# Patient Record
Sex: FEMALE | Race: WHITE | NOT HISPANIC OR LATINO | Employment: OTHER | URBAN - METROPOLITAN AREA
[De-identification: names, ages, dates, MRNs, and addresses within clinical notes are randomized per-mention and may not be internally consistent; named-entity substitution may affect disease eponyms.]

---

## 2017-01-25 ENCOUNTER — TELEPHONE (OUTPATIENT)
Dept: PHARMACY | Facility: CLINIC | Age: 67
End: 2017-01-25

## 2017-01-25 NOTE — TELEPHONE ENCOUNTER
Patient called saying she received a letter from [facility] indicating that if she continues to use our Pharmacy for her Repatha she would have to pay full price for the medication.  I explained we don't want that to happen and I would find out where the medication needed to be sent.    She is concerned about using their mail order program, as she used them prior for Enbrel and received medication hot a number of times.     Will call patient back with more information.

## 2017-01-25 NOTE — TELEPHONE ENCOUNTER
Called patient back with information about her insurance requiring her to use mail order for Repatha.    Explained to her that I spoke with Sowmya at [facility], [phone], and she indicated that patient must convert to  Home Delivery serviced through [facility] for long term maintenance drugs.      I gave Ms. Busby the  Home Delivery phone number ([phone]) and explained that she would need to call them to obtain a price, as they would not give it to me.      We will transfer the prescription for the patient to  Home Delivery.

## 2017-01-30 ENCOUNTER — TELEPHONE (OUTPATIENT)
Dept: PHARMACY | Facility: CLINIC | Age: 67
End: 2017-01-30

## 2017-01-30 NOTE — TELEPHONE ENCOUNTER
Tried to fill Repatha today as requested by patient.     Copayment was over $1,000 as we expected.  Patient is required to use  Home Delivery.  I told her that [facility], was transferring prescription today to them at [phone].      Patient verbalized understanding.

## 2017-02-01 ENCOUNTER — TELEPHONE (OUTPATIENT)
Dept: PHARMACY | Facility: CLINIC | Age: 67
End: 2017-02-01

## 2017-02-01 NOTE — TELEPHONE ENCOUNTER
Patient called to let me know [facility] called her and set up delivery for Repatha and told her there would be a $0.00 copayment on her Repatha.      Patient was very thankful for all the help provided.  It is my pleasure to assist Ms. Busby.

## 2017-02-13 ENCOUNTER — TELEPHONE (OUTPATIENT)
Dept: PHARMACY | Facility: CLINIC | Age: 67
End: 2017-02-13

## 2017-02-13 NOTE — TELEPHONE ENCOUNTER
Patient called to notify us that [facility], [facility] had her prescription as Repatha syringes and she can't use the syringes, only the Sureclick.  I reassured Ms. Busby that the prescription we had on filed at the pharmacy was a Sureclick and one of our pharmacists would call [facility] and speak with them.    Patient verbalized understanding.

## 2017-02-28 ENCOUNTER — TELEPHONE (OUTPATIENT)
Dept: FAMILY MEDICINE | Facility: CLINIC | Age: 67
End: 2017-02-28

## 2017-02-28 DIAGNOSIS — R73.03 PREDIABETES: Primary | ICD-10-CM

## 2017-03-03 ENCOUNTER — OFFICE VISIT (OUTPATIENT)
Dept: RHEUMATOLOGY | Facility: CLINIC | Age: 67
End: 2017-03-03
Payer: MEDICARE

## 2017-03-03 VITALS
SYSTOLIC BLOOD PRESSURE: 129 MMHG | DIASTOLIC BLOOD PRESSURE: 50 MMHG | HEIGHT: 63 IN | BODY MASS INDEX: 41.61 KG/M2 | HEART RATE: 63 BPM | WEIGHT: 234.81 LBS

## 2017-03-03 DIAGNOSIS — D64.9 ANEMIA, UNSPECIFIED TYPE: ICD-10-CM

## 2017-03-03 DIAGNOSIS — E55.9 VITAMIN D DEFICIENCY: ICD-10-CM

## 2017-03-03 DIAGNOSIS — M81.0 OP (OSTEOPOROSIS): ICD-10-CM

## 2017-03-03 DIAGNOSIS — E66.01 MORBID OBESITY WITH BMI OF 40.0-44.9, ADULT: ICD-10-CM

## 2017-03-03 DIAGNOSIS — G62.9 NEUROPATHY: ICD-10-CM

## 2017-03-03 DIAGNOSIS — Z95.1 S/P CABG (CORONARY ARTERY BYPASS GRAFT): Chronic | ICD-10-CM

## 2017-03-03 DIAGNOSIS — N18.30 STAGE 3 CHRONIC KIDNEY DISEASE: ICD-10-CM

## 2017-03-03 DIAGNOSIS — L40.50 PSORIATIC ARTHRITIS: Primary | Chronic | ICD-10-CM

## 2017-03-03 DIAGNOSIS — M15.9 PRIMARY OSTEOARTHRITIS INVOLVING MULTIPLE JOINTS: ICD-10-CM

## 2017-03-03 DIAGNOSIS — D84.9 IMMUNOCOMPROMISED PATIENT: ICD-10-CM

## 2017-03-03 PROCEDURE — 96372 THER/PROPH/DIAG INJ SC/IM: CPT | Mod: PBBFAC,PO

## 2017-03-03 PROCEDURE — 99999 PR PBB SHADOW E&M-EST. PATIENT-LVL III: CPT | Mod: PBBFAC,,, | Performed by: INTERNAL MEDICINE

## 2017-03-03 PROCEDURE — 99215 OFFICE O/P EST HI 40 MIN: CPT | Mod: S$PBB,,, | Performed by: INTERNAL MEDICINE

## 2017-03-03 PROCEDURE — 99213 OFFICE O/P EST LOW 20 MIN: CPT | Mod: PBBFAC,PO | Performed by: INTERNAL MEDICINE

## 2017-03-03 RX ORDER — CHLORHEXIDINE GLUCONATE 40 MG/ML
SOLUTION TOPICAL DAILY PRN
Qty: 473 ML | Refills: 3 | Status: SHIPPED | OUTPATIENT
Start: 2017-03-03 | End: 2017-11-16

## 2017-03-03 RX ORDER — GABAPENTIN 100 MG/1
200 CAPSULE ORAL 3 TIMES DAILY PRN
Qty: 540 CAPSULE | Refills: 3 | Status: SHIPPED | OUTPATIENT
Start: 2017-03-03 | End: 2018-03-13 | Stop reason: SDUPTHER

## 2017-03-03 RX ADMIN — USTEKINUMAB 90 MG: 90 INJECTION, SOLUTION SUBCUTANEOUS at 01:03

## 2017-03-03 NOTE — PROGRESS NOTES
"Subjective:       Patient ID: Qi Ortiz is a 66 y.o. female.    Chief Complaint: Psoriatic Arthritis; polyneuropathy; and Osteoporosis    HPI   Routine fup on psoriatic arthritis; last visit was first visit with me after DR. Stauffer left. She also has OP, CAD s/p 3v CABG 9/2016, on Stelara since 3/2016.    Last visit she was doing great! She was recovering from open heart surgery. She had no synovitis or psoriasis. She had some neuropathic pain in her chest and was requesting medicine so I started gabapentin 100mg TID.     Since last visit she had cardiology fup and had some SOB in absence of evidence of CHF exacerbation so they cleared her to start cardiology rehab. She saw pulmonary and was hypotensive and had postural dizziness during appointment so she was sent to ED by EMS. She refused admission despite hgb drop from 11.3 to 7.8. At this time she was having a bleeding hemorrhoid that stopped on it's own 2/19. She took 5 days of iron one daily (3 pills made her constipated) and her strength  Returned and didn't need to follow-up. Then daughter got the flu, then she got the flu. 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  She has PCP follow-up scheduled 3/8.   4 deaths in the family this past week as well, unexpected death of nephew in his 50s.     Review of Systems   Gastrointestinal: Positive for anal bleeding (spot or two of bright red blood on the toilet paper).   Neurological: Positive for numbness (pain on left side of the chest which is a neuropathy, complete response to gabapentin).         She is having some midfoot pain all day long.   She thinks this may be weather related.   Psoriasis in remission.     Objective:   BP (!) 129/50  Pulse 63  Ht 5' 3" (1.6 m)  Wt 106.5 kg (234 lb 12.6 oz)  BMI 41.59 kg/m2     Physical Exam   Vitals reviewed.  Constitutional: She is oriented to person, place, and time and well-developed, " well-nourished, and in no distress. No distress.   HENT:   Head: Normocephalic and atraumatic.   Right Ear: External ear normal.   Left Ear: External ear normal.   Eyes: Conjunctivae are normal. Right eye exhibits no discharge. Left eye exhibits no discharge. No scleral icterus.   Neck: Neck supple.   Cardiovascular: Normal rate and regular rhythm.    Murmur heard.  Sinus arrhythmia     Pulmonary/Chest: Effort normal. No respiratory distress. She has no wheezes.   Neurological: She is alert and oriented to person, place, and time. No cranial nerve deficit. She exhibits normal muscle tone.   Skin: Skin is warm and dry. Rash (small amount of redness in the hair line anteriorly) noted. She is not diaphoretic. No erythema.     Psychiatric: Mood, memory, affect and judgment normal.   Musculoskeletal: Normal range of motion. She exhibits tenderness (r inferior medial knee joint line tenderness, no synoviits). She exhibits no edema or deformity.   No synovitis anywhere             LABS REVIEWED  Results for JOI LYON (MRN [id number]) as of 3/3/2017 12:44   Ref. Range 3/3/2017 12:25   WBC Latest Ref Range: 3.90 - 12.70 K/uL 6.95   RBC Latest Ref Range: 4.00 - 5.40 M/uL 4.65   Hemoglobin Latest Ref Range: 12.0 - 16.0 g/dL 10.5 (L)   Hematocrit Latest Ref Range: 37.0 - 48.5 % 34.8 (L)   MCV Latest Ref Range: 82 - 98 fL 75 (L)   MCH Latest Ref Range: 27.0 - 31.0 pg 22.6 (L)   MCHC Latest Ref Range: 32.0 - 36.0 % 30.2 (L)   RDW Latest Ref Range: 11.5 - 14.5 % 18.6 (H)   Platelets Latest Ref Range: 150 - 350 K/uL 291   MPV Latest Ref Range: 9.2 - 12.9 fL 9.1 (L)     Results for JOI LYON (MRN [id number]) as of 3/3/2017 15:55   Ref. Range 3/3/2017 12:25   Sodium Latest Ref Range: 136 - 145 mmol/L 142   Potassium Latest Ref Range: 3.5 - 5.1 mmol/L 4.1   Chloride Latest Ref Range: 95 - 110 mmol/L 106   CO2 Latest Ref Range: 23 - 29 mmol/L 26   Anion Gap Latest Ref Range: 8 - 16 mmol/L 10   BUN, Bld Latest Ref Range: 8 -  23 mg/dL 20   Creatinine Latest Ref Range: 0.5 - 1.4 mg/dL 1.1   eGFR if non African American Latest Ref Range: >60 mL/min/1.73 m^2 53 (A)   eGFR if African American Latest Ref Range: >60 mL/min/1.73 m^2 >60   Glucose Latest Ref Range: 70 - 110 mg/dL 100   Calcium Latest Ref Range: 8.7 - 10.5 mg/dL 9.5   Alkaline Phosphatase Latest Ref Range: 55 - 135 U/L 45 (L)   Total Protein Latest Ref Range: 6.0 - 8.4 g/dL 7.3   Albumin Latest Ref Range: 3.5 - 5.2 g/dL 3.4 (L)   Total Bilirubin Latest Ref Range: 0.1 - 1.0 mg/dL 0.4   AST Latest Ref Range: 10 - 40 U/L 23   ALT Latest Ref Range: 10 - 44 U/L 12     I personally viewed the xrays with my impressions below:  CXR 12/2016 no acute disease     Assessment:       1. Psoriatic arthritis    2. S/P CABG (coronary artery bypass graft)    3. Morbid obesity with BMI of 40.0-44.9, adult    4. Vitamin D deficiency    5. OP (osteoporosis)    6. Anemia, unspecified type    7. Neuropathy    8. Immunocompromised patient    9. Stage 3 chronic kidney disease    10. Primary osteoarthritis involving multiple joints          Psoriatic arthritis- in remission today- Failed Enbrel, intolerance to MTX, allergy to sulfa, recurrent skin infections with Humira on hibiclens, Otezla intolerance  Started Ustekinumab IL12/IL-23  On 3/2016 with 90% improvement of her skin. Ustekinumab 90mg every 12 weeks she gets it done here. Due for injection today. No clinical or lab toxicity noted.       Knee OA/multiple sites- s/p viscosupplementation 1/2016, has some pain inferior right knee joint line and takes OTC tylenol with relief     Osteopenia on drug holiday  Repeat DEXA 4/2017     Polyneuropathy- chronic, improving with gabapentin 100mg to 200mg TID. Needs refills.     Anemia- improving, due to bleeding hemorrhoids which are just now spotting. She is going to see colorectal surgeon about this.     CKD Stage III- stable, avoid NSAIDS.     S/p CABG- stable right now, following with pulmonary and  cardiology.     IC patient    Plan:     Continue taking gabapentin 200mg -200mg as needed, new RX given    Continue hibiclens refilled for her since she has had numerous soft tissue infections and ID recommended she use this product to wash with     Give Stelara today and schedule in 12 weeks    DEXA next visit    See colorectal surgeon please!    RTC in 3 months with cbc, cmp, sed rate, crp and DEXA    MB

## 2017-03-03 NOTE — MR AVS SNAPSHOT
[facility] - Rheumatology  [address] [place] [address]  Phone: [phone]  Fax: [phone]                  Qi Ortiz   3/3/2017 1:00 PM   Office Visit    Description:  Female : 1950   Provider:  Elisabeth Barragan MD   Department:  [facility] - Rheumatology           Reason for Visit     Psoriatic Arthritis     polyneuropathy     Osteoporosis           Diagnoses this Visit        Comments    Psoriatic arthritis    -  Primary     S/P CABG (coronary artery bypass graft)         Morbid obesity with BMI of 40.0-44.9, adult         Vitamin D deficiency         OP (osteoporosis)         Anemia, unspecified type         Neuropathy         Immunocompromised patient         Stage 3 chronic kidney disease                To Do List           Future Appointments        Provider Department Dept Phone    3/7/2017 1:40 PM Tono Hancock MD [facility] [phone]    3/8/2017 11:00 AM Trinidad Cleaning MD [facility] [phone]    2017 10:30 AM LABORATORY, [facility] [phone]    2017 11:00 AM [facility] [phone]    2017 11:30 AM Elisabeth Barragan MD [facility] Rheumatology [phone]      Goals (5 Years of Data)     None       These Medications        Disp Refills Start End    gabapentin (NEURONTIN) 100 MG capsule 540 capsule 3 3/3/2017 3/3/2018    Take 2 capsules (200 mg total) by mouth 3 (three) times daily as needed. - Oral    Pharmacy: [facility] Ph #: [phone]       chlorhexidine (HIBICLENS) 4 % external liquid 473 mL 3 3/3/2017     Apply topically daily as needed. - Topical (Top)    Pharmacy: [facility] Ph #: [phone]         [facility]     [facility] -  Assistance  Registered nurses in the [facility] provide clinical advisement, health education,  appointment booking, and other advisory services.  Call for this free service at [phone].             Medications           Message regarding Medications     Verify the changes and/or additions to your medication regime listed below are the same as discussed with your clinician today.  If any of these changes or additions are incorrect, please notify your healthcare provider.        START taking these NEW medications        Refills    gabapentin (NEURONTIN) 100 MG capsule 3    Sig: Take 2 capsules (200 mg total) by mouth 3 (three) times daily as needed.    Class: Print    Route: Oral    chlorhexidine (HIBICLENS) 4 % external liquid 3    Sig: Apply topically daily as needed.    Class: Print    Route: Topical (Top)           Verify that the below list of medications is an accurate representation of the medications you are currently taking.  If none reported, the list may be blank. If incorrect, please contact your healthcare provider. Carry this list with you in case of emergency.           Current Medications     aspirin 81 MG Chew Take 81 mg by mouth once daily.    chlorhexidine (HIBICLENS) 4 % external liquid Apply topically daily as needed.    clopidogrel (PLAVIX) 75 mg tablet Take 1 tablet (75 mg total) by mouth once daily. Generic ok. Take as directed by your cardiologist.    cyanocobalamin 2000 MCG tablet Take 2,000 mcg by mouth once daily.    evolocumab (REPATHA SYRINGE) 140 mg/mL Syrg Inject 1 Dose into the skin every 14 (fourteen) days.    garlic 2,000 mg Cap Take 3,000 mg by mouth once daily.     levothyroxine (SYNTHROID) 100 MCG tablet Take 1 tablet (100 mcg total) by mouth once daily. Generic ok    metoprolol succinate (TOPROL-XL) 100 MG 24 hr tablet 1 tab (100mg) in morning. Half tab (50mg) at bedtime.  Generic ok    nitroGLYCERIN (NITROSTAT) 0.4 MG SL tablet Place 1 tablet (0.4 mg total) under the tongue every 5 (five) minutes as needed for Chest pain.    pyridoxine (VITAMIN B-6) 100 MG Tab Take  "200 mg by mouth once daily.     valsartan-hydrochlorothiazide (DIOVAN-HCT) 160-12.5 mg per tablet Take 1 tablet by mouth once daily. Generic ok    vitamin D 1000 units Tab Take 185 mg by mouth once daily.    chlorhexidine (HIBICLENS) 4 % external liquid Apply topically daily as needed.    ferrous gluconate (FERGON) 325 MG Tab Take 1 tablet (325 mg total) by mouth 2 (two) times daily with meals.    furosemide (LASIX) 20 MG tablet Take 1 tablet (20 mg total) by mouth 2 (two) times daily.    gabapentin (NEURONTIN) 100 MG capsule Take 2 capsules (200 mg total) by mouth 3 (three) times daily as needed.           Clinical Reference Information           Your Vitals Were     BP Pulse Height Weight BMI    129/50 63 5' 3" (1.6 m) 106.5 kg (234 lb 12.6 oz) 41.59 kg/m2      Blood Pressure          Most Recent Value    BP  (!)  129/50      Allergies as of 3/3/2017     Celexa [Citalopram]    Clindamycin    Codeine    Crestor [Rosuvastatin]    Cytotec [Misoprostol]    Lisinopril    Magnesium Citrate    Stadol [Butorphanol Tartrate]    Vicodin [Hydrocodone-acetaminophen]    Adhesive    Aggrenox [Aspirin-dipyridamole]    Demerol [Meperidine]    Isosorbide    Kenalog [Triamcinolone Acetonide]    Medrol [Methylprednisolone]    Mobic [Meloxicam]    Morphine    Nitroglycerin    Ranexa [Ranolazine]    Sulfa (Sulfonamide Antibiotics)    Talwin [Pentazocine Lactate]    Tetracyclines    Tilade [Nedocromil]    Zetia [Ezetimibe]      Immunizations Administered on Date of Encounter - 3/3/2017     None      Orders Placed During Today's Visit     Future Labs/Procedures Expected by Expires    DXA Bone Density Spine And Hip  3/3/2017 3/3/2018      [facility] Sign-Up     Activating your [facility] account is as easy as 1-2-3!     1) Visit [email or web address], select Sign Up Now, enter this activation code and your date of birth, then select Next.  [email or web address]  Expires: 4/17/2017  1:42 PM      2) Create a username and password to use when you visit " [facility] in the future and select a security question in case you lose your password and select Next.    3) Enter your e-mail address and click Sign Up!    Additional Information  If you have questions, please e-mail [email or web address] or call [phone] to talk to our [facility] staff. Remember, [facility] is NOT to be used for urgent needs. For medical emergencies, dial 911.         Language Assistance Services     ATTENTION: Language assistance services are available, free of charge. Please call [phone].      ATENCIÓN: Si habla español, tiene a gayle disposición servicios gratuitos de asistencia lingüística. Llame al [phone].     CHÚ Ý: N?u b?n nói Ti?ng Vi?t, có các d?ch v? h? tr? ngôn ng? mi?n phí dành cho b?n. G?i s? [phone].         [facility] complies with applicable Federal civil rights laws and does not discriminate on the basis of race, color, national origin, age, disability, or sex.

## 2017-03-03 NOTE — PROGRESS NOTES
Administered 1cc of Stelara 90mg/1cc to RLQ of ABD. Patient tolerated well. No acute reaction noted to site. Patient instructed on S/S to report. Patient verbalized understanding. Patient instructed to wait 15 minutes after injection.    Lot: [id number]  Exp: 02/2018  Man : [employer]

## 2017-03-07 ENCOUNTER — OFFICE VISIT (OUTPATIENT)
Dept: CARDIOLOGY | Facility: CLINIC | Age: 67
End: 2017-03-07
Payer: MEDICARE

## 2017-03-07 VITALS
HEART RATE: 60 BPM | BODY MASS INDEX: 40.12 KG/M2 | DIASTOLIC BLOOD PRESSURE: 66 MMHG | WEIGHT: 235 LBS | SYSTOLIC BLOOD PRESSURE: 140 MMHG | HEIGHT: 64 IN

## 2017-03-07 DIAGNOSIS — I25.10 CORONARY ARTERY DISEASE, OCCLUSIVE: Chronic | ICD-10-CM

## 2017-03-07 DIAGNOSIS — I25.810: ICD-10-CM

## 2017-03-07 DIAGNOSIS — I10 ESSENTIAL HYPERTENSION: ICD-10-CM

## 2017-03-07 DIAGNOSIS — Z95.1 S/P CABG (CORONARY ARTERY BYPASS GRAFT): Chronic | ICD-10-CM

## 2017-03-07 DIAGNOSIS — R07.2 PRECORDIAL PAIN: ICD-10-CM

## 2017-03-07 DIAGNOSIS — E78.2 MIXED HYPERLIPIDEMIA: Chronic | ICD-10-CM

## 2017-03-07 DIAGNOSIS — I25.10 CORONARY ARTERY DISEASE, OCCLUSIVE: Primary | Chronic | ICD-10-CM

## 2017-03-07 PROCEDURE — 99999 PR PBB SHADOW E&M-EST. PATIENT-LVL III: CPT | Mod: PBBFAC,,, | Performed by: NUCLEAR MEDICINE

## 2017-03-07 PROCEDURE — 99213 OFFICE O/P EST LOW 20 MIN: CPT | Mod: PBBFAC | Performed by: NUCLEAR MEDICINE

## 2017-03-07 PROCEDURE — 99214 OFFICE O/P EST MOD 30 MIN: CPT | Mod: S$PBB,,, | Performed by: NUCLEAR MEDICINE

## 2017-03-07 RX ORDER — CLOPIDOGREL BISULFATE 75 MG/1
75 TABLET ORAL DAILY
Qty: 90 TABLET | Refills: 3 | Status: SHIPPED | OUTPATIENT
Start: 2017-03-07 | End: 2017-03-09 | Stop reason: SDUPTHER

## 2017-03-07 RX ORDER — VALSARTAN AND HYDROCHLOROTHIAZIDE 160; 12.5 MG/1; MG/1
1 TABLET, FILM COATED ORAL DAILY
Qty: 90 TABLET | Refills: 3 | Status: SHIPPED | OUTPATIENT
Start: 2017-03-07 | End: 2017-03-09 | Stop reason: SDUPTHER

## 2017-03-07 RX ORDER — METOPROLOL SUCCINATE 100 MG/1
TABLET, EXTENDED RELEASE ORAL
Qty: 135 TABLET | Refills: 3 | Status: SHIPPED | OUTPATIENT
Start: 2017-03-07 | End: 2017-03-09 | Stop reason: SDUPTHER

## 2017-03-07 RX ORDER — NITROGLYCERIN 0.4 MG/1
0.4 TABLET SUBLINGUAL EVERY 5 MIN PRN
Qty: 90 TABLET | Refills: 3 | Status: SHIPPED | OUTPATIENT
Start: 2017-03-07 | End: 2017-03-09 | Stop reason: SDUPTHER

## 2017-03-07 NOTE — MR AVS SNAPSHOT
[facility]  [address] [address]  Phone: [phone]  Fax: [phone]                  Qi Ortiz   3/7/2017 1:40 PM   Office Visit    Description:  Female : 1950   Provider:  Tono Hancock MD   Department:  [facility] Cardiology           Reason for Visit     Coronary Artery Disease     Hyperlipidemia           Diagnoses this Visit        Comments    Coronary artery disease, occlusive    -  Primary     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         Mixed hyperlipidemia                To Do List           Future Appointments        Provider Department Dept Phone    3/8/2017 11:00 AM Trinidad Cleaning MD [facility] [phone]    2017 10:30 AM LABORATORY, [facility] [phone]    2017 11:00 AM [facility] [phone]    2017 11:30 AM Elisabeth Barragan MD [facility] [phone]      Goals (5 Years of Data)     None      Follow-Up and Disposition     Return in about 6 months (around 2017).      [facility]     [facility] Nurse [facility] -  Assistance  Registered nurses in the [facility] provide clinical advisement, health education, appointment booking, and other advisory services.  Call for this free service at [phone].             Medications           Message regarding Medications     Verify the changes and/or additions to your medication regime listed below are the same as discussed with your clinician today.  If any of these changes or additions are incorrect, please notify your healthcare provider.             Verify that the below list of medications is an accurate representation of the medications you are currently taking.  If none reported, the list may be blank. If incorrect, please contact your healthcare provider. Carry this list with you in case of emergency.          "  Current Medications     aspirin 81 MG Chew Take 81 mg by mouth once daily.    chlorhexidine (HIBICLENS) 4 % external liquid Apply topically daily as needed.    chlorhexidine (HIBICLENS) 4 % external liquid Apply topically daily as needed.    cyanocobalamin 2000 MCG tablet Take 2,000 mcg by mouth once daily.    gabapentin (NEURONTIN) 100 MG capsule Take 2 capsules (200 mg total) by mouth 3 (three) times daily as needed.    garlic 2,000 mg Cap Take 3,000 mg by mouth once daily.     levothyroxine (SYNTHROID) 100 MCG tablet Take 1 tablet (100 mcg total) by mouth once daily. Generic ok    pyridoxine (VITAMIN B-6) 100 MG Tab Take 200 mg by mouth once daily.     vitamin D 1000 units Tab Take 185 mg by mouth once daily.    clopidogrel (PLAVIX) 75 mg tablet Take 1 tablet (75 mg total) by mouth once daily. Generic ok. Take as directed by your cardiologist.    evolocumab (REPATHA SURECLICK) 140 mg/mL PnIj Inject 140 mg into the skin every 14 (fourteen) days.    ferrous gluconate (FERGON) 325 MG Tab Take 1 tablet (325 mg total) by mouth 2 (two) times daily with meals.    furosemide (LASIX) 20 MG tablet Take 1 tablet (20 mg total) by mouth 2 (two) times daily.    metoprolol succinate (TOPROL-XL) 100 MG 24 hr tablet 1 tab (100mg) in morning. Half tab (50mg) at bedtime.  Generic ok    nitroGLYCERIN (NITROSTAT) 0.4 MG SL tablet Place 1 tablet (0.4 mg total) under the tongue every 5 (five) minutes as needed for Chest pain.    valsartan-hydrochlorothiazide (DIOVAN-HCT) 160-12.5 mg per tablet Take 1 tablet by mouth once daily. Generic ok           Clinical Reference Information           Your Vitals Were     BP Pulse Height Weight BMI    140/66 (BP Location: Left arm, Patient Position: Sitting, BP Method: Manual) 60 5' 3.5" (1.613 m) 106.6 kg (235 lb) 40.98 kg/m2      Blood Pressure          Most Recent Value    BP  (!)  140/66      Allergies as of 3/7/2017     Celexa [Citalopram]    Clindamycin    Codeine    Crestor [Rosuvastatin] "    Cytotec [Misoprostol]    Lisinopril    Magnesium Citrate    Stadol [Butorphanol Tartrate]    Vicodin [Hydrocodone-acetaminophen]    Adhesive    Aggrenox [Aspirin-dipyridamole]    Demerol [Meperidine]    Isosorbide    Kenalog [Triamcinolone Acetonide]    Medrol [Methylprednisolone]    Mobic [Meloxicam]    Morphine    Nitroglycerin    Ranexa [Ranolazine]    Sulfa (Sulfonamide Antibiotics)    Talwin [Pentazocine Lactate]    Tetracyclines    Tilade [Nedocromil]    Zetia [Ezetimibe]      Immunizations Administered on Date of Encounter - 3/7/2017     None      [facility] Sign-Up     Activating your [facility] account is as easy as 1-2-3!     1) Visit [email or web address], select Sign Up Now, enter this activation code and your date of birth, then select Next.  [email or web address]  Expires: 4/17/2017  1:42 PM      2) Create a username and password to use when you visit [facility] in the future and select a security question in case you lose your password and select Next.    3) Enter your e-mail address and click Sign Up!    Additional Information  If you have questions, please e-mail [email or web address] or call [phone] to talk to our [facility] staff. Remember, [facility] is NOT to be used for urgent needs. For medical emergencies, dial 911.         Language Assistance Services     ATTENTION: Language assistance services are available, free of charge. Please call [phone].      ATENCIÓN: Si habla español, tiene a gayle disposición servicios gratuitos de asistencia lingüística. Llame al [phone].     CHÚ Ý: N?u b?n nói Ti?ng Vi?t, có các d?ch v? h? tr? ngôn ng? mi?n phí dành cho b?n. G?i s? [phone].         [facility] complies with applicable Federal civil rights laws and does not discriminate on the basis of race, color, national origin, age, disability, or sex.

## 2017-03-07 NOTE — PROGRESS NOTES
Subjective:   Patient ID:  Qi Ortiz is a 66 y.o. female who presents for follow-up of Coronary Artery Disease (3 month followup) and Hyperlipidemia      HPI 1- CHRONIC CAD- POST CABG   2- HYPERLIPIDEMIA ON REPATHA  DOING WELL.  NO RECURRENT ANGINA. NO RECENT HOSPITALIZATIONS OR ED VISITS FOR ACS OR ADHF.  ORDINARY DAILY ACTIVITIES WELL TOLERATED. NO ORTHOPNEA OR PND  NO EDEMA. NO INTERMITTENT CLAUDICATION. NO CALVE TENDERNESS  NO PALPITATIONS. NO SYNCOPE  NO FOCAL CNS SYMPTOMS OR SIGNS TO SUGGEST TIA OR STROKE  CARD MED WELL TOLERATED  REPATHA - NO SIDE EFFECTS    Review of Systems   Constitution: Negative for chills, fever, weakness, night sweats, weight gain and weight loss.   HENT: Negative for headaches and nosebleeds.    Eyes: Negative for blurred vision, double vision and visual disturbance.   Cardiovascular: Negative for chest pain, dyspnea on exertion, irregular heartbeat, leg swelling, orthopnea, palpitations, paroxysmal nocturnal dyspnea and syncope.   Respiratory: Negative for cough, hemoptysis and wheezing.    Endocrine: Negative for polydipsia and polyuria.   Hematologic/Lymphatic: Does not bruise/bleed easily.   Skin: Negative for rash.   Musculoskeletal: Negative for joint pain, joint swelling, muscle weakness and myalgias.   Gastrointestinal: Negative for abdominal pain, hematemesis, jaundice and melena.   Genitourinary: Negative for dysuria, hematuria and nocturia.   Neurological: Negative for dizziness, focal weakness and sensory change.   Psychiatric/Behavioral: Negative for depression. The patient does not have insomnia and is not nervous/anxious.      Family History   Problem Relation Age of Onset    Breast cancer Maternal Grandfather     Breast cancer Paternal Aunt     Stroke      Breast cancer Sister 60    Leukemia Sister 8      as child    Lung cancer Paternal Grandfather     Heart disease       Past Medical History:   Diagnosis Date    Carotid stenosis     19%    COPD  (chronic obstructive pulmonary disease)     No meds    CVA (cerebral vascular accident)     Dr. 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 Hope    Psoriatic arthritis     Rheumatology     Current Outpatient Prescriptions on File Prior to Visit   Medication Sig Dispense Refill    aspirin 81 MG Chew Take 81 mg by mouth once daily.      chlorhexidine (HIBICLENS) 4 % external liquid Apply topically daily as needed. 120 mL 0    chlorhexidine (HIBICLENS) 4 % external liquid Apply topically daily as needed. 473 mL 3    cyanocobalamin 2000 MCG tablet Take 2,000 mcg by mouth once daily.      gabapentin (NEURONTIN) 100 MG capsule Take 2 capsules (200 mg total) by mouth 3 (three) times daily as needed. 540 capsule 3    garlic 2,000 mg Cap Take 3,000 mg by mouth once daily.       levothyroxine (SYNTHROID) 100 MCG tablet Take 1 tablet (100 mcg total) by mouth once daily. Generic ok 90 tablet 3    pyridoxine (VITAMIN B-6) 100 MG Tab Take 200 mg by mouth once daily.       vitamin D 1000 units Tab Take 185 mg by mouth once daily.      [DISCONTINUED] clopidogrel (PLAVIX) 75 mg tablet Take 1 tablet (75 mg total) by mouth once daily. Generic ok. Take as directed by your cardiologist. 90 tablet 3    [DISCONTINUED] evolocumab (REPATHA SYRINGE) 140 mg/mL Syrg Inject 1 Dose into the skin every 14 (fourteen) days. 6 Syringe 3    [DISCONTINUED] metoprolol succinate (TOPROL-XL) 100 MG 24 hr tablet 1 tab (100mg) in morning. Half tab (50mg) at bedtime.  Generic ok 135 tablet 3    [DISCONTINUED] valsartan-hydrochlorothiazide (DIOVAN-HCT) 160-12.5 mg per tablet Take 1 tablet by mouth once daily. Generic ok 90 tablet 3    ferrous gluconate (FERGON) 325 MG Tab Take 1 tablet (325 mg total) by mouth 2 (two) times daily with meals. 60 tablet 0    furosemide (LASIX) 20 MG tablet Take 1 tablet (20 mg total) by  mouth 2 (two) times daily. (Patient taking differently: Take 20 mg by mouth once daily. ) 60 tablet 11    [DISCONTINUED] nitroGLYCERIN (NITROSTAT) 0.4 MG SL tablet Place 1 tablet (0.4 mg total) under the tongue every 5 (five) minutes as needed for Chest pain. 60 tablet 12     No current facility-administered medications on file prior to visit.      Review of patient's allergies indicates:   Allergen Reactions    Celexa [citalopram] Anaphylaxis    Clindamycin Itching and Swelling    Codeine Shortness Of Breath, Itching and Swelling    Crestor [rosuvastatin] Anaphylaxis    Cytotec [misoprostol] Anaphylaxis and Other (See Comments)     Difficulty breathing    Lisinopril Anaphylaxis    Magnesium citrate Shortness Of Breath    Stadol [butorphanol tartrate] Anaphylaxis     Coded    Vicodin [hydrocodone-acetaminophen] Shortness Of Breath    Adhesive      EKG Electrodes    Aggrenox [aspirin-dipyridamole] Other (See Comments)     headaches    Demerol [meperidine]     Isosorbide Other (See Comments)     Severe headache    Kenalog [triamcinolone acetonide]     Medrol [methylprednisolone] Other (See Comments)     unknown    Mobic [meloxicam]     Morphine     Nitroglycerin      Long acting    Ranexa [ranolazine] Nausea And Vomiting    Sulfa (sulfonamide antibiotics) Other (See Comments)     unknown    Talwin [pentazocine lactate]     Tetracyclines     Tilade [nedocromil]     Zetia [ezetimibe]        Objective:     Physical Exam   Constitutional: She is oriented to person, place, and time. She appears well-developed. No distress.   HENT:   Head: Normocephalic.   Eyes: Conjunctivae are normal. Pupils are equal, round, and reactive to light. No scleral icterus.   Neck: Normal range of motion. Neck supple. Normal carotid pulses, no hepatojugular reflux and no JVD present. Carotid bruit is not present. No edema present. No thyroid mass and no thyromegaly present.   Cardiovascular: Normal rate, regular rhythm,  S1 normal, S2 normal, normal heart sounds and intact distal pulses.  PMI is not displaced.  Exam reveals no gallop and no friction rub.    No murmur heard.  Pulses:       Carotid pulses are 2+ on the right side, and 2+ on the left side.       Radial pulses are 2+ on the right side, and 2+ on the left side.        Femoral pulses are 2+ on the right side, and 2+ on the left side.       Popliteal pulses are 2+ on the right side, and 2+ on the left side.        Dorsalis pedis pulses are 2+ on the right side, and 2+ on the left side.        Posterior tibial pulses are 2+ on the right side, and 2+ on the left side.   Pulmonary/Chest: Effort normal and breath sounds normal. She has no wheezes. She has no rales. She exhibits no tenderness.   Abdominal: Soft. Bowel sounds are normal. She exhibits no pulsatile midline mass and no mass. There is no hepatosplenomegaly. There is no tenderness.   Musculoskeletal: Normal range of motion. She exhibits no edema or tenderness.        Cervical back: Normal.        Thoracic back: Normal.        Lumbar back: Normal.   Lymphadenopathy:     She has no cervical adenopathy.     She has no axillary adenopathy.        Right: No supraclavicular adenopathy present.        Left: No supraclavicular adenopathy present.   Neurological: She is alert and oriented to person, place, and time. She has normal strength and normal reflexes. No sensory deficit. Gait normal.   Skin: Skin is warm. No rash noted. No cyanosis. No pallor. Nails show no clubbing.   Psychiatric: She has a normal mood and affect. Her speech is normal and behavior is normal. Cognition and memory are normal.       Assessment:     1. Coronary artery disease, occlusive    2. 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    3. Mixed hyperlipidemia      STABLE CAD/ ANGINA PATTERN.  RECENT LAB RESULTS WERE REVIEWED AND DISCUSSED- LIPIDS AT GOAL  CNS STATUS STABLE  CARD MED WELL TOLERATED  Plan:     Coronary artery  disease, occlusive    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    Mixed hyperlipidemia    1- CONTINUE PRESENT CARD MED    2- RETURN IN 6 MONTHS.

## 2017-03-08 ENCOUNTER — HOSPITAL ENCOUNTER (OUTPATIENT)
Dept: RADIOLOGY | Facility: HOSPITAL | Age: 67
Discharge: HOME | End: 2017-03-08
Attending: FAMILY MEDICINE
Payer: MEDICARE

## 2017-03-08 ENCOUNTER — OFFICE VISIT (OUTPATIENT)
Dept: FAMILY MEDICINE | Facility: CLINIC | Age: 67
End: 2017-03-08
Payer: MEDICARE

## 2017-03-08 VITALS
HEIGHT: 64 IN | RESPIRATION RATE: 15 BRPM | WEIGHT: 237.88 LBS | TEMPERATURE: 97 F | SYSTOLIC BLOOD PRESSURE: 136 MMHG | BODY MASS INDEX: 40.61 KG/M2 | HEART RATE: 60 BPM | DIASTOLIC BLOOD PRESSURE: 70 MMHG | OXYGEN SATURATION: 98 %

## 2017-03-08 DIAGNOSIS — E78.5 HYPERLIPIDEMIA, UNSPECIFIED HYPERLIPIDEMIA TYPE: ICD-10-CM

## 2017-03-08 DIAGNOSIS — L40.50 PSORIATIC ARTHRITIS: ICD-10-CM

## 2017-03-08 DIAGNOSIS — E66.01 MORBID OBESITY WITH BMI OF 40.0-44.9, ADULT: ICD-10-CM

## 2017-03-08 DIAGNOSIS — E03.9 HYPOTHYROIDISM, UNSPECIFIED TYPE: ICD-10-CM

## 2017-03-08 DIAGNOSIS — Z12.11 COLON CANCER SCREENING: ICD-10-CM

## 2017-03-08 DIAGNOSIS — N18.30 STAGE 3 CHRONIC KIDNEY DISEASE: ICD-10-CM

## 2017-03-08 DIAGNOSIS — Z12.31 VISIT FOR SCREENING MAMMOGRAM: ICD-10-CM

## 2017-03-08 DIAGNOSIS — I25.10 CORONARY ARTERY DISEASE, OCCLUSIVE: ICD-10-CM

## 2017-03-08 DIAGNOSIS — R73.03 PREDIABETES: Primary | ICD-10-CM

## 2017-03-08 DIAGNOSIS — D64.9 ANEMIA, UNSPECIFIED: ICD-10-CM

## 2017-03-08 DIAGNOSIS — I10 ESSENTIAL HYPERTENSION: ICD-10-CM

## 2017-03-08 DIAGNOSIS — M25.561 RIGHT KNEE PAIN, UNSPECIFIED CHRONICITY: ICD-10-CM

## 2017-03-08 DIAGNOSIS — Z95.1 S/P CABG (CORONARY ARTERY BYPASS GRAFT): ICD-10-CM

## 2017-03-08 PROCEDURE — 73560 X-RAY EXAM OF KNEE 1 OR 2: CPT | Mod: TC,59,PO,LT

## 2017-03-08 PROCEDURE — 73562 X-RAY EXAM OF KNEE 3: CPT | Mod: 26,RT,, | Performed by: RADIOLOGY

## 2017-03-08 PROCEDURE — 99214 OFFICE O/P EST MOD 30 MIN: CPT | Mod: S$PBB,,, | Performed by: FAMILY MEDICINE

## 2017-03-08 PROCEDURE — 73560 X-RAY EXAM OF KNEE 1 OR 2: CPT | Mod: 26,59,LT, | Performed by: RADIOLOGY

## 2017-03-08 PROCEDURE — 99999 PR PBB SHADOW E&M-EST. PATIENT-LVL IV: CPT | Mod: PBBFAC,,, | Performed by: FAMILY MEDICINE

## 2017-03-08 RX ORDER — LEVOTHYROXINE SODIUM 100 UG/1
100 TABLET ORAL DAILY
Qty: 90 TABLET | Refills: 1 | Status: SHIPPED | OUTPATIENT
Start: 2017-03-08 | End: 2017-09-18

## 2017-03-08 NOTE — PROGRESS NOTES
Subjective:       Patient ID: Qi Ortiz is a 66 y.o. female.    Chief Complaint: Chronic Care    HPI   Chronic Care  65yo female presents today for chronic care assessment. Since her last visit she has undergone CABG after findings of occlusive CAD. She reports she has been doing well and is without any associated CP, BROOKS or palpitations. Most recent assessment with Cardiology was yesterday and no adjustments were made to her medication regimen. Psoriatic arthritis has been stable and she is followed by Rheumatology routinely. She has started Stelara treatments. Updated lab assessment demonstrates stable A1c at 6.0. There are no symptoms of hyper or hypoglycemia reported. Diet has admittedly been variable. Current TFT levels are stable with TSH of 1.567 and free T4 of 1.07 on Synthroid 100mcg daily. She has been experiencing hemorrhoidal bleeding and is planning to see GI for updated C scope. No change in bowel habits is reported. Right knee pain has been present for the past week. She reports crepitus and audible popping to the patella with range of motion. There have been no falls or injuries. She has tried no measures for relief. Updated health maintenance is needed.     Review of Systems   Constitutional: Negative for appetite change, fatigue and unexpected weight change.   HENT: Negative for congestion, ear pain, rhinorrhea and sore throat.    Eyes: Negative for redness, itching and visual disturbance.   Respiratory: Negative for chest tightness and shortness of breath.    Cardiovascular: Negative for chest pain, palpitations and leg swelling.   Gastrointestinal: Negative for abdominal pain, blood in stool, constipation, diarrhea, nausea and vomiting.        +hemorrhoids   Endocrine: Negative for cold intolerance, heat intolerance, polydipsia, polyphagia and polyuria.   Genitourinary: Negative for decreased urine volume, difficulty urinating and dysuria.   Musculoskeletal: Positive for arthralgias.  "Negative for myalgias.   Skin: Negative for pallor and rash.   Allergic/Immunologic: Negative for environmental allergies.   Neurological: Negative for dizziness, weakness and numbness.   Hematological: Bruises/bleeds easily.   Psychiatric/Behavioral: Negative for dysphoric mood and sleep disturbance. The patient is not nervous/anxious.        Objective:   /70  Pulse 60  Temp 96.7 °F (35.9 °C) (Temporal)   Resp 15  Ht 5' 3.5" (1.613 m)  Wt 107.9 kg (237 lb 14 oz)  SpO2 98%  BMI 41.48 kg/m2  Physical Exam   Constitutional: She is oriented to person, place, and time. She appears well-developed. No distress.   Morbidly obese, non-toxic   HENT:   Head: Normocephalic and atraumatic.   Right Ear: External ear normal.   Left Ear: External ear normal.   Nose: Nose normal.   Mouth/Throat: Oropharynx is clear and moist.   Eyes: Conjunctivae and EOM are normal. Pupils are equal, round, and reactive to light.   Neck: Normal range of motion. Neck supple.   Cardiovascular: Normal rate, regular rhythm and normal heart sounds.    Pulmonary/Chest: Effort normal and breath sounds normal.   Abdominal: Soft. Bowel sounds are normal.   Musculoskeletal: She exhibits no edema.        Right knee: She exhibits normal range of motion, no swelling, no effusion and no bony tenderness. No tenderness found. No medial joint line, no lateral joint line and no patellar tendon tenderness noted.   +crepitus on ROM of the right knee   Neurological: She is alert and oriented to person, place, and time.   Skin: Skin is warm and dry. She is not diaphoretic.   Psychiatric: She has a normal mood and affect. Her behavior is normal.       Assessment:       1. Prediabetes    2. Essential hypertension    3. Hypothyroidism, unspecified type    4. Right knee pain, unspecified chronicity    5. Anemia, unspecified    6. Hyperlipidemia, unspecified hyperlipidemia type    7. Coronary artery disease, occlusive    8. Psoriatic arthritis    9. Stage 3 " chronic kidney disease    10. Morbid obesity with BMI of 40.0-44.9, adult    11. S/P CABG (coronary artery bypass graft)    12. Visit for screening mammogram    13. Colon cancer screening        Plan:   Prediabetes  Stable. Advised need for dietary modification and weight loss. Recommend recheck in 6 months.  -     Hemoglobin A1c; Future; Expected date: 3/10/17    Essential hypertension  Stable. Continuation of current measures is advised. Target BP <140/90.  -     Basic metabolic panel; Future; Expected date: 3/10/17    Hypothyroidism, unspecified type  Stable TFT's. Continue with Synthroid at current dosing. Recheck in 6 months.  -     levothyroxine (SYNTHROID) 100 MCG tablet; Take 1 tablet (100 mcg total) by mouth once daily. Generic ok  Dispense: 90 tablet; Refill: 1  -     TSH; Future; Expected date: 3/8/17  -     T4, free; Future; Expected date: 3/8/17  -     TSH; Future; Expected date: 3/10/17  -     T4, free; Future; Expected date: 3/10/17    Right knee pain, unspecified chronicity  Mild degenerative changes noted. Consider Ortho evaluation.  -     X-ray Knee Ortho Right; Future; Expected date: 3/8/17    Anemia, unspecified  Have strongly advised seeing GI for C scope and further assessment with regard to hemorrhoids.  -     CBC auto differential; Future; Expected date: 3/8/17    Hyperlipidemia, unspecified hyperlipidemia type  Stable. Continue with current treatment in combination with dietary measures.    Coronary artery disease, occlusive  Stable. Notes from most recent Cardiology visit have been reviewed with the patient in office today. She is advised to keep all future follow-up appointments.    Psoriatic arthritis  Stable. Continue with Stelara as per Rheumatology. Notes from her most recent visit were reviewed with the patient in office today.    Stage 3 chronic kidney disease  Stable. Recommend BP control and avoidance of NSAID use. Will assess updated BMP prior to next visit.    Morbid obesity with  BMI of 40.0-44.9, adult  Weight loss efforts have been encouraged as above.    S/P CABG (coronary artery bypass graft)  As per Cardiology.    Visit for screening mammogram  -     Mammo Digital Screening Bilat with CAD; Future; Expected date: 3/8/17    Colon cancer screening  See GI as above.    RTC in 6 months with labs prior to the visit.

## 2017-03-08 NOTE — PROGRESS NOTES
Patient states she wants to wait to do mammogram/  Because of open heart surgery sept 2016.  Cayla Romeo LPN

## 2017-03-08 NOTE — MR AVS SNAPSHOT
[facility] Medicine  [address]  [facility] [address]  Phone: [phone]  Fax: [phone]                  Qi Ortiz   3/8/2017 11:00 AM   Office Visit    Description:  Female : 1950   Provider:  Trinidad Cleaning MD   Department:  [facility]           Reason for Visit     Chronic Care           Diagnoses this Visit        Comments    Prediabetes    -  Primary     Essential hypertension         Hypothyroidism, unspecified type         Right knee pain, unspecified chronicity         Anemia, unspecified         Hyperlipidemia, unspecified hyperlipidemia type         Coronary artery disease, occlusive         Psoriatic arthritis         Stage 3 chronic kidney disease         Morbid obesity with BMI of 40.0-44.9, adult         S/P CABG (coronary artery bypass graft)         Visit for screening mammogram         Colon cancer screening                To Do List           Future Appointments        Provider Department Dept Phone    3/8/2017 1:30 PM [facility] XR1 [facility] [phone]    3/8/2017 1:50 PM LAB[facility]ATORY, [place] [facility]     2017 10:30 AM [facility]ATORY, [place] [facility] [phone]    2017 11:00 AM [facility] BMD1 [facility] [phone]    2017 11:30 AM Elisabeth Barragan MD [facility] Rheumatology [phone]      Goals (5 Years of Data)     None       These Medications        Disp Refills Start End    levothyroxine (SYNTHROID) 100 MCG tablet 90 tablet 1 3/8/2017     Take 1 tablet (100 mcg total) by mouth once daily. Generic ok - Oral    Pharmacy: [facility] Ph #: [phone]         [facility] On Call     [facility] On [facility] Nurse Care Line -  Assistance  Registered nurses in the [facility] Center provide clinical advisement, health education, appointment booking, and other advisory services.  Call for this  free service at [phone].             Medications           Message regarding Medications     Verify the changes and/or additions to your medication regime listed below are the same as discussed with your clinician today.  If any of these changes or additions are incorrect, please notify your healthcare provider.             Verify that the below list of medications is an accurate representation of the medications you are currently taking.  If none reported, the list may be blank. If incorrect, please contact your healthcare provider. Carry this list with you in case of emergency.           Current Medications     aspirin 81 MG Chew Take 81 mg by mouth once daily.    chlorhexidine (HIBICLENS) 4 % external liquid Apply topically daily as needed.    chlorhexidine (HIBICLENS) 4 % external liquid Apply topically daily as needed.    clopidogrel (PLAVIX) 75 mg tablet Take 1 tablet (75 mg total) by mouth once daily. Generic ok. Take as directed by your cardiologist.    cyanocobalamin 2000 MCG tablet Take 2,000 mcg by mouth once daily.    evolocumab (REPATHA SURECLICK) 140 mg/mL PnIj Inject 140 mg into the skin every 14 (fourteen) days.    ferrous gluconate (FERGON) 325 MG Tab Take 1 tablet (325 mg total) by mouth 2 (two) times daily with meals.    furosemide (LASIX) 20 MG tablet Take 1 tablet (20 mg total) by mouth 2 (two) times daily.    gabapentin (NEURONTIN) 100 MG capsule Take 2 capsules (200 mg total) by mouth 3 (three) times daily as needed.    garlic 2,000 mg Cap Take 3,000 mg by mouth once daily.     levothyroxine (SYNTHROID) 100 MCG tablet Take 1 tablet (100 mcg total) by mouth once daily. Generic ok    metoprolol succinate (TOPROL-XL) 100 MG 24 hr tablet 1 tab (100mg) in morning. Half tab (50mg) at bedtime.  Generic ok    nitroGLYCERIN (NITROSTAT) 0.4 MG SL tablet Place 1 tablet (0.4 mg total) under the tongue every 5 (five) minutes as needed for Chest pain.    pyridoxine (VITAMIN B-6) 100 MG Tab Take 200  "mg by mouth once daily.     valsartan-hydrochlorothiazide (DIOVAN-HCT) 160-12.5 mg per tablet Take 1 tablet by mouth once daily. Generic ok    vitamin D 1000 units Tab Take 185 mg by mouth once daily.           Clinical Reference Information           Your Vitals Were     BP Pulse Temp Resp Height Weight    136/70 60 96.7 °F (35.9 °C) (Temporal) 15 5' 3.5" (1.613 m) 107.9 kg (237 lb 14 oz)    SpO2 BMI             98% 41.48 kg/m2         Blood Pressure          Most Recent Value    BP  136/70      Allergies as of 3/8/2017     Celexa [Citalopram]    Clindamycin    Codeine    Crestor [Rosuvastatin]    Cytotec [Misoprostol]    Lisinopril    Magnesium Citrate    Stadol [Butorphanol Tartrate]    Vicodin [Hydrocodone-acetaminophen]    Adhesive    Aggrenox [Aspirin-dipyridamole]    Demerol [Meperidine]    Isosorbide    Kenalog [Triamcinolone Acetonide]    Medrol [Methylprednisolone]    Mobic [Meloxicam]    Morphine    Nitroglycerin    Ranexa [Ranolazine]    Sulfa (Sulfonamide Antibiotics)    Talwin [Pentazocine Lactate]    Tetracyclines    Tilade [Nedocromil]    Zetia [Ezetimibe]      Immunizations Administered on Date of Encounter - 3/8/2017     None      Orders Placed During Today's Visit     Future Labs/Procedures Expected by Expires    CBC auto differential  3/8/2017 5/7/2018    Mammo Digital Screening Bilat with CAD  3/8/2017 5/8/2018    T4, free  3/8/2017 5/7/2018    TSH  3/8/2017 5/7/2018    X-ray Knee Ortho Right  3/8/2017 3/8/2018      [facility] Sign-Up     Activating your [facility] account is as easy as 1-2-3!     1) Visit [email or web address], select Sign Up Now, enter this activation code and your date of birth, then select Next.  [email or web address]  Expires: 4/17/2017  1:42 PM      2) Create a username and password to use when you visit [facility] in the future and select a security question in case you lose your password and select Next.    3) Enter your e-mail address and click Sign Up!    Additional Information  If " you have questions, please e-mail [email or web address] or call [phone] to talk to our [facility] staff. Remember, [facility] is NOT to be used for urgent needs. For medical emergencies, dial 911.         Language Assistance Services     ATTENTION: Language assistance services are available, free of charge. Please call [phone].      ATENCIÓN: Si habla español, tiene a gayle disposición servicios gratuitos de asistencia lingüística. Llame al [phone].     CHÚ Ý: N?u b?n nói Ti?ng Vi?t, có các d?ch v? h? tr? ngôn ng? mi?n phí dành cho b?n. G?i s? [phone].         [facility] complies with applicable Federal civil rights laws and does not discriminate on the basis of race, color, national origin, age, disability, or sex.

## 2017-03-09 ENCOUNTER — TELEPHONE (OUTPATIENT)
Dept: CARDIOLOGY | Facility: CLINIC | Age: 67
End: 2017-03-09

## 2017-03-09 DIAGNOSIS — I10 ESSENTIAL HYPERTENSION: ICD-10-CM

## 2017-03-09 DIAGNOSIS — R07.2 PRECORDIAL PAIN: ICD-10-CM

## 2017-03-09 RX ORDER — METOPROLOL SUCCINATE 100 MG/1
TABLET, EXTENDED RELEASE ORAL
Qty: 135 TABLET | Refills: 3 | Status: SHIPPED | OUTPATIENT
Start: 2017-03-09 | End: 2018-03-15 | Stop reason: SDUPTHER

## 2017-03-09 RX ORDER — VALSARTAN AND HYDROCHLOROTHIAZIDE 160; 12.5 MG/1; MG/1
1 TABLET, FILM COATED ORAL DAILY
Qty: 90 TABLET | Refills: 3 | Status: ON HOLD | OUTPATIENT
Start: 2017-03-09 | End: 2017-12-20 | Stop reason: HOSPADM

## 2017-03-09 RX ORDER — NITROGLYCERIN 0.4 MG/1
0.4 TABLET SUBLINGUAL EVERY 5 MIN PRN
Qty: 90 TABLET | Refills: 3 | Status: SHIPPED | OUTPATIENT
Start: 2017-03-09 | End: 2018-03-15 | Stop reason: SDUPTHER

## 2017-03-09 RX ORDER — CLOPIDOGREL BISULFATE 75 MG/1
75 TABLET ORAL DAILY
Qty: 90 TABLET | Refills: 3 | Status: SHIPPED | OUTPATIENT
Start: 2017-03-09 | End: 2018-03-15 | Stop reason: SDUPTHER

## 2017-03-09 NOTE — TELEPHONE ENCOUNTER
Qi Ortiz   3/7/2017 1:40 PM   Refill   MRN:  [id number]    Department: [facility] Cardiology   Dept Phone: [phone]    Description: Female : 1950   Provider: Tono Hancock MD         Medication    evolocumab (REPATHA SURECLICK) 140 mg/mL PnIj [[id number]]         Medication Detail         Disp Refills Start End SALVADOR     evolocumab (REPATHA SURECLICK) 140 mg/mL PnIj 6 Syringe 3 3/7/2017  --     Sig - Route: Inject 140 mg into the skin every 14 (fourteen) days. - Subcutaneous     Class: Normal     Order: [id number]     Date/Time Signed: 3/7/2017  2:07 PM         E-Prescribing Status: Receipt confirmed by pharmacy (3/7/2017  2:07 PM CST)       Pharmacy      [facility] - [address]

## 2017-03-09 NOTE — TELEPHONE ENCOUNTER
----- Message from Stefany Redd sent at 3/9/2017  3:12 PM CST -----  Patient returned your call.   Call her at [phone].                                           [email or web address]

## 2017-03-10 ENCOUNTER — TELEPHONE (OUTPATIENT)
Dept: FAMILY MEDICINE | Facility: CLINIC | Age: 67
End: 2017-03-10

## 2017-06-07 ENCOUNTER — OFFICE VISIT (OUTPATIENT)
Dept: RHEUMATOLOGY | Facility: CLINIC | Age: 67
End: 2017-06-07
Payer: MEDICARE

## 2017-06-07 ENCOUNTER — LAB VISIT (OUTPATIENT)
Dept: LAB | Facility: HOSPITAL | Age: 67
End: 2017-06-07
Attending: INTERNAL MEDICINE
Payer: MEDICARE

## 2017-06-07 VITALS
DIASTOLIC BLOOD PRESSURE: 75 MMHG | HEART RATE: 71 BPM | WEIGHT: 241.19 LBS | SYSTOLIC BLOOD PRESSURE: 130 MMHG | BODY MASS INDEX: 42.73 KG/M2 | HEIGHT: 63 IN

## 2017-06-07 DIAGNOSIS — G62.9 POLYNEUROPATHY: ICD-10-CM

## 2017-06-07 DIAGNOSIS — L40.50 PSORIATIC ARTHRITIS: Chronic | ICD-10-CM

## 2017-06-07 DIAGNOSIS — N18.30 STAGE 3 CHRONIC KIDNEY DISEASE: ICD-10-CM

## 2017-06-07 DIAGNOSIS — L40.50 PSORIATIC ARTHRITIS: Primary | ICD-10-CM

## 2017-06-07 DIAGNOSIS — M81.0 OSTEOPOROSIS, UNSPECIFIED OSTEOPOROSIS TYPE, UNSPECIFIED PATHOLOGICAL FRACTURE PRESENCE: ICD-10-CM

## 2017-06-07 PROBLEM — M85.89 OSTEOPENIA OF MULTIPLE SITES: Status: ACTIVE | Noted: 2017-06-07

## 2017-06-07 LAB
ALBUMIN SERPL BCP-MCNC: 3.3 G/DL
ALP SERPL-CCNC: 50 U/L
ALT SERPL W/O P-5'-P-CCNC: 12 U/L
ANION GAP SERPL CALC-SCNC: 9 MMOL/L
AST SERPL-CCNC: 18 U/L
BASOPHILS # BLD AUTO: 0.04 K/UL
BASOPHILS NFR BLD: 0.6 %
BILIRUB SERPL-MCNC: 0.2 MG/DL
BUN SERPL-MCNC: 17 MG/DL
CALCIUM SERPL-MCNC: 9.6 MG/DL
CHLORIDE SERPL-SCNC: 105 MMOL/L
CO2 SERPL-SCNC: 29 MMOL/L
CREAT SERPL-MCNC: 1.1 MG/DL
CRP SERPL-MCNC: 10.7 MG/L
DIFFERENTIAL METHOD: ABNORMAL
EOSINOPHIL # BLD AUTO: 0.2 K/UL
EOSINOPHIL NFR BLD: 2.6 %
ERYTHROCYTE [DISTWIDTH] IN BLOOD BY AUTOMATED COUNT: 18.6 %
ERYTHROCYTE [SEDIMENTATION RATE] IN BLOOD BY WESTERGREN METHOD: 23 MM/HR
EST. GFR  (AFRICAN AMERICAN): >60 ML/MIN/1.73 M^2
EST. GFR  (NON AFRICAN AMERICAN): 52 ML/MIN/1.73 M^2
GLUCOSE SERPL-MCNC: 139 MG/DL
HCT VFR BLD AUTO: 36 %
HGB BLD-MCNC: 10.9 G/DL
LYMPHOCYTES # BLD AUTO: 2.1 K/UL
LYMPHOCYTES NFR BLD: 29.3 %
MCH RBC QN AUTO: 23.6 PG
MCHC RBC AUTO-ENTMCNC: 30.3 %
MCV RBC AUTO: 78 FL
MONOCYTES # BLD AUTO: 0.4 K/UL
MONOCYTES NFR BLD: 5.6 %
NEUTROPHILS # BLD AUTO: 4.5 K/UL
NEUTROPHILS NFR BLD: 61.9 %
PLATELET # BLD AUTO: 321 K/UL
PMV BLD AUTO: 9.3 FL
POTASSIUM SERPL-SCNC: 4 MMOL/L
PROT SERPL-MCNC: 7.3 G/DL
RBC # BLD AUTO: 4.62 M/UL
SODIUM SERPL-SCNC: 143 MMOL/L
WBC # BLD AUTO: 7.26 K/UL

## 2017-06-07 PROCEDURE — 99999 PR PBB SHADOW E&M-EST. PATIENT-LVL IV: CPT | Mod: PBBFAC,,, | Performed by: INTERNAL MEDICINE

## 2017-06-07 PROCEDURE — 1125F AMNT PAIN NOTED PAIN PRSNT: CPT | Mod: ,,, | Performed by: INTERNAL MEDICINE

## 2017-06-07 PROCEDURE — 1159F MED LIST DOCD IN RCRD: CPT | Mod: ,,, | Performed by: INTERNAL MEDICINE

## 2017-06-07 PROCEDURE — 99214 OFFICE O/P EST MOD 30 MIN: CPT | Mod: S$PBB,,, | Performed by: INTERNAL MEDICINE

## 2017-06-07 PROCEDURE — 99214 OFFICE O/P EST MOD 30 MIN: CPT | Mod: PBBFAC,25,PO | Performed by: INTERNAL MEDICINE

## 2017-06-07 PROCEDURE — 96372 THER/PROPH/DIAG INJ SC/IM: CPT | Mod: PBBFAC,PO

## 2017-06-07 RX ADMIN — USTEKINUMAB 90 MG: 90 INJECTION, SOLUTION SUBCUTANEOUS at 12:06

## 2017-06-07 NOTE — PROGRESS NOTES
Administered 1cc of Stelara 90mg/1cc to RLQ of ABD. Patient tolerated well. No acute reaction noted to site. Patient instructed on S/S to report. Patient verbalized understanding. Patient instructed to wait 15 minutes after injection.     Lot: [id number]  Exp: 11/2018  Man : [employer]

## 2017-06-07 NOTE — PROGRESS NOTES
"Subjective:       Patient ID: Qi Ortiz is a 67 y.o. female.    Chief Complaint: Psoriatic Arthritis; Vitamin D Deficiency; Osteoporosis; Osteoarthritis; Chronic Kidney Disease; and immunocompromised    HPI   Routine fup on psoriatic arthritis on Stelara q 3 months getting it here. She also has OP, CAD s/p 3v CABG 9/2016, on Stelara since 3/2016. Also with neuropathic chest pain due to cardiac surgery on gabapentin. She has also had chronic soft tissue infections.    Last visit:   PSA in remission  Had anemia due to acute bleed from hemorrhoid the last visit but didn't seek out colorectal surgery since hgb stabilized    Review of Systems   Constitutional: Negative for chills and fever.   Gastrointestinal: Positive for anal bleeding (hemorrhoid bleeds on and off).   Skin: Negative for rash.        Minimal skin disease             Objective:   /75   Pulse 71   Ht 5' 3" (1.6 m)   Wt 109.4 kg (241 lb 2.9 oz)   BMI 42.72 kg/m²      Physical Exam   Vitals reviewed.  Constitutional: She is oriented to person, place, and time and well-developed, well-nourished, and in no distress. No distress.   HENT:   Head: Normocephalic and atraumatic.   Right Ear: External ear normal.   Left Ear: External ear normal.   Eyes: Conjunctivae are normal. Right eye exhibits no discharge. Left eye exhibits no discharge. No scleral icterus.   Neck: Neck supple.   Cardiovascular: Normal rate and regular rhythm.    Murmur heard.  Sinus arrhythmia     Pulmonary/Chest: Effort normal. No respiratory distress. She has no wheezes.   Neurological: She is alert and oriented to person, place, and time. No cranial nerve deficit. She exhibits normal muscle tone.   Skin: Skin is warm and dry. No rash noted. She is not diaphoretic. No erythema.     Psychiatric: Mood, memory, affect and judgment normal.   Musculoskeletal: She exhibits no edema, tenderness or deformity.   No synovitis anywhere             LABS REVIEWED   Ref. Range 6/7/2017 " 10:45   WBC Latest Ref Range: 3.90 - 12.70 K/uL 7.26   RBC Latest Ref Range: 4.00 - 5.40 M/uL 4.62   Hemoglobin Latest Ref Range: 12.0 - 16.0 g/dL 10.9 (L)   Hematocrit Latest Ref Range: 37.0 - 48.5 % 36.0 (L)   MCV Latest Ref Range: 82 - 98 fL 78 (L)   MCH Latest Ref Range: 27.0 - 31.0 pg 23.6 (L)   MCHC Latest Ref Range: 32.0 - 36.0 % 30.3 (L)   RDW Latest Ref Range: 11.5 - 14.5 % 18.6 (H)   Platelets Latest Ref Range: 150 - 350 K/uL 321   MPV Latest Ref Range: 9.2 - 12.9 fL 9.3   Gran% Latest Ref Range: 38.0 - 73.0 % 61.9   Gran # Latest Ref Range: 1.8 - 7.7 K/uL 4.5   Lymph% Latest Ref Range: 18.0 - 48.0 % 29.3   Lymph # Latest Ref Range: 1.0 - 4.8 K/uL 2.1   Mono% Latest Ref Range: 4.0 - 15.0 % 5.6   Mono # Latest Ref Range: 0.3 - 1.0 K/uL 0.4   Eosinophil% Latest Ref Range: 0.0 - 8.0 % 2.6   Eos # Latest Ref Range: 0.0 - 0.5 K/uL 0.2   Basophil% Latest Ref Range: 0.0 - 1.9 % 0.6   Baso # Latest Ref Range: 0.00 - 0.20 K/uL 0.04   Sed Rate Latest Ref Range: 0 - 20 mm/Hr 23 (H)   Sodium Latest Ref Range: 136 - 145 mmol/L 143   Potassium Latest Ref Range: 3.5 - 5.1 mmol/L 4.0   Chloride Latest Ref Range: 95 - 110 mmol/L 105   CO2 Latest Ref Range: 23 - 29 mmol/L 29   Anion Gap Latest Ref Range: 8 - 16 mmol/L 9   BUN, Bld Latest Ref Range: 8 - 23 mg/dL 17   Creatinine Latest Ref Range: 0.5 - 1.4 mg/dL 1.1   eGFR if non African American Latest Ref Range: >60 mL/min/1.73 m^2 52 (A)   eGFR if African American Latest Ref Range: >60 mL/min/1.73 m^2 >60   Glucose Latest Ref Range: 70 - 110 mg/dL 139 (H)   Calcium Latest Ref Range: 8.7 - 10.5 mg/dL 9.6   Alkaline Phosphatase Latest Ref Range: 55 - 135 U/L 50 (L)   Total Protein Latest Ref Range: 6.0 - 8.4 g/dL 7.3   Albumin Latest Ref Range: 3.5 - 5.2 g/dL 3.3 (L)   Total Bilirubin Latest Ref Range: 0.1 - 1.0 mg/dL 0.2   AST Latest Ref Range: 10 - 40 U/L 18   ALT Latest Ref Range: 10 - 44 U/L 12       I personally viewed the xrays with my impressions below:  CXR 12/2016  no acute disease     IMAGING:  BMD done today 6/7/2017 reviewed  Osteopenia  Major fx 14.1%  Hip fx 1.7%    Comparison to 4/7/2015  Total hip decline -3.6%   Total spine -0.3%    Assessment:       1. Psoriatic arthritis    2. Osteoporosis, unspecified osteoporosis type, unspecified pathological fracture presence    3. Polyneuropathy    4. Stage 3 chronic kidney disease          Psoriatic arthritis- in remission today- Failed Enbrel, intolerance to MTX, allergy to sulfa, recurrent skin infections with Humira on hibiclens, Otezla intolerance  Started Ustekinumab IL12/IL-23 on 3/2016 with 90% improvement of her skin. Ustekinumab 90mg every 12 weeks she gets it done here. Due for injection today. No clinical or lab toxicity noted.       Osteoporosis by definition due to hx of fragility fracture in setting of osteopenia. Currently on bisphosphonate holiday- was on Boniva for 3 years. She failed po fosamax due to GI intolerance.     Polyneuropathy- chronic, improving with gabapentin 100mg to 200mg TID.     CKD - stable GFR ~52, still ok to give Reclast    Plan:     Give Stelara today and schedule in 12 weeks    See colorectal surgeon please to evaluate for colorectal surgery to remove the occasionally bleeding hemorrhoid    We discussed the bone density test and the decline in the BMD since 2015. Given her hx of fracture, will opt to restart therapy with IV Reclast.given po intolerance. Will start the approval process so that way she may come and get her Reclast at next visit.    RTC in 3 months with cbc  , cmp, sed rate, crp and Reclast infusion    MB

## 2017-06-08 RX ORDER — HEPARIN SODIUM (PORCINE) LOCK FLUSH IV SOLN 100 UNIT/ML 100 UNIT/ML
100 SOLUTION INTRAVENOUS
Status: CANCELLED | OUTPATIENT
Start: 2017-06-08

## 2017-06-08 RX ORDER — ZOLEDRONIC ACID 5 MG/100ML
5 INJECTION, SOLUTION INTRAVENOUS ONCE
Status: CANCELLED | OUTPATIENT
Start: 2017-06-08 | End: 2017-06-08

## 2017-06-08 RX ORDER — SODIUM CHLORIDE 0.9 % (FLUSH) 0.9 %
10 SYRINGE (ML) INJECTION
Status: CANCELLED | OUTPATIENT
Start: 2017-06-08

## 2017-06-08 RX ORDER — ACETAMINOPHEN 325 MG/1
1000 TABLET ORAL ONCE
Status: CANCELLED | OUTPATIENT
Start: 2017-06-08

## 2017-06-14 DIAGNOSIS — M81.0 OSTEOPOROSIS, UNSPECIFIED OSTEOPOROSIS TYPE, UNSPECIFIED PATHOLOGICAL FRACTURE PRESENCE: Primary | ICD-10-CM

## 2017-08-14 ENCOUNTER — TELEPHONE (OUTPATIENT)
Dept: CARDIOLOGY | Facility: CLINIC | Age: 67
End: 2017-08-14

## 2017-08-14 DIAGNOSIS — E78.2 MIXED HYPERLIPIDEMIA: Primary | ICD-10-CM

## 2017-08-14 DIAGNOSIS — I25.83 CORONARY ARTERY DISEASE DUE TO LIPID RICH PLAQUE: ICD-10-CM

## 2017-08-14 DIAGNOSIS — I10 BENIGN ESSENTIAL HTN: ICD-10-CM

## 2017-08-14 DIAGNOSIS — I25.10 CORONARY ARTERY DISEASE DUE TO LIPID RICH PLAQUE: ICD-10-CM

## 2017-08-14 NOTE — TELEPHONE ENCOUNTER
----- Message from Octavia Avalos sent at 8/14/2017  1:18 PM CDT -----  Contact: Pt   States she is calling to have labs scheduled for her appt with / maliks to schedule on 09/07 and can be reached at [phone]//thanks/dbw

## 2017-08-14 NOTE — TELEPHONE ENCOUNTER
Returned call and informed Lipids will be added to 09/07/2018 labs.    Patient was instructed to fast for labs.  Patient verbalized understanding, stating can fast until 11.  Patient was informed can come same day, but earlier time.

## 2017-09-07 ENCOUNTER — OFFICE VISIT (OUTPATIENT)
Dept: RHEUMATOLOGY | Facility: CLINIC | Age: 67
End: 2017-09-07
Payer: MEDICARE

## 2017-09-07 ENCOUNTER — INFUSION (OUTPATIENT)
Dept: RHEUMATOLOGY | Facility: HOSPITAL | Age: 67
End: 2017-09-07
Attending: INTERNAL MEDICINE
Payer: MEDICARE

## 2017-09-07 VITALS
BODY MASS INDEX: 44.25 KG/M2 | WEIGHT: 249.81 LBS | DIASTOLIC BLOOD PRESSURE: 77 MMHG | SYSTOLIC BLOOD PRESSURE: 134 MMHG | HEART RATE: 62 BPM

## 2017-09-07 VITALS — DIASTOLIC BLOOD PRESSURE: 77 MMHG | HEART RATE: 62 BPM | SYSTOLIC BLOOD PRESSURE: 134 MMHG

## 2017-09-07 DIAGNOSIS — Z51.81 MEDICATION MONITORING ENCOUNTER: ICD-10-CM

## 2017-09-07 DIAGNOSIS — M85.89 OSTEOPENIA OF MULTIPLE SITES: ICD-10-CM

## 2017-09-07 DIAGNOSIS — L40.9 PSORIASIS: ICD-10-CM

## 2017-09-07 DIAGNOSIS — L40.50 PSORIATIC ARTHRITIS: Primary | Chronic | ICD-10-CM

## 2017-09-07 DIAGNOSIS — M81.0 OSTEOPOROSIS, UNSPECIFIED OSTEOPOROSIS TYPE, UNSPECIFIED PATHOLOGICAL FRACTURE PRESENCE: Primary | ICD-10-CM

## 2017-09-07 PROCEDURE — 99214 OFFICE O/P EST MOD 30 MIN: CPT | Mod: PBBFAC,PO | Performed by: PHYSICIAN ASSISTANT

## 2017-09-07 PROCEDURE — 1125F AMNT PAIN NOTED PAIN PRSNT: CPT | Mod: ,,, | Performed by: PHYSICIAN ASSISTANT

## 2017-09-07 PROCEDURE — 1159F MED LIST DOCD IN RCRD: CPT | Mod: ,,, | Performed by: PHYSICIAN ASSISTANT

## 2017-09-07 PROCEDURE — 3078F DIAST BP <80 MM HG: CPT | Mod: ,,, | Performed by: PHYSICIAN ASSISTANT

## 2017-09-07 PROCEDURE — 96372 THER/PROPH/DIAG INJ SC/IM: CPT | Mod: PBBFAC,PO

## 2017-09-07 PROCEDURE — 3075F SYST BP GE 130 - 139MM HG: CPT | Mod: ,,, | Performed by: PHYSICIAN ASSISTANT

## 2017-09-07 PROCEDURE — 96365 THER/PROPH/DIAG IV INF INIT: CPT | Mod: PO

## 2017-09-07 PROCEDURE — 25000003 PHARM REV CODE 250: Mod: PO | Performed by: INTERNAL MEDICINE

## 2017-09-07 PROCEDURE — 99214 OFFICE O/P EST MOD 30 MIN: CPT | Mod: S$PBB,,, | Performed by: PHYSICIAN ASSISTANT

## 2017-09-07 PROCEDURE — A4216 STERILE WATER/SALINE, 10 ML: HCPCS | Mod: PO | Performed by: INTERNAL MEDICINE

## 2017-09-07 PROCEDURE — 99999 PR PBB SHADOW E&M-EST. PATIENT-LVL IV: CPT | Mod: PBBFAC,,, | Performed by: PHYSICIAN ASSISTANT

## 2017-09-07 PROCEDURE — 63600175 PHARM REV CODE 636 W HCPCS: Mod: PO | Performed by: INTERNAL MEDICINE

## 2017-09-07 RX ORDER — SODIUM CHLORIDE 0.9 % (FLUSH) 0.9 %
10 SYRINGE (ML) INJECTION
Status: CANCELLED | OUTPATIENT
Start: 2017-09-07

## 2017-09-07 RX ORDER — SODIUM CHLORIDE 0.9 % (FLUSH) 0.9 %
10 SYRINGE (ML) INJECTION
Status: DISCONTINUED | OUTPATIENT
Start: 2017-09-07 | End: 2017-09-07 | Stop reason: HOSPADM

## 2017-09-07 RX ORDER — ZOLEDRONIC ACID 5 MG/100ML
5 INJECTION, SOLUTION INTRAVENOUS ONCE
Status: COMPLETED | OUTPATIENT
Start: 2017-09-07 | End: 2017-09-07

## 2017-09-07 RX ORDER — ACETAMINOPHEN 500 MG
1000 TABLET ORAL ONCE
Status: COMPLETED | OUTPATIENT
Start: 2017-09-07 | End: 2017-09-07

## 2017-09-07 RX ORDER — ACETAMINOPHEN 500 MG
1000 TABLET ORAL ONCE
Status: CANCELLED | OUTPATIENT
Start: 2017-09-07

## 2017-09-07 RX ORDER — ZOLEDRONIC ACID 5 MG/100ML
5 INJECTION, SOLUTION INTRAVENOUS ONCE
Status: CANCELLED | OUTPATIENT
Start: 2017-09-07 | End: 2017-09-07

## 2017-09-07 RX ORDER — HEPARIN SODIUM (PORCINE) LOCK FLUSH IV SOLN 100 UNIT/ML 100 UNIT/ML
100 SOLUTION INTRAVENOUS
Status: CANCELLED | OUTPATIENT
Start: 2017-09-07

## 2017-09-07 RX ADMIN — ACETAMINOPHEN 1000 MG: 500 TABLET ORAL at 11:09

## 2017-09-07 RX ADMIN — Medication 10 ML: at 11:09

## 2017-09-07 RX ADMIN — ZOLEDRONIC ACID 5 MG: 5 INJECTION, SOLUTION INTRAVENOUS at 11:09

## 2017-09-07 RX ADMIN — USTEKINUMAB 90 MG: 90 INJECTION, SOLUTION SUBCUTANEOUS at 11:09

## 2017-09-07 NOTE — PROGRESS NOTES
Administered 1cc of Stelara 90mg/1cc to LLQ of ABD. Patient tolerated well. No acute reaction noted to site. Patient instructed on S/S to report. Patient verbalized understanding. Patient instructed to wait 15 minutes after injection.     Lot: [id number]  Exp: 11/2018  Man : [employer]

## 2017-09-07 NOTE — PROGRESS NOTES
Subjective:       Patient ID: Qi Ortiz is a 67 y.o. female.    Chief Complaint: Psoriatic Arthritis; psoriasis, osteopenia    Melinda is here for f/u for psoriatic arthritis with psoriasis on Stelara 90 mg q 3 months; she is getting it here. In the past has failed enbrel, mtx, has sulfa allergy, recurrent skin infections with Humira, intolerant to Otezla.  She also has Osteopenia, h/o right wrist fx in the 90's. Failed po boniva and fosamax due to GI intolerance. Last dexa showed decreasing bmd so Dr. CASTELLANOS wanted to start reclast after last visit but hasn't started yet, due today.  . She has been on Stelara since 3/2016. Also with neuropathic chest pain due to cardiac surgery, gabapentin helps greatly.    She feels great on stelara, she can tell when it's time for her injection because she gets achy.  No swollen or tender joints today.  Her rash is minimal with small area at her hairline on the forehead and left calf. She has topical to use for her rash which works well.  She is pleased with Stelara. Pain today 4/10 generalized.          Review of Systems   Constitutional: Negative for chills, fatigue and fever.   HENT: Negative for mouth sores, rhinorrhea and sore throat.    Eyes: Negative for pain and redness.   Respiratory: Negative for cough and shortness of breath.    Cardiovascular: Negative for chest pain.        CAD s/p 3v CABG 9/2016,   Gastrointestinal: Negative for abdominal pain, constipation, diarrhea, nausea and vomiting.   Genitourinary: Negative for dysuria and hematuria.   Musculoskeletal: Positive for arthralgias. Negative for joint swelling and myalgias.   Skin: Positive for rash.   Neurological: Negative for weakness, numbness and headaches.   Psychiatric/Behavioral: The patient is not nervous/anxious.          Objective:   /77   Pulse 62   Wt 113.3 kg (249 lb 12.5 oz)   BMI 44.25 kg/m²      Physical Exam   Constitutional: She is oriented to person, place, and time and  well-developed, well-nourished, and in no distress.   HENT:   Head: Normocephalic and atraumatic.   Eyes: Pupils are equal, round, and reactive to light. Right eye exhibits no discharge.   Neck: Normal range of motion.   Cardiovascular: Normal rate, regular rhythm and normal heart sounds.  Exam reveals no friction rub.    Pulmonary/Chest: Effort normal and breath sounds normal. No respiratory distress.   Abdominal: Soft. She exhibits no distension. There is no tenderness.   Lymphadenopathy:     She has no cervical adenopathy.   Neurological: She is alert and oriented to person, place, and time.   Skin: Rash noted. No erythema.     Small amount of psorasis rash on the scalp line at the forehead, very small patch left lower calf   Psychiatric: Mood normal.   Musculoskeletal: Normal range of motion. She exhibits no edema or deformity.   jimbo wrists, mcps, pips no synvoitis, no tenderness, no warmth, good rom  Left 2, 4 pip with fixed flexion  Right 5th dip fixed flexion    jimbo knees no effusion, no warmth or tenderness           Recent Results (from the past 168 hour(s))   Comprehensive metabolic panel    Collection Time: 09/07/17 11:00 AM   Result Value Ref Range    Sodium 141 136 - 145 mmol/L    Potassium 4.4 3.5 - 5.1 mmol/L    Chloride 104 95 - 110 mmol/L    CO2 27 23 - 29 mmol/L    Glucose 105 70 - 110 mg/dL    BUN, Bld 17 8 - 23 mg/dL    Creatinine 1.1 0.5 - 1.4 mg/dL    Calcium 9.3 8.7 - 10.5 mg/dL    Total Protein 7.1 6.0 - 8.4 g/dL    Albumin 3.2 (L) 3.5 - 5.2 g/dL    Total Bilirubin 0.4 0.1 - 1.0 mg/dL    Alkaline Phosphatase 49 (L) 55 - 135 U/L    AST 20 10 - 40 U/L    ALT 10 10 - 44 U/L    Anion Gap 10 8 - 16 mmol/L    eGFR if African American >60 >60 mL/min/1.73 m^2    eGFR if non African American 52 (A) >60 mL/min/1.73 m^2   CBC auto differential    Collection Time: 09/07/17 11:00 AM   Result Value Ref Range    WBC 7.33 3.90 - 12.70 K/uL    RBC 4.72 4.00 - 5.40 M/uL    Hemoglobin 11.6 (L) 12.0 - 16.0 g/dL     Hematocrit 36.8 (L) 37.0 - 48.5 %    MCV 78 (L) 82 - 98 fL    MCH 24.6 (L) 27.0 - 31.0 pg    MCHC 31.5 (L) 32.0 - 36.0 g/dL    RDW 17.2 (H) 11.5 - 14.5 %    Platelets 272 150 - 350 K/uL    MPV 9.2 9.2 - 12.9 fL    Gran # 4.6 1.8 - 7.7 K/uL    Lymph # 2.0 1.0 - 4.8 K/uL    Mono # 0.5 0.3 - 1.0 K/uL    Eos # 0.2 0.0 - 0.5 K/uL    Baso # 0.04 0.00 - 0.20 K/uL    Gran% 62.9 38.0 - 73.0 %    Lymph% 27.7 18.0 - 48.0 %    Mono% 6.4 4.0 - 15.0 %    Eosinophil% 2.5 0.0 - 8.0 %    Basophil% 0.5 0.0 - 1.9 %    Differential Method Automated    Basic metabolic panel    Collection Time: 09/07/17 11:00 AM   Result Value Ref Range    Sodium 141 136 - 145 mmol/L    Potassium 4.4 3.5 - 5.1 mmol/L    Chloride 104 95 - 110 mmol/L    CO2 27 23 - 29 mmol/L    Glucose 105 70 - 110 mg/dL    BUN, Bld 17 8 - 23 mg/dL    Creatinine 1.1 0.5 - 1.4 mg/dL    Calcium 9.3 8.7 - 10.5 mg/dL    Anion Gap 10 8 - 16 mmol/L    eGFR if African American >60 >60 mL/min/1.73 m^2    eGFR if non African American 52 (A) >60 mL/min/1.73 m^2   Lipid panel    Collection Time: 09/07/17 11:00 AM   Result Value Ref Range    Cholesterol 150 120 - 199 mg/dL    Triglycerides 106 30 - 150 mg/dL    HDL 48 40 - 75 mg/dL    LDL Cholesterol 80.8 63.0 - 159.0 mg/dL    HDL/Chol Ratio 32.0 20.0 - 50.0 %    Total Cholesterol/HDL Ratio 3.1 2.0 - 5.0    Non-HDL Cholesterol 102 mg/dL      dexa 6.17.17: DF 0.2, RN -1.7, spine -0.6, decreasing bmd frax major 14.1 hip 1.7  Assessment:       1. Psoriatic arthritis    2. Psoriasis    3. Osteopenia of multiple sites    4. Medication monitoring encounter        Impression:    Psoriatic arthritis with psoriasis- stable- Failed Enbrel, intolerance to MTX, allergy to sulfa, recurrent skin infections with Humira on hibiclens, Otezla intolerance; Started Ustekinumab IL12/IL-23 on 3/2016 with 90% improvement of her skin. Ustekinumab 90mg every 12 weeks she gets it done here. Due for injection today, rash less than 1% bsa, joints no swollen  or tender on exam     Osteopenia with decreasing bmd, h/o r wrist fx (90's); failed fosamax/boniva due to GI intolerance; due for 1st reclast today; does take vitamin D daily      Polyneuropathy- chest neuropathy s/p cardiac surgery, chronic, improved with gabapentin 100mg to 200mg TID.      CKD - stable GFR ~52, still ok to give Reclast    Medication monitoring: no issues with toxicity      Plan:       Give Stelara today 90 mg SC and cont q 3 months  1st reclast today, cont yearly, cont daily vitamin D  Cont gabapentin for neuropathy  Return q 3 mos with reg 4 labs and for stelara

## 2017-09-07 NOTE — PATIENT INSTRUCTIONS
Zoledronic Acid injection (Paget's Disease, Osteoporosis)  What is this medicine?  ZOLEDRONIC ACID (STEFANO le dron ik AS id) lowers the amount of calcium loss from bone. It is used to treat Paget's disease and osteoporosis in women.  How should I use this medicine?  This medicine is for infusion into a vein. It is given by a health care professional in a hospital or clinic setting.  Talk to your pediatrician regarding the use of this medicine in children. This medicine is not approved for use in children.  What side effects may I notice from receiving this medicine?  Side effects that you should report to your doctor or health care professional as soon as possible:  · allergic reactions like skin rash, itching or hives, swelling of the face, lips, or tongue  · anxiety, confusion, or depression  · breathing problems  · changes in vision  · eye pain  · feeling faint or lightheaded, falls  · jaw pain, especially after dental work  · mouth sores  · muscle cramps, stiffness, or weakness  · redness, blistering, peeling or loosening of the skin, including inside the mouth  · trouble passing urine or change in the amount of urine  Side effects that usually do not require medical attention (report to your doctor or health care professional if they continue or are bothersome):  · bone, joint, or muscle pain  · constipation  · diarrhea  · fever  · hair loss  · irritation at site where injected  · loss of appetite  · nausea, vomiting  · stomach upset  · trouble sleeping  · trouble swallowing  · weak or tired  What may interact with this medicine?  · certain antibiotics given by injection  · NSAIDs, medicines for pain and inflammation, like ibuprofen or naproxen  · some diuretics like bumetanide, furosemide  · teriparatide  What if I miss a dose?  It is important not to miss your dose. Call your doctor or health care professional if you are unable to keep an appointment.  Where should I keep my medicine?  This drug is given in a  hospital or clinic and will not be stored at home.  What should I tell my health care provider before I take this medicine?  They need to know if you have any of these conditions:  · aspirin-sensitive asthma  · cancer, especially if you are receiving medicines used to treat cancer  · dental disease or wear dentures  · infection  · kidney disease  · low levels of calcium in the blood  · past surgery on the parathyroid gland or intestines  · receiving corticosteroids like dexamethasone or prednisone  · an unusual or allergic reaction to zoledronic acid, other medicines, foods, dyes, or preservatives  · pregnant or trying to get pregnant  · breast-feeding  What should I watch for while using this medicine?  Visit your doctor or health care professional for regular checkups. It may be some time before you see the benefit from this medicine. Do not stop taking your medicine unless your doctor tells you to. Your doctor may order blood tests or other tests to see how you are doing.  Women should inform their doctor if they wish to become pregnant or think they might be pregnant. There is a potential for serious side effects to an unborn child. Talk to your health care professional or pharmacist for more information.  You should make sure that you get enough calcium and vitamin D while you are taking this medicine. Discuss the foods you eat and the vitamins you take with your health care professional.  Some people who take this medicine have severe bone, joint, and/or muscle pain. This medicine may also increase your risk for jaw problems or a broken thigh bone. Tell your doctor right away if you have severe pain in your jaw, bones, joints, or muscles. Tell your doctor if you have any pain that does not go away or that gets worse.  Tell your dentist and dental surgeon that you are taking this medicine. You should not have major dental surgery while on this medicine. See your dentist to have a dental exam and fix any dental  problems before starting this medicine. Take good care of your teeth while on this medicine. Make sure you see your dentist for regular follow-up appointments.  Date Last Reviewed:   NOTE:This sheet is a summary. It may not cover all possible information. If you have questions about this medicine, talk to your doctor, pharmacist, or health care provider. Copyright© 2016 [employer]

## 2017-09-07 NOTE — PROGRESS NOTES
Infusion # 1  Recent Labs? Yes, reviewed by Flor COLLINS  Recent Invasive or planned dental procedures? Denied (dentures)  Reclast 5mg administered IV over 20 minutes. Pt tolerated well without adverse events. Pressure dressing applied.  See Vitals and MAR for further details.  Pt discharged ambulatory from clinic.

## 2017-09-18 ENCOUNTER — OFFICE VISIT (OUTPATIENT)
Dept: FAMILY MEDICINE | Facility: CLINIC | Age: 67
End: 2017-09-18
Payer: MEDICARE

## 2017-09-18 VITALS
OXYGEN SATURATION: 99 % | SYSTOLIC BLOOD PRESSURE: 136 MMHG | TEMPERATURE: 98 F | HEART RATE: 71 BPM | RESPIRATION RATE: 14 BRPM | WEIGHT: 248.13 LBS | DIASTOLIC BLOOD PRESSURE: 80 MMHG | BODY MASS INDEX: 43.96 KG/M2 | HEIGHT: 63 IN

## 2017-09-18 DIAGNOSIS — H61.21 IMPACTED CERUMEN OF RIGHT EAR: ICD-10-CM

## 2017-09-18 DIAGNOSIS — Z95.1 S/P CABG (CORONARY ARTERY BYPASS GRAFT): ICD-10-CM
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
## 2017-09-18 DIAGNOSIS — I25.83 CORONARY ARTERY DISEASE DUE TO LIPID RICH PLAQUE: ICD-10-CM

## 2017-09-18 DIAGNOSIS — R73.03 PREDIABETES: ICD-10-CM

## 2017-09-18 DIAGNOSIS — Z86.73 HISTORY OF CVA (CEREBROVASCULAR ACCIDENT): ICD-10-CM

## 2017-09-18 DIAGNOSIS — E66.01 MORBID OBESITY WITH BMI OF 40.0-44.9, ADULT: ICD-10-CM

## 2017-09-18 DIAGNOSIS — F43.22 ADJUSTMENT DISORDER WITH ANXIOUS MOOD: ICD-10-CM

## 2017-09-18 DIAGNOSIS — E03.9 HYPOTHYROIDISM, UNSPECIFIED TYPE: Primary | ICD-10-CM

## 2017-09-18 DIAGNOSIS — I25.10 CORONARY ARTERY DISEASE DUE TO LIPID RICH PLAQUE: ICD-10-CM

## 2017-09-18 DIAGNOSIS — H00.014 HORDEOLUM EXTERNUM LEFT UPPER EYELID: ICD-10-CM

## 2017-09-18 DIAGNOSIS — I10 ESSENTIAL HYPERTENSION: ICD-10-CM

## 2017-09-18 DIAGNOSIS — M54.50 BILATERAL LOW BACK PAIN WITHOUT SCIATICA, UNSPECIFIED CHRONICITY: ICD-10-CM

## 2017-09-18 DIAGNOSIS — E78.5 HYPERLIPIDEMIA, UNSPECIFIED HYPERLIPIDEMIA TYPE: ICD-10-CM

## 2017-09-18 DIAGNOSIS — R53.83 FATIGUE, UNSPECIFIED TYPE: ICD-10-CM

## 2017-09-18 DIAGNOSIS — E55.9 VITAMIN D INSUFFICIENCY: ICD-10-CM

## 2017-09-18 DIAGNOSIS — T14.8XXA BRUISING: ICD-10-CM

## 2017-09-18 PROCEDURE — 3075F SYST BP GE 130 - 139MM HG: CPT | Mod: ,,, | Performed by: FAMILY MEDICINE

## 2017-09-18 PROCEDURE — 99999 PR PBB SHADOW E&M-EST. PATIENT-LVL III: CPT | Mod: PBBFAC,,, | Performed by: FAMILY MEDICINE

## 2017-09-18 PROCEDURE — 1159F MED LIST DOCD IN RCRD: CPT | Mod: ,,, | Performed by: FAMILY MEDICINE

## 2017-09-18 PROCEDURE — 99213 OFFICE O/P EST LOW 20 MIN: CPT | Mod: PBBFAC,PO | Performed by: FAMILY MEDICINE

## 2017-09-18 PROCEDURE — 1125F AMNT PAIN NOTED PAIN PRSNT: CPT | Mod: ,,, | Performed by: FAMILY MEDICINE

## 2017-09-18 PROCEDURE — 3079F DIAST BP 80-89 MM HG: CPT | Mod: ,,, | Performed by: FAMILY MEDICINE

## 2017-09-18 PROCEDURE — 99214 OFFICE O/P EST MOD 30 MIN: CPT | Mod: S$PBB,,, | Performed by: FAMILY MEDICINE

## 2017-09-18 RX ORDER — LEVOTHYROXINE SODIUM 100 UG/1
100 TABLET ORAL DAILY
Qty: 90 TABLET | Refills: 1 | Status: CANCELLED | OUTPATIENT
Start: 2017-09-18

## 2017-09-18 RX ORDER — LEVOTHYROXINE SODIUM 112 UG/1
112 TABLET ORAL DAILY
Qty: 90 TABLET | Refills: 0 | Status: SHIPPED | OUTPATIENT
Start: 2017-09-18 | End: 2017-12-07 | Stop reason: SDUPTHER

## 2017-09-18 NOTE — PROGRESS NOTES
Subjective:       Patient ID: Qi Ortiz is a 67 y.o. female.    Chief Complaint: Chronic Care    HPI   Chronic Care  66yo female presents today for chronic care assessment. She has had recent updated labs and is here for review of results. She has fatigue at baseline. She notes having considerable discomfort to the chest wall since undergoing CABG- Neurontin has been helping. There has been increased familial stressors of late. She does not feel that RX measures are necessary. She has a good support system. She has a visit with Cardiology tomorrow. There has been an increase in NTG use of late. She reports having occasional CP with exertional activities. Symptoms are readily alleviated with NTG use. BP control has been stable with Diovan-HCTZ and Metoprolol. Updated TFT monitoring demonstrates imbalance with TSH elevated at 3.255 on Synthroid 100mcg daily. She denies having missed any doses. There has been a gain of 11 pounds within the past 6 months. A1c levels are stable at 6.0. There are no symptoms of hyper or hypoglycemia. Patient fell at home several days ago. No LOC reported but she does have some low back discomfort. No trouble ambulating. She notes most of her discomfort is to the left buttock.     Review of Systems   Constitutional: Positive for activity change, appetite change, fatigue and unexpected weight change.   HENT: Negative for congestion, ear pain and rhinorrhea.    Eyes: Negative for pain and visual disturbance.   Respiratory: Negative for cough, chest tightness and shortness of breath.    Cardiovascular: Positive for chest pain. Negative for palpitations and leg swelling.   Gastrointestinal: Negative for abdominal pain, constipation, diarrhea and nausea.   Endocrine: Negative for polydipsia and polyuria.   Genitourinary: Negative for decreased urine volume and difficulty urinating.   Musculoskeletal: Positive for arthralgias and back pain. Negative for myalgias.   Skin: Negative for  "color change and rash.   Neurological: Positive for numbness. Negative for dizziness, weakness and headaches.   Psychiatric/Behavioral: Negative for dysphoric mood and sleep disturbance. The patient is nervous/anxious.        Objective:   /80   Pulse 71   Temp 98.3 °F (36.8 °C) (Temporal)   Resp 14   Ht 5' 3" (1.6 m)   Wt 112.5 kg (248 lb 2 oz)   SpO2 99%   BMI 43.95 kg/m²   Physical Exam   Constitutional: She is oriented to person, place, and time. She appears well-developed. No distress.   Morbidly obese, non-toxic   HENT:   Head: Normocephalic and atraumatic.   Right Ear: External ear normal.   Left Ear: Tympanic membrane, external ear and ear canal normal.   Nose: Nose normal.   Mouth/Throat: Oropharynx is clear and moist.   Right cerumen impaction   Eyes: Conjunctivae and EOM are normal. Pupils are equal, round, and reactive to light.       Neck: Normal range of motion. Neck supple.   Cardiovascular: Normal rate, regular rhythm and normal heart sounds.    Pulmonary/Chest: Effort normal and breath sounds normal.   Abdominal: Soft. Bowel sounds are normal.   Musculoskeletal: She exhibits no edema.        Right hip: She exhibits normal range of motion and normal strength.        Left hip: She exhibits normal range of motion and normal strength.        Lumbar back: She exhibits pain. She exhibits normal range of motion, no tenderness and no bony tenderness.        Back:    Neurological: She is alert and oriented to person, place, and time.   Skin: Skin is warm and dry. Bruising noted. She is not diaphoretic.        Psychiatric: Her speech is normal and behavior is normal. Her mood appears anxious. She does not exhibit a depressed mood.       Assessment:       1. Hypothyroidism, unspecified type    2. Essential hypertension    3. Prediabetes    4. Hyperlipidemia, unspecified hyperlipidemia type    5. Fatigue, unspecified type    6. Coronary artery disease due to lipid rich plaque    7. Vitamin D " insufficiency    8. Hordeolum externum left upper eyelid    9. Impacted cerumen of right ear    10. Bilateral low back pain without sciatica, unspecified chronicity    11. Bruising    12. Adjustment disorder with anxious mood    13. Morbid obesity with BMI of 40.0-44.9, adult    14. Fall, initial encounter    15. S/P CABG (coronary artery bypass graft)    16. History of CVA (cerebrovascular accident)        Plan:   Hypothyroidism, unspecified type  Will adjust Synthroid dosing from 100mcg to 112mcg daily. Recommend reassessment in 10 weeks. Reviewed target goals.   -     TSH; Future; Expected date: 09/18/2017  -     T4, free; Future; Expected date: 09/18/2017  -     levothyroxine (SYNTHROID) 112 MCG tablet; Take 1 tablet (112 mcg total) by mouth once daily.  Dispense: 90 tablet; Refill: 0    Essential hypertension  Stable. Recommend continuation of current treatment measures. Target BP <140/90. Heart healthy diet is advised. See Cardiology as scheduled tomorrow.    Prediabetes  Stable. Advised dietary control measures. Will plan to reassess within 6 months.    Hyperlipidemia, unspecified hyperlipidemia type  Continue with current lipid control measures as per Cardiology. Heart healthy diet as above.    Fatigue, unspecified type  Multifactorial. Recommend adjustment to thyroid regimen as above. Emphasized rest, hydration and nutrition.    Coronary artery disease due to lipid rich plaque  See Cardiology for routine follow-up as planned.    Vitamin D insufficiency  -     Vitamin D; Future; Expected date: 09/18/2017    Hordeolum externum left upper eyelid  Warm compress use. Consider lid cleanser as well.     Impacted cerumen of right ear  Debrox drops use is advised. If this does not alleviate symptoms then she may need to see ENT for cerumen removal.    Bilateral low back pain without sciatica, unspecified chronicity  Recommend heating pad use and Tylenol as needed. She has declined need for imaging or further  measures today.    Bruising  Monitor for resolution.    Adjustment disorder with anxious mood  Reviewed coping mechanisms. She has declined need for RX. She is aware of counseling/therapy resources within [facility].    Morbid obesity with BMI of 40.0-44.9, adult  Weight loss efforts remain encouraged through diet and lifestyle measures.    Fall, initial encounter  As per #10 and 11.    S/P CABG (coronary artery bypass graft)  As above. See Cardiology as planned.    History of CVA (cerebrovascular accident)  Continue with BP and lipid control.    Patient refused MMG- acknowledges risk of undiagnosed breast CA and implications on her health  Declines seasonal flu and PNA vaccination

## 2017-09-19 ENCOUNTER — OFFICE VISIT (OUTPATIENT)
Dept: CARDIOLOGY | Facility: CLINIC | Age: 67
End: 2017-09-19
Payer: MEDICARE

## 2017-09-19 VITALS
OXYGEN SATURATION: 99 % | BODY MASS INDEX: 43.95 KG/M2 | HEIGHT: 63 IN | SYSTOLIC BLOOD PRESSURE: 132 MMHG | HEART RATE: 65 BPM | DIASTOLIC BLOOD PRESSURE: 76 MMHG

## 2017-09-19 DIAGNOSIS — I25.10 CORONARY ARTERY DISEASE, OCCLUSIVE: Primary | Chronic | ICD-10-CM

## 2017-09-19 DIAGNOSIS — Z95.1 S/P CABG (CORONARY ARTERY BYPASS GRAFT): Chronic | ICD-10-CM

## 2017-09-19 DIAGNOSIS — I10 ESSENTIAL HYPERTENSION: Chronic | ICD-10-CM

## 2017-09-19 DIAGNOSIS — E78.00 PURE HYPERCHOLESTEROLEMIA: Chronic | ICD-10-CM

## 2017-09-19 DIAGNOSIS — I20.9 ANGINA, CLASS II: Chronic | ICD-10-CM

## 2017-09-19 PROCEDURE — 99213 OFFICE O/P EST LOW 20 MIN: CPT | Mod: PBBFAC | Performed by: NUCLEAR MEDICINE

## 2017-09-19 PROCEDURE — 3075F SYST BP GE 130 - 139MM HG: CPT | Mod: ,,, | Performed by: NUCLEAR MEDICINE

## 2017-09-19 PROCEDURE — 1159F MED LIST DOCD IN RCRD: CPT | Mod: ,,, | Performed by: NUCLEAR MEDICINE

## 2017-09-19 PROCEDURE — 3078F DIAST BP <80 MM HG: CPT | Mod: ,,, | Performed by: NUCLEAR MEDICINE

## 2017-09-19 PROCEDURE — 99214 OFFICE O/P EST MOD 30 MIN: CPT | Mod: S$PBB,,, | Performed by: NUCLEAR MEDICINE

## 2017-09-19 PROCEDURE — 99999 PR PBB SHADOW E&M-EST. PATIENT-LVL III: CPT | Mod: PBBFAC,,, | Performed by: NUCLEAR MEDICINE

## 2017-09-19 NOTE — PROGRESS NOTES
Subjective:   Patient ID:  Qi Ortiz is a 67 y.o. female who presents for follow-up of Carotid Artery Disease; Hypertension; and Hyperlipidemia      HPI 1- CHRONIC CAD- ANGINA FC 2-  2- ESSENTIAL HTN   3- DYSLIPIDEMIA  NO RECURRENT ANGINA OR EQUIVALENT  NO RECENT HOSPITALIZATIONS FOR  ACS OR ADHF  NO HX OF PALPITATIONS.  NO NEAR SYNCOPE OR SYNCOPE  NO EDEMA. NO CALVE TENDERNESS  NO FOCAL CNS SYMPTOMS OR SIGNS TO SUGGEST TIA OR STROKE  CARD MED GOOD COMPLIANCE    Review of Systems   Constitution: Negative for chills, fever, weakness, night sweats, weight gain and weight loss.   HENT: Negative for nosebleeds.    Eyes: Negative for blurred vision, double vision and visual disturbance.   Cardiovascular: Positive for chest pain and dyspnea on exertion. Negative for irregular heartbeat, leg swelling, orthopnea, palpitations, paroxysmal nocturnal dyspnea and syncope.   Respiratory: Negative for cough, hemoptysis and wheezing.    Endocrine: Negative for polydipsia and polyuria.   Hematologic/Lymphatic: Does not bruise/bleed easily.   Skin: Negative for rash.   Musculoskeletal: Negative for joint pain, joint swelling, muscle weakness and myalgias.   Gastrointestinal: Negative for abdominal pain, hematemesis, jaundice and melena.   Genitourinary: Negative for dysuria, hematuria and nocturia.   Neurological: Negative for dizziness, focal weakness, headaches and sensory change.   Psychiatric/Behavioral: Negative for depression. The patient does not have insomnia and is not nervous/anxious.      Family History   Problem Relation Age of Onset    Breast cancer Maternal Grandfather     Breast cancer Paternal Aunt     Stroke      Breast cancer Sister 60    Leukemia Sister 8      as child    Lung cancer Paternal Grandfather     Heart disease       Past Medical History:   Diagnosis Date    Carotid stenosis     19%    COPD (chronic obstructive pulmonary disease)     No meds    CVA (cerebral vascular accident)      Dr. 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 Hope    Psoriatic arthritis     Rheumatology     Current Outpatient Prescriptions on File Prior to Visit   Medication Sig Dispense Refill    aspirin 81 MG Chew Take 81 mg by mouth once daily.      chlorhexidine (HIBICLENS) 4 % external liquid Apply topically daily as needed. 120 mL 0    chlorhexidine (HIBICLENS) 4 % external liquid Apply topically daily as needed. 473 mL 3    clopidogrel (PLAVIX) 75 mg tablet Take 1 tablet (75 mg total) by mouth once daily. 90 tablet 3    cyanocobalamin 2000 MCG tablet Take 2,000 mcg by mouth once daily.      evolocumab (REPATHA SURECLICK) 140 mg/mL PnIj Inject 140 mg into the skin every 14 (fourteen) days. 6 Syringe 3    ferrous gluconate (FERGON) 325 MG Tab Take 1 tablet (325 mg total) by mouth 2 (two) times daily with meals. 60 tablet 0    gabapentin (NEURONTIN) 100 MG capsule Take 2 capsules (200 mg total) by mouth 3 (three) times daily as needed. 540 capsule 3    garlic 2,000 mg Cap Take 3,000 mg by mouth once daily.       levothyroxine (SYNTHROID) 112 MCG tablet Take 1 tablet (112 mcg total) by mouth once daily. 90 tablet 0    metoprolol succinate (TOPROL-XL) 100 MG 24 hr tablet 1 tab (100mg) in morning. Half tab (50mg) at bedtime.  Generic ok 135 tablet 3    nitroGLYCERIN (NITROSTAT) 0.4 MG SL tablet Place 1 tablet (0.4 mg total) under the tongue every 5 (five) minutes as needed for Chest pain. 90 tablet 3    pyridoxine (VITAMIN B-6) 100 MG Tab Take 200 mg by mouth once daily.       valsartan-hydrochlorothiazide (DIOVAN-HCT) 160-12.5 mg per tablet Take 1 tablet by mouth once daily. 90 tablet 3    vitamin D 1000 units Tab Take 185 mg by mouth once daily.      furosemide (LASIX) 20 MG tablet Take 1 tablet (20 mg total) by mouth 2 (two) times daily. (Patient taking differently: Take 20 mg by  mouth once daily. ) 60 tablet 11     Current Facility-Administered Medications on File Prior to Visit   Medication Dose Route Frequency Provider Last Rate Last Dose    ustekinumab (stelara) 90 mg/mL subcutaneous syringe  90 mg Subcutaneous Q90 Days Flor Rodriguez PA-C   90 mg at 09/07/17 1143     Review of patient's allergies indicates:   Allergen Reactions    Celexa [citalopram] Anaphylaxis    Clindamycin Itching and Swelling    Codeine Shortness Of Breath, Itching and Swelling    Crestor [rosuvastatin] Anaphylaxis    Cytotec [misoprostol] Anaphylaxis and Other (See Comments)     Difficulty breathing    Lisinopril Anaphylaxis    Magnesium citrate Shortness Of Breath    Stadol [butorphanol tartrate] Anaphylaxis     Coded    Vicodin [hydrocodone-acetaminophen] Shortness Of Breath    Adhesive      EKG Electrodes    Aggrenox [aspirin-dipyridamole] Other (See Comments)     headaches    Demerol [meperidine]     Isosorbide Other (See Comments)     Severe headache    Kenalog [triamcinolone acetonide]     Medrol [methylprednisolone] Other (See Comments)     unknown    Mobic [meloxicam]     Morphine     Nitroglycerin      Long acting    Ranexa [ranolazine] Nausea And Vomiting    Sulfa (sulfonamide antibiotics) Other (See Comments)     unknown    Talwin [pentazocine lactate]     Tetracyclines     Tilade [nedocromil]     Zetia [ezetimibe]        Objective:     Physical Exam   Constitutional: She is oriented to person, place, and time. She appears well-developed. No distress.   HENT:   Head: Normocephalic.   Eyes: Conjunctivae are normal. Pupils are equal, round, and reactive to light. No scleral icterus.   Neck: Normal range of motion. Neck supple. Normal carotid pulses, no hepatojugular reflux and no JVD present. Carotid bruit is not present. No edema present. No thyroid mass and no thyromegaly present.   Cardiovascular: Normal rate, regular rhythm, S1 normal, S2 normal and intact distal pulses.  PMI  is not displaced.  Exam reveals no gallop and no friction rub.    Murmur heard.   Medium-pitched harsh crescendo-decrescendo systolic murmur is present with a grade of 3/6  at the upper right sternal border, upper left sternal border, lower left sternal border  Pulses:       Carotid pulses are 2+ on the right side, and 2+ on the left side.       Radial pulses are 2+ on the right side, and 2+ on the left side.        Femoral pulses are 2+ on the right side, and 2+ on the left side.       Popliteal pulses are 2+ on the right side, and 2+ on the left side.        Dorsalis pedis pulses are 2+ on the right side, and 2+ on the left side.        Posterior tibial pulses are 2+ on the right side, and 2+ on the left side.   Pulmonary/Chest: Effort normal and breath sounds normal. She has no wheezes. She has no rales. She exhibits no tenderness.   Abdominal: Soft. Bowel sounds are normal. She exhibits no pulsatile midline mass and no mass. There is no hepatosplenomegaly. There is no tenderness.   Musculoskeletal: Normal range of motion. She exhibits no edema or tenderness.        Cervical back: Normal.        Thoracic back: Normal.        Lumbar back: Normal.   Lymphadenopathy:     She has no cervical adenopathy.     She has no axillary adenopathy.        Right: No supraclavicular adenopathy present.        Left: No supraclavicular adenopathy present.   Neurological: She is alert and oriented to person, place, and time. She has normal strength and normal reflexes. No sensory deficit. Gait normal.   Skin: Skin is warm. No rash noted. No cyanosis. No pallor. Nails show no clubbing.   Psychiatric: She has a normal mood and affect. Her speech is normal and behavior is normal. Cognition and memory are normal.       Assessment:     1. Coronary artery disease, occlusive    2. Angina, class II    3. Essential hypertension    4. Pure hypercholesterolemia      STABLE CAD- ANGINA PATTERN  BP WELL CONTROLLED  NO ARRHYTHMIAS  CNS STATUS  STABLE  CARD MED WELL TOLERATED  Plan:     Coronary artery disease, occlusive    Angina, class II    Essential hypertension    Pure hypercholesterolemia    1- CONTINUE PRESENT CARD MANAGEMENT    2- RETURN IN 6 MONTHS

## 2017-10-02 ENCOUNTER — TELEPHONE (OUTPATIENT)
Dept: FAMILY MEDICINE | Facility: CLINIC | Age: 67
End: 2017-10-02

## 2017-10-02 NOTE — TELEPHONE ENCOUNTER
"Patient wanted to let Dr. Doyle  know that she went to the ER at [facility] for fever and pain "all over".  Patient states that the ER told her she had no fever,her blood sugar was fine and was discharged home after blood work was drawn.Patient stated that she was told to   Make Dr. Doyle aware  "

## 2017-10-02 NOTE — TELEPHONE ENCOUNTER
----- Message from Kalli Simons sent at 10/2/2017  9:03 AM CDT -----  Please call pt back [phone]  About her E R visit.

## 2017-10-04 ENCOUNTER — OFFICE VISIT (OUTPATIENT)
Dept: FAMILY MEDICINE | Facility: CLINIC | Age: 67
End: 2017-10-04
Payer: MEDICARE

## 2017-10-04 ENCOUNTER — HOSPITAL ENCOUNTER (OUTPATIENT)
Dept: RADIOLOGY | Facility: HOSPITAL | Age: 67
Discharge: HOME | End: 2017-10-04
Attending: FAMILY MEDICINE
Payer: MEDICARE

## 2017-10-04 VITALS
HEART RATE: 97 BPM | OXYGEN SATURATION: 99 % | BODY MASS INDEX: 43.89 KG/M2 | DIASTOLIC BLOOD PRESSURE: 78 MMHG | TEMPERATURE: 98 F | RESPIRATION RATE: 14 BRPM | WEIGHT: 247.69 LBS | SYSTOLIC BLOOD PRESSURE: 128 MMHG | HEIGHT: 63 IN

## 2017-10-04 DIAGNOSIS — Z86.73 HISTORY OF CVA (CEREBROVASCULAR ACCIDENT): ICD-10-CM

## 2017-10-04 DIAGNOSIS — R41.0 CONFUSION: ICD-10-CM

## 2017-10-04 DIAGNOSIS — D64.9 ANEMIA, UNSPECIFIED TYPE: ICD-10-CM

## 2017-10-04 DIAGNOSIS — R41.3 MEMORY CHANGES: ICD-10-CM

## 2017-10-04 DIAGNOSIS — I65.29 STENOSIS OF CAROTID ARTERY, UNSPECIFIED LATERALITY: ICD-10-CM

## 2017-10-04 DIAGNOSIS — E03.9 HYPOTHYROIDISM, UNSPECIFIED TYPE: Primary | ICD-10-CM

## 2017-10-04 PROCEDURE — 99999 PR PBB SHADOW E&M-EST. PATIENT-LVL III: CPT | Mod: PBBFAC,,, | Performed by: FAMILY MEDICINE

## 2017-10-04 PROCEDURE — 99213 OFFICE O/P EST LOW 20 MIN: CPT | Mod: PBBFAC,25,PO | Performed by: FAMILY MEDICINE

## 2017-10-04 PROCEDURE — 99214 OFFICE O/P EST MOD 30 MIN: CPT | Mod: S$PBB,,, | Performed by: FAMILY MEDICINE

## 2017-10-04 PROCEDURE — 70450 CT HEAD/BRAIN W/O DYE: CPT | Mod: TC

## 2017-10-04 NOTE — PROGRESS NOTES
"Subjective:       Patient ID: Qi Ortiz is a 67 y.o. female.    Chief Complaint: ER Follow-up    HPI   ER Follow-up  66yo female presents today for assessment after having an episode of confusion on Sunday October 1st. She notes transport to the ER via EMS as she was not able to walk. She was taken to [facility] for assessment. She reports that vitals were stable and she was advised symptoms were attributed to thyroid dysfunction. She had blood glucose monitoring and a urinalysis but reports that no abnormalities were detected. She is concerned that her recent dosing of Reclast contributed to symptoms. Her last infusion was on 9/7/17. She has done some research online with regard to symptoms and has correlated the Reclast infusion. She reports multiple drug allergies in the past with unusual reactions. She has not contacted Rheumatology.    Review of Systems   Constitutional: Negative for appetite change, fatigue and unexpected weight change.   HENT: Negative for ear pain, sinus pressure and trouble swallowing.    Eyes: Negative for visual disturbance.   Respiratory: Negative for cough and shortness of breath.    Cardiovascular: Negative for chest pain, palpitations and leg swelling.   Gastrointestinal: Negative for abdominal pain, constipation and diarrhea.   Endocrine: Negative for cold intolerance and heat intolerance.   Genitourinary: Negative for decreased urine volume and difficulty urinating.   Musculoskeletal: Negative for arthralgias and myalgias.   Skin: Negative for rash.   Neurological: Negative for dizziness, speech difficulty, weakness, light-headedness and headaches.   Psychiatric/Behavioral: Negative for sleep disturbance. The patient is nervous/anxious.        Objective:   /78   Pulse 97   Temp 98.2 °F (36.8 °C) (Temporal)   Resp 14   Ht 5' 3" (1.6 m)   Wt 112.4 kg (247 lb 11 oz)   SpO2 99%   BMI 43.88 kg/m²   Physical Exam   Constitutional: She is oriented to person, place, and " time. She appears well-developed. No distress.   Morbidly obese, non-toxic   HENT:   Head: Normocephalic and atraumatic.   Right Ear: Tympanic membrane, external ear and ear canal normal.   Left Ear: Tympanic membrane, external ear and ear canal normal.   Nose: Nose normal.   Mouth/Throat: Oropharynx is clear and moist.   Eyes: Conjunctivae and EOM are normal. Pupils are equal, round, and reactive to light.   Neck: Normal range of motion. Neck supple.   Cardiovascular: Normal rate and regular rhythm.    Murmur heard.  Pulmonary/Chest: Effort normal and breath sounds normal.   Abdominal: Soft. Bowel sounds are normal.   Musculoskeletal: She exhibits no edema.   Neurological: She is alert and oriented to person, place, and time. No cranial nerve deficit.   Skin: Skin is warm and dry. She is not diaphoretic.   Psychiatric: She has a normal mood and affect. Her behavior is normal.       Assessment:       1. Hypothyroidism, unspecified type    2. Memory changes    3. Confusion    4. Anemia, unspecified type    5. Stenosis of carotid artery, unspecified laterality    6. History of CVA (cerebrovascular accident)        Plan:   Hypothyroidism, unspecified type  Continue with recent adjustment to Synthroid dosing. Updated TSH and free T4 will be obtained as planned.    Memory changes  Will request records from [facility]. Proceed with lab assessment and CT given known carotid stenosis and previous CVA. No acute findings on neurologic exam today.  -     CT Head Without Contrast; Future; Expected date: 10/04/2017  -     Vitamin B12; Future; Expected date: 10/04/2017  -     RPR; Future; Expected date: 10/04/2017  -     Folate; Future; Expected date: 10/04/2017  -     Basic metabolic panel; Future; Expected date: 10/04/2017    Confusion  -     CT Head Without Contrast; Future; Expected date: 10/04/2017    Anemia, unspecified type  -     CBC auto differential; Future; Expected date: 10/04/2017  -     Ferritin; Future;  Expected date: 10/04/2017  -     Iron and TIBC; Future; Expected date: 10/04/2017    Stenosis of carotid artery, unspecified laterality  Continue with monitoring as per Cardiology.    History of CVA (cerebrovascular accident)  BP and lipid control measures remain advised.  -     CT Head Without Contrast; Future; Expected date: 10/04/2017

## 2017-10-16 NOTE — PLAN OF CARE
Problem: Patient Care Overview  Goal: Plan of Care Review  Pt verbalized understanding of POT       Protocol For Nbuvb: The patient received NBUVB.

## 2017-11-14 ENCOUNTER — OFFICE VISIT (OUTPATIENT)
Dept: FAMILY MEDICINE | Facility: CLINIC | Age: 67
End: 2017-11-14
Payer: MEDICARE

## 2017-11-14 ENCOUNTER — HOSPITAL ENCOUNTER (OUTPATIENT)
Dept: RADIOLOGY | Facility: HOSPITAL | Age: 67
Discharge: HOME | End: 2017-11-14
Attending: FAMILY MEDICINE
Payer: MEDICARE

## 2017-11-14 VITALS
HEIGHT: 63 IN | TEMPERATURE: 98 F | RESPIRATION RATE: 15 BRPM | WEIGHT: 255.75 LBS | BODY MASS INDEX: 45.32 KG/M2 | SYSTOLIC BLOOD PRESSURE: 130 MMHG | HEART RATE: 73 BPM | OXYGEN SATURATION: 98 % | DIASTOLIC BLOOD PRESSURE: 88 MMHG

## 2017-11-14 DIAGNOSIS — G47.33 OBSTRUCTIVE SLEEP APNEA SYNDROME: ICD-10-CM

## 2017-11-14 DIAGNOSIS — R06.00 DYSPNEA, UNSPECIFIED TYPE: ICD-10-CM

## 2017-11-14 DIAGNOSIS — R53.83 FATIGUE, UNSPECIFIED TYPE: Primary | ICD-10-CM

## 2017-11-14 DIAGNOSIS — E66.01 MORBID OBESITY WITH BMI OF 45.0-49.9, ADULT: ICD-10-CM

## 2017-11-14 DIAGNOSIS — N39.0 URINARY TRACT INFECTION WITHOUT HEMATURIA, SITE UNSPECIFIED: ICD-10-CM

## 2017-11-14 DIAGNOSIS — E03.9 HYPOTHYROIDISM, UNSPECIFIED TYPE: ICD-10-CM

## 2017-11-14 DIAGNOSIS — L40.50 PSORIATIC ARTHRITIS: ICD-10-CM

## 2017-11-14 DIAGNOSIS — K64.9 HEMORRHOIDS, UNSPECIFIED HEMORRHOID TYPE: ICD-10-CM

## 2017-11-14 DIAGNOSIS — R82.90 ABNORMAL FINDING ON URINALYSIS: ICD-10-CM

## 2017-11-14 LAB
BILIRUB SERPL-MCNC: ABNORMAL MG/DL
BLOOD URINE, POC: 50
COLOR, POC UA: YELLOW
GLUCOSE UR QL STRIP: NORMAL
KETONES UR QL STRIP: ABNORMAL
LEUKOCYTE ESTERASE URINE, POC: ABNORMAL
NITRITE, POC UA: ABNORMAL
PH, POC UA: 5
PROTEIN, POC: ABNORMAL
SPECIFIC GRAVITY, POC UA: 1.01
UROBILINOGEN, POC UA: NORMAL

## 2017-11-14 PROCEDURE — 71020 XR CHEST PA AND LATERAL: CPT | Mod: TC,PO

## 2017-11-14 PROCEDURE — 99213 OFFICE O/P EST LOW 20 MIN: CPT | Mod: PBBFAC,25,PO | Performed by: FAMILY MEDICINE

## 2017-11-14 PROCEDURE — 99214 OFFICE O/P EST MOD 30 MIN: CPT | Mod: S$PBB,,, | Performed by: FAMILY MEDICINE

## 2017-11-14 PROCEDURE — 87186 SC STD MICRODIL/AGAR DIL: CPT

## 2017-11-14 PROCEDURE — 87077 CULTURE AEROBIC IDENTIFY: CPT

## 2017-11-14 PROCEDURE — 81002 URINALYSIS NONAUTO W/O SCOPE: CPT | Mod: PBBFAC,PO | Performed by: FAMILY MEDICINE

## 2017-11-14 PROCEDURE — 87086 URINE CULTURE/COLONY COUNT: CPT

## 2017-11-14 PROCEDURE — 87088 URINE BACTERIA CULTURE: CPT

## 2017-11-14 PROCEDURE — 99999 PR PBB SHADOW E&M-EST. PATIENT-LVL III: CPT | Mod: PBBFAC,,, | Performed by: FAMILY MEDICINE

## 2017-11-14 PROCEDURE — 71020 XR CHEST PA AND LATERAL: CPT | Mod: 26,,, | Performed by: RADIOLOGY

## 2017-11-14 RX ORDER — CEFUROXIME AXETIL 250 MG/1
250 TABLET ORAL 2 TIMES DAILY
Qty: 14 TABLET | Refills: 0 | Status: SHIPPED | OUTPATIENT
Start: 2017-11-14 | End: 2017-12-07

## 2017-11-14 NOTE — PROGRESS NOTES
Subjective:       Patient ID: Qi Ortiz is a 67 y.o. female.    Chief Complaint: Fatigue    Fatigue   This is a new problem. The current episode started in the past 7 days. The problem occurs constantly. The problem has been waxing and waning. Associated symptoms include arthralgias, fatigue and weakness. Pertinent negatives include no abdominal pain, chest pain, congestion, coughing, diaphoresis, headaches, myalgias or rash.   Patient reports having considerable fatigue since Saturday 11/11/17. She notes that she is concerned about recent findings of thyroid imbalance. Compliance with Synthroid 112mcg daily is reported- this dosing adjustment was made in September. She attributes symptoms in part to having received Reclast infusion in September 2017. She has previously reported concerns for adverse effects of the treatment. She describes having some variability with her heart rate depending on activity level but denies any CP or palpitations. There are no active symptoms reported today.     Review of Systems   Constitutional: Positive for appetite change and fatigue. Negative for diaphoresis and unexpected weight change.   HENT: Negative for congestion, ear pain, postnasal drip and sinus pressure.    Eyes: Negative for pain and visual disturbance.        +wears glasses   Respiratory: Positive for shortness of breath. Negative for cough and chest tightness.    Cardiovascular: Positive for leg swelling. Negative for chest pain and palpitations.   Gastrointestinal: Negative for abdominal pain, blood in stool, constipation and diarrhea.   Endocrine: Negative for cold intolerance and heat intolerance.   Genitourinary: Negative for decreased urine volume and difficulty urinating.   Musculoskeletal: Positive for arthralgias. Negative for myalgias.   Skin: Negative for color change and rash.   Allergic/Immunologic: Negative for environmental allergies.   Neurological: Positive for weakness. Negative for dizziness  "and headaches.   Psychiatric/Behavioral: Positive for sleep disturbance. Negative for confusion (memory symptoms have improved) and dysphoric mood. The patient is not nervous/anxious.        Objective:   /88   Pulse 73   Temp 98.1 °F (36.7 °C) (Temporal)   Resp 15   Ht 5' 3" (1.6 m)   Wt 116 kg (255 lb 11.7 oz)   SpO2 98%   BMI 45.30 kg/m²   Physical Exam   Constitutional: She is oriented to person, place, and time. She appears well-developed. No distress.   Morbidly obese, non-toxic   HENT:   Head: Normocephalic and atraumatic.   Right Ear: Tympanic membrane, external ear and ear canal normal.   Left Ear: Tympanic membrane, external ear and ear canal normal.   Nose: Nose normal.   Mouth/Throat: Oropharynx is clear and moist.   Eyes: Conjunctivae and EOM are normal. Pupils are equal, round, and reactive to light.   Neck: Normal range of motion. Neck supple.   Cardiovascular: Normal rate and regular rhythm.    Murmur heard.  Pulmonary/Chest: Effort normal and breath sounds normal. No respiratory distress. She has no wheezes.   Abdominal: Soft. Bowel sounds are normal.   Genitourinary:   Genitourinary Comments: No suprapubic tenderness   Musculoskeletal: She exhibits no edema.   Neurological: She is alert and oriented to person, place, and time.   Skin: Skin is warm and dry. She is not diaphoretic. No pallor.   Psychiatric: Her speech is normal and behavior is normal. Her mood appears anxious.       Assessment:       1. Fatigue, unspecified type    2. Urinary tract infection without hematuria, site unspecified    3. Abnormal finding on urinalysis    4. Hypothyroidism, unspecified type    5. Dyspnea, unspecified type    6. Obstructive sleep apnea syndrome    7. Hemorrhoids, unspecified hemorrhoid type    8. Psoriatic arthritis    9. Morbid obesity with BMI of 45.0-49.9, adult        Plan:   Fatigue, unspecified type  Likely multifactorial. Recommend proceeding with lab update. UA in office is concerning " for UTI. Will initiate treatment while awaiting culture results. Have advised that given her cardiac history an updated assessment with Dr. White may be needed- she denies any CP presently. She is also questioning the impact of the Reclast infusion and her current symptoms- I have asked that she consult with her Rheumatologist. Additional recommendations will be provided pending lab results.   -     CBC auto differential; Future; Expected date: 11/14/2017  -     POCT URINE DIPSTICK WITHOUT MICROSCOPE    Urinary tract infection without hematuria, site unspecified  -     cefUROXime (CEFTIN) 250 MG tablet; Take 1 tablet (250 mg total) by mouth 2 (two) times daily.  Dispense: 14 tablet; Refill: 0    Abnormal finding on urinalysis  -     Urine culture    Hypothyroidism, unspecified type  Continue with Synthroid at current dosing. Awaiting TFT's for additional recommendations.     Dyspnea, unspecified type  Patient with exertional SOB at baseline. CXR is pending. Should symptoms acutely worsen she will seek emergent assessment.  -     X-Ray Chest PA And Lateral; Future; Expected date: 11/14/2017  -     Brain natriuretic peptide; Future; Expected date: 11/28/2017  -     CBC auto differential; Future; Expected date: 11/14/2017    Obstructive sleep apnea syndrome  Not compliant with CPAP use. Last visit with Pulmonology was in December 2016 at which time comment was made regarding adherence- she notes no consistent use of CPAP in at least 18 months. Recommend updated evaluation with Dr. Leon.    Hemorrhoids, unspecified hemorrhoid type  No active bleeding is reported.     Psoriatic arthritis  Continue with current treatment and follow-up recommendations as per Rheumatology.    Morbid obesity with BMI of 45.0-49.9, adult  Weight loss efforts remain encouraged through diet and lifestyle measures.

## 2017-11-16 ENCOUNTER — TELEPHONE (OUTPATIENT)
Dept: FAMILY MEDICINE | Facility: CLINIC | Age: 67
End: 2017-11-16

## 2017-11-16 ENCOUNTER — TELEPHONE (OUTPATIENT)
Dept: CARDIOLOGY | Facility: CLINIC | Age: 67
End: 2017-11-16

## 2017-11-16 ENCOUNTER — OFFICE VISIT (OUTPATIENT)
Dept: CARDIOLOGY | Facility: CLINIC | Age: 67
End: 2017-11-16
Payer: MEDICARE

## 2017-11-16 VITALS
BODY MASS INDEX: 43.59 KG/M2 | DIASTOLIC BLOOD PRESSURE: 78 MMHG | HEIGHT: 64 IN | WEIGHT: 255.31 LBS | HEART RATE: 71 BPM | SYSTOLIC BLOOD PRESSURE: 148 MMHG

## 2017-11-16 DIAGNOSIS — I65.29 STENOSIS OF CAROTID ARTERY, UNSPECIFIED LATERALITY: ICD-10-CM

## 2017-11-16 DIAGNOSIS — E78.00 PURE HYPERCHOLESTEROLEMIA: Chronic | ICD-10-CM

## 2017-11-16 DIAGNOSIS — N18.30 STAGE 3 CHRONIC KIDNEY DISEASE: ICD-10-CM

## 2017-11-16 DIAGNOSIS — I20.9 ANGINA, CLASS II: Chronic | ICD-10-CM

## 2017-11-16 DIAGNOSIS — I10 ESSENTIAL HYPERTENSION: Primary | Chronic | ICD-10-CM

## 2017-11-16 DIAGNOSIS — I25.10 CORONARY ARTERY DISEASE, OCCLUSIVE: Chronic | ICD-10-CM

## 2017-11-16 DIAGNOSIS — Z95.1 S/P CABG (CORONARY ARTERY BYPASS GRAFT): Chronic | ICD-10-CM

## 2017-11-16 DIAGNOSIS — I25.810: ICD-10-CM

## 2017-11-16 PROCEDURE — 99214 OFFICE O/P EST MOD 30 MIN: CPT | Mod: S$PBB,,, | Performed by: INTERNAL MEDICINE

## 2017-11-16 PROCEDURE — 99213 OFFICE O/P EST LOW 20 MIN: CPT | Mod: PBBFAC | Performed by: INTERNAL MEDICINE

## 2017-11-16 PROCEDURE — 93005 ELECTROCARDIOGRAM TRACING: CPT | Mod: PBBFAC | Performed by: NUCLEAR MEDICINE

## 2017-11-16 PROCEDURE — 93010 ELECTROCARDIOGRAM REPORT: CPT | Mod: S$PBB,,, | Performed by: NUCLEAR MEDICINE

## 2017-11-16 PROCEDURE — 99999 PR PBB SHADOW E&M-EST. PATIENT-LVL III: CPT | Mod: PBBFAC,,, | Performed by: INTERNAL MEDICINE

## 2017-11-16 RX ORDER — FUROSEMIDE 20 MG/1
10 TABLET ORAL 2 TIMES DAILY
Qty: 90 TABLET | Refills: 3 | Status: SHIPPED | OUTPATIENT
Start: 2017-11-16 | End: 2020-03-02 | Stop reason: SDUPTHER

## 2017-11-16 NOTE — PROGRESS NOTES
Subjective:   Patient ID:  Qi Ortiz is a 67 y.o. female who presents for cardiac consult of Shortness of Breath and Edema      HPI  The patient came in today for cardiac consult of Shortness of Breath and Edema    This is a 67 year old female pt with PMHx CAD s/p CABG, HTN, HLD, carotid stenosis, depression, CKD presents for follow up visit. She is seen by Dr. Hancock in cardiology clinic and last seen on 9/19/17.     Pt has been more SOB over the weekend, is better today.  Pt gets SOB with walking around in house. Legs have been more swollen for the past few days. Has gained over 20 lbs in Aug, but 10 lbs. BNP was elevated at  288 days ago. CXR negative. Sleeps with 3 small pillows, stable. Did take lasix 20 mg, 10mg in am and 10mg in PM with relief. Tries to avoid salty food but had some chicken noodle soup on Saturday. Pt has to elevated legs and use compression stockings but does not always do so.     Patient feels  no chest pain,  no PND, no palpitation, no dizziness, no syncope, no CNS symptoms.    Patient is compliant with medications.    ECG: NSR, old inferior infarct, anterolateral TWI - no signifcant change from ECG in 12/2016 12/2016 Stress  EKG Conclusions:  1. The EKG portion of this study is uninterpretable for ischemia due to failure to reach target heart rate at a low workload, and peak heart rate of 89 bpm (60% of predicted).   2. Sensitivity is impaired due to failure to reach target heart rate.   3. Exercise capacity is severely impaired.   4. Blood pressure response to exercise was normal (Presenting BP: 132/70 Peak BP: 132/70).   5. The following arrhythmias were present: occasional PVCs.   6. There were no symptoms of chest discomfort or significant dyspnea throughout the protocol.       6/29/16 2D ECHO  CONCLUSIONS     1 - Eccentric hypertrophy.     2 - Normal left ventricular systolic function (EF 55-60%).     3 - Normal left ventricular diastolic function.     4 - Normal right  ventricular systolic function .     5 - Mild aortic stenosis, ALFREDO = 1.8 cm2, peak velocity = 2.8 m/s, mean gradient = 21.0 mmHg.     6 - The estimated PA systolic pressure is 34 mmHg.     7 - Trivial to mild mitral regurgitation.     8 - Mild tricuspid regurgitation.     CABG - 2016 LIMA to LAD, prios SVG RAMUS to OM and SVG to LAD    Past Medical History:   Diagnosis Date    Carotid stenosis     19%    COPD (chronic obstructive pulmonary disease)     No meds    CVA (cerebral vascular accident)     Dr. 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 Hope    Psoriatic arthritis     Rheumatology       Past Surgical History:   Procedure Laterality Date    BREAST BIOPSY      R sided/benign    CARDIAC SURGERY      2016    CERVICAL FUSION      CHOLECYSTECTOMY      CORONARY ARTERY BYPASS GRAFT      triple bypass    CORONARY STENT PLACEMENT      EYE SURGERY      INTRAUTERINE DEVICE INSERTION      mass removed from R groin      TOTAL ABDOMINAL HYSTERECTOMY W/ BILATERAL SALPINGOOPHORECTOMY      due to benign mass, adhesions    TUBAL LIGATION         Social History   Substance Use Topics    Smoking status: Never Smoker    Smokeless tobacco: Never Used    Alcohol use No       Family History   Problem Relation Age of Onset    Breast cancer Maternal Grandfather     Breast cancer Paternal Aunt     Stroke      Breast cancer Sister 60    Leukemia Sister 8      as child    Lung cancer Paternal Grandfather     Heart disease         Patient's Medications   New Prescriptions    FUROSEMIDE (LASIX) 20 MG TABLET    Take 0.5 tablets (10 mg total) by mouth 2 (two) times daily.   Previous Medications    ASPIRIN 81 MG CHEW    Take 81 mg by mouth once daily.    CEFUROXIME (CEFTIN) 250 MG TABLET    Take 1 tablet (250 mg total) by mouth 2 (two) times daily.    CHLORHEXIDINE (HIBICLENS) 4 %  EXTERNAL LIQUID    Apply topically daily as needed.    CLOPIDOGREL (PLAVIX) 75 MG TABLET    Take 1 tablet (75 mg total) by mouth once daily.    CYANOCOBALAMIN 2000 MCG TABLET    Take 2,000 mcg by mouth once daily.    EVOLOCUMAB (REPATHA SURECLICK) 140 MG/ML PNIJ    Inject 140 mg into the skin every 14 (fourteen) days.    FUROSEMIDE (LASIX) 20 MG TABLET    Take 1 tablet (20 mg total) by mouth 2 (two) times daily.    GABAPENTIN (NEURONTIN) 100 MG CAPSULE    Take 2 capsules (200 mg total) by mouth 3 (three) times daily as needed.    GARLIC 2,000 MG CAP    Take 3,000 mg by mouth once daily.     LEVOTHYROXINE (SYNTHROID) 112 MCG TABLET    Take 1 tablet (112 mcg total) by mouth once daily.    METOPROLOL SUCCINATE (TOPROL-XL) 100 MG 24 HR TABLET    1 tab (100mg) in morning. Half tab (50mg) at bedtime.  Generic ok    NITROGLYCERIN (NITROSTAT) 0.4 MG SL TABLET    Place 1 tablet (0.4 mg total) under the tongue every 5 (five) minutes as needed for Chest pain.    PYRIDOXINE (VITAMIN B-6) 100 MG TAB    Take 200 mg by mouth once daily.     VALSARTAN-HYDROCHLOROTHIAZIDE (DIOVAN-HCT) 160-12.5 MG PER TABLET    Take 1 tablet by mouth once daily.    VITAMIN D 1000 UNITS TAB    Take 185 mg by mouth once daily.   Modified Medications    No medications on file   Discontinued Medications    CHLORHEXIDINE (HIBICLENS) 4 % EXTERNAL LIQUID    Apply topically daily as needed.       Review of Systems   Constitutional: Negative.    HENT: Negative.    Eyes: Negative.    Respiratory: Positive for shortness of breath.    Cardiovascular: Positive for leg swelling. Negative for chest pain.   Gastrointestinal: Negative.    Genitourinary: Negative.    Musculoskeletal: Negative.    Skin: Negative.    Neurological: Negative.    Endo/Heme/Allergies: Negative.    Psychiatric/Behavioral: Negative.    All 12 systems otherwise negative.      Wt Readings from Last 3 Encounters:   11/16/17 115.8 kg (255 lb 4.7 oz)   11/14/17 116 kg (255 lb 11.7 oz)   10/04/17  "112.4 kg (247 lb 11 oz)     Temp Readings from Last 3 Encounters:   11/14/17 98.1 °F (36.7 °C) (Temporal)   10/04/17 98.2 °F (36.8 °C) (Temporal)   09/18/17 98.3 °F (36.8 °C) (Temporal)     BP Readings from Last 3 Encounters:   11/16/17 (!) 148/78   11/14/17 130/88   10/04/17 128/78     Pulse Readings from Last 3 Encounters:   11/16/17 71   11/14/17 73   10/04/17 97       BP (!) 148/78   Pulse 71   Ht 5' 3.5" (1.613 m)   Wt 115.8 kg (255 lb 4.7 oz)   BMI 44.51 kg/m²     Objective:   Physical Exam   Constitutional: She is oriented to person, place, and time. She appears well-developed and well-nourished. No distress.   HENT:   Head: Normocephalic and atraumatic.   Nose: Nose normal.   Mouth/Throat: Oropharynx is clear and moist.   Eyes: Conjunctivae and EOM are normal. No scleral icterus.   Neck: Normal range of motion. Neck supple. No JVD present. No thyromegaly present.   Cardiovascular: Normal rate, regular rhythm, S1 normal and S2 normal.  Exam reveals no gallop, no S3, no S4 and no friction rub.    Murmur heard.   Midsystolic murmur is present with a grade of 2/6  at the upper right sternal border  Pulmonary/Chest: Effort normal and breath sounds normal. No stridor. No respiratory distress. She has no wheezes. She has no rales. She exhibits no tenderness.   Abdominal: Soft. Bowel sounds are normal. She exhibits no distension and no mass. There is no tenderness. There is no rebound.   Genitourinary:   Genitourinary Comments: Deferred   Musculoskeletal: Normal range of motion. She exhibits edema. She exhibits no tenderness or deformity.   Lymphadenopathy:     She has no cervical adenopathy.   Neurological: She is alert and oriented to person, place, and time. She exhibits normal muscle tone. Coordination normal.   Skin: Skin is warm and dry. No rash noted. She is not diaphoretic. No erythema. No pallor.   Psychiatric: She has a normal mood and affect. Her behavior is normal. Judgment and thought content " normal.   Nursing note and vitals reviewed.      Lab Results   Component Value Date     11/14/2017    K 4.0 11/14/2017     11/14/2017    CO2 28 11/14/2017    BUN 15 11/14/2017    CREATININE 1.1 11/14/2017     11/14/2017    HGBA1C 6.0 (H) 09/07/2017    MG 2.1 11/01/2010    AST 20 09/07/2017    ALT 10 09/07/2017    ALBUMIN 3.2 (L) 09/07/2017    PROT 7.1 09/07/2017    BILITOT 0.4 09/07/2017    WBC 7.90 11/14/2017    HGB 11.3 (L) 11/14/2017    HCT 37.6 11/14/2017    MCV 79 (L) 11/14/2017     11/14/2017    TSH 2.580 11/14/2017    CHOL 150 09/07/2017    HDL 48 09/07/2017    LDLCALC 80.8 09/07/2017    TRIG 106 09/07/2017     (H) 11/14/2017     Assessment:      1. Essential hypertension    2. Pure hypercholesterolemia    3. Coronary artery disease, occlusive    4. S/P CABG (coronary artery bypass graft)    5. Angina, class II    6. 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    7. Stenosis of carotid artery, unspecified laterality    8. Stage 3 chronic kidney disease        Plan:   1. SOB with Edema - likely secondary to Diastolic HF   - cont lasix as pt is doing  - avoid salty food  - monitor fluid statin  - check BMP and Mg next week  - will check Echo    2. CAD s/p CABG  - cont asa, plavix, BB, Repatha  - symptoms seem more c/w fluid and not angina  - if continues to have SOB will check nuclear stress for ischemic workup     3. HLD  - cont Repatha    4. HTN  - cont meds     5. Mild AS  - monitor, check 2D ECHO    Thank you for allowing me to participate in this patient's care. Please do not hesitate to contact me with any questions or concerns. Consult note has been forwarded to the referral physician.

## 2017-11-16 NOTE — TELEPHONE ENCOUNTER
Telephoned patient to confirm appointment with Dr. Hancock on 11/28/17 but patient is requesting to be seen within 1-2 days.  She says her legs are swelling but go down after elevation and shortness of breath, no chest pain  Has not been taking Lasix 20 mg since first post surgery visit.  After long discussion with patient will see Dr. Mcfadden today at 220 PM

## 2017-11-17 ENCOUNTER — CLINICAL SUPPORT (OUTPATIENT)
Dept: CARDIOLOGY | Facility: CLINIC | Age: 67
End: 2017-11-17
Attending: INTERNAL MEDICINE
Payer: MEDICARE

## 2017-11-17 DIAGNOSIS — I25.10 CORONARY ARTERY DISEASE, OCCLUSIVE: Chronic | ICD-10-CM

## 2017-11-17 DIAGNOSIS — I10 ESSENTIAL HYPERTENSION: Chronic | ICD-10-CM

## 2017-11-17 LAB
AORTIC VALVE STENOSIS: ABNORMAL
BACTERIA UR CULT: NORMAL
ESTIMATED PA SYSTOLIC PRESSURE: 24.21
MITRAL VALVE MOBILITY: NORMAL
RETIRED EF AND QEF - SEE NOTES: 55 (ref 55–65)
TRICUSPID VALVE REGURGITATION: ABNORMAL

## 2017-11-17 PROCEDURE — 93306 TTE W/DOPPLER COMPLETE: CPT | Mod: PBBFAC | Performed by: INTERNAL MEDICINE

## 2017-11-21 ENCOUNTER — TELEPHONE (OUTPATIENT)
Dept: CARDIOLOGY | Facility: CLINIC | Age: 67
End: 2017-11-21

## 2017-11-21 NOTE — TELEPHONE ENCOUNTER
----- Message from Rufino Mcfadden MD sent at 11/17/2017  2:59 PM CST -----  Please call the patient regarding her abnormal result. The aortic valve is tighter than before with moderate to severe stenosis. Can discuss more in follow up but would not do surgery on the valve now.

## 2017-11-22 ENCOUNTER — LAB VISIT (OUTPATIENT)
Dept: LAB | Facility: HOSPITAL | Age: 67
End: 2017-11-22
Attending: INTERNAL MEDICINE
Payer: MEDICARE

## 2017-11-22 DIAGNOSIS — I10 ESSENTIAL HYPERTENSION: Chronic | ICD-10-CM

## 2017-11-22 LAB
ANION GAP SERPL CALC-SCNC: 8 MMOL/L
BUN SERPL-MCNC: 22 MG/DL
CALCIUM SERPL-MCNC: 9.2 MG/DL
CHLORIDE SERPL-SCNC: 105 MMOL/L
CO2 SERPL-SCNC: 28 MMOL/L
CREAT SERPL-MCNC: 1.2 MG/DL
EST. GFR  (AFRICAN AMERICAN): 54.4 ML/MIN/1.73 M^2
EST. GFR  (NON AFRICAN AMERICAN): 47.2 ML/MIN/1.73 M^2
GLUCOSE SERPL-MCNC: 134 MG/DL
POTASSIUM SERPL-SCNC: 4 MMOL/L
SODIUM SERPL-SCNC: 141 MMOL/L

## 2017-11-22 PROCEDURE — 36415 COLL VENOUS BLD VENIPUNCTURE: CPT | Mod: PO

## 2017-11-22 PROCEDURE — 80048 BASIC METABOLIC PNL TOTAL CA: CPT

## 2017-11-22 NOTE — TELEPHONE ENCOUNTER
Called to patient on 11/21/17 and gave results of echo--all questions answered--patient verbalizes understanding

## 2017-12-07 ENCOUNTER — OFFICE VISIT (OUTPATIENT)
Dept: FAMILY MEDICINE | Facility: CLINIC | Age: 67
End: 2017-12-07
Payer: MEDICARE

## 2017-12-07 VITALS
BODY MASS INDEX: 45.08 KG/M2 | OXYGEN SATURATION: 97 % | HEART RATE: 90 BPM | RESPIRATION RATE: 15 BRPM | TEMPERATURE: 98 F | SYSTOLIC BLOOD PRESSURE: 120 MMHG | WEIGHT: 254.44 LBS | HEIGHT: 63 IN | DIASTOLIC BLOOD PRESSURE: 70 MMHG

## 2017-12-07 DIAGNOSIS — R73.03 PREDIABETES: ICD-10-CM

## 2017-12-07 DIAGNOSIS — D50.9 IRON DEFICIENCY ANEMIA, UNSPECIFIED IRON DEFICIENCY ANEMIA TYPE: ICD-10-CM

## 2017-12-07 DIAGNOSIS — E66.01 MORBID OBESITY WITH BMI OF 45.0-49.9, ADULT: ICD-10-CM

## 2017-12-07 DIAGNOSIS — I35.0 AORTIC VALVE STENOSIS, ETIOLOGY OF CARDIAC VALVE DISEASE UNSPECIFIED: ICD-10-CM

## 2017-12-07 DIAGNOSIS — K64.9 HEMORRHOIDS, UNSPECIFIED HEMORRHOID TYPE: ICD-10-CM

## 2017-12-07 DIAGNOSIS — E53.8 FOLIC ACID DEFICIENCY: ICD-10-CM

## 2017-12-07 DIAGNOSIS — E03.9 HYPOTHYROIDISM, UNSPECIFIED TYPE: Primary | ICD-10-CM

## 2017-12-07 DIAGNOSIS — Z95.1 S/P CABG (CORONARY ARTERY BYPASS GRAFT): ICD-10-CM

## 2017-12-07 DIAGNOSIS — R79.89 ELEVATED PLATELET COUNT: ICD-10-CM

## 2017-12-07 DIAGNOSIS — R53.83 FATIGUE, UNSPECIFIED TYPE: ICD-10-CM

## 2017-12-07 PROCEDURE — 99214 OFFICE O/P EST MOD 30 MIN: CPT | Mod: S$PBB,,, | Performed by: FAMILY MEDICINE

## 2017-12-07 PROCEDURE — 99214 OFFICE O/P EST MOD 30 MIN: CPT | Mod: PBBFAC,PO | Performed by: FAMILY MEDICINE

## 2017-12-07 PROCEDURE — 99999 PR PBB SHADOW E&M-EST. PATIENT-LVL IV: CPT | Mod: PBBFAC,,, | Performed by: FAMILY MEDICINE

## 2017-12-07 RX ORDER — LEVOTHYROXINE SODIUM 112 UG/1
112 TABLET ORAL DAILY
Qty: 90 TABLET | Refills: 1 | Status: SHIPPED | OUTPATIENT
Start: 2017-12-07 | End: 2018-06-07 | Stop reason: SDUPTHER

## 2017-12-07 RX ORDER — FOLIC ACID 1 MG/1
1 TABLET ORAL DAILY
Qty: 90 TABLET | Refills: 1 | Status: SHIPPED | OUTPATIENT
Start: 2017-12-07 | End: 2018-06-07 | Stop reason: SDUPTHER

## 2017-12-07 NOTE — PROGRESS NOTES
Subjective:       Patient ID: Qi Ortiz is a 67 y.o. female.    Chief Complaint: Follow-up    HPI   Follow-up  66yo female presents today for follow-up. She has had recent lab update and is here for review of results. She has been taking Synthroid 112mcg daily- current TFT's are stable. Labs are concerning for iron deficiency as well as folic acid deficiency. She reports having fatigue but notes that there is improvement since last visit. She was diagnosed with CHF per Cardiology and further follow-up is planned. There is no report of HA, CP or dizziness. She does report having hemorrhoids that bleed intermittently. C scope is not up to date. She has also elected to forego mammography due to discomfort that the study causes. Patient reports considerable psychosocial stressors and issues with transportation that keep her from pursuing her health maintenance.    Review of Systems   Constitutional: Positive for fatigue. Negative for appetite change, chills and fever.   HENT: Negative for congestion, ear pain, rhinorrhea and sinus pressure.    Eyes: Positive for visual disturbance (chronic).   Respiratory: Positive for shortness of breath. Negative for cough and chest tightness.    Cardiovascular: Positive for leg swelling (improved). Negative for chest pain and palpitations.   Gastrointestinal: Negative for abdominal pain, blood in stool, constipation, diarrhea, nausea, rectal pain and vomiting.   Endocrine: Negative for cold intolerance, heat intolerance, polydipsia and polyuria.   Genitourinary: Negative for decreased urine volume and difficulty urinating.   Musculoskeletal: Positive for arthralgias. Negative for myalgias.   Skin: Negative for rash.   Neurological: Negative for dizziness, weakness and headaches.   Psychiatric/Behavioral: Negative for dysphoric mood and sleep disturbance. The patient is not nervous/anxious.        Objective:   /70   Pulse 90   Temp 98.1 °F (36.7 °C) (Temporal)   Resp  "15   Ht 5' 3" (1.6 m)   Wt 115.4 kg (254 lb 6.6 oz)   SpO2 97%   BMI 45.07 kg/m²   Physical Exam   Constitutional: She is oriented to person, place, and time. She appears well-developed. No distress.   Morbidly obese, non-toxic   HENT:   Head: Normocephalic and atraumatic.   Right Ear: Tympanic membrane, external ear and ear canal normal.   Left Ear: Tympanic membrane, external ear and ear canal normal.   Nose: Nose normal.   Mouth/Throat: Oropharynx is clear and moist.   Eyes: Conjunctivae and EOM are normal. Right pupil is not round. Left pupil is round and reactive.       Neck: Normal range of motion. Neck supple.   Cardiovascular: Normal rate and regular rhythm.    Murmur heard.  Pulmonary/Chest: Effort normal and breath sounds normal.   Abdominal: Soft. Bowel sounds are normal.   Musculoskeletal: She exhibits edema (1 plus bilateral LE edema).   Neurological: She is alert and oriented to person, place, and time.   Skin: Skin is warm and dry. She is not diaphoretic.   Psychiatric: She has a normal mood and affect. Her behavior is normal.       Assessment:       1. Hypothyroidism, unspecified type    2. Iron deficiency anemia, unspecified iron deficiency anemia type    3. Elevated platelet count    4. Folic acid deficiency    5. Aortic valve stenosis, etiology of cardiac valve disease unspecified    6. Hemorrhoids, unspecified hemorrhoid type    7. Fatigue, unspecified type    8. Prediabetes    9. Morbid obesity with BMI of 45.0-49.9, adult    10. S/P CABG (coronary artery bypass graft)        Plan:   Hypothyroidism, unspecified type  Stable. Will continue with current Synthroid dosing. Will monitor in 6 months, sooner if she develops symptoms concerning for imbalance.  -     levothyroxine (SYNTHROID) 112 MCG tablet; Take 1 tablet (112 mcg total) by mouth once daily.  Dispense: 90 tablet; Refill: 1  -     TSH; Future; Expected date: 12/07/2017  -     T4, free; Future; Expected date: 12/07/2017    Iron " deficiency anemia, unspecified iron deficiency anemia type  -     Ambulatory Referral to Hematology / Oncology    Elevated platelet count  -     Ambulatory Referral to Hematology / Oncology    Folic acid deficiency  -     Ambulatory Referral to Hematology / Oncology  -     folic acid (FOLVITE) 1 MG tablet; Take 1 tablet (1 mg total) by mouth once daily.  Dispense: 90 tablet; Refill: 1    Aortic valve stenosis, etiology of cardiac valve disease unspecified  Continue with current measures as per Cardiology. Follow-up with Dr. Mcfadden as planned.    Hemorrhoids, unspecified hemorrhoid type  Offered referral for consultation. She declines. High fiber diet, sitz baths and maintaining stable bowel habits has been advised.    Fatigue, unspecified type  Discussed symptoms in detail. Suspect underlying deficiencies are contributing. Proceed with Heme/onc eval.  -     Comprehensive metabolic panel; Future; Expected date: 12/07/2017    Prediabetes  -     Hemoglobin A1c; Future; Expected date: 12/07/2017    Morbid obesity with BMI of 45.0-49.9, adult  Weight loss efforts remain encouraged through diet and lifestyle measures.    S/P CABG (coronary artery bypass graft)  As above. Proceed as per Cardiology.    Declines MMG- orders have previously been placed.  Declines C scope. Declines Gen Surg consult for hemorrhoids.  Chronic care in 6 months.

## 2017-12-12 ENCOUNTER — OFFICE VISIT (OUTPATIENT)
Dept: RHEUMATOLOGY | Facility: CLINIC | Age: 67
End: 2017-12-12
Payer: MEDICARE

## 2017-12-12 ENCOUNTER — LAB VISIT (OUTPATIENT)
Dept: LAB | Facility: HOSPITAL | Age: 67
End: 2017-12-12
Attending: PHYSICIAN ASSISTANT
Payer: MEDICARE

## 2017-12-12 VITALS
BODY MASS INDEX: 44.91 KG/M2 | SYSTOLIC BLOOD PRESSURE: 128 MMHG | WEIGHT: 253.5 LBS | HEART RATE: 55 BPM | DIASTOLIC BLOOD PRESSURE: 56 MMHG

## 2017-12-12 DIAGNOSIS — Z51.81 MEDICATION MONITORING ENCOUNTER: ICD-10-CM

## 2017-12-12 DIAGNOSIS — G62.9 POLYNEUROPATHY: ICD-10-CM

## 2017-12-12 DIAGNOSIS — L40.50 PSORIATIC ARTHRITIS: Primary | ICD-10-CM

## 2017-12-12 DIAGNOSIS — L40.50 PSORIATIC ARTHRITIS: Chronic | ICD-10-CM

## 2017-12-12 DIAGNOSIS — N18.30 STAGE 3 CHRONIC KIDNEY DISEASE: ICD-10-CM

## 2017-12-12 DIAGNOSIS — L40.9 PSORIASIS: ICD-10-CM

## 2017-12-12 DIAGNOSIS — M85.89 OSTEOPENIA OF MULTIPLE SITES: ICD-10-CM

## 2017-12-12 DIAGNOSIS — D84.9 IMMUNOCOMPROMISED: ICD-10-CM

## 2017-12-12 LAB
ALBUMIN SERPL BCP-MCNC: 3.6 G/DL
ALP SERPL-CCNC: 38 U/L
ALT SERPL W/O P-5'-P-CCNC: 16 U/L
ANION GAP SERPL CALC-SCNC: 9 MMOL/L
AST SERPL-CCNC: 20 U/L
BASOPHILS # BLD AUTO: 0.04 K/UL
BASOPHILS NFR BLD: 0.6 %
BILIRUB SERPL-MCNC: 0.4 MG/DL
BUN SERPL-MCNC: 17 MG/DL
CALCIUM SERPL-MCNC: 9.2 MG/DL
CHLORIDE SERPL-SCNC: 108 MMOL/L
CO2 SERPL-SCNC: 26 MMOL/L
CREAT SERPL-MCNC: 1.2 MG/DL
CRP SERPL-MCNC: 2.4 MG/L
DIFFERENTIAL METHOD: ABNORMAL
EOSINOPHIL # BLD AUTO: 0.1 K/UL
EOSINOPHIL NFR BLD: 1.7 %
ERYTHROCYTE [DISTWIDTH] IN BLOOD BY AUTOMATED COUNT: 16.2 %
ERYTHROCYTE [SEDIMENTATION RATE] IN BLOOD BY WESTERGREN METHOD: 15 MM/HR
EST. GFR  (AFRICAN AMERICAN): 54 ML/MIN/1.73 M^2
EST. GFR  (NON AFRICAN AMERICAN): 47 ML/MIN/1.73 M^2
GLUCOSE SERPL-MCNC: 91 MG/DL
HCT VFR BLD AUTO: 39.3 %
HGB BLD-MCNC: 12.2 G/DL
LYMPHOCYTES # BLD AUTO: 2.4 K/UL
LYMPHOCYTES NFR BLD: 37.7 %
MCH RBC QN AUTO: 25.2 PG
MCHC RBC AUTO-ENTMCNC: 31 G/DL
MCV RBC AUTO: 81 FL
MONOCYTES # BLD AUTO: 0.4 K/UL
MONOCYTES NFR BLD: 6.3 %
NEUTROPHILS # BLD AUTO: 3.4 K/UL
NEUTROPHILS NFR BLD: 53.7 %
PLATELET # BLD AUTO: 266 K/UL
PMV BLD AUTO: 9.2 FL
POTASSIUM SERPL-SCNC: 4.4 MMOL/L
PROT SERPL-MCNC: 7.4 G/DL
RBC # BLD AUTO: 4.85 M/UL
SODIUM SERPL-SCNC: 143 MMOL/L
WBC # BLD AUTO: 6.36 K/UL

## 2017-12-12 PROCEDURE — 80053 COMPREHEN METABOLIC PANEL: CPT | Mod: PO

## 2017-12-12 PROCEDURE — 99214 OFFICE O/P EST MOD 30 MIN: CPT | Mod: S$PBB,,, | Performed by: PHYSICIAN ASSISTANT

## 2017-12-12 PROCEDURE — 85025 COMPLETE CBC W/AUTO DIFF WBC: CPT | Mod: PO

## 2017-12-12 PROCEDURE — 99999 PR PBB SHADOW E&M-EST. PATIENT-LVL IV: CPT | Mod: PBBFAC,,, | Performed by: PHYSICIAN ASSISTANT

## 2017-12-12 PROCEDURE — 86140 C-REACTIVE PROTEIN: CPT

## 2017-12-12 PROCEDURE — 36415 COLL VENOUS BLD VENIPUNCTURE: CPT | Mod: PO

## 2017-12-12 PROCEDURE — 99214 OFFICE O/P EST MOD 30 MIN: CPT | Mod: PBBFAC,PO | Performed by: PHYSICIAN ASSISTANT

## 2017-12-12 PROCEDURE — 96372 THER/PROPH/DIAG INJ SC/IM: CPT | Mod: PBBFAC,PO

## 2017-12-12 PROCEDURE — 85651 RBC SED RATE NONAUTOMATED: CPT | Mod: PO

## 2017-12-12 RX ADMIN — USTEKINUMAB 90 MG: 90 INJECTION, SOLUTION SUBCUTANEOUS at 01:12

## 2017-12-12 NOTE — PROGRESS NOTES
Administered 1cc of Stelara 90mg/1cc to RLQ of ABD. Patient tolerated well. No acute reaction noted to site. Patient instructed on S/S to report. Patient verbalized understanding. Patient instructed to wait 15 minutes after injection.     Lot: [id number]  Exp: 11/2018  Man : Hemp 4 [place]

## 2017-12-12 NOTE — PROGRESS NOTES
Subjective:       Patient ID: Qi Ortiz is a 67 y.o. female.    Chief Complaint: Psoriatic Arthritis; psoriasis, osteopenia    Melinda is here for f/u for psoriatic arthritis with psoriasis on Stelara 90 mg q 3 months; she is getting it here in clinic. In the past has failed enbrel, mtx, has sulfa allergy, recurrent skin infections with Humira, intolerant to Otezla.  She also has Osteopenia, h/o right wrist fx in the 90's. Failed po boniva and fosamax due to GI intolerance. Last dexa showed decreasing bmd so Dr. CASTELLANOS started reclast in sept but she felt terrible after her infusion with significant pain and confusion. She does not want to repeat.  NO new falls/fxs.    She has been on Stelara since 3/2016. Also with neuropathic chest pain due to previous cardiac surgery, gabapentin helps greatly 200mg bid.    She generally feels great on stelara, she can tell when it's time for her injection because she gets achy.  No swollen joints today.  No psoriasis rash.  She is complaining of shooting pains in her right arm that started after she spent a long time writing in her journal. Her pain is at the medial elbow with sharp shooting pains up and down the arm. That pain is a 10/10.        Review of Systems   Constitutional: Negative for chills, fatigue and fever.   HENT: Negative for mouth sores, rhinorrhea and sore throat.    Eyes: Negative for pain and redness.   Respiratory: Negative for cough and shortness of breath.    Cardiovascular: Negative for chest pain.        CAD s/p 3v CABG 9/2016,   Gastrointestinal: Negative for abdominal pain, constipation, diarrhea, nausea and vomiting.   Genitourinary: Negative for dysuria and hematuria.   Musculoskeletal: Positive for arthralgias. Negative for joint swelling and myalgias.   Skin: Positive for rash.   Neurological: Negative for weakness, numbness and headaches.   Psychiatric/Behavioral: The patient is not nervous/anxious.          Objective:   BP (!) 128/56   Pulse (!)  55   Wt 115 kg (253 lb 8.5 oz)   BMI 44.91 kg/m²      Physical Exam   Constitutional: She is oriented to person, place, and time and well-developed, well-nourished, and in no distress.   HENT:   Head: Normocephalic and atraumatic.   Eyes: Pupils are equal, round, and reactive to light. Right eye exhibits no discharge.   Neck: Normal range of motion.   Cardiovascular: Normal rate, regular rhythm and normal heart sounds.  Exam reveals no friction rub.    Pulmonary/Chest: Effort normal and breath sounds normal. No respiratory distress.   Abdominal: Soft. She exhibits no distension. There is no tenderness.   Lymphadenopathy:     She has no cervical adenopathy.   Neurological: She is alert and oriented to person, place, and time.   Skin: No rash noted. No erythema.     No psoriasis rash   Psychiatric: Mood normal.   Musculoskeletal: Normal range of motion. She exhibits no edema or deformity.   jimbo wrists, mcps, pips no synvoitis, no tenderness, no warmth, good rom  Left 2, 4 pip unable to fully extend  Right 5th dip unable to fully extend    Right elbow tender along the medial epicondyle with shooting pain up and down her arm with palpation    jimbo knees no effusion, no warmth or tenderness           Recent Results (from the past 168 hour(s))   CBC auto differential    Collection Time: 12/12/17  1:40 PM   Result Value Ref Range    WBC 6.36 3.90 - 12.70 K/uL    RBC 4.85 4.00 - 5.40 M/uL    Hemoglobin 12.2 12.0 - 16.0 g/dL    Hematocrit 39.3 37.0 - 48.5 %    MCV 81 (L) 82 - 98 fL    MCH 25.2 (L) 27.0 - 31.0 pg    MCHC 31.0 (L) 32.0 - 36.0 g/dL    RDW 16.2 (H) 11.5 - 14.5 %    Platelets 266 150 - 350 K/uL    MPV 9.2 9.2 - 12.9 fL    Gran # 3.4 1.8 - 7.7 K/uL    Lymph # 2.4 1.0 - 4.8 K/uL    Mono # 0.4 0.3 - 1.0 K/uL    Eos # 0.1 0.0 - 0.5 K/uL    Baso # 0.04 0.00 - 0.20 K/uL    Gran% 53.7 38.0 - 73.0 %    Lymph% 37.7 18.0 - 48.0 %    Mono% 6.3 4.0 - 15.0 %    Eosinophil% 1.7 0.0 - 8.0 %    Basophil% 0.6 0.0 - 1.9 %     Differential Method Automated         dexa 6.17.17: DF 0.2, RN -1.7, spine -0.6, decreasing bmd frax major 14.1 hip 1.7  Assessment:       1. Psoriatic arthritis    2. Psoriasis    3. Osteopenia of multiple sites    4. Medication monitoring encounter        Impression:    Psoriatic arthritis with psoriasis- stable- Failed Enbrel, intolerance to MTX, allergy to sulfa, recurrent skin infections with Humira on hibiclens, Otezla intolerance; Started Stelara 3/2016 with 90% improvement of her skin. Stelara 90mg every 12 weeks she gets it done here. Due for injection today, no rash today; joints none swollen or tender on exam    Right elbow pain: could be some medial epicondylitis vs neuropathic type pain, she wants to give it time     Osteopenia with decreasing bmd last dexa, h/o r wrist fx (90's); failed fosamax/boniva due to GI intolerance;  1st reclast 9/7/2017 with side effects after infusion (felt terrible pain, confusion); does take vitamin D daily      Polyneuropathy- chest neuropathy s/p cardiac surgery, chronic, improved with gabapentin 200 bid     CKD - last checked gfr 47, pending today     Medication monitoring: no issues with toxicity, side effects with reclast     Immunizations: not utd, declining vaccinations right now      Plan:       Get reg 4 labs today   Give Stelara today 90 mg SC and cont q 3 months  Will not continue reclast ok to sit tight for now and recheck dexa in 2019 frax major 14.1, hip1.7, cont daily vitamin D, will repeat dexa 6/2019  Cont gabapentin for neuropathy 200 in am and in pm, can try increasing to 300-400 bid for her right arm pain to see if this will help  Encouraged vaccinations, she will decline right now    Return q 3 mos with reg 4 labs and for stelara   Call for questions/concerns

## 2017-12-12 NOTE — PATIENT INSTRUCTIONS
Will stop reclast infusions and just do vitamin D daily until your next bone density     Try increasing gabapentin to help with your right arm to 3-4 pills in the morning and at night if it doesn't make you sleepy     stelara today and continue every 3 months

## 2017-12-18 ENCOUNTER — TELEPHONE (OUTPATIENT)
Dept: HEMATOLOGY/ONCOLOGY | Facility: CLINIC | Age: 67
End: 2017-12-18

## 2017-12-18 ENCOUNTER — HOSPITAL ENCOUNTER (OUTPATIENT)
Facility: HOSPITAL | Age: 67
Discharge: HOME | End: 2017-12-20
Attending: EMERGENCY MEDICINE | Admitting: FAMILY MEDICINE
Payer: MEDICARE

## 2017-12-18 ENCOUNTER — TELEPHONE (OUTPATIENT)
Dept: CARDIOLOGY | Facility: CLINIC | Age: 67
End: 2017-12-18

## 2017-12-18 DIAGNOSIS — R50.9 FEVER: ICD-10-CM

## 2017-12-18 DIAGNOSIS — R07.9 CHEST PAIN: Primary | ICD-10-CM

## 2017-12-18 LAB
ALBUMIN SERPL BCP-MCNC: 3.4 G/DL
ALP SERPL-CCNC: 37 U/L
ALT SERPL W/O P-5'-P-CCNC: 18 U/L
ANION GAP SERPL CALC-SCNC: 10 MMOL/L
APTT BLDCRRT: 25.7 SEC
AST SERPL-CCNC: 26 U/L
BASOPHILS # BLD AUTO: 0.02 K/UL
BASOPHILS NFR BLD: 0.2 %
BILIRUB SERPL-MCNC: 0.4 MG/DL
BNP SERPL-MCNC: 424 PG/ML
BUN SERPL-MCNC: 18 MG/DL
CALCIUM SERPL-MCNC: 9.3 MG/DL
CHLORIDE SERPL-SCNC: 103 MMOL/L
CO2 SERPL-SCNC: 26 MMOL/L
CREAT SERPL-MCNC: 1.1 MG/DL
DIFFERENTIAL METHOD: ABNORMAL
EOSINOPHIL # BLD AUTO: 0 K/UL
EOSINOPHIL NFR BLD: 0.2 %
ERYTHROCYTE [DISTWIDTH] IN BLOOD BY AUTOMATED COUNT: 16.3 %
EST. GFR  (AFRICAN AMERICAN): >60 ML/MIN/1.73 M^2
EST. GFR  (NON AFRICAN AMERICAN): 52 ML/MIN/1.73 M^2
FLUAV AG SPEC QL IA: NEGATIVE
FLUBV AG SPEC QL IA: NEGATIVE
GLUCOSE SERPL-MCNC: 102 MG/DL
HCT VFR BLD AUTO: 36.8 %
HGB BLD-MCNC: 11.9 G/DL
INR PPP: 1
LACTATE SERPL-SCNC: 1.6 MMOL/L
LYMPHOCYTES # BLD AUTO: 0.9 K/UL
LYMPHOCYTES NFR BLD: 9.4 %
MCH RBC QN AUTO: 25.5 PG
MCHC RBC AUTO-ENTMCNC: 32.3 G/DL
MCV RBC AUTO: 79 FL
MONOCYTES # BLD AUTO: 0.6 K/UL
MONOCYTES NFR BLD: 6.7 %
NEUTROPHILS # BLD AUTO: 7.6 K/UL
NEUTROPHILS NFR BLD: 83.5 %
PLATELET # BLD AUTO: 223 K/UL
PMV BLD AUTO: 9.3 FL
POTASSIUM SERPL-SCNC: 3.8 MMOL/L
PROT SERPL-MCNC: 7.3 G/DL
PROTHROMBIN TIME: 10.4 SEC
RBC # BLD AUTO: 4.67 M/UL
SODIUM SERPL-SCNC: 139 MMOL/L
SPECIMEN SOURCE: NORMAL
TROPONIN I SERPL DL<=0.01 NG/ML-MCNC: 0.03 NG/ML
WBC # BLD AUTO: 9.09 K/UL

## 2017-12-18 PROCEDURE — 80053 COMPREHEN METABOLIC PANEL: CPT

## 2017-12-18 PROCEDURE — 87400 INFLUENZA A/B EACH AG IA: CPT | Mod: 59

## 2017-12-18 PROCEDURE — 85025 COMPLETE CBC W/AUTO DIFF WBC: CPT

## 2017-12-18 PROCEDURE — 84484 ASSAY OF TROPONIN QUANT: CPT

## 2017-12-18 PROCEDURE — 93005 ELECTROCARDIOGRAM TRACING: CPT

## 2017-12-18 PROCEDURE — 99285 EMERGENCY DEPT VISIT HI MDM: CPT | Mod: 25

## 2017-12-18 PROCEDURE — 83735 ASSAY OF MAGNESIUM: CPT

## 2017-12-18 PROCEDURE — 83605 ASSAY OF LACTIC ACID: CPT

## 2017-12-18 PROCEDURE — 85610 PROTHROMBIN TIME: CPT

## 2017-12-18 PROCEDURE — 83880 ASSAY OF NATRIURETIC PEPTIDE: CPT

## 2017-12-18 PROCEDURE — 96374 THER/PROPH/DIAG INJ IV PUSH: CPT

## 2017-12-18 PROCEDURE — 85730 THROMBOPLASTIN TIME PARTIAL: CPT

## 2017-12-18 PROCEDURE — 25000003 PHARM REV CODE 250: Performed by: EMERGENCY MEDICINE

## 2017-12-18 PROCEDURE — 93010 ELECTROCARDIOGRAM REPORT: CPT | Mod: ,,, | Performed by: INTERNAL MEDICINE

## 2017-12-18 PROCEDURE — 84100 ASSAY OF PHOSPHORUS: CPT

## 2017-12-18 PROCEDURE — 80061 LIPID PANEL: CPT

## 2017-12-18 RX ORDER — ACETAMINOPHEN 325 MG/1
650 TABLET ORAL
Status: COMPLETED | OUTPATIENT
Start: 2017-12-18 | End: 2017-12-18

## 2017-12-18 RX ADMIN — ACETAMINOPHEN 650 MG: 325 TABLET ORAL at 10:12

## 2017-12-18 NOTE — TELEPHONE ENCOUNTER
----- Message from Brian Bk sent at 12/18/2017  2:25 PM CST -----  Contact: Pt  Please give pt a call at ..[phone] (home) regarding her appt, caller states that she's sick and needs to know what to do? I tried to reschedule but she insist on speaking with the nurse.

## 2017-12-18 NOTE — TELEPHONE ENCOUNTER
----- Message from Brian Bk sent at 12/18/2017  2:25 PM CST -----  Contact: Pt  Please give pt a call at ..[phone] (home) regarding her appt, caller states that she's sick and needs to know what to do? I tried to reschedule but she insist on speaking with the nurse.

## 2017-12-19 PROBLEM — R79.89 ELEVATED BRAIN NATRIURETIC PEPTIDE (BNP) LEVEL: Status: ACTIVE | Noted: 2017-12-19

## 2017-12-19 PROBLEM — R50.9 FEVER: Status: ACTIVE | Noted: 2017-12-19

## 2017-12-19 LAB
ANION GAP SERPL CALC-SCNC: 12 MMOL/L
BACTERIA #/AREA URNS HPF: ABNORMAL /HPF
BASOPHILS # BLD AUTO: 0.01 K/UL
BASOPHILS NFR BLD: 0.1 %
BILIRUB UR QL STRIP: NEGATIVE
BUN SERPL-MCNC: 17 MG/DL
CALCIUM SERPL-MCNC: 9.2 MG/DL
CHLORIDE SERPL-SCNC: 103 MMOL/L
CHOLEST SERPL-MCNC: 123 MG/DL
CHOLEST/HDLC SERPL: 2.1 {RATIO}
CLARITY UR: CLEAR
CO2 SERPL-SCNC: 24 MMOL/L
COLOR UR: YELLOW
CREAT SERPL-MCNC: 1.2 MG/DL
DIFFERENTIAL METHOD: ABNORMAL
EOSINOPHIL # BLD AUTO: 0 K/UL
EOSINOPHIL NFR BLD: 0.1 %
ERYTHROCYTE [DISTWIDTH] IN BLOOD BY AUTOMATED COUNT: 16.4 %
EST. GFR  (AFRICAN AMERICAN): 54 ML/MIN/1.73 M^2
EST. GFR  (NON AFRICAN AMERICAN): 47 ML/MIN/1.73 M^2
ESTIMATED AVG GLUCOSE: 123 MG/DL
GLUCOSE SERPL-MCNC: 110 MG/DL
GLUCOSE UR QL STRIP: NEGATIVE
HBA1C MFR BLD HPLC: 5.9 %
HCT VFR BLD AUTO: 37.6 %
HDLC SERPL-MCNC: 59 MG/DL
HDLC SERPL: 48 %
HGB BLD-MCNC: 12 G/DL
HGB UR QL STRIP: ABNORMAL
KETONES UR QL STRIP: NEGATIVE
LDLC SERPL CALC-MCNC: 50.8 MG/DL
LEUKOCYTE ESTERASE UR QL STRIP: NEGATIVE
LYMPHOCYTES # BLD AUTO: 1 K/UL
LYMPHOCYTES NFR BLD: 12 %
MAGNESIUM SERPL-MCNC: 1.8 MG/DL
MCH RBC QN AUTO: 24.9 PG
MCHC RBC AUTO-ENTMCNC: 31.9 G/DL
MCV RBC AUTO: 78 FL
MICROSCOPIC COMMENT: ABNORMAL
MONOCYTES # BLD AUTO: 0.6 K/UL
MONOCYTES NFR BLD: 6.9 %
NEUTROPHILS # BLD AUTO: 6.5 K/UL
NEUTROPHILS NFR BLD: 80.9 %
NITRITE UR QL STRIP: NEGATIVE
NONHDLC SERPL-MCNC: 64 MG/DL
PH UR STRIP: 6 [PH] (ref 5–8)
PHOSPHATE SERPL-MCNC: 2.6 MG/DL
PLATELET # BLD AUTO: 217 K/UL
PMV BLD AUTO: 9.1 FL
POTASSIUM SERPL-SCNC: 3.9 MMOL/L
PROT UR QL STRIP: NEGATIVE
RBC # BLD AUTO: 4.81 M/UL
RBC #/AREA URNS HPF: 5 /HPF (ref 0–4)
SODIUM SERPL-SCNC: 139 MMOL/L
SP GR UR STRIP: 1.01 (ref 1–1.03)
TRIGL SERPL-MCNC: 66 MG/DL
TROPONIN I SERPL DL<=0.01 NG/ML-MCNC: 0.04 NG/ML
TROPONIN I SERPL DL<=0.01 NG/ML-MCNC: 0.05 NG/ML
TSH SERPL DL<=0.005 MIU/L-ACNC: 0.79 UIU/ML
URN SPEC COLLECT METH UR: ABNORMAL
UROBILINOGEN UR STRIP-ACNC: NEGATIVE EU/DL
WBC # BLD AUTO: 8.09 K/UL
WBC #/AREA URNS HPF: 3 /HPF (ref 0–5)

## 2017-12-19 PROCEDURE — 25000003 PHARM REV CODE 250: Performed by: NURSE PRACTITIONER

## 2017-12-19 PROCEDURE — 84484 ASSAY OF TROPONIN QUANT: CPT | Mod: 91

## 2017-12-19 PROCEDURE — 25000003 PHARM REV CODE 250: Performed by: EMERGENCY MEDICINE

## 2017-12-19 PROCEDURE — G0378 HOSPITAL OBSERVATION PER HR: HCPCS

## 2017-12-19 PROCEDURE — 94761 N-INVAS EAR/PLS OXIMETRY MLT: CPT

## 2017-12-19 PROCEDURE — 81000 URINALYSIS NONAUTO W/SCOPE: CPT

## 2017-12-19 PROCEDURE — 94640 AIRWAY INHALATION TREATMENT: CPT

## 2017-12-19 PROCEDURE — 83036 HEMOGLOBIN GLYCOSYLATED A1C: CPT

## 2017-12-19 PROCEDURE — 84443 ASSAY THYROID STIM HORMONE: CPT

## 2017-12-19 PROCEDURE — 63600175 PHARM REV CODE 636 W HCPCS: Performed by: EMERGENCY MEDICINE

## 2017-12-19 PROCEDURE — 63600175 PHARM REV CODE 636 W HCPCS: Performed by: NURSE PRACTITIONER

## 2017-12-19 PROCEDURE — 25000242 PHARM REV CODE 250 ALT 637 W/ HCPCS: Performed by: NURSE PRACTITIONER

## 2017-12-19 PROCEDURE — 99213 OFFICE O/P EST LOW 20 MIN: CPT | Mod: ,,, | Performed by: INTERNAL MEDICINE

## 2017-12-19 PROCEDURE — 84484 ASSAY OF TROPONIN QUANT: CPT

## 2017-12-19 PROCEDURE — 85025 COMPLETE CBC W/AUTO DIFF WBC: CPT

## 2017-12-19 PROCEDURE — 36415 COLL VENOUS BLD VENIPUNCTURE: CPT

## 2017-12-19 PROCEDURE — 80048 BASIC METABOLIC PNL TOTAL CA: CPT

## 2017-12-19 RX ORDER — ACETAMINOPHEN 325 MG/1
650 TABLET ORAL EVERY 8 HOURS PRN
Status: DISCONTINUED | OUTPATIENT
Start: 2017-12-19 | End: 2017-12-20 | Stop reason: HOSPADM

## 2017-12-19 RX ORDER — FAMOTIDINE 20 MG/1
20 TABLET, FILM COATED ORAL 2 TIMES DAILY
Status: DISCONTINUED | OUTPATIENT
Start: 2017-12-19 | End: 2017-12-20 | Stop reason: HOSPADM

## 2017-12-19 RX ORDER — MOXIFLOXACIN HYDROCHLORIDE 400 MG/1
400 TABLET ORAL DAILY
Status: DISCONTINUED | OUTPATIENT
Start: 2017-12-19 | End: 2017-12-20 | Stop reason: HOSPADM

## 2017-12-19 RX ORDER — CLOPIDOGREL BISULFATE 75 MG/1
75 TABLET ORAL DAILY
Status: DISCONTINUED | OUTPATIENT
Start: 2017-12-19 | End: 2017-12-20 | Stop reason: HOSPADM

## 2017-12-19 RX ORDER — VALSARTAN AND HYDROCHLOROTHIAZIDE 160; 12.5 MG/1; MG/1
1 TABLET, FILM COATED ORAL DAILY
Status: DISCONTINUED | OUTPATIENT
Start: 2017-12-19 | End: 2017-12-19 | Stop reason: CLARIF

## 2017-12-19 RX ORDER — LEVOTHYROXINE SODIUM 112 UG/1
112 TABLET ORAL DAILY
Status: DISCONTINUED | OUTPATIENT
Start: 2017-12-19 | End: 2017-12-19

## 2017-12-19 RX ORDER — IPRATROPIUM BROMIDE AND ALBUTEROL SULFATE 2.5; .5 MG/3ML; MG/3ML
3 SOLUTION RESPIRATORY (INHALATION)
Status: DISCONTINUED | OUTPATIENT
Start: 2017-12-19 | End: 2017-12-19

## 2017-12-19 RX ORDER — ACETAMINOPHEN 10 MG/ML
1000 INJECTION, SOLUTION INTRAVENOUS ONCE
Status: ACTIVE | OUTPATIENT
Start: 2017-12-19 | End: 2017-12-20

## 2017-12-19 RX ORDER — GABAPENTIN 100 MG/1
100 CAPSULE ORAL 3 TIMES DAILY
Status: DISCONTINUED | OUTPATIENT
Start: 2017-12-19 | End: 2017-12-20 | Stop reason: HOSPADM

## 2017-12-19 RX ORDER — NAPROXEN SODIUM 220 MG/1
81 TABLET, FILM COATED ORAL DAILY
Status: DISCONTINUED | OUTPATIENT
Start: 2017-12-19 | End: 2017-12-20 | Stop reason: HOSPADM

## 2017-12-19 RX ORDER — IPRATROPIUM BROMIDE AND ALBUTEROL SULFATE 2.5; .5 MG/3ML; MG/3ML
3 SOLUTION RESPIRATORY (INHALATION) EVERY 4 HOURS
Status: DISCONTINUED | OUTPATIENT
Start: 2017-12-19 | End: 2017-12-20 | Stop reason: HOSPADM

## 2017-12-19 RX ORDER — BUDESONIDE 0.25 MG/2ML
0.25 INHALANT ORAL DAILY
Status: DISCONTINUED | OUTPATIENT
Start: 2017-12-19 | End: 2017-12-19

## 2017-12-19 RX ORDER — HYDROCHLOROTHIAZIDE 12.5 MG/1
12.5 TABLET ORAL DAILY
Status: DISCONTINUED | OUTPATIENT
Start: 2017-12-19 | End: 2017-12-20 | Stop reason: HOSPADM

## 2017-12-19 RX ORDER — PREDNISONE 20 MG/1
40 TABLET ORAL DAILY
Status: DISCONTINUED | OUTPATIENT
Start: 2017-12-19 | End: 2017-12-20 | Stop reason: HOSPADM

## 2017-12-19 RX ORDER — ENOXAPARIN SODIUM 100 MG/ML
40 INJECTION SUBCUTANEOUS EVERY 24 HOURS
Status: DISCONTINUED | OUTPATIENT
Start: 2017-12-19 | End: 2017-12-20 | Stop reason: HOSPADM

## 2017-12-19 RX ORDER — FUROSEMIDE 10 MG/ML
40 INJECTION INTRAMUSCULAR; INTRAVENOUS ONCE
Status: DISCONTINUED | OUTPATIENT
Start: 2017-12-19 | End: 2017-12-20 | Stop reason: HOSPADM

## 2017-12-19 RX ORDER — METOPROLOL SUCCINATE 50 MG/1
50 TABLET, EXTENDED RELEASE ORAL NIGHTLY
Status: DISCONTINUED | OUTPATIENT
Start: 2017-12-19 | End: 2017-12-20 | Stop reason: HOSPADM

## 2017-12-19 RX ORDER — VALSARTAN 80 MG/1
160 TABLET ORAL DAILY
Status: DISCONTINUED | OUTPATIENT
Start: 2017-12-19 | End: 2017-12-20 | Stop reason: HOSPADM

## 2017-12-19 RX ORDER — ONDANSETRON 2 MG/ML
4 INJECTION INTRAMUSCULAR; INTRAVENOUS ONCE
Status: COMPLETED | OUTPATIENT
Start: 2017-12-19 | End: 2017-12-19

## 2017-12-19 RX ORDER — BENZONATATE 100 MG/1
100 CAPSULE ORAL 3 TIMES DAILY PRN
Status: DISCONTINUED | OUTPATIENT
Start: 2017-12-19 | End: 2017-12-20 | Stop reason: HOSPADM

## 2017-12-19 RX ORDER — LEVOTHYROXINE SODIUM 112 UG/1
112 TABLET ORAL DAILY
Status: DISCONTINUED | OUTPATIENT
Start: 2017-12-19 | End: 2017-12-20 | Stop reason: HOSPADM

## 2017-12-19 RX ORDER — IBUPROFEN 400 MG/1
400 TABLET ORAL
Status: COMPLETED | OUTPATIENT
Start: 2017-12-19 | End: 2017-12-19

## 2017-12-19 RX ORDER — BUDESONIDE 0.25 MG/2ML
0.25 INHALANT ORAL DAILY
Status: DISCONTINUED | OUTPATIENT
Start: 2017-12-20 | End: 2017-12-19

## 2017-12-19 RX ADMIN — IPRATROPIUM BROMIDE AND ALBUTEROL SULFATE 3 ML: .5; 3 SOLUTION RESPIRATORY (INHALATION) at 12:12

## 2017-12-19 RX ADMIN — ASPIRIN 81 MG CHEWABLE TABLET 81 MG: 81 TABLET CHEWABLE at 09:12

## 2017-12-19 RX ADMIN — METOPROLOL SUCCINATE 50 MG: 50 TABLET, EXTENDED RELEASE ORAL at 09:12

## 2017-12-19 RX ADMIN — ONDANSETRON 4 MG: 2 INJECTION INTRAMUSCULAR; INTRAVENOUS at 12:12

## 2017-12-19 RX ADMIN — FAMOTIDINE 20 MG: 20 TABLET, FILM COATED ORAL at 09:12

## 2017-12-19 RX ADMIN — IPRATROPIUM BROMIDE AND ALBUTEROL SULFATE 3 ML: .5; 3 SOLUTION RESPIRATORY (INHALATION) at 05:12

## 2017-12-19 RX ADMIN — GABAPENTIN 100 MG: 100 CAPSULE ORAL at 09:12

## 2017-12-19 RX ADMIN — BENZONATATE 100 MG: 100 CAPSULE ORAL at 11:12

## 2017-12-19 RX ADMIN — GABAPENTIN 100 MG: 100 CAPSULE ORAL at 02:12

## 2017-12-19 RX ADMIN — METOPROLOL SUCCINATE 50 MG: 50 TABLET, EXTENDED RELEASE ORAL at 05:12

## 2017-12-19 RX ADMIN — IBUPROFEN 400 MG: 400 TABLET, FILM COATED ORAL at 05:12

## 2017-12-19 RX ADMIN — IPRATROPIUM BROMIDE AND ALBUTEROL SULFATE 3 ML: .5; 3 SOLUTION RESPIRATORY (INHALATION) at 08:12

## 2017-12-19 RX ADMIN — BENZONATATE 100 MG: 100 CAPSULE ORAL at 05:12

## 2017-12-19 RX ADMIN — CLOPIDOGREL BISULFATE 75 MG: 75 TABLET ORAL at 09:12

## 2017-12-19 RX ADMIN — HYDROCHLOROTHIAZIDE 12.5 MG: 12.5 TABLET ORAL at 09:12

## 2017-12-19 RX ADMIN — ACETAMINOPHEN 650 MG: 325 TABLET ORAL at 09:12

## 2017-12-19 RX ADMIN — LEVOTHYROXINE SODIUM 112 MCG: 112 TABLET ORAL at 09:12

## 2017-12-19 RX ADMIN — GABAPENTIN 100 MG: 100 CAPSULE ORAL at 05:12

## 2017-12-19 RX ADMIN — VALSARTAN 160 MG: 80 TABLET, FILM COATED ORAL at 09:12

## 2017-12-19 RX ADMIN — ENOXAPARIN SODIUM 40 MG: 100 INJECTION SUBCUTANEOUS at 04:12

## 2017-12-19 RX ADMIN — MOXIFLOXACIN HYDROCHLORIDE 400 MG: 400 TABLET, FILM COATED ORAL at 05:12

## 2017-12-19 NOTE — HPI
Qi Ortiz is a 67 y.o. female patient with presented to the Emergency Department for Chest pain.  Onset gradually earlier today pta. Patient locates pain to mid sternal, does not radiate. Patient reports she is unsure if this pain is similar to past CP episodes or when she had MI. Symptoms are intermittent and moderate in severity. No mitigating or exacerbating factors reported. Pt reports chronic SOB. Associated sxs include subjective fever, congestion, diaphoresis, and HA. Patient denies any n/v/d, urinary frequency, dysuria, abd pain, back pain, dizziness, numbness/weakness, and all other sxs at this time. No prior Tx. No further complaints or concerns at this time. Patient evaluated in the ER. WBC neg. CMP stable, Electrolytes stable. Flu neg, lactic neg. CT head for Headache complaint, NAF. Chest Xray NAF. Patient initial troponin mild elevated but during er course #2 elevated to 0.042. . Patient placed in obs to continue trend of troponin and further evaluation.

## 2017-12-19 NOTE — ASSESSMENT & PLAN NOTE
Lasix-->patient refused    Most recent echo 11/17/17 CONCLUSIONS     1 - Normal left ventricular systolic function (EF 55-60%).     2 - Indeterminate LV diastolic function.     3 - No wall motion abnormalities.     4 - Normal right ventricular systolic function .     5 - The estimated PA systolic pressure is greater than 24 mmHg.     6 - Concentric hypertrophy.     7 - Trivial tricuspid regurgitation.     8 - Moderate to severe aortic stenosis, ALFREDO = 0.78 cm2, peak velocity = 3.51 m/s, mean gradient = 30 mmHg.     Consider Echo repeat

## 2017-12-19 NOTE — ED PROVIDER NOTES
SCRIBE #1 NOTE: I, Hortencia King, am scribing for, and in the presence of, Lenny Springer MD. I have scribed the entire note.      History      Chief Complaint   Patient presents with    Chest Pain     reports chest pain that started on the way to the ER, reports taking SL nitro on the way here and reports some relief, reports everyday shortness of breath        Review of patient's allergies indicates:   Allergen Reactions    Celexa [citalopram] Anaphylaxis    Clindamycin Itching and Swelling    Codeine Shortness Of Breath, Itching and Swelling    Crestor [rosuvastatin] Anaphylaxis    Cytotec [misoprostol] Anaphylaxis and Other (See Comments)     Difficulty breathing    Lisinopril Anaphylaxis    Magnesium citrate Shortness Of Breath    Stadol [butorphanol tartrate] Anaphylaxis     Coded    Vicodin [hydrocodone-acetaminophen] Shortness Of Breath    Adhesive      EKG Electrodes    Aggrenox [aspirin-dipyridamole] Other (See Comments)     headaches    Demerol [meperidine]     Isosorbide Other (See Comments)     Severe headache    Kenalog [triamcinolone acetonide]     Medrol [methylprednisolone] Other (See Comments)     unknown    Mobic [meloxicam]     Morphine     Nitroglycerin      Long acting    Ranexa [ranolazine] Nausea And Vomiting    Reclast [zoledronic acid-mannitol-water] Other (See Comments)     Bones hurt    Sulfa (sulfonamide antibiotics) Other (See Comments)     unknown    Talwin [pentazocine lactate]     Tetracyclines     Tilade [nedocromil]     Zetia [ezetimibe]         HPI   HPI    12/18/2017, 10:26 PM   History obtained from the patient      History of Present Illness: Qi Ortiz is a 67 y.o. female patient with hx of stroke and CHFwho presents to the Emergency Department for Chest pain which onset gradually earlier today. Symptoms are constant and moderate in severity. No mitigating or exacerbating factors reported. Pt reports everyday SOB. Associated sxs include subjective  fever, congestion, diaphoresis, and HA. Patient denies any n/v/d, urinary frequency, dysuria, abd pain, back pain, dizziness, numbness/weakness, and all other sxs at this time. No prior Tx. No further complaints or concerns at this time.     Arrival mode: Personal vehicle     PCP: Trinidad Cleaning MD       Past Medical History:  Past Medical History:   Diagnosis Date    Carotid stenosis     19%    COPD (chronic obstructive pulmonary disease)     No meds    CVA (cerebral vascular accident)     Dr. 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 Hope    Psoriatic arthritis     Rheumatology       Past Surgical History:  Past Surgical History:   Procedure Laterality Date    BREAST BIOPSY      R sided/benign    CARDIAC SURGERY      2016    CERVICAL FUSION      CHOLECYSTECTOMY      CORONARY ARTERY BYPASS GRAFT      triple bypass    CORONARY STENT PLACEMENT      EYE SURGERY      INTRAUTERINE DEVICE INSERTION      mass removed from R groin      TOTAL ABDOMINAL HYSTERECTOMY W/ BILATERAL SALPINGOOPHORECTOMY      due to benign mass, adhesions    TUBAL LIGATION           Family History:  Family History   Problem Relation Age of Onset    Breast cancer Maternal Grandfather     Breast cancer Paternal Aunt     Stroke      Breast cancer Sister 60    Leukemia Sister 8      as child    Lung cancer Paternal Grandfather     Heart disease         Social History:  Social History     Social History Main Topics    Smoking status: Never Smoker    Smokeless tobacco: Never Used    Alcohol use No    Drug use: No    Sexual activity: No       ROS   Review of Systems   Constitutional: Positive for diaphoresis and fever.   HENT: Positive for congestion. Negative for sore throat.    Respiratory: Negative for cough and shortness of breath.    Cardiovascular: Positive for chest pain.  "  Gastrointestinal: Negative for abdominal pain, diarrhea, nausea and vomiting.   Genitourinary: Negative for dysuria.   Musculoskeletal: Negative for back pain and neck pain.   Skin: Negative for rash.   Neurological: Positive for headaches. Negative for dizziness, syncope, weakness and numbness.   Hematological: Does not bruise/bleed easily.   All other systems reviewed and are negative.    Physical Exam      Initial Vitals [12/18/17 2208]   BP Pulse Resp Temp SpO2   (!) 169/86 94 18 (!) 100.6 °F (38.1 °C) 95 %      MAP       113.67          Physical Exam  Nursing Notes and Vital Signs Reviewed.  Constitutional: Patient is in no acute distress. Well-developed and well-nourished.  Head: Atraumatic. Normocephalic.  Eyes: PERRL. EOM intact. Conjunctivae are not pale. No scleral icterus.  ENT: Mucous membranes are moist. Oropharynx is clear and symmetric.    Neck: Supple. Full ROM. No lymphadenopathy.  Cardiovascular: Regular rate. Regular rhythm. Heart murmur second intercostal spaceNo rubs or gallops. Distal pulses are 2+ and symmetric.  Pulmonary/Chest: No respiratory distress. Clear to auscultation bilaterally. No wheezing or rales.  Abdominal: Soft and non-distended.  There is no tenderness.  No rebound, guarding, or rigidity. Good bowel sounds.  Genitourinary: No CVA tenderness  Musculoskeletal: Moves all extremities. No obvious deformities. No edema. No calf tenderness.  Skin: Warm and dry.  Neurological:  Alert, awake, and appropriate.  Normal speech.  No acute focal neurological deficits are appreciated.  Psychiatric: Normal affect. Good eye contact. Appropriate in content.    ED Course    Procedures  ED Vital Signs:  Vitals:    12/18/17 2208 12/18/17 2213 12/18/17 2216 12/18/17 2346   BP: (!) 169/86   (!) 149/77   Pulse: 94  108 109   Resp: 18      Temp: (!) 100.6 °F (38.1 °C)      TempSrc: Oral      SpO2: 95%   96%   Weight:  115.3 kg (254 lb 3.1 oz)     Height: 5' 3" (1.6 m)       12/19/17 0146 12/19/17 " 0210 12/19/17 0211 12/19/17 0311   BP:  127/65     Pulse: 103 91  92   Resp: 20      Temp:   98.5 °F (36.9 °C)    TempSrc:   Oral    SpO2: 95% 97%  96%   Weight:       Height:        12/19/17 0336 12/19/17 0358   BP:     Pulse: 101 101   Resp: (!) 25 20   Temp:     TempSrc:     SpO2: 96% 95%   Weight:     Height:         Abnormal Lab Results:  Labs Reviewed   CBC W/ AUTO DIFFERENTIAL - Abnormal; Notable for the following:        Result Value    Hemoglobin 11.9 (*)     Hematocrit 36.8 (*)     MCV 79 (*)     MCH 25.5 (*)     RDW 16.3 (*)     Lymph # 0.9 (*)     Gran% 83.5 (*)     Lymph% 9.4 (*)     All other components within normal limits   COMPREHENSIVE METABOLIC PANEL - Abnormal; Notable for the following:     Albumin 3.4 (*)     Alkaline Phosphatase 37 (*)     eGFR if non  52 (*)     All other components within normal limits   B-TYPE NATRIURETIC PEPTIDE - Abnormal; Notable for the following:      (*)     All other components within normal limits   TROPONIN I - Abnormal; Notable for the following:     Troponin I 0.027 (*)     All other components within normal limits   TROPONIN I - Abnormal; Notable for the following:     Troponin I 0.042 (*)     All other components within normal limits   PHOSPHORUS - Abnormal; Notable for the following:     Phosphorus 2.6 (*)     All other components within normal limits   PROTIME-INR   APTT   LACTIC ACID, PLASMA   INFLUENZA A AND B ANTIGEN   LIPID PANEL   MAGNESIUM   PHOSPHORUS   MAGNESIUM   CBC W/ AUTO DIFFERENTIAL   BASIC METABOLIC PANEL   LIPID PANEL   URINALYSIS        All Lab Results:  Results for orders placed or performed during the hospital encounter of 12/18/17   CBC auto differential   Result Value Ref Range    WBC 9.09 3.90 - 12.70 K/uL    RBC 4.67 4.00 - 5.40 M/uL    Hemoglobin 11.9 (L) 12.0 - 16.0 g/dL    Hematocrit 36.8 (L) 37.0 - 48.5 %    MCV 79 (L) 82 - 98 fL    MCH 25.5 (L) 27.0 - 31.0 pg    MCHC 32.3 32.0 - 36.0 g/dL    RDW 16.3 (H) 11.5  - 14.5 %    Platelets 223 150 - 350 K/uL    MPV 9.3 9.2 - 12.9 fL    Gran # 7.6 1.8 - 7.7 K/uL    Lymph # 0.9 (L) 1.0 - 4.8 K/uL    Mono # 0.6 0.3 - 1.0 K/uL    Eos # 0.0 0.0 - 0.5 K/uL    Baso # 0.02 0.00 - 0.20 K/uL    Gran% 83.5 (H) 38.0 - 73.0 %    Lymph% 9.4 (L) 18.0 - 48.0 %    Mono% 6.7 4.0 - 15.0 %    Eosinophil% 0.2 0.0 - 8.0 %    Basophil% 0.2 0.0 - 1.9 %    Differential Method Automated    Comprehensive metabolic panel   Result Value Ref Range    Sodium 139 136 - 145 mmol/L    Potassium 3.8 3.5 - 5.1 mmol/L    Chloride 103 95 - 110 mmol/L    CO2 26 23 - 29 mmol/L    Glucose 102 70 - 110 mg/dL    BUN, Bld 18 8 - 23 mg/dL    Creatinine 1.1 0.5 - 1.4 mg/dL    Calcium 9.3 8.7 - 10.5 mg/dL    Total Protein 7.3 6.0 - 8.4 g/dL    Albumin 3.4 (L) 3.5 - 5.2 g/dL    Total Bilirubin 0.4 0.1 - 1.0 mg/dL    Alkaline Phosphatase 37 (L) 55 - 135 U/L    AST 26 10 - 40 U/L    ALT 18 10 - 44 U/L    Anion Gap 10 8 - 16 mmol/L    eGFR if African American >60 >60 mL/min/1.73 m^2    eGFR if non African American 52 (A) >60 mL/min/1.73 m^2   Protime-INR   Result Value Ref Range    Prothrombin Time 10.4 9.0 - 12.5 sec    INR 1.0 0.8 - 1.2   APTT   Result Value Ref Range    aPTT 25.7 21.0 - 32.0 sec   Brain natriuretic peptide   Result Value Ref Range     (H) 0 - 99 pg/mL   Troponin I   Result Value Ref Range    Troponin I 0.027 (H) 0.000 - 0.026 ng/mL   Lactic acid, plasma   Result Value Ref Range    Lactate (Lactic Acid) 1.6 0.5 - 2.2 mmol/L   Influenza antigen Nasopharyngeal Swab   Result Value Ref Range    Influenza A Ag, EIA Negative Negative    Influenza B Ag, EIA Negative Negative    Flu A & B Source Nasopharyngeal Swab    Troponin I   Result Value Ref Range    Troponin I 0.042 (H) 0.000 - 0.026 ng/mL   Magnesium   Result Value Ref Range    Magnesium 1.8 1.6 - 2.6 mg/dL   Phosphorus   Result Value Ref Range    Phosphorus 2.6 (L) 2.7 - 4.5 mg/dL         Imaging Results:  Imaging Results          X-Ray Chest 1 View (In  process)    Per [employer], pt's CT results no acute intracranial findings.              The EKG was ordered, reviewed, and independently interpreted by the ED provider.  Interpretation time: 22:14  Rate: 108 BPM  Rhythm: sinus tachycardia and premature ventricular contractions (PVC)  Interpretation: Left ventricular hypertrophy with repolarization abnormality. No STEMI.           The Emergency Provider reviewed the vital signs and test results, which are outlined above.    ED Discussion     2:54 AM: Discussed case with Moiz Purvis NP([facility]). Moiz Purvis NP agrees with current care and management of pt and accepts admission.   Admitting Service: [facility]   Admitting Physician: Dr. Dayton MD  Admit to: Obs    2:56 AM: Re-evaluated pt. I have discussed test results, shared treatment plan, and the need for admission with patient and family at bedside. Pt and family express understanding at this time and agree with all information. All questions answered. Pt and family have no further questions or concerns at this time. Pt is ready for admit.          ED Medication(s):  Medications   levothyroxine tablet 112 mcg (not administered)   clopidogrel tablet 75 mg (not administered)   metoprolol succinate (TOPROL-XL) 24 hr tablet 50 mg (not administered)   gabapentin capsule 100 mg (not administered)   aspirin chewable tablet 81 mg (not administered)   enoxaparin injection 40 mg (not administered)   furosemide injection 40 mg (40 mg Intravenous Not Given 12/19/17 0315)   acetaminophen (10 mg/mL) injection 1,000 mg (1,000 mg Intravenous Not Given 12/19/17 0330)   benzonatate capsule 100 mg (not administered)   valsartan tablet 160 mg (not administered)   hydroCHLOROthiazide tablet 12.5 mg (not administered)   acetaminophen tablet 650 mg (650 mg Oral Given During Downtime 12/18/17 2245)   ondansetron injection 4 mg (4 mg Intravenous Given 12/19/17 0014)       New Prescriptions    No medications on file              Medical Decision Making    Medical Decision Making:   Clinical Tests:   Lab Tests: Ordered and Reviewed  Radiological Study: Ordered and Reviewed  Medical Tests: Ordered and Reviewed           Scribe Attestation:   Scribe #1: I performed the above scribed service and the documentation accurately describes the services I performed. I attest to the accuracy of the note.    Attending:   Physician Attestation Statement for Scribe #1: I, Lenny Springer MD, personally performed the services described in this documentation, as scribed by Hortencia King, in my presence, and it is both accurate and complete.          Clinical Impression       ICD-10-CM ICD-9-CM   1. Chest pain R07.9 786.50   2. Fever R50.9 780.60       Disposition:   Disposition: Placed in Observation  Condition: Fair         Lenny Springer MD  12/19/17 0397

## 2017-12-19 NOTE — ED NOTES
"Pt requesting benadryl to rest. States " that really helps can you call the doctor for me" relayed in shift report. Reinaldo RN given report.   "

## 2017-12-19 NOTE — ASSESSMENT & PLAN NOTE
WBC, Lactic neg  Chest Xray neg for indication of infectious process  UA pending  Flu neg, still consider viral   Treat with antipyretics   Monitor

## 2017-12-19 NOTE — PLAN OF CARE
Problem: Patient Care Overview  Goal: Plan of Care Review  Patient is on room air tolerating well no distress.

## 2017-12-19 NOTE — ED NOTES
Pt resting. Denies cp. Reports wanting to rest. Not wanting to take lasix until the morning. Will notify md. Pt reports intermittent headache. rr e/u. Skin warm and dry. gcs 15. Sinus tach with PVCs noted on monitor. Blankets and pillow given for comfort. Denies questions on poc or needs at this time.call light in reach.

## 2017-12-19 NOTE — CONSULTS
[facility]  Cardiology  Consult Note    Patient Name: Qi Ortiz  MRN: [id number]  Admission Date: 12/18/2017  Hospital Length of Stay: 0 days  Code Status: Full Code   Attending Provider: Peter Khanna MD   Consulting Provider: Rafaela Paez MD  Primary Care Physician: Trinidad Cleaning MD  Principal Problem:Chest pain    Patient information was obtained from patient and ER records.     Inpatient consult to Cardiology  Consult performed by: RAFAELA PAEZ  Consult ordered by: AMINA DIAZ       elevated troponin  Subjective:     Chief Complaint:  Cough and ingestion    HPI: 68yo female, PMH CAD s/p cabgx2 and redo LIMA to LAD in , normal EF, moderate AS, COPD, carotid disease, HTN, HLD and IRIS on CPAP.  She was seen at cardiology clinic recently for chronic BROOKS.  She has had one day of fever, chills, cough, and chest ingestion. Had severe headache after severe cough. With weakness.  She has chest pain on surgical site when she had severe cough.  In ER, EKG showed sinus tachy with PVCs and chronic ST depression on anterolateral leads, troponin 0.027 --> 0,.05 and  <---288 one month ago. CXR clear.    Past Medical History:   Diagnosis Date    Carotid stenosis     19%    COPD (chronic obstructive pulmonary disease)     No meds    CVA (cerebral vascular accident)     Dr. 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 Hope    Psoriatic arthritis     Rheumatology       Past Surgical History:   Procedure Laterality Date    BREAST BIOPSY      R sided/benign    CARDIAC SURGERY      sept 28 2016    CERVICAL FUSION      CHOLECYSTECTOMY      CORONARY ARTERY BYPASS GRAFT      triple bypass    CORONARY STENT PLACEMENT      EYE SURGERY      INTRAUTERINE DEVICE INSERTION      mass removed from R groin      TOTAL ABDOMINAL HYSTERECTOMY W/ BILATERAL  SALPINGOOPHORECTOMY      due to benign mass, adhesions    TUBAL LIGATION         Review of patient's allergies indicates:   Allergen Reactions    Celexa [citalopram] Anaphylaxis    Clindamycin Itching and Swelling    Codeine Shortness Of Breath, Itching and Swelling    Crestor [rosuvastatin] Anaphylaxis    Cytotec [misoprostol] Anaphylaxis and Other (See Comments)     Difficulty breathing    Lisinopril Anaphylaxis    Magnesium citrate Shortness Of Breath    Stadol [butorphanol tartrate] Anaphylaxis     Coded    Vicodin [hydrocodone-acetaminophen] Shortness Of Breath    Adhesive      EKG Electrodes    Aggrenox [aspirin-dipyridamole] Other (See Comments)     headaches    Demerol [meperidine]     Isosorbide Other (See Comments)     Severe headache    Kenalog [triamcinolone acetonide]     Medrol [methylprednisolone] Other (See Comments)     unknown    Mobic [meloxicam]     Morphine     Nitroglycerin      Long acting    Ranexa [ranolazine] Nausea And Vomiting    Reclast [zoledronic acid-mannitol-water] Other (See Comments)     Bones hurt    Sulfa (sulfonamide antibiotics) Other (See Comments)     unknown    Talwin [pentazocine lactate]     Tetracyclines     Tilade [nedocromil]     Zetia [ezetimibe]        Current Facility-Administered Medications on File Prior to Encounter   Medication    ustekinumab (stelara) 90 mg/mL subcutaneous syringe     Current Outpatient Prescriptions on File Prior to Encounter   Medication Sig    aspirin 81 MG Chew Take 81 mg by mouth once daily.    chlorhexidine (HIBICLENS) 4 % external liquid Apply topically daily as needed.    clopidogrel (PLAVIX) 75 mg tablet Take 1 tablet (75 mg total) by mouth once daily.    cyanocobalamin 2000 MCG tablet Take 2,000 mcg by mouth once daily.    evolocumab (REPATHA SURECLICK) 140 mg/mL PnIj Inject 140 mg into the skin every 14 (fourteen) days.    folic acid (FOLVITE) 1 MG tablet Take 1 tablet (1 mg total) by mouth once daily.     furosemide (LASIX) 20 MG tablet Take 0.5 tablets (10 mg total) by mouth 2 (two) times daily.    gabapentin (NEURONTIN) 100 MG capsule Take 2 capsules (200 mg total) by mouth 3 (three) times daily as needed.    garlic 2,000 mg Cap Take 3,000 mg by mouth once daily.     levothyroxine (SYNTHROID) 112 MCG tablet Take 1 tablet (112 mcg total) by mouth once daily.    metoprolol succinate (TOPROL-XL) 100 MG 24 hr tablet 1 tab (100mg) in morning. Half tab (50mg) at bedtime.  Generic ok    nitroGLYCERIN (NITROSTAT) 0.4 MG SL tablet Place 1 tablet (0.4 mg total) under the tongue every 5 (five) minutes as needed for Chest pain.    pyridoxine (VITAMIN B-6) 100 MG Tab Take 200 mg by mouth once daily.     valsartan-hydrochlorothiazide (DIOVAN-HCT) 160-12.5 mg per tablet Take 1 tablet by mouth once daily.    vitamin D 1000 units Tab Take 185 mg by mouth once daily.     Family History     Problem Relation (Age of Onset)    Breast cancer Maternal Grandfather, Paternal Aunt, Sister (60)    Heart disease     Leukemia Sister (8)    Lung cancer Paternal Grandfather    Stroke         Social History Main Topics    Smoking status: Never Smoker    Smokeless tobacco: Never Used    Alcohol use No    Drug use: No    Sexual activity: No     Review of Systems   Constitution: Positive for weakness. Negative for decreased appetite, diaphoresis, fever, malaise/fatigue and night sweats.   HENT: Negative for nosebleeds.    Eyes: Negative for blurred vision and double vision.   Cardiovascular: Positive for dyspnea on exertion. Negative for chest pain, claudication, irregular heartbeat, leg swelling, near-syncope, orthopnea, palpitations, paroxysmal nocturnal dyspnea and syncope.   Respiratory: Positive for cough, shortness of breath and wheezing. Negative for sleep disturbances due to breathing, snoring and sputum production.    Endocrine: Negative for cold intolerance and polyuria.   Hematologic/Lymphatic: Does not bruise/bleed  easily.   Skin: Negative for rash.   Musculoskeletal: Negative for back pain, falls, joint pain, joint swelling and neck pain.   Gastrointestinal: Negative for abdominal pain, heartburn, nausea and vomiting.   Genitourinary: Negative for dysuria, frequency and hematuria.   Neurological: Negative for difficulty with concentration, dizziness, focal weakness, headaches, light-headedness, numbness and seizures.   Psychiatric/Behavioral: Negative for depression, memory loss and substance abuse. The patient does not have insomnia.    Allergic/Immunologic: Negative for HIV exposure and hives.     Objective:     Vital Signs (Most Recent):  Temp: 98.9 °F (37.2 °C) (12/19/17 0940)  Pulse: 82 (12/19/17 0940)  Resp: 20 (12/19/17 0940)  BP: (!) 107/59 (12/19/17 0940)  SpO2: 96 % (12/19/17 0940) Vital Signs (24h Range):  Temp:  [98.5 °F (36.9 °C)-101.1 °F (38.4 °C)] 98.9 °F (37.2 °C)  Pulse:  [] 82  Resp:  [18-25] 20  SpO2:  [95 %-97 %] 96 %  BP: (107-169)/(59-86) 107/59     Weight: 115.3 kg (254 lb 3.1 oz)  Body mass index is 45.03 kg/m².    SpO2: 96 %  O2 Device (Oxygen Therapy): room air    No intake or output data in the 24 hours ending 12/19/17 1042    Lines/Drains/Airways     Peripheral Intravenous Line                 Peripheral IV - Single Lumen 12/18/17 2259 Left Antecubital less than 1 day                Physical Exam   Constitutional: She is oriented to person, place, and time. She appears well-nourished.   HENT:   Head: Normocephalic.   Eyes: Pupils are equal, round, and reactive to light.   Neck: Normal carotid pulses and no JVD present. Carotid bruit is not present. No thyromegaly present.   Cardiovascular: Normal rate, regular rhythm and normal pulses.   No extrasystoles are present. PMI is not displaced.  Exam reveals no gallop and no S3.    Murmur heard.  ESM 1/6 on RUSB   Pulmonary/Chest: Breath sounds normal. No stridor. No respiratory distress.   Decreased BS   Abdominal: Soft. Bowel sounds are normal.  There is no tenderness. There is no rebound.   Musculoskeletal: Normal range of motion.   Neurological: She is alert and oriented to person, place, and time.   Skin: Skin is intact. No rash noted.   Psychiatric: Her behavior is normal.       Significant Labs:   ABG: No results for input(s): PH, PCO2, HCO3, POCSATURATED, BE in the last 48 hours., Blood Culture: No results for input(s): LABBLOO in the last 48 hours., BMP:   Recent Labs  Lab 12/18/17 2259 12/19/17  0547    110    139   K 3.8 3.9    103   CO2 26 24   BUN 18 17   CREATININE 1.1 1.2   CALCIUM 9.3 9.2   MG 1.8  --    , CMP   Recent Labs  Lab 12/18/17 2259 12/19/17  0547    139   K 3.8 3.9    103   CO2 26 24    110   BUN 18 17   CREATININE 1.1 1.2   CALCIUM 9.3 9.2   PROT 7.3  --    ALBUMIN 3.4*  --    BILITOT 0.4  --    ALKPHOS 37*  --    AST 26  --    ALT 18  --    ANIONGAP 10 12   ESTGFRAFRICA >60 54*   EGFRNONAA 52* 47*   , CBC   Recent Labs  Lab 12/18/17 2259 12/19/17  0547   WBC 9.09 8.09   HGB 11.9* 12.0   HCT 36.8* 37.6    217   , INR   Recent Labs  Lab 12/18/17 2259   INR 1.0   , Lipid Panel No results for input(s): CHOL, HDL, LDLCALC, TRIG, CHOLHDL in the last 48 hours. and Troponin   Recent Labs  Lab 12/18/17 2259 12/19/17 0211 12/19/17  0547   TROPONINI 0.027* 0.042* 0.050*       Significant Imaging: X-Ray: CXR: X-Ray Chest 1 View (CXR):   Results for orders placed or performed during the hospital encounter of 12/18/17   X-Ray Chest 1 View    Narrative    XR CHEST 1 VIEW    Clinical history: Fever.      Findings: Prior sternotomy is again noted. Cardiomediastinal silhouette is within normal limits for AP technique. The lungs appear clear of active disease. No acute appearing infiltrate, pleural effusion, pneumothorax or other acute disease identified.    Impression     No acute disease identified in the chest.        Electronically signed by: LEONARD DAS MD  Date:     12/18/17  Time:    22:47       Assessment and Plan:       Elevated troponin likely due to demand ischemia from acute bronchitis.  Mildly elevated BNP from her baseline  CAD s/p cabgx2 and redo LIMA to LAD in ,   CHFpEF compensated  Moderate to severe AS  COPD    Plan;  Repeat troponin and trending down  Continue ASA, Plavix, lasix 20 mg bid. toprolXL and diovan/HCT.  She is on Repatha twice a month and not on statin.              Active Diagnoses:    Diagnosis Date Noted POA    PRINCIPAL PROBLEM:  Chest pain [R07.9] 06/21/2016 Yes    Elevated brain natriuretic peptide (BNP) level [R79.89] 12/19/2017 Yes    Fever [R50.9] 12/19/2017 Yes    Essential hypertension [I10] 07/30/2015 Yes     Chronic    CAD (coronary artery disease), with stents  [I25.10] 07/30/2015 Yes      Problems Resolved During this Admission:    Diagnosis Date Noted Date Resolved POA       VTE Risk Mitigation         Ordered     enoxaparin injection 40 mg  Daily     Route:  Subcutaneous        12/19/17 0303     Medium Risk of VTE  Once      12/19/17 0303     Place sequential compression device  Until discontinued      12/19/17 0303          Thank you for your consult. I will follow-up with patient. Please contact us if you have any additional questions.    Asif Paez MD  Cardiology   [facility]

## 2017-12-19 NOTE — PROGRESS NOTES
Patient seen and examined today. Cardiology consult for elevated troponin and increase CAD risk. Elevated troponin likely due to demand ischemia from acute bronchitis.  Duo nebs every 4 hours, Avelox 400 mg PO daily and Prednisone 40 mg daily.

## 2017-12-19 NOTE — ED NOTES
Pt reports improvement after zofran given. Resting in bed. rr e/u skin warm and dry. Reports cp comes and goes. Denies at this time. vss

## 2017-12-19 NOTE — SUBJECTIVE & OBJECTIVE
Past Medical History:   Diagnosis Date    Carotid stenosis     19%    COPD (chronic obstructive pulmonary disease)     No meds    CVA (cerebral vascular accident)     Dr. 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 Hope    Psoriatic arthritis     Rheumatology       Past Surgical History:   Procedure Laterality Date    BREAST BIOPSY      R sided/benign    CARDIAC SURGERY      sept 28 2016    CERVICAL FUSION      CHOLECYSTECTOMY      CORONARY ARTERY BYPASS GRAFT      triple bypass    CORONARY STENT PLACEMENT      EYE SURGERY      INTRAUTERINE DEVICE INSERTION      mass removed from R groin      TOTAL ABDOMINAL HYSTERECTOMY W/ BILATERAL SALPINGOOPHORECTOMY      due to benign mass, adhesions    TUBAL LIGATION         Review of patient's allergies indicates:   Allergen Reactions    Celexa [citalopram] Anaphylaxis    Clindamycin Itching and Swelling    Codeine Shortness Of Breath, Itching and Swelling    Crestor [rosuvastatin] Anaphylaxis    Cytotec [misoprostol] Anaphylaxis and Other (See Comments)     Difficulty breathing    Lisinopril Anaphylaxis    Magnesium citrate Shortness Of Breath    Stadol [butorphanol tartrate] Anaphylaxis     Coded    Vicodin [hydrocodone-acetaminophen] Shortness Of Breath    Adhesive      EKG Electrodes    Aggrenox [aspirin-dipyridamole] Other (See Comments)     headaches    Demerol [meperidine]     Isosorbide Other (See Comments)     Severe headache    Kenalog [triamcinolone acetonide]     Medrol [methylprednisolone] Other (See Comments)     unknown    Mobic [meloxicam]     Morphine     Nitroglycerin      Long acting    Ranexa [ranolazine] Nausea And Vomiting    Reclast [zoledronic acid-mannitol-water] Other (See Comments)     Bones hurt    Sulfa (sulfonamide antibiotics) Other (See Comments)     unknown    Talwin [pentazocine lactate]      Tetracyclines     Tilade [nedocromil]     Zetia [ezetimibe]        Current Facility-Administered Medications on File Prior to Encounter   Medication    ustekinumab (stelara) 90 mg/mL subcutaneous syringe     Current Outpatient Prescriptions on File Prior to Encounter   Medication Sig    aspirin 81 MG Chew Take 81 mg by mouth once daily.    chlorhexidine (HIBICLENS) 4 % external liquid Apply topically daily as needed.    clopidogrel (PLAVIX) 75 mg tablet Take 1 tablet (75 mg total) by mouth once daily.    cyanocobalamin 2000 MCG tablet Take 2,000 mcg by mouth once daily.    evolocumab (REPATHA SURECLICK) 140 mg/mL PnIj Inject 140 mg into the skin every 14 (fourteen) days.    folic acid (FOLVITE) 1 MG tablet Take 1 tablet (1 mg total) by mouth once daily.    furosemide (LASIX) 20 MG tablet Take 0.5 tablets (10 mg total) by mouth 2 (two) times daily.    gabapentin (NEURONTIN) 100 MG capsule Take 2 capsules (200 mg total) by mouth 3 (three) times daily as needed.    garlic 2,000 mg Cap Take 3,000 mg by mouth once daily.     levothyroxine (SYNTHROID) 112 MCG tablet Take 1 tablet (112 mcg total) by mouth once daily.    metoprolol succinate (TOPROL-XL) 100 MG 24 hr tablet 1 tab (100mg) in morning. Half tab (50mg) at bedtime.  Generic ok    nitroGLYCERIN (NITROSTAT) 0.4 MG SL tablet Place 1 tablet (0.4 mg total) under the tongue every 5 (five) minutes as needed for Chest pain.    pyridoxine (VITAMIN B-6) 100 MG Tab Take 200 mg by mouth once daily.     valsartan-hydrochlorothiazide (DIOVAN-HCT) 160-12.5 mg per tablet Take 1 tablet by mouth once daily.    vitamin D 1000 units Tab Take 185 mg by mouth once daily.     Family History     Problem Relation (Age of Onset)    Breast cancer Maternal Grandfather, Paternal Aunt, Sister (60)    Heart disease     Leukemia Sister (8)    Lung cancer Paternal Grandfather    Stroke         Social History Main Topics    Smoking status: Never Smoker    Smokeless  tobacco: Never Used    Alcohol use No    Drug use: No    Sexual activity: No     Review of Systems   Constitutional: Positive for fatigue. Negative for chills, diaphoresis and fever.   HENT: Positive for congestion. Negative for rhinorrhea, sinus pain, sinus pressure, sore throat and voice change.    Eyes: Negative for photophobia and visual disturbance.   Respiratory: Negative for cough, shortness of breath, wheezing and stridor.    Cardiovascular: Positive for chest pain. Negative for leg swelling.   Gastrointestinal: Negative for abdominal distention, abdominal pain, blood in stool, constipation, diarrhea, nausea and vomiting.   Endocrine: Negative for polydipsia, polyphagia and polyuria.   Genitourinary: Negative for difficulty urinating, dysuria, flank pain, pelvic pain, urgency and vaginal discharge.   Musculoskeletal: Negative for back pain, joint swelling, neck pain and neck stiffness.   Skin: Negative for color change and rash.   Allergic/Immunologic: Negative for immunocompromised state.   Neurological: Positive for headaches. Negative for dizziness, syncope, weakness and numbness.   Hematological: Does not bruise/bleed easily.   Psychiatric/Behavioral: Negative for agitation, behavioral problems and confusion.     Objective:     Vital Signs (Most Recent):  Temp: 98.5 °F (36.9 °C) (12/19/17 0211)  Pulse: 91 (12/19/17 0210)  Resp: 20 (12/19/17 0146)  BP: 127/65 (12/19/17 0210)  SpO2: 97 % (12/19/17 0210) Vital Signs (24h Range):  Temp:  [98.5 °F (36.9 °C)-100.6 °F (38.1 °C)] 98.5 °F (36.9 °C)  Pulse:  [] 91  Resp:  [18-20] 20  SpO2:  [95 %-97 %] 97 %  BP: (127-169)/(65-86) 127/65     Weight: 115.3 kg (254 lb 3.1 oz)  Body mass index is 45.03 kg/m².    Physical Exam   Constitutional: She is oriented to person, place, and time. She appears well-developed. She does not have a sickly appearance. She appears ill. No distress.   Elderly, older than stated   HENT:   Head: Normocephalic and atraumatic.    Nose: Nose normal.   Eyes: Conjunctivae and EOM are normal. Pupils are equal, round, and reactive to light. No scleral icterus.   Neck: Normal range of motion. Neck supple. No tracheal deviation present.   Cardiovascular: Normal rate, regular rhythm, normal heart sounds and intact distal pulses.    No murmur heard.  Pulmonary/Chest: Effort normal and breath sounds normal. No stridor. No respiratory distress. She has no wheezes. She has no rales.   Abdominal: Soft. Bowel sounds are normal. She exhibits no distension. There is no tenderness. There is no guarding.   Genitourinary:   Genitourinary Comments: ua pending   Musculoskeletal: Normal range of motion. She exhibits no edema or deformity.   Mid sternal and left Chest wall tender, Reproducable chest pain    Neurological: She is alert and oriented to person, place, and time. No cranial nerve deficit.   Skin: Skin is warm and dry. Capillary refill takes less than 2 seconds. No rash noted. She is not diaphoretic.   Psychiatric: She has a normal mood and affect. Her behavior is normal. Judgment and thought content normal.   Nursing note and vitals reviewed.        CRANIAL NERVES     CN III, IV, VI   Pupils are equal, round, and reactive to light.  Extraocular motions are normal.        Significant Labs: All pertinent labs within the past 24 hours have been reviewed.   Results for orders placed or performed during the hospital encounter of 12/18/17   CBC auto differential   Result Value Ref Range    WBC 9.09 3.90 - 12.70 K/uL    RBC 4.67 4.00 - 5.40 M/uL    Hemoglobin 11.9 (L) 12.0 - 16.0 g/dL    Hematocrit 36.8 (L) 37.0 - 48.5 %    MCV 79 (L) 82 - 98 fL    MCH 25.5 (L) 27.0 - 31.0 pg    MCHC 32.3 32.0 - 36.0 g/dL    RDW 16.3 (H) 11.5 - 14.5 %    Platelets 223 150 - 350 K/uL    MPV 9.3 9.2 - 12.9 fL    Gran # 7.6 1.8 - 7.7 K/uL    Lymph # 0.9 (L) 1.0 - 4.8 K/uL    Mono # 0.6 0.3 - 1.0 K/uL    Eos # 0.0 0.0 - 0.5 K/uL    Baso # 0.02 0.00 - 0.20 K/uL    Gran% 83.5 (H)  38.0 - 73.0 %    Lymph% 9.4 (L) 18.0 - 48.0 %    Mono% 6.7 4.0 - 15.0 %    Eosinophil% 0.2 0.0 - 8.0 %    Basophil% 0.2 0.0 - 1.9 %    Differential Method Automated    Comprehensive metabolic panel   Result Value Ref Range    Sodium 139 136 - 145 mmol/L    Potassium 3.8 3.5 - 5.1 mmol/L    Chloride 103 95 - 110 mmol/L    CO2 26 23 - 29 mmol/L    Glucose 102 70 - 110 mg/dL    BUN, Bld 18 8 - 23 mg/dL    Creatinine 1.1 0.5 - 1.4 mg/dL    Calcium 9.3 8.7 - 10.5 mg/dL    Total Protein 7.3 6.0 - 8.4 g/dL    Albumin 3.4 (L) 3.5 - 5.2 g/dL    Total Bilirubin 0.4 0.1 - 1.0 mg/dL    Alkaline Phosphatase 37 (L) 55 - 135 U/L    AST 26 10 - 40 U/L    ALT 18 10 - 44 U/L    Anion Gap 10 8 - 16 mmol/L    eGFR if African American >60 >60 mL/min/1.73 m^2    eGFR if non African American 52 (A) >60 mL/min/1.73 m^2   Protime-INR   Result Value Ref Range    Prothrombin Time 10.4 9.0 - 12.5 sec    INR 1.0 0.8 - 1.2   APTT   Result Value Ref Range    aPTT 25.7 21.0 - 32.0 sec   Brain natriuretic peptide   Result Value Ref Range     (H) 0 - 99 pg/mL   Troponin I   Result Value Ref Range    Troponin I 0.027 (H) 0.000 - 0.026 ng/mL   Lactic acid, plasma   Result Value Ref Range    Lactate (Lactic Acid) 1.6 0.5 - 2.2 mmol/L   Influenza antigen Nasopharyngeal Swab   Result Value Ref Range    Influenza A Ag, EIA Negative Negative    Influenza B Ag, EIA Negative Negative    Flu A & B Source Nasopharyngeal Swab    Troponin I   Result Value Ref Range    Troponin I 0.042 (H) 0.000 - 0.026 ng/mL         Significant Imaging: I have reviewed all pertinent imaging results/findings within the past 24 hours.   Imaging Results          CT Head Without Contrast     Per Er note: [employer] read NAF            X-Ray Chest 1 View (Final result)  Result time 12/18/17 22:47:55    Final result by Leonard Das III, MD (12/18/17 22:47:55)                 Impression:     No acute disease identified in the chest.        Electronically signed by: LEONARD DAS  MD  Date:     12/18/17  Time:    22:47              Narrative:    XR CHEST 1 VIEW    Clinical history: Fever.      Findings: Prior sternotomy is again noted. Cardiomediastinal silhouette is within normal limits for AP technique. The lungs appear clear of active disease. No acute appearing infiltrate, pleural effusion, pneumothorax or other acute disease identified.

## 2017-12-19 NOTE — H&P
[facility] Hospital Medicine  History & Physical    Patient Name: Qi Ortiz  MRN: [id number]  Admission Date: 12/18/2017  Attending Physician: Tawanna Burris MD  Primary Care Provider: Trinidad Cleaning MD         Patient information was obtained from patient, past medical records and ER records.     Subjective:     Principal Problem:Chest pain    Chief Complaint:   Chief Complaint   Patient presents with    Chest Pain     reports chest pain that started on the way to the ER, reports taking SL nitro on the way here and reports some relief, reports everyday shortness of breath         HPI: Qi Ortiz is a 67 y.o. female patient with presented to the Emergency Department for Chest pain.  Onset gradually earlier today pta. Patient locates pain to mid sternal, does not radiate. Patient reports she is unsure if this pain is similar to past CP episodes or when she had MI. Symptoms are intermittent and moderate in severity. No mitigating or exacerbating factors reported. Pt reports chronic SOB. Associated sxs include subjective fever, congestion, diaphoresis, and HA. Patient denies any n/v/d, urinary frequency, dysuria, abd pain, back pain, dizziness, numbness/weakness, and all other sxs at this time. No prior Tx. No further complaints or concerns at this time. Patient evaluated in the ER. WBC neg. CMP stable, Electrolytes stable. Flu neg, lactic neg. CT head for Headache complaint, NAF. Chest Xray NAF. Patient initial troponin mild elevated but during er course #2 elevated to 0.042. . Patient placed in obs to continue trend of troponin and further evaluation.     Past Medical History:   Diagnosis Date    Carotid stenosis     19%    COPD (chronic obstructive pulmonary disease)     No meds    CVA (cerebral vascular accident)     Dr. Hoffman    Depression     Double ectopic ureters     Dr. Porras    Hyperlipidemia     Hypothyroid     OP (osteoporosis)     IRIS (obstructive sleep  apnea)     Dr. Hope    Psoriatic arthritis     Rheumatology       Past Surgical History:   Procedure Laterality Date    BREAST BIOPSY      R sided/benign    CARDIAC SURGERY      sept 28 2016    CERVICAL FUSION      CHOLECYSTECTOMY      CORONARY ARTERY BYPASS GRAFT      triple bypass    CORONARY STENT PLACEMENT      EYE SURGERY      INTRAUTERINE DEVICE INSERTION      mass removed from R groin      TOTAL ABDOMINAL HYSTERECTOMY W/ BILATERAL SALPINGOOPHORECTOMY      due to benign mass, adhesions    TUBAL LIGATION         Review of patient's allergies indicates:   Allergen Reactions    Celexa [citalopram] Anaphylaxis    Clindamycin Itching and Swelling    Codeine Shortness Of Breath, Itching and Swelling    Crestor [rosuvastatin] Anaphylaxis    Cytotec [misoprostol] Anaphylaxis and Other (See Comments)     Difficulty breathing    Lisinopril Anaphylaxis    Magnesium citrate Shortness Of Breath    Stadol [butorphanol tartrate] Anaphylaxis     Coded    Vicodin [hydrocodone-acetaminophen] Shortness Of Breath    Adhesive      EKG Electrodes    Aggrenox [aspirin-dipyridamole] Other (See Comments)     headaches    Demerol [meperidine]     Isosorbide Other (See Comments)     Severe headache    Kenalog [triamcinolone acetonide]     Medrol [methylprednisolone] Other (See Comments)     unknown    Mobic [meloxicam]     Morphine     Nitroglycerin      Long acting    Ranexa [ranolazine] Nausea And Vomiting    Reclast [zoledronic acid-mannitol-water] Other (See Comments)     Bones hurt    Sulfa (sulfonamide antibiotics) Other (See Comments)     unknown    Talwin [pentazocine lactate]     Tetracyclines     Tilade [nedocromil]     Zetia [ezetimibe]        Current Facility-Administered Medications on File Prior to Encounter   Medication    ustekinumab (stelara) 90 mg/mL subcutaneous syringe     Current Outpatient Prescriptions on File Prior to Encounter   Medication Sig    aspirin 81 MG Chew Take  81 mg by mouth once daily.    chlorhexidine (HIBICLENS) 4 % external liquid Apply topically daily as needed.    clopidogrel (PLAVIX) 75 mg tablet Take 1 tablet (75 mg total) by mouth once daily.    cyanocobalamin 2000 MCG tablet Take 2,000 mcg by mouth once daily.    evolocumab (REPATHA SURECLICK) 140 mg/mL PnIj Inject 140 mg into the skin every 14 (fourteen) days.    folic acid (FOLVITE) 1 MG tablet Take 1 tablet (1 mg total) by mouth once daily.    furosemide (LASIX) 20 MG tablet Take 0.5 tablets (10 mg total) by mouth 2 (two) times daily.    gabapentin (NEURONTIN) 100 MG capsule Take 2 capsules (200 mg total) by mouth 3 (three) times daily as needed.    garlic 2,000 mg Cap Take 3,000 mg by mouth once daily.     levothyroxine (SYNTHROID) 112 MCG tablet Take 1 tablet (112 mcg total) by mouth once daily.    metoprolol succinate (TOPROL-XL) 100 MG 24 hr tablet 1 tab (100mg) in morning. Half tab (50mg) at bedtime.  Generic ok    nitroGLYCERIN (NITROSTAT) 0.4 MG SL tablet Place 1 tablet (0.4 mg total) under the tongue every 5 (five) minutes as needed for Chest pain.    pyridoxine (VITAMIN B-6) 100 MG Tab Take 200 mg by mouth once daily.     valsartan-hydrochlorothiazide (DIOVAN-HCT) 160-12.5 mg per tablet Take 1 tablet by mouth once daily.    vitamin D 1000 units Tab Take 185 mg by mouth once daily.     Family History     Problem Relation (Age of Onset)    Breast cancer Maternal Grandfather, Paternal Aunt, Sister (60)    Heart disease     Leukemia Sister (8)    Lung cancer Paternal Grandfather    Stroke         Social History Main Topics    Smoking status: Never Smoker    Smokeless tobacco: Never Used    Alcohol use No    Drug use: No    Sexual activity: No     Review of Systems   Constitutional: Positive for fatigue. Negative for chills, diaphoresis and fever.   HENT: Positive for congestion. Negative for rhinorrhea, sinus pain, sinus pressure, sore throat and voice change.    Eyes: Negative for  photophobia and visual disturbance.   Respiratory: Negative for cough, shortness of breath, wheezing and stridor.    Cardiovascular: Positive for chest pain. Negative for leg swelling.   Gastrointestinal: Negative for abdominal distention, abdominal pain, blood in stool, constipation, diarrhea, nausea and vomiting.   Endocrine: Negative for polydipsia, polyphagia and polyuria.   Genitourinary: Negative for difficulty urinating, dysuria, flank pain, pelvic pain, urgency and vaginal discharge.   Musculoskeletal: Negative for back pain, joint swelling, neck pain and neck stiffness.   Skin: Negative for color change and rash.   Allergic/Immunologic: Negative for immunocompromised state.   Neurological: Positive for headaches. Negative for dizziness, syncope, weakness and numbness.   Hematological: Does not bruise/bleed easily.   Psychiatric/Behavioral: Negative for agitation, behavioral problems and confusion.     Objective:     Vital Signs (Most Recent):  Temp: 98.5 °F (36.9 °C) (12/19/17 0211)  Pulse: 91 (12/19/17 0210)  Resp: 20 (12/19/17 0146)  BP: 127/65 (12/19/17 0210)  SpO2: 97 % (12/19/17 0210) Vital Signs (24h Range):  Temp:  [98.5 °F (36.9 °C)-100.6 °F (38.1 °C)] 98.5 °F (36.9 °C)  Pulse:  [] 91  Resp:  [18-20] 20  SpO2:  [95 %-97 %] 97 %  BP: (127-169)/(65-86) 127/65     Weight: 115.3 kg (254 lb 3.1 oz)  Body mass index is 45.03 kg/m².    Physical Exam   Constitutional: She is oriented to person, place, and time. She appears well-developed. She does not have a sickly appearance. She appears ill. No distress.   Elderly, older than stated   HENT:   Head: Normocephalic and atraumatic.   Nose: Nose normal.   Eyes: Conjunctivae and EOM are normal. Pupils are equal, round, and reactive to light. No scleral icterus.   Neck: Normal range of motion. Neck supple. No tracheal deviation present.   Cardiovascular: Normal rate, regular rhythm, normal heart sounds and intact distal pulses.    No murmur  heard.  Pulmonary/Chest: Effort normal and breath sounds normal. No stridor. No respiratory distress. She has no wheezes. She has no rales.   Abdominal: Soft. Bowel sounds are normal. She exhibits no distension. There is no tenderness. There is no guarding.   Genitourinary:   Genitourinary Comments: ua pending   Musculoskeletal: Normal range of motion. She exhibits no edema or deformity.   Mid sternal and left Chest wall tender, Reproducable chest pain    Neurological: She is alert and oriented to person, place, and time. No cranial nerve deficit.   Skin: Skin is warm and dry. Capillary refill takes less than 2 seconds. No rash noted. She is not diaphoretic.   Psychiatric: She has a normal mood and affect. Her behavior is normal. Judgment and thought content normal.   Nursing note and vitals reviewed.        CRANIAL NERVES     CN III, IV, VI   Pupils are equal, round, and reactive to light.  Extraocular motions are normal.        Significant Labs: All pertinent labs within the past 24 hours have been reviewed.   Results for orders placed or performed during the hospital encounter of 12/18/17   CBC auto differential   Result Value Ref Range    WBC 9.09 3.90 - 12.70 K/uL    RBC 4.67 4.00 - 5.40 M/uL    Hemoglobin 11.9 (L) 12.0 - 16.0 g/dL    Hematocrit 36.8 (L) 37.0 - 48.5 %    MCV 79 (L) 82 - 98 fL    MCH 25.5 (L) 27.0 - 31.0 pg    MCHC 32.3 32.0 - 36.0 g/dL    RDW 16.3 (H) 11.5 - 14.5 %    Platelets 223 150 - 350 K/uL    MPV 9.3 9.2 - 12.9 fL    Gran # 7.6 1.8 - 7.7 K/uL    Lymph # 0.9 (L) 1.0 - 4.8 K/uL    Mono # 0.6 0.3 - 1.0 K/uL    Eos # 0.0 0.0 - 0.5 K/uL    Baso # 0.02 0.00 - 0.20 K/uL    Gran% 83.5 (H) 38.0 - 73.0 %    Lymph% 9.4 (L) 18.0 - 48.0 %    Mono% 6.7 4.0 - 15.0 %    Eosinophil% 0.2 0.0 - 8.0 %    Basophil% 0.2 0.0 - 1.9 %    Differential Method Automated    Comprehensive metabolic panel   Result Value Ref Range    Sodium 139 136 - 145 mmol/L    Potassium 3.8 3.5 - 5.1 mmol/L    Chloride 103 95 - 110  mmol/L    CO2 26 23 - 29 mmol/L    Glucose 102 70 - 110 mg/dL    BUN, Bld 18 8 - 23 mg/dL    Creatinine 1.1 0.5 - 1.4 mg/dL    Calcium 9.3 8.7 - 10.5 mg/dL    Total Protein 7.3 6.0 - 8.4 g/dL    Albumin 3.4 (L) 3.5 - 5.2 g/dL    Total Bilirubin 0.4 0.1 - 1.0 mg/dL    Alkaline Phosphatase 37 (L) 55 - 135 U/L    AST 26 10 - 40 U/L    ALT 18 10 - 44 U/L    Anion Gap 10 8 - 16 mmol/L    eGFR if African American >60 >60 mL/min/1.73 m^2    eGFR if non African American 52 (A) >60 mL/min/1.73 m^2   Protime-INR   Result Value Ref Range    Prothrombin Time 10.4 9.0 - 12.5 sec    INR 1.0 0.8 - 1.2   APTT   Result Value Ref Range    aPTT 25.7 21.0 - 32.0 sec   Brain natriuretic peptide   Result Value Ref Range     (H) 0 - 99 pg/mL   Troponin I   Result Value Ref Range    Troponin I 0.027 (H) 0.000 - 0.026 ng/mL   Lactic acid, plasma   Result Value Ref Range    Lactate (Lactic Acid) 1.6 0.5 - 2.2 mmol/L   Influenza antigen Nasopharyngeal Swab   Result Value Ref Range    Influenza A Ag, EIA Negative Negative    Influenza B Ag, EIA Negative Negative    Flu A & B Source Nasopharyngeal Swab    Troponin I   Result Value Ref Range    Troponin I 0.042 (H) 0.000 - 0.026 ng/mL         Significant Imaging: I have reviewed all pertinent imaging results/findings within the past 24 hours.   Imaging Results          CT Head Without Contrast     Per Er note: [facility] read NAF            X-Ray Chest 1 View (Final result)  Result time 12/18/17 22:47:55    Final result by Leonard Das III, MD (12/18/17 22:47:55)                 Impression:     No acute disease identified in the chest.        Electronically signed by: LEONARD DAS MD  Date:     12/18/17  Time:    22:47              Narrative:    XR CHEST 1 VIEW    Clinical history: Fever.      Findings: Prior sternotomy is again noted. Cardiomediastinal silhouette is within normal limits for AP technique. The lungs appear clear of active disease. No acute appearing infiltrate, pleural  effusion, pneumothorax or other acute disease identified.                              I have personally reviewed the patients labs, imaging, ekg and discussed the patient case in detail with the Er provider      Assessment/Plan:     * Chest pain    -Admit to Obs  -Troponin Result in Er #2 elevated  -EKG in ED reviewed PVCs noted, but patient has hx of past ekgs with PCVs: stable  -Continue Serial enzymes  -ASA 81mg daily  -Cardiology if need  -monitor             CAD (coronary artery disease), with stents     Continue BB, ASA, Plavix   Consider statin  Check lipids  Check TSH, A1C          Elevated brain natriuretic peptide (BNP) level    Lasix-->patient refused    Most recent echo 11/17/17 CONCLUSIONS     1 - Normal left ventricular systolic function (EF 55-60%).     2 - Indeterminate LV diastolic function.     3 - No wall motion abnormalities.     4 - Normal right ventricular systolic function .     5 - The estimated PA systolic pressure is greater than 24 mmHg.     6 - Concentric hypertrophy.     7 - Trivial tricuspid regurgitation.     8 - Moderate to severe aortic stenosis, ALFREDO = 0.78 cm2, peak velocity = 3.51 m/s, mean gradient = 30 mmHg.     Consider Echo repeat         Essential hypertension    Stable   Continue home meds        Fever    WBC, Lactic neg  Chest Xray neg for indication of infectious process  UA pending  Flu neg, still consider viral   Treat with antipyretics   Monitor           VTE Risk Mitigation         Ordered     enoxaparin injection 40 mg  Daily     Route:  Subcutaneous        12/19/17 0303     Medium Risk of VTE  Once      12/19/17 0303     Place sequential compression device  Until discontinued      12/19/17 0303             Moiz Purvis NP  Department of Hospital Medicine   [facility]

## 2017-12-20 VITALS
DIASTOLIC BLOOD PRESSURE: 58 MMHG | WEIGHT: 254.19 LBS | OXYGEN SATURATION: 95 % | TEMPERATURE: 98 F | BODY MASS INDEX: 45.04 KG/M2 | SYSTOLIC BLOOD PRESSURE: 125 MMHG | HEIGHT: 63 IN | HEART RATE: 90 BPM | RESPIRATION RATE: 18 BRPM

## 2017-12-20 LAB
ALBUMIN SERPL BCP-MCNC: 2.9 G/DL
ALP SERPL-CCNC: 29 U/L
ALT SERPL W/O P-5'-P-CCNC: 19 U/L
ANION GAP SERPL CALC-SCNC: 12 MMOL/L
AST SERPL-CCNC: 30 U/L
BASOPHILS # BLD AUTO: 0.01 K/UL
BASOPHILS NFR BLD: 0.2 %
BILIRUB SERPL-MCNC: 0.3 MG/DL
BUN SERPL-MCNC: 24 MG/DL
CALCIUM SERPL-MCNC: 8.7 MG/DL
CHLORIDE SERPL-SCNC: 101 MMOL/L
CO2 SERPL-SCNC: 26 MMOL/L
CREAT SERPL-MCNC: 1.5 MG/DL
DIFFERENTIAL METHOD: ABNORMAL
EOSINOPHIL # BLD AUTO: 0 K/UL
EOSINOPHIL NFR BLD: 0.2 %
ERYTHROCYTE [DISTWIDTH] IN BLOOD BY AUTOMATED COUNT: 16.7 %
EST. GFR  (AFRICAN AMERICAN): 42 ML/MIN/1.73 M^2
EST. GFR  (NON AFRICAN AMERICAN): 36 ML/MIN/1.73 M^2
GLUCOSE SERPL-MCNC: 89 MG/DL
HCT VFR BLD AUTO: 38.4 %
HGB BLD-MCNC: 12.1 G/DL
LYMPHOCYTES # BLD AUTO: 2 K/UL
LYMPHOCYTES NFR BLD: 33.1 %
MCH RBC QN AUTO: 25.1 PG
MCHC RBC AUTO-ENTMCNC: 31.5 G/DL
MCV RBC AUTO: 80 FL
MONOCYTES # BLD AUTO: 0.6 K/UL
MONOCYTES NFR BLD: 10.1 %
NEUTROPHILS # BLD AUTO: 3.5 K/UL
NEUTROPHILS NFR BLD: 56.4 %
PLATELET # BLD AUTO: 222 K/UL
PMV BLD AUTO: 9.6 FL
POTASSIUM SERPL-SCNC: 3.5 MMOL/L
PROT SERPL-MCNC: 6.6 G/DL
RBC # BLD AUTO: 4.82 M/UL
SODIUM SERPL-SCNC: 139 MMOL/L
TROPONIN I SERPL DL<=0.01 NG/ML-MCNC: 0.04 NG/ML
WBC # BLD AUTO: 6.14 K/UL

## 2017-12-20 PROCEDURE — 25000242 PHARM REV CODE 250 ALT 637 W/ HCPCS: Performed by: NURSE PRACTITIONER

## 2017-12-20 PROCEDURE — 25000003 PHARM REV CODE 250: Performed by: NURSE PRACTITIONER

## 2017-12-20 PROCEDURE — 80053 COMPREHEN METABOLIC PANEL: CPT

## 2017-12-20 PROCEDURE — 96361 HYDRATE IV INFUSION ADD-ON: CPT

## 2017-12-20 PROCEDURE — 96360 HYDRATION IV INFUSION INIT: CPT

## 2017-12-20 PROCEDURE — 94640 AIRWAY INHALATION TREATMENT: CPT

## 2017-12-20 PROCEDURE — 94761 N-INVAS EAR/PLS OXIMETRY MLT: CPT

## 2017-12-20 PROCEDURE — 36415 COLL VENOUS BLD VENIPUNCTURE: CPT

## 2017-12-20 PROCEDURE — 63600175 PHARM REV CODE 636 W HCPCS: Performed by: NURSE PRACTITIONER

## 2017-12-20 PROCEDURE — 85025 COMPLETE CBC W/AUTO DIFF WBC: CPT

## 2017-12-20 PROCEDURE — G0378 HOSPITAL OBSERVATION PER HR: HCPCS

## 2017-12-20 PROCEDURE — 25000003 PHARM REV CODE 250: Performed by: EMERGENCY MEDICINE

## 2017-12-20 PROCEDURE — 84484 ASSAY OF TROPONIN QUANT: CPT

## 2017-12-20 PROCEDURE — 99214 OFFICE O/P EST MOD 30 MIN: CPT | Mod: ,,, | Performed by: INTERNAL MEDICINE

## 2017-12-20 RX ORDER — SODIUM CHLORIDE 9 MG/ML
INJECTION, SOLUTION INTRAVENOUS CONTINUOUS
Status: DISCONTINUED | OUTPATIENT
Start: 2017-12-20 | End: 2017-12-20 | Stop reason: HOSPADM

## 2017-12-20 RX ORDER — IBUPROFEN 400 MG/1
400 TABLET ORAL ONCE
Status: COMPLETED | OUTPATIENT
Start: 2017-12-20 | End: 2017-12-20

## 2017-12-20 RX ORDER — MOXIFLOXACIN HYDROCHLORIDE 400 MG/1
400 TABLET ORAL DAILY
Qty: 4 TABLET | Refills: 0 | Status: SHIPPED | OUTPATIENT
Start: 2017-12-21 | End: 2017-12-26

## 2017-12-20 RX ORDER — PREDNISONE 20 MG/1
40 TABLET ORAL DAILY
Qty: 10 TABLET | Refills: 0 | Status: SHIPPED | OUTPATIENT
Start: 2017-12-21 | End: 2017-12-26

## 2017-12-20 RX ADMIN — IPRATROPIUM BROMIDE AND ALBUTEROL SULFATE 3 ML: .5; 3 SOLUTION RESPIRATORY (INHALATION) at 04:12

## 2017-12-20 RX ADMIN — GABAPENTIN 100 MG: 100 CAPSULE ORAL at 05:12

## 2017-12-20 RX ADMIN — IPRATROPIUM BROMIDE AND ALBUTEROL SULFATE 3 ML: .5; 3 SOLUTION RESPIRATORY (INHALATION) at 01:12

## 2017-12-20 RX ADMIN — GABAPENTIN 100 MG: 100 CAPSULE ORAL at 01:12

## 2017-12-20 RX ADMIN — IBUPROFEN 400 MG: 400 TABLET, FILM COATED ORAL at 01:12

## 2017-12-20 RX ADMIN — CLOPIDOGREL BISULFATE 75 MG: 75 TABLET ORAL at 08:12

## 2017-12-20 RX ADMIN — MOXIFLOXACIN HYDROCHLORIDE 400 MG: 400 TABLET, FILM COATED ORAL at 08:12

## 2017-12-20 RX ADMIN — LEVOTHYROXINE SODIUM 112 MCG: 112 TABLET ORAL at 05:12

## 2017-12-20 RX ADMIN — IPRATROPIUM BROMIDE AND ALBUTEROL SULFATE 3 ML: .5; 3 SOLUTION RESPIRATORY (INHALATION) at 08:12

## 2017-12-20 RX ADMIN — SODIUM CHLORIDE: 0.9 INJECTION, SOLUTION INTRAVENOUS at 08:12

## 2017-12-20 RX ADMIN — ASPIRIN 81 MG CHEWABLE TABLET 81 MG: 81 TABLET CHEWABLE at 08:12

## 2017-12-20 RX ADMIN — FAMOTIDINE 20 MG: 20 TABLET, FILM COATED ORAL at 08:12

## 2017-12-20 RX ADMIN — IPRATROPIUM BROMIDE AND ALBUTEROL SULFATE 3 ML: .5; 3 SOLUTION RESPIRATORY (INHALATION) at 12:12

## 2017-12-20 NOTE — PLAN OF CARE
Problem: Patient Care Overview  Goal: Plan of Care Review  Outcome: Ongoing (interventions implemented as appropriate)  Pt was without falls or injury this shift. Daughter at bedside. Pt with CP with non productive cough. POC reviewed. SR with PVC's in the 80's. Bed low and call light within reach.

## 2017-12-20 NOTE — PROGRESS NOTES
Cardiology Progress Note        SUBJECTIVE:     History of Present Illness:  Patient is a 67 y.o. female presents with acute bronchitis.  Today, dyspnea and wheezing better, denied chest pain  Still has cough.      OBJECTIVE:     Vital Signs (Most Recent)  Temp: 98.9 °F (37.2 °C) (12/20/17 0812)  Pulse: 73 (12/20/17 1142)  Resp: 18 (12/20/17 1142)  BP: (!) 98/52 (12/20/17 1142)  SpO2: 98 % (12/20/17 1142)    Vital Signs Range (Last 24H):  Temp:  [98.3 °F (36.8 °C)-101.9 °F (38.8 °C)]   Pulse:  [60-85]   Resp:  [16-20]   BP: ()/(52-64)   SpO2:  [92 %-98 %]     Intake/Output last 3 shifts:  No intake/output data recorded.    Intake/Output this shift:  No intake/output data recorded.    Review of patient's allergies indicates:   Allergen Reactions    Celexa [citalopram] Anaphylaxis    Clindamycin Itching and Swelling    Codeine Shortness Of Breath, Itching and Swelling    Crestor [rosuvastatin] Anaphylaxis    Cytotec [misoprostol] Anaphylaxis and Other (See Comments)     Difficulty breathing    Lisinopril Anaphylaxis    Magnesium citrate Shortness Of Breath    Stadol [butorphanol tartrate] Anaphylaxis     Coded    Vicodin [hydrocodone-acetaminophen] Shortness Of Breath    Adhesive      EKG Electrodes    Aggrenox [aspirin-dipyridamole] Other (See Comments)     headaches    Demerol [meperidine]     Isosorbide Other (See Comments)     Severe headache    Kenalog [triamcinolone acetonide]     Medrol [methylprednisolone] Other (See Comments)     unknown    Mobic [meloxicam]     Morphine     Nitroglycerin      Long acting    Ranexa [ranolazine] Nausea And Vomiting    Reclast [zoledronic acid-mannitol-water] Other (See Comments)     Bones hurt    Sulfa (sulfonamide antibiotics) Other (See Comments)     unknown    Talwin [pentazocine lactate]     Tetracyclines     Tilade [nedocromil]     Zetia [ezetimibe]        Current Facility-Administered Medications   Medication    0.9%  NaCl infusion     acetaminophen tablet 650 mg    albuterol-ipratropium 2.5mg-0.5mg/3mL nebulizer solution 3 mL    aspirin chewable tablet 81 mg    benzonatate capsule 100 mg    clopidogrel tablet 75 mg    enoxaparin injection 40 mg    famotidine tablet 20 mg    furosemide injection 40 mg    gabapentin capsule 100 mg    hydroCHLOROthiazide tablet 12.5 mg    levothyroxine tablet 112 mcg    metoprolol succinate (TOPROL-XL) 24 hr tablet 50 mg    moxifloxacin tablet 400 mg    predniSONE tablet 40 mg    valsartan tablet 160 mg     No current facility-administered medications on file prior to encounter.      Current Outpatient Prescriptions on File Prior to Encounter   Medication Sig    aspirin 81 MG Chew Take 81 mg by mouth once daily.    chlorhexidine (HIBICLENS) 4 % external liquid Apply topically daily as needed.    clopidogrel (PLAVIX) 75 mg tablet Take 1 tablet (75 mg total) by mouth once daily.    cyanocobalamin 2000 MCG tablet Take 2,000 mcg by mouth once daily.    evolocumab (REPATHA SURECLICK) 140 mg/mL PnIj Inject 140 mg into the skin every 14 (fourteen) days.    folic acid (FOLVITE) 1 MG tablet Take 1 tablet (1 mg total) by mouth once daily.    furosemide (LASIX) 20 MG tablet Take 0.5 tablets (10 mg total) by mouth 2 (two) times daily.    gabapentin (NEURONTIN) 100 MG capsule Take 2 capsules (200 mg total) by mouth 3 (three) times daily as needed.    garlic 2,000 mg Cap Take 3,000 mg by mouth once daily.     levothyroxine (SYNTHROID) 112 MCG tablet Take 1 tablet (112 mcg total) by mouth once daily.    metoprolol succinate (TOPROL-XL) 100 MG 24 hr tablet 1 tab (100mg) in morning. Half tab (50mg) at bedtime.  Generic ok    nitroGLYCERIN (NITROSTAT) 0.4 MG SL tablet Place 1 tablet (0.4 mg total) under the tongue every 5 (five) minutes as needed for Chest pain.    pyridoxine (VITAMIN B-6) 100 MG Tab Take 200 mg by mouth once daily.     valsartan-hydrochlorothiazide (DIOVAN-HCT) 160-12.5 mg per tablet Take  1 tablet by mouth once daily.    vitamin D 1000 units Tab Take 185 mg by mouth once daily.       Physical Exam:  General appearance: alert, appears stated age and cooperative  Head: Normocephalic, without obvious abnormality, atraumatic  Eyes:  conjunctivae/corneas clear. PERRL, EOM's intact. Fundi benign.  Nose: no discharge  Throat: normal findings: tongue midline and normal  Neck: no adenopathy, no carotid bruit, no JVD, supple, symmetrical, trachea midline and thyroid not enlarged, symmetric, no tenderness/mass/nodules  Back:  no skin lesions, erythema, or scars  Lungs:  B/l diffuse wheezing.  Chest wall: no tenderness  Heart: regular rate and rhythm, S1, S2 normal, no murmur, click, rub or gallop  Abdomen: soft, non-tender; bowel sounds normal; no masses,  no organomegaly  Extremities: extremities normal, atraumatic, no cyanosis or edema  Pulses: 2+ and symmetric  Skin: Skin color, texture, turgor normal. No rashes or lesions  Neurologic: Grossly normal    Laboratory:  Chemistry:   Lab Results   Component Value Date     12/20/2017    K 3.5 12/20/2017     12/20/2017    CO2 26 12/20/2017    BUN 24 (H) 12/20/2017    CREATININE 1.5 (H) 12/20/2017    CALCIUM 8.7 12/20/2017     Cardiac Markers:   Lab Results   Component Value Date    TROPONINI 0.043 (H) 12/20/2017     Cardiac Markers (Last 3):   Lab Results   Component Value Date    TROPONINI 0.043 (H) 12/20/2017    TROPONINI 0.050 (H) 12/19/2017    TROPONINI 0.042 (H) 12/19/2017     CBC:   Lab Results   Component Value Date    WBC 6.14 12/20/2017    HGB 12.1 12/20/2017    HCT 38.4 12/20/2017    MCV 80 (L) 12/20/2017     12/20/2017     Lipids:   Lab Results   Component Value Date    CHOL 123 12/18/2017    TRIG 66 12/18/2017    HDL 59 12/18/2017     Coagulation:   Lab Results   Component Value Date    INR 1.0 12/18/2017    APTT 25.7 12/18/2017       Diagnostic Results:  ECG: Reviewed  X-Ray: Reviewed  US: Reviewed  CT: Reviewed  Echo:  Reviewed      ASSESSMENT/PLAN:     Patient Active Problem List   Diagnosis    Hyperlipidemia    Hypothyroid    Degenerative arthritis of lumbar spine    Polyneuropathy    Metabolic syndrome    Vitamin D deficiency    OP (osteoporosis)    Essential hypertension    CAD (coronary artery disease), with stents     S/P CABG (coronary artery bypass graft)    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    Carotid stenosis    CVA (cerebral vascular accident)    IRIS (obstructive sleep apnea)    Double ectopic ureters    Psoriatic arthritis    Morbid obesity with BMI of 40.0-44.9, adult    Angina, class II    Primary osteoarthritis involving multiple joints    Chest pain    Statin intolerance    Abnormal nuclear cardiac imaging test    Preoperative respiratory examination    Postural dizziness with near syncope    Stage 3 chronic kidney disease    Osteopenia of multiple sites    Psoriasis    Medication monitoring encounter    Elevated brain natriuretic peptide (BNP) level    Fever      Elevated troponin likely due to demand ischemia from acute bronchitis.  Trending down now.  Mildly elevated BNP from her baseline  CAD s/p cabgx2 and redo LIMA to LAD in ,   CHFpEF compensated  Moderate to severe AS  COPD/bronchitis     Plan;  Continue ASA, Plavix, toprolXL and diovan/HCT.  She is on Repatha twice a month and not on statin.  Ok for gentle hydration now due to hypotension

## 2017-12-21 NOTE — HOSPITAL COURSE
Ms Ortiz is a 67 year old female with PMHx of s/p CAD, s/p CABG and redo, HTN and obesity. [place] [facility] of [place] pain. Associated symptoms fever, congestion and wheezing. Troponin mildy elevated and trended downward. . Lactic acid normal. Cardiology consulted, mildly elevated troponin due to COPD/bronchitis. Patient started on Prednisone, Duo nebs and Avelox. She experienced a temp last night, however no temp today. WBC continued to be normal. Electrolytes stable.  Patient was started on IVF this AM for hydration. Blood pressure on the low side, will hold valsartan/HCTZ at discharge. She will follow up in Cardiology clinic in one week. Patient was seen and examined, she is ready for discharge.

## 2017-12-21 NOTE — PROGRESS NOTES
D/C instructions were given, all questions were answered. IV access was removed with catheter intact. Heart monitor was removed. Patient was brought to family car via wheelchair, no signs of distress were noted. Patient was given prescriptions as well.

## 2017-12-21 NOTE — DISCHARGE SUMMARY
[facility]  Hospital Medicine  Discharge Summary      Patient Name: Qi Ortiz  MRN: [id number]  Admission Date: 12/18/2017  Hospital Length of Stay: 0 days  Discharge Date and Time:  12/20/2017 6:30 PM  Attending Physician: Peter Khanna MD   Discharging Provider: Tony Herring NP  Primary Care Provider: Trinidad Cleaning MD      HPI:   Qi Ortiz is a 67 y.o. female patient with presented to the Emergency Department for Chest pain.  Onset gradually earlier today pta. Patient locates pain to mid sternal, does not radiate. Patient reports she is unsure if this pain is similar to past CP episodes or when she had MI. Symptoms are intermittent and moderate in severity. No mitigating or exacerbating factors reported. Pt reports chronic SOB. Associated sxs include subjective fever, congestion, diaphoresis, and HA. Patient denies any n/v/d, urinary frequency, dysuria, abd pain, back pain, dizziness, numbness/weakness, and all other sxs at this time. No prior Tx. No further complaints or concerns at this time. Patient evaluated in the ER. WBC neg. CMP stable, Electrolytes stable. Flu neg, lactic neg. CT head for Headache complaint, NAF. Chest Xray NAF. Patient initial troponin mild elevated but during er course #2 elevated to 0.042. . Patient placed in obs to continue trend of troponin and further evaluation.     * No surgery found *      Hospital Course:   Ms Ortiz is a 67 year old female with PMHx of s/p CAD, s/p CABG and redo, HTN and obesity. She presented to [facility] with complaints of chest pain. Associated symptoms fever, congestion and wheezing. Troponin mildy elevated and trended downward. . Lactic acid normal. Cardiology consulted, mildly elevated troponin due to COPD/bronchitis. Patient started on Prednisone, Duo nebs and Avelox. She experienced a temp last night, however no temp today. WBC continued to be normal. Electrolytes stable.  Patient was started on  IVF this AM for hydration. Blood pressure on the low side, will hold valsartan/HCTZ at discharge. She will follow up in Cardiology clinic in one week. Patient was seen and examined, she is ready for discharge.      Consults:   Consults         Status Ordering Provider     Inpatient consult to Cardiology  Once     Provider:  Asif Paez MD    Completed AMIAN DIAZ     Inpatient consult to Hospitalist  Once     Provider:  Tawanna Burris MD    Acknowledged PENELOPE MARTINO          No new Assessment & Plan notes have been filed under this hospital service since the last note was generated.  Service: Hospital Medicine    Final Active Diagnoses:    Diagnosis Date Noted POA    PRINCIPAL PROBLEM:  Chest pain [R07.9] 06/21/2016 Yes    Elevated brain natriuretic peptide (BNP) level [R79.89] 12/19/2017 Yes    Fever [R50.9] 12/19/2017 Yes    Essential hypertension [I10] 07/30/2015 Yes     Chronic    CAD (coronary artery disease), with stents  [I25.10] 07/30/2015 Yes      Problems Resolved During this Admission:    Diagnosis Date Noted Date Resolved POA       Discharged Condition: stable    Disposition: Home or Self Care    Follow Up:  Follow-up Information     MICHELL Campuzano. Schedule an appointment as soon as possible for a visit in 1 week.    Specialty:  Cardiology  Why:  hospital follow up   Contact information:  [address]  [place] [address] [phone]                 Patient Instructions:     Diet general     Activity as tolerated     Call MD for:  persistent dizziness, light-headedness, or visual disturbances     Call MD for:  difficulty breathing or increased cough         Significant Diagnostic Studies: Labs:   BMP:   Recent Labs  Lab 12/18/17 2259 12/19/17  0547 12/20/17  0424    110 89    139 139   K 3.8 3.9 3.5    103 101   CO2 26 24 26   BUN 18 17 24*   CREATININE 1.1 1.2 1.5*   CALCIUM 9.3 9.2 8.7   MG 1.8  --   --    , CMP   Recent Labs  Lab 12/18/17 2259  12/19/17  0547 12/20/17  0424    139 139   K 3.8 3.9 3.5    103 101   CO2 26 24 26    110 89   BUN 18 17 24*   CREATININE 1.1 1.2 1.5*   CALCIUM 9.3 9.2 8.7   PROT 7.3  --  6.6   ALBUMIN 3.4*  --  2.9*   BILITOT 0.4  --  0.3   ALKPHOS 37*  --  29*   AST 26  --  30   ALT 18  --  19   ANIONGAP 10 12 12   ESTGFRAFRICA >60 54* 42*   EGFRNONAA 52* 47* 36*    and CBC   Recent Labs  Lab 12/18/17  2259 12/19/17  0547 12/20/17  0856   WBC 9.09 8.09 6.14   HGB 11.9* 12.0 12.1   HCT 36.8* 37.6 38.4    217 222       Pending Diagnostic Studies:     None         Medications:  Reconciled Home Medications:   Current Discharge Medication List      START taking these medications    Details   moxifloxacin (AVELOX) 400 mg tablet Take 1 tablet (400 mg total) by mouth once daily.  Qty: 4 tablet, Refills: 0      predniSONE (DELTASONE) 20 MG tablet Take 2 tablets (40 mg total) by mouth once daily.  Qty: 10 tablet, Refills: 0         CONTINUE these medications which have NOT CHANGED    Details   aspirin 81 MG Chew Take 81 mg by mouth once daily.      chlorhexidine (HIBICLENS) 4 % external liquid Apply topically daily as needed.  Qty: 120 mL, Refills: 0    Associated Diagnoses: Abscess; Cellulitis and abscess      clopidogrel (PLAVIX) 75 mg tablet Take 1 tablet (75 mg total) by mouth once daily.  Qty: 90 tablet, Refills: 3      cyanocobalamin 2000 MCG tablet Take 2,000 mcg by mouth once daily.      evolocumab (REPATHA SURECLICK) 140 mg/mL PnIj Inject 140 mg into the skin every 14 (fourteen) days.  Qty: 6 Syringe, Refills: 3      folic acid (FOLVITE) 1 MG tablet Take 1 tablet (1 mg total) by mouth once daily.  Qty: 90 tablet, Refills: 1    Associated Diagnoses: Folic acid deficiency      furosemide (LASIX) 20 MG tablet Take 0.5 tablets (10 mg total) by mouth 2 (two) times daily.  Qty: 90 tablet, Refills: 3    Associated Diagnoses: Essential hypertension; Coronary artery disease, occlusive      gabapentin (NEURONTIN)  100 MG capsule Take 2 capsules (200 mg total) by mouth 3 (three) times daily as needed.  Qty: 540 capsule, Refills: 3    Associated Diagnoses: Neuropathy      garlic 2,000 mg Cap Take 3,000 mg by mouth once daily.       levothyroxine (SYNTHROID) 112 MCG tablet Take 1 tablet (112 mcg total) by mouth once daily.  Qty: 90 tablet, Refills: 1    Associated Diagnoses: Hypothyroidism, unspecified type      metoprolol succinate (TOPROL-XL) 100 MG 24 hr tablet 1 tab (100mg) in morning. Half tab (50mg) at bedtime.  Generic ok  Qty: 135 tablet, Refills: 3    Associated Diagnoses: Essential hypertension      nitroGLYCERIN (NITROSTAT) 0.4 MG SL tablet Place 1 tablet (0.4 mg total) under the tongue every 5 (five) minutes as needed for Chest pain.  Qty: 90 tablet, Refills: 3    Associated Diagnoses: Precordial pain      pyridoxine (VITAMIN B-6) 100 MG Tab Take 200 mg by mouth once daily.       vitamin D 1000 units Tab Take 185 mg by mouth once daily.         STOP taking these medications       valsartan-hydrochlorothiazide (DIOVAN-HCT) 160-12.5 mg per tablet Comments:   Reason for Stopping:               Indwelling Lines/Drains at time of discharge:   Lines/Drains/Airways          No matching active lines, drains, or airways          Time spent on the discharge of patient: 45 minutes  Patient was seen and examined on the date of discharge and determined to be suitable for discharge.         Tony Herring NP  Department of Hospital Medicine  [facility]

## 2017-12-26 ENCOUNTER — OFFICE VISIT (OUTPATIENT)
Dept: INTERNAL MEDICINE | Facility: CLINIC | Age: 67
End: 2017-12-26
Payer: MEDICARE

## 2017-12-26 ENCOUNTER — OFFICE VISIT (OUTPATIENT)
Dept: CARDIOLOGY | Facility: CLINIC | Age: 67
End: 2017-12-26
Payer: MEDICARE

## 2017-12-26 VITALS
DIASTOLIC BLOOD PRESSURE: 80 MMHG | WEIGHT: 253.5 LBS | HEIGHT: 63 IN | BODY MASS INDEX: 44.92 KG/M2 | SYSTOLIC BLOOD PRESSURE: 120 MMHG | HEART RATE: 72 BPM

## 2017-12-26 DIAGNOSIS — J44.1 ACUTE EXACERBATION OF CHRONIC OBSTRUCTIVE PULMONARY DISEASE (COPD): Primary | ICD-10-CM

## 2017-12-26 DIAGNOSIS — I10 ESSENTIAL HYPERTENSION: Chronic | ICD-10-CM

## 2017-12-26 DIAGNOSIS — I65.29 STENOSIS OF CAROTID ARTERY, UNSPECIFIED LATERALITY: ICD-10-CM

## 2017-12-26 DIAGNOSIS — R07.1 CHEST PAIN ON BREATHING: ICD-10-CM

## 2017-12-26 DIAGNOSIS — I25.708 CORONARY ARTERY DISEASE INVOLVING CORONARY BYPASS GRAFT OF NATIVE HEART WITH OTHER FORMS OF ANGINA PECTORIS: Primary | ICD-10-CM

## 2017-12-26 DIAGNOSIS — I20.9 ANGINA, CLASS II: Chronic | ICD-10-CM

## 2017-12-26 DIAGNOSIS — E78.00 PURE HYPERCHOLESTEROLEMIA: Chronic | ICD-10-CM

## 2017-12-26 DIAGNOSIS — Z95.1 S/P CABG (CORONARY ARTERY BYPASS GRAFT): Chronic | ICD-10-CM

## 2017-12-26 PROCEDURE — 99215 OFFICE O/P EST HI 40 MIN: CPT | Mod: S$PBB,,, | Performed by: FAMILY MEDICINE

## 2017-12-26 PROCEDURE — 99214 OFFICE O/P EST MOD 30 MIN: CPT | Mod: S$PBB,,, | Performed by: INTERNAL MEDICINE

## 2017-12-26 PROCEDURE — 99999 PR PBB SHADOW E&M-EST. PATIENT-LVL IV: CPT | Mod: PBBFAC,,, | Performed by: INTERNAL MEDICINE

## 2017-12-26 PROCEDURE — 99999 PR PBB SHADOW E&M-EST. PATIENT-LVL IV: CPT | Mod: PBBFAC,,, | Performed by: FAMILY MEDICINE

## 2017-12-26 PROCEDURE — 99214 OFFICE O/P EST MOD 30 MIN: CPT | Mod: PBBFAC,PO | Performed by: INTERNAL MEDICINE

## 2017-12-26 PROCEDURE — 99214 OFFICE O/P EST MOD 30 MIN: CPT | Mod: PBBFAC,27,PO | Performed by: FAMILY MEDICINE

## 2017-12-26 NOTE — LETTER
PHYSICIAN ORDERS    PATIENT:   Qi Ortiz,  1950; MRN [id number]  YOB: 1950   DATE OF VISIT:  17    ORDERS  1. Referral to emergency department    SUPPORTING DIAGNOSES    ICD-10-CM ICD-9-CM   1. Acute exacerbation of chronic obstructive pulmonary disease (COPD) J44.1 491.21     BRIEF HISTORY: Recent 3 day hospitalization for acute exacerbation of COPD, discharged home on Avelox and prednisone, but she has not taken them. She says she did not take the Avelox due to fear of side effects. She says that she did not take the prednisone because she cannot take corticosteroids by mouth. (?) She has increasing dyspnea at rest with labored breathing.       JOSEPH Lockwood MD, NPI: [id number]  ------------------------------------------------------------------------------------------------  NOTHING BELOW THIS LINE

## 2017-12-26 NOTE — PROGRESS NOTES
Subjective:   Patient ID:  Qi Ortiz is a 67 y.o. female who presents for cardiac consult of Hospital Follow Up      HPI  The patient came in today for cardiac consult of Hospital Follow Up      67 year old female pt with PMHx CAD s/p CABG, HTN, HLD, carotid stenosis, depression, CKD presents for follow up visit. She presented to [facility] [place] [place]- with complaints of chest pain. Associated symptoms fever, congestion and wheezing. Troponin mildy elevated and trended downward. . Lactic acid normal. Cardiology consulted, mildly elevated troponin due to COPD/bronchitis. Patient started on Prednisone, Duo nebs and Avelox.    Today pt explains that she cannot take prednisone orally due to an interaction with her stomach so she has not taken it. She also has not taken Avelox due to a list of adverse effects or black box warnings on it and wants to get penicillin instead. She continues to have a lot of wheezing, cough, rhonchi. No chest pain. She is to see her PCP later and will decide what medications she wants to take for her bronchitis.       Past Medical History:   Diagnosis Date    Carotid stenosis     19%    COPD (chronic obstructive pulmonary disease)     No meds    CVA (cerebral vascular accident)     Dr. 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 Hope    Psoriatic arthritis     Rheumatology       Past Surgical History:   Procedure Laterality Date    BREAST BIOPSY      R sided/benign    CARDIAC SURGERY      sept 28 2016    CERVICAL FUSION      CHOLECYSTECTOMY      CORONARY ARTERY BYPASS GRAFT      triple bypass    CORONARY STENT PLACEMENT      EYE SURGERY      INTRAUTERINE DEVICE INSERTION      mass removed from R groin      TOTAL ABDOMINAL HYSTERECTOMY W/ BILATERAL SALPINGOOPHORECTOMY      due to benign mass, adhesions    TUBAL LIGATION         Social History   Substance Use  Topics    Smoking status: Never Smoker    Smokeless tobacco: Never Used    Alcohol use No       Family History   Problem Relation Age of Onset    Breast cancer Maternal Grandfather     Breast cancer Paternal Aunt     Stroke      Breast cancer Sister 60    Leukemia Sister 8      as child    Lung cancer Paternal Grandfather     Heart disease         Patient's Medications   New Prescriptions    No medications on file   Previous Medications    ASPIRIN 81 MG CHEW    Take 81 mg by mouth once daily.    CHLORHEXIDINE (HIBICLENS) 4 % EXTERNAL LIQUID    Apply topically daily as needed.    CLOPIDOGREL (PLAVIX) 75 MG TABLET    Take 1 tablet (75 mg total) by mouth once daily.    CYANOCOBALAMIN 2000 MCG TABLET    Take 2,000 mcg by mouth once daily.    EVOLOCUMAB (REPATHA SURECLICK) 140 MG/ML PNIJ    Inject 140 mg into the skin every 14 (fourteen) days.    FOLIC ACID (FOLVITE) 1 MG TABLET    Take 1 tablet (1 mg total) by mouth once daily.    FUROSEMIDE (LASIX) 20 MG TABLET    Take 0.5 tablets (10 mg total) by mouth 2 (two) times daily.    GABAPENTIN (NEURONTIN) 100 MG CAPSULE    Take 2 capsules (200 mg total) by mouth 3 (three) times daily as needed.    GARLIC 2,000 MG CAP    Take 3,000 mg by mouth once daily.     LEVOTHYROXINE (SYNTHROID) 112 MCG TABLET    Take 1 tablet (112 mcg total) by mouth once daily.    METOPROLOL SUCCINATE (TOPROL-XL) 100 MG 24 HR TABLET    1 tab (100mg) in morning. Half tab (50mg) at bedtime.  Generic ok    MOXIFLOXACIN (AVELOX) 400 MG TABLET    Take 1 tablet (400 mg total) by mouth once daily.    NITROGLYCERIN (NITROSTAT) 0.4 MG SL TABLET    Place 1 tablet (0.4 mg total) under the tongue every 5 (five) minutes as needed for Chest pain.    PREDNISONE (DELTASONE) 20 MG TABLET    Take 2 tablets (40 mg total) by mouth once daily.    PYRIDOXINE (VITAMIN B-6) 100 MG TAB    Take 200 mg by mouth once daily.     VITAMIN D 1000 UNITS TAB    Take 185 mg by mouth once daily.   Modified Medications     "No medications on file   Discontinued Medications    No medications on file       Review of Systems   Constitutional: Negative.  Negative for malaise/fatigue.   HENT: Negative.    Eyes: Negative.    Respiratory: Positive for cough, sputum production, shortness of breath and wheezing.    Cardiovascular: Negative.  Negative for chest pain and palpitations.   Gastrointestinal: Positive for heartburn.   Genitourinary: Negative.    Musculoskeletal: Negative.    Skin: Negative.    Neurological: Negative.    Endo/Heme/Allergies: Negative.    Psychiatric/Behavioral: The patient is nervous/anxious.    All 12 systems otherwise negative.      Wt Readings from Last 3 Encounters:   12/26/17 115 kg (253 lb 8.5 oz)   12/18/17 115.3 kg (254 lb 3.1 oz)   12/12/17 115 kg (253 lb 8.5 oz)     Temp Readings from Last 3 Encounters:   12/20/17 98.4 °F (36.9 °C) (Oral)   12/07/17 98.1 °F (36.7 °C) (Temporal)   11/14/17 98.1 °F (36.7 °C) (Temporal)     BP Readings from Last 3 Encounters:   12/26/17 120/80   12/20/17 (!) 125/58   12/12/17 (!) 128/56     Pulse Readings from Last 3 Encounters:   12/26/17 72   12/20/17 90   12/12/17 (!) 55       /80 (BP Location: Right arm, Patient Position: Sitting, BP Method: Large (Manual))   Pulse 72   Ht 5' 3" (1.6 m)   Wt 115 kg (253 lb 8.5 oz)   BMI 44.91 kg/m²     Objective:   Physical Exam   Constitutional: She is oriented to person, place, and time. She appears well-developed and well-nourished. No distress.   obese   HENT:   Head: Normocephalic and atraumatic.   Nose: Nose normal.   Mouth/Throat: Oropharynx is clear and moist.   Eyes: Conjunctivae and EOM are normal. No scleral icterus.   Neck: Normal range of motion. Neck supple. No JVD present. No thyromegaly present.   Cardiovascular: Normal rate, regular rhythm, S1 normal and S2 normal.  Exam reveals no gallop, no S3, no S4 and no friction rub.    Murmur heard.   Harsh midsystolic murmur is present with a grade of 3/6  at the upper " right sternal border radiating to the neck  Pulmonary/Chest: No stridor. She is in respiratory distress. She has wheezes. She has no rales. She exhibits no tenderness.   Abdominal: Soft. Bowel sounds are normal. She exhibits no distension and no mass. There is no tenderness. There is no rebound.   Genitourinary:   Genitourinary Comments: Deferred   Musculoskeletal: Normal range of motion. She exhibits no edema, tenderness or deformity.   Lymphadenopathy:     She has no cervical adenopathy.   Neurological: She is alert and oriented to person, place, and time. She exhibits normal muscle tone. Coordination normal.   Skin: Skin is warm and dry. No rash noted. She is not diaphoretic. No erythema. No pallor.   Psychiatric: She has a normal mood and affect. Her behavior is normal. Judgment and thought content normal.   Nursing note and vitals reviewed.      Lab Results   Component Value Date     12/20/2017    K 3.5 12/20/2017     12/20/2017    CO2 26 12/20/2017    BUN 24 (H) 12/20/2017    CREATININE 1.5 (H) 12/20/2017    GLU 89 12/20/2017    HGBA1C 5.9 (H) 12/19/2017    MG 1.8 12/18/2017    AST 30 12/20/2017    ALT 19 12/20/2017    ALBUMIN 2.9 (L) 12/20/2017    PROT 6.6 12/20/2017    BILITOT 0.3 12/20/2017    WBC 6.14 12/20/2017    HGB 12.1 12/20/2017    HCT 38.4 12/20/2017    MCV 80 (L) 12/20/2017     12/20/2017    INR 1.0 12/18/2017    TSH 0.785 12/19/2017    CHOL 123 12/18/2017    HDL 59 12/18/2017    LDLCALC 50.8 (L) 12/18/2017    TRIG 66 12/18/2017     (H) 12/18/2017     Assessment:      1. Coronary artery disease involving coronary bypass graft of native heart with other forms of angina pectoris    2. Angina, class II    3. S/P CABG (coronary artery bypass graft)    4. Essential hypertension    5. Pure hypercholesterolemia    6. Stenosis of carotid artery, unspecified laterality    7. Chest pain on breathing        Plan:   1. Acute Bronchitis  - rec steroids and antibiotics but pt refused  treatment  - needs to discuss with PCP or Pulm    2. Moderate to Severe AS  - cont to monitor    3. CAD s/p CABG  - cont meds  - CV status stable    4. HTN  - cont meds  - off diovan now, PRN lasix    5. HLD  - cont Repatha    6. IRIS  - needs CPAP, but could not tolerate mask    Thank you for allowing me to participate in this patient's care. Please do not hesitate to contact me with any questions or concerns. Consult note has been forwarded to the referral physician.

## 2017-12-27 ENCOUNTER — TELEPHONE (OUTPATIENT)
Dept: INTERNAL MEDICINE | Facility: CLINIC | Age: 67
End: 2017-12-27

## 2017-12-27 ENCOUNTER — TELEPHONE (OUTPATIENT)
Dept: CARDIOLOGY | Facility: CLINIC | Age: 67
End: 2017-12-27

## 2017-12-27 VITALS
HEART RATE: 74 BPM | RESPIRATION RATE: 22 BRPM | HEIGHT: 63 IN | OXYGEN SATURATION: 94 % | BODY MASS INDEX: 44.92 KG/M2 | DIASTOLIC BLOOD PRESSURE: 88 MMHG | WEIGHT: 253.5 LBS | TEMPERATURE: 99 F | SYSTOLIC BLOOD PRESSURE: 126 MMHG

## 2017-12-27 NOTE — TELEPHONE ENCOUNTER
----- Message from Shonna Cardozo sent at 12/27/2017 11:25 AM CST -----  Contact: Self- [phone]   Pt would like to consult with the nurse.  Please call back at [phone]. x-

## 2017-12-27 NOTE — TELEPHONE ENCOUNTER
Contacted patient to inform her that after speaking with  he stated that  she would need to go to the emergency room department. Patient states that she needs a direct admit to the hospital and cannot take steroids by mouth. Patient informed that this message will be sent to . She verbalized an understanding.

## 2017-12-27 NOTE — TELEPHONE ENCOUNTER
Patient called Dr. Lockwood to get clarification on why she was not being admitted to the hospital and had to sit in Emergency room department. Patient explained to that per Dr. Lockwood notes she was to go to the emergency room for further evaluation. Patient denied that that information was ever stated to her.Patient is requesting a direct admit. Patient informed that this information will be sent to . Patient ended call abruptly after.

## 2017-12-27 NOTE — PROGRESS NOTES
CHIEF COMPLAINT  Cough; Shortness of Breath; Nasal Congestion; and Hospital Follow Up      HISTORY OF PRESENT ILLNESS  PROBLEM/CONDITION: Primary complaint is cough. ONSET was over the last couple of weeks. QUALITY described as a congested cough productive of clear yellow sputum. SEVERITY of symptoms described as SEVERE. ALLEVIATING FACTORS include albuterol and IV corticosteroids. Symptoms occur in the CONTEXT of underlying chronic obstructive pulmonary disease. NARRATIVE: She reports that she was recently hospitalized for 3 days due to acute exacerbation of COPD with lower respiratory infection. She was discharged home on Avelox and prednisone. She filled the prescriptions, although she says that she has taken none of them, explaining that she was fearful of side effects from Avelox (even though she has never had adverse effect to quinolone previously), and she was unwilling to take the prednisone, because she feels that she is intolerant to oral corticosteroids, and can only safely receive corticosteroids IV. Having not taken the treatments prescribed, she has deteriorated and now has dyspnea on minimal exertion and even dyspnea at rest. On exam, she is noted to be MILDLY tachypneic, and her initial oxygen saturation was 91% on room air. This was after minimal physical exertion. When given the opportunity to rest, her oxygen saturation gerald to the mid-90s. She is unwilling to accept a prescription for oral corticosteroids from me, and she is unwilling to accept a perception for antibiotics unless I can guarantee her that she will not have side effects. I had lengthy discussion with her and her daughter, who accompanied her today, and I explained the seriousness of the situation and explained that, given the restrictions she is imposing upon my treatment options, it is medically necessary that she go to the emergency department for evaluation and treatment. She has chosen to go to [facility] emergency  "department. I provided her with 3 copies of her referral documents to take to the emergency department, and my medical assistant called report to the receiving emergency department. Her daughter agreed to take her to the emergency department without detour or delay. She left this office in stable condition.    No other complaints or concerns reported.    Problem List Items Addressed This Visit     None      Visit Diagnoses     Acute exacerbation of chronic obstructive pulmonary disease (COPD)    -  Primary          REVIEW OF SYSTEMS  CONSTITUTIONAL: No fever or chills reported.   GASTROINTESTINAL: No hematemesis or melena reported.   GENITOURINARY: No dysuria or hematuria reported.     PHYSICAL EXAM  Vitals:    12/26/17 1345   BP: 126/88   BP Location: Right arm   Patient Position: Sitting   BP Method: Medium (Manual)   Pulse: 74   Resp: (!) 22   Temp: 98.8 °F (37.1 °C)   TempSrc: Tympanic   SpO2: (!) 94%   Weight: 115 kg (253 lb 8.5 oz)   Height: 5' 3" (1.6 m)     CONSTITUTIONAL: Vital signs noted. No apparent distress. Does not appear acutely ill or septic. Appears adequately hydrated.  EYES: Pupils equal and reactive. Extraocular movements intact. Sclerae anicteric. Lids and conjunctiva unremarkable.  ENT: External ENT grossly unremarkable. Oropharynx moist.  PULM: Diffuse scattered rhonchi andl wheezes. Fair air movement. Breathing is mildly labored.  HEART: Auscultation reveals regular rate and rhythm without murmur, gallop or rub.  GI: Abdomen soft and nontender. Bowel sounds present.  DERM: Skin warm and moist with normal turgor.  NEURO: Strength is reasonably symmetric without gross focal motor deficits or gross deficits of cranial nerves III-XII.  PSYCH: Alert and oriented x 3. Mood is grossly euthymic. Affect appropriate. Judgment and insight not grossly compromised.  MSK: Grossly normal stance and gait.     PAST MEDICAL HISTORY, FAMILY HISTORY, SOCIAL HISTORY, CURRENT MEDICATION LIST, and " ALLERGY LIST reviewed by me (JOSEPH Lockwood MD) and are updated consistent with the patient's report.    ASSESSMENT and PLAN  Acute exacerbation of chronic obstructive pulmonary disease (COPD)        PRESCRIPTION MEDICATION MANAGEMENT  Medication List with Changes/Refills   Current Medications    ASPIRIN 81 MG CHEW    Take 81 mg by mouth once daily.    CHLORHEXIDINE (HIBICLENS) 4 % EXTERNAL LIQUID    Apply topically daily as needed.    CLOPIDOGREL (PLAVIX) 75 MG TABLET    Take 1 tablet (75 mg total) by mouth once daily.    CYANOCOBALAMIN 2000 MCG TABLET    Take 2,000 mcg by mouth once daily.    EVOLOCUMAB (REPATHA SURECLICK) 140 MG/ML PNIJ    Inject 140 mg into the skin every 14 (fourteen) days.    FOLIC ACID (FOLVITE) 1 MG TABLET    Take 1 tablet (1 mg total) by mouth once daily.    FUROSEMIDE (LASIX) 20 MG TABLET    Take 0.5 tablets (10 mg total) by mouth 2 (two) times daily.    GABAPENTIN (NEURONTIN) 100 MG CAPSULE    Take 2 capsules (200 mg total) by mouth 3 (three) times daily as needed.    GARLIC 2,000 MG CAP    Take 3,000 mg by mouth once daily.     LEVOTHYROXINE (SYNTHROID) 112 MCG TABLET    Take 1 tablet (112 mcg total) by mouth once daily.    METOPROLOL SUCCINATE (TOPROL-XL) 100 MG 24 HR TABLET    1 tab (100mg) in morning. Half tab (50mg) at bedtime.  Generic ok    NITROGLYCERIN (NITROSTAT) 0.4 MG SL TABLET    Place 1 tablet (0.4 mg total) under the tongue every 5 (five) minutes as needed for Chest pain.    PYRIDOXINE (VITAMIN B-6) 100 MG TAB    Take 200 mg by mouth once daily.     VITAMIN D 1000 UNITS TAB    Take 185 mg by mouth once daily.   Discontinued Medications    MOXIFLOXACIN (AVELOX) 400 MG TABLET    Take 1 tablet (400 mg total) by mouth once daily.    PREDNISONE (DELTASONE) 20 MG TABLET    Take 2 tablets (40 mg total) by mouth once daily.       Return if symptoms worsen or fail to improve.    TOTAL TIME evaluating and managing this patient for this encounter exceeded 40 minutes, the majority  "spent counseling and coordinating care for the listed diagnoses.     ABOUT THIS DOCUMENTATION:  · The order of the conditions listed in the HPI is one of convenience and does not necessarily reflect the chronology of the appointment, nor the relative importance of a condition. It is possible that additional description or status details about condition(s) may be found elsewhere in the EHR documentation for today's encounter.  · Documentation entered by me for this encounter was done in part using speech-recognition technology. Although I have made an effort to ensure accuracy, "sound like" errors may exist and should be interpreted in context.                        -JOSEPH Lockwood MD    There are no Patient Instructions on file for this visit.    "

## 2017-12-27 NOTE — TELEPHONE ENCOUNTER
----- Message from Brian Bourgeois sent at 12/27/2017 11:12 AM CST -----  Contact: Pt  Please give pt a call at ..[phone] (home) regarding her ER visit.

## 2017-12-28 DIAGNOSIS — J44.1 COPD WITH ACUTE EXACERBATION: Primary | ICD-10-CM

## 2017-12-28 RX ORDER — BENZONATATE 200 MG/1
200 CAPSULE ORAL 3 TIMES DAILY PRN
Qty: 30 CAPSULE | Refills: 0 | Status: SHIPPED | OUTPATIENT
Start: 2017-12-28 | End: 2018-01-07

## 2017-12-28 RX ORDER — ALBUTEROL SULFATE 0.83 MG/ML
2.5 SOLUTION RESPIRATORY (INHALATION) EVERY 6 HOURS PRN
Qty: 50 EACH | Refills: 0 | Status: SHIPPED | OUTPATIENT
Start: 2017-12-28 | End: 2019-10-17 | Stop reason: SDUPTHER

## 2017-12-28 RX ORDER — AMOXICILLIN AND CLAVULANATE POTASSIUM 875; 125 MG/1; MG/1
1 TABLET, FILM COATED ORAL EVERY 12 HOURS
Qty: 20 TABLET | Refills: 0 | Status: SHIPPED | OUTPATIENT
Start: 2017-12-28 | End: 2018-01-08

## 2017-12-28 NOTE — PROGRESS NOTES
I received message from nurse that patient left emergency department against medical advice, and she reports that her clinical situation is unchanged from when I saw her at her last appointment. She refuses to go back to the emergency department, and she is unwilling to be seen in office for further evaluation. She refuses treatment with oral corticosteroids. She says that she has done well on Augmentin previously, and she refuses antibiotics other than Augmentin. Given her unwillingness to obtain recommended treatment, I agreed to accommodate her request in an attempt to mitigate the circumstances, emphasizing that she should promptly go to nearest emergency department if her condition worsens or fails to improve as expected.

## 2018-01-08 ENCOUNTER — LAB VISIT (OUTPATIENT)
Dept: LAB | Facility: HOSPITAL | Age: 68
End: 2018-01-08
Attending: FAMILY MEDICINE
Payer: MEDICARE

## 2018-01-08 ENCOUNTER — OFFICE VISIT (OUTPATIENT)
Dept: FAMILY MEDICINE | Facility: CLINIC | Age: 68
End: 2018-01-08
Payer: MEDICARE

## 2018-01-08 VITALS
HEIGHT: 63 IN | OXYGEN SATURATION: 98 % | TEMPERATURE: 98 F | SYSTOLIC BLOOD PRESSURE: 138 MMHG | DIASTOLIC BLOOD PRESSURE: 80 MMHG | RESPIRATION RATE: 16 BRPM | HEART RATE: 81 BPM | WEIGHT: 249.25 LBS | BODY MASS INDEX: 44.16 KG/M2

## 2018-01-08 DIAGNOSIS — E66.01 MORBID OBESITY WITH BMI OF 40.0-44.9, ADULT: ICD-10-CM

## 2018-01-08 DIAGNOSIS — J44.9 CHRONIC OBSTRUCTIVE PULMONARY DISEASE, UNSPECIFIED COPD TYPE: ICD-10-CM

## 2018-01-08 DIAGNOSIS — I20.9 ANGINA, CLASS II: ICD-10-CM

## 2018-01-08 DIAGNOSIS — I10 ESSENTIAL HYPERTENSION: ICD-10-CM

## 2018-01-08 DIAGNOSIS — J44.1 COPD WITH ACUTE EXACERBATION: Primary | ICD-10-CM

## 2018-01-08 DIAGNOSIS — Z86.73 HISTORY OF CVA (CEREBROVASCULAR ACCIDENT): ICD-10-CM

## 2018-01-08 DIAGNOSIS — R79.89 ELEVATED SERUM CREATININE: ICD-10-CM

## 2018-01-08 DIAGNOSIS — L40.50 PSORIATIC ARTHRITIS: ICD-10-CM

## 2018-01-08 DIAGNOSIS — J20.9 ACUTE BRONCHITIS, UNSPECIFIED ORGANISM: ICD-10-CM

## 2018-01-08 LAB
ANION GAP SERPL CALC-SCNC: 6 MMOL/L
BUN SERPL-MCNC: 16 MG/DL
CALCIUM SERPL-MCNC: 9.6 MG/DL
CHLORIDE SERPL-SCNC: 104 MMOL/L
CO2 SERPL-SCNC: 31 MMOL/L
CREAT SERPL-MCNC: 1.1 MG/DL
EST. GFR  (AFRICAN AMERICAN): >60 ML/MIN/1.73 M^2
EST. GFR  (NON AFRICAN AMERICAN): 52.4 ML/MIN/1.73 M^2
GLUCOSE SERPL-MCNC: 93 MG/DL
POTASSIUM SERPL-SCNC: 4.6 MMOL/L
SODIUM SERPL-SCNC: 141 MMOL/L

## 2018-01-08 PROCEDURE — 36415 COLL VENOUS BLD VENIPUNCTURE: CPT | Mod: PO

## 2018-01-08 PROCEDURE — 99213 OFFICE O/P EST LOW 20 MIN: CPT | Mod: PBBFAC,PO | Performed by: FAMILY MEDICINE

## 2018-01-08 PROCEDURE — 99214 OFFICE O/P EST MOD 30 MIN: CPT | Mod: S$PBB,,, | Performed by: FAMILY MEDICINE

## 2018-01-08 PROCEDURE — 99999 PR PBB SHADOW E&M-EST. PATIENT-LVL III: CPT | Mod: PBBFAC,,, | Performed by: FAMILY MEDICINE

## 2018-01-08 PROCEDURE — 80048 BASIC METABOLIC PNL TOTAL CA: CPT

## 2018-01-08 RX ORDER — CHLORHEXIDINE GLUCONATE 40 MG/ML
SOLUTION TOPICAL
COMMUNITY
End: 2018-03-13

## 2018-01-08 RX ORDER — MUPIROCIN 20 MG/G
OINTMENT TOPICAL
COMMUNITY
End: 2018-01-08

## 2018-01-08 RX ORDER — VALSARTAN AND HYDROCHLOROTHIAZIDE 160; 12.5 MG/1; MG/1
1 TABLET, FILM COATED ORAL DAILY
COMMUNITY
End: 2018-03-15 | Stop reason: SDUPTHER

## 2018-01-08 NOTE — PROGRESS NOTES
"Subjective:       Patient ID: Qi Ortiz is a 67 y.o. female.    Chief Complaint: Hospital Follow Up    HPI   Hospital Follow Up  68yo female presents today for follow-up after recent hospitalization at [facility] 12/18/17 and subsequent ER evaluation while in [place] on 12/22/17 for COPD exacerbation. She notes that she had been discharged with Avelox and Prednisone. She elected not to take the medications based on concern for side effects of Avelox and did not take Prednisone due to previous intolerance to oral steroids (GI side effects). She notes that she was gien IV steroids in [facility] and supplemental oxygen as well as nebulized treatments and was determined to be stable enough for discharge home. She was given an RX for Augmentin per Dr. Lockwood and finished her last dose yesterday. She had been taken off Diovan-HCT by [facility] but has resumed use. BP has been stable. She is feeling back to baseline currently.     Review of Systems   Constitutional: Negative for appetite change, chills and fever.   HENT: Negative for congestion, ear pain and sinus pressure.    Eyes: Negative for redness and itching.   Respiratory: Positive for cough and shortness of breath. Negative for chest tightness and wheezing.    Cardiovascular: Negative for chest pain and palpitations.   Gastrointestinal: Negative for abdominal pain, diarrhea and nausea.   Genitourinary: Negative for decreased urine volume, difficulty urinating and dysuria.   Musculoskeletal: Positive for arthralgias. Negative for myalgias.   Skin: Negative for rash.   Neurological: Negative for weakness and light-headedness.   Psychiatric/Behavioral: Negative for dysphoric mood and sleep disturbance.       Objective:   /80   Pulse 81   Temp 98.3 °F (36.8 °C) (Temporal)   Resp 16   Ht 5' 3" (1.6 m)   Wt 113 kg (249 lb 3.7 oz)   SpO2 98%   BMI 44.15 kg/m²   Physical Exam   Constitutional: She is oriented to person, place, and time. She appears " well-developed. No distress.   Morbidly obese, non-toxic   HENT:   Head: Normocephalic and atraumatic.   Right Ear: External ear normal.   Left Ear: External ear normal.   Nose: Nose normal.   Mouth/Throat: Oropharynx is clear and moist.   Eyes: Conjunctivae and EOM are normal.       Neck: Normal range of motion. Neck supple.   Cardiovascular: Normal rate and regular rhythm.    Murmur heard.  Pulmonary/Chest: Effort normal and breath sounds normal. No respiratory distress. She has no wheezes.   Abdominal: Soft. Bowel sounds are normal.   Musculoskeletal: She exhibits no edema.   Neurological: She is alert and oriented to person, place, and time.   Skin: Skin is warm and dry. No rash noted. She is not diaphoretic.   Psychiatric: She has a normal mood and affect. Her behavior is normal.       Assessment:       1. COPD with acute exacerbation    2. Chronic obstructive pulmonary disease, unspecified COPD type    3. Acute bronchitis, unspecified organism    4. Essential hypertension    5. Elevated serum creatinine    6. Angina, class II    7. Psoriatic arthritis    8. History of CVA (cerebrovascular accident)    9. Morbid obesity with BMI of 40.0-44.9, adult        Plan:      COPD with acute exacerbation  Improving with return to baseline status. We had a lengthy discussion on the role of oral steroids during exacerbation. She has acknowledged understanding but notes intent in the future to receive these medications IV. Recommend compliance with current RX measures at baseline. Discussed w/s and reasons to seek reassessment should symptoms evolve.    Chronic obstructive pulmonary disease, unspecified COPD type  As above.    Acute bronchitis, unspecified organism  As above.    Essential hypertension  Stable. Will continue with current treatment. Given that she has resumed Diovan-HCT an updated BMP will be obtained today.  -     Basic metabolic panel; Future; Expected date: 01/08/2018    Elevated serum creatinine  As  above.  -     Basic metabolic panel; Future; Expected date: 01/08/2018    Angina, class II  Stable. No active CP. Continue with current treatment and follow-up recommendations as per Cardiology. Recent chart notes per Dr. Mcfadden were reviewed with the patient in office.    Psoriatic arthritis  Continue as per Rheumatology.    History of CVA (cerebrovascular accident)  Continue with measures for BP and lipid control.    Morbid obesity with BMI of 40.0-44.9, adult  Weight loss efforts remain encouraged.    Will request records from ER at [place].  RTC prn for the above. Otherwise for chronic care as scheduled.

## 2018-01-16 NOTE — TELEPHONE ENCOUNTER
----- Message from Oralia Barragan sent at 1/16/2018 10:34 AM CST -----  Contact: Patient  Patient checking on status of a refill on [facility], please call her back at [phone]. Thank you

## 2018-01-19 ENCOUNTER — LAB VISIT (OUTPATIENT)
Dept: LAB | Facility: HOSPITAL | Age: 68
End: 2018-01-19
Attending: INTERNAL MEDICINE
Payer: MEDICARE

## 2018-01-19 ENCOUNTER — OFFICE VISIT (OUTPATIENT)
Dept: HEMATOLOGY/ONCOLOGY | Facility: CLINIC | Age: 68
End: 2018-01-19
Payer: MEDICARE

## 2018-01-19 VITALS
HEIGHT: 64 IN | HEART RATE: 76 BPM | RESPIRATION RATE: 18 BRPM | OXYGEN SATURATION: 98 % | DIASTOLIC BLOOD PRESSURE: 73 MMHG | SYSTOLIC BLOOD PRESSURE: 150 MMHG | BODY MASS INDEX: 42.76 KG/M2 | WEIGHT: 250.44 LBS

## 2018-01-19 DIAGNOSIS — E53.8 B12 DEFICIENCY: ICD-10-CM

## 2018-01-19 DIAGNOSIS — D50.0 IRON DEFICIENCY ANEMIA DUE TO CHRONIC BLOOD LOSS: Primary | ICD-10-CM

## 2018-01-19 DIAGNOSIS — D50.0 IRON DEFICIENCY ANEMIA DUE TO CHRONIC BLOOD LOSS: ICD-10-CM

## 2018-01-19 LAB
BASOPHILS # BLD AUTO: 0.03 K/UL
BASOPHILS NFR BLD: 0.4 %
DIFFERENTIAL METHOD: ABNORMAL
EOSINOPHIL # BLD AUTO: 0.2 K/UL
EOSINOPHIL NFR BLD: 2.7 %
ERYTHROCYTE [DISTWIDTH] IN BLOOD BY AUTOMATED COUNT: 16.4 %
HAPTOGLOB SERPL-MCNC: 175 MG/DL
HCT VFR BLD AUTO: 39.2 %
HGB BLD-MCNC: 12.4 G/DL
IRON SERPL-MCNC: 49 UG/DL
LDH SERPL L TO P-CCNC: 152 U/L
LYMPHOCYTES # BLD AUTO: 2.4 K/UL
LYMPHOCYTES NFR BLD: 34.8 %
MCH RBC QN AUTO: 25.5 PG
MCHC RBC AUTO-ENTMCNC: 31.6 G/DL
MCV RBC AUTO: 81 FL
MONOCYTES # BLD AUTO: 0.5 K/UL
MONOCYTES NFR BLD: 7.7 %
NEUTROPHILS # BLD AUTO: 3.8 K/UL
NEUTROPHILS NFR BLD: 54.4 %
PLATELET # BLD AUTO: 262 K/UL
PMV BLD AUTO: 9.4 FL
RBC # BLD AUTO: 4.87 M/UL
RETICS/RBC NFR AUTO: 1.1 %
SATURATED IRON: 12 %
TOTAL IRON BINDING CAPACITY: 422 UG/DL
TRANSFERRIN SERPL-MCNC: 285 MG/DL
WBC # BLD AUTO: 7.02 K/UL

## 2018-01-19 PROCEDURE — 85025 COMPLETE CBC W/AUTO DIFF WBC: CPT

## 2018-01-19 PROCEDURE — 84165 PROTEIN E-PHORESIS SERUM: CPT | Mod: 26,,, | Performed by: PATHOLOGY

## 2018-01-19 PROCEDURE — 85045 AUTOMATED RETICULOCYTE COUNT: CPT

## 2018-01-19 PROCEDURE — 83615 LACTATE (LD) (LDH) ENZYME: CPT

## 2018-01-19 PROCEDURE — 36415 COLL VENOUS BLD VENIPUNCTURE: CPT

## 2018-01-19 PROCEDURE — 99205 OFFICE O/P NEW HI 60 MIN: CPT | Mod: S$PBB,,, | Performed by: INTERNAL MEDICINE

## 2018-01-19 PROCEDURE — 84165 PROTEIN E-PHORESIS SERUM: CPT

## 2018-01-19 PROCEDURE — 83520 IMMUNOASSAY QUANT NOS NONAB: CPT

## 2018-01-19 PROCEDURE — 83540 ASSAY OF IRON: CPT

## 2018-01-19 PROCEDURE — 99999 PR PBB SHADOW E&M-EST. PATIENT-LVL III: CPT | Mod: PBBFAC,,, | Performed by: INTERNAL MEDICINE

## 2018-01-19 PROCEDURE — 83010 ASSAY OF HAPTOGLOBIN QUANT: CPT

## 2018-01-19 PROCEDURE — 99213 OFFICE O/P EST LOW 20 MIN: CPT | Mod: PBBFAC | Performed by: INTERNAL MEDICINE

## 2018-01-19 PROCEDURE — 82728 ASSAY OF FERRITIN: CPT

## 2018-01-19 RX ORDER — HEPARIN 100 UNIT/ML
500 SYRINGE INTRAVENOUS
Status: CANCELLED | OUTPATIENT
Start: 2018-01-19

## 2018-01-19 RX ORDER — METHYLPREDNISOLONE SOD SUCC 125 MG
125 VIAL (EA) INJECTION
Status: CANCELLED
Start: 2018-01-26

## 2018-01-19 RX ORDER — CYANOCOBALAMIN 1000 UG/ML
1000 INJECTION, SOLUTION INTRAMUSCULAR; SUBCUTANEOUS
Status: CANCELLED
Start: 2018-02-01

## 2018-01-19 RX ORDER — HEPARIN 100 UNIT/ML
500 SYRINGE INTRAVENOUS
Status: CANCELLED | OUTPATIENT
Start: 2018-01-26

## 2018-01-19 RX ORDER — SODIUM CHLORIDE 0.9 % (FLUSH) 0.9 %
10 SYRINGE (ML) INJECTION
Status: CANCELLED | OUTPATIENT
Start: 2018-01-26

## 2018-01-19 RX ORDER — SODIUM CHLORIDE 0.9 % (FLUSH) 0.9 %
10 SYRINGE (ML) INJECTION
Status: CANCELLED | OUTPATIENT
Start: 2018-01-19

## 2018-01-19 RX ORDER — METHYLPREDNISOLONE SOD SUCC 125 MG
125 VIAL (EA) INJECTION
Status: CANCELLED
Start: 2018-01-19

## 2018-01-19 NOTE — PROGRESS NOTES
Subjective:       Patient ID: Qi Ortiz is a 67 y.o. female.    Chief Complaint: Anemia and Results    HPI 67-year-old female history of iron deficiency anemia referred for further evaluation    Past Medical History:   Diagnosis Date    Carotid stenosis     19%    COPD (chronic obstructive pulmonary disease)     No meds    CVA (cerebral vascular accident)     Dr. 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 Hope    Psoriatic arthritis     Rheumatology     Family History   Problem Relation Age of Onset    Breast cancer Maternal Grandfather     Breast cancer Paternal Aunt     Stroke      Breast cancer Sister 60    Leukemia Sister 8      as child    Lung cancer Paternal Grandfather     Heart disease       Social History     Social History    Marital status: Single     Spouse name: N/A    Number of children: 3    Years of education: N/A     Occupational History    Not working      Social History Main Topics    Smoking status: Never Smoker    Smokeless tobacco: Never Used    Alcohol use No    Drug use: No    Sexual activity: No     Other Topics Concern    Not on file     Social History Narrative    No narrative on file     Past Surgical History:   Procedure Laterality Date    BREAST BIOPSY      R sided/benign    CARDIAC SURGERY      2016    CERVICAL FUSION      CHOLECYSTECTOMY      CORONARY ARTERY BYPASS GRAFT      triple bypass    CORONARY STENT PLACEMENT      EYE SURGERY      INTRAUTERINE DEVICE INSERTION      mass removed from R groin      TOTAL ABDOMINAL HYSTERECTOMY W/ BILATERAL SALPINGOOPHORECTOMY      due to benign mass, adhesions    TUBAL LIGATION         Labs:  Lab Results   Component Value Date    WBC 7.02 2018    HGB 12.4 2018    HCT 39.2 2018    MCV 81 (L) 2018     2018     BMP  Lab Results   Component  Value Date     01/08/2018    K 4.6 01/08/2018     01/08/2018    CO2 31 (H) 01/08/2018    BUN 16 01/08/2018    CREATININE 1.1 01/08/2018    CALCIUM 9.6 01/08/2018    ANIONGAP 6 (L) 01/08/2018    ESTGFRAFRICA >60.0 01/08/2018    EGFRNONAA 52.4 (A) 01/08/2018     Lab Results   Component Value Date    ALT 19 12/20/2017    AST 30 12/20/2017    ALKPHOS 29 (L) 12/20/2017    BILITOT 0.3 12/20/2017       Lab Results   Component Value Date    IRON 70 11/14/2017    TIBC 475 (H) 11/14/2017    FERRITIN 12 (L) 11/14/2017     Lab Results   Component Value Date    XRXQCKUV25 645 11/14/2017     Lab Results   Component Value Date    FOLATE 3.4 (L) 11/14/2017     Lab Results   Component Value Date    TSH 0.785 12/19/2017         Review of Systems   Constitutional: Positive for activity change and fatigue. Negative for appetite change, chills, diaphoresis, fever and unexpected weight change.   HENT: Negative for congestion, dental problem, drooling, ear discharge, ear pain, facial swelling, hearing loss, mouth sores, nosebleeds, postnasal drip, rhinorrhea, sinus pressure, sneezing, sore throat, tinnitus, trouble swallowing and voice change.    Eyes: Negative for photophobia, pain, discharge, redness, itching and visual disturbance.   Respiratory: Positive for shortness of breath. Negative for cough, choking, chest tightness, wheezing and stridor.    Cardiovascular: Negative for chest pain, palpitations and leg swelling.   Gastrointestinal: Negative for abdominal distention, abdominal pain, anal bleeding, blood in stool, constipation, diarrhea, nausea, rectal pain and vomiting.   Endocrine: Negative for cold intolerance, heat intolerance, polydipsia, polyphagia and polyuria.   Genitourinary: Negative for decreased urine volume, difficulty urinating, dyspareunia, dysuria, enuresis, flank pain, frequency, genital sores, hematuria, menstrual problem, pelvic pain, urgency, vaginal bleeding, vaginal discharge and vaginal pain.    Musculoskeletal: Negative for arthralgias, back pain, gait problem, joint swelling, myalgias, neck pain and neck stiffness.   Skin: Negative for color change, pallor and rash.   Allergic/Immunologic: Negative for environmental allergies, food allergies and immunocompromised state.   Neurological: Positive for weakness. Negative for dizziness, tremors, seizures, syncope, facial asymmetry, speech difficulty, light-headedness, numbness and headaches.   Hematological: Negative for adenopathy. Does not bruise/bleed easily.   Psychiatric/Behavioral: Positive for dysphoric mood. Negative for agitation, behavioral problems, confusion, decreased concentration, hallucinations, self-injury, sleep disturbance and suicidal ideas. The patient is nervous/anxious. The patient is not hyperactive.        Objective:      Physical Exam   Constitutional: She is oriented to person, place, and time. She appears well-developed and well-nourished. She appears distressed.   HENT:   Head: Normocephalic and atraumatic.   Right Ear: Tympanic membrane and external ear normal.   Left Ear: Tympanic membrane and external ear normal.   Nose: Nose normal. Right sinus exhibits no maxillary sinus tenderness and no frontal sinus tenderness. Left sinus exhibits no maxillary sinus tenderness and no frontal sinus tenderness.   Mouth/Throat: Oropharynx is clear and moist. No oropharyngeal exudate.   Eyes: Conjunctivae, EOM and lids are normal. Pupils are equal, round, and reactive to light. Right eye exhibits no discharge. Left eye exhibits no discharge. Right conjunctiva is not injected. Right conjunctiva has no hemorrhage. Left conjunctiva is not injected. Left conjunctiva has no hemorrhage. No scleral icterus.   Neck: Normal range of motion. Neck supple. No JVD present. No tracheal deviation present. No thyromegaly present.   Cardiovascular: Normal rate, regular rhythm and intact distal pulses.    Murmur heard.  Pulmonary/Chest: Effort normal and  breath sounds normal. No stridor. No respiratory distress. She exhibits no tenderness.   Abdominal: Soft. Bowel sounds are normal. She exhibits no distension and no mass. There is no splenomegaly or hepatomegaly. There is no tenderness. There is no rebound.   Musculoskeletal: Normal range of motion. She exhibits no edema or tenderness.   Lymphadenopathy:     She has no cervical adenopathy.     She has no axillary adenopathy.        Right: No supraclavicular adenopathy present.        Left: No supraclavicular adenopathy present.   Neurological: She is alert and oriented to person, place, and time. No cranial nerve deficit. Coordination normal.   Skin: Skin is dry. No rash noted. She is not diaphoretic. No erythema.   Psychiatric: She has a normal mood and affect. Her behavior is normal. Judgment and thought content normal.   Vitals reviewed.          Assessment:      1. Iron deficiency anemia due to chronic blood loss    2. B12 deficiency           Plan:   Patient appears to have iron deficiency will proceed with laboratory evaluation today To Assume Diagnosis of Iron Deficiency Treated with Intravenous Iron See Back on Day 1 Previous History of B12 Deficiency 7 Years Ago Takes B12 Intimately Resume B12 on a Monthly Basis.  Upper Lower Endoscopies Warranted No Previous GI Evaluation That I Can Detect.  Follow-Up with Me on Day 1 or 8 Start of Intravenous Iron with Resultant Diagnosis Will Review Laboratory Studies

## 2018-01-19 NOTE — LETTER
January 19, 2018      Trinidad Cleaning MD  [id number] [address] [address]           [facility]  [id number] [place] [address]  Phone: [phone]  Fax: [phone]          Patient: Qi Ortiz   MR Number: [id number]   YOB: 1950   Date of Visit: 1/19/2018       Dear Dr. Trinidad Cleaning:    Thank you for referring Qi Ortiz to me for evaluation. Attached you will find relevant portions of my assessment and plan of care.    If you have questions, please do not hesitate to call me. I look forward to following Qi Ortiz along with you.    Sincerely,    Royal Mckinney MD    Enclosure  CC:  No Recipients    If you would like to receive this communication electronically, please contact [email or web address] or [phone] to request more information on [employer]â„¢ Audio Link access.    For providers and/or their staff who would like to refer a patient to [facility], please contact us through our one-stop-shop provider referral line, [facility] , at [phone].    If you feel you have received this communication in error or would no longer like to receive these types of communications, please e-mail [email or web address]

## 2018-01-20 LAB — FERRITIN SERPL-MCNC: 15 NG/ML

## 2018-01-22 LAB
ALBUMIN SERPL ELPH-MCNC: 3.83 G/DL
ALPHA1 GLOB SERPL ELPH-MCNC: 0.28 G/DL
ALPHA2 GLOB SERPL ELPH-MCNC: 0.9 G/DL
B-GLOBULIN SERPL ELPH-MCNC: 0.89 G/DL
GAMMA GLOB SERPL ELPH-MCNC: 1.1 G/DL
KAPPA LC SER QL IA: 2.92 MG/DL
KAPPA LC/LAMBDA SER IA: 1.31
LAMBDA LC SER QL IA: 2.23 MG/DL
PATHOLOGIST INTERPRETATION SPE: NORMAL
PROT SERPL-MCNC: 7 G/DL

## 2018-01-24 ENCOUNTER — DOCUMENTATION ONLY (OUTPATIENT)
Dept: GASTROENTEROLOGY | Facility: CLINIC | Age: 68
End: 2018-01-24

## 2018-01-25 ENCOUNTER — TELEPHONE (OUTPATIENT)
Dept: CARDIOLOGY | Facility: CLINIC | Age: 68
End: 2018-01-25

## 2018-01-26 ENCOUNTER — TELEPHONE (OUTPATIENT)
Dept: FAMILY MEDICINE | Facility: CLINIC | Age: 68
End: 2018-01-26

## 2018-01-26 NOTE — TELEPHONE ENCOUNTER
----- Message from Alexsandra Calderón sent at 1/26/2018  9:34 AM CST -----  Contact: Pt  Pt request a call from the nurse to get lab results done on 01-08-18, please contact the pt at [phone]

## 2018-01-29 ENCOUNTER — TELEPHONE (OUTPATIENT)
Dept: HEMATOLOGY/ONCOLOGY | Facility: CLINIC | Age: 68
End: 2018-01-29

## 2018-01-29 NOTE — TELEPHONE ENCOUNTER
----- Message from Jerome Son sent at 1/29/2018 10:12 AM CST -----  Contact: [Pt  X_ 1st Request  _ 2nd Request  _ 3rd Request    Who: JOI LYON [[id number]]    Why: Patient would like to speak with with questions about the iron side effects. Please advise    What Number to Call Back: [phone]    When to Expect a call back: (Before the end of the day)  -- if call after 3:00 call back will be tomorrow.

## 2018-01-30 ENCOUNTER — OFFICE VISIT (OUTPATIENT)
Dept: HEMATOLOGY/ONCOLOGY | Facility: CLINIC | Age: 68
End: 2018-01-30
Payer: MEDICARE

## 2018-01-30 VITALS
SYSTOLIC BLOOD PRESSURE: 120 MMHG | TEMPERATURE: 98 F | RESPIRATION RATE: 18 BRPM | BODY MASS INDEX: 42.38 KG/M2 | HEIGHT: 64 IN | WEIGHT: 248.25 LBS | DIASTOLIC BLOOD PRESSURE: 60 MMHG | HEART RATE: 74 BPM | OXYGEN SATURATION: 97 %

## 2018-01-30 DIAGNOSIS — D50.0 IRON DEFICIENCY ANEMIA DUE TO CHRONIC BLOOD LOSS: Primary | ICD-10-CM

## 2018-01-30 PROCEDURE — 1125F AMNT PAIN NOTED PAIN PRSNT: CPT | Mod: ,,, | Performed by: INTERNAL MEDICINE

## 2018-01-30 PROCEDURE — 99213 OFFICE O/P EST LOW 20 MIN: CPT | Mod: S$PBB,,, | Performed by: INTERNAL MEDICINE

## 2018-01-30 PROCEDURE — 99213 OFFICE O/P EST LOW 20 MIN: CPT | Mod: PBBFAC,PO | Performed by: INTERNAL MEDICINE

## 2018-01-30 PROCEDURE — 1159F MED LIST DOCD IN RCRD: CPT | Mod: ,,, | Performed by: INTERNAL MEDICINE

## 2018-01-30 PROCEDURE — 99999 PR PBB SHADOW E&M-EST. PATIENT-LVL III: CPT | Mod: PBBFAC,,, | Performed by: INTERNAL MEDICINE

## 2018-01-30 NOTE — PROGRESS NOTES
Subjective:       Patient ID: Qi Ortiz is a 67 y.o. female.    Chief Complaint: Follow-up    HPI 67-year-old female scheduled for intravenous iron because of assistant iron deficiency intolerant of oral iron patients concerns over reaction    Past Medical History:   Diagnosis Date    Carotid stenosis     19%    COPD (chronic obstructive pulmonary disease)     No meds    CVA (cerebral vascular accident)     Dr. 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 Hope    Psoriatic arthritis     Rheumatology     Family History   Problem Relation Age of Onset    Breast cancer Maternal Grandfather     Breast cancer Paternal Aunt     Stroke      Breast cancer Sister 60    Leukemia Sister 8      as child    Lung cancer Paternal Grandfather     Heart disease       Social History     Social History    Marital status: Single     Spouse name: N/A    Number of children: 3    Years of education: N/A     Occupational History    Not working      Social History Main Topics    Smoking status: Never Smoker    Smokeless tobacco: Never Used    Alcohol use No    Drug use: No    Sexual activity: No     Other Topics Concern    Not on file     Social History Narrative    No narrative on file     Past Surgical History:   Procedure Laterality Date    BREAST BIOPSY      R sided/benign    CARDIAC SURGERY      2016    CERVICAL FUSION      CHOLECYSTECTOMY      CORONARY ARTERY BYPASS GRAFT      triple bypass    CORONARY STENT PLACEMENT      EYE SURGERY      INTRAUTERINE DEVICE INSERTION      mass removed from R groin      TOTAL ABDOMINAL HYSTERECTOMY W/ BILATERAL SALPINGOOPHORECTOMY      due to benign mass, adhesions    TUBAL LIGATION         Labs:  Lab Results   Component Value Date    WBC 7.02 2018    HGB 12.4 2018    HCT 39.2 2018    MCV 81 (L) 2018    PLT  262 01/19/2018     BMP  Lab Results   Component Value Date     01/08/2018    K 4.6 01/08/2018     01/08/2018    CO2 31 (H) 01/08/2018    BUN 16 01/08/2018    CREATININE 1.1 01/08/2018    CALCIUM 9.6 01/08/2018    ANIONGAP 6 (L) 01/08/2018    ESTGFRAFRICA >60.0 01/08/2018    EGFRNONAA 52.4 (A) 01/08/2018     Lab Results   Component Value Date    ALT 19 12/20/2017    AST 30 12/20/2017    ALKPHOS 29 (L) 12/20/2017    BILITOT 0.3 12/20/2017       Lab Results   Component Value Date    IRON 49 01/19/2018    TIBC 422 01/19/2018    FERRITIN 15 (L) 01/19/2018     Lab Results   Component Value Date    XYLFWBHE71 645 11/14/2017     Lab Results   Component Value Date    FOLATE 3.4 (L) 11/14/2017     Lab Results   Component Value Date    TSH 0.785 12/19/2017         Review of Systems   Constitutional: Positive for activity change and fatigue. Negative for appetite change, chills, diaphoresis, fever and unexpected weight change.   HENT: Negative for congestion, dental problem, drooling, ear discharge, ear pain, facial swelling, hearing loss, mouth sores, nosebleeds, postnasal drip, rhinorrhea, sinus pressure, sneezing, sore throat, tinnitus, trouble swallowing and voice change.    Eyes: Negative for photophobia, pain, discharge, redness, itching and visual disturbance.   Respiratory: Negative for cough, choking, chest tightness, shortness of breath, wheezing and stridor.    Cardiovascular: Negative for chest pain, palpitations and leg swelling.   Gastrointestinal: Negative for abdominal distention, abdominal pain, anal bleeding, blood in stool, constipation, diarrhea, nausea, rectal pain and vomiting.   Endocrine: Negative for cold intolerance, heat intolerance, polydipsia, polyphagia and polyuria.   Genitourinary: Negative for decreased urine volume, difficulty urinating, dyspareunia, dysuria, enuresis, flank pain, frequency, genital sores, hematuria, menstrual problem, pelvic pain, urgency, vaginal bleeding, vaginal  discharge and vaginal pain.   Musculoskeletal: Negative for arthralgias, back pain, gait problem, joint swelling, myalgias, neck pain and neck stiffness.   Skin: Negative for color change, pallor and rash.   Allergic/Immunologic: Negative for environmental allergies, food allergies and immunocompromised state.   Neurological: Positive for weakness. Negative for dizziness, tremors, seizures, syncope, facial asymmetry, speech difficulty, light-headedness, numbness and headaches.   Hematological: Negative for adenopathy. Does not bruise/bleed easily.   Psychiatric/Behavioral: Positive for dysphoric mood. Negative for agitation, behavioral problems, confusion, decreased concentration, hallucinations, self-injury, sleep disturbance and suicidal ideas. The patient is nervous/anxious. The patient is not hyperactive.        Objective:      Physical Exam   Constitutional: She is oriented to person, place, and time. She has a sickly appearance. She appears ill. She appears distressed.   HENT:   Head: Normocephalic and atraumatic.   Right Ear: External ear normal.   Left Ear: External ear normal.   Nose: Nose normal. Right sinus exhibits no maxillary sinus tenderness and no frontal sinus tenderness. Left sinus exhibits no maxillary sinus tenderness and no frontal sinus tenderness.   Mouth/Throat: Oropharynx is clear and moist. No oropharyngeal exudate.   Eyes: Conjunctivae, EOM and lids are normal. Pupils are equal, round, and reactive to light. Right eye exhibits no discharge. Left eye exhibits no discharge. Right conjunctiva is not injected. Right conjunctiva has no hemorrhage. Left conjunctiva is not injected. Left conjunctiva has no hemorrhage. No scleral icterus.   Neck: Normal range of motion. Neck supple. No JVD present. No tracheal deviation present. No thyromegaly present.   Cardiovascular: Normal rate and regular rhythm.    Pulmonary/Chest: Effort normal. No stridor. No respiratory distress. She exhibits no  tenderness.   Abdominal: Soft. She exhibits no distension and no mass. There is no splenomegaly or hepatomegaly. There is no tenderness. There is no rebound.   Musculoskeletal: Normal range of motion. She exhibits no edema or tenderness.   Lymphadenopathy:     She has no cervical adenopathy.     She has no axillary adenopathy.        Right: No supraclavicular adenopathy present.        Left: No supraclavicular adenopathy present.   Neurological: She is alert and oriented to person, place, and time. No cranial nerve deficit. Coordination normal.   Skin: Skin is dry. No rash noted. She is not diaphoretic. No erythema.   Psychiatric: Her behavior is normal. Judgment and thought content normal. Her mood appears anxious. She exhibits a depressed mood.   Vitals reviewed.          Assessment:      1. Iron deficiency anemia due to chronic blood loss           Plan:   Patient has over 28 ALLERGIES at this point reviewed laboratory studies demonstrating iron deficiency but no anemia intolerant oral iron at this point I strongly recommend GI evaluation of placed orders again for upper lower endoscopies her decision whether not to do so will be hers.  Return in 4-6 months with repeat CBC iron status prior

## 2018-02-14 ENCOUNTER — TELEPHONE (OUTPATIENT)
Dept: RHEUMATOLOGY | Facility: CLINIC | Age: 68
End: 2018-02-14

## 2018-02-14 NOTE — TELEPHONE ENCOUNTER
----- Message from Marium Painter sent at 2/14/2018 10:54 AM CST -----  Contact: PT   PT states she has labs on 3/7/2018 and will do Dr. Arreaga's labs the same day. .[phone] (home)

## 2018-03-07 ENCOUNTER — LAB VISIT (OUTPATIENT)
Dept: LAB | Facility: HOSPITAL | Age: 68
End: 2018-03-07
Attending: FAMILY MEDICINE
Payer: MEDICARE

## 2018-03-07 DIAGNOSIS — E03.9 HYPOTHYROIDISM, UNSPECIFIED TYPE: ICD-10-CM

## 2018-03-07 DIAGNOSIS — L40.50 PSORIATIC ARTHRITIS: Chronic | ICD-10-CM

## 2018-03-07 DIAGNOSIS — Z51.81 MEDICATION MONITORING ENCOUNTER: ICD-10-CM

## 2018-03-07 DIAGNOSIS — L40.9 PSORIASIS: ICD-10-CM

## 2018-03-07 DIAGNOSIS — R73.03 PREDIABETES: ICD-10-CM

## 2018-03-07 DIAGNOSIS — R53.83 FATIGUE, UNSPECIFIED TYPE: ICD-10-CM

## 2018-03-07 PROCEDURE — 86140 C-REACTIVE PROTEIN: CPT

## 2018-03-07 PROCEDURE — 83036 HEMOGLOBIN GLYCOSYLATED A1C: CPT

## 2018-03-07 PROCEDURE — 84439 ASSAY OF FREE THYROXINE: CPT

## 2018-03-07 PROCEDURE — 36415 COLL VENOUS BLD VENIPUNCTURE: CPT | Mod: PO

## 2018-03-07 PROCEDURE — 85025 COMPLETE CBC W/AUTO DIFF WBC: CPT

## 2018-03-07 PROCEDURE — 80053 COMPREHEN METABOLIC PANEL: CPT

## 2018-03-07 PROCEDURE — 84443 ASSAY THYROID STIM HORMONE: CPT

## 2018-03-07 PROCEDURE — 85651 RBC SED RATE NONAUTOMATED: CPT

## 2018-03-08 LAB
ALBUMIN SERPL BCP-MCNC: 3.7 G/DL
ALBUMIN SERPL BCP-MCNC: 3.7 G/DL
ALP SERPL-CCNC: 33 U/L
ALP SERPL-CCNC: 33 U/L
ALT SERPL W/O P-5'-P-CCNC: 17 U/L
ALT SERPL W/O P-5'-P-CCNC: 17 U/L
ANION GAP SERPL CALC-SCNC: 10 MMOL/L
ANION GAP SERPL CALC-SCNC: 10 MMOL/L
AST SERPL-CCNC: 22 U/L
AST SERPL-CCNC: 22 U/L
BASOPHILS # BLD AUTO: 0.03 K/UL
BASOPHILS NFR BLD: 0.5 %
BILIRUB SERPL-MCNC: 0.3 MG/DL
BILIRUB SERPL-MCNC: 0.3 MG/DL
BUN SERPL-MCNC: 16 MG/DL
BUN SERPL-MCNC: 16 MG/DL
CALCIUM SERPL-MCNC: 9 MG/DL
CALCIUM SERPL-MCNC: 9 MG/DL
CHLORIDE SERPL-SCNC: 106 MMOL/L
CHLORIDE SERPL-SCNC: 106 MMOL/L
CO2 SERPL-SCNC: 27 MMOL/L
CO2 SERPL-SCNC: 27 MMOL/L
CREAT SERPL-MCNC: 0.9 MG/DL
CREAT SERPL-MCNC: 0.9 MG/DL
CRP SERPL-MCNC: 3.3 MG/L
DIFFERENTIAL METHOD: ABNORMAL
EOSINOPHIL # BLD AUTO: 0.1 K/UL
EOSINOPHIL NFR BLD: 2 %
ERYTHROCYTE [DISTWIDTH] IN BLOOD BY AUTOMATED COUNT: 17.8 %
ERYTHROCYTE [SEDIMENTATION RATE] IN BLOOD BY WESTERGREN METHOD: 14 MM/HR
EST. GFR  (AFRICAN AMERICAN): >60 ML/MIN/1.73 M^2
EST. GFR  (AFRICAN AMERICAN): >60 ML/MIN/1.73 M^2
EST. GFR  (NON AFRICAN AMERICAN): >60 ML/MIN/1.73 M^2
EST. GFR  (NON AFRICAN AMERICAN): >60 ML/MIN/1.73 M^2
ESTIMATED AVG GLUCOSE: 120 MG/DL
GLUCOSE SERPL-MCNC: 111 MG/DL
GLUCOSE SERPL-MCNC: 111 MG/DL
HBA1C MFR BLD HPLC: 5.8 %
HCT VFR BLD AUTO: 38.1 %
HGB BLD-MCNC: 12.3 G/DL
LYMPHOCYTES # BLD AUTO: 1.7 K/UL
LYMPHOCYTES NFR BLD: 28 %
MCH RBC QN AUTO: 26.5 PG
MCHC RBC AUTO-ENTMCNC: 32.3 G/DL
MCV RBC AUTO: 82 FL
MONOCYTES # BLD AUTO: 0.6 K/UL
MONOCYTES NFR BLD: 9.5 %
NEUTROPHILS # BLD AUTO: 3.6 K/UL
NEUTROPHILS NFR BLD: 60 %
PLATELET # BLD AUTO: 256 K/UL
PMV BLD AUTO: 10.9 FL
POTASSIUM SERPL-SCNC: 4 MMOL/L
POTASSIUM SERPL-SCNC: 4 MMOL/L
PROT SERPL-MCNC: 7.2 G/DL
PROT SERPL-MCNC: 7.2 G/DL
RBC # BLD AUTO: 4.64 M/UL
SODIUM SERPL-SCNC: 143 MMOL/L
SODIUM SERPL-SCNC: 143 MMOL/L
T4 FREE SERPL-MCNC: 0.96 NG/DL
TSH SERPL DL<=0.005 MIU/L-ACNC: 1.36 UIU/ML
WBC # BLD AUTO: 5.99 K/UL

## 2018-03-13 ENCOUNTER — PATIENT OUTREACH (OUTPATIENT)
Dept: ADMINISTRATIVE | Facility: HOSPITAL | Age: 68
End: 2018-03-13

## 2018-03-13 ENCOUNTER — OFFICE VISIT (OUTPATIENT)
Dept: RHEUMATOLOGY | Facility: CLINIC | Age: 68
End: 2018-03-13
Payer: MEDICARE

## 2018-03-13 ENCOUNTER — OFFICE VISIT (OUTPATIENT)
Dept: FAMILY MEDICINE | Facility: CLINIC | Age: 68
End: 2018-03-13
Payer: MEDICARE

## 2018-03-13 VITALS
HEIGHT: 63 IN | HEART RATE: 76 BPM | BODY MASS INDEX: 44.59 KG/M2 | OXYGEN SATURATION: 98 % | SYSTOLIC BLOOD PRESSURE: 122 MMHG | DIASTOLIC BLOOD PRESSURE: 78 MMHG | TEMPERATURE: 97 F | WEIGHT: 251.63 LBS

## 2018-03-13 VITALS
SYSTOLIC BLOOD PRESSURE: 130 MMHG | HEART RATE: 67 BPM | BODY MASS INDEX: 42.95 KG/M2 | DIASTOLIC BLOOD PRESSURE: 63 MMHG | WEIGHT: 251.56 LBS | HEIGHT: 64 IN

## 2018-03-13 DIAGNOSIS — L40.50 PSORIATIC ARTHRITIS: ICD-10-CM

## 2018-03-13 DIAGNOSIS — Z88.8 ALLERGY TO STATIN MEDICATION: ICD-10-CM

## 2018-03-13 DIAGNOSIS — Z95.1 S/P CABG (CORONARY ARTERY BYPASS GRAFT): ICD-10-CM

## 2018-03-13 DIAGNOSIS — D50.0 IRON DEFICIENCY ANEMIA DUE TO CHRONIC BLOOD LOSS: Primary | ICD-10-CM

## 2018-03-13 DIAGNOSIS — Z86.73 HISTORY OF CVA (CEREBROVASCULAR ACCIDENT): ICD-10-CM

## 2018-03-13 DIAGNOSIS — I10 ESSENTIAL HYPERTENSION: ICD-10-CM

## 2018-03-13 DIAGNOSIS — E66.01 MORBID OBESITY WITH BMI OF 40.0-44.9, ADULT: ICD-10-CM

## 2018-03-13 DIAGNOSIS — L40.50 PSORIATIC ARTHRITIS: Primary | ICD-10-CM

## 2018-03-13 DIAGNOSIS — I20.9 ANGINA, CLASS II: ICD-10-CM

## 2018-03-13 DIAGNOSIS — R73.03 PREDIABETES: ICD-10-CM

## 2018-03-13 DIAGNOSIS — E03.9 HYPOTHYROIDISM, UNSPECIFIED TYPE: ICD-10-CM

## 2018-03-13 DIAGNOSIS — E53.8 B12 DEFICIENCY: ICD-10-CM

## 2018-03-13 DIAGNOSIS — G62.9 NEUROPATHY: ICD-10-CM

## 2018-03-13 DIAGNOSIS — E53.8 FOLIC ACID DEFICIENCY: ICD-10-CM

## 2018-03-13 DIAGNOSIS — J44.9 CHRONIC OBSTRUCTIVE PULMONARY DISEASE, UNSPECIFIED COPD TYPE: ICD-10-CM

## 2018-03-13 PROCEDURE — 99214 OFFICE O/P EST MOD 30 MIN: CPT | Mod: PBBFAC,27,PO | Performed by: INTERNAL MEDICINE

## 2018-03-13 PROCEDURE — 99214 OFFICE O/P EST MOD 30 MIN: CPT | Mod: S$PBB,,, | Performed by: INTERNAL MEDICINE

## 2018-03-13 PROCEDURE — 99214 OFFICE O/P EST MOD 30 MIN: CPT | Mod: S$PBB,,, | Performed by: FAMILY MEDICINE

## 2018-03-13 PROCEDURE — 99999 PR PBB SHADOW E&M-EST. PATIENT-LVL III: CPT | Mod: PBBFAC,,, | Performed by: FAMILY MEDICINE

## 2018-03-13 PROCEDURE — 99999 PR PBB SHADOW E&M-EST. PATIENT-LVL IV: CPT | Mod: PBBFAC,,, | Performed by: INTERNAL MEDICINE

## 2018-03-13 PROCEDURE — 99213 OFFICE O/P EST LOW 20 MIN: CPT | Mod: PBBFAC,PO | Performed by: FAMILY MEDICINE

## 2018-03-13 RX ORDER — CHLORHEXIDINE GLUCONATE 40 MG/ML
SOLUTION TOPICAL
COMMUNITY
End: 2019-10-11

## 2018-03-13 RX ORDER — GABAPENTIN 100 MG/1
200 CAPSULE ORAL 3 TIMES DAILY PRN
Qty: 540 CAPSULE | Refills: 3 | Status: SHIPPED | OUTPATIENT
Start: 2018-03-13 | End: 2019-10-11 | Stop reason: SDUPTHER

## 2018-03-13 RX ORDER — FOLIC ACID 1 MG/1
1 TABLET ORAL DAILY
Qty: 90 TABLET | Refills: 1 | Status: CANCELLED | OUTPATIENT
Start: 2018-03-13 | End: 2019-03-13

## 2018-03-13 RX ORDER — MUPIROCIN 20 MG/G
OINTMENT TOPICAL
COMMUNITY
End: 2022-06-22 | Stop reason: SDUPTHER

## 2018-03-13 NOTE — PROGRESS NOTES
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    Carotid stenosis    IRIS (obstructive sleep apnea)    Double ectopic ureters    Psoriatic arthritis    Morbid obesity with BMI of 40.0-44.9, adult    Angina, class II    Primary osteoarthritis involving multiple joints    Chest pain    Statin intolerance    Abnormal nuclear cardiac imaging test    Preoperative respiratory examination    Postural dizziness with near syncope    Stage 3 chronic kidney disease    Osteopenia of multiple sites    Psoriasis    Medication monitoring encounter    Elevated brain natriuretic peptide (BNP) level    Fever    History of CVA (cerebrovascular accident)    Chronic obstructive pulmonary disease    Iron deficiency anemia due to chronic blood loss    B12 deficiency    Iron deficiency anemia due to chronic blood loss    Allergy to statin medication    Prediabetes     Ms tierney was previously seen by Flor Lawrence is here for f/u for psoriatic arthritis with psoriasis on Stelara 90 mg q 3 months; she is getting it here in clinic. In the past has failed enbrel, mtx, has sulfa allergy, recurrent skin infections with Humira, intolerant to Otezla.  She also has Osteopenia, h/o right wrist fx in the 90's. Failed po boniva and fosamax due to GI intolerance. Last dexa showed decreasing bmd so Dr. CASTELLANOS started reclast in sept but she felt terrible after her infusion with significant pain and confusion. She does not want to repeat.  NO new  falls/fxs.     She has been on Stelara since 3/2016. Also with neuropathic chest pain due to previous cardiac surgery, gabapentin helps greatly. She takes 100-200 mg tid      Today she denies any pain / stiffness   Mild rash along hairline otherwise doing well   No other concerns      Past Medical History:   Diagnosis Date    Carotid stenosis     19%    COPD (chronic obstructive pulmonary disease)     No meds    CVA (cerebral vascular accident)     Dr. 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 Hope    Psoriatic arthritis     Rheumatology       Past Surgical History:   Procedure Laterality Date    BREAST BIOPSY      R sided/benign    CARDIAC SURGERY      2016    CERVICAL FUSION      CHOLECYSTECTOMY      CORONARY ARTERY BYPASS GRAFT      triple bypass    CORONARY STENT PLACEMENT      EYE SURGERY      INTRAUTERINE DEVICE INSERTION      mass removed from R groin      TOTAL ABDOMINAL HYSTERECTOMY W/ BILATERAL SALPINGOOPHORECTOMY      due to benign mass, adhesions    TUBAL LIGATION           Social History   Substance Use Topics    Smoking status: Never Smoker    Smokeless tobacco: Never Used    Alcohol use No       Family History   Problem Relation Age of Onset    Breast cancer Maternal Grandfather     Breast cancer Paternal Aunt     Stroke      Breast cancer Sister 60    Leukemia Sister 8      as child    Lung cancer Paternal Grandfather     Heart disease         Review of patient's allergies indicates:   Allergen Reactions    Celexa [citalopram] Anaphylaxis    Clindamycin Itching and Swelling    Codeine Shortness Of Breath, Itching and Swelling    Crestor [rosuvastatin] Anaphylaxis    Cytotec [misoprostol] Anaphylaxis and Other (See Comments)     Difficulty breathing    Lisinopril Anaphylaxis    Magnesium citrate Shortness Of Breath    Stadol  [butorphanol tartrate] Anaphylaxis     Coded    Vicodin [hydrocodone-acetaminophen] Shortness Of Breath    Adhesive      EKG Electrodes    Aggrenox [aspirin-dipyridamole] Other (See Comments)     headaches    Avelox [moxifloxacin]      PATIENT STATES DO NOT GIVE UNDER ANY CIRCUSTANCES    Demerol [meperidine]     Isosorbide Other (See Comments)     Severe headache    Kenalog [triamcinolone acetonide]     Medrol [methylprednisolone] Other (See Comments)     unknown    Mobic [meloxicam]     Morphine     Nitroglycerin      Long acting    Pholcodine     Prednisone      GASTRIC PAIN    Ranexa [ranolazine] Nausea And Vomiting    Reclast [zoledronic acid-mannitol-water] Other (See Comments)     Bones hurt    Sulfa (sulfonamide antibiotics) Other (See Comments)     unknown    Talwin [pentazocine lactate]     Tetracycline     Tetracyclines     Tilade [nedocromil]     Zetia [ezetimibe]      Medication List with Changes/Refills   Current Medications    ALBUTEROL (PROVENTIL) 2.5 MG /3 ML (0.083 %) NEBULIZER SOLUTION    Take 3 mLs (2.5 mg total) by nebulization every 6 (six) hours as needed for Wheezing. Rescue    ASPIRIN 81 MG CHEW    Take 81 mg by mouth once daily.    CHLORHEXIDINE (HIBICLENS) 4 % EXTERNAL LIQUID    Hibiclens 4 % topical liquid   Apply 1 application twice a day by topical route as directed for 90 days.    CYANOCOBALAMIN 2000 MCG TABLET    Take 2,000 mcg by mouth once daily.    EVOLOCUMAB (REPATHA SURECLICK) 140 MG/ML PNIJ    Inject 140 mg into the skin every 14 (fourteen) days.    FOLIC ACID (FOLVITE) 1 MG TABLET    Take 1 tablet (1 mg total) by mouth once daily.    FUROSEMIDE (LASIX) 20 MG TABLET    Take 0.5 tablets (10 mg total) by mouth 2 (two) times daily.    GARLIC 2,000 MG CAP    Take 3,000 mg by mouth once daily.     LEVOTHYROXINE (SYNTHROID) 112 MCG TABLET    Take 1 tablet (112 mcg total) by mouth once daily.    MUPIROCIN (BACTROBAN) 2 % OINTMENT    Bactroban 2 % topical ointment    Apply 1 application twice a day by topical route as directed for 30 days.    PYRIDOXINE (VITAMIN B-6) 100 MG TAB    Take 200 mg by mouth once daily.     VITAMIN D 1000 UNITS TAB    Take 185 mg by mouth once daily.   Changed and/or Refilled Medications    Modified Medication Previous Medication    CLOPIDOGREL (PLAVIX) 75 MG TABLET clopidogrel (PLAVIX) 75 mg tablet       Take 1 tablet (75 mg total) by mouth once daily.    Take 1 tablet (75 mg total) by mouth once daily.    GABAPENTIN (NEURONTIN) 100 MG CAPSULE gabapentin (NEURONTIN) 100 MG capsule       Take 2 capsules (200 mg total) by mouth 3 (three) times daily as needed.    Take 2 capsules (200 mg total) by mouth 3 (three) times daily as needed.    METOPROLOL SUCCINATE (TOPROL-XL) 100 MG 24 HR TABLET metoprolol succinate (TOPROL-XL) 100 MG 24 hr tablet       1 tab (100mg) in morning. Half tab (50mg) at bedtime.  Generic ok    1 tab (100mg) in morning. Half tab (50mg) at bedtime.  Generic ok    NITROGLYCERIN (NITROSTAT) 0.4 MG SL TABLET nitroGLYCERIN (NITROSTAT) 0.4 MG SL tablet       Place 1 tablet (0.4 mg total) under the tongue every 5 (five) minutes as needed for Chest pain.    Place 1 tablet (0.4 mg total) under the tongue every 5 (five) minutes as needed for Chest pain.    VALSARTAN-HYDROCHLOROTHIAZIDE (DIOVAN-HCT) 160-12.5 MG PER TABLET valsartan-hydrochlorothiazide (DIOVAN-HCT) 160-12.5 mg per tablet       Take 1 tablet by mouth once daily.    Take 1 tablet by mouth once daily.           Review of Systems:  Constitutional: Denies fever, chills. No recent weight changes.   Fatigue: no  Muscle weakness: no  Headaches: no new headaches  Eyes: No redness or dryness.  No recent visual changes.  ENT: Denies dry mouth. No oral or nasal ulcers.  Card: No chest pain.  Resp: No cough or sob.   Gastro: No nausea or vomiting.  No heartburn.  Constipation: no  Diarrhea: no  Genito:  No dysuria.  No genital ulcers.  Skin: per hpi   Raynauds:no  Neuro: No numbness /  tingling.   Psych: No depression, anxiety  Endo:  no excess thirst.  Heme: no abnormal bleeding or bruising  Clots:none       OBJECTIVE:     Vital Signs   Vitals:    03/13/18 1330   BP: 130/63   Pulse: 67     Physical Exam:  General Appearance:  NAD.   Gait: not favoring.  HEENT: PERRL.  Eyes not dry or injected.  No nasal ulcers.  Mouth not dry, no oral lesions.  Lymph: cervical, supraclavicular or axillary nodes: none abnormal   Cardio: no irregularity of S1 or S2.  No gallops or rubs.   Resp: Normal respiratory motion. Clear to auscultation bilaterally.   No abnormal chest conformation.  Abd: Soft, non-tender, nondistended.  No masses.   Skin: Head and neck,  and extremities examined.   Rash along hairline   Neuro: Ox3.   Cranial nerves II-XII grossly intact.   Sensation intact  in both distal LE and upper extremities to light touch.  Musculoskeletal Exam:    Right Side Rheumatological Exam     Examination finds the shoulder, elbow, wrist, knee, 1st PIP, 1st MCP, 2nd PIP, 2nd MCP, 3rd PIP, 3rd MCP, 4th PIP, 4th MCP, 5th PIP and 5th MCP no synovitis     Others :  Hip:N  Ankle :N  Foot:N    Left Side Rheumatological Exam     Examination finds the shoulder, elbow, wrist, knee, 1st PIP, 1st MCP, 2nd PIP, 2nd MCP, 3rd PIP, 3rd MCP, 4th PIP, 4th MCP, 5th PIP and 5th MCP no synovitis     Others :  Hip:N  Ankle :N  Foot:N    Spine :  Occiput to wall :  Modified schobers :    Tender points:  Muscle strength:Equal and full in all mm groups of the upper and lower ext.    Laboratory:   Results for orders placed or performed in visit on 03/07/18   CBC auto differential   Result Value Ref Range    WBC 5.99 3.90 - 12.70 K/uL    RBC 4.64 4.00 - 5.40 M/uL    Hemoglobin 12.3 12.0 - 16.0 g/dL    Hematocrit 38.1 37.0 - 48.5 %    MCV 82 82 - 98 fL    MCH 26.5 (L) 27.0 - 31.0 pg    MCHC 32.3 32.0 - 36.0 g/dL    RDW 17.8 (H) 11.5 - 14.5 %    Platelets 256 150 - 350 K/uL    MPV 10.9 9.2 - 12.9 fL    Gran # (ANC) 3.6 1.8 - 7.7 K/uL     Lymph # 1.7 1.0 - 4.8 K/uL    Mono # 0.6 0.3 - 1.0 K/uL    Eos # 0.1 0.0 - 0.5 K/uL    Baso # 0.03 0.00 - 0.20 K/uL    Gran% 60.0 38.0 - 73.0 %    Lymph% 28.0 18.0 - 48.0 %    Mono% 9.5 4.0 - 15.0 %    Eosinophil% 2.0 0.0 - 8.0 %    Basophil% 0.5 0.0 - 1.9 %    Differential Method Automated    Comprehensive metabolic panel   Result Value Ref Range    Sodium 143 136 - 145 mmol/L    Potassium 4.0 3.5 - 5.1 mmol/L    Chloride 106 95 - 110 mmol/L    CO2 27 23 - 29 mmol/L    Glucose 111 (H) 70 - 110 mg/dL    BUN, Bld 16 8 - 23 mg/dL    Creatinine 0.9 0.5 - 1.4 mg/dL    Calcium 9.0 8.7 - 10.5 mg/dL    Total Protein 7.2 6.0 - 8.4 g/dL    Albumin 3.7 3.5 - 5.2 g/dL    Total Bilirubin 0.3 0.1 - 1.0 mg/dL    Alkaline Phosphatase 33 (L) 55 - 135 U/L    AST 22 10 - 40 U/L    ALT 17 10 - 44 U/L    Anion Gap 10 8 - 16 mmol/L    eGFR if African American >60 >60 mL/min/1.73 m^2    eGFR if non African American >60 >60 mL/min/1.73 m^2   C-reactive protein   Result Value Ref Range    CRP 3.3 0.0 - 8.2 mg/L   Sedimentation rate, manual   Result Value Ref Range    Sed Rate 14 0 - 20 mm/Hr   TSH   Result Value Ref Range    TSH 1.361 0.400 - 4.000 uIU/mL   T4, free   Result Value Ref Range    Free T4 0.96 0.71 - 1.51 ng/dL   Hemoglobin A1c   Result Value Ref Range    Hemoglobin A1C 5.8 (H) 4.0 - 5.6 %    Estimated Avg Glucose 120 68 - 131 mg/dL   Comprehensive metabolic panel   Result Value Ref Range    Sodium 143 136 - 145 mmol/L    Potassium 4.0 3.5 - 5.1 mmol/L    Chloride 106 95 - 110 mmol/L    CO2 27 23 - 29 mmol/L    Glucose 111 (H) 70 - 110 mg/dL    BUN, Bld 16 8 - 23 mg/dL    Creatinine 0.9 0.5 - 1.4 mg/dL    Calcium 9.0 8.7 - 10.5 mg/dL    Total Protein 7.2 6.0 - 8.4 g/dL    Albumin 3.7 3.5 - 5.2 g/dL    Total Bilirubin 0.3 0.1 - 1.0 mg/dL    Alkaline Phosphatase 33 (L) 55 - 135 U/L    AST 22 10 - 40 U/L    ALT 17 10 - 44 U/L    Anion Gap 10 8 - 16 mmol/L    eGFR if African American >60 >60 mL/min/1.73 m^2    eGFR if non African  American >60 >60 mL/min/1.73 m^2     Imaging :reviewed x rays hands/ feet     Notes reviewed  Other procedures:    ASSESSMENT/PLAN:     Psoriatic arthritis  -     Prior Authorization Order  -     ustekinumab (stelara) 90 mg/mL subcutaneous syringe; Inject 90 mg into the skin every 3 (three) months.    Neuropathy  -     gabapentin (NEURONTIN) 100 MG capsule; Take 2 capsules (200 mg total) by mouth 3 (three) times daily as needed.  Dispense: 540 capsule; Refill: 3      67 yr old    1: Pso/ PsA  Stable disease   Started Stelara 3/2016 with > 90% improvement of her skin.  C/w Stelara 90mg every 12 weeks (she gets it done here)  Vaccinations declined     2: Osteopenia with DEXA 6/7/17 with osteopenia with FRAX 14.1/1.7, decreasing   BMD at hip     Received last reclast in September 2017 , due for next in September   But she did not tolerate with pain   Prior GI intolerance to fosamax and boniva   Will address further next visit     3 month f/u with all 4 labs

## 2018-03-13 NOTE — PROGRESS NOTES
"Subjective:       Patient ID: Qi Ortiz is a 67 y.o. female.    Chief Complaint: Follow-up    HPI   Follow-up  66yo female presents today for follow-up. She has had recent lab update and is here for review of results. She notes continuation of B12 and folic acid supplements. She has been seen by Dr. Mckinney with regard to iron deficiency but plans for iron infusion were cancelled due to stable labs and multiple allergies. She is planning to undergo EGD and C scope to be obtained at [facility] per Dr. Shawn Schulte. There is no date scheduled but she plans to pursue this as a hospital admission "after Easter". She has passed some clots per rectum- the most recent was 2 days ago. She notes having hemorrhoids as well. No hematuria is reported and no unusual bruising or bleeding is reported. She is on Plavix routinely. She will see Rheumatology later today for Stelara and reports she is especially achy diffusely. She continues with Synthroid and current levels are stable. There are no symptoms of concern for imbalance. A1c levels are stable. She admits to dietary indiscretion. There are no symptoms of hyper or hypoglycemia reported. She notes intermittent CP with exertional activity. No active chest pain is reported. She will see Cardiology later this month for assessment.    Review of Systems   Constitutional: Negative for activity change, appetite change, fatigue and unexpected weight change.   HENT: Negative for congestion, ear pain and sinus pressure.    Eyes: Positive for visual disturbance.   Respiratory: Positive for shortness of breath. Negative for cough.    Cardiovascular: Negative for chest pain and palpitations.   Gastrointestinal: Negative for abdominal pain, constipation and diarrhea.        +hemorrhoids   Endocrine: Negative for cold intolerance and heat intolerance.   Genitourinary: Negative for decreased urine volume and difficulty urinating.   Musculoskeletal: Positive for arthralgias. " "Negative for myalgias.   Skin: Negative for rash.   Neurological: Negative for dizziness and weakness.   Hematological: Does not bruise/bleed easily.   Psychiatric/Behavioral: Negative for sleep disturbance. The patient is not nervous/anxious.        Objective:   /78   Pulse 76   Temp 96.9 °F (36.1 °C) (Tympanic)   Ht 5' 3" (1.6 m)   Wt 114.1 kg (251 lb 10.5 oz)   SpO2 98%   BMI 44.58 kg/m²   Physical Exam   Constitutional: She is oriented to person, place, and time. She appears well-developed. No distress.   Morbidly obese, non-toxic   HENT:   Head: Normocephalic and atraumatic.   Right Ear: Tympanic membrane, external ear and ear canal normal.   Left Ear: Tympanic membrane, external ear and ear canal normal.   Nose: Nose normal.   Mouth/Throat: Oropharynx is clear and moist.   Eyes: Conjunctivae and EOM are normal. Right pupil is not round. Right pupil is reactive. Left pupil is round and reactive.       Neck: Normal range of motion. Neck supple.   Cardiovascular: Normal rate and regular rhythm.    Murmur heard.  Pulmonary/Chest: Effort normal and breath sounds normal.   Abdominal: Soft. Bowel sounds are normal.   Musculoskeletal: She exhibits no edema.   Neurological: She is alert and oriented to person, place, and time.   Skin: Skin is warm and dry. She is not diaphoretic.   Psychiatric: She has a normal mood and affect. Her behavior is normal.       Assessment:       1. Iron deficiency anemia due to chronic blood loss    2. Folic acid deficiency    3. B12 deficiency    4. Psoriatic arthritis    5. Prediabetes    6. Hypothyroidism, unspecified type    7. Essential hypertension    8. Angina, class II    9. Chronic obstructive pulmonary disease, unspecified COPD type    10. Morbid obesity with BMI of 40.0-44.9, adult    11. History of CVA (cerebrovascular accident)    12. Allergy to statin medication    13. S/P CABG (coronary artery bypass graft)        Plan:      Iron deficiency anemia due to chronic " blood loss  Reviewed notes with the patient from Hematology- she was uncertain as to why her infusions were cancelled. Discussed dietary sources of iron as well as the need to proceed with EGD and C scope.     Folic acid deficiency  Continue with supplementation. Will plan to repeat levels within the next 6 months.  -     Folate; Future; Expected date: 03/13/2018    B12 deficiency  Discussed oral supplementation- have advised this has similar efficacy to injection therapy which was deferred by Heme/Onc. She will maintain daily supplementation.  -     VITAMIN B12; Future; Expected date: 03/13/2018    Psoriatic arthritis  Continue with current treatment and follow-up recommendations as per Rheumatology.    Prediabetes  Stable. Counseled with regard to healthy eating patterns. Will continue with intermittent A1c assessment.  -     HEMOGLOBIN A1C; Future; Expected date: 03/13/2018    Hypothyroidism, unspecified type  Stable. Continuation of current Synthroid dosing is advised.  -     TSH; Future; Expected date: 03/13/2018  -     T4, free; Future; Expected date: 03/13/2018    Essential hypertension  Stable. Continue with current treatment. Target BP goal remains<140/90. Heart healthy diet is advised. Intermittent home monitoring has been discussed.    Angina, class II  Intermittent CP symptoms are reported with exertion. Patient will keep pending appt with Cardiology as scheduled.    Chronic obstructive pulmonary disease, unspecified COPD type  Stable per patient. Continue with current treatment and follow-up recommendations as per Pulmonology.    Morbid obesity with BMI of 40.0-44.9, adult  Weight loss efforts remain encouraged through diet and lifestyle measures.    History of CVA (cerebrovascular accident)  As above. BP control is advised. Allergy to statin reported and now on Repatha per Cardiology.    Allergy to statin medication  As above.    S/P CABG (coronary artery bypass graft)  As above.    Spent considerable  length of time today counseling with regard to chronic disease management including the need for further evaluation with EGD/C scope for chronic blood loss. By her own account she has had issues with hemorrhoidal bleeding- she declines assessment of hemorrhoids. Have advised with regard to nutrition given prediabetes and dyslipidemia. Reviewed vitamin supplementation as well. Ample time was allotted for questions/answers regarding her current medical issues. She will be scheduled for follow-up within the next 3 months but knows to contact me sooner if needed.    Total visit time of 25 minutes with greater than half the visit time dedicated to counseling and coordinating care.

## 2018-03-15 ENCOUNTER — TELEPHONE (OUTPATIENT)
Dept: CARDIOLOGY | Facility: CLINIC | Age: 68
End: 2018-03-15

## 2018-03-15 DIAGNOSIS — I10 ESSENTIAL HYPERTENSION: ICD-10-CM

## 2018-03-15 DIAGNOSIS — R07.2 PRECORDIAL PAIN: ICD-10-CM

## 2018-03-15 RX ORDER — VALSARTAN AND HYDROCHLOROTHIAZIDE 160; 12.5 MG/1; MG/1
1 TABLET, FILM COATED ORAL DAILY
Qty: 90 TABLET | Refills: 3 | Status: SHIPPED | OUTPATIENT
Start: 2018-03-15 | End: 2019-03-19 | Stop reason: SDUPTHER

## 2018-03-15 RX ORDER — METOPROLOL SUCCINATE 100 MG/1
TABLET, EXTENDED RELEASE ORAL
Qty: 135 TABLET | Refills: 3 | Status: SHIPPED | OUTPATIENT
Start: 2018-03-15 | End: 2019-01-31

## 2018-03-15 RX ORDER — CLOPIDOGREL BISULFATE 75 MG/1
75 TABLET ORAL DAILY
Qty: 90 TABLET | Refills: 3 | Status: SHIPPED | OUTPATIENT
Start: 2018-03-15 | End: 2019-03-19 | Stop reason: SDUPTHER

## 2018-03-15 RX ORDER — NITROGLYCERIN 0.4 MG/1
0.4 TABLET SUBLINGUAL EVERY 5 MIN PRN
Qty: 100 TABLET | Refills: 3 | Status: SHIPPED | OUTPATIENT
Start: 2018-03-15 | End: 2019-03-19 | Stop reason: SDUPTHER

## 2018-03-15 NOTE — TELEPHONE ENCOUNTER
Returned call. Patient [facility], due rule change and will only accept printed rx.    Patient states will  , next week at [facility].

## 2018-03-15 NOTE — TELEPHONE ENCOUNTER
----- Message from Priti Nath sent at 3/15/2018 10:32 AM CDT -----  Contact: Pt  She is calling in regards to requesting a refill on her prescription (plavix 75 mg) and stated that she will pick it up.    Pt can be reached at [phone] ([phone])

## 2018-03-19 ENCOUNTER — TELEPHONE (OUTPATIENT)
Dept: RHEUMATOLOGY | Facility: CLINIC | Age: 68
End: 2018-03-19

## 2018-03-19 NOTE — TELEPHONE ENCOUNTER
Spoke with Ms. Diana and scheduled a nurse visit for her Stelara 90mg in office injection for 03/21/2018 at 10:00am. Request for extra visits for the remainder of the year sent to Fatoumata Marcos.

## 2018-03-21 ENCOUNTER — CLINICAL SUPPORT (OUTPATIENT)
Dept: RHEUMATOLOGY | Facility: CLINIC | Age: 68
End: 2018-03-21
Payer: MEDICARE

## 2018-03-21 DIAGNOSIS — L40.50 PSORIATIC ARTHRITIS: ICD-10-CM

## 2018-03-21 PROCEDURE — 99999 PR PBB SHADOW E&M-EST. PATIENT-LVL II: CPT | Mod: PBBFAC,,,

## 2018-03-21 PROCEDURE — 96372 THER/PROPH/DIAG INJ SC/IM: CPT | Mod: PBBFAC,PO

## 2018-03-21 PROCEDURE — 99212 OFFICE O/P EST SF 10 MIN: CPT | Mod: PBBFAC,PO,25

## 2018-03-21 RX ADMIN — USTEKINUMAB 90 MG: 90 INJECTION, SOLUTION SUBCUTANEOUS at 10:03

## 2018-03-21 NOTE — PROGRESS NOTES
stelara 90 mg given in left lower abdomen. Pt tolerated injected well. Pt instructed to wait 15 min. She declined.    Lot [id number]  Exp 6/2019

## 2018-03-27 ENCOUNTER — OFFICE VISIT (OUTPATIENT)
Dept: CARDIOLOGY | Facility: CLINIC | Age: 68
End: 2018-03-27
Payer: MEDICARE

## 2018-03-27 VITALS
BODY MASS INDEX: 44.73 KG/M2 | HEART RATE: 60 BPM | WEIGHT: 252.44 LBS | DIASTOLIC BLOOD PRESSURE: 68 MMHG | HEIGHT: 63 IN | SYSTOLIC BLOOD PRESSURE: 134 MMHG

## 2018-03-27 DIAGNOSIS — I20.9 ANGINA, CLASS II: Chronic | ICD-10-CM

## 2018-03-27 DIAGNOSIS — E66.01 MORBID OBESITY WITH BMI OF 40.0-44.9, ADULT: ICD-10-CM

## 2018-03-27 DIAGNOSIS — R06.00 DYSPNEA, UNSPECIFIED TYPE: ICD-10-CM

## 2018-03-27 DIAGNOSIS — R07.9 ACUTE CHEST PAIN: ICD-10-CM

## 2018-03-27 DIAGNOSIS — G47.33 OSA (OBSTRUCTIVE SLEEP APNEA): ICD-10-CM

## 2018-03-27 DIAGNOSIS — Z95.1 S/P CABG (CORONARY ARTERY BYPASS GRAFT): Chronic | ICD-10-CM

## 2018-03-27 DIAGNOSIS — I10 ESSENTIAL HYPERTENSION: Chronic | ICD-10-CM

## 2018-03-27 DIAGNOSIS — I25.708 CORONARY ARTERY DISEASE INVOLVING CORONARY BYPASS GRAFT OF NATIVE HEART WITH OTHER FORMS OF ANGINA PECTORIS: Primary | ICD-10-CM

## 2018-03-27 DIAGNOSIS — R07.1 CHEST PAIN ON BREATHING: ICD-10-CM

## 2018-03-27 DIAGNOSIS — I65.29 STENOSIS OF CAROTID ARTERY, UNSPECIFIED LATERALITY: ICD-10-CM

## 2018-03-27 DIAGNOSIS — E78.00 PURE HYPERCHOLESTEROLEMIA: Chronic | ICD-10-CM

## 2018-03-27 PROCEDURE — 99215 OFFICE O/P EST HI 40 MIN: CPT | Mod: S$PBB,,, | Performed by: INTERNAL MEDICINE

## 2018-03-27 PROCEDURE — 99999 PR PBB SHADOW E&M-EST. PATIENT-LVL IV: CPT | Mod: PBBFAC,,, | Performed by: INTERNAL MEDICINE

## 2018-03-27 PROCEDURE — 99214 OFFICE O/P EST MOD 30 MIN: CPT | Mod: PBBFAC | Performed by: INTERNAL MEDICINE

## 2018-03-27 NOTE — PROGRESS NOTES
aSubjective:   Patient ID:  Qi Ortiz is a 67 y.o. female who presents for cardiac consult of Coronary Artery Disease and Hypertension      HPI  The patient came in today for cardiac consult of Coronary Artery Disease and Hypertension      67 year old female pt with PMHx CAD s/p CABG, HTN, HLD, carotid stenosis, depression, CKD presents for follow up visit.    12/26/18 Visit  She presented to [facility]- with complaints of chest pain. Associated symptoms fever, congestion and wheezing. Troponin mildy elevated and trended downward. . Lactic acid normal. Cardiology consulted, mildly elevated troponin due to COPD/bronchitis. Patient started on Prednisone, Duo nebs and Avelox.  Today pt explains that she cannot take prednisone orally due to an interaction with her stomach so she has not taken it. She also has not taken Avelox due to a list of adverse effects or black box warnings on it and wants to get penicillin instead. She continues to have a lot of wheezing, cough, rhonchi. No chest pain. She is to see her PCP later and will decide what medications she wants to take for her bronchitis.     3/27/18  Has been doing well with Antonio. Pt was jogging outsisde had left side chest pain and left arm pain, pain resolves with rest; did not have NTG was in the house. Pt just sat outside on porch and symptoms resolved. Pain felt tight, several minutes. PT does have pain these days but usually due to arthritis.  Of note pt discussed an issue after last visit she was told to go to ED after PCP visit but left due to a long wait and did neb treatments at home, she apparently refused steroids and antibiotics per notes but denies it, unsure the actual course but felt ok at home eventually.       Past Medical History:   Diagnosis Date    Carotid stenosis     19%    COPD (chronic obstructive pulmonary disease)     No meds    Coronary artery disease     CVA (cerebral vascular accident)     Dr. Yasmin Amaya      Double ectopic ureters     Dr. Porras    Hyperlipidemia     Hypertension     Hypothyroid     OP (osteoporosis)     IRIS (obstructive sleep apnea)     Dr. Hope    Psoriatic arthritis     Rheumatology       Past Surgical History:   Procedure Laterality Date    BREAST BIOPSY      R sided/benign    CARDIAC SURGERY      2016    CERVICAL FUSION      CHOLECYSTECTOMY      CORONARY ANGIOPLASTY      CORONARY ARTERY BYPASS GRAFT      triple bypass    CORONARY STENT PLACEMENT      EYE SURGERY      INTRAUTERINE DEVICE INSERTION      mass removed from R groin      TOTAL ABDOMINAL HYSTERECTOMY W/ BILATERAL SALPINGOOPHORECTOMY      due to benign mass, adhesions    TUBAL LIGATION         Social History   Substance Use Topics    Smoking status: Never Smoker    Smokeless tobacco: Never Used    Alcohol use No       Family History   Problem Relation Age of Onset    Breast cancer Maternal Grandfather     Breast cancer Paternal Aunt     Stroke      Breast cancer Sister 60    Leukemia Sister 8      as child    Lung cancer Paternal Grandfather     Heart disease         Patient's Medications   New Prescriptions    No medications on file   Previous Medications    ALBUTEROL (PROVENTIL) 2.5 MG /3 ML (0.083 %) NEBULIZER SOLUTION    Take 3 mLs (2.5 mg total) by nebulization every 6 (six) hours as needed for Wheezing. Rescue    ASPIRIN 81 MG CHEW    Take 81 mg by mouth once daily.    CHLORHEXIDINE (HIBICLENS) 4 % EXTERNAL LIQUID    Hibiclens 4 % topical liquid   Apply 1 application twice a day by topical route as directed for 90 days.    CLOPIDOGREL (PLAVIX) 75 MG TABLET    Take 1 tablet (75 mg total) by mouth once daily.    CYANOCOBALAMIN 2000 MCG TABLET    Take 2,000 mcg by mouth once daily.    EVOLOCUMAB (REPATHA SURECLICK) 140 MG/ML PNIJ    Inject 140 mg into the skin every 14 (fourteen) days.    FOLIC ACID (FOLVITE) 1 MG TABLET    Take 1 tablet (1 mg total) by mouth once daily.    FUROSEMIDE (LASIX) 20  MG TABLET    Take 0.5 tablets (10 mg total) by mouth 2 (two) times daily.    GABAPENTIN (NEURONTIN) 100 MG CAPSULE    Take 2 capsules (200 mg total) by mouth 3 (three) times daily as needed.    GARLIC 2,000 MG CAP    Take 3,000 mg by mouth once daily.     LEVOTHYROXINE (SYNTHROID) 112 MCG TABLET    Take 1 tablet (112 mcg total) by mouth once daily.    METOPROLOL SUCCINATE (TOPROL-XL) 100 MG 24 HR TABLET    1 tab (100mg) in morning. Half tab (50mg) at bedtime.  Generic ok    MUPIROCIN (BACTROBAN) 2 % OINTMENT    Bactroban 2 % topical ointment   Apply 1 application twice a day by topical route as directed for 30 days.    NITROGLYCERIN (NITROSTAT) 0.4 MG SL TABLET    Place 1 tablet (0.4 mg total) under the tongue every 5 (five) minutes as needed for Chest pain.    PYRIDOXINE (VITAMIN B-6) 100 MG TAB    Take 200 mg by mouth once daily.     VALSARTAN-HYDROCHLOROTHIAZIDE (DIOVAN-HCT) 160-12.5 MG PER TABLET    Take 1 tablet by mouth once daily.    VITAMIN D 1000 UNITS TAB    Take 185 mg by mouth once daily.   Modified Medications    No medications on file   Discontinued Medications    No medications on file       Review of Systems   Constitutional: Negative.  Negative for malaise/fatigue.   HENT: Negative.    Eyes: Negative.    Respiratory: Positive for shortness of breath. Negative for cough, sputum production and wheezing.    Cardiovascular: Positive for chest pain. Negative for palpitations.   Gastrointestinal: Negative for heartburn.   Genitourinary: Negative.    Musculoskeletal: Negative.    Skin: Negative.    Neurological: Negative.    Endo/Heme/Allergies: Negative.    Psychiatric/Behavioral: The patient is nervous/anxious.    All 12 systems otherwise negative.      Wt Readings from Last 3 Encounters:   03/27/18 114.5 kg (252 lb 6.8 oz)   03/13/18 114.1 kg (251 lb 8.7 oz)   03/13/18 114.1 kg (251 lb 10.5 oz)     Temp Readings from Last 3 Encounters:   03/13/18 96.9 °F (36.1 °C) (Tympanic)   01/30/18 97.9 °F (36.6 °C)  "(Oral)   01/08/18 98.3 °F (36.8 °C) (Temporal)     BP Readings from Last 3 Encounters:   03/27/18 134/68   03/13/18 130/63   03/13/18 122/78     Pulse Readings from Last 3 Encounters:   03/27/18 60   03/13/18 67   03/13/18 76       /68 (BP Method: Large (Manual))   Pulse 60   Ht 5' 3" (1.6 m)   Wt 114.5 kg (252 lb 6.8 oz)   BMI 44.72 kg/m²     Objective:   Physical Exam   Constitutional: She is oriented to person, place, and time. She appears well-developed and well-nourished. No distress.   obese   HENT:   Head: Normocephalic and atraumatic.   Nose: Nose normal.   Mouth/Throat: Oropharynx is clear and moist.   Eyes: Conjunctivae and EOM are normal. No scleral icterus.   Neck: Normal range of motion. Neck supple. No JVD present. No thyromegaly present.   Cardiovascular: Normal rate, regular rhythm, S1 normal and S2 normal.  Exam reveals no gallop, no S3, no S4 and no friction rub.    Murmur heard.   Harsh midsystolic murmur is present with a grade of 3/6  at the upper right sternal border radiating to the neck  Pulmonary/Chest: No stridor. She is in respiratory distress. She has wheezes. She has no rales. She exhibits no tenderness.   Abdominal: Soft. Bowel sounds are normal. She exhibits no distension and no mass. There is no tenderness. There is no rebound.   Genitourinary:   Genitourinary Comments: Deferred   Musculoskeletal: Normal range of motion. She exhibits no edema, tenderness or deformity.   Lymphadenopathy:     She has no cervical adenopathy.   Neurological: She is alert and oriented to person, place, and time. She exhibits normal muscle tone. Coordination normal.   Skin: Skin is warm and dry. No rash noted. She is not diaphoretic. No erythema. No pallor.   Psychiatric: She has a normal mood and affect. Her behavior is normal. Judgment and thought content normal.   Nursing note and vitals reviewed.      Lab Results   Component Value Date     03/07/2018     03/07/2018    K 4.0 " 03/07/2018    K 4.0 03/07/2018     03/07/2018     03/07/2018    CO2 27 03/07/2018    CO2 27 03/07/2018    BUN 16 03/07/2018    BUN 16 03/07/2018    CREATININE 0.9 03/07/2018    CREATININE 0.9 03/07/2018     (H) 03/07/2018     (H) 03/07/2018    HGBA1C 5.8 (H) 03/07/2018    MG 1.8 12/18/2017    AST 22 03/07/2018    AST 22 03/07/2018    ALT 17 03/07/2018    ALT 17 03/07/2018    ALBUMIN 3.7 03/07/2018    ALBUMIN 3.7 03/07/2018    PROT 7.2 03/07/2018    PROT 7.2 03/07/2018    BILITOT 0.3 03/07/2018    BILITOT 0.3 03/07/2018    WBC 5.99 03/07/2018    HGB 12.3 03/07/2018    HCT 38.1 03/07/2018    MCV 82 03/07/2018     03/07/2018    INR 1.0 12/18/2017    TSH 1.361 03/07/2018    CHOL 123 12/18/2017    HDL 59 12/18/2017    LDLCALC 50.8 (L) 12/18/2017    TRIG 66 12/18/2017     (H) 12/18/2017     Assessment:      1. Coronary artery disease involving coronary bypass graft of native heart with other forms of angina pectoris    2. S/P CABG (coronary artery bypass graft)    3. Essential hypertension    4. Pure hypercholesterolemia    5. Stenosis of carotid artery, unspecified laterality    6. IRIS (obstructive sleep apnea)    7. Chest pain on breathing    8. Morbid obesity with BMI of 40.0-44.9, adult    9. Angina, class II    10. Acute chest pain    11. Dyspnea, unspecified type        Plan:   1. CAD s/p CABG with chest pain, exertional angina  - cont meds; PRN NTG  - will order pharm nuclear stress, if abnormal increase antianginal meds  - pt cannot walk due to joint pain    2. Moderate to Severe AS  - cont to monitor    3. HTN  - cont meds  - off diovan now, PRN lasix    4. HLD  - cont Repatha    5. IRIS  - needs CPAP, but could not tolerate mask    Thank you for allowing me to participate in this patient's care. Please do not hesitate to contact me with any questions or concerns. Consult note has been forwarded to the referral physician.

## 2018-04-03 ENCOUNTER — LAB VISIT (OUTPATIENT)
Dept: LAB | Facility: HOSPITAL | Age: 68
End: 2018-04-03
Attending: FAMILY MEDICINE
Payer: MEDICARE

## 2018-04-03 DIAGNOSIS — E53.8 FOLIC ACID DEFICIENCY: ICD-10-CM

## 2018-04-03 DIAGNOSIS — R73.03 PREDIABETES: ICD-10-CM

## 2018-04-03 DIAGNOSIS — E53.8 B12 DEFICIENCY: ICD-10-CM

## 2018-04-03 DIAGNOSIS — E03.9 HYPOTHYROIDISM, UNSPECIFIED TYPE: ICD-10-CM

## 2018-04-03 LAB
ESTIMATED AVG GLUCOSE: 120 MG/DL
FOLATE SERPL-MCNC: 16.6 NG/ML
HBA1C MFR BLD HPLC: 5.8 %
T4 FREE SERPL-MCNC: 1 NG/DL
TSH SERPL DL<=0.005 MIU/L-ACNC: 1.36 UIU/ML
VIT B12 SERPL-MCNC: 744 PG/ML

## 2018-04-03 PROCEDURE — 36415 COLL VENOUS BLD VENIPUNCTURE: CPT | Mod: PO

## 2018-04-03 PROCEDURE — 82746 ASSAY OF FOLIC ACID SERUM: CPT

## 2018-04-03 PROCEDURE — 84443 ASSAY THYROID STIM HORMONE: CPT

## 2018-04-03 PROCEDURE — 83036 HEMOGLOBIN GLYCOSYLATED A1C: CPT

## 2018-04-03 PROCEDURE — 84439 ASSAY OF FREE THYROXINE: CPT

## 2018-04-03 PROCEDURE — 82607 VITAMIN B-12: CPT

## 2018-04-05 ENCOUNTER — HOSPITAL ENCOUNTER (OUTPATIENT)
Dept: RADIOLOGY | Facility: HOSPITAL | Age: 68
Discharge: HOME | End: 2018-04-05
Attending: INTERNAL MEDICINE
Payer: MEDICARE

## 2018-04-05 ENCOUNTER — CLINICAL SUPPORT (OUTPATIENT)
Dept: CARDIOLOGY | Facility: CLINIC | Age: 68
End: 2018-04-05
Attending: INTERNAL MEDICINE
Payer: MEDICARE

## 2018-04-05 DIAGNOSIS — I25.708 CORONARY ARTERY DISEASE INVOLVING CORONARY BYPASS GRAFT OF NATIVE HEART WITH OTHER FORMS OF ANGINA PECTORIS: ICD-10-CM

## 2018-04-05 DIAGNOSIS — R07.1 CHEST PAIN ON BREATHING: ICD-10-CM

## 2018-04-05 DIAGNOSIS — R06.00 DYSPNEA, UNSPECIFIED TYPE: ICD-10-CM

## 2018-04-05 DIAGNOSIS — R07.9 ACUTE CHEST PAIN: ICD-10-CM

## 2018-04-05 DIAGNOSIS — Z95.1 S/P CABG (CORONARY ARTERY BYPASS GRAFT): Chronic | ICD-10-CM

## 2018-04-05 LAB — DIASTOLIC DYSFUNCTION: NO

## 2018-04-05 PROCEDURE — 93018 CV STRESS TEST I&R ONLY: CPT | Mod: S$PBB,,, | Performed by: NUCLEAR MEDICINE

## 2018-04-05 PROCEDURE — 93016 CV STRESS TEST SUPVJ ONLY: CPT | Mod: S$PBB,,, | Performed by: NUCLEAR MEDICINE

## 2018-04-05 PROCEDURE — 78452 HT MUSCLE IMAGE SPECT MULT: CPT | Mod: 26,,, | Performed by: NUCLEAR MEDICINE

## 2018-04-05 PROCEDURE — A9502 TC99M TETROFOSMIN: HCPCS | Mod: PO

## 2018-04-09 RX ORDER — RANOLAZINE 500 MG/1
500 TABLET, EXTENDED RELEASE ORAL 2 TIMES DAILY
Qty: 60 TABLET | Refills: 11 | Status: SHIPPED | OUTPATIENT
Start: 2018-04-09 | End: 2018-06-07

## 2018-04-10 ENCOUNTER — TELEPHONE (OUTPATIENT)
Dept: CARDIOLOGY | Facility: CLINIC | Age: 68
End: 2018-04-10

## 2018-04-10 NOTE — TELEPHONE ENCOUNTER
Returned call to patient and gave results of Nuclear Stress Test along with medication order--patient verbalizes understanding

## 2018-04-10 NOTE — TELEPHONE ENCOUNTER
----- Message from Sara Hernandez sent at 4/9/2018 10:58 AM CDT -----  I have attempted without success to contact this patient by phone to inform them of their results. I left a message for them to call back at [phone].

## 2018-05-28 ENCOUNTER — PATIENT OUTREACH (OUTPATIENT)
Dept: ADMINISTRATIVE | Facility: HOSPITAL | Age: 68
End: 2018-05-28

## 2018-06-07 ENCOUNTER — OFFICE VISIT (OUTPATIENT)
Dept: FAMILY MEDICINE | Facility: CLINIC | Age: 68
End: 2018-06-07
Payer: MEDICARE

## 2018-06-07 VITALS
HEART RATE: 84 BPM | RESPIRATION RATE: 18 BRPM | DIASTOLIC BLOOD PRESSURE: 80 MMHG | WEIGHT: 255.19 LBS | SYSTOLIC BLOOD PRESSURE: 130 MMHG | TEMPERATURE: 98 F | HEIGHT: 64 IN | BODY MASS INDEX: 43.57 KG/M2 | OXYGEN SATURATION: 98 %

## 2018-06-07 DIAGNOSIS — J98.4 CHRONIC RESTRICTIVE LUNG DISEASE: ICD-10-CM

## 2018-06-07 DIAGNOSIS — G47.30 SLEEP APNEA, UNSPECIFIED TYPE: ICD-10-CM

## 2018-06-07 DIAGNOSIS — E03.9 HYPOTHYROIDISM, UNSPECIFIED TYPE: Primary | ICD-10-CM

## 2018-06-07 DIAGNOSIS — E66.01 MORBID OBESITY WITH BMI OF 40.0-44.9, ADULT: ICD-10-CM

## 2018-06-07 DIAGNOSIS — L40.50 PSORIATIC ARTHRITIS: ICD-10-CM

## 2018-06-07 DIAGNOSIS — Z86.73 HISTORY OF CVA (CEREBROVASCULAR ACCIDENT): ICD-10-CM

## 2018-06-07 DIAGNOSIS — E53.8 B12 DEFICIENCY: ICD-10-CM

## 2018-06-07 DIAGNOSIS — Z88.8 ALLERGY TO STATIN MEDICATION: ICD-10-CM

## 2018-06-07 DIAGNOSIS — Z95.1 S/P CABG (CORONARY ARTERY BYPASS GRAFT): ICD-10-CM

## 2018-06-07 DIAGNOSIS — E53.8 FOLIC ACID DEFICIENCY: ICD-10-CM

## 2018-06-07 DIAGNOSIS — J44.9 CHRONIC OBSTRUCTIVE PULMONARY DISEASE, UNSPECIFIED COPD TYPE: ICD-10-CM

## 2018-06-07 DIAGNOSIS — R73.03 PREDIABETES: ICD-10-CM

## 2018-06-07 DIAGNOSIS — I10 ESSENTIAL HYPERTENSION: ICD-10-CM

## 2018-06-07 DIAGNOSIS — I20.9 ANGINA, CLASS II: ICD-10-CM

## 2018-06-07 PROCEDURE — 99214 OFFICE O/P EST MOD 30 MIN: CPT | Mod: S$PBB,,, | Performed by: FAMILY MEDICINE

## 2018-06-07 PROCEDURE — 99213 OFFICE O/P EST LOW 20 MIN: CPT | Mod: PBBFAC,PO | Performed by: FAMILY MEDICINE

## 2018-06-07 PROCEDURE — 99999 PR PBB SHADOW E&M-EST. PATIENT-LVL III: CPT | Mod: PBBFAC,,, | Performed by: FAMILY MEDICINE

## 2018-06-07 RX ORDER — LEVOTHYROXINE SODIUM 112 UG/1
112 TABLET ORAL DAILY
Qty: 90 TABLET | Refills: 1 | Status: SHIPPED | OUTPATIENT
Start: 2018-06-07 | End: 2018-10-05 | Stop reason: SDUPTHER

## 2018-06-07 RX ORDER — FOLIC ACID 1 MG/1
1 TABLET ORAL DAILY
Qty: 90 TABLET | Refills: 1 | Status: SHIPPED | OUTPATIENT
Start: 2018-06-07 | End: 2018-10-05 | Stop reason: SDUPTHER

## 2018-06-07 NOTE — PROGRESS NOTES
Subjective:       Patient ID: Qi Ortiz is a 68 y.o. female.    Chief Complaint: Chronic Care    HPI   Chronic Care  67yo female presents today for chronic care assessment. She had updated labs in April 2018 and is here for review of results. She also reports undergoing a stress test per Cardiology in April and has not had any discussion regarding findings. Per documentation from Dr. Mcfadden findings were mildly abnormal and Ranexa was prescribed. Patient reports intolerance to use and she has not been taking the medication. She does have SL NTG for which she has previously required use- nothing of late. Intermittent palpitations and CP is reported with activity. Generally there is relief with rest. She does have further follow-up in July and denies any active chest pain currently. Thyroid studies are stable with Synthroid 112mcg daily. No symptoms concerning for imbalance are reported. She remains on folic acid and B12 supplements. She has not had assessment per Hematology and has also not had GI evaluation as discussed on multiple previous occasions. She continues with see Rheumatology and reports persistent issues with joint pain. There have been no ER visits or hospitalizations since last assessment.    Review of Systems   Constitutional: Negative for appetite change and fatigue.   HENT: Negative for congestion, ear pain and sinus pressure.    Eyes: Negative for visual disturbance.   Respiratory: Positive for shortness of breath. Negative for cough.    Cardiovascular: Negative for chest pain, palpitations and leg swelling.   Gastrointestinal: Negative for abdominal pain, constipation and diarrhea.   Endocrine: Positive for heat intolerance. Negative for cold intolerance.   Genitourinary: Negative for decreased urine volume and difficulty urinating.   Musculoskeletal: Positive for arthralgias. Negative for myalgias.   Skin: Negative for rash.   Neurological: Negative for dizziness and weakness.  "  Psychiatric/Behavioral: Negative for dysphoric mood and sleep disturbance. The patient is nervous/anxious.        Objective:   /80   Pulse 84   Temp 97.6 °F (36.4 °C) (Temporal)   Resp 18   Ht 5' 3.5" (1.613 m)   Wt 115.8 kg (255 lb 2.9 oz)   SpO2 98%   BMI 44.49 kg/m²   Physical Exam   Constitutional: She is oriented to person, place, and time. She appears well-developed and well-nourished. No distress.   Morbidly obese, non-toxic   HENT:   Head: Normocephalic and atraumatic.   Right Ear: External ear normal.   Left Ear: External ear normal.   Nose: Nose normal.   Mouth/Throat: Oropharynx is clear and moist.   Eyes: Conjunctivae and EOM are normal. Right pupil is not round. Left pupil is round and reactive.       Neck: Normal range of motion. Neck supple.   Cardiovascular: Normal rate and regular rhythm.    Murmur heard.  Pulmonary/Chest: Effort normal and breath sounds normal.   Abdominal: Soft. Bowel sounds are normal.   Musculoskeletal: She exhibits no edema.   Neurological: She is alert and oriented to person, place, and time.   Skin: Skin is warm and dry. She is not diaphoretic.   Psychiatric: She has a normal mood and affect. Her behavior is normal.       Assessment:       1. Hypothyroidism, unspecified type    2. Folic acid deficiency    3. B12 deficiency    4. Angina, class II    5. Psoriatic arthritis    6. Prediabetes    7. Essential hypertension    8. Chronic restrictive lung disease    9. Sleep apnea, unspecified type    10. Chronic obstructive pulmonary disease, unspecified COPD type    11. Morbid obesity with BMI of 40.0-44.9, adult    12. History of CVA (cerebrovascular accident)    13. Allergy to statin medication    14. S/P CABG (coronary artery bypass graft)        Plan:      Hypothyroidism, unspecified type  Stable. Continue with current Synthroid dosing. Will plan to reassess levels within the next 6 months, sooner if she notes symptoms of imbalance.  -     levothyroxine " (SYNTHROID) 112 MCG tablet; Take 1 tablet (112 mcg total) by mouth once daily.  Dispense: 90 tablet; Refill: 1  -     TSH; Future; Expected date: 06/07/2018  -     T4, free; Future; Expected date: 06/07/2018    Folic acid deficiency  Stable and improved. Will continue with current folic acid dosing.  -     folic acid (FOLVITE) 1 MG tablet; Take 1 tablet (1 mg total) by mouth once daily.  Dispense: 90 tablet; Refill: 1    B12 deficiency  Stable and improved. Reviewed target goals. Continuation of supplementation is advised. She also needs updated assessment per Hematology and further workup with regard to GI symptoms but declines.    Angina, class II  Continue with current treatment and follow-up recommendations per Cardiology. Will send a message to Dr. Mcfadden regarding patient's recent stress test and intolerance to Ranexa. In the interim the patient has been advised to utilize NTG if she experiences symptoms of CP.    Psoriatic arthritis  Continue with current treatment and follow-up recommendations as per Rheumatology.    Prediabetes  -     Hemoglobin A1c; Future; Expected date: 06/07/2018    Essential hypertension  Stable. Continue with current treatment. Target BP goal remains<140/90. Heart healthy diet is advised. Intermittent home monitoring has been discussed.  -     Comprehensive metabolic panel; Future; Expected date: 06/07/2018    Chronic restrictive lung disease  Continue with current treatment and follow-up recommendations per Pulmonology. Patient has not had a visit in nearly 2 years- will arrange.    Sleep apnea, unspecified type  Intolerant to CPAP. Recommend updated assessment with Pulmonology as above.    Chronic obstructive pulmonary disease, unspecified COPD type  As above. Intolerant to albuterol use.    Morbid obesity with BMI of 40.0-44.9, adult  Weight loss efforts remain encouraged through diet and lifestyle measures.    History of CVA (cerebrovascular accident)  BP and lipid control remain  advised.    Allergy to statin medication  Multiple allergies to medication reported. Heart healthy diet is advised.    S/P CABG (coronary artery bypass graft)  See Cardiology as planned.    Total visit time of 25 minutes with greater than half the visit time dedicated to counseling and coordinating care.

## 2018-06-14 ENCOUNTER — LAB VISIT (OUTPATIENT)
Dept: LAB | Facility: HOSPITAL | Age: 68
End: 2018-06-14
Attending: PHYSICIAN ASSISTANT
Payer: MEDICARE

## 2018-06-14 ENCOUNTER — OFFICE VISIT (OUTPATIENT)
Dept: RHEUMATOLOGY | Facility: CLINIC | Age: 68
End: 2018-06-14
Payer: MEDICARE

## 2018-06-14 VITALS
WEIGHT: 254.88 LBS | HEIGHT: 63 IN | SYSTOLIC BLOOD PRESSURE: 125 MMHG | HEART RATE: 67 BPM | DIASTOLIC BLOOD PRESSURE: 68 MMHG | BODY MASS INDEX: 45.16 KG/M2

## 2018-06-14 DIAGNOSIS — L40.50 PSORIATIC ARTHRITIS: Chronic | ICD-10-CM

## 2018-06-14 DIAGNOSIS — L40.9 PSORIASIS: Primary | ICD-10-CM

## 2018-06-14 DIAGNOSIS — Z51.81 MEDICATION MONITORING ENCOUNTER: ICD-10-CM

## 2018-06-14 DIAGNOSIS — R53.81 OTHER MALAISE: ICD-10-CM

## 2018-06-14 DIAGNOSIS — M85.89 OSTEOPENIA OF MULTIPLE SITES: ICD-10-CM

## 2018-06-14 DIAGNOSIS — L40.9 PSORIASIS: ICD-10-CM

## 2018-06-14 DIAGNOSIS — L40.50 PSORIATIC ARTHRITIS: Primary | ICD-10-CM

## 2018-06-14 LAB
ALBUMIN SERPL BCP-MCNC: 3.5 G/DL
ALP SERPL-CCNC: 38 U/L
ALT SERPL W/O P-5'-P-CCNC: 18 U/L
ANION GAP SERPL CALC-SCNC: 10 MMOL/L
AST SERPL-CCNC: 27 U/L
BASOPHILS # BLD AUTO: 0.03 K/UL
BASOPHILS NFR BLD: 0.4 %
BILIRUB SERPL-MCNC: 0.4 MG/DL
BUN SERPL-MCNC: 18 MG/DL
CALCIUM SERPL-MCNC: 9.5 MG/DL
CHLORIDE SERPL-SCNC: 106 MMOL/L
CO2 SERPL-SCNC: 27 MMOL/L
CREAT SERPL-MCNC: 1.2 MG/DL
CRP SERPL-MCNC: 4 MG/L
DIFFERENTIAL METHOD: ABNORMAL
EOSINOPHIL # BLD AUTO: 0.2 K/UL
EOSINOPHIL NFR BLD: 2.2 %
ERYTHROCYTE [DISTWIDTH] IN BLOOD BY AUTOMATED COUNT: 15 %
ERYTHROCYTE [SEDIMENTATION RATE] IN BLOOD BY WESTERGREN METHOD: 17 MM/HR
EST. GFR  (AFRICAN AMERICAN): 54 ML/MIN/1.73 M^2
EST. GFR  (NON AFRICAN AMERICAN): 47 ML/MIN/1.73 M^2
GLUCOSE SERPL-MCNC: 146 MG/DL
HCT VFR BLD AUTO: 41 %
HGB BLD-MCNC: 13.3 G/DL
LYMPHOCYTES # BLD AUTO: 2 K/UL
LYMPHOCYTES NFR BLD: 27.2 %
MCH RBC QN AUTO: 27.9 PG
MCHC RBC AUTO-ENTMCNC: 32.4 G/DL
MCV RBC AUTO: 86 FL
MONOCYTES # BLD AUTO: 0.4 K/UL
MONOCYTES NFR BLD: 5.6 %
NEUTROPHILS # BLD AUTO: 4.6 K/UL
NEUTROPHILS NFR BLD: 64.6 %
PLATELET # BLD AUTO: 249 K/UL
PMV BLD AUTO: 9.8 FL
POTASSIUM SERPL-SCNC: 4 MMOL/L
PROT SERPL-MCNC: 7.1 G/DL
RBC # BLD AUTO: 4.76 M/UL
SODIUM SERPL-SCNC: 143 MMOL/L
WBC # BLD AUTO: 7.18 K/UL

## 2018-06-14 PROCEDURE — 99214 OFFICE O/P EST MOD 30 MIN: CPT | Mod: S$PBB,,, | Performed by: INTERNAL MEDICINE

## 2018-06-14 PROCEDURE — 85025 COMPLETE CBC W/AUTO DIFF WBC: CPT | Mod: PO

## 2018-06-14 PROCEDURE — 99213 OFFICE O/P EST LOW 20 MIN: CPT | Mod: PBBFAC,PO | Performed by: INTERNAL MEDICINE

## 2018-06-14 PROCEDURE — 36415 COLL VENOUS BLD VENIPUNCTURE: CPT | Mod: PO

## 2018-06-14 PROCEDURE — 86140 C-REACTIVE PROTEIN: CPT

## 2018-06-14 PROCEDURE — 80053 COMPREHEN METABOLIC PANEL: CPT | Mod: PO

## 2018-06-14 PROCEDURE — 85651 RBC SED RATE NONAUTOMATED: CPT | Mod: PO

## 2018-06-14 PROCEDURE — 99999 PR PBB SHADOW E&M-EST. PATIENT-LVL III: CPT | Mod: PBBFAC,,, | Performed by: INTERNAL MEDICINE

## 2018-06-14 NOTE — PROGRESS NOTES
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    Carotid stenosis    IRIS (obstructive sleep apnea)    Double ectopic ureters    Psoriatic arthritis    Morbid obesity with BMI of 40.0-44.9, adult    Angina, class II    Primary osteoarthritis involving multiple joints    Chest pain    Statin intolerance    Abnormal nuclear cardiac imaging test    Preoperative respiratory examination    Postural dizziness with near syncope    Stage 3 chronic kidney disease    Osteopenia of multiple sites    Psoriasis    Medication monitoring encounter    Elevated brain natriuretic peptide (BNP) level    Fever    History of CVA (cerebrovascular accident)    Chronic obstructive pulmonary disease    Iron deficiency anemia due to chronic blood loss    B12 deficiency    Iron deficiency anemia due to chronic blood loss    Allergy to statin medication    Prediabetes     Ms tierney was previously seen by Flor Lawrence is here for f/u for psoriatic arthritis with psoriasis on Stelara 90 mg q 3 months; she is getting it here in clinic. In the past has failed enbrel, mtx, has sulfa allergy, recurrent skin infections with Humira, intolerant to Otezla    She also has Osteopenia, h/o right wrist fx in the 90's. Failed po boniva and fosamax due to GI intolerance. Last dexa showed decreasing bmd so Dr. CASTELLANOS started reclast in sept but she felt terrible after her infusion with significant pain and confusion. She does not want to repeat.   NO new falls/fxs. I discussed prolia with her and she is agreable   Denies any bleeding gums, jaw pains.  No anticipated dental work.  She is aware needs to notify her dentist prior to any dental procedures of her being on these medicines.     She has been on Stelara since 3/2016. Also with neuropathic chest pain due to previous cardiac surgery, gabapentin helps greatly. She takes 100-200 mg tid      Today she denies any pain / stiffness   Still with Mild rash along hairline otherwise doing well   No other concerns  Hands hurt when she uses a lot      Current Outpatient Prescriptions:     albuterol (PROVENTIL) 2.5 mg /3 mL (0.083 %) nebulizer solution, Take 3 mLs (2.5 mg total) by nebulization every 6 (six) hours as needed for Wheezing. Rescue, Disp: 50 each, Rfl: 0    aspirin 81 MG Chew, Take 81 mg by mouth once daily., Disp: , Rfl:     chlorhexidine (HIBICLENS) 4 % external liquid, Hibiclens 4 % topical liquid  Apply 1 application twice a day by topical route as directed for 90 days., Disp: , Rfl:     clopidogrel (PLAVIX) 75 mg tablet, Take 1 tablet (75 mg total) by mouth once daily., Disp: 90 tablet, Rfl: 3    cyanocobalamin 2000 MCG tablet, Take 2,000 mcg by mouth once daily., Disp: , Rfl:     evolocumab (REPATHA SURECLICK) 140 mg/mL PnIj, Inject 140 mg into the skin every 14 (fourteen) days., Disp: 6 Syringe, Rfl: 3    folic acid (FOLVITE) 1 MG tablet, Take 1 tablet (1 mg total) by mouth once daily., Disp: 90 tablet, Rfl: 1    furosemide (LASIX) 20 MG tablet, Take 0.5 tablets (10 mg total) by mouth 2 (two) times daily. (Patient taking differently: Take 10 mg by mouth as needed. ), Disp: 90 tablet, Rfl: 3    gabapentin (NEURONTIN) 100 MG capsule, Take 2 capsules (200 mg total) by mouth 3 (three) times daily as needed. (Patient taking differently: Take 100 mg by mouth 2 (two) times daily. ), Disp: 540 capsule, Rfl: 3    garlic 2,000 mg Cap, Take 3,000 mg by mouth once daily. , Disp: , Rfl:      levothyroxine (SYNTHROID) 112 MCG tablet, Take 1 tablet (112 mcg total) by mouth once daily., Disp: 90 tablet, Rfl: 1    metoprolol succinate (TOPROL-XL) 100 MG 24 hr tablet, 1 tab (100mg) in morning. Half tab (50mg) at bedtime.  Generic ok, Disp: 135 tablet, Rfl: 3    nitroGLYCERIN (NITROSTAT) 0.4 MG SL tablet, Place 1 tablet (0.4 mg total) under the tongue every 5 (five) minutes as needed for Chest pain., Disp: 100 tablet, Rfl: 3    pyridoxine (VITAMIN B-6) 100 MG Tab, Take 200 mg by mouth once daily. , Disp: , Rfl:     valsartan-hydrochlorothiazide (DIOVAN-HCT) 160-12.5 mg per tablet, Take 1 tablet by mouth once daily., Disp: 90 tablet, Rfl: 3    vitamin D 1000 units Tab, Take 185 mg by mouth once daily., Disp: , Rfl:     mupirocin (BACTROBAN) 2 % ointment, Bactroban 2 % topical ointment  Apply 1 application twice a day by topical route as directed for 30 days., Disp: , Rfl:       Past Medical History:   Diagnosis Date    Carotid stenosis     19%    COPD (chronic obstructive pulmonary disease)     No meds    Coronary artery disease     CVA (cerebral vascular accident)     Dr. 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 Hope    Psoriatic arthritis     Rheumatology       Past Surgical History:   Procedure Laterality Date    BREAST BIOPSY      R sided/benign    CARDIAC SURGERY      sept 28 2016    CERVICAL FUSION      CHOLECYSTECTOMY      CORONARY ANGIOPLASTY      CORONARY ARTERY BYPASS GRAFT      triple bypass    CORONARY STENT PLACEMENT      EYE SURGERY      INTRAUTERINE DEVICE INSERTION      mass removed from R groin      TOTAL ABDOMINAL HYSTERECTOMY W/ BILATERAL SALPINGOOPHORECTOMY      due to benign mass, adhesions    TUBAL LIGATION           Social History   Substance Use Topics    Smoking status: Never Smoker    Smokeless tobacco: Never Used     Alcohol use No       Family History   Problem Relation Age of Onset    Breast cancer Maternal Grandfather     Breast cancer Paternal Aunt     Stroke Unknown     Breast cancer Sister 60    Leukemia Sister 8         as child    Lung cancer Paternal Grandfather     Heart disease Unknown        Review of patient's allergies indicates:   Allergen Reactions    Celexa [citalopram] Anaphylaxis    Clindamycin Itching and Swelling    Codeine Shortness Of Breath, Itching and Swelling    Crestor [rosuvastatin] Anaphylaxis    Cytotec [misoprostol] Anaphylaxis and Other (See Comments)     Difficulty breathing    Lisinopril Anaphylaxis    Magnesium citrate Shortness Of Breath    Stadol [butorphanol tartrate] Anaphylaxis     Coded    Vicodin [hydrocodone-acetaminophen] Shortness Of Breath    Adhesive      EKG Electrodes    Aggrenox [aspirin-dipyridamole] Other (See Comments)     headaches    Avelox [moxifloxacin]      PATIENT STATES DO NOT GIVE UNDER ANY CIRCUSTANCES    Demerol [meperidine]     Isosorbide Other (See Comments)     Severe headache    Kenalog [triamcinolone acetonide]     Medrol [methylprednisolone] Other (See Comments)     unknown    Mobic [meloxicam]     Morphine     Nitroglycerin      Long acting    Pholcodine     Prednisone      GASTRIC PAIN    Ranexa [ranolazine] Nausea And Vomiting    Reclast [zoledronic acid-mannitol-water] Other (See Comments)     Bones hurt    Sulfa (sulfonamide antibiotics) Other (See Comments)     unknown    Talwin [pentazocine lactate]     Tetracycline     Tetracyclines     Tilade [nedocromil]     Zetia [ezetimibe]      Medication List with Changes/Refills   Current Medications    ALBUTEROL (PROVENTIL) 2.5 MG /3 ML (0.083 %) NEBULIZER SOLUTION    Take 3 mLs (2.5 mg total) by nebulization every 6 (six) hours as needed for Wheezing. Rescue    ASPIRIN 81 MG CHEW    Take 81 mg by mouth once daily.    CHLORHEXIDINE (HIBICLENS) 4 % EXTERNAL LIQUID     Hibiclens 4 % topical liquid   Apply 1 application twice a day by topical route as directed for 90 days.    CLOPIDOGREL (PLAVIX) 75 MG TABLET    Take 1 tablet (75 mg total) by mouth once daily.    CYANOCOBALAMIN 2000 MCG TABLET    Take 2,000 mcg by mouth once daily.    EVOLOCUMAB (REPATHA SURECLICK) 140 MG/ML PNIJ    Inject 140 mg into the skin every 14 (fourteen) days.    FOLIC ACID (FOLVITE) 1 MG TABLET    Take 1 tablet (1 mg total) by mouth once daily.    FUROSEMIDE (LASIX) 20 MG TABLET    Take 0.5 tablets (10 mg total) by mouth 2 (two) times daily.    GABAPENTIN (NEURONTIN) 100 MG CAPSULE    Take 2 capsules (200 mg total) by mouth 3 (three) times daily as needed.    GARLIC 2,000 MG CAP    Take 3,000 mg by mouth once daily.     LEVOTHYROXINE (SYNTHROID) 112 MCG TABLET    Take 1 tablet (112 mcg total) by mouth once daily.    METOPROLOL SUCCINATE (TOPROL-XL) 100 MG 24 HR TABLET    1 tab (100mg) in morning. Half tab (50mg) at bedtime.  Generic ok    MUPIROCIN (BACTROBAN) 2 % OINTMENT    Bactroban 2 % topical ointment   Apply 1 application twice a day by topical route as directed for 30 days.    NITROGLYCERIN (NITROSTAT) 0.4 MG SL TABLET    Place 1 tablet (0.4 mg total) under the tongue every 5 (five) minutes as needed for Chest pain.    PYRIDOXINE (VITAMIN B-6) 100 MG TAB    Take 200 mg by mouth once daily.     VALSARTAN-HYDROCHLOROTHIAZIDE (DIOVAN-HCT) 160-12.5 MG PER TABLET    Take 1 tablet by mouth once daily.    VITAMIN D 1000 UNITS TAB    Take 185 mg by mouth once daily.           Review of Systems:  Constitutional: Denies fever, chills. No recent weight changes.   Fatigue: no  Muscle weakness: no  Headaches: no new headaches  Eyes: No redness or dryness.  No recent visual changes.  ENT: Denies dry mouth. No oral or nasal ulcers.  Card: No chest pain.  Resp: No cough or sob.   Gastro: No nausea or vomiting.  No heartburn.  Constipation: no  Diarrhea: no  Genito:  No dysuria.  No genital ulcers.  Skin: per hpi    Raynauds:no  Neuro: No numbness / tingling.   Psych: No depression, anxiety  Endo:  no excess thirst.  Heme: no abnormal bleeding or bruising  Clots:none       OBJECTIVE:     Vital Signs   Vitals:    06/14/18 1301   BP: 125/68   Pulse: 67     Physical Exam:  General Appearance:  NAD.   Gait: not favoring.  HEENT: PERRL.  Eyes not dry or injected.  No nasal ulcers.  Mouth not dry, no oral lesions.  Lymph: cervical, supraclavicular or axillary nodes: none abnormal   Cardio: no irregularity of S1 or S2.  No gallops or rubs.   Resp: Normal respiratory motion. Clear to auscultation bilaterally.   No abnormal chest conformation.  Abd: Soft, non-tender, nondistended.  No masses.   Skin: Head and neck,  and extremities examined.   Rash along hairline   Neuro: Ox3.   Cranial nerves II-XII grossly intact.   Sensation intact  in both distal LE and upper extremities to light touch.  Musculoskeletal Exam:    Right Side Rheumatological Exam     Examination finds the shoulder, elbow, wrist, knee, 1st PIP, 1st MCP, 2nd PIP, 2nd MCP, 3rd PIP, 3rd MCP, 4th PIP, 4th MCP, 5th PIP and 5th MCP no synovitis     Others :  Hip:N  Ankle :N  Foot:N    Left Side Rheumatological Exam     Examination finds the shoulder, elbow, wrist, knee, 1st PIP, 1st MCP, 2nd PIP, 2nd MCP, 3rd PIP, 3rd MCP, 4th PIP, 4th MCP, 5th PIP and 5th MCP no synovitis     Others :  Hip:N  Ankle :N  Foot:N    Spine :  Occiput to wall :  Modified schobers :    Tender points:  Muscle strength:Equal and full in all mm groups of the upper and lower ext.    Laboratory:   Results for orders placed or performed in visit on 06/14/18   CBC auto differential   Result Value Ref Range    WBC 7.18 3.90 - 12.70 K/uL    RBC 4.76 4.00 - 5.40 M/uL    Hemoglobin 13.3 12.0 - 16.0 g/dL    Hematocrit 41.0 37.0 - 48.5 %    MCV 86 82 - 98 fL    MCH 27.9 27.0 - 31.0 pg    MCHC 32.4 32.0 - 36.0 g/dL    RDW 15.0 (H) 11.5 - 14.5 %    Platelets 249 150 - 350 K/uL    MPV 9.8 9.2 - 12.9 fL    Gran #  (ANC) 4.6 1.8 - 7.7 K/uL    Lymph # 2.0 1.0 - 4.8 K/uL    Mono # 0.4 0.3 - 1.0 K/uL    Eos # 0.2 0.0 - 0.5 K/uL    Baso # 0.03 0.00 - 0.20 K/uL    Gran% 64.6 38.0 - 73.0 %    Lymph% 27.2 18.0 - 48.0 %    Mono% 5.6 4.0 - 15.0 %    Eosinophil% 2.2 0.0 - 8.0 %    Basophil% 0.4 0.0 - 1.9 %    Differential Method Automated    Comprehensive metabolic panel   Result Value Ref Range    Sodium 143 136 - 145 mmol/L    Potassium 4.0 3.5 - 5.1 mmol/L    Chloride 106 95 - 110 mmol/L    CO2 27 23 - 29 mmol/L    Glucose 146 (H) 70 - 110 mg/dL    BUN, Bld 18 8 - 23 mg/dL    Creatinine 1.2 0.5 - 1.4 mg/dL    Calcium 9.5 8.7 - 10.5 mg/dL    Total Protein 7.1 6.0 - 8.4 g/dL    Albumin 3.5 3.5 - 5.2 g/dL    Total Bilirubin 0.4 0.1 - 1.0 mg/dL    Alkaline Phosphatase 38 (L) 55 - 135 U/L    AST 27 10 - 40 U/L    ALT 18 10 - 44 U/L    Anion Gap 10 8 - 16 mmol/L    eGFR if African American 54 (A) >60 mL/min/1.73 m^2    eGFR if non African American 47 (A) >60 mL/min/1.73 m^2     Lab Results   Component Value Date    HEPCAB Negative 07/22/2014         Imaging :reviewed x rays hands/ feet     Notes reviewed  Other procedures:    ASSESSMENT/PLAN:     Psoriasis  -     Cancel: Hepatitis panel, acute; Future  -     Quantiferon Gold TB; Future; Expected date: 06/14/2018    Osteopenia of multiple sites  -     Prior Authorization Order    Other malaise   -     Cancel: Hepatitis panel, acute; Future  -     Hepatitis panel, acute; Future    Psoriatic arthritis      67 yr old    1: Pso/ PsA  Stable disease   Started Stelara 3/2016 with > 90% improvement of her skin.  C/w Stelara 90mg every 12 weeks (she gets it done here)  Vaccinations declined     2: Osteopenia with DEXA 6/7/17 with osteopenia with FRAX 14.1/1.7, decreasing   BMD at hip     Received last reclast in September 2017 , due for next in September   But she did not tolerate with pain   Prior GI intolerance to fosamax and boniva   I discussed prolia with her , she may have a chance that she  can have similar side effects with Prolia as well  Will get prior auth and can do it when she comes in September  cnt vit d supp     Start Prolia. Risks vs Benefits and potential side effects of medication prescribed today were discussed with patient. Medication literature given to patient up discharge. We also discussed about jaw necrosis and atypical fractures which are rare side effects from the medication.   jessy :  Infection, malignancy risk discussed.  Hold prior to any surgery or during periods of infection.    3 month f/u with all 4 labs , hep panel, t spot   Went over uptodate information /literature on the meds prescribed today

## 2018-06-27 ENCOUNTER — CLINICAL SUPPORT (OUTPATIENT)
Dept: RHEUMATOLOGY | Facility: CLINIC | Age: 68
End: 2018-06-27
Payer: MEDICARE

## 2018-06-27 DIAGNOSIS — L40.50 PSORIATIC ARTHRITIS: Primary | ICD-10-CM

## 2018-06-27 PROCEDURE — 96372 THER/PROPH/DIAG INJ SC/IM: CPT | Mod: PBBFAC,PO

## 2018-06-27 PROCEDURE — 99999 PR PBB SHADOW E&M-EST. PATIENT-LVL II: CPT | Mod: PBBFAC,,,

## 2018-06-27 PROCEDURE — 99212 OFFICE O/P EST SF 10 MIN: CPT | Mod: PBBFAC,PO

## 2018-06-27 RX ADMIN — USTEKINUMAB 90 MG: 90 INJECTION, SOLUTION SUBCUTANEOUS at 01:06

## 2018-06-27 NOTE — PROGRESS NOTES
stelara 90 mg given in right lower abdomen. Pt tolerated well. Pt instructed to wait 15 minutes to monitor for any adverse reactions.     Lot [id number]    Exp 12/2019

## 2018-06-28 ENCOUNTER — TELEPHONE (OUTPATIENT)
Dept: RHEUMATOLOGY | Facility: CLINIC | Age: 68
End: 2018-06-28

## 2018-06-28 NOTE — TELEPHONE ENCOUNTER
Spoke with patient who states that she does not know why Pholcodine is added as an allergy because she has never taken the medication before. I informed her that this was added during her visit with Dr. Mckinney on 01/19/2018 and may need to contact the nurse regarding this to see if she can recall why the medication was added. Ms. Ortiz stated that she will contact Dr. Rangel's nurse and speak with her regarding this. I also informed Ms. Ortiz that the isosorbide is listed as a medication she has an allergy. Ms. Ortiz stated that she reviewed the list again and sees that the medication is listed as an allergy. Ms. Ortiz thanked this nurse for the help. Alysha Soto in patient relations was notified of the above conversation.

## 2018-07-23 DIAGNOSIS — I25.10 CORONARY ARTERY DISEASE WITHOUT ANGINA PECTORIS, UNSPECIFIED VESSEL OR LESION TYPE, UNSPECIFIED WHETHER NATIVE OR TRANSPLANTED HEART: Primary | ICD-10-CM

## 2018-07-24 ENCOUNTER — OFFICE VISIT (OUTPATIENT)
Dept: CARDIOLOGY | Facility: CLINIC | Age: 68
End: 2018-07-24
Payer: MEDICARE

## 2018-07-24 ENCOUNTER — CLINICAL SUPPORT (OUTPATIENT)
Dept: CARDIOLOGY | Facility: CLINIC | Age: 68
End: 2018-07-24
Payer: MEDICARE

## 2018-07-24 VITALS
HEIGHT: 63 IN | BODY MASS INDEX: 44.65 KG/M2 | WEIGHT: 252 LBS | HEART RATE: 70 BPM | SYSTOLIC BLOOD PRESSURE: 124 MMHG | DIASTOLIC BLOOD PRESSURE: 78 MMHG

## 2018-07-24 DIAGNOSIS — I10 ESSENTIAL HYPERTENSION: Chronic | ICD-10-CM

## 2018-07-24 DIAGNOSIS — I25.10 CORONARY ARTERY DISEASE WITHOUT ANGINA PECTORIS, UNSPECIFIED VESSEL OR LESION TYPE, UNSPECIFIED WHETHER NATIVE OR TRANSPLANTED HEART: ICD-10-CM

## 2018-07-24 DIAGNOSIS — I25.708 CORONARY ARTERY DISEASE OF BYPASS GRAFT OF NATIVE HEART WITH STABLE ANGINA PECTORIS: ICD-10-CM

## 2018-07-24 DIAGNOSIS — Z95.1 S/P CABG (CORONARY ARTERY BYPASS GRAFT): Chronic | ICD-10-CM

## 2018-07-24 DIAGNOSIS — G47.33 OSA (OBSTRUCTIVE SLEEP APNEA): ICD-10-CM

## 2018-07-24 DIAGNOSIS — E78.00 PURE HYPERCHOLESTEROLEMIA: Chronic | ICD-10-CM

## 2018-07-24 DIAGNOSIS — I65.29 STENOSIS OF CAROTID ARTERY, UNSPECIFIED LATERALITY: ICD-10-CM

## 2018-07-24 DIAGNOSIS — I20.9 ANGINA, CLASS II: Chronic | ICD-10-CM

## 2018-07-24 DIAGNOSIS — E66.01 MORBID OBESITY WITH BMI OF 40.0-44.9, ADULT: ICD-10-CM

## 2018-07-24 DIAGNOSIS — R07.1 CHEST PAIN ON BREATHING: Primary | ICD-10-CM

## 2018-07-24 PROCEDURE — 99214 OFFICE O/P EST MOD 30 MIN: CPT | Mod: S$PBB,,, | Performed by: INTERNAL MEDICINE

## 2018-07-24 PROCEDURE — 93010 ELECTROCARDIOGRAM REPORT: CPT | Mod: S$PBB,,, | Performed by: INTERNAL MEDICINE

## 2018-07-24 PROCEDURE — 99999 PR PBB SHADOW E&M-EST. PATIENT-LVL III: CPT | Mod: PBBFAC,,, | Performed by: INTERNAL MEDICINE

## 2018-07-24 PROCEDURE — 93005 ELECTROCARDIOGRAM TRACING: CPT | Mod: PBBFAC | Performed by: INTERNAL MEDICINE

## 2018-07-24 PROCEDURE — 99213 OFFICE O/P EST LOW 20 MIN: CPT | Mod: PBBFAC,25 | Performed by: INTERNAL MEDICINE

## 2018-07-24 NOTE — PROGRESS NOTES
aSubjective:   Patient ID:  Qi Ortiz is a 68 y.o. female who presents for cardiac consult of Coronary Artery Disease      HPI  The patient came in today for cardiac consult of Coronary Artery Disease    68 year old female pt with CAD s/p CABG X 2 redo LIMA to LAD in , HTN, HLD, carotid stenosis, depression, CKD presents for follow up visit.    12/26/18 Visit  She presented to [facility] [place]- with complaints of chest pain. Associated symptoms fever, congestion and wheezing. Troponin mildy elevated and trended downward. . Lactic acid normal. Cardiology consulted, mildly elevated troponin due to COPD/bronchitis. Patient started on Prednisone, Duo nebs and Avelox.  Today pt explains that she cannot take prednisone orally due to an interaction with her stomach so she has not taken it. She also has not taken Avelox due to a list of adverse effects or black box warnings on it and wants to get penicillin instead. She continues to have a lot of wheezing, cough, rhonchi. No chest pain. She is to see her PCP later and will decide what medications she wants to take for her bronchitis.     3/27/18  Has been doing well with Antonio. Pt was jogging outsisde had left side chest pain and left arm pain, pain resolves with rest; did not have NTG was in the house. Pt just sat outside on porch and symptoms resolved. Pain felt tight, several minutes. PT does have pain these days but usually due to arthritis.  Of note pt discussed an issue after last visit she was told to go to ED after PCP visit but left due to a long wait and did neb treatments at home, she apparently refused steroids and antibiotics per notes but denies it, unsure the actual course but felt ok at home eventually.     7/24/18  Pt had mildly abnormal stress tset 4/2018 and considered adding Ranexa, Imdur but she couldn't tolerate due to allergies in the past. She has PRN NTG tabs. Her CP is relieved with rest as in the past. Pt has not been compliant with  CPAP either. Overall she feels good, today feels a bit tired, had some issue with melatonin causing her lethargy for 2 days. Chest pain she feels with exertion is not has bad it was it was in '98. She also claims no one called her about stress test results but she is mistaken as she finally admits that someone did call her as is clearly documented. Unclear if she doesn't remember details or chooses to omit certain things.     ECG - NSR, inf infarct, anterolat ischemia    Nuclear Stress  Nuclear Quantitative Functional Analysis:   LVEF: 50 %    Impression: ABNORMAL MYOCARDIAL PERFUSION  1. There is mild intensity fixed defect in the anteroapical wall of the left ventricle, consistent with myocardial injury. There is trivial annie-injury ischemia.   2. There is abnormal wall motion at rest showing mild hypokinesis of the anteroapical wall of the left ventricle.   3. Resting LV function is normal.   4. The ventricular volumes are normal at rest and stress.   5. The extracardiac distribution of radioactivity is normal.     This document has been electronically    SIGNED BY: Tono Hancock MD On: 04/05/2018 16:05    2D ECHO  CONCLUSIONS     1 - Normal left ventricular systolic function (EF 55-60%).     2 - Indeterminate LV diastolic function.     3 - No wall motion abnormalities.     4 - Normal right ventricular systolic function .     5 - The estimated PA systolic pressure is greater than 24 mmHg.     6 - Concentric hypertrophy.     7 - Trivial tricuspid regurgitation.     8 - Moderate to severe aortic stenosis, ALFREDO = 0.78 cm2, peak velocity = 3.51 m/s, mean gradient = 30 mmHg.       This document has been electronically    SIGNED BY: Rufino Mcfadden MD On: 11/17/2017 12:44      Past Medical History:   Diagnosis Date    Carotid stenosis     19%    COPD (chronic obstructive pulmonary disease)     No meds    Coronary artery disease     CVA (cerebral vascular accident)     Dr. Hoffman    Depression     Double ectopic  ureters     Dr. Porras    Hyperlipidemia     Hypertension     Hypothyroid     OP (osteoporosis)     IRIS (obstructive sleep apnea)     Dr. Hope    Psoriatic arthritis     Rheumatology       Past Surgical History:   Procedure Laterality Date    BREAST BIOPSY      R sided/benign    CARDIAC SURGERY      2016    CERVICAL FUSION      CHOLECYSTECTOMY      CORONARY ANGIOPLASTY      CORONARY ARTERY BYPASS GRAFT      triple bypass    CORONARY STENT PLACEMENT      EYE SURGERY      INTRAUTERINE DEVICE INSERTION      mass removed from R groin      TOTAL ABDOMINAL HYSTERECTOMY W/ BILATERAL SALPINGOOPHORECTOMY      due to benign mass, adhesions    TUBAL LIGATION         Social History   Substance Use Topics    Smoking status: Never Smoker    Smokeless tobacco: Never Used    Alcohol use No       Family History   Problem Relation Age of Onset    Breast cancer Maternal Grandfather     Breast cancer Paternal Aunt     Stroke Unknown     Breast cancer Sister 60    Leukemia Sister 8         as child    Lung cancer Paternal Grandfather     Heart disease Unknown        Patient's Medications   New Prescriptions    No medications on file   Previous Medications    ALBUTEROL (PROVENTIL) 2.5 MG /3 ML (0.083 %) NEBULIZER SOLUTION    Take 3 mLs (2.5 mg total) by nebulization every 6 (six) hours as needed for Wheezing. Rescue    APPLE CIDER VINEGAR ORAL    Take by mouth.    ASPIRIN 81 MG CHEW    Take 81 mg by mouth once daily.    CHLORHEXIDINE (HIBICLENS) 4 % EXTERNAL LIQUID    Hibiclens 4 % topical liquid   Apply 1 application twice a day by topical route as directed for 90 days.    CLOPIDOGREL (PLAVIX) 75 MG TABLET    Take 1 tablet (75 mg total) by mouth once daily.    CYANOCOBALAMIN 2000 MCG TABLET    Take 2,000 mcg by mouth once daily.    EVOLOCUMAB (REPATHA SURECLICK) 140 MG/ML PNIJ    Inject 140 mg into the skin every 14 (fourteen) days.    FOLIC ACID (FOLVITE) 1 MG TABLET    Take 1 tablet (1 mg total)  by mouth once daily.    FUROSEMIDE (LASIX) 20 MG TABLET    Take 0.5 tablets (10 mg total) by mouth 2 (two) times daily.    GABAPENTIN (NEURONTIN) 100 MG CAPSULE    Take 2 capsules (200 mg total) by mouth 3 (three) times daily as needed.    GARLIC 2,000 MG CAP    Take 3,000 mg by mouth once daily.     LEVOTHYROXINE (SYNTHROID) 112 MCG TABLET    Take 1 tablet (112 mcg total) by mouth once daily.    METOPROLOL SUCCINATE (TOPROL-XL) 100 MG 24 HR TABLET    1 tab (100mg) in morning. Half tab (50mg) at bedtime.  Generic ok    MUPIROCIN (BACTROBAN) 2 % OINTMENT    Bactroban 2 % topical ointment   Apply 1 application twice a day by topical route as directed for 30 days.    NITROGLYCERIN (NITROSTAT) 0.4 MG SL TABLET    Place 1 tablet (0.4 mg total) under the tongue every 5 (five) minutes as needed for Chest pain.    PYRIDOXINE (VITAMIN B-6) 100 MG TAB    Take 200 mg by mouth once daily.     TURMERIC, BULK, MISC    by Misc.(Non-Drug; Combo Route) route.    VALSARTAN-HYDROCHLOROTHIAZIDE (DIOVAN-HCT) 160-12.5 MG PER TABLET    Take 1 tablet by mouth once daily.    VITAMIN D 1000 UNITS TAB    Take 185 mg by mouth once daily.   Modified Medications    No medications on file   Discontinued Medications    No medications on file       Review of Systems   Constitutional: Negative.  Negative for malaise/fatigue.   HENT: Negative.    Eyes: Negative.    Respiratory: Positive for shortness of breath. Negative for cough, sputum production and wheezing.    Cardiovascular: Positive for chest pain. Negative for palpitations.   Gastrointestinal: Negative for heartburn.   Genitourinary: Negative.    Musculoskeletal: Negative.    Skin: Negative.    Neurological: Negative.    Endo/Heme/Allergies: Negative.    Psychiatric/Behavioral: The patient is nervous/anxious.    All 12 systems otherwise negative.      Wt Readings from Last 3 Encounters:   07/24/18 114.3 kg (251 lb 15.8 oz)   06/14/18 115.6 kg (254 lb 13.6 oz)   06/07/18 115.8 kg (255 lb 2.9  "oz)     Temp Readings from Last 3 Encounters:   06/07/18 97.6 °F (36.4 °C) (Temporal)   03/13/18 96.9 °F (36.1 °C) (Tympanic)   01/30/18 97.9 °F (36.6 °C) (Oral)     BP Readings from Last 3 Encounters:   07/24/18 124/78   06/14/18 125/68   06/07/18 130/80     Pulse Readings from Last 3 Encounters:   07/24/18 70   06/14/18 67   06/07/18 84       /78 (BP Location: Left arm)   Pulse 70   Ht 5' 3" (1.6 m)   Wt 114.3 kg (251 lb 15.8 oz)   BMI 44.64 kg/m²     Objective:   Physical Exam   Constitutional: She is oriented to person, place, and time. She appears well-developed and well-nourished. No distress.   obese   HENT:   Head: Normocephalic and atraumatic.   Nose: Nose normal.   Mouth/Throat: Oropharynx is clear and moist.   Eyes: Conjunctivae and EOM are normal. No scleral icterus.   Neck: Normal range of motion. Neck supple. No JVD present. No thyromegaly present.   Cardiovascular: Normal rate, regular rhythm, S1 normal and S2 normal.  Exam reveals no gallop, no S3, no S4 and no friction rub.    Murmur heard.   Harsh midsystolic murmur is present with a grade of 3/6  at the upper right sternal border radiating to the neck  Pulmonary/Chest: No stridor. She is in respiratory distress. She has wheezes. She has no rales. She exhibits no tenderness.   Abdominal: Soft. Bowel sounds are normal. She exhibits no distension and no mass. There is no tenderness. There is no rebound.   Genitourinary:   Genitourinary Comments: Deferred   Musculoskeletal: Normal range of motion. She exhibits no edema, tenderness or deformity.   Lymphadenopathy:     She has no cervical adenopathy.   Neurological: She is alert and oriented to person, place, and time. She exhibits normal muscle tone. Coordination normal.   Skin: Skin is warm and dry. No rash noted. She is not diaphoretic. No erythema. No pallor.   Psychiatric: She has a normal mood and affect. Her behavior is normal. Judgment and thought content normal.   Nursing note and " vitals reviewed.      Lab Results   Component Value Date     06/14/2018    K 4.0 06/14/2018     06/14/2018    CO2 27 06/14/2018    BUN 18 06/14/2018    CREATININE 1.2 06/14/2018     (H) 06/14/2018    HGBA1C 5.8 (H) 04/03/2018    MG 1.8 12/18/2017    AST 27 06/14/2018    ALT 18 06/14/2018    ALBUMIN 3.5 06/14/2018    PROT 7.1 06/14/2018    BILITOT 0.4 06/14/2018    WBC 7.18 06/14/2018    HGB 13.3 06/14/2018    HCT 41.0 06/14/2018    MCV 86 06/14/2018     06/14/2018    INR 1.0 12/18/2017    TSH 1.357 04/03/2018    CHOL 123 12/18/2017    HDL 59 12/18/2017    LDLCALC 50.8 (L) 12/18/2017    TRIG 66 12/18/2017     (H) 12/18/2017     Assessment:      1. Chest pain on breathing    2. IRIS (obstructive sleep apnea)    3. Essential hypertension    4. Pure hypercholesterolemia    5. S/P CABG (coronary artery bypass graft)    6. Angina, class II    7. Coronary artery disease of bypass graft of native heart with stable angina pectoris    8. Stenosis of carotid artery, unspecified laterality    9. Morbid obesity with BMI of 40.0-44.9, adult        Plan:   1. CAD s/p CABG with chronic stable angina   - cont meds; PRN NTG  - mildly abnormal stress - cont max medical therapy, if pain worse refer for C discussed with patient in detail as no major ischemic perfusion defect as she has old scar on perfusion imaging  - unfortunately cannot tolerate Imdur/Ranexa in addition to 30+ other allergies    2. Moderate to Severe AS  - cont to monitor    3. HTN  - cont meds    4. HLD  - cont Repatha    5. IRIS  - needs CPAP, but could not tolerate mask  - will see pulm    Thank you for allowing me to participate in this patient's care. Please do not hesitate to contact me with any questions or concerns. Consult note has been forwarded to the referral physician.

## 2018-07-26 ENCOUNTER — OFFICE VISIT (OUTPATIENT)
Dept: PULMONOLOGY | Facility: CLINIC | Age: 68
End: 2018-07-26
Payer: MEDICARE

## 2018-07-26 VITALS
DIASTOLIC BLOOD PRESSURE: 80 MMHG | OXYGEN SATURATION: 95 % | WEIGHT: 252.75 LBS | HEIGHT: 63 IN | RESPIRATION RATE: 17 BRPM | BODY MASS INDEX: 44.79 KG/M2 | SYSTOLIC BLOOD PRESSURE: 142 MMHG | HEART RATE: 67 BPM

## 2018-07-26 DIAGNOSIS — E66.01 MORBID OBESITY WITH BMI OF 40.0-44.9, ADULT: Chronic | ICD-10-CM

## 2018-07-26 DIAGNOSIS — G47.33 OSA (OBSTRUCTIVE SLEEP APNEA): Primary | Chronic | ICD-10-CM

## 2018-07-26 PROCEDURE — 99999 PR PBB SHADOW E&M-EST. PATIENT-LVL III: CPT | Mod: PBBFAC,,, | Performed by: INTERNAL MEDICINE

## 2018-07-26 PROCEDURE — 99213 OFFICE O/P EST LOW 20 MIN: CPT | Mod: PBBFAC | Performed by: INTERNAL MEDICINE

## 2018-07-26 PROCEDURE — 99214 OFFICE O/P EST MOD 30 MIN: CPT | Mod: S$PBB,,, | Performed by: INTERNAL MEDICINE

## 2018-07-26 NOTE — ASSESSMENT & PLAN NOTE
She does not tolerate CPAP.    Complications of untreated sleep apnea discussed with pateint in detail including but not limited to  high blood pressure, heart attack, stroke, obesity,  diabetes and worsening heart failure. Patient voiced understanding.    Follow up as needed.
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
## 2018-08-07 ENCOUNTER — APPOINTMENT (OUTPATIENT)
Dept: LAB | Facility: HOSPITAL | Age: 68
End: 2018-08-07
Attending: INTERNAL MEDICINE
Payer: MEDICARE

## 2018-08-10 ENCOUNTER — OFFICE VISIT (OUTPATIENT)
Dept: HEMATOLOGY/ONCOLOGY | Facility: CLINIC | Age: 68
End: 2018-08-10
Payer: MEDICARE

## 2018-08-10 VITALS
OXYGEN SATURATION: 98 % | WEIGHT: 250.25 LBS | SYSTOLIC BLOOD PRESSURE: 132 MMHG | DIASTOLIC BLOOD PRESSURE: 80 MMHG | BODY MASS INDEX: 44.34 KG/M2 | HEART RATE: 64 BPM | HEIGHT: 63 IN | TEMPERATURE: 99 F

## 2018-08-10 DIAGNOSIS — D50.0 IRON DEFICIENCY ANEMIA DUE TO CHRONIC BLOOD LOSS: Primary | ICD-10-CM

## 2018-08-10 PROCEDURE — 99213 OFFICE O/P EST LOW 20 MIN: CPT | Mod: S$PBB,,, | Performed by: NURSE PRACTITIONER

## 2018-08-10 PROCEDURE — 99999 PR PBB SHADOW E&M-EST. PATIENT-LVL III: CPT | Mod: PBBFAC,,, | Performed by: NURSE PRACTITIONER

## 2018-08-10 PROCEDURE — 99213 OFFICE O/P EST LOW 20 MIN: CPT | Mod: PBBFAC | Performed by: NURSE PRACTITIONER

## 2018-08-10 NOTE — PROGRESS NOTES
"Subjective:       Patient ID: Qi Ortiz is a 68 y.o. female.    Chief Complaint: Anemia, lab work follow up.    HPI: 68 y.o female here for lab review. Patient hemoglobin remains normal at 13.7. Iron studies continue to reflect iron deficiency. Patient was asked to see GI for an EGD/Colonoscopy. Patient states she does not desire this at this time. Patient denies shortness of breath, hematuria, hematochezia. Patient reports she had a "mild" heart attack in the recent months and a stress test was performed by her cardiologist in April. Patient states her cardiologist prescribed Ranexa but she was intolerant of this and not taking. Patient denies chest pain at this time and states she will go to ED if she "ever feels it is necessary" Patient states she maintains an active lifestyle. Patient does not wish for further intervention at this time but states she is willing to follow up with labwork in 3-4 months.  Social History     Social History    Marital status: Single     Spouse name: N/A    Number of children: 3    Years of education: N/A     Occupational History    Not working      Social History Main Topics    Smoking status: Never Smoker    Smokeless tobacco: Never Used    Alcohol use No    Drug use: No    Sexual activity: No     Other Topics Concern    Not on file     Social History Narrative    No narrative on file       Past Medical History:   Diagnosis Date    Carotid stenosis     19%    COPD (chronic obstructive pulmonary disease)     No meds    Coronary artery disease     CVA (cerebral vascular accident)     Dr. 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 Hope    Psoriatic arthritis     Rheumatology       Family History   Problem Relation Age of Onset    Breast cancer Maternal Grandfather     Breast cancer Paternal Aunt     Stroke Unknown     " Breast cancer Sister 60    Leukemia Sister 8         as child    Lung cancer Paternal Grandfather     Heart disease Unknown        Past Surgical History:   Procedure Laterality Date    BREAST BIOPSY      R sided/benign    CARDIAC SURGERY      2016    CERVICAL FUSION      CHOLECYSTECTOMY      CORONARY ANGIOPLASTY      CORONARY ARTERY BYPASS GRAFT      triple bypass    CORONARY STENT PLACEMENT      EYE SURGERY      INTRAUTERINE DEVICE INSERTION      mass removed from R groin      TOTAL ABDOMINAL HYSTERECTOMY W/ BILATERAL SALPINGOOPHORECTOMY      due to benign mass, adhesions    TUBAL LIGATION         Review of Systems   Constitutional: Negative for appetite change, fatigue and unexpected weight change.   HENT: Negative for trouble swallowing.    Eyes: Negative for visual disturbance.   Respiratory: Negative for cough and shortness of breath.    Cardiovascular: Negative for chest pain.   Gastrointestinal: Negative for abdominal pain, constipation and diarrhea.   Genitourinary: Negative for difficulty urinating.   Musculoskeletal: Negative for back pain.   Skin: Negative for wound.   Neurological: Negative for headaches.   Hematological: Does not bruise/bleed easily.   Psychiatric/Behavioral: The patient is not nervous/anxious.          Medication List with Changes/Refills   Current Medications    ALBUTEROL (PROVENTIL) 2.5 MG /3 ML (0.083 %) NEBULIZER SOLUTION    Take 3 mLs (2.5 mg total) by nebulization every 6 (six) hours as needed for Wheezing. Rescue    APPLE CIDER VINEGAR ORAL    Take by mouth.    ASPIRIN 81 MG CHEW    Take 81 mg by mouth once daily.    CHLORHEXIDINE (HIBICLENS) 4 % EXTERNAL LIQUID    Hibiclens 4 % topical liquid   Apply 1 application twice a day by topical route as directed for 90 days.    CLOPIDOGREL (PLAVIX) 75 MG TABLET    Take 1 tablet (75 mg total) by mouth once daily.    CYANOCOBALAMIN 2000 MCG TABLET    Take 2,000 mcg by mouth once daily.    EVOLOCUMAB (REPATHA  SURECLICK) 140 MG/ML PNIJ    Inject 140 mg into the skin every 14 (fourteen) days.    FOLIC ACID (FOLVITE) 1 MG TABLET    Take 1 tablet (1 mg total) by mouth once daily.    FUROSEMIDE (LASIX) 20 MG TABLET    Take 0.5 tablets (10 mg total) by mouth 2 (two) times daily.    GABAPENTIN (NEURONTIN) 100 MG CAPSULE    Take 2 capsules (200 mg total) by mouth 3 (three) times daily as needed.    GARLIC 2,000 MG CAP    Take 3,000 mg by mouth once daily.     LEVOTHYROXINE (SYNTHROID) 112 MCG TABLET    Take 1 tablet (112 mcg total) by mouth once daily.    METOPROLOL SUCCINATE (TOPROL-XL) 100 MG 24 HR TABLET    1 tab (100mg) in morning. Half tab (50mg) at bedtime.  Generic ok    MUPIROCIN (BACTROBAN) 2 % OINTMENT    Bactroban 2 % topical ointment   Apply 1 application twice a day by topical route as directed for 30 days.    NITROGLYCERIN (NITROSTAT) 0.4 MG SL TABLET    Place 1 tablet (0.4 mg total) under the tongue every 5 (five) minutes as needed for Chest pain.    PYRIDOXINE (VITAMIN B-6) 100 MG TAB    Take 200 mg by mouth once daily.     TURMERIC, BULK, MISC    by Misc.(Non-Drug; Combo Route) route.    VALSARTAN-HYDROCHLOROTHIAZIDE (DIOVAN-HCT) 160-12.5 MG PER TABLET    Take 1 tablet by mouth once daily.    VITAMIN D 1000 UNITS TAB    Take 185 mg by mouth once daily.     Objective:     Vitals:    08/10/18 1412   BP: 132/80   Pulse: 64   Temp: 98.5 °F (36.9 °C)       Physical Exam   Constitutional: She is oriented to person, place, and time. Vital signs are normal. She appears well-developed and well-nourished.   HENT:   Head: Normocephalic.   Right Ear: Hearing normal.   Left Ear: Hearing normal.   Nose: Nose normal.   Mouth/Throat: Oropharynx is clear and moist.   Eyes: Conjunctivae, EOM and lids are normal. Right eye exhibits no discharge. Left eye exhibits no discharge.   Neck: Normal range of motion. No thyroid mass present.   Cardiovascular: Normal rate, regular rhythm and normal heart sounds.  Exam reveals no gallop and  no friction rub.    Pulmonary/Chest: Effort normal and breath sounds normal. No respiratory distress. She has no wheezes. She has no rales. She exhibits no tenderness.   Abdominal: Soft. Bowel sounds are normal. She exhibits no distension. There is no tenderness.   Musculoskeletal: Normal range of motion. She exhibits no edema.   Neurological: She is alert and oriented to person, place, and time.   Skin: Skin is warm, dry and intact.   Psychiatric: Her speech is normal. Thought content normal. Her affect is blunt.   Vitals reviewed.       Assessment:     Problem List Items Addressed This Visit     Iron deficiency anemia due to chronic blood loss - Primary     Patient states she can not tolerate PO iron due to GI upset. Patient states she does not desire IV iron at this time due to her extensive allergy history. Patient refuses EGD/Colonoscopy at this time. I will see patient back in 3-4 months for repeat CBC, Iron, TIBC, Ferritin                     I will review assessment/plan with collaborating physician     EMIR Kumar

## 2018-08-10 NOTE — ASSESSMENT & PLAN NOTE
Patient states she can not tolerate PO iron due to GI upset. Patient states she does not desire IV iron at this time due to her extensive allergy history. Patient refuses EGD/Colonoscopy at this time. I will see patient back in 3-4 months for repeat CBC, Iron, TIBC, Ferritin

## 2018-10-03 ENCOUNTER — OFFICE VISIT (OUTPATIENT)
Dept: RHEUMATOLOGY | Facility: CLINIC | Age: 68
End: 2018-10-03
Payer: MEDICARE

## 2018-10-03 ENCOUNTER — TELEPHONE (OUTPATIENT)
Dept: RHEUMATOLOGY | Facility: CLINIC | Age: 68
End: 2018-10-03

## 2018-10-03 ENCOUNTER — LAB VISIT (OUTPATIENT)
Dept: LAB | Facility: HOSPITAL | Age: 68
End: 2018-10-03
Attending: FAMILY MEDICINE
Payer: MEDICARE

## 2018-10-03 VITALS
BODY MASS INDEX: 44.1 KG/M2 | HEIGHT: 63 IN | SYSTOLIC BLOOD PRESSURE: 141 MMHG | DIASTOLIC BLOOD PRESSURE: 71 MMHG | WEIGHT: 248.88 LBS | HEART RATE: 59 BPM

## 2018-10-03 DIAGNOSIS — L40.50 PSORIATIC ARTHRITIS: Primary | Chronic | ICD-10-CM

## 2018-10-03 DIAGNOSIS — L40.50 PSORIATIC ARTHRITIS: ICD-10-CM

## 2018-10-03 DIAGNOSIS — M81.8 OTHER OSTEOPOROSIS WITHOUT CURRENT PATHOLOGICAL FRACTURE: ICD-10-CM

## 2018-10-03 DIAGNOSIS — L40.50 PSORIATIC ARTHRITIS: Primary | ICD-10-CM

## 2018-10-03 DIAGNOSIS — R53.81 OTHER MALAISE: ICD-10-CM

## 2018-10-03 LAB
ALBUMIN SERPL BCP-MCNC: 3.5 G/DL
ALP SERPL-CCNC: 41 U/L
ALT SERPL W/O P-5'-P-CCNC: 16 U/L
ANION GAP SERPL CALC-SCNC: 7 MMOL/L
AST SERPL-CCNC: 22 U/L
BASOPHILS # BLD AUTO: 0.02 K/UL
BASOPHILS NFR BLD: 0.3 %
BILIRUB SERPL-MCNC: 0.4 MG/DL
BUN SERPL-MCNC: 21 MG/DL
CALCIUM SERPL-MCNC: 9.9 MG/DL
CHLORIDE SERPL-SCNC: 105 MMOL/L
CO2 SERPL-SCNC: 31 MMOL/L
CREAT SERPL-MCNC: 1.4 MG/DL
CRP SERPL-MCNC: 4.5 MG/L
DIFFERENTIAL METHOD: NORMAL
EOSINOPHIL # BLD AUTO: 0.1 K/UL
EOSINOPHIL NFR BLD: 1.7 %
ERYTHROCYTE [DISTWIDTH] IN BLOOD BY AUTOMATED COUNT: 14.3 %
ERYTHROCYTE [SEDIMENTATION RATE] IN BLOOD BY WESTERGREN METHOD: 10 MM/HR
EST. GFR  (AFRICAN AMERICAN): 45 ML/MIN/1.73 M^2
EST. GFR  (NON AFRICAN AMERICAN): 39 ML/MIN/1.73 M^2
GLUCOSE SERPL-MCNC: 107 MG/DL
HCT VFR BLD AUTO: 38.9 %
HGB BLD-MCNC: 13.4 G/DL
LYMPHOCYTES # BLD AUTO: 2 K/UL
LYMPHOCYTES NFR BLD: 30.6 %
MCH RBC QN AUTO: 29.5 PG
MCHC RBC AUTO-ENTMCNC: 34.4 G/DL
MCV RBC AUTO: 86 FL
MONOCYTES # BLD AUTO: 0.6 K/UL
MONOCYTES NFR BLD: 9.1 %
NEUTROPHILS # BLD AUTO: 3.8 K/UL
NEUTROPHILS NFR BLD: 58.3 %
PLATELET # BLD AUTO: 229 K/UL
PMV BLD AUTO: 9.8 FL
POTASSIUM SERPL-SCNC: 3.9 MMOL/L
PROT SERPL-MCNC: 6.9 G/DL
RBC # BLD AUTO: 4.54 M/UL
SODIUM SERPL-SCNC: 143 MMOL/L
WBC # BLD AUTO: 6.51 K/UL

## 2018-10-03 PROCEDURE — 86140 C-REACTIVE PROTEIN: CPT

## 2018-10-03 PROCEDURE — 80074 ACUTE HEPATITIS PANEL: CPT

## 2018-10-03 PROCEDURE — 99214 OFFICE O/P EST MOD 30 MIN: CPT | Mod: PBBFAC,PO,25 | Performed by: INTERNAL MEDICINE

## 2018-10-03 PROCEDURE — 85025 COMPLETE CBC W/AUTO DIFF WBC: CPT | Mod: PO

## 2018-10-03 PROCEDURE — 99999 PR PBB SHADOW E&M-EST. PATIENT-LVL IV: CPT | Mod: PBBFAC,,, | Performed by: INTERNAL MEDICINE

## 2018-10-03 PROCEDURE — 96372 THER/PROPH/DIAG INJ SC/IM: CPT | Mod: PBBFAC,PO

## 2018-10-03 PROCEDURE — 85651 RBC SED RATE NONAUTOMATED: CPT | Mod: PO

## 2018-10-03 PROCEDURE — 80053 COMPREHEN METABOLIC PANEL: CPT | Mod: PO

## 2018-10-03 PROCEDURE — 36415 COLL VENOUS BLD VENIPUNCTURE: CPT | Mod: PO

## 2018-10-03 PROCEDURE — 99214 OFFICE O/P EST MOD 30 MIN: CPT | Mod: S$PBB,,, | Performed by: INTERNAL MEDICINE

## 2018-10-03 RX ADMIN — USTEKINUMAB 90 MG: 90 INJECTION, SOLUTION SUBCUTANEOUS at 01:10

## 2018-10-03 NOTE — PROGRESS NOTES
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    Carotid stenosis    IRIS (obstructive sleep apnea)    Double ectopic ureters    Psoriatic arthritis    Morbid obesity with BMI of 40.0-44.9, adult    Angina, class II    Primary osteoarthritis involving multiple joints    Chest pain    Statin intolerance    Abnormal nuclear cardiac imaging test    Preoperative respiratory examination    Postural dizziness with near syncope    Stage 3 chronic kidney disease    Osteopenia of multiple sites    Psoriasis    Medication monitoring encounter    Elevated brain natriuretic peptide (BNP) level    Fever    History of CVA (cerebrovascular accident)    Chronic obstructive pulmonary disease    Iron deficiency anemia due to chronic blood loss    B12 deficiency    Iron deficiency anemia due to chronic blood loss    Allergy to statin medication    Prediabetes    For routine follow-up of psoriasis and psoriatic arthritis   on Stelara 90 mg q 3 months; she is getting it here in clinic. In the past has failed enbrel, mtx, has sulfa allergy, recurrent skin infections with Humira, intolerant to Otezla    She also has Osteopenia, h/o right wrist fx in the 90's. Failed po boniva and fosamax due to GI intolerance. Last dexa showed decreasing bmd so Dr. CASTELLANOS started reclast in sept but she felt terrible after her infusion with significant pain and confusion. She does not want to repeat.  NO new falls/fxs. I discussed prolia with  her in the past and she declines any meds      She has been on Stelara since 3/2016. Also with neuropathic chest pain due to previous cardiac surgery, gabapentin helps greatly. She takes 100-200 mg tid      Today she denies any pain / stiffness   Still with Mild rash along hairline otherwise doing well , gets it to words the end of 3 months when she is due for the neck shot  No other concerns  Hands hurt when she uses a lot      Current Outpatient Medications:     albuterol (PROVENTIL) 2.5 mg /3 mL (0.083 %) nebulizer solution, Take 3 mLs (2.5 mg total) by nebulization every 6 (six) hours as needed for Wheezing. Rescue, Disp: 50 each, Rfl: 0    APPLE CIDER VINEGAR ORAL, Take by mouth., Disp: , Rfl:     aspirin 81 MG Chew, Take 81 mg by mouth once daily., Disp: , Rfl:     chlorhexidine (HIBICLENS) 4 % external liquid, Hibiclens 4 % topical liquid  Apply 1 application twice a day by topical route as directed for 90 days., Disp: , Rfl:     clopidogrel (PLAVIX) 75 mg tablet, Take 1 tablet (75 mg total) by mouth once daily., Disp: 90 tablet, Rfl: 3    cyanocobalamin 2000 MCG tablet, Take 2,000 mcg by mouth once daily., Disp: , Rfl:     evolocumab (REPATHA SURECLICK) 140 mg/mL PnIj, Inject 140 mg into the skin every 14 (fourteen) days., Disp: 6 Syringe, Rfl: 3    folic acid (FOLVITE) 1 MG tablet, Take 1 tablet (1 mg total) by mouth once daily., Disp: 90 tablet, Rfl: 1    furosemide (LASIX) 20 MG tablet, Take 0.5 tablets (10 mg total) by mouth 2 (two) times daily. (Patient taking differently: Take 10 mg by mouth as needed. ), Disp: 90 tablet, Rfl: 3    gabapentin (NEURONTIN) 100 MG capsule, Take 2 capsules (200 mg total) by mouth 3 (three) times daily as needed. (Patient taking differently: Take 100 mg by mouth 2 (two) times daily. ), Disp: 540 capsule, Rfl: 3    garlic 2,000 mg Cap, Take 3,000 mg by mouth once daily. , Disp: , Rfl:     levothyroxine (SYNTHROID) 112 MCG tablet, Take 1 tablet (112 mcg total) by  mouth once daily., Disp: 90 tablet, Rfl: 1    metoprolol succinate (TOPROL-XL) 100 MG 24 hr tablet, 1 tab (100mg) in morning. Half tab (50mg) at bedtime.  Generic ok, Disp: 135 tablet, Rfl: 3    mupirocin (BACTROBAN) 2 % ointment, Bactroban 2 % topical ointment  Apply 1 application twice a day by topical route as directed for 30 days., Disp: , Rfl:     nitroGLYCERIN (NITROSTAT) 0.4 MG SL tablet, Place 1 tablet (0.4 mg total) under the tongue every 5 (five) minutes as needed for Chest pain., Disp: 100 tablet, Rfl: 3    pyridoxine (VITAMIN B-6) 100 MG Tab, Take 200 mg by mouth once daily. , Disp: , Rfl:     TURMERIC, BULK, MISC, by Misc.(Non-Drug; Combo Route) route., Disp: , Rfl:     valsartan-hydrochlorothiazide (DIOVAN-HCT) 160-12.5 mg per tablet, Take 1 tablet by mouth once daily., Disp: 90 tablet, Rfl: 3    vitamin D 1000 units Tab, Take 185 mg by mouth once daily., Disp: , Rfl:     Current Facility-Administered Medications:     ustekinumab (stelara) 90 mg/mL subcutaneous syringe, 90 mg, Subcutaneous, 1 time in Clinic/HOD, Savannah Arreaga MD      Past Medical History:   Diagnosis Date    Carotid stenosis     19%    COPD (chronic obstructive pulmonary disease)     No meds    Coronary artery disease     CVA (cerebral vascular accident)     Dr. 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 Hope    Psoriatic arthritis     Rheumatology       Past Surgical History:   Procedure Laterality Date    BREAST BIOPSY      R sided/benign    CARDIAC SURGERY      sept 28 2016    CERVICAL FUSION      CHOLECYSTECTOMY      CORONARY ANGIOPLASTY      CORONARY ARTERY BYPASS GRAFT      triple bypass    CORONARY STENT PLACEMENT      EYE SURGERY      INTRAUTERINE DEVICE INSERTION      mass removed from R groin      TOTAL ABDOMINAL HYSTERECTOMY W/ BILATERAL SALPINGOOPHORECTOMY       due to benign mass, adhesions    TUBAL LIGATION           Social History     Tobacco Use    Smoking status: Never Smoker    Smokeless tobacco: Never Used   Substance Use Topics    Alcohol use: No    Drug use: No       Family History   Problem Relation Age of Onset    Breast cancer Maternal Grandfather     Breast cancer Paternal Aunt     Stroke Unknown     Breast cancer Sister 60    Leukemia Sister 8         as child    Lung cancer Paternal Grandfather     Heart disease Unknown        Review of patient's allergies indicates:   Allergen Reactions    Celexa [citalopram] Anaphylaxis    Clindamycin Itching and Swelling    Codeine Shortness Of Breath, Itching and Swelling    Crestor [rosuvastatin] Anaphylaxis    Cytotec [misoprostol] Anaphylaxis and Other (See Comments)     Difficulty breathing    Lisinopril Anaphylaxis    Magnesium citrate Shortness Of Breath    Stadol [butorphanol tartrate] Anaphylaxis     Coded    Vicodin [hydrocodone-acetaminophen] Shortness Of Breath    Adhesive      EKG Electrodes    Aggrenox [aspirin-dipyridamole] Other (See Comments)     headaches    Avelox [moxifloxacin]      PATIENT STATES DO NOT GIVE UNDER ANY CIRCUSTANCES    Demerol [meperidine]     Isosorbide Other (See Comments)     Severe headache    Kenalog [triamcinolone acetonide]     Medrol [methylprednisolone] Other (See Comments)     unknown    Mobic [meloxicam]     Morphine     Nitroglycerin      Long acting    Pholcodine     Prednisone      GASTRIC PAIN    Ranexa [ranolazine] Nausea And Vomiting    Reclast [zoledronic acid-mannitol-water] Other (See Comments)     Bones hurt    Sulfa (sulfonamide antibiotics) Other (See Comments)     unknown    Talwin [pentazocine lactate]     Tetracycline     Tetracyclines     Tilade [nedocromil]     Zetia [ezetimibe]         Medication List           Accurate as of 10/3/18  1:19 PM. If you have any questions, ask your nurse or doctor.                CHANGE how you take these medications    furosemide 20 MG tablet  Commonly known as:  LASIX  Take 0.5 tablets (10 mg total) by mouth 2 (two) times daily.  What changed:    · when to take this  · reasons to take this     gabapentin 100 MG capsule  Commonly known as:  NEURONTIN  Take 2 capsules (200 mg total) by mouth 3 (three) times daily as needed.  What changed:    · how much to take  · when to take this        CONTINUE taking these medications    albuterol 2.5 mg /3 mL (0.083 %) nebulizer solution  Commonly known as:  PROVENTIL  Take 3 mLs (2.5 mg total) by nebulization every 6 (six) hours as needed for Wheezing. Rescue     APPLE CIDER VINEGAR ORAL     aspirin 81 MG Chew     BACTROBAN 2 % ointment  Generic drug:  mupirocin     clopidogrel 75 mg tablet  Commonly known as:  PLAVIX  Take 1 tablet (75 mg total) by mouth once daily.     cyanocobalamin 2000 MCG tablet     evolocumab 140 mg/mL Pnij  Commonly known as:  REPATHA SURECLICK  Inject 140 mg into the skin every 14 (fourteen) days.     folic acid 1 MG tablet  Commonly known as:  FOLVITE  Take 1 tablet (1 mg total) by mouth once daily.     garlic 2,000 mg Cap     HIBICLENS 4 % external liquid  Generic drug:  chlorhexidine     levothyroxine 112 MCG tablet  Commonly known as:  SYNTHROID  Take 1 tablet (112 mcg total) by mouth once daily.     metoprolol succinate 100 MG 24 hr tablet  Commonly known as:  TOPROL-XL  1 tab (100mg) in morning. Half tab (50mg) at bedtime.  Generic ok     nitroGLYCERIN 0.4 MG SL tablet  Commonly known as:  NITROSTAT  Place 1 tablet (0.4 mg total) under the tongue every 5 (five) minutes as needed for Chest pain.     TURMERIC (BULK) MISC     valsartan-hydrochlorothiazide 160-12.5 mg per tablet  Commonly known as:  DIOVAN-HCT  Take 1 tablet by mouth once daily.     VITAMIN B-6 100 MG Tab  Generic drug:  pyridoxine (vitamin B6)     vitamin D 1000 units Tab  Commonly known as:  VITAMIN D3                Review of Systems:  Constitutional:  Denies fever, chills. No recent weight changes.   Fatigue: no  Muscle weakness: no  Headaches: no new headaches  Eyes: No redness or dryness.  No recent visual changes.  ENT: Denies dry mouth. No oral or nasal ulcers.  Card: No chest pain.  Resp: No cough or sob.   Gastro: No nausea or vomiting.  No heartburn.  Constipation: no  Diarrhea: no  Genito:  No dysuria.  No genital ulcers.  Skin: per hpi   Raynauds:no  Neuro: No numbness / tingling.   Psych: No depression, anxiety  Endo:  no excess thirst.  Heme: no abnormal bleeding or bruising  Clots:none       OBJECTIVE:     Vital Signs   Vitals:    10/03/18 1244   BP: (!) 141/71   Pulse: (!) 59     Physical Exam:  General Appearance:  NAD.   Gait: not favoring.  HEENT: PERRL.  Eyes not dry or injected.  No nasal ulcers.  Mouth not dry, no oral lesions.  Lymph: cervical, supraclavicular or axillary nodes: none abnormal   Cardio: no irregularity of S1 or S2.  No gallops or rubs.   Resp: Normal respiratory motion. Clear to auscultation bilaterally.   No abnormal chest conformation.  Abd: Soft, non-tender, nondistended.  No masses.   Skin: Head and neck,  and extremities examined.   Rash along hairline   Neuro: Ox3.   Cranial nerves II-XII grossly intact.   Sensation intact  in both distal LE and upper extremities to light touch.  Musculoskeletal Exam:    Bilateral osteoarthritic changes in both hands.  Prominent DIP.  No synovitis  Muscle strength:Equal and full in all mm groups of the upper and lower ext.    Laboratory:   Results for orders placed or performed in visit on 10/03/18   CBC W/ AUTO DIFFERENTIAL   Result Value Ref Range    WBC 6.51 3.90 - 12.70 K/uL    RBC 4.54 4.00 - 5.40 M/uL    Hemoglobin 13.4 12.0 - 16.0 g/dL    Hematocrit 38.9 37.0 - 48.5 %    MCV 86 82 - 98 fL    MCH 29.5 27.0 - 31.0 pg    MCHC 34.4 32.0 - 36.0 g/dL    RDW 14.3 11.5 - 14.5 %    Platelets 229 150 - 350 K/uL    MPV 9.8 9.2 - 12.9 fL    Gran # (ANC) 3.8 1.8 - 7.7 K/uL    Lymph # 2.0 1.0 -  4.8 K/uL    Mono # 0.6 0.3 - 1.0 K/uL    Eos # 0.1 0.0 - 0.5 K/uL    Baso # 0.02 0.00 - 0.20 K/uL    Gran% 58.3 38.0 - 73.0 %    Lymph% 30.6 18.0 - 48.0 %    Mono% 9.1 4.0 - 15.0 %    Eosinophil% 1.7 0.0 - 8.0 %    Basophil% 0.3 0.0 - 1.9 %    Differential Method Automated      Lab Results   Component Value Date    HEPCAB Negative 07/22/2014         Imaging :reviewed x rays hands/ feet     Notes reviewed  Other procedures:    ASSESSMENT/PLAN:     Psoriatic arthritis  -     ustekinumab (stelara) 90 mg/mL subcutaneous syringe; Inject 90 mg into the skin one time.    Other osteoporosis without current pathological fracture      67 yr old    1: Pso/ PsA  Stable disease   Started Stelara 3/2016 with > 90% improvement of her skin.  C/w Stelara 90mg every 12 weeks (she gets it done here) as she cannot do it herself with her arthritis  Vaccinations declined     2: Osteopenia with DEXA 6/7/17 with osteopenia with FRAX 14.1/1.7, decreasing   BMD at hip     Received last reclast in September 2017 ,But she did not tolerate with pain   She also experienced lot of confusion after IV Reclast and she declines any further medications for the treatment of osteoporosis  Prior GI intolerance to fosamax and boniva   cont vit d supp       stellara :  Infection, malignancy risk discussed.  Hold prior to any surgery or during periods of infection.    3 month f/u with all 4 labs, t spot   Went over uptodate information /literature on the meds prescribed today     Disclaimer: This note was prepared using voice recognition system and is likely to have sound alike errors and is not proof read.  Please call me with any questions.

## 2018-10-03 NOTE — PROGRESS NOTES
Administered 90mg of Stelara  to Left lower abdomen.  Patient tolerated well. No acute reaction noted to site. Patient instructed on S/S to report. Patient verbalized understanding. Patient Qi Ortiz  15 minutes post administration. Patient is scheduled to return 3months for next injection.     Lot: [id number]  Exp: 12/2019  Manu:Kiran

## 2018-10-04 ENCOUNTER — TELEPHONE (OUTPATIENT)
Dept: RHEUMATOLOGY | Facility: CLINIC | Age: 68
End: 2018-10-04

## 2018-10-04 LAB
HAV IGM SERPL QL IA: NEGATIVE
HBV CORE IGM SERPL QL IA: NEGATIVE
HBV SURFACE AG SERPL QL IA: NEGATIVE
HCV AB SERPL QL IA: NEGATIVE

## 2018-10-04 NOTE — TELEPHONE ENCOUNTER
Returned pt call pt was just wanting clarification about her tb gold test test, told her that we needed that to start certain meds and it has to be updated periodically for medical and insurance purposes. Pt verbalized understanding.

## 2018-10-04 NOTE — TELEPHONE ENCOUNTER
----- Message from Shonna Cardozo sent at 10/4/2018 10:50 AM CDT -----  Contact: [phone]   Pt would like to consult with the nurse about lab test.  Please call back at [phone]. Thx-AH

## 2018-10-05 ENCOUNTER — OFFICE VISIT (OUTPATIENT)
Dept: FAMILY MEDICINE | Facility: CLINIC | Age: 68
End: 2018-10-05
Payer: MEDICARE

## 2018-10-05 VITALS
BODY MASS INDEX: 44.55 KG/M2 | RESPIRATION RATE: 16 BRPM | HEART RATE: 73 BPM | DIASTOLIC BLOOD PRESSURE: 78 MMHG | OXYGEN SATURATION: 98 % | SYSTOLIC BLOOD PRESSURE: 130 MMHG | HEIGHT: 63 IN | WEIGHT: 251.44 LBS | TEMPERATURE: 98 F

## 2018-10-05 DIAGNOSIS — E53.8 B12 DEFICIENCY: ICD-10-CM

## 2018-10-05 DIAGNOSIS — Z86.73 HISTORY OF CVA (CEREBROVASCULAR ACCIDENT): ICD-10-CM

## 2018-10-05 DIAGNOSIS — R73.03 PREDIABETES: ICD-10-CM

## 2018-10-05 DIAGNOSIS — E53.8 FOLIC ACID DEFICIENCY: ICD-10-CM

## 2018-10-05 DIAGNOSIS — I10 ESSENTIAL HYPERTENSION: ICD-10-CM

## 2018-10-05 DIAGNOSIS — L40.50 PSORIATIC ARTHRITIS: ICD-10-CM

## 2018-10-05 DIAGNOSIS — E66.01 MORBID OBESITY WITH BMI OF 40.0-44.9, ADULT: ICD-10-CM

## 2018-10-05 DIAGNOSIS — E03.9 HYPOTHYROIDISM, UNSPECIFIED TYPE: Primary | ICD-10-CM

## 2018-10-05 DIAGNOSIS — E78.00 PURE HYPERCHOLESTEROLEMIA: ICD-10-CM

## 2018-10-05 DIAGNOSIS — I20.9 ANGINA, CLASS II: ICD-10-CM

## 2018-10-05 DIAGNOSIS — J44.9 CHRONIC OBSTRUCTIVE PULMONARY DISEASE, UNSPECIFIED COPD TYPE: ICD-10-CM

## 2018-10-05 DIAGNOSIS — R94.4 DECREASED GFR: ICD-10-CM

## 2018-10-05 PROCEDURE — 99999 PR PBB SHADOW E&M-EST. PATIENT-LVL III: CPT | Mod: PBBFAC,,, | Performed by: FAMILY MEDICINE

## 2018-10-05 PROCEDURE — 99213 OFFICE O/P EST LOW 20 MIN: CPT | Mod: PBBFAC,PO | Performed by: FAMILY MEDICINE

## 2018-10-05 PROCEDURE — 99214 OFFICE O/P EST MOD 30 MIN: CPT | Mod: S$PBB,,, | Performed by: FAMILY MEDICINE

## 2018-10-05 RX ORDER — LEVOTHYROXINE SODIUM 112 UG/1
112 TABLET ORAL DAILY
Qty: 90 TABLET | Refills: 1 | Status: SHIPPED | OUTPATIENT
Start: 2018-10-05 | End: 2019-04-10 | Stop reason: SDUPTHER

## 2018-10-05 RX ORDER — FOLIC ACID 1 MG/1
1 TABLET ORAL DAILY
Qty: 90 TABLET | Refills: 1 | Status: SHIPPED | OUTPATIENT
Start: 2018-10-05 | End: 2019-04-10 | Stop reason: SDUPTHER

## 2018-10-05 NOTE — PROGRESS NOTES
Subjective:       Patient ID: Qi Ortiz is a 68 y.o. female.    Chief Complaint: Chronic Care    HPI   Chronic Care  67yo female presents today for chronic care assessment. She has had recent lab update and is here for review of results. Since last visit she has been evaluated per Cardiology, Pulmonology, Rheumatology as well as Heme/Onc. Reports no medication changes have been made. She is no longer using CPAP. Not currently on any iron supplements and has declined iron infusion. Thyroid function is stable with TSH of 1.336 and free T4 of 1.15. Current dosing of Synthroid is 112mcg daily. Expresses concern that her thyroid levels may be imbalanced secondary to her fatigue and difficulty with maintaining her weight. Is not able to tolerate exercise due to underlying CV issues and has chronic dyspnea at baseline. A1c is measured at 5.9. No symptoms of hyper or hypoglycemia are reported. She does not feel that her current diet is unhealthy as to explain the elevated reading. Has been putting on weight to the hips which she finds unusual for herself. Has multiple medication allergies/intolerance but no complaints with regard to her current treatment regimen. There have been no ER visits or hospitalizations since her last office visit.     Review of Systems   Constitutional: Positive for fatigue. Negative for activity change.   HENT: Positive for hearing loss. Negative for congestion, ear pain and sinus pressure.    Eyes: Negative for visual disturbance.   Respiratory: Positive for shortness of breath. Negative for chest tightness.    Cardiovascular: Positive for leg swelling. Negative for chest pain and palpitations.   Gastrointestinal: Negative for abdominal pain, constipation and diarrhea.        Intermittent hemorrhoidal bleeding   Endocrine: Negative for polydipsia and polyuria.   Genitourinary: Negative for decreased urine volume and difficulty urinating.   Musculoskeletal: Positive for arthralgias.  "Negative for myalgias.   Skin: Negative for rash.   Neurological: Negative for weakness and headaches.   Psychiatric/Behavioral: Negative for sleep disturbance.       Objective:   /78   Pulse 73   Temp 97.8 °F (36.6 °C) (Temporal)   Resp 16   Ht 5' 3" (1.6 m)   Wt 114 kg (251 lb 7 oz)   SpO2 98%   BMI 44.54 kg/m²   Physical Exam   Constitutional: She is oriented to person, place, and time. She appears well-developed and well-nourished. No distress.   Morbidly obese, non-toxic   HENT:   Head: Normocephalic and atraumatic.   Right Ear: Tympanic membrane, external ear and ear canal normal.   Left Ear: Tympanic membrane, external ear and ear canal normal.   Nose: Nose normal.   Mouth/Throat: Oropharynx is clear and moist.   Eyes: Conjunctivae and EOM are normal.   Neck: Normal range of motion. Neck supple.   Cardiovascular: Normal rate and regular rhythm.   Murmur heard.  Pulmonary/Chest: Effort normal. She has decreased breath sounds in the right lower field and the left lower field.   Abdominal: Soft. Bowel sounds are normal. She exhibits no distension. There is no tenderness.   Musculoskeletal: She exhibits edema (trace bilateral LE edema).   Neurological: She is alert and oriented to person, place, and time.   Skin: Skin is warm and dry. She is not diaphoretic.   Psychiatric: She has a normal mood and affect. Her behavior is normal.       Assessment:       1. Hypothyroidism, unspecified type    2. Prediabetes    3. Folic acid deficiency    4. B12 deficiency    5. Essential hypertension    6. Pure hypercholesterolemia    7. Angina, class II    8. Psoriatic arthritis    9. Chronic obstructive pulmonary disease, unspecified COPD type    10. Decreased GFR    11. Morbid obesity with BMI of 40.0-44.9, adult    12. History of CVA (cerebrovascular accident)        Plan:      Hypothyroidism, unspecified type  Stable. Advised she is currently euthyroid and plan is to continue with current Synthroid dosing with " repeat lab monitoring in 6 months. She will report back sooner if needed pending symptoms.  -     levothyroxine (SYNTHROID) 112 MCG tablet; Take 1 tablet (112 mcg total) by mouth once daily.  Dispense: 90 tablet; Refill: 1  -     TSH; Future; Expected date: 10/05/2018  -     T4, free; Future; Expected date: 10/05/2018    Prediabetes  Offered evaluation with nutrition given her weight concerns. Reports she knows the correct things to eat. Admittedly eating at inconsistent intervals. Target A1c <5.7.   -     Hemoglobin A1c; Future; Expected date: 10/05/2018    Folic acid deficiency  -     folic acid (FOLVITE) 1 MG tablet; Take 1 tablet (1 mg total) by mouth once daily.  Dispense: 90 tablet; Refill: 1  -     Folate; Future; Expected date: 10/05/2018    B12 deficiency  Continue as per Heme/Onc.    Essential hypertension  Stable. Continue with current treatment. Target BP goal remains<140/90. Heart healthy diet is advised. Intermittent home monitoring has been discussed.  -     Comprehensive metabolic panel; Future; Expected date: 10/05/2018    Pure hypercholesterolemia  -     Lipid panel; Future; Expected date: 10/05/2018    Angina, class II  Continue as per Cardiology.    Psoriatic arthritis  Continue as per Rheumatology.    Chronic obstructive pulmonary disease, unspecified COPD type  Continue as per Pulmonology.    Decreased GFR  Likely CKD. Refrain from NSAID use. Will continue to monitor.    Morbid obesity with BMI of 40.0-44.9, adult  Weight loss efforts remain encouraged through diet and lifestyle measures.    History of CVA (cerebrovascular accident)  Advised importance of maintaining BP and lipid control.    Spent length of time today discussing weight related concerns. Advised importance of consistent timing of meals and snacks with healthy choices in light of her current conditions which severely limit activity. As above, a consultation with a dietician was offered but she has declined. Recommend continuation  of current treatment and follow-up as per her specialists. Will plan to reassess clinically in 6 months with labs prior to the visit. She again declines age appropriate CA screening.    Total visit time of 25 minutes with greater than half the visit dedicated to counseling and coordinating care.

## 2018-10-30 ENCOUNTER — TELEPHONE (OUTPATIENT)
Dept: FAMILY MEDICINE | Facility: CLINIC | Age: 68
End: 2018-10-30

## 2018-10-30 NOTE — TELEPHONE ENCOUNTER
Spoke to pt. Pt called stating that last when she woke up to use the bathroom her leg went numb for 15 minutes. She said she has not felt this before . She said it was completely numb not tingling like your leg does when you cut off your circulation. I told her that Dr Doyle is out of town but my advise to her would be to go to the ER to be evaluated so they can run test and then follow up with her neurologist since she is established. Pt states that she is not going to the ER but she will follow up with her neurologist. I ended the conversation with telling pt that I recommend her go to the ER and then follow up with her neurologist. Pt verbalized understanding

## 2018-11-06 ENCOUNTER — OFFICE VISIT (OUTPATIENT)
Dept: FAMILY MEDICINE | Facility: CLINIC | Age: 68
End: 2018-11-06
Payer: MEDICARE

## 2018-11-06 ENCOUNTER — HOSPITAL ENCOUNTER (OUTPATIENT)
Dept: RADIOLOGY | Facility: HOSPITAL | Age: 68
Discharge: HOME | End: 2018-11-06
Attending: FAMILY MEDICINE
Payer: MEDICARE

## 2018-11-06 VITALS
DIASTOLIC BLOOD PRESSURE: 76 MMHG | OXYGEN SATURATION: 98 % | WEIGHT: 253.44 LBS | RESPIRATION RATE: 16 BRPM | TEMPERATURE: 98 F | SYSTOLIC BLOOD PRESSURE: 136 MMHG | HEART RATE: 73 BPM | BODY MASS INDEX: 44.91 KG/M2 | HEIGHT: 63 IN

## 2018-11-06 DIAGNOSIS — R20.0 RIGHT LEG NUMBNESS: Primary | ICD-10-CM

## 2018-11-06 DIAGNOSIS — M54.50 CHRONIC LOW BACK PAIN WITHOUT SCIATICA, UNSPECIFIED BACK PAIN LATERALITY: ICD-10-CM

## 2018-11-06 DIAGNOSIS — M51.36 DDD (DEGENERATIVE DISC DISEASE), LUMBAR: ICD-10-CM

## 2018-11-06 DIAGNOSIS — Z95.1 S/P CABG (CORONARY ARTERY BYPASS GRAFT): ICD-10-CM

## 2018-11-06 DIAGNOSIS — G89.29 CHRONIC LOW BACK PAIN WITHOUT SCIATICA, UNSPECIFIED BACK PAIN LATERALITY: ICD-10-CM

## 2018-11-06 DIAGNOSIS — M25.552 BILATERAL HIP PAIN: ICD-10-CM

## 2018-11-06 DIAGNOSIS — I20.9 ANGINA, CLASS II: ICD-10-CM

## 2018-11-06 DIAGNOSIS — M25.551 BILATERAL HIP PAIN: ICD-10-CM

## 2018-11-06 DIAGNOSIS — I65.29 STENOSIS OF CAROTID ARTERY, UNSPECIFIED LATERALITY: ICD-10-CM

## 2018-11-06 DIAGNOSIS — J44.9 CHRONIC OBSTRUCTIVE PULMONARY DISEASE, UNSPECIFIED COPD TYPE: ICD-10-CM

## 2018-11-06 DIAGNOSIS — Z86.73 HISTORY OF CVA (CEREBROVASCULAR ACCIDENT): ICD-10-CM

## 2018-11-06 DIAGNOSIS — N18.30 STAGE 3 CHRONIC KIDNEY DISEASE: ICD-10-CM

## 2018-11-06 DIAGNOSIS — G57.02 PYRIFORMIS SYNDROME, LEFT: ICD-10-CM

## 2018-11-06 DIAGNOSIS — E66.01 MORBID OBESITY WITH BMI OF 40.0-44.9, ADULT: ICD-10-CM

## 2018-11-06 DIAGNOSIS — L40.50 PSORIATIC ARTHRITIS: ICD-10-CM

## 2018-11-06 PROCEDURE — 99215 OFFICE O/P EST HI 40 MIN: CPT | Mod: S$PBB,,, | Performed by: FAMILY MEDICINE

## 2018-11-06 PROCEDURE — 99214 OFFICE O/P EST MOD 30 MIN: CPT | Mod: PBBFAC,25,PO | Performed by: FAMILY MEDICINE

## 2018-11-06 PROCEDURE — 73521 X-RAY EXAM HIPS BI 2 VIEWS: CPT | Mod: TC,PO

## 2018-11-06 PROCEDURE — 72110 X-RAY EXAM L-2 SPINE 4/>VWS: CPT | Mod: TC,PO

## 2018-11-06 PROCEDURE — 73521 X-RAY EXAM HIPS BI 2 VIEWS: CPT | Mod: 26,,, | Performed by: RADIOLOGY

## 2018-11-06 PROCEDURE — 99999 PR PBB SHADOW E&M-EST. PATIENT-LVL IV: CPT | Mod: PBBFAC,,, | Performed by: FAMILY MEDICINE

## 2018-11-06 PROCEDURE — 72110 X-RAY EXAM L-2 SPINE 4/>VWS: CPT | Mod: 26,,, | Performed by: RADIOLOGY

## 2018-11-06 NOTE — PROGRESS NOTES
"Subjective:       Patient ID: Qi Ortiz is a 68 y.o. female.    Chief Complaint: ER Follow-up    HPI   ER Follow-up  69yo female presents today for follow-up after a visit to [facility] in [place] on 10/30/18 for acute symptoms of numbness to the right leg "from my panty line down". Symptoms began after she briefly sat on the toilet and she reports having to manually move the extremity to ambulate. States "it was dead". No falls or injuries reported in association. She notes that symptoms resolved after 15 minutes. She did tap the extremity on the ground and stood in place until there was improvement. She denies any accompanying low back pain in conjunction. There was no leg pain. She drove herself to the ER and underwent assessment including CT brain with negative findings. Per records from the ER there were no acute focal deficits at presentation. Admission was offered but she declined. There has been no further occurrence of symptoms. She has an [place] [facility] [place] the patient with her CVA history. She is most bothered today by what she describes is left hip pain- she is clutching her left buttocks currently. Certain seated positions exacerbate her pain. She is requesting imaging. She is followed by Rheumatology for psoriatic arthritis. Patient is also requesting updated referral orders to continue care with her CT surgeon (Dr. Mata) for routine care. She has brought documentation with her today indicating it is time for updated carotid studies.    Review of Systems   Constitutional: Positive for activity change. Negative for appetite change, fatigue and unexpected weight change.   HENT: Negative for congestion, ear pain and sinus pressure.    Eyes: Negative for visual disturbance.   Respiratory: Positive for shortness of breath. Negative for cough.    Cardiovascular: Negative for chest pain, palpitations and leg swelling.   Gastrointestinal: Negative for " "abdominal pain, constipation and diarrhea.        No saddle anesthesia   Genitourinary: Negative for decreased urine volume and difficulty urinating.   Musculoskeletal: Positive for arthralgias and neck pain. Negative for gait problem.   Skin: Negative for rash.   Neurological: Positive for numbness. Negative for dizziness, weakness and headaches.   Psychiatric/Behavioral: Negative for dysphoric mood and sleep disturbance. The patient is not nervous/anxious.        Objective:   /76 (BP Location: Left arm, Patient Position: Sitting, BP Method: Large (Manual))   Pulse 73   Temp 97.6 °F (36.4 °C) (Temporal)   Resp 16   Ht 5' 3" (1.6 m)   Wt 114.9 kg (253 lb 6.7 oz)   SpO2 98%   BMI 44.89 kg/m²   Physical Exam   Constitutional: She is oriented to person, place, and time. She appears well-developed and well-nourished. No distress.   Morbidly obese, non-toxic   HENT:   Head: Normocephalic and atraumatic.   Right Ear: External ear normal.   Left Ear: External ear normal.   Nose: Nose normal.   Mouth/Throat: Oropharynx is clear and moist.   Eyes: Conjunctivae and EOM are normal. Pupils are equal, round, and reactive to light.   Neck: Normal range of motion. Neck supple.   Cardiovascular: Normal rate and regular rhythm.   Murmur heard.  Pulmonary/Chest: Effort normal. She has decreased breath sounds in the right lower field and the left lower field.   Abdominal: Soft. Bowel sounds are normal.   Musculoskeletal: She exhibits no edema.        Right hip: She exhibits normal range of motion and no tenderness.        Left hip: She exhibits normal range of motion and no tenderness.        Cervical back: She exhibits normal range of motion, no tenderness, no bony tenderness and no pain.        Thoracic back: She exhibits normal range of motion, no tenderness, no bony tenderness and no pain.        Lumbar back: She exhibits tenderness and pain. She exhibits normal range of motion and no bony tenderness.        " Legs:  Neurological: She is alert and oriented to person, place, and time.   Skin: Skin is warm and dry. She is not diaphoretic.   Psychiatric: Her speech is normal and behavior is normal. Her mood appears anxious. She does not exhibit a depressed mood.       Assessment:       1. Right leg numbness    2. Chronic low back pain without sciatica, unspecified back pain laterality    3. DDD (degenerative disc disease), lumbar    4. Bilateral hip pain    5. Pyriformis syndrome, left    6. Psoriatic arthritis    7. Stenosis of carotid artery, unspecified laterality    8. Angina, class II    9. Chronic obstructive pulmonary disease, unspecified COPD type    10. Stage 3 chronic kidney disease    11. S/P CABG (coronary artery bypass graft)    12. History of CVA (cerebrovascular accident)    13. Morbid obesity with BMI of 40.0-44.9, adult        Plan:      Right leg numbness  Resolved. Suspect symptoms were related to seating position with some nerve compression noted. No residual neurologic deficits noted in the ER and no recurrence of symptoms. She is advised to keep pending eval with Neurology as scheduled. Records available through [facility] were reviewed with the patient in office today. Ample time was allotted for questions and answers. She has been advised to seek reassessment in the ER should symptoms return.    Chronic low back pain without sciatica, unspecified back pain laterality  Recommend updated imaging. Have advised proceeding with Aquatherapy through [facility] in light of chronic pain issues and intolerance to medications- she will contemplate and report back if she desires to proceed.  -     X-Ray Lumbar Spine Complete 5 View; Future; Expected date: 11/06/2018    DDD (degenerative disc disease), lumbar  As above.  -     X-Ray Lumbar Spine Complete 5 View; Future; Expected date: 11/06/2018    Bilateral hip pain  No acute findings on imaging. Aquatherapy is advised as above.  -     X-Ray Hips Bilateral 2  View Incl AP Pelvis; Future; Expected date: 11/06/2018    Pyriformis syndrome, left  Suspected based on exam. She is not able to tolerate injection therapy. Reasoning for PT and Aquatherapy have been explained to the patient.     Psoriatic arthritis  Continue as per Rheumatology.    Stenosis of carotid artery, unspecified laterality  -     Ambulatory Referral to Cardiothoracic Surgery    Angina, class II  Continue as per Cardiology.    Chronic obstructive pulmonary disease, unspecified COPD type  Continue as per Pulmonology.    Stage 3 chronic kidney disease  Refrain from NSAID use and maintain BP control.    S/P CABG (coronary artery bypass graft)  -     Ambulatory Referral to Cardiothoracic Surgery    History of CVA (cerebrovascular accident)  As above. BP and lipid control remain advised.    Morbid obesity with BMI of 40.0-44.9, adult  Weight loss efforts are encouraged.    Spent considerable length of time today discussing symptoms and reviewing workup as well as providing further recommendations. Reasoning for these recommendations was explained in detail. Ample time was allotted for questions and answers. Recommend she discuss obtaining updated arterial studies to the lower extremities at her upcoming appointment with Dr. Mata. She acknowledges understanding. Continued multi-specialty care remains advised. Will plan to follow-up at her next scheduled chronic care visit, should she elect to proceed with PT/Aquatherapy in the interim she knows to contact me for orders.    Total visit time of 40 minutes with greater than half the visit time dedicated to counseling and coordinating care.

## 2018-11-28 ENCOUNTER — LAB VISIT (OUTPATIENT)
Dept: LAB | Facility: HOSPITAL | Age: 68
End: 2018-11-28
Attending: NURSE PRACTITIONER
Payer: MEDICARE

## 2018-11-28 DIAGNOSIS — D50.0 IRON DEFICIENCY ANEMIA DUE TO CHRONIC BLOOD LOSS: ICD-10-CM

## 2018-11-28 LAB
BASOPHILS # BLD AUTO: 0.04 K/UL
BASOPHILS NFR BLD: 0.6 %
DIFFERENTIAL METHOD: NORMAL
EOSINOPHIL # BLD AUTO: 0.1 K/UL
EOSINOPHIL NFR BLD: 1.9 %
ERYTHROCYTE [DISTWIDTH] IN BLOOD BY AUTOMATED COUNT: 14.4 %
FERRITIN SERPL-MCNC: 17 NG/ML
HCT VFR BLD AUTO: 41.7 %
HGB BLD-MCNC: 13.5 G/DL
IRON SERPL-MCNC: 74 UG/DL
LYMPHOCYTES # BLD AUTO: 2 K/UL
LYMPHOCYTES NFR BLD: 31.9 %
MCH RBC QN AUTO: 28.5 PG
MCHC RBC AUTO-ENTMCNC: 32.4 G/DL
MCV RBC AUTO: 88 FL
MONOCYTES # BLD AUTO: 0.6 K/UL
MONOCYTES NFR BLD: 10 %
NEUTROPHILS # BLD AUTO: 3.5 K/UL
NEUTROPHILS NFR BLD: 55.4 %
NRBC BLD-RTO: 0 /100 WBC
PLATELET # BLD AUTO: 258 K/UL
PMV BLD AUTO: 10.4 FL
RBC # BLD AUTO: 4.74 M/UL
SATURATED IRON: 18 %
TOTAL IRON BINDING CAPACITY: 407 UG/DL
TRANSFERRIN SERPL-MCNC: 275 MG/DL
WBC # BLD AUTO: 6.3 K/UL

## 2018-11-28 PROCEDURE — 36415 COLL VENOUS BLD VENIPUNCTURE: CPT | Mod: PO

## 2018-11-28 PROCEDURE — 83540 ASSAY OF IRON: CPT

## 2018-11-28 PROCEDURE — 85025 COMPLETE CBC W/AUTO DIFF WBC: CPT

## 2018-11-28 PROCEDURE — 82728 ASSAY OF FERRITIN: CPT

## 2018-12-03 ENCOUNTER — OFFICE VISIT (OUTPATIENT)
Dept: HEMATOLOGY/ONCOLOGY | Facility: CLINIC | Age: 68
End: 2018-12-03
Payer: MEDICARE

## 2018-12-03 VITALS
TEMPERATURE: 98 F | SYSTOLIC BLOOD PRESSURE: 122 MMHG | WEIGHT: 250 LBS | DIASTOLIC BLOOD PRESSURE: 65 MMHG | HEART RATE: 56 BPM | BODY MASS INDEX: 44.29 KG/M2 | OXYGEN SATURATION: 98 %

## 2018-12-03 DIAGNOSIS — D53.9 NUTRITIONAL ANEMIA: ICD-10-CM

## 2018-12-03 DIAGNOSIS — I25.708 CORONARY ARTERY DISEASE OF BYPASS GRAFT OF NATIVE HEART WITH STABLE ANGINA PECTORIS: ICD-10-CM

## 2018-12-03 DIAGNOSIS — D50.0 IRON DEFICIENCY ANEMIA DUE TO CHRONIC BLOOD LOSS: Primary | ICD-10-CM

## 2018-12-03 DIAGNOSIS — I25.810: ICD-10-CM

## 2018-12-03 DIAGNOSIS — N18.30 STAGE 3 CHRONIC KIDNEY DISEASE: ICD-10-CM

## 2018-12-03 DIAGNOSIS — E66.01 MORBID OBESITY WITH BMI OF 40.0-44.9, ADULT: ICD-10-CM

## 2018-12-03 DIAGNOSIS — E78.00 PURE HYPERCHOLESTEROLEMIA: Chronic | ICD-10-CM

## 2018-12-03 DIAGNOSIS — I10 ESSENTIAL HYPERTENSION: Chronic | ICD-10-CM

## 2018-12-03 DIAGNOSIS — R07.1 CHEST PAIN ON BREATHING: ICD-10-CM

## 2018-12-03 DIAGNOSIS — I65.29 STENOSIS OF CAROTID ARTERY, UNSPECIFIED LATERALITY: ICD-10-CM

## 2018-12-03 DIAGNOSIS — E53.8 B12 DEFICIENCY: ICD-10-CM

## 2018-12-03 PROCEDURE — 99214 OFFICE O/P EST MOD 30 MIN: CPT | Mod: S$PBB,,, | Performed by: NURSE PRACTITIONER

## 2018-12-03 PROCEDURE — 99999 PR PBB SHADOW E&M-EST. PATIENT-LVL II: CPT | Mod: PBBFAC,,, | Performed by: NURSE PRACTITIONER

## 2018-12-03 PROCEDURE — 99212 OFFICE O/P EST SF 10 MIN: CPT | Mod: PBBFAC | Performed by: NURSE PRACTITIONER

## 2018-12-03 NOTE — ASSESSMENT & PLAN NOTE
Patient remains iron deficient, but improved from prior visit.  She continues to decline EGD/colonoscopy.  I did discuss implications of endoscopies with the patient including determining possible infection/malignancy/GI bleeding source.    Patient's hemoglobin remains within normal limits.  Hemoglobin 13.5.  Last vitamin B12 and folate levels checked in 04/2018 WNL    F/U 4-6 months with CBC, BMP, iron studies, vitamin B12, folate.  She knows to call sooner if questions or concerns or signs and symptoms of anemia

## 2018-12-03 NOTE — ASSESSMENT & PLAN NOTE
4/2018 Vitamin B12 level 744.  Will plan to recheck next follow-up visit in 4-6 months.  Patient states she continues to take vitamin B12 p.o. supplement on most days of the week.

## 2018-12-03 NOTE — PROGRESS NOTES
"Subjective:       Patient ID: iQ Ortiz is a 68 y.o. female.    Chief Complaint: Anemia, lab work follow up.    HPI: 68 y.o female with CKD III, HLD, HTN, CAD, arthritis, Iron deficiency here for lab review. Patient hemoglobin remains normal at 13.7. Iron studies continue to reflect iron deficiency. Patient was asked to see GI for an EGD/Colonoscopy. Patient states she does not desire this at this time. Patient denies shortness of breath, hematuria, hematochezia. Patient reports she had a "mild" heart attack in the recent months and a stress test was performed by her cardiologist in April. Patient states her cardiologist prescribed Ranexa but she was intolerant of this and not taking. Patient denies chest pain at this time and states she will go to ED if she "ever feels it is necessary" Patient states she maintains an active lifestyle. Patient does not wish for further intervention at this time but states she is willing to follow up with labwork in 3-4 months.    Today's Visit:  Review of lab for today's visit revealed that patient hemoglobin remained stable.  She does remained iron deficient, but her counts have improved.  Hemoglobin 13.5, CBC otherwise unremarkable.  Ferritin 17, saturated iron 18, iron 74.  The patient tells me that in October she had an isolated episode of right lower extremity numbness of which she reported to the emergency department for.  The patient tells me that she had a CT of her head performed which was unremarkable.  She is scheduled to see a neurologist 12/12/2018 for follow-up of this.  The patient states that since the initial episode she has not had any further episodes.  The patient continued to decline upper and lower endoscopies despite being told the rationale for this procedure.  The patient continues to refuse oral or IV iron replacement therapy.  The patient only denies desires continued lab work monitoring at this time.    Social History     Socioeconomic History    " Marital status: Single     Spouse name: Not on file    Number of children: 3    Years of education: Not on file    Highest education level: Not on file   Social Needs    Financial resource strain: Not on file    Food insecurity - worry: Not on file    Food insecurity - inability: Not on file    Transportation needs - medical: Not on file    Transportation needs - non-medical: Not on file   Occupational History    Occupation: Not working   Tobacco Use    Smoking status: Never Smoker    Smokeless tobacco: Never Used   Substance and Sexual Activity    Alcohol use: No    Drug use: No    Sexual activity: No     Partners: Male   Other Topics Concern    Not on file   Social History Narrative    Not on file       Past Medical History:   Diagnosis Date    Carotid stenosis     19%    COPD (chronic obstructive pulmonary disease)     No meds    Coronary artery disease     CVA (cerebral vascular accident)     Dr. 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 Hope    Psoriatic arthritis     Rheumatology       Family History   Problem Relation Age of Onset    Breast cancer Maternal Grandfather     Breast cancer Paternal Aunt     Stroke Unknown     Breast cancer Sister 60    Leukemia Sister 8         as child    Lung cancer Paternal Grandfather     Heart disease Unknown        Past Surgical History:   Procedure Laterality Date    BREAST BIOPSY      R sided/benign    CARDIAC SURGERY      2016    CERVICAL FUSION      CHOLECYSTECTOMY      CORONARY ANGIOPLASTY      CORONARY ARTERY BYPASS GRAFT      triple bypass    CORONARY STENT PLACEMENT      EYE SURGERY      INTRAUTERINE DEVICE INSERTION      mass removed from R groin      TOTAL ABDOMINAL HYSTERECTOMY W/ BILATERAL SALPINGOOPHORECTOMY      due to benign mass, adhesions    TUBAL LIGATION          Review of Systems   Constitutional: Negative for activity change, appetite change, chills, diaphoresis, fatigue, fever and unexpected weight change.   HENT: Negative for congestion, nosebleeds, sore throat, trouble swallowing and voice change.    Eyes: Negative for photophobia, pain and visual disturbance.   Respiratory: Negative for cough, choking, chest tightness, shortness of breath and stridor.    Cardiovascular: Negative for chest pain, palpitations and leg swelling.   Gastrointestinal: Negative for abdominal distention, abdominal pain, anal bleeding, blood in stool, constipation, diarrhea, nausea, rectal pain and vomiting.   Genitourinary: Negative for difficulty urinating, dysuria and hematuria.   Musculoskeletal: Negative for arthralgias, back pain and gait problem.   Skin: Negative for rash and wound.   Neurological: Negative for dizziness, facial asymmetry, weakness, light-headedness, numbness and headaches.   Hematological: Negative for adenopathy. Does not bruise/bleed easily.   Psychiatric/Behavioral: The patient is not nervous/anxious.             Medication List           Accurate as of 12/3/18  1:41 PM. If you have any questions, ask your nurse or doctor.               CHANGE how you take these medications    furosemide 20 MG tablet  Commonly known as:  LASIX  Take 0.5 tablets (10 mg total) by mouth 2 (two) times daily.  What changed:    · when to take this  · reasons to take this     gabapentin 100 MG capsule  Commonly known as:  NEURONTIN  Take 2 capsules (200 mg total) by mouth 3 (three) times daily as needed.  What changed:    · how much to take  · when to take this        CONTINUE taking these medications    albuterol 2.5 mg /3 mL (0.083 %) nebulizer solution  Commonly known as:  PROVENTIL  Take 3 mLs (2.5 mg total) by nebulization every 6 (six) hours as needed for Wheezing. Rescue     APPLE CIDER VINEGAR ORAL     aspirin 81 MG Chew     BACTROBAN 2 % ointment  Generic drug:  mupirocin      clopidogrel 75 mg tablet  Commonly known as:  PLAVIX  Take 1 tablet (75 mg total) by mouth once daily.     cyanocobalamin 2000 MCG tablet     evolocumab 140 mg/mL Pnij  Commonly known as:  REPATHA SURECLICK  Inject 140 mg into the skin every 14 (fourteen) days.     folic acid 1 MG tablet  Commonly known as:  FOLVITE  Take 1 tablet (1 mg total) by mouth once daily.     garlic 2,000 mg Cap     HIBICLENS 4 % external liquid  Generic drug:  chlorhexidine     levothyroxine 112 MCG tablet  Commonly known as:  SYNTHROID  Take 1 tablet (112 mcg total) by mouth once daily.     metoprolol succinate 100 MG 24 hr tablet  Commonly known as:  TOPROL-XL  1 tab (100mg) in morning. Half tab (50mg) at bedtime.  Generic ok     nitroGLYCERIN 0.4 MG SL tablet  Commonly known as:  NITROSTAT  Place 1 tablet (0.4 mg total) under the tongue every 5 (five) minutes as needed for Chest pain.     TURMERIC (BULK) MISC     valsartan-hydrochlorothiazide 160-12.5 mg per tablet  Commonly known as:  DIOVAN-HCT  Take 1 tablet by mouth once daily.     VITAMIN B-6 100 MG Tab  Generic drug:  pyridoxine (vitamin B6)     vitamin D 1000 units Tab  Commonly known as:  VITAMIN D3          Objective:     Vitals:    12/03/18 1302   BP: 122/65   Pulse: (!) 56   Temp: 98.3 °F (36.8 °C)       Physical Exam   Constitutional: She is oriented to person, place, and time. Vital signs are normal. She appears well-developed and well-nourished.   HENT:   Head: Normocephalic.   Right Ear: Hearing and external ear normal.   Left Ear: Hearing and external ear normal.   Nose: Nose normal.   Mouth/Throat: Oropharynx is clear and moist.   Eyes: Conjunctivae, EOM and lids are normal. Right eye exhibits no discharge. Left eye exhibits no discharge.   Neck: Normal range of motion. No thyroid mass present.   Cardiovascular: Normal rate and regular rhythm.   Murmur heard.   Systolic murmur is present with a grade of 2/6.  Pulmonary/Chest: Effort normal and breath sounds normal. No  respiratory distress. She has no wheezes. She has no rhonchi. She has no rales. She exhibits no tenderness.   Abdominal: Soft. Bowel sounds are normal. She exhibits no distension. There is no tenderness.   Musculoskeletal: Normal range of motion. She exhibits no edema.   Lymphadenopathy:        Head (right side): No submandibular, no preauricular and no posterior auricular adenopathy present.        Head (left side): No submandibular, no preauricular and no posterior auricular adenopathy present.        Right cervical: No superficial cervical adenopathy present.       Left cervical: No superficial cervical adenopathy present.   Neurological: She is alert and oriented to person, place, and time.   Skin: Skin is warm, dry and intact.   Psychiatric: Her speech is normal and behavior is normal. Thought content normal. Her mood appears anxious.   Vitals reviewed.       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  Followed and managed by PCP            Oncology    Iron deficiency anemia due to chronic blood loss - Primary     Patient remains iron deficient, but improved from prior visit.  She continues to decline EGD/colonoscopy.  I did discuss implications of endoscopies with the patient including determining possible infection/malignancy/GI bleeding source.    Patient's hemoglobin remains within normal limits.  Hemoglobin 13.5.  Last vitamin B12 and folate levels  checked in 04/2018 [facility]    F/U 4-6 months with CBC, BMP, iron studies, vitamin B12, folate.  She knows to call sooner if questions or concerns or signs and symptoms of anemia         Relevant Orders    CBC auto differential    Ferritin    Iron and TIBC    C-reactive protein    Vitamin B12    Folate    Basic metabolic panel       Endocrine    Morbid obesity with BMI of 40.0-44.9, adult     Encouraged healthy nutrition and exercise habits         B12 deficiency     4/2018 Vitamin B12 level 744.  Will plan to recheck next follow-up visit in 4-6 months.  Patient states she continues to take vitamin B12 p.o. supplement on most days of the week.            Other    Chest pain     Followed by cardiologist           Other Visit Diagnoses     Nutritional anemia         Relevant Orders    Vitamin B12    Folate                I will review assessment/plan with collaborating physician     EMIR Kumar

## 2018-12-14 DIAGNOSIS — I25.10 CORONARY ARTERY DISEASE WITHOUT ANGINA PECTORIS, UNSPECIFIED VESSEL OR LESION TYPE, UNSPECIFIED WHETHER NATIVE OR TRANSPLANTED HEART: ICD-10-CM

## 2018-12-14 DIAGNOSIS — E78.49 OTHER HYPERLIPIDEMIA: Primary | ICD-10-CM

## 2019-01-02 ENCOUNTER — OFFICE VISIT (OUTPATIENT)
Dept: CARDIOLOGY | Facility: CLINIC | Age: 69
End: 2019-01-02
Payer: MEDICARE

## 2019-01-02 ENCOUNTER — CLINICAL SUPPORT (OUTPATIENT)
Dept: CARDIOLOGY | Facility: CLINIC | Age: 69
End: 2019-01-02
Payer: MEDICARE

## 2019-01-02 ENCOUNTER — CLINICAL SUPPORT (OUTPATIENT)
Dept: CARDIOLOGY | Facility: CLINIC | Age: 69
End: 2019-01-02
Attending: NUCLEAR MEDICINE
Payer: MEDICARE

## 2019-01-02 VITALS
WEIGHT: 244 LBS | DIASTOLIC BLOOD PRESSURE: 62 MMHG | SYSTOLIC BLOOD PRESSURE: 128 MMHG | HEART RATE: 52 BPM | BODY MASS INDEX: 41.66 KG/M2 | HEIGHT: 64 IN

## 2019-01-02 DIAGNOSIS — G45.9 TRANSIENT ISCHEMIC ATTACK (TIA): ICD-10-CM

## 2019-01-02 DIAGNOSIS — R55 NEAR SYNCOPE: ICD-10-CM

## 2019-01-02 DIAGNOSIS — Z95.1 S/P CABG (CORONARY ARTERY BYPASS GRAFT): Chronic | ICD-10-CM

## 2019-01-02 DIAGNOSIS — R07.9 CHEST PAIN, UNSPECIFIED TYPE: ICD-10-CM

## 2019-01-02 DIAGNOSIS — R55 NEAR SYNCOPE: Primary | ICD-10-CM

## 2019-01-02 DIAGNOSIS — I25.708 CORONARY ARTERY DISEASE OF BYPASS GRAFT OF NATIVE HEART WITH STABLE ANGINA PECTORIS: ICD-10-CM

## 2019-01-02 PROCEDURE — 99214 PR OFFICE/OUTPT VISIT, EST, LEVL IV, 30-39 MIN: ICD-10-PCS | Mod: S$PBB,,, | Performed by: NUCLEAR MEDICINE

## 2019-01-02 PROCEDURE — 99214 OFFICE O/P EST MOD 30 MIN: CPT | Mod: S$PBB,,, | Performed by: NUCLEAR MEDICINE

## 2019-01-02 PROCEDURE — 0298T HOLTER MONITOR - 3-14 DAY ADULT: CPT | Mod: ,,, | Performed by: INTERNAL MEDICINE

## 2019-01-02 PROCEDURE — 93010 ELECTROCARDIOGRAM REPORT: CPT | Mod: S$PBB,,, | Performed by: INTERNAL MEDICINE

## 2019-01-02 PROCEDURE — 99213 OFFICE O/P EST LOW 20 MIN: CPT | Mod: PBBFAC,PO | Performed by: NUCLEAR MEDICINE

## 2019-01-02 PROCEDURE — 0296T HOLTER MONITOR - 3-14 DAY ADULT: CPT | Mod: PBBFAC,PO | Performed by: INTERNAL MEDICINE

## 2019-01-02 PROCEDURE — 0298T HOLTER MONITOR - 3-14 DAY ADULT: ICD-10-PCS | Mod: ,,, | Performed by: INTERNAL MEDICINE

## 2019-01-02 PROCEDURE — 99999 PR PBB SHADOW E&M-EST. PATIENT-LVL III: ICD-10-PCS | Mod: PBBFAC,,, | Performed by: NUCLEAR MEDICINE

## 2019-01-02 PROCEDURE — 93005 ELECTROCARDIOGRAM TRACING: CPT | Mod: PBBFAC,PO | Performed by: INTERNAL MEDICINE

## 2019-01-02 PROCEDURE — 99999 PR PBB SHADOW E&M-EST. PATIENT-LVL III: CPT | Mod: PBBFAC,,, | Performed by: NUCLEAR MEDICINE

## 2019-01-02 PROCEDURE — 93010 EKG 12-LEAD: ICD-10-PCS | Mod: S$PBB,,, | Performed by: INTERNAL MEDICINE

## 2019-01-02 RX ORDER — PSEUDOEPHED/CODEINE/TRIPROLIDN 30-10-1.25
1 SYRUP ORAL DAILY
COMMUNITY

## 2019-01-02 RX ORDER — VALSARTAN 160 MG/1
TABLET ORAL
COMMUNITY
Start: 2018-12-19 | End: 2019-01-02

## 2019-01-02 RX ORDER — HYDROCHLOROTHIAZIDE 12.5 MG/1
TABLET ORAL
COMMUNITY
Start: 2018-12-19 | End: 2019-01-02

## 2019-01-02 NOTE — PROGRESS NOTES
Subjective:   Patient ID:  Qi Ortiz is a 68 y.o. female who presents for follow-up of Pre Syncope; Hypertension; and Coronary Artery Disease      HPI 1-CHRONIC CAD- POST CABG AND PCI/STENT,  CHRONIC ANGINA FC 2-   ESSENTIAL HYPERTENSION  PRESENTS FOR EVALUATION OF NEAR SYNCOPE AND TIA   SINCE 2017-  3 EPISODES  PATTERN OF ANGINA STABLE- NO REST PAIN  NO UNUSUAL BROOKS. NO ORTHOPNEA OR PND  NO RECENT HOSPITALIZATIONS OR ED VISITS FOR ADHF OR ACS  NO EDEMA. NO CALVE TENDERNESS. NO INTERMITTENT CLAUDICATION  CARD MED GOOD COMPLIANCE    Review of Systems   Constitution: Negative for chills, fever, weakness, night sweats, weight gain and weight loss.   HENT: Negative for nosebleeds.    Eyes: Negative for blurred vision, double vision and visual disturbance.   Cardiovascular: Positive for chest pain and near-syncope. Negative for dyspnea on exertion, irregular heartbeat, leg swelling, orthopnea, palpitations, paroxysmal nocturnal dyspnea and syncope.   Respiratory: Negative for cough, hemoptysis and wheezing.    Endocrine: Negative for polydipsia and polyuria.   Hematologic/Lymphatic: Does not bruise/bleed easily.   Skin: Negative for rash.   Musculoskeletal: Negative for joint pain, joint swelling, muscle weakness and myalgias.   Gastrointestinal: Negative for abdominal pain, hematemesis, jaundice and melena.   Genitourinary: Negative for dysuria, hematuria and nocturia.   Neurological: Positive for focal weakness, numbness and paresthesias. Negative for dizziness, headaches and sensory change.   Psychiatric/Behavioral: Negative for depression. The patient does not have insomnia and is not nervous/anxious.      Family History   Problem Relation Age of Onset    Breast cancer Maternal Grandfather     Breast cancer Paternal Aunt     Stroke Unknown     Breast cancer Sister 60    Leukemia Sister 8         as child    Lung cancer Paternal Grandfather     Heart disease Unknown      Past Medical History:    Diagnosis Date    Carotid stenosis     19%    COPD (chronic obstructive pulmonary disease)     No meds    Coronary artery disease     CVA (cerebral vascular accident)     Dr. 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 Hope    Psoriatic arthritis     Rheumatology     Current Outpatient Medications on File Prior to Visit   Medication Sig Dispense Refill    APPLE CIDER VINEGAR ORAL Take 1 capsule by mouth once daily.       aspirin 81 MG Chew Take 162.5 mg by mouth once daily.       calcium carbonate 650 mg calcium (1,625 mg) tablet Take 1 tablet by mouth once daily.      chlorhexidine (HIBICLENS) 4 % external liquid Hibiclens 4 % topical liquid   Apply 1 application twice a day by topical route as directed for 90 days.      clopidogrel (PLAVIX) 75 mg tablet Take 1 tablet (75 mg total) by mouth once daily. 90 tablet 3    cyanocobalamin 2000 MCG tablet Take 2,000 mcg by mouth once daily.      evolocumab (REPATHA SURECLICK) 140 mg/mL PnIj Inject 1 mL (140 mg total) into the skin every 14 (fourteen) days. 6 Syringe 3    folic acid (FOLVITE) 1 MG tablet Take 1 tablet (1 mg total) by mouth once daily. 90 tablet 1    gabapentin (NEURONTIN) 100 MG capsule Take 2 capsules (200 mg total) by mouth 3 (three) times daily as needed. (Patient taking differently: Take 100 mg by mouth 2 (two) times daily. ) 540 capsule 3    garlic 2,000 mg Cap Take 3,000 mg by mouth once daily.       levothyroxine (SYNTHROID) 112 MCG tablet Take 1 tablet (112 mcg total) by mouth once daily. 90 tablet 1    metoprolol succinate (TOPROL-XL) 100 MG 24 hr tablet 1 tab (100mg) in morning. Half tab (50mg) at bedtime.  Generic ok 135 tablet 3    mupirocin (BACTROBAN) 2 % ointment Bactroban 2 % topical ointment   Apply 1 application twice a day by topical route as directed for 30 days.      pyridoxine  (VITAMIN B-6) 100 MG Tab Take 200 mg by mouth once daily.       TURMERIC, BULK, MISC Take 1 capsule by mouth 2 (two) times daily.       valsartan-hydrochlorothiazide (DIOVAN-HCT) 160-12.5 mg per tablet Take 1 tablet by mouth once daily. 90 tablet 3    vitamin D 1000 units Tab Take 185 mg by mouth once daily.      albuterol (PROVENTIL) 2.5 mg /3 mL (0.083 %) nebulizer solution Take 3 mLs (2.5 mg total) by nebulization every 6 (six) hours as needed for Wheezing. Rescue 50 each 0    furosemide (LASIX) 20 MG tablet Take 0.5 tablets (10 mg total) by mouth 2 (two) times daily. (Patient taking differently: Take 10 mg by mouth as needed. ) 90 tablet 3    nitroGLYCERIN (NITROSTAT) 0.4 MG SL tablet Place 1 tablet (0.4 mg total) under the tongue every 5 (five) minutes as needed for Chest pain. 100 tablet 3    [DISCONTINUED] hydroCHLOROthiazide (HYDRODIURIL) 12.5 MG Tab       [DISCONTINUED] valsartan (DIOVAN) 160 MG tablet        No current facility-administered medications on file prior to visit.      Review of patient's allergies indicates:   Allergen Reactions    Celexa [citalopram] Anaphylaxis    Clindamycin Itching and Swelling    Codeine Shortness Of Breath, Itching and Swelling    Crestor [rosuvastatin] Anaphylaxis    Cytotec [misoprostol] Anaphylaxis and Other (See Comments)     Difficulty breathing    Lisinopril Anaphylaxis    Magnesium citrate Shortness Of Breath    Stadol [butorphanol tartrate] Anaphylaxis     Coded    Vicodin [hydrocodone-acetaminophen] Shortness Of Breath    Adhesive      EKG Electrodes    Aggrenox [aspirin-dipyridamole] Other (See Comments)     headaches    Avelox [moxifloxacin]      PATIENT STATES DO NOT GIVE UNDER ANY CIRCUSTANCES    Demerol [meperidine]     Isosorbide Other (See Comments)     Severe headache    Kenalog [triamcinolone acetonide]     Medrol [methylprednisolone] Other (See Comments)     unknown    Mobic [meloxicam]     Morphine     Nitroglycerin       Long acting    Pholcodine     Prednisone      GASTRIC PAIN    Ranexa [ranolazine] Nausea And Vomiting    Reclast [zoledronic acid-mannitol-water] Other (See Comments)     Bones hurt    Sulfa (sulfonamide antibiotics) Other (See Comments)     unknown    Talwin [pentazocine lactate]     Tetracycline     Tetracyclines     Tilade [nedocromil]     Zetia [ezetimibe]        Objective:     Physical Exam   Constitutional: She is oriented to person, place, and time. She appears well-developed. No distress.   HENT:   Head: Normocephalic.   Eyes: Conjunctivae are normal. Pupils are equal, round, and reactive to light. No scleral icterus.   Neck: Normal range of motion. Neck supple. Normal carotid pulses, no hepatojugular reflux and no JVD present. Carotid bruit is not present. No edema present. No thyroid mass and no thyromegaly present.   Cardiovascular: Normal rate, regular rhythm, S1 normal, S2 normal, normal heart sounds and intact distal pulses. PMI is not displaced. Exam reveals no gallop and no friction rub.   No murmur heard.  Pulses:       Carotid pulses are 2+ on the right side, and 2+ on the left side.       Radial pulses are 2+ on the right side, and 2+ on the left side.        Femoral pulses are 2+ on the right side, and 2+ on the left side.       Popliteal pulses are 2+ on the right side, and 2+ on the left side.        Dorsalis pedis pulses are 2+ on the right side, and 2+ on the left side.        Posterior tibial pulses are 2+ on the right side, and 2+ on the left side.   Pulmonary/Chest: Effort normal and breath sounds normal. She has no wheezes. She has no rales. She exhibits no tenderness.   Abdominal: Soft. Bowel sounds are normal. She exhibits no pulsatile midline mass and no mass. There is no hepatosplenomegaly. There is no tenderness.   Musculoskeletal: Normal range of motion. She exhibits no edema or tenderness.        Cervical back: Normal.        Thoracic back: Normal.        Lumbar back:  Normal.   Lymphadenopathy:     She has no cervical adenopathy.     She has no axillary adenopathy.        Right: No supraclavicular adenopathy present.        Left: No supraclavicular adenopathy present.   Neurological: She is alert and oriented to person, place, and time. She has normal strength and normal reflexes. No sensory deficit. Gait normal.   Skin: Skin is warm. No rash noted. No cyanosis. No pallor. Nails show no clubbing.   Psychiatric: She has a normal mood and affect. Her speech is normal and behavior is normal. Cognition and memory are normal.       Assessment:     1. Near syncope    2. Transient ischemic attack (TIA)    3. Coronary artery disease of bypass graft of native heart with stable angina pectoris    4. S/P CABG (coronary artery bypass graft)      ECG TODAY- SB- 52 BMP. CHRONIC ST T CHANGES. LV[facility]  WE NEED TO EVALUATE FOR CARD ARRHYTHMIAS. AS CAUSE OF TIA AND NEAR SYNCOPE  Plan:     Near syncope  -     Holter Monitor - 3-14 Day Adult; Future    Transient ischemic attack (TIA)  -     Holter Monitor - 3-14 Day Adult; Future    Coronary artery disease of bypass graft of native heart with stable angina pectoris  -     Holter Monitor - 3-14 Day Adult; Future    S/P CABG (coronary artery bypass graft)      1- CONTINUE PRESENT CARD MED    2- RETURN IN 4 WEEKS.

## 2019-01-03 ENCOUNTER — LAB VISIT (OUTPATIENT)
Dept: LAB | Facility: HOSPITAL | Age: 69
End: 2019-01-03
Attending: INTERNAL MEDICINE
Payer: MEDICARE

## 2019-01-03 ENCOUNTER — OFFICE VISIT (OUTPATIENT)
Dept: RHEUMATOLOGY | Facility: CLINIC | Age: 69
End: 2019-01-03
Payer: MEDICARE

## 2019-01-03 VITALS
DIASTOLIC BLOOD PRESSURE: 69 MMHG | HEIGHT: 63 IN | SYSTOLIC BLOOD PRESSURE: 129 MMHG | BODY MASS INDEX: 44.3 KG/M2 | WEIGHT: 250 LBS | HEART RATE: 60 BPM

## 2019-01-03 DIAGNOSIS — L40.50 PSORIATIC ARTHRITIS: Chronic | ICD-10-CM

## 2019-01-03 DIAGNOSIS — L40.50 PSORIATIC ARTHRITIS: Primary | Chronic | ICD-10-CM

## 2019-01-03 DIAGNOSIS — R73.03 PREDIABETES: ICD-10-CM

## 2019-01-03 DIAGNOSIS — L40.9 PSORIASIS: ICD-10-CM

## 2019-01-03 LAB
ALBUMIN SERPL BCP-MCNC: 3.6 G/DL
ALP SERPL-CCNC: 39 U/L
ALT SERPL W/O P-5'-P-CCNC: 19 U/L
ANION GAP SERPL CALC-SCNC: 7 MMOL/L
AST SERPL-CCNC: 25 U/L
BASOPHILS # BLD AUTO: 0.03 K/UL
BASOPHILS NFR BLD: 0.5 %
BILIRUB SERPL-MCNC: 0.5 MG/DL
BUN SERPL-MCNC: 19 MG/DL
CALCIUM SERPL-MCNC: 9.6 MG/DL
CHLORIDE SERPL-SCNC: 103 MMOL/L
CO2 SERPL-SCNC: 30 MMOL/L
CREAT SERPL-MCNC: 1.1 MG/DL
CRP SERPL-MCNC: 2.8 MG/L
DIFFERENTIAL METHOD: NORMAL
EOSINOPHIL # BLD AUTO: 0.1 K/UL
EOSINOPHIL NFR BLD: 2.3 %
ERYTHROCYTE [DISTWIDTH] IN BLOOD BY AUTOMATED COUNT: 14.3 %
ERYTHROCYTE [SEDIMENTATION RATE] IN BLOOD BY WESTERGREN METHOD: 9 MM/HR
EST. GFR  (AFRICAN AMERICAN): >60 ML/MIN/1.73 M^2
EST. GFR  (NON AFRICAN AMERICAN): 52 ML/MIN/1.73 M^2
ESTIMATED AVG GLUCOSE: 126 MG/DL
GLUCOSE SERPL-MCNC: 105 MG/DL
HBA1C MFR BLD HPLC: 6 %
HCT VFR BLD AUTO: 42.8 %
HGB BLD-MCNC: 13.7 G/DL
LYMPHOCYTES # BLD AUTO: 1.9 K/UL
LYMPHOCYTES NFR BLD: 30.5 %
MCH RBC QN AUTO: 28.1 PG
MCHC RBC AUTO-ENTMCNC: 32 G/DL
MCV RBC AUTO: 88 FL
MONOCYTES # BLD AUTO: 0.4 K/UL
MONOCYTES NFR BLD: 5.9 %
NEUTROPHILS # BLD AUTO: 3.7 K/UL
NEUTROPHILS NFR BLD: 60.8 %
PLATELET # BLD AUTO: 258 K/UL
PMV BLD AUTO: 10.1 FL
POTASSIUM SERPL-SCNC: 4 MMOL/L
PROT SERPL-MCNC: 7.1 G/DL
RBC # BLD AUTO: 4.87 M/UL
SODIUM SERPL-SCNC: 140 MMOL/L
WBC # BLD AUTO: 6.07 K/UL

## 2019-01-03 PROCEDURE — 83036 HEMOGLOBIN GLYCOSYLATED A1C: CPT

## 2019-01-03 PROCEDURE — 86140 C-REACTIVE PROTEIN: CPT

## 2019-01-03 PROCEDURE — 99214 PR OFFICE/OUTPT VISIT, EST, LEVL IV, 30-39 MIN: ICD-10-PCS | Mod: S$PBB,,, | Performed by: INTERNAL MEDICINE

## 2019-01-03 PROCEDURE — 96372 THER/PROPH/DIAG INJ SC/IM: CPT | Mod: PBBFAC,PO

## 2019-01-03 PROCEDURE — 99214 OFFICE O/P EST MOD 30 MIN: CPT | Mod: S$PBB,,, | Performed by: INTERNAL MEDICINE

## 2019-01-03 PROCEDURE — 80053 COMPREHEN METABOLIC PANEL: CPT | Mod: PO

## 2019-01-03 PROCEDURE — 99999 PR PBB SHADOW E&M-EST. PATIENT-LVL III: CPT | Mod: PBBFAC,,, | Performed by: INTERNAL MEDICINE

## 2019-01-03 PROCEDURE — 99213 OFFICE O/P EST LOW 20 MIN: CPT | Mod: PBBFAC,PO,25 | Performed by: INTERNAL MEDICINE

## 2019-01-03 PROCEDURE — 85025 COMPLETE CBC W/AUTO DIFF WBC: CPT | Mod: PO

## 2019-01-03 PROCEDURE — 85651 RBC SED RATE NONAUTOMATED: CPT | Mod: PO

## 2019-01-03 PROCEDURE — 99999 PR PBB SHADOW E&M-EST. PATIENT-LVL III: ICD-10-PCS | Mod: PBBFAC,,, | Performed by: INTERNAL MEDICINE

## 2019-01-03 PROCEDURE — 36415 COLL VENOUS BLD VENIPUNCTURE: CPT | Mod: PO

## 2019-01-03 RX ADMIN — USTEKINUMAB 90 MG: 90 INJECTION, SOLUTION SUBCUTANEOUS at 12:01

## 2019-01-03 NOTE — PROGRESS NOTES
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    Carotid stenosis    IRIS (obstructive sleep apnea)    Double ectopic ureters    Psoriatic arthritis    Morbid obesity with BMI of 40.0-44.9, adult    Angina, class II    Primary osteoarthritis involving multiple joints    Chest pain    Statin intolerance    Abnormal nuclear cardiac imaging test    Preoperative respiratory examination    Postural dizziness with near syncope    Stage 3 chronic kidney disease    Osteopenia of multiple sites    Psoriasis    Medication monitoring encounter    Elevated brain natriuretic peptide (BNP) level    Fever    History of CVA (cerebrovascular accident)    Chronic obstructive pulmonary disease    Iron deficiency anemia due to chronic blood loss    B12 deficiency    Iron deficiency anemia due to chronic blood loss    Allergy to statin medication    Prediabetes    Transient ischemic attack (TIA)    Near syncope    For routine follow-up of psoriasis and psoriatic arthritis   on Stelara 90 mg q 3 months; she is getting it here in clinic. In the past has failed enbrel, mtx, has sulfa allergy, recurrent skin infections with Humira, intolerant to Otezla    She also has Osteopenia, h/o right wrist fx in the 90's. Failed po boniva and fosamax due to GI intolerance. Last dexa showed decreasing bmd so Dr. CASTELLANOS started reclast in sept but she felt terrible after her infusion with significant pain and confusion. She does not want to  repeat.  NO new falls/fxs. I discussed prolia with her in the past and she declines any meds      She has been on Stelara since 3/2016. Also with neuropathic chest pain due to previous cardiac surgery, gabapentin helps greatly. She takes 100-200 mg tid      Today she denies any pain / stiffness   Still with Mild rash along hairline otherwise doing well ,  No other concerns  Hands hurt when she uses a lot      Current Outpatient Medications:     albuterol (PROVENTIL) 2.5 mg /3 mL (0.083 %) nebulizer solution, Take 3 mLs (2.5 mg total) by nebulization every 6 (six) hours as needed for Wheezing. Rescue, Disp: 50 each, Rfl: 0    APPLE CIDER VINEGAR ORAL, Take 1 capsule by mouth once daily. , Disp: , Rfl:     aspirin 81 MG Chew, Take 162.5 mg by mouth once daily. , Disp: , Rfl:     calcium carbonate 650 mg calcium (1,625 mg) tablet, Take 1 tablet by mouth once daily., Disp: , Rfl:     chlorhexidine (HIBICLENS) 4 % external liquid, Hibiclens 4 % topical liquid  Apply 1 application twice a day by topical route as directed for 90 days., Disp: , Rfl:     clopidogrel (PLAVIX) 75 mg tablet, Take 1 tablet (75 mg total) by mouth once daily., Disp: 90 tablet, Rfl: 3    cyanocobalamin 2000 MCG tablet, Take 2,000 mcg by mouth once daily., Disp: , Rfl:     evolocumab (REPATHA SURECLICK) 140 mg/mL PnIj, Inject 1 mL (140 mg total) into the skin every 14 (fourteen) days., Disp: 6 Syringe, Rfl: 3    folic acid (FOLVITE) 1 MG tablet, Take 1 tablet (1 mg total) by mouth once daily., Disp: 90 tablet, Rfl: 1    gabapentin (NEURONTIN) 100 MG capsule, Take 2 capsules (200 mg total) by mouth 3 (three) times daily as needed. (Patient taking differently: Take 100 mg by mouth 2 (two) times daily. ), Disp: 540 capsule, Rfl: 3    garlic 2,000 mg Cap, Take 3,000 mg by mouth once daily. , Disp: , Rfl:     levothyroxine (SYNTHROID) 112 MCG tablet, Take 1 tablet (112 mcg total) by mouth once daily., Disp: 90 tablet, Rfl: 1    metoprolol  succinate (TOPROL-XL) 100 MG 24 hr tablet, 1 tab (100mg) in morning. Half tab (50mg) at bedtime.  Generic ok, Disp: 135 tablet, Rfl: 3    mupirocin (BACTROBAN) 2 % ointment, Bactroban 2 % topical ointment  Apply 1 application twice a day by topical route as directed for 30 days., Disp: , Rfl:     nitroGLYCERIN (NITROSTAT) 0.4 MG SL tablet, Place 1 tablet (0.4 mg total) under the tongue every 5 (five) minutes as needed for Chest pain., Disp: 100 tablet, Rfl: 3    pyridoxine (VITAMIN B-6) 100 MG Tab, Take 200 mg by mouth once daily. , Disp: , Rfl:     TURMERIC, BULK, MISC, Take 1 capsule by mouth 2 (two) times daily. , Disp: , Rfl:     valsartan-hydrochlorothiazide (DIOVAN-HCT) 160-12.5 mg per tablet, Take 1 tablet by mouth once daily., Disp: 90 tablet, Rfl: 3    vitamin D 1000 units Tab, Take 185 mg by mouth once daily., Disp: , Rfl:     furosemide (LASIX) 20 MG tablet, Take 0.5 tablets (10 mg total) by mouth 2 (two) times daily. (Patient taking differently: Take 10 mg by mouth as needed. ), Disp: 90 tablet, Rfl: 3      Past Medical History:   Diagnosis Date    Carotid stenosis     19%    COPD (chronic obstructive pulmonary disease)     No meds    Coronary artery disease     CVA (cerebral vascular accident)     Dr. 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 Hope    Psoriatic arthritis     Rheumatology       Past Surgical History:   Procedure Laterality Date    BREAST BIOPSY      R sided/benign    CARDIAC SURGERY      sept 28 2016    CERVICAL FUSION      CHOLECYSTECTOMY      CORONARY ANGIOPLASTY      CORONARY ARTERY BYPASS GRAFT      triple bypass    CORONARY STENT PLACEMENT      EYE SURGERY      INTRAUTERINE DEVICE INSERTION      mass removed from R groin      TOTAL ABDOMINAL HYSTERECTOMY W/ BILATERAL SALPINGOOPHORECTOMY      due to benign mass, adhesions     TUBAL LIGATION           Social History     Tobacco Use    Smoking status: Never Smoker    Smokeless tobacco: Never Used   Substance Use Topics    Alcohol use: No    Drug use: No       Family History   Problem Relation Age of Onset    Breast cancer Maternal Grandfather     Breast cancer Paternal Aunt     Stroke Unknown     Breast cancer Sister 60    Leukemia Sister 8         as child    Lung cancer Paternal Grandfather     Heart disease Unknown        Review of patient's allergies indicates:   Allergen Reactions    Celexa [citalopram] Anaphylaxis    Clindamycin Itching and Swelling    Codeine Shortness Of Breath, Itching and Swelling    Crestor [rosuvastatin] Anaphylaxis    Cytotec [misoprostol] Anaphylaxis and Other (See Comments)     Difficulty breathing    Lisinopril Anaphylaxis    Magnesium citrate Shortness Of Breath    Stadol [butorphanol tartrate] Anaphylaxis     Coded    Vicodin [hydrocodone-acetaminophen] Shortness Of Breath    Adhesive      EKG Electrodes    Aggrenox [aspirin-dipyridamole] Other (See Comments)     headaches    Avelox [moxifloxacin]      PATIENT STATES DO NOT GIVE UNDER ANY CIRCUSTANCES    Demerol [meperidine]     Isosorbide Other (See Comments)     Severe headache    Kenalog [triamcinolone acetonide]     Medrol [methylprednisolone] Other (See Comments)     unknown    Mobic [meloxicam]     Morphine     Nitroglycerin      Long acting    Pholcodine     Prednisone      GASTRIC PAIN    Ranexa [ranolazine] Nausea And Vomiting    Reclast [zoledronic acid-mannitol-water] Other (See Comments)     Bones hurt    Sulfa (sulfonamide antibiotics) Other (See Comments)     unknown    Talwin [pentazocine lactate]     Tetracycline     Tetracyclines     Tilade [nedocromil]     Zetia [ezetimibe]         Medication List           Accurate as of 1/3/19 12:40 PM. If you have any questions, ask your nurse or doctor.               CHANGE how you take these  medications    furosemide 20 MG tablet  Commonly known as:  LASIX  Take 0.5 tablets (10 mg total) by mouth 2 (two) times daily.  What changed:    · when to take this  · reasons to take this     gabapentin 100 MG capsule  Commonly known as:  NEURONTIN  Take 2 capsules (200 mg total) by mouth 3 (three) times daily as needed.  What changed:    · how much to take  · when to take this        CONTINUE taking these medications    albuterol 2.5 mg /3 mL (0.083 %) nebulizer solution  Commonly known as:  PROVENTIL  Take 3 mLs (2.5 mg total) by nebulization every 6 (six) hours as needed for Wheezing. Rescue     APPLE CIDER VINEGAR ORAL     aspirin 81 MG Chew     BACTROBAN 2 % ointment  Generic drug:  mupirocin     calcium carbonate 650 mg calcium (1,625 mg) tablet     clopidogrel 75 mg tablet  Commonly known as:  PLAVIX  Take 1 tablet (75 mg total) by mouth once daily.     cyanocobalamin 2000 MCG tablet     evolocumab 140 mg/mL Pnij  Commonly known as:  REPATHA SURECLICK  Inject 1 mL (140 mg total) into the skin every 14 (fourteen) days.     folic acid 1 MG tablet  Commonly known as:  FOLVITE  Take 1 tablet (1 mg total) by mouth once daily.     garlic 2,000 mg Cap     HIBICLENS 4 % external liquid  Generic drug:  chlorhexidine     levothyroxine 112 MCG tablet  Commonly known as:  SYNTHROID  Take 1 tablet (112 mcg total) by mouth once daily.     metoprolol succinate 100 MG 24 hr tablet  Commonly known as:  TOPROL-XL  1 tab (100mg) in morning. Half tab (50mg) at bedtime.  Generic ok     nitroGLYCERIN 0.4 MG SL tablet  Commonly known as:  NITROSTAT  Place 1 tablet (0.4 mg total) under the tongue every 5 (five) minutes as needed for Chest pain.     TURMERIC (BULK) MISC     valsartan-hydrochlorothiazide 160-12.5 mg per tablet  Commonly known as:  DIOVAN-HCT  Take 1 tablet by mouth once daily.     VITAMIN B-6 100 MG Tab  Generic drug:  pyridoxine (vitamin B6)     vitamin D 1000 units Tab  Commonly known as:  VITAMIN D3                 Review of Systems:  Constitutional: Denies fever, chills. No recent weight changes.   Fatigue: no  Muscle weakness: no  Headaches: no new headaches  Eyes: No redness or dryness.  No recent visual changes.  ENT: Denies dry mouth. No oral or nasal ulcers.  Card: No chest pain.  Resp: No cough or sob.   Gastro: No nausea or vomiting.  No heartburn.  Constipation: no  Diarrhea: no  Genito:  No dysuria.  No genital ulcers.  Skin: per hpi   Raynauds:no  Neuro: No numbness / tingling.   Psych: No depression, anxiety  Endo:  no excess thirst.  Heme: no abnormal bleeding or bruising  Clots:none       OBJECTIVE:     Vital Signs   Vitals:    01/03/19 1239   BP: 129/69   Pulse: 60     Physical Exam:  General Appearance:  NAD.   Gait: not favoring.  HEENT: PERRL.  Eyes not dry or injected.  No nasal ulcers.  Mouth not dry, no oral lesions.  Lymph: cervical, supraclavicular or axillary nodes: none abnormal   Cardio: no irregularity of S1 or S2.  No gallops or rubs.   Resp: Normal respiratory motion. Clear to auscultation bilaterally.   No abnormal chest conformation.  Abd: Soft, non-tender, nondistended.  No masses.   Skin: Head and neck,  and extremities examined.   Rash along hairline   Neuro: Ox3.   Cranial nerves II-XII grossly intact.   Sensation intact  in both distal LE and upper extremities to light touch.  Musculoskeletal Exam:    Bilateral osteoarthritic changes in both hands.  Prominent DIP.  No synovitis  Muscle strength:Equal and full in all mm groups of the upper and lower ext.    Laboratory:   Results for orders placed or performed in visit on 01/03/19   CBC auto differential   Result Value Ref Range    WBC 6.07 3.90 - 12.70 K/uL    RBC 4.87 4.00 - 5.40 M/uL    Hemoglobin 13.7 12.0 - 16.0 g/dL    Hematocrit 42.8 37.0 - 48.5 %    MCV 88 82 - 98 fL    MCH 28.1 27.0 - 31.0 pg    MCHC 32.0 32.0 - 36.0 g/dL    RDW 14.3 11.5 - 14.5 %    Platelets 258 150 - 350 K/uL    MPV 10.1 9.2 - 12.9 fL    Gran # (ANC) 3.7  1.8 - 7.7 K/uL    Lymph # 1.9 1.0 - 4.8 K/uL    Mono # 0.4 0.3 - 1.0 K/uL    Eos # 0.1 0.0 - 0.5 K/uL    Baso # 0.03 0.00 - 0.20 K/uL    Gran% 60.8 38.0 - 73.0 %    Lymph% 30.5 18.0 - 48.0 %    Mono% 5.9 4.0 - 15.0 %    Eosinophil% 2.3 0.0 - 8.0 %    Basophil% 0.5 0.0 - 1.9 %    Differential Method Automated      Lab Results   Component Value Date    HEPAIGM Negative 10/03/2018    HEPBIGM Negative 10/03/2018    HEPCAB Negative 10/03/2018         Imaging :reviewed x rays hands/ feet     Notes reviewed  Other procedures:    ASSESSMENT/PLAN:     There are no diagnoses linked to this encounter.  67 yr old    1: Pso/ PsA  Stable disease   Started Stelara 3/2016 with > 90% improvement of her skin.  C/w Stelara 90mg every 12 weeks (she gets it done here) as she cannot do it herself with her arthritis  Vaccinations declined     2: Osteopenia with DEXA 6/7/17 with osteopenia with FRAX 14.1/1.7, decreasing   BMD at hip     Received last reclast in September 2017 ,But she did not tolerate with pain   She also experienced lot of confusion after IV Reclast and she declines any further medications for the treatment of osteoporosis  Prior GI intolerance to fosamax and boniva   cont vit d supp       stellara :  Infection, malignancy risk , other side effects discussed.  Hold prior to any surgery or during periods of infection.    3 month f/u with all 4 lab  Went over uptodate information /literature on the meds prescribed today     Disclaimer: This note was prepared using voice recognition system and is likely to have sound alike errors and is not proof read.  Please call me with any questions.

## 2019-01-03 NOTE — PROGRESS NOTES
Administered 90mg/mL to Left abdomen. No acute reaction noted to site. Patient instructed on S/S to report. Patient verbalized understanding. Patient 15 minutes post administration. Patient is scheduled to return 4/5/19 for next injection. Remind me sent to [facility] to review for next dose.    Lot:[id number]   Exp:06/2019

## 2019-01-31 ENCOUNTER — OFFICE VISIT (OUTPATIENT)
Dept: CARDIOLOGY | Facility: CLINIC | Age: 69
End: 2019-01-31
Payer: MEDICARE

## 2019-01-31 VITALS
WEIGHT: 251.75 LBS | SYSTOLIC BLOOD PRESSURE: 140 MMHG | DIASTOLIC BLOOD PRESSURE: 70 MMHG | HEIGHT: 63 IN | HEART RATE: 64 BPM | BODY MASS INDEX: 44.61 KG/M2

## 2019-01-31 DIAGNOSIS — I10 ESSENTIAL HYPERTENSION: ICD-10-CM

## 2019-01-31 DIAGNOSIS — I47.10 SUPRAVENTRICULAR TACHYCARDIA: Chronic | ICD-10-CM

## 2019-01-31 DIAGNOSIS — R55 NEAR SYNCOPE: Primary | ICD-10-CM

## 2019-01-31 DIAGNOSIS — R00.2 PALPITATIONS: ICD-10-CM

## 2019-01-31 PROCEDURE — 99999 PR PBB SHADOW E&M-EST. PATIENT-LVL III: CPT | Mod: PBBFAC,,, | Performed by: NUCLEAR MEDICINE

## 2019-01-31 PROCEDURE — 99214 PR OFFICE/OUTPT VISIT, EST, LEVL IV, 30-39 MIN: ICD-10-PCS | Mod: S$PBB,,, | Performed by: NUCLEAR MEDICINE

## 2019-01-31 PROCEDURE — 99214 OFFICE O/P EST MOD 30 MIN: CPT | Mod: S$PBB,,, | Performed by: NUCLEAR MEDICINE

## 2019-01-31 PROCEDURE — 99999 PR PBB SHADOW E&M-EST. PATIENT-LVL III: ICD-10-PCS | Mod: PBBFAC,,, | Performed by: NUCLEAR MEDICINE

## 2019-01-31 PROCEDURE — 99213 OFFICE O/P EST LOW 20 MIN: CPT | Mod: PBBFAC,PN | Performed by: NUCLEAR MEDICINE

## 2019-01-31 RX ORDER — METOPROLOL SUCCINATE 100 MG/1
TABLET, EXTENDED RELEASE ORAL
Qty: 180 TABLET | Refills: 3 | OUTPATIENT
Start: 2019-01-31 | End: 2019-01-31 | Stop reason: SDUPTHER

## 2019-01-31 RX ORDER — METOPROLOL SUCCINATE 100 MG/1
TABLET, EXTENDED RELEASE ORAL
Qty: 180 TABLET | Refills: 3 | Status: SHIPPED | OUTPATIENT
Start: 2019-01-31 | End: 2019-03-19 | Stop reason: SDUPTHER

## 2019-01-31 NOTE — PROGRESS NOTES
Subjective:   Patient ID:  Qi Ortiz is a 68 y.o. female who presents for follow-up of Coronary Artery Disease (followup); Results (Review Holter); Hypertension; and Palpitations      HPI  NO RECURRENT EPISODES OF NEAR SYNCOPE OR SYNCOPE  OCCASIONAL PALPITATIONS.   NO CHEST DISCOMFORT- NO ANGINA. OR EQUIVALENT  NO UNUSUAL BROOKS.NO ORTHOPNEA OR PND  NO EDEMA. NO CALVE TENDERNESS. NO INTERMITTENT CLAUDICATION  CARD MED GOOD COMPLIANCE    Review of Systems   Constitution: Negative for chills, fever, weakness, night sweats, weight gain and weight loss.   HENT: Negative for nosebleeds.    Eyes: Negative for blurred vision, double vision and visual disturbance.   Cardiovascular: Negative for chest pain, dyspnea on exertion, irregular heartbeat, leg swelling, orthopnea, palpitations, paroxysmal nocturnal dyspnea and syncope.   Respiratory: Negative for cough, hemoptysis and wheezing.    Endocrine: Negative for polydipsia and polyuria.   Hematologic/Lymphatic: Does not bruise/bleed easily.   Skin: Negative for rash.   Musculoskeletal: Negative for joint pain, joint swelling, muscle weakness and myalgias.   Gastrointestinal: Negative for abdominal pain, hematemesis, jaundice and melena.   Genitourinary: Negative for dysuria, hematuria and nocturia.   Neurological: Negative for dizziness, focal weakness, headaches and sensory change.   Psychiatric/Behavioral: Negative for depression. The patient does not have insomnia and is not nervous/anxious.      Family History   Problem Relation Age of Onset    Breast cancer Maternal Grandfather     Breast cancer Paternal Aunt     Stroke Unknown     Breast cancer Sister 60    Leukemia Sister 8         as child    Lung cancer Paternal Grandfather     Heart disease Unknown      Past Medical History:   Diagnosis Date    Carotid stenosis     19%    COPD (chronic obstructive pulmonary disease)     No meds    Coronary artery disease     CVA (cerebral vascular accident)      Dr. 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 Hope    Psoriatic arthritis     Rheumatology     Current Outpatient Medications on File Prior to Visit   Medication Sig Dispense Refill    albuterol (PROVENTIL) 2.5 mg /3 mL (0.083 %) nebulizer solution Take 3 mLs (2.5 mg total) by nebulization every 6 (six) hours as needed for Wheezing. Rescue 50 each 0    APPLE CIDER VINEGAR ORAL Take 1 capsule by mouth once daily.       aspirin 81 MG Chew Take 162.5 mg by mouth once daily.       calcium carbonate 650 mg calcium (1,625 mg) tablet Take 1 tablet by mouth once daily.      chlorhexidine (HIBICLENS) 4 % external liquid Hibiclens 4 % topical liquid   Apply 1 application twice a day by topical route as directed for 90 days.      clopidogrel (PLAVIX) 75 mg tablet Take 1 tablet (75 mg total) by mouth once daily. 90 tablet 3    cyanocobalamin 2000 MCG tablet Take 2,000 mcg by mouth once daily.      evolocumab (REPATHA SURECLICK) 140 mg/mL PnIj Inject 1 mL (140 mg total) into the skin every 14 (fourteen) days. 6 Syringe 3    folic acid (FOLVITE) 1 MG tablet Take 1 tablet (1 mg total) by mouth once daily. 90 tablet 1    gabapentin (NEURONTIN) 100 MG capsule Take 2 capsules (200 mg total) by mouth 3 (three) times daily as needed. (Patient taking differently: Take 100 mg by mouth 2 (two) times daily. ) 540 capsule 3    garlic 2,000 mg Cap Take 3,000 mg by mouth once daily.       levothyroxine (SYNTHROID) 112 MCG tablet Take 1 tablet (112 mcg total) by mouth once daily. 90 tablet 1    mupirocin (BACTROBAN) 2 % ointment Bactroban 2 % topical ointment   Apply 1 application twice a day by topical route as directed for 30 days.      pyridoxine (VITAMIN B-6) 100 MG Tab Take 200 mg by mouth once daily.       TURMERIC, BULK, MISC Take 1 capsule by mouth 2 (two) times daily.        valsartan-hydrochlorothiazide (DIOVAN-HCT) 160-12.5 mg per tablet Take 1 tablet by mouth once daily. 90 tablet 3    vitamin D 1000 units Tab Take 185 mg by mouth once daily.      [DISCONTINUED] metoprolol succinate (TOPROL-XL) 100 MG 24 hr tablet 1 tab (100mg) in morning. Half tab (50mg) at bedtime.  Generic ok 135 tablet 3    furosemide (LASIX) 20 MG tablet Take 0.5 tablets (10 mg total) by mouth 2 (two) times daily. (Patient taking differently: Take 10 mg by mouth as needed. ) 90 tablet 3    nitroGLYCERIN (NITROSTAT) 0.4 MG SL tablet Place 1 tablet (0.4 mg total) under the tongue every 5 (five) minutes as needed for Chest pain. 100 tablet 3     No current facility-administered medications on file prior to visit.      Review of patient's allergies indicates:   Allergen Reactions    Celexa [citalopram] Anaphylaxis    Clindamycin Itching and Swelling    Codeine Shortness Of Breath, Itching and Swelling    Crestor [rosuvastatin] Anaphylaxis    Cytotec [misoprostol] Anaphylaxis and Other (See Comments)     Difficulty breathing    Lisinopril Anaphylaxis    Magnesium citrate Shortness Of Breath    Stadol [butorphanol tartrate] Anaphylaxis     Coded    Vicodin [hydrocodone-acetaminophen] Shortness Of Breath    Adhesive      EKG Electrodes    Aggrenox [aspirin-dipyridamole] Other (See Comments)     headaches    Avelox [moxifloxacin]      PATIENT STATES DO NOT GIVE UNDER ANY CIRCUSTANCES    Demerol [meperidine]     Isosorbide Other (See Comments)     Severe headache    Kenalog [triamcinolone acetonide]     Medrol [methylprednisolone] Other (See Comments)     unknown    Mobic [meloxicam]     Morphine     Nitroglycerin      Long acting    Pholcodine     Prednisone      GASTRIC PAIN    Ranexa [ranolazine] Nausea And Vomiting    Reclast [zoledronic acid-mannitol-water] Other (See Comments)     Bones hurt    Sulfa (sulfonamide antibiotics) Other (See Comments)     unknown    Talwin [pentazocine  lactate]     Tetracycline     Tetracyclines     Tilade [nedocromil]     Zetia [ezetimibe]        Objective:     Physical Exam   Constitutional: She is oriented to person, place, and time. She appears well-developed. No distress.   HENT:   Head: Normocephalic.   Eyes: Conjunctivae are normal. Pupils are equal, round, and reactive to light. No scleral icterus.   Neck: Normal range of motion. Neck supple. Normal carotid pulses, no hepatojugular reflux and no JVD present. Carotid bruit is not present. No edema present. No thyroid mass and no thyromegaly present.   Cardiovascular: Normal rate, regular rhythm, S1 normal, S2 normal, normal heart sounds and intact distal pulses. PMI is not displaced. Exam reveals no gallop and no friction rub.   No murmur heard.  Pulses:       Carotid pulses are 2+ on the right side, and 2+ on the left side.       Radial pulses are 2+ on the right side, and 2+ on the left side.        Femoral pulses are 2+ on the right side, and 2+ on the left side.       Popliteal pulses are 2+ on the right side, and 2+ on the left side.        Dorsalis pedis pulses are 2+ on the right side, and 2+ on the left side.        Posterior tibial pulses are 2+ on the right side, and 2+ on the left side.   Pulmonary/Chest: Effort normal and breath sounds normal. She has no wheezes. She has no rales. She exhibits no tenderness.   Abdominal: Soft. Bowel sounds are normal. She exhibits no pulsatile midline mass and no mass. There is no hepatosplenomegaly. There is no tenderness.   Musculoskeletal: Normal range of motion. She exhibits no edema or tenderness.        Cervical back: Normal.        Thoracic back: Normal.        Lumbar back: Normal.   Lymphadenopathy:     She has no cervical adenopathy.     She has no axillary adenopathy.        Right: No supraclavicular adenopathy present.        Left: No supraclavicular adenopathy present.   Neurological: She is alert and oriented to person, place, and time. She has  normal strength and normal reflexes. No sensory deficit. Gait normal.   Skin: Skin is warm. No rash noted. No cyanosis. No pallor. Nails show no clubbing.   Psychiatric: She has a normal mood and affect. Her speech is normal and behavior is normal. Cognition and memory are normal.       Assessment:     1. Near syncope    2. Supraventricular tachycardia    3. Palpitations    4. Essential hypertension      CARDIO EVENT RECORDING- ZIO WAS REVIEWED AND DISCUSSED- FREQUENT PSVT-  MX RATE 140 BMP,  NO VT. NO PAUSES OR AV BLOCKS  Plan:     Near syncope    Supraventricular tachycardia    Palpitations    Essential hypertension  -     Discontinue: metoprolol succinate (TOPROL-XL) 100 MG 24 hr tablet; 1 tab (100mg) in morning. And 100 mg at bedtime.  Generic ok  Dispense: 180 tablet; Refill: 3  -     Discontinue: metoprolol succinate (TOPROL-XL) 100 MG 24 hr tablet; 1 tab (100mg) in morning. And 100 mg at bedtime.  Generic ok  Dispense: 180 tablet; Refill: 3  -     metoprolol succinate (TOPROL-XL) 100 MG 24 hr tablet; 1 tab (100mg) in morning. And 100 mg at bedtime.  Generic ok  Dispense: 180 tablet; Refill: 3      1- OMT BBS- INCREASE TOPROL  MG BID    2- RETURN IN 3 MONTHS.

## 2019-03-15 ENCOUNTER — TELEPHONE (OUTPATIENT)
Dept: CARDIOLOGY | Facility: CLINIC | Age: 69
End: 2019-03-15

## 2019-03-15 NOTE — TELEPHONE ENCOUNTER
Returned call and instructed rx will be available for  Tuesday at 0730, at 2nd floor registration desk.

## 2019-03-15 NOTE — TELEPHONE ENCOUNTER
----- Message from Toya Freeman sent at 3/15/2019 11:01 AM CDT -----  Type:  RX Refill Request    Who Called: Pt   Refill or New Rx:r New Rx   RX Name and Strength:Plavix 75 mg   How is the patient currently taking it? (ex. 1XDay): 1 x day   Is this a 30 day or 90 day RX: 90 day   Preferred Pharmacy with phone number: Pt stated that she has to  the prescriptions.        Type:  RX Refill Request    Who Called: Pt   Refill or New Rx: New Rx   RX Name and Strength: Diovan Hctz 150/12.5  How is the patient currently taking it? (ex. 1XDay): 1 x day   Is this a 30 day or 90 day RX: 90 day   Preferred Pharmacy with phone number: She needs to  prescription    Ordering Provider: Santi  Would the patient rather a call back or a response via [facility]?  Call back   Best Call Back Number: [phone] (home)

## 2019-03-19 DIAGNOSIS — I10 ESSENTIAL HYPERTENSION: ICD-10-CM

## 2019-03-19 DIAGNOSIS — R07.2 PRECORDIAL PAIN: ICD-10-CM

## 2019-03-19 DIAGNOSIS — I25.10 CORONARY ARTERY DISEASE WITHOUT ANGINA PECTORIS, UNSPECIFIED VESSEL OR LESION TYPE, UNSPECIFIED WHETHER NATIVE OR TRANSPLANTED HEART: ICD-10-CM

## 2019-03-19 DIAGNOSIS — E78.49 OTHER HYPERLIPIDEMIA: ICD-10-CM

## 2019-03-19 RX ORDER — VALSARTAN AND HYDROCHLOROTHIAZIDE 160; 12.5 MG/1; MG/1
1 TABLET, FILM COATED ORAL DAILY
Qty: 90 TABLET | Refills: 3 | Status: SHIPPED | OUTPATIENT
Start: 2019-03-19 | End: 2020-03-02 | Stop reason: SDUPTHER

## 2019-03-19 RX ORDER — CLOPIDOGREL BISULFATE 75 MG/1
75 TABLET ORAL DAILY
Qty: 90 TABLET | Refills: 3 | Status: SHIPPED | OUTPATIENT
Start: 2019-03-19 | End: 2020-03-02 | Stop reason: SDUPTHER

## 2019-03-19 RX ORDER — METOPROLOL SUCCINATE 100 MG/1
TABLET, EXTENDED RELEASE ORAL
Qty: 180 TABLET | Refills: 3 | Status: SHIPPED | OUTPATIENT
Start: 2019-03-19 | End: 2020-03-02 | Stop reason: SDUPTHER

## 2019-03-19 RX ORDER — NITROGLYCERIN 0.4 MG/1
0.4 TABLET SUBLINGUAL EVERY 5 MIN PRN
Qty: 100 TABLET | Refills: 3 | Status: SHIPPED | OUTPATIENT
Start: 2019-03-19 | End: 2020-03-02 | Stop reason: SDUPTHER

## 2019-03-28 ENCOUNTER — PATIENT OUTREACH (OUTPATIENT)
Dept: ADMINISTRATIVE | Facility: HOSPITAL | Age: 69
End: 2019-03-28

## 2019-03-28 NOTE — PROGRESS NOTES
Health Maintenance reviewed. Pt is refusing to ever have the following procedures  Mammogram/Colonoscopy/Influenza/Tetanus & declining to take statin.

## 2019-04-04 DIAGNOSIS — L40.9 PSORIASIS: Primary | ICD-10-CM

## 2019-04-05 ENCOUNTER — LAB VISIT (OUTPATIENT)
Dept: LAB | Facility: HOSPITAL | Age: 69
End: 2019-04-05
Attending: INTERNAL MEDICINE
Payer: MEDICARE

## 2019-04-05 ENCOUNTER — OFFICE VISIT (OUTPATIENT)
Dept: RHEUMATOLOGY | Facility: CLINIC | Age: 69
End: 2019-04-05
Payer: MEDICARE

## 2019-04-05 ENCOUNTER — INFUSION (OUTPATIENT)
Dept: RHEUMATOLOGY | Facility: HOSPITAL | Age: 69
End: 2019-04-05
Attending: INTERNAL MEDICINE
Payer: MEDICARE

## 2019-04-05 VITALS
DIASTOLIC BLOOD PRESSURE: 81 MMHG | BODY MASS INDEX: 44.65 KG/M2 | WEIGHT: 252 LBS | HEIGHT: 63 IN | HEART RATE: 62 BPM | SYSTOLIC BLOOD PRESSURE: 126 MMHG

## 2019-04-05 VITALS — BODY MASS INDEX: 44.64 KG/M2 | WEIGHT: 252 LBS

## 2019-04-05 DIAGNOSIS — M85.89 OSTEOPENIA OF MULTIPLE SITES: ICD-10-CM

## 2019-04-05 DIAGNOSIS — L40.50 PSORIATIC ARTHRITIS: ICD-10-CM

## 2019-04-05 DIAGNOSIS — L40.9 PSORIASIS: Primary | ICD-10-CM

## 2019-04-05 DIAGNOSIS — E53.8 B12 DEFICIENCY: ICD-10-CM

## 2019-04-05 DIAGNOSIS — L40.9 PSORIASIS: ICD-10-CM

## 2019-04-05 DIAGNOSIS — M81.0 OSTEOPOROSIS, UNSPECIFIED OSTEOPOROSIS TYPE, UNSPECIFIED PATHOLOGICAL FRACTURE PRESENCE: ICD-10-CM

## 2019-04-05 LAB
ALBUMIN SERPL BCP-MCNC: 3.6 G/DL (ref 3.5–5.2)
ALP SERPL-CCNC: 43 U/L (ref 55–135)
ALT SERPL W/O P-5'-P-CCNC: 16 U/L (ref 10–44)
ANION GAP SERPL CALC-SCNC: 9 MMOL/L (ref 8–16)
AST SERPL-CCNC: 22 U/L (ref 10–40)
BASOPHILS # BLD AUTO: 0.05 K/UL (ref 0–0.2)
BASOPHILS NFR BLD: 0.7 % (ref 0–1.9)
BILIRUB SERPL-MCNC: 0.5 MG/DL (ref 0.1–1)
BUN SERPL-MCNC: 16 MG/DL (ref 8–23)
CALCIUM SERPL-MCNC: 9.7 MG/DL (ref 8.7–10.5)
CHLORIDE SERPL-SCNC: 104 MMOL/L (ref 95–110)
CO2 SERPL-SCNC: 28 MMOL/L (ref 23–29)
CREAT SERPL-MCNC: 1 MG/DL (ref 0.5–1.4)
CRP SERPL-MCNC: 2.4 MG/L (ref 0–8.2)
DIFFERENTIAL METHOD: ABNORMAL
EOSINOPHIL # BLD AUTO: 0.1 K/UL (ref 0–0.5)
EOSINOPHIL NFR BLD: 1.7 % (ref 0–8)
ERYTHROCYTE [DISTWIDTH] IN BLOOD BY AUTOMATED COUNT: 14.2 % (ref 11.5–14.5)
ERYTHROCYTE [SEDIMENTATION RATE] IN BLOOD BY WESTERGREN METHOD: 21 MM/HR (ref 0–36)
EST. GFR  (AFRICAN AMERICAN): >60 ML/MIN/1.73 M^2
EST. GFR  (NON AFRICAN AMERICAN): 58 ML/MIN/1.73 M^2
GLUCOSE SERPL-MCNC: 104 MG/DL (ref 70–110)
HCT VFR BLD AUTO: 41 % (ref 37–48.5)
HGB BLD-MCNC: 12.6 G/DL (ref 12–16)
IMM GRANULOCYTES # BLD AUTO: 0.01 K/UL (ref 0–0.04)
IMM GRANULOCYTES NFR BLD AUTO: 0.1 % (ref 0–0.5)
LYMPHOCYTES # BLD AUTO: 1.7 K/UL (ref 1–4.8)
LYMPHOCYTES NFR BLD: 24.6 % (ref 18–48)
MCH RBC QN AUTO: 26.3 PG (ref 27–31)
MCHC RBC AUTO-ENTMCNC: 30.7 G/DL (ref 32–36)
MCV RBC AUTO: 86 FL (ref 82–98)
MONOCYTES # BLD AUTO: 0.5 K/UL (ref 0.3–1)
MONOCYTES NFR BLD: 7.1 % (ref 4–15)
NEUTROPHILS # BLD AUTO: 4.6 K/UL (ref 1.8–7.7)
NEUTROPHILS NFR BLD: 65.9 % (ref 38–73)
NRBC BLD-RTO: 0 /100 WBC
PLATELET # BLD AUTO: 296 K/UL (ref 150–350)
PMV BLD AUTO: 9.5 FL (ref 9.2–12.9)
POTASSIUM SERPL-SCNC: 4.3 MMOL/L (ref 3.5–5.1)
PROT SERPL-MCNC: 7.2 G/DL (ref 6–8.4)
RBC # BLD AUTO: 4.79 M/UL (ref 4–5.4)
SODIUM SERPL-SCNC: 141 MMOL/L (ref 136–145)
WBC # BLD AUTO: 7 K/UL (ref 3.9–12.7)

## 2019-04-05 PROCEDURE — 99214 PR OFFICE/OUTPT VISIT, EST, LEVL IV, 30-39 MIN: ICD-10-PCS | Mod: S$PBB,,, | Performed by: INTERNAL MEDICINE

## 2019-04-05 PROCEDURE — 99999 PR PBB SHADOW E&M-EST. PATIENT-LVL IV: ICD-10-PCS | Mod: PBBFAC,,, | Performed by: INTERNAL MEDICINE

## 2019-04-05 PROCEDURE — 99214 OFFICE O/P EST MOD 30 MIN: CPT | Mod: PBBFAC,PN | Performed by: INTERNAL MEDICINE

## 2019-04-05 PROCEDURE — C9487 USTEKINUMAB IV INJ, 1 MG: HCPCS | Performed by: INTERNAL MEDICINE

## 2019-04-05 PROCEDURE — 85652 RBC SED RATE AUTOMATED: CPT

## 2019-04-05 PROCEDURE — 85025 COMPLETE CBC W/AUTO DIFF WBC: CPT

## 2019-04-05 PROCEDURE — 86140 C-REACTIVE PROTEIN: CPT

## 2019-04-05 PROCEDURE — 99214 OFFICE O/P EST MOD 30 MIN: CPT | Mod: S$PBB,,, | Performed by: INTERNAL MEDICINE

## 2019-04-05 PROCEDURE — 36415 COLL VENOUS BLD VENIPUNCTURE: CPT

## 2019-04-05 PROCEDURE — 96372 THER/PROPH/DIAG INJ SC/IM: CPT

## 2019-04-05 PROCEDURE — 80053 COMPREHEN METABOLIC PANEL: CPT

## 2019-04-05 PROCEDURE — 99999 PR PBB SHADOW E&M-EST. PATIENT-LVL IV: CPT | Mod: PBBFAC,,, | Performed by: INTERNAL MEDICINE

## 2019-04-05 PROCEDURE — 63600175 PHARM REV CODE 636 W HCPCS: Performed by: INTERNAL MEDICINE

## 2019-04-05 RX ORDER — ZOLEDRONIC ACID 5 MG/100ML
5 INJECTION, SOLUTION INTRAVENOUS ONCE
Status: CANCELLED | OUTPATIENT
Start: 2019-04-05

## 2019-04-05 RX ORDER — HEPARIN SODIUM (PORCINE) LOCK FLUSH IV SOLN 100 UNIT/ML 100 UNIT/ML
100 SOLUTION INTRAVENOUS
Status: CANCELLED | OUTPATIENT
Start: 2019-04-05

## 2019-04-05 RX ORDER — ACETAMINOPHEN 325 MG/1
1000 TABLET ORAL ONCE
Status: CANCELLED | OUTPATIENT
Start: 2019-04-05

## 2019-04-05 RX ORDER — SODIUM CHLORIDE 0.9 % (FLUSH) 0.9 %
10 SYRINGE (ML) INJECTION
Status: CANCELLED | OUTPATIENT
Start: 2019-04-05

## 2019-04-05 RX ORDER — CYANOCOBALAMIN 1000 UG/ML
1000 INJECTION, SOLUTION INTRAMUSCULAR; SUBCUTANEOUS
Status: CANCELLED
Start: 2019-04-05

## 2019-04-05 RX ADMIN — USTEKINUMAB 90 MG: 90 INJECTION, SOLUTION SUBCUTANEOUS at 01:04

## 2019-04-05 NOTE — NURSING
Administered 90mg/mL to Right upper abdomen. No acute reaction noted to site. Patient instructed on S/S to report. Patient verbalized understanding. Patient 15 minutes post administration.     Lot:[id number]            Exp: 08/2021

## 2019-04-05 NOTE — PATIENT INSTRUCTIONS
Ustekinumab injection  What is this medicine?  USTEKINUMAB (US te KIN ue mab) is used to treat plaque psoriasis and psoriatic arthritis. This medicine is also used to treat Crohn's disease. It is not a cure.  How should I use this medicine?  This medicine is for injection under the skin or infusion into a vein. It is usually given by a health care professional in a hospital or clinic setting.  If you get this medicine at home, you will be taught how to prepare and give this medicine. Use exactly as directed. Take your medicine at regular intervals. Do not take your medicine more often than directed.  It is important that you put your used needles and syringes in a special sharps container. Do not put them in a trash can. If you do not have a sharps container, call your pharmacist or healthcare provider to get one.  A special MedGuide will be given to you by the pharmacist with each prescription and refill. Be sure to read this information carefully each time.  Talk to your pediatrician regarding the use of this medicine in children. Special care may be needed.  What side effects may I notice from receiving this medicine?  Side effects that you should report to your doctor or health care professional as soon as possible:  · allergic reactions like skin rash, itching or hives, swelling of the face, lips, or tongue  · breathing problems  · changes in vision  · confusion  · seizures  · signs and symptoms of infection like fever or chills; cough; sore throat; pain or trouble passing urine  · swollen lymph nodes in the neck, underarm, or groin areas  · unexplained weight loss  · unusually weak or tired  · vomiting  Side effects that usually do not require medical attention (Report these to your doctor or health care professional if they continue or are bothersome.):  · headache  · nausea  · redness, itching, swelling, or bruising at site where injected  What may interact with this medicine?  Do not take this medicine  with any of the following medications:  · live virus vaccines  This medicine may also interact with the following medications:  · cyclosporine  · immunosuppressives  · vaccines  · warfarin  What if I miss a dose?  If you miss a dose, take it as soon as you can. If it is almost time for your next dose, take only that dose. Do not take double or extra doses.  Where should I keep my medicine?  Keep out of the reach of children.  If you are using this medicine at home, you will be instructed on how to store this medicine. Store the prefilled syringes in a refrigerator between 2 to 8 degrees C (36 to 46 degrees F). Keep in the original carton. Protect from light. Do not freeze. Do not shake. Throw away any unused medicine after the expiration date on the label.  What should I tell my health care provider before I take this medicine?  They need to know if you have any of these conditions:  · cancer  · diabetes  · immune system problems  · infection (especially a virus infection such as chickenpox, cold sores, or herpes) or history of infections  · receiving or have received allergy shots  · recently received or scheduled to receive a vaccine  · tuberculosis, a positive skin test for tuberculosis, or have recently been in close contact with someone who has tuberculosis  · an unusual reaction to ustekinumab, latex, other medicines, foods, dyes, or preservatives  · pregnant or trying to get pregnant  · breast-feeding  What should I watch for while using this medicine?  Your condition will be monitored carefully while you are receiving this medicine. Tell your doctor or healthcare professional if your symptoms do not start to get better or if they get worse.  You will be tested for tuberculosis (TB) before you start this medicine. If your doctor prescribes any medicine for TB, you should start taking the TB medicine before starting this medicine. Make sure to finish the full course of TB medicine.  Call your doctor or health  care professional if you get a cold or other infection while receiving this medicine. Do not treat yourself. This medicine may decrease your body's ability to fight infection.  Talk to your doctor about your risk of cancer. You may be more at risk for certain types of cancers if you take this medicine.  NOTE:This sheet is a summary. It may not cover all possible information. If you have questions about this medicine, talk to your doctor, pharmacist, or health care provider. Copyright© 2017 [employer]

## 2019-04-05 NOTE — PROGRESS NOTES
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    Carotid stenosis    IRIS (obstructive sleep apnea)    Double ectopic ureters    Psoriatic arthritis    Morbid obesity with BMI of 40.0-44.9, adult    Angina, class II    Primary osteoarthritis involving multiple joints    Chest pain    Statin intolerance    Abnormal nuclear cardiac imaging test    Preoperative respiratory examination    Postural dizziness with near syncope    Stage 3 chronic kidney disease    Osteopenia of multiple sites    Psoriasis    Medication monitoring encounter    Elevated brain natriuretic peptide (BNP) level    Fever    History of CVA (cerebrovascular accident)    Chronic obstructive pulmonary disease    Iron deficiency anemia due to chronic blood loss    B12 deficiency    Iron deficiency anemia due to chronic blood loss    Allergy to statin medication    Prediabetes    Transient ischemic attack (TIA)    Near syncope    Palpitations    Supraventricular tachycardia    For routine follow-up of psoriasis and psoriatic arthritis   on Stelara 90 mg q 3 months; she is getting it here in clinic. In the past has failed enbrel, mtx, has sulfa allergy, recurrent skin infections with Humira, intolerant to Otezla    She also has Osteopenia, h/o right wrist fx in the 90's. Failed po boniva and fosamax due to GI intolerance. Last dexa showed decreasing bmd so Dr. CASTELLANOS started reclast in sept but she felt terrible after her infusion with  significant pain and confusion. She does not want to repeat.  NO new falls/fxs. I discussed prolia with her in the past and today and she declines any meds      She has been on Stelara since 3/2016. Also with neuropathic chest pain due to previous cardiac surgery, gabapentin helps greatly. She takes 100-200 mg tid      Today she denies any pain / stiffness   Still with Mild rash along hairline otherwise doing well , small few mm patch on left leg  No other concerns  Hands hurt when she uses a lot    She has other cardiac issues and gets short of breath when she walks , so she minimizes her activity and walk    Current Outpatient Medications:     albuterol (PROVENTIL) 2.5 mg /3 mL (0.083 %) nebulizer solution, Take 3 mLs (2.5 mg total) by nebulization every 6 (six) hours as needed for Wheezing. Rescue, Disp: 50 each, Rfl: 0    APPLE CIDER VINEGAR ORAL, Take 1 capsule by mouth once daily. , Disp: , Rfl:     aspirin 81 MG Chew, Take 162.5 mg by mouth once daily. , Disp: , Rfl:     calcium carbonate 650 mg calcium (1,625 mg) tablet, Take 1 tablet by mouth once daily., Disp: , Rfl:     chlorhexidine (HIBICLENS) 4 % external liquid, Hibiclens 4 % topical liquid  Apply 1 application twice a day by topical route as directed for 90 days., Disp: , Rfl:     clopidogrel (PLAVIX) 75 mg tablet, Take 1 tablet (75 mg total) by mouth once daily., Disp: 90 tablet, Rfl: 3    cyanocobalamin 2000 MCG tablet, Take 2,000 mcg by mouth once daily., Disp: , Rfl:     evolocumab (REPATHA SURECLICK) 140 mg/mL PnIj, Inject 1 mL (140 mg total) into the skin every 14 (fourteen) days., Disp: 6 Syringe, Rfl: 3    folic acid (FOLVITE) 1 MG tablet, Take 1 tablet (1 mg total) by mouth once daily., Disp: 90 tablet, Rfl: 1    garlic 2,000 mg Cap, Take 3,000 mg by mouth once daily. , Disp: , Rfl:     levothyroxine (SYNTHROID) 112 MCG tablet, Take 1 tablet (112 mcg total) by mouth once daily., Disp: 90 tablet, Rfl: 1    metoprolol succinate  (TOPROL-XL) 100 MG 24 hr tablet, 1 tab (100mg) in morning. And 100 mg at bedtime.  Generic ok, Disp: 180 tablet, Rfl: 3    mupirocin (BACTROBAN) 2 % ointment, Bactroban 2 % topical ointment  Apply 1 application twice a day by topical route as directed for 30 days., Disp: , Rfl:     nitroGLYCERIN (NITROSTAT) 0.4 MG SL tablet, Place 1 tablet (0.4 mg total) under the tongue every 5 (five) minutes as needed for Chest pain., Disp: 100 tablet, Rfl: 3    pyridoxine (VITAMIN B-6) 100 MG Tab, Take 200 mg by mouth once daily. , Disp: , Rfl:     TURMERIC, BULK, MISC, Take 1 capsule by mouth 2 (two) times daily. , Disp: , Rfl:     valsartan-hydrochlorothiazide (DIOVAN-HCT) 160-12.5 mg per tablet, Take 1 tablet by mouth once daily., Disp: 90 tablet, Rfl: 3    vitamin D 1000 units Tab, Take 185 mg by mouth once daily., Disp: , Rfl:     furosemide (LASIX) 20 MG tablet, Take 0.5 tablets (10 mg total) by mouth 2 (two) times daily. (Patient taking differently: Take 10 mg by mouth as needed. ), Disp: 90 tablet, Rfl: 3    gabapentin (NEURONTIN) 100 MG capsule, Take 2 capsules (200 mg total) by mouth 3 (three) times daily as needed. (Patient taking differently: Take 100 mg by mouth 2 (two) times daily. ), Disp: 540 capsule, Rfl: 3      Past Medical History:   Diagnosis Date    Carotid stenosis     19%    COPD (chronic obstructive pulmonary disease)     No meds    Coronary artery disease     CVA (cerebral vascular accident)     Dr. 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 Hope    Psoriatic arthritis     Rheumatology       Past Surgical History:   Procedure Laterality Date    BREAST BIOPSY      R sided/benign    CARDIAC SURGERY      sept 28 2016    CERVICAL FUSION      CHOLECYSTECTOMY      CORONARY ANGIOPLASTY      CORONARY ARTERY BYPASS GRAFT      triple bypass    CORONARY  STENT PLACEMENT      EYE SURGERY      INTRAUTERINE DEVICE INSERTION      mass removed from R groin      TOTAL ABDOMINAL HYSTERECTOMY W/ BILATERAL SALPINGOOPHORECTOMY      due to benign mass, adhesions    TUBAL LIGATION           Social History     Tobacco Use    Smoking status: Never Smoker    Smokeless tobacco: Never Used   Substance Use Topics    Alcohol use: No    Drug use: No       Family History   Problem Relation Age of Onset    Breast cancer Maternal Grandfather     Breast cancer Paternal Aunt     Stroke Unknown     Breast cancer Sister 60    Leukemia Sister 8         as child    Lung cancer Paternal Grandfather     Heart disease Unknown        Review of patient's allergies indicates:   Allergen Reactions    Celexa [citalopram] Anaphylaxis    Clindamycin Itching and Swelling    Codeine Shortness Of Breath, Itching and Swelling    Crestor [rosuvastatin] Anaphylaxis    Cytotec [misoprostol] Anaphylaxis and Other (See Comments)     Difficulty breathing    Lisinopril Anaphylaxis    Magnesium citrate Shortness Of Breath    Stadol [butorphanol tartrate] Anaphylaxis     Coded    Vicodin [hydrocodone-acetaminophen] Shortness Of Breath    Adhesive      EKG Electrodes    Aggrenox [aspirin-dipyridamole] Other (See Comments)     headaches    Avelox [moxifloxacin]      PATIENT STATES DO NOT GIVE UNDER ANY CIRCUSTANCES    Demerol [meperidine]     Isosorbide Other (See Comments)     Severe headache    Kenalog [triamcinolone acetonide]     Medrol [methylprednisolone] Other (See Comments)     unknown    Mobic [meloxicam]     Morphine     Nitroglycerin      Long acting    Pholcodine     Prednisone      GASTRIC PAIN    Ranexa [ranolazine] Nausea And Vomiting    Reclast [zoledronic acid-mannitol-water] Other (See Comments)     Bones hurt    Sulfa (sulfonamide antibiotics) Other (See Comments)     unknown    Talwin [pentazocine lactate]     Tetracycline     Tetracyclines     Tilade  [nedocromil]     Zetia [ezetimibe]      Medication List with Changes/Refills   Current Medications    ALBUTEROL (PROVENTIL) 2.5 MG /3 ML (0.083 %) NEBULIZER SOLUTION    Take 3 mLs (2.5 mg total) by nebulization every 6 (six) hours as needed for Wheezing. Rescue    APPLE CIDER VINEGAR ORAL    Take 1 capsule by mouth once daily.     ASPIRIN 81 MG CHEW    Take 162.5 mg by mouth once daily.     CALCIUM CARBONATE 650 MG CALCIUM (1,625 MG) TABLET    Take 1 tablet by mouth once daily.    CHLORHEXIDINE (HIBICLENS) 4 % EXTERNAL LIQUID    Hibiclens 4 % topical liquid   Apply 1 application twice a day by topical route as directed for 90 days.    CLOPIDOGREL (PLAVIX) 75 MG TABLET    Take 1 tablet (75 mg total) by mouth once daily.    CYANOCOBALAMIN 2000 MCG TABLET    Take 2,000 mcg by mouth once daily.    EVOLOCUMAB (REPATHA SURECLICK) 140 MG/ML PNIJ    Inject 1 mL (140 mg total) into the skin every 14 (fourteen) days.    FOLIC ACID (FOLVITE) 1 MG TABLET    Take 1 tablet (1 mg total) by mouth once daily.    FUROSEMIDE (LASIX) 20 MG TABLET    Take 0.5 tablets (10 mg total) by mouth 2 (two) times daily.    GABAPENTIN (NEURONTIN) 100 MG CAPSULE    Take 2 capsules (200 mg total) by mouth 3 (three) times daily as needed.    GARLIC 2,000 MG CAP    Take 3,000 mg by mouth once daily.     LEVOTHYROXINE (SYNTHROID) 112 MCG TABLET    Take 1 tablet (112 mcg total) by mouth once daily.    METOPROLOL SUCCINATE (TOPROL-XL) 100 MG 24 HR TABLET    1 tab (100mg) in morning. And 100 mg at bedtime.  Generic ok    MUPIROCIN (BACTROBAN) 2 % OINTMENT    Bactroban 2 % topical ointment   Apply 1 application twice a day by topical route as directed for 30 days.    NITROGLYCERIN (NITROSTAT) 0.4 MG SL TABLET    Place 1 tablet (0.4 mg total) under the tongue every 5 (five) minutes as needed for Chest pain.    PYRIDOXINE (VITAMIN B-6) 100 MG TAB    Take 200 mg by mouth once daily.     TURMERIC, BULK, MISC    Take 1 capsule by mouth 2 (two) times daily.      VALSARTAN-HYDROCHLOROTHIAZIDE (DIOVAN-HCT) 160-12.5 MG PER TABLET    Take 1 tablet by mouth once daily.    VITAMIN D 1000 UNITS TAB    Take 185 mg by mouth once daily.           Review of Systems:  Constitutional: Denies fever, chills. No recent weight changes.   Fatigue: no  Muscle weakness: no  Headaches: no new headaches  Eyes: No redness or dryness.  No recent visual changes.  ENT: Denies dry mouth. No oral or nasal ulcers.  Card: No chest pain.  Resp: No cough or sob.   Gastro: No nausea or vomiting.  No heartburn.  Constipation: no  Diarrhea: no  Genito:  No dysuria.  No genital ulcers.  Skin: per hpi   Raynauds:no  Neuro: No numbness / tingling.   Psych: No depression, anxiety  Endo:  no excess thirst.  Heme: no abnormal bleeding or bruising  Clots:none       OBJECTIVE:     Vital Signs   Vitals:    04/05/19 1233   BP: 126/81   Pulse: 62     Physical Exam:  General Appearance:  NAD.   Gait: not favoring.  HEENT: PERRL.  Eyes not dry or injected.  No nasal ulcers.  Mouth not dry, no oral lesions.  Lymph: cervical, supraclavicular or axillary nodes: none abnormal   Cardio: no irregularity of S1 or S2.  No gallops or rubs.   Resp: Normal respiratory motion. Clear to auscultation bilaterally.   No abnormal chest conformation.  Abd: Soft, non-tender, nondistended.  No masses.   Skin: Head and neck,  and extremities examined.   Rash along hairline   Small few mm patch on left leg   Neuro: Ox3.   Cranial nerves II-XII grossly intact.   Sensation intact  in both distal LE and upper extremities to light touch.  Musculoskeletal Exam:    Bilateral osteoarthritic changes in both hands.  Prominent DIP.  No tenderness  No synovitis  Muscle strength:  She uses wheelchair      Laboratory:   Results for orders placed or performed in visit on 04/05/19   CBC auto differential   Result Value Ref Range    WBC 7.00 3.90 - 12.70 K/uL    RBC 4.79 4.00 - 5.40 M/uL    Hemoglobin 12.6 12.0 - 16.0 g/dL    Hematocrit 41.0 37.0 - 48.5 %     MCV 86 82 - 98 fL    MCH 26.3 (L) 27.0 - 31.0 pg    MCHC 30.7 (L) 32.0 - 36.0 g/dL    RDW 14.2 11.5 - 14.5 %    Platelets 296 150 - 350 K/uL    MPV 9.5 9.2 - 12.9 fL    Immature Granulocytes 0.1 0.0 - 0.5 %    Gran # (ANC) 4.6 1.8 - 7.7 K/uL    Immature Grans (Abs) 0.01 0.00 - 0.04 K/uL    Lymph # 1.7 1.0 - 4.8 K/uL    Mono # 0.5 0.3 - 1.0 K/uL    Eos # 0.1 0.0 - 0.5 K/uL    Baso # 0.05 0.00 - 0.20 K/uL    nRBC 0 0 /100 WBC    Gran% 65.9 38.0 - 73.0 %    Lymph% 24.6 18.0 - 48.0 %    Mono% 7.1 4.0 - 15.0 %    Eosinophil% 1.7 0.0 - 8.0 %    Basophil% 0.7 0.0 - 1.9 %    Differential Method Automated    Comprehensive metabolic panel   Result Value Ref Range    Sodium 141 136 - 145 mmol/L    Potassium 4.3 3.5 - 5.1 mmol/L    Chloride 104 95 - 110 mmol/L    CO2 28 23 - 29 mmol/L    Glucose 104 70 - 110 mg/dL    BUN, Bld 16 8 - 23 mg/dL    Creatinine 1.0 0.5 - 1.4 mg/dL    Calcium 9.7 8.7 - 10.5 mg/dL    Total Protein 7.2 6.0 - 8.4 g/dL    Albumin 3.6 3.5 - 5.2 g/dL    Total Bilirubin 0.5 0.1 - 1.0 mg/dL    Alkaline Phosphatase 43 (L) 55 - 135 U/L    AST 22 10 - 40 U/L    ALT 16 10 - 44 U/L    Anion Gap 9 8 - 16 mmol/L    eGFR if African American >60 >60 mL/min/1.73 m^2    eGFR if non African American 58 (A) >60 mL/min/1.73 m^2   C-reactive protein   Result Value Ref Range    CRP 2.4 0.0 - 8.2 mg/L     Lab Results   Component Value Date    HEPAIGM Negative 10/03/2018    HEPBIGM Negative 10/03/2018    HEPCAB Negative 10/03/2018         Imaging :reviewed x rays hands/ feet     Notes reviewed  Other procedures:    ASSESSMENT/PLAN:     Psoriasis    Psoriatic arthritis    Osteopenia of multiple sites    Osteoporosis, unspecified osteoporosis type, unspecified pathological fracture presence  -     Vitamin D; Standing      67 yr old    1: Pso/ PsA  Stable disease   Started Stelara 3/2016 with > 90% improvement of her skin.  C/w Stelara 90mg every 12 weeks (she gets it done here) as she cannot do it herself with her  arthritis  Vaccinations declined     2: Osteopenia with DEXA 6/7/17 with osteopenia with FRAX 14.1/1.7, decreasing   BMD at hip     Received last reclast in September 2017 ,But she did not tolerate with pain   She also experienced lot of confusion after IV Reclast and she declines any further medications for the treatment of osteoporosis  Prior GI intolerance to fosamax and boniva   cont vit d supp -OTC  Check levels with next lab    jessy :  Infection, malignancy risk , other side effects discussed.  Hold prior to any surgery or during periods of infection.    3 month f/u with all 4 lab  Went over uptodate information /literature on the meds prescribed today     Disclaimer: This note was prepared using voice recognition system and is likely to have sound alike errors and is not proof read.  Please call me with any questions.

## 2019-04-09 ENCOUNTER — TELEPHONE (OUTPATIENT)
Dept: PHARMACY | Facility: CLINIC | Age: 69
End: 2019-04-09

## 2019-04-10 ENCOUNTER — TELEPHONE (OUTPATIENT)
Dept: HEMATOLOGY/ONCOLOGY | Facility: CLINIC | Age: 69
End: 2019-04-10

## 2019-04-10 ENCOUNTER — OFFICE VISIT (OUTPATIENT)
Dept: FAMILY MEDICINE | Facility: CLINIC | Age: 69
End: 2019-04-10
Payer: MEDICARE

## 2019-04-10 VITALS
TEMPERATURE: 98 F | HEART RATE: 71 BPM | WEIGHT: 252.31 LBS | DIASTOLIC BLOOD PRESSURE: 70 MMHG | OXYGEN SATURATION: 98 % | HEIGHT: 63 IN | SYSTOLIC BLOOD PRESSURE: 128 MMHG | RESPIRATION RATE: 16 BRPM | BODY MASS INDEX: 44.71 KG/M2

## 2019-04-10 DIAGNOSIS — Z95.1 S/P CABG (CORONARY ARTERY BYPASS GRAFT): ICD-10-CM

## 2019-04-10 DIAGNOSIS — J44.9 CHRONIC OBSTRUCTIVE PULMONARY DISEASE, UNSPECIFIED COPD TYPE: ICD-10-CM

## 2019-04-10 DIAGNOSIS — E53.8 FOLIC ACID DEFICIENCY: ICD-10-CM

## 2019-04-10 DIAGNOSIS — Z86.73 HISTORY OF CVA (CEREBROVASCULAR ACCIDENT): ICD-10-CM

## 2019-04-10 DIAGNOSIS — N18.30 STAGE 3 CHRONIC KIDNEY DISEASE: ICD-10-CM

## 2019-04-10 DIAGNOSIS — L40.50 PSORIATIC ARTHRITIS: ICD-10-CM

## 2019-04-10 DIAGNOSIS — E66.01 MORBID OBESITY WITH BMI OF 40.0-44.9, ADULT: ICD-10-CM

## 2019-04-10 DIAGNOSIS — R73.03 PREDIABETES: ICD-10-CM

## 2019-04-10 DIAGNOSIS — I20.9 ANGINA, CLASS II: ICD-10-CM

## 2019-04-10 DIAGNOSIS — E03.9 HYPOTHYROIDISM, UNSPECIFIED TYPE: Primary | ICD-10-CM

## 2019-04-10 PROCEDURE — 99214 OFFICE O/P EST MOD 30 MIN: CPT | Mod: S$PBB,,, | Performed by: FAMILY MEDICINE

## 2019-04-10 PROCEDURE — 99999 PR PBB SHADOW E&M-EST. PATIENT-LVL III: ICD-10-PCS | Mod: PBBFAC,,, | Performed by: FAMILY MEDICINE

## 2019-04-10 PROCEDURE — 99213 OFFICE O/P EST LOW 20 MIN: CPT | Mod: PBBFAC,PO | Performed by: FAMILY MEDICINE

## 2019-04-10 PROCEDURE — 99999 PR PBB SHADOW E&M-EST. PATIENT-LVL III: CPT | Mod: PBBFAC,,, | Performed by: FAMILY MEDICINE

## 2019-04-10 PROCEDURE — 99214 PR OFFICE/OUTPT VISIT, EST, LEVL IV, 30-39 MIN: ICD-10-PCS | Mod: S$PBB,,, | Performed by: FAMILY MEDICINE

## 2019-04-10 RX ORDER — FOLIC ACID 1 MG/1
1 TABLET ORAL DAILY
Qty: 90 TABLET | Refills: 1 | Status: SHIPPED | OUTPATIENT
Start: 2019-04-10 | End: 2019-05-28 | Stop reason: SDUPTHER

## 2019-04-10 RX ORDER — LEVOTHYROXINE SODIUM 112 UG/1
112 TABLET ORAL DAILY
Qty: 90 TABLET | Refills: 1 | Status: SHIPPED | OUTPATIENT
Start: 2019-04-10 | End: 2019-05-29 | Stop reason: SDUPTHER

## 2019-04-10 NOTE — PROGRESS NOTES
"Subjective:       Patient ID: iQ Ortiz is a 68 y.o. female.    Chief Complaint: Follow-up    HPI   Follow-up  67yo female presents today for follow-up. She reports that since last visit she has been evaluated per Cardiology and Neurology. Is now on Metoprolol 100mg BID. Has not experienced any more symptoms of lower extremity paresthesias. She had a Holter monitor which demonstrated SVT. There is no report of palpitations or CP currently. She had A1c monitoring in January 2019 at which time levels were measured at 6.0. No symptoms of hyper or hypoglycemia are reported. She continues with Synthroid 112mcg daily and denies any symptoms of concern for imbalance. She reports stable respiratory status. She has been followed by Rheumatology for psoriasis and is on Stelara. She notes having a recent injection and her symptoms have been stable. There have been no recent ER visits or hospitalizations.    Review of Systems   Constitutional: Negative for activity change, appetite change, fatigue and unexpected weight change.   HENT: Negative for congestion, ear pain and sinus pain.    Eyes: Negative for visual disturbance.   Respiratory: Positive for shortness of breath. Negative for cough.    Cardiovascular: Negative for chest pain and palpitations.   Gastrointestinal: Negative for abdominal pain, constipation and diarrhea.   Endocrine: Negative for cold intolerance and heat intolerance.   Genitourinary: Negative for decreased urine volume and difficulty urinating.   Musculoskeletal: Positive for arthralgias. Negative for myalgias.   Skin: Negative for rash.   Neurological: Negative for dizziness, weakness and headaches.   Psychiatric/Behavioral: Negative for dysphoric mood and sleep disturbance. The patient is not nervous/anxious.        Objective:   /70   Pulse 71   Temp 97.9 °F (36.6 °C) (Temporal)   Resp 16   Ht 5' 3" (1.6 m)   Wt 114.4 kg (252 lb 5.1 oz)   SpO2 98%   BMI 44.70 kg/m²   Physical Exam "   Constitutional: She is oriented to person, place, and time. She appears well-developed and well-nourished.   Morbidly obese, non-toxic   HENT:   Head: Normocephalic and atraumatic.   Right Ear: Tympanic membrane, external ear and ear canal normal.   Left Ear: Tympanic membrane, external ear and ear canal normal.   Nose: Nose normal.   Mouth/Throat: Oropharynx is clear and moist.   Eyes: Pupils are equal, round, and reactive to light. Conjunctivae and EOM are normal.   Neck: Normal range of motion. Neck supple.   Cardiovascular: Normal rate, regular rhythm and normal heart sounds.   Pulmonary/Chest: Effort normal and breath sounds normal.   Abdominal: Soft. Bowel sounds are normal.   Musculoskeletal: She exhibits no edema.   Neurological: She is alert and oriented to person, place, and time.   Skin: Skin is warm and dry. No rash noted.   Psychiatric: She has a normal mood and affect. Her behavior is normal.       Assessment:       1. Hypothyroidism, unspecified type    2. Folic acid deficiency    3. Prediabetes    4. Psoriatic arthritis    5. Chronic obstructive pulmonary disease, unspecified COPD type    6. Stage 3 chronic kidney disease    7. Angina, class II    8. S/P CABG (coronary artery bypass graft)    9. History of CVA (cerebrovascular accident)    10. Morbid obesity with BMI of 40.0-44.9, adult        Plan:      Hypothyroidism, unspecified type  Proceed with lab update and continue with current Synthroid dosing in the interim. Additional recommendations pending results.  -     levothyroxine (SYNTHROID) 112 MCG tablet; Take 1 tablet (112 mcg total) by mouth once daily.  Dispense: 90 tablet; Refill: 1    Folic acid deficiency  Continue with supplementation. Proceed with lab surveillance.  -     folic acid (FOLVITE) 1 MG tablet; Take 1 tablet (1 mg total) by mouth once daily.  Dispense: 90 tablet; Refill: 1    Prediabetes  Stable per last assessment and no symptoms related to blood glucose abnormality are  reported. Continue with intermittent monitoring. Target A1c goal <5.7.    Psoriatic arthritis  As per Rheumatology.    Chronic obstructive pulmonary disease, unspecified COPD type  As per Pulmonology.    Stage 3 chronic kidney disease  Recommend maintaining BP control and refraining from NSAID use.    Angina, class II  As per Cardiology.    S/P CABG (coronary artery bypass graft)  As above.    History of CVA (cerebrovascular accident)  As per Neurology. BP and lipid control remain advised.    Morbid obesity with BMI of 40.0-44.9, adult  Weight loss efforts remain encouraged through diet and lifestyle measures.

## 2019-04-10 NOTE — TELEPHONE ENCOUNTER
Spoke with pts about uncoming lab appointment tomorrow. Labs for our department were linked. Moved appointment with Hem/Onc to ensure all labs were in before appointment. Encouraged pt to call our department if she needs lab orders by one of our MDs or NPs and we can take care of that for her. Explained that sometimes lab orders  and new ones are needed. Ms Marie expressed gratitude and call ended.

## 2019-04-11 ENCOUNTER — LAB VISIT (OUTPATIENT)
Dept: LAB | Facility: HOSPITAL | Age: 69
End: 2019-04-11
Attending: FAMILY MEDICINE
Payer: MEDICARE

## 2019-04-11 DIAGNOSIS — E03.9 HYPOTHYROIDISM, UNSPECIFIED TYPE: ICD-10-CM

## 2019-04-11 DIAGNOSIS — I10 ESSENTIAL HYPERTENSION: ICD-10-CM

## 2019-04-11 DIAGNOSIS — E53.8 FOLIC ACID DEFICIENCY: ICD-10-CM

## 2019-04-11 DIAGNOSIS — D53.9 NUTRITIONAL ANEMIA: ICD-10-CM

## 2019-04-11 DIAGNOSIS — E78.00 PURE HYPERCHOLESTEROLEMIA: ICD-10-CM

## 2019-04-11 DIAGNOSIS — D50.0 IRON DEFICIENCY ANEMIA DUE TO CHRONIC BLOOD LOSS: ICD-10-CM

## 2019-04-11 LAB
ALBUMIN SERPL BCP-MCNC: 3.4 G/DL (ref 3.5–5.2)
ALP SERPL-CCNC: 43 U/L (ref 55–135)
ALT SERPL W/O P-5'-P-CCNC: 14 U/L (ref 10–44)
ANION GAP SERPL CALC-SCNC: 10 MMOL/L (ref 8–16)
ANION GAP SERPL CALC-SCNC: 10 MMOL/L (ref 8–16)
AST SERPL-CCNC: 23 U/L (ref 10–40)
BASOPHILS # BLD AUTO: 0.06 K/UL (ref 0–0.2)
BASOPHILS NFR BLD: 0.8 % (ref 0–1.9)
BILIRUB SERPL-MCNC: 0.5 MG/DL (ref 0.1–1)
BUN SERPL-MCNC: 18 MG/DL (ref 8–23)
BUN SERPL-MCNC: 18 MG/DL (ref 8–23)
CALCIUM SERPL-MCNC: 9.4 MG/DL (ref 8.7–10.5)
CALCIUM SERPL-MCNC: 9.4 MG/DL (ref 8.7–10.5)
CHLORIDE SERPL-SCNC: 105 MMOL/L (ref 95–110)
CHLORIDE SERPL-SCNC: 105 MMOL/L (ref 95–110)
CHOLEST SERPL-MCNC: 131 MG/DL (ref 120–199)
CHOLEST/HDLC SERPL: 2.6 {RATIO} (ref 2–5)
CO2 SERPL-SCNC: 27 MMOL/L (ref 23–29)
CO2 SERPL-SCNC: 27 MMOL/L (ref 23–29)
CREAT SERPL-MCNC: 1 MG/DL (ref 0.5–1.4)
CREAT SERPL-MCNC: 1 MG/DL (ref 0.5–1.4)
DIFFERENTIAL METHOD: ABNORMAL
EOSINOPHIL # BLD AUTO: 0.1 K/UL (ref 0–0.5)
EOSINOPHIL NFR BLD: 1.5 % (ref 0–8)
ERYTHROCYTE [DISTWIDTH] IN BLOOD BY AUTOMATED COUNT: 14.3 % (ref 11.5–14.5)
EST. GFR  (AFRICAN AMERICAN): >60 ML/MIN/1.73 M^2
EST. GFR  (AFRICAN AMERICAN): >60 ML/MIN/1.73 M^2
EST. GFR  (NON AFRICAN AMERICAN): 58.4 ML/MIN/1.73 M^2
EST. GFR  (NON AFRICAN AMERICAN): 58.4 ML/MIN/1.73 M^2
FERRITIN SERPL-MCNC: 11 NG/ML (ref 20–300)
FOLATE SERPL-MCNC: 15.4 NG/ML (ref 4–24)
FOLATE SERPL-MCNC: 15.4 NG/ML (ref 4–24)
GLUCOSE SERPL-MCNC: 102 MG/DL (ref 70–110)
GLUCOSE SERPL-MCNC: 102 MG/DL (ref 70–110)
HCT VFR BLD AUTO: 41.7 % (ref 37–48.5)
HDLC SERPL-MCNC: 51 MG/DL (ref 40–75)
HDLC SERPL: 38.9 % (ref 20–50)
HGB BLD-MCNC: 12.8 G/DL (ref 12–16)
IRON SERPL-MCNC: 79 UG/DL (ref 30–160)
LDLC SERPL CALC-MCNC: 55.8 MG/DL (ref 63–159)
LYMPHOCYTES # BLD AUTO: 2.1 K/UL (ref 1–4.8)
LYMPHOCYTES NFR BLD: 29 % (ref 18–48)
MCH RBC QN AUTO: 26.6 PG (ref 27–31)
MCHC RBC AUTO-ENTMCNC: 30.7 G/DL (ref 32–36)
MCV RBC AUTO: 87 FL (ref 82–98)
MONOCYTES # BLD AUTO: 0.6 K/UL (ref 0.3–1)
MONOCYTES NFR BLD: 8.2 % (ref 4–15)
NEUTROPHILS # BLD AUTO: 4.4 K/UL (ref 1.8–7.7)
NEUTROPHILS NFR BLD: 60.2 % (ref 38–73)
NONHDLC SERPL-MCNC: 80 MG/DL
NRBC BLD-RTO: 0 /100 WBC
PLATELET # BLD AUTO: 296 K/UL (ref 150–350)
PMV BLD AUTO: 10.6 FL (ref 9.2–12.9)
POTASSIUM SERPL-SCNC: 4.6 MMOL/L (ref 3.5–5.1)
POTASSIUM SERPL-SCNC: 4.6 MMOL/L (ref 3.5–5.1)
PROT SERPL-MCNC: 6.9 G/DL (ref 6–8.4)
RBC # BLD AUTO: 4.81 M/UL (ref 4–5.4)
SATURATED IRON: 18 % (ref 20–50)
SODIUM SERPL-SCNC: 142 MMOL/L (ref 136–145)
SODIUM SERPL-SCNC: 142 MMOL/L (ref 136–145)
T4 FREE SERPL-MCNC: 0.86 NG/DL (ref 0.71–1.51)
TOTAL IRON BINDING CAPACITY: 440 UG/DL (ref 250–450)
TRANSFERRIN SERPL-MCNC: 297 MG/DL (ref 200–375)
TRIGL SERPL-MCNC: 121 MG/DL (ref 30–150)
TSH SERPL DL<=0.005 MIU/L-ACNC: 1.22 UIU/ML (ref 0.4–4)
VIT B12 SERPL-MCNC: 680 PG/ML (ref 210–950)
WBC # BLD AUTO: 7.35 K/UL (ref 3.9–12.7)

## 2019-04-11 PROCEDURE — 82607 VITAMIN B-12: CPT

## 2019-04-11 PROCEDURE — 80061 LIPID PANEL: CPT

## 2019-04-11 PROCEDURE — 86140 C-REACTIVE PROTEIN: CPT

## 2019-04-11 PROCEDURE — 83540 ASSAY OF IRON: CPT

## 2019-04-11 PROCEDURE — 82746 ASSAY OF FOLIC ACID SERUM: CPT

## 2019-04-11 PROCEDURE — 80053 COMPREHEN METABOLIC PANEL: CPT

## 2019-04-11 PROCEDURE — 82728 ASSAY OF FERRITIN: CPT

## 2019-04-11 PROCEDURE — 85025 COMPLETE CBC W/AUTO DIFF WBC: CPT

## 2019-04-11 PROCEDURE — 84439 ASSAY OF FREE THYROXINE: CPT

## 2019-04-11 PROCEDURE — 36415 COLL VENOUS BLD VENIPUNCTURE: CPT | Mod: PO

## 2019-04-11 PROCEDURE — 84443 ASSAY THYROID STIM HORMONE: CPT

## 2019-04-12 DIAGNOSIS — N18.30 STAGE 3 CHRONIC KIDNEY DISEASE: ICD-10-CM

## 2019-04-12 DIAGNOSIS — E03.9 HYPOTHYROIDISM, UNSPECIFIED TYPE: Primary | ICD-10-CM

## 2019-04-12 DIAGNOSIS — R73.03 PREDIABETES: ICD-10-CM

## 2019-04-12 LAB — CRP SERPL-MCNC: 3.2 MG/L (ref 0–8.2)

## 2019-04-18 NOTE — TELEPHONE ENCOUNTER
DOCUMENTATION ONLY:  Prior authorization for Stelara 90 mg/ml approved from 4/16/19 to 12/31/2099.     Case ID# none Co-pay: $72.00 ( not eligible for coupon card)     Patient Assistance IS NOT required.     Forward to clinical pharmacist for consult & shipment. TEREZA

## 2019-04-23 ENCOUNTER — OFFICE VISIT (OUTPATIENT)
Dept: CARDIOLOGY | Facility: CLINIC | Age: 69
End: 2019-04-23
Payer: MEDICARE

## 2019-04-23 ENCOUNTER — OFFICE VISIT (OUTPATIENT)
Dept: HEMATOLOGY/ONCOLOGY | Facility: CLINIC | Age: 69
End: 2019-04-23
Payer: MEDICARE

## 2019-04-23 VITALS
HEIGHT: 63 IN | HEIGHT: 63 IN | BODY MASS INDEX: 44.76 KG/M2 | DIASTOLIC BLOOD PRESSURE: 65 MMHG | WEIGHT: 252.63 LBS | SYSTOLIC BLOOD PRESSURE: 136 MMHG | SYSTOLIC BLOOD PRESSURE: 142 MMHG | OXYGEN SATURATION: 98 % | WEIGHT: 251.75 LBS | HEART RATE: 58 BPM | BODY MASS INDEX: 44.61 KG/M2 | DIASTOLIC BLOOD PRESSURE: 70 MMHG | RESPIRATION RATE: 18 BRPM | TEMPERATURE: 98 F

## 2019-04-23 DIAGNOSIS — E78.00 PURE HYPERCHOLESTEROLEMIA: Chronic | ICD-10-CM

## 2019-04-23 DIAGNOSIS — I10 ESSENTIAL HYPERTENSION: Chronic | ICD-10-CM

## 2019-04-23 DIAGNOSIS — Z95.1 S/P CABG (CORONARY ARTERY BYPASS GRAFT): Chronic | ICD-10-CM

## 2019-04-23 DIAGNOSIS — N18.30 STAGE 3 CHRONIC KIDNEY DISEASE: ICD-10-CM

## 2019-04-23 DIAGNOSIS — D50.0 IRON DEFICIENCY ANEMIA DUE TO CHRONIC BLOOD LOSS: Primary | ICD-10-CM

## 2019-04-23 DIAGNOSIS — I25.118 CORONARY ARTERY DISEASE OF NATIVE ARTERY OF NATIVE HEART WITH STABLE ANGINA PECTORIS: Primary | ICD-10-CM

## 2019-04-23 DIAGNOSIS — E53.8 B12 DEFICIENCY: ICD-10-CM

## 2019-04-23 DIAGNOSIS — E66.01 MORBID OBESITY WITH BMI OF 40.0-44.9, ADULT: ICD-10-CM

## 2019-04-23 DIAGNOSIS — I20.9 ANGINA, CLASS II: Chronic | ICD-10-CM

## 2019-04-23 PROCEDURE — 99214 OFFICE O/P EST MOD 30 MIN: CPT | Mod: S$PBB,,, | Performed by: NUCLEAR MEDICINE

## 2019-04-23 PROCEDURE — 99999 PR PBB SHADOW E&M-EST. PATIENT-LVL III: ICD-10-PCS | Mod: PBBFAC,,, | Performed by: NURSE PRACTITIONER

## 2019-04-23 PROCEDURE — 99213 PR OFFICE/OUTPT VISIT, EST, LEVL III, 20-29 MIN: ICD-10-PCS | Mod: S$PBB,,, | Performed by: NURSE PRACTITIONER

## 2019-04-23 PROCEDURE — 99214 PR OFFICE/OUTPT VISIT, EST, LEVL IV, 30-39 MIN: ICD-10-PCS | Mod: S$PBB,,, | Performed by: NUCLEAR MEDICINE

## 2019-04-23 PROCEDURE — 99999 PR PBB SHADOW E&M-EST. PATIENT-LVL III: CPT | Mod: PBBFAC,,, | Performed by: NURSE PRACTITIONER

## 2019-04-23 PROCEDURE — 99213 OFFICE O/P EST LOW 20 MIN: CPT | Mod: S$PBB,,, | Performed by: NURSE PRACTITIONER

## 2019-04-23 PROCEDURE — 99213 OFFICE O/P EST LOW 20 MIN: CPT | Mod: PBBFAC | Performed by: NUCLEAR MEDICINE

## 2019-04-23 PROCEDURE — 99999 PR PBB SHADOW E&M-EST. PATIENT-LVL III: CPT | Mod: PBBFAC,,, | Performed by: NUCLEAR MEDICINE

## 2019-04-23 PROCEDURE — 99999 PR PBB SHADOW E&M-EST. PATIENT-LVL III: ICD-10-PCS | Mod: PBBFAC,,, | Performed by: NUCLEAR MEDICINE

## 2019-04-23 PROCEDURE — 99213 OFFICE O/P EST LOW 20 MIN: CPT | Mod: PBBFAC,27 | Performed by: NURSE PRACTITIONER

## 2019-04-23 NOTE — PROGRESS NOTES
Subjective:   Patient ID:  Qi rOtiz is a 68 y.o. female who presents for follow-up of Coronary Artery Disease; Hypertension; and Hyperlipidemia      HPI  NO RECURRENT ANGINA. NO NEED OF SL NTG  NO RECENT HOSPITALIZATIONS OR ED VISITS FOR ACS,  ARRHYTHMIAS OR ADHF OR CVA  NO UNUSUAL BROOKS. NO ORTHOPNEA OR PND  NO PALPITATIONS  NO NEAR SYNCOPE OR SYNCOPE  NO ABDOMINAL DISCOMFORT OR ABNORMAL PULSATIONS  NO EDEMA. NO CALVE TENDERNESS  NO INTERMITTENT CLAUDICATION  NO FOCAL CNS SYMPTOMS OR SIGNS TO SUGGEST TIA OR STROKE  CARD MED GOOD COMPLIANCE, NO SIDE EFFECTS    Review of Systems   Constitution: Negative for chills, fever, night sweats, weight gain and weight loss.   HENT: Negative for nosebleeds.    Eyes: Negative for blurred vision, double vision and visual disturbance.   Cardiovascular: Negative for chest pain, dyspnea on exertion, irregular heartbeat, leg swelling, orthopnea, palpitations, paroxysmal nocturnal dyspnea and syncope.   Respiratory: Negative for cough, hemoptysis and wheezing.    Endocrine: Negative for polydipsia and polyuria.   Hematologic/Lymphatic: Does not bruise/bleed easily.   Skin: Negative for rash.   Musculoskeletal: Negative for joint pain, joint swelling, muscle weakness and myalgias.   Gastrointestinal: Negative for abdominal pain, hematemesis, jaundice and melena.   Genitourinary: Negative for dysuria, hematuria and nocturia.   Neurological: Negative for dizziness, focal weakness, headaches, sensory change and weakness.   Psychiatric/Behavioral: Negative for depression. The patient does not have insomnia and is not nervous/anxious.      Family History   Problem Relation Age of Onset    Breast cancer Maternal Grandfather     Breast cancer Paternal Aunt     Stroke Unknown     Breast cancer Sister 60    Leukemia Sister 8         as child    Lung cancer Paternal Grandfather     Heart disease Unknown      Past Medical History:   Diagnosis Date    Carotid stenosis     19%     COPD (chronic obstructive pulmonary disease)     No meds    Coronary artery disease     CVA (cerebral vascular accident)     Dr. 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 Hope    Psoriatic arthritis     Rheumatology     Social History     Socioeconomic History    Marital status: Single     Spouse name: Not on file    Number of children: 3    Years of education: Not on file    Highest education level: Not on file   Occupational History    Occupation: Not working   Social Needs    Financial resource strain: Not on file    Food insecurity:     Worry: Not on file     Inability: Not on file    Transportation needs:     Medical: Not on file     Non-medical: Not on file   Tobacco Use    Smoking status: Never Smoker    Smokeless tobacco: Never Used   Substance and Sexual Activity    Alcohol use: No    Drug use: No    Sexual activity: Never     Partners: Male   Lifestyle    Physical activity:     Days per week: Not on file     Minutes per session: Not on file    Stress: Not on file   Relationships    Social connections:     Talks on phone: Not on file     Gets together: Not on file     Attends Anabaptist service: Not on file     Active member of club or organization: Not on file     Attends meetings of clubs or organizations: Not on file     Relationship status: Not on file   Other Topics Concern    Not on file   Social History Narrative    Not on file     Current Outpatient Medications on File Prior to Visit   Medication Sig Dispense Refill    albuterol (PROVENTIL) 2.5 mg /3 mL (0.083 %) nebulizer solution Take 3 mLs (2.5 mg total) by nebulization every 6 (six) hours as needed for Wheezing. Rescue 50 each 0    APPLE CIDER VINEGAR ORAL Take 1 capsule by mouth once daily.       aspirin 81 MG Chew Take 162.5 mg by mouth once daily.       calcium carbonate 650 mg  calcium (1,625 mg) tablet Take 1 tablet by mouth once daily.      clopidogrel (PLAVIX) 75 mg tablet Take 1 tablet (75 mg total) by mouth once daily. 90 tablet 3    evolocumab (REPATHA SURECLICK) 140 mg/mL PnIj Inject 1 mL (140 mg total) into the skin every 14 (fourteen) days. 6 Syringe 3    folic acid (FOLVITE) 1 MG tablet Take 1 tablet (1 mg total) by mouth once daily. 90 tablet 1    furosemide (LASIX) 20 MG tablet Take 0.5 tablets (10 mg total) by mouth 2 (two) times daily. (Patient taking differently: Take 10 mg by mouth as needed. ) 90 tablet 3    gabapentin (NEURONTIN) 100 MG capsule Take 2 capsules (200 mg total) by mouth 3 (three) times daily as needed. (Patient taking differently: Take 100 mg by mouth As instructed. 1 tab in am , 2tabs at night) 540 capsule 3    garlic 2,000 mg Cap Take 3,000 mg by mouth once daily.       levothyroxine (SYNTHROID) 112 MCG tablet Take 1 tablet (112 mcg total) by mouth once daily. 90 tablet 1    metoprolol succinate (TOPROL-XL) 100 MG 24 hr tablet 1 tab (100mg) in morning. And 100 mg at bedtime.  Generic ok (Patient taking differently: Take by mouth 2 (two) times daily. 1 tab (100mg) in morning. And 100 mg at bedtime.  Generic ok) 180 tablet 3    nitroGLYCERIN (NITROSTAT) 0.4 MG SL tablet Place 1 tablet (0.4 mg total) under the tongue every 5 (five) minutes as needed for Chest pain. 100 tablet 3    pyridoxine (VITAMIN B-6) 100 MG Tab Take 200 mg by mouth once daily.       TURMERIC, BULK, MISC Take 1 capsule by mouth 2 (two) times daily.       valsartan-hydrochlorothiazide (DIOVAN-HCT) 160-12.5 mg per tablet Take 1 tablet by mouth once daily. 90 tablet 3    vitamin D 1000 units Tab Take 185 mg by mouth once daily.      chlorhexidine (HIBICLENS) 4 % external liquid Hibiclens 4 % topical liquid   Apply 1 application twice a day by topical route as directed for 90 days.      cyanocobalamin 2000 MCG tablet Take 2,000 mcg by mouth once daily.      mupirocin  (BACTROBAN) 2 % ointment Bactroban 2 % topical ointment   Apply 1 application twice a day by topical route as directed for 30 days.       No current facility-administered medications on file prior to visit.      Review of patient's allergies indicates:   Allergen Reactions    Celexa [citalopram] Anaphylaxis    Clindamycin Itching and Swelling    Codeine Shortness Of Breath, Itching and Swelling    Crestor [rosuvastatin] Anaphylaxis    Cytotec [misoprostol] Anaphylaxis and Other (See Comments)     Difficulty breathing    Lisinopril Anaphylaxis    Magnesium citrate Shortness Of Breath    Stadol [butorphanol tartrate] Anaphylaxis     Coded    Vicodin [hydrocodone-acetaminophen] Shortness Of Breath    Adhesive      EKG Electrodes    Aggrenox [aspirin-dipyridamole] Other (See Comments)     headaches    Avelox [moxifloxacin]      PATIENT STATES DO NOT GIVE UNDER ANY CIRCUSTANCES    Demerol [meperidine]     Isosorbide Other (See Comments)     Severe headache    Kenalog [triamcinolone acetonide]     Medrol [methylprednisolone] Other (See Comments)     unknown    Mobic [meloxicam]     Morphine     Nitroglycerin      Long acting    Pholcodine     Prednisone      GASTRIC PAIN    Ranexa [ranolazine] Nausea And Vomiting    Reclast [zoledronic acid-mannitol-water] Other (See Comments)     Bones hurt    Sulfa (sulfonamide antibiotics) Other (See Comments)     unknown    Talwin [pentazocine lactate]     Tetracycline     Tetracyclines     Tilade [nedocromil]     Zetia [ezetimibe]        Objective:     Physical Exam   Constitutional: She is oriented to person, place, and time. She appears well-developed. No distress.   HENT:   Head: Normocephalic.   Eyes: Pupils are equal, round, and reactive to light. Conjunctivae are normal. No scleral icterus.   Neck: Normal range of motion. Neck supple. Normal carotid pulses, no hepatojugular reflux and no JVD present. Carotid bruit is not present. No edema present.  No thyroid mass and no thyromegaly present.   Cardiovascular: Normal rate, regular rhythm, S1 normal, S2 normal, normal heart sounds and intact distal pulses. PMI is not displaced. Exam reveals no gallop and no friction rub.   No murmur heard.  Pulses:       Carotid pulses are 2+ on the right side, and 2+ on the left side.       Radial pulses are 2+ on the right side, and 2+ on the left side.        Femoral pulses are 2+ on the right side, and 2+ on the left side.       Popliteal pulses are 2+ on the right side, and 2+ on the left side.        Dorsalis pedis pulses are 2+ on the right side, and 2+ on the left side.        Posterior tibial pulses are 2+ on the right side, and 2+ on the left side.   Pulmonary/Chest: Effort normal and breath sounds normal. She has no wheezes. She has no rales. She exhibits no tenderness.   Abdominal: Soft. Bowel sounds are normal. She exhibits no pulsatile midline mass and no mass. There is no hepatosplenomegaly. There is no tenderness.   Musculoskeletal: Normal range of motion. She exhibits no edema or tenderness.        Cervical back: Normal.        Thoracic back: Normal.        Lumbar back: Normal.   Lymphadenopathy:     She has no cervical adenopathy.     She has no axillary adenopathy.        Right: No supraclavicular adenopathy present.        Left: No supraclavicular adenopathy present.   Neurological: She is alert and oriented to person, place, and time. She has normal strength and normal reflexes. No sensory deficit. Gait normal.   Skin: Skin is warm. No rash noted. No cyanosis. No pallor. Nails show no clubbing.   Psychiatric: She has a normal mood and affect. Her speech is normal and behavior is normal. Cognition and memory are normal.       Assessment:     1. Coronary artery disease of native artery of native heart with stable angina pectoris    2. Angina, class II    3. S/P CABG (coronary artery bypass graft)    4. Essential hypertension    5. Pure hypercholesterolemia         Plan:     Coronary artery disease of native artery of native heart with stable angina pectoris  NO CLINICAL EVIDENCE OF ACTIVE MYOCARDIAL ISCHEMIA  NO ARRHYTHMIAS  NO ADHF  CARD MED WELL TOLERATED    Angina, class II  ANGINA PATTERN  STABLE    S/P CABG (coronary artery bypass graft)  ON DAPT-ASA AND PLAVIX- NO ABNORMAL BLEEDING      Essential hypertension  WELL CONTROLLED BP  CNS STATUS STABLE    Pure hypercholesterolemia  STATIN WELL TOLERATED    CONTINUE PRESENT CARD MANAGEMENT    RETURN IN 6 MONTHS.

## 2019-04-23 NOTE — PROGRESS NOTES
"Subjective:       Patient ID: Qi Ortiz is a 68 y.o. female.    Chief Complaint: Anemia, lab work follow up.    HPI: 68 y.o female with CKD III, HLD, HTN, CAD, arthritis, Iron deficiency here for lab review. Patient hemoglobin remains normal at 13.7. Iron studies continue to reflect iron deficiency. Patient was asked to see GI for an EGD/Colonoscopy. Patient states she does not desire this at this time. Patient denies shortness of breath, hematuria, hematochezia. Patient reports she had a "mild" heart attack in the recent months and a stress test was performed by her cardiologist in April. Patient states her cardiologist prescribed Ranexa but she was intolerant of this and not taking. Patient denies chest pain at this time and states she will go to ED if she "ever feels it is necessary" Patient states she maintains an active lifestyle. Patient does not wish for further intervention at this time but states she is willing to follow up with labwork in 3-4 months.    Today's Visit:  Continued iron deficiency however hemoglobin remains normal. Reports feeling well overall.   Social History     Socioeconomic History    Marital status: Single     Spouse name: Not on file    Number of children: 3    Years of education: Not on file    Highest education level: Not on file   Occupational History    Occupation: Not working   Social Needs    Financial resource strain: Not on file    Food insecurity:     Worry: Not on file     Inability: Not on file    Transportation needs:     Medical: Not on file     Non-medical: Not on file   Tobacco Use    Smoking status: Never Smoker    Smokeless tobacco: Never Used   Substance and Sexual Activity    Alcohol use: No    Drug use: No    Sexual activity: Never     Partners: Male   Lifestyle    Physical activity:     Days per week: Not on file     Minutes per session: Not on file    Stress: Not on file   Relationships    Social connections:     Talks on phone: Not on file "     Gets together: Not on file     Attends Pentecostalism service: Not on file     Active member of club or organization: Not on file     Attends meetings of clubs or organizations: Not on file     Relationship status: Not on file   Other Topics Concern    Not on file   Social History Narrative    Not on file       Past Medical History:   Diagnosis Date    Carotid stenosis     19%    COPD (chronic obstructive pulmonary disease)     No meds    Coronary artery disease     CVA (cerebral vascular accident)     Dr. 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 Hope    Psoriatic arthritis     Rheumatology       Family History   Problem Relation Age of Onset    Breast cancer Maternal Grandfather     Breast cancer Paternal Aunt     Stroke Unknown     Breast cancer Sister 60    Leukemia Sister 8         as child    Lung cancer Paternal Grandfather     Heart disease Unknown        Past Surgical History:   Procedure Laterality Date    BREAST BIOPSY      R sided/benign    CARDIAC SURGERY      2016    CERVICAL FUSION      CHOLECYSTECTOMY      CORONARY ANGIOPLASTY      CORONARY ARTERY BYPASS GRAFT      triple bypass    CORONARY STENT PLACEMENT      EYE SURGERY      INTRAUTERINE DEVICE INSERTION      mass removed from R groin      TOTAL ABDOMINAL HYSTERECTOMY W/ BILATERAL SALPINGOOPHORECTOMY      due to benign mass, adhesions    TUBAL LIGATION         Review of Systems   Constitutional: Negative for activity change, appetite change, chills, diaphoresis, fatigue, fever and unexpected weight change.   HENT: Negative for congestion, nosebleeds, sore throat, trouble swallowing and voice change.    Eyes: Negative for photophobia, pain and visual disturbance.   Respiratory: Negative for cough, choking, chest tightness, shortness of breath and stridor.     Cardiovascular: Negative for chest pain, palpitations and leg swelling.   Gastrointestinal: Negative for abdominal distention, abdominal pain, anal bleeding, blood in stool, constipation, diarrhea, nausea, rectal pain and vomiting.   Genitourinary: Negative for difficulty urinating, dysuria and hematuria.   Musculoskeletal: Negative for arthralgias, back pain and gait problem.   Skin: Negative for rash and wound.   Neurological: Negative for dizziness, facial asymmetry, weakness, light-headedness, numbness and headaches.   Hematological: Negative for adenopathy. Does not bruise/bleed easily.   Psychiatric/Behavioral: The patient is not nervous/anxious.          Medication List with Changes/Refills   Current Medications    ALBUTEROL (PROVENTIL) 2.5 MG /3 ML (0.083 %) NEBULIZER SOLUTION    Take 3 mLs (2.5 mg total) by nebulization every 6 (six) hours as needed for Wheezing. Rescue    APPLE CIDER VINEGAR ORAL    Take 1 capsule by mouth once daily.     ASPIRIN 81 MG CHEW    Take 162.5 mg by mouth once daily.     CALCIUM CARBONATE 650 MG CALCIUM (1,625 MG) TABLET    Take 1 tablet by mouth once daily.    CHLORHEXIDINE (HIBICLENS) 4 % EXTERNAL LIQUID    Hibiclens 4 % topical liquid   Apply 1 application twice a day by topical route as directed for 90 days.    CLOPIDOGREL (PLAVIX) 75 MG TABLET    Take 1 tablet (75 mg total) by mouth once daily.    CYANOCOBALAMIN 2000 MCG TABLET    Take 2,000 mcg by mouth once daily.    EVOLOCUMAB (REPATHA SURECLICK) 140 MG/ML PNIJ    Inject 1 mL (140 mg total) into the skin every 14 (fourteen) days.    FOLIC ACID (FOLVITE) 1 MG TABLET    Take 1 tablet (1 mg total) by mouth once daily.    FUROSEMIDE (LASIX) 20 MG TABLET    Take 0.5 tablets (10 mg total) by mouth 2 (two) times daily.    GABAPENTIN (NEURONTIN) 100 MG CAPSULE    Take 2 capsules (200 mg total) by mouth 3 (three) times daily as needed.    GARLIC 2,000 MG CAP    Take 3,000 mg by mouth once daily.     LEVOTHYROXINE (SYNTHROID)  112 MCG TABLET    Take 1 tablet (112 mcg total) by mouth once daily.    METOPROLOL SUCCINATE (TOPROL-XL) 100 MG 24 HR TABLET    1 tab (100mg) in morning. And 100 mg at bedtime.  Generic ok    MUPIROCIN (BACTROBAN) 2 % OINTMENT    Bactroban 2 % topical ointment   Apply 1 application twice a day by topical route as directed for 30 days.    NITROGLYCERIN (NITROSTAT) 0.4 MG SL TABLET    Place 1 tablet (0.4 mg total) under the tongue every 5 (five) minutes as needed for Chest pain.    PYRIDOXINE (VITAMIN B-6) 100 MG TAB    Take 200 mg by mouth once daily.     TURMERIC, BULK, MISC    Take 1 capsule by mouth 2 (two) times daily.     VALSARTAN-HYDROCHLOROTHIAZIDE (DIOVAN-HCT) 160-12.5 MG PER TABLET    Take 1 tablet by mouth once daily.    VITAMIN D 1000 UNITS TAB    Take 185 mg by mouth once daily.     Objective:     Vitals:    04/23/19 1545   BP: 136/65   Pulse: (!) 58   Resp: 18   Temp: 98.1 °F (36.7 °C)     Lab Results   Component Value Date    WBC 7.35 04/11/2019    HGB 12.8 04/11/2019    HCT 41.7 04/11/2019    MCV 87 04/11/2019     04/11/2019     BMP  Lab Results   Component Value Date     04/11/2019     04/11/2019    K 4.6 04/11/2019    K 4.6 04/11/2019     04/11/2019     04/11/2019    CO2 27 04/11/2019    CO2 27 04/11/2019    BUN 18 04/11/2019    BUN 18 04/11/2019    CREATININE 1.0 04/11/2019    CREATININE 1.0 04/11/2019    CALCIUM 9.4 04/11/2019    CALCIUM 9.4 04/11/2019    ANIONGAP 10 04/11/2019    ANIONGAP 10 04/11/2019    ESTGFRAFRICA >60.0 04/11/2019    ESTGFRAFRICA >60.0 04/11/2019    EGFRNONAA 58.4 (A) 04/11/2019    EGFRNONAA 58.4 (A) 04/11/2019     Lab Results   Component Value Date    ALT 14 04/11/2019    AST 23 04/11/2019    ALKPHOS 43 (L) 04/11/2019    BILITOT 0.5 04/11/2019     Lab Results   Component Value Date    IRON 79 04/11/2019    TIBC 440 04/11/2019    FERRITIN 11 (L) 04/11/2019         Physical Exam   Constitutional: She is oriented to person, place, and time. Vital  signs are normal. She appears well-developed and well-nourished.   HENT:   Head: Normocephalic.   Right Ear: Hearing and external ear normal.   Left Ear: Hearing and external ear normal.   Nose: Nose normal.   Mouth/Throat: Oropharynx is clear and moist.   Eyes: Conjunctivae, EOM and lids are normal. Right eye exhibits no discharge. Left eye exhibits no discharge.   Neck: Normal range of motion. No thyroid mass present.   Cardiovascular: Normal rate and regular rhythm.   Murmur heard.   Systolic murmur is present with a grade of 2/6.  Pulmonary/Chest: Effort normal and breath sounds normal. No respiratory distress. She has no wheezes. She has no rhonchi. She has no rales. She exhibits no tenderness.   Abdominal: Soft. Bowel sounds are normal. She exhibits no distension. There is no tenderness.   Musculoskeletal: Normal range of motion. She exhibits no edema.   Lymphadenopathy:        Head (right side): No submandibular, no preauricular and no posterior auricular adenopathy present.        Head (left side): No submandibular, no preauricular and no posterior auricular adenopathy present.        Right cervical: No superficial cervical adenopathy present.       Left cervical: No superficial cervical adenopathy present.   Neurological: She is alert and oriented to person, place, and time.   Skin: Skin is warm, dry and intact.   Psychiatric: Her speech is normal and behavior is normal. Thought content normal. Her mood appears anxious.   Vitals reviewed.       Assessment:     Problem List Items Addressed This Visit        Renal/    Stage 3 chronic kidney disease    Relevant Orders    CBC auto differential    Basic metabolic panel    Iron and TIBC    Ferritin    Vitamin B12       Oncology    Iron deficiency anemia due to chronic blood loss - Primary     Patient remains iron deficient, but hemoglobin normal. She continues to decline EGD/colonoscopy.  I did discuss implications of endoscopies with the patient including  determining possible infection/malignancy/GI bleeding source.    F/U 4-6 months with CBC, BMP, iron studies, vitamin B12, folate.  She knows to call sooner if questions or concerns or signs and symptoms of anemia         Relevant Orders    CBC auto differential    Basic metabolic panel    Iron and TIBC    Ferritin    Vitamin B12       Endocrine    Morbid obesity with BMI of 40.0-44.9, adult     Encouraged healthy nutrition and exercise habits         B12 deficiency    Relevant Orders    Vitamin B12                I will review assessment/plan with collaborating physician     EMIR Kumar

## 2019-04-23 NOTE — ASSESSMENT & PLAN NOTE
Patient remains iron deficient, but hemoglobin normal. She continues to decline EGD/colonoscopy.  I did discuss implications of endoscopies with the patient including determining possible infection/malignancy/GI bleeding source.    F/U 4-6 months with CBC, BMP, iron studies, vitamin B12, folate.  She knows to call sooner if questions or concerns or signs and symptoms of anemia

## 2019-04-26 ENCOUNTER — TELEPHONE (OUTPATIENT)
Dept: RHEUMATOLOGY | Facility: CLINIC | Age: 69
End: 2019-04-26

## 2019-04-30 DIAGNOSIS — L40.50 PSORIATIC ARTHRITIS: Primary | ICD-10-CM

## 2019-04-30 DIAGNOSIS — L40.9 PSORIASIS: ICD-10-CM

## 2019-05-01 ENCOUNTER — TELEPHONE (OUTPATIENT)
Dept: RHEUMATOLOGY | Facility: CLINIC | Age: 69
End: 2019-05-01

## 2019-05-01 DIAGNOSIS — E78.49 OTHER HYPERLIPIDEMIA: ICD-10-CM

## 2019-05-01 DIAGNOSIS — I25.10 CORONARY ARTERY DISEASE WITHOUT ANGINA PECTORIS, UNSPECIFIED VESSEL OR LESION TYPE, UNSPECIFIED WHETHER NATIVE OR TRANSPLANTED HEART: ICD-10-CM

## 2019-05-01 RX ORDER — EVOLOCUMAB 140 MG/ML
INJECTION, SOLUTION SUBCUTANEOUS
Qty: 6 ML | Refills: 3 | Status: SHIPPED | OUTPATIENT
Start: 2019-05-01 | End: 2020-05-20

## 2019-05-01 NOTE — TELEPHONE ENCOUNTER
Spoke to pt and [facility] regarding Stellara. Advised we can not accept med from pt or outside pharm.  Spk with tati Bustos from [facility] cancelled. Per Julia in infusion pt already approved with specialty pharm for inj. On July 5

## 2019-05-01 NOTE — TELEPHONE ENCOUNTER
----- Message from Rosario Sullivan sent at 5/1/2019 10:11 AM CDT -----  Contact: Pt  Type:  Needs Medical Advice    Who Called: Pt  Symptoms (please be specific): n/a   How long has patient had these symptoms:n/a  Pharmacy name and phone #:  [facility]  Would the patient rather a call back or a response via [facility]? Call back   Best Call Back Number: [phone] (home)   Additional Information: The pt is calling in regards to getting her Stellara injection at the office and not sent to her home due to the patient not being able to administer the shot to herself,please advise.

## 2019-05-03 ENCOUNTER — TELEPHONE (OUTPATIENT)
Dept: CARDIOLOGY | Facility: CLINIC | Age: 69
End: 2019-05-03

## 2019-05-03 NOTE — TELEPHONE ENCOUNTER
Returned call. Patient stated [facility] requesting pre-authorization for Repatha.  [facility] to fax form.    Patient was instructed once form is receive will notify.    Patient verbalized understanding.

## 2019-05-03 NOTE — TELEPHONE ENCOUNTER
----- Message from Sherrie Nesbitt sent at 5/3/2019 10:07 AM CDT -----  Contact: self  Type:  Needs Medical Advice    Who Called: pt  Symptoms (please be specific):n/a   How long has patient had these symptoms:n/a  Pharmacy name and phone #:n/a  Would the patient rather a call back or a response via [facility]? Call back  Best Call Back Number: [phone]  Additional Information: requesting call back regarding pharmacy needing a prior auth from provider to fill pt medication.    Thanks,  Sherrie Nesbitt

## 2019-05-06 ENCOUNTER — TELEPHONE (OUTPATIENT)
Dept: CARDIOLOGY | Facility: CLINIC | Age: 69
End: 2019-05-06

## 2019-05-06 NOTE — TELEPHONE ENCOUNTER
Called and instructed patient Prior authorization for Repatha completed and faxed back to [facility].

## 2019-05-20 ENCOUNTER — TELEPHONE (OUTPATIENT)
Dept: RHEUMATOLOGY | Facility: CLINIC | Age: 69
End: 2019-05-20

## 2019-05-20 NOTE — TELEPHONE ENCOUNTER
spoke with Pt and she is wanting an answer on her Stelara injections last note show Jaquelin I spoke with Julia and it is covered through a specialty pharmacy doesn't say which one for July 5th please call pt and explain to her about her Strelara injections she wants to know fro sure if it is covered and by whomrodrigo schafer when thanks

## 2019-05-20 NOTE — TELEPHONE ENCOUNTER
----- Message from Judith Quarles sent at 5/20/2019  3:47 PM CDT -----  Contact: self [phone]  Type:  Patient Returning Call    Who Called:Qi Ortiz  Who Left Message for Patient: unk  Does the patient know what this is regarding?: medication  Would the patient rather a call back or a response via [facility]? Call back  Best Call Back Number:[phone]  Additional Information:

## 2019-05-20 NOTE — TELEPHONE ENCOUNTER
----- Message from Qi Solano sent at 5/20/2019 11:38 AM CDT -----  Contact: pt   Stated she will like a call back about an injection,  she will like a call back from Jaquelin, she can be reached at [phone] Thanks

## 2019-05-20 NOTE — TELEPHONE ENCOUNTER
----- Message from Judith Quarles sent at 5/20/2019  3:47 PM CDT -----  Contact: self [phone]  Type:  Patient Returning Call    Who Called:Qi Ortiz  Who Left Message for Patient: unk  Does the patient know what this is regarding?: medication  Would the patient rather a call back or a response via [facility]? Call back  Best Call Back Number:[phone]  Additional Information:

## 2019-05-21 ENCOUNTER — TELEPHONE (OUTPATIENT)
Dept: RHEUMATOLOGY | Facility: HOSPITAL | Age: 69
End: 2019-05-21

## 2019-05-21 NOTE — TELEPHONE ENCOUNTER
Spoke to joselyn, she had questions about where her Stelara was be bill to ?  I will forward this message to Ese Salinas and have her call joselyn back

## 2019-05-21 NOTE — TELEPHONE ENCOUNTER
Spoke to Crist[facility] with [facility] [phone]. She  confirmed that the patient s stelara script with [facility] was voided and they know we are a buy and bill. So the patient is approved for us to give her Stelara her in infusion  Repeated that to joselyn and she understood

## 2019-05-28 ENCOUNTER — TELEPHONE (OUTPATIENT)
Dept: FAMILY MEDICINE | Facility: CLINIC | Age: 69
End: 2019-05-28

## 2019-05-28 DIAGNOSIS — E53.8 FOLIC ACID DEFICIENCY: ICD-10-CM

## 2019-05-28 RX ORDER — FOLIC ACID 1 MG/1
1 TABLET ORAL DAILY
Qty: 90 TABLET | Refills: 1 | Status: SHIPPED | OUTPATIENT
Start: 2019-05-28 | End: 2019-10-08 | Stop reason: SDUPTHER

## 2019-05-28 NOTE — TELEPHONE ENCOUNTER
I called pt no answer LMOM    The last  Rx was printed as well with one refill. Where ever she dropped it off they should have the extra refill on file. She needs to go go that pharmacy

## 2019-05-28 NOTE — TELEPHONE ENCOUNTER
----- Message from Nanijerrod Gaston sent at 5/28/2019 10:34 AM CDT -----  Contact: self  Type:  RX Refill Request    Who Called: Qi Alfonso or New Rx:refill  RX Name and Strength: folic acid (FOLVITE) 1 MG tablet /// levothyroxine (SYNTHROID) 112 MCG tablet  How is the patient currently taking it? (ex. 1XDay):1xda/1xday  Is this a 30 day or 90 day RX:30  Preferred Pharmacy with phone number:    Local or Mail Order:local  Ordering Provider:Black  Would the patient rather a call back or a response via [facility]? call  Best Call Back Number:[phone]  Additional Information: patient will  written prescription

## 2019-05-29 DIAGNOSIS — E03.9 HYPOTHYROIDISM, UNSPECIFIED TYPE: ICD-10-CM

## 2019-05-29 RX ORDER — LEVOTHYROXINE SODIUM 112 UG/1
112 TABLET ORAL DAILY
Qty: 90 TABLET | Refills: 1 | Status: SHIPPED | OUTPATIENT
Start: 2019-05-29 | End: 2019-10-08 | Stop reason: SDUPTHER

## 2019-06-24 ENCOUNTER — TELEPHONE (OUTPATIENT)
Dept: RHEUMATOLOGY | Facility: CLINIC | Age: 69
End: 2019-06-24

## 2019-06-24 NOTE — TELEPHONE ENCOUNTER
Spoke with Ms. Ortiz and rescheduled her 07/05/2019 appointment with Dr. Arreaga due to her not being in clinic on that day. Ms. Ortiz was rescheduled for 07/08/2019 for Dr. Arraega and Infusion and her labs were rescheduled for 07/01/2019 in [place]. Appointment reminder letter mailed.

## 2019-06-24 NOTE — TELEPHONE ENCOUNTER
----- Message from Jasmin Beard sent at 6/24/2019  3:43 PM CDT -----  Contact: Patient  .Type:  Patient Returning Call    Who Called: Qi-patient  Who Left Message for Patient: Jaquelin  Does the patient know what this is regarding?: Changing patients appointment  Would the patient rather a call back or a response via [facility]? Call  Best Call Back Number: [phone]  Additional Information:  Please call her home phone number only, her cell phone doesn't work inside her house.

## 2019-07-01 ENCOUNTER — LAB VISIT (OUTPATIENT)
Dept: LAB | Facility: HOSPITAL | Age: 69
End: 2019-07-01
Attending: INTERNAL MEDICINE
Payer: MEDICARE

## 2019-07-01 DIAGNOSIS — M81.0 OSTEOPOROSIS, UNSPECIFIED OSTEOPOROSIS TYPE, UNSPECIFIED PATHOLOGICAL FRACTURE PRESENCE: ICD-10-CM

## 2019-07-01 DIAGNOSIS — L40.9 PSORIASIS: ICD-10-CM

## 2019-07-01 LAB
25(OH)D3+25(OH)D2 SERPL-MCNC: 32 NG/ML (ref 30–96)
ALBUMIN SERPL BCP-MCNC: 3.5 G/DL (ref 3.5–5.2)
ALP SERPL-CCNC: 46 U/L (ref 55–135)
ALT SERPL W/O P-5'-P-CCNC: 15 U/L (ref 10–44)
ANION GAP SERPL CALC-SCNC: 10 MMOL/L (ref 8–16)
AST SERPL-CCNC: 21 U/L (ref 10–40)
BASOPHILS # BLD AUTO: 0.07 K/UL (ref 0–0.2)
BASOPHILS NFR BLD: 0.8 % (ref 0–1.9)
BILIRUB SERPL-MCNC: 0.3 MG/DL (ref 0.1–1)
BUN SERPL-MCNC: 24 MG/DL (ref 8–23)
CALCIUM SERPL-MCNC: 9.8 MG/DL (ref 8.7–10.5)
CHLORIDE SERPL-SCNC: 104 MMOL/L (ref 95–110)
CO2 SERPL-SCNC: 29 MMOL/L (ref 23–29)
CREAT SERPL-MCNC: 1.2 MG/DL (ref 0.5–1.4)
CRP SERPL-MCNC: 3.7 MG/L (ref 0–8.2)
DIFFERENTIAL METHOD: ABNORMAL
EOSINOPHIL # BLD AUTO: 0.2 K/UL (ref 0–0.5)
EOSINOPHIL NFR BLD: 1.9 % (ref 0–8)
ERYTHROCYTE [DISTWIDTH] IN BLOOD BY AUTOMATED COUNT: 15.1 % (ref 11.5–14.5)
ERYTHROCYTE [SEDIMENTATION RATE] IN BLOOD BY WESTERGREN METHOD: 38 MM/HR (ref 0–36)
EST. GFR  (AFRICAN AMERICAN): 53.7 ML/MIN/1.73 M^2
EST. GFR  (NON AFRICAN AMERICAN): 46.5 ML/MIN/1.73 M^2
GLUCOSE SERPL-MCNC: 97 MG/DL (ref 70–110)
HCT VFR BLD AUTO: 38 % (ref 37–48.5)
HGB BLD-MCNC: 11.8 G/DL (ref 12–16)
LYMPHOCYTES # BLD AUTO: 2.8 K/UL (ref 1–4.8)
LYMPHOCYTES NFR BLD: 30.7 % (ref 18–48)
MCH RBC QN AUTO: 25.9 PG (ref 27–31)
MCHC RBC AUTO-ENTMCNC: 31.1 G/DL (ref 32–36)
MCV RBC AUTO: 84 FL (ref 82–98)
MONOCYTES # BLD AUTO: 0.8 K/UL (ref 0.3–1)
MONOCYTES NFR BLD: 9 % (ref 4–15)
NEUTROPHILS # BLD AUTO: 5.2 K/UL (ref 1.8–7.7)
NEUTROPHILS NFR BLD: 57.4 % (ref 38–73)
NRBC BLD-RTO: 0 /100 WBC
PLATELET # BLD AUTO: 292 K/UL (ref 150–350)
PMV BLD AUTO: 10.5 FL (ref 9.2–12.9)
POTASSIUM SERPL-SCNC: 4.3 MMOL/L (ref 3.5–5.1)
PROT SERPL-MCNC: 7.1 G/DL (ref 6–8.4)
RBC # BLD AUTO: 4.55 M/UL (ref 4–5.4)
SODIUM SERPL-SCNC: 143 MMOL/L (ref 136–145)
WBC # BLD AUTO: 9.02 K/UL (ref 3.9–12.7)

## 2019-07-01 PROCEDURE — 85652 RBC SED RATE AUTOMATED: CPT

## 2019-07-01 PROCEDURE — 36415 COLL VENOUS BLD VENIPUNCTURE: CPT | Mod: PO

## 2019-07-01 PROCEDURE — 86140 C-REACTIVE PROTEIN: CPT

## 2019-07-01 PROCEDURE — 82306 VITAMIN D 25 HYDROXY: CPT

## 2019-07-01 PROCEDURE — 85025 COMPLETE CBC W/AUTO DIFF WBC: CPT

## 2019-07-01 PROCEDURE — 80053 COMPREHEN METABOLIC PANEL: CPT

## 2019-07-08 ENCOUNTER — INFUSION (OUTPATIENT)
Dept: RHEUMATOLOGY | Facility: HOSPITAL | Age: 69
End: 2019-07-08
Attending: INTERNAL MEDICINE
Payer: MEDICARE

## 2019-07-08 ENCOUNTER — OFFICE VISIT (OUTPATIENT)
Dept: RHEUMATOLOGY | Facility: CLINIC | Age: 69
End: 2019-07-08
Payer: MEDICARE

## 2019-07-08 VITALS — BODY MASS INDEX: 45.85 KG/M2 | WEIGHT: 258.81 LBS

## 2019-07-08 VITALS
SYSTOLIC BLOOD PRESSURE: 130 MMHG | DIASTOLIC BLOOD PRESSURE: 66 MMHG | BODY MASS INDEX: 45.85 KG/M2 | HEART RATE: 62 BPM | WEIGHT: 258.81 LBS

## 2019-07-08 DIAGNOSIS — L40.9 PSORIASIS: Primary | ICD-10-CM

## 2019-07-08 DIAGNOSIS — L40.50 PSORIATIC ARTHRITIS: ICD-10-CM

## 2019-07-08 PROCEDURE — 99214 OFFICE O/P EST MOD 30 MIN: CPT | Mod: S$PBB,,, | Performed by: INTERNAL MEDICINE

## 2019-07-08 PROCEDURE — 99999 PR PBB SHADOW E&M-EST. PATIENT-LVL II: CPT | Mod: PBBFAC,,, | Performed by: INTERNAL MEDICINE

## 2019-07-08 PROCEDURE — 99212 OFFICE O/P EST SF 10 MIN: CPT | Mod: PBBFAC | Performed by: INTERNAL MEDICINE

## 2019-07-08 PROCEDURE — 96372 THER/PROPH/DIAG INJ SC/IM: CPT

## 2019-07-08 PROCEDURE — 99214 PR OFFICE/OUTPT VISIT, EST, LEVL IV, 30-39 MIN: ICD-10-PCS | Mod: S$PBB,,, | Performed by: INTERNAL MEDICINE

## 2019-07-08 PROCEDURE — 99999 PR PBB SHADOW E&M-EST. PATIENT-LVL II: ICD-10-PCS | Mod: PBBFAC,,, | Performed by: INTERNAL MEDICINE

## 2019-07-08 PROCEDURE — 63600175 PHARM REV CODE 636 W HCPCS: Performed by: INTERNAL MEDICINE

## 2019-07-08 RX ADMIN — USTEKINUMAB 90 MG: 90 INJECTION, SOLUTION SUBCUTANEOUS at 01:07

## 2019-07-08 NOTE — PROGRESS NOTES
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    Carotid stenosis    IRIS (obstructive sleep apnea)    Double ectopic ureters    Psoriatic arthritis    Morbid obesity with BMI of 40.0-44.9, adult    Angina, class II    Primary osteoarthritis involving multiple joints    Statin intolerance    Abnormal nuclear cardiac imaging test    Preoperative respiratory examination    Postural dizziness with near syncope    Stage 3 chronic kidney disease    Osteopenia of multiple sites    Psoriasis    Medication monitoring encounter    Elevated brain natriuretic peptide (BNP) level    Fever    History of CVA (cerebrovascular accident)    Chronic obstructive pulmonary disease    Iron deficiency anemia due to chronic blood loss    B12 deficiency    Iron deficiency anemia due to chronic blood loss    Allergy to statin medication    Prediabetes    Transient ischemic attack (TIA)    Near syncope    Palpitations    Supraventricular tachycardia    For routine follow-up of psoriasis and psoriatic arthritis   on Stelara 90 mg q 3 months; she is getting it here in clinic. In the past has failed enbrel, mtx, has sulfa allergy, recurrent skin infections with Humira, intolerant to Otezla    She also has Osteopenia, h/o right wrist fx in the 90's. Failed po boniva and fosamax due to GI intolerance. Last dexa showed decreasing bmd so Dr. CASTELLANOS started reclast in sept but she felt terrible after her infusion with significant pain  and confusion. She does not want to repeat.  NO new falls/fxs. I discussed prolia with her in the past and today and she declines any meds   She takes vitamin-D over-the-counter daily, levels normal today   She has been on Stelara since 3/2016. Also with neuropathic chest pain due to previous cardiac surgery, gabapentin helps greatly. She takes 100-200 mg tid        No rash  No other concerns  Hands hurt when she uses a lot, otherwise no other concerns  No joint swelling    No fever, chills, fatigue or weight loss    She has other cardiac issues and gets short of breath when she walks , so she minimizes her activity and walk    Current Outpatient Medications:     albuterol (PROVENTIL) 2.5 mg /3 mL (0.083 %) nebulizer solution, Take 3 mLs (2.5 mg total) by nebulization every 6 (six) hours as needed for Wheezing. Rescue, Disp: 50 each, Rfl: 0    APPLE CIDER VINEGAR ORAL, Take 1 capsule by mouth once daily. , Disp: , Rfl:     aspirin 81 MG Chew, Take 162.5 mg by mouth once daily. , Disp: , Rfl:     calcium carbonate 650 mg calcium (1,625 mg) tablet, Take 1 tablet by mouth once daily., Disp: , Rfl:     chlorhexidine (HIBICLENS) 4 % external liquid, Hibiclens 4 % topical liquid  Apply 1 application twice a day by topical route as directed for 90 days., Disp: , Rfl:     clopidogrel (PLAVIX) 75 mg tablet, Take 1 tablet (75 mg total) by mouth once daily., Disp: 90 tablet, Rfl: 3    cyanocobalamin 2000 MCG tablet, Take 2,000 mcg by mouth once daily., Disp: , Rfl:     folic acid (FOLVITE) 1 MG tablet, Take 1 tablet (1 mg total) by mouth once daily., Disp: 90 tablet, Rfl: 1    furosemide (LASIX) 20 MG tablet, Take 0.5 tablets (10 mg total) by mouth 2 (two) times daily. (Patient taking differently: Take 10 mg by mouth as needed. ), Disp: 90 tablet, Rfl: 3    gabapentin (NEURONTIN) 100 MG capsule, Take 2 capsules (200 mg total) by mouth 3 (three) times daily as needed. (Patient taking differently: Take 100 mg by mouth  As instructed. 1 tab in am , 2tabs at night), Disp: 540 capsule, Rfl: 3    garlic 2,000 mg Cap, Take 3,000 mg by mouth once daily. , Disp: , Rfl:     levothyroxine (SYNTHROID) 112 MCG tablet, Take 1 tablet (112 mcg total) by mouth once daily., Disp: 90 tablet, Rfl: 1    metoprolol succinate (TOPROL-XL) 100 MG 24 hr tablet, 1 tab (100mg) in morning. And 100 mg at bedtime.  Generic ok (Patient taking differently: Take by mouth 2 (two) times daily. 1 tab (100mg) in morning. And 100 mg at bedtime.  Generic ok), Disp: 180 tablet, Rfl: 3    mupirocin (BACTROBAN) 2 % ointment, Bactroban 2 % topical ointment  Apply 1 application twice a day by topical route as directed for 30 days., Disp: , Rfl:     nitroGLYCERIN (NITROSTAT) 0.4 MG SL tablet, Place 1 tablet (0.4 mg total) under the tongue every 5 (five) minutes as needed for Chest pain., Disp: 100 tablet, Rfl: 3    pyridoxine (VITAMIN B-6) 100 MG Tab, Take 200 mg by mouth once daily. , Disp: , Rfl:     REPATHA SURECLICK 140 mg/mL PnIj, INJECT 140 MG INTO THE SKIN EVERY 14 DAYS, Disp: 6 mL, Rfl: 3    TURMERIC, BULK, MISC, Take 1 capsule by mouth 2 (two) times daily. , Disp: , Rfl:     ustekinumab (STELARA) 90 mg/mL Syrg syringe, Inject 1 mL (90 mg total) into the skin every 3 (three) months., Disp: 1 Syringe, Rfl: 3    valsartan-hydrochlorothiazide (DIOVAN-HCT) 160-12.5 mg per tablet, Take 1 tablet by mouth once daily., Disp: 90 tablet, Rfl: 3    vitamin D 1000 units Tab, Take 185 mg by mouth once daily., Disp: , Rfl:       Past Medical History:   Diagnosis Date    Carotid stenosis     19%    COPD (chronic obstructive pulmonary disease)     No meds    Coronary artery disease     CVA (cerebral vascular accident)     Dr. 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 Hope    Psoriatic arthritis     Rheumatology       Past  Surgical History:   Procedure Laterality Date    BREAST BIOPSY      R sided/benign    CARDIAC SURGERY      2016    CERVICAL FUSION      CHOLECYSTECTOMY      CORONARY ANGIOPLASTY      CORONARY ARTERY BYPASS GRAFT      triple bypass    CORONARY STENT PLACEMENT      EYE SURGERY      INTRAUTERINE DEVICE INSERTION      mass removed from R groin      TOTAL ABDOMINAL HYSTERECTOMY W/ BILATERAL SALPINGOOPHORECTOMY      due to benign mass, adhesions    TUBAL LIGATION           Social History     Tobacco Use    Smoking status: Never Smoker    Smokeless tobacco: Never Used   Substance Use Topics    Alcohol use: No    Drug use: No       Family History   Problem Relation Age of Onset    Breast cancer Maternal Grandfather     Breast cancer Paternal Aunt     Stroke Unknown     Breast cancer Sister 60    Leukemia Sister 8         as child    Lung cancer Paternal Grandfather     Heart disease Unknown        Review of patient's allergies indicates:   Allergen Reactions    Celexa [citalopram] Anaphylaxis    Clindamycin Itching and Swelling    Codeine Shortness Of Breath, Itching and Swelling    Crestor [rosuvastatin] Anaphylaxis    Cytotec [misoprostol] Anaphylaxis and Other (See Comments)     Difficulty breathing    Lisinopril Anaphylaxis    Magnesium citrate Shortness Of Breath    Stadol [butorphanol tartrate] Anaphylaxis     Coded    Vicodin [hydrocodone-acetaminophen] Shortness Of Breath    Adhesive      EKG Electrodes    Aggrenox [aspirin-dipyridamole] Other (See Comments)     headaches    Avelox [moxifloxacin]      PATIENT STATES DO NOT GIVE UNDER ANY CIRCUSTANCES    Demerol [meperidine]     Isosorbide Other (See Comments)     Severe headache    Kenalog [triamcinolone acetonide]     Medrol [methylprednisolone] Other (See Comments)     unknown    Mobic [meloxicam]     Morphine     Nitroglycerin      Long acting    Pholcodine     Prednisone      GASTRIC PAIN    Ranexa  [ranolazine] Nausea And Vomiting    Reclast [zoledronic acid-mannitol-water] Other (See Comments)     Bones hurt    Sulfa (sulfonamide antibiotics) Other (See Comments)     unknown    Talwin [pentazocine lactate]     Tetracycline     Tetracyclines     Tilade [nedocromil]     Zetia [ezetimibe]      Medication List with Changes/Refills   Current Medications    ALBUTEROL (PROVENTIL) 2.5 MG /3 ML (0.083 %) NEBULIZER SOLUTION    Take 3 mLs (2.5 mg total) by nebulization every 6 (six) hours as needed for Wheezing. Rescue    APPLE CIDER VINEGAR ORAL    Take 1 capsule by mouth once daily.     ASPIRIN 81 MG CHEW    Take 162.5 mg by mouth once daily.     CALCIUM CARBONATE 650 MG CALCIUM (1,625 MG) TABLET    Take 1 tablet by mouth once daily.    CHLORHEXIDINE (HIBICLENS) 4 % EXTERNAL LIQUID    Hibiclens 4 % topical liquid   Apply 1 application twice a day by topical route as directed for 90 days.    CLOPIDOGREL (PLAVIX) 75 MG TABLET    Take 1 tablet (75 mg total) by mouth once daily.    CYANOCOBALAMIN 2000 MCG TABLET    Take 2,000 mcg by mouth once daily.    FOLIC ACID (FOLVITE) 1 MG TABLET    Take 1 tablet (1 mg total) by mouth once daily.    FUROSEMIDE (LASIX) 20 MG TABLET    Take 0.5 tablets (10 mg total) by mouth 2 (two) times daily.    GABAPENTIN (NEURONTIN) 100 MG CAPSULE    Take 2 capsules (200 mg total) by mouth 3 (three) times daily as needed.    GARLIC 2,000 MG CAP    Take 3,000 mg by mouth once daily.     LEVOTHYROXINE (SYNTHROID) 112 MCG TABLET    Take 1 tablet (112 mcg total) by mouth once daily.    METOPROLOL SUCCINATE (TOPROL-XL) 100 MG 24 HR TABLET    1 tab (100mg) in morning. And 100 mg at bedtime.  Generic ok    MUPIROCIN (BACTROBAN) 2 % OINTMENT    Bactroban 2 % topical ointment   Apply 1 application twice a day by topical route as directed for 30 days.    NITROGLYCERIN (NITROSTAT) 0.4 MG SL TABLET    Place 1 tablet (0.4 mg total) under the tongue every 5 (five) minutes as needed for Chest pain.     PYRIDOXINE (VITAMIN B-6) 100 MG TAB    Take 200 mg by mouth once daily.     REPATHA SURECLICK 140 MG/ML PNIJ    INJECT 140 MG INTO THE SKIN EVERY 14 DAYS    TURMERIC, BULK, MISC    Take 1 capsule by mouth 2 (two) times daily.     USTEKINUMAB (STELARA) 90 MG/ML SYRG SYRINGE    Inject 1 mL (90 mg total) into the skin every 3 (three) months.    VALSARTAN-HYDROCHLOROTHIAZIDE (DIOVAN-HCT) 160-12.5 MG PER TABLET    Take 1 tablet by mouth once daily.    VITAMIN D 1000 UNITS TAB    Take 185 mg by mouth once daily.           Review of Systems:  Constitutional: Denies fever, chills. No recent weight changes.   Fatigue: no  Muscle weakness: no  Headaches: no new headaches  Eyes: No redness or dryness.  No recent visual changes.  ENT: Denies dry mouth. No oral or nasal ulcers.  Card: No chest pain.  Resp: No cough or sob.   Gastro: No nausea or vomiting.  No heartburn.  Constipation: no  Diarrhea: no  Genito:  No dysuria.  No genital ulcers.  Skin: per hpi   Raynauds:no  Neuro: No numbness / tingling.   Psych: No depression, anxiety  Endo:  no excess thirst.  Heme: no abnormal bleeding or bruising  Clots:none       OBJECTIVE:     Vital Signs   Vitals:    07/08/19 1254   BP: 130/66   Pulse: 62     Physical Exam:  General Appearance:  NAD.   Gait: not favoring.  HEENT: PERRL.  Eyes not dry or injected.  No nasal ulcers.  Mouth not dry, no oral lesions.  Lymph: cervical, supraclavicular or axillary nodes: none abnormal   Cardio: no irregularity of S1 or S2.  No gallops or rubs.   Resp: Normal respiratory motion. Clear to auscultation bilaterally.   No abnormal chest conformation.  Abd: Soft, non-tender, nondistended.  No masses.   Skin: Head and neck,  and extremities examined.   Rash along hairline   Small few mm patch on left leg   Neuro: Ox3.   Cranial nerves II-XII grossly intact.   Sensation intact  in both distal LE and upper extremities to light touch.  Musculoskeletal Exam:    Bilateral osteoarthritic changes in both  hands.  Prominent DIP.  No tenderness  No synovitis  Muscle strength:  She uses wheelchair      Laboratory:   Results for orders placed or performed in visit on 07/01/19   CBC auto differential   Result Value Ref Range    WBC 9.02 3.90 - 12.70 K/uL    RBC 4.55 4.00 - 5.40 M/uL    Hemoglobin 11.8 (L) 12.0 - 16.0 g/dL    Hematocrit 38.0 37.0 - 48.5 %    Mean Corpuscular Volume 84 82 - 98 fL    Mean Corpuscular Hemoglobin 25.9 (L) 27.0 - 31.0 pg    Mean Corpuscular Hemoglobin Conc 31.1 (L) 32.0 - 36.0 g/dL    RDW 15.1 (H) 11.5 - 14.5 %    Platelets 292 150 - 350 K/uL    MPV 10.5 9.2 - 12.9 fL    Gran # (ANC) 5.2 1.8 - 7.7 K/uL    Lymph # 2.8 1.0 - 4.8 K/uL    Mono # 0.8 0.3 - 1.0 K/uL    Eos # 0.2 0.0 - 0.5 K/uL    Baso # 0.07 0.00 - 0.20 K/uL    nRBC 0 0 /100 WBC    Gran% 57.4 38.0 - 73.0 %    Lymph% 30.7 18.0 - 48.0 %    Mono% 9.0 4.0 - 15.0 %    Eosinophil% 1.9 0.0 - 8.0 %    Basophil% 0.8 0.0 - 1.9 %    Differential Method Automated    Comprehensive metabolic panel   Result Value Ref Range    Sodium 143 136 - 145 mmol/L    Potassium 4.3 3.5 - 5.1 mmol/L    Chloride 104 95 - 110 mmol/L    CO2 29 23 - 29 mmol/L    Glucose 97 70 - 110 mg/dL    BUN, Bld 24 (H) 8 - 23 mg/dL    Creatinine 1.2 0.5 - 1.4 mg/dL    Calcium 9.8 8.7 - 10.5 mg/dL    Total Protein 7.1 6.0 - 8.4 g/dL    Albumin 3.5 3.5 - 5.2 g/dL    Total Bilirubin 0.3 0.1 - 1.0 mg/dL    Alkaline Phosphatase 46 (L) 55 - 135 U/L    AST 21 10 - 40 U/L    ALT 15 10 - 44 U/L    Anion Gap 10 8 - 16 mmol/L    eGFR if African American 53.7 (A) >60 mL/min/1.73 m^2    eGFR if non  46.5 (A) >60 mL/min/1.73 m^2   Sedimentation rate   Result Value Ref Range    Sed Rate 38 (H) 0 - 36 mm/Hr   C-reactive protein   Result Value Ref Range    CRP 3.7 0.0 - 8.2 mg/L   Vitamin D   Result Value Ref Range    Vit D, 25-Hydroxy 32 30 - 96 ng/mL     Lab Results   Component Value Date    HEPAIGM Negative 10/03/2018    HEPBIGM Negative 10/03/2018    HEPCAB Negative  10/03/2018         Imaging :reviewed x rays hands/ feet     Notes reviewed  Other procedures:    ASSESSMENT/PLAN:     There are no diagnoses linked to this encounter.  67 yr old    1: Pso/ PsA  Stable disease   Started Stelara 3/2016 with > 90% improvement of her skin.  C/w Stelara 90mg every 12 weeks (she gets it done here) as she cannot do it herself with her arthritis  Vaccinations declined     2: Osteopenia with DEXA 6/7/17 with osteopenia with FRAX 14.1/1.7, decreasing   BMD at hip     Received last reclast in September 2017 ,But she did not tolerate with pain   She also experienced lot of confusion after IV Reclast and she declines any further medications for the treatment of osteoporosis  Prior GI intolerance to fosamax and boniva   cont vit d supp -OTC  Today d/w her prolia  She declined again     3: CKD stage 3 :  She does not  Take any NSAIDS   She only uses tylenol as needed     stellara :  Infection, malignancy risk , other side effects discussed.  Hold prior to any surgery or during periods of infection.    3 month f/u with all 4 lab  Went over uptodate information /literature on the meds prescribed today     Disclaimer: This note was prepared using voice recognition system and is likely to have sound alike errors and is not proof read.  Please call me with any questions.

## 2019-07-08 NOTE — PATIENT INSTRUCTIONS
Ustekinumab injection  What is this medicine?  USTEKINUMAB (US te KIN ue mab) is used to treat plaque psoriasis and psoriatic arthritis. This medicine is also used to treat Crohn's disease. It is not a cure.  How should I use this medicine?  This medicine is for injection under the skin or infusion into a vein. It is usually given by a health care professional in a hospital or clinic setting.  If you get this medicine at home, you will be taught how to prepare and give this medicine. Use exactly as directed. Take your medicine at regular intervals. Do not take your medicine more often than directed.  It is important that you put your used needles and syringes in a special sharps container. Do not put them in a trash can. If you do not have a sharps container, call your pharmacist or healthcare provider to get one.  A special MedGuide will be given to you by the pharmacist with each prescription and refill. Be sure to read this information carefully each time.  Talk to your pediatrician regarding the use of this medicine in children. Special care may be needed.  What side effects may I notice from receiving this medicine?  Side effects that you should report to your doctor or health care professional as soon as possible:  · allergic reactions like skin rash, itching or hives, swelling of the face, lips, or tongue  · breathing problems  · changes in vision  · confusion  · seizures  · signs and symptoms of infection like fever or chills; cough; sore throat; pain or trouble passing urine  · swollen lymph nodes in the neck, underarm, or groin areas  · unexplained weight loss  · unusually weak or tired  · vomiting  Side effects that usually do not require medical attention (Report these to your doctor or health care professional if they continue or are bothersome.):  · headache  · nausea  · redness, itching, swelling, or bruising at site where injected  What may interact with this medicine?  Do not take this medicine  with any of the following medications:  · live virus vaccines  This medicine may also interact with the following medications:  · cyclosporine  · immunosuppressives  · vaccines  · warfarin  What if I miss a dose?  If you miss a dose, take it as soon as you can. If it is almost time for your next dose, take only that dose. Do not take double or extra doses.  Where should I keep my medicine?  Keep out of the reach of children.  If you are using this medicine at home, you will be instructed on how to store this medicine. Store the prefilled syringes in a refrigerator between 2 to 8 degrees C (36 to 46 degrees F). Keep in the original carton. Protect from light. Do not freeze. Do not shake. Throw away any unused medicine after the expiration date on the label.  What should I tell my health care provider before I take this medicine?  They need to know if you have any of these conditions:  · cancer  · diabetes  · immune system problems  · infection (especially a virus infection such as chickenpox, cold sores, or herpes) or history of infections  · receiving or have received allergy shots  · recently received or scheduled to receive a vaccine  · tuberculosis, a positive skin test for tuberculosis, or have recently been in close contact with someone who has tuberculosis  · an unusual reaction to ustekinumab, latex, other medicines, foods, dyes, or preservatives  · pregnant or trying to get pregnant  · breast-feeding  What should I watch for while using this medicine?  Your condition will be monitored carefully while you are receiving this medicine. Tell your doctor or healthcare professional if your symptoms do not start to get better or if they get worse.  You will be tested for tuberculosis (TB) before you start this medicine. If your doctor prescribes any medicine for TB, you should start taking the TB medicine before starting this medicine. Make sure to finish the full course of TB medicine.  Call your doctor or health  care professional if you get a cold or other infection while receiving this medicine. Do not treat yourself. This medicine may decrease your body's ability to fight infection.  Talk to your doctor about your risk of cancer. You may be more at risk for certain types of cancers if you take this medicine.  NOTE:This sheet is a summary. It may not cover all possible information. If you have questions about this medicine, talk to your doctor, pharmacist, or health care provider. Copyright© 2017 [employer]        Ustekinumab injection  What is this medicine?  USTEKINUMAB (US te KIN ue mab) is used to treat plaque psoriasis and psoriatic arthritis. This medicine is also used to treat Crohn's disease. It is not a cure.  How should I use this medicine?  This medicine is for injection under the skin or infusion into a vein. It is usually given by a health care professional in a hospital or clinic setting.  If you get this medicine at home, you will be taught how to prepare and give this medicine. Use exactly as directed. Take your medicine at regular intervals. Do not take your medicine more often than directed.  It is important that you put your used needles and syringes in a special sharps container. Do not put them in a trash can. If you do not have a sharps container, call your pharmacist or healthcare provider to get one.  A special MedGuide will be given to you by the pharmacist with each prescription and refill. Be sure to read this information carefully each time.  Talk to your pediatrician regarding the use of this medicine in children. Special care may be needed.  What side effects may I notice from receiving this medicine?  Side effects that you should report to your doctor or health care professional as soon as possible:  · allergic reactions like skin rash, itching or hives, swelling of the face, lips, or tongue  · breathing problems  · changes in vision  · confusion  · seizures  · signs and symptoms of  infection like fever or chills; cough; sore throat; pain or trouble passing urine  · swollen lymph nodes in the neck, underarm, or groin areas  · unexplained weight loss  · unusually weak or tired  · vomiting  Side effects that usually do not require medical attention (Report these to your doctor or health care professional if they continue or are bothersome.):  · headache  · nausea  · redness, itching, swelling, or bruising at site where injected  What may interact with this medicine?  Do not take this medicine with any of the following medications:  · live virus vaccines  This medicine may also interact with the following medications:  · cyclosporine  · immunosuppressives  · vaccines  · warfarin  What if I miss a dose?  If you miss a dose, take it as soon as you can. If it is almost time for your next dose, take only that dose. Do not take double or extra doses.  Where should I keep my medicine?  Keep out of the reach of children.  If you are using this medicine at home, you will be instructed on how to store this medicine. Store the prefilled syringes in a refrigerator between 2 to 8 degrees C (36 to 46 degrees F). Keep in the original carton. Protect from light. Do not freeze. Do not shake. Throw away any unused medicine after the expiration date on the label.  What should I tell my health care provider before I take this medicine?  They need to know if you have any of these conditions:  · cancer  · diabetes  · immune system problems  · infection (especially a virus infection such as chickenpox, cold sores, or herpes) or history of infections  · receiving or have received allergy shots  · recently received or scheduled to receive a vaccine  · tuberculosis, a positive skin test for tuberculosis, or have recently been in close contact with someone who has tuberculosis  · an unusual reaction to ustekinumab, latex, other medicines, foods, dyes, or preservatives  · pregnant or trying to get  pregnant  · breast-feeding  What should I watch for while using this medicine?  Your condition will be monitored carefully while you are receiving this medicine. Tell your doctor or healthcare professional if your symptoms do not start to get better or if they get worse.  You will be tested for tuberculosis (TB) before you start this medicine. If your doctor prescribes any medicine for TB, you should start taking the TB medicine before starting this medicine. Make sure to finish the full course of TB medicine.  Call your doctor or health care professional if you get a cold or other infection while receiving this medicine. Do not treat yourself. This medicine may decrease your body's ability to fight infection.  Talk to your doctor about your risk of cancer. You may be more at risk for certain types of cancers if you take this medicine.  NOTE:This sheet is a summary. It may not cover all possible information. If you have questions about this medicine, talk to your doctor, pharmacist, or health care provider. Copyright© 2017 [employer]

## 2019-07-08 NOTE — NURSING
Stelara 90 mg/ml administered subcutaneous in abdomen.   Pt instructed on s/s to report to MD/RN. Instructed to wait in clinic x 15 min. Post injection.   Pt verbalized understanding and tolerated injection well without adverse events.

## 2019-09-26 ENCOUNTER — TELEPHONE (OUTPATIENT)
Dept: CARDIOLOGY | Facility: CLINIC | Age: 69
End: 2019-09-26

## 2019-09-26 ENCOUNTER — PATIENT OUTREACH (OUTPATIENT)
Dept: ADMINISTRATIVE | Facility: HOSPITAL | Age: 69
End: 2019-09-26

## 2019-09-26 DIAGNOSIS — E78.2 MIXED HYPERLIPIDEMIA: Primary | ICD-10-CM

## 2019-09-26 NOTE — TELEPHONE ENCOUNTER
Returned call. Patient stated had not have lipids checked in years and taking Repatha.    Reviewed record and patient was instructed lipids were last completed 074/2019 per PCP.    Results given.

## 2019-09-26 NOTE — TELEPHONE ENCOUNTER
----- Message from Gardenia Alexis sent at 9/26/2019  2:01 PM CDT -----  Contact: pt  Pt states she have lab schedule for 10/1, pt states is doctor any lab order for her please add to her labs on 10/1, any questions [phone].

## 2019-10-01 ENCOUNTER — LAB VISIT (OUTPATIENT)
Dept: LAB | Facility: HOSPITAL | Age: 69
End: 2019-10-01
Attending: FAMILY MEDICINE
Payer: MEDICARE

## 2019-10-01 DIAGNOSIS — E78.2 MIXED HYPERLIPIDEMIA: ICD-10-CM

## 2019-10-01 DIAGNOSIS — E03.9 HYPOTHYROIDISM, UNSPECIFIED TYPE: ICD-10-CM

## 2019-10-01 DIAGNOSIS — E53.8 B12 DEFICIENCY: ICD-10-CM

## 2019-10-01 DIAGNOSIS — D50.0 IRON DEFICIENCY ANEMIA DUE TO CHRONIC BLOOD LOSS: ICD-10-CM

## 2019-10-01 DIAGNOSIS — N18.30 STAGE 3 CHRONIC KIDNEY DISEASE: ICD-10-CM

## 2019-10-01 DIAGNOSIS — R73.03 PREDIABETES: ICD-10-CM

## 2019-10-01 DIAGNOSIS — L40.9 PSORIASIS: ICD-10-CM

## 2019-10-01 LAB
BASOPHILS # BLD AUTO: 0.06 K/UL (ref 0–0.2)
BASOPHILS # BLD AUTO: 0.06 K/UL (ref 0–0.2)
BASOPHILS NFR BLD: 0.9 % (ref 0–1.9)
BASOPHILS NFR BLD: 0.9 % (ref 0–1.9)
DIFFERENTIAL METHOD: ABNORMAL
DIFFERENTIAL METHOD: ABNORMAL
EOSINOPHIL # BLD AUTO: 0.1 K/UL (ref 0–0.5)
EOSINOPHIL # BLD AUTO: 0.1 K/UL (ref 0–0.5)
EOSINOPHIL NFR BLD: 1.7 % (ref 0–8)
EOSINOPHIL NFR BLD: 1.7 % (ref 0–8)
ERYTHROCYTE [DISTWIDTH] IN BLOOD BY AUTOMATED COUNT: 15.9 % (ref 11.5–14.5)
ERYTHROCYTE [DISTWIDTH] IN BLOOD BY AUTOMATED COUNT: 15.9 % (ref 11.5–14.5)
ERYTHROCYTE [SEDIMENTATION RATE] IN BLOOD BY WESTERGREN METHOD: 52 MM/HR (ref 0–36)
ESTIMATED AVG GLUCOSE: 126 MG/DL (ref 68–131)
HBA1C MFR BLD HPLC: 6 % (ref 4–5.6)
HCT VFR BLD AUTO: 37.1 % (ref 37–48.5)
HCT VFR BLD AUTO: 37.1 % (ref 37–48.5)
HGB BLD-MCNC: 10.7 G/DL (ref 12–16)
HGB BLD-MCNC: 10.7 G/DL (ref 12–16)
LYMPHOCYTES # BLD AUTO: 1.8 K/UL (ref 1–4.8)
LYMPHOCYTES # BLD AUTO: 1.8 K/UL (ref 1–4.8)
LYMPHOCYTES NFR BLD: 28.5 % (ref 18–48)
LYMPHOCYTES NFR BLD: 28.5 % (ref 18–48)
MCH RBC QN AUTO: 23.7 PG (ref 27–31)
MCH RBC QN AUTO: 23.7 PG (ref 27–31)
MCHC RBC AUTO-ENTMCNC: 28.8 G/DL (ref 32–36)
MCHC RBC AUTO-ENTMCNC: 28.8 G/DL (ref 32–36)
MCV RBC AUTO: 82 FL (ref 82–98)
MCV RBC AUTO: 82 FL (ref 82–98)
MONOCYTES # BLD AUTO: 0.5 K/UL (ref 0.3–1)
MONOCYTES # BLD AUTO: 0.5 K/UL (ref 0.3–1)
MONOCYTES NFR BLD: 8.4 % (ref 4–15)
MONOCYTES NFR BLD: 8.4 % (ref 4–15)
NEUTROPHILS # BLD AUTO: 3.9 K/UL (ref 1.8–7.7)
NEUTROPHILS # BLD AUTO: 3.9 K/UL (ref 1.8–7.7)
NEUTROPHILS NFR BLD: 60.3 % (ref 38–73)
NEUTROPHILS NFR BLD: 60.3 % (ref 38–73)
NRBC BLD-RTO: 0 /100 WBC
NRBC BLD-RTO: 0 /100 WBC
PLATELET # BLD AUTO: 320 K/UL (ref 150–350)
PLATELET # BLD AUTO: 320 K/UL (ref 150–350)
PMV BLD AUTO: 10.7 FL (ref 9.2–12.9)
PMV BLD AUTO: 10.7 FL (ref 9.2–12.9)
RBC # BLD AUTO: 4.51 M/UL (ref 4–5.4)
RBC # BLD AUTO: 4.51 M/UL (ref 4–5.4)
WBC # BLD AUTO: 6.46 K/UL (ref 3.9–12.7)
WBC # BLD AUTO: 6.46 K/UL (ref 3.9–12.7)

## 2019-10-01 PROCEDURE — 82728 ASSAY OF FERRITIN: CPT

## 2019-10-01 PROCEDURE — 80053 COMPREHEN METABOLIC PANEL: CPT

## 2019-10-01 PROCEDURE — 84439 ASSAY OF FREE THYROXINE: CPT

## 2019-10-01 PROCEDURE — 83036 HEMOGLOBIN GLYCOSYLATED A1C: CPT

## 2019-10-01 PROCEDURE — 85652 RBC SED RATE AUTOMATED: CPT

## 2019-10-01 PROCEDURE — 86140 C-REACTIVE PROTEIN: CPT

## 2019-10-01 PROCEDURE — 36415 COLL VENOUS BLD VENIPUNCTURE: CPT | Mod: PO

## 2019-10-01 PROCEDURE — 85025 COMPLETE CBC W/AUTO DIFF WBC: CPT

## 2019-10-01 PROCEDURE — 84443 ASSAY THYROID STIM HORMONE: CPT

## 2019-10-01 PROCEDURE — 83540 ASSAY OF IRON: CPT

## 2019-10-01 PROCEDURE — 80061 LIPID PANEL: CPT

## 2019-10-01 PROCEDURE — 82607 VITAMIN B-12: CPT

## 2019-10-02 LAB
ALBUMIN SERPL BCP-MCNC: 3.6 G/DL (ref 3.5–5.2)
ALP SERPL-CCNC: 43 U/L (ref 55–135)
ALT SERPL W/O P-5'-P-CCNC: 14 U/L (ref 10–44)
ANION GAP SERPL CALC-SCNC: 10 MMOL/L (ref 8–16)
AST SERPL-CCNC: 23 U/L (ref 10–40)
BILIRUB SERPL-MCNC: 0.5 MG/DL (ref 0.1–1)
BUN SERPL-MCNC: 24 MG/DL (ref 8–23)
CALCIUM SERPL-MCNC: 9.7 MG/DL (ref 8.7–10.5)
CHLORIDE SERPL-SCNC: 105 MMOL/L (ref 95–110)
CHOLEST SERPL-MCNC: 127 MG/DL (ref 120–199)
CHOLEST/HDLC SERPL: 2.7 {RATIO} (ref 2–5)
CO2 SERPL-SCNC: 26 MMOL/L (ref 23–29)
CREAT SERPL-MCNC: 1.1 MG/DL (ref 0.5–1.4)
CRP SERPL-MCNC: 3.2 MG/L (ref 0–8.2)
EST. GFR  (AFRICAN AMERICAN): 59.6 ML/MIN/1.73 M^2
EST. GFR  (NON AFRICAN AMERICAN): 51.7 ML/MIN/1.73 M^2
FERRITIN SERPL-MCNC: 7 NG/ML (ref 20–300)
GLUCOSE SERPL-MCNC: 97 MG/DL (ref 70–110)
HDLC SERPL-MCNC: 47 MG/DL (ref 40–75)
HDLC SERPL: 37 % (ref 20–50)
IRON SERPL-MCNC: 43 UG/DL (ref 30–160)
LDLC SERPL CALC-MCNC: 56 MG/DL (ref 63–159)
NONHDLC SERPL-MCNC: 80 MG/DL
POTASSIUM SERPL-SCNC: 3.9 MMOL/L (ref 3.5–5.1)
PROT SERPL-MCNC: 7.1 G/DL (ref 6–8.4)
SATURATED IRON: 9 % (ref 20–50)
SODIUM SERPL-SCNC: 141 MMOL/L (ref 136–145)
T4 FREE SERPL-MCNC: 1.1 NG/DL (ref 0.71–1.51)
TOTAL IRON BINDING CAPACITY: 453 UG/DL (ref 250–450)
TRANSFERRIN SERPL-MCNC: 306 MG/DL (ref 200–375)
TRIGL SERPL-MCNC: 120 MG/DL (ref 30–150)
TSH SERPL DL<=0.005 MIU/L-ACNC: 1.2 UIU/ML (ref 0.4–4)
VIT B12 SERPL-MCNC: 674 PG/ML (ref 210–950)

## 2019-10-08 ENCOUNTER — OFFICE VISIT (OUTPATIENT)
Dept: FAMILY MEDICINE | Facility: CLINIC | Age: 69
End: 2019-10-08
Payer: MEDICARE

## 2019-10-08 ENCOUNTER — TELEPHONE (OUTPATIENT)
Dept: HEMATOLOGY/ONCOLOGY | Facility: CLINIC | Age: 69
End: 2019-10-08

## 2019-10-08 VITALS
SYSTOLIC BLOOD PRESSURE: 126 MMHG | WEIGHT: 253.06 LBS | BODY MASS INDEX: 44.84 KG/M2 | HEART RATE: 84 BPM | DIASTOLIC BLOOD PRESSURE: 80 MMHG | TEMPERATURE: 98 F | RESPIRATION RATE: 16 BRPM | HEIGHT: 63 IN | OXYGEN SATURATION: 97 %

## 2019-10-08 DIAGNOSIS — I47.10 SUPRAVENTRICULAR TACHYCARDIA: Chronic | ICD-10-CM

## 2019-10-08 DIAGNOSIS — Z95.1 S/P CABG (CORONARY ARTERY BYPASS GRAFT): ICD-10-CM

## 2019-10-08 DIAGNOSIS — R73.03 PREDIABETES: ICD-10-CM

## 2019-10-08 DIAGNOSIS — D50.0 IRON DEFICIENCY ANEMIA DUE TO CHRONIC BLOOD LOSS: ICD-10-CM

## 2019-10-08 DIAGNOSIS — E66.01 MORBID OBESITY WITH BMI OF 40.0-44.9, ADULT: ICD-10-CM

## 2019-10-08 DIAGNOSIS — J44.9 CHRONIC OBSTRUCTIVE PULMONARY DISEASE, UNSPECIFIED COPD TYPE: ICD-10-CM

## 2019-10-08 DIAGNOSIS — E53.8 B12 DEFICIENCY: ICD-10-CM

## 2019-10-08 DIAGNOSIS — N18.30 STAGE 3 CHRONIC KIDNEY DISEASE: ICD-10-CM

## 2019-10-08 DIAGNOSIS — I20.9 ANGINA, CLASS II: ICD-10-CM

## 2019-10-08 DIAGNOSIS — Z86.73 HISTORY OF CVA (CEREBROVASCULAR ACCIDENT): ICD-10-CM

## 2019-10-08 DIAGNOSIS — Z78.9 STATIN INTOLERANCE: ICD-10-CM

## 2019-10-08 DIAGNOSIS — L40.50 PSORIATIC ARTHRITIS: ICD-10-CM

## 2019-10-08 DIAGNOSIS — E03.9 HYPOTHYROIDISM, UNSPECIFIED TYPE: Primary | ICD-10-CM

## 2019-10-08 DIAGNOSIS — E53.8 FOLIC ACID DEFICIENCY: ICD-10-CM

## 2019-10-08 PROCEDURE — 99213 OFFICE O/P EST LOW 20 MIN: CPT | Mod: PBBFAC,PO | Performed by: FAMILY MEDICINE

## 2019-10-08 PROCEDURE — 99214 OFFICE O/P EST MOD 30 MIN: CPT | Mod: S$PBB,,, | Performed by: FAMILY MEDICINE

## 2019-10-08 PROCEDURE — 99214 PR OFFICE/OUTPT VISIT, EST, LEVL IV, 30-39 MIN: ICD-10-PCS | Mod: S$PBB,,, | Performed by: FAMILY MEDICINE

## 2019-10-08 PROCEDURE — 99999 PR PBB SHADOW E&M-EST. PATIENT-LVL III: CPT | Mod: PBBFAC,,, | Performed by: FAMILY MEDICINE

## 2019-10-08 PROCEDURE — 99999 PR PBB SHADOW E&M-EST. PATIENT-LVL III: ICD-10-PCS | Mod: PBBFAC,,, | Performed by: FAMILY MEDICINE

## 2019-10-08 RX ORDER — FOLIC ACID 1 MG/1
1 TABLET ORAL DAILY
Qty: 90 TABLET | Refills: 1 | Status: SHIPPED | OUTPATIENT
Start: 2019-10-08 | End: 2020-05-19 | Stop reason: SDUPTHER

## 2019-10-08 RX ORDER — LEVOTHYROXINE SODIUM 112 UG/1
112 TABLET ORAL DAILY
Qty: 90 TABLET | Refills: 1 | Status: SHIPPED | OUTPATIENT
Start: 2019-10-08 | End: 2020-05-19 | Stop reason: SDUPTHER

## 2019-10-08 NOTE — PROGRESS NOTES
Subjective:       Patient ID: Qi Ortiz is a 69 y.o. female.    Chief Complaint: Chronic Care    HPI   Chronic Care  70yo female presents today for chronic care assessment. She has had recent lab assessment and is here for review of results. She has been taking Synthroid 112mcg daily. Current TSH levels are measured at 1.203 and free T4 at 1.10. She does endorse fatigue but attributes this to her SVT and AS. She notes persistent shortness of breath. She has required use of NTG more often of late- particularly with walking. She has been modifying her activity as a result. Notes taking frequent breaks when she goes shopping. She is scheduled to see Cardiology later this month. She has had updated labs per Hematology but cancelled her appointment as she does not want to travel to [facility] for her care. She is taking B6, B12, vitamin D and calcium but is not on any iron supplements secondary to constipation with use. She has been evaluated by Rheumatology for psoriatic arthritis. She is on Stellara and Neurontin and reports generally stable symptom control. She has been applying garlic to her hemorrhoids and this has lessened her bleeding. She did recently have a bout of GE and admits to some associated rectal bleeding- this has resolved. She has historically declined cancer screening.     Review of Systems   Constitutional: Positive for activity change and fatigue. Negative for appetite change.   HENT: Negative for congestion, ear pain and sinus pressure.    Eyes: Negative for visual disturbance.   Respiratory: Positive for shortness of breath.    Cardiovascular: Positive for chest pain. Negative for palpitations and leg swelling.   Gastrointestinal: Negative for abdominal pain, blood in stool, constipation, diarrhea, nausea and vomiting.        +hemorrhoids- occasional bleeding     Endocrine: Negative for cold intolerance, heat intolerance, polydipsia, polyphagia and polyuria.   Genitourinary: Negative for  "decreased urine volume and difficulty urinating.   Musculoskeletal: Positive for arthralgias. Negative for myalgias.   Skin: Negative for pallor and rash.   Neurological: Negative for dizziness, weakness and headaches.   Hematological: Bruises/bleeds easily.   Psychiatric/Behavioral: Negative for dysphoric mood and sleep disturbance. The patient is not nervous/anxious.        Objective:   /80   Pulse 84   Temp 98.1 °F (36.7 °C) (Temporal)   Resp 16   Ht 5' 3" (1.6 m)   Wt 114.8 kg (253 lb 1.4 oz)   SpO2 97%   BMI 44.83 kg/m²   Physical Exam   Constitutional: She is oriented to person, place, and time. She appears well-developed and well-nourished.   Obese, non-toxic   HENT:   Head: Normocephalic and atraumatic.   Right Ear: Tympanic membrane, external ear and ear canal normal.   Left Ear: Tympanic membrane, external ear and ear canal normal.   Nose: Nose normal.   Mouth/Throat: Oropharynx is clear and moist.   Eyes: Pupils are equal, round, and reactive to light. Conjunctivae and EOM are normal.   Neck: Normal range of motion. Neck supple.   Cardiovascular: Normal rate, regular rhythm, normal heart sounds and intact distal pulses.   Pulmonary/Chest: Effort normal and breath sounds normal.   Abdominal: Soft. Bowel sounds are normal.   Musculoskeletal: She exhibits no edema.   Neurological: She is alert and oriented to person, place, and time.   Skin: Skin is warm and dry. No rash noted.   Psychiatric: She has a normal mood and affect. Her behavior is normal.       Assessment:       1. Hypothyroidism, unspecified type    2. Prediabetes    3. Stage 3 chronic kidney disease    4. Folic acid deficiency    5. Iron deficiency anemia due to chronic blood loss    6. B12 deficiency    7. Psoriatic arthritis    8. Chronic obstructive pulmonary disease, unspecified COPD type    9. Supraventricular tachycardia    10. Angina, class II    11. S/P CABG (coronary artery bypass graft)    12. History of CVA " (cerebrovascular accident)    13. Morbid obesity with BMI of 40.0-44.9, adult    14. Statin intolerance        Plan:      Hypothyroidism, unspecified type  -     levothyroxine (SYNTHROID) 112 MCG tablet; Take 1 tablet (112 mcg total) by mouth once daily.  Dispense: 90 tablet; Refill: 1  -     TSH; Future; Expected date: 10/08/2019  -     T4, free; Future; Expected date: 10/08/2019    Prediabetes  -     Hemoglobin A1c; Future; Expected date: 10/08/2019    Stage 3 chronic kidney disease  -     Comprehensive metabolic panel; Future; Expected date: 10/08/2019    Folic acid deficiency  -     folic acid (FOLVITE) 1 MG tablet; Take 1 tablet (1 mg total) by mouth once daily.  Dispense: 90 tablet; Refill: 1  -     Folate; Future; Expected date: 10/08/2019    Iron deficiency anemia due to chronic blood loss  As per Heme/Onc.    B12 deficiency  As per Heme/Onc.    Psoriatic arthritis  As per Rheumatology.    Chronic obstructive pulmonary disease, unspecified COPD type  As per Pulmonology.    Supraventricular tachycardia  As per Cardiology.    Angina, class II  As per Cardiology.    S/P CABG (coronary artery bypass graft)  As per Cardiology.    History of CVA (cerebrovascular accident)  BP and lipid control remain advised.    Morbid obesity with BMI of 40.0-44.9, adult  Weight loss efforts remain encouraged through diet and lifestyle measures.    Statin intolerance  Continue with Repatha as per Cardiology.       Patient has again declined CA screening- discussed C scope as well as MMG.  Declines all immunizations.

## 2019-10-08 NOTE — TELEPHONE ENCOUNTER
----- Message from Sylvie Temple sent at 10/8/2019  8:33 AM CDT -----  Contact: pt  ..Type:  Needs Medical Advice    Who Called: pt  Symptoms (please be specific): n/a   How long has patient had these symptoms:  n/a  Pharmacy name and phone #:  n/a  Would the patient rather a call back or a response via [facility]? Call back  Best Call Back Number: [phone]  Additional Information: requesting a call back to discuss possibly moving to the [facility] location, please call back at [phone]

## 2019-10-11 ENCOUNTER — OFFICE VISIT (OUTPATIENT)
Dept: RHEUMATOLOGY | Facility: CLINIC | Age: 69
End: 2019-10-11
Payer: MEDICARE

## 2019-10-11 ENCOUNTER — INFUSION (OUTPATIENT)
Dept: RHEUMATOLOGY | Facility: HOSPITAL | Age: 69
End: 2019-10-11
Attending: INTERNAL MEDICINE
Payer: MEDICARE

## 2019-10-11 VITALS
HEART RATE: 60 BPM | SYSTOLIC BLOOD PRESSURE: 135 MMHG | BODY MASS INDEX: 44.65 KG/M2 | WEIGHT: 252 LBS | DIASTOLIC BLOOD PRESSURE: 65 MMHG | HEIGHT: 63 IN

## 2019-10-11 DIAGNOSIS — N18.30 STAGE 3 CHRONIC KIDNEY DISEASE: ICD-10-CM

## 2019-10-11 DIAGNOSIS — L40.50 PSORIATIC ARTHRITIS: ICD-10-CM

## 2019-10-11 DIAGNOSIS — G62.9 POLYNEUROPATHY: ICD-10-CM

## 2019-10-11 DIAGNOSIS — L40.9 PSORIASIS: Primary | ICD-10-CM

## 2019-10-11 DIAGNOSIS — G62.9 NEUROPATHY: ICD-10-CM

## 2019-10-11 PROCEDURE — 99214 PR OFFICE/OUTPT VISIT, EST, LEVL IV, 30-39 MIN: ICD-10-PCS | Mod: S$PBB,,, | Performed by: INTERNAL MEDICINE

## 2019-10-11 PROCEDURE — 99214 OFFICE O/P EST MOD 30 MIN: CPT | Mod: S$PBB,,, | Performed by: INTERNAL MEDICINE

## 2019-10-11 PROCEDURE — 96372 THER/PROPH/DIAG INJ SC/IM: CPT

## 2019-10-11 PROCEDURE — 99213 OFFICE O/P EST LOW 20 MIN: CPT | Mod: PBBFAC | Performed by: INTERNAL MEDICINE

## 2019-10-11 PROCEDURE — 63600175 PHARM REV CODE 636 W HCPCS: Performed by: INTERNAL MEDICINE

## 2019-10-11 PROCEDURE — C9487 USTEKINUMAB IV INJ, 1 MG: HCPCS | Performed by: INTERNAL MEDICINE

## 2019-10-11 PROCEDURE — 99999 PR PBB SHADOW E&M-EST. PATIENT-LVL III: CPT | Mod: PBBFAC,,, | Performed by: INTERNAL MEDICINE

## 2019-10-11 PROCEDURE — 99999 PR PBB SHADOW E&M-EST. PATIENT-LVL III: ICD-10-PCS | Mod: PBBFAC,,, | Performed by: INTERNAL MEDICINE

## 2019-10-11 RX ORDER — GABAPENTIN 100 MG/1
200 CAPSULE ORAL 3 TIMES DAILY PRN
Qty: 540 CAPSULE | Refills: 3 | Status: SHIPPED | OUTPATIENT
Start: 2019-10-11 | End: 2021-08-03

## 2019-10-11 RX ORDER — CHLORHEXIDINE GLUCONATE 40 MG/ML
SOLUTION TOPICAL DAILY PRN
Qty: 473 ML | Refills: 3 | Status: SHIPPED | OUTPATIENT
Start: 2019-10-11 | End: 2022-02-10 | Stop reason: SDUPTHER

## 2019-10-11 RX ADMIN — USTEKINUMAB 90 MG: 90 INJECTION, SOLUTION SUBCUTANEOUS at 01:10

## 2019-10-11 NOTE — PROGRESS NOTES
CC:  Chief Complaint   Patient presents with    Osteoporosis   Psoriasis/ PsA    History of Present Illness:  Qi Mg a 69 y.o.yo female    For routine follow-up of psoriasis and psoriatic arthritis   on Stelara 90 mg q 3 months; she is getting it here in clinic. In the past has failed enbrel, mtx, has sulfa allergy, recurrent skin infections with Humira, intolerant to Otezla    She also has Osteopenia, h/o right wrist fx in the 90's. Failed po boniva and fosamax due to GI intolerance. Last dexa showed decreasing bmd so Dr. CASTELLANOS started reclast in sept but she felt terrible after her infusion with significant pain and confusion. She does not want to repeat.  NO new falls/fxs. I discussed prolia with her in the past and today and she declines any meds   She takes vitamin-D over-the-counter daily, levels normal today   She has been on Stelara since 3/2016. Also with neuropathic chest pain due to previous cardiac surgery, gabapentin helps greatly. She takes 200 mg 2 to 3 times a day as needed   No rash  No other concerns  Hands hurt when she uses a lot, otherwise no other concerns  No joint swelling  No fever, chills, fatigue or weight loss    She has iron deficiency anemia and gets constipated with iron tablets  She is currently followed by PCP for the same    She has history of staph infections and hence uses a betacept scrub , requests refill      Past Medical History:   Diagnosis Date    Carotid stenosis     19%    COPD (chronic obstructive pulmonary disease)     No meds    Coronary artery disease     CVA (cerebral vascular accident)     Dr. 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 Hope    Psoriatic arthritis     Rheumatology       Past Surgical History:   Procedure Laterality Date    BREAST BIOPSY      R sided/benign    CARDIAC SURGERY      sept 28 2016     CERVICAL FUSION      CHOLECYSTECTOMY      CORONARY ANGIOPLASTY      CORONARY ARTERY BYPASS GRAFT      triple bypass    CORONARY STENT PLACEMENT      EYE SURGERY      INTRAUTERINE DEVICE INSERTION      mass removed from R groin      TOTAL ABDOMINAL HYSTERECTOMY W/ BILATERAL SALPINGOOPHORECTOMY      due to benign mass, adhesions    TUBAL LIGATION           Social History     Tobacco Use    Smoking status: Never Smoker    Smokeless tobacco: Never Used   Substance Use Topics    Alcohol use: No    Drug use: No       Family History   Problem Relation Age of Onset    Breast cancer Maternal Grandfather     Breast cancer Paternal Aunt     Stroke Unknown     Breast cancer Sister 60    Leukemia Sister 8         as child    Lung cancer Paternal Grandfather     Heart disease Unknown        Review of patient's allergies indicates:   Allergen Reactions    Celexa [citalopram] Anaphylaxis    Clindamycin Itching and Swelling    Codeine Shortness Of Breath, Itching and Swelling    Crestor [rosuvastatin] Anaphylaxis    Cytotec [misoprostol] Anaphylaxis and Other (See Comments)     Difficulty breathing    Lisinopril Anaphylaxis    Magnesium citrate Shortness Of Breath    Stadol [butorphanol tartrate] Anaphylaxis     Coded    Vicodin [hydrocodone-acetaminophen] Shortness Of Breath    Adhesive      EKG Electrodes    Aggrenox [aspirin-dipyridamole] Other (See Comments)     headaches    Avelox [moxifloxacin]      PATIENT STATES DO NOT GIVE UNDER ANY CIRCUSTANCES    Demerol [meperidine]     Isosorbide Other (See Comments)     Severe headache    Kenalog [triamcinolone acetonide]     Medrol [methylprednisolone] Other (See Comments)     unknown    Mobic [meloxicam]     Morphine     Nitroglycerin      Long acting    Pholcodine     Prednisone      GASTRIC PAIN    Ranexa [ranolazine] Nausea And Vomiting    Reclast [zoledronic acid-mannitol-water] Other (See Comments)     Bones hurt    Sulfa  (sulfonamide antibiotics) Other (See Comments)     unknown    Talwin [pentazocine lactate]     Tetracycline     Tetracyclines     Tilade [nedocromil]     Zetia [ezetimibe]      Medication List with Changes/Refills   Current Medications    ALBUTEROL (PROVENTIL) 2.5 MG /3 ML (0.083 %) NEBULIZER SOLUTION    Take 3 mLs (2.5 mg total) by nebulization every 6 (six) hours as needed for Wheezing. Rescue    APPLE CIDER VINEGAR ORAL    Take 1 capsule by mouth once daily.     ASPIRIN 81 MG CHEW    Take 162.5 mg by mouth once daily.     CALCIUM CARBONATE 650 MG CALCIUM (1,625 MG) TABLET    Take 1 tablet by mouth once daily.    CHLORHEXIDINE (HIBICLENS) 4 % EXTERNAL LIQUID    Hibiclens 4 % topical liquid   Apply 1 application twice a day by topical route as directed for 90 days.    CLOPIDOGREL (PLAVIX) 75 MG TABLET    Take 1 tablet (75 mg total) by mouth once daily.    CYANOCOBALAMIN 2000 MCG TABLET    Take 2,000 mcg by mouth once daily.    FOLIC ACID (FOLVITE) 1 MG TABLET    Take 1 tablet (1 mg total) by mouth once daily.    FUROSEMIDE (LASIX) 20 MG TABLET    Take 0.5 tablets (10 mg total) by mouth 2 (two) times daily.    GABAPENTIN (NEURONTIN) 100 MG CAPSULE    Take 2 capsules (200 mg total) by mouth 3 (three) times daily as needed.    GARLIC 2,000 MG CAP    Take 3,000 mg by mouth once daily.     LEVOTHYROXINE (SYNTHROID) 112 MCG TABLET    Take 1 tablet (112 mcg total) by mouth once daily.    METOPROLOL SUCCINATE (TOPROL-XL) 100 MG 24 HR TABLET    1 tab (100mg) in morning. And 100 mg at bedtime.  Generic ok    MUPIROCIN (BACTROBAN) 2 % OINTMENT    Bactroban 2 % topical ointment   Apply 1 application twice a day by topical route as directed for 30 days.    NITROGLYCERIN (NITROSTAT) 0.4 MG SL TABLET    Place 1 tablet (0.4 mg total) under the tongue every 5 (five) minutes as needed for Chest pain.    PYRIDOXINE (VITAMIN B-6) 100 MG TAB    Take 200 mg by mouth once daily.     REPATHA SURECLICK 140 MG/ML PNIJ    INJECT 140 MG  INTO THE SKIN EVERY 14 DAYS    TURMERIC, BULK, MISC    Take 1 capsule by mouth 2 (two) times daily.     USTEKINUMAB (STELARA) 90 MG/ML SYRG SYRINGE    Inject 1 mL (90 mg total) into the skin every 3 (three) months.    VALSARTAN-HYDROCHLOROTHIAZIDE (DIOVAN-HCT) 160-12.5 MG PER TABLET    Take 1 tablet by mouth once daily.    VITAMIN D 1000 UNITS TAB    Take 185 mg by mouth once daily.     Review of Systems:  Constitutional: Denies fever, chills. No recent weight changes.   Fatigue: no  Muscle weakness: no  Headaches: no new headaches  Eyes: No redness or dryness.  No recent visual changes.  ENT: Denies dry mouth. No oral or nasal ulcers.  Card: No chest pain.  Resp: No cough or sob.   Gastro: No nausea or vomiting.  No heartburn.  Constipation: no  Diarrhea: no  Genito:  No dysuria.  No genital ulcers.  Skin: per hpi   Raynauds:no  Neuro: No numbness / tingling.   Psych: No depression, anxiety  Endo:  no excess thirst.  Heme: no abnormal bleeding or bruising  Clots:none       OBJECTIVE:     Vital Signs   Vitals:    10/11/19 1254   BP: 135/65   Pulse: 60     Physical Exam:  General Appearance:  NAD.   Gait: not favoring.  HEENT: PERRL.  Eyes not dry or injected.  No nasal ulcers.  Mouth not dry, no oral lesions.  Lymph: cervical, supraclavicular or axillary nodes: none abnormal   Cardio: no irregularity of S1 or S2.  No gallops or rubs.   Resp: Normal respiratory motion. Clear to auscultation bilaterally.   No abnormal chest conformation.  Abd: Soft, non-tender, nondistended.  No masses.   Skin: Head and neck,  and extremities examined.   Rash along hairline   Small few mm patch on left leg   Neuro: Ox3.   Cranial nerves II-XII grossly intact.   Sensation intact  in both distal LE and upper extremities to light touch.  Musculoskeletal Exam:    Bilateral osteoarthritic changes in both hands.  Prominent DIP.  No tenderness  No synovitis    Laboratory:   Results for orders placed or performed in visit on 10/01/19   CBC  auto differential   Result Value Ref Range    WBC 6.46 3.90 - 12.70 K/uL    RBC 4.51 4.00 - 5.40 M/uL    Hemoglobin 10.7 (L) 12.0 - 16.0 g/dL    Hematocrit 37.1 37.0 - 48.5 %    Mean Corpuscular Volume 82 82 - 98 fL    Mean Corpuscular Hemoglobin 23.7 (L) 27.0 - 31.0 pg    Mean Corpuscular Hemoglobin Conc 28.8 (L) 32.0 - 36.0 g/dL    RDW 15.9 (H) 11.5 - 14.5 %    Platelets 320 150 - 350 K/uL    MPV 10.7 9.2 - 12.9 fL    Gran # (ANC) 3.9 1.8 - 7.7 K/uL    Lymph # 1.8 1.0 - 4.8 K/uL    Mono # 0.5 0.3 - 1.0 K/uL    Eos # 0.1 0.0 - 0.5 K/uL    Baso # 0.06 0.00 - 0.20 K/uL    nRBC 0 0 /100 WBC    Gran% 60.3 38.0 - 73.0 %    Lymph% 28.5 18.0 - 48.0 %    Mono% 8.4 4.0 - 15.0 %    Eosinophil% 1.7 0.0 - 8.0 %    Basophil% 0.9 0.0 - 1.9 %    Differential Method Automated    Comprehensive metabolic panel   Result Value Ref Range    Sodium 141 136 - 145 mmol/L    Potassium 3.9 3.5 - 5.1 mmol/L    Chloride 105 95 - 110 mmol/L    CO2 26 23 - 29 mmol/L    Glucose 97 70 - 110 mg/dL    BUN, Bld 24 (H) 8 - 23 mg/dL    Creatinine 1.1 0.5 - 1.4 mg/dL    Calcium 9.7 8.7 - 10.5 mg/dL    Total Protein 7.1 6.0 - 8.4 g/dL    Albumin 3.6 3.5 - 5.2 g/dL    Total Bilirubin 0.5 0.1 - 1.0 mg/dL    Alkaline Phosphatase 43 (L) 55 - 135 U/L    AST 23 10 - 40 U/L    ALT 14 10 - 44 U/L    Anion Gap 10 8 - 16 mmol/L    eGFR if African American 59.6 (A) >60 mL/min/1.73 m^2    eGFR if non  51.7 (A) >60 mL/min/1.73 m^2   Sedimentation rate   Result Value Ref Range    Sed Rate 52 (H) 0 - 36 mm/Hr   C-reactive protein   Result Value Ref Range    CRP 3.2 0.0 - 8.2 mg/L   Hemoglobin A1c   Result Value Ref Range    Hemoglobin A1C 6.0 (H) 4.0 - 5.6 %    Estimated Avg Glucose 126 68 - 131 mg/dL   TSH   Result Value Ref Range    TSH 1.203 0.400 - 4.000 uIU/mL   T4, free   Result Value Ref Range    Free T4 1.10 0.71 - 1.51 ng/dL   Basic metabolic panel   Result Value Ref Range    Sodium 141 136 - 145 mmol/L    Potassium 3.9 3.5 - 5.1 mmol/L     Chloride 105 95 - 110 mmol/L    CO2 26 23 - 29 mmol/L    Glucose 97 70 - 110 mg/dL    BUN, Bld 24 (H) 8 - 23 mg/dL    Creatinine 1.1 0.5 - 1.4 mg/dL    Calcium 9.7 8.7 - 10.5 mg/dL    Anion Gap 10 8 - 16 mmol/L    eGFR if African American 59.6 (A) >60 mL/min/1.73 m^2    eGFR if non  51.7 (A) >60 mL/min/1.73 m^2   CBC auto differential   Result Value Ref Range    WBC 6.46 3.90 - 12.70 K/uL    RBC 4.51 4.00 - 5.40 M/uL    Hemoglobin 10.7 (L) 12.0 - 16.0 g/dL    Hematocrit 37.1 37.0 - 48.5 %    Mean Corpuscular Volume 82 82 - 98 fL    Mean Corpuscular Hemoglobin 23.7 (L) 27.0 - 31.0 pg    Mean Corpuscular Hemoglobin Conc 28.8 (L) 32.0 - 36.0 g/dL    RDW 15.9 (H) 11.5 - 14.5 %    Platelets 320 150 - 350 K/uL    MPV 10.7 9.2 - 12.9 fL    Gran # (ANC) 3.9 1.8 - 7.7 K/uL    Lymph # 1.8 1.0 - 4.8 K/uL    Mono # 0.5 0.3 - 1.0 K/uL    Eos # 0.1 0.0 - 0.5 K/uL    Baso # 0.06 0.00 - 0.20 K/uL    nRBC 0 0 /100 WBC    Gran% 60.3 38.0 - 73.0 %    Lymph% 28.5 18.0 - 48.0 %    Mono% 8.4 4.0 - 15.0 %    Eosinophil% 1.7 0.0 - 8.0 %    Basophil% 0.9 0.0 - 1.9 %    Differential Method Automated    Basic metabolic panel   Result Value Ref Range    Sodium 141 136 - 145 mmol/L    Potassium 3.9 3.5 - 5.1 mmol/L    Chloride 105 95 - 110 mmol/L    CO2 26 23 - 29 mmol/L    Glucose 97 70 - 110 mg/dL    BUN, Bld 24 (H) 8 - 23 mg/dL    Creatinine 1.1 0.5 - 1.4 mg/dL    Calcium 9.7 8.7 - 10.5 mg/dL    Anion Gap 10 8 - 16 mmol/L    eGFR if African American 59.6 (A) >60 mL/min/1.73 m^2    eGFR if non  51.7 (A) >60 mL/min/1.73 m^2   Iron and TIBC   Result Value Ref Range    Iron 43 30 - 160 ug/dL    Transferrin 306 200 - 375 mg/dL    TIBC 453 (H) 250 - 450 ug/dL    Saturated Iron 9 (L) 20 - 50 %   Ferritin   Result Value Ref Range    Ferritin 7 (L) 20.0 - 300.0 ng/mL   Vitamin B12   Result Value Ref Range    Vitamin B-12 674 210 - 950 pg/mL   Lipid panel   Result Value Ref Range    Cholesterol 127 120 - 199 mg/dL     Triglycerides 120 30 - 150 mg/dL    HDL 47 40 - 75 mg/dL    LDL Cholesterol 56.0 (L) 63.0 - 159.0 mg/dL    Hdl/Cholesterol Ratio 37.0 20.0 - 50.0 %    Total Cholesterol/HDL Ratio 2.7 2.0 - 5.0    Non-HDL Cholesterol 80 mg/dL     Lab Results   Component Value Date    HEPAIGM Negative 10/03/2018    HEPBIGM Negative 10/03/2018    HEPCAB Negative 10/03/2018         Imaging :reviewed x rays hands/ feet     Notes reviewed  Other procedures:    ASSESSMENT/PLAN:     Psoriasis    Psoriatic arthritis    Stage 3 chronic kidney disease    Polyneuropathy      67 yr old    1: Pso/ PsA  Stable disease   Started Stelara 3/2016 with > 90% improvement of her skin.  C/w Stelara 90mg every 12 weeks (she gets it done here) as she cannot do it herself with her arthritis  Vaccinations declined   Continue with gabapentin 200 mg 2 to 3 times a day as needed    2: Osteopenia with DEXA 6/7/17 with osteopenia with FRAX 14.1/1.7, decreasing   BMD at hip     Received last reclast in September 2017 ,But she did not tolerate with pain   She also experienced lot of confusion after IV Reclast and she declines any further medications for the treatment of osteoporosis  Prior GI intolerance to fosamax and boniva   cont vit d supp -OTC  Today d/w her prolia  She declined again     3: CKD stage 3 :  She does not  Take any NSAIDS   She only uses tylenol as needed     stellara :  Infection, malignancy risk , other side effects discussed.  Hold prior to any surgery or during periods of infection.    3 month f/u with all 4 lab  Went over uptodate information /literature on the meds prescribed today     Disclaimer: This note was prepared using voice recognition system and is likely to have sound alike errors and is not proof read.  Please call me with any questions.

## 2019-10-17 ENCOUNTER — OFFICE VISIT (OUTPATIENT)
Dept: URGENT CARE | Facility: CLINIC | Age: 69
End: 2019-10-17
Payer: MEDICARE

## 2019-10-17 ENCOUNTER — TELEPHONE (OUTPATIENT)
Dept: FAMILY MEDICINE | Facility: CLINIC | Age: 69
End: 2019-10-17

## 2019-10-17 VITALS
DIASTOLIC BLOOD PRESSURE: 67 MMHG | OXYGEN SATURATION: 97 % | HEIGHT: 63 IN | SYSTOLIC BLOOD PRESSURE: 152 MMHG | TEMPERATURE: 98 F | BODY MASS INDEX: 44.47 KG/M2 | WEIGHT: 251 LBS | HEART RATE: 72 BPM

## 2019-10-17 DIAGNOSIS — J44.1 COPD WITH ACUTE EXACERBATION: ICD-10-CM

## 2019-10-17 DIAGNOSIS — R05.9 COUGH: Primary | ICD-10-CM

## 2019-10-17 PROCEDURE — 99999 PR PBB SHADOW E&M-EST. PATIENT-LVL III: CPT | Mod: PBBFAC,,, | Performed by: NURSE PRACTITIONER

## 2019-10-17 PROCEDURE — 99999 PR PBB SHADOW E&M-EST. PATIENT-LVL III: ICD-10-PCS | Mod: PBBFAC,,, | Performed by: NURSE PRACTITIONER

## 2019-10-17 PROCEDURE — 99213 OFFICE O/P EST LOW 20 MIN: CPT | Mod: PBBFAC,PO | Performed by: NURSE PRACTITIONER

## 2019-10-17 PROCEDURE — 99214 OFFICE O/P EST MOD 30 MIN: CPT | Mod: S$PBB,,, | Performed by: NURSE PRACTITIONER

## 2019-10-17 PROCEDURE — 99214 PR OFFICE/OUTPT VISIT, EST, LEVL IV, 30-39 MIN: ICD-10-PCS | Mod: S$PBB,,, | Performed by: NURSE PRACTITIONER

## 2019-10-17 RX ORDER — ALBUTEROL SULFATE 0.83 MG/ML
2.5 SOLUTION RESPIRATORY (INHALATION) EVERY 6 HOURS PRN
Qty: 50 EACH | Refills: 0 | OUTPATIENT
Start: 2019-10-17

## 2019-10-17 RX ORDER — ALBUTEROL SULFATE 0.83 MG/ML
2.5 SOLUTION RESPIRATORY (INHALATION) EVERY 6 HOURS PRN
Qty: 25 EACH | Refills: 0 | Status: SHIPPED | OUTPATIENT
Start: 2019-10-17 | End: 2020-05-19 | Stop reason: SDUPTHER

## 2019-10-17 RX ORDER — BENZONATATE 200 MG/1
200 CAPSULE ORAL 3 TIMES DAILY PRN
Qty: 30 CAPSULE | Refills: 0 | Status: SHIPPED | OUTPATIENT
Start: 2019-10-17 | End: 2019-10-27

## 2019-10-17 NOTE — PROGRESS NOTES
"Subjective:       Patient ID: Qi Ortiz is a 69 y.o. female.    Vitals:  height is 5' 3" (1.6 m) and weight is 113.9 kg (251 lb). Her tympanic temperature is 98 °F (36.7 °C). Her blood pressure is 152/67 (abnormal) and her pulse is 72. Her oxygen saturation is 97%.     Chief Complaint: Cough    Cough   This is a new problem. The current episode started in the past 7 days. The problem has been gradually worsening. The problem occurs every few hours. The cough is non-productive. Associated symptoms include wheezing. Nothing aggravates the symptoms. Treatments tried: mucinex, tessalon pearles, 2 breathing treatments. The treatment provided moderate (but ran out) relief. Her past medical history is significant for COPD.       Respiratory: Positive for cough and wheezing. Negative for chest tightness and sputum production.        Objective:      Physical Exam   Constitutional: She is oriented to person, place, and time. She appears well-developed and well-nourished. She is cooperative. No distress.   HENT:   Head: Normocephalic.   Eyes: Conjunctivae are normal. Right eye exhibits no discharge. Left eye exhibits no discharge.   Neck: Normal range of motion and full passive range of motion without pain. Neck supple. No tracheal tenderness present.   Cardiovascular: Normal rate.   Pulmonary/Chest: Effort normal and breath sounds normal. No accessory muscle usage. No tachypnea and no bradypnea. No respiratory distress. She has no wheezes. She exhibits no tenderness, no bony tenderness, no crepitus and no swelling.   Musculoskeletal: Normal range of motion.   Neurological: She is alert and oriented to person, place, and time.   Skin: Skin is warm, intact, not diaphoretic, not pale and no rash. Capillary refill takes less than 2 seconds.   Nursing note and vitals reviewed.        Assessment:       1. Cough    2. COPD with acute exacerbation        Plan:         Cough  -     benzonatate (TESSALON) 200 MG capsule; Take 1 " capsule (200 mg total) by mouth 3 (three) times daily as needed.  Dispense: 30 capsule; Refill: 0    COPD with acute exacerbation  -     albuterol (PROVENTIL) 2.5 mg /3 mL (0.083 %) nebulizer solution; Take 3 mLs (2.5 mg total) by nebulization every 6 (six) hours as needed for Wheezing. Rescue  Dispense: 25 each; Refill: 0  -     benzonatate (TESSALON) 200 MG capsule; Take 1 capsule (200 mg total) by mouth 3 (three) times daily as needed.  Dispense: 30 capsule; Refill: 0         Follow prescribed treatment plan as directed.  Stay hydrated and rest.  Report to ER if symptoms worsen.  Patient reports was sent to  to get medication refills  Follow up with PCP/Pulmonary in 2-3 days or sooner if symptoms do not improve or go to nearest ER

## 2019-10-17 NOTE — PATIENT INSTRUCTIONS
Cough, Chronic, Uncertain Cause (Adult)    Everyone has had a cough as part of the common cold, flu, or bronchitis. This kind of cough occurs along with an achy feeling, low-grade fever, nasal and sinus congestion, and a scratchy or sore throat. This usually gets better in 2 to 3 weeks. A cough that lasts longer than 3 weeks may be due to other causes.  If your cough does not improve over the next 2 weeks, further testing may be needed. Follow up with your healthcare provider as advised. Cough suppressants may be recommended. Based on your exam today, the exact cause of your cough is not certain. Below are some common causes for persistent cough.  Smokers cough  Smokers cough doesnt go away. If you continue to smoke, it only gets worse. The cough is from irritation in the air passages. Talk to your healthcare provider about quitting. Medicines or nicotine-replacement products, like gum or the patch, may make quitting easier.  Postnasal drip  A cough that is worse at night may be due to postnasal drip. Excess mucus in the nose drains from the back of your nose to your throat. This triggers the cough reflex. Postnasal drip may be due to a sinus infection or allergy. Common allergens include dust, tobacco smoke (both inhaled and secondhand smoke), environmental pollutants, pollen, mold, pets, cleaning agents, room deodorizers, and chemical fumes. Over-the-counter antihistamines or decongestants may be helpful for allergies. A sinus infection may requires antibiotic treatment. See your healthcare provider if symptoms continue.  Medicines  Certain prescribed medicines can cause a chronic cough in some people:  · ACE inhibitors for high blood pressure. These include benazepril, captopril, enalapril, fosinopril, lisinopril, quinapril, ramipril, and others.  · Beta-blockers for high blood pressure and other conditions. These include propranolol, atenolol, metoprolol, nadolol, and others.  Let your healthcare provider  know if you are taking any of these.  Asthma  Cough may be the only sign of mild asthma. You may have tests to find out if asthma is causing your cough. You may also take asthma medicine on a trial basis.  Acid reflux (heartburn, GERD)  The esophagus is the tube that carries food from the mouth to the stomach. A valve at its lower end prevents stomach acids from flowing upward. If this valve does not work properly, acid from the stomach enters the esophagus. This may cause a burning pain in the upper abdomen or lower chest, belching, or cough. Symptoms are often worse when lying flat. Avoid eating or drinking before bedtime. Try using extra pillows to raise your upper body, or place 4-inch blocks under the head of your bed. You may try an over-the-counter antacid or an acid-blocking medicine such as famotidine, cimetidine, ranitidine, esomeprazole, lansoprazole, or omeprazole. Stronger medicines for this condition can be prescribed by your healthcare provider.  Follow-up care  Follow up with your healthcare provider, or as advised, if your cough does not improve. Further testing may be needed.  Note: If an X-ray was taken, a specialist will review it. You will be notified of any new findings that may affect your care.  When to seek medical advice  Call your healthcare provider right away if any of these occur:  · Mild wheezing or difficulty breathing  · Fever of 100.4ºF (38ºC) or higher, or as directed by your healthcare provider  · Unexpected weight loss  · Coughing up large amounts of colored sputum  · Night sweats (sheets and pajamas get soaking wet)  Call 911, or get immediate medical care  Contact emergency services right away if any of these occur:  · Coughing up blood  · Moderate to severe trouble breathing or wheezing  Date Last Reviewed: 9/13/2015  © 4773-8290 [employer]. [address] [place] [address], [place] [address]. All rights reserved. This information is not intended as a substitute for  professional medical care. Always follow your healthcare professional's instructions.          COPD Flare    You have had a flare-up of your COPD.  COPD, or chronic obstructive pulmonary disease, is a common lung disease. It causes your airways to become irritated and narrower. This makes it harder for you to breathe. Emphysema and chronic bronchitis are both types of COPD. This is a chronic condition, which means you always have it. Sometimes it gets worse. When this happens, it is called a flare-up.  Symptoms of COPD  People with COPD may have symptoms most of the time. In a flare-up, your symptoms get worse. These symptoms may mean you are having a flare-up:  · Shortness of breath, shallow or rapid breathing, or wheezing that gets worse  · Lung infection  · Cough that gets worse  · More mucus, thicker mucus or mucus of a different color  · Tiredness, decreased energy, or trouble doing your usual activities  · Fever  · Chest tightness  · Your symptoms dont get better even when you use your usual medicines, inhalers, and nebulizer  · Trouble talking  · You feel confused  Causes of flare-ups  Unfortunately, a flare-up can happen even though you did everything right, and you followed your doctors instructions. Some causes of flare-ups are:  · Smoking or secondhand smoke  · Colds, the flu, or respiratory infections  · Air pollution  · Sudden change in the weather  · Dust, irritating chemicals, or strong fumes  · Not taking your medicines as prescribed  Home care  Here are some things you can do at home to treat a flare-up:  · Try not to panic. This makes it harder to breathe, and keeps you from doing the right things.  · Dont smoke or be around others who are smoking.  · Try to drink more fluids than usual during a flare-up, unless your doctor has told you not to because of heart and kidney problems. More fluids can help loosen the mucus.  · Use your inhalers and nebulizer, if you have one, as you have been told  to.  · If you were given antibiotics, take them until they are used up or your doctor tells you to stop. Its important to finish the antibiotics, even though you feel better. This will make sure the infection has cleared.  · If you were given prednisone or another steroid, finish it even if you feel better.  Preventing a flare-up  Even though flare-ups happen, the best way to treat one is to prevent it before it starts. Here are some pointers:  · Dont smoke or be around others who are smoking.  · Take your medicines as you have been told.  · Talk with your doctor about getting a flu shot every year. Also find out if you need a pneumonia shot.  · If there is a weather advisory warning to stay indoors, try to stay inside when possible.  · Try to eat healthy and get plenty of sleep.  · Try to avoid things that usually set you off, like dust, chemical fumes, hairsprays, or strong perfumes.  Follow-up care  Follow up with your healthcare provider, or as advised.  If a culture was done, you will be told if your treatment needs to be changed. You can call as directed for the results.  If X-rays were done, you will be notified of any new findings that may affect your care.  Call 911  Call 911 if any of these occur:  · You have trouble breathing  · You feel confused or its difficult to wake you up  · You faint or lose consciousness  · You have a rapid heart rate  · You have new pain in your chest, arm, shoulder, neck or upper back  When to seek medical advice  Call your healthcare provider right away if any of these occur:  · Wheezing or shortness of breath gets worse  · You need to use your inhalers more often than usual without relief  · Fever of 100.4°F (38ºC) or higher, or as directed by your healthcare provider  · Coughing up lots of dark-colored or bloody mucus (sputum)  · Chest pain with each breath  · You do not start to get better within 24 hours  · Swelling of your ankles gets worse  · Dizziness or  weakness  Date Last Reviewed: 9/1/2016  © 9138-0618 [employer]. [address] [place] [address], [place] [address]. All rights reserved. This information is not intended as a substitute for professional medical care. Always follow your healthcare professional's instructions.        Treatment for COPD    Your healthcare provider will prescribe the best treatments for your COPD.  Treatment  Recommendations include the following:  · Medicines. Some medicines help relieve symptoms when you have them. Others are taken daily to control inflammation in the lungs. Always take your medicines as prescribed. Learn the names of your medicines, as well as how and when to use them.  · Oxygen therapy. Oxygen may be prescribed if tests show that your blood contains too little oxygen.  · Smoking. If you smoke, quit. Smoking is the main cause of COPD. Quitting will help you be able to better manage your COPD. Ask your healthcare provider about ways to help you quit smoking.  · Avoiding infections. Infections, like a cold or the flu, can cause your symptoms to worsen. Try to stay away from people who are sick. Wash your hands often. And, ask your healthcare provider about vaccines for the flu and pneumonia.  Coping with shortness of breath  Coping tips include the following:  · Exercise. Try to be as active as possible. This will improve energy levels and strengthen your muscles, so you can do more.  · Breathing techniques. Ask your healthcare provider or nurse to show you how to do pursed-lip breathing.  · Balance rest and activity. Each day, try to balance rest periods with activity. For example, you might start the day with getting dressed and eating breakfast, then relax and read the paper. After that, take a brief walk. And then sit with your feet up for a while.  · Pulmonary rehabilitation. Ask your provider, or call your local hospital to find out about pulmonary rehab programs. The programs help with managing your  disease, breathing techniques, exercise, support and counseling.  · Healthy eating. Eating a healthy, balanced diet and making an effort to maintain your ideal weight are important to staying as healthy as possible. Make sure you have a lot of fruit and vegetables every day, as well as balanced portions of whole grains, lean meats and fish, and low-fat dairy products.  Date Last Reviewed: 5/1/2016  © 2915-3694 [employer]. [address] [place] [address], [place] [address]. All rights reserved. This information is not intended as a substitute for professional medical care. Always follow your healthcare professional's instructions.

## 2019-10-17 NOTE — TELEPHONE ENCOUNTER
----- Message from Mily Coley sent at 10/17/2019  8:55 AM CDT -----  Contact: Pt  Type:  RX Refill Request    Who Called: PT  Refill or New Rx:NEW  RX Name and Strength:  albuterol (PROVENTIL) 2.5 mg /3 mL (0.083 %) nebulizer solution  How is the patient currently taking it? (ex. 1XDay):  Is this a 30 day or 90 day RX:  Preferred Pharmacy with phone number:  [facility] [id number] [place], [place] - [address]  [address]  [place] [address]  Phone: [phone] Fax: [phone]  Local or Mail Order:LOCAL  Ordering Provider:  Would the patient rather a call back or a response via [facility]? CALL BACK  Best Call Back Number:[phone]  Additional Information:

## 2019-10-18 ENCOUNTER — TELEPHONE (OUTPATIENT)
Dept: URGENT CARE | Facility: CLINIC | Age: 69
End: 2019-10-18

## 2019-10-18 NOTE — TELEPHONE ENCOUNTER
Spoke with pt stated that she wanted to speak with dr cisse [place] nurse. Informed pt that  i'm sorry for the miss understanding. Due to the message being sent to urgent care. Pt is very upset. Pt stated that she did not receive enough medication only for ten days. Informed pt that the provider did give her the medication she requested. Pt stated that she needed more medication. Pt stated that she have put in complaints on other departments. For sending the wrong message. Pt stated that she did not need to speak with urgent care. Informed pt that i'm sorry for the misunderstanding but i'm here to help her. Informed pt that I can schedule her a appt with dr cisse to discuss additional medication refills. Pt stated that the provider she seen was not very helpful. Informed pt that i'm sorry but I can schedule her a appt with dr cisse and she can discuss her concern with the dr. appt schedule for 10/25/19 at 2pm. Reminder notice sent out.   YULIANA

## 2019-10-18 NOTE — TELEPHONE ENCOUNTER
----- Message from Marie Smith MA sent at 10/18/2019  2:51 PM CDT -----  Contact: Patient  Pt saw [facility], message sent to their staff.    ----- Message -----  From: Jasmin Beard  Sent: 10/18/2019   2:45 PM CDT  To: [facility] Staff    Patient would like the nurse that referred her to urgent care to call her. Ph.[phone] (home) she does not know the name of the nurse that talked to her.

## 2019-10-18 NOTE — TELEPHONE ENCOUNTER
----- Message from Marie Smith MA sent at 10/18/2019  2:51 PM CDT -----  Contact: Patient  Pt saw [facility], message sent to their staff.    ----- Message -----  From: Jasmin Beard  Sent: 10/18/2019   2:45 PM CDT  To: [facility] Staff    Patient would like the nurse that referred her to urgent care to call her. Ph.[phone] (home) she does not know the name of the nurse that talked to her.

## 2019-10-22 ENCOUNTER — TELEPHONE (OUTPATIENT)
Dept: HEMATOLOGY/ONCOLOGY | Facility: CLINIC | Age: 69
End: 2019-10-22

## 2019-10-22 NOTE — TELEPHONE ENCOUNTER
----- Message from Migdalia Crowell sent at 10/22/2019 11:31 AM CDT -----  Contact: self [phone]  Pt called to reschedule an appt she missed.      Thank you!

## 2019-10-23 ENCOUNTER — OFFICE VISIT (OUTPATIENT)
Dept: CARDIOLOGY | Facility: CLINIC | Age: 69
End: 2019-10-23
Payer: MEDICARE

## 2019-10-23 ENCOUNTER — CLINICAL SUPPORT (OUTPATIENT)
Dept: CARDIOLOGY | Facility: CLINIC | Age: 69
End: 2019-10-23
Payer: MEDICARE

## 2019-10-23 ENCOUNTER — OFFICE VISIT (OUTPATIENT)
Dept: HEMATOLOGY/ONCOLOGY | Facility: CLINIC | Age: 69
End: 2019-10-23
Payer: MEDICARE

## 2019-10-23 VITALS
OXYGEN SATURATION: 97 % | HEART RATE: 69 BPM | WEIGHT: 252 LBS | DIASTOLIC BLOOD PRESSURE: 68 MMHG | HEIGHT: 63 IN | SYSTOLIC BLOOD PRESSURE: 132 MMHG | BODY MASS INDEX: 44.65 KG/M2

## 2019-10-23 VITALS
SYSTOLIC BLOOD PRESSURE: 138 MMHG | HEIGHT: 63 IN | TEMPERATURE: 98 F | WEIGHT: 236.75 LBS | OXYGEN SATURATION: 98 % | BODY MASS INDEX: 41.95 KG/M2 | DIASTOLIC BLOOD PRESSURE: 66 MMHG | HEART RATE: 69 BPM

## 2019-10-23 DIAGNOSIS — I47.10 SVT (SUPRAVENTRICULAR TACHYCARDIA): Primary | ICD-10-CM

## 2019-10-23 DIAGNOSIS — R00.2 PALPITATIONS: ICD-10-CM

## 2019-10-23 DIAGNOSIS — Z95.1 S/P CABG (CORONARY ARTERY BYPASS GRAFT): Chronic | ICD-10-CM

## 2019-10-23 DIAGNOSIS — E78.00 PURE HYPERCHOLESTEROLEMIA: Chronic | ICD-10-CM

## 2019-10-23 DIAGNOSIS — I10 ESSENTIAL HYPERTENSION: Chronic | ICD-10-CM

## 2019-10-23 DIAGNOSIS — Z59.9 FINANCIAL DIFFICULTIES: ICD-10-CM

## 2019-10-23 DIAGNOSIS — I47.10 SVT (SUPRAVENTRICULAR TACHYCARDIA): ICD-10-CM

## 2019-10-23 DIAGNOSIS — D50.0 IRON DEFICIENCY ANEMIA DUE TO CHRONIC BLOOD LOSS: Primary | ICD-10-CM

## 2019-10-23 DIAGNOSIS — I25.118 CORONARY ARTERY DISEASE OF NATIVE ARTERY OF NATIVE HEART WITH STABLE ANGINA PECTORIS: Primary | ICD-10-CM

## 2019-10-23 PROCEDURE — 99999 PR PBB SHADOW E&M-EST. PATIENT-LVL III: CPT | Mod: PBBFAC,,, | Performed by: NUCLEAR MEDICINE

## 2019-10-23 PROCEDURE — 99214 OFFICE O/P EST MOD 30 MIN: CPT | Mod: PBBFAC,27,25 | Performed by: NURSE PRACTITIONER

## 2019-10-23 PROCEDURE — 99999 PR PBB SHADOW E&M-EST. PATIENT-LVL III: ICD-10-PCS | Mod: PBBFAC,,, | Performed by: NUCLEAR MEDICINE

## 2019-10-23 PROCEDURE — 99214 OFFICE O/P EST MOD 30 MIN: CPT | Mod: S$PBB,,, | Performed by: NURSE PRACTITIONER

## 2019-10-23 PROCEDURE — 99999 PR PBB SHADOW E&M-EST. PATIENT-LVL IV: ICD-10-PCS | Mod: PBBFAC,,, | Performed by: NURSE PRACTITIONER

## 2019-10-23 PROCEDURE — 93010 ELECTROCARDIOGRAM REPORT: CPT | Mod: S$PBB,,, | Performed by: INTERNAL MEDICINE

## 2019-10-23 PROCEDURE — 99214 PR OFFICE/OUTPT VISIT, EST, LEVL IV, 30-39 MIN: ICD-10-PCS | Mod: S$PBB,,, | Performed by: NUCLEAR MEDICINE

## 2019-10-23 PROCEDURE — 93010 EKG 12-LEAD: ICD-10-PCS | Mod: S$PBB,,, | Performed by: INTERNAL MEDICINE

## 2019-10-23 PROCEDURE — 99214 PR OFFICE/OUTPT VISIT, EST, LEVL IV, 30-39 MIN: ICD-10-PCS | Mod: S$PBB,,, | Performed by: NURSE PRACTITIONER

## 2019-10-23 PROCEDURE — 99214 OFFICE O/P EST MOD 30 MIN: CPT | Mod: S$PBB,,, | Performed by: NUCLEAR MEDICINE

## 2019-10-23 PROCEDURE — 99999 PR PBB SHADOW E&M-EST. PATIENT-LVL IV: CPT | Mod: PBBFAC,,, | Performed by: NURSE PRACTITIONER

## 2019-10-23 PROCEDURE — 93005 ELECTROCARDIOGRAM TRACING: CPT | Mod: PBBFAC | Performed by: INTERNAL MEDICINE

## 2019-10-23 PROCEDURE — 99213 OFFICE O/P EST LOW 20 MIN: CPT | Mod: PBBFAC,25 | Performed by: NUCLEAR MEDICINE

## 2019-10-23 RX ORDER — IRON,CARBONYL/ASCORBIC ACID 100-250 MG
1 TABLET ORAL DAILY
Qty: 90 TABLET | Refills: 4 | Status: SHIPPED | OUTPATIENT
Start: 2019-10-23 | End: 2020-12-09

## 2019-10-23 SDOH — SOCIAL DETERMINANTS OF HEALTH (SDOH): PROBLEM RELATED TO HOUSING AND ECONOMIC CIRCUMSTANCES, UNSPECIFIED: Z59.9

## 2019-10-23 NOTE — PROGRESS NOTES
Subjective:   Patient ID:  Qi Ortiz is a 69 y.o. female who presents for follow-up of Follow-up (6 mo); Chest Pain; Dizziness; Shortness of Breath; Coronary Artery Disease (CABG); and Palpitations (psvt)      HPI COMPLAINING OF RECURRENT PALPITATIONS, SHORT DURATION, ASSOCIATED WITH LIGHTHEADEDNESS- NO NEAR SYNCOPE OR SYNCOPE  NO RECURRENT ANGINA. OR EQUIVALENT  NO EXACERBATION OR WORSENING OF BROOKS  NO ORTHOPNEA OR PND  NO ABDOMINAL DISCOMFORT  NO FOCAL CNS SYMPTOMS OR SIGNS TO SUGGEST TIA OR STROKE  NO EDEMA. NO CALVE TENDERNESS  CARD MED GOOD COMPLIANCE  ECG TODAY- SR, 67 BPM, NO ACUTE ST T CHANGES    Review of Systems   Constitution: Negative for chills, fever, night sweats, weight gain and weight loss.   HENT: Negative for nosebleeds.    Eyes: Negative for blurred vision, double vision and visual disturbance.   Cardiovascular: Positive for palpitations. Negative for chest pain, dyspnea on exertion, irregular heartbeat, leg swelling, orthopnea, paroxysmal nocturnal dyspnea and syncope.   Respiratory: Negative for cough, hemoptysis and wheezing.    Endocrine: Negative for polydipsia and polyuria.   Hematologic/Lymphatic: Does not bruise/bleed easily.   Skin: Negative for rash.   Musculoskeletal: Negative for joint pain, joint swelling, muscle weakness and myalgias.   Gastrointestinal: Negative for abdominal pain, hematemesis, jaundice and melena.   Genitourinary: Negative for dysuria, hematuria and nocturia.   Neurological: Positive for light-headedness. Negative for dizziness, focal weakness, headaches, sensory change and weakness.   Psychiatric/Behavioral: Negative for depression. The patient does not have insomnia and is not nervous/anxious.      Family History   Problem Relation Age of Onset    Breast cancer Maternal Grandfather     Breast cancer Paternal Aunt     Stroke Unknown     Breast cancer Sister 60    Leukemia Sister 8         as child    Lung cancer Paternal Grandfather     Heart  disease Unknown      Past Medical History:   Diagnosis Date    Carotid stenosis     19%    COPD (chronic obstructive pulmonary disease)     No meds    Coronary artery disease     CVA (cerebral vascular accident)     Dr. 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 Hope    Psoriatic arthritis     Rheumatology     Social History     Socioeconomic History    Marital status: Single     Spouse name: Not on file    Number of children: 3    Years of education: Not on file    Highest education level: Not on file   Occupational History    Occupation: Not working   Social Needs    Financial resource strain: Not on file    Food insecurity:     Worry: Not on file     Inability: Not on file    Transportation needs:     Medical: Not on file     Non-medical: Not on file   Tobacco Use    Smoking status: Never Smoker    Smokeless tobacco: Never Used   Substance and Sexual Activity    Alcohol use: No    Drug use: No    Sexual activity: Never     Partners: Male   Lifestyle    Physical activity:     Days per week: Not on file     Minutes per session: Not on file    Stress: Not on file   Relationships    Social connections:     Talks on phone: Not on file     Gets together: Not on file     Attends Sikhism service: Not on file     Active member of club or organization: Not on file     Attends meetings of clubs or organizations: Not on file     Relationship status: Not on file   Other Topics Concern    Not on file   Social History Narrative    Not on file     Current Outpatient Medications on File Prior to Visit   Medication Sig Dispense Refill    albuterol (PROVENTIL) 2.5 mg /3 mL (0.083 %) nebulizer solution Take 3 mLs (2.5 mg total) by nebulization every 6 (six) hours as needed for Wheezing. Rescue 25 each 0    aspirin 81 MG Chew Take 162.5 mg by mouth once daily.        calcium carbonate 650 mg calcium (1,625 mg) tablet Take 1 tablet by mouth once daily.      chlorhexidine (BETASEPT SURGICAL SCRUB) 4 % external liquid Apply topically daily as needed. Consider 90 day supply 473 mL 3    clopidogrel (PLAVIX) 75 mg tablet Take 1 tablet (75 mg total) by mouth once daily. 90 tablet 3    cyanocobalamin 2000 MCG tablet Take 2,000 mcg by mouth once daily.      folic acid (FOLVITE) 1 MG tablet Take 1 tablet (1 mg total) by mouth once daily. 90 tablet 1    furosemide (LASIX) 20 MG tablet Take 0.5 tablets (10 mg total) by mouth 2 (two) times daily. (Patient taking differently: Take 10 mg by mouth as needed. ) 90 tablet 3    gabapentin (NEURONTIN) 100 MG capsule Take 2 capsules (200 mg total) by mouth 3 (three) times daily as needed. 540 capsule 3    garlic 2,000 mg Cap Take 3,000 mg by mouth once daily.       levothyroxine (SYNTHROID) 112 MCG tablet Take 1 tablet (112 mcg total) by mouth once daily. 90 tablet 1    metoprolol succinate (TOPROL-XL) 100 MG 24 hr tablet 1 tab (100mg) in morning. And 100 mg at bedtime.  Generic ok (Patient taking differently: Take by mouth 2 (two) times daily. 1 tab (100mg) in morning. And 100 mg at bedtime.  Generic ok) 180 tablet 3    mupirocin (BACTROBAN) 2 % ointment Bactroban 2 % topical ointment   Apply 1 application twice a day by topical route as directed for 30 days.      nitroGLYCERIN (NITROSTAT) 0.4 MG SL tablet Place 1 tablet (0.4 mg total) under the tongue every 5 (five) minutes as needed for Chest pain. 100 tablet 3    pyridoxine (VITAMIN B-6) 100 MG Tab Take 200 mg by mouth once daily.       REPATHA SURECLICK 140 mg/mL PnIj INJECT 140 MG INTO THE SKIN EVERY 14 DAYS 6 mL 3    ustekinumab (STELARA) 90 mg/mL Syrg syringe Inject 1 mL (90 mg total) into the skin every 3 (three) months. 1 Syringe 3    valsartan-hydrochlorothiazide (DIOVAN-HCT) 160-12.5 mg per tablet Take 1 tablet by mouth once daily. 90 tablet 3    vitamin D 1000 units Tab  Take 185 mg by mouth once daily.      APPLE CIDER VINEGAR ORAL Take 1 capsule by mouth once daily.       benzonatate (TESSALON) 200 MG capsule Take 1 capsule (200 mg total) by mouth 3 (three) times daily as needed. (Patient not taking: Reported on 10/23/2019) 30 capsule 0    TURMERIC, BULK, MISC Take 1 capsule by mouth 2 (two) times daily.        No current facility-administered medications on file prior to visit.      Review of patient's allergies indicates:   Allergen Reactions    Celexa [citalopram] Anaphylaxis    Clindamycin Itching and Swelling    Codeine Shortness Of Breath, Itching and Swelling    Crestor [rosuvastatin] Anaphylaxis    Cytotec [misoprostol] Anaphylaxis and Other (See Comments)     Difficulty breathing    Lisinopril Anaphylaxis    Magnesium citrate Shortness Of Breath    Stadol [butorphanol tartrate] Anaphylaxis     Coded    Vicodin [hydrocodone-acetaminophen] Shortness Of Breath    Adhesive      EKG Electrodes    Aggrenox [aspirin-dipyridamole] Other (See Comments)     headaches    Avelox [moxifloxacin]      PATIENT STATES DO NOT GIVE UNDER ANY CIRCUSTANCES    Demerol [meperidine]     Isosorbide Other (See Comments)     Severe headache    Kenalog [triamcinolone acetonide]     Medrol [methylprednisolone] Other (See Comments)     unknown    Mobic [meloxicam]     Morphine     Nitroglycerin      Long acting    Pholcodine     Prednisone      GASTRIC PAIN    Ranexa [ranolazine] Nausea And Vomiting    Reclast [zoledronic acid-mannitol-water] Other (See Comments)     Bones hurt    Sulfa (sulfonamide antibiotics) Other (See Comments)     unknown    Talwin [pentazocine lactate]     Tetracycline     Tetracyclines     Tilade [nedocromil]     Zetia [ezetimibe]        Objective:     Physical Exam   Constitutional: She is oriented to person, place, and time. She appears well-developed. No distress.   HENT:   Head: Normocephalic.   Eyes: Pupils are equal, round, and reactive to  light. Conjunctivae are normal. No scleral icterus.   Neck: Normal range of motion. Neck supple. Normal carotid pulses, no hepatojugular reflux and no JVD present. Carotid bruit is not present. No edema present. No thyroid mass and no thyromegaly present.   Cardiovascular: Normal rate, regular rhythm, S1 normal, S2 normal and intact distal pulses. PMI is not displaced. Exam reveals no gallop and no friction rub.   Murmur heard.   Medium-pitched harsh crescendo-decrescendo midsystolic murmur is present with a grade of 3/6 at the upper right sternal border, upper left sternal border and lower left sternal border. The intensity increases with respiration.  Pulses:       Carotid pulses are 2+ on the right side, and 2+ on the left side.       Radial pulses are 2+ on the right side, and 2+ on the left side.        Femoral pulses are 2+ on the right side, and 2+ on the left side.       Popliteal pulses are 2+ on the right side, and 2+ on the left side.        Dorsalis pedis pulses are 2+ on the right side, and 2+ on the left side.        Posterior tibial pulses are 2+ on the right side, and 2+ on the left side.   Pulmonary/Chest: Effort normal. She has no wheezes. She has rhonchi in the right middle field, the left upper field and the left middle field. She has no rales. She exhibits no tenderness.   Abdominal: Soft. Bowel sounds are normal. She exhibits no pulsatile midline mass and no mass. There is no hepatosplenomegaly. There is no tenderness.   Musculoskeletal: Normal range of motion. She exhibits no edema or tenderness.        Cervical back: Normal.        Thoracic back: Normal.        Lumbar back: Normal.   Lymphadenopathy:     She has no cervical adenopathy.     She has no axillary adenopathy.        Right: No supraclavicular adenopathy present.        Left: No supraclavicular adenopathy present.   Neurological: She is alert and oriented to person, place, and time. She has normal strength and normal reflexes. No  sensory deficit. Gait normal.   Skin: Skin is warm. No rash noted. No cyanosis. No pallor. Nails show no clubbing.   Psychiatric: She has a normal mood and affect. Her speech is normal and behavior is normal. Cognition and memory are normal.       Assessment:   1- CHRONIC OBSTRUCTIVE CAG  2- OLD CABG  3- ESSENTIAL HYPERTENSION  4- DYSLIPIDEMIA  5- PALPITATIONS      Plan:     1- STABLE CV STATUS-  NO EVIDENCE OF ACTIVE MYOCARDIAL ISCHEMIA  NO ARRHYTHMIAS  NO ADHF  2- BP WELL CONTROLLED  3- CNS STATUS STABLE  4- CARD ARRHYTHMIAS CONTROLLED    CONTINUE PRESENT CARD MANAGEMENT  RETURN IN 6 MONTHS

## 2019-10-23 NOTE — PROGRESS NOTES
Subjective:       Patient ID: Qi Ortiz is a 69 y.o. female.    Chief Complaint: Anemia, lab work follow up.    HPI: 68 y.o female with CKD III, HLD, HTN, CAD, arthritis, Iron deficiency. Patient was asked to see GI previously for an EGD/Colonoscopy however, she declined    Today's Visit:  Patient reports recent abnormal heart rhythm, being managed per Cardiology. Reports intermittent dizziness, fatigue. I had a detailed discussion with the patient in regards to her current clinical situation. Discussed with patient her declining iron levels and declining hemoglobin. I have recommended proceeding with upper and lower endoscopies. Patient reports that she may be agreeable to proceeding with colonoscopy but would like visit with GI first  Social History     Socioeconomic History    Marital status: Single     Spouse name: Not on file    Number of children: 3    Years of education: Not on file    Highest education level: Not on file   Occupational History    Occupation: Not working   Social Needs    Financial resource strain: Not on file    Food insecurity:     Worry: Not on file     Inability: Not on file    Transportation needs:     Medical: Not on file     Non-medical: Not on file   Tobacco Use    Smoking status: Never Smoker    Smokeless tobacco: Never Used   Substance and Sexual Activity    Alcohol use: No    Drug use: No    Sexual activity: Never     Partners: Male   Lifestyle    Physical activity:     Days per week: Not on file     Minutes per session: Not on file    Stress: Not on file   Relationships    Social connections:     Talks on phone: Not on file     Gets together: Not on file     Attends Church service: Not on file     Active member of club or organization: Not on file     Attends meetings of clubs or organizations: Not on file     Relationship status: Not on file   Other Topics Concern    Not on file   Social History Narrative    Not on file       Past Medical History:    Diagnosis Date    Carotid stenosis     19%    COPD (chronic obstructive pulmonary disease)     No meds    Coronary artery disease     CVA (cerebral vascular accident)     Dr. 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 Hope    Psoriatic arthritis     Rheumatology       Family History   Problem Relation Age of Onset    Breast cancer Maternal Grandfather     Breast cancer Paternal Aunt     Stroke Unknown     Breast cancer Sister 60    Leukemia Sister 8         as child    Lung cancer Paternal Grandfather     Heart disease Unknown        Past Surgical History:   Procedure Laterality Date    BREAST BIOPSY      R sided/benign    CARDIAC SURGERY      2016    CERVICAL FUSION      CHOLECYSTECTOMY      CORONARY ANGIOPLASTY      CORONARY ARTERY BYPASS GRAFT      triple bypass    CORONARY STENT PLACEMENT      EYE SURGERY      INTRAUTERINE DEVICE INSERTION      mass removed from R groin      TOTAL ABDOMINAL HYSTERECTOMY W/ BILATERAL SALPINGOOPHORECTOMY      due to benign mass, adhesions    TUBAL LIGATION         Review of Systems   Constitutional: Positive for fatigue. Negative for activity change, appetite change, chills, diaphoresis, fever and unexpected weight change.   HENT: Negative for congestion, nosebleeds, sore throat, trouble swallowing and voice change.    Eyes: Negative for photophobia, pain and visual disturbance.   Respiratory: Negative for cough, choking, chest tightness, shortness of breath, wheezing and stridor.    Cardiovascular: Negative for chest pain, palpitations and leg swelling.   Gastrointestinal: Negative for abdominal distention, abdominal pain, anal bleeding, blood in stool, constipation, diarrhea, nausea, rectal pain and vomiting.   Genitourinary: Negative for difficulty urinating, dysuria and hematuria.   Musculoskeletal:  Negative for arthralgias, back pain and gait problem.   Skin: Negative for rash and wound.   Neurological: Positive for dizziness (intermittent ). Negative for facial asymmetry, weakness, light-headedness, numbness and headaches.   Hematological: Negative for adenopathy. Does not bruise/bleed easily.   Psychiatric/Behavioral: The patient is not nervous/anxious.          Medication List with Changes/Refills   New Medications    IRON-VITAMIN C 100-250 MG, ICAR-C, (ICAR-C) 100-250 MG TAB    Take 1 tablet by mouth once daily.   Current Medications    ALBUTEROL (PROVENTIL) 2.5 MG /3 ML (0.083 %) NEBULIZER SOLUTION    Take 3 mLs (2.5 mg total) by nebulization every 6 (six) hours as needed for Wheezing. Rescue    APPLE CIDER VINEGAR ORAL    Take 1 capsule by mouth once daily.     ASPIRIN 81 MG CHEW    Take 162.5 mg by mouth once daily.     BENZONATATE (TESSALON) 200 MG CAPSULE    Take 1 capsule (200 mg total) by mouth 3 (three) times daily as needed.    CALCIUM CARBONATE 650 MG CALCIUM (1,625 MG) TABLET    Take 1 tablet by mouth once daily.    CHLORHEXIDINE (BETASEPT SURGICAL SCRUB) 4 % EXTERNAL LIQUID    Apply topically daily as needed. Consider 90 day supply    CLOPIDOGREL (PLAVIX) 75 MG TABLET    Take 1 tablet (75 mg total) by mouth once daily.    CYANOCOBALAMIN 2000 MCG TABLET    Take 2,000 mcg by mouth once daily.    FOLIC ACID (FOLVITE) 1 MG TABLET    Take 1 tablet (1 mg total) by mouth once daily.    FUROSEMIDE (LASIX) 20 MG TABLET    Take 0.5 tablets (10 mg total) by mouth 2 (two) times daily.    GABAPENTIN (NEURONTIN) 100 MG CAPSULE    Take 2 capsules (200 mg total) by mouth 3 (three) times daily as needed.    GARLIC 2,000 MG CAP    Take 3,000 mg by mouth once daily.     LEVOTHYROXINE (SYNTHROID) 112 MCG TABLET    Take 1 tablet (112 mcg total) by mouth once daily.    METOPROLOL SUCCINATE (TOPROL-XL) 100 MG 24 HR TABLET    1 tab (100mg) in morning. And 100 mg at bedtime.  Generic ok    MUPIROCIN (BACTROBAN) 2 %  OINTMENT    Bactroban 2 % topical ointment   Apply 1 application twice a day by topical route as directed for 30 days.    NITROGLYCERIN (NITROSTAT) 0.4 MG SL TABLET    Place 1 tablet (0.4 mg total) under the tongue every 5 (five) minutes as needed for Chest pain.    PYRIDOXINE (VITAMIN B-6) 100 MG TAB    Take 200 mg by mouth once daily.     REPATHA SURECLICK 140 MG/ML PNIJ    INJECT 140 MG INTO THE SKIN EVERY 14 DAYS    TURMERIC, BULK, MISC    Take 1 capsule by mouth 2 (two) times daily.     USTEKINUMAB (STELARA) 90 MG/ML SYRG SYRINGE    Inject 1 mL (90 mg total) into the skin every 3 (three) months.    VALSARTAN-HYDROCHLOROTHIAZIDE (DIOVAN-HCT) 160-12.5 MG PER TABLET    Take 1 tablet by mouth once daily.    VITAMIN D 1000 UNITS TAB    Take 185 mg by mouth once daily.     Objective:     Vitals:    10/23/19 1415   BP: 138/66   Pulse: 69   Temp: 97.8 °F (36.6 °C)     Lab Results   Component Value Date    WBC 6.46 10/01/2019    WBC 6.46 10/01/2019    HGB 10.7 (L) 10/01/2019    HGB 10.7 (L) 10/01/2019    HCT 37.1 10/01/2019    HCT 37.1 10/01/2019    MCV 82 10/01/2019    MCV 82 10/01/2019     10/01/2019     10/01/2019     BMP  Lab Results   Component Value Date     10/01/2019     10/01/2019     10/01/2019    K 3.9 10/01/2019    K 3.9 10/01/2019    K 3.9 10/01/2019     10/01/2019     10/01/2019     10/01/2019    CO2 26 10/01/2019    CO2 26 10/01/2019    CO2 26 10/01/2019    BUN 24 (H) 10/01/2019    BUN 24 (H) 10/01/2019    BUN 24 (H) 10/01/2019    CREATININE 1.1 10/01/2019    CREATININE 1.1 10/01/2019    CREATININE 1.1 10/01/2019    CALCIUM 9.7 10/01/2019    CALCIUM 9.7 10/01/2019    CALCIUM 9.7 10/01/2019    ANIONGAP 10 10/01/2019    ANIONGAP 10 10/01/2019    ANIONGAP 10 10/01/2019    ESTGFRAFRICA 59.6 (A) 10/01/2019    ESTGFRAFRICA 59.6 (A) 10/01/2019    ESTGFRAFRICA 59.6 (A) 10/01/2019    EGFRNONAA 51.7 (A) 10/01/2019    EGFRNONAA 51.7 (A) 10/01/2019    EGFRNONAA 51.7  (A) 10/01/2019     Lab Results   Component Value Date    ALT 14 10/01/2019    AST 23 10/01/2019    ALKPHOS 43 (L) 10/01/2019    BILITOT 0.5 10/01/2019     Lab Results   Component Value Date    IRON 43 10/01/2019    TIBC 453 (H) 10/01/2019    FERRITIN 7 (L) 10/01/2019         Physical Exam   Constitutional: She is oriented to person, place, and time. Vital signs are normal. She appears well-developed and well-nourished.   HENT:   Head: Normocephalic.   Right Ear: Hearing and external ear normal.   Left Ear: Hearing and external ear normal.   Nose: Nose normal.   Mouth/Throat: Oropharynx is clear and moist.   Eyes: Conjunctivae, EOM and lids are normal. Right eye exhibits no discharge. Left eye exhibits no discharge.   Neck: Normal range of motion. No thyroid mass present.   Cardiovascular: Normal rate and regular rhythm.   Murmur heard.   Systolic murmur is present with a grade of 2/6.  Pulmonary/Chest: Effort normal and breath sounds normal. No respiratory distress. She has no wheezes. She has no rhonchi. She has no rales. She exhibits no tenderness.   Abdominal: Soft. Bowel sounds are normal. She exhibits no distension. There is no tenderness.   Musculoskeletal: Normal range of motion. She exhibits no edema.   Lymphadenopathy:        Head (right side): No submandibular, no preauricular and no posterior auricular adenopathy present.        Head (left side): No submandibular, no preauricular and no posterior auricular adenopathy present.        Right cervical: No superficial cervical adenopathy present.       Left cervical: No superficial cervical adenopathy present.   Neurological: She is alert and oriented to person, place, and time.   Skin: Skin is warm, dry and intact.   Psychiatric: Her speech is normal and behavior is normal. Thought content normal. Her mood appears anxious.   Vitals reviewed.       Assessment:     Problem List Items Addressed This Visit        Oncology    Iron deficiency anemia due to chronic  blood loss - Primary     Iron levels continue to decline. Hemoglobin declining. Patient continues to decline IV iron. Agreeable to try ICAR-C daily. Discussed constipation prevention. Patient knows to contact office if she is unable to tolerate oral iron supplementation    Recommend EGD/Colonoscopy. Patient would like GI consult to discuss prior to scheduling endoscopies. GI consult placed. Appointment scheduled per nurse    F/U 6-8 weeks with repeat labs. She knows to call sooner if questions, concerns, or anemia symptoms         Relevant Medications    iron-vitamin C 100-250 mg, ICAR-C, (ICAR-C) 100-250 mg Tab    Other Relevant Orders    Ambulatory consult to Gastroenterology    CBC auto differential    Iron and TIBC    Ferritin    Basic metabolic panel      Other Visit Diagnoses     Financial difficulties        Relevant Orders    Ambulatory consult to Social Work                I will review assessment/plan with collaborating physician Dr. Ja Alanis, KJP-C

## 2019-10-23 NOTE — ASSESSMENT & PLAN NOTE
Iron levels continue to decline. Hemoglobin declining. Patient continues to decline IV iron. Agreeable to try ICAR-C daily. Discussed constipation prevention. Patient knows to contact office if she is unable to tolerate oral iron supplementation    Recommend EGD/Colonoscopy. Patient would like GI consult to discuss prior to scheduling endoscopies. GI consult placed. Appointment scheduled per nurse    F/U 6-8 weeks with repeat labs. She knows to call sooner if questions, concerns, or anemia symptoms

## 2019-10-28 ENCOUNTER — TELEPHONE (OUTPATIENT)
Dept: HEMATOLOGY/ONCOLOGY | Facility: CLINIC | Age: 69
End: 2019-10-28

## 2019-10-28 NOTE — TELEPHONE ENCOUNTER
Pt was referred to SW by Elida BRASHER on 10/23/2019 for financial difficulties. SW explained the nature of SW phone call. Pt provided detail descriptions of challenges and events that have taken place in her life. Pt stated that she is on food stamps partially and still works to afford all of her bills. Pt stated that she has two daughters for supportive needs. SW suggested [facility]; however, pt discussed her experience with them prior to age requirement for services. SW listened and provided emotional support. SW offered SW contact information; however, pt declined.     F/u as needs arise.

## 2019-11-07 ENCOUNTER — OFFICE VISIT (OUTPATIENT)
Dept: GASTROENTEROLOGY | Facility: CLINIC | Age: 69
End: 2019-11-07
Payer: MEDICARE

## 2019-11-07 ENCOUNTER — TELEPHONE (OUTPATIENT)
Dept: GASTROENTEROLOGY | Facility: CLINIC | Age: 69
End: 2019-11-07

## 2019-11-07 VITALS
HEART RATE: 76 BPM | HEIGHT: 64 IN | DIASTOLIC BLOOD PRESSURE: 76 MMHG | WEIGHT: 250.44 LBS | BODY MASS INDEX: 42.76 KG/M2 | SYSTOLIC BLOOD PRESSURE: 142 MMHG

## 2019-11-07 DIAGNOSIS — D50.9 IRON DEFICIENCY ANEMIA, UNSPECIFIED IRON DEFICIENCY ANEMIA TYPE: Primary | ICD-10-CM

## 2019-11-07 DIAGNOSIS — K92.1 BLOOD IN STOOL: ICD-10-CM

## 2019-11-07 DIAGNOSIS — K64.9 HEMORRHOIDS, UNSPECIFIED HEMORRHOID TYPE: ICD-10-CM

## 2019-11-07 PROCEDURE — 99204 PR OFFICE/OUTPT VISIT, NEW, LEVL IV, 45-59 MIN: ICD-10-PCS | Mod: S$PBB,,, | Performed by: INTERNAL MEDICINE

## 2019-11-07 PROCEDURE — 99999 PR PBB SHADOW E&M-EST. PATIENT-LVL IV: ICD-10-PCS | Mod: PBBFAC,,, | Performed by: INTERNAL MEDICINE

## 2019-11-07 PROCEDURE — 99214 OFFICE O/P EST MOD 30 MIN: CPT | Mod: PBBFAC | Performed by: INTERNAL MEDICINE

## 2019-11-07 PROCEDURE — 99204 OFFICE O/P NEW MOD 45 MIN: CPT | Mod: S$PBB,,, | Performed by: INTERNAL MEDICINE

## 2019-11-07 PROCEDURE — 99999 PR PBB SHADOW E&M-EST. PATIENT-LVL IV: CPT | Mod: PBBFAC,,, | Performed by: INTERNAL MEDICINE

## 2019-11-07 RX ORDER — SYRING-NEEDL,DISP,INSUL,0.3 ML 29 G X1/2"
296 SYRINGE, EMPTY DISPOSABLE MISCELLANEOUS ONCE
Qty: 3 BOTTLE | Refills: 0 | Status: SHIPPED | OUTPATIENT
Start: 2019-11-07 | End: 2019-11-07

## 2019-11-07 RX ORDER — SYRING-NEEDL,DISP,INSUL,0.3 ML 29 G X1/2"
296 SYRINGE, EMPTY DISPOSABLE MISCELLANEOUS ONCE
Qty: 3 BOTTLE | Refills: 0 | COMMUNITY
Start: 2019-11-07 | End: 2019-11-07

## 2019-11-07 NOTE — TELEPHONE ENCOUNTER
Dr. Hancock Patient is Having an EGD and colonoscopy on January 29th at 1:00pm, she is on Plavix 75 mg can this be held for 5 days prior to this Procedure. Please let us know.

## 2019-11-07 NOTE — LETTER
November 7, 2019      Elida Alanis NP  [address] [place] [address]           [facility]  [address]  [place] [address]  Phone: [phone]  Fax: [phone]          Patient: Qi Ortiz   MR Number: [id number]   YOB: 1950   Date of Visit: 11/7/2019       Dear Elida Alanis:    Thank you for referring Qi Ortiz to me for evaluation. Attached you will find relevant portions of my assessment and plan of care.    If you have questions, please do not hesitate to call me. I look forward to following Qi Ortiz along with you.    Sincerely,    Pricila Smith MD    Enclosure  CC:  No Recipients    If you would like to receive this communication electronically, please contact [email or web address] or [phone] to request more information on [facility] [employer] access.    For providers and/or their staff who would like to refer a patient to [facility], please contact us through our one-stop-shop provider referral line, [facility], at [phone].    If you feel you have received this communication in error or would no longer like to receive these types of communications, please e-mail [email or web address]

## 2019-11-07 NOTE — PROGRESS NOTES
[facility] [place]  Gastroenterology    Patient evaluated at the request of Elida Alanis, SAMEERA  [address] [place] [address]    PCP: Trinidad Cleaning MD    11/7/19    HPI     Rectal Bleeding      Additional comments: Bleeding Hemorroids/ has active Staff          Last edited by Ashley Lewis LPN on 11/7/2019  1:47 PM. (History)        Reason for Referral: Iron Deficiency Anemia    Subjective:   Qi Ortiz is a 69 y.o. female with PMH of CAD s/p CABG on ASA and Plavix, fibromyalgia, psoriatic arthritis and hemorrhoids who presents for evaluation of iron deficiency anemia. Patient reports she has bleeding hemorrhoids for which she takes garlic. She has constipation on occasion depending on what she eats. No abdominal pain or unintentional weight loss. She has no family history of colon cancer. Her last colonoscopy was in 2011 and had one polyp which was removed along with internal and external hemorrhoids. Patient denies any GERD or dysphagia./ She does report a history of gastric ulcers in the past when she was a child. Of note, patient reports she has had multiple abdominal surgeries for presence of adhesions, gallbladder and for hernia repair.       Past Medical History:   Diagnosis Date    Carotid stenosis     19%    COPD (chronic obstructive pulmonary disease)     No meds    Coronary artery disease     CVA (cerebral vascular accident)     Dr. 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 Hope    Psoriatic arthritis     Rheumatology       Past Surgical History:   Procedure Laterality Date    BREAST BIOPSY      R sided/benign    CARDIAC SURGERY      sept 28 2016    CERVICAL FUSION      CHOLECYSTECTOMY      CORONARY ANGIOPLASTY      CORONARY ARTERY BYPASS GRAFT      triple bypass    CORONARY STENT PLACEMENT      EYE SURGERY       INTRAUTERINE DEVICE INSERTION      mass removed from R groin      TOTAL ABDOMINAL HYSTERECTOMY W/ BILATERAL SALPINGOOPHORECTOMY      due to benign mass, adhesions    TUBAL LIGATION         Current Outpatient Medications on File Prior to Visit   Medication Sig Dispense Refill    albuterol (PROVENTIL) 2.5 mg /3 mL (0.083 %) nebulizer solution Take 3 mLs (2.5 mg total) by nebulization every 6 (six) hours as needed for Wheezing. Rescue 25 each 0    APPLE CIDER VINEGAR ORAL Take 1 capsule by mouth once daily.       aspirin 81 MG Chew Take 162.5 mg by mouth once daily.       calcium carbonate 650 mg calcium (1,625 mg) tablet Take 1 tablet by mouth once daily.      chlorhexidine (BETASEPT SURGICAL SCRUB) 4 % external liquid Apply topically daily as needed. Consider 90 day supply 473 mL 3    clopidogrel (PLAVIX) 75 mg tablet Take 1 tablet (75 mg total) by mouth once daily. 90 tablet 3    cyanocobalamin 2000 MCG tablet Take 2,000 mcg by mouth once daily.      folic acid (FOLVITE) 1 MG tablet Take 1 tablet (1 mg total) by mouth once daily. 90 tablet 1    furosemide (LASIX) 20 MG tablet Take 0.5 tablets (10 mg total) by mouth 2 (two) times daily. (Patient taking differently: Take 10 mg by mouth as needed. ) 90 tablet 3    gabapentin (NEURONTIN) 100 MG capsule Take 2 capsules (200 mg total) by mouth 3 (three) times daily as needed. 540 capsule 3    garlic 2,000 mg Cap Take 3,000 mg by mouth once daily.       iron-vitamin C 100-250 mg, ICAR-C, (ICAR-C) 100-250 mg Tab Take 1 tablet by mouth once daily. 90 tablet 4    levothyroxine (SYNTHROID) 112 MCG tablet Take 1 tablet (112 mcg total) by mouth once daily. 90 tablet 1    metoprolol succinate (TOPROL-XL) 100 MG 24 hr tablet 1 tab (100mg) in morning. And 100 mg at bedtime.  Generic ok (Patient taking differently: Take by mouth 2 (two) times daily. 1 tab (100mg) in morning. And 100 mg at bedtime.  Generic ok) 180 tablet 3    mupirocin (BACTROBAN) 2 % ointment  Bactroban 2 % topical ointment   Apply 1 application twice a day by topical route as directed for 30 days.      nitroGLYCERIN (NITROSTAT) 0.4 MG SL tablet Place 1 tablet (0.4 mg total) under the tongue every 5 (five) minutes as needed for Chest pain. 100 tablet 3    pyridoxine (VITAMIN B-6) 100 MG Tab Take 200 mg by mouth once daily.       REPATHA SURECLICK 140 mg/mL PnIj INJECT 140 MG INTO THE SKIN EVERY 14 DAYS 6 mL 3    TURMERIC, BULK, MISC Take 1 capsule by mouth 2 (two) times daily.       ustekinumab (STELARA) 90 mg/mL Syrg syringe Inject 1 mL (90 mg total) into the skin every 3 (three) months. 1 Syringe 3    valsartan-hydrochlorothiazide (DIOVAN-HCT) 160-12.5 mg per tablet Take 1 tablet by mouth once daily. 90 tablet 3    vitamin D 1000 units Tab Take 185 mg by mouth once daily.       No current facility-administered medications on file prior to visit.        Review of patient's allergies indicates:   Allergen Reactions    Celexa [citalopram] Anaphylaxis    Clindamycin Itching and Swelling    Codeine Shortness Of Breath, Itching and Swelling    Crestor [rosuvastatin] Anaphylaxis    Cytotec [misoprostol] Anaphylaxis and Other (See Comments)     Difficulty breathing    Lisinopril Anaphylaxis    Magnesium citrate Shortness Of Breath    Stadol [butorphanol tartrate] Anaphylaxis     Coded    Vicodin [hydrocodone-acetaminophen] Shortness Of Breath    Adhesive      EKG Electrodes    Aggrenox [aspirin-dipyridamole] Other (See Comments)     headaches    Avelox [moxifloxacin]      PATIENT STATES DO NOT GIVE UNDER ANY CIRCUSTANCES    Demerol [meperidine]     Isosorbide Other (See Comments)     Severe headache    Kenalog [triamcinolone acetonide]     Medrol [methylprednisolone] Other (See Comments)     unknown    Mobic [meloxicam]     Morphine     Nitroglycerin      Long acting    Pholcodine     Prednisone      GASTRIC PAIN    Ranexa [ranolazine] Nausea And Vomiting    Reclast [zoledronic  acid-mannitol-water] Other (See Comments)     Bones hurt    Sulfa (sulfonamide antibiotics) Other (See Comments)     unknown    Talwin [pentazocine lactate]     Tetracycline     Tetracyclines     Tilade [nedocromil]     Zetia [ezetimibe]        Social History     Socioeconomic History    Marital status: Single     Spouse name: Not on file    Number of children: 3    Years of education: Not on file    Highest education level: Not on file   Occupational History    Occupation: Not working   Social Needs    Financial resource strain: Not on file    Food insecurity:     Worry: Not on file     Inability: Not on file    Transportation needs:     Medical: Not on file     Non-medical: Not on file   Tobacco Use    Smoking status: Never Smoker    Smokeless tobacco: Never Used   Substance and Sexual Activity    Alcohol use: No    Drug use: No    Sexual activity: Never     Partners: Male   Lifestyle    Physical activity:     Days per week: Not on file     Minutes per session: Not on file    Stress: Not on file   Relationships    Social connections:     Talks on phone: Not on file     Gets together: Not on file     Attends Christian service: Not on file     Active member of club or organization: Not on file     Attends meetings of clubs or organizations: Not on file     Relationship status: Not on file   Other Topics Concern    Not on file   Social History Narrative    Not on file       Family History   Problem Relation Age of Onset    Breast cancer Maternal Grandfather     Breast cancer Paternal Aunt     Stroke Unknown     Breast cancer Sister 60    Leukemia Sister 8         as child    Lung cancer Paternal Grandfather     Heart disease Unknown        Review of Systems   Constitutional: Negative for appetite change, fever and unexpected weight change.   HENT: Negative for postnasal drip, rhinorrhea, sneezing, sore throat and trouble swallowing.    Eyes: Negative for visual disturbance.    Respiratory: Negative for cough, shortness of breath and wheezing.    Cardiovascular: Negative for chest pain, palpitations and leg swelling.   Gastrointestinal: Positive for blood in stool. Negative for abdominal pain, constipation, diarrhea, nausea and vomiting.   Genitourinary: Negative for dysuria.   Musculoskeletal: Negative for arthralgias, joint swelling and myalgias.   Skin: Positive for wound. Negative for color change, pallor and rash.   Neurological: Negative for weakness, light-headedness, numbness and headaches.   Hematological: Negative for adenopathy. Does not bruise/bleed easily.   Psychiatric/Behavioral: Negative for agitation.           Objective:   Vitals:   Vitals:    11/07/19 1345   BP: (!) 142/76   Pulse: 76       Physical Exam   Constitutional: She is oriented to person, place, and time. No distress.   HENT:   Head: Normocephalic and atraumatic.   Mouth/Throat: No oropharyngeal exudate.   Eyes: Pupils are equal, round, and reactive to light. Conjunctivae and EOM are normal. Right eye exhibits no discharge. Left eye exhibits no discharge. No scleral icterus.   Neck: Normal range of motion.   Cardiovascular: Normal rate, regular rhythm and normal heart sounds. Exam reveals no gallop and no friction rub.   No murmur heard.  Pulmonary/Chest: Effort normal and breath sounds normal. No stridor. No respiratory distress. She has no wheezes. She has no rales.   Abdominal: Soft. Bowel sounds are normal. She exhibits no distension and no mass. There is no tenderness. There is no guarding.   Musculoskeletal: Normal range of motion. She exhibits no edema.   Neurological: She is alert and oriented to person, place, and time.   Skin: Skin is warm and dry. No rash noted. She is not diaphoretic. No erythema. No pallor.   Psychiatric: She has a normal mood and affect.   Vitals reviewed.          IMPRESSION     Problem List Items Addressed This Visit     None      Visit Diagnoses     Iron deficiency anemia,  unspecified iron deficiency anemia type    -  Primary          PLANS:    - Iron indices suggestive of iron deficiency  - Will schedule for EGD and colonoscopy for further evaluation  - Patient will need to hold her Plavix pre-procedure. Will get cardiology clearance prior to procedure  - Further recommendations pending endoscopy results      Iron deficiency anemia, unspecified iron deficiency anemia type      Pricila Smith MD  Gastroenterology and Hepatology

## 2019-11-08 ENCOUNTER — TELEPHONE (OUTPATIENT)
Dept: GASTROENTEROLOGY | Facility: CLINIC | Age: 69
End: 2019-11-08

## 2019-12-09 ENCOUNTER — LAB VISIT (OUTPATIENT)
Dept: LAB | Facility: HOSPITAL | Age: 69
End: 2019-12-09
Attending: NURSE PRACTITIONER
Payer: MEDICARE

## 2019-12-09 DIAGNOSIS — D50.0 IRON DEFICIENCY ANEMIA DUE TO CHRONIC BLOOD LOSS: ICD-10-CM

## 2019-12-09 LAB
BASOPHILS # BLD AUTO: 0.04 K/UL (ref 0–0.2)
BASOPHILS NFR BLD: 0.6 % (ref 0–1.9)
DIFFERENTIAL METHOD: ABNORMAL
EOSINOPHIL # BLD AUTO: 0.1 K/UL (ref 0–0.5)
EOSINOPHIL NFR BLD: 1.1 % (ref 0–8)
ERYTHROCYTE [DISTWIDTH] IN BLOOD BY AUTOMATED COUNT: 19.5 % (ref 11.5–14.5)
HCT VFR BLD AUTO: 37.3 % (ref 37–48.5)
HGB BLD-MCNC: 11.1 G/DL (ref 12–16)
IMM GRANULOCYTES # BLD AUTO: 0.02 K/UL (ref 0–0.04)
IMM GRANULOCYTES NFR BLD AUTO: 0.3 % (ref 0–0.5)
LYMPHOCYTES # BLD AUTO: 1.8 K/UL (ref 1–4.8)
LYMPHOCYTES NFR BLD: 24.5 % (ref 18–48)
MCH RBC QN AUTO: 24.7 PG (ref 27–31)
MCHC RBC AUTO-ENTMCNC: 29.8 G/DL (ref 32–36)
MCV RBC AUTO: 83 FL (ref 82–98)
MONOCYTES # BLD AUTO: 0.6 K/UL (ref 0.3–1)
MONOCYTES NFR BLD: 8.7 % (ref 4–15)
NEUTROPHILS # BLD AUTO: 4.7 K/UL (ref 1.8–7.7)
NEUTROPHILS NFR BLD: 64.8 % (ref 38–73)
NRBC BLD-RTO: 0 /100 WBC
PLATELET # BLD AUTO: 312 K/UL (ref 150–350)
PMV BLD AUTO: 10.8 FL (ref 9.2–12.9)
RBC # BLD AUTO: 4.49 M/UL (ref 4–5.4)
WBC # BLD AUTO: 7.22 K/UL (ref 3.9–12.7)

## 2019-12-09 PROCEDURE — 82728 ASSAY OF FERRITIN: CPT

## 2019-12-09 PROCEDURE — 36415 COLL VENOUS BLD VENIPUNCTURE: CPT | Mod: PO

## 2019-12-09 PROCEDURE — 85025 COMPLETE CBC W/AUTO DIFF WBC: CPT

## 2019-12-09 PROCEDURE — 83540 ASSAY OF IRON: CPT

## 2019-12-09 PROCEDURE — 80048 BASIC METABOLIC PNL TOTAL CA: CPT

## 2019-12-10 LAB
ANION GAP SERPL CALC-SCNC: 10 MMOL/L (ref 8–16)
BUN SERPL-MCNC: 27 MG/DL (ref 8–23)
CALCIUM SERPL-MCNC: 9.9 MG/DL (ref 8.7–10.5)
CHLORIDE SERPL-SCNC: 102 MMOL/L (ref 95–110)
CO2 SERPL-SCNC: 30 MMOL/L (ref 23–29)
CREAT SERPL-MCNC: 1.2 MG/DL (ref 0.5–1.4)
EST. GFR  (AFRICAN AMERICAN): 53.7 ML/MIN/1.73 M^2
EST. GFR  (NON AFRICAN AMERICAN): 46.5 ML/MIN/1.73 M^2
FERRITIN SERPL-MCNC: 10 NG/ML (ref 20–300)
GLUCOSE SERPL-MCNC: 102 MG/DL (ref 70–110)
IRON SERPL-MCNC: 26 UG/DL (ref 30–160)
POTASSIUM SERPL-SCNC: 4.2 MMOL/L (ref 3.5–5.1)
SATURATED IRON: 6 % (ref 20–50)
SODIUM SERPL-SCNC: 142 MMOL/L (ref 136–145)
TOTAL IRON BINDING CAPACITY: 448 UG/DL (ref 250–450)
TRANSFERRIN SERPL-MCNC: 303 MG/DL (ref 200–375)

## 2019-12-11 ENCOUNTER — OFFICE VISIT (OUTPATIENT)
Dept: HEMATOLOGY/ONCOLOGY | Facility: CLINIC | Age: 69
End: 2019-12-11
Payer: MEDICARE

## 2019-12-11 VITALS
BODY MASS INDEX: 44.48 KG/M2 | DIASTOLIC BLOOD PRESSURE: 57 MMHG | SYSTOLIC BLOOD PRESSURE: 124 MMHG | OXYGEN SATURATION: 98 % | TEMPERATURE: 98 F | HEART RATE: 59 BPM | WEIGHT: 255.06 LBS

## 2019-12-11 DIAGNOSIS — D50.0 IRON DEFICIENCY ANEMIA DUE TO CHRONIC BLOOD LOSS: Primary | ICD-10-CM

## 2019-12-11 PROCEDURE — 1159F MED LIST DOCD IN RCRD: CPT | Mod: ,,, | Performed by: NURSE PRACTITIONER

## 2019-12-11 PROCEDURE — 99214 OFFICE O/P EST MOD 30 MIN: CPT | Mod: S$PBB,,, | Performed by: NURSE PRACTITIONER

## 2019-12-11 PROCEDURE — 1126F PR PAIN SEVERITY QUANTIFIED, NO PAIN PRESENT: ICD-10-PCS | Mod: ,,, | Performed by: NURSE PRACTITIONER

## 2019-12-11 PROCEDURE — 99999 PR PBB SHADOW E&M-EST. PATIENT-LVL III: ICD-10-PCS | Mod: PBBFAC,,, | Performed by: NURSE PRACTITIONER

## 2019-12-11 PROCEDURE — 99213 OFFICE O/P EST LOW 20 MIN: CPT | Mod: PBBFAC | Performed by: NURSE PRACTITIONER

## 2019-12-11 PROCEDURE — 99999 PR PBB SHADOW E&M-EST. PATIENT-LVL III: CPT | Mod: PBBFAC,,, | Performed by: NURSE PRACTITIONER

## 2019-12-11 PROCEDURE — 1126F AMNT PAIN NOTED NONE PRSNT: CPT | Mod: ,,, | Performed by: NURSE PRACTITIONER

## 2019-12-11 PROCEDURE — 99214 PR OFFICE/OUTPT VISIT, EST, LEVL IV, 30-39 MIN: ICD-10-PCS | Mod: S$PBB,,, | Performed by: NURSE PRACTITIONER

## 2019-12-11 PROCEDURE — 1159F PR MEDICATION LIST DOCUMENTED IN MEDICAL RECORD: ICD-10-PCS | Mod: ,,, | Performed by: NURSE PRACTITIONER

## 2019-12-11 RX ORDER — SODIUM CHLORIDE 0.9 % (FLUSH) 0.9 %
10 SYRINGE (ML) INJECTION
Status: CANCELLED | OUTPATIENT
Start: 2019-12-11

## 2019-12-11 RX ORDER — SYRING-NEEDL,DISP,INSUL,0.3 ML 29 G X1/2"
SYRINGE, EMPTY DISPOSABLE MISCELLANEOUS
COMMUNITY
Start: 2019-12-05 | End: 2021-05-10

## 2019-12-11 RX ORDER — METHYLPREDNISOLONE SOD SUCC 125 MG
80 VIAL (EA) INJECTION
Status: CANCELLED
Start: 2019-12-11

## 2019-12-11 RX ORDER — HEPARIN 100 UNIT/ML
500 SYRINGE INTRAVENOUS
Status: CANCELLED | OUTPATIENT
Start: 2019-12-11

## 2019-12-11 NOTE — PROGRESS NOTES
Subjective:       Patient ID: Qi Ortiz is a 69 y.o. female.    Chief Complaint: Anemia, lab work follow up.    HPI: 69 y.o female with CKD III, HLD, HTN, CAD, arthritis, Iron deficiency. Patient was asked to see GI previously for an EGD/Colonoscopy however, she declined    10/23/19  Patient reports recent abnormal heart rhythm, being managed per Cardiology. Reports intermittent dizziness, fatigue. I had a detailed discussion with the patient in regards to her current clinical situation. Discussed with patient her declining iron levels and declining hemoglobin. I have recommended proceeding with upper and lower endoscopies. Patient reports that she may be agreeable to proceeding with colonoscopy but would like visit with GI first    Today's visit:  Patient reports having visit with GI.  Reports now agreeable to upper and lower endoscopies.  Reports she plans to schedule following daughter's upcoming surgery.  Reports unable to tolerate iron pills due to constipation.  I had a detailed discussion with the patient in regards to her current clinical situation.  I have discussed that due to intolerability of oral iron preparation would recommend proceeding with intravenous iron supplementation as her ongoing symptoms are likely to not improved and less iron deficiency is corrected.    Social History     Socioeconomic History    Marital status: Single     Spouse name: Not on file    Number of children: 3    Years of education: Not on file    Highest education level: Not on file   Occupational History    Occupation: Not working   Social Needs    Financial resource strain: Not on file    Food insecurity:     Worry: Not on file     Inability: Not on file    Transportation needs:     Medical: Not on file     Non-medical: Not on file   Tobacco Use    Smoking status: Never Smoker    Smokeless tobacco: Never Used   Substance and Sexual Activity    Alcohol use: No    Drug use: No    Sexual activity: Never      Partners: Male   Lifestyle    Physical activity:     Days per week: Not on file     Minutes per session: Not on file    Stress: Not on file   Relationships    Social connections:     Talks on phone: Not on file     Gets together: Not on file     Attends Jainism service: Not on file     Active member of club or organization: Not on file     Attends meetings of clubs or organizations: Not on file     Relationship status: Not on file   Other Topics Concern    Not on file   Social History Narrative    Not on file       Past Medical History:   Diagnosis Date    Carotid stenosis     19%    COPD (chronic obstructive pulmonary disease)     No meds    Coronary artery disease     CVA (cerebral vascular accident)     Dr. 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 Hope    Psoriatic arthritis     Rheumatology       Family History   Problem Relation Age of Onset    Breast cancer Maternal Grandfather     Breast cancer Paternal Aunt     Stroke Unknown     Breast cancer Sister 60    Leukemia Sister 8         as child    Lung cancer Paternal Grandfather     Heart disease Unknown        Past Surgical History:   Procedure Laterality Date    BREAST BIOPSY      R sided/benign    CARDIAC SURGERY      2016    CERVICAL FUSION      CHOLECYSTECTOMY      CORONARY ANGIOPLASTY      CORONARY ARTERY BYPASS GRAFT      triple bypass    CORONARY STENT PLACEMENT      EYE SURGERY      INTRAUTERINE DEVICE INSERTION      mass removed from R groin      TOTAL ABDOMINAL HYSTERECTOMY W/ BILATERAL SALPINGOOPHORECTOMY      due to benign mass, adhesions    TUBAL LIGATION         Review of Systems   Constitutional: Positive for fatigue. Negative for activity change, appetite change, chills, diaphoresis, fever and unexpected weight change.   HENT: Negative for congestion,  nosebleeds, sore throat, trouble swallowing and voice change.    Eyes: Negative for photophobia, pain and visual disturbance.   Respiratory: Negative for cough, choking, chest tightness, shortness of breath, wheezing and stridor.    Cardiovascular: Negative for chest pain, palpitations and leg swelling.   Gastrointestinal: Negative for abdominal distention, abdominal pain, anal bleeding, blood in stool, constipation, diarrhea, nausea, rectal pain and vomiting.   Genitourinary: Negative for difficulty urinating, dysuria and hematuria.   Musculoskeletal: Negative for arthralgias, back pain and gait problem.   Skin: Negative for rash and wound.   Neurological: Positive for dizziness (intermittent ) and weakness. Negative for facial asymmetry, light-headedness, numbness and headaches.   Hematological: Negative for adenopathy. Does not bruise/bleed easily.   Psychiatric/Behavioral: The patient is not nervous/anxious.          Medication List with Changes/Refills   Current Medications    ALBUTEROL (PROVENTIL) 2.5 MG /3 ML (0.083 %) NEBULIZER SOLUTION    Take 3 mLs (2.5 mg total) by nebulization every 6 (six) hours as needed for Wheezing. Rescue    APPLE CIDER VINEGAR ORAL    Take 1 capsule by mouth once daily.     ASPIRIN 81 MG CHEW    Take 162.5 mg by mouth once daily.     CALCIUM CARBONATE 650 MG CALCIUM (1,625 MG) TABLET    Take 1 tablet by mouth once daily.    CHLORHEXIDINE (BETASEPT SURGICAL SCRUB) 4 % EXTERNAL LIQUID    Apply topically daily as needed. Consider 90 day supply    CLOPIDOGREL (PLAVIX) 75 MG TABLET    Take 1 tablet (75 mg total) by mouth once daily.    CYANOCOBALAMIN 2000 MCG TABLET    Take 2,000 mcg by mouth once daily.    FOLIC ACID (FOLVITE) 1 MG TABLET    Take 1 tablet (1 mg total) by mouth once daily.    FUROSEMIDE (LASIX) 20 MG TABLET    Take 0.5 tablets (10 mg total) by mouth 2 (two) times daily.    GABAPENTIN (NEURONTIN) 100 MG CAPSULE    Take 2 capsules (200 mg total) by mouth 3 (three) times  daily as needed.    GARLIC 2,000 MG CAP    Take 3,000 mg by mouth once daily.     IRON-VITAMIN C 100-250 MG, ICAR-C, (ICAR-C) 100-250 MG TAB    Take 1 tablet by mouth once daily.    LEVOTHYROXINE (SYNTHROID) 112 MCG TABLET    Take 1 tablet (112 mcg total) by mouth once daily.    MAGNESIUM CITRATE SOLUTION        METOPROLOL SUCCINATE (TOPROL-XL) 100 MG 24 HR TABLET    1 tab (100mg) in morning. And 100 mg at bedtime.  Generic ok    MUPIROCIN (BACTROBAN) 2 % OINTMENT    Bactroban 2 % topical ointment   Apply 1 application twice a day by topical route as directed for 30 days.    NITROGLYCERIN (NITROSTAT) 0.4 MG SL TABLET    Place 1 tablet (0.4 mg total) under the tongue every 5 (five) minutes as needed for Chest pain.    PYRIDOXINE (VITAMIN B-6) 100 MG TAB    Take 200 mg by mouth once daily.     REPATHA SURECLICK 140 MG/ML PNIJ    INJECT 140 MG INTO THE SKIN EVERY 14 DAYS    TURMERIC, BULK, MISC    Take 1 capsule by mouth 2 (two) times daily.     USTEKINUMAB (STELARA) 90 MG/ML SYRG SYRINGE    Inject 1 mL (90 mg total) into the skin every 3 (three) months.    VALSARTAN-HYDROCHLOROTHIAZIDE (DIOVAN-HCT) 160-12.5 MG PER TABLET    Take 1 tablet by mouth once daily.    VITAMIN D 1000 UNITS TAB    Take 185 mg by mouth once daily.     Objective:     Vitals:    12/11/19 0954   BP: (!) 124/57   Pulse: (!) 59   Temp: 98.2 °F (36.8 °C)     Lab Results   Component Value Date    WBC 7.22 12/09/2019    HGB 11.1 (L) 12/09/2019    HCT 37.3 12/09/2019    MCV 83 12/09/2019     12/09/2019     BMP  Lab Results   Component Value Date     12/09/2019    K 4.2 12/09/2019     12/09/2019    CO2 30 (H) 12/09/2019    BUN 27 (H) 12/09/2019    CREATININE 1.2 12/09/2019    CALCIUM 9.9 12/09/2019    ANIONGAP 10 12/09/2019    ESTGFRAFRICA 53.7 (A) 12/09/2019    EGFRNONAA 46.5 (A) 12/09/2019     Lab Results   Component Value Date    ALT 14 10/01/2019    AST 23 10/01/2019    ALKPHOS 43 (L) 10/01/2019    BILITOT 0.5 10/01/2019     Lab  Results   Component Value Date    IRON 26 (L) 12/09/2019    TIBC 448 12/09/2019    FERRITIN 10 (L) 12/09/2019         Physical Exam   Constitutional: She is oriented to person, place, and time. Vital signs are normal. She appears well-developed and well-nourished.   HENT:   Head: Normocephalic.   Right Ear: Hearing and external ear normal.   Left Ear: Hearing and external ear normal.   Nose: Nose normal.   Mouth/Throat: Oropharynx is clear and moist.   Eyes: Conjunctivae, EOM and lids are normal. Right eye exhibits no discharge. Left eye exhibits no discharge.   Neck: Normal range of motion. No thyroid mass present.   Cardiovascular: Normal rate and regular rhythm.   Murmur heard.   Systolic murmur is present with a grade of 2/6.  Pulmonary/Chest: Effort normal and breath sounds normal. No respiratory distress. She has no wheezes. She has no rhonchi. She has no rales. She exhibits no tenderness.   Abdominal: Soft. Bowel sounds are normal. She exhibits no distension. There is no tenderness.   Musculoskeletal: Normal range of motion. She exhibits no edema.   Lymphadenopathy:        Head (right side): No submandibular, no preauricular and no posterior auricular adenopathy present.        Head (left side): No submandibular, no preauricular and no posterior auricular adenopathy present.        Right cervical: No superficial cervical adenopathy present.       Left cervical: No superficial cervical adenopathy present.   Neurological: She is alert and oriented to person, place, and time.   Skin: Skin is warm, dry and intact.   Psychiatric: Her speech is normal and behavior is normal. Thought content normal. Her mood appears anxious.   Vitals reviewed.       Assessment:     Problem List Items Addressed This Visit        Oncology    Iron deficiency anemia due to chronic blood loss - Primary     Patient remains significantly iron deficient. She is anemic but hemoglobin is stable.  I have strongly encouraged proceeding with  upper and lower endoscopies.  Patient reports intolerance to iron pills due to constipation.  I have strongly recommended proceeding with intravenous iron supplementation.  Patient reports started has upcoming surgery she will not be able to complete iron infusions at present time.  Reports she will contact our office to schedule once daughter has stabilized    Orders placed for IV Venofer as patient is not agreeable to any of the other iron preparations    Hemoglobin 11.1.  This is stable.  No urgent indication for blood transfusion at present time    Would like to proceed with IV Venofer weekly x5 doses.  Have patient follow up in 6-8 weeks with repeat labs.         Relevant Orders    CBC auto differential    Basic metabolic panel    Iron and TIBC    Ferritin                I will review assessment/plan with collaborating physician EMIR Granado

## 2019-12-11 NOTE — ASSESSMENT & PLAN NOTE
Patient remains significantly iron deficient. She is anemic but hemoglobin is stable.  I have strongly encouraged proceeding with upper and lower endoscopies.  Patient reports intolerance to iron pills due to constipation.  I have strongly recommended proceeding with intravenous iron supplementation.  Patient reports started has upcoming surgery she will not be able to complete iron infusions at present time.  Reports she will contact our office to schedule once daughter has stabilized    Orders placed for IV Venofer as patient is not agreeable to any of the other iron preparations    Hemoglobin 11.1.  This is stable.  No urgent indication for blood transfusion at present time    Would like to proceed with IV Venofer weekly x5 doses.  Have patient follow up in 6-8 weeks with repeat labs.

## 2019-12-23 ENCOUNTER — TELEPHONE (OUTPATIENT)
Dept: HEMATOLOGY/ONCOLOGY | Facility: CLINIC | Age: 69
End: 2019-12-23

## 2019-12-23 NOTE — TELEPHONE ENCOUNTER
----- Message from Fawad Wilson sent at 12/23/2019  1:14 PM CST -----  Contact: pt   Pt would like cb in reference to future appt.         [phone]

## 2019-12-24 NOTE — TELEPHONE ENCOUNTER
----- Message from Muriel Tanner sent at 12/24/2019  8:48 AM CST -----  Contact: Pt   Pt is calling regarding requesting to  have nurse call pt back. Pt states that call is concerning pt upcoming appt. Pt states that has questions concerning  this appt,. ..[phone] (lqof)           .Thank You  Muriel Tanner

## 2019-12-24 NOTE — TELEPHONE ENCOUNTER
Patient called inquiring about f/u appt with Ty on 1/27/20. Advised it is a f/u appt for her labs that is scheduled on 1/20/20 to check her iron levels//bdm

## 2020-01-07 ENCOUNTER — INFUSION (OUTPATIENT)
Dept: INFUSION THERAPY | Facility: HOSPITAL | Age: 70
End: 2020-01-07
Attending: INTERNAL MEDICINE
Payer: MEDICARE

## 2020-01-07 VITALS
DIASTOLIC BLOOD PRESSURE: 65 MMHG | RESPIRATION RATE: 20 BRPM | HEART RATE: 61 BPM | SYSTOLIC BLOOD PRESSURE: 117 MMHG | TEMPERATURE: 99 F | OXYGEN SATURATION: 100 %

## 2020-01-07 DIAGNOSIS — D50.0 IRON DEFICIENCY ANEMIA DUE TO CHRONIC BLOOD LOSS: Primary | ICD-10-CM

## 2020-01-07 PROCEDURE — 63600175 PHARM REV CODE 636 W HCPCS: Performed by: NURSE PRACTITIONER

## 2020-01-07 PROCEDURE — 96374 THER/PROPH/DIAG INJ IV PUSH: CPT

## 2020-01-07 PROCEDURE — 96375 TX/PRO/DX INJ NEW DRUG ADDON: CPT

## 2020-01-07 RX ORDER — METHYLPREDNISOLONE SOD SUCC 125 MG
80 VIAL (EA) INJECTION
Status: COMPLETED | OUTPATIENT
Start: 2020-01-07 | End: 2020-01-07

## 2020-01-07 RX ADMIN — IRON SUCROSE 200 MG: 20 INJECTION, SOLUTION INTRAVENOUS at 02:01

## 2020-01-07 RX ADMIN — METHYLPREDNISOLONE SODIUM SUCCINATE 80 MG: 125 INJECTION, POWDER, FOR SOLUTION INTRAMUSCULAR; INTRAVENOUS at 01:01

## 2020-01-07 NOTE — DISCHARGE INSTRUCTIONS
[facility]  [id number] [address] [address]  [address]  [phone] phone     [phone] fax  Hours of Operation: Monday- Friday 8:00am- 5:00pm  After hours phone  [phone]  Hematology / Oncology Physicians on call      SAMEERA Robertson Dr., Dr., Dr., Dr., NP Sydney Prescott, NP Tyesha Taylor, NP    Please call with any concerns regarding your appointment today.Support Groups/Classes    IRON DEFICIENCY ANEMIA:  Anemia is a condition where the size or number of red blood cells in the body is reduced. Iron is needed in the diet to make red cells. The red blood cells carry oxygen to all parts of the body. Anemia limits the delivery of oxygen to where it is needed. This causes a feeling of being tired and run down. When anemia becomes severe, the skin becomes pale and there is shortness of breath with exertion, headaches, dizziness, drowsiness and fatigue.  The cause of your anemia is lack of iron in your body. This may occur due to blood loss (for example, heavy menstrual periods or bleeding from the stomach or intestines) or a poor diet (not eating enough iron-containing foods), inability to absorb iron from your diet, or pregnancy.  If the blood count is low enough, an IRON SUPPLEMENT will be prescribed. It usually takes about 2-3 months of treatment with iron supplements to correct an anemia. Severe cases of anemia requires a blood transfusion to rapidly correct symptoms and deliver more oxygen to the cells.  HOME CARE:  1) Increase the iron stores in your body by eating foods high in iron content. This is a natural way of building your blood cells back up again. Beef, liver, spinach and other dark green leafy vegetables, whole grain products, beans and nuts are all natural sources of iron.  2) If you are having symptoms of anemia listed above:  -- Do not overexert yourself.  -- Talk to your doctor before  flying on an airplane or travelling to high altitudes.  FOLLOW UP with your doctor in 2 months for a repeat red blood cell count, or as recommended by our staff, to be sure that the anemia has been corrected.  GET PROMPT MEDICAL ATTENTION if any of the following occur or worsen:  -- Shortness of breath or chest pain  -- Dizziness or fainting  -- Vomiting blood or passing red or black-colored stool  © 9101-1629 [facility], [address] [place] [address], [place] [address]. All rights reserved. This information is not intended as a substitute for professional medical care. Always follow your healthcare professional's instructions.

## 2020-01-07 NOTE — PATIENT INSTRUCTIONS
Iron Sucrose injection  What is this medicine?  IRON SUCROSE (AGUSTIN mata) is an iron complex. Iron is used to make healthy red blood cells, which carry oxygen and nutrients throughout the body. This medicine is used to treat iron deficiency anemia in people with chronic kidney disease.  How should I use this medicine?  This medicine is for infusion into a vein. It is given by a health care professional in a hospital or clinic setting.  Talk to your pediatrician regarding the use of this medicine in children. While this drug may be prescribed for children as young as 2 years for selected conditions, precautions do apply.  What side effects may I notice from receiving this medicine?  Side effects that you should report to your doctor or health care professional as soon as possible:  · allergic reactions like skin rash, itching or hives, swelling of the face, lips, or tongue  · breathing problems  · changes in blood pressure  · cough  · fast, irregular heartbeat  · feeling faint or lightheaded, falls  · fever or chills  · flushing, sweating, or hot feelings  · joint or muscle aches/pains  · seizures  · swelling of the ankles or feet  · unusually weak or tired  Side effects that usually do not require medical attention (report to your doctor or health care professional if they continue or are bothersome):  · diarrhea  · feeling achy  · headache  · irritation at site where injected  · nausea, vomiting  · stomach upset  · tiredness  What may interact with this medicine?  Do not take this medicine with any of the following medications:  · deferoxamine  · dimercaprol  · other iron products  This medicine may also interact with the following medications:  · chloramphenicol  · deferasirox  What if I miss a dose?  It is important not to miss your dose. Call your doctor or health care professional if you are unable to keep an appointment.  Where should I keep my medicine?  This drug is given in a hospital or clinic and  will not be stored at home.  What should I tell my health care provider before I take this medicine?  They need to know if you have any of these conditions:  · anemia not caused by low iron levels  · heart disease  · high levels of iron in the blood  · kidney disease  · liver disease  · an unusual or allergic reaction to iron, other medicines, foods, dyes, or preservatives  · pregnant or trying to get pregnant  · breast-feeding  What should I watch for while using this medicine?  Visit your doctor or healthcare professional regularly. Tell your doctor or healthcare professional if your symptoms do not start to get better or if they get worse. You may need blood work done while you are taking this medicine.  You may need to follow a special diet. Talk to your doctor. Foods that contain iron include: whole grains/cereals, dried fruits, beans, or peas, leafy green vegetables, and organ meats (liver, kidney).  NOTE:This sheet is a summary. It may not cover all possible information. If you have questions about this medicine, talk to your doctor, pharmacist, or health care provider. Copyright© 2017 [employer]

## 2020-01-08 ENCOUNTER — LAB VISIT (OUTPATIENT)
Dept: LAB | Facility: HOSPITAL | Age: 70
End: 2020-01-08
Attending: INTERNAL MEDICINE
Payer: MEDICARE

## 2020-01-08 DIAGNOSIS — L40.9 PSORIASIS: ICD-10-CM

## 2020-01-08 LAB
ALBUMIN SERPL BCP-MCNC: 3.3 G/DL (ref 3.5–5.2)
ALP SERPL-CCNC: 40 U/L (ref 55–135)
ALT SERPL W/O P-5'-P-CCNC: 12 U/L (ref 10–44)
ANION GAP SERPL CALC-SCNC: 11 MMOL/L (ref 8–16)
AST SERPL-CCNC: 18 U/L (ref 10–40)
BILIRUB SERPL-MCNC: 0.2 MG/DL (ref 0.1–1)
BUN SERPL-MCNC: 24 MG/DL (ref 8–23)
CALCIUM SERPL-MCNC: 8.9 MG/DL (ref 8.7–10.5)
CHLORIDE SERPL-SCNC: 106 MMOL/L (ref 95–110)
CO2 SERPL-SCNC: 26 MMOL/L (ref 23–29)
CREAT SERPL-MCNC: 1.2 MG/DL (ref 0.5–1.4)
CRP SERPL-MCNC: 2 MG/L (ref 0–8.2)
ERYTHROCYTE [SEDIMENTATION RATE] IN BLOOD BY WESTERGREN METHOD: 35 MM/HR (ref 0–36)
EST. GFR  (AFRICAN AMERICAN): 53.7 ML/MIN/1.73 M^2
EST. GFR  (NON AFRICAN AMERICAN): 46.5 ML/MIN/1.73 M^2
GLUCOSE SERPL-MCNC: 124 MG/DL (ref 70–110)
POTASSIUM SERPL-SCNC: 3.9 MMOL/L (ref 3.5–5.1)
PROT SERPL-MCNC: 6.5 G/DL (ref 6–8.4)
SODIUM SERPL-SCNC: 143 MMOL/L (ref 136–145)

## 2020-01-08 PROCEDURE — 86140 C-REACTIVE PROTEIN: CPT

## 2020-01-08 PROCEDURE — 85652 RBC SED RATE AUTOMATED: CPT

## 2020-01-08 PROCEDURE — 36415 COLL VENOUS BLD VENIPUNCTURE: CPT | Mod: PO

## 2020-01-08 PROCEDURE — 80053 COMPREHEN METABOLIC PANEL: CPT

## 2020-01-08 PROCEDURE — 85025 COMPLETE CBC W/AUTO DIFF WBC: CPT

## 2020-01-09 LAB
BASOPHILS # BLD AUTO: 0.05 K/UL (ref 0–0.2)
BASOPHILS NFR BLD: 0.4 % (ref 0–1.9)
DIFFERENTIAL METHOD: ABNORMAL
EOSINOPHIL # BLD AUTO: 0 K/UL (ref 0–0.5)
EOSINOPHIL NFR BLD: 0.3 % (ref 0–8)
ERYTHROCYTE [DISTWIDTH] IN BLOOD BY AUTOMATED COUNT: 18.5 % (ref 11.5–14.5)
HCT VFR BLD AUTO: 34.5 % (ref 37–48.5)
HGB BLD-MCNC: 9.8 G/DL (ref 12–16)
IMM GRANULOCYTES # BLD AUTO: 0.05 K/UL (ref 0–0.04)
IMM GRANULOCYTES NFR BLD AUTO: 0.4 % (ref 0–0.5)
LYMPHOCYTES # BLD AUTO: 2.8 K/UL (ref 1–4.8)
LYMPHOCYTES NFR BLD: 20.2 % (ref 18–48)
MCH RBC QN AUTO: 23.6 PG (ref 27–31)
MCHC RBC AUTO-ENTMCNC: 28.4 G/DL (ref 32–36)
MCV RBC AUTO: 83 FL (ref 82–98)
MONOCYTES # BLD AUTO: 1 K/UL (ref 0.3–1)
MONOCYTES NFR BLD: 6.9 % (ref 4–15)
NEUTROPHILS # BLD AUTO: 9.9 K/UL (ref 1.8–7.7)
NEUTROPHILS NFR BLD: 71.8 % (ref 38–73)
NRBC BLD-RTO: 0 /100 WBC
PLATELET # BLD AUTO: 307 K/UL (ref 150–350)
PMV BLD AUTO: 10.5 FL (ref 9.2–12.9)
RBC # BLD AUTO: 4.15 M/UL (ref 4–5.4)
WBC # BLD AUTO: 13.73 K/UL (ref 3.9–12.7)

## 2020-01-10 RX ORDER — SODIUM CHLORIDE 0.9 % (FLUSH) 0.9 %
10 SYRINGE (ML) INJECTION
Status: CANCELLED | OUTPATIENT
Start: 2020-01-10

## 2020-01-10 RX ORDER — HEPARIN 100 UNIT/ML
500 SYRINGE INTRAVENOUS
Status: CANCELLED | OUTPATIENT
Start: 2020-01-10

## 2020-01-10 RX ORDER — METHYLPREDNISOLONE SOD SUCC 125 MG
80 VIAL (EA) INJECTION
Status: CANCELLED
Start: 2020-01-10

## 2020-01-13 ENCOUNTER — INFUSION (OUTPATIENT)
Dept: INFUSION THERAPY | Facility: HOSPITAL | Age: 70
End: 2020-01-13
Attending: INTERNAL MEDICINE
Payer: MEDICARE

## 2020-01-13 ENCOUNTER — OFFICE VISIT (OUTPATIENT)
Dept: RHEUMATOLOGY | Facility: CLINIC | Age: 70
End: 2020-01-13
Payer: MEDICARE

## 2020-01-13 VITALS
DIASTOLIC BLOOD PRESSURE: 66 MMHG | HEART RATE: 63 BPM | BODY MASS INDEX: 43.98 KG/M2 | WEIGHT: 252.19 LBS | SYSTOLIC BLOOD PRESSURE: 144 MMHG

## 2020-01-13 DIAGNOSIS — L40.50 PSORIATIC ARTHRITIS: ICD-10-CM

## 2020-01-13 DIAGNOSIS — L40.50 PSORIATIC ARTHRITIS: Primary | ICD-10-CM

## 2020-01-13 DIAGNOSIS — L40.9 PSORIASIS: Primary | ICD-10-CM

## 2020-01-13 DIAGNOSIS — N18.30 STAGE 3 CHRONIC KIDNEY DISEASE: ICD-10-CM

## 2020-01-13 PROCEDURE — 1125F AMNT PAIN NOTED PAIN PRSNT: CPT | Mod: ,,, | Performed by: INTERNAL MEDICINE

## 2020-01-13 PROCEDURE — 63600175 PHARM REV CODE 636 W HCPCS: Mod: JG | Performed by: INTERNAL MEDICINE

## 2020-01-13 PROCEDURE — 1159F MED LIST DOCD IN RCRD: CPT | Mod: ,,, | Performed by: INTERNAL MEDICINE

## 2020-01-13 PROCEDURE — 99214 OFFICE O/P EST MOD 30 MIN: CPT | Mod: S$PBB,,, | Performed by: INTERNAL MEDICINE

## 2020-01-13 PROCEDURE — 99999 PR PBB SHADOW E&M-EST. PATIENT-LVL III: ICD-10-PCS | Mod: PBBFAC,,, | Performed by: INTERNAL MEDICINE

## 2020-01-13 PROCEDURE — 99214 PR OFFICE/OUTPT VISIT, EST, LEVL IV, 30-39 MIN: ICD-10-PCS | Mod: S$PBB,,, | Performed by: INTERNAL MEDICINE

## 2020-01-13 PROCEDURE — 99213 OFFICE O/P EST LOW 20 MIN: CPT | Mod: PBBFAC,25 | Performed by: INTERNAL MEDICINE

## 2020-01-13 PROCEDURE — 1159F PR MEDICATION LIST DOCUMENTED IN MEDICAL RECORD: ICD-10-PCS | Mod: ,,, | Performed by: INTERNAL MEDICINE

## 2020-01-13 PROCEDURE — 96372 THER/PROPH/DIAG INJ SC/IM: CPT

## 2020-01-13 PROCEDURE — 99999 PR PBB SHADOW E&M-EST. PATIENT-LVL III: CPT | Mod: PBBFAC,,, | Performed by: INTERNAL MEDICINE

## 2020-01-13 PROCEDURE — 1125F PR PAIN SEVERITY QUANTIFIED, PAIN PRESENT: ICD-10-PCS | Mod: ,,, | Performed by: INTERNAL MEDICINE

## 2020-01-13 RX ADMIN — USTEKINUMAB 90 MG: 90 INJECTION, SOLUTION SUBCUTANEOUS at 01:01

## 2020-01-13 NOTE — PROGRESS NOTES
CC:  Chief Complaint   Patient presents with    Osteoporosis   Psoriasis/ PsA    History of Present Illness:  Qi Mg a 69 y.o.yo female    For routine follow-up of psoriasis and psoriatic arthritis   on Stelara 90 mg q 3 months; she is getting it here in clinic. In the past has failed enbrel, mtx, has sulfa allergy, recurrent skin infections with Humira, intolerant to Otezla    She also has Osteopenia, h/o right wrist fx in the 90's. Failed po boniva and fosamax due to GI intolerance. Last dexa showed decreasing bmd so Dr. CASTELLANOS started reclast in sept but she felt terrible after her infusion with significant pain and confusion. She does not want to repeat.  NO new falls/fxs. I discussed prolia with her in the past and today and she declines any meds   She takes vitamin-D over-the-counter daily, levels normal today   She has been on Stelara since 3/2016. Also with neuropathic chest pain due to previous cardiac surgery, gabapentin helps greatly. She takes 200 mg 2 to 3 times a day as needed   No rash  No other concerns  Hands hurt when she uses a lot, otherwise no other concerns  No joint swelling  No fever, chills, fatigue or weight loss    She has iron deficiency anemia and gets constipated with iron tablets  She is currently followed by PCP for the same    She has history of staph infections and hence uses a betacept scrub , requests refill      Past Medical History:   Diagnosis Date    Carotid stenosis     19%    COPD (chronic obstructive pulmonary disease)     No meds    Coronary artery disease     CVA (cerebral vascular accident)     Dr. 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 Hope    Psoriatic arthritis     Rheumatology       Past Surgical History:   Procedure Laterality Date    BREAST BIOPSY      R sided/benign    CARDIAC SURGERY      sept 28 2016     CERVICAL FUSION      CHOLECYSTECTOMY      CORONARY ANGIOPLASTY      CORONARY ARTERY BYPASS GRAFT      triple bypass    CORONARY STENT PLACEMENT      EYE SURGERY      INTRAUTERINE DEVICE INSERTION      mass removed from R groin      TOTAL ABDOMINAL HYSTERECTOMY W/ BILATERAL SALPINGOOPHORECTOMY      due to benign mass, adhesions    TUBAL LIGATION           Social History     Tobacco Use    Smoking status: Never Smoker    Smokeless tobacco: Never Used   Substance Use Topics    Alcohol use: No    Drug use: No       Family History   Problem Relation Age of Onset    Breast cancer Maternal Grandfather     Breast cancer Paternal Aunt     Stroke Unknown     Breast cancer Sister 60    Leukemia Sister 8         as child    Lung cancer Paternal Grandfather     Heart disease Unknown        Review of patient's allergies indicates:   Allergen Reactions    Celexa [citalopram] Anaphylaxis    Clindamycin Itching and Swelling    Codeine Shortness Of Breath, Itching and Swelling    Crestor [rosuvastatin] Anaphylaxis    Cytotec [misoprostol] Anaphylaxis and Other (See Comments)     Difficulty breathing    Lisinopril Anaphylaxis    Magnesium citrate Shortness Of Breath    Stadol [butorphanol tartrate] Anaphylaxis     Coded    Vicodin [hydrocodone-acetaminophen] Shortness Of Breath    Adhesive      EKG Electrodes    Aggrenox [aspirin-dipyridamole] Other (See Comments)     headaches    Avelox [moxifloxacin]      PATIENT STATES DO NOT GIVE UNDER ANY CIRCUSTANCES    Demerol [meperidine]     Isosorbide Other (See Comments)     Severe headache    Kenalog [triamcinolone acetonide]     Medrol [methylprednisolone] Other (See Comments)     unknown    Mobic [meloxicam]     Morphine     Nitroglycerin      Long acting    Pholcodine     Prednisone      GASTRIC PAIN    Ranexa [ranolazine] Nausea And Vomiting    Reclast [zoledronic acid-mannitol-water] Other (See Comments)     Bones hurt    Sulfa  (sulfonamide antibiotics) Other (See Comments)     unknown    Talwin [pentazocine lactate]     Tetracycline     Tetracyclines     Tilade [nedocromil]     Zetia [ezetimibe]      Medication List with Changes/Refills   Current Medications    ALBUTEROL (PROVENTIL) 2.5 MG /3 ML (0.083 %) NEBULIZER SOLUTION    Take 3 mLs (2.5 mg total) by nebulization every 6 (six) hours as needed for Wheezing. Rescue    APPLE CIDER VINEGAR ORAL    Take 1 capsule by mouth once daily.     ASPIRIN 81 MG CHEW    Take 162.5 mg by mouth once daily.     CALCIUM CARBONATE 650 MG CALCIUM (1,625 MG) TABLET    Take 1 tablet by mouth once daily.    CHLORHEXIDINE (BETASEPT SURGICAL SCRUB) 4 % EXTERNAL LIQUID    Apply topically daily as needed. Consider 90 day supply    CLOPIDOGREL (PLAVIX) 75 MG TABLET    Take 1 tablet (75 mg total) by mouth once daily.    CYANOCOBALAMIN 2000 MCG TABLET    Take 2,000 mcg by mouth once daily.    FOLIC ACID (FOLVITE) 1 MG TABLET    Take 1 tablet (1 mg total) by mouth once daily.    FUROSEMIDE (LASIX) 20 MG TABLET    Take 0.5 tablets (10 mg total) by mouth 2 (two) times daily.    GABAPENTIN (NEURONTIN) 100 MG CAPSULE    Take 2 capsules (200 mg total) by mouth 3 (three) times daily as needed.    GARLIC 2,000 MG CAP    Take 3,000 mg by mouth once daily.     IRON-VITAMIN C 100-250 MG, ICAR-C, (ICAR-C) 100-250 MG TAB    Take 1 tablet by mouth once daily.    LEVOTHYROXINE (SYNTHROID) 112 MCG TABLET    Take 1 tablet (112 mcg total) by mouth once daily.    MAGNESIUM CITRATE SOLUTION        METOPROLOL SUCCINATE (TOPROL-XL) 100 MG 24 HR TABLET    1 tab (100mg) in morning. And 100 mg at bedtime.  Generic ok    MUPIROCIN (BACTROBAN) 2 % OINTMENT    Bactroban 2 % topical ointment   Apply 1 application twice a day by topical route as directed for 30 days.    NITROGLYCERIN (NITROSTAT) 0.4 MG SL TABLET    Place 1 tablet (0.4 mg total) under the tongue every 5 (five) minutes as needed for Chest pain.    PYRIDOXINE (VITAMIN B-6)  100 MG TAB    Take 200 mg by mouth once daily.     REPATHA SURECLICK 140 MG/ML PNIJ    INJECT 140 MG INTO THE SKIN EVERY 14 DAYS    TURMERIC, BULK, MISC    Take 1 capsule by mouth 2 (two) times daily.     USTEKINUMAB (STELARA) 90 MG/ML SYRG SYRINGE    Inject 1 mL (90 mg total) into the skin every 3 (three) months.    VALSARTAN-HYDROCHLOROTHIAZIDE (DIOVAN-HCT) 160-12.5 MG PER TABLET    Take 1 tablet by mouth once daily.    VITAMIN D 1000 UNITS TAB    Take 185 mg by mouth once daily.     Review of Systems:  Constitutional: Denies fever, chills. No recent weight changes.   Fatigue: no  Muscle weakness: no  Headaches: no new headaches  Eyes: No redness or dryness.  No recent visual changes.  ENT: Denies dry mouth. No oral or nasal ulcers.  Card: No chest pain.  Resp: No cough or sob.   Gastro: No nausea or vomiting.  No heartburn.  Constipation: no  Diarrhea: no  Genito:  No dysuria.  No genital ulcers.  Skin: per hpi   Raynauds:no  Neuro: No numbness / tingling.   Psych: No depression, anxiety  Endo:  no excess thirst.  Heme: no abnormal bleeding or bruising  Clots:none       OBJECTIVE:     Vital Signs   Vitals:    01/13/20 1306   BP: (!) 144/66   Pulse: 63     Physical Exam:  General Appearance:  NAD.   Gait: not favoring.  HEENT: PERRL.  Eyes not dry or injected.  No nasal ulcers.  Mouth not dry, no oral lesions.  Lymph: cervical, supraclavicular or axillary nodes: none abnormal   Cardio: no irregularity of S1 or S2.  No gallops or rubs.   Resp: Normal respiratory motion. Clear to auscultation bilaterally.   No abnormal chest conformation.  Abd: Soft, non-tender, nondistended.  No masses.   Skin: Head and neck,  and extremities examined.   Rash along hairline   Small few mm patch on left leg   Neuro: Ox3.   Cranial nerves II-XII grossly intact.   Sensation intact  in both distal LE and upper extremities to light touch.  Musculoskeletal Exam:    Bilateral osteoarthritic changes in both hands.  Prominent DIP.  No  tenderness  No synovitis    Laboratory:   Results for orders placed or performed in visit on 01/08/20   CBC auto differential   Result Value Ref Range    WBC 13.73 (H) 3.90 - 12.70 K/uL    RBC 4.15 4.00 - 5.40 M/uL    Hemoglobin 9.8 (L) 12.0 - 16.0 g/dL    Hematocrit 34.5 (L) 37.0 - 48.5 %    Mean Corpuscular Volume 83 82 - 98 fL    Mean Corpuscular Hemoglobin 23.6 (L) 27.0 - 31.0 pg    Mean Corpuscular Hemoglobin Conc 28.4 (L) 32.0 - 36.0 g/dL    RDW 18.5 (H) 11.5 - 14.5 %    Platelets 307 150 - 350 K/uL    MPV 10.5 9.2 - 12.9 fL    Immature Granulocytes 0.4 0.0 - 0.5 %    Gran # (ANC) 9.9 (H) 1.8 - 7.7 K/uL    Immature Grans (Abs) 0.05 (H) 0.00 - 0.04 K/uL    Lymph # 2.8 1.0 - 4.8 K/uL    Mono # 1.0 0.3 - 1.0 K/uL    Eos # 0.0 0.0 - 0.5 K/uL    Baso # 0.05 0.00 - 0.20 K/uL    nRBC 0 0 /100 WBC    Gran% 71.8 38.0 - 73.0 %    Lymph% 20.2 18.0 - 48.0 %    Mono% 6.9 4.0 - 15.0 %    Eosinophil% 0.3 0.0 - 8.0 %    Basophil% 0.4 0.0 - 1.9 %    Differential Method Automated    Comprehensive metabolic panel   Result Value Ref Range    Sodium 143 136 - 145 mmol/L    Potassium 3.9 3.5 - 5.1 mmol/L    Chloride 106 95 - 110 mmol/L    CO2 26 23 - 29 mmol/L    Glucose 124 (H) 70 - 110 mg/dL    BUN, Bld 24 (H) 8 - 23 mg/dL    Creatinine 1.2 0.5 - 1.4 mg/dL    Calcium 8.9 8.7 - 10.5 mg/dL    Total Protein 6.5 6.0 - 8.4 g/dL    Albumin 3.3 (L) 3.5 - 5.2 g/dL    Total Bilirubin 0.2 0.1 - 1.0 mg/dL    Alkaline Phosphatase 40 (L) 55 - 135 U/L    AST 18 10 - 40 U/L    ALT 12 10 - 44 U/L    Anion Gap 11 8 - 16 mmol/L    eGFR if African American 53.7 (A) >60 mL/min/1.73 m^2    eGFR if non  46.5 (A) >60 mL/min/1.73 m^2   Sedimentation rate   Result Value Ref Range    Sed Rate 35 0 - 36 mm/Hr   C-reactive protein   Result Value Ref Range    CRP 2.0 0.0 - 8.2 mg/L     Lab Results   Component Value Date    HEPAIGM Negative 10/03/2018    HEPBIGM Negative 10/03/2018    HEPCAB Negative 10/03/2018         Imaging :reviewed x rays  hands/ feet     Notes reviewed  Other procedures:    ASSESSMENT/PLAN:     Psoriatic arthritis    Stage 3 chronic kidney disease      67 yr old    1: Pso/ PsA  Stable disease   Started Stelara 3/2016 with > 90% improvement of her skin.  C/w Stelara 90mg every 12 weeks (she gets it done here) as she cannot do it herself with her arthritis  Vaccinations declined   Continue with gabapentin 200 mg 2 to 3 times a day as needed    2: Osteopenia with DEXA 6/7/17 with osteopenia with FRAX 14.1/1.7, decreasing   BMD at hip     Received last reclast in September 2017 ,But she did not tolerate with pain   She also experienced lot of confusion after IV Reclast and she declines any further medications for the treatment of osteoporosis  Prior GI intolerance to fosamax and boniva   cont vit d supp -OTC  Today d/w her prolia  She declined again     3: CKD stage 3 :  She does not  Take any NSAIDS   She only uses tylenol as needed     4:Anemia / leukocytosis :  She is currently getting IV iron infusions, she follows with Hematology  She is also due to  have endoscopy and colonoscopy per GI  Follow-up CBC to be done January 20  with Hematology  She is advised to follow-up for leukocytosis as well per them    jessy :  Infection, malignancy risk , other side effects discussed.  Hold prior to any surgery or during periods of infection.    3 month f/u with all 4 lab  Went over uptodate information /literature on the meds prescribed today     Disclaimer: This note was prepared using voice recognition system and is likely to have sound alike errors and is not proof read.  Please call me with any questions.

## 2020-01-14 ENCOUNTER — INFUSION (OUTPATIENT)
Dept: INFUSION THERAPY | Facility: HOSPITAL | Age: 70
End: 2020-01-14
Attending: INTERNAL MEDICINE
Payer: MEDICARE

## 2020-01-14 VITALS
SYSTOLIC BLOOD PRESSURE: 141 MMHG | OXYGEN SATURATION: 98 % | TEMPERATURE: 98 F | RESPIRATION RATE: 20 BRPM | DIASTOLIC BLOOD PRESSURE: 63 MMHG

## 2020-01-14 DIAGNOSIS — D50.0 IRON DEFICIENCY ANEMIA DUE TO CHRONIC BLOOD LOSS: Primary | ICD-10-CM

## 2020-01-14 PROCEDURE — 63600175 PHARM REV CODE 636 W HCPCS: Performed by: NURSE PRACTITIONER

## 2020-01-14 PROCEDURE — 96375 TX/PRO/DX INJ NEW DRUG ADDON: CPT

## 2020-01-14 PROCEDURE — 96374 THER/PROPH/DIAG INJ IV PUSH: CPT

## 2020-01-14 RX ORDER — METHYLPREDNISOLONE SOD SUCC 125 MG
80 VIAL (EA) INJECTION
Status: COMPLETED | OUTPATIENT
Start: 2020-01-14 | End: 2020-01-14

## 2020-01-14 RX ADMIN — METHYLPREDNISOLONE SODIUM SUCCINATE 80 MG: 125 INJECTION, POWDER, FOR SOLUTION INTRAMUSCULAR; INTRAVENOUS at 01:01

## 2020-01-14 RX ADMIN — SODIUM CHLORIDE: 9 INJECTION, SOLUTION INTRAVENOUS at 01:01

## 2020-01-14 RX ADMIN — IRON SUCROSE 200 MG: 20 INJECTION, SOLUTION INTRAVENOUS at 02:01

## 2020-01-14 NOTE — DISCHARGE INSTRUCTIONS
FALL PREVENTION   Falls often occur due to slipping, tripping or losing your balance. Here are ways to reduce your risk of falling again.   Was there anything that caused your fall that can be fixed, removed or replaced?   Make your home safe by keeping walkways clear of objects you may trip over.   Use non-slip pads under rugs.   Do not walk in poorly lit areas.   Do not stand on chairs or wobbly ladders.   Use caution when reaching overhead or looking upward. This position can cause a loss of balance.   Be sure your shoes fit properly, have non-slip bottoms and are in good condition.   Be cautious when going up and down stairs, curbs, and when walking on uneven sidewalks.   If your balance is poor, consider using a cane or walker.   If your fall was related to alcohol use, stop or limit alcohol intake.   If your fall was related to use of sleeping medicines, talk to your doctor about this. You may need to reduce your dosage at bedtime if you awaken during the night to go to the bathroom.   To reduce the need for nighttime bathroom trips:   Avoid drinking fluids for several hours before going to bed   Empty your bladder before going to bed   Men can keep a urinal at the bedside   © 8704-0134 [facility], [address] [place] [address], [place] [address]. All rights reserved. This information is not intended as a substitute for professional medical care. Always follow your healthcare professional's instructions.  IRON DEFICIENCY ANEMIA:  Anemia is a condition where the size or number of red blood cells in the body is reduced. Iron is needed in the diet to make red cells. The red blood cells carry oxygen to all parts of the body. Anemia limits the delivery of oxygen to where it is needed. This causes a feeling of being tired and run down. When anemia becomes severe, the skin becomes pale and there is shortness of breath with exertion, headaches, dizziness, drowsiness and fatigue.  The cause of your anemia is lack of iron  in your body. This may occur due to blood loss (for example, heavy menstrual periods or bleeding from the stomach or intestines) or a poor diet (not eating enough iron-containing foods), inability to absorb iron from your diet, or pregnancy.  If the blood count is low enough, an IRON SUPPLEMENT will be prescribed. It usually takes about 2-3 months of treatment with iron supplements to correct an anemia. Severe cases of anemia requires a blood transfusion to rapidly correct symptoms and deliver more oxygen to the cells.  HOME CARE:  1) Increase the iron stores in your body by eating foods high in iron content. This is a natural way of building your blood cells back up again. Beef, liver, spinach and other dark green leafy vegetables, whole grain products, beans and nuts are all natural sources of iron.  2) If you are having symptoms of anemia listed above:  -- Do not overexert yourself.  -- Talk to your doctor before flying on an airplane or travelling to high altitudes.  FOLLOW UP with your doctor in 2 months for a repeat red blood cell count, or as recommended by our staff, to be sure that the anemia has been corrected.  GET PROMPT MEDICAL ATTENTION if any of the following occur or worsen:  -- Shortness of breath or chest pain  -- Dizziness or fainting  -- Vomiting blood or passing red or black-colored stool  © 3580-2928 [employer], [address] [place] [address], [place] [address]. All rights reserved. This information is not intended as a substitute for professional medical care. Always follow your healthcare professional's instructions.

## 2020-01-14 NOTE — NURSING
Pt tolerated Venofer well. No adverse reaction noted. Pt education reinforced on possible side effects, what to expect, and when to call her MD. Pt verbalized understanding. I reviewed pt calendar w/ pt and understanding verbalized. IV flushed w/ NS and D/C per protocol.

## 2020-01-16 ENCOUNTER — TELEPHONE (OUTPATIENT)
Dept: GASTROENTEROLOGY | Facility: CLINIC | Age: 70
End: 2020-01-16

## 2020-01-16 NOTE — TELEPHONE ENCOUNTER
Returned patients call and rescheduled her procedure per her request to March 30 ne instruction or being put in the mail.

## 2020-01-21 ENCOUNTER — INFUSION (OUTPATIENT)
Dept: INFUSION THERAPY | Facility: HOSPITAL | Age: 70
End: 2020-01-21
Attending: INTERNAL MEDICINE
Payer: MEDICARE

## 2020-01-21 VITALS
SYSTOLIC BLOOD PRESSURE: 126 MMHG | DIASTOLIC BLOOD PRESSURE: 69 MMHG | TEMPERATURE: 97 F | HEART RATE: 58 BPM | RESPIRATION RATE: 20 BRPM

## 2020-01-21 DIAGNOSIS — D50.0 IRON DEFICIENCY ANEMIA DUE TO CHRONIC BLOOD LOSS: Primary | ICD-10-CM

## 2020-01-21 PROCEDURE — 96375 TX/PRO/DX INJ NEW DRUG ADDON: CPT

## 2020-01-21 PROCEDURE — 63600175 PHARM REV CODE 636 W HCPCS: Performed by: NURSE PRACTITIONER

## 2020-01-21 PROCEDURE — 96374 THER/PROPH/DIAG INJ IV PUSH: CPT

## 2020-01-21 RX ORDER — HEPARIN 100 UNIT/ML
500 SYRINGE INTRAVENOUS
Status: CANCELLED | OUTPATIENT
Start: 2020-01-21

## 2020-01-21 RX ORDER — METHYLPREDNISOLONE SOD SUCC 125 MG
80 VIAL (EA) INJECTION
Status: COMPLETED | OUTPATIENT
Start: 2020-01-21 | End: 2020-01-21

## 2020-01-21 RX ORDER — SODIUM CHLORIDE 0.9 % (FLUSH) 0.9 %
10 SYRINGE (ML) INJECTION
Status: CANCELLED | OUTPATIENT
Start: 2020-01-21

## 2020-01-21 RX ORDER — METHYLPREDNISOLONE SOD SUCC 125 MG
80 VIAL (EA) INJECTION
Status: CANCELLED
Start: 2020-01-21

## 2020-01-21 RX ADMIN — SODIUM CHLORIDE: 9 INJECTION, SOLUTION INTRAVENOUS at 02:01

## 2020-01-21 RX ADMIN — METHYLPREDNISOLONE SODIUM SUCCINATE 80 MG: 125 INJECTION, POWDER, FOR SOLUTION INTRAMUSCULAR; INTRAVENOUS at 02:01

## 2020-01-21 RX ADMIN — IRON SUCROSE 200 MG: 20 INJECTION, SOLUTION INTRAVENOUS at 02:01

## 2020-01-21 NOTE — DISCHARGE INSTRUCTIONS
[facility]  [address] [facility]  [address]  [phone] phone     [phone] fax  Hours of Operation: Monday- Friday 8:00am- 5:00pm  After hours phone  [phone]  Hematology / Oncology Physicians on call      Dr. Hank Javed, SAMEERA Delgado NP Tyesha Taylor, NP    Please call with any concerns regarding your appointment today.      FALL PREVENTION   Falls often occur due to slipping, tripping or losing your balance. Here are ways to reduce your risk of falling again.   Was there anything that caused your fall that can be fixed, removed or replaced?   Make your home safe by keeping walkways clear of objects you may trip over.   Use non-slip pads under rugs.   Do not walk in poorly lit areas.   Do not stand on chairs or wobbly ladders.   Use caution when reaching overhead or looking upward. This position can cause a loss of balance.   Be sure your shoes fit properly, have non-slip bottoms and are in good condition.   Be cautious when going up and down stairs, curbs, and when walking on uneven sidewalks.   If your balance is poor, consider using a cane or walker.   If your fall was related to alcohol use, stop or limit alcohol intake.   If your fall was related to use of sleeping medicines, talk to your doctor about this. You may need to reduce your dosage at bedtime if you awaken during the night to go to the bathroom.   To reduce the need for nighttime bathroom trips:   Avoid drinking fluids for several hours before going to bed   Empty your bladder before going to bed   Men can keep a urinal at the bedside   © 6401-6533 [facility], [address] [place] [address], [place] [address]. All rights reserved. This information is not intended as a substitute for professional medical care. Always follow your healthcare professional's instructions.

## 2020-01-21 NOTE — PLAN OF CARE
Patient denies pain today. Feet elevated. SOB noted with exertion, and patient reports some dizziness when ambulating only. Patient denies other needs at time of visit.

## 2020-01-27 DIAGNOSIS — D50.0 IRON DEFICIENCY ANEMIA DUE TO CHRONIC BLOOD LOSS: Primary | ICD-10-CM

## 2020-01-28 ENCOUNTER — INFUSION (OUTPATIENT)
Dept: INFUSION THERAPY | Facility: HOSPITAL | Age: 70
End: 2020-01-28
Attending: INTERNAL MEDICINE
Payer: MEDICARE

## 2020-01-28 VITALS
SYSTOLIC BLOOD PRESSURE: 142 MMHG | TEMPERATURE: 99 F | HEART RATE: 58 BPM | RESPIRATION RATE: 18 BRPM | OXYGEN SATURATION: 98 % | DIASTOLIC BLOOD PRESSURE: 69 MMHG

## 2020-01-28 DIAGNOSIS — D50.0 IRON DEFICIENCY ANEMIA DUE TO CHRONIC BLOOD LOSS: Primary | ICD-10-CM

## 2020-01-28 PROCEDURE — 96375 TX/PRO/DX INJ NEW DRUG ADDON: CPT

## 2020-01-28 PROCEDURE — 96374 THER/PROPH/DIAG INJ IV PUSH: CPT

## 2020-01-28 PROCEDURE — 63600175 PHARM REV CODE 636 W HCPCS: Performed by: NURSE PRACTITIONER

## 2020-01-28 RX ORDER — METHYLPREDNISOLONE SOD SUCC 125 MG
80 VIAL (EA) INJECTION
Status: COMPLETED | OUTPATIENT
Start: 2020-01-28 | End: 2020-01-28

## 2020-01-28 RX ORDER — HEPARIN 100 UNIT/ML
500 SYRINGE INTRAVENOUS
Status: CANCELLED | OUTPATIENT
Start: 2020-01-28

## 2020-01-28 RX ORDER — SODIUM CHLORIDE 0.9 % (FLUSH) 0.9 %
10 SYRINGE (ML) INJECTION
Status: CANCELLED | OUTPATIENT
Start: 2020-01-28

## 2020-01-28 RX ORDER — METHYLPREDNISOLONE SOD SUCC 125 MG
80 VIAL (EA) INJECTION
Status: CANCELLED
Start: 2020-01-28

## 2020-01-28 RX ADMIN — SODIUM CHLORIDE: 0.9 INJECTION, SOLUTION INTRAVENOUS at 01:01

## 2020-01-28 RX ADMIN — IRON SUCROSE 200 MG: 20 INJECTION, SOLUTION INTRAVENOUS at 02:01

## 2020-01-28 RX ADMIN — METHYLPREDNISOLONE SODIUM SUCCINATE 80 MG: 125 INJECTION, POWDER, FOR SOLUTION INTRAMUSCULAR; INTRAVENOUS at 01:01

## 2020-01-28 NOTE — NURSING
Pt tolerated Venofer well. No adverse reaction noted. Pt education reinforced on possible side effects, what to expect, and when to call Elida Alanis NP. Pt verbalized understanding. I reviewed pt calendar w/ pt and understanding verbalized. IV flushed w/ NS and D/C per protocol.

## 2020-01-28 NOTE — PLAN OF CARE
Pt states she feels like she has a little more energy today.  She isn't quite as weak since her last dose of Venofer.

## 2020-01-28 NOTE — DISCHARGE INSTRUCTIONS
[facility]  [address] [facility]  [address] [address]  [phone] phone     [phone] fax  Hours of Operation: Monday- Friday 8:00am- 5:00pm  After hours phone  [phone]  Hematology / Oncology Physicians on call      Dr. Hank Javed, SAMEERA Delgado NP Tyesha Taylor, NP    Please call with any concerns regarding your appointment today.FALL PREVENTION   Falls often occur due to slipping, tripping or losing your balance. Here are ways to reduce your risk of falling again.   Was there anything that caused your fall that can be fixed, removed or replaced?   Make your home safe by keeping walkways clear of objects you may trip over.   Use non-slip pads under rugs.   Do not walk in poorly lit areas.   Do not stand on chairs or wobbly ladders.   Use caution when reaching overhead or looking upward. This position can cause a loss of balance.   Be sure your shoes fit properly, have non-slip bottoms and are in good condition.   Be cautious when going up and down stairs, curbs, and when walking on uneven sidewalks.   If your balance is poor, consider using a cane or walker.   If your fall was related to alcohol use, stop or limit alcohol intake.   If your fall was related to use of sleeping medicines, talk to your doctor about this. You may need to reduce your dosage at bedtime if you awaken during the night to go to the bathroom.   To reduce the need for nighttime bathroom trips:   Avoid drinking fluids for several hours before going to bed   Empty your bladder before going to bed   Men can keep a urinal at the bedside   © 7349-6868 [facility], [address] [place] [address], [place] [address]. All rights reserved. This information is not intended as a substitute for professional medical care. Always follow your healthcare professional's instructions.  IRON DEFICIENCY ANEMIA:  Anemia is a  condition where the size or number of red blood cells in the body is reduced. Iron is needed in the diet to make red cells. The red blood cells carry oxygen to all parts of the body. Anemia limits the delivery of oxygen to where it is needed. This causes a feeling of being tired and run down. When anemia becomes severe, the skin becomes pale and there is shortness of breath with exertion, headaches, dizziness, drowsiness and fatigue.  The cause of your anemia is lack of iron in your body. This may occur due to blood loss (for example, heavy menstrual periods or bleeding from the stomach or intestines) or a poor diet (not eating enough iron-containing foods), inability to absorb iron from your diet, or pregnancy.  If the blood count is low enough, an IRON SUPPLEMENT will be prescribed. It usually takes about 2-3 months of treatment with iron supplements to correct an anemia. Severe cases of anemia requires a blood transfusion to rapidly correct symptoms and deliver more oxygen to the cells.  HOME CARE:  1) Increase the iron stores in your body by eating foods high in iron content. This is a natural way of building your blood cells back up again. Beef, liver, spinach and other dark green leafy vegetables, whole grain products, beans and nuts are all natural sources of iron.  2) If you are having symptoms of anemia listed above:  -- Do not overexert yourself.  -- Talk to your doctor before flying on an airplane or travelling to high altitudes.  FOLLOW UP with your doctor in 2 months for a repeat red blood cell count, or as recommended by our staff, to be sure that the anemia has been corrected.  GET PROMPT MEDICAL ATTENTION if any of the following occur or worsen:  -- Shortness of breath or chest pain  -- Dizziness or fainting  -- Vomiting blood or passing red or black-colored stool  © 6282-1370 [employer], [address] [facility], [place] [address]. All rights reserved. This information is not intended as a  substitute for professional medical care. Always follow your healthcare professional's instructions.

## 2020-02-03 RX ORDER — SODIUM CHLORIDE 0.9 % (FLUSH) 0.9 %
10 SYRINGE (ML) INJECTION
Status: CANCELLED | OUTPATIENT
Start: 2020-02-03

## 2020-02-03 RX ORDER — METHYLPREDNISOLONE SOD SUCC 125 MG
80 VIAL (EA) INJECTION
Status: CANCELLED
Start: 2020-02-03

## 2020-02-03 RX ORDER — HEPARIN 100 UNIT/ML
500 SYRINGE INTRAVENOUS
Status: CANCELLED | OUTPATIENT
Start: 2020-02-03

## 2020-02-04 ENCOUNTER — INFUSION (OUTPATIENT)
Dept: INFUSION THERAPY | Facility: HOSPITAL | Age: 70
End: 2020-02-04
Attending: INTERNAL MEDICINE
Payer: MEDICARE

## 2020-02-04 VITALS
OXYGEN SATURATION: 98 % | HEART RATE: 55 BPM | SYSTOLIC BLOOD PRESSURE: 116 MMHG | RESPIRATION RATE: 18 BRPM | TEMPERATURE: 97 F | DIASTOLIC BLOOD PRESSURE: 53 MMHG

## 2020-02-04 DIAGNOSIS — D50.0 IRON DEFICIENCY ANEMIA DUE TO CHRONIC BLOOD LOSS: Primary | ICD-10-CM

## 2020-02-04 PROCEDURE — 63600175 PHARM REV CODE 636 W HCPCS: Performed by: NURSE PRACTITIONER

## 2020-02-04 PROCEDURE — 96375 TX/PRO/DX INJ NEW DRUG ADDON: CPT

## 2020-02-04 PROCEDURE — 96374 THER/PROPH/DIAG INJ IV PUSH: CPT

## 2020-02-04 RX ORDER — METHYLPREDNISOLONE SOD SUCC 125 MG
80 VIAL (EA) INJECTION
Status: COMPLETED | OUTPATIENT
Start: 2020-02-04 | End: 2020-02-04

## 2020-02-04 RX ADMIN — METHYLPREDNISOLONE SODIUM SUCCINATE 80 MG: 125 INJECTION, POWDER, FOR SOLUTION INTRAMUSCULAR; INTRAVENOUS at 01:02

## 2020-02-04 RX ADMIN — IRON SUCROSE 200 MG: 20 INJECTION, SOLUTION INTRAVENOUS at 02:02

## 2020-02-04 NOTE — NURSING
Pt tolerated Venofer well. No adverse reaction noted. Pt education reinforced on possible side effects, what to expect, and when to call . Pt verbalized understanding. I reviewed pt calendar w/ pt and understanding verbalized. IV flushed w/ NS and D/C per protocol.

## 2020-02-04 NOTE — DISCHARGE INSTRUCTIONS
[facility]  [address] [facility]  [address]  [phone] phone     [phone] fax  Hours of Operation: Monday- Friday 8:00am- 5:00pm  After hours phone  [phone]  Hematology / Oncology Physicians on call      Dr. Hank Javed, SAMEERA Delgado NP Tyesha Taylor, NP    Please call with any concerns regarding your appointment today.FALL PREVENTION   Falls often occur due to slipping, tripping or losing your balance. Here are ways to reduce your risk of falling again.   Was there anything that caused your fall that can be fixed, removed or replaced?   Make your home safe by keeping walkways clear of objects you may trip over.   Use non-slip pads under rugs.   Do not walk in poorly lit areas.   Do not stand on chairs or wobbly ladders.   Use caution when reaching overhead or looking upward. This position can cause a loss of balance.   Be sure your shoes fit properly, have non-slip bottoms and are in good condition.   Be cautious when going up and down stairs, curbs, and when walking on uneven sidewalks.   If your balance is poor, consider using a cane or walker.   If your fall was related to alcohol use, stop or limit alcohol intake.   If your fall was related to use of sleeping medicines, talk to your doctor about this. You may need to reduce your dosage at bedtime if you awaken during the night to go to the bathroom.   To reduce the need for nighttime bathroom trips:   Avoid drinking fluids for several hours before going to bed   Empty your bladder before going to bed   Men can keep a urinal at the bedside   © 5641-4046 [employer] [facility], [address] [place] [address], [place] [address]. All rights reserved. This information is not intended as a substitute for professional medical care. Always follow your healthcare professional's instructions.

## 2020-02-07 ENCOUNTER — PATIENT OUTREACH (OUTPATIENT)
Dept: ADMINISTRATIVE | Facility: HOSPITAL | Age: 70
End: 2020-02-07

## 2020-03-02 DIAGNOSIS — I10 ESSENTIAL HYPERTENSION: ICD-10-CM

## 2020-03-02 DIAGNOSIS — I25.118 CORONARY ARTERY DISEASE OF NATIVE ARTERY OF NATIVE HEART WITH STABLE ANGINA PECTORIS: Primary | ICD-10-CM

## 2020-03-02 DIAGNOSIS — I25.10 CORONARY ARTERY DISEASE, OCCLUSIVE: Chronic | ICD-10-CM

## 2020-03-02 DIAGNOSIS — R07.2 PRECORDIAL PAIN: ICD-10-CM

## 2020-03-02 RX ORDER — CLOPIDOGREL BISULFATE 75 MG/1
75 TABLET ORAL DAILY
Qty: 90 TABLET | Refills: 3 | Status: SHIPPED | OUTPATIENT
Start: 2020-03-02 | End: 2020-03-05 | Stop reason: SDUPTHER

## 2020-03-02 RX ORDER — NITROGLYCERIN 0.4 MG/1
0.4 TABLET SUBLINGUAL EVERY 5 MIN PRN
Qty: 100 TABLET | Refills: 3 | Status: SHIPPED | OUTPATIENT
Start: 2020-03-02 | End: 2020-03-05 | Stop reason: SDUPTHER

## 2020-03-02 RX ORDER — FUROSEMIDE 20 MG/1
10 TABLET ORAL 2 TIMES DAILY
Qty: 90 TABLET | Refills: 3 | Status: SHIPPED | OUTPATIENT
Start: 2020-03-02 | End: 2020-03-05 | Stop reason: SDUPTHER

## 2020-03-02 RX ORDER — METOPROLOL SUCCINATE 100 MG/1
100 TABLET, EXTENDED RELEASE ORAL 2 TIMES DAILY
Qty: 180 TABLET | Refills: 3 | Status: SHIPPED | OUTPATIENT
Start: 2020-03-02 | End: 2020-03-05 | Stop reason: SDUPTHER

## 2020-03-02 RX ORDER — VALSARTAN AND HYDROCHLOROTHIAZIDE 160; 12.5 MG/1; MG/1
1 TABLET, FILM COATED ORAL DAILY
Qty: 90 TABLET | Refills: 3 | Status: SHIPPED | OUTPATIENT
Start: 2020-03-02 | End: 2020-03-05 | Stop reason: SDUPTHER

## 2020-03-02 NOTE — TELEPHONE ENCOUNTER
----- Message from Judith Quarles sent at 3/2/2020 11:18 AM CST -----  Contact: self [phone]  Type:  RX Refill Request    Who Called: Qi Ortiz  Refill or New Rx:refill  RX Name and Strength: Toprol 100mg   How is the patient currently taking it? (ex. 1XDay):2x day  Is this a 30 day or 90 day RX: 90 day  Preferred Pharmacy with phone number: Picks up rxs  Local or Mail Order: [facility]  Ordering Provider:Santi  Would the patient rather a call back or a response via [facility]? Call back   Best Call Back Number:[phone]  Additional Information: Valsartan/HCTZ 160/12.5mg 1x day 90 day supply, plavix 75mg 1x day 90 day supply, nitro sublingual as needed 90 day supply, lasix 10mg 2x day as needed 90 day supply. States that she wants 3 refills per rx. States that she would like to  the rxs by Friday at [facility].

## 2020-03-05 ENCOUNTER — TELEPHONE (OUTPATIENT)
Dept: CARDIOLOGY | Facility: CLINIC | Age: 70
End: 2020-03-05

## 2020-03-05 DIAGNOSIS — I10 ESSENTIAL HYPERTENSION: ICD-10-CM

## 2020-03-05 DIAGNOSIS — I25.118 CORONARY ARTERY DISEASE OF NATIVE ARTERY OF NATIVE HEART WITH STABLE ANGINA PECTORIS: ICD-10-CM

## 2020-03-05 DIAGNOSIS — R07.2 PRECORDIAL PAIN: ICD-10-CM

## 2020-03-05 DIAGNOSIS — I25.10 CORONARY ARTERY DISEASE, OCCLUSIVE: Chronic | ICD-10-CM

## 2020-03-05 RX ORDER — NITROGLYCERIN 0.4 MG/1
0.4 TABLET SUBLINGUAL EVERY 5 MIN PRN
Qty: 100 TABLET | Refills: 3 | Status: SHIPPED | OUTPATIENT
Start: 2020-03-05 | End: 2022-02-08 | Stop reason: SDUPTHER

## 2020-03-05 RX ORDER — CLOPIDOGREL BISULFATE 75 MG/1
75 TABLET ORAL DAILY
Qty: 90 TABLET | Refills: 3 | OUTPATIENT
Start: 2020-03-05 | End: 2020-03-05 | Stop reason: SDUPTHER

## 2020-03-05 RX ORDER — CLOPIDOGREL BISULFATE 75 MG/1
75 TABLET ORAL DAILY
Qty: 90 TABLET | Refills: 3 | Status: SHIPPED | OUTPATIENT
Start: 2020-03-05 | End: 2021-03-09 | Stop reason: SDUPTHER

## 2020-03-05 RX ORDER — FUROSEMIDE 20 MG/1
10 TABLET ORAL 2 TIMES DAILY
Qty: 90 TABLET | Refills: 3 | OUTPATIENT
Start: 2020-03-05 | End: 2020-03-05 | Stop reason: SDUPTHER

## 2020-03-05 RX ORDER — VALSARTAN AND HYDROCHLOROTHIAZIDE 160; 12.5 MG/1; MG/1
1 TABLET, FILM COATED ORAL DAILY
Qty: 90 TABLET | Refills: 3 | OUTPATIENT
Start: 2020-03-05 | End: 2020-03-05 | Stop reason: SDUPTHER

## 2020-03-05 RX ORDER — METOPROLOL SUCCINATE 100 MG/1
100 TABLET, EXTENDED RELEASE ORAL 2 TIMES DAILY
Qty: 180 TABLET | Refills: 3 | Status: SHIPPED | OUTPATIENT
Start: 2020-03-05 | End: 2021-03-09 | Stop reason: SDUPTHER

## 2020-03-05 RX ORDER — METOPROLOL SUCCINATE 100 MG/1
100 TABLET, EXTENDED RELEASE ORAL 2 TIMES DAILY
Qty: 180 TABLET | Refills: 3 | OUTPATIENT
Start: 2020-03-05 | End: 2020-03-05 | Stop reason: SDUPTHER

## 2020-03-05 RX ORDER — VALSARTAN AND HYDROCHLOROTHIAZIDE 160; 12.5 MG/1; MG/1
1 TABLET, FILM COATED ORAL DAILY
Qty: 90 TABLET | Refills: 3 | Status: SHIPPED | OUTPATIENT
Start: 2020-03-05 | End: 2021-03-09 | Stop reason: SDUPTHER

## 2020-03-05 RX ORDER — NITROGLYCERIN 0.4 MG/1
0.4 TABLET SUBLINGUAL EVERY 5 MIN PRN
Qty: 100 TABLET | Refills: 3 | OUTPATIENT
Start: 2020-03-05 | End: 2020-03-05 | Stop reason: SDUPTHER

## 2020-03-05 RX ORDER — FUROSEMIDE 20 MG/1
10 TABLET ORAL 2 TIMES DAILY
Qty: 90 TABLET | Refills: 3 | Status: SHIPPED | OUTPATIENT
Start: 2020-03-05 | End: 2020-08-21 | Stop reason: DRUGHIGH

## 2020-03-05 NOTE — TELEPHONE ENCOUNTER
----- Message from Demetra Cheney sent at 3/5/2020 11:16 AM CST -----  Contact: [phone]  Would like to consult with the nurse, Patient would like to know if her Rx is Ready to be , Patient would like to speak with the nurse concerning this, Please call back at [phone], Thanks sj

## 2020-03-06 ENCOUNTER — TELEPHONE (OUTPATIENT)
Dept: CARDIOLOGY | Facility: CLINIC | Age: 70
End: 2020-03-06

## 2020-03-12 ENCOUNTER — TELEPHONE (OUTPATIENT)
Dept: GASTROENTEROLOGY | Facility: CLINIC | Age: 70
End: 2020-03-12

## 2020-03-12 ENCOUNTER — TELEPHONE (OUTPATIENT)
Dept: HEMATOLOGY/ONCOLOGY | Facility: CLINIC | Age: 70
End: 2020-03-12

## 2020-03-12 NOTE — TELEPHONE ENCOUNTER
----- Message from Elida Alanis NP sent at 3/12/2020  3:00 PM CDT -----  Contact: pt  Please let patient know that if she feels okay and would like to cancel that is fine but I would recommend her keeping appointment. We are working to implement telemedicine appointments which means a virtual visit. If she decides to cancel please have her rescheduled   ----- Message -----  From: Jessica Becker MA  Sent: 3/12/2020   9:31 AM CDT  To: Elida Alanis NP    Spoke with patient; stated she is highly concerned with the covid-19 spread and wants to cancel her upcoming lab and follow up appointments with you until further notice. She stated she is also cancelling her upcoming endoscopies as well. Patient wanted to get your thoughts on this before cancelling. Please advise.  ----- Message -----  From: Magy Mg  Sent: 3/12/2020   9:03 AM CDT  To: Annabelle Marrero Staff    The pt request a call concerning her 03/13/2020 and 03/16/2020 appt, no additional info given and can be reached at [phone]///thxMW

## 2020-03-12 NOTE — TELEPHONE ENCOUNTER
Patient stated she feels great at the moment, no symptoms. Patient wishes to cancel the appointments. Offered to reschedule and patient declined stating that if anything changes with her symptoms, she will call us to reachedule

## 2020-03-12 NOTE — TELEPHONE ENCOUNTER
Patient called and needed to reschedule her procedure for 3/30/2020 d/t the Corona Virus she is at moderate risk and wants to reschedule for the end of May. Procedure rescheduled for  5/25/2020 at 9 AM. Patient lost instructions and new Instructions were mailed to her today.

## 2020-03-12 NOTE — TELEPHONE ENCOUNTER
----- Message from Magy Mg sent at 3/12/2020  9:05 AM CDT -----  Contact: pt  The pt request a call concerning her 03/30/2020 appt, no additional info given and can be reached at [phone]///thxMW

## 2020-03-12 NOTE — TELEPHONE ENCOUNTER
Dr Colleen Pozo has rescheduled her EGD and Colon from Janurary 29 th to March 30th. Patient just called and wants to reschedule to May 25 th d/t Corona Virus and her being Moderate risk for it. Her Cardiac Clearance will be longer than 6 months, is it ok to hold her Plavix for 5 days prior to the May 25 th Procedure for EGD and Colon. Thanks, and sorry for having to ask you for clearance again.

## 2020-03-30 ENCOUNTER — TELEPHONE (OUTPATIENT)
Dept: HEMATOLOGY/ONCOLOGY | Facility: CLINIC | Age: 70
End: 2020-03-30

## 2020-03-30 NOTE — TELEPHONE ENCOUNTER
----- Message from Shae Seals sent at 3/30/2020 10:23 AM CDT -----  Contact: patient  Calling to get orders for lab work to be done [phone]. Please call patient @ [phone]. Thanks

## 2020-04-03 ENCOUNTER — LAB VISIT (OUTPATIENT)
Dept: LAB | Facility: HOSPITAL | Age: 70
End: 2020-04-03
Attending: INTERNAL MEDICINE
Payer: MEDICARE

## 2020-04-03 DIAGNOSIS — D50.0 IRON DEFICIENCY ANEMIA DUE TO CHRONIC BLOOD LOSS: ICD-10-CM

## 2020-04-03 DIAGNOSIS — L40.9 PSORIASIS: ICD-10-CM

## 2020-04-03 LAB
ANION GAP SERPL CALC-SCNC: 10 MMOL/L (ref 8–16)
BASOPHILS # BLD AUTO: 0.07 K/UL (ref 0–0.2)
BASOPHILS # BLD AUTO: 0.07 K/UL (ref 0–0.2)
BASOPHILS NFR BLD: 0.8 % (ref 0–1.9)
BASOPHILS NFR BLD: 0.8 % (ref 0–1.9)
BUN SERPL-MCNC: 23 MG/DL (ref 8–23)
CALCIUM SERPL-MCNC: 9.3 MG/DL (ref 8.7–10.5)
CHLORIDE SERPL-SCNC: 103 MMOL/L (ref 95–110)
CO2 SERPL-SCNC: 30 MMOL/L (ref 23–29)
CREAT SERPL-MCNC: 1 MG/DL (ref 0.5–1.4)
DIFFERENTIAL METHOD: ABNORMAL
DIFFERENTIAL METHOD: ABNORMAL
EOSINOPHIL # BLD AUTO: 0.2 K/UL (ref 0–0.5)
EOSINOPHIL # BLD AUTO: 0.2 K/UL (ref 0–0.5)
EOSINOPHIL NFR BLD: 1.8 % (ref 0–8)
EOSINOPHIL NFR BLD: 1.8 % (ref 0–8)
ERYTHROCYTE [DISTWIDTH] IN BLOOD BY AUTOMATED COUNT: 16 % (ref 11.5–14.5)
ERYTHROCYTE [DISTWIDTH] IN BLOOD BY AUTOMATED COUNT: 16 % (ref 11.5–14.5)
EST. GFR  (AFRICAN AMERICAN): >60 ML/MIN/1.73 M^2
EST. GFR  (NON AFRICAN AMERICAN): 58 ML/MIN/1.73 M^2
FERRITIN SERPL-MCNC: 25 NG/ML (ref 20–300)
GLUCOSE SERPL-MCNC: 123 MG/DL (ref 70–110)
HCT VFR BLD AUTO: 42.4 % (ref 37–48.5)
HCT VFR BLD AUTO: 42.4 % (ref 37–48.5)
HGB BLD-MCNC: 13.2 G/DL (ref 12–16)
HGB BLD-MCNC: 13.2 G/DL (ref 12–16)
IMM GRANULOCYTES # BLD AUTO: 0.02 K/UL (ref 0–0.04)
IMM GRANULOCYTES # BLD AUTO: 0.02 K/UL (ref 0–0.04)
IMM GRANULOCYTES NFR BLD AUTO: 0.2 % (ref 0–0.5)
IMM GRANULOCYTES NFR BLD AUTO: 0.2 % (ref 0–0.5)
IRON SERPL-MCNC: 91 UG/DL (ref 30–160)
LYMPHOCYTES # BLD AUTO: 2.8 K/UL (ref 1–4.8)
LYMPHOCYTES # BLD AUTO: 2.8 K/UL (ref 1–4.8)
LYMPHOCYTES NFR BLD: 30.9 % (ref 18–48)
LYMPHOCYTES NFR BLD: 30.9 % (ref 18–48)
MCH RBC QN AUTO: 28.2 PG (ref 27–31)
MCH RBC QN AUTO: 28.2 PG (ref 27–31)
MCHC RBC AUTO-ENTMCNC: 31.1 G/DL (ref 32–36)
MCHC RBC AUTO-ENTMCNC: 31.1 G/DL (ref 32–36)
MCV RBC AUTO: 91 FL (ref 82–98)
MCV RBC AUTO: 91 FL (ref 82–98)
MONOCYTES # BLD AUTO: 0.8 K/UL (ref 0.3–1)
MONOCYTES # BLD AUTO: 0.8 K/UL (ref 0.3–1)
MONOCYTES NFR BLD: 8.2 % (ref 4–15)
MONOCYTES NFR BLD: 8.2 % (ref 4–15)
NEUTROPHILS # BLD AUTO: 5.3 K/UL (ref 1.8–7.7)
NEUTROPHILS # BLD AUTO: 5.3 K/UL (ref 1.8–7.7)
NEUTROPHILS NFR BLD: 58.1 % (ref 38–73)
NEUTROPHILS NFR BLD: 58.1 % (ref 38–73)
NRBC BLD-RTO: 0 /100 WBC
NRBC BLD-RTO: 0 /100 WBC
PLATELET # BLD AUTO: 249 K/UL (ref 150–350)
PLATELET # BLD AUTO: 249 K/UL (ref 150–350)
PMV BLD AUTO: 10.3 FL (ref 9.2–12.9)
PMV BLD AUTO: 10.3 FL (ref 9.2–12.9)
POTASSIUM SERPL-SCNC: 3.9 MMOL/L (ref 3.5–5.1)
RBC # BLD AUTO: 4.68 M/UL (ref 4–5.4)
RBC # BLD AUTO: 4.68 M/UL (ref 4–5.4)
SATURATED IRON: 22 % (ref 20–50)
SODIUM SERPL-SCNC: 143 MMOL/L (ref 136–145)
TOTAL IRON BINDING CAPACITY: 413 UG/DL (ref 250–450)
TRANSFERRIN SERPL-MCNC: 279 MG/DL (ref 200–375)
WBC # BLD AUTO: 9.1 K/UL (ref 3.9–12.7)
WBC # BLD AUTO: 9.1 K/UL (ref 3.9–12.7)

## 2020-04-03 PROCEDURE — 83540 ASSAY OF IRON: CPT

## 2020-04-03 PROCEDURE — 80053 COMPREHEN METABOLIC PANEL: CPT

## 2020-04-03 PROCEDURE — 82728 ASSAY OF FERRITIN: CPT

## 2020-04-03 PROCEDURE — 85025 COMPLETE CBC W/AUTO DIFF WBC: CPT

## 2020-04-05 LAB
ALBUMIN SERPL BCP-MCNC: 3.5 G/DL (ref 3.5–5.2)
ALP SERPL-CCNC: 40 U/L (ref 55–135)
ALT SERPL W/O P-5'-P-CCNC: 16 U/L (ref 10–44)
ANION GAP SERPL CALC-SCNC: 10 MMOL/L (ref 8–16)
AST SERPL-CCNC: 18 U/L (ref 10–40)
BILIRUB SERPL-MCNC: 0.6 MG/DL (ref 0.1–1)
BUN SERPL-MCNC: 23 MG/DL (ref 8–23)
CALCIUM SERPL-MCNC: 9.3 MG/DL (ref 8.7–10.5)
CHLORIDE SERPL-SCNC: 103 MMOL/L (ref 95–110)
CO2 SERPL-SCNC: 30 MMOL/L (ref 23–29)
CREAT SERPL-MCNC: 1 MG/DL (ref 0.5–1.4)
EST. GFR  (AFRICAN AMERICAN): >60 ML/MIN/1.73 M^2
EST. GFR  (NON AFRICAN AMERICAN): 58 ML/MIN/1.73 M^2
GLUCOSE SERPL-MCNC: 123 MG/DL (ref 70–110)
POTASSIUM SERPL-SCNC: 3.9 MMOL/L (ref 3.5–5.1)
PROT SERPL-MCNC: 7.2 G/DL (ref 6–8.4)
SODIUM SERPL-SCNC: 143 MMOL/L (ref 136–145)

## 2020-04-06 ENCOUNTER — OFFICE VISIT (OUTPATIENT)
Dept: RHEUMATOLOGY | Facility: CLINIC | Age: 70
End: 2020-04-06
Payer: MEDICARE

## 2020-04-06 ENCOUNTER — TELEPHONE (OUTPATIENT)
Dept: RHEUMATOLOGY | Facility: CLINIC | Age: 70
End: 2020-04-06

## 2020-04-06 ENCOUNTER — OFFICE VISIT (OUTPATIENT)
Dept: HEMATOLOGY/ONCOLOGY | Facility: CLINIC | Age: 70
End: 2020-04-06
Payer: MEDICARE

## 2020-04-06 DIAGNOSIS — D50.0 IRON DEFICIENCY ANEMIA DUE TO CHRONIC BLOOD LOSS: Primary | ICD-10-CM

## 2020-04-06 DIAGNOSIS — L40.9 PSORIASIS: Primary | ICD-10-CM

## 2020-04-06 DIAGNOSIS — N18.30 STAGE 3 CHRONIC KIDNEY DISEASE: ICD-10-CM

## 2020-04-06 DIAGNOSIS — Z51.81 MEDICATION MONITORING ENCOUNTER: ICD-10-CM

## 2020-04-06 DIAGNOSIS — L40.50 PSORIATIC ARTHRITIS: ICD-10-CM

## 2020-04-06 PROCEDURE — 99211 OFF/OP EST MAY X REQ PHY/QHP: CPT | Mod: PBBFAC | Performed by: NURSE PRACTITIONER

## 2020-04-06 PROCEDURE — 99213 PR OFFICE/OUTPT VISIT, EST, LEVL III, 20-29 MIN: ICD-10-PCS | Mod: S$PBB,,, | Performed by: NURSE PRACTITIONER

## 2020-04-06 PROCEDURE — 99442 PR PHYSICIAN TELEPHONE EVALUATION 11-20 MIN: CPT | Mod: 95,,, | Performed by: INTERNAL MEDICINE

## 2020-04-06 PROCEDURE — 99999 PR PBB SHADOW E&M-EST. PATIENT-LVL I: CPT | Mod: PBBFAC,,, | Performed by: NURSE PRACTITIONER

## 2020-04-06 PROCEDURE — 99999 PR PBB SHADOW E&M-EST. PATIENT-LVL I: ICD-10-PCS | Mod: PBBFAC,,, | Performed by: NURSE PRACTITIONER

## 2020-04-06 PROCEDURE — 99213 OFFICE O/P EST LOW 20 MIN: CPT | Mod: S$PBB,,, | Performed by: NURSE PRACTITIONER

## 2020-04-06 PROCEDURE — 99442 PR PHYSICIAN TELEPHONE EVALUATION 11-20 MIN: ICD-10-PCS | Mod: 95,,, | Performed by: INTERNAL MEDICINE

## 2020-04-06 NOTE — TELEPHONE ENCOUNTER
Called pt to schedule f/u appts after today's telephone visit. Appts scheduled for 7/15/20 with labs 1wk prior in [place] per pt req.

## 2020-04-06 NOTE — PATIENT INSTRUCTIONS
Iron-Deficiency Anemia (Adult)  Red blood cells carry oxygen to the tissues of your body. Anemia is a condition in which you have too few red blood cells. You need iron to make red cells. Anemia makes you feel tired and run down. When anemia becomes severe, your skin becomes pale. You may feel short of breath after physical activity. Other symptoms include:  · Headaches  · Dizziness  · Leg cramps with physical activity  · Drowsiness  · Restless legs  Your anemia is caused by not having enough iron in your body. This may be because of:  · Loss of blood. This can be caused by heavy menstrual periods. It can also be caused by bleeding from the stomach or intestines.  · Poor diet. You may not be eating enough foods that contain iron.  · Inability to absorb iron from the foods you eat  · Pregnancy  If your blood count is low enough, your healthcare provider may prescribe an iron supplement. It usually takes about 2 to 3 months of treatment with iron supplements to correct anemia. Severe cases of anemia need a blood transfusion to quickly ease symptoms and deliver more oxygen to the cells.  Home care  Follow these guidelines when caring for yourself at home:  · Eat foods high in iron. This will boost the amount of iron stored in your body. It is a natural way to build up the number of blood cells. Good sources of iron include beef, liver, spinach and other dark green leafy vegetables, whole grains, beans, and nuts.  · Do not overexert yourself.  · Talk with your healthcare provider before traveling by air or traveling to high altitudes.  Follow-up care  Follow up with your healthcare provider in 2 months, or as advised. This is to have another red blood cell count to be sure your anemia has been fixed.  When to seek medical advice  Call your healthcare provider right away if any of these occur:  · Shortness of breath or chest pain  · Dizziness or fainting  · Vomiting blood or passing red or black-colored stool   Date  Last Reviewed: 2/25/2016  © 5527-4069 [employer]. [address] [place] [address], [place] [address]. All rights reserved. This information is not intended as a substitute for professional medical care. Always follow your healthcare professional's instructions.

## 2020-04-06 NOTE — PROGRESS NOTES
The patient location is: home  The chief complaint leading to consultation is:  Psoriasis and psoriatic arthritis  Visit type: Virtual visit with synchronous audio only / Telephone encounter   Total time spent with patient: 20 min   Each patient to whom he or she provides medical services by telemedicine is:  (1) informed of the relationship between the physician and patient and the respective role of any other health care provider with respect to management of the patient; and (2) notified that he or she may decline to receive medical services by telemedicine and may withdraw from such care at any time.      CC:  Chief Complaint   Patient presents with    Osteoporosis   Psoriasis/ PsA    Today we have discussed her condition via telephone encounter  She is following Strict quarantine  She is due to receive her next dose of stelara this month , she is aware of the risks in the prevailing COVID condition, but however since she is starting to notice pain stiffness and some swelling in the hands  She would like to proceed with the injection    Her labs reviewed  Stable      History of Present Illness:  Qi Mg a 69 y.o.yo female    For routine follow-up of psoriasis and psoriatic arthritis   on Stelara 90 mg q 3 months; she is getting it here in clinic. In the past has failed enbrel, mtx, has sulfa allergy, recurrent skin infections with Humira, intolerant to Otezla    She also has Osteopenia, h/o right wrist fx in the 90's. Failed po boniva and fosamax due to GI intolerance. Last dexa showed decreasing bmd so Dr. CASTELLANOS started reclast in sept but she felt terrible after her infusion with significant pain and confusion. She does not want to repeat.  NO new falls/fxs. I discussed prolia with her in the past and today and she declines any meds   She takes vitamin-D over-the-counter daily, levels normal today   She has been on Stelara since 3/2016. Also with neuropathic chest pain due to previous cardiac surgery,  gabapentin helps greatly. She takes 200 mg 2 to 3 times a day as needed   No rash  No other concerns  Hands hurt when she uses a lot, otherwise no other concerns  No joint swelling  No fever, chills, fatigue or weight loss    She has iron deficiency anemia and gets constipated with iron tablets  She is currently followed by PCP for the same    She has history of staph infections and hence uses a betacept scrub , requests refill      Past Medical History:   Diagnosis Date    Carotid stenosis     19%    COPD (chronic obstructive pulmonary disease)     No meds    Coronary artery disease     CVA (cerebral vascular accident)     Dr. 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 Hope    Psoriatic arthritis     Rheumatology       Past Surgical History:   Procedure Laterality Date    BREAST BIOPSY      R sided/benign    CARDIAC SURGERY      2016    CERVICAL FUSION      CHOLECYSTECTOMY      CORONARY ANGIOPLASTY      CORONARY ARTERY BYPASS GRAFT      triple bypass    CORONARY STENT PLACEMENT      EYE SURGERY      INTRAUTERINE DEVICE INSERTION      mass removed from R groin      TOTAL ABDOMINAL HYSTERECTOMY W/ BILATERAL SALPINGOOPHORECTOMY      due to benign mass, adhesions    TUBAL LIGATION           Social History     Tobacco Use    Smoking status: Never Smoker    Smokeless tobacco: Never Used   Substance Use Topics    Alcohol use: No    Drug use: No       Family History   Problem Relation Age of Onset    Breast cancer Maternal Grandfather     Breast cancer Paternal Aunt     Stroke Unknown     Breast cancer Sister 60    Leukemia Sister 8         as child    Lung cancer Paternal Grandfather     Heart disease Unknown        Review of patient's allergies indicates:   Allergen Reactions    Celexa [citalopram] Anaphylaxis    Clindamycin Itching and  Swelling    Codeine Shortness Of Breath, Itching and Swelling    Crestor [rosuvastatin] Anaphylaxis    Cytotec [misoprostol] Anaphylaxis and Other (See Comments)     Difficulty breathing    Lisinopril Anaphylaxis    Magnesium citrate Shortness Of Breath    Stadol [butorphanol tartrate] Anaphylaxis     Coded    Vicodin [hydrocodone-acetaminophen] Shortness Of Breath    Adhesive      EKG Electrodes    Aggrenox [aspirin-dipyridamole] Other (See Comments)     headaches    Avelox [moxifloxacin]      PATIENT STATES DO NOT GIVE UNDER ANY CIRCUSTANCES    Demerol [meperidine]     Isosorbide Other (See Comments)     Severe headache    Kenalog [triamcinolone acetonide]     Medrol [methylprednisolone] Other (See Comments)     unknown    Mobic [meloxicam]     Morphine     Nitroglycerin      Long acting    Pholcodine     Prednisone      GASTRIC PAIN    Ranexa [ranolazine] Nausea And Vomiting    Reclast [zoledronic acid-mannitol-water] Other (See Comments)     Bones hurt    Sulfa (sulfonamide antibiotics) Other (See Comments)     unknown    Talwin [pentazocine lactate]     Tetracycline     Tetracyclines     Tilade [nedocromil]     Zetia [ezetimibe]      Medication List with Changes/Refills   Current Medications    ALBUTEROL (PROVENTIL) 2.5 MG /3 ML (0.083 %) NEBULIZER SOLUTION    Take 3 mLs (2.5 mg total) by nebulization every 6 (six) hours as needed for Wheezing. Rescue    APPLE CIDER VINEGAR ORAL    Take 1 capsule by mouth once daily.     ASPIRIN 81 MG CHEW    Take 162.5 mg by mouth once daily.     CALCIUM CARBONATE 650 MG CALCIUM (1,625 MG) TABLET    Take 1 tablet by mouth once daily.    CHLORHEXIDINE (BETASEPT SURGICAL SCRUB) 4 % EXTERNAL LIQUID    Apply topically daily as needed. Consider 90 day supply    CLOPIDOGREL (PLAVIX) 75 MG TABLET    Take 1 tablet (75 mg total) by mouth once daily.    CYANOCOBALAMIN 2000 MCG TABLET    Take 2,000 mcg by mouth once daily.    FOLIC ACID (FOLVITE) 1 MG TABLET     Take 1 tablet (1 mg total) by mouth once daily.    FUROSEMIDE (LASIX) 20 MG TABLET    Take 0.5 tablets (10 mg total) by mouth 2 (two) times daily.    GABAPENTIN (NEURONTIN) 100 MG CAPSULE    Take 2 capsules (200 mg total) by mouth 3 (three) times daily as needed.    GARLIC 2,000 MG CAP    Take 3,000 mg by mouth once daily.     IRON-VITAMIN C 100-250 MG, ICAR-C, (ICAR-C) 100-250 MG TAB    Take 1 tablet by mouth once daily.    LEVOTHYROXINE (SYNTHROID) 112 MCG TABLET    Take 1 tablet (112 mcg total) by mouth once daily.    MAGNESIUM CITRATE SOLUTION        METOPROLOL SUCCINATE (TOPROL-XL) 100 MG 24 HR TABLET    Take 1 tablet (100 mg total) by mouth 2 (two) times daily. 1 tab (100mg) in morning. And 100 mg at bedtime.  Generic ok    MUPIROCIN (BACTROBAN) 2 % OINTMENT    Bactroban 2 % topical ointment   Apply 1 application twice a day by topical route as directed for 30 days.    NITROGLYCERIN (NITROSTAT) 0.4 MG SL TABLET    Place 1 tablet (0.4 mg total) under the tongue every 5 (five) minutes as needed for Chest pain.    PYRIDOXINE (VITAMIN B-6) 100 MG TAB    Take 200 mg by mouth once daily.     REPATHA SURECLICK 140 MG/ML PNIJ    INJECT 140 MG INTO THE SKIN EVERY 14 DAYS    TURMERIC, BULK, MISC    Take 1 capsule by mouth 2 (two) times daily.     USTEKINUMAB (STELARA) 90 MG/ML SYRG SYRINGE    Inject 1 mL (90 mg total) into the skin every 3 (three) months.    VALSARTAN-HYDROCHLOROTHIAZIDE (DIOVAN-HCT) 160-12.5 MG PER TABLET    Take 1 tablet by mouth once daily.    VITAMIN D 1000 UNITS TAB    Take 185 mg by mouth once daily.     Review of Systems:  Constitutional: Denies fever, chills. No recent weight changes.   Fatigue: no  Muscle weakness: no  Headaches: no new headaches  Eyes: No redness or dryness.  No recent visual changes.  ENT: Denies dry mouth. No oral or nasal ulcers.  Card: No chest pain.  Resp: No cough or sob.   Gastro: No nausea or vomiting.  No heartburn.  Constipation: no  Diarrhea: no  Genito:  No  dysuria.  No genital ulcers.  Skin: per hpi   Raynauds:no  Neuro: No numbness / tingling.   Psych: No depression, anxiety  Endo:  no excess thirst.  Heme: no abnormal bleeding or bruising  Clots:none       OBJECTIVE:     Vital Signs     Physical Exam:    Not done     Laboratory:   Results for orders placed or performed in visit on 04/03/20   CBC auto differential   Result Value Ref Range    WBC 9.10 3.90 - 12.70 K/uL    RBC 4.68 4.00 - 5.40 M/uL    Hemoglobin 13.2 12.0 - 16.0 g/dL    Hematocrit 42.4 37.0 - 48.5 %    Mean Corpuscular Volume 91 82 - 98 fL    Mean Corpuscular Hemoglobin 28.2 27.0 - 31.0 pg    Mean Corpuscular Hemoglobin Conc 31.1 (L) 32.0 - 36.0 g/dL    RDW 16.0 (H) 11.5 - 14.5 %    Platelets 249 150 - 350 K/uL    MPV 10.3 9.2 - 12.9 fL    Immature Granulocytes 0.2 0.0 - 0.5 %    Gran # (ANC) 5.3 1.8 - 7.7 K/uL    Immature Grans (Abs) 0.02 0.00 - 0.04 K/uL    Lymph # 2.8 1.0 - 4.8 K/uL    Mono # 0.8 0.3 - 1.0 K/uL    Eos # 0.2 0.0 - 0.5 K/uL    Baso # 0.07 0.00 - 0.20 K/uL    nRBC 0 0 /100 WBC    Gran% 58.1 38.0 - 73.0 %    Lymph% 30.9 18.0 - 48.0 %    Mono% 8.2 4.0 - 15.0 %    Eosinophil% 1.8 0.0 - 8.0 %    Basophil% 0.8 0.0 - 1.9 %    Differential Method Automated    Comprehensive metabolic panel   Result Value Ref Range    Sodium 143 136 - 145 mmol/L    Potassium 3.9 3.5 - 5.1 mmol/L    Chloride 103 95 - 110 mmol/L    CO2 30 (H) 23 - 29 mmol/L    Glucose 123 (H) 70 - 110 mg/dL    BUN, Bld 23 8 - 23 mg/dL    Creatinine 1.0 0.5 - 1.4 mg/dL    Calcium 9.3 8.7 - 10.5 mg/dL    Total Protein 7.2 6.0 - 8.4 g/dL    Albumin 3.5 3.5 - 5.2 g/dL    Total Bilirubin 0.6 0.1 - 1.0 mg/dL    Alkaline Phosphatase 40 (L) 55 - 135 U/L    AST 18 10 - 40 U/L    ALT 16 10 - 44 U/L    Anion Gap 10 8 - 16 mmol/L    eGFR if African American >60.0 >60 mL/min/1.73 m^2    eGFR if non  58.0 (A) >60 mL/min/1.73 m^2   CBC auto differential   Result Value Ref Range    WBC 9.10 3.90 - 12.70 K/uL    RBC 4.68 4.00 -  5.40 M/uL    Hemoglobin 13.2 12.0 - 16.0 g/dL    Hematocrit 42.4 37.0 - 48.5 %    Mean Corpuscular Volume 91 82 - 98 fL    Mean Corpuscular Hemoglobin 28.2 27.0 - 31.0 pg    Mean Corpuscular Hemoglobin Conc 31.1 (L) 32.0 - 36.0 g/dL    RDW 16.0 (H) 11.5 - 14.5 %    Platelets 249 150 - 350 K/uL    MPV 10.3 9.2 - 12.9 fL    Immature Granulocytes 0.2 0.0 - 0.5 %    Gran # (ANC) 5.3 1.8 - 7.7 K/uL    Immature Grans (Abs) 0.02 0.00 - 0.04 K/uL    Lymph # 2.8 1.0 - 4.8 K/uL    Mono # 0.8 0.3 - 1.0 K/uL    Eos # 0.2 0.0 - 0.5 K/uL    Baso # 0.07 0.00 - 0.20 K/uL    nRBC 0 0 /100 WBC    Gran% 58.1 38.0 - 73.0 %    Lymph% 30.9 18.0 - 48.0 %    Mono% 8.2 4.0 - 15.0 %    Eosinophil% 1.8 0.0 - 8.0 %    Basophil% 0.8 0.0 - 1.9 %    Differential Method Automated    Basic metabolic panel   Result Value Ref Range    Sodium 143 136 - 145 mmol/L    Potassium 3.9 3.5 - 5.1 mmol/L    Chloride 103 95 - 110 mmol/L    CO2 30 (H) 23 - 29 mmol/L    Glucose 123 (H) 70 - 110 mg/dL    BUN, Bld 23 8 - 23 mg/dL    Creatinine 1.0 0.5 - 1.4 mg/dL    Calcium 9.3 8.7 - 10.5 mg/dL    Anion Gap 10 8 - 16 mmol/L    eGFR if African American >60.0 >60 mL/min/1.73 m^2    eGFR if non  58.0 (A) >60 mL/min/1.73 m^2   Iron and TIBC   Result Value Ref Range    Iron 91 30 - 160 ug/dL    Transferrin 279 200 - 375 mg/dL    TIBC 413 250 - 450 ug/dL    Saturated Iron 22 20 - 50 %   Ferritin   Result Value Ref Range    Ferritin 25 20.0 - 300.0 ng/mL     Lab Results   Component Value Date    HEPAIGM Negative 10/03/2018    HEPBIGM Negative 10/03/2018    HEPCAB Negative 10/03/2018         Imaging :reviewed x rays hands/ feet     Notes reviewed  Other procedures:    ASSESSMENT/PLAN:     Psoriasis    Psoriatic arthritis    Stage 3 chronic kidney disease    Medication monitoring encounter      67 yr old    1: Pso/ PsA  Stable disease   Started Stelara 3/2016 with > 90% improvement of her skin.  C/w Stelara 90mg every 12 weeks (she gets it done here) as she  cannot do it herself with her arthritis  Vaccinations declined   Continue with gabapentin 200 mg 2 to 3 times a day as needed    2: Osteopenia with DEXA 6/7/17 with osteopenia with FRAX 14.1/1.7, decreasing   BMD at hip     Received last reclast in September 2017 ,But she did not tolerate with pain   She also experienced lot of confusion after IV Reclast and she declines any further medications for the treatment of osteoporosis  Prior GI intolerance to fosamax and boniva   cont vit d supp -OTC  Today d/w her prolia  She declined again     3: CKD stage 3 :  She does not  Take any NSAIDS   She only uses tylenol as needed     4:Anemia / leukocytosis :  She is currently getting IV iron infusions, she follows with Hematology  She is also due to  have endoscopy and colonoscopy per GI scheduled in May 2020    She is advised to follow-up for leukocytosis as well per them    stellara :  Infection, malignancy risk , other side effects discussed.  Hold prior to any surgery or during periods of infection.    3 month f/u with all 4 lab  Went over uptodate information /literature on the meds prescribed today     Disclaimer: This note was prepared using voice recognition system and is likely to have sound alike errors and is not proof read.  Please call me with any questions.

## 2020-04-06 NOTE — ASSESSMENT & PLAN NOTE
--s/p IV Venofer x 5 infusions  --significant response. Iron levels now adequate. Hemoglobin normal  --reports endoscopies scheduled for 5/2020. May change depending on COVID-19 situation    F/u 3 months with repeat labs

## 2020-04-06 NOTE — PROGRESS NOTES
The patient location is: home  The chief complaint leading to consultation is: f/u CAREN. Monitor response to IV iron supplementation  Visit type: Audio call only via telephone  Total time spent with patient: 15 minutes  Each patient to whom he or she provides medical services by telemedicine is:  (1) informed of the relationship between the physician and patient and the respective role of any other health care provider with respect to management of the patient; and (2) notified that he or she may decline to receive medical services by telemedicine and may withdraw from such care at any time.    Notes:     Subjective:       Patient ID: Qi Ortiz is a 69 y.o. female.    Chief Complaint: Anemia, lab work follow up.    HPI: 69 y.o female with CKD III, HLD, HTN, CAD, arthritis, Iron deficiency anemia. Patient was asked to see GI previously for an EGD/Colonoscopy however, she declined. Now agreeable and has scopes scheduled for 5/2020    10/23/19  Patient reports recent abnormal heart rhythm, being managed per Cardiology. Reports intermittent dizziness, fatigue. I had a detailed discussion with the patient in regards to her current clinical situation. Discussed with patient her declining iron levels and declining hemoglobin. I have recommended proceeding with upper and lower endoscopies. Patient reports that she may be agreeable to proceeding with colonoscopy but would like visit with GI first    Today's visit:  Patient presents today for follow up of her CAREN s/p IV Venofer x 5 doses. Reports tolerating iron infusions well overall without significant side effects. Feels well overall and is without anemia symptoms    Social History     Socioeconomic History    Marital status: Single     Spouse name: Not on file    Number of children: 3    Years of education: Not on file    Highest education level: Not on file   Occupational History    Occupation: Not working   Social Needs    Financial resource strain: Not on  file    Food insecurity:     Worry: Not on file     Inability: Not on file    Transportation needs:     Medical: Not on file     Non-medical: Not on file   Tobacco Use    Smoking status: Never Smoker    Smokeless tobacco: Never Used   Substance and Sexual Activity    Alcohol use: No    Drug use: No    Sexual activity: Never     Partners: Male   Lifestyle    Physical activity:     Days per week: Not on file     Minutes per session: Not on file    Stress: Not on file   Relationships    Social connections:     Talks on phone: Not on file     Gets together: Not on file     Attends Hinduism service: Not on file     Active member of club or organization: Not on file     Attends meetings of clubs or organizations: Not on file     Relationship status: Not on file   Other Topics Concern    Not on file   Social History Narrative    Not on file       Past Medical History:   Diagnosis Date    Carotid stenosis     19%    COPD (chronic obstructive pulmonary disease)     No meds    Coronary artery disease     CVA (cerebral vascular accident)     Dr. 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 Hope    Psoriatic arthritis     Rheumatology       Family History   Problem Relation Age of Onset    Breast cancer Maternal Grandfather     Breast cancer Paternal Aunt     Stroke Unknown     Breast cancer Sister 60    Leukemia Sister 8         as child    Lung cancer Paternal Grandfather     Heart disease Unknown        Past Surgical History:   Procedure Laterality Date    BREAST BIOPSY      R sided/benign    CARDIAC SURGERY      2016    CERVICAL FUSION      CHOLECYSTECTOMY      CORONARY ANGIOPLASTY      CORONARY ARTERY BYPASS GRAFT      triple bypass    CORONARY STENT PLACEMENT      EYE SURGERY      INTRAUTERINE DEVICE INSERTION      mass removed from R  groin      TOTAL ABDOMINAL HYSTERECTOMY W/ BILATERAL SALPINGOOPHORECTOMY      due to benign mass, adhesions    TUBAL LIGATION         Review of Systems   Constitutional: Negative for activity change, appetite change, chills, diaphoresis, fatigue, fever and unexpected weight change.   HENT: Negative for congestion, nosebleeds, sore throat, trouble swallowing and voice change.    Eyes: Negative for photophobia, pain and visual disturbance.   Respiratory: Negative for cough, choking, chest tightness, shortness of breath, wheezing and stridor.    Cardiovascular: Negative for chest pain, palpitations and leg swelling.   Gastrointestinal: Negative for abdominal distention, abdominal pain, anal bleeding, blood in stool, constipation, diarrhea, nausea, rectal pain and vomiting.   Genitourinary: Negative for difficulty urinating, dysuria and hematuria.   Musculoskeletal: Negative for arthralgias, back pain and gait problem.   Skin: Negative for rash and wound.   Neurological: Negative for dizziness, facial asymmetry, weakness, light-headedness, numbness and headaches.   Hematological: Negative for adenopathy. Does not bruise/bleed easily.   Psychiatric/Behavioral: The patient is not nervous/anxious.          Medication List with Changes/Refills   Current Medications    ALBUTEROL (PROVENTIL) 2.5 MG /3 ML (0.083 %) NEBULIZER SOLUTION    Take 3 mLs (2.5 mg total) by nebulization every 6 (six) hours as needed for Wheezing. Rescue    APPLE CIDER VINEGAR ORAL    Take 1 capsule by mouth once daily.     ASPIRIN 81 MG CHEW    Take 162.5 mg by mouth once daily.     CALCIUM CARBONATE 650 MG CALCIUM (1,625 MG) TABLET    Take 1 tablet by mouth once daily.    CHLORHEXIDINE (BETASEPT SURGICAL SCRUB) 4 % EXTERNAL LIQUID    Apply topically daily as needed. Consider 90 day supply    CLOPIDOGREL (PLAVIX) 75 MG TABLET    Take 1 tablet (75 mg total) by mouth once daily.    CYANOCOBALAMIN 2000 MCG TABLET    Take 2,000 mcg by mouth once daily.     FOLIC ACID (FOLVITE) 1 MG TABLET    Take 1 tablet (1 mg total) by mouth once daily.    FUROSEMIDE (LASIX) 20 MG TABLET    Take 0.5 tablets (10 mg total) by mouth 2 (two) times daily.    GABAPENTIN (NEURONTIN) 100 MG CAPSULE    Take 2 capsules (200 mg total) by mouth 3 (three) times daily as needed.    GARLIC 2,000 MG CAP    Take 3,000 mg by mouth once daily.     IRON-VITAMIN C 100-250 MG, ICAR-C, (ICAR-C) 100-250 MG TAB    Take 1 tablet by mouth once daily.    LEVOTHYROXINE (SYNTHROID) 112 MCG TABLET    Take 1 tablet (112 mcg total) by mouth once daily.    MAGNESIUM CITRATE SOLUTION        METOPROLOL SUCCINATE (TOPROL-XL) 100 MG 24 HR TABLET    Take 1 tablet (100 mg total) by mouth 2 (two) times daily. 1 tab (100mg) in morning. And 100 mg at bedtime.  Generic ok    MUPIROCIN (BACTROBAN) 2 % OINTMENT    Bactroban 2 % topical ointment   Apply 1 application twice a day by topical route as directed for 30 days.    NITROGLYCERIN (NITROSTAT) 0.4 MG SL TABLET    Place 1 tablet (0.4 mg total) under the tongue every 5 (five) minutes as needed for Chest pain.    PYRIDOXINE (VITAMIN B-6) 100 MG TAB    Take 200 mg by mouth once daily.     REPATHA SURECLICK 140 MG/ML PNIJ    INJECT 140 MG INTO THE SKIN EVERY 14 DAYS    TURMERIC, BULK, MISC    Take 1 capsule by mouth 2 (two) times daily.     USTEKINUMAB (STELARA) 90 MG/ML SYRG SYRINGE    Inject 1 mL (90 mg total) into the skin every 3 (three) months.    VALSARTAN-HYDROCHLOROTHIAZIDE (DIOVAN-HCT) 160-12.5 MG PER TABLET    Take 1 tablet by mouth once daily.    VITAMIN D 1000 UNITS TAB    Take 185 mg by mouth once daily.     Objective:     There were no vitals filed for this visit.  Lab Results   Component Value Date    WBC 9.10 04/03/2020    WBC 9.10 04/03/2020    HGB 13.2 04/03/2020    HGB 13.2 04/03/2020    HCT 42.4 04/03/2020    HCT 42.4 04/03/2020    MCV 91 04/03/2020    MCV 91 04/03/2020     04/03/2020     04/03/2020     BMP  Lab Results   Component Value Date      04/03/2020     04/03/2020     04/03/2020    K 3.9 04/03/2020    K 3.9 04/03/2020    K 4.0 04/03/2020     04/03/2020     04/03/2020     04/03/2020    CO2 30 (H) 04/03/2020    CO2 30 (H) 04/03/2020    CO2 29 04/03/2020    BUN 23 04/03/2020    BUN 23 04/03/2020    BUN 23 04/03/2020    CREATININE 1.0 04/03/2020    CREATININE 1.0 04/03/2020    CREATININE 1.0 04/03/2020    CALCIUM 9.3 04/03/2020    CALCIUM 9.3 04/03/2020    CALCIUM 9.3 04/03/2020    ANIONGAP 10 04/03/2020    ANIONGAP 10 04/03/2020    ANIONGAP 10 04/03/2020    ESTGFRAFRICA >60.0 04/03/2020    ESTGFRAFRICA >60.0 04/03/2020    ESTGFRAFRICA >60.0 04/03/2020    EGFRNONAA 58.0 (A) 04/03/2020    EGFRNONAA 58.0 (A) 04/03/2020    EGFRNONAA 58.0 (A) 04/03/2020     Lab Results   Component Value Date    ALT 16 04/03/2020    ALT 14 04/03/2020    AST 18 04/03/2020    AST 18 04/03/2020    ALKPHOS 40 (L) 04/03/2020    ALKPHOS 42 (L) 04/03/2020    BILITOT 0.6 04/03/2020    BILITOT 0.5 04/03/2020     Lab Results   Component Value Date    IRON 91 04/03/2020    TIBC 413 04/03/2020    FERRITIN 25 04/03/2020         Physical Exam   Constitutional: She is oriented to person, place, and time. She appears well-developed.   HENT:   Right Ear: Hearing normal.   Left Ear: Hearing normal.   Neurological: She is alert and oriented to person, place, and time.   Psychiatric: She has a normal mood and affect. Her speech is normal.        Assessment:     Problem List Items Addressed This Visit        Oncology    Iron deficiency anemia due to chronic blood loss - Primary     --s/p IV Venofer x 5 infusions  --significant response. Iron levels now adequate. Hemoglobin normal  --reports endoscopies scheduled for 5/2020. May change depending on COVID-19 situation    F/u 3 months with repeat labs         Relevant Orders    CBC auto differential    Comprehensive metabolic panel    Iron and TIBC    Ferritin                  Elida Alanis, KJP-C

## 2020-04-15 ENCOUNTER — INFUSION (OUTPATIENT)
Dept: INFUSION THERAPY | Facility: HOSPITAL | Age: 70
End: 2020-04-15
Attending: INTERNAL MEDICINE
Payer: MEDICARE

## 2020-04-15 VITALS
BODY MASS INDEX: 44.59 KG/M2 | RESPIRATION RATE: 18 BRPM | SYSTOLIC BLOOD PRESSURE: 124 MMHG | DIASTOLIC BLOOD PRESSURE: 67 MMHG | HEART RATE: 70 BPM | OXYGEN SATURATION: 98 % | TEMPERATURE: 98 F | WEIGHT: 255.75 LBS

## 2020-04-15 DIAGNOSIS — L40.50 PSORIATIC ARTHRITIS: ICD-10-CM

## 2020-04-15 DIAGNOSIS — L40.9 PSORIASIS: Primary | ICD-10-CM

## 2020-04-15 PROCEDURE — 63600175 PHARM REV CODE 636 W HCPCS: Mod: JG | Performed by: INTERNAL MEDICINE

## 2020-04-15 PROCEDURE — 96372 THER/PROPH/DIAG INJ SC/IM: CPT

## 2020-04-15 RX ADMIN — USTEKINUMAB 90 MG: 90 INJECTION, SOLUTION SUBCUTANEOUS at 02:04

## 2020-04-15 NOTE — NURSING
Stelara 90 mg q 3 months  Last dose- 1/13/20    Any:  -recent illness, infection, or antibiotic use in past week- denies  -open wounds or mouth sores- denies  -invasive procedures or surgeries in past 4 weeks or in upcoming 4 weeks- denies  -vaccinations in past week- denies  -chance you may be pregnant- n/a      Recent labs? 4/3/20  Last Rheumatology provider visit- Seen by Dr. Arreaga on 4/9/20     Premeds? none    Stelara 90 mg administered SQ per orders to left lower abdominal quadrant. See MAR and vitals for more  details. No acute reaction noted to site. Advised patient to wait in lobby 15 minutes after receiving injection to monitor for any reactions. Verbalizes understanding. Tolerated well without adverse events, discharged and ambulatory out of clinic.

## 2020-04-15 NOTE — PLAN OF CARE
Plan of care reviewed with patient. Discussed if there are any new or ongoing concerns. Denies.   Problem: Adult Inpatient Plan of Care  Goal: Plan of Care Review  Outcome: Ongoing, Progressing  Goal: Patient-Specific Goal (Individualization)  Outcome: Ongoing, Progressing  Goal: Optimal Comfort and Wellbeing  Outcome: Ongoing, Progressing

## 2020-05-08 ENCOUNTER — TELEPHONE (OUTPATIENT)
Dept: ENDOSCOPY | Facility: HOSPITAL | Age: 70
End: 2020-05-08

## 2020-05-08 NOTE — TELEPHONE ENCOUNTER
Pt called to cancel her EGD/Colon scheduled on 5/25.  Pt states she will call back to reschedule when she feels it is safe from the covid 19.

## 2020-05-19 ENCOUNTER — OFFICE VISIT (OUTPATIENT)
Dept: FAMILY MEDICINE | Facility: CLINIC | Age: 70
End: 2020-05-19
Payer: MEDICARE

## 2020-05-19 VITALS
TEMPERATURE: 98 F | HEIGHT: 63 IN | BODY MASS INDEX: 45.82 KG/M2 | RESPIRATION RATE: 16 BRPM | WEIGHT: 258.63 LBS | OXYGEN SATURATION: 97 % | HEART RATE: 87 BPM | SYSTOLIC BLOOD PRESSURE: 130 MMHG | DIASTOLIC BLOOD PRESSURE: 80 MMHG

## 2020-05-19 DIAGNOSIS — N18.30 STAGE 3 CHRONIC KIDNEY DISEASE: ICD-10-CM

## 2020-05-19 DIAGNOSIS — F43.22 ADJUSTMENT DISORDER WITH ANXIOUS MOOD: ICD-10-CM

## 2020-05-19 DIAGNOSIS — E03.9 HYPOTHYROIDISM, UNSPECIFIED TYPE: Primary | ICD-10-CM

## 2020-05-19 DIAGNOSIS — I10 ESSENTIAL HYPERTENSION: ICD-10-CM

## 2020-05-19 DIAGNOSIS — Z86.73 HISTORY OF CVA (CEREBROVASCULAR ACCIDENT): ICD-10-CM

## 2020-05-19 DIAGNOSIS — E53.8 FOLIC ACID DEFICIENCY: ICD-10-CM

## 2020-05-19 DIAGNOSIS — I20.9 ANGINA, CLASS II: ICD-10-CM

## 2020-05-19 DIAGNOSIS — D50.0 IRON DEFICIENCY ANEMIA DUE TO CHRONIC BLOOD LOSS: ICD-10-CM

## 2020-05-19 DIAGNOSIS — L40.50 PSORIATIC ARTHRITIS: ICD-10-CM

## 2020-05-19 DIAGNOSIS — Z95.1 S/P CABG (CORONARY ARTERY BYPASS GRAFT): ICD-10-CM

## 2020-05-19 DIAGNOSIS — E66.01 MORBID OBESITY WITH BMI OF 45.0-49.9, ADULT: ICD-10-CM

## 2020-05-19 DIAGNOSIS — I47.10 SUPRAVENTRICULAR TACHYCARDIA: ICD-10-CM

## 2020-05-19 DIAGNOSIS — E53.8 B12 DEFICIENCY: ICD-10-CM

## 2020-05-19 DIAGNOSIS — J44.9 CHRONIC OBSTRUCTIVE PULMONARY DISEASE, UNSPECIFIED COPD TYPE: ICD-10-CM

## 2020-05-19 DIAGNOSIS — R73.03 PREDIABETES: ICD-10-CM

## 2020-05-19 PROCEDURE — 99214 PR OFFICE/OUTPT VISIT, EST, LEVL IV, 30-39 MIN: ICD-10-PCS | Mod: S$PBB,,, | Performed by: FAMILY MEDICINE

## 2020-05-19 PROCEDURE — 99213 OFFICE O/P EST LOW 20 MIN: CPT | Mod: PBBFAC,PO | Performed by: FAMILY MEDICINE

## 2020-05-19 PROCEDURE — 99999 PR PBB SHADOW E&M-EST. PATIENT-LVL III: CPT | Mod: PBBFAC,,, | Performed by: FAMILY MEDICINE

## 2020-05-19 PROCEDURE — 99999 PR PBB SHADOW E&M-EST. PATIENT-LVL III: ICD-10-PCS | Mod: PBBFAC,,, | Performed by: FAMILY MEDICINE

## 2020-05-19 PROCEDURE — 99214 OFFICE O/P EST MOD 30 MIN: CPT | Mod: S$PBB,,, | Performed by: FAMILY MEDICINE

## 2020-05-19 RX ORDER — LEVOTHYROXINE SODIUM 112 UG/1
112 TABLET ORAL DAILY
Qty: 90 TABLET | Refills: 1 | Status: SHIPPED | OUTPATIENT
Start: 2020-05-19 | End: 2020-10-12 | Stop reason: SDUPTHER

## 2020-05-19 RX ORDER — FOLIC ACID 1 MG/1
1 TABLET ORAL DAILY
Qty: 90 TABLET | Refills: 1 | Status: SHIPPED | OUTPATIENT
Start: 2020-05-19 | End: 2020-10-12 | Stop reason: SDUPTHER

## 2020-05-19 RX ORDER — ALBUTEROL SULFATE 0.83 MG/ML
2.5 SOLUTION RESPIRATORY (INHALATION) EVERY 6 HOURS PRN
Qty: 25 EACH | Refills: 5 | Status: SHIPPED | OUTPATIENT
Start: 2020-05-19 | End: 2022-09-27 | Stop reason: SDUPTHER

## 2020-05-19 NOTE — PROGRESS NOTES
Subjective:       Patient ID: Qi Ortiz is a 69 y.o. female.    Chief Complaint: Chronic Care    HPI   Chronic Care  70yo female presents today for chronic care assessment. She has had recent lab update and is here for review of results. Reports compliance with Synthroid use. TFT's are stable. She denies any symptoms of concern for imbalance. BP has been well controlled. She denies any CP or palpitations. She will see her Cardiologist for follow-up in the coming weeks. Her respiratory symptoms have been at baseline. She denies any recent use of Albuterol but would like to have the RX renewed. A1c is stable at 5.9 which is an improvement from 6.0 at last check. There are no symptoms of hyper or hypoglycemia. She has been due for EGD and C scope but has elected to hold off on the procedures in light of COVID-19. Ongoing joint pain is reported secondary to psoriatic arthritis. She is followed closely by Rheumatology. There are situational stressors currently which are contributing to heightened anxiety. She is sewing for therapy and does not desire RX measures or therapy evaluation.    Review of Systems   Constitutional: Negative for chills and fever.   HENT: Negative for congestion, ear pain and sinus pressure.    Eyes: Negative for visual disturbance.   Respiratory: Positive for shortness of breath. Negative for cough.    Cardiovascular: Negative for chest pain and palpitations.   Gastrointestinal: Negative for abdominal pain, blood in stool, constipation, diarrhea and nausea.   Endocrine: Negative for cold intolerance, heat intolerance, polydipsia and polyuria.   Genitourinary: Negative for decreased urine volume and difficulty urinating.   Musculoskeletal: Positive for arthralgias. Negative for myalgias.   Skin: Negative for rash.   Neurological: Negative for dizziness, weakness and headaches.   Psychiatric/Behavioral: Negative for dysphoric mood and sleep disturbance. The patient is nervous/anxious.       "  Objective:   /80   Pulse 87   Temp 97.9 °F (36.6 °C) (Temporal)   Resp 16   Ht 5' 3" (1.6 m)   Wt 117.3 kg (258 lb 9.6 oz)   SpO2 97%   BMI 45.81 kg/m²   Physical Exam   Constitutional: She is oriented to person, place, and time. She appears well-developed and well-nourished.   Obese, non-toxic   HENT:   Head: Normocephalic and atraumatic.   Right Ear: Tympanic membrane, external ear and ear canal normal.   Left Ear: Tympanic membrane, external ear and ear canal normal.   Nose: Nose normal.   Mouth/Throat: Oropharynx is clear and moist.   Eyes: Pupils are equal, round, and reactive to light. Conjunctivae and EOM are normal.   Neck: Normal range of motion. Neck supple.   Cardiovascular: Normal rate and regular rhythm.   Murmur heard.  Pulmonary/Chest: Effort normal and breath sounds normal.   Abdominal: Soft. Bowel sounds are normal.   Musculoskeletal: She exhibits no edema.   Neurological: She is alert and oriented to person, place, and time.   Skin: Skin is warm and dry.   Psychiatric: She has a normal mood and affect. Her behavior is normal.       Assessment:       1. Hypothyroidism, unspecified type    2. Folic acid deficiency    3. Chronic obstructive pulmonary disease, unspecified COPD type    4. Prediabetes    5. Stage 3 chronic kidney disease    6. Iron deficiency anemia due to chronic blood loss    7. B12 deficiency    8. Psoriatic arthritis    9. Essential hypertension    10. Supraventricular tachycardia    11. Angina, class II    12. Adjustment disorder with anxious mood    13. S/P CABG (coronary artery bypass graft)    14. History of CVA (cerebrovascular accident)    15. Morbid obesity with BMI of 45.0-49.9, adult        Plan:      Hypothyroidism, unspecified type  -     levothyroxine (SYNTHROID) 112 MCG tablet; Take 1 tablet (112 mcg total) by mouth once daily.  Dispense: 90 tablet; Refill: 1  -     TSH; Future; Expected date: 05/19/2020  -     T4, free; Future; Expected date: " 05/19/2020    Folic acid deficiency  -     folic acid (FOLVITE) 1 MG tablet; Take 1 tablet (1 mg total) by mouth once daily.  Dispense: 90 tablet; Refill: 1    Chronic obstructive pulmonary disease, unspecified COPD type  -     albuterol (PROVENTIL) 2.5 mg /3 mL (0.083 %) nebulizer solution; Take 3 mLs (2.5 mg total) by nebulization every 6 (six) hours as needed for Wheezing. Rescue  Dispense: 25 each; Refill: 5    Prediabetes  -     Hemoglobin A1C; Future; Expected date: 05/19/2020    Stage 3 chronic kidney disease  -     Comprehensive metabolic panel; Future; Expected date: 05/19/2020    Iron deficiency anemia due to chronic blood loss  As per Hematology.    B12 deficiency  As per Hematology.    Psoriatic arthritis  As per Rheumatology.    Essential hypertension  -     Lipid Panel; Future; Expected date: 05/19/2020    Supraventricular tachycardia  As per Cardiology.    Angina, class II  As above.    Adjustment disorder with anxious mood  Coping mechanisms reviewed. Patient is aware of counseling/therapy options within [facility].     S/P CABG (coronary artery bypass graft)    History of CVA (cerebrovascular accident)    Morbid obesity with BMI of 45.0-49.9, adult  Weight loss efforts are encouraged through diet and lifestyle measures.     Declines MMG  Declines scheduling EGD and C scope due to current COVID-19 pandemic.  DEXA is due in June 2020- she will discuss with her Rheumatologist.

## 2020-05-20 ENCOUNTER — TELEPHONE (OUTPATIENT)
Dept: CARDIOLOGY | Facility: CLINIC | Age: 70
End: 2020-05-20

## 2020-05-20 DIAGNOSIS — Z88.8 ALLERGY TO STATIN MEDICATION: Primary | ICD-10-CM

## 2020-05-20 DIAGNOSIS — E78.49 OTHER HYPERLIPIDEMIA: ICD-10-CM

## 2020-05-20 DIAGNOSIS — I25.10 CORONARY ARTERY DISEASE WITHOUT ANGINA PECTORIS, UNSPECIFIED VESSEL OR LESION TYPE, UNSPECIFIED WHETHER NATIVE OR TRANSPLANTED HEART: ICD-10-CM

## 2020-05-20 NOTE — TELEPHONE ENCOUNTER
Returned call to pt.   Pt requesting to have repatha injection refilled/prior authorization done.  Pt also requesting pt be contacted with confirmation number once received.  Notified pt will start the process for prior auth and refill.  Pt verbalized understanding.

## 2020-05-20 NOTE — TELEPHONE ENCOUNTER
----- Message from Berry Green sent at 5/20/2020 10:08 AM CDT -----  ..Type:  Patient Returning Call    Who Called: pt   Who Left Message for Patient:  Does the patient know what this is regarding?:precription   Would the patient rather a call back or a response via [facility]?  Call back   Best Call Back Number: [phone]  Additional Information: Pt is requesting a call from nurse to discuss new/refill on prescription. Pt states she would like to speak to the nurse

## 2020-06-09 ENCOUNTER — TELEPHONE (OUTPATIENT)
Dept: HEMATOLOGY/ONCOLOGY | Facility: CLINIC | Age: 70
End: 2020-06-09

## 2020-06-09 ENCOUNTER — TELEPHONE (OUTPATIENT)
Dept: ENDOSCOPY | Facility: HOSPITAL | Age: 70
End: 2020-06-09

## 2020-06-09 NOTE — TELEPHONE ENCOUNTER
Patient called office regarding scheduling a procedure d/t receiving a letter.  Informed patient of no open order to schedule a procedure.  Patient stated she is not able to have any endoscopy procedure per the recommendations of her doctor- covid-19 related.  Patient stated she would call office when ready to schedule.

## 2020-06-09 NOTE — TELEPHONE ENCOUNTER
----- Message from Anthony Green sent at 6/9/2020  9:58 AM CDT -----  Contact: Self- [phone]  Pt would like a call from nurse in regards to labs that need to be order. Please call back at [phone].       Thank You,   Anthony Green

## 2020-06-09 NOTE — TELEPHONE ENCOUNTER
Returned pt's call back.. Pt called about lab appt.. Linked labs and made lab appt prior to her appt at [facility] for 6/15/20..

## 2020-06-15 ENCOUNTER — LAB VISIT (OUTPATIENT)
Dept: LAB | Facility: HOSPITAL | Age: 70
End: 2020-06-15
Attending: NURSE PRACTITIONER
Payer: MEDICARE

## 2020-06-15 DIAGNOSIS — D50.0 IRON DEFICIENCY ANEMIA DUE TO CHRONIC BLOOD LOSS: ICD-10-CM

## 2020-06-15 LAB
ALBUMIN SERPL BCP-MCNC: 3.3 G/DL (ref 3.5–5.2)
ALP SERPL-CCNC: 37 U/L (ref 55–135)
ALT SERPL W/O P-5'-P-CCNC: 14 U/L (ref 10–44)
ANION GAP SERPL CALC-SCNC: 11 MMOL/L (ref 8–16)
AST SERPL-CCNC: 20 U/L (ref 10–40)
BASOPHILS # BLD AUTO: 0.06 K/UL (ref 0–0.2)
BASOPHILS NFR BLD: 0.8 % (ref 0–1.9)
BILIRUB SERPL-MCNC: 0.4 MG/DL (ref 0.1–1)
BUN SERPL-MCNC: 19 MG/DL (ref 8–23)
CALCIUM SERPL-MCNC: 9.3 MG/DL (ref 8.7–10.5)
CHLORIDE SERPL-SCNC: 105 MMOL/L (ref 95–110)
CO2 SERPL-SCNC: 27 MMOL/L (ref 23–29)
CREAT SERPL-MCNC: 1.2 MG/DL (ref 0.5–1.4)
DIFFERENTIAL METHOD: ABNORMAL
EOSINOPHIL # BLD AUTO: 0.2 K/UL (ref 0–0.5)
EOSINOPHIL NFR BLD: 2.7 % (ref 0–8)
ERYTHROCYTE [DISTWIDTH] IN BLOOD BY AUTOMATED COUNT: 14.7 % (ref 11.5–14.5)
EST. GFR  (AFRICAN AMERICAN): 53.3 ML/MIN/1.73 M^2
EST. GFR  (NON AFRICAN AMERICAN): 46.2 ML/MIN/1.73 M^2
FERRITIN SERPL-MCNC: 16 NG/ML (ref 20–300)
GLUCOSE SERPL-MCNC: 138 MG/DL (ref 70–110)
HCT VFR BLD AUTO: 41.8 % (ref 37–48.5)
HGB BLD-MCNC: 12.9 G/DL (ref 12–16)
IMM GRANULOCYTES # BLD AUTO: 0.03 K/UL (ref 0–0.04)
IMM GRANULOCYTES NFR BLD AUTO: 0.4 % (ref 0–0.5)
IRON SERPL-MCNC: 83 UG/DL (ref 30–160)
LYMPHOCYTES # BLD AUTO: 2.1 K/UL (ref 1–4.8)
LYMPHOCYTES NFR BLD: 28.6 % (ref 18–48)
MCH RBC QN AUTO: 28.8 PG (ref 27–31)
MCHC RBC AUTO-ENTMCNC: 30.9 G/DL (ref 32–36)
MCV RBC AUTO: 93 FL (ref 82–98)
MONOCYTES # BLD AUTO: 0.7 K/UL (ref 0.3–1)
MONOCYTES NFR BLD: 9.2 % (ref 4–15)
NEUTROPHILS # BLD AUTO: 4.3 K/UL (ref 1.8–7.7)
NEUTROPHILS NFR BLD: 58.3 % (ref 38–73)
NRBC BLD-RTO: 0 /100 WBC
PLATELET # BLD AUTO: 248 K/UL (ref 150–350)
PMV BLD AUTO: 10.8 FL (ref 9.2–12.9)
POTASSIUM SERPL-SCNC: 4.4 MMOL/L (ref 3.5–5.1)
PROT SERPL-MCNC: 6.7 G/DL (ref 6–8.4)
RBC # BLD AUTO: 4.48 M/UL (ref 4–5.4)
SATURATED IRON: 22 % (ref 20–50)
SODIUM SERPL-SCNC: 143 MMOL/L (ref 136–145)
TOTAL IRON BINDING CAPACITY: 385 UG/DL (ref 250–450)
TRANSFERRIN SERPL-MCNC: 260 MG/DL (ref 200–375)
WBC # BLD AUTO: 7.4 K/UL (ref 3.9–12.7)

## 2020-06-15 PROCEDURE — 85025 COMPLETE CBC W/AUTO DIFF WBC: CPT

## 2020-06-15 PROCEDURE — 36415 COLL VENOUS BLD VENIPUNCTURE: CPT | Mod: PO

## 2020-06-15 PROCEDURE — 83540 ASSAY OF IRON: CPT

## 2020-06-15 PROCEDURE — 82728 ASSAY OF FERRITIN: CPT

## 2020-06-15 PROCEDURE — 80053 COMPREHEN METABOLIC PANEL: CPT

## 2020-06-18 ENCOUNTER — OFFICE VISIT (OUTPATIENT)
Dept: HEMATOLOGY/ONCOLOGY | Facility: CLINIC | Age: 70
End: 2020-06-18
Payer: MEDICARE

## 2020-06-18 VITALS
BODY MASS INDEX: 45.89 KG/M2 | TEMPERATURE: 98 F | DIASTOLIC BLOOD PRESSURE: 64 MMHG | SYSTOLIC BLOOD PRESSURE: 117 MMHG | WEIGHT: 259.06 LBS

## 2020-06-18 DIAGNOSIS — D50.0 IRON DEFICIENCY ANEMIA DUE TO CHRONIC BLOOD LOSS: Primary | ICD-10-CM

## 2020-06-18 DIAGNOSIS — N18.30 STAGE 3 CHRONIC KIDNEY DISEASE: ICD-10-CM

## 2020-06-18 PROCEDURE — 99214 OFFICE O/P EST MOD 30 MIN: CPT | Mod: PBBFAC | Performed by: NURSE PRACTITIONER

## 2020-06-18 PROCEDURE — 99214 PR OFFICE/OUTPT VISIT, EST, LEVL IV, 30-39 MIN: ICD-10-PCS | Mod: S$PBB,,, | Performed by: NURSE PRACTITIONER

## 2020-06-18 PROCEDURE — 99214 OFFICE O/P EST MOD 30 MIN: CPT | Mod: S$PBB,,, | Performed by: NURSE PRACTITIONER

## 2020-06-18 PROCEDURE — 99999 PR PBB SHADOW E&M-EST. PATIENT-LVL IV: CPT | Mod: PBBFAC,,, | Performed by: NURSE PRACTITIONER

## 2020-06-18 PROCEDURE — 99999 PR PBB SHADOW E&M-EST. PATIENT-LVL IV: ICD-10-PCS | Mod: PBBFAC,,, | Performed by: NURSE PRACTITIONER

## 2020-06-18 RX ORDER — METHYLPREDNISOLONE SOD SUCC 125 MG
80 VIAL (EA) INJECTION
Status: CANCELLED
Start: 2020-06-18

## 2020-06-18 RX ORDER — HEPARIN 100 UNIT/ML
500 SYRINGE INTRAVENOUS
Status: CANCELLED | OUTPATIENT
Start: 2020-06-18

## 2020-06-18 RX ORDER — SODIUM CHLORIDE 0.9 % (FLUSH) 0.9 %
10 SYRINGE (ML) INJECTION
Status: CANCELLED | OUTPATIENT
Start: 2020-06-18

## 2020-06-18 NOTE — ASSESSMENT & PLAN NOTE
Hemoglobin remains normal but slightly lower than previous. Saturated iron normal. Ferritin has significantly decline and is low. Planned endoscopies were cancelled due to COVID concerns. Patient has not yet rescheduled    Proceed with Venofer IV x 1 dose. F/u 2 months with repeat labs. Discussed S&S to report sooner

## 2020-06-18 NOTE — PROGRESS NOTES
Subjective:       Patient ID: Qi Ortiz is a 70 y.o. female.    Chief Complaint: Anemia, lab work follow up.    HPI: 69 y.o female with CKD III, HLD, HTN, CAD, arthritis, Iron deficiency. Patient was asked to see GI previously for an EGD/Colonoscopy however, she declined. Eventually she agreed to GI consult and planned to proceed with endoscopies however due to COVID concerns endoscopies were cancelled     10/23/19  Patient reports recent abnormal heart rhythm, being managed per Cardiology. Reports intermittent dizziness, fatigue. I had a detailed discussion with the patient in regards to her current clinical situation. Discussed with patient her declining iron levels and declining hemoglobin. I have recommended proceeding with upper and lower endoscopies. Patient reports that she may be agreeable to proceeding with colonoscopy but would like visit with GI first    Today's visit:  Patient presents today for lab result review and follow up of her iron deficiency anemia. In the interim endoscopies were scheduled for 5/2020 however was cancelled due to Covid concerns. Patient has not yet rescheduled endoscopies. I have encouraged her to do so. Does report fatigue but denies SOB, dizziness, lightheadedness, syncope, hematuria, hematochezia, melena.   Social History     Socioeconomic History    Marital status: Single     Spouse name: Not on file    Number of children: 3    Years of education: Not on file    Highest education level: Not on file   Occupational History    Occupation: Not working   Social Needs    Financial resource strain: Not on file    Food insecurity     Worry: Not on file     Inability: Not on file    Transportation needs     Medical: Not on file     Non-medical: Not on file   Tobacco Use    Smoking status: Never Smoker    Smokeless tobacco: Never Used   Substance and Sexual Activity    Alcohol use: No    Drug use: No    Sexual activity: Never     Partners: Male   Lifestyle     Physical activity     Days per week: Not on file     Minutes per session: Not on file    Stress: Not on file   Relationships    Social connections     Talks on phone: Not on file     Gets together: Not on file     Attends Rastafari service: Not on file     Active member of club or organization: Not on file     Attends meetings of clubs or organizations: Not on file     Relationship status: Not on file   Other Topics Concern    Not on file   Social History Narrative    Not on file       Past Medical History:   Diagnosis Date    Carotid stenosis     19%    COPD (chronic obstructive pulmonary disease)     No meds    Coronary artery disease     CVA (cerebral vascular accident)     Dr. 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 Hope    Psoriatic arthritis     Rheumatology       Family History   Problem Relation Age of Onset    Breast cancer Maternal Grandfather     Breast cancer Paternal Aunt     Stroke Unknown     Breast cancer Sister 60    Leukemia Sister 8         as child    Lung cancer Paternal Grandfather     Heart disease Unknown        Past Surgical History:   Procedure Laterality Date    BREAST BIOPSY      R sided/benign    CARDIAC SURGERY      2016    CERVICAL FUSION      CHOLECYSTECTOMY      CORONARY ANGIOPLASTY      CORONARY ARTERY BYPASS GRAFT      triple bypass    CORONARY STENT PLACEMENT      EYE SURGERY      INTRAUTERINE DEVICE INSERTION      mass removed from R groin      TOTAL ABDOMINAL HYSTERECTOMY W/ BILATERAL SALPINGOOPHORECTOMY      due to benign mass, adhesions    TUBAL LIGATION         Review of Systems   Constitutional: Positive for fatigue. Negative for activity change, appetite change, chills, diaphoresis, fever and unexpected weight change.   HENT: Negative for congestion, nosebleeds, sore throat, trouble swallowing  and voice change.    Eyes: Negative for photophobia, pain and visual disturbance.   Respiratory: Negative for cough, choking, chest tightness, shortness of breath, wheezing and stridor.    Cardiovascular: Negative for chest pain, palpitations and leg swelling.   Gastrointestinal: Negative for abdominal distention, abdominal pain, anal bleeding, blood in stool, constipation, diarrhea, nausea, rectal pain and vomiting.   Genitourinary: Negative for difficulty urinating, dysuria and hematuria.   Musculoskeletal: Negative for arthralgias, back pain and gait problem.   Skin: Negative for rash and wound.   Neurological: Negative for dizziness, facial asymmetry, weakness, light-headedness, numbness and headaches.   Hematological: Negative for adenopathy. Does not bruise/bleed easily.   Psychiatric/Behavioral: The patient is not nervous/anxious.          Medication List with Changes/Refills   Current Medications    ALBUTEROL (PROVENTIL) 2.5 MG /3 ML (0.083 %) NEBULIZER SOLUTION    Take 3 mLs (2.5 mg total) by nebulization every 6 (six) hours as needed for Wheezing. Rescue    APPLE CIDER VINEGAR ORAL    Take 1 capsule by mouth once daily.     ASPIRIN 81 MG CHEW    Take 162.5 mg by mouth once daily.     CALCIUM CARBONATE 650 MG CALCIUM (1,625 MG) TABLET    Take 1 tablet by mouth once daily.    CHLORHEXIDINE (BETASEPT SURGICAL SCRUB) 4 % EXTERNAL LIQUID    Apply topically daily as needed. Consider 90 day supply    CLOPIDOGREL (PLAVIX) 75 MG TABLET    Take 1 tablet (75 mg total) by mouth once daily.    CYANOCOBALAMIN 2000 MCG TABLET    Take 2,000 mcg by mouth once daily.    EVOLOCUMAB (REPATHA SURECLICK) 140 MG/ML PNIJ    Inject 1 mL (140 mg total) into the skin every 14 (fourteen) days.    FOLIC ACID (FOLVITE) 1 MG TABLET    Take 1 tablet (1 mg total) by mouth once daily.    FUROSEMIDE (LASIX) 20 MG TABLET    Take 0.5 tablets (10 mg total) by mouth 2 (two) times daily.    GABAPENTIN (NEURONTIN) 100 MG CAPSULE    Take 2  capsules (200 mg total) by mouth 3 (three) times daily as needed.    GARLIC 2,000 MG CAP    Take 3,000 mg by mouth once daily.     IRON-VITAMIN C 100-250 MG, ICAR-C, (ICAR-C) 100-250 MG TAB    Take 1 tablet by mouth once daily.    LEVOTHYROXINE (SYNTHROID) 112 MCG TABLET    Take 1 tablet (112 mcg total) by mouth once daily.    MAGNESIUM CITRATE SOLUTION        METOPROLOL SUCCINATE (TOPROL-XL) 100 MG 24 HR TABLET    Take 1 tablet (100 mg total) by mouth 2 (two) times daily. 1 tab (100mg) in morning. And 100 mg at bedtime.  Generic ok    MUPIROCIN (BACTROBAN) 2 % OINTMENT    Bactroban 2 % topical ointment   Apply 1 application twice a day by topical route as directed for 30 days.    NITROGLYCERIN (NITROSTAT) 0.4 MG SL TABLET    Place 1 tablet (0.4 mg total) under the tongue every 5 (five) minutes as needed for Chest pain.    PYRIDOXINE (VITAMIN B-6) 100 MG TAB    Take 200 mg by mouth once daily.     TURMERIC, BULK, MISC    Take 1 capsule by mouth 2 (two) times daily.     USTEKINUMAB (STELARA) 90 MG/ML SYRG SYRINGE    Inject 1 mL (90 mg total) into the skin every 3 (three) months.    VALSARTAN-HYDROCHLOROTHIAZIDE (DIOVAN-HCT) 160-12.5 MG PER TABLET    Take 1 tablet by mouth once daily.    VITAMIN D 1000 UNITS TAB    Take 185 mg by mouth once daily.     Objective:     Vitals:    06/18/20 1220   BP: 117/64   Temp: 98.2 °F (36.8 °C)     Lab Results   Component Value Date    WBC 7.40 06/15/2020    HGB 12.9 06/15/2020    HCT 41.8 06/15/2020    MCV 93 06/15/2020     06/15/2020     BMP  Lab Results   Component Value Date     06/15/2020    K 4.4 06/15/2020     06/15/2020    CO2 27 06/15/2020    BUN 19 06/15/2020    CREATININE 1.2 06/15/2020    CALCIUM 9.3 06/15/2020    ANIONGAP 11 06/15/2020    ESTGFRAFRICA 53.3 (A) 06/15/2020    EGFRNONAA 46.2 (A) 06/15/2020     Lab Results   Component Value Date    ALT 14 06/15/2020    AST 20 06/15/2020    ALKPHOS 37 (L) 06/15/2020    BILITOT 0.4 06/15/2020     Lab Results    Component Value Date    IRON 83 06/15/2020    TIBC 385 06/15/2020    FERRITIN 16 (L) 06/15/2020         Physical Exam  Vitals signs reviewed.   Constitutional:       Appearance: She is well-developed.   HENT:      Head: Normocephalic.      Right Ear: Hearing and external ear normal.      Left Ear: Hearing and external ear normal.      Nose: Nose normal.   Eyes:      General: Lids are normal.         Right eye: No discharge.         Left eye: No discharge.      Conjunctiva/sclera: Conjunctivae normal.   Neck:      Musculoskeletal: Normal range of motion.      Thyroid: No thyroid mass.   Cardiovascular:      Rate and Rhythm: Normal rate and regular rhythm.      Heart sounds: Murmur present. Systolic murmur present with a grade of 2/6.   Pulmonary:      Effort: Pulmonary effort is normal. No respiratory distress.      Breath sounds: Normal breath sounds. No wheezing, rhonchi or rales.   Chest:      Chest wall: No tenderness.   Abdominal:      General: Bowel sounds are normal. There is no distension.      Palpations: Abdomen is soft.      Tenderness: There is no abdominal tenderness.   Musculoskeletal: Normal range of motion.   Lymphadenopathy:      Head:      Right side of head: No submandibular, preauricular or posterior auricular adenopathy.      Left side of head: No submandibular, preauricular or posterior auricular adenopathy.      Cervical:      Right cervical: No superficial cervical adenopathy.     Left cervical: No superficial cervical adenopathy.   Skin:     General: Skin is warm and dry.   Neurological:      Mental Status: She is alert and oriented to person, place, and time.   Psychiatric:         Mood and Affect: Mood is anxious.         Speech: Speech normal.         Behavior: Behavior normal.         Thought Content: Thought content normal.          Assessment:     Problem List Items Addressed This Visit        Renal/    Stage 3 chronic kidney disease     stable            Oncology    Iron deficiency  anemia due to chronic blood loss - Primary     Hemoglobin remains normal but slightly lower than previous. Saturated iron normal. Ferritin has significantly decline and is low. Planned endoscopies were cancelled due to COVID concerns. Patient has not yet rescheduled    Proceed with Venofer IV x 1 dose. F/u 2 months with repeat labs. Discussed S&S to report sooner         Relevant Orders    CBC auto differential    Basic metabolic panel    Iron and TIBC    Ferritin                    Elida Alanis, KJP-C

## 2020-06-23 ENCOUNTER — INFUSION (OUTPATIENT)
Dept: INFUSION THERAPY | Facility: HOSPITAL | Age: 70
End: 2020-06-23
Attending: INTERNAL MEDICINE
Payer: MEDICARE

## 2020-06-23 VITALS
DIASTOLIC BLOOD PRESSURE: 59 MMHG | TEMPERATURE: 97 F | HEART RATE: 57 BPM | RESPIRATION RATE: 16 BRPM | SYSTOLIC BLOOD PRESSURE: 125 MMHG | OXYGEN SATURATION: 99 %

## 2020-06-23 DIAGNOSIS — D50.0 IRON DEFICIENCY ANEMIA DUE TO CHRONIC BLOOD LOSS: Primary | ICD-10-CM

## 2020-06-23 PROCEDURE — 96375 TX/PRO/DX INJ NEW DRUG ADDON: CPT

## 2020-06-23 PROCEDURE — 96374 THER/PROPH/DIAG INJ IV PUSH: CPT

## 2020-06-23 PROCEDURE — 63600175 PHARM REV CODE 636 W HCPCS: Performed by: NURSE PRACTITIONER

## 2020-06-23 RX ORDER — METHYLPREDNISOLONE SOD SUCC 125 MG
80 VIAL (EA) INJECTION
Status: COMPLETED | OUTPATIENT
Start: 2020-06-23 | End: 2020-06-23

## 2020-06-23 RX ADMIN — METHYLPREDNISOLONE SODIUM SUCCINATE 80 MG: 125 INJECTION, POWDER, FOR SOLUTION INTRAMUSCULAR; INTRAVENOUS at 01:06

## 2020-06-23 RX ADMIN — IRON SUCROSE 200 MG: 20 INJECTION, SOLUTION INTRAVENOUS at 01:06

## 2020-06-23 NOTE — DISCHARGE INSTRUCTIONS
[facility]  [address] [facility]  [address] [address]  [phone] phone     [phone] fax  Hours of Operation: Monday- Friday 8:00am- 5:00pm  After hours phone  [phone]  Hematology / Oncology Physicians on call      SAMEERA Robertson Dr., Dr., Dr., Dr., NP Sydney Prescott, NP Tyesha Taylor, NP    Please call with any concerns regarding your appointment today.IRON DEFICIENCY ANEMIA:  Anemia is a condition where the size or number of red blood cells in the body is reduced. Iron is needed in the diet to make red cells. The red blood cells carry oxygen to all parts of the body. Anemia limits the delivery of oxygen to where it is needed. This causes a feeling of being tired and run down. When anemia becomes severe, the skin becomes pale and there is shortness of breath with exertion, headaches, dizziness, drowsiness and fatigue.  The cause of your anemia is lack of iron in your body. This may occur due to blood loss (for example, heavy menstrual periods or bleeding from the stomach or intestines) or a poor diet (not eating enough iron-containing foods), inability to absorb iron from your diet, or pregnancy.  If the blood count is low enough, an IRON SUPPLEMENT will be prescribed. It usually takes about 2-3 months of treatment with iron supplements to correct an anemia. Severe cases of anemia requires a blood transfusion to rapidly correct symptoms and deliver more oxygen to the cells.  HOME CARE:  1) Increase the iron stores in your body by eating foods high in iron content. This is a natural way of building your blood cells back up again. Beef, liver, spinach and other dark green leafy vegetables, whole grain products, beans and nuts are all natural sources of iron.  2) If you are having symptoms of anemia listed above:  -- Do not overexert yourself.  -- Talk to your doctor before flying on an airplane or  travelling to high altitudes.  FOLLOW UP with your doctor in 2 months for a repeat red blood cell count, or as recommended by our staff, to be sure that the anemia has been corrected.  GET PROMPT MEDICAL ATTENTION if any of the following occur or worsen:  -- Shortness of breath or chest pain  -- Dizziness or fainting  -- Vomiting blood or passing red or black-colored stool  © 8296-0054 [employer] [facility], [address] [place] [address], [place] [address]. All rights reserved. This information is not intended as a substitute for professional medical care. Always follow your healthcare professional's instructions.

## 2020-06-25 ENCOUNTER — PATIENT OUTREACH (OUTPATIENT)
Dept: ADMINISTRATIVE | Facility: OTHER | Age: 70
End: 2020-06-25

## 2020-06-25 DIAGNOSIS — Z12.31 ENCOUNTER FOR SCREENING MAMMOGRAM FOR MALIGNANT NEOPLASM OF BREAST: Primary | ICD-10-CM

## 2020-06-25 NOTE — PROGRESS NOTES
Requested updates within Care Everywhere.  Patient's chart was reviewed for overdue KAYLAN topics.  Immunizations reconciled.    Orders placed: Mammo  Tasked appts: none  Labs Linked: none

## 2020-06-30 ENCOUNTER — OFFICE VISIT (OUTPATIENT)
Dept: CARDIOLOGY | Facility: CLINIC | Age: 70
End: 2020-06-30
Payer: MEDICARE

## 2020-06-30 VITALS
HEART RATE: 78 BPM | SYSTOLIC BLOOD PRESSURE: 154 MMHG | BODY MASS INDEX: 45.7 KG/M2 | WEIGHT: 257.94 LBS | DIASTOLIC BLOOD PRESSURE: 70 MMHG | HEIGHT: 63 IN | OXYGEN SATURATION: 98 %

## 2020-06-30 DIAGNOSIS — I25.118 CORONARY ARTERY DISEASE OF NATIVE ARTERY OF NATIVE HEART WITH STABLE ANGINA PECTORIS: ICD-10-CM

## 2020-06-30 DIAGNOSIS — I20.9 ANGINA, CLASS II: Chronic | ICD-10-CM

## 2020-06-30 DIAGNOSIS — I35.0 NONRHEUMATIC AORTIC VALVE STENOSIS: Chronic | ICD-10-CM

## 2020-06-30 DIAGNOSIS — I10 ESSENTIAL HYPERTENSION: Primary | Chronic | ICD-10-CM

## 2020-06-30 PROCEDURE — 99999 PR PBB SHADOW E&M-EST. PATIENT-LVL V: ICD-10-PCS | Mod: PBBFAC,,, | Performed by: NUCLEAR MEDICINE

## 2020-06-30 PROCEDURE — 99214 OFFICE O/P EST MOD 30 MIN: CPT | Mod: S$PBB,,, | Performed by: NUCLEAR MEDICINE

## 2020-06-30 PROCEDURE — 99215 OFFICE O/P EST HI 40 MIN: CPT | Mod: PBBFAC | Performed by: NUCLEAR MEDICINE

## 2020-06-30 PROCEDURE — 99999 PR PBB SHADOW E&M-EST. PATIENT-LVL V: CPT | Mod: PBBFAC,,, | Performed by: NUCLEAR MEDICINE

## 2020-06-30 PROCEDURE — 99214 PR OFFICE/OUTPT VISIT, EST, LEVL IV, 30-39 MIN: ICD-10-PCS | Mod: S$PBB,,, | Performed by: NUCLEAR MEDICINE

## 2020-06-30 NOTE — PROGRESS NOTES
Subjective:   Patient ID:  Qi Ortiz is a 70 y.o. female who presents for follow-up of Coronary Artery Disease (POST PCI) and Hypertension (ESSENTIAL)      HPI DOING WELL. NO RECURRENT ANGINA OR EQUIVALENT  NO UNUSUAL BROOKS WITH ORDINARY DAILY ACTIVITIES  NO ORTHOPNEA OR PND  NO PALPITATIONS  NO NEAR SYNCOPE OR SYNCOPE  NO FOCAL CNS SYMPTOMS OR SIGNS TO SUGGEST TIA OR STROKE  NO EDEMA. NO CALVE TENDERNESS  CARD MED GOOD COMPLIANCE, NO SIDE EFFECTS    Review of Systems   Constitution: Negative for chills, fever, night sweats, weight gain and weight loss.   HENT: Negative for nosebleeds.    Eyes: Negative for blurred vision, double vision and visual disturbance.   Cardiovascular: Negative for chest pain, dyspnea on exertion, irregular heartbeat, leg swelling, orthopnea, palpitations, paroxysmal nocturnal dyspnea and syncope.   Respiratory: Negative for cough, hemoptysis and wheezing.    Endocrine: Negative for polydipsia and polyuria.   Hematologic/Lymphatic: Does not bruise/bleed easily.   Skin: Negative for rash.   Musculoskeletal: Negative for joint pain, joint swelling, muscle weakness and myalgias.   Gastrointestinal: Negative for abdominal pain, hematemesis, jaundice and melena.   Genitourinary: Negative for dysuria, hematuria and nocturia.   Neurological: Negative for dizziness, focal weakness, headaches, sensory change and weakness.   Psychiatric/Behavioral: Negative for depression. The patient does not have insomnia and is not nervous/anxious.      Family History   Problem Relation Age of Onset    Breast cancer Maternal Grandfather     Breast cancer Paternal Aunt     Stroke Unknown     Breast cancer Sister 60    Leukemia Sister 8         as child    Lung cancer Paternal Grandfather     Heart disease Unknown      Past Medical History:   Diagnosis Date    Carotid stenosis     19%    COPD (chronic obstructive pulmonary disease)     No meds    Coronary artery disease     CVA (cerebral vascular  accident)     Dr. 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 Hope    Psoriatic arthritis     Rheumatology     Social History     Socioeconomic History    Marital status: Single     Spouse name: Not on file    Number of children: 3    Years of education: Not on file    Highest education level: Not on file   Occupational History    Occupation: Not working   Social Needs    Financial resource strain: Not on file    Food insecurity     Worry: Not on file     Inability: Not on file    Transportation needs     Medical: Not on file     Non-medical: Not on file   Tobacco Use    Smoking status: Never Smoker    Smokeless tobacco: Never Used   Substance and Sexual Activity    Alcohol use: No    Drug use: No    Sexual activity: Never     Partners: Male   Lifestyle    Physical activity     Days per week: Not on file     Minutes per session: Not on file    Stress: Not on file   Relationships    Social connections     Talks on phone: Not on file     Gets together: Not on file     Attends Church service: Not on file     Active member of club or organization: Not on file     Attends meetings of clubs or organizations: Not on file     Relationship status: Not on file   Other Topics Concern    Not on file   Social History Narrative    Not on file     Current Outpatient Medications on File Prior to Visit   Medication Sig Dispense Refill    aspirin 81 MG Chew Take 162.5 mg by mouth once daily.       calcium carbonate 650 mg calcium (1,625 mg) tablet Take 1 tablet by mouth once daily.      clopidogreL (PLAVIX) 75 mg tablet Take 1 tablet (75 mg total) by mouth once daily. 90 tablet 3    cyanocobalamin 2000 MCG tablet Take 2,000 mcg by mouth once daily.      evolocumab (REPATHA SURECLICK) 140 mg/mL PnIj Inject 1 mL (140 mg total) into the skin every 14 (fourteen) days. 6 mL  3    folic acid (FOLVITE) 1 MG tablet Take 1 tablet (1 mg total) by mouth once daily. 90 tablet 1    gabapentin (NEURONTIN) 100 MG capsule Take 2 capsules (200 mg total) by mouth 3 (three) times daily as needed. 540 capsule 3    garlic 2,000 mg Cap Take 3,000 mg by mouth once daily.       levothyroxine (SYNTHROID) 112 MCG tablet Take 1 tablet (112 mcg total) by mouth once daily. 90 tablet 1    metoprolol succinate (TOPROL-XL) 100 MG 24 hr tablet Take 1 tablet (100 mg total) by mouth 2 (two) times daily. 1 tab (100mg) in morning. And 100 mg at bedtime.  Generic ok 180 tablet 3    nitroGLYCERIN (NITROSTAT) 0.4 MG SL tablet Place 1 tablet (0.4 mg total) under the tongue every 5 (five) minutes as needed for Chest pain. 100 tablet 3    pyridoxine (VITAMIN B-6) 100 MG Tab Take 200 mg by mouth once daily.       valsartan-hydrochlorothiazide (DIOVAN-HCT) 160-12.5 mg per tablet Take 1 tablet by mouth once daily. 90 tablet 3    vitamin D 1000 units Tab Take 185 mg by mouth once daily.      albuterol (PROVENTIL) 2.5 mg /3 mL (0.083 %) nebulizer solution Take 3 mLs (2.5 mg total) by nebulization every 6 (six) hours as needed for Wheezing. Rescue 25 each 5    APPLE CIDER VINEGAR ORAL Take 1 capsule by mouth once daily.       chlorhexidine (BETASEPT SURGICAL SCRUB) 4 % external liquid Apply topically daily as needed. Consider 90 day supply 473 mL 3    furosemide (LASIX) 20 MG tablet Take 0.5 tablets (10 mg total) by mouth 2 (two) times daily. (Patient not taking: Reported on 6/30/2020) 90 tablet 3    iron-vitamin C 100-250 mg, ICAR-C, (ICAR-C) 100-250 mg Tab Take 1 tablet by mouth once daily. (Patient not taking: Reported on 6/30/2020) 90 tablet 4    magnesium citrate solution       mupirocin (BACTROBAN) 2 % ointment Bactroban 2 % topical ointment   Apply 1 application twice a day by topical route as directed for 30 days.      TURMERIC, BULK, MISC Take 1 capsule by mouth 2 (two) times daily.       ustekinumab  (STELARA) 90 mg/mL Syrg syringe Inject 1 mL (90 mg total) into the skin every 3 (three) months. 1 Syringe 3     No current facility-administered medications on file prior to visit.      Review of patient's allergies indicates:   Allergen Reactions    Celexa [citalopram] Anaphylaxis    Clindamycin Itching and Swelling    Codeine Shortness Of Breath, Itching and Swelling    Crestor [rosuvastatin] Anaphylaxis    Cytotec [misoprostol] Anaphylaxis and Other (See Comments)     Difficulty breathing    Lisinopril Anaphylaxis    Magnesium citrate Shortness Of Breath    Stadol [butorphanol tartrate] Anaphylaxis     Coded    Vicodin [hydrocodone-acetaminophen] Shortness Of Breath    Adhesive      EKG Electrodes    Aggrenox [aspirin-dipyridamole] Other (See Comments)     headaches    Avelox [moxifloxacin]      PATIENT STATES DO NOT GIVE UNDER ANY CIRCUSTANCES    Demerol [meperidine]     Isosorbide Other (See Comments)     Severe headache    Kenalog [triamcinolone acetonide]     Medrol [methylprednisolone] Other (See Comments)     Patient reports taking IV in the past without any issues. Reports allergy to oral preparation     Mobic [meloxicam]     Morphine     Nitroglycerin      Long acting    Pholcodine     Prednisone      GASTRIC PAIN    Ranexa [ranolazine] Nausea And Vomiting    Reclast [zoledronic acid-mannitol-water] Other (See Comments)     Bones hurt    Sulfa (sulfonamide antibiotics) Other (See Comments)     unknown    Talwin [pentazocine lactate]     Tetracycline     Tetracyclines     Tilade [nedocromil]     Zetia [ezetimibe]        Objective:     Physical Exam   Constitutional: She is oriented to person, place, and time. She appears well-developed. No distress.   HENT:   Head: Normocephalic.   Eyes: Pupils are equal, round, and reactive to light. Conjunctivae are normal. No scleral icterus.   Neck: Normal range of motion. Neck supple. Normal carotid pulses, no hepatojugular reflux and no  JVD present. Carotid bruit is not present. No edema present. No thyroid mass and no thyromegaly present.   Cardiovascular: Normal rate, regular rhythm, S1 normal, S2 normal and intact distal pulses. PMI is not displaced. Exam reveals no gallop and no friction rub.   Murmur heard.   Harsh crescendo-decrescendo midsystolic murmur is present with a grade of 3/6 at the upper right sternal border radiating to the neck. The intensity increases with respiration.  Pulses:       Carotid pulses are 2+ on the right side and 2+ on the left side.       Radial pulses are 2+ on the right side and 2+ on the left side.        Femoral pulses are 2+ on the right side and 2+ on the left side.       Popliteal pulses are 2+ on the right side and 2+ on the left side.        Dorsalis pedis pulses are 2+ on the right side and 2+ on the left side.        Posterior tibial pulses are 2+ on the right side and 2+ on the left side.   Pulmonary/Chest: Effort normal and breath sounds normal. She has no wheezes. She has no rales. She exhibits no tenderness.   Abdominal: Soft. Bowel sounds are normal. She exhibits no pulsatile midline mass and no mass. There is no hepatosplenomegaly. There is no abdominal tenderness.   Musculoskeletal: Normal range of motion.         General: No tenderness or edema.      Cervical back: Normal.      Thoracic back: Normal.      Lumbar back: Normal.   Lymphadenopathy:     She has no cervical adenopathy.     She has no axillary adenopathy.        Right: No supraclavicular adenopathy present.        Left: No supraclavicular adenopathy present.   Neurological: She is alert and oriented to person, place, and time. She has normal strength and normal reflexes. No sensory deficit. Gait normal.   Skin: Skin is warm. No rash noted. No cyanosis. No pallor. Nails show no clubbing.   Psychiatric: She has a normal mood and affect. Her speech is normal and behavior is normal. Cognition and memory are normal.       Assessment:     1.  Essential hypertension    2. Coronary artery disease of native artery of native heart with stable angina pectoris    3. Angina, class II        Plan:     Essential hypertension  WELL CONTROLLED BP  CNS STATUS STABLE  NO ADHF    Coronary artery disease of native artery of native heart with stable angina pectoris  NO ACTIVE MYOCARDIAL ISCHEMIA  NO ARRHYTHMIAS    Angina, class II  STABLE PATTERN    CONTINUE PRESENT CARD MANAGEMENT    RETURN IN 6 MONTHS.

## 2020-07-08 ENCOUNTER — LAB VISIT (OUTPATIENT)
Dept: LAB | Facility: HOSPITAL | Age: 70
End: 2020-07-08
Attending: INTERNAL MEDICINE
Payer: MEDICARE

## 2020-07-08 DIAGNOSIS — L40.9 PSORIASIS: ICD-10-CM

## 2020-07-08 LAB
ALBUMIN SERPL BCP-MCNC: 3.5 G/DL (ref 3.5–5.2)
ALP SERPL-CCNC: 38 U/L (ref 55–135)
ALT SERPL W/O P-5'-P-CCNC: 20 U/L (ref 10–44)
ANION GAP SERPL CALC-SCNC: 10 MMOL/L (ref 8–16)
AST SERPL-CCNC: 22 U/L (ref 10–40)
BASOPHILS # BLD AUTO: 0.07 K/UL (ref 0–0.2)
BASOPHILS NFR BLD: 0.8 % (ref 0–1.9)
BILIRUB SERPL-MCNC: 0.4 MG/DL (ref 0.1–1)
BUN SERPL-MCNC: 21 MG/DL (ref 8–23)
CALCIUM SERPL-MCNC: 9.5 MG/DL (ref 8.7–10.5)
CHLORIDE SERPL-SCNC: 103 MMOL/L (ref 95–110)
CO2 SERPL-SCNC: 28 MMOL/L (ref 23–29)
CREAT SERPL-MCNC: 1.3 MG/DL (ref 0.5–1.4)
CRP SERPL-MCNC: 6.1 MG/L (ref 0–8.2)
DIFFERENTIAL METHOD: ABNORMAL
EOSINOPHIL # BLD AUTO: 0.2 K/UL (ref 0–0.5)
EOSINOPHIL NFR BLD: 1.7 % (ref 0–8)
ERYTHROCYTE [DISTWIDTH] IN BLOOD BY AUTOMATED COUNT: 15.3 % (ref 11.5–14.5)
ERYTHROCYTE [SEDIMENTATION RATE] IN BLOOD BY WESTERGREN METHOD: 29 MM/HR (ref 0–36)
EST. GFR  (AFRICAN AMERICAN): 48.4 ML/MIN/1.73 M^2
EST. GFR  (NON AFRICAN AMERICAN): 42 ML/MIN/1.73 M^2
GLUCOSE SERPL-MCNC: 145 MG/DL (ref 70–110)
HCT VFR BLD AUTO: 39.2 % (ref 37–48.5)
HGB BLD-MCNC: 12.6 G/DL (ref 12–16)
IMM GRANULOCYTES # BLD AUTO: 0.04 K/UL (ref 0–0.04)
IMM GRANULOCYTES NFR BLD AUTO: 0.5 % (ref 0–0.5)
LYMPHOCYTES # BLD AUTO: 1.9 K/UL (ref 1–4.8)
LYMPHOCYTES NFR BLD: 22.4 % (ref 18–48)
MCH RBC QN AUTO: 29.4 PG (ref 27–31)
MCHC RBC AUTO-ENTMCNC: 32.1 G/DL (ref 32–36)
MCV RBC AUTO: 91 FL (ref 82–98)
MONOCYTES # BLD AUTO: 0.7 K/UL (ref 0.3–1)
MONOCYTES NFR BLD: 8.5 % (ref 4–15)
NEUTROPHILS # BLD AUTO: 5.7 K/UL (ref 1.8–7.7)
NEUTROPHILS NFR BLD: 66.1 % (ref 38–73)
NRBC BLD-RTO: 0 /100 WBC
PLATELET # BLD AUTO: 254 K/UL (ref 150–350)
PMV BLD AUTO: 11.4 FL (ref 9.2–12.9)
POTASSIUM SERPL-SCNC: 3.9 MMOL/L (ref 3.5–5.1)
PROT SERPL-MCNC: 6.8 G/DL (ref 6–8.4)
RBC # BLD AUTO: 4.29 M/UL (ref 4–5.4)
SODIUM SERPL-SCNC: 141 MMOL/L (ref 136–145)
WBC # BLD AUTO: 8.61 K/UL (ref 3.9–12.7)

## 2020-07-08 PROCEDURE — 86140 C-REACTIVE PROTEIN: CPT

## 2020-07-08 PROCEDURE — 85652 RBC SED RATE AUTOMATED: CPT

## 2020-07-08 PROCEDURE — 85025 COMPLETE CBC W/AUTO DIFF WBC: CPT

## 2020-07-08 PROCEDURE — 36415 COLL VENOUS BLD VENIPUNCTURE: CPT | Mod: PO

## 2020-07-08 PROCEDURE — 80053 COMPREHEN METABOLIC PANEL: CPT

## 2020-07-14 ENCOUNTER — PATIENT OUTREACH (OUTPATIENT)
Dept: ADMINISTRATIVE | Facility: OTHER | Age: 70
End: 2020-07-14

## 2020-07-15 ENCOUNTER — OFFICE VISIT (OUTPATIENT)
Dept: RHEUMATOLOGY | Facility: CLINIC | Age: 70
End: 2020-07-15
Payer: MEDICARE

## 2020-07-15 ENCOUNTER — INFUSION (OUTPATIENT)
Dept: INFUSION THERAPY | Facility: HOSPITAL | Age: 70
End: 2020-07-15
Attending: NURSE PRACTITIONER
Payer: MEDICARE

## 2020-07-15 VITALS
WEIGHT: 259.94 LBS | HEART RATE: 69 BPM | TEMPERATURE: 98 F | DIASTOLIC BLOOD PRESSURE: 64 MMHG | BODY MASS INDEX: 46.04 KG/M2 | OXYGEN SATURATION: 98 % | SYSTOLIC BLOOD PRESSURE: 117 MMHG | RESPIRATION RATE: 18 BRPM

## 2020-07-15 VITALS
WEIGHT: 259.94 LBS | HEIGHT: 63 IN | HEART RATE: 79 BPM | BODY MASS INDEX: 46.06 KG/M2 | DIASTOLIC BLOOD PRESSURE: 64 MMHG | SYSTOLIC BLOOD PRESSURE: 99 MMHG

## 2020-07-15 DIAGNOSIS — L40.9 PSORIASIS: Primary | ICD-10-CM

## 2020-07-15 DIAGNOSIS — M81.0 AGE-RELATED OSTEOPOROSIS WITHOUT CURRENT PATHOLOGICAL FRACTURE: ICD-10-CM

## 2020-07-15 DIAGNOSIS — L40.50 PSORIATIC ARTHRITIS: Primary | ICD-10-CM

## 2020-07-15 DIAGNOSIS — L40.50 PSORIATIC ARTHRITIS: ICD-10-CM

## 2020-07-15 DIAGNOSIS — N18.30 STAGE 3 CHRONIC KIDNEY DISEASE: ICD-10-CM

## 2020-07-15 PROCEDURE — 63600175 PHARM REV CODE 636 W HCPCS: Mod: JG | Performed by: INTERNAL MEDICINE

## 2020-07-15 PROCEDURE — 99999 PR PBB SHADOW E&M-EST. PATIENT-LVL IV: ICD-10-PCS | Mod: PBBFAC,,, | Performed by: INTERNAL MEDICINE

## 2020-07-15 PROCEDURE — 99214 OFFICE O/P EST MOD 30 MIN: CPT | Mod: PBBFAC,25 | Performed by: INTERNAL MEDICINE

## 2020-07-15 PROCEDURE — 99214 PR OFFICE/OUTPT VISIT, EST, LEVL IV, 30-39 MIN: ICD-10-PCS | Mod: S$PBB,,, | Performed by: INTERNAL MEDICINE

## 2020-07-15 PROCEDURE — 96372 THER/PROPH/DIAG INJ SC/IM: CPT

## 2020-07-15 PROCEDURE — 99999 PR PBB SHADOW E&M-EST. PATIENT-LVL IV: CPT | Mod: PBBFAC,,, | Performed by: INTERNAL MEDICINE

## 2020-07-15 PROCEDURE — 99214 OFFICE O/P EST MOD 30 MIN: CPT | Mod: S$PBB,,, | Performed by: INTERNAL MEDICINE

## 2020-07-15 RX ADMIN — USTEKINUMAB 90 MG: 90 INJECTION, SOLUTION SUBCUTANEOUS at 11:07

## 2020-07-15 NOTE — PROGRESS NOTES
CC:  Chief Complaint   Patient presents with    Osteoporosis   Psoriasis/ PsA      History of Present Illness:  Qi Mg a 70 y.o.yo female    For routine follow-up of psoriasis and psoriatic arthritis   on Stelara 90 mg q 3 months; she is getting it here in clinic. In the past has failed enbrel, mtx, has sulfa allergy, recurrent skin infections with Humira, intolerant to Otezla  She has been on Stelara since 3/2016. Also with neuropathic chest pain due to previous cardiac surgery, gabapentin helps greatly. She takes 200 mg 2 to 3 times a day as needed  Most of her rash resolved except she sometimes has rash along the hairline  Hands hurt when she uses a lot  No joint swelling  No fever, chills, fatigue or weight loss      She is doing well except pain in both her feet  Better controlled on gabapentin , but if she takes a higher dose she cant function   She is aware of all COVID precautions    She also has Osteopenia, h/o right wrist fx in the 90's. Failed po boniva and fosamax due to GI intolerance. Last dexa showed decreasing bmd so Dr. CASTELLANOS started reclast in sept but she felt terrible after her infusion with significant pain and confusion. She does not want to repeat.  NO new falls/fxs. I discussed prolia with her in the past and today and she declines any meds   But she would still like to get a DEXA to monitor  She takes vitamin-D over-the-counter daily, levels normal today     She has iron deficiency anemia and gets constipated with iron tablets  She is currently followed by PCP for the same    She has history of staph infections and hence uses a betacept scrub      Past Medical History:   Diagnosis Date    Carotid stenosis     19%    COPD (chronic obstructive pulmonary disease)     No meds    Coronary artery disease     CVA (cerebral vascular accident)     Dr. 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 Hope    Psoriatic arthritis     Rheumatology       Past Surgical History:   Procedure Laterality Date    BREAST BIOPSY      R sided/benign    CARDIAC SURGERY      2016    CERVICAL FUSION      CHOLECYSTECTOMY      CORONARY ANGIOPLASTY      CORONARY ARTERY BYPASS GRAFT      triple bypass    CORONARY STENT PLACEMENT      EYE SURGERY      INTRAUTERINE DEVICE INSERTION      mass removed from R groin      TOTAL ABDOMINAL HYSTERECTOMY W/ BILATERAL SALPINGOOPHORECTOMY      due to benign mass, adhesions    TUBAL LIGATION           Social History     Tobacco Use    Smoking status: Never Smoker    Smokeless tobacco: Never Used   Substance Use Topics    Alcohol use: No    Drug use: No       Family History   Problem Relation Age of Onset    Breast cancer Maternal Grandfather     Breast cancer Paternal Aunt     Stroke Unknown     Breast cancer Sister 60    Leukemia Sister 8         as child    Lung cancer Paternal Grandfather     Heart disease Unknown        Review of patient's allergies indicates:   Allergen Reactions    Celexa [citalopram] Anaphylaxis    Clindamycin Itching and Swelling    Codeine Shortness Of Breath, Itching and Swelling    Crestor [rosuvastatin] Anaphylaxis    Cytotec [misoprostol] Anaphylaxis and Other (See Comments)     Difficulty breathing    Lisinopril Anaphylaxis    Magnesium citrate Shortness Of Breath    Stadol [butorphanol tartrate] Anaphylaxis     Coded    Vicodin [hydrocodone-acetaminophen] Shortness Of Breath    Adhesive      EKG Electrodes    Aggrenox [aspirin-dipyridamole] Other (See Comments)     headaches    Avelox [moxifloxacin]      PATIENT STATES DO NOT GIVE UNDER ANY CIRCUSTANCES    Demerol [meperidine]     Isosorbide Other (See Comments)     Severe headache    Kenalog [triamcinolone acetonide]     Medrol [methylprednisolone] Other (See Comments)     unknown    Mobic [meloxicam]      Morphine     Nitroglycerin      Long acting    Pholcodine     Prednisone      GASTRIC PAIN    Ranexa [ranolazine] Nausea And Vomiting    Reclast [zoledronic acid-mannitol-water] Other (See Comments)     Bones hurt    Sulfa (sulfonamide antibiotics) Other (See Comments)     unknown    Talwin [pentazocine lactate]     Tetracycline     Tetracyclines     Tilade [nedocromil]     Zetia [ezetimibe]      Medication List with Changes/Refills   Current Medications    ALBUTEROL (PROVENTIL) 2.5 MG /3 ML (0.083 %) NEBULIZER SOLUTION    Take 3 mLs (2.5 mg total) by nebulization every 6 (six) hours as needed for Wheezing. Rescue    APPLE CIDER VINEGAR ORAL    Take 1 capsule by mouth once daily.     ASPIRIN 81 MG CHEW    Take 162.5 mg by mouth once daily.     CALCIUM CARBONATE 650 MG CALCIUM (1,625 MG) TABLET    Take 1 tablet by mouth once daily.    CHLORHEXIDINE (BETASEPT SURGICAL SCRUB) 4 % EXTERNAL LIQUID    Apply topically daily as needed. Consider 90 day supply    CLOPIDOGREL (PLAVIX) 75 MG TABLET    Take 1 tablet (75 mg total) by mouth once daily.    CYANOCOBALAMIN 2000 MCG TABLET    Take 2,000 mcg by mouth once daily.    EVOLOCUMAB (REPATHA SURECLICK) 140 MG/ML PNIJ    Inject 1 mL (140 mg total) into the skin every 14 (fourteen) days.    FOLIC ACID (FOLVITE) 1 MG TABLET    Take 1 tablet (1 mg total) by mouth once daily.    FUROSEMIDE (LASIX) 20 MG TABLET    Take 0.5 tablets (10 mg total) by mouth 2 (two) times daily.    GABAPENTIN (NEURONTIN) 100 MG CAPSULE    Take 2 capsules (200 mg total) by mouth 3 (three) times daily as needed.    GARLIC 2,000 MG CAP    Take 3,000 mg by mouth once daily.     IRON-VITAMIN C 100-250 MG, ICAR-C, (ICAR-C) 100-250 MG TAB    Take 1 tablet by mouth once daily.    LEVOTHYROXINE (SYNTHROID) 112 MCG TABLET    Take 1 tablet (112 mcg total) by mouth once daily.    MAGNESIUM CITRATE SOLUTION        METOPROLOL SUCCINATE (TOPROL-XL) 100 MG 24 HR TABLET    Take 1 tablet (100 mg total) by  mouth 2 (two) times daily. 1 tab (100mg) in morning. And 100 mg at bedtime.  Generic ok    MUPIROCIN (BACTROBAN) 2 % OINTMENT    Bactroban 2 % topical ointment   Apply 1 application twice a day by topical route as directed for 30 days.    NITROGLYCERIN (NITROSTAT) 0.4 MG SL TABLET    Place 1 tablet (0.4 mg total) under the tongue every 5 (five) minutes as needed for Chest pain.    PYRIDOXINE (VITAMIN B-6) 100 MG TAB    Take 200 mg by mouth once daily.     TURMERIC, BULK, MISC    Take 1 capsule by mouth 2 (two) times daily.     USTEKINUMAB (STELARA) 90 MG/ML SYRG SYRINGE    Inject 1 mL (90 mg total) into the skin every 3 (three) months.    VALSARTAN-HYDROCHLOROTHIAZIDE (DIOVAN-HCT) 160-12.5 MG PER TABLET    Take 1 tablet by mouth once daily.    VITAMIN D 1000 UNITS TAB    Take 185 mg by mouth once daily.     Review of Systems:  Constitutional: Denies fever, chills. No recent weight changes.   Fatigue: no  Muscle weakness: no  Headaches: no new headaches  Eyes: No redness or dryness.  No recent visual changes.  ENT: Denies dry mouth. No oral or nasal ulcers.  Card: No chest pain.  Resp: No cough or sob.   Gastro: No nausea or vomiting.  No heartburn.  Constipation: no  Diarrhea: no  Genito:  No dysuria.  No genital ulcers.  Skin: per hpi   Raynauds:no  Neuro: No numbness / tingling.   Psych: No depression, anxiety  Endo:  no excess thirst.  Heme: no abnormal bleeding or bruising  Clots:none       OBJECTIVE:     Vital Signs     Physical Exam:  General Appearance:  NAD.   Gait: not favoring.  HEENT: PERRL.  Eyes not dry or injected.  No nasal ulcers.  Mouth not dry, no oral lesions.  Lymph: cervical, supraclavicular or axillary nodes: none abnormal   Cardio: no irregularity of S1 or S2.  No gallops or rubs.   Resp: Normal respiratory motion. Clear to auscultation bilaterally.   No abnormal chest conformation.  Abd: Soft, non-tender, nondistended.  No masses.   Skin: Head and neck,  and extremities examined.   Rash along  hairline   Neuro: Ox3.   Cranial nerves II-XII grossly intact.   Sensation intact  in both distal LE and upper extremities to light touch.  Musculoskeletal Exam:     Bilateral osteoarthritic changes in both hands.  Prominent DIP.  No tenderness  No synovitis    Laboratory:   Results for orders placed or performed in visit on 07/08/20   CBC auto differential   Result Value Ref Range    WBC 8.61 3.90 - 12.70 K/uL    RBC 4.29 4.00 - 5.40 M/uL    Hemoglobin 12.6 12.0 - 16.0 g/dL    Hematocrit 39.2 37.0 - 48.5 %    Mean Corpuscular Volume 91 82 - 98 fL    Mean Corpuscular Hemoglobin 29.4 27.0 - 31.0 pg    Mean Corpuscular Hemoglobin Conc 32.1 32.0 - 36.0 g/dL    RDW 15.3 (H) 11.5 - 14.5 %    Platelets 254 150 - 350 K/uL    MPV 11.4 9.2 - 12.9 fL    Immature Granulocytes 0.5 0.0 - 0.5 %    Gran # (ANC) 5.7 1.8 - 7.7 K/uL    Immature Grans (Abs) 0.04 0.00 - 0.04 K/uL    Lymph # 1.9 1.0 - 4.8 K/uL    Mono # 0.7 0.3 - 1.0 K/uL    Eos # 0.2 0.0 - 0.5 K/uL    Baso # 0.07 0.00 - 0.20 K/uL    nRBC 0 0 /100 WBC    Gran% 66.1 38.0 - 73.0 %    Lymph% 22.4 18.0 - 48.0 %    Mono% 8.5 4.0 - 15.0 %    Eosinophil% 1.7 0.0 - 8.0 %    Basophil% 0.8 0.0 - 1.9 %    Differential Method Automated    Comprehensive metabolic panel   Result Value Ref Range    Sodium 141 136 - 145 mmol/L    Potassium 3.9 3.5 - 5.1 mmol/L    Chloride 103 95 - 110 mmol/L    CO2 28 23 - 29 mmol/L    Glucose 145 (H) 70 - 110 mg/dL    BUN, Bld 21 8 - 23 mg/dL    Creatinine 1.3 0.5 - 1.4 mg/dL    Calcium 9.5 8.7 - 10.5 mg/dL    Total Protein 6.8 6.0 - 8.4 g/dL    Albumin 3.5 3.5 - 5.2 g/dL    Total Bilirubin 0.4 0.1 - 1.0 mg/dL    Alkaline Phosphatase 38 (L) 55 - 135 U/L    AST 22 10 - 40 U/L    ALT 20 10 - 44 U/L    Anion Gap 10 8 - 16 mmol/L    eGFR if African American 48.4 (A) >60 mL/min/1.73 m^2    eGFR if non  42.0 (A) >60 mL/min/1.73 m^2   Sedimentation rate   Result Value Ref Range    Sed Rate 29 0 - 36 mm/Hr   C-reactive protein   Result Value  Ref Range    CRP 6.1 0.0 - 8.2 mg/L     Lab Results   Component Value Date    HEPAIGM Negative 10/03/2018    HEPBIGM Negative 10/03/2018    HEPCAB Negative 10/03/2018         Imaging :reviewed x rays hands/ feet     Notes reviewed  Other procedures:    ASSESSMENT/PLAN:     Psoriatic arthritis    Age-related osteoporosis without current pathological fracture  -     DXA Bone Density Spine And Hip; Future; Expected date: 07/15/2020    Stage 3 chronic kidney disease      67 yr old    1: Pso/ PsA  Stable disease   Started Stelara 3/2016 with good improvement  C/w Stelara 90mg every 12 weeks (she gets it done here) as she cannot do it herself with her arthritis  Vaccinations declined   Continue with gabapentin 200 mg 2 to 3 times a day as needed    2: Osteopenia with DEXA 6/7/17 with osteopenia with FRAX 14.1/1.7, decreasing   BMD at hip     Received last reclast in September 2017 ,But she did not tolerate with pain   She also experienced lot of confusion after IV Reclast and she declines any further medications for the treatment of osteoporosis  Prior GI intolerance to fosamax and boniva   cont vit d supp -OTC  Today d/w her prolia  She declined again     But would like to have her DEXA monitored  Check DEXA    3: CKD stage 3 :  She does not  Take any NSAIDS   She only uses tylenol as needed     4:Anemia / leukocytosis :  She is currently getting IV iron infusions, she follows with Hematology  She is also due to  have endoscopy and colonoscopy per GI scheduled in May 2020        stellara :  Infection, malignancy risk , other side effects discussed.  Hold prior to any surgery or during periods of infection.  Stay uptodate with HM screen , she says she refused mammogram due to pain after CABG     3 month f/u with all 4 lab  Went over uptodate information /literature on the meds prescribed today     Disclaimer: This note was prepared using voice recognition system and is likely to have sound alike errors and is not proof  read.  Please call me with any questions.

## 2020-07-15 NOTE — LETTER
July 15, 2020        Trinidad Cleaning MD  [address] [place] [id number]           [facility]  [address] [facility] [address]  Phone: [phone]  Fax: [phone]   Patient: Qi Ortiz   MR Number: [id number]   YOB: 1950   Date of Visit: 7/15/2020     Dear Dr. Cleaning,     She refuses treatment for osteoporosis, she is worried about side effects as she had severe side effects in the past with IV Reclast  But however she wants continued monitoring so I have ordered DEXA        Sincerely,      Savannah Arreaga MD            CC  No Recipients    Enclosure

## 2020-07-15 NOTE — NURSING
Stelara 90 mg q 3 months  Last dose- 4/15/20     Any:  -recent illness, infection, or antibiotic use in past week- denies  -open wounds or mouth sores- denies  -invasive procedures or surgeries in past 4 weeks or in upcoming 4 weeks- denies  -vaccinations in past week- denies  -chance you may be pregnant- n/a        Recent labs? 7/8/20  Last Rheumatology provider visit- Seen by Dr. Arreaga on 7/15/20     Premeds? none    Lot #[id number]  Exp.-07/22     Stelara 90 mg administered SQ per orders to left lower abdominal quadrant. See MAR and vitals for more  details. No acute reaction noted to site. Advised patient to wait in lobby 15 minutes after receiving injection to monitor for any reactions. Verbalizes understanding. Tolerated well without adverse events, discharged and ambulatory out of clinic.

## 2020-07-20 ENCOUNTER — TELEPHONE (OUTPATIENT)
Dept: HEMATOLOGY/ONCOLOGY | Facility: CLINIC | Age: 70
End: 2020-07-20

## 2020-07-20 ENCOUNTER — LAB VISIT (OUTPATIENT)
Dept: LAB | Facility: HOSPITAL | Age: 70
End: 2020-07-20
Attending: INTERNAL MEDICINE
Payer: MEDICARE

## 2020-07-20 DIAGNOSIS — D50.0 IRON DEFICIENCY ANEMIA DUE TO CHRONIC BLOOD LOSS: ICD-10-CM

## 2020-07-20 LAB
ANION GAP SERPL CALC-SCNC: 7 MMOL/L (ref 8–16)
BASOPHILS # BLD AUTO: 0.05 K/UL (ref 0–0.2)
BASOPHILS NFR BLD: 0.6 % (ref 0–1.9)
BUN SERPL-MCNC: 23 MG/DL (ref 8–23)
CALCIUM SERPL-MCNC: 9.5 MG/DL (ref 8.7–10.5)
CHLORIDE SERPL-SCNC: 104 MMOL/L (ref 95–110)
CO2 SERPL-SCNC: 32 MMOL/L (ref 23–29)
CREAT SERPL-MCNC: 1.1 MG/DL (ref 0.5–1.4)
DIFFERENTIAL METHOD: ABNORMAL
EOSINOPHIL # BLD AUTO: 0.1 K/UL (ref 0–0.5)
EOSINOPHIL NFR BLD: 1.3 % (ref 0–8)
ERYTHROCYTE [DISTWIDTH] IN BLOOD BY AUTOMATED COUNT: 15 % (ref 11.5–14.5)
EST. GFR  (AFRICAN AMERICAN): 59.2 ML/MIN/1.73 M^2
EST. GFR  (NON AFRICAN AMERICAN): 51.3 ML/MIN/1.73 M^2
FERRITIN SERPL-MCNC: 33 NG/ML (ref 20–300)
GLUCOSE SERPL-MCNC: 89 MG/DL (ref 70–110)
HCT VFR BLD AUTO: 37.9 % (ref 37–48.5)
HGB BLD-MCNC: 11.6 G/DL (ref 12–16)
IMM GRANULOCYTES # BLD AUTO: 0.02 K/UL (ref 0–0.04)
IMM GRANULOCYTES NFR BLD AUTO: 0.2 % (ref 0–0.5)
IRON SERPL-MCNC: 41 UG/DL (ref 30–160)
LYMPHOCYTES # BLD AUTO: 2.2 K/UL (ref 1–4.8)
LYMPHOCYTES NFR BLD: 25.8 % (ref 18–48)
MCH RBC QN AUTO: 29.3 PG (ref 27–31)
MCHC RBC AUTO-ENTMCNC: 30.6 G/DL (ref 32–36)
MCV RBC AUTO: 96 FL (ref 82–98)
MONOCYTES # BLD AUTO: 0.8 K/UL (ref 0.3–1)
MONOCYTES NFR BLD: 8.8 % (ref 4–15)
NEUTROPHILS # BLD AUTO: 5.5 K/UL (ref 1.8–7.7)
NEUTROPHILS NFR BLD: 63.3 % (ref 38–73)
NRBC BLD-RTO: 0 /100 WBC
PLATELET # BLD AUTO: 257 K/UL (ref 150–350)
PMV BLD AUTO: 10.4 FL (ref 9.2–12.9)
POTASSIUM SERPL-SCNC: 3.9 MMOL/L (ref 3.5–5.1)
RBC # BLD AUTO: 3.96 M/UL (ref 4–5.4)
SATURATED IRON: 10 % (ref 20–50)
SODIUM SERPL-SCNC: 143 MMOL/L (ref 136–145)
TOTAL IRON BINDING CAPACITY: 397 UG/DL (ref 250–450)
TRANSFERRIN SERPL-MCNC: 268 MG/DL (ref 200–375)
WBC # BLD AUTO: 8.63 K/UL (ref 3.9–12.7)

## 2020-07-20 PROCEDURE — 36415 COLL VENOUS BLD VENIPUNCTURE: CPT | Mod: PO

## 2020-07-20 PROCEDURE — 80048 BASIC METABOLIC PNL TOTAL CA: CPT

## 2020-07-20 PROCEDURE — 82728 ASSAY OF FERRITIN: CPT

## 2020-07-20 PROCEDURE — 85025 COMPLETE CBC W/AUTO DIFF WBC: CPT

## 2020-07-20 PROCEDURE — 83540 ASSAY OF IRON: CPT

## 2020-07-20 NOTE — TELEPHONE ENCOUNTER
Returned pt's call bk.. she called in ref to coming in at an earlier time, rather than next month to be seen by NP.. she states she hasn't been feeling her best, and would like to have labs done and an appt much sooner than next month. I was able to sched her for this week w lab. Pt verbalized understanding to her new appt date and time.

## 2020-07-20 NOTE — TELEPHONE ENCOUNTER
----- Message from Mary Rashid sent at 7/20/2020  1:05 PM CDT -----  Contact: SELF/[phone]  Would like to consult with nurse regarding blood infusion and shortness of breath. Please call back at [phone] . Thanks/ar

## 2020-07-21 ENCOUNTER — OFFICE VISIT (OUTPATIENT)
Dept: HEMATOLOGY/ONCOLOGY | Facility: CLINIC | Age: 70
End: 2020-07-21
Payer: MEDICARE

## 2020-07-21 VITALS
OXYGEN SATURATION: 95 % | DIASTOLIC BLOOD PRESSURE: 66 MMHG | TEMPERATURE: 97 F | RESPIRATION RATE: 14 BRPM | HEART RATE: 56 BPM | BODY MASS INDEX: 44.08 KG/M2 | WEIGHT: 258.19 LBS | HEIGHT: 64 IN | SYSTOLIC BLOOD PRESSURE: 98 MMHG

## 2020-07-21 DIAGNOSIS — E66.01 MORBID OBESITY WITH BMI OF 40.0-44.9, ADULT: ICD-10-CM

## 2020-07-21 DIAGNOSIS — E53.8 B12 DEFICIENCY: ICD-10-CM

## 2020-07-21 DIAGNOSIS — N18.30 STAGE 3 CHRONIC KIDNEY DISEASE: ICD-10-CM

## 2020-07-21 DIAGNOSIS — D50.0 IRON DEFICIENCY ANEMIA DUE TO CHRONIC BLOOD LOSS: Primary | ICD-10-CM

## 2020-07-21 PROCEDURE — 99214 PR OFFICE/OUTPT VISIT, EST, LEVL IV, 30-39 MIN: ICD-10-PCS | Mod: S$PBB,,, | Performed by: NURSE PRACTITIONER

## 2020-07-21 PROCEDURE — 99215 OFFICE O/P EST HI 40 MIN: CPT | Mod: PBBFAC | Performed by: NURSE PRACTITIONER

## 2020-07-21 PROCEDURE — 99999 PR PBB SHADOW E&M-EST. PATIENT-LVL V: CPT | Mod: PBBFAC,,, | Performed by: NURSE PRACTITIONER

## 2020-07-21 PROCEDURE — 99999 PR PBB SHADOW E&M-EST. PATIENT-LVL V: ICD-10-PCS | Mod: PBBFAC,,, | Performed by: NURSE PRACTITIONER

## 2020-07-21 PROCEDURE — 99214 OFFICE O/P EST MOD 30 MIN: CPT | Mod: S$PBB,,, | Performed by: NURSE PRACTITIONER

## 2020-07-21 RX ORDER — SODIUM CHLORIDE 0.9 % (FLUSH) 0.9 %
10 SYRINGE (ML) INJECTION
Status: CANCELLED | OUTPATIENT
Start: 2020-08-15

## 2020-07-21 RX ORDER — SODIUM CHLORIDE 0.9 % (FLUSH) 0.9 %
10 SYRINGE (ML) INJECTION
Status: CANCELLED | OUTPATIENT
Start: 2020-07-21

## 2020-07-21 RX ORDER — HEPARIN 100 UNIT/ML
500 SYRINGE INTRAVENOUS
Status: CANCELLED | OUTPATIENT
Start: 2020-08-08

## 2020-07-21 RX ORDER — HEPARIN 100 UNIT/ML
500 SYRINGE INTRAVENOUS
Status: CANCELLED | OUTPATIENT
Start: 2020-08-15

## 2020-07-21 RX ORDER — SODIUM CHLORIDE 0.9 % (FLUSH) 0.9 %
10 SYRINGE (ML) INJECTION
Status: CANCELLED | OUTPATIENT
Start: 2020-08-08

## 2020-07-21 RX ORDER — HEPARIN 100 UNIT/ML
500 SYRINGE INTRAVENOUS
Status: CANCELLED | OUTPATIENT
Start: 2020-08-01

## 2020-07-21 RX ORDER — METHYLPREDNISOLONE SOD SUCC 125 MG
80 VIAL (EA) INJECTION
Status: CANCELLED
Start: 2020-08-01

## 2020-07-21 RX ORDER — METHYLPREDNISOLONE SOD SUCC 125 MG
80 VIAL (EA) INJECTION
Status: CANCELLED
Start: 2020-08-08

## 2020-07-21 RX ORDER — METHYLPREDNISOLONE SOD SUCC 125 MG
80 VIAL (EA) INJECTION
Status: CANCELLED
Start: 2020-07-21

## 2020-07-21 RX ORDER — METHYLPREDNISOLONE SOD SUCC 125 MG
80 VIAL (EA) INJECTION
Status: CANCELLED
Start: 2020-08-15

## 2020-07-21 RX ORDER — HEPARIN 100 UNIT/ML
500 SYRINGE INTRAVENOUS
Status: CANCELLED | OUTPATIENT
Start: 2020-07-21

## 2020-07-21 RX ORDER — SODIUM CHLORIDE 0.9 % (FLUSH) 0.9 %
10 SYRINGE (ML) INJECTION
Status: CANCELLED | OUTPATIENT
Start: 2020-08-01

## 2020-07-21 NOTE — PROGRESS NOTES
Subjective:       Patient ID: Qi Ortiz is a 70 y.o. female.    Chief Complaint: Anemia, lab work follow up.    HPI: 70 y.o female with CKD III, HLD, HTN, CAD, arthritis, Iron deficiency. Patient was asked to see GI previously for an EGD/Colonoscopy however, she declined. Eventually she agreed to GI consult and planned to proceed with endoscopies however due to COVID concerns endoscopies were cancelled     10/23/19  Patient reports recent abnormal heart rhythm, being managed per Cardiology. Reports intermittent dizziness, fatigue. I had a detailed discussion with the patient in regards to her current clinical situation. Discussed with patient her declining iron levels and declining hemoglobin. I have recommended proceeding with upper and lower endoscopies. Patient reports that she may be agreeable to proceeding with colonoscopy but would like visit with GI first    Today's visit:  Patient presents today for lab result review and follow up of her iron deficiency anemia. In the interim endoscopies were scheduled for 5/2020 however was cancelled due to Covid concerns. Patient has not yet rescheduled endoscopies. I have encouraged her to do so with ongoing c/o intermittent rectal bleeding. Discussed implications in detail. Does report fatigue but denies SOB, dizziness, lightheadedness, syncope, hematuria, hematochezia, melena.   Social History     Socioeconomic History    Marital status: Single     Spouse name: Not on file    Number of children: 3    Years of education: Not on file    Highest education level: Not on file   Occupational History    Occupation: Not working   Social Needs    Financial resource strain: Not on file    Food insecurity     Worry: Not on file     Inability: Not on file    Transportation needs     Medical: Not on file     Non-medical: Not on file   Tobacco Use    Smoking status: Never Smoker    Smokeless tobacco: Never Used   Substance and Sexual Activity    Alcohol use: No     Drug use: No    Sexual activity: Never     Partners: Male   Lifestyle    Physical activity     Days per week: Not on file     Minutes per session: Not on file    Stress: Not on file   Relationships    Social connections     Talks on phone: Not on file     Gets together: Not on file     Attends Rastafarian service: Not on file     Active member of club or organization: Not on file     Attends meetings of clubs or organizations: Not on file     Relationship status: Not on file   Other Topics Concern    Not on file   Social History Narrative    Not on file       Past Medical History:   Diagnosis Date    Carotid stenosis     19%    COPD (chronic obstructive pulmonary disease)     No meds    Coronary artery disease     CVA (cerebral vascular accident)     Dr. 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 Hope    Psoriatic arthritis     Rheumatology       Family History   Problem Relation Age of Onset    Breast cancer Maternal Grandfather     Breast cancer Paternal Aunt     Stroke Unknown     Breast cancer Sister 60    Leukemia Sister 8         as child    Lung cancer Paternal Grandfather     Heart disease Unknown        Past Surgical History:   Procedure Laterality Date    BREAST BIOPSY      R sided/benign    CARDIAC SURGERY      2016    CERVICAL FUSION      CHOLECYSTECTOMY      CORONARY ANGIOPLASTY      CORONARY ARTERY BYPASS GRAFT      triple bypass    CORONARY STENT PLACEMENT      EYE SURGERY      INTRAUTERINE DEVICE INSERTION      mass removed from R groin      TOTAL ABDOMINAL HYSTERECTOMY W/ BILATERAL SALPINGOOPHORECTOMY      due to benign mass, adhesions    TUBAL LIGATION         Review of Systems   Constitutional: Negative for activity change, appetite change, chills, diaphoresis, fatigue, fever and unexpected weight change.   HENT:  Negative for congestion, nosebleeds, sore throat, trouble swallowing and voice change.    Eyes: Negative for photophobia, pain and visual disturbance.   Respiratory: Negative for cough, choking, chest tightness, shortness of breath, wheezing and stridor.    Cardiovascular: Negative for chest pain, palpitations and leg swelling.   Gastrointestinal: Negative for abdominal distention, abdominal pain, anal bleeding, blood in stool, constipation, diarrhea, nausea, rectal pain and vomiting.   Genitourinary: Negative for difficulty urinating, dysuria and hematuria.   Musculoskeletal: Negative for arthralgias, back pain and gait problem.   Skin: Negative for rash and wound.   Neurological: Negative for dizziness, facial asymmetry, weakness, light-headedness, numbness and headaches.   Hematological: Negative for adenopathy. Does not bruise/bleed easily.   Psychiatric/Behavioral: The patient is not nervous/anxious.          Medication List with Changes/Refills   Current Medications    ALBUTEROL (PROVENTIL) 2.5 MG /3 ML (0.083 %) NEBULIZER SOLUTION    Take 3 mLs (2.5 mg total) by nebulization every 6 (six) hours as needed for Wheezing. Rescue    APPLE CIDER VINEGAR ORAL    Take 1 capsule by mouth once daily.     ASPIRIN 81 MG CHEW    Take 162.5 mg by mouth once daily.     CALCIUM CARBONATE 650 MG CALCIUM (1,625 MG) TABLET    Take 1 tablet by mouth once daily.    CHLORHEXIDINE (BETASEPT SURGICAL SCRUB) 4 % EXTERNAL LIQUID    Apply topically daily as needed. Consider 90 day supply    CLOPIDOGREL (PLAVIX) 75 MG TABLET    Take 1 tablet (75 mg total) by mouth once daily.    CYANOCOBALAMIN 2000 MCG TABLET    Take 2,000 mcg by mouth once daily.    EVOLOCUMAB (REPATHA SURECLICK) 140 MG/ML PNIJ    Inject 1 mL (140 mg total) into the skin every 14 (fourteen) days.    FOLIC ACID (FOLVITE) 1 MG TABLET    Take 1 tablet (1 mg total) by mouth once daily.    FUROSEMIDE (LASIX) 20 MG TABLET    Take 0.5 tablets (10 mg total) by mouth 2 (two)  times daily.    GABAPENTIN (NEURONTIN) 100 MG CAPSULE    Take 2 capsules (200 mg total) by mouth 3 (three) times daily as needed.    GARLIC 2,000 MG CAP    Take 3,000 mg by mouth once daily.     IRON-VITAMIN C 100-250 MG, ICAR-C, (ICAR-C) 100-250 MG TAB    Take 1 tablet by mouth once daily.    LEVOTHYROXINE (SYNTHROID) 112 MCG TABLET    Take 1 tablet (112 mcg total) by mouth once daily.    MAGNESIUM CITRATE SOLUTION        METOPROLOL SUCCINATE (TOPROL-XL) 100 MG 24 HR TABLET    Take 1 tablet (100 mg total) by mouth 2 (two) times daily. 1 tab (100mg) in morning. And 100 mg at bedtime.  Generic ok    MUPIROCIN (BACTROBAN) 2 % OINTMENT    Bactroban 2 % topical ointment   Apply 1 application twice a day by topical route as directed for 30 days.    NITROGLYCERIN (NITROSTAT) 0.4 MG SL TABLET    Place 1 tablet (0.4 mg total) under the tongue every 5 (five) minutes as needed for Chest pain.    PYRIDOXINE (VITAMIN B-6) 100 MG TAB    Take 200 mg by mouth once daily.     TURMERIC, BULK, MISC    Take 1 capsule by mouth 2 (two) times daily.     USTEKINUMAB (STELARA) 90 MG/ML SYRG SYRINGE    Inject 1 mL (90 mg total) into the skin every 3 (three) months.    VALSARTAN-HYDROCHLOROTHIAZIDE (DIOVAN-HCT) 160-12.5 MG PER TABLET    Take 1 tablet by mouth once daily.    VITAMIN D 1000 UNITS TAB    Take 185 mg by mouth once daily.     Objective:     Vitals:    07/21/20 1346   BP: 98/66   Pulse: (!) 56   Resp: 14   Temp: 97.1 °F (36.2 °C)     Lab Results   Component Value Date    WBC 8.63 07/20/2020    HGB 11.6 (L) 07/20/2020    HCT 37.9 07/20/2020    MCV 96 07/20/2020     07/20/2020     BMP  Lab Results   Component Value Date     07/20/2020    K 3.9 07/20/2020     07/20/2020    CO2 32 (H) 07/20/2020    BUN 23 07/20/2020    CREATININE 1.1 07/20/2020    CALCIUM 9.5 07/20/2020    ANIONGAP 7 (L) 07/20/2020    ESTGFRAFRICA 59.2 (A) 07/20/2020    EGFRNONAA 51.3 (A) 07/20/2020     Lab Results   Component Value Date    ALT 20  07/08/2020    AST 22 07/08/2020    ALKPHOS 38 (L) 07/08/2020    BILITOT 0.4 07/08/2020     Lab Results   Component Value Date    IRON 41 07/20/2020    TIBC 397 07/20/2020    FERRITIN 33 07/20/2020         Physical Exam  Vitals signs reviewed.   Constitutional:       Appearance: She is well-developed.   HENT:      Head: Normocephalic.      Right Ear: Hearing and external ear normal.      Left Ear: Hearing and external ear normal.      Nose: Nose normal.   Eyes:      General: Lids are normal.         Right eye: No discharge.         Left eye: No discharge.      Conjunctiva/sclera: Conjunctivae normal.   Neck:      Musculoskeletal: Normal range of motion.      Thyroid: No thyroid mass.   Cardiovascular:      Rate and Rhythm: Normal rate and regular rhythm.      Heart sounds: Murmur present.   Pulmonary:      Effort: Pulmonary effort is normal. No respiratory distress.      Breath sounds: Normal breath sounds. No wheezing, rhonchi or rales.   Chest:      Chest wall: No tenderness.   Abdominal:      General: Bowel sounds are normal. There is no distension.      Palpations: Abdomen is soft.      Tenderness: There is no abdominal tenderness.   Musculoskeletal: Normal range of motion.   Lymphadenopathy:      Head:      Right side of head: No submandibular, preauricular or posterior auricular adenopathy.      Left side of head: No submandibular, preauricular or posterior auricular adenopathy.      Cervical:      Right cervical: No superficial cervical adenopathy.     Left cervical: No superficial cervical adenopathy.   Skin:     General: Skin is warm and dry.   Neurological:      Mental Status: She is alert and oriented to person, place, and time.   Psychiatric:         Mood and Affect: Mood is anxious.         Speech: Speech normal.         Behavior: Behavior normal.         Thought Content: Thought content normal.          Assessment:     Problem List Items Addressed This Visit        Renal/    Stage 3 chronic kidney  disease    Relevant Orders    CBC auto differential    Basic metabolic panel    Iron and TIBC    Ferritin       Oncology    Iron deficiency anemia due to chronic blood loss - Primary     Mild anemia noted in the setting of iron deficiency. Patient reports noting small amount of bleeding with bowel movement last week but has since resolved.  Planned endoscopies were cancelled due to COVID concerns. Patient has not yet rescheduled. Plans to have scheduled once COVID  Cases decreased    Proceed with Venofer IV x 4 doses. F/u 4 weeks following last Venofer infusion with repeat labs. Discussed S&S to report sooner         Relevant Orders    CBC auto differential    Basic metabolic panel    Iron and TIBC    Ferritin       Endocrine    Morbid obesity with BMI of 40.0-44.9, adult     Encouraged healthy nutrition and exercise habits         B12 deficiency     Continue B12 supplementation         Relevant Orders    Vitamin B12    Folate                    Elida Alanis, KJP-C

## 2020-07-21 NOTE — ASSESSMENT & PLAN NOTE
Mild anemia noted in the setting of iron deficiency. Patient reports noting small amount of bleeding with bowel movement last week but has since resolved.  Planned endoscopies were cancelled due to COVID concerns. Patient has not yet rescheduled. Plans to have scheduled once COVID  Cases decreased    Proceed with Venofer IV x 4 doses. F/u 4 weeks following last Venofer infusion with repeat labs. Discussed S&S to report sooner

## 2020-07-31 ENCOUNTER — INFUSION (OUTPATIENT)
Dept: INFUSION THERAPY | Facility: HOSPITAL | Age: 70
End: 2020-07-31
Attending: INTERNAL MEDICINE
Payer: MEDICARE

## 2020-07-31 VITALS
DIASTOLIC BLOOD PRESSURE: 55 MMHG | HEART RATE: 61 BPM | WEIGHT: 260.56 LBS | BODY MASS INDEX: 44.73 KG/M2 | SYSTOLIC BLOOD PRESSURE: 115 MMHG | OXYGEN SATURATION: 98 % | TEMPERATURE: 98 F | RESPIRATION RATE: 17 BRPM

## 2020-07-31 DIAGNOSIS — D50.0 IRON DEFICIENCY ANEMIA DUE TO CHRONIC BLOOD LOSS: Primary | ICD-10-CM

## 2020-07-31 PROCEDURE — 96365 THER/PROPH/DIAG IV INF INIT: CPT

## 2020-07-31 PROCEDURE — 96375 TX/PRO/DX INJ NEW DRUG ADDON: CPT

## 2020-07-31 PROCEDURE — 63600175 PHARM REV CODE 636 W HCPCS: Performed by: NURSE PRACTITIONER

## 2020-07-31 RX ORDER — METHYLPREDNISOLONE SOD SUCC 125 MG
80 VIAL (EA) INJECTION
Status: COMPLETED | OUTPATIENT
Start: 2020-07-31 | End: 2020-07-31

## 2020-07-31 RX ADMIN — METHYLPREDNISOLONE SODIUM SUCCINATE 80 MG: 125 INJECTION, POWDER, FOR SOLUTION INTRAMUSCULAR; INTRAVENOUS at 10:07

## 2020-07-31 RX ADMIN — IRON SUCROSE 200 MG: 20 INJECTION, SOLUTION INTRAVENOUS at 10:07

## 2020-07-31 NOTE — PLAN OF CARE
Patient states she feels tired. Patient tolerated iron infusion well. Reviewed S&S of reaction. Patient verbalized understanding.

## 2020-08-07 ENCOUNTER — INFUSION (OUTPATIENT)
Dept: INFUSION THERAPY | Facility: HOSPITAL | Age: 70
End: 2020-08-07
Attending: INTERNAL MEDICINE
Payer: MEDICARE

## 2020-08-07 VITALS
RESPIRATION RATE: 17 BRPM | DIASTOLIC BLOOD PRESSURE: 55 MMHG | SYSTOLIC BLOOD PRESSURE: 108 MMHG | OXYGEN SATURATION: 98 % | HEART RATE: 59 BPM | TEMPERATURE: 99 F

## 2020-08-07 DIAGNOSIS — D50.0 IRON DEFICIENCY ANEMIA DUE TO CHRONIC BLOOD LOSS: Primary | ICD-10-CM

## 2020-08-07 PROCEDURE — 96375 TX/PRO/DX INJ NEW DRUG ADDON: CPT

## 2020-08-07 PROCEDURE — 63600175 PHARM REV CODE 636 W HCPCS: Performed by: NURSE PRACTITIONER

## 2020-08-07 PROCEDURE — 96365 THER/PROPH/DIAG IV INF INIT: CPT

## 2020-08-07 RX ORDER — METHYLPREDNISOLONE SOD SUCC 125 MG
80 VIAL (EA) INJECTION
Status: COMPLETED | OUTPATIENT
Start: 2020-08-07 | End: 2020-08-07

## 2020-08-07 RX ADMIN — METHYLPREDNISOLONE SODIUM SUCCINATE 80 MG: 125 INJECTION, POWDER, FOR SOLUTION INTRAMUSCULAR; INTRAVENOUS at 11:08

## 2020-08-07 RX ADMIN — IRON SUCROSE 200 MG: 20 INJECTION, SOLUTION INTRAVENOUS at 11:08

## 2020-08-07 NOTE — PLAN OF CARE
Patient states she feels SOB sometimes. Patient tolerated iron infusion well. Reviewed S&S of reaction. Patient verbalized understanding.

## 2020-08-10 ENCOUNTER — OFFICE VISIT (OUTPATIENT)
Dept: FAMILY MEDICINE | Facility: CLINIC | Age: 70
End: 2020-08-10
Payer: MEDICARE

## 2020-08-10 VITALS
SYSTOLIC BLOOD PRESSURE: 144 MMHG | BODY MASS INDEX: 44.92 KG/M2 | OXYGEN SATURATION: 97 % | HEART RATE: 84 BPM | WEIGHT: 261.69 LBS | DIASTOLIC BLOOD PRESSURE: 80 MMHG | TEMPERATURE: 97 F

## 2020-08-10 DIAGNOSIS — R22.1 NECK SWELLING: Primary | ICD-10-CM

## 2020-08-10 DIAGNOSIS — I10 ESSENTIAL HYPERTENSION: ICD-10-CM

## 2020-08-10 DIAGNOSIS — E66.01 MORBID OBESITY WITH BMI OF 40.0-44.9, ADULT: ICD-10-CM

## 2020-08-10 PROCEDURE — 99214 PR OFFICE/OUTPT VISIT, EST, LEVL IV, 30-39 MIN: ICD-10-PCS | Mod: S$PBB,,, | Performed by: NURSE PRACTITIONER

## 2020-08-10 PROCEDURE — 99214 OFFICE O/P EST MOD 30 MIN: CPT | Mod: S$PBB,,, | Performed by: NURSE PRACTITIONER

## 2020-08-10 PROCEDURE — 99999 PR PBB SHADOW E&M-EST. PATIENT-LVL V: ICD-10-PCS | Mod: PBBFAC,,, | Performed by: NURSE PRACTITIONER

## 2020-08-10 PROCEDURE — 99215 OFFICE O/P EST HI 40 MIN: CPT | Mod: PBBFAC,PO | Performed by: NURSE PRACTITIONER

## 2020-08-10 PROCEDURE — 99999 PR PBB SHADOW E&M-EST. PATIENT-LVL V: CPT | Mod: PBBFAC,,, | Performed by: NURSE PRACTITIONER

## 2020-08-10 NOTE — PROGRESS NOTES
"Subjective:       Patient ID: Qi Ortiz is a 70 y.o. female.    Chief Complaint: No chief complaint on file.  Pt reports to clinic with chief complaint of neck swelling and tonsillar and submandibular lymphadenopathy.  Onset 3 days ago.  Denies any oral swelling or facial swelling.  Extensive allergy list, but denies contact with any listed.  Denies cough, congestion, fever or chills. Non smoker.  No oral or dental pain or swelling.  Reports neck "feels tight" and palpable edema.  Denies difficulty breathing or talking.  PMH: COPD, TIA, CVA, CAD, HTN, CKD3, obesity  Edema  This is a new problem. The current episode started in the past 7 days. The problem occurs constantly. The problem has been unchanged. Associated symptoms include swollen glands. Pertinent negatives include no chest pain, chills, congestion, coughing, fever, headaches, joint swelling, nausea, sore throat or visual change. Nothing aggravates the symptoms. She has tried nothing for the symptoms. The treatment provided no relief.     Review of Systems   Constitutional: Negative for chills and fever.   HENT: Negative for congestion and sore throat.    Respiratory: Negative for cough.    Cardiovascular: Negative for chest pain.   Gastrointestinal: Negative for nausea.   Musculoskeletal: Negative for joint swelling.   Neurological: Negative for headaches.       Objective:      Physical Exam  Vitals signs reviewed.   Constitutional:       Appearance: She is obese.   HENT:      Head: Normocephalic.      Right Ear: Tympanic membrane normal.      Left Ear: Tympanic membrane normal.      Nose: Nose normal.      Mouth/Throat:      Mouth: Mucous membranes are dry.   Eyes:      Extraocular Movements: Extraocular movements intact.      Pupils: Pupils are equal, round, and reactive to light.   Neck:      Musculoskeletal: Normal range of motion.      Thyroid: No thyroid mass.      Trachea: Trachea normal.   Cardiovascular:      Rate and Rhythm: Normal rate. "      Heart sounds: Murmur present.   Pulmonary:      Effort: Pulmonary effort is normal.      Breath sounds: No stridor. No wheezing or rhonchi.   Musculoskeletal:      Right lower le+ Pitting Edema present.      Left lower le+ Pitting Edema present.   Lymphadenopathy:      Cervical: No cervical adenopathy.      Right cervical: No superficial, deep or posterior cervical adenopathy.     Left cervical: No superficial, deep or posterior cervical adenopathy.   Neurological:      General: No focal deficit present.      Mental Status: She is alert and oriented to person, place, and time.   Psychiatric:         Mood and Affect: Mood normal.         Behavior: Behavior normal.         Assessment:       1. Neck swelling        Plan:   Neck swelling  -     CT Soft Tissue Neck WO Contrast; Future; Expected date: 08/10/2020  -no visible edema as well as no palpable adenopathy.  Will obtain CT.  Worsening symptoms discussed, verbalized understanding     Morbid obesity  -uncontrolled diet and exercise discussed verbalized understanding    HTN  -uncontrolled at this time, pt reports she has not taken bp medication prior to arrival 2nd diuresis.  Will re evaluation during follow up visit     No follow-ups on file.

## 2020-08-12 ENCOUNTER — TELEPHONE (OUTPATIENT)
Dept: CARDIOLOGY | Facility: CLINIC | Age: 70
End: 2020-08-12

## 2020-08-12 ENCOUNTER — HOSPITAL ENCOUNTER (OUTPATIENT)
Dept: RADIOLOGY | Facility: HOSPITAL | Age: 70
Discharge: HOME | End: 2020-08-12
Attending: NURSE PRACTITIONER
Payer: MEDICARE

## 2020-08-12 DIAGNOSIS — I25.10 CAD (CORONARY ARTERY DISEASE): ICD-10-CM

## 2020-08-12 DIAGNOSIS — I10 ESSENTIAL HYPERTENSION: Primary | Chronic | ICD-10-CM

## 2020-08-12 DIAGNOSIS — Z95.1 S/P CABG (CORONARY ARTERY BYPASS GRAFT): Chronic | ICD-10-CM

## 2020-08-12 DIAGNOSIS — R22.1 NECK SWELLING: ICD-10-CM

## 2020-08-12 DIAGNOSIS — I25.810: ICD-10-CM

## 2020-08-12 DIAGNOSIS — I25.118 CORONARY ARTERY DISEASE OF NATIVE ARTERY OF NATIVE HEART WITH STABLE ANGINA PECTORIS: ICD-10-CM

## 2020-08-12 PROCEDURE — 70491 CT SOFT TISSUE NECK W/DYE: CPT | Mod: 26,,, | Performed by: RADIOLOGY

## 2020-08-12 PROCEDURE — 70491 CT SOFT TISSUE NECK W/DYE: CPT | Mod: TC

## 2020-08-12 PROCEDURE — 25500020 PHARM REV CODE 255: Performed by: NURSE PRACTITIONER

## 2020-08-12 PROCEDURE — 70491 CT SOFT TISSUE NECK WITH CONTRAST: ICD-10-PCS | Mod: 26,,, | Performed by: RADIOLOGY

## 2020-08-12 RX ADMIN — IOHEXOL 75 ML: 350 INJECTION, SOLUTION INTRAVENOUS at 01:08

## 2020-08-12 NOTE — TELEPHONE ENCOUNTER
----- Message from Jasmin Beard sent at 8/12/2020  3:16 PM CDT -----  Regarding: Appointment access  Contact: Patient  Patient would like a call back concerning an appointment with Dr Hancock as soon as possible, she is experiencing shortness of breath and leg swelling. The next available is coming up as 09/03/20. Please call to advise at Ph .[phone] (home)

## 2020-08-13 ENCOUNTER — HOSPITAL ENCOUNTER (OUTPATIENT)
Dept: CARDIOLOGY | Facility: HOSPITAL | Age: 70
Discharge: HOME | End: 2020-08-13
Attending: NUCLEAR MEDICINE
Payer: MEDICARE

## 2020-08-13 ENCOUNTER — PATIENT OUTREACH (OUTPATIENT)
Dept: ADMINISTRATIVE | Facility: OTHER | Age: 70
End: 2020-08-13

## 2020-08-13 ENCOUNTER — OFFICE VISIT (OUTPATIENT)
Dept: CARDIOLOGY | Facility: CLINIC | Age: 70
End: 2020-08-13
Payer: MEDICARE

## 2020-08-13 VITALS
HEART RATE: 75 BPM | OXYGEN SATURATION: 98 % | DIASTOLIC BLOOD PRESSURE: 58 MMHG | BODY MASS INDEX: 44.72 KG/M2 | SYSTOLIC BLOOD PRESSURE: 100 MMHG | HEIGHT: 64 IN | WEIGHT: 261.94 LBS

## 2020-08-13 DIAGNOSIS — I25.10 CAD (CORONARY ARTERY DISEASE): ICD-10-CM

## 2020-08-13 DIAGNOSIS — I25.118 CORONARY ARTERY DISEASE OF NATIVE ARTERY OF NATIVE HEART WITH STABLE ANGINA PECTORIS: ICD-10-CM

## 2020-08-13 DIAGNOSIS — Z95.1 S/P CABG (CORONARY ARTERY BYPASS GRAFT): Chronic | ICD-10-CM

## 2020-08-13 DIAGNOSIS — I35.0 NONRHEUMATIC AORTIC VALVE STENOSIS: Chronic | ICD-10-CM

## 2020-08-13 DIAGNOSIS — I10 ESSENTIAL HYPERTENSION: Chronic | ICD-10-CM

## 2020-08-13 DIAGNOSIS — I25.118 CORONARY ARTERY DISEASE OF NATIVE ARTERY OF NATIVE HEART WITH STABLE ANGINA PECTORIS: Primary | ICD-10-CM

## 2020-08-13 DIAGNOSIS — I25.810: ICD-10-CM

## 2020-08-13 PROCEDURE — 99215 OFFICE O/P EST HI 40 MIN: CPT | Mod: PBBFAC,25 | Performed by: NUCLEAR MEDICINE

## 2020-08-13 PROCEDURE — 99999 PR PBB SHADOW E&M-EST. PATIENT-LVL V: CPT | Mod: PBBFAC,,, | Performed by: NUCLEAR MEDICINE

## 2020-08-13 PROCEDURE — 93005 ELECTROCARDIOGRAM TRACING: CPT

## 2020-08-13 PROCEDURE — 93010 EKG 12-LEAD: ICD-10-PCS | Mod: ,,, | Performed by: INTERNAL MEDICINE

## 2020-08-13 PROCEDURE — 93010 ELECTROCARDIOGRAM REPORT: CPT | Mod: ,,, | Performed by: INTERNAL MEDICINE

## 2020-08-13 PROCEDURE — 99215 OFFICE O/P EST HI 40 MIN: CPT | Mod: S$PBB,,, | Performed by: NUCLEAR MEDICINE

## 2020-08-13 PROCEDURE — 99999 PR PBB SHADOW E&M-EST. PATIENT-LVL V: ICD-10-PCS | Mod: PBBFAC,,, | Performed by: NUCLEAR MEDICINE

## 2020-08-13 PROCEDURE — 99215 PR OFFICE/OUTPT VISIT, EST, LEVL V, 40-54 MIN: ICD-10-PCS | Mod: S$PBB,,, | Performed by: NUCLEAR MEDICINE

## 2020-08-13 NOTE — PROGRESS NOTES
Subjective:   Patient ID:  Qi Ortiz is a 70 y.o. female who presents for follow-up of Coronary Artery Disease (CABG), Valvular Heart Disease (DEG AS), and Hypertension (ESSENTIAL)      HPI FOR LAST 1-2 WEEKS HAS NOTICED INCREASE EDEMA IN THE LE, NO CALVE TENDERNESS  ALSO NOTICED INCREASE BROOKS.  NO PND  NO RUQ ABDOMINAL DISCOMFORT  NO RECURRENT ANGINA OR EQUIVALENT  NO EXACERBATION OF PALPITATIONS  NO NEAR SYNCOPE OR SYNCOPE  NO FEVER, CHILLS OR COUGH  CARD MED- GOOD COMPLIANCE  ECG TODAY- SR, 75 BMP.  CHRONIC ST T CHANGES. LVH    Review of Systems   Constitution: Positive for malaise/fatigue. Negative for chills, fever, night sweats, weight gain and weight loss.   HENT: Negative for nosebleeds.    Eyes: Negative for blurred vision, double vision and visual disturbance.   Cardiovascular: Positive for dyspnea on exertion and leg swelling. Negative for chest pain, irregular heartbeat, orthopnea, palpitations, paroxysmal nocturnal dyspnea and syncope.   Respiratory: Negative for cough, hemoptysis and wheezing.    Endocrine: Negative for polydipsia and polyuria.   Hematologic/Lymphatic: Does not bruise/bleed easily.   Skin: Negative for rash.   Musculoskeletal: Negative for joint pain, joint swelling, muscle weakness and myalgias.   Gastrointestinal: Negative for abdominal pain, hematemesis, jaundice and melena.   Genitourinary: Negative for dysuria, hematuria and nocturia.   Neurological: Negative for dizziness, focal weakness, headaches, sensory change and weakness.   Psychiatric/Behavioral: Negative for depression. The patient does not have insomnia and is not nervous/anxious.      Family History   Problem Relation Age of Onset    Breast cancer Maternal Grandfather     Breast cancer Paternal Aunt     Stroke Unknown     Breast cancer Sister 60    Leukemia Sister 8         as child    Lung cancer Paternal Grandfather     Heart disease Unknown      Past Medical History:   Diagnosis Date    Carotid  stenosis     19%    COPD (chronic obstructive pulmonary disease)     No meds    Coronary artery disease     CVA (cerebral vascular accident)     Dr. 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 Hope    Psoriatic arthritis     Rheumatology     Social History     Socioeconomic History    Marital status: Single     Spouse name: Not on file    Number of children: 3    Years of education: Not on file    Highest education level: Not on file   Occupational History    Occupation: Not working   Social Needs    Financial resource strain: Not on file    Food insecurity     Worry: Not on file     Inability: Not on file    Transportation needs     Medical: Not on file     Non-medical: Not on file   Tobacco Use    Smoking status: Never Smoker    Smokeless tobacco: Never Used   Substance and Sexual Activity    Alcohol use: No    Drug use: No    Sexual activity: Never     Partners: Male   Lifestyle    Physical activity     Days per week: Not on file     Minutes per session: Not on file    Stress: Not on file   Relationships    Social connections     Talks on phone: Not on file     Gets together: Not on file     Attends [employer] service: Not on file     Active member of club or organization: Not on file     Attends meetings of clubs or organizations: Not on file     Relationship status: Not on file   Other Topics Concern    Not on file   Social History Narrative    Not on file     Current Outpatient Medications on File Prior to Visit   Medication Sig Dispense Refill    aspirin 81 MG Chew Take 162.5 mg by mouth once daily.       clopidogreL (PLAVIX) 75 mg tablet Take 1 tablet (75 mg total) by mouth once daily. 90 tablet 3    evolocumab (REPATHA SURECLICK) 140 mg/mL PnIj Inject 1 mL (140 mg total) into the skin every 14 (fourteen) days. 6 mL 3    furosemide (LASIX) 20 MG  tablet Take 0.5 tablets (10 mg total) by mouth 2 (two) times daily. 90 tablet 3    gabapentin (NEURONTIN) 100 MG capsule Take 2 capsules (200 mg total) by mouth 3 (three) times daily as needed. 540 capsule 3    garlic 2,000 mg Cap Take 3,000 mg by mouth once daily.       iron-vitamin C 100-250 mg, ICAR-C, (ICAR-C) 100-250 mg Tab Take 1 tablet by mouth once daily. 90 tablet 4    levothyroxine (SYNTHROID) 112 MCG tablet Take 1 tablet (112 mcg total) by mouth once daily. 90 tablet 1    metoprolol succinate (TOPROL-XL) 100 MG 24 hr tablet Take 1 tablet (100 mg total) by mouth 2 (two) times daily. 1 tab (100mg) in morning. And 100 mg at bedtime.  Generic ok 180 tablet 3    nitroGLYCERIN (NITROSTAT) 0.4 MG SL tablet Place 1 tablet (0.4 mg total) under the tongue every 5 (five) minutes as needed for Chest pain. 100 tablet 3    pyridoxine (VITAMIN B-6) 100 MG Tab Take 200 mg by mouth once daily.       ustekinumab (STELARA) 90 mg/mL Syrg syringe Inject 1 mL (90 mg total) into the skin every 3 (three) months. 1 Syringe 3    valsartan-hydrochlorothiazide (DIOVAN-HCT) 160-12.5 mg per tablet Take 1 tablet by mouth once daily. 90 tablet 3    vitamin D 1000 units Tab Take 185 mg by mouth once daily.      albuterol (PROVENTIL) 2.5 mg /3 mL (0.083 %) nebulizer solution Take 3 mLs (2.5 mg total) by nebulization every 6 (six) hours as needed for Wheezing. Rescue 25 each 5    APPLE CIDER VINEGAR ORAL Take 1 capsule by mouth once daily.       calcium carbonate 650 mg calcium (1,625 mg) tablet Take 1 tablet by mouth once daily.      chlorhexidine (BETASEPT SURGICAL SCRUB) 4 % external liquid Apply topically daily as needed. Consider 90 day supply 473 mL 3    cyanocobalamin 2000 MCG tablet Take 2,000 mcg by mouth once daily.      folic acid (FOLVITE) 1 MG tablet Take 1 tablet (1 mg total) by mouth once daily. 90 tablet 1    magnesium citrate solution       mupirocin (BACTROBAN) 2 % ointment Bactroban 2 % topical  ointment   Apply 1 application twice a day by topical route as directed for 30 days.      TURMERIC, BULK, MISC Take 1 capsule by mouth 2 (two) times daily.        No current facility-administered medications on file prior to visit.      Review of patient's allergies indicates:   Allergen Reactions    Celexa [citalopram] Anaphylaxis    Clindamycin Itching and Swelling    Codeine Shortness Of Breath, Itching and Swelling    Crestor [rosuvastatin] Anaphylaxis    Cytotec [misoprostol] Anaphylaxis and Other (See Comments)     Difficulty breathing    Lisinopril Anaphylaxis    Magnesium citrate Shortness Of Breath    Stadol [butorphanol tartrate] Anaphylaxis     Coded    Vicodin [hydrocodone-acetaminophen] Shortness Of Breath    Adhesive      EKG Electrodes    Aggrenox [aspirin-dipyridamole] Other (See Comments)     headaches    Avelox [moxifloxacin]      PATIENT STATES DO NOT GIVE UNDER ANY CIRCUSTANCES    Demerol [meperidine]     Isosorbide Other (See Comments)     Severe headache    Kenalog [triamcinolone acetonide]     Medrol [methylprednisolone] Other (See Comments)     Patient reports taking IV in the past without any issues. Reports allergy to oral preparation     Mobic [meloxicam]     Morphine     Nitroglycerin      Long acting    Pholcodine     Prednisone      GASTRIC PAIN    Ranexa [ranolazine] Nausea And Vomiting    Reclast [zoledronic acid-mannitol-water] Other (See Comments)     Bones hurt    Sulfa (sulfonamide antibiotics) Other (See Comments)     unknown    Talwin [pentazocine lactate]     Tetracycline     Tetracyclines     Tilade [nedocromil]     Zetia [ezetimibe]        Objective:     Physical Exam   Constitutional: She is oriented to person, place, and time. She appears well-developed. No distress.   HENT:   Head: Normocephalic.   Eyes: Pupils are equal, round, and reactive to light. Conjunctivae are normal. No scleral icterus.   Neck: Normal range of motion. Neck supple.  Normal carotid pulses, no hepatojugular reflux and no JVD present. Carotid bruit is not present. No edema present. No thyroid mass and no thyromegaly present.   Cardiovascular: Normal rate, regular rhythm, S1 normal, S2 normal and intact distal pulses. PMI is not displaced. Exam reveals no gallop and no friction rub.   Murmur heard.   Medium-pitched harsh crescendo-decrescendo midsystolic murmur is present at the upper right sternal border, upper left sternal border and lower left sternal border radiating to the neck and lower right sternal border. The intensity increases with respiration.  Pulses:       Carotid pulses are 2+ on the right side and 2+ on the left side.       Radial pulses are 2+ on the right side and 2+ on the left side.        Femoral pulses are 2+ on the right side and 2+ on the left side.       Popliteal pulses are 2+ on the right side and 2+ on the left side.        Dorsalis pedis pulses are 2+ on the right side and 2+ on the left side.        Posterior tibial pulses are 2+ on the right side and 2+ on the left side.   Pulmonary/Chest: Effort normal and breath sounds normal. She has no wheezes. She has no rales. She exhibits no tenderness.   Abdominal: Soft. Bowel sounds are normal. She exhibits no pulsatile midline mass and no mass. There is no hepatosplenomegaly. There is no abdominal tenderness.   Musculoskeletal: Normal range of motion.         General: Edema present. No tenderness.      Cervical back: Normal.      Thoracic back: Normal.      Lumbar back: Normal.      Comments: 1-2 + HARD EDEMA OF FEET AND ANKLES   Lymphadenopathy:     She has no cervical adenopathy.     She has no axillary adenopathy.        Right: No supraclavicular adenopathy present.        Left: No supraclavicular adenopathy present.   Neurological: She is alert and oriented to person, place, and time. She has normal strength and normal reflexes. No sensory deficit. Gait normal.   Skin: Skin is warm. No rash noted. No  cyanosis. No pallor. Nails show no clubbing.   Psychiatric: She has a normal mood and affect. Her speech is normal and behavior is normal. Cognition and memory are normal.       Assessment:     1. Coronary artery disease of native artery of native heart with stable angina pectoris    2. Nonrheumatic aortic valve stenosis    3. S/P CABG (coronary artery bypass graft)    4. Essential hypertension        Plan:     Coronary artery disease of native artery of native heart with stable angina pectoris  WE NEED TO EVALUATE FOR ADHF  -     Echo Color Flow Doppler? Yes; Future; Expected date: 08/13/2020  -     Basic metabolic panel; Future; Expected date: 08/13/2020  -     Brain Natriuretic Peptide; Future; Expected date: 08/13/2020    Nonrheumatic aortic valve stenosis  WE NEED TO EVALUATE FOR WORSENING OF AS  -     Echo Color Flow Doppler? Yes; Future; Expected date: 08/13/2020  -     Basic metabolic panel; Future; Expected date: 08/13/2020  -     Brain Natriuretic Peptide; Future; Expected date: 08/13/2020    S/P CABG (coronary artery bypass graft)   STABLE    Essential hypertension  CONTROLLED BP  -     Basic metabolic panel; Future; Expected date: 08/13/2020  -     Brain Natriuretic Peptide; Future; Expected date: 08/13/2020      PHONE CALL TO REVIEW RESULTS AND FURTHER RECOMMENDATIONS

## 2020-08-13 NOTE — PROGRESS NOTES
Chart reviewed.   Immunizations: Triggered Imm Registry     Orders placed: n/a  Upcoming appts to satisfy KAYLAN topics: n/a

## 2020-08-14 ENCOUNTER — TELEPHONE (OUTPATIENT)
Dept: CARDIOLOGY | Facility: CLINIC | Age: 70
End: 2020-08-14

## 2020-08-14 ENCOUNTER — TELEPHONE (OUTPATIENT)
Dept: FAMILY MEDICINE | Facility: CLINIC | Age: 70
End: 2020-08-14

## 2020-08-14 ENCOUNTER — INFUSION (OUTPATIENT)
Dept: INFUSION THERAPY | Facility: HOSPITAL | Age: 70
End: 2020-08-14
Attending: INTERNAL MEDICINE
Payer: MEDICARE

## 2020-08-14 VITALS
RESPIRATION RATE: 18 BRPM | SYSTOLIC BLOOD PRESSURE: 103 MMHG | HEIGHT: 64 IN | TEMPERATURE: 98 F | DIASTOLIC BLOOD PRESSURE: 53 MMHG | BODY MASS INDEX: 44.96 KG/M2 | OXYGEN SATURATION: 98 % | HEART RATE: 59 BPM

## 2020-08-14 DIAGNOSIS — D50.0 IRON DEFICIENCY ANEMIA DUE TO CHRONIC BLOOD LOSS: Primary | ICD-10-CM

## 2020-08-14 PROCEDURE — 63600175 PHARM REV CODE 636 W HCPCS: Performed by: NURSE PRACTITIONER

## 2020-08-14 PROCEDURE — 96374 THER/PROPH/DIAG INJ IV PUSH: CPT

## 2020-08-14 PROCEDURE — 96375 TX/PRO/DX INJ NEW DRUG ADDON: CPT

## 2020-08-14 RX ORDER — METHYLPREDNISOLONE SOD SUCC 125 MG
80 VIAL (EA) INJECTION
Status: COMPLETED | OUTPATIENT
Start: 2020-08-14 | End: 2020-08-14

## 2020-08-14 RX ORDER — SODIUM CHLORIDE 0.9 % (FLUSH) 0.9 %
10 SYRINGE (ML) INJECTION
Status: DISCONTINUED | OUTPATIENT
Start: 2020-08-14 | End: 2020-08-14 | Stop reason: HOSPADM

## 2020-08-14 RX ADMIN — IRON SUCROSE 200 MG: 20 INJECTION, SOLUTION INTRAVENOUS at 11:08

## 2020-08-14 RX ADMIN — METHYLPREDNISOLONE SODIUM SUCCINATE 80 MG: 125 INJECTION, POWDER, FOR SOLUTION INTRAMUSCULAR; INTRAVENOUS at 10:08

## 2020-08-14 NOTE — TELEPHONE ENCOUNTER
----- Message from Tono Hancock MD sent at 8/14/2020  8:10 AM CDT -----  We need to call patient today.RENAL FUNCTION AND ELECTROLYTES WNL.  CONTINUE ORAL FUROSEMIDE 40 MG EVERY AM

## 2020-08-14 NOTE — TELEPHONE ENCOUNTER
I was reviewing chart prior to calling pt. The last order for lasix that I see is:         furosemide (LASIX) 20 MG tablet Take 0.5 tablets (10 mg total) by mouth 2 (two) times daily     Please clarify dose of lasix.

## 2020-08-14 NOTE — TELEPHONE ENCOUNTER
Spoke to pt and told pt we haven't gotten the ct results back yet but as soon as I have them I will give her call with the results.

## 2020-08-14 NOTE — DISCHARGE INSTRUCTIONS
[facility]  [address] [facility]  [address]  [phone] phone     [phone] fax  Hours of Operation: Monday- Friday 8:00am- 5:00pm  After hours phone  [phone]  Hematology / Oncology Physicians on call      Dr. Hank Javed, SAMEERA Delgado NP Tyesha Taylor, NP    Please call with any concerns regarding your appointment today.FALL PREVENTION   Falls often occur due to slipping, tripping or losing your balance. Here are ways to reduce your risk of falling again.   Was there anything that caused your fall that can be fixed, removed or replaced?   Make your home safe by keeping walkways clear of objects you may trip over.   Use non-slip pads under rugs.   Do not walk in poorly lit areas.   Do not stand on chairs or wobbly ladders.   Use caution when reaching overhead or looking upward. This position can cause a loss of balance.   Be sure your shoes fit properly, have non-slip bottoms and are in good condition.   Be cautious when going up and down stairs, curbs, and when walking on uneven sidewalks.   If your balance is poor, consider using a cane or walker.   If your fall was related to alcohol use, stop or limit alcohol intake.   If your fall was related to use of sleeping medicines, talk to your doctor about this. You may need to reduce your dosage at bedtime if you awaken during the night to go to the bathroom.   To reduce the need for nighttime bathroom trips:   Avoid drinking fluids for several hours before going to bed   Empty your bladder before going to bed   Men can keep a urinal at the bedside   © 3803-7554 [employer] [facility], [address] [place] [address], [place] [address]. All rights reserved. This information is not intended as a substitute for professional medical care. Always follow your healthcare professional's instructions.  FALL PREVENTION   Falls often occur due to  slipping, tripping or losing your balance. Here are ways to reduce your risk of falling again.   Was there anything that caused your fall that can be fixed, removed or replaced?   Make your home safe by keeping walkways clear of objects you may trip over.   Use non-slip pads under rugs.   Do not walk in poorly lit areas.   Do not stand on chairs or wobbly ladders.   Use caution when reaching overhead or looking upward. This position can cause a loss of balance.   Be sure your shoes fit properly, have non-slip bottoms and are in good condition.   Be cautious when going up and down stairs, curbs, and when walking on uneven sidewalks.   If your balance is poor, consider using a cane or walker.   If your fall was related to alcohol use, stop or limit alcohol intake.   If your fall was related to use of sleeping medicines, talk to your doctor about this. You may need to reduce your dosage at bedtime if you awaken during the night to go to the bathroom.   To reduce the need for nighttime bathroom trips:   Avoid drinking fluids for several hours before going to bed   Empty your bladder before going to bed   Men can keep a urinal at the bedside   © 5478-7230 [employer] [facility], [address] [place] [address], [place] [address]. All rights reserved. This information is not intended as a substitute for professional medical care. Always follow your healthcare professional's instructions.

## 2020-08-14 NOTE — TELEPHONE ENCOUNTER
----- Message from Mindy Avelar sent at 8/14/2020 12:46 PM CDT -----  Contact: pt  Patient states she was told to call about CT results from this week. Patient is home please call back at [phone].

## 2020-08-17 ENCOUNTER — TELEPHONE (OUTPATIENT)
Dept: CARDIOLOGY | Facility: CLINIC | Age: 70
End: 2020-08-17

## 2020-08-17 DIAGNOSIS — I25.118 CORONARY ARTERY DISEASE OF NATIVE ARTERY OF NATIVE HEART WITH STABLE ANGINA PECTORIS: Primary | ICD-10-CM

## 2020-08-17 NOTE — TELEPHONE ENCOUNTER
Continue present card management,  check bmp Friday am.  Phone call on Friday  to check progress and further recommendations.

## 2020-08-17 NOTE — TELEPHONE ENCOUNTER
Symptom check, increase lasix 40mg daily for 3 days    Pt informed still with BROOKS when walking around. Pt informed swelling to ankles better but can still see dents when presses on legs. Pt does not check BP at home.    Please advise for further recommendations.

## 2020-08-18 ENCOUNTER — APPOINTMENT (OUTPATIENT)
Dept: RADIOLOGY | Facility: HOSPITAL | Age: 70
End: 2020-08-18
Attending: INTERNAL MEDICINE
Payer: MEDICARE

## 2020-08-18 DIAGNOSIS — M81.0 AGE-RELATED OSTEOPOROSIS WITHOUT CURRENT PATHOLOGICAL FRACTURE: ICD-10-CM

## 2020-08-18 PROCEDURE — 77080 DXA BONE DENSITY AXIAL: CPT | Mod: 26,,, | Performed by: RADIOLOGY

## 2020-08-18 PROCEDURE — 77080 DXA BONE DENSITY AXIAL: CPT | Mod: TC

## 2020-08-18 PROCEDURE — 77080 DEXA BONE DENSITY SPINE HIP: ICD-10-PCS | Mod: 26,,, | Performed by: RADIOLOGY

## 2020-08-19 ENCOUNTER — HOSPITAL ENCOUNTER (OUTPATIENT)
Dept: CARDIOLOGY | Facility: HOSPITAL | Age: 70
Discharge: HOME | End: 2020-08-19
Attending: NUCLEAR MEDICINE
Payer: MEDICARE

## 2020-08-19 VITALS
WEIGHT: 261 LBS | SYSTOLIC BLOOD PRESSURE: 103 MMHG | BODY MASS INDEX: 44.56 KG/M2 | HEART RATE: 78 BPM | DIASTOLIC BLOOD PRESSURE: 53 MMHG | HEIGHT: 64 IN

## 2020-08-19 DIAGNOSIS — I25.118 CORONARY ARTERY DISEASE OF NATIVE ARTERY OF NATIVE HEART WITH STABLE ANGINA PECTORIS: ICD-10-CM

## 2020-08-19 DIAGNOSIS — I35.0 NONRHEUMATIC AORTIC VALVE STENOSIS: ICD-10-CM

## 2020-08-19 PROCEDURE — 93306 TTE W/DOPPLER COMPLETE: CPT

## 2020-08-19 PROCEDURE — 93306 ECHO (CUPID ONLY): ICD-10-PCS | Mod: 26,,, | Performed by: INTERNAL MEDICINE

## 2020-08-19 PROCEDURE — 93306 TTE W/DOPPLER COMPLETE: CPT | Mod: 26,,, | Performed by: INTERNAL MEDICINE

## 2020-08-21 ENCOUNTER — INFUSION (OUTPATIENT)
Dept: INFUSION THERAPY | Facility: HOSPITAL | Age: 70
End: 2020-08-21
Attending: INTERNAL MEDICINE
Payer: MEDICARE

## 2020-08-21 ENCOUNTER — TELEPHONE (OUTPATIENT)
Dept: CARDIOLOGY | Facility: CLINIC | Age: 70
End: 2020-08-21

## 2020-08-21 VITALS
TEMPERATURE: 98 F | HEIGHT: 64 IN | BODY MASS INDEX: 44.8 KG/M2 | OXYGEN SATURATION: 98 % | HEART RATE: 55 BPM | DIASTOLIC BLOOD PRESSURE: 52 MMHG | RESPIRATION RATE: 18 BRPM | SYSTOLIC BLOOD PRESSURE: 103 MMHG

## 2020-08-21 DIAGNOSIS — D50.0 IRON DEFICIENCY ANEMIA DUE TO CHRONIC BLOOD LOSS: Primary | ICD-10-CM

## 2020-08-21 DIAGNOSIS — I25.118 CORONARY ARTERY DISEASE OF NATIVE ARTERY OF NATIVE HEART WITH STABLE ANGINA PECTORIS: Primary | ICD-10-CM

## 2020-08-21 LAB
AORTIC ROOT ANNULUS: 2.57 CM
AV INDEX (PROSTH): 0.27
AV MEAN GRADIENT: 49 MMHG
AV PEAK GRADIENT: 72 MMHG
AV VALVE AREA: 0.74 CM2
AV VELOCITY RATIO: 0.25
BSA FOR ECHO PROCEDURE: 2.31 M2
CV ECHO LV RWT: 0.5 CM
DOP CALC AO PEAK VEL: 4.23 M/S
DOP CALC AO VTI: 104.04 CM
DOP CALC LVOT AREA: 2.7 CM2
DOP CALC LVOT DIAMETER: 1.87 CM
DOP CALC LVOT PEAK VEL: 1.07 M/S
DOP CALC LVOT STROKE VOLUME: 77.36 CM3
DOP CALC RVOT PEAK VEL: 0.78 M/S
DOP CALC RVOT VTI: 13.65 CM
DOP CALCLVOT PEAK VEL VTI: 28.18 CM
E WAVE DECELERATION TIME: 301.76 MSEC
E/A RATIO: 0.73
E/E' RATIO: 15.09 M/S
ECHO LV POSTERIOR WALL: 1.31 CM (ref 0.6–1.1)
FRACTIONAL SHORTENING: 37 % (ref 28–44)
INTERVENTRICULAR SEPTUM: 0.99 CM (ref 0.6–1.1)
IVRT: 117.03 MSEC
LA MAJOR: 4.42 CM
LA MINOR: 4.42 CM
LA WIDTH: 3.35 CM
LEFT ATRIUM SIZE: 3.72 CM
LEFT ATRIUM VOLUME INDEX: 21.4 ML/M2
LEFT ATRIUM VOLUME: 46.82 CM3
LEFT INTERNAL DIMENSION IN SYSTOLE: 3.33 CM (ref 2.1–4)
LEFT VENTRICLE DIASTOLIC VOLUME INDEX: 61.08 ML/M2
LEFT VENTRICLE DIASTOLIC VOLUME: 133.79 ML
LEFT VENTRICLE MASS INDEX: 109 G/M2
LEFT VENTRICLE SYSTOLIC VOLUME INDEX: 20.6 ML/M2
LEFT VENTRICLE SYSTOLIC VOLUME: 45.03 ML
LEFT VENTRICULAR INTERNAL DIMENSION IN DIASTOLE: 5.27 CM (ref 3.5–6)
LEFT VENTRICULAR MASS: 239.75 G
LV LATERAL E/E' RATIO: 16.6 M/S
LV SEPTAL E/E' RATIO: 13.83 M/S
MV PEAK A VEL: 1.13 M/S
MV PEAK E VEL: 0.83 M/S
MV STENOSIS PRESSURE HALF TIME: 87.51 MS
MV VALVE AREA P 1/2 METHOD: 2.51 CM2
PISA TR MAX VEL: 2.34 M/S
PULM VEIN S/D RATIO: 1.22
PV MEAN GRADIENT: 1.37 MMHG
PV PEAK D VEL: 0.27 M/S
PV PEAK S VEL: 0.33 M/S
PV PEAK VELOCITY: 1.44 CM/S
RA MAJOR: 4.18 CM
RA WIDTH: 2.68 CM
RIGHT VENTRICULAR END-DIASTOLIC DIMENSION: 2.65 CM
SINUS: 2.25 CM
STJ: 2.09 CM
TDI LATERAL: 0.05 M/S
TDI SEPTAL: 0.06 M/S
TDI: 0.06 M/S
TR MAX PG: 22 MMHG
TRICUSPID ANNULAR PLANE SYSTOLIC EXCURSION: 2.19 CM

## 2020-08-21 PROCEDURE — 63600175 PHARM REV CODE 636 W HCPCS: Performed by: NURSE PRACTITIONER

## 2020-08-21 PROCEDURE — 96375 TX/PRO/DX INJ NEW DRUG ADDON: CPT

## 2020-08-21 PROCEDURE — 96365 THER/PROPH/DIAG IV INF INIT: CPT

## 2020-08-21 RX ORDER — SODIUM CHLORIDE 0.9 % (FLUSH) 0.9 %
10 SYRINGE (ML) INJECTION
Status: DISCONTINUED | OUTPATIENT
Start: 2020-08-21 | End: 2020-08-21 | Stop reason: HOSPADM

## 2020-08-21 RX ORDER — POTASSIUM CHLORIDE 20 MEQ/1
20 TABLET, EXTENDED RELEASE ORAL DAILY
Qty: 90 TABLET | Refills: 3 | Status: SHIPPED | OUTPATIENT
Start: 2020-08-21 | End: 2021-09-09 | Stop reason: SDUPTHER

## 2020-08-21 RX ORDER — FUROSEMIDE 20 MG/1
20 TABLET ORAL DAILY
COMMUNITY
End: 2021-09-09 | Stop reason: SDUPTHER

## 2020-08-21 RX ORDER — POTASSIUM CHLORIDE 20 MEQ/1
20 TABLET, EXTENDED RELEASE ORAL DAILY
COMMUNITY
End: 2020-08-21 | Stop reason: SDUPTHER

## 2020-08-21 RX ORDER — METHYLPREDNISOLONE SOD SUCC 125 MG
80 VIAL (EA) INJECTION
Status: COMPLETED | OUTPATIENT
Start: 2020-08-21 | End: 2020-08-21

## 2020-08-21 RX ORDER — POTASSIUM CHLORIDE 20 MEQ/1
20 TABLET, EXTENDED RELEASE ORAL 2 TIMES DAILY
COMMUNITY
End: 2020-08-21 | Stop reason: CLARIF

## 2020-08-21 RX ADMIN — IRON SUCROSE 200 MG: 20 INJECTION, SOLUTION INTRAVENOUS at 11:08

## 2020-08-21 RX ADMIN — METHYLPREDNISOLONE SODIUM SUCCINATE 80 MG: 125 INJECTION, POWDER, FOR SOLUTION INTRAMUSCULAR; INTRAVENOUS at 11:08

## 2020-08-21 NOTE — PLAN OF CARE
Patient concerned about recent scans. Patient has follow up with cardiologist to review results. Patient voices no other concerns at this time.

## 2020-08-21 NOTE — TELEPHONE ENCOUNTER
Received orders per :  1: Lasix 20mg daily  2: K-dur 20meq daily  3: F/u appt to discuss echo results.  RBTO: Dr. Hancock/AMY Wong    The patient has been notified of this information, f/u appt scheduled with Dr. Hancock on 8-25-20 at 2pm and all questions answered.

## 2020-08-21 NOTE — DISCHARGE INSTRUCTIONS
[facility]  [address] [facility]  or  [address] [place] [address] Drive  [phone] phone     [phone] fax  Hours of Operation: Monday- Friday 8:00am- 5:00pm  After hours phone  [phone]  Hematology / Oncology Physicians on call      Dr. Hank Javed, SAMEERA Draper, SAMEERA Richardson, SAMEERA Alanis NP    Please call with any concerns regarding your appointment today.[facility]  [address] [facility].  or  [address] [place] [address] Drive  [phone] phone     [phone] fax  Hours of Operation: Monday- Friday 8:00am- 5:00pm  After hours phone  [phone]  Hematology / Oncology Physicians on call      SAMEERA Robertson Dr., Dr., Dr., Dr., SAMEERA Richardson, SAMEERA Alanis NP    Please call with any concerns regarding your appointment today.FALL PREVENTION   Falls often occur due to slipping, tripping or losing your balance. Here are ways to reduce your risk of falling again.   Was there anything that caused your fall that can be fixed, removed or replaced?   Make your home safe by keeping walkways clear of objects you may trip over.   Use non-slip pads under rugs.   Do not walk in poorly lit areas.   Do not stand on chairs or wobbly ladders.   Use caution when reaching overhead or looking upward. This position can cause a loss of balance.   Be sure your shoes fit properly, have non-slip bottoms and are in good condition.   Be cautious when going up and down stairs, curbs, and when walking on uneven sidewalks.   If your balance is poor, consider using a cane or walker.   If your fall was related to alcohol use, stop or limit alcohol intake.   If your fall was related to use of sleeping medicines, talk to your doctor about this. You may need to reduce your dosage at bedtime if you awaken during the night to go to the  bathroom.   To reduce the need for nighttime bathroom trips:   Avoid drinking fluids for several hours before going to bed   Empty your bladder before going to bed   Men can keep a urinal at the bedside   © 7863-8328 [employer], [address] [place] [address], [place] [address]. All rights reserved. This information is not intended as a substitute for professional medical care. Always follow your healthcare professional's instructions.

## 2020-08-21 NOTE — TELEPHONE ENCOUNTER
Called and spoke to pt to do symptom check. Pt informed BROOKS and edema BLE unchanged. Pt informed only took 20mg lasix yesterday instead of 40 mg due to urinating so much. Pt has not gone to lab work yet today.  Pt also asking about TTE results from 8-19-20.      Awaiting lab work today for further recommendations.

## 2020-08-21 NOTE — TELEPHONE ENCOUNTER
----- Message from Sabrina Dhillon RN sent at 8/17/2020  2:08 PM CDT -----  Regarding: Progress check and further recommendations

## 2020-08-24 ENCOUNTER — OFFICE VISIT (OUTPATIENT)
Dept: CARDIOLOGY | Facility: CLINIC | Age: 70
End: 2020-08-24
Payer: MEDICARE

## 2020-08-24 VITALS
WEIGHT: 259.25 LBS | OXYGEN SATURATION: 98 % | BODY MASS INDEX: 44.26 KG/M2 | DIASTOLIC BLOOD PRESSURE: 62 MMHG | HEIGHT: 64 IN | HEART RATE: 83 BPM | SYSTOLIC BLOOD PRESSURE: 122 MMHG

## 2020-08-24 DIAGNOSIS — Z95.1 S/P CABG (CORONARY ARTERY BYPASS GRAFT): Chronic | ICD-10-CM

## 2020-08-24 DIAGNOSIS — I35.0 NONRHEUMATIC AORTIC VALVE STENOSIS: Primary | ICD-10-CM

## 2020-08-24 DIAGNOSIS — I35.0 NONRHEUMATIC AORTIC VALVE STENOSIS: Primary | Chronic | ICD-10-CM

## 2020-08-24 DIAGNOSIS — N18.9 CHRONIC KIDNEY DISEASE, UNSPECIFIED CKD STAGE: ICD-10-CM

## 2020-08-24 DIAGNOSIS — I25.118 CORONARY ARTERY DISEASE OF NATIVE ARTERY OF NATIVE HEART WITH STABLE ANGINA PECTORIS: ICD-10-CM

## 2020-08-24 PROCEDURE — 99214 OFFICE O/P EST MOD 30 MIN: CPT | Mod: PBBFAC | Performed by: NUCLEAR MEDICINE

## 2020-08-24 PROCEDURE — 99999 PR PBB SHADOW E&M-EST. PATIENT-LVL IV: CPT | Mod: PBBFAC,,, | Performed by: NUCLEAR MEDICINE

## 2020-08-24 PROCEDURE — 99999 PR PBB SHADOW E&M-EST. PATIENT-LVL IV: ICD-10-PCS | Mod: PBBFAC,,, | Performed by: NUCLEAR MEDICINE

## 2020-08-24 PROCEDURE — 99214 PR OFFICE/OUTPT VISIT, EST, LEVL IV, 30-39 MIN: ICD-10-PCS | Mod: S$PBB,,, | Performed by: NUCLEAR MEDICINE

## 2020-08-24 PROCEDURE — 99214 OFFICE O/P EST MOD 30 MIN: CPT | Mod: S$PBB,,, | Performed by: NUCLEAR MEDICINE

## 2020-08-24 NOTE — H&P (VIEW-ONLY)
Subjective:   Patient ID:  Qi Ortiz is a 70 y.o. female who presents for follow-up of Valvular Heart Disease (DEG AS) and Coronary Artery Disease (CABG)      HPI PRESENTS FOR REVIEW AND DISCUSSION OF RECENT ECHO- 8/19/20,, COMPARED TO LAST ECHO DONE ON November 2017  AV PEAK VELOCITY,  MEAN GRADIENT HAVE INCREASED ,  4.23, 49  AND ALFREDO 0.74,  LV EF 60%  THE ABOVE FINDINGS ARE CONSISTENT WITH SEVERE DEG AS  DISCUSSED WITH PATIENT THE NATURAL HX OF AS,  AND THERAPEUTIC ALTERNATIVES - SAVR VS TAVR, SINCE PT IS SYMPTOMATIC-  BROOKS,  AND EDEMA.  NO SYNCOPE , NO RECENT ANGINA  PATIENT HAS HX OF CABG X2-  1999- 2 SVG TO LAD AND CX,   2016- LIMA TO LAD.  AND PCI TO SVG  OF CX ON 2014  NORMAL RENAL FUNCTION, NO DM.  HAS COPD AND PAD/CAROTID ARTERY DISEASE      Review of Systems   Constitution: Negative for chills, fever, night sweats, weight gain and weight loss.   HENT: Negative for nosebleeds.    Eyes: Negative for blurred vision, double vision and visual disturbance.   Cardiovascular: Positive for dyspnea on exertion, leg swelling and palpitations. Negative for chest pain, irregular heartbeat, orthopnea, paroxysmal nocturnal dyspnea and syncope.   Respiratory: Negative for cough, hemoptysis and wheezing.    Endocrine: Negative for polydipsia and polyuria.   Hematologic/Lymphatic: Does not bruise/bleed easily.   Skin: Negative for rash.   Musculoskeletal: Negative for joint pain, joint swelling, muscle weakness and myalgias.   Gastrointestinal: Negative for abdominal pain, hematemesis, jaundice and melena.   Genitourinary: Negative for dysuria, hematuria and nocturia.   Neurological: Negative for dizziness, focal weakness, headaches, sensory change and weakness.   Psychiatric/Behavioral: Negative for depression. The patient does not have insomnia and is not nervous/anxious.      Family History   Problem Relation Age of Onset    Breast cancer Maternal Grandfather     Breast cancer Paternal Aunt     Stroke Unknown      Breast cancer Sister 60    Leukemia Sister 8         as child    Lung cancer Paternal Grandfather     Heart disease Unknown      Past Medical History:   Diagnosis Date    Carotid stenosis     19%    COPD (chronic obstructive pulmonary disease)     No meds    Coronary artery disease     CVA (cerebral vascular accident)     Dr. 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 Hope    Psoriatic arthritis     Rheumatology     Social History     Socioeconomic History    Marital status: Single     Spouse name: Not on file    Number of children: 3    Years of education: Not on file    Highest education level: Not on file   Occupational History    Occupation: Not working   Social Needs    Financial resource strain: Not on file    Food insecurity     Worry: Not on file     Inability: Not on file    Transportation needs     Medical: Not on file     Non-medical: Not on file   Tobacco Use    Smoking status: Never Smoker    Smokeless tobacco: Never Used   Substance and Sexual Activity    Alcohol use: No    Drug use: No    Sexual activity: Never     Partners: Male   Lifestyle    Physical activity     Days per week: Not on file     Minutes per session: Not on file    Stress: Not on file   Relationships    Social connections     Talks on phone: Not on file     Gets together: Not on file     Attends Scientologist service: Not on file     Active member of club or organization: Not on file     Attends meetings of clubs or organizations: Not on file     Relationship status: Not on file   Other Topics Concern    Not on file   Social History Narrative    Not on file     Current Outpatient Medications on File Prior to Visit   Medication Sig Dispense Refill    aspirin 81 MG Chew Take 162.5 mg by mouth once daily.       calcium carbonate 650 mg calcium (1,625 mg) tablet Take 1  tablet by mouth once daily.      clopidogreL (PLAVIX) 75 mg tablet Take 1 tablet (75 mg total) by mouth once daily. 90 tablet 3    cyanocobalamin 2000 MCG tablet Take 2,000 mcg by mouth once daily.      evolocumab (REPATHA SURECLICK) 140 mg/mL PnIj Inject 1 mL (140 mg total) into the skin every 14 (fourteen) days. 6 mL 3    folic acid (FOLVITE) 1 MG tablet Take 1 tablet (1 mg total) by mouth once daily. 90 tablet 1    furosemide (LASIX) 20 MG tablet Take 20 mg by mouth once daily.      gabapentin (NEURONTIN) 100 MG capsule Take 2 capsules (200 mg total) by mouth 3 (three) times daily as needed. 540 capsule 3    garlic 2,000 mg Cap Take 3,000 mg by mouth once daily.       levothyroxine (SYNTHROID) 112 MCG tablet Take 1 tablet (112 mcg total) by mouth once daily. 90 tablet 1    magnesium citrate solution       metoprolol succinate (TOPROL-XL) 100 MG 24 hr tablet Take 1 tablet (100 mg total) by mouth 2 (two) times daily. 1 tab (100mg) in morning. And 100 mg at bedtime.  Generic ok 180 tablet 3    nitroGLYCERIN (NITROSTAT) 0.4 MG SL tablet Place 1 tablet (0.4 mg total) under the tongue every 5 (five) minutes as needed for Chest pain. 100 tablet 3    potassium chloride SA (K-DUR,KLOR-CON) 20 MEQ tablet Take 1 tablet (20 mEq total) by mouth once daily. 90 tablet 3    pyridoxine (VITAMIN B-6) 100 MG Tab Take 200 mg by mouth once daily.       valsartan-hydrochlorothiazide (DIOVAN-HCT) 160-12.5 mg per tablet Take 1 tablet by mouth once daily. 90 tablet 3    vitamin D 1000 units Tab Take 185 mg by mouth once daily.      albuterol (PROVENTIL) 2.5 mg /3 mL (0.083 %) nebulizer solution Take 3 mLs (2.5 mg total) by nebulization every 6 (six) hours as needed for Wheezing. Rescue 25 each 5    APPLE CIDER VINEGAR ORAL Take 1 capsule by mouth once daily.       chlorhexidine (BETASEPT SURGICAL SCRUB) 4 % external liquid Apply topically daily as needed. Consider 90 day supply 473 mL 3    iron-vitamin C 100-250 mg,  ICAR-C, (ICAR-C) 100-250 mg Tab Take 1 tablet by mouth once daily. (Patient not taking: Reported on 8/24/2020) 90 tablet 4    mupirocin (BACTROBAN) 2 % ointment Bactroban 2 % topical ointment   Apply 1 application twice a day by topical route as directed for 30 days.      TURMERIC, BULK, MISC Take 1 capsule by mouth 2 (two) times daily.       ustekinumab (STELARA) 90 mg/mL Syrg syringe Inject 1 mL (90 mg total) into the skin every 3 (three) months. 1 Syringe 3     No current facility-administered medications on file prior to visit.      Review of patient's allergies indicates:   Allergen Reactions    Celexa [citalopram] Anaphylaxis    Clindamycin Itching and Swelling    Codeine Shortness Of Breath, Itching and Swelling    Crestor [rosuvastatin] Anaphylaxis    Cytotec [misoprostol] Anaphylaxis and Other (See Comments)     Difficulty breathing    Lisinopril Anaphylaxis    Magnesium citrate Shortness Of Breath    Stadol [butorphanol tartrate] Anaphylaxis     Coded    Vicodin [hydrocodone-acetaminophen] Shortness Of Breath    Adhesive      EKG Electrodes    Aggrenox [aspirin-dipyridamole] Other (See Comments)     headaches    Avelox [moxifloxacin]      PATIENT STATES DO NOT GIVE UNDER ANY CIRCUSTANCES    Demerol [meperidine]     Isosorbide Other (See Comments)     Severe headache    Kenalog [triamcinolone acetonide]     Medrol [methylprednisolone] Other (See Comments)     Patient reports taking IV in the past without any issues. Reports allergy to oral preparation     Mobic [meloxicam]     Morphine     Nitroglycerin      Long acting    Pholcodine     Prednisone      GASTRIC PAIN    Ranexa [ranolazine] Nausea And Vomiting    Reclast [zoledronic acid-mannitol-water] Other (See Comments)     Bones hurt    Sulfa (sulfonamide antibiotics) Other (See Comments)     unknown    Talwin [pentazocine lactate]     Tetracycline     Tetracyclines     Tilade [nedocromil]     Zetia [ezetimibe]         Objective:     Physical Exam   Constitutional: She is oriented to person, place, and time. She appears well-developed. No distress.   HENT:   Head: Normocephalic.   Eyes: Pupils are equal, round, and reactive to light. Conjunctivae are normal. No scleral icterus.   Neck: Normal range of motion. Neck supple. Normal carotid pulses, no hepatojugular reflux and no JVD present. Carotid bruit is not present. No edema present. No thyroid mass and no thyromegaly present.   Cardiovascular: Normal rate, regular rhythm, S1 normal, S2 normal and intact distal pulses. PMI is not displaced. Exam reveals no gallop and no friction rub.   Murmur heard.   Harsh crescendo-decrescendo midsystolic murmur is present with a grade of 3/6 at the upper right sternal border, upper left sternal border and lower left sternal border radiating to the neck. The intensity increases with respiration.  Pulses:       Carotid pulses are 2+ on the right side and 2+ on the left side.       Radial pulses are 2+ on the right side and 2+ on the left side.        Femoral pulses are 2+ on the right side and 2+ on the left side.       Popliteal pulses are 2+ on the right side and 2+ on the left side.        Dorsalis pedis pulses are 2+ on the right side and 2+ on the left side.        Posterior tibial pulses are 2+ on the right side and 2+ on the left side.   Pulmonary/Chest: Effort normal and breath sounds normal. She has no wheezes. She has no rales. She exhibits no tenderness.   Abdominal: Soft. Bowel sounds are normal. She exhibits no pulsatile midline mass and no mass. There is no hepatosplenomegaly. There is no abdominal tenderness.   Musculoskeletal: Normal range of motion.         General: No tenderness or edema.      Cervical back: Normal.      Thoracic back: Normal.      Lumbar back: Normal.   Lymphadenopathy:     She has no cervical adenopathy.     She has no axillary adenopathy.        Right: No supraclavicular adenopathy present.         Left: No supraclavicular adenopathy present.   Neurological: She is alert and oriented to person, place, and time. She has normal strength and normal reflexes. No sensory deficit. Gait normal.   Skin: Skin is warm. No rash noted. No cyanosis. No pallor. Nails show no clubbing.   Psychiatric: She has a normal mood and affect. Her speech is normal and behavior is normal. Cognition and memory are normal.       Assessment:     1. Nonrheumatic aortic valve stenosis    2. Coronary artery disease of native artery of native heart with stable angina pectoris    3. S/P CABG (coronary artery bypass graft)        Plan:     Nonrheumatic aortic valve stenosis  REFER TO STRUCTURAL HEART PROGRAM ,[place],  FOR EVALUATION AND MANAGEMENT OF SEVERE, SYMPTOMATIC DEG AS    Coronary artery disease of native artery of native heart with stable angina pectoris  NO CLINICAL EVIDENCE OF ACTIVE MYOCARDIAL ISCHEMIA  HOWEVER, SHE NEEDS AN ANGIOGRAM PRIOR TO VALVE INTERVENTION    S/P CABG (coronary artery bypass graft)  STABLE    PHONE CALL FOR FURTHER RECOMMENDATIONS FROM [place]

## 2020-08-24 NOTE — PROGRESS NOTES
Subjective:   Patient ID:  Qi Ortiz is a 70 y.o. female who presents for follow-up of Valvular Heart Disease (DEG AS) and Coronary Artery Disease (CABG)      HPI PRESENTS FOR REVIEW AND DISCUSSION OF RECENT ECHO- 8/19/20,, COMPARED TO LAST ECHO DONE ON November 2017  AV PEAK VELOCITY,  MEAN GRADIENT HAVE INCREASED ,  4.23, 49  AND ALFREDO 0.74,  LV EF 60%  THE ABOVE FINDINGS ARE CONSISTENT WITH SEVERE DEG AS  DISCUSSED WITH PATIENT THE NATURAL HX OF AS,  AND THERAPEUTIC ALTERNATIVES - SAVR VS TAVR, SINCE PT IS SYMPTOMATIC-  BROOKS,  AND EDEMA.  NO SYNCOPE , NO RECENT ANGINA  PATIENT HAS HX OF CABG X2-  1999- 2 SVG TO LAD AND CX,   2016- LIMA TO LAD.  AND PCI TO SVG  OF CX ON 2014  NORMAL RENAL FUNCTION, NO DM.  HAS COPD AND PAD/CAROTID ARTERY DISEASE      Review of Systems   Constitution: Negative for chills, fever, night sweats, weight gain and weight loss.   HENT: Negative for nosebleeds.    Eyes: Negative for blurred vision, double vision and visual disturbance.   Cardiovascular: Positive for dyspnea on exertion, leg swelling and palpitations. Negative for chest pain, irregular heartbeat, orthopnea, paroxysmal nocturnal dyspnea and syncope.   Respiratory: Negative for cough, hemoptysis and wheezing.    Endocrine: Negative for polydipsia and polyuria.   Hematologic/Lymphatic: Does not bruise/bleed easily.   Skin: Negative for rash.   Musculoskeletal: Negative for joint pain, joint swelling, muscle weakness and myalgias.   Gastrointestinal: Negative for abdominal pain, hematemesis, jaundice and melena.   Genitourinary: Negative for dysuria, hematuria and nocturia.   Neurological: Negative for dizziness, focal weakness, headaches, sensory change and weakness.   Psychiatric/Behavioral: Negative for depression. The patient does not have insomnia and is not nervous/anxious.      Family History   Problem Relation Age of Onset    Breast cancer Maternal Grandfather     Breast cancer Paternal Aunt     Stroke Unknown      Breast cancer Sister 60    Leukemia Sister 8         as child    Lung cancer Paternal Grandfather     Heart disease Unknown      Past Medical History:   Diagnosis Date    Carotid stenosis     19%    COPD (chronic obstructive pulmonary disease)     No meds    Coronary artery disease     CVA (cerebral vascular accident)     Dr. 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 Hope    Psoriatic arthritis     Rheumatology     Social History     Socioeconomic History    Marital status: Single     Spouse name: Not on file    Number of children: 3    Years of education: Not on file    Highest education level: Not on file   Occupational History    Occupation: Not working   Social Needs    Financial resource strain: Not on file    Food insecurity     Worry: Not on file     Inability: Not on file    Transportation needs     Medical: Not on file     Non-medical: Not on file   Tobacco Use    Smoking status: Never Smoker    Smokeless tobacco: Never Used   Substance and Sexual Activity    Alcohol use: No    Drug use: No    Sexual activity: Never     Partners: Male   Lifestyle    Physical activity     Days per week: Not on file     Minutes per session: Not on file    Stress: Not on file   Relationships    Social connections     Talks on phone: Not on file     Gets together: Not on file     Attends Religion service: Not on file     Active member of club or organization: Not on file     Attends meetings of clubs or organizations: Not on file     Relationship status: Not on file   Other Topics Concern    Not on file   Social History Narrative    Not on file     Current Outpatient Medications on File Prior to Visit   Medication Sig Dispense Refill    aspirin 81 MG Chew Take 162.5 mg by mouth once daily.       calcium carbonate 650 mg calcium (1,625 mg) tablet Take 1  tablet by mouth once daily.      clopidogreL (PLAVIX) 75 mg tablet Take 1 tablet (75 mg total) by mouth once daily. 90 tablet 3    cyanocobalamin 2000 MCG tablet Take 2,000 mcg by mouth once daily.      evolocumab (REPATHA SURECLICK) 140 mg/mL PnIj Inject 1 mL (140 mg total) into the skin every 14 (fourteen) days. 6 mL 3    folic acid (FOLVITE) 1 MG tablet Take 1 tablet (1 mg total) by mouth once daily. 90 tablet 1    furosemide (LASIX) 20 MG tablet Take 20 mg by mouth once daily.      gabapentin (NEURONTIN) 100 MG capsule Take 2 capsules (200 mg total) by mouth 3 (three) times daily as needed. 540 capsule 3    garlic 2,000 mg Cap Take 3,000 mg by mouth once daily.       levothyroxine (SYNTHROID) 112 MCG tablet Take 1 tablet (112 mcg total) by mouth once daily. 90 tablet 1    magnesium citrate solution       metoprolol succinate (TOPROL-XL) 100 MG 24 hr tablet Take 1 tablet (100 mg total) by mouth 2 (two) times daily. 1 tab (100mg) in morning. And 100 mg at bedtime.  Generic ok 180 tablet 3    nitroGLYCERIN (NITROSTAT) 0.4 MG SL tablet Place 1 tablet (0.4 mg total) under the tongue every 5 (five) minutes as needed for Chest pain. 100 tablet 3    potassium chloride SA (K-DUR,KLOR-CON) 20 MEQ tablet Take 1 tablet (20 mEq total) by mouth once daily. 90 tablet 3    pyridoxine (VITAMIN B-6) 100 MG Tab Take 200 mg by mouth once daily.       valsartan-hydrochlorothiazide (DIOVAN-HCT) 160-12.5 mg per tablet Take 1 tablet by mouth once daily. 90 tablet 3    vitamin D 1000 units Tab Take 185 mg by mouth once daily.      albuterol (PROVENTIL) 2.5 mg /3 mL (0.083 %) nebulizer solution Take 3 mLs (2.5 mg total) by nebulization every 6 (six) hours as needed for Wheezing. Rescue 25 each 5    APPLE CIDER VINEGAR ORAL Take 1 capsule by mouth once daily.       chlorhexidine (BETASEPT SURGICAL SCRUB) 4 % external liquid Apply topically daily as needed. Consider 90 day supply 473 mL 3    iron-vitamin C 100-250 mg,  ICAR-C, (ICAR-C) 100-250 mg Tab Take 1 tablet by mouth once daily. (Patient not taking: Reported on 8/24/2020) 90 tablet 4    mupirocin (BACTROBAN) 2 % ointment Bactroban 2 % topical ointment   Apply 1 application twice a day by topical route as directed for 30 days.      TURMERIC, BULK, MISC Take 1 capsule by mouth 2 (two) times daily.       ustekinumab (STELARA) 90 mg/mL Syrg syringe Inject 1 mL (90 mg total) into the skin every 3 (three) months. 1 Syringe 3     No current facility-administered medications on file prior to visit.      Review of patient's allergies indicates:   Allergen Reactions    Celexa [citalopram] Anaphylaxis    Clindamycin Itching and Swelling    Codeine Shortness Of Breath, Itching and Swelling    Crestor [rosuvastatin] Anaphylaxis    Cytotec [misoprostol] Anaphylaxis and Other (See Comments)     Difficulty breathing    Lisinopril Anaphylaxis    Magnesium citrate Shortness Of Breath    Stadol [butorphanol tartrate] Anaphylaxis     Coded    Vicodin [hydrocodone-acetaminophen] Shortness Of Breath    Adhesive      EKG Electrodes    Aggrenox [aspirin-dipyridamole] Other (See Comments)     headaches    Avelox [moxifloxacin]      PATIENT STATES DO NOT GIVE UNDER ANY CIRCUSTANCES    Demerol [meperidine]     Isosorbide Other (See Comments)     Severe headache    Kenalog [triamcinolone acetonide]     Medrol [methylprednisolone] Other (See Comments)     Patient reports taking IV in the past without any issues. Reports allergy to oral preparation     Mobic [meloxicam]     Morphine     Nitroglycerin      Long acting    Pholcodine     Prednisone      GASTRIC PAIN    Ranexa [ranolazine] Nausea And Vomiting    Reclast [zoledronic acid-mannitol-water] Other (See Comments)     Bones hurt    Sulfa (sulfonamide antibiotics) Other (See Comments)     unknown    Talwin [pentazocine lactate]     Tetracycline     Tetracyclines     Tilade [nedocromil]     Zetia [ezetimibe]         Objective:     Physical Exam   Constitutional: She is oriented to person, place, and time. She appears well-developed. No distress.   HENT:   Head: Normocephalic.   Eyes: Pupils are equal, round, and reactive to light. Conjunctivae are normal. No scleral icterus.   Neck: Normal range of motion. Neck supple. Normal carotid pulses, no hepatojugular reflux and no JVD present. Carotid bruit is not present. No edema present. No thyroid mass and no thyromegaly present.   Cardiovascular: Normal rate, regular rhythm, S1 normal, S2 normal and intact distal pulses. PMI is not displaced. Exam reveals no gallop and no friction rub.   Murmur heard.   Harsh crescendo-decrescendo midsystolic murmur is present with a grade of 3/6 at the upper right sternal border, upper left sternal border and lower left sternal border radiating to the neck. The intensity increases with respiration.  Pulses:       Carotid pulses are 2+ on the right side and 2+ on the left side.       Radial pulses are 2+ on the right side and 2+ on the left side.        Femoral pulses are 2+ on the right side and 2+ on the left side.       Popliteal pulses are 2+ on the right side and 2+ on the left side.        Dorsalis pedis pulses are 2+ on the right side and 2+ on the left side.        Posterior tibial pulses are 2+ on the right side and 2+ on the left side.   Pulmonary/Chest: Effort normal and breath sounds normal. She has no wheezes. She has no rales. She exhibits no tenderness.   Abdominal: Soft. Bowel sounds are normal. She exhibits no pulsatile midline mass and no mass. There is no hepatosplenomegaly. There is no abdominal tenderness.   Musculoskeletal: Normal range of motion.         General: No tenderness or edema.      Cervical back: Normal.      Thoracic back: Normal.      Lumbar back: Normal.   Lymphadenopathy:     She has no cervical adenopathy.     She has no axillary adenopathy.        Right: No supraclavicular adenopathy present.         Left: No supraclavicular adenopathy present.   Neurological: She is alert and oriented to person, place, and time. She has normal strength and normal reflexes. No sensory deficit. Gait normal.   Skin: Skin is warm. No rash noted. No cyanosis. No pallor. Nails show no clubbing.   Psychiatric: She has a normal mood and affect. Her speech is normal and behavior is normal. Cognition and memory are normal.       Assessment:     1. Nonrheumatic aortic valve stenosis    2. Coronary artery disease of native artery of native heart with stable angina pectoris    3. S/P CABG (coronary artery bypass graft)        Plan:     Nonrheumatic aortic valve stenosis  REFER TO STRUCTURAL HEART PROGRAM ,[place],  FOR EVALUATION AND MANAGEMENT OF SEVERE, SYMPTOMATIC DEG AS    Coronary artery disease of native artery of native heart with stable angina pectoris  NO CLINICAL EVIDENCE OF ACTIVE MYOCARDIAL ISCHEMIA  HOWEVER, SHE NEEDS AN ANGIOGRAM PRIOR TO VALVE INTERVENTION    S/P CABG (coronary artery bypass graft)  STABLE    PHONE CALL FOR FURTHER RECOMMENDATIONS FROM [place]

## 2020-08-25 ENCOUNTER — TELEPHONE (OUTPATIENT)
Dept: CARDIOLOGY | Facility: CLINIC | Age: 70
End: 2020-08-25

## 2020-08-25 NOTE — TELEPHONE ENCOUNTER
Patient referred for TAVR by Dr. Hancock in [place].  Had long conversation with patient regarding process for TAVR evaluation.  Patient stated that she did not want to come to [place] for many reasons.  She is very concerned about Covid and also does not have any family or support that can be with her for procedures in [facility].  She prefers to have procedures in [place] and will speak with her doctors about referring her to local MDs.  I have provided her with 's office number and contact information for her to call if she would like to come to [place].

## 2020-08-26 ENCOUNTER — TELEPHONE (OUTPATIENT)
Dept: CARDIOLOGY | Facility: CLINIC | Age: 70
End: 2020-08-26

## 2020-08-26 NOTE — TELEPHONE ENCOUNTER
Dr. Hancock spoke to the patient. Dr. Ibarra would be able to do her angiogram at [facility] in [place] and after we can refer her to Dr. Cantrell at [facility] with her TAVR. The patient does not want to schedule at this time due to the impending weather. She will call us back on Monday with her decision about the procedure.

## 2020-09-01 ENCOUNTER — TELEPHONE (OUTPATIENT)
Dept: HEMATOLOGY/ONCOLOGY | Facility: CLINIC | Age: 70
End: 2020-09-01

## 2020-09-01 NOTE — TELEPHONE ENCOUNTER
Patient wanting to let us know that she will be having a heart cath soon followed by another heart surgery. She states she may be able to keep her appts, but she will call us  to time when she finds out for sure. I acknowledged, and call ended well.

## 2020-09-02 ENCOUNTER — TELEPHONE (OUTPATIENT)
Dept: CARDIOLOGY | Facility: CLINIC | Age: 70
End: 2020-09-02

## 2020-09-02 NOTE — TELEPHONE ENCOUNTER
Received call from patient with questions re: heart cath and TAVR with Dr. Wilder  Answered all questions and patient is ready for planned procedures.

## 2020-09-08 ENCOUNTER — HOSPITAL ENCOUNTER (OUTPATIENT)
Facility: HOSPITAL | Age: 70
Discharge: HOME | End: 2020-09-08
Attending: INTERNAL MEDICINE | Admitting: INTERNAL MEDICINE
Payer: MEDICARE

## 2020-09-08 VITALS
TEMPERATURE: 98 F | RESPIRATION RATE: 18 BRPM | SYSTOLIC BLOOD PRESSURE: 106 MMHG | DIASTOLIC BLOOD PRESSURE: 55 MMHG | OXYGEN SATURATION: 99 % | HEART RATE: 62 BPM

## 2020-09-08 DIAGNOSIS — I20.9 ANGINA, CLASS II: ICD-10-CM

## 2020-09-08 DIAGNOSIS — I47.10 SVT (SUPRAVENTRICULAR TACHYCARDIA): ICD-10-CM

## 2020-09-08 DIAGNOSIS — I25.118 CORONARY ARTERY DISEASE OF NATIVE ARTERY OF NATIVE HEART WITH STABLE ANGINA PECTORIS: Primary | ICD-10-CM

## 2020-09-08 LAB
BASOPHILS # BLD AUTO: 0.04 K/UL (ref 0–0.2)
BASOPHILS NFR BLD: 0.5 % (ref 0–1.9)
DIFFERENTIAL METHOD: NORMAL
EOSINOPHIL # BLD AUTO: 0.1 K/UL (ref 0–0.5)
EOSINOPHIL NFR BLD: 1.8 % (ref 0–8)
ERYTHROCYTE [DISTWIDTH] IN BLOOD BY AUTOMATED COUNT: 14.1 % (ref 11.5–14.5)
HCT VFR BLD AUTO: 38.2 % (ref 37–48.5)
HGB BLD-MCNC: 12.4 G/DL (ref 12–16)
IMM GRANULOCYTES # BLD AUTO: 0.02 K/UL (ref 0–0.04)
IMM GRANULOCYTES NFR BLD AUTO: 0.3 % (ref 0–0.5)
LYMPHOCYTES # BLD AUTO: 2 K/UL (ref 1–4.8)
LYMPHOCYTES NFR BLD: 26.3 % (ref 18–48)
MCH RBC QN AUTO: 29.7 PG (ref 27–31)
MCHC RBC AUTO-ENTMCNC: 32.5 G/DL (ref 32–36)
MCV RBC AUTO: 91 FL (ref 82–98)
MONOCYTES # BLD AUTO: 0.8 K/UL (ref 0.3–1)
MONOCYTES NFR BLD: 10.1 % (ref 4–15)
NEUTROPHILS # BLD AUTO: 4.7 K/UL (ref 1.8–7.7)
NEUTROPHILS NFR BLD: 61 % (ref 38–73)
NRBC BLD-RTO: 0 /100 WBC
PLATELET # BLD AUTO: 205 K/UL (ref 150–350)
PMV BLD AUTO: 10.2 FL (ref 9.2–12.9)
RBC # BLD AUTO: 4.18 M/UL (ref 4–5.4)
SARS-COV-2 RDRP RESP QL NAA+PROBE: NEGATIVE
WBC # BLD AUTO: 7.75 K/UL (ref 3.9–12.7)

## 2020-09-08 PROCEDURE — 27201423 OPTIME MED/SURG SUP & DEVICES STERILE SUPPLY: Performed by: INTERNAL MEDICINE

## 2020-09-08 PROCEDURE — 85025 COMPLETE CBC W/AUTO DIFF WBC: CPT

## 2020-09-08 PROCEDURE — 63600175 PHARM REV CODE 636 W HCPCS: Performed by: INTERNAL MEDICINE

## 2020-09-08 PROCEDURE — U0002 COVID-19 LAB TEST NON-CDC: HCPCS

## 2020-09-08 PROCEDURE — 25500020 PHARM REV CODE 255: Performed by: INTERNAL MEDICINE

## 2020-09-08 PROCEDURE — 99153 MOD SED SAME PHYS/QHP EA: CPT | Performed by: INTERNAL MEDICINE

## 2020-09-08 PROCEDURE — 99152 MOD SED SAME PHYS/QHP 5/>YRS: CPT | Performed by: INTERNAL MEDICINE

## 2020-09-08 PROCEDURE — C1769 GUIDE WIRE: HCPCS | Performed by: INTERNAL MEDICINE

## 2020-09-08 PROCEDURE — 93459 L HRT ART/GRFT ANGIO: CPT | Performed by: INTERNAL MEDICINE

## 2020-09-08 PROCEDURE — C1894 INTRO/SHEATH, NON-LASER: HCPCS | Performed by: INTERNAL MEDICINE

## 2020-09-08 PROCEDURE — 25000003 PHARM REV CODE 250: Performed by: INTERNAL MEDICINE

## 2020-09-08 PROCEDURE — 99152 MOD SED SAME PHYS/QHP 5/>YRS: CPT | Mod: ,,, | Performed by: INTERNAL MEDICINE

## 2020-09-08 PROCEDURE — 99152 PR MOD CONSCIOUS SEDATION, SAME PHYS, 5+ YRS, FIRST 15 MIN: ICD-10-PCS | Mod: ,,, | Performed by: INTERNAL MEDICINE

## 2020-09-08 PROCEDURE — 93459 L HRT ART/GRFT ANGIO: CPT | Mod: 26,,, | Performed by: INTERNAL MEDICINE

## 2020-09-08 PROCEDURE — 93459: ICD-10-PCS | Mod: 26,,, | Performed by: INTERNAL MEDICINE

## 2020-09-08 RX ORDER — NAPROXEN SODIUM 220 MG/1
81 TABLET, FILM COATED ORAL ONCE
Status: COMPLETED | OUTPATIENT
Start: 2020-09-08 | End: 2020-09-08

## 2020-09-08 RX ORDER — HEPARIN SODIUM 1000 [USP'U]/ML
INJECTION INTRAVENOUS; SUBCUTANEOUS
Status: DISCONTINUED | OUTPATIENT
Start: 2020-09-08 | End: 2020-09-08 | Stop reason: HOSPADM

## 2020-09-08 RX ORDER — MIDAZOLAM HYDROCHLORIDE 1 MG/ML
INJECTION, SOLUTION INTRAMUSCULAR; INTRAVENOUS
Status: DISCONTINUED | OUTPATIENT
Start: 2020-09-08 | End: 2020-09-08 | Stop reason: HOSPADM

## 2020-09-08 RX ORDER — DIPHENHYDRAMINE HCL 50 MG
50 CAPSULE ORAL ONCE
Status: COMPLETED | OUTPATIENT
Start: 2020-09-08 | End: 2020-09-08

## 2020-09-08 RX ORDER — DIAZEPAM 5 MG/1
5 TABLET ORAL
Status: DISCONTINUED | OUTPATIENT
Start: 2020-09-08 | End: 2020-09-08 | Stop reason: HOSPADM

## 2020-09-08 RX ORDER — NITROGLYCERIN 5 MG/ML
INJECTION, SOLUTION INTRAVENOUS
Status: DISCONTINUED | OUTPATIENT
Start: 2020-09-08 | End: 2020-09-08 | Stop reason: HOSPADM

## 2020-09-08 RX ORDER — SODIUM CHLORIDE 9 MG/ML
INJECTION, SOLUTION INTRAVENOUS CONTINUOUS
Status: DISCONTINUED | OUTPATIENT
Start: 2020-09-08 | End: 2020-09-08 | Stop reason: HOSPADM

## 2020-09-08 RX ORDER — PHENYLEPHRINE HYDROCHLORIDE 10 MG/ML
INJECTION INTRAVENOUS
Status: DISCONTINUED | OUTPATIENT
Start: 2020-09-08 | End: 2020-09-08 | Stop reason: HOSPADM

## 2020-09-08 RX ORDER — LIDOCAINE HYDROCHLORIDE 20 MG/ML
INJECTION, SOLUTION EPIDURAL; INFILTRATION; INTRACAUDAL; PERINEURAL
Status: DISCONTINUED | OUTPATIENT
Start: 2020-09-08 | End: 2020-09-08 | Stop reason: HOSPADM

## 2020-09-08 RX ORDER — FENTANYL CITRATE 50 UG/ML
INJECTION, SOLUTION INTRAMUSCULAR; INTRAVENOUS
Status: DISCONTINUED | OUTPATIENT
Start: 2020-09-08 | End: 2020-09-08 | Stop reason: HOSPADM

## 2020-09-08 RX ORDER — VERAPAMIL HYDROCHLORIDE 2.5 MG/ML
INJECTION, SOLUTION INTRAVENOUS
Status: DISCONTINUED | OUTPATIENT
Start: 2020-09-08 | End: 2020-09-08 | Stop reason: HOSPADM

## 2020-09-08 RX ADMIN — DIAZEPAM 5 MG: 5 TABLET ORAL at 06:09

## 2020-09-08 RX ADMIN — SODIUM CHLORIDE: 0.9 INJECTION, SOLUTION INTRAVENOUS at 06:09

## 2020-09-08 RX ADMIN — DIPHENHYDRAMINE HYDROCHLORIDE 50 MG: 50 CAPSULE ORAL at 06:09

## 2020-09-08 RX ADMIN — ASPIRIN 81 MG CHEWABLE TABLET 81 MG: 81 TABLET CHEWABLE at 06:09

## 2020-09-08 NOTE — Clinical Note
Catheter is inserted into the ostium   right coronary artery. Angiography performed of the right coronary arteries.All catheter exchanged over a wire.

## 2020-09-08 NOTE — NURSING
Patient AAOx3, in no acute distress with no complaints. L wrist soft with dressing c/d/i. L radial pulse 2+. Capillary refill less than 3 seconds. Patient denies site pain or numbness. Patient is ambulatory without dizziness, chest pain, SOB or any other complaints. Patient was wheeled out of [facility] in stable condition and assisted to passenger side of daughter's car without complications or patient complaints.

## 2020-09-08 NOTE — INTERVAL H&P NOTE
The patient has been examined and the H&P has been reviewed:    I concur with the findings and no changes have occurred since H&P was written.  Severe AS for pretavr w/u  Anesthesia/Surgery risks, benefits and alternative options discussed and understood by patient/family.  I have explained the risks, benefits , and alternatives of the procedure in detail.the patient voices understanding and all questions have been answered.the patient agrees to proceed as planned.          Active Hospital Problems    Diagnosis  POA    Angina, class II [I20.9]  Yes     Priority: Low     Chronic      Resolved Hospital Problems   No resolved problems to display.

## 2020-09-08 NOTE — DISCHARGE SUMMARY
Discharge Note  Short Stay      SUMMARY     Admit Date: 9/8/2020    Attending Physician: Veronica Ibarra MD     Discharge Physician: José Ibarra MD     Discharge Date: 9/8/2020    Final Diagnosis: as severe cad     Outcome of Stay:patioent tolearted procedure well has no complications she has patent lima her om is occluded her rca small non dominant lcx has no significant disease. She well be evaluated for tavr . She will follow up with DR TORRES IN CLINIC.    Disposition: Home or Self Care    Patient Instructions:   Current Discharge Medication List      CONTINUE these medications which have NOT CHANGED    Details   aspirin 81 MG Chew Take 162.5 mg by mouth once daily.       calcium carbonate 650 mg calcium (1,625 mg) tablet Take 1 tablet by mouth once daily.      clopidogreL (PLAVIX) 75 mg tablet Take 1 tablet (75 mg total) by mouth once daily.  Qty: 90 tablet, Refills: 3    Associated Diagnoses: Coronary artery disease of native artery of native heart with stable angina pectoris      cyanocobalamin 2000 MCG tablet Take 2,000 mcg by mouth once daily.      folic acid (FOLVITE) 1 MG tablet Take 1 tablet (1 mg total) by mouth once daily.  Qty: 90 tablet, Refills: 1    Associated Diagnoses: Folic acid deficiency      furosemide (LASIX) 20 MG tablet Take 20 mg by mouth once daily.      gabapentin (NEURONTIN) 100 MG capsule Take 2 capsules (200 mg total) by mouth 3 (three) times daily as needed.  Qty: 540 capsule, Refills: 3    Associated Diagnoses: Neuropathy      garlic 2,000 mg Cap Take 3,000 mg by mouth once daily.       levothyroxine (SYNTHROID) 112 MCG tablet Take 1 tablet (112 mcg total) by mouth once daily.  Qty: 90 tablet, Refills: 1    Associated Diagnoses: Hypothyroidism, unspecified type      metoprolol succinate (TOPROL-XL) 100 MG 24 hr tablet Take 1 tablet (100 mg total) by mouth 2 (two) times daily. 1 tab (100mg) in morning. And 100 mg at bedtime.  Generic ok  Qty: 180 tablet, Refills: 3     Associated Diagnoses: Essential hypertension      mupirocin (BACTROBAN) 2 % ointment Bactroban 2 % topical ointment   Apply 1 application twice a day by topical route as directed for 30 days.      nitroGLYCERIN (NITROSTAT) 0.4 MG SL tablet Place 1 tablet (0.4 mg total) under the tongue every 5 (five) minutes as needed for Chest pain.  Qty: 100 tablet, Refills: 3    Associated Diagnoses: Precordial pain      potassium chloride SA (K-DUR,KLOR-CON) 20 MEQ tablet Take 1 tablet (20 mEq total) by mouth once daily.  Qty: 90 tablet, Refills: 3    Associated Diagnoses: Coronary artery disease of native artery of native heart with stable angina pectoris      pyridoxine (VITAMIN B-6) 100 MG Tab Take 200 mg by mouth once daily.       valsartan-hydrochlorothiazide (DIOVAN-HCT) 160-12.5 mg per tablet Take 1 tablet by mouth once daily.  Qty: 90 tablet, Refills: 3    Associated Diagnoses: Essential hypertension      vitamin D 1000 units Tab Take 185 mg by mouth once daily.      albuterol (PROVENTIL) 2.5 mg /3 mL (0.083 %) nebulizer solution Take 3 mLs (2.5 mg total) by nebulization every 6 (six) hours as needed for Wheezing. Rescue  Qty: 25 each, Refills: 5    Associated Diagnoses: Chronic obstructive pulmonary disease, unspecified COPD type      APPLE CIDER VINEGAR ORAL Take 1 capsule by mouth once daily.       chlorhexidine (BETASEPT SURGICAL SCRUB) 4 % external liquid Apply topically daily as needed. Consider 90 day supply  Qty: 473 mL, Refills: 3    Associated Diagnoses: Psoriasis      evolocumab (REPATHA SURECLICK) 140 mg/mL PnIj Inject 1 mL (140 mg total) into the skin every 14 (fourteen) days.  Qty: 6 mL, Refills: 3    Associated Diagnoses: Other hyperlipidemia; Allergy to statin medication      iron-vitamin C 100-250 mg, ICAR-C, (ICAR-C) 100-250 mg Tab Take 1 tablet by mouth once daily.  Qty: 90 tablet, Refills: 4    Associated Diagnoses: Iron deficiency anemia due to chronic blood loss      magnesium citrate solution        TURMERIC, BULK, MISC Take 1 capsule by mouth 2 (two) times daily.       ustekinumab (STELARA) 90 mg/mL Syrg syringe Inject 1 mL (90 mg total) into the skin every 3 (three) months.  Qty: 1 Syringe, Refills: 3    Associated Diagnoses: Psoriatic arthritis; Psoriasis             Discharge Procedure Orders (must include Diet, Follow-up, Activity)   Discharge Procedure Orders (must include Diet, Follow-up, Activity)   Diet general     Call MD for:  temperature >100.4     Call MD for:  persistent nausea and vomiting     Call MD for:  severe uncontrolled pain     Call MD for:  difficulty breathing, headache or visual disturbances     Call MD for:  redness, tenderness, or signs of infection (pain, swelling, redness, odor or green/yellow discharge around incision site)     Call MD for:  hives     Call MD for:  persistent dizziness or light-headedness     Call MD for:  extreme fatigue     Follow-up Information     Tono Hancock MD In 2 weeks.    Specialties: Cardiology, Nuclear Medicine  Contact information:  [address]  [place] [address] [phone]

## 2020-09-08 NOTE — Clinical Note
125 ml injected throughout the case. 175 mL total wasted during the case. 300 mL total used in the case.

## 2020-09-08 NOTE — Clinical Note
Called and spoke to patient's daughter, Shyanne. Informed procedure is starting and patient stable.

## 2020-09-08 NOTE — DISCHARGE INSTRUCTIONS
"  Post-op Heart Catheterization    1. DIET: It is advisable for you to follow a diet that limits the intake of salt, sugar, saturated fats and cholesterol.     2. DRIVING: Due to sedation you received during your procedure, DO NOT drive or operate machinery for 24 hours. Avoid making critical decisions or signing legal documents until tomorrow.    3. ACTIVITY: AVOID activities that require bending of the affected arm/wrist for 3 days and submerging the site in water for 3 days. REMOVE the dressing the day after  the procedure, and shower.  Apply a bandaid to site after shower.  WEAR wrist immobilizer until tomorrow night.    You may RESUME your normal activities or prescribed exercise program as instructed by your physician after 3 days.                                                                                                                 4. WOUND CARE: It is not unusual to have a small amount of bruising to appear at or near the puncture site. It is also common to have a tender "knot" develop beneath the skin at the puncture site of the wrist/arm. This is usually scar tissue and is not a cause for concern or alarm. This tender knot may take several weeks to fully resolve. The bruise will usually spread over several days. However, if the lump gets bigger, call your doctor immediately.    5. DISCOMFORT: For general discomfort at the puncture site, you may take 1 or 2 Acetaminophen (Tylenol) tablets every 4 - 6 hours as needed. (Do not take more than 4000 mg a day)    6. SIGNS AND SYMPTOMS TO REPORT:  Call your physician immediately if any of the following occur:                                            1. Loss of feeling, warmth or color to the affected arm/wrist                                                                                                          2. Mild beeding from the site                 3. Pain that is sudden, sharp or persistent in the affected arm/wrist                 4. " "Swelling or a change in "lump" size, increased redness or drainage at the puncture site                                                                               5. High fever (101 degrees or higher)    7. GO TO  THE EMERGENCY ROOM OR CALL 911 IF YOU HAVE: Chest pains or discomforts not relieved with 3 nitroglycerin doses (sublingual tablets or spray), numbness or severe pain of limb, if your limb becomes cold or discolored or if you develop uncontrolled bleeding from the puncture site (quickly apply firm, direct pressure above the site).    "

## 2020-09-18 ENCOUNTER — LAB VISIT (OUTPATIENT)
Dept: LAB | Facility: HOSPITAL | Age: 70
End: 2020-09-18
Attending: NURSE PRACTITIONER
Payer: MEDICARE

## 2020-09-18 DIAGNOSIS — E53.8 B12 DEFICIENCY: ICD-10-CM

## 2020-09-18 DIAGNOSIS — D50.0 IRON DEFICIENCY ANEMIA DUE TO CHRONIC BLOOD LOSS: ICD-10-CM

## 2020-09-18 DIAGNOSIS — N18.30 STAGE 3 CHRONIC KIDNEY DISEASE: ICD-10-CM

## 2020-09-18 LAB
ANION GAP SERPL CALC-SCNC: 12 MMOL/L (ref 8–16)
BASOPHILS # BLD AUTO: 0.04 K/UL (ref 0–0.2)
BASOPHILS NFR BLD: 0.5 % (ref 0–1.9)
BUN SERPL-MCNC: 24 MG/DL (ref 8–23)
CALCIUM SERPL-MCNC: 9 MG/DL (ref 8.7–10.5)
CHLORIDE SERPL-SCNC: 102 MMOL/L (ref 95–110)
CO2 SERPL-SCNC: 26 MMOL/L (ref 23–29)
CREAT SERPL-MCNC: 1.2 MG/DL (ref 0.5–1.4)
DIFFERENTIAL METHOD: ABNORMAL
EOSINOPHIL # BLD AUTO: 0.2 K/UL (ref 0–0.5)
EOSINOPHIL NFR BLD: 2.2 % (ref 0–8)
ERYTHROCYTE [DISTWIDTH] IN BLOOD BY AUTOMATED COUNT: 14.5 % (ref 11.5–14.5)
EST. GFR  (AFRICAN AMERICAN): 53.3 ML/MIN/1.73 M^2
EST. GFR  (NON AFRICAN AMERICAN): 46.2 ML/MIN/1.73 M^2
GLUCOSE SERPL-MCNC: 160 MG/DL (ref 70–110)
HCT VFR BLD AUTO: 39.7 % (ref 37–48.5)
HGB BLD-MCNC: 12.6 G/DL (ref 12–16)
IMM GRANULOCYTES # BLD AUTO: 0.02 K/UL (ref 0–0.04)
IMM GRANULOCYTES NFR BLD AUTO: 0.3 % (ref 0–0.5)
LYMPHOCYTES # BLD AUTO: 2.3 K/UL (ref 1–4.8)
LYMPHOCYTES NFR BLD: 29.5 % (ref 18–48)
MCH RBC QN AUTO: 29.6 PG (ref 27–31)
MCHC RBC AUTO-ENTMCNC: 31.7 G/DL (ref 32–36)
MCV RBC AUTO: 93 FL (ref 82–98)
MONOCYTES # BLD AUTO: 0.6 K/UL (ref 0.3–1)
MONOCYTES NFR BLD: 7.8 % (ref 4–15)
NEUTROPHILS # BLD AUTO: 4.7 K/UL (ref 1.8–7.7)
NEUTROPHILS NFR BLD: 59.7 % (ref 38–73)
NRBC BLD-RTO: 0 /100 WBC
PLATELET # BLD AUTO: 236 K/UL (ref 150–350)
PMV BLD AUTO: 11.2 FL (ref 9.2–12.9)
POTASSIUM SERPL-SCNC: 4.1 MMOL/L (ref 3.5–5.1)
RBC # BLD AUTO: 4.25 M/UL (ref 4–5.4)
SODIUM SERPL-SCNC: 140 MMOL/L (ref 136–145)
WBC # BLD AUTO: 7.87 K/UL (ref 3.9–12.7)

## 2020-09-18 PROCEDURE — 82728 ASSAY OF FERRITIN: CPT

## 2020-09-18 PROCEDURE — 82607 VITAMIN B-12: CPT

## 2020-09-18 PROCEDURE — 83540 ASSAY OF IRON: CPT

## 2020-09-18 PROCEDURE — 82746 ASSAY OF FOLIC ACID SERUM: CPT

## 2020-09-18 PROCEDURE — 36415 COLL VENOUS BLD VENIPUNCTURE: CPT | Mod: PO

## 2020-09-18 PROCEDURE — 80048 BASIC METABOLIC PNL TOTAL CA: CPT

## 2020-09-18 PROCEDURE — 85025 COMPLETE CBC W/AUTO DIFF WBC: CPT

## 2020-09-19 LAB
FERRITIN SERPL-MCNC: 124 NG/ML (ref 20–300)
FOLATE SERPL-MCNC: 32 NG/ML (ref 4–24)
IRON SERPL-MCNC: 52 UG/DL (ref 30–160)
SATURATED IRON: 16 % (ref 20–50)
TOTAL IRON BINDING CAPACITY: 327 UG/DL (ref 250–450)
TRANSFERRIN SERPL-MCNC: 221 MG/DL (ref 200–375)
VIT B12 SERPL-MCNC: 1816 PG/ML (ref 210–950)

## 2020-09-21 ENCOUNTER — TELEPHONE (OUTPATIENT)
Dept: CARDIOLOGY | Facility: CLINIC | Age: 70
End: 2020-09-21

## 2020-09-21 NOTE — TELEPHONE ENCOUNTER
Returned patient call. Having surgery on Thrusday and want to avoid being around people at this time. Will call back to r/s cancelled appointment.  ----- Message from Kalli Celestin sent at 9/21/2020 10:30 AM CDT -----  Regarding: please call  Contact: pt  Please call regarding tomorrow's appt. [phone]

## 2020-09-22 NOTE — TELEPHONE ENCOUNTER
Pt called to let us know that she will be rescheduling her follow up appointment with Dr. Ibarra after her procedure on Thursday.

## 2020-09-28 ENCOUNTER — DOCUMENTATION ONLY (OUTPATIENT)
Dept: GASTROENTEROLOGY | Facility: HOSPITAL | Age: 70
End: 2020-09-28

## 2020-09-28 ENCOUNTER — TELEPHONE (OUTPATIENT)
Dept: GASTROENTEROLOGY | Facility: CLINIC | Age: 70
End: 2020-09-28

## 2020-09-28 ENCOUNTER — HOSPITAL ENCOUNTER (EMERGENCY)
Facility: HOSPITAL | Age: 70
Discharge: HOME | End: 2020-09-28
Attending: EMERGENCY MEDICINE
Payer: MEDICARE

## 2020-09-28 VITALS
OXYGEN SATURATION: 99 % | BODY MASS INDEX: 46.07 KG/M2 | WEIGHT: 260 LBS | DIASTOLIC BLOOD PRESSURE: 69 MMHG | HEART RATE: 88 BPM | HEIGHT: 63 IN | SYSTOLIC BLOOD PRESSURE: 172 MMHG | TEMPERATURE: 98 F | RESPIRATION RATE: 18 BRPM

## 2020-09-28 DIAGNOSIS — E66.01 MORBID OBESITY: ICD-10-CM

## 2020-09-28 DIAGNOSIS — I10 CHRONIC HYPERTENSION: ICD-10-CM

## 2020-09-28 DIAGNOSIS — K64.4 HEMORRHOIDS, EXTERNAL WITHOUT COMPLICATIONS: ICD-10-CM

## 2020-09-28 DIAGNOSIS — K62.5 RECTAL BLEEDING: Primary | ICD-10-CM

## 2020-09-28 DIAGNOSIS — Z98.890 HISTORY OF AORTIC VALVE REPAIR: ICD-10-CM

## 2020-09-28 DIAGNOSIS — Z86.79 HISTORY OF AORTIC VALVE REPAIR: ICD-10-CM

## 2020-09-28 LAB
ABO + RH BLD: NORMAL
ALBUMIN SERPL BCP-MCNC: 3.4 G/DL (ref 3.5–5.2)
ALP SERPL-CCNC: 37 U/L (ref 55–135)
ALT SERPL W/O P-5'-P-CCNC: 11 U/L (ref 10–44)
ANION GAP SERPL CALC-SCNC: 12 MMOL/L (ref 8–16)
AST SERPL-CCNC: 29 U/L (ref 10–40)
BASOPHILS # BLD AUTO: 0.05 K/UL (ref 0–0.2)
BASOPHILS NFR BLD: 0.6 % (ref 0–1.9)
BILIRUB SERPL-MCNC: 0.3 MG/DL (ref 0.1–1)
BLD GP AB SCN CELLS X3 SERPL QL: NORMAL
BUN SERPL-MCNC: 10 MG/DL (ref 8–23)
CALCIUM SERPL-MCNC: 9.6 MG/DL (ref 8.7–10.5)
CHLORIDE SERPL-SCNC: 104 MMOL/L (ref 95–110)
CO2 SERPL-SCNC: 26 MMOL/L (ref 23–29)
CREAT SERPL-MCNC: 1 MG/DL (ref 0.5–1.4)
DIFFERENTIAL METHOD: ABNORMAL
EOSINOPHIL # BLD AUTO: 0.1 K/UL (ref 0–0.5)
EOSINOPHIL NFR BLD: 1.5 % (ref 0–8)
ERYTHROCYTE [DISTWIDTH] IN BLOOD BY AUTOMATED COUNT: 14.6 % (ref 11.5–14.5)
EST. GFR  (AFRICAN AMERICAN): >60 ML/MIN/1.73 M^2
EST. GFR  (NON AFRICAN AMERICAN): 58 ML/MIN/1.73 M^2
GLUCOSE SERPL-MCNC: 103 MG/DL (ref 70–110)
HCT VFR BLD AUTO: 35.5 % (ref 37–48.5)
HCV AB SERPL QL IA: NEGATIVE
HGB BLD-MCNC: 11.2 G/DL (ref 12–16)
IMM GRANULOCYTES # BLD AUTO: 0.04 K/UL (ref 0–0.04)
IMM GRANULOCYTES NFR BLD AUTO: 0.5 % (ref 0–0.5)
INR PPP: 1 (ref 0.8–1.2)
LYMPHOCYTES # BLD AUTO: 1.9 K/UL (ref 1–4.8)
LYMPHOCYTES NFR BLD: 22.1 % (ref 18–48)
MCH RBC QN AUTO: 29.4 PG (ref 27–31)
MCHC RBC AUTO-ENTMCNC: 31.5 G/DL (ref 32–36)
MCV RBC AUTO: 93 FL (ref 82–98)
MONOCYTES # BLD AUTO: 0.7 K/UL (ref 0.3–1)
MONOCYTES NFR BLD: 8.7 % (ref 4–15)
NEUTROPHILS # BLD AUTO: 5.7 K/UL (ref 1.8–7.7)
NEUTROPHILS NFR BLD: 66.6 % (ref 38–73)
NRBC BLD-RTO: 0 /100 WBC
PLATELET # BLD AUTO: 201 K/UL (ref 150–350)
PMV BLD AUTO: 10.4 FL (ref 9.2–12.9)
POTASSIUM SERPL-SCNC: 4.2 MMOL/L (ref 3.5–5.1)
PROT SERPL-MCNC: 7.2 G/DL (ref 6–8.4)
PROTHROMBIN TIME: 10.2 SEC (ref 9–12.5)
RBC # BLD AUTO: 3.81 M/UL (ref 4–5.4)
SARS-COV-2 RDRP RESP QL NAA+PROBE: NEGATIVE
SODIUM SERPL-SCNC: 142 MMOL/L (ref 136–145)
WBC # BLD AUTO: 8.49 K/UL (ref 3.9–12.7)

## 2020-09-28 PROCEDURE — 85610 PROTHROMBIN TIME: CPT

## 2020-09-28 PROCEDURE — 86803 HEPATITIS C AB TEST: CPT

## 2020-09-28 PROCEDURE — 93010 ELECTROCARDIOGRAM REPORT: CPT | Mod: ,,, | Performed by: INTERNAL MEDICINE

## 2020-09-28 PROCEDURE — 99284 EMERGENCY DEPT VISIT MOD MDM: CPT

## 2020-09-28 PROCEDURE — U0002 COVID-19 LAB TEST NON-CDC: HCPCS

## 2020-09-28 PROCEDURE — 80053 COMPREHEN METABOLIC PANEL: CPT

## 2020-09-28 PROCEDURE — 93010 EKG 12-LEAD: ICD-10-PCS | Mod: ,,, | Performed by: INTERNAL MEDICINE

## 2020-09-28 PROCEDURE — 86850 RBC ANTIBODY SCREEN: CPT

## 2020-09-28 PROCEDURE — 93005 ELECTROCARDIOGRAM TRACING: CPT

## 2020-09-28 PROCEDURE — 85025 COMPLETE CBC W/AUTO DIFF WBC: CPT

## 2020-09-28 RX ORDER — HYDROCORTISONE 25 MG/G
CREAM TOPICAL 2 TIMES DAILY
Qty: 20 G | Refills: 0 | Status: SHIPPED | OUTPATIENT
Start: 2020-09-28 | End: 2023-01-09

## 2020-09-28 RX ORDER — HYDROCORTISONE ACETATE 25 MG/1
25 SUPPOSITORY RECTAL 2 TIMES DAILY
Qty: 20 SUPPOSITORY | Refills: 0 | Status: SHIPPED | OUTPATIENT
Start: 2020-09-28 | End: 2020-10-08

## 2020-09-28 RX ORDER — DOCUSATE SODIUM 100 MG/1
100 CAPSULE, LIQUID FILLED ORAL 2 TIMES DAILY
Qty: 60 CAPSULE | Refills: 0 | Status: SHIPPED | OUTPATIENT
Start: 2020-09-28

## 2020-09-28 NOTE — TELEPHONE ENCOUNTER
----- Message from Marj Lara sent at 9/28/2020 12:51 PM CDT -----  Type:  Needs Medical Advice    Who Called:  Pt  Qi  Symptoms (please be specific):  Pt went to [facility] er last night for rectal bleeding//was told to see her Dr today//she is getting weak   How long has patient had these symptoms:     Pharmacy name and phone #:     Would the patient rather a call back or a response via [facility]?    Call back  Best Call Back Number:    [phone] or [phone]  Additional Information:  please call asap//thanks/cate

## 2020-09-28 NOTE — TELEPHONE ENCOUNTER
Patient is on Plavix and ASA for recent cardiac procedure. Rectal bleeding likely hemorrhiodal with her complaints of constipation but needs to present to ED for eval.

## 2020-09-28 NOTE — TELEPHONE ENCOUNTER
Returned call to pt who stated she had a heart procedure 4 days ago. She became constipated and when her bowels began move 2 days ago, she began bleeding rectally. Since then, the bleeding has increased so pt went to [facility] ER last night and was sent home and told to contact GI today. Pt stated she filled 2 pads with bright red blood earlier today and now feels weak and short of breath. Pt was strongly advised to go to the ER but pt is requesting that the Provider on call be notified. Advised pt that I will send a message to the Provider on call but she will need to go to the ER today. Pt agreed with plan and will have her daughter take her to [facility] ER.

## 2020-09-28 NOTE — ED PROVIDER NOTES
SCRIBE #1 NOTE: I, Martha Velazquez, am scribing for, and in the presence of, Padmini Latham MD. I have scribed the entire note.       History     Chief Complaint   Patient presents with    Rectal Bleeding     Pt seen at [facility] last night for hemorroid and reports increased bleeding. Requests reeval     Review of patient's allergies indicates:   Allergen Reactions    Celexa [citalopram] Anaphylaxis    Clindamycin Itching and Swelling    Codeine Shortness Of Breath, Itching and Swelling    Crestor [rosuvastatin] Anaphylaxis    Cytotec [misoprostol] Anaphylaxis and Other (See Comments)     Difficulty breathing    Lisinopril Anaphylaxis    Magnesium citrate Shortness Of Breath    Stadol [butorphanol tartrate] Anaphylaxis     Coded    Vicodin [hydrocodone-acetaminophen] Shortness Of Breath    Adhesive      EKG Electrodes    Aggrenox [aspirin-dipyridamole] Other (See Comments)     headaches    Avelox [moxifloxacin]      PATIENT STATES DO NOT GIVE UNDER ANY CIRCUSTANCES    Demerol [meperidine]     Isosorbide Other (See Comments)     Severe headache    Kenalog [triamcinolone acetonide]     Medrol [methylprednisolone] Other (See Comments)     Patient reports taking IV in the past without any issues. Reports allergy to oral preparation     Mobic [meloxicam]     Morphine     Nitroglycerin      Long acting    Pholcodine     Prednisone      GASTRIC PAIN    Ranexa [ranolazine] Nausea And Vomiting    Reclast [zoledronic acid-mannitol-water] Other (See Comments)     Bones hurt    Sulfa (sulfonamide antibiotics) Other (See Comments)     unknown    Talwin [pentazocine lactate]     Tetracycline     Tetracyclines     Tilade [nedocromil]     Zetia [ezetimibe]          History of Present Illness     HPI    9/28/2020, 4:20 PM  History obtained from the patient      History of Present Illness: Qi Ortiz is a 70 y.o. female patient with a h/o carotid stenosis, COPD, CAD, CVA, depression, HLD, HTN,  IRIS, who presents to the Emergency Department for evaluation of episodic rectal bleeding (bright red blood) x 2 days. Pt reports having 10 rectal bleeding episodes that are moderate in severity since 9/26/20. Pt was seen last night in the ER at [facility], evaluated, states she wasn't bleeding at the time and discharged home and told to follow up with GI today. Pt reports recently having an aortic valve replacement performed by Dr. Wilder at [facility] on 9/24/20. Pt reports that she was having problems with constipation since her surgery and was doing a lot of straining. No mitigating or exacerbating factors reported. Associated sxs include dizziness and intermittent light-headedness. Patient denies any fever, chills, nausea, emesis, SOB, abd pain, and all other sxs at this time. No prior Tx reported. Pt is currently on Plavix and Aspirin for recent aortic valve replacement. No further complaints or concerns at this time.     Arrival mode: EMS    PCP: Trinidad Cleaning MD      Past Medical History:  Past Medical History:   Diagnosis Date    Carotid stenosis     19%    COPD (chronic obstructive pulmonary disease)     No meds    Coronary artery disease     CVA (cerebral vascular accident)     Dr. 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 Hope    Psoriatic arthritis     Rheumatology       Past Surgical History:  Past Surgical History:   Procedure Laterality Date    BREAST BIOPSY      R sided/benign    CARDIAC SURGERY      sept 28 2016    CERVICAL FUSION      CHOLECYSTECTOMY      CORONARY ANGIOPLASTY      CORONARY ARTERY BYPASS GRAFT      triple bypass    CORONARY STENT PLACEMENT      EYE SURGERY      INTRAUTERINE DEVICE INSERTION      LEFT HEART CATHETERIZATION Left 9/8/2020    Procedure: CATHETERIZATION, HEART, LEFT;  Surgeon:  Veronica Ibarra MD;  Location: [facility] CATH LAB;  Service: Cardiology;  Laterality: Left;  7am start time    mass removed from R groin      TOTAL ABDOMINAL HYSTERECTOMY W/ BILATERAL SALPINGOOPHORECTOMY      due to benign mass, adhesions    TUBAL LIGATION           Family History:  Family History   Problem Relation Age of Onset    Breast cancer Maternal Grandfather     Breast cancer Paternal Aunt     Stroke Unknown     Breast cancer Sister 60    Leukemia Sister 8         as child    Lung cancer Paternal Grandfather     Heart disease Unknown        Social History:   Social History     Tobacco Use    Smoking status: Never Smoker    Smokeless tobacco: Never Used   Substance and Sexual Activity    Alcohol use: No    Drug use: No    Sexual activity: Never     Partners: Male        Review of Systems     Review of Systems   Constitutional: Negative for chills and fever.   HENT: Negative for sore throat.    Respiratory: Negative for shortness of breath.    Cardiovascular: Negative for chest pain.   Gastrointestinal: Positive for anal bleeding. Negative for abdominal pain, nausea and vomiting.   Genitourinary: Negative for dysuria.   Musculoskeletal: Negative for back pain.   Skin: Negative for rash.   Neurological: Positive for dizziness and light-headedness (intermittent). Negative for numbness and headaches.   Hematological: Does not bruise/bleed easily.   All other systems reviewed and are negative.       Physical Exam     Initial Vitals [20 1545]   BP Pulse Resp Temp SpO2   (!) 170/66 79 18 98.4 °F (36.9 °C) 99 %      MAP       --          Physical Exam  Nursing Notes and Vital Signs Reviewed.  Constitutional: Morbidly Obese. Pt is in no acute distress.  Head: Atraumatic. Normocephalic.  Eyes: EOM intact. No scleral icterus.  ENT: Mucous membranes are moist. Oropharynx is clear and symmetric.    Neck: Supple. Full ROM. No lymphadenopathy.  Cardiovascular: Regular rate. Regular rhythm. No murmurs,  "rubs, or gallops. Distal pulses are 2+ and symmetric.  Pulmonary/Chest: No respiratory distress. Clear to auscultation bilaterally. No wheezing or rales.  Abdominal: Soft and non-distended.  There is no tenderness.  No rebound, guarding, or rigidity.  Genitourinary: No CVA tenderness  Rectal: Female chaperone present for the duration of the rectal exam. Multiple large non thrombosed external hemorrhoids. Dry blood noted, but no active bleeding. Normal sphincter tone.  Musculoskeletal: Moves all extremities. No obvious deformities. No calf tenderness.  Skin: Warm and dry.  Neurological:  Alert, awake, and appropriate.  Normal speech.  No acute focal neurological deficits are appreciated.  Psychiatric: Normal affect. Good eye contact. Appropriate in content.     ED Course   Procedures  ED Vital Signs:  Vitals:    09/28/20 1545 09/28/20 1610 09/28/20 1646 09/28/20 1647   BP: (!) 170/66  (!) 154/75 (!) 168/76   Pulse: 79  81 78   Resp: 18      Temp: 98.4 °F (36.9 °C)      TempSrc: Oral      SpO2: 99%  98% 99%   Weight:  117.9 kg (260 lb)     Height: 5' 3" (1.6 m) 5' 3" (1.6 m)      09/28/20 1653   BP: (!) 172/69   Pulse: 88   Resp:    Temp:    TempSrc:    SpO2: 99%   Weight:    Height:        Abnormal Lab Results:  Labs Reviewed   CBC W/ AUTO DIFFERENTIAL - Abnormal; Notable for the following components:       Result Value    RBC 3.81 (*)     Hemoglobin 11.2 (*)     Hematocrit 35.5 (*)     Mean Corpuscular Hemoglobin Conc 31.5 (*)     RDW 14.6 (*)     All other components within normal limits   COMPREHENSIVE METABOLIC PANEL - Abnormal; Notable for the following components:    Albumin 3.4 (*)     Alkaline Phosphatase 37 (*)     eGFR if non  58 (*)     All other components within normal limits   HEPATITIS C ANTIBODY   PROTIME-INR   SARS-COV-2 RNA AMPLIFICATION, QUAL    Narrative:     Admit planning   TYPE & SCREEN        All Lab Results:  Results for orders placed or performed during the hospital encounter " of 09/28/20   Hepatitis C antibody   Result Value Ref Range    Hepatitis C Ab Negative Negative   CBC auto differential   Result Value Ref Range    WBC 8.49 3.90 - 12.70 K/uL    RBC 3.81 (L) 4.00 - 5.40 M/uL    Hemoglobin 11.2 (L) 12.0 - 16.0 g/dL    Hematocrit 35.5 (L) 37.0 - 48.5 %    Mean Corpuscular Volume 93 82 - 98 fL    Mean Corpuscular Hemoglobin 29.4 27.0 - 31.0 pg    Mean Corpuscular Hemoglobin Conc 31.5 (L) 32.0 - 36.0 g/dL    RDW 14.6 (H) 11.5 - 14.5 %    Platelets 201 150 - 350 K/uL    MPV 10.4 9.2 - 12.9 fL    Immature Granulocytes 0.5 0.0 - 0.5 %    Gran # (ANC) 5.7 1.8 - 7.7 K/uL    Immature Grans (Abs) 0.04 0.00 - 0.04 K/uL    Lymph # 1.9 1.0 - 4.8 K/uL    Mono # 0.7 0.3 - 1.0 K/uL    Eos # 0.1 0.0 - 0.5 K/uL    Baso # 0.05 0.00 - 0.20 K/uL    nRBC 0 0 /100 WBC    Gran% 66.6 38.0 - 73.0 %    Lymph% 22.1 18.0 - 48.0 %    Mono% 8.7 4.0 - 15.0 %    Eosinophil% 1.5 0.0 - 8.0 %    Basophil% 0.6 0.0 - 1.9 %    Differential Method Automated    Comprehensive metabolic panel   Result Value Ref Range    Sodium 142 136 - 145 mmol/L    Potassium 4.2 3.5 - 5.1 mmol/L    Chloride 104 95 - 110 mmol/L    CO2 26 23 - 29 mmol/L    Glucose 103 70 - 110 mg/dL    BUN, Bld 10 8 - 23 mg/dL    Creatinine 1.0 0.5 - 1.4 mg/dL    Calcium 9.6 8.7 - 10.5 mg/dL    Total Protein 7.2 6.0 - 8.4 g/dL    Albumin 3.4 (L) 3.5 - 5.2 g/dL    Total Bilirubin 0.3 0.1 - 1.0 mg/dL    Alkaline Phosphatase 37 (L) 55 - 135 U/L    AST 29 10 - 40 U/L    ALT 11 10 - 44 U/L    Anion Gap 12 8 - 16 mmol/L    eGFR if African American >60 >60 mL/min/1.73 m^2    eGFR if non African American 58 (A) >60 mL/min/1.73 m^2   Protime-INR   Result Value Ref Range    Prothrombin Time 10.2 9.0 - 12.5 sec    INR 1.0 0.8 - 1.2   COVID-19 Rapid Screening   Result Value Ref Range    SARS-CoV-2 RNA, Amplification, Qual Negative Negative   Type & Screen   Result Value Ref Range    Group & Rh A POS     Indirect Cristóbal NEG          The EKG was ordered, reviewed, and  independently interpreted by the ED provider.  Interpretation time: 16:35  Rate: 79 BPM  Rhythm: NSR  Interpretation: ST & T wave abnormality, consider inferior ischemia. ST & T wave abnormality, consider anterolateral ischemia. No STEMI. No significant changes compared to EKG done on 10/23/19.      The Emergency Provider reviewed the vital signs and test results, which are outlined above.     ED Discussion     5:41 PM: Discussed pt's case with Dr. Sequeira (Gastroenterology) who reports this is likely rectal outlet bleeding worsened with dual anti-platelet therapy and constipation. Dr. Sequeira recommends pt needs treatment of the hemorrhoids with anusol (for external) and hydrocortisone suppository (for internal); these are things she can do at home. She states that the pt can't stop her Plavix and ASA because of her recent cardiac procedure so recommends pt take Miralax BID to keep her stools soft and she is not straining. Does not feel patient warrants admission at this time. Dr. Sequeira reports that she will arrange follow up with Dr. Smith since that is who pt sees for Gastroenterology.    5:54 PM: Reassessed pt at this time.  Pt states her condition has improved at this time. Discussed with pt all pertinent ED information and results. Discussed pt dx and plan of tx. Gave pt all f/u and return to the ED instructions. All questions and concerns were addressed at this time. Pt expresses understanding of information and instructions, and is comfortable with plan to discharge. Pt is stable for discharge.    I discussed with patient and/or family/caretaker that evaluation in the ED does not suggest any emergent or life threatening medical conditions requiring immediate intervention beyond what was provided in the ED, and I believe patient is safe for discharge.  Regardless, an unremarkable evaluation in the ED does not preclude the development or presence of a serious of life threatening condition. As such, patient  was instructed to return immediately for any worsening or change in current symptoms.       MDM        Medical Decision Making:   Clinical Tests:   Lab Tests: Ordered and Reviewed  Medical Tests: Ordered and Reviewed           ED Medication(s):  Medications - No data to display    New Prescriptions    DOCUSATE SODIUM (COLACE) 100 MG CAPSULE    Take 1 capsule (100 mg total) by mouth 2 (two) times daily.    HYDROCORTISONE (ANUSOL-HC) 25 MG SUPPOSITORY    Place 1 suppository (25 mg total) rectally 2 (two) times daily. for 10 days    HYDROCORTISONE 2.5 % CREAM    Apply topically 2 (two) times daily.       Follow-up Information     Pricila Smith MD. Schedule an appointment as soon as possible for a visit in 1 day.    Specialties: Gastroenterology, Internal Medicine  Why: Return to the [facility], If symptoms worsen  Contact information:  [address] [place] [address] [address] Charmaine HAINES [id number]  [phone]                       Scribe Attestation:   Scribe #1: I performed the above scribed service and the documentation accurately describes the services I performed. I attest to the accuracy of the note.     Attending:   Physician Attestation Statement for Scribe #1: I, Padmini Latham MD, personally performed the services described in this documentation, as scribed by Martha Velazquez, in my presence, and it is both accurate and complete.           Clinical Impression       ICD-10-CM ICD-9-CM   1. Rectal bleeding  K62.5 569.3   2. Hemorrhoids, external without complications  K64.4 455.3   3. Morbid obesity  E66.01 278.01   4. Chronic hypertension  I10 401.9   5. History of aortic valve repair  Z98.890 V15.1    Z86.79        Disposition:   Disposition: Discharged  Condition: Stable         Padmini Latham MD  09/28/20 4276

## 2020-09-28 NOTE — PROGRESS NOTES
70 y.o female with CAD and recently discharged from [facility] with AVR and placed on Plavix and ASA. Postoperatively she has had issues with constipation and excessive straining. She began to experience rectal bleeding. Presented to [facility] Emergency Dept last night but sent home. She went to [facility] ED this afternoon and labs stable. Rectal exam per ED provider shows large external hemorrhoids.    Recommended anusol (external hemorrhoids) , hydrocortisone suppositories (internal hemorrhoids) and miralax to keep stools soft. Will arrange GI follow up with GI clinic.

## 2020-09-28 NOTE — TELEPHONE ENCOUNTER
Called pt and advised her that the Provider on call was notified and agrees that she needs to go to the ER. Pt stated she is on her way to [facility] ER now.

## 2020-09-29 ENCOUNTER — EXTERNAL HOSPITAL ADMISSION (OUTPATIENT)
Dept: ADMINISTRATIVE | Facility: CLINIC | Age: 70
End: 2020-09-29

## 2020-09-29 ENCOUNTER — PATIENT OUTREACH (OUTPATIENT)
Dept: ADMINISTRATIVE | Facility: CLINIC | Age: 70
End: 2020-09-29

## 2020-09-29 DIAGNOSIS — D50.0 IRON DEFICIENCY ANEMIA DUE TO CHRONIC BLOOD LOSS: Primary | ICD-10-CM

## 2020-09-29 NOTE — PATIENT INSTRUCTIONS
Discharge Instructions for Aortic Valve Stenosis  You have been diagnosed with aortic valve stenosis, which means that the aortic valve in your heart is stiff and has trouble opening. Because of this, your heart has to work harder to push the blood through the valve. In some cases, this extra work makes the muscle of the heart thicken. The extra work can tire the heart and cause its muscle to weaken over time. This type of stenosis may get worse quickly. Many patients with aortic stenosis do not require treatment. Some can control their stenosis with medications. In some cases, surgery is required.  Home Care  Check with your doctor before taking any over-the-counter agents, herbal preparations, or vitamins.  Take your medications exactly as directed. Dont skip doses.  Ask your doctor for an antibiotic prescription to use before you visit the dentist or before you have a medical procedure.  Keep all follow-up appointments. Some patients with aortic stenosis dont have symptoms; others need close follow-up and surgery.  Lifestyle Changes  Maintain a healthy weight. Get help to lose any extra pounds.  Ask your doctor if an exercise program is right for you. Some people with aortic stenosis need to be very careful about exercise.  Break the smoking habit. Enroll in a stop-smoking program to improve your chances of success.  Follow-Up  Make a follow-up appointment as directed by our staff.  When to Call Your Doctor  Call your doctor right away if you have any of the following:  Chest pain or shortness of breath  Weakness in the muscles of your face, arms, or legs  Trouble speaking  Rapid pulse or pounding heartbeat  Fainting or dizziness     © 8455-8685 [employer], [address] [place] [address], [place] [address]. All rights reserved. This information is not intended as a substitute for professional medical care. Always follow your healthcare professional's instructions.

## 2020-10-01 ENCOUNTER — LAB VISIT (OUTPATIENT)
Dept: LAB | Facility: HOSPITAL | Age: 70
End: 2020-10-01
Attending: FAMILY MEDICINE
Payer: MEDICARE

## 2020-10-01 ENCOUNTER — OFFICE VISIT (OUTPATIENT)
Dept: GASTROENTEROLOGY | Facility: CLINIC | Age: 70
End: 2020-10-01
Payer: MEDICARE

## 2020-10-01 VITALS
DIASTOLIC BLOOD PRESSURE: 60 MMHG | HEART RATE: 76 BPM | SYSTOLIC BLOOD PRESSURE: 140 MMHG | BODY MASS INDEX: 46.17 KG/M2 | WEIGHT: 260.56 LBS | HEIGHT: 63 IN

## 2020-10-01 DIAGNOSIS — E03.9 HYPOTHYROIDISM, UNSPECIFIED TYPE: ICD-10-CM

## 2020-10-01 DIAGNOSIS — K64.9 HEMORRHOIDS, UNSPECIFIED HEMORRHOID TYPE: ICD-10-CM

## 2020-10-01 DIAGNOSIS — R73.03 PREDIABETES: ICD-10-CM

## 2020-10-01 DIAGNOSIS — I10 ESSENTIAL HYPERTENSION: ICD-10-CM

## 2020-10-01 DIAGNOSIS — N18.30 STAGE 3 CHRONIC KIDNEY DISEASE: ICD-10-CM

## 2020-10-01 DIAGNOSIS — K92.1 HEMATOCHEZIA: Primary | ICD-10-CM

## 2020-10-01 DIAGNOSIS — D50.9 IRON DEFICIENCY ANEMIA, UNSPECIFIED IRON DEFICIENCY ANEMIA TYPE: ICD-10-CM

## 2020-10-01 LAB
ALBUMIN SERPL BCP-MCNC: 3.3 G/DL (ref 3.5–5.2)
ALP SERPL-CCNC: 34 U/L (ref 55–135)
ALT SERPL W/O P-5'-P-CCNC: 12 U/L (ref 10–44)
ANION GAP SERPL CALC-SCNC: 10 MMOL/L (ref 8–16)
AST SERPL-CCNC: 22 U/L (ref 10–40)
BILIRUB SERPL-MCNC: 0.4 MG/DL (ref 0.1–1)
BUN SERPL-MCNC: 15 MG/DL (ref 8–23)
CALCIUM SERPL-MCNC: 8.7 MG/DL (ref 8.7–10.5)
CHLORIDE SERPL-SCNC: 105 MMOL/L (ref 95–110)
CHOLEST SERPL-MCNC: 167 MG/DL (ref 120–199)
CHOLEST/HDLC SERPL: 4.1 {RATIO} (ref 2–5)
CO2 SERPL-SCNC: 29 MMOL/L (ref 23–29)
CREAT SERPL-MCNC: 1 MG/DL (ref 0.5–1.4)
EST. GFR  (AFRICAN AMERICAN): >60 ML/MIN/1.73 M^2
EST. GFR  (NON AFRICAN AMERICAN): 57.6 ML/MIN/1.73 M^2
GLUCOSE SERPL-MCNC: 106 MG/DL (ref 70–110)
HDLC SERPL-MCNC: 41 MG/DL (ref 40–75)
HDLC SERPL: 24.6 % (ref 20–50)
LDLC SERPL CALC-MCNC: 101.8 MG/DL (ref 63–159)
NONHDLC SERPL-MCNC: 126 MG/DL
POTASSIUM SERPL-SCNC: 3.8 MMOL/L (ref 3.5–5.1)
PROT SERPL-MCNC: 6.5 G/DL (ref 6–8.4)
SODIUM SERPL-SCNC: 144 MMOL/L (ref 136–145)
T4 FREE SERPL-MCNC: 0.95 NG/DL (ref 0.71–1.51)
TRIGL SERPL-MCNC: 121 MG/DL (ref 30–150)
TSH SERPL DL<=0.005 MIU/L-ACNC: 2.17 UIU/ML (ref 0.4–4)

## 2020-10-01 PROCEDURE — 99999 PR PBB SHADOW E&M-EST. PATIENT-LVL IV: CPT | Mod: PBBFAC,,, | Performed by: INTERNAL MEDICINE

## 2020-10-01 PROCEDURE — 99214 PR OFFICE/OUTPT VISIT, EST, LEVL IV, 30-39 MIN: ICD-10-PCS | Mod: S$PBB,,, | Performed by: INTERNAL MEDICINE

## 2020-10-01 PROCEDURE — 80053 COMPREHEN METABOLIC PANEL: CPT

## 2020-10-01 PROCEDURE — 84443 ASSAY THYROID STIM HORMONE: CPT

## 2020-10-01 PROCEDURE — 99214 OFFICE O/P EST MOD 30 MIN: CPT | Mod: PBBFAC | Performed by: INTERNAL MEDICINE

## 2020-10-01 PROCEDURE — 99999 PR PBB SHADOW E&M-EST. PATIENT-LVL IV: ICD-10-PCS | Mod: PBBFAC,,, | Performed by: INTERNAL MEDICINE

## 2020-10-01 PROCEDURE — 99214 OFFICE O/P EST MOD 30 MIN: CPT | Mod: S$PBB,,, | Performed by: INTERNAL MEDICINE

## 2020-10-01 PROCEDURE — 83036 HEMOGLOBIN GLYCOSYLATED A1C: CPT

## 2020-10-01 PROCEDURE — 80061 LIPID PANEL: CPT

## 2020-10-01 PROCEDURE — 84439 ASSAY OF FREE THYROXINE: CPT

## 2020-10-01 PROCEDURE — 36415 COLL VENOUS BLD VENIPUNCTURE: CPT | Mod: PO

## 2020-10-01 NOTE — PROGRESS NOTES
[facility]  Gastroenterology      PCP: Trinidad Cleaning MD    10/1/20    HPI     Rectal Bleeding      Additional comments: hosp f/u          Last edited by Sj Alanis LPN on 10/1/2020 10:12 AM. (History)        Reason for Visit: Hospital Follow-up for Rectal Bleeding    Subjective:   Qi Ortiz is a 70 y.o. female is here for f/u rectal bleeding. Patient was last seen in 11/2019 for evaluation of CAREN. She was scheduled for EGD/colon but this was never completed. She then had worsening BROOKS and was found to have severe AS. She underwent a TAVR with Dr. Wilder about 1 week ago. After discharge from the hospital she was constipated, had some straining and then developed overt rectal bleeding with a large amount of bright red blood. She went back to [facility] ED and was discharged. She then came to [facility] ED. She was found to have both external and internal hemorrhoids which were thought to be cause of bleeding. She was given Preparation H and script for Anusol suppositories. She is on ASA/Plavix which apparently cannot be stopped at this time due to recent TAVR.     Today, she states the bleeding has stopped unless she has a BM. She has been having softer bowel movements. No other complaints voiced. Hb stable around 11.6-12. She continues to get iron infusions. She has scheduled follow-up with cardiology coming up.       Past Medical History:   Diagnosis Date    Carotid stenosis     19%    COPD (chronic obstructive pulmonary disease)     No meds    Coronary artery disease     CVA (cerebral vascular accident)     Dr. 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 Hope    Psoriatic arthritis     Rheumatology       Past Surgical History:   Procedure Laterality Date    BREAST BIOPSY      R sided/benign    CARDIAC SURGERY      sept 28 2016    CERVICAL  FUSION      CHOLECYSTECTOMY      CORONARY ANGIOPLASTY      CORONARY ARTERY BYPASS GRAFT      triple bypass    CORONARY STENT PLACEMENT      EYE SURGERY      INTRAUTERINE DEVICE INSERTION      LEFT HEART CATHETERIZATION Left 9/8/2020    Procedure: CATHETERIZATION, HEART, LEFT;  Surgeon: Veronica Ibarra MD;  Location: [facility] CATH LAB;  Service: Cardiology;  Laterality: Left;  7am start time    mass removed from R groin      TOTAL ABDOMINAL HYSTERECTOMY W/ BILATERAL SALPINGOOPHORECTOMY      due to benign mass, adhesions    TUBAL LIGATION         Current Outpatient Medications on File Prior to Visit   Medication Sig Dispense Refill    albuterol (PROVENTIL) 2.5 mg /3 mL (0.083 %) nebulizer solution Take 3 mLs (2.5 mg total) by nebulization every 6 (six) hours as needed for Wheezing. Rescue 25 each 5    aspirin 81 MG Chew Take 162.5 mg by mouth once daily.       calcium carbonate 650 mg calcium (1,625 mg) tablet Take 1 tablet by mouth once daily.      chlorhexidine (BETASEPT SURGICAL SCRUB) 4 % external liquid Apply topically daily as needed. Consider 90 day supply 473 mL 3    clopidogreL (PLAVIX) 75 mg tablet Take 1 tablet (75 mg total) by mouth once daily. 90 tablet 3    cyanocobalamin 2000 MCG tablet Take 2,000 mcg by mouth once daily.      docusate sodium (COLACE) 100 MG capsule Take 1 capsule (100 mg total) by mouth 2 (two) times daily. 60 capsule 0    evolocumab (REPATHA SURECLICK) 140 mg/mL PnIj Inject 1 mL (140 mg total) into the skin every 14 (fourteen) days. 6 mL 3    folic acid (FOLVITE) 1 MG tablet Take 1 tablet (1 mg total) by mouth once daily. 90 tablet 1    furosemide (LASIX) 20 MG tablet Take 20 mg by mouth once daily.      gabapentin (NEURONTIN) 100 MG capsule Take 2 capsules (200 mg total) by mouth 3 (three) times daily as needed. 540 capsule 3    garlic 2,000 mg Cap Take 3,000 mg by mouth once daily.       hydrocortisone 2.5 % cream Apply topically 2 (two) times daily. 20 g 0     levothyroxine (SYNTHROID) 112 MCG tablet Take 1 tablet (112 mcg total) by mouth once daily. 90 tablet 1    metoprolol succinate (TOPROL-XL) 100 MG 24 hr tablet Take 1 tablet (100 mg total) by mouth 2 (two) times daily. 1 tab (100mg) in morning. And 100 mg at bedtime.  Generic ok 180 tablet 3    mupirocin (BACTROBAN) 2 % ointment Bactroban 2 % topical ointment   Apply 1 application twice a day by topical route as directed for 30 days.      nitroGLYCERIN (NITROSTAT) 0.4 MG SL tablet Place 1 tablet (0.4 mg total) under the tongue every 5 (five) minutes as needed for Chest pain. 100 tablet 3    potassium chloride SA (K-DUR,KLOR-CON) 20 MEQ tablet Take 1 tablet (20 mEq total) by mouth once daily. 90 tablet 3    pyridoxine (VITAMIN B-6) 100 MG Tab Take 200 mg by mouth once daily.       ustekinumab (STELARA) 90 mg/mL Syrg syringe Inject 1 mL (90 mg total) into the skin every 3 (three) months. 1 Syringe 3    valsartan-hydrochlorothiazide (DIOVAN-HCT) 160-12.5 mg per tablet Take 1 tablet by mouth once daily. 90 tablet 3    vitamin D 1000 units Tab Take 185 mg by mouth once daily.      APPLE CIDER VINEGAR ORAL Take 1 capsule by mouth once daily.       hydrocortisone (ANUSOL-HC) 25 mg suppository Place 1 suppository (25 mg total) rectally 2 (two) times daily. for 10 days (Patient not taking: Reported on 10/1/2020) 20 suppository 0    iron-vitamin C 100-250 mg, ICAR-C, (ICAR-C) 100-250 mg Tab Take 1 tablet by mouth once daily. (Patient not taking: Reported on 8/24/2020) 90 tablet 4    magnesium citrate solution       TURMERIC, BULK, MISC Take 1 capsule by mouth 2 (two) times daily.        No current facility-administered medications on file prior to visit.        Review of patient's allergies indicates:   Allergen Reactions    Celexa [citalopram] Anaphylaxis    Clindamycin Itching and Swelling    Codeine Shortness Of Breath, Itching and Swelling    Crestor [rosuvastatin] Anaphylaxis    Cytotec [misoprostol]  Anaphylaxis and Other (See Comments)     Difficulty breathing    Lisinopril Anaphylaxis    Magnesium citrate Shortness Of Breath    Stadol [butorphanol tartrate] Anaphylaxis     Coded    Vicodin [hydrocodone-acetaminophen] Shortness Of Breath    Adhesive      EKG Electrodes    Aggrenox [aspirin-dipyridamole] Other (See Comments)     headaches    Avelox [moxifloxacin]      PATIENT STATES DO NOT GIVE UNDER ANY CIRCUSTANCES    Demerol [meperidine]     Isosorbide Other (See Comments)     Severe headache    Kenalog [triamcinolone acetonide]     Medrol [methylprednisolone] Other (See Comments)     Patient reports taking IV in the past without any issues. Reports allergy to oral preparation     Mobic [meloxicam]     Morphine     Nitroglycerin      Long acting    Pholcodine     Prednisone      GASTRIC PAIN    Ranexa [ranolazine] Nausea And Vomiting    Reclast [zoledronic acid-mannitol-water] Other (See Comments)     Bones hurt    Sulfa (sulfonamide antibiotics) Other (See Comments)     unknown    Talwin [pentazocine lactate]     Tetracycline     Tetracyclines     Tilade [nedocromil]     Zetia [ezetimibe]        Social History     Socioeconomic History    Marital status: Single     Spouse name: Not on file    Number of children: 3    Years of education: Not on file    Highest education level: Not on file   Occupational History    Occupation: Not working   Social Needs    Financial resource strain: Not on file    Food insecurity     Worry: Not on file     Inability: Not on file    Transportation needs     Medical: Not on file     Non-medical: Not on file   Tobacco Use    Smoking status: Never Smoker    Smokeless tobacco: Never Used   Substance and Sexual Activity    Alcohol use: No    Drug use: No    Sexual activity: Never     Partners: Male   Lifestyle    Physical activity     Days per week: Not on file     Minutes per session: Not on file    Stress: Not on file   Relationships     Social connections     Talks on phone: Not on file     Gets together: Not on file     Attends [employer] service: Not on file     Active member of club or organization: Not on file     Attends meetings of clubs or organizations: Not on file     Relationship status: Not on file   Other Topics Concern    Not on file   Social History Narrative    Not on file       Family History   Problem Relation Age of Onset    Breast cancer Maternal Grandfather     Breast cancer Paternal Aunt     Stroke Unknown     Breast cancer Sister 60    Leukemia Sister 8         as child    Lung cancer Paternal Grandfather     Heart disease Unknown        Review of Systems   Constitutional: Negative for appetite change, fever and unexpected weight change.   HENT: Negative for postnasal drip, rhinorrhea, sneezing, sore throat and trouble swallowing.    Eyes: Negative for visual disturbance.   Respiratory: Negative for cough, shortness of breath and wheezing.    Cardiovascular: Negative for chest pain, palpitations and leg swelling.   Gastrointestinal: Positive for anal bleeding. Negative for abdominal pain, blood in stool, constipation, diarrhea, nausea and vomiting.   Genitourinary: Negative for dysuria.   Musculoskeletal: Negative for arthralgias, joint swelling and myalgias.   Skin: Negative for color change, pallor and rash.   Neurological: Negative for weakness, light-headedness, numbness and headaches.   Hematological: Negative for adenopathy. Does not bruise/bleed easily.   Psychiatric/Behavioral: Negative for agitation.           Objective:   Vitals:   Vitals:    10/01/20 1012   BP: (!) 140/60   Pulse: 76       Physical Exam  Vitals signs reviewed.   Constitutional:       General: She is not in acute distress.     Appearance: She is not diaphoretic.   HENT:      Head: Normocephalic and atraumatic.      Mouth/Throat:      Pharynx: No oropharyngeal exudate.   Eyes:      General: No scleral icterus.        Right eye: No  discharge.         Left eye: No discharge.      Conjunctiva/sclera: Conjunctivae normal.      Pupils: Pupils are equal, round, and reactive to light.   Neck:      Musculoskeletal: Normal range of motion.   Cardiovascular:      Rate and Rhythm: Normal rate and regular rhythm.      Heart sounds: Normal heart sounds. No murmur. No friction rub. No gallop.    Pulmonary:      Effort: Pulmonary effort is normal. No respiratory distress.      Breath sounds: Normal breath sounds. No stridor. No wheezing or rales.   Abdominal:      General: Bowel sounds are normal. There is no distension.      Palpations: Abdomen is soft. There is no mass.      Tenderness: There is no abdominal tenderness. There is no guarding.   Musculoskeletal: Normal range of motion.   Skin:     General: Skin is warm and dry.      Coloration: Skin is not pale.      Findings: No erythema or rash.   Neurological:      Mental Status: She is alert and oriented to person, place, and time.             IMPRESSION     Problem List Items Addressed This Visit     None      Visit Diagnoses     Hematochezia    -  Primary    Hemorrhoids, unspecified hemorrhoid type        Iron deficiency anemia, unspecified iron deficiency anemia type              PLANS:    - Rectal bleeding improved, still some with bowel movements  - Presumed source is internal/external hemorrhoids  - Hb stable  - Ideally, would want to perform EGD/colon for history of CAREN and the rectal bleeding. However, patient cannot come off of blood thinner due to recent TAVR  - Patient has an appointment with her cardiologist coming up. She is to discuss with cardiologist- as soon as it is ok to temporarily come off of blood thinner, will plan for endoscopy  - In the interim, agree with Preparation H and Anusol for the hemorrhoid treatment  - Sitz baths, avoid straining, Miralax and stool softener PRN to avoid constipation  - ED precautions given for any worsening or continued bleeding  - RTC in 2-3 months  for f/u    Hematochezia    Hemorrhoids, unspecified hemorrhoid type    Iron deficiency anemia, unspecified iron deficiency anemia type            Pricila Smith MD  Gastroenterology and Hepatology

## 2020-10-02 LAB
ESTIMATED AVG GLUCOSE: 126 MG/DL (ref 68–131)
HBA1C MFR BLD HPLC: 6 % (ref 4–5.6)

## 2020-10-06 ENCOUNTER — OFFICE VISIT (OUTPATIENT)
Dept: CARDIOLOGY | Facility: CLINIC | Age: 70
End: 2020-10-06
Payer: MEDICARE

## 2020-10-06 VITALS
WEIGHT: 256.5 LBS | SYSTOLIC BLOOD PRESSURE: 146 MMHG | OXYGEN SATURATION: 97 % | DIASTOLIC BLOOD PRESSURE: 70 MMHG | BODY MASS INDEX: 45.44 KG/M2 | HEART RATE: 82 BPM

## 2020-10-06 DIAGNOSIS — I25.118 CORONARY ARTERY DISEASE OF NATIVE ARTERY OF NATIVE HEART WITH STABLE ANGINA PECTORIS: ICD-10-CM

## 2020-10-06 DIAGNOSIS — I47.10 SUPRAVENTRICULAR TACHYCARDIA: Chronic | ICD-10-CM

## 2020-10-06 DIAGNOSIS — N18.32 STAGE 3B CHRONIC KIDNEY DISEASE: ICD-10-CM

## 2020-10-06 DIAGNOSIS — E78.00 PURE HYPERCHOLESTEROLEMIA: Chronic | ICD-10-CM

## 2020-10-06 DIAGNOSIS — Z95.1 S/P CABG (CORONARY ARTERY BYPASS GRAFT): Chronic | ICD-10-CM

## 2020-10-06 DIAGNOSIS — I20.9 ANGINA, CLASS II: Chronic | ICD-10-CM

## 2020-10-06 DIAGNOSIS — Z78.9 STATIN INTOLERANCE: ICD-10-CM

## 2020-10-06 DIAGNOSIS — Z95.2 S/P TAVR (TRANSCATHETER AORTIC VALVE REPLACEMENT): Primary | ICD-10-CM

## 2020-10-06 DIAGNOSIS — E03.9 ACQUIRED HYPOTHYROIDISM: Chronic | ICD-10-CM

## 2020-10-06 DIAGNOSIS — J44.9 CHRONIC OBSTRUCTIVE PULMONARY DISEASE, UNSPECIFIED COPD TYPE: ICD-10-CM

## 2020-10-06 DIAGNOSIS — E66.01 MORBID OBESITY WITH BMI OF 40.0-44.9, ADULT: ICD-10-CM

## 2020-10-06 DIAGNOSIS — R73.03 PREDIABETES: ICD-10-CM

## 2020-10-06 DIAGNOSIS — G47.33 OSA (OBSTRUCTIVE SLEEP APNEA): ICD-10-CM

## 2020-10-06 DIAGNOSIS — R93.1 ABNORMAL NUCLEAR CARDIAC IMAGING TEST: ICD-10-CM

## 2020-10-06 DIAGNOSIS — I35.0 NONRHEUMATIC AORTIC VALVE STENOSIS: Chronic | ICD-10-CM

## 2020-10-06 DIAGNOSIS — Z86.73 HISTORY OF CVA (CEREBROVASCULAR ACCIDENT): ICD-10-CM

## 2020-10-06 DIAGNOSIS — I65.29 STENOSIS OF CAROTID ARTERY, UNSPECIFIED LATERALITY: ICD-10-CM

## 2020-10-06 PROBLEM — Z95.3 S/P TAVR (TRANSCATHETER AORTIC VALVE REPLACEMENT): Status: ACTIVE | Noted: 2020-10-06

## 2020-10-06 PROCEDURE — 99999 PR PBB SHADOW E&M-EST. PATIENT-LVL III: ICD-10-PCS | Mod: PBBFAC,,, | Performed by: INTERNAL MEDICINE

## 2020-10-06 PROCEDURE — 99214 PR OFFICE/OUTPT VISIT, EST, LEVL IV, 30-39 MIN: ICD-10-PCS | Mod: S$PBB,,, | Performed by: INTERNAL MEDICINE

## 2020-10-06 PROCEDURE — 99214 OFFICE O/P EST MOD 30 MIN: CPT | Mod: S$PBB,,, | Performed by: INTERNAL MEDICINE

## 2020-10-06 PROCEDURE — 99213 OFFICE O/P EST LOW 20 MIN: CPT | Mod: PBBFAC | Performed by: INTERNAL MEDICINE

## 2020-10-06 PROCEDURE — 99999 PR PBB SHADOW E&M-EST. PATIENT-LVL III: CPT | Mod: PBBFAC,,, | Performed by: INTERNAL MEDICINE

## 2020-10-06 NOTE — PROGRESS NOTES
Subjective:   Patient ID:  Qi Ortiz is a 70 y.o. female who presents for follow up of Hospital Follow Up      HPI  A 69 yo female with cad s/p cabg x2  htn hlp obesity bethanie no cpap AS S/P TAVR COPD CKD H/O CVA IS HERE FROF /U HAD TAVR FOR SYMPTOMATIC SEVERE AS. IS DOINGW ELL CLINICALLY HAD NO COMPLICATION SHE IS RECOVERING WELL.BLACKMON S MILD LEG SWELLING IF SHE IS IN DEPENDENT POSITION. SHE IS COMPLIANT WITH DIET AND MEDS. HER SHORTNESS OF BREATH RESOLVED NO BLEEDING HAS NO ANGINA OR CHF   Past Medical History:   Diagnosis Date    Carotid stenosis     19%    COPD (chronic obstructive pulmonary disease)     No meds    Coronary artery disease     CVA (cerebral vascular accident)     Dr. Hoffman    Depression     Double ectopic ureters     Dr. Porras    Hyperlipidemia     Hypertension     Hypothyroid     OP (osteoporosis)     BETHANIE (obstructive sleep apnea)     Dr. Hope    Psoriatic arthritis     Rheumatology       Past Surgical History:   Procedure Laterality Date    BREAST BIOPSY      R sided/benign    CARDIAC SURGERY      sept 28 2016    CERVICAL FUSION      CHOLECYSTECTOMY      CORONARY ANGIOPLASTY      CORONARY ARTERY BYPASS GRAFT      triple bypass    CORONARY STENT PLACEMENT      EYE SURGERY      INTRAUTERINE DEVICE INSERTION      LEFT HEART CATHETERIZATION Left 9/8/2020    Procedure: CATHETERIZATION, HEART, LEFT;  Surgeon: Veronica Ibarra MD;  Location: [facility] CATH LAB;  Service: Cardiology;  Laterality: Left;  7am start time    mass removed from R groin      TOTAL ABDOMINAL HYSTERECTOMY W/ BILATERAL SALPINGOOPHORECTOMY      due to benign mass, adhesions    TUBAL LIGATION         Social History     Tobacco Use    Smoking status: Never Smoker    Smokeless tobacco: Never Used   Substance Use Topics    Alcohol use: No    Drug use: No       Family History   Problem Relation Age of Onset    Breast cancer Maternal Grandfather     Breast cancer Paternal Aunt     Stroke Unknown     Breast  cancer Sister 60    Leukemia Sister 8         as child    Lung cancer Paternal Grandfather     Heart disease Unknown        Current Outpatient Medications   Medication Sig    albuterol (PROVENTIL) 2.5 mg /3 mL (0.083 %) nebulizer solution Take 3 mLs (2.5 mg total) by nebulization every 6 (six) hours as needed for Wheezing. Rescue    APPLE CIDER VINEGAR ORAL Take 1 capsule by mouth once daily.     aspirin 81 MG Chew Take 162.5 mg by mouth once daily.     calcium carbonate 650 mg calcium (1,625 mg) tablet Take 1 tablet by mouth once daily.    chlorhexidine (BETASEPT SURGICAL SCRUB) 4 % external liquid Apply topically daily as needed. Consider 90 day supply    clopidogreL (PLAVIX) 75 mg tablet Take 1 tablet (75 mg total) by mouth once daily.    cyanocobalamin 2000 MCG tablet Take 2,000 mcg by mouth once daily.    docusate sodium (COLACE) 100 MG capsule Take 1 capsule (100 mg total) by mouth 2 (two) times daily.    evolocumab (REPATHA SURECLICK) 140 mg/mL PnIj Inject 1 mL (140 mg total) into the skin every 14 (fourteen) days.    folic acid (FOLVITE) 1 MG tablet Take 1 tablet (1 mg total) by mouth once daily.    furosemide (LASIX) 20 MG tablet Take 20 mg by mouth once daily.    gabapentin (NEURONTIN) 100 MG capsule Take 2 capsules (200 mg total) by mouth 3 (three) times daily as needed.    garlic 2,000 mg Cap Take 3,000 mg by mouth once daily.     hydrocortisone (ANUSOL-HC) 25 mg suppository Place 1 suppository (25 mg total) rectally 2 (two) times daily. for 10 days (Patient not taking: Reported on 10/1/2020)    hydrocortisone 2.5 % cream Apply topically 2 (two) times daily.    iron-vitamin C 100-250 mg, ICAR-C, (ICAR-C) 100-250 mg Tab Take 1 tablet by mouth once daily. (Patient not taking: Reported on 2020)    levothyroxine (SYNTHROID) 112 MCG tablet Take 1 tablet (112 mcg total) by mouth once daily.    magnesium citrate solution     metoprolol succinate (TOPROL-XL) 100 MG 24 hr tablet  Take 1 tablet (100 mg total) by mouth 2 (two) times daily. 1 tab (100mg) in morning. And 100 mg at bedtime.  Generic ok    mupirocin (BACTROBAN) 2 % ointment Bactroban 2 % topical ointment   Apply 1 application twice a day by topical route as directed for 30 days.    nitroGLYCERIN (NITROSTAT) 0.4 MG SL tablet Place 1 tablet (0.4 mg total) under the tongue every 5 (five) minutes as needed for Chest pain.    potassium chloride SA (K-DUR,KLOR-CON) 20 MEQ tablet Take 1 tablet (20 mEq total) by mouth once daily.    pyridoxine (VITAMIN B-6) 100 MG Tab Take 200 mg by mouth once daily.     TURMERIC, BULK, MISC Take 1 capsule by mouth 2 (two) times daily.     ustekinumab (STELARA) 90 mg/mL Syrg syringe Inject 1 mL (90 mg total) into the skin every 3 (three) months.    valsartan-hydrochlorothiazide (DIOVAN-HCT) 160-12.5 mg per tablet Take 1 tablet by mouth once daily.    vitamin D 1000 units Tab Take 185 mg by mouth once daily.     No current facility-administered medications for this visit.      Current Outpatient Medications on File Prior to Visit   Medication Sig    albuterol (PROVENTIL) 2.5 mg /3 mL (0.083 %) nebulizer solution Take 3 mLs (2.5 mg total) by nebulization every 6 (six) hours as needed for Wheezing. Rescue    APPLE CIDER VINEGAR ORAL Take 1 capsule by mouth once daily.     aspirin 81 MG Chew Take 162.5 mg by mouth once daily.     calcium carbonate 650 mg calcium (1,625 mg) tablet Take 1 tablet by mouth once daily.    chlorhexidine (BETASEPT SURGICAL SCRUB) 4 % external liquid Apply topically daily as needed. Consider 90 day supply    clopidogreL (PLAVIX) 75 mg tablet Take 1 tablet (75 mg total) by mouth once daily.    cyanocobalamin 2000 MCG tablet Take 2,000 mcg by mouth once daily.    docusate sodium (COLACE) 100 MG capsule Take 1 capsule (100 mg total) by mouth 2 (two) times daily.    evolocumab (REPATHA SURECLICK) 140 mg/mL PnIj Inject 1 mL (140 mg total) into the skin every 14 (fourteen)  days.    folic acid (FOLVITE) 1 MG tablet Take 1 tablet (1 mg total) by mouth once daily.    furosemide (LASIX) 20 MG tablet Take 20 mg by mouth once daily.    gabapentin (NEURONTIN) 100 MG capsule Take 2 capsules (200 mg total) by mouth 3 (three) times daily as needed.    garlic 2,000 mg Cap Take 3,000 mg by mouth once daily.     hydrocortisone (ANUSOL-HC) 25 mg suppository Place 1 suppository (25 mg total) rectally 2 (two) times daily. for 10 days (Patient not taking: Reported on 10/1/2020)    hydrocortisone 2.5 % cream Apply topically 2 (two) times daily.    iron-vitamin C 100-250 mg, ICAR-C, (ICAR-C) 100-250 mg Tab Take 1 tablet by mouth once daily. (Patient not taking: Reported on 8/24/2020)    levothyroxine (SYNTHROID) 112 MCG tablet Take 1 tablet (112 mcg total) by mouth once daily.    magnesium citrate solution     metoprolol succinate (TOPROL-XL) 100 MG 24 hr tablet Take 1 tablet (100 mg total) by mouth 2 (two) times daily. 1 tab (100mg) in morning. And 100 mg at bedtime.  Generic ok    mupirocin (BACTROBAN) 2 % ointment Bactroban 2 % topical ointment   Apply 1 application twice a day by topical route as directed for 30 days.    nitroGLYCERIN (NITROSTAT) 0.4 MG SL tablet Place 1 tablet (0.4 mg total) under the tongue every 5 (five) minutes as needed for Chest pain.    potassium chloride SA (K-DUR,KLOR-CON) 20 MEQ tablet Take 1 tablet (20 mEq total) by mouth once daily.    pyridoxine (VITAMIN B-6) 100 MG Tab Take 200 mg by mouth once daily.     TURMERIC, BULK, MISC Take 1 capsule by mouth 2 (two) times daily.     ustekinumab (STELARA) 90 mg/mL Syrg syringe Inject 1 mL (90 mg total) into the skin every 3 (three) months.    valsartan-hydrochlorothiazide (DIOVAN-HCT) 160-12.5 mg per tablet Take 1 tablet by mouth once daily.    vitamin D 1000 units Tab Take 185 mg by mouth once daily.     No current facility-administered medications on file prior to visit.      Review of patient's allergies  indicates:   Allergen Reactions    Celexa [citalopram] Anaphylaxis    Clindamycin Itching and Swelling    Codeine Shortness Of Breath, Itching and Swelling    Crestor [rosuvastatin] Anaphylaxis    Cytotec [misoprostol] Anaphylaxis and Other (See Comments)     Difficulty breathing    Lisinopril Anaphylaxis    Magnesium citrate Shortness Of Breath    Stadol [butorphanol tartrate] Anaphylaxis     Coded    Vicodin [hydrocodone-acetaminophen] Shortness Of Breath    Adhesive      EKG Electrodes    Aggrenox [aspirin-dipyridamole] Other (See Comments)     headaches    Avelox [moxifloxacin]      PATIENT STATES DO NOT GIVE UNDER ANY CIRCUSTANCES    Demerol [meperidine]     Isosorbide Other (See Comments)     Severe headache    Kenalog [triamcinolone acetonide]     Medrol [methylprednisolone] Other (See Comments)     Patient reports taking IV in the past without any issues. Reports allergy to oral preparation     Mobic [meloxicam]     Morphine     Nitroglycerin      Long acting    Pholcodine     Prednisone      GASTRIC PAIN    Ranexa [ranolazine] Nausea And Vomiting    Reclast [zoledronic acid-mannitol-water] Other (See Comments)     Bones hurt    Sulfa (sulfonamide antibiotics) Other (See Comments)     unknown    Talwin [pentazocine lactate]     Tetracycline     Tetracyclines     Tilade [nedocromil]     Zetia [ezetimibe]      Review of Systems   Constitution: Negative for diaphoresis, malaise/fatigue and weight gain.   HENT: Negative for hoarse voice.    Eyes: Negative for double vision and visual disturbance.   Cardiovascular: Positive for dyspnea on exertion and leg swelling. Negative for chest pain, claudication, cyanosis, irregular heartbeat, near-syncope, orthopnea, palpitations, paroxysmal nocturnal dyspnea and syncope.   Respiratory: Positive for shortness of breath. Negative for cough, hemoptysis and snoring.    Hematologic/Lymphatic: Negative for bleeding problem. Does not bruise/bleed  easily.   Skin: Negative for color change and poor wound healing.   Musculoskeletal: Negative for muscle cramps, muscle weakness and myalgias.   Gastrointestinal: Negative for bloating, abdominal pain, change in bowel habit, diarrhea, heartburn, hematemesis, hematochezia, melena and nausea.   Neurological: Negative for excessive daytime sleepiness, dizziness, headaches, light-headedness, loss of balance, numbness and weakness.   Psychiatric/Behavioral: Negative for memory loss. The patient does not have insomnia.    Allergic/Immunologic: Negative for hives.       Objective:   Physical Exam   Constitutional: She is oriented to person, place, and time. She appears well-developed and well-nourished. She does not appear ill. No distress.   HENT:   Head: Normocephalic and atraumatic.   Eyes: Pupils are equal, round, and reactive to light. EOM are normal. No scleral icterus.   Neck: Normal range of motion. Neck supple. Normal carotid pulses, no hepatojugular reflux and no JVD present. Carotid bruit is not present. No tracheal deviation present. No thyromegaly present.   Cardiovascular: Normal rate, regular rhythm, intact distal pulses and normal pulses. Exam reveals no gallop and no friction rub.   Murmur heard.   Harsh midsystolic murmur is present with a grade of 1/6 at the upper right sternal border radiating to the neck.  Pulses:       Carotid pulses are 2+ on the right side and 2+ on the left side.       Radial pulses are 2+ on the right side and 2+ on the left side.        Popliteal pulses are 2+ on the right side and 2+ on the left side.        Dorsalis pedis pulses are 2+ on the right side and 2+ on the left side.        Posterior tibial pulses are 2+ on the right side and 2+ on the left side.   Pulmonary/Chest: Effort normal and breath sounds normal. No respiratory distress. She has no wheezes. She has no rhonchi. She has no rales. She exhibits no tenderness.   SCAR CABG WELL HEALED.    Abdominal: Soft. Normal  appearance, normal aorta and bowel sounds are normal. She exhibits no distension, no abdominal bruit, no ascites and no pulsatile midline mass. There is no hepatomegaly. There is no abdominal tenderness.   Musculoskeletal:         General: No edema.      Right shoulder: She exhibits no deformity.   Neurological: She is alert and oriented to person, place, and time. She has normal strength. No cranial nerve deficit. Coordination normal.   Skin: Skin is warm and dry. No rash noted. She is not diaphoretic. No cyanosis or erythema. Nails show no clubbing.   Psychiatric: She has a normal mood and affect. Her speech is normal and behavior is normal.   Nursing note and vitals reviewed.    Vitals:    10/06/20 1501 10/06/20 1503   BP: (!) 144/72 (!) 146/70   BP Location: Left arm Right arm   Patient Position: Sitting Sitting   BP Method: Medium (Manual) Medium (Manual)   Pulse: 82    SpO2: 97%    Weight: 116.3 kg (256 lb 8.1 oz)      Lab Results   Component Value Date    CHOL 167 10/01/2020    CHOL 127 10/01/2019    CHOL 131 04/11/2019     Lab Results   Component Value Date    HDL 41 10/01/2020    HDL 47 10/01/2019    HDL 51 04/11/2019     Lab Results   Component Value Date    LDLCALC 101.8 10/01/2020    LDLCALC 56.0 (L) 10/01/2019    LDLCALC 55.8 (L) 04/11/2019     Lab Results   Component Value Date    TRIG 121 10/01/2020    TRIG 120 10/01/2019    TRIG 121 04/11/2019     Lab Results   Component Value Date    CHOLHDL 24.6 10/01/2020    CHOLHDL 37.0 10/01/2019    CHOLHDL 38.9 04/11/2019       Chemistry        Component Value Date/Time     10/01/2020 0853    K 3.8 10/01/2020 0853     10/01/2020 0853    CO2 29 10/01/2020 0853    BUN 15 10/01/2020 0853    CREATININE 1.0 10/01/2020 0853     10/01/2020 0853        Component Value Date/Time    CALCIUM 8.7 10/01/2020 0853    ALKPHOS 34 (L) 10/01/2020 0853    AST 22 10/01/2020 0853    ALT 12 10/01/2020 0853    BILITOT 0.4 10/01/2020 0853    ESTGFRAFRICA >60.0  10/01/2020 0853    EGFRNONAA 57.6 (A) 10/01/2020 0853          Lab Results   Component Value Date    TSH 2.172 10/01/2020     Lab Results   Component Value Date    INR 1.0 09/28/2020    INR 1.0 12/18/2017     Lab Results   Component Value Date    WBC 8.49 09/28/2020    HGB 11.2 (L) 09/28/2020    HCT 35.5 (L) 09/28/2020    MCV 93 09/28/2020     09/28/2020     BMP  Sodium   Date Value Ref Range Status   10/01/2020 144 136 - 145 mmol/L Final     Potassium   Date Value Ref Range Status   10/01/2020 3.8 3.5 - 5.1 mmol/L Final     Chloride   Date Value Ref Range Status   10/01/2020 105 95 - 110 mmol/L Final     CO2   Date Value Ref Range Status   10/01/2020 29 23 - 29 mmol/L Final     BUN, Bld   Date Value Ref Range Status   10/01/2020 15 8 - 23 mg/dL Final     Creatinine   Date Value Ref Range Status   10/01/2020 1.0 0.5 - 1.4 mg/dL Final     Calcium   Date Value Ref Range Status   10/01/2020 8.7 8.7 - 10.5 mg/dL Final     Anion Gap   Date Value Ref Range Status   10/01/2020 10 8 - 16 mmol/L Final     eGFR if    Date Value Ref Range Status   10/01/2020 >60.0 >60 mL/min/1.73 m^2 Final     eGFR if non    Date Value Ref Range Status   10/01/2020 57.6 (A) >60 mL/min/1.73 m^2 Final     Comment:     Calculation used to obtain the estimated glomerular filtration  rate (eGFR) is the CKD-EPI equation.        Estimated Creatinine Clearance: 65.4 mL/min (based on SCr of 1 mg/dL).    Assessment:     1. S/P TAVR (transcatheter aortic valve replacement)    2. Angina, class II    3. Pure hypercholesterolemia    4. Acquired hypothyroidism    5. S/P CABG (coronary artery bypass graft)    6. Supraventricular tachycardia    7. Nonrheumatic aortic valve stenosis    8. Abnormal nuclear cardiac imaging test    9. Coronary artery disease of native artery of native heart with stable angina pectoris    10. IRIS (obstructive sleep apnea)    11. Stenosis of carotid artery, unspecified laterality    12. Morbid  obesity with BMI of 40.0-44.9, adult    13. Statin intolerance    14. Stage 3b chronic kidney disease    15. History of CVA (cerebrovascular accident)    16. Chronic obstructive pulmonary disease, unspecified COPD type    17. Prediabetes      DOING WELL POST VTAVR . SHE WAS ADVISED TO CONTINUE CURRENT THERAPY WEIGHT LOSS LOW SALT AND WALKING EXERCISE PROGRAM.   Plan:   Continue current therapy  Cardiac low salt diet.  Risk factor modification and excercise program.  F/u in 1 months with  DR TORRES.

## 2020-10-12 ENCOUNTER — OFFICE VISIT (OUTPATIENT)
Dept: FAMILY MEDICINE | Facility: CLINIC | Age: 70
End: 2020-10-12
Payer: MEDICARE

## 2020-10-12 VITALS
HEIGHT: 63 IN | WEIGHT: 250.88 LBS | TEMPERATURE: 97 F | OXYGEN SATURATION: 97 % | DIASTOLIC BLOOD PRESSURE: 80 MMHG | BODY MASS INDEX: 44.45 KG/M2 | SYSTOLIC BLOOD PRESSURE: 138 MMHG | HEART RATE: 93 BPM | RESPIRATION RATE: 16 BRPM

## 2020-10-12 DIAGNOSIS — I10 ESSENTIAL HYPERTENSION: ICD-10-CM

## 2020-10-12 DIAGNOSIS — K64.9 HEMORRHOIDS, UNSPECIFIED HEMORRHOID TYPE: ICD-10-CM

## 2020-10-12 DIAGNOSIS — D50.0 IRON DEFICIENCY ANEMIA DUE TO CHRONIC BLOOD LOSS: ICD-10-CM

## 2020-10-12 DIAGNOSIS — R73.03 PREDIABETES: ICD-10-CM

## 2020-10-12 DIAGNOSIS — E03.9 HYPOTHYROIDISM, UNSPECIFIED TYPE: Primary | ICD-10-CM

## 2020-10-12 DIAGNOSIS — L40.50 PSORIATIC ARTHRITIS: ICD-10-CM

## 2020-10-12 DIAGNOSIS — E53.8 B12 DEFICIENCY: ICD-10-CM

## 2020-10-12 DIAGNOSIS — E66.01 MORBID OBESITY WITH BMI OF 45.0-49.9, ADULT: ICD-10-CM

## 2020-10-12 DIAGNOSIS — Z95.1 S/P CABG (CORONARY ARTERY BYPASS GRAFT): ICD-10-CM

## 2020-10-12 DIAGNOSIS — K92.1 HEMATOCHEZIA: ICD-10-CM

## 2020-10-12 DIAGNOSIS — N18.30 STAGE 3 CHRONIC KIDNEY DISEASE, UNSPECIFIED WHETHER STAGE 3A OR 3B CKD: ICD-10-CM

## 2020-10-12 DIAGNOSIS — Z86.73 HISTORY OF CVA (CEREBROVASCULAR ACCIDENT): ICD-10-CM

## 2020-10-12 DIAGNOSIS — Z95.2 S/P TAVR (TRANSCATHETER AORTIC VALVE REPLACEMENT): ICD-10-CM

## 2020-10-12 DIAGNOSIS — E53.8 FOLIC ACID DEFICIENCY: ICD-10-CM

## 2020-10-12 PROCEDURE — 99215 OFFICE O/P EST HI 40 MIN: CPT | Mod: PBBFAC,PO | Performed by: FAMILY MEDICINE

## 2020-10-12 PROCEDURE — 99214 OFFICE O/P EST MOD 30 MIN: CPT | Mod: S$PBB,,, | Performed by: FAMILY MEDICINE

## 2020-10-12 PROCEDURE — 99999 PR PBB SHADOW E&M-EST. PATIENT-LVL V: ICD-10-PCS | Mod: PBBFAC,,, | Performed by: FAMILY MEDICINE

## 2020-10-12 PROCEDURE — 99999 PR PBB SHADOW E&M-EST. PATIENT-LVL V: CPT | Mod: PBBFAC,,, | Performed by: FAMILY MEDICINE

## 2020-10-12 PROCEDURE — 99214 PR OFFICE/OUTPT VISIT, EST, LEVL IV, 30-39 MIN: ICD-10-PCS | Mod: S$PBB,,, | Performed by: FAMILY MEDICINE

## 2020-10-12 RX ORDER — FOLIC ACID 1 MG/1
1 TABLET ORAL DAILY
Qty: 90 TABLET | Refills: 1 | Status: SHIPPED | OUTPATIENT
Start: 2020-10-12 | End: 2021-06-14 | Stop reason: SDUPTHER

## 2020-10-12 RX ORDER — LEVOTHYROXINE SODIUM 112 UG/1
112 TABLET ORAL DAILY
Qty: 90 TABLET | Refills: 1 | Status: SHIPPED | OUTPATIENT
Start: 2020-10-12 | End: 2021-06-14 | Stop reason: SDUPTHER

## 2020-10-12 NOTE — PROGRESS NOTES
"Subjective:       Patient ID: Qi Ortiz is a 70 y.o. female.    Chief Complaint: Follow-up    HPI   Follow-up  71yo female presents today for follow-up. She has had recent lab update and is here for review of results. Since her last visit she has undergone TAVR per Dr. Wilder. She has since had a follow-up visit with Dr. Ibarra and will see her general Cardiologist for further assessment. She remains on Plavix and ASA in combination. Her antihypertensive regimen was adjusted perioperatively and she is doing well with the changes. She suffered from hematochezia and symptoms were attributed to hemorrhoids. She was seen by GI on 10/1/2020 and there was discussion with regard to pursuing EGD and C scope once safe to place Plavix and ASA on hold. The hematochezia has resolved. She notes "I'm feeling great". She has been ambulating long distances without dyspnea. She denies any CP or palpitations. TFT's are stable with Synthroid at 112mcg daily. There are no symptoms of concern for imbalance. A1c is stable at 6.0. She denies any symptoms of hyper or hypoglycemia. Results brought by the patient [facility] demonstrate a level of 6.3 during her recent admission. She notes multiple situational stressors but feels she is coping well. There are several pending follow-up visits with her specialists in the coming weeks and months.     Review of Systems   Constitutional: Negative for activity change, appetite change and fatigue.   HENT: Negative for congestion, ear pain and sinus pressure.    Respiratory: Negative for cough and shortness of breath.    Cardiovascular: Negative for chest pain and palpitations.   Gastrointestinal: Negative for abdominal pain, blood in stool, constipation, diarrhea and nausea.   Endocrine: Negative for cold intolerance, heat intolerance, polydipsia and polyuria.   Genitourinary: Negative for decreased urine volume, difficulty urinating and hematuria.   Musculoskeletal: Positive for " "arthralgias and back pain. Negative for myalgias.   Skin: Negative for rash.   Neurological: Negative for dizziness, weakness and headaches.   Psychiatric/Behavioral: Negative for dysphoric mood and sleep disturbance.       Objective:   /80   Pulse 93   Temp 97.3 °F (36.3 °C) (Temporal)   Resp 16   Ht 5' 3" (1.6 m)   Wt 113.8 kg (250 lb 14.1 oz)   SpO2 97%   BMI 44.44 kg/m²   Physical Exam  Constitutional:       Appearance: She is well-developed. She is obese.   HENT:      Head: Normocephalic and atraumatic.      Right Ear: Tympanic membrane, ear canal and external ear normal.      Left Ear: Tympanic membrane, ear canal and external ear normal.      Nose: Nose normal.      Mouth/Throat:      Mouth: Mucous membranes are moist.   Eyes:      Extraocular Movements: Extraocular movements intact.      Conjunctiva/sclera: Conjunctivae normal.      Pupils: Pupils are equal, round, and reactive to light.   Neck:      Musculoskeletal: Normal range of motion.   Cardiovascular:      Rate and Rhythm: Normal rate and regular rhythm.      Heart sounds: Murmur present.   Pulmonary:      Effort: Pulmonary effort is normal.      Breath sounds: Normal breath sounds.   Abdominal:      General: Bowel sounds are normal.      Palpations: Abdomen is soft.   Musculoskeletal:         General: No swelling.   Skin:     General: Skin is warm and dry.   Neurological:      Mental Status: She is alert and oriented to person, place, and time.   Psychiatric:         Mood and Affect: Mood normal.         Behavior: Behavior normal.         Assessment:       1. Hypothyroidism, unspecified type    2. Prediabetes    3. Essential hypertension    4. Stage 3 chronic kidney disease, unspecified whether stage 3a or 3b CKD    5. Iron deficiency anemia due to chronic blood loss    6. Folic acid deficiency    7. B12 deficiency    8. Psoriatic arthritis    9. Hematochezia    10. Hemorrhoids, unspecified hemorrhoid type    11. Morbid obesity with BMI " of 45.0-49.9, adult    12. S/P CABG (coronary artery bypass graft)    13. S/P TAVR (transcatheter aortic valve replacement)    14. History of CVA (cerebrovascular accident)        Plan:      Hypothyroidism, unspecified type  Stable. Will continue with Synthroid at current dosing. Reviewed s/s of imbalance. Recommend reassessment with PCP of choice in 6 months.   -     levothyroxine (SYNTHROID) 112 MCG tablet; Take 1 tablet (112 mcg total) by mouth once daily.  Dispense: 90 tablet; Refill: 1    Prediabetes  Stable. Emphasized importance of dietary and lifestyle modification including weight loss. Reviewed s/s of hyper and hypoglycemia- should she experience these symptoms she is asked to report back for further assessment.    Essential hypertension  Stable. As noted in HPI there have been some recent adjustments to her medication regimen. Further follow-up as per Cardiology is advised.     Stage 3 chronic kidney disease, unspecified whether stage 3a or 3b CKD  Stable. Avoidance of NSAID medications remains advised. Hypertension control emphasized as above.    Iron deficiency anemia due to chronic blood loss  As per Heme/Onc.    Folic acid deficiency  -     folic acid (FOLVITE) 1 MG tablet; Take 1 tablet (1 mg total) by mouth once daily.  Dispense: 90 tablet; Refill: 1    B12 deficiency  Continue with current supplementation.    Psoriatic arthritis  As per Rheumatology.    Hematochezia  Reviewed hospital records and visit notes from GI. Further follow-up is advised. In the interim we have discussed the importance of avoiding constipation and use of topical measures for hemorrhoidal symptom relief.     Hemorrhoids, unspecified hemorrhoid type  As above.    Morbid obesity with BMI of 45.0-49.9, adult  Weight loss efforts remain encouraged through diet and lifestyle measures.    S/P CABG (coronary artery bypass graft)  As per Cardiology.    S/P TAVR (transcatheter aortic valve replacement)  As per Cardiology.    History  of CVA (cerebrovascular accident)  Bp and lipid control remain advised.    Reviewed records brought by the patient today from  General.  Copies of recent labs provided for the patient.  Printed RX provided at patient's request.  Immunization updated declined per patient.  [facility] orders placed- patient has declined scheduling.

## 2020-10-20 ENCOUNTER — LAB VISIT (OUTPATIENT)
Dept: LAB | Facility: HOSPITAL | Age: 70
End: 2020-10-20
Attending: INTERNAL MEDICINE
Payer: MEDICARE

## 2020-10-20 DIAGNOSIS — L40.9 PSORIASIS: ICD-10-CM

## 2020-10-20 LAB
ALBUMIN SERPL BCP-MCNC: 3.7 G/DL (ref 3.5–5.2)
ALP SERPL-CCNC: 40 U/L (ref 55–135)
ALT SERPL W/O P-5'-P-CCNC: 21 U/L (ref 10–44)
ANION GAP SERPL CALC-SCNC: 10 MMOL/L (ref 8–16)
AST SERPL-CCNC: 28 U/L (ref 10–40)
BASOPHILS # BLD AUTO: 0.05 K/UL (ref 0–0.2)
BASOPHILS NFR BLD: 0.7 % (ref 0–1.9)
BILIRUB SERPL-MCNC: 0.3 MG/DL (ref 0.1–1)
BUN SERPL-MCNC: 24 MG/DL (ref 8–23)
CALCIUM SERPL-MCNC: 9.6 MG/DL (ref 8.7–10.5)
CHLORIDE SERPL-SCNC: 104 MMOL/L (ref 95–110)
CO2 SERPL-SCNC: 28 MMOL/L (ref 23–29)
CREAT SERPL-MCNC: 1.2 MG/DL (ref 0.5–1.4)
DIFFERENTIAL METHOD: ABNORMAL
EOSINOPHIL # BLD AUTO: 0.1 K/UL (ref 0–0.5)
EOSINOPHIL NFR BLD: 1.6 % (ref 0–8)
ERYTHROCYTE [DISTWIDTH] IN BLOOD BY AUTOMATED COUNT: 14.5 % (ref 11.5–14.5)
ERYTHROCYTE [SEDIMENTATION RATE] IN BLOOD BY WESTERGREN METHOD: 25 MM/HR (ref 0–36)
EST. GFR  (AFRICAN AMERICAN): 53 ML/MIN/1.73 M^2
EST. GFR  (NON AFRICAN AMERICAN): 46 ML/MIN/1.73 M^2
GLUCOSE SERPL-MCNC: 117 MG/DL (ref 70–110)
HCT VFR BLD AUTO: 40.4 % (ref 37–48.5)
HGB BLD-MCNC: 12.2 G/DL (ref 12–16)
IMM GRANULOCYTES # BLD AUTO: 0.02 K/UL (ref 0–0.04)
IMM GRANULOCYTES NFR BLD AUTO: 0.3 % (ref 0–0.5)
LYMPHOCYTES # BLD AUTO: 2 K/UL (ref 1–4.8)
LYMPHOCYTES NFR BLD: 26.9 % (ref 18–48)
MCH RBC QN AUTO: 28.4 PG (ref 27–31)
MCHC RBC AUTO-ENTMCNC: 30.2 G/DL (ref 32–36)
MCV RBC AUTO: 94 FL (ref 82–98)
MONOCYTES # BLD AUTO: 0.7 K/UL (ref 0.3–1)
MONOCYTES NFR BLD: 9.9 % (ref 4–15)
NEUTROPHILS # BLD AUTO: 4.6 K/UL (ref 1.8–7.7)
NEUTROPHILS NFR BLD: 60.6 % (ref 38–73)
NRBC BLD-RTO: 0 /100 WBC
PLATELET # BLD AUTO: 247 K/UL (ref 150–350)
PMV BLD AUTO: 10.9 FL (ref 9.2–12.9)
POTASSIUM SERPL-SCNC: 3.9 MMOL/L (ref 3.5–5.1)
PROT SERPL-MCNC: 7.1 G/DL (ref 6–8.4)
RBC # BLD AUTO: 4.29 M/UL (ref 4–5.4)
SODIUM SERPL-SCNC: 142 MMOL/L (ref 136–145)
WBC # BLD AUTO: 7.51 K/UL (ref 3.9–12.7)

## 2020-10-20 PROCEDURE — 86140 C-REACTIVE PROTEIN: CPT

## 2020-10-20 PROCEDURE — 85025 COMPLETE CBC W/AUTO DIFF WBC: CPT

## 2020-10-20 PROCEDURE — 80053 COMPREHEN METABOLIC PANEL: CPT

## 2020-10-20 PROCEDURE — 36415 COLL VENOUS BLD VENIPUNCTURE: CPT | Mod: PO

## 2020-10-20 PROCEDURE — 85652 RBC SED RATE AUTOMATED: CPT

## 2020-10-21 LAB — CRP SERPL-MCNC: 3.8 MG/L (ref 0–8.2)

## 2020-10-25 ENCOUNTER — PATIENT OUTREACH (OUTPATIENT)
Dept: ADMINISTRATIVE | Facility: OTHER | Age: 70
End: 2020-10-25

## 2020-10-26 ENCOUNTER — PATIENT OUTREACH (OUTPATIENT)
Dept: ADMINISTRATIVE | Facility: OTHER | Age: 70
End: 2020-10-26

## 2020-10-26 NOTE — PROGRESS NOTES
Health Maintenance Due   Topic Date Due    Colorectal Cancer Screening  04/18/2016    Mammogram  10/23/2016     Updates were requested from care everywhere.  Chart was reviewed for overdue Proactive [facility] Encounters (KAYLAN) topics (CRS, Breast Cancer Screening, Eye exam)  Health Maintenance has been updated.  [facility] immunization registry triggered.  Immunizations were reconciled.

## 2020-10-27 ENCOUNTER — OFFICE VISIT (OUTPATIENT)
Dept: HEMATOLOGY/ONCOLOGY | Facility: CLINIC | Age: 70
End: 2020-10-27
Payer: MEDICARE

## 2020-10-27 ENCOUNTER — OFFICE VISIT (OUTPATIENT)
Dept: RHEUMATOLOGY | Facility: CLINIC | Age: 70
End: 2020-10-27
Payer: MEDICARE

## 2020-10-27 ENCOUNTER — INFUSION (OUTPATIENT)
Dept: INFUSION THERAPY | Facility: HOSPITAL | Age: 70
End: 2020-10-27
Attending: NUCLEAR MEDICINE
Payer: MEDICARE

## 2020-10-27 VITALS
OXYGEN SATURATION: 98 % | DIASTOLIC BLOOD PRESSURE: 68 MMHG | RESPIRATION RATE: 16 BRPM | RESPIRATION RATE: 16 BRPM | HEIGHT: 64 IN | HEART RATE: 78 BPM | SYSTOLIC BLOOD PRESSURE: 114 MMHG | OXYGEN SATURATION: 98 % | TEMPERATURE: 98 F | WEIGHT: 255 LBS | DIASTOLIC BLOOD PRESSURE: 65 MMHG | HEART RATE: 78 BPM | BODY MASS INDEX: 43.54 KG/M2 | SYSTOLIC BLOOD PRESSURE: 124 MMHG | TEMPERATURE: 98 F

## 2020-10-27 VITALS
SYSTOLIC BLOOD PRESSURE: 126 MMHG | DIASTOLIC BLOOD PRESSURE: 68 MMHG | HEART RATE: 74 BPM | BODY MASS INDEX: 45.18 KG/M2 | WEIGHT: 255.06 LBS

## 2020-10-27 DIAGNOSIS — L40.50 PSORIATIC ARTHRITIS: ICD-10-CM

## 2020-10-27 DIAGNOSIS — Z51.81 MEDICATION MONITORING ENCOUNTER: ICD-10-CM

## 2020-10-27 DIAGNOSIS — L40.9 PSORIASIS: Primary | ICD-10-CM

## 2020-10-27 DIAGNOSIS — D50.0 IRON DEFICIENCY ANEMIA DUE TO CHRONIC BLOOD LOSS: Primary | ICD-10-CM

## 2020-10-27 DIAGNOSIS — M85.89 OSTEOPENIA OF MULTIPLE SITES: ICD-10-CM

## 2020-10-27 DIAGNOSIS — N18.30 STAGE 3 CHRONIC KIDNEY DISEASE, UNSPECIFIED WHETHER STAGE 3A OR 3B CKD: ICD-10-CM

## 2020-10-27 PROCEDURE — 99999 PR PBB SHADOW E&M-EST. PATIENT-LVL IV: ICD-10-PCS | Mod: PBBFAC,,, | Performed by: INTERNAL MEDICINE

## 2020-10-27 PROCEDURE — 99999 PR PBB SHADOW E&M-EST. PATIENT-LVL V: ICD-10-PCS | Mod: PBBFAC,,, | Performed by: NURSE PRACTITIONER

## 2020-10-27 PROCEDURE — 96372 THER/PROPH/DIAG INJ SC/IM: CPT

## 2020-10-27 PROCEDURE — 99214 OFFICE O/P EST MOD 30 MIN: CPT | Mod: S$PBB,,, | Performed by: INTERNAL MEDICINE

## 2020-10-27 PROCEDURE — 99214 PR OFFICE/OUTPT VISIT, EST, LEVL IV, 30-39 MIN: ICD-10-PCS | Mod: S$PBB,,, | Performed by: NURSE PRACTITIONER

## 2020-10-27 PROCEDURE — 99999 PR PBB SHADOW E&M-EST. PATIENT-LVL V: CPT | Mod: PBBFAC,,, | Performed by: NURSE PRACTITIONER

## 2020-10-27 PROCEDURE — 63600175 PHARM REV CODE 636 W HCPCS: Mod: JG | Performed by: INTERNAL MEDICINE

## 2020-10-27 PROCEDURE — 99214 PR OFFICE/OUTPT VISIT, EST, LEVL IV, 30-39 MIN: ICD-10-PCS | Mod: S$PBB,,, | Performed by: INTERNAL MEDICINE

## 2020-10-27 PROCEDURE — 99214 OFFICE O/P EST MOD 30 MIN: CPT | Mod: PBBFAC,25 | Performed by: INTERNAL MEDICINE

## 2020-10-27 PROCEDURE — 99214 OFFICE O/P EST MOD 30 MIN: CPT | Mod: S$PBB,,, | Performed by: NURSE PRACTITIONER

## 2020-10-27 PROCEDURE — 99999 PR PBB SHADOW E&M-EST. PATIENT-LVL IV: CPT | Mod: PBBFAC,,, | Performed by: INTERNAL MEDICINE

## 2020-10-27 PROCEDURE — 99215 OFFICE O/P EST HI 40 MIN: CPT | Mod: PBBFAC,25,27 | Performed by: NURSE PRACTITIONER

## 2020-10-27 RX ORDER — DEXTROMETHORPHAN HYDROBROMIDE, GUAIFENESIN 5; 100 MG/5ML; MG/5ML
LIQUID ORAL
COMMUNITY

## 2020-10-27 RX ADMIN — USTEKINUMAB 90 MG: 90 INJECTION, SOLUTION SUBCUTANEOUS at 03:10

## 2020-10-27 NOTE — PROGRESS NOTES
Subjective:       Patient ID: [id number] Ortiz is a 70 y.o. female.    Chief Complaint: Anemia, lab work follow up.    HPI: 70 y.o female with CKD III, HLD, HTN, CAD, arthritis, Iron deficiency. Patient was asked to see GI previously for an EGD/Colonoscopy however, she declined. Eventually she agreed to GI consult and planned to proceed with endoscopies however due to COVID concerns endoscopies were cancelled     10/23/19  Patient reports recent abnormal heart rhythm, being managed per Cardiology. Reports intermittent dizziness, fatigue. I had a detailed discussion with the patient in regards to her current clinical situation. Discussed with patient her declining iron levels and declining hemoglobin. I have recommended proceeding with upper and lower endoscopies. Patient reports that she may be agreeable to proceeding with colonoscopy but would like visit with GI first. Patient saw GI and had endoscopies were scheduled for 5/2020 however was cancelled due to Covid concerns    Today's visit:  Patient presents today for lab result review and follow up of her iron deficiency anemia. Patient reports having recent heart valve replacement. Reports having visit with GI and was told endoscopies would need to be delayed at least 3 months postop. She plans to see GI 1/2021. Denies SOB, dizziness, lightheadedness, syncope, hematuria, hematochezia, melena. Patient reports noting BLE swelling postoperatively. Denies pain. Has upcoming Cardiology appointment  Social History     Socioeconomic History    Marital status: Single     Spouse name: Not on file    Number of children: 3    Years of education: Not on file    Highest education level: Not on file   Occupational History    Occupation: Not working   Social Needs    Financial resource strain: Not on file    Food insecurity     Worry: Not on file     Inability: Not on file    Transportation needs     Medical: Not on file     Non-medical: Not on file   Tobacco Use     Smoking status: Never Smoker    Smokeless tobacco: Never Used   Substance and Sexual Activity    Alcohol use: No    Drug use: No    Sexual activity: Never     Partners: Male   Lifestyle    Physical activity     Days per week: Not on file     Minutes per session: Not on file    Stress: Not on file   Relationships    Social connections     Talks on phone: Not on file     Gets together: Not on file     Attends Manda[employer] service: Not on file     Active member of club or organization: Not on file     Attends meetings of clubs or organizations: Not on file     Relationship status: Not on file   Other Topics Concern    Not on file   Social History Narrative    Not on file       Past Medical History:   Diagnosis Date    Carotid stenosis     19%    COPD (chronic obstructive pulmonary disease)     No meds    Coronary artery disease     CVA (cerebral vascular accident)     Dr. 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 Hope    Psoriatic arthritis     Rheumatology       Family History   Problem Relation Age of Onset    Breast cancer Maternal Grandfather     Breast cancer Paternal Aunt     Stroke Unknown     Breast cancer Sister 60    Leukemia Sister 8         as child    Lung cancer Paternal Grandfather     Heart disease Unknown        Past Surgical History:   Procedure Laterality Date    BREAST BIOPSY      R sided/benign    CARDIAC SURGERY      2016    CERVICAL FUSION      CHOLECYSTECTOMY      CORONARY ANGIOPLASTY      CORONARY ARTERY BYPASS GRAFT      triple bypass    CORONARY STENT PLACEMENT      EYE SURGERY      INTRAUTERINE DEVICE INSERTION      LEFT HEART CATHETERIZATION Left 2020    Procedure: CATHETERIZATION, HEART, LEFT;  Surgeon: Veronica Ibarra MD;  Location: [facility] CATH LAB;  Service: Cardiology;  Laterality: Left;  7am start time     mass removed from R groin      TOTAL ABDOMINAL HYSTERECTOMY W/ BILATERAL SALPINGOOPHORECTOMY      due to benign mass, adhesions    TUBAL LIGATION         Review of Systems   Constitutional: Negative for activity change, appetite change, chills, diaphoresis, fatigue, fever and unexpected weight change.   HENT: Negative for congestion, nosebleeds, sore throat, trouble swallowing and voice change.    Eyes: Negative for photophobia, pain and visual disturbance.   Respiratory: Negative for cough, choking, chest tightness, shortness of breath, wheezing and stridor.    Cardiovascular: Positive for leg swelling. Negative for chest pain and palpitations.   Gastrointestinal: Negative for abdominal distention, abdominal pain, anal bleeding, blood in stool, constipation, diarrhea, nausea, rectal pain and vomiting.   Genitourinary: Negative for difficulty urinating, dysuria and hematuria.   Musculoskeletal: Negative for arthralgias, back pain and gait problem.   Skin: Negative for rash and wound.   Neurological: Negative for dizziness, facial asymmetry, weakness, light-headedness, numbness and headaches.   Hematological: Negative for adenopathy. Does not bruise/bleed easily.   Psychiatric/Behavioral: The patient is not nervous/anxious.          Medication List with Changes/Refills   Current Medications    ACETAMINOPHEN (TYLENOL) 650 MG TBSR    as directed    ALBUTEROL (PROVENTIL) 2.5 MG /3 ML (0.083 %) NEBULIZER SOLUTION    Take 3 mLs (2.5 mg total) by nebulization every 6 (six) hours as needed for Wheezing. Rescue    ASPIRIN 81 MG CHEW    Take 162.5 mg by mouth once daily.     CALCIUM CARBONATE 650 MG CALCIUM (1,625 MG) TABLET    Take 1 tablet by mouth once daily.    CHLORHEXIDINE (BETASEPT SURGICAL SCRUB) 4 % EXTERNAL LIQUID    Apply topically daily as needed. Consider 90 day supply    CLOPIDOGREL (PLAVIX) 75 MG TABLET    Take 1 tablet (75 mg total) by mouth once daily.    CYANOCOBALAMIN 2000 MCG TABLET    Take 2,000 mcg  by mouth once daily.    DOCUSATE SODIUM (COLACE) 100 MG CAPSULE    Take 1 capsule (100 mg total) by mouth 2 (two) times daily.    EVOLOCUMAB (REPATHA SURECLICK) 140 MG/ML PNIJ    Inject 1 mL (140 mg total) into the skin every 14 (fourteen) days.    FOLIC ACID (FOLVITE) 1 MG TABLET    Take 1 tablet (1 mg total) by mouth once daily.    FUROSEMIDE (LASIX) 20 MG TABLET    Take 20 mg by mouth once daily.    GABAPENTIN (NEURONTIN) 100 MG CAPSULE    Take 2 capsules (200 mg total) by mouth 3 (three) times daily as needed.    GARLIC 2,000 MG CAP    Take 3,000 mg by mouth once daily.     HYDROCORTISONE 2.5 % CREAM    Apply topically 2 (two) times daily.    IRON-VITAMIN C 100-250 MG, ICAR-C, (ICAR-C) 100-250 MG TAB    Take 1 tablet by mouth once daily.    LEVOTHYROXINE (SYNTHROID) 112 MCG TABLET    Take 1 tablet (112 mcg total) by mouth once daily.    MAGNESIUM CITRATE SOLUTION        METOPROLOL SUCCINATE (TOPROL-XL) 100 MG 24 HR TABLET    Take 1 tablet (100 mg total) by mouth 2 (two) times daily. 1 tab (100mg) in morning. And 100 mg at bedtime.  Generic ok    MUPIROCIN (BACTROBAN) 2 % OINTMENT    Bactroban 2 % topical ointment   Apply 1 application twice a day by topical route as directed for 30 days.    NITROGLYCERIN (NITROSTAT) 0.4 MG SL TABLET    Place 1 tablet (0.4 mg total) under the tongue every 5 (five) minutes as needed for Chest pain.    POTASSIUM CHLORIDE SA (K-DUR,KLOR-CON) 20 MEQ TABLET    Take 1 tablet (20 mEq total) by mouth once daily.    PYRIDOXINE (VITAMIN B-6) 100 MG TAB    Take 200 mg by mouth once daily.     USTEKINUMAB (STELARA) 90 MG/ML SYRG SYRINGE    Inject 1 mL (90 mg total) into the skin every 3 (three) months.    VALSARTAN-HYDROCHLOROTHIAZIDE (DIOVAN-HCT) 160-12.5 MG PER TABLET    Take 1 tablet by mouth once daily.    VITAMIN D 1000 UNITS TAB    Take 185 mg by mouth once daily.     Objective:     Vitals:    10/27/20 1503   BP: 124/65   Pulse: 78   Resp: 16   Temp: 97.6 °F (36.4 °C)     Lab Results    Component Value Date    WBC 8.58 10/27/2020    HGB 13.0 10/27/2020    HCT 40.5 10/27/2020    MCV 91 10/27/2020     10/27/2020     BMP  Lab Results   Component Value Date     10/27/2020    K 4.0 10/27/2020     10/27/2020    CO2 29 10/27/2020    BUN 16 10/27/2020    CREATININE 1.2 10/27/2020    CALCIUM 9.7 10/27/2020    ANIONGAP 11 10/27/2020    ESTGFRAFRICA 53 (A) 10/27/2020    EGFRNONAA 46 (A) 10/27/2020     Lab Results   Component Value Date    ALT 21 10/20/2020    AST 28 10/20/2020    ALKPHOS 40 (L) 10/20/2020    BILITOT 0.3 10/20/2020     Lab Results   Component Value Date    IRON 52 09/18/2020    TIBC 327 09/18/2020    FERRITIN 124 09/18/2020         Physical Exam  Vitals signs reviewed.   Constitutional:       Appearance: She is well-developed.   HENT:      Head: Normocephalic.      Right Ear: Hearing and external ear normal.      Left Ear: Hearing and external ear normal.      Nose: Nose normal.   Eyes:      General: Lids are normal.         Right eye: No discharge.         Left eye: No discharge.      Conjunctiva/sclera: Conjunctivae normal.   Neck:      Musculoskeletal: Normal range of motion.      Thyroid: No thyroid mass.   Cardiovascular:      Rate and Rhythm: Normal rate and regular rhythm.   Pulmonary:      Effort: Pulmonary effort is normal. No respiratory distress.      Breath sounds: Normal breath sounds.   Abdominal:      General: There is no distension.   Musculoskeletal: Normal range of motion.      Right lower leg: Edema present.      Left lower leg: Edema present.   Lymphadenopathy:      Head:      Right side of head: No submandibular, preauricular or posterior auricular adenopathy.      Left side of head: No submandibular, preauricular or posterior auricular adenopathy.      Cervical:      Right cervical: No superficial cervical adenopathy.     Left cervical: No superficial cervical adenopathy.   Skin:     General: Skin is warm and dry.   Neurological:      Mental Status:  She is alert and oriented to person, place, and time.   Psychiatric:         Mood and Affect: Mood is anxious.         Speech: Speech normal.         Behavior: Behavior normal.         Thought Content: Thought content normal.          Assessment:     Problem List Items Addressed This Visit        Oncology    Iron deficiency anemia due to chronic blood loss - Primary     Iron levels drawn today are pending. F/u and replete PRN. Hemoglobin normal. She denies noting any abnormal bleeding. Patient reports having GI visit but scopes on hold due to recently having heart valve replacement surgery. Plans to see GI 1/2021 to reevaluate    F/u 2 months with repeat labs. Sooner follow up as needed         Relevant Orders    CBC auto differential    Basic Metabolic Panel    Iron and TIBC    Ferritin                    EMIR Kumar

## 2020-10-27 NOTE — NURSING
Stelara 90 mg q 3 months  Last dose- 7/15/20     Any:  recent illness, infection, or antibiotic use in past week- denies  open wounds or mouth sores- denies  invasive procedures or surgeries in past 4 weeks or in upcoming 4 weeks- patient had surgery 9/8/20  Recent live vaccine- denies  -chance you may be pregnant- n/a        Recent labs? 10/20/20  Last Rheumatology provider visit- Seen by Dr. Arreaga on 10/27/20     Premeds? none     Exp:3/23     Stelara 90 mg administered as documented in MAR in RLQ of abdominal tissue using aseptic technique. Band aid applied to site. Patient tolerated well. Patient declined to stay for observation after injection. Patient declines any previous signs or symptoms of adverse reaction after receiving stelara injection. Patient ambulated out of treatment room with S

## 2020-10-27 NOTE — PROGRESS NOTES
CC:  Chief Complaint   Patient presents with       Psoriasis/ PsA      History of Present Illness:  Qi Mg a 70 y.o.yo female here for routine follow-up of psoriasis and psoriatic arthritis  On Stelara 90 mg q 3 months; she is getting it here in clinic. In the past has failed enbrel, mtx, has sulfa allergy, recurrent skin infections with Humira, intolerant to Otezla  She has been on Stelara since 3/2016. Also with neuropathic chest pain due to previous cardiac surgery, gabapentin helps greatly. She takes 200 mg 2 to 3 times a day as needed  Most of her rash resolved except she sometimes has rash along the hairline  Hands hurt when she uses a lot  No joint swelling  No fever, chills, fatigue or weight loss    Since last visit she had AVR , they were aware she is on Stelara done at [facility], today she is 1 m s/p procedure   Per her they did not mention any concerns with stelara   She is feeling very well after the procedure with improved energy ,  decreased fatigue    She is doing well except pain in both her feet  Better controlled on gabapentin , but if she takes a higher dose she cant function   She is aware of all COVID precautions    She also has Osteopenia, h/o right wrist fx in the 90's. Failed po boniva and fosamax due to GI intolerance. Last dexa showed decreasing bmd so Dr. CASTELLANOS started reclast in sept but she felt terrible after her infusion with significant pain and confusion. She does not want to repeat.  NO new falls/fxs. I discussed prolia with her in the past and today and she declines any meds   In July had a repeat DEXA suggestive of osteopenia with moderate FRAX  She takes vitamin-D over-the-counter daily, levels normal today     She has iron deficiency anemia and gets constipated with iron tablets  She is currently followed by PCP for the same    She has history of staph infections and hence uses a betacept scrub      Past Medical History:   Diagnosis Date    Carotid  stenosis     19%    COPD (chronic obstructive pulmonary disease)     No meds    Coronary artery disease     CVA (cerebral vascular accident)     Dr. 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 Hope    Psoriatic arthritis     Rheumatology       Past Surgical History:   Procedure Laterality Date    BREAST BIOPSY      R sided/benign    CARDIAC SURGERY      2016    CERVICAL FUSION      CHOLECYSTECTOMY      CORONARY ANGIOPLASTY      CORONARY ARTERY BYPASS GRAFT      triple bypass    CORONARY STENT PLACEMENT      EYE SURGERY      INTRAUTERINE DEVICE INSERTION      LEFT HEART CATHETERIZATION Left 2020    Procedure: CATHETERIZATION, HEART, LEFT;  Surgeon: Veronica Ibarra MD;  Location: [facility] CATH LAB;  Service: Cardiology;  Laterality: Left;  7am start time    mass removed from R groin      TOTAL ABDOMINAL HYSTERECTOMY W/ BILATERAL SALPINGOOPHORECTOMY      due to benign mass, adhesions    TUBAL LIGATION           Social History     Tobacco Use    Smoking status: Never Smoker    Smokeless tobacco: Never Used   Substance Use Topics    Alcohol use: No    Drug use: No       Family History   Problem Relation Age of Onset    Breast cancer Maternal Grandfather     Breast cancer Paternal Aunt     Stroke Unknown     Breast cancer Sister 60    Leukemia Sister 8         as child    Lung cancer Paternal Grandfather     Heart disease Unknown        Review of patient's allergies indicates:   Allergen Reactions    Celexa [citalopram] Anaphylaxis    Clindamycin Itching and Swelling    Codeine Shortness Of Breath, Itching and Swelling    Crestor [rosuvastatin] Anaphylaxis    Cytotec [misoprostol] Anaphylaxis and Other (See Comments)     Difficulty breathing    Lisinopril Anaphylaxis    Magnesium citrate Shortness Of Breath    Stadol [butorphanol  tartrate] Anaphylaxis     Coded    Vicodin [hydrocodone-acetaminophen] Shortness Of Breath    Adhesive      EKG Electrodes    Aggrenox [aspirin-dipyridamole] Other (See Comments)     headaches    Avelox [moxifloxacin]      PATIENT STATES DO NOT GIVE UNDER ANY CIRCUSTANCES    Demerol [meperidine]     Isosorbide Other (See Comments)     Severe headache    Kenalog [triamcinolone acetonide]     Medrol [methylprednisolone] Other (See Comments)     unknown    Mobic [meloxicam]     Morphine     Nitroglycerin      Long acting    Pholcodine     Prednisone      GASTRIC PAIN    Ranexa [ranolazine] Nausea And Vomiting    Reclast [zoledronic acid-mannitol-water] Other (See Comments)     Bones hurt    Sulfa (sulfonamide antibiotics) Other (See Comments)     unknown    Talwin [pentazocine lactate]     Tetracycline     Tetracyclines     Tilade [nedocromil]     Zetia [ezetimibe]      Medication List with Changes/Refills   Current Medications    ACETAMINOPHEN (TYLENOL) 650 MG TBSR    as directed    ALBUTEROL (PROVENTIL) 2.5 MG /3 ML (0.083 %) NEBULIZER SOLUTION    Take 3 mLs (2.5 mg total) by nebulization every 6 (six) hours as needed for Wheezing. Rescue    ASPIRIN 81 MG CHEW    Take 162.5 mg by mouth once daily.     CALCIUM CARBONATE 650 MG CALCIUM (1,625 MG) TABLET    Take 1 tablet by mouth once daily.    CHLORHEXIDINE (BETASEPT SURGICAL SCRUB) 4 % EXTERNAL LIQUID    Apply topically daily as needed. Consider 90 day supply    CLOPIDOGREL (PLAVIX) 75 MG TABLET    Take 1 tablet (75 mg total) by mouth once daily.    CYANOCOBALAMIN 2000 MCG TABLET    Take 2,000 mcg by mouth once daily.    DOCUSATE SODIUM (COLACE) 100 MG CAPSULE    Take 1 capsule (100 mg total) by mouth 2 (two) times daily.    EVOLOCUMAB (REPATHA SURECLICK) 140 MG/ML PNIJ    Inject 1 mL (140 mg total) into the skin every 14 (fourteen) days.    FOLIC ACID (FOLVITE) 1 MG TABLET    Take 1 tablet (1 mg total) by mouth once daily.    FUROSEMIDE (LASIX)  20 MG TABLET    Take 20 mg by mouth once daily.    GABAPENTIN (NEURONTIN) 100 MG CAPSULE    Take 2 capsules (200 mg total) by mouth 3 (three) times daily as needed.    GARLIC 2,000 MG CAP    Take 3,000 mg by mouth once daily.     HYDROCORTISONE 2.5 % CREAM    Apply topically 2 (two) times daily.    IRON-VITAMIN C 100-250 MG, ICAR-C, (ICAR-C) 100-250 MG TAB    Take 1 tablet by mouth once daily.    LEVOTHYROXINE (SYNTHROID) 112 MCG TABLET    Take 1 tablet (112 mcg total) by mouth once daily.    MAGNESIUM CITRATE SOLUTION        METOPROLOL SUCCINATE (TOPROL-XL) 100 MG 24 HR TABLET    Take 1 tablet (100 mg total) by mouth 2 (two) times daily. 1 tab (100mg) in morning. And 100 mg at bedtime.  Generic ok    MUPIROCIN (BACTROBAN) 2 % OINTMENT    Bactroban 2 % topical ointment   Apply 1 application twice a day by topical route as directed for 30 days.    NITROGLYCERIN (NITROSTAT) 0.4 MG SL TABLET    Place 1 tablet (0.4 mg total) under the tongue every 5 (five) minutes as needed for Chest pain.    POTASSIUM CHLORIDE SA (K-DUR,KLOR-CON) 20 MEQ TABLET    Take 1 tablet (20 mEq total) by mouth once daily.    PYRIDOXINE (VITAMIN B-6) 100 MG TAB    Take 200 mg by mouth once daily.     USTEKINUMAB (STELARA) 90 MG/ML SYRG SYRINGE    Inject 1 mL (90 mg total) into the skin every 3 (three) months.    VALSARTAN-HYDROCHLOROTHIAZIDE (DIOVAN-HCT) 160-12.5 MG PER TABLET    Take 1 tablet by mouth once daily.    VITAMIN D 1000 UNITS TAB    Take 185 mg by mouth once daily.     Review of Systems:  Constitutional: Denies fever, chills. No recent weight changes.   Fatigue: feels better   Muscle weakness: no  Headaches: no new headaches  Eyes: No redness or dryness.  No recent visual changes.  ENT: Denies dry mouth. No oral or nasal ulcers.  Card: No chest pain.  Resp: No cough or sob.   Gastro: No nausea or vomiting.  No heartburn.  Constipation: no  Diarrhea: no  Genito:  No dysuria.  No genital ulcers.  Skin: per hpi   Raynauds:no  Neuro: Non  focal   Psych: + depression   Endo:  no excess thirst.  Heme: no abnormal bleeding or bruising  Clots:none       OBJECTIVE:     Vital Signs     Physical Exam:  General Appearance:  NAD.   Gait: not favoring.  HEENT:   Eyes not dry or injected.  No nasal ulcers.  Mouth not dry, no oral lesions.  Lymph: cervical, supraclavicular or axillary nodes: none abnormal   Cardio: no irregularity of S1 or S2.  No gallops or rubs.   Resp: Normal respiratory motion. Clear to auscultation bilaterally.   No abnormal chest conformation.  Abd: Soft, non-tender, nondistended.  No masses.   Skin: Head and neck,  and extremities examined.   Rash along hairline   Neuro: Ox3.   Cranial nerves II-XII grossly intact.   Musculoskeletal Exam:   Bilateral osteoarthritic changes in both hands.  Prominent DIP.  No tenderness  No synovitis    Laboratory:   Results for orders placed or performed in visit on 10/20/20   CBC auto differential   Result Value Ref Range    WBC 7.51 3.90 - 12.70 K/uL    RBC 4.29 4.00 - 5.40 M/uL    Hemoglobin 12.2 12.0 - 16.0 g/dL    Hematocrit 40.4 37.0 - 48.5 %    MCV 94 82 - 98 fL    MCH 28.4 27.0 - 31.0 pg    MCHC 30.2 (L) 32.0 - 36.0 g/dL    RDW 14.5 11.5 - 14.5 %    Platelets 247 150 - 350 K/uL    MPV 10.9 9.2 - 12.9 fL    Immature Granulocytes 0.3 0.0 - 0.5 %    Gran # (ANC) 4.6 1.8 - 7.7 K/uL    Immature Grans (Abs) 0.02 0.00 - 0.04 K/uL    Lymph # 2.0 1.0 - 4.8 K/uL    Mono # 0.7 0.3 - 1.0 K/uL    Eos # 0.1 0.0 - 0.5 K/uL    Baso # 0.05 0.00 - 0.20 K/uL    nRBC 0 0 /100 WBC    Gran % 60.6 38.0 - 73.0 %    Lymph % 26.9 18.0 - 48.0 %    Mono % 9.9 4.0 - 15.0 %    Eosinophil % 1.6 0.0 - 8.0 %    Basophil % 0.7 0.0 - 1.9 %    Differential Method Automated    Comprehensive metabolic panel   Result Value Ref Range    Sodium 142 136 - 145 mmol/L    Potassium 3.9 3.5 - 5.1 mmol/L    Chloride 104 95 - 110 mmol/L    CO2 28 23 - 29 mmol/L    Glucose 117 (H) 70 - 110 mg/dL    BUN 24 (H) 8 - 23 mg/dL    Creatinine 1.2 0.5 -  1.4 mg/dL    Calcium 9.6 8.7 - 10.5 mg/dL    Total Protein 7.1 6.0 - 8.4 g/dL    Albumin 3.7 3.5 - 5.2 g/dL    Total Bilirubin 0.3 0.1 - 1.0 mg/dL    Alkaline Phosphatase 40 (L) 55 - 135 U/L    AST 28 10 - 40 U/L    ALT 21 10 - 44 U/L    Anion Gap 10 8 - 16 mmol/L    eGFR if African American 53 (A) >60 mL/min/1.73 m^2    eGFR if non African American 46 (A) >60 mL/min/1.73 m^2   Sedimentation rate   Result Value Ref Range    Sed Rate 25 0 - 36 mm/Hr   C-reactive protein   Result Value Ref Range    CRP 3.8 0.0 - 8.2 mg/L     Lab Results   Component Value Date    HEPAIGM Negative 10/03/2018    HEPBIGM Negative 10/03/2018    HEPCAB Negative 09/28/2020         Imaging :reviewed x rays hands/ feet     Notes reviewed  Other procedures:DEXA 8/18/20      FINDINGS:  The L1 to L4 vertebral bone mineral density is equal to 1.108 g/cm squared with a T score of -0.6.  There has been a 0.2% increase which is not statistically significant relative to the prior study.     The left femoral neck bone mineral density is equal to 0.85 g/cm squared with a T score of -1.4.  There has been  a 1.4% increase which is not statistically significant relative to the prior study.     There is a 12.9% risk of a major osteoporotic fracture and a 1.4% risk of hip fracture in the next 10 years (FRAX).     Impression:     Osteopenia  ASSESSMENT/PLAN:     Psoriasis    Psoriatic arthritis    Medication monitoring encounter    Stage 3 chronic kidney disease, unspecified whether stage 3a or 3b CKD    Osteopenia of multiple sites      67 yr old    1: Pso/ PsA  Stable disease   Started Stelara 3/2016 with good improvement  C/w Stelara 90mg every 12 weeks (she gets it done here) as she cannot do it herself with her arthritis  Vaccinations declined   Continue with gabapentin 200 mg 2 to 3 times a day as needed    2: Osteopenia with moderate FRAX   Received last reclast in September 2017 ,But she did not tolerate with pain   She also experienced lot of  confusion after IV Reclast and she declines any further medications for the treatment of osteoporosis  Prior GI intolerance to fosamax and boniva   cont vit d supp -OTC  Today d/w her prolia  She declined again   DEXA reviewed       3: CKD stage 3 :  She does not  Take any NSAIDS   She only uses tylenol as needed     4:Anemia / leukocytosis :  She is currently getting IV iron infusions, she follows with Hematology  She is also due to  have endoscopy and colonoscopy per GI        stellara :  Infection, malignancy risk , other side effects discussed.  Hold prior to any surgery or during periods of infection.  Stay uptodate with HM screen , she says she refused mammogram due to pain after CABG , she understands the risks but still refuses     3 month f/u with all 4 lab  Went over uptodate information /literature on the meds prescribed today     Disclaimer: This note was prepared using voice recognition system and is likely to have sound alike errors and is not proof read.  Please call me with any questions.

## 2020-10-27 NOTE — ASSESSMENT & PLAN NOTE
Iron levels drawn today are pending. F/u and replete PRN. Hemoglobin normal. She denies noting any abnormal bleeding. Patient reports having GI visit but scopes on hold due to recently having heart valve replacement surgery. Plans to see GI 1/2021 to reevaluate    F/u 2 months with repeat labs. Sooner follow up as needed

## 2020-10-28 NOTE — PROGRESS NOTES
PATIENT CALLED BACK AND STATED THAT SHE HAD A VALVE REPLACED A MONTH A GO.  WOULD LIKE TO WAIT TILL SHE SPEAKS TO DOCTOR.

## 2020-11-09 ENCOUNTER — HOSPITAL ENCOUNTER (OUTPATIENT)
Dept: CARDIOLOGY | Facility: HOSPITAL | Age: 70
Discharge: HOME | End: 2020-11-09
Attending: NUCLEAR MEDICINE
Payer: MEDICARE

## 2020-11-09 ENCOUNTER — OFFICE VISIT (OUTPATIENT)
Dept: CARDIOLOGY | Facility: CLINIC | Age: 70
End: 2020-11-09
Payer: MEDICARE

## 2020-11-09 VITALS
DIASTOLIC BLOOD PRESSURE: 64 MMHG | OXYGEN SATURATION: 97 % | WEIGHT: 255.75 LBS | BODY MASS INDEX: 43.66 KG/M2 | HEIGHT: 64 IN | HEART RATE: 83 BPM | SYSTOLIC BLOOD PRESSURE: 136 MMHG

## 2020-11-09 DIAGNOSIS — I35.0 NONRHEUMATIC AORTIC VALVE STENOSIS: Primary | Chronic | ICD-10-CM

## 2020-11-09 DIAGNOSIS — Z95.1 S/P CABG (CORONARY ARTERY BYPASS GRAFT): Chronic | ICD-10-CM

## 2020-11-09 DIAGNOSIS — I25.118 CORONARY ARTERY DISEASE OF NATIVE ARTERY OF NATIVE HEART WITH STABLE ANGINA PECTORIS: ICD-10-CM

## 2020-11-09 DIAGNOSIS — Z95.2 S/P TAVR (TRANSCATHETER AORTIC VALVE REPLACEMENT): ICD-10-CM

## 2020-11-09 DIAGNOSIS — I47.10 SUPRAVENTRICULAR TACHYCARDIA: Primary | Chronic | ICD-10-CM

## 2020-11-09 DIAGNOSIS — I47.10 SUPRAVENTRICULAR TACHYCARDIA: Chronic | ICD-10-CM

## 2020-11-09 PROCEDURE — 93010 ELECTROCARDIOGRAM REPORT: CPT | Mod: ,,, | Performed by: INTERNAL MEDICINE

## 2020-11-09 PROCEDURE — 99999 PR PBB SHADOW E&M-EST. PATIENT-LVL V: ICD-10-PCS | Mod: PBBFAC,,, | Performed by: NUCLEAR MEDICINE

## 2020-11-09 PROCEDURE — 93010 EKG 12-LEAD: ICD-10-PCS | Mod: ,,, | Performed by: INTERNAL MEDICINE

## 2020-11-09 PROCEDURE — 99214 PR OFFICE/OUTPT VISIT, EST, LEVL IV, 30-39 MIN: ICD-10-PCS | Mod: S$PBB,,, | Performed by: NUCLEAR MEDICINE

## 2020-11-09 PROCEDURE — 99215 OFFICE O/P EST HI 40 MIN: CPT | Mod: PBBFAC | Performed by: NUCLEAR MEDICINE

## 2020-11-09 PROCEDURE — 93005 ELECTROCARDIOGRAM TRACING: CPT

## 2020-11-09 PROCEDURE — 99999 PR PBB SHADOW E&M-EST. PATIENT-LVL V: CPT | Mod: PBBFAC,,, | Performed by: NUCLEAR MEDICINE

## 2020-11-09 PROCEDURE — 99214 OFFICE O/P EST MOD 30 MIN: CPT | Mod: S$PBB,,, | Performed by: NUCLEAR MEDICINE

## 2020-11-09 NOTE — PROGRESS NOTES
Subjective:   Patient ID:  Qi Ortiz is a 70 y.o. female who presents for follow-up of Valvular Heart Disease (AS- TAVR), Coronary Artery Disease (CABG), and Hypertension      HPI DOING WELL. ALL ORDINARY DAILY ACTIVITIES WELL TOLERATED  NO ORTHOPNEA OR PND  NO CHEST DISCOMFORT- PLEURITIC OR ANGINAL  NO RECURRENT FEVER OR CHILLS  NO PALPITATIONS  NO FOCAL CNS SYMPTOMS OR SIGNS TO SUGGEST TIA OR STROKE  NO EDEMA. NO CALVE TENDERNESS  NO ABNORMAL BLEEDING - ON DAPT-ASA AND PLAVIX    Review of Systems   Constitution: Negative for chills, fever, night sweats, weight gain and weight loss.   HENT: Negative for nosebleeds.    Eyes: Negative for blurred vision, double vision and visual disturbance.   Cardiovascular: Negative for chest pain, dyspnea on exertion, irregular heartbeat, leg swelling, orthopnea, palpitations, paroxysmal nocturnal dyspnea and syncope.   Respiratory: Negative for cough, hemoptysis and wheezing.    Endocrine: Negative for polydipsia and polyuria.   Hematologic/Lymphatic: Does not bruise/bleed easily.   Skin: Negative for rash.   Musculoskeletal: Negative for joint pain, joint swelling, muscle weakness and myalgias.   Gastrointestinal: Negative for abdominal pain, hematemesis, jaundice and melena.   Genitourinary: Negative for dysuria, hematuria and nocturia.   Neurological: Negative for dizziness, focal weakness, headaches, sensory change and weakness.   Psychiatric/Behavioral: Negative for depression. The patient does not have insomnia and is not nervous/anxious.      Family History   Problem Relation Age of Onset    Breast cancer Maternal Grandfather     Breast cancer Paternal Aunt     Stroke Unknown     Breast cancer Sister 60    Leukemia Sister 8         as child    Lung cancer Paternal Grandfather     Heart disease Unknown      Past Medical History:   Diagnosis Date    Carotid stenosis     19%    COPD (chronic obstructive pulmonary disease)     No meds    Coronary artery  disease     CVA (cerebral vascular accident)     Dr. 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 Hope    Psoriatic arthritis     Rheumatology     Social History     Socioeconomic History    Marital status: Single     Spouse name: Not on file    Number of children: 3    Years of education: Not on file    Highest education level: Not on file   Occupational History    Occupation: Not working   Social Needs    Financial resource strain: Not on file    Food insecurity     Worry: Not on file     Inability: Not on file    Transportation needs     Medical: Not on file     Non-medical: Not on file   Tobacco Use    Smoking status: Never Smoker    Smokeless tobacco: Never Used   Substance and Sexual Activity    Alcohol use: No    Drug use: No    Sexual activity: Never     Partners: Male   Lifestyle    Physical activity     Days per week: Not on file     Minutes per session: Not on file    Stress: Not on file   Relationships    Social connections     Talks on phone: Not on file     Gets together: Not on file     Attends Orthodox service: Not on file     Active member of club or organization: Not on file     Attends meetings of clubs or organizations: Not on file     Relationship status: Not on file   Other Topics Concern    Not on file   Social History Narrative    Not on file     Current Outpatient Medications on File Prior to Visit   Medication Sig Dispense Refill    aspirin 81 MG Chew Take 162.5 mg by mouth once daily.       clopidogreL (PLAVIX) 75 mg tablet Take 1 tablet (75 mg total) by mouth once daily. 90 tablet 3    furosemide (LASIX) 20 MG tablet Take 20 mg by mouth once daily.      levothyroxine (SYNTHROID) 112 MCG tablet Take 1 tablet (112 mcg total) by mouth once daily. 90 tablet 1    metoprolol succinate (TOPROL-XL) 100 MG 24 hr tablet Take 1 tablet  (100 mg total) by mouth 2 (two) times daily. 1 tab (100mg) in morning. And 100 mg at bedtime.  Generic ok 180 tablet 3    potassium chloride SA (K-DUR,KLOR-CON) 20 MEQ tablet Take 1 tablet (20 mEq total) by mouth once daily. 90 tablet 3    valsartan-hydrochlorothiazide (DIOVAN-HCT) 160-12.5 mg per tablet Take 1 tablet by mouth once daily. 90 tablet 3    acetaminophen (TYLENOL) 650 MG TbSR as directed      albuterol (PROVENTIL) 2.5 mg /3 mL (0.083 %) nebulizer solution Take 3 mLs (2.5 mg total) by nebulization every 6 (six) hours as needed for Wheezing. Rescue 25 each 5    calcium carbonate 650 mg calcium (1,625 mg) tablet Take 1 tablet by mouth once daily.      chlorhexidine (BETASEPT SURGICAL SCRUB) 4 % external liquid Apply topically daily as needed. Consider 90 day supply 473 mL 3    cyanocobalamin 2000 MCG tablet Take 2,000 mcg by mouth once daily.      docusate sodium (COLACE) 100 MG capsule Take 1 capsule (100 mg total) by mouth 2 (two) times daily. 60 capsule 0    evolocumab (REPATHA SURECLICK) 140 mg/mL PnIj Inject 1 mL (140 mg total) into the skin every 14 (fourteen) days. 6 mL 3    folic acid (FOLVITE) 1 MG tablet Take 1 tablet (1 mg total) by mouth once daily. 90 tablet 1    gabapentin (NEURONTIN) 100 MG capsule Take 2 capsules (200 mg total) by mouth 3 (three) times daily as needed. 540 capsule 3    garlic 2,000 mg Cap Take 3,000 mg by mouth once daily.       hydrocortisone 2.5 % cream Apply topically 2 (two) times daily. 20 g 0    iron-vitamin C 100-250 mg, ICAR-C, (ICAR-C) 100-250 mg Tab Take 1 tablet by mouth once daily. 90 tablet 4    magnesium citrate solution       mupirocin (BACTROBAN) 2 % ointment Bactroban 2 % topical ointment   Apply 1 application twice a day by topical route as directed for 30 days.      nitroGLYCERIN (NITROSTAT) 0.4 MG SL tablet Place 1 tablet (0.4 mg total) under the tongue every 5 (five) minutes as needed for Chest pain. (Patient not taking: Reported on  11/9/2020) 100 tablet 3    pyridoxine (VITAMIN B-6) 100 MG Tab Take 200 mg by mouth once daily.       ustekinumab (STELARA) 90 mg/mL Syrg syringe Inject 1 mL (90 mg total) into the skin every 3 (three) months. 1 Syringe 3    vitamin D 1000 units Tab Take 185 mg by mouth once daily.       No current facility-administered medications on file prior to visit.      Review of patient's allergies indicates:   Allergen Reactions    Celexa [citalopram] Anaphylaxis    Clindamycin Itching and Swelling    Codeine Shortness Of Breath, Itching and Swelling    Crestor [rosuvastatin] Anaphylaxis    Cytotec [misoprostol] Anaphylaxis and Other (See Comments)     Difficulty breathing    Lisinopril Anaphylaxis    Magnesium citrate Shortness Of Breath    Stadol [butorphanol tartrate] Anaphylaxis     Coded    Vicodin [hydrocodone-acetaminophen] Shortness Of Breath    Adhesive      EKG Electrodes    Aggrenox [aspirin-dipyridamole] Other (See Comments)     headaches    Avelox [moxifloxacin]      PATIENT STATES DO NOT GIVE UNDER ANY CIRCUSTANCES    Demerol [meperidine]     Isosorbide Other (See Comments)     Severe headache    Kenalog [triamcinolone acetonide]     Medrol [methylprednisolone] Other (See Comments)     Patient reports taking IV in the past without any issues. Reports allergy to oral preparation     Mobic [meloxicam]     Morphine     Nitroglycerin      Long acting    Pholcodine     Prednisone      GASTRIC PAIN    Ranexa [ranolazine] Nausea And Vomiting    Reclast [zoledronic acid-mannitol-water] Other (See Comments)     Bones hurt    Sulfa (sulfonamide antibiotics) Other (See Comments)     unknown    Talwin [pentazocine lactate]     Tetracycline     Tetracyclines     Tilade [nedocromil]     Zetia [ezetimibe]        Objective:     Physical Exam   Constitutional: She is oriented to person, place, and time. She appears well-developed. No distress.   HENT:   Head: Normocephalic.   Eyes: Pupils are  equal, round, and reactive to light. Conjunctivae are normal. No scleral icterus.   Neck: Normal range of motion. Neck supple. Normal carotid pulses, no hepatojugular reflux and no JVD present. Carotid bruit is not present. No edema present. No thyroid mass and no thyromegaly present.   Cardiovascular: Normal rate, regular rhythm, S1 normal, S2 normal and intact distal pulses. PMI is not displaced. Exam reveals no gallop and no friction rub.   Murmur heard.   Medium-pitched harsh crescendo-decrescendo midsystolic murmur is present with a grade of 2/6 at the upper right sternal border, upper left sternal border and lower left sternal border. The intensity increases with respiration.  Pulses:       Carotid pulses are 2+ on the right side and 2+ on the left side.       Radial pulses are 2+ on the right side and 2+ on the left side.        Femoral pulses are 2+ on the right side and 2+ on the left side.       Popliteal pulses are 2+ on the right side and 2+ on the left side.        Dorsalis pedis pulses are 2+ on the right side and 2+ on the left side.        Posterior tibial pulses are 2+ on the right side and 2+ on the left side.   Pulmonary/Chest: Effort normal and breath sounds normal. She has no wheezes. She has no rales. She exhibits no tenderness.   Abdominal: Soft. Bowel sounds are normal. She exhibits no pulsatile midline mass and no mass. There is no hepatosplenomegaly. There is no abdominal tenderness.   Musculoskeletal: Normal range of motion.         General: No tenderness or edema.      Cervical back: Normal.      Thoracic back: Normal.      Lumbar back: Normal.   Lymphadenopathy:     She has no cervical adenopathy.     She has no axillary adenopathy.        Right: No supraclavicular adenopathy present.        Left: No supraclavicular adenopathy present.   Neurological: She is alert and oriented to person, place, and time. She has normal strength and normal reflexes. No sensory deficit. Gait normal.    Skin: Skin is warm. No rash noted. No cyanosis. No pallor. Nails show no clubbing.   Psychiatric: She has a normal mood and affect. Her speech is normal and behavior is normal. Cognition and memory are normal.       Assessment:     1. Nonrheumatic aortic valve stenosis    2. S/P TAVR (transcatheter aortic valve replacement)    3. Coronary artery disease of native artery of native heart with stable angina pectoris    4. S/P CABG (coronary artery bypass graft)        Plan:     Nonrheumatic aortic valve stenosis  ASYMPTOMATIC    S/P TAVR (transcatheter aortic valve replacement)  WELL FUNCTIONING CARD DEVICE  NO NINI ARRHYTHMIAS  CNS STATUS STABLE  NO ABNORMAL BLEEDING-ON DAPT    Coronary artery disease of native artery of native heart with stable angina pectoris  NO CLINICAL EVIDENCE OF ACTIVE MYOCARDIAL ISCHEMIA    S/P CABG (coronary artery bypass graft)  STABLE    CONTINUE PRESENT CARD MANAGEMENT  RETURN IN 4 MONTHS.

## 2020-11-10 RX ORDER — METHYLPREDNISOLONE SOD SUCC 125 MG
80 VIAL (EA) INJECTION
Status: CANCELLED
Start: 2020-11-10

## 2020-11-10 RX ORDER — HEPARIN 100 UNIT/ML
500 SYRINGE INTRAVENOUS
Status: CANCELLED | OUTPATIENT
Start: 2020-11-10

## 2020-11-10 RX ORDER — SODIUM CHLORIDE 0.9 % (FLUSH) 0.9 %
10 SYRINGE (ML) INJECTION
Status: CANCELLED | OUTPATIENT
Start: 2020-11-10

## 2020-11-12 ENCOUNTER — INFUSION (OUTPATIENT)
Dept: INFUSION THERAPY | Facility: HOSPITAL | Age: 70
End: 2020-11-12
Attending: NURSE PRACTITIONER
Payer: MEDICARE

## 2020-11-12 VITALS
OXYGEN SATURATION: 98 % | DIASTOLIC BLOOD PRESSURE: 59 MMHG | WEIGHT: 257.06 LBS | BODY MASS INDEX: 44.82 KG/M2 | TEMPERATURE: 99 F | SYSTOLIC BLOOD PRESSURE: 117 MMHG | HEART RATE: 58 BPM | RESPIRATION RATE: 18 BRPM

## 2020-11-12 DIAGNOSIS — D50.0 IRON DEFICIENCY ANEMIA DUE TO CHRONIC BLOOD LOSS: Primary | ICD-10-CM

## 2020-11-12 PROCEDURE — 96375 TX/PRO/DX INJ NEW DRUG ADDON: CPT

## 2020-11-12 PROCEDURE — 96374 THER/PROPH/DIAG INJ IV PUSH: CPT

## 2020-11-12 PROCEDURE — 63600175 PHARM REV CODE 636 W HCPCS: Performed by: NURSE PRACTITIONER

## 2020-11-12 RX ORDER — METHYLPREDNISOLONE SOD SUCC 125 MG
80 VIAL (EA) INJECTION
Status: COMPLETED | OUTPATIENT
Start: 2020-11-12 | End: 2020-11-12

## 2020-11-12 RX ADMIN — IRON SUCROSE 200 MG: 20 INJECTION, SOLUTION INTRAVENOUS at 10:11

## 2020-11-12 RX ADMIN — METHYLPREDNISOLONE SODIUM SUCCINATE 80 MG: 125 INJECTION, POWDER, FOR SOLUTION INTRAMUSCULAR; INTRAVENOUS at 10:11

## 2020-11-18 RX ORDER — HEPARIN 100 UNIT/ML
500 SYRINGE INTRAVENOUS
Status: CANCELLED | OUTPATIENT
Start: 2020-11-18

## 2020-11-18 RX ORDER — SODIUM CHLORIDE 0.9 % (FLUSH) 0.9 %
10 SYRINGE (ML) INJECTION
Status: CANCELLED | OUTPATIENT
Start: 2020-11-18

## 2020-11-18 RX ORDER — METHYLPREDNISOLONE SOD SUCC 125 MG
80 VIAL (EA) INJECTION
Status: CANCELLED
Start: 2020-11-18

## 2020-11-19 ENCOUNTER — INFUSION (OUTPATIENT)
Dept: INFUSION THERAPY | Facility: HOSPITAL | Age: 70
End: 2020-11-19
Attending: NURSE PRACTITIONER
Payer: MEDICARE

## 2020-11-19 VITALS
TEMPERATURE: 97 F | DIASTOLIC BLOOD PRESSURE: 58 MMHG | RESPIRATION RATE: 18 BRPM | SYSTOLIC BLOOD PRESSURE: 129 MMHG | HEART RATE: 69 BPM | OXYGEN SATURATION: 98 %

## 2020-11-19 DIAGNOSIS — D50.0 IRON DEFICIENCY ANEMIA DUE TO CHRONIC BLOOD LOSS: Primary | ICD-10-CM

## 2020-11-19 PROCEDURE — 96375 TX/PRO/DX INJ NEW DRUG ADDON: CPT

## 2020-11-19 PROCEDURE — 96374 THER/PROPH/DIAG INJ IV PUSH: CPT

## 2020-11-19 PROCEDURE — 63600175 PHARM REV CODE 636 W HCPCS: Performed by: NURSE PRACTITIONER

## 2020-11-19 RX ORDER — METHYLPREDNISOLONE SOD SUCC 125 MG
80 VIAL (EA) INJECTION
Status: COMPLETED | OUTPATIENT
Start: 2020-11-19 | End: 2020-11-19

## 2020-11-19 RX ADMIN — METHYLPREDNISOLONE SODIUM SUCCINATE 80 MG: 125 INJECTION, POWDER, FOR SOLUTION INTRAMUSCULAR; INTRAVENOUS at 10:11

## 2020-11-19 RX ADMIN — IRON SUCROSE 200 MG: 20 INJECTION, SOLUTION INTRAVENOUS at 11:11

## 2020-11-25 ENCOUNTER — INFUSION (OUTPATIENT)
Dept: INFUSION THERAPY | Facility: HOSPITAL | Age: 70
End: 2020-11-25
Attending: NURSE PRACTITIONER
Payer: MEDICARE

## 2020-11-25 VITALS
BODY MASS INDEX: 43.47 KG/M2 | OXYGEN SATURATION: 97 % | DIASTOLIC BLOOD PRESSURE: 51 MMHG | TEMPERATURE: 97 F | WEIGHT: 254.63 LBS | HEART RATE: 68 BPM | SYSTOLIC BLOOD PRESSURE: 143 MMHG | HEIGHT: 64 IN | RESPIRATION RATE: 18 BRPM

## 2020-11-25 DIAGNOSIS — D50.0 IRON DEFICIENCY ANEMIA DUE TO CHRONIC BLOOD LOSS: Primary | ICD-10-CM

## 2020-11-25 PROCEDURE — 96374 THER/PROPH/DIAG INJ IV PUSH: CPT

## 2020-11-25 PROCEDURE — 96375 TX/PRO/DX INJ NEW DRUG ADDON: CPT

## 2020-11-25 PROCEDURE — 63600175 PHARM REV CODE 636 W HCPCS: Performed by: INTERNAL MEDICINE

## 2020-11-25 PROCEDURE — 25000003 PHARM REV CODE 250: Performed by: INTERNAL MEDICINE

## 2020-11-25 RX ORDER — SODIUM CHLORIDE 0.9 % (FLUSH) 0.9 %
10 SYRINGE (ML) INJECTION
Status: DISCONTINUED | OUTPATIENT
Start: 2020-11-25 | End: 2020-11-25 | Stop reason: HOSPADM

## 2020-11-25 RX ORDER — HEPARIN 100 UNIT/ML
500 SYRINGE INTRAVENOUS
Status: CANCELLED | OUTPATIENT
Start: 2020-11-25

## 2020-11-25 RX ORDER — SODIUM CHLORIDE 0.9 % (FLUSH) 0.9 %
10 SYRINGE (ML) INJECTION
Status: CANCELLED | OUTPATIENT
Start: 2020-11-25

## 2020-11-25 RX ORDER — METHYLPREDNISOLONE SOD SUCC 125 MG
80 VIAL (EA) INJECTION
Status: CANCELLED
Start: 2020-11-25

## 2020-11-25 RX ORDER — METHYLPREDNISOLONE SOD SUCC 125 MG
80 VIAL (EA) INJECTION
Status: COMPLETED | OUTPATIENT
Start: 2020-11-25 | End: 2020-11-25

## 2020-11-25 RX ADMIN — IRON SUCROSE 200 MG: 20 INJECTION, SOLUTION INTRAVENOUS at 12:11

## 2020-11-25 RX ADMIN — SODIUM CHLORIDE: 0.9 INJECTION, SOLUTION INTRAVENOUS at 11:11

## 2020-11-25 RX ADMIN — METHYLPREDNISOLONE SODIUM SUCCINATE 80 MG: 125 INJECTION, POWDER, FOR SOLUTION INTRAMUSCULAR; INTRAVENOUS at 11:11

## 2020-11-25 NOTE — PLAN OF CARE
"  Problem: Adult Inpatient Plan of Care  Goal: Plan of Care Review  Outcome: Ongoing, Progressing     Problem: Anemia  Goal: Anemia Symptom Improvement  Outcome: Ongoing, Progressing     Problem: Fall Injury Risk  Goal: Absence of Fall and Fall-Related Injury  Outcome: Ongoing, Progressing   Patient reports, "I have always been nauseated off and on"   "

## 2020-11-25 NOTE — DISCHARGE INSTRUCTIONS
[facility]  [address]  [address]  [phone] phone     [phone] fax  Hours of Operation: Monday- Friday 8:00am- 5:00pm  After hours phone  [phone]  Hematology / Oncology Physicians on call      Dr. Hank Javed, SAMEERA Delgado, SAMEERA Alanis NP    Please call with any concerns regarding your appointment today.  WAYS TO HELP PREVENT INFECTION         WASH YOUR HANDS OFTEN DURING THE DAY, ESPECIALLY BEFORE YOU EAT, AFTER USING THE BATHROOM, AND AFTER TOUCHING ANIMALS     STAY AWAY FROM PEOPLE WHO HAVE ILLNESSES YOU CAN CATCH; SUCH AS COLDS, FLU, CHICKEN POX     TRY TO AVOID CROWDS     STAY AWAY FROM CHILDREN WHO RECENTLY HAVE RECEIVED LIVE VIRUS VACCINES     MAINTAIN GOOD MOUTH CARE     DO NOT SQUEEZE OR SCRATCH PIMPLES     CLEAN CUTS & SCRAPES RIGHT AWAY AND DAILY UNTIL HEALED WITH WARM WATER, SOAP & AN ANTISEPTIC     AVOID CONTACT WITH LITTER BOXES, BIRD CAGES, & FISH TANKS     AVOID STANDING WATER, IE., BIRD BATHS, FLOWER POTS/VASES, OR HUMIDIFIERS     WEAR GLOVES WHEN GARDENING OR CLEANING UP AFTER OTHERS, ESPECIALLY BABIES & SMALL CHILDREN     DO NOT EAT RAW FISH, SEAFOOD, MEAT, OR EGGS  FALL PREVENTION   Falls often occur due to slipping, tripping or losing your balance. Here are ways to reduce your risk of falling again.   Was there anything that caused your fall that can be fixed, removed or replaced?   Make your home safe by keeping walkways clear of objects you may trip over.   Use non-slip pads under rugs.   Do not walk in poorly lit areas.   Do not stand on chairs or wobbly ladders.   Use caution when reaching overhead or looking upward. This position can cause a loss of balance.   Be sure your shoes fit properly, have non-slip bottoms and are in good condition.   Be cautious when going up and down stairs, curbs, and when walking on  uneven sidewalks.   If your balance is poor, consider using a cane or walker.   If your fall was related to alcohol use, stop or limit alcohol intake.   If your fall was related to use of sleeping medicines, talk to your doctor about this. You may need to reduce your dosage at bedtime if you awaken during the night to go to the bathroom.   To reduce the need for nighttime bathroom trips:   Avoid drinking fluids for several hours before going to bed   Empty your bladder before going to bed   Men can keep a urinal at the bedside   © 3973-0422 [facility], [address] [place] [address], [place] [address]. All rights reserved. This information is not intended as a substitute for professional medical care. Always follow your healthcare professional's instructions.

## 2020-12-02 ENCOUNTER — INFUSION (OUTPATIENT)
Dept: INFUSION THERAPY | Facility: HOSPITAL | Age: 70
End: 2020-12-02
Attending: NURSE PRACTITIONER
Payer: MEDICARE

## 2020-12-02 VITALS
RESPIRATION RATE: 16 BRPM | HEART RATE: 75 BPM | DIASTOLIC BLOOD PRESSURE: 52 MMHG | OXYGEN SATURATION: 97 % | TEMPERATURE: 98 F | SYSTOLIC BLOOD PRESSURE: 128 MMHG

## 2020-12-02 DIAGNOSIS — D50.0 IRON DEFICIENCY ANEMIA DUE TO CHRONIC BLOOD LOSS: Primary | ICD-10-CM

## 2020-12-02 PROCEDURE — 25000003 PHARM REV CODE 250: Performed by: NURSE PRACTITIONER

## 2020-12-02 PROCEDURE — 96374 THER/PROPH/DIAG INJ IV PUSH: CPT

## 2020-12-02 PROCEDURE — 63600175 PHARM REV CODE 636 W HCPCS: Performed by: NURSE PRACTITIONER

## 2020-12-02 PROCEDURE — 96375 TX/PRO/DX INJ NEW DRUG ADDON: CPT

## 2020-12-02 RX ORDER — SODIUM CHLORIDE 0.9 % (FLUSH) 0.9 %
10 SYRINGE (ML) INJECTION
Status: CANCELLED | OUTPATIENT
Start: 2020-12-02

## 2020-12-02 RX ORDER — METHYLPREDNISOLONE SOD SUCC 125 MG
80 VIAL (EA) INJECTION
Status: CANCELLED
Start: 2020-12-02

## 2020-12-02 RX ORDER — HEPARIN 100 UNIT/ML
500 SYRINGE INTRAVENOUS
Status: CANCELLED | OUTPATIENT
Start: 2020-12-02

## 2020-12-02 RX ORDER — METHYLPREDNISOLONE SOD SUCC 125 MG
80 VIAL (EA) INJECTION
Status: COMPLETED | OUTPATIENT
Start: 2020-12-02 | End: 2020-12-02

## 2020-12-02 RX ADMIN — METHYLPREDNISOLONE SODIUM SUCCINATE 80 MG: 125 INJECTION, POWDER, FOR SOLUTION INTRAMUSCULAR; INTRAVENOUS at 09:12

## 2020-12-02 RX ADMIN — SODIUM CHLORIDE: 0.9 INJECTION, SOLUTION INTRAVENOUS at 08:12

## 2020-12-02 RX ADMIN — IRON SUCROSE 200 MG: 20 INJECTION, SOLUTION INTRAVENOUS at 09:12

## 2020-12-02 NOTE — DISCHARGE INSTRUCTIONS
[facility]  [address] [address]  [address]  [phone] phone     [phone] fax  Hours of Operation: Monday- Friday 8:00am- 5:00pm  After hours phone  [phone]  Hematology / Oncology Physicians on call      Dr. Hank Kwong      Please call with any concerns regarding your appointment today.        FALL PREVENTION   Falls often occur due to slipping, tripping or losing your balance. Here are ways to reduce your risk of falling again.   Was there anything that caused your fall that can be fixed, removed or replaced?   Make your home safe by keeping walkways clear of objects you may trip over.   Use non-slip pads under rugs.   Do not walk in poorly lit areas.   Do not stand on chairs or wobbly ladders.   Use caution when reaching overhead or looking upward. This position can cause a loss of balance.   Be sure your shoes fit properly, have non-slip bottoms and are in good condition.   Be cautious when going up and down stairs, curbs, and when walking on uneven sidewalks.   If your balance is poor, consider using a cane or walker.   If your fall was related to alcohol use, stop or limit alcohol intake.   If your fall was related to use of sleeping medicines, talk to your doctor about this. You may need to reduce your dosage at bedtime if you awaken during the night to go to the bathroom.   To reduce the need for nighttime bathroom trips:   Avoid drinking fluids for several hours before going to bed   Empty your bladder before going to bed   Men can keep a urinal at the bedside   © 2590-7210 [employer], [address] [place] [address]. All rights reserved. This information is not intended as a substitute for professional medical care. Always follow your healthcare professional's instructions.      ANEMIA, Iron Deficiency [Adult]  Anemia is a condition where the size or number of red blood cells in the  body is reduced. Iron is needed in the diet to make red cells. The red blood cells carry oxygen to all parts of the body. Anemia limits the delivery of oxygen to where it is needed. This causes a feeling of being tired and run down. When anemia becomes severe, the skin becomes pale and there is shortness of breath with exertion, headaches, dizziness, drowsiness and fatigue.  The cause of your anemia is lack of iron in your body. This may occur due to blood loss (for example, heavy menstrual periods or bleeding from the stomach or intestines) or a poor diet (not eating enough iron-containing foods), inability to absorb iron from your diet, or pregnancy.  If the blood count is low enough, an IRON SUPPLEMENT will be prescribed. It usually takes about 2-3 months of treatment with iron supplements to correct an anemia. Severe cases of anemia requires a blood transfusion to rapidly correct symptoms and deliver more oxygen to the cells.  HOME CARE:  1) Increase the iron stores in your body by eating foods high in iron content. This is a natural way of building your blood cells back up again. Beef, liver, spinach and other dark green leafy vegetables, whole grain products, beans and nuts are all natural sources of iron.  2) If you are having symptoms of anemia listed above:  -- Do not overexert yourself.  -- Talk to your doctor before flying on an airplane or travelling to high altitudes.  FOLLOW UP with your doctor in 2 months for a repeat red blood cell count, or as recommended by our staff, to be sure that the anemia has been corrected.  GET PROMPT MEDICAL ATTENTION if any of the following occur or worsen:  -- Shortness of breath or chest pain  -- Dizziness or fainting  -- Vomiting blood or passing red or black-colored stool  © 2032-3319 [employer], [address] [place] [address], [place] [address]. All rights reserved. This information is not intended as a substitute for professional medical care. Always follow your  healthcare professional's instructions.    WAYS TO HELP PREVENT INFECTION         WASH YOUR HANDS OFTEN DURING THE DAY, ESPECIALLY BEFORE YOU EAT, AFTER USING THE BATHROOM, AND AFTER TOUCHING ANIMALS     STAY AWAY FROM PEOPLE WHO HAVE ILLNESSES YOU CAN CATCH; SUCH AS COLDS, FLU, CHICKEN POX     TRY TO AVOID CROWDS     STAY AWAY FROM CHILDREN WHO RECENTLY HAVE RECEIVED LIVE VIRUS VACCINES     MAINTAIN GOOD MOUTH CARE     DO NOT SQUEEZE OR SCRATCH PIMPLES     CLEAN CUTS & SCRAPES RIGHT AWAY AND DAILY UNTIL HEALED WITH WARM WATER, SOAP & AN ANTISEPTIC     AVOID CONTACT WITH LITTER BOXES, BIRD CAGES, & FISH TANKS     AVOID STANDING WATER, IE., BIRD BATHS, FLOWER POTS/VASES, OR HUMIDIFIERS     WEAR GLOVES WHEN GARDENING OR CLEANING UP AFTER OTHERS, ESPECIALLY BABIES & SMALL CHILDREN     DO NOT EAT RAW FISH, SEAFOOD, MEAT, OR EGGS

## 2020-12-09 ENCOUNTER — OFFICE VISIT (OUTPATIENT)
Dept: CARDIOLOGY | Facility: CLINIC | Age: 70
End: 2020-12-09
Payer: MEDICARE

## 2020-12-09 ENCOUNTER — HOSPITAL ENCOUNTER (OUTPATIENT)
Dept: RADIOLOGY | Facility: HOSPITAL | Age: 70
Discharge: HOME | End: 2020-12-09
Attending: NUCLEAR MEDICINE
Payer: MEDICARE

## 2020-12-09 VITALS
DIASTOLIC BLOOD PRESSURE: 64 MMHG | WEIGHT: 257.69 LBS | OXYGEN SATURATION: 97 % | SYSTOLIC BLOOD PRESSURE: 122 MMHG | HEART RATE: 88 BPM | HEIGHT: 64 IN | BODY MASS INDEX: 43.99 KG/M2

## 2020-12-09 DIAGNOSIS — I25.118 CORONARY ARTERY DISEASE OF NATIVE ARTERY OF NATIVE HEART WITH STABLE ANGINA PECTORIS: Primary | ICD-10-CM

## 2020-12-09 DIAGNOSIS — Z95.2 S/P TAVR (TRANSCATHETER AORTIC VALVE REPLACEMENT): ICD-10-CM

## 2020-12-09 DIAGNOSIS — I10 ESSENTIAL HYPERTENSION: Chronic | ICD-10-CM

## 2020-12-09 DIAGNOSIS — I35.0 NONRHEUMATIC AORTIC VALVE STENOSIS: Chronic | ICD-10-CM

## 2020-12-09 DIAGNOSIS — Z95.1 S/P CABG (CORONARY ARTERY BYPASS GRAFT): Chronic | ICD-10-CM

## 2020-12-09 DIAGNOSIS — I35.0 NONRHEUMATIC AORTIC VALVE STENOSIS: ICD-10-CM

## 2020-12-09 DIAGNOSIS — I20.9 ANGINA, CLASS II: Chronic | ICD-10-CM

## 2020-12-09 DIAGNOSIS — I20.9 ANGINA, CLASS II: Primary | Chronic | ICD-10-CM

## 2020-12-09 PROCEDURE — 99999 PR PBB SHADOW E&M-EST. PATIENT-LVL V: ICD-10-PCS | Mod: PBBFAC,,, | Performed by: NUCLEAR MEDICINE

## 2020-12-09 PROCEDURE — 93010 EKG 12-LEAD: ICD-10-PCS | Mod: ,,, | Performed by: INTERNAL MEDICINE

## 2020-12-09 PROCEDURE — 71046 XR CHEST PA AND LATERAL: ICD-10-PCS | Mod: 26,,, | Performed by: RADIOLOGY

## 2020-12-09 PROCEDURE — 71046 X-RAY EXAM CHEST 2 VIEWS: CPT | Mod: TC

## 2020-12-09 PROCEDURE — 99215 PR OFFICE/OUTPT VISIT, EST, LEVL V, 40-54 MIN: ICD-10-PCS | Mod: S$PBB,,, | Performed by: NUCLEAR MEDICINE

## 2020-12-09 PROCEDURE — 99999 PR PBB SHADOW E&M-EST. PATIENT-LVL V: CPT | Mod: PBBFAC,,, | Performed by: NUCLEAR MEDICINE

## 2020-12-09 PROCEDURE — 93005 ELECTROCARDIOGRAM TRACING: CPT

## 2020-12-09 PROCEDURE — 71046 X-RAY EXAM CHEST 2 VIEWS: CPT | Mod: 26,,, | Performed by: RADIOLOGY

## 2020-12-09 PROCEDURE — 99215 OFFICE O/P EST HI 40 MIN: CPT | Mod: PBBFAC,25 | Performed by: NUCLEAR MEDICINE

## 2020-12-09 PROCEDURE — 93010 ELECTROCARDIOGRAM REPORT: CPT | Mod: ,,, | Performed by: INTERNAL MEDICINE

## 2020-12-09 PROCEDURE — 99215 OFFICE O/P EST HI 40 MIN: CPT | Mod: S$PBB,,, | Performed by: NUCLEAR MEDICINE

## 2020-12-09 NOTE — PROGRESS NOTES
Subjective:   Patient ID:  Qi Ortiz is a 70 y.o. female who presents for follow-up of Aortic Stenosis (TAVR), Coronary Artery Disease (CABG), and Hypertension (ESSENTIAL)      HPI FOR LAST 2 WEEKS HER FAMILY MEMBERS HAS NOTICE HER TO BE MORE FATIGUE AND SOB.. SHE CLAIMS SHE HAS NOTICED LESS ENERGY AND BROOKS WITH MILD TO MODERATE EXERTION  NO DYSPNEA AT REST. NO ORTHOPNEA OR PND  NO EXACERBATION OF PALPITATIONS. NO CHEST DISCOMFORT- ANGINAL OR PLEURITIC  NO RECURRENT FEVER OR CHILLS  NO ABDOMINAL DISCOMFORT  NO INCREASE EDEMA  NO FOCAL CNS SYMPTOMS OR SIGNS TO SUGGEST TIA OR STROKE  SHE HAS INCREASED WEIGHT. AND NO REGULAR EXERCISE    Review of Systems   Constitution: Positive for malaise/fatigue. Negative for chills, fever, night sweats, weight gain and weight loss.   HENT: Negative for nosebleeds.    Eyes: Negative for blurred vision, double vision and visual disturbance.   Cardiovascular: Positive for dyspnea on exertion. Negative for chest pain, irregular heartbeat, leg swelling, orthopnea, palpitations, paroxysmal nocturnal dyspnea and syncope.   Respiratory: Negative for cough, hemoptysis and wheezing.    Endocrine: Negative for polydipsia and polyuria.   Hematologic/Lymphatic: Does not bruise/bleed easily.   Skin: Negative for rash.   Musculoskeletal: Negative for joint pain, joint swelling, muscle weakness and myalgias.   Gastrointestinal: Negative for abdominal pain, hematemesis, jaundice and melena.   Genitourinary: Negative for dysuria, hematuria and nocturia.   Neurological: Negative for dizziness, focal weakness, headaches, sensory change and weakness.   Psychiatric/Behavioral: Negative for depression. The patient does not have insomnia and is not nervous/anxious.      Family History   Problem Relation Age of Onset    Breast cancer Maternal Grandfather     Breast cancer Paternal Aunt     Stroke Unknown     Breast cancer Sister 60    Leukemia Sister 8         as child    Lung cancer  Paternal Grandfather     Heart disease Unknown      Past Medical History:   Diagnosis Date    Carotid stenosis     19%    COPD (chronic obstructive pulmonary disease)     No meds    Coronary artery disease     CVA (cerebral vascular accident)     Dr. 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 Hope    Psoriatic arthritis     Rheumatology     Social History     Socioeconomic History    Marital status: Single     Spouse name: Not on file    Number of children: 3    Years of education: Not on file    Highest education level: Not on file   Occupational History    Occupation: Not working   Social Needs    Financial resource strain: Not on file    Food insecurity     Worry: Not on file     Inability: Not on file    Transportation needs     Medical: Not on file     Non-medical: Not on file   Tobacco Use    Smoking status: Never Smoker    Smokeless tobacco: Never Used   Substance and Sexual Activity    Alcohol use: No    Drug use: No    Sexual activity: Never     Partners: Male   Lifestyle    Physical activity     Days per week: Not on file     Minutes per session: Not on file    Stress: Not on file   Relationships    Social connections     Talks on phone: Not on file     Gets together: Not on file     Attends Yarsani service: Not on file     Active member of club or organization: Not on file     Attends meetings of clubs or organizations: Not on file     Relationship status: Not on file   Other Topics Concern    Not on file   Social History Narrative    Not on file     Current Outpatient Medications on File Prior to Visit   Medication Sig Dispense Refill    acetaminophen (TYLENOL) 650 MG TbSR as directed      aspirin 81 MG Chew Take 162.5 mg by mouth once daily.       calcium carbonate 650 mg calcium (1,625 mg) tablet Take 1 tablet by mouth once daily.       clopidogreL (PLAVIX) 75 mg tablet Take 1 tablet (75 mg total) by mouth once daily. 90 tablet 3    docusate sodium (COLACE) 100 MG capsule Take 1 capsule (100 mg total) by mouth 2 (two) times daily. 60 capsule 0    evolocumab (REPATHA SURECLICK) 140 mg/mL PnIj Inject 1 mL (140 mg total) into the skin every 14 (fourteen) days. 6 mL 3    folic acid (FOLVITE) 1 MG tablet Take 1 tablet (1 mg total) by mouth once daily. 90 tablet 1    furosemide (LASIX) 20 MG tablet Take 20 mg by mouth once daily.      gabapentin (NEURONTIN) 100 MG capsule Take 2 capsules (200 mg total) by mouth 3 (three) times daily as needed. 540 capsule 3    garlic 2,000 mg Cap Take 3,000 mg by mouth once daily.       levothyroxine (SYNTHROID) 112 MCG tablet Take 1 tablet (112 mcg total) by mouth once daily. 90 tablet 1    metoprolol succinate (TOPROL-XL) 100 MG 24 hr tablet Take 1 tablet (100 mg total) by mouth 2 (two) times daily. 1 tab (100mg) in morning. And 100 mg at bedtime.  Generic ok 180 tablet 3    potassium chloride SA (K-DUR,KLOR-CON) 20 MEQ tablet Take 1 tablet (20 mEq total) by mouth once daily. 90 tablet 3    pyridoxine (VITAMIN B-6) 100 MG Tab Take 200 mg by mouth once daily.       valsartan-hydrochlorothiazide (DIOVAN-HCT) 160-12.5 mg per tablet Take 1 tablet by mouth once daily. 90 tablet 3    vitamin D 1000 units Tab Take 185 mg by mouth once daily.      albuterol (PROVENTIL) 2.5 mg /3 mL (0.083 %) nebulizer solution Take 3 mLs (2.5 mg total) by nebulization every 6 (six) hours as needed for Wheezing. Rescue 25 each 5    chlorhexidine (BETASEPT SURGICAL SCRUB) 4 % external liquid Apply topically daily as needed. Consider 90 day supply 473 mL 3    cyanocobalamin 2000 MCG tablet Take 2,000 mcg by mouth once daily.      hydrocortisone 2.5 % cream Apply topically 2 (two) times daily. 20 g 0    magnesium citrate solution       mupirocin (BACTROBAN) 2 % ointment Bactroban 2 % topical ointment   Apply 1 application  twice a day by topical route as directed for 30 days.      nitroGLYCERIN (NITROSTAT) 0.4 MG SL tablet Place 1 tablet (0.4 mg total) under the tongue every 5 (five) minutes as needed for Chest pain. (Patient not taking: Reported on 12/9/2020) 100 tablet 3    ustekinumab (STELARA) 90 mg/mL Syrg syringe Inject 1 mL (90 mg total) into the skin every 3 (three) months. 1 Syringe 3    [DISCONTINUED] iron-vitamin C 100-250 mg, ICAR-C, (ICAR-C) 100-250 mg Tab Take 1 tablet by mouth once daily. (Patient not taking: Reported on 12/9/2020) 90 tablet 4     No current facility-administered medications on file prior to visit.      Review of patient's allergies indicates:   Allergen Reactions    Celexa [citalopram] Anaphylaxis    Clindamycin Itching and Swelling    Codeine Shortness Of Breath, Itching and Swelling    Crestor [rosuvastatin] Anaphylaxis    Cytotec [misoprostol] Anaphylaxis and Other (See Comments)     Difficulty breathing    Lisinopril Anaphylaxis    Magnesium citrate Shortness Of Breath    Stadol [butorphanol tartrate] Anaphylaxis     Coded    Vicodin [hydrocodone-acetaminophen] Shortness Of Breath    Adhesive      EKG Electrodes    Aggrenox [aspirin-dipyridamole] Other (See Comments)     headaches    Avelox [moxifloxacin]      PATIENT STATES DO NOT GIVE UNDER ANY CIRCUSTANCES    Demerol [meperidine]     Isosorbide Other (See Comments)     Severe headache    Kenalog [triamcinolone acetonide]     Medrol [methylprednisolone] Other (See Comments)     Patient reports taking IV in the past without any issues. Reports allergy to oral preparation     Mobic [meloxicam]     Morphine     Nitroglycerin      Long acting    Pholcodine     Prednisone      GASTRIC PAIN    Ranexa [ranolazine] Nausea And Vomiting    Reclast [zoledronic acid-mannitol-water] Other (See Comments)     Bones hurt    Sulfa (sulfonamide antibiotics) Other (See Comments)     unknown    Talwin [pentazocine lactate]      Tetracycline     Tetracyclines     Tilade [nedocromil]     Zetia [ezetimibe]        Objective:     Physical Exam   Constitutional: She is oriented to person, place, and time. She appears well-developed. No distress.   HENT:   Head: Normocephalic.   Eyes: Pupils are equal, round, and reactive to light. Conjunctivae are normal. No scleral icterus.   Neck: Normal range of motion. Neck supple. Normal carotid pulses, no hepatojugular reflux and no JVD present. Carotid bruit is not present. No edema present. No thyroid mass and no thyromegaly present.   Cardiovascular: Normal rate, regular rhythm, S1 normal, S2 normal and intact distal pulses. PMI is not displaced. Exam reveals no gallop and no friction rub.   Murmur heard.   Low-pitched harsh crescendo-decrescendo mid to late systolic murmur is present with a grade of 2/6 at the upper left sternal border and lower left sternal border. The intensity increases with respiration.  Pulses:       Carotid pulses are 2+ on the right side and 2+ on the left side.       Radial pulses are 2+ on the right side and 2+ on the left side.        Femoral pulses are 2+ on the right side and 2+ on the left side.       Popliteal pulses are 2+ on the right side and 2+ on the left side.        Dorsalis pedis pulses are 2+ on the right side and 2+ on the left side.        Posterior tibial pulses are 2+ on the right side and 2+ on the left side.   Pulmonary/Chest: Effort normal. She has wheezes. She has no rales. She exhibits no tenderness.   Abdominal: Soft. Bowel sounds are normal. She exhibits no pulsatile midline mass and no mass. There is no hepatosplenomegaly. There is no abdominal tenderness.   Musculoskeletal: Normal range of motion.         General: Edema present. No tenderness.      Cervical back: Normal.      Thoracic back: Normal.      Lumbar back: Normal.      Comments: 1+ BILATERAL LEGS   Lymphadenopathy:     She has no cervical adenopathy.     She has no axillary adenopathy.         Right: No supraclavicular adenopathy present.        Left: No supraclavicular adenopathy present.   Neurological: She is alert and oriented to person, place, and time. She has normal strength and normal reflexes. No sensory deficit. Gait normal.   Skin: Skin is warm. No rash noted. No cyanosis. No pallor. Nails show no clubbing.   Psychiatric: She has a normal mood and affect. Her speech is normal and behavior is normal. Cognition and memory are normal.       Assessment:     1. Coronary artery disease of native artery of native heart with stable angina pectoris    2. Nonrheumatic aortic valve stenosis    3. S/P CABG (coronary artery bypass graft)    4. S/P TAVR (transcatheter aortic valve replacement)    5. Essential hypertension        Plan:     Coronary artery disease of native artery of native heart with stable angina pectoris  ECG TODAY- SR, , 73, SINUS ARRHYTHMIAS. LVH WITH CHRONIC REP CHANGES    Nonrheumatic aortic valve stenosis  NO EVIDENCE OF DIASTOLIC MURMUR    S/P CABG (coronary artery bypass graft)  STABLE    S/P TAVR (transcatheter aortic valve replacement)    Essential hypertension  WELL CONTROLLED    TODAY-  BMP, BNP , CXR  AND ECHO IN ORDER TO EVALUATE STRUCTURAL AND FUNCTIONAL HEART FUNCTION     PHONE CALL TO REVIEW RESULTS AND FURTHER RECOMMENDATIONS

## 2020-12-10 ENCOUNTER — HOSPITAL ENCOUNTER (OUTPATIENT)
Dept: CARDIOLOGY | Facility: HOSPITAL | Age: 70
Discharge: HOME | End: 2020-12-10
Attending: NUCLEAR MEDICINE
Payer: MEDICARE

## 2020-12-10 VITALS
DIASTOLIC BLOOD PRESSURE: 64 MMHG | BODY MASS INDEX: 43.87 KG/M2 | HEIGHT: 64 IN | SYSTOLIC BLOOD PRESSURE: 122 MMHG | WEIGHT: 257 LBS

## 2020-12-10 DIAGNOSIS — Z95.2 S/P TAVR (TRANSCATHETER AORTIC VALVE REPLACEMENT): ICD-10-CM

## 2020-12-10 DIAGNOSIS — I35.0 NONRHEUMATIC AORTIC VALVE STENOSIS: ICD-10-CM

## 2020-12-10 PROCEDURE — 93306 ECHO (CUPID ONLY): ICD-10-PCS | Mod: 26,,, | Performed by: INTERNAL MEDICINE

## 2020-12-10 PROCEDURE — 93306 TTE W/DOPPLER COMPLETE: CPT

## 2020-12-10 PROCEDURE — 93306 TTE W/DOPPLER COMPLETE: CPT | Mod: 26,,, | Performed by: INTERNAL MEDICINE

## 2020-12-13 LAB
ASCENDING AORTA: 2.62 CM
AV INDEX (PROSTH): 0.48
AV MEAN GRADIENT: 9 MMHG
AV PEAK GRADIENT: 17 MMHG
AV VALVE AREA: 1.5 CM2
AV VELOCITY RATIO: 0.41
BSA FOR ECHO PROCEDURE: 2.29 M2
CV ECHO LV RWT: 0.56 CM
DOP CALC AO PEAK VEL: 2.06 M/S
DOP CALC AO VTI: 36.24 CM
DOP CALC LVOT AREA: 3.1 CM2
DOP CALC LVOT DIAMETER: 2 CM
DOP CALC LVOT PEAK VEL: 0.85 M/S
DOP CALC LVOT STROKE VOLUME: 54.42 CM3
DOP CALC RVOT PEAK VEL: 0.85 M/S
DOP CALC RVOT VTI: 14.23 CM
DOP CALCLVOT PEAK VEL VTI: 17.33 CM
E WAVE DECELERATION TIME: 260.83 MSEC
E/A RATIO: 0.75
E/E' RATIO: 10.63 M/S
ECHO LV POSTERIOR WALL: 1.39 CM (ref 0.6–1.1)
FRACTIONAL SHORTENING: 5 % (ref 28–44)
INTERVENTRICULAR SEPTUM: 1.21 CM (ref 0.6–1.1)
LA MAJOR: 5.52 CM
LA MINOR: 4.86 CM
LA WIDTH: 3.27 CM
LEFT ATRIUM SIZE: 4.27 CM
LEFT ATRIUM VOLUME INDEX: 28.2 ML/M2
LEFT ATRIUM VOLUME: 61.35 CM3
LEFT INTERNAL DIMENSION IN SYSTOLE: 4.73 CM (ref 2.1–4)
LEFT VENTRICLE DIASTOLIC VOLUME INDEX: 54.03 ML/M2
LEFT VENTRICLE DIASTOLIC VOLUME: 117.57 ML
LEFT VENTRICLE MASS INDEX: 120 G/M2
LEFT VENTRICLE SYSTOLIC VOLUME INDEX: 47.7 ML/M2
LEFT VENTRICLE SYSTOLIC VOLUME: 103.71 ML
LEFT VENTRICULAR INTERNAL DIMENSION IN DIASTOLE: 4.99 CM (ref 3.5–6)
LEFT VENTRICULAR MASS: 261.01 G
LV LATERAL E/E' RATIO: 7.73 M/S
LV SEPTAL E/E' RATIO: 17 M/S
MV PEAK A VEL: 1.14 M/S
MV PEAK E VEL: 0.85 M/S
PISA TR MAX VEL: 2.37 M/S
PV MEAN GRADIENT: 1.4 MMHG
PV PEAK VELOCITY: 1.25 CM/S
RA MAJOR: 4.87 CM
RA WIDTH: 2.99 CM
RIGHT VENTRICULAR END-DIASTOLIC DIMENSION: 2.63 CM
SINUS: 2.54 CM
STJ: 2.64 CM
TDI LATERAL: 0.11 M/S
TDI SEPTAL: 0.05 M/S
TDI: 0.08 M/S
TR MAX PG: 22 MMHG

## 2021-01-01 NOTE — TELEPHONE ENCOUNTER
----- Message from Cynthia Turner LPN sent at 2/28/2017  9:52 AM CST -----  Please see Chart note. Pt requesting Hemglobin A1c be added to her lab draw on 3/3/17 Due to Family Hx of Diabetes and being told in the past she was boarderline.   From: Torsten Mendoza  To: Ashish Wyman  Sent: 2021 12:21 PM CST  Subject: Follow Up    This message is being sent by Demi Mendoza on behalf of Torsten Mendoza.    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 He was able to pull the attachement but I believe that it is also slightly attached on the other side. Would it be possible to come in for him to take look at it?

## 2021-01-03 ENCOUNTER — PATIENT OUTREACH (OUTPATIENT)
Dept: ADMINISTRATIVE | Facility: OTHER | Age: 71
End: 2021-01-03

## 2021-01-05 ENCOUNTER — LAB VISIT (OUTPATIENT)
Dept: LAB | Facility: HOSPITAL | Age: 71
End: 2021-01-05
Attending: NURSE PRACTITIONER
Payer: MEDICARE

## 2021-01-05 DIAGNOSIS — D50.0 IRON DEFICIENCY ANEMIA DUE TO CHRONIC BLOOD LOSS: ICD-10-CM

## 2021-01-05 LAB
ANION GAP SERPL CALC-SCNC: 10 MMOL/L (ref 8–16)
BASOPHILS # BLD AUTO: 0.05 K/UL (ref 0–0.2)
BASOPHILS NFR BLD: 0.6 % (ref 0–1.9)
BUN SERPL-MCNC: 13 MG/DL (ref 8–23)
CALCIUM SERPL-MCNC: 9.4 MG/DL (ref 8.7–10.5)
CHLORIDE SERPL-SCNC: 103 MMOL/L (ref 95–110)
CO2 SERPL-SCNC: 30 MMOL/L (ref 23–29)
CREAT SERPL-MCNC: 1 MG/DL (ref 0.5–1.4)
DIFFERENTIAL METHOD: ABNORMAL
EOSINOPHIL # BLD AUTO: 0.1 K/UL (ref 0–0.5)
EOSINOPHIL NFR BLD: 1 % (ref 0–8)
ERYTHROCYTE [DISTWIDTH] IN BLOOD BY AUTOMATED COUNT: 14.8 % (ref 11.5–14.5)
EST. GFR  (AFRICAN AMERICAN): >60 ML/MIN/1.73 M^2
EST. GFR  (NON AFRICAN AMERICAN): 58 ML/MIN/1.73 M^2
GLUCOSE SERPL-MCNC: 154 MG/DL (ref 70–110)
HCT VFR BLD AUTO: 41.5 % (ref 37–48.5)
HGB BLD-MCNC: 12.9 G/DL (ref 12–16)
IMM GRANULOCYTES # BLD AUTO: 0.03 K/UL (ref 0–0.04)
IMM GRANULOCYTES NFR BLD AUTO: 0.4 % (ref 0–0.5)
LYMPHOCYTES # BLD AUTO: 1.7 K/UL (ref 1–4.8)
LYMPHOCYTES NFR BLD: 20.9 % (ref 18–48)
MCH RBC QN AUTO: 29.5 PG (ref 27–31)
MCHC RBC AUTO-ENTMCNC: 31.1 G/DL (ref 32–36)
MCV RBC AUTO: 95 FL (ref 82–98)
MONOCYTES # BLD AUTO: 0.7 K/UL (ref 0.3–1)
MONOCYTES NFR BLD: 9.2 % (ref 4–15)
NEUTROPHILS # BLD AUTO: 5.4 K/UL (ref 1.8–7.7)
NEUTROPHILS NFR BLD: 67.9 % (ref 38–73)
NRBC BLD-RTO: 0 /100 WBC
PLATELET # BLD AUTO: 274 K/UL (ref 150–350)
PMV BLD AUTO: 10.2 FL (ref 9.2–12.9)
POTASSIUM SERPL-SCNC: 3.8 MMOL/L (ref 3.5–5.1)
RBC # BLD AUTO: 4.38 M/UL (ref 4–5.4)
SODIUM SERPL-SCNC: 143 MMOL/L (ref 136–145)
WBC # BLD AUTO: 7.95 K/UL (ref 3.9–12.7)

## 2021-01-05 PROCEDURE — 82728 ASSAY OF FERRITIN: CPT

## 2021-01-05 PROCEDURE — 80048 BASIC METABOLIC PNL TOTAL CA: CPT

## 2021-01-05 PROCEDURE — 36415 COLL VENOUS BLD VENIPUNCTURE: CPT | Mod: PO

## 2021-01-05 PROCEDURE — 85025 COMPLETE CBC W/AUTO DIFF WBC: CPT

## 2021-01-05 PROCEDURE — 83540 ASSAY OF IRON: CPT

## 2021-01-06 LAB
FERRITIN SERPL-MCNC: 338 NG/ML (ref 20–300)
IRON SERPL-MCNC: 100 UG/DL (ref 30–160)
SATURATED IRON: 35 % (ref 20–50)
TOTAL IRON BINDING CAPACITY: 283 UG/DL (ref 250–450)
TRANSFERRIN SERPL-MCNC: 191 MG/DL (ref 200–375)

## 2021-01-12 ENCOUNTER — OFFICE VISIT (OUTPATIENT)
Dept: HEMATOLOGY/ONCOLOGY | Facility: CLINIC | Age: 71
End: 2021-01-12
Payer: MEDICARE

## 2021-01-12 VITALS
SYSTOLIC BLOOD PRESSURE: 142 MMHG | WEIGHT: 250.88 LBS | OXYGEN SATURATION: 97 % | HEIGHT: 64 IN | RESPIRATION RATE: 16 BRPM | DIASTOLIC BLOOD PRESSURE: 75 MMHG | HEART RATE: 87 BPM | BODY MASS INDEX: 42.83 KG/M2 | TEMPERATURE: 98 F

## 2021-01-12 DIAGNOSIS — E53.8 B12 DEFICIENCY: ICD-10-CM

## 2021-01-12 DIAGNOSIS — D50.0 IRON DEFICIENCY ANEMIA DUE TO CHRONIC BLOOD LOSS: Primary | ICD-10-CM

## 2021-01-12 PROCEDURE — 99999 PR PBB SHADOW E&M-EST. PATIENT-LVL III: CPT | Mod: PBBFAC,,, | Performed by: NURSE PRACTITIONER

## 2021-01-12 PROCEDURE — 99999 PR PBB SHADOW E&M-EST. PATIENT-LVL III: ICD-10-PCS | Mod: PBBFAC,,, | Performed by: NURSE PRACTITIONER

## 2021-01-12 PROCEDURE — 99214 OFFICE O/P EST MOD 30 MIN: CPT | Mod: S$PBB,,, | Performed by: NURSE PRACTITIONER

## 2021-01-12 PROCEDURE — 99213 OFFICE O/P EST LOW 20 MIN: CPT | Mod: PBBFAC | Performed by: NURSE PRACTITIONER

## 2021-01-12 PROCEDURE — 99214 PR OFFICE/OUTPT VISIT, EST, LEVL IV, 30-39 MIN: ICD-10-PCS | Mod: S$PBB,,, | Performed by: NURSE PRACTITIONER

## 2021-01-19 ENCOUNTER — LAB VISIT (OUTPATIENT)
Dept: LAB | Facility: HOSPITAL | Age: 71
End: 2021-01-19
Attending: INTERNAL MEDICINE
Payer: MEDICARE

## 2021-01-19 DIAGNOSIS — L40.9 PSORIASIS: ICD-10-CM

## 2021-01-19 LAB
ALBUMIN SERPL BCP-MCNC: 3.6 G/DL (ref 3.5–5.2)
ALP SERPL-CCNC: 38 U/L (ref 55–135)
ALT SERPL W/O P-5'-P-CCNC: 23 U/L (ref 10–44)
ANION GAP SERPL CALC-SCNC: 11 MMOL/L (ref 8–16)
AST SERPL-CCNC: 30 U/L (ref 10–40)
BASOPHILS # BLD AUTO: 0.06 K/UL (ref 0–0.2)
BASOPHILS NFR BLD: 0.8 % (ref 0–1.9)
BILIRUB SERPL-MCNC: 0.5 MG/DL (ref 0.1–1)
BUN SERPL-MCNC: 18 MG/DL (ref 8–23)
CALCIUM SERPL-MCNC: 9.5 MG/DL (ref 8.7–10.5)
CHLORIDE SERPL-SCNC: 100 MMOL/L (ref 95–110)
CO2 SERPL-SCNC: 31 MMOL/L (ref 23–29)
CREAT SERPL-MCNC: 1.1 MG/DL (ref 0.5–1.4)
CRP SERPL-MCNC: 4.9 MG/L (ref 0–8.2)
DIFFERENTIAL METHOD: ABNORMAL
EOSINOPHIL # BLD AUTO: 0.1 K/UL (ref 0–0.5)
EOSINOPHIL NFR BLD: 1.6 % (ref 0–8)
ERYTHROCYTE [DISTWIDTH] IN BLOOD BY AUTOMATED COUNT: 15 % (ref 11.5–14.5)
ERYTHROCYTE [SEDIMENTATION RATE] IN BLOOD BY WESTERGREN METHOD: 14 MM/HR (ref 0–20)
EST. GFR  (AFRICAN AMERICAN): 59 ML/MIN/1.73 M^2
EST. GFR  (NON AFRICAN AMERICAN): 51 ML/MIN/1.73 M^2
GLUCOSE SERPL-MCNC: 120 MG/DL (ref 70–110)
HCT VFR BLD AUTO: 42.6 % (ref 37–48.5)
HGB BLD-MCNC: 13 G/DL (ref 12–16)
IMM GRANULOCYTES # BLD AUTO: 0.02 K/UL (ref 0–0.04)
IMM GRANULOCYTES NFR BLD AUTO: 0.3 % (ref 0–0.5)
LYMPHOCYTES # BLD AUTO: 1.9 K/UL (ref 1–4.8)
LYMPHOCYTES NFR BLD: 25.1 % (ref 18–48)
MCH RBC QN AUTO: 29.2 PG (ref 27–31)
MCHC RBC AUTO-ENTMCNC: 30.5 G/DL (ref 32–36)
MCV RBC AUTO: 96 FL (ref 82–98)
MONOCYTES # BLD AUTO: 0.6 K/UL (ref 0.3–1)
MONOCYTES NFR BLD: 8.3 % (ref 4–15)
NEUTROPHILS # BLD AUTO: 5 K/UL (ref 1.8–7.7)
NEUTROPHILS NFR BLD: 63.9 % (ref 38–73)
NRBC BLD-RTO: 0 /100 WBC
PLATELET # BLD AUTO: 286 K/UL (ref 150–350)
PMV BLD AUTO: 10.4 FL (ref 9.2–12.9)
POTASSIUM SERPL-SCNC: 4 MMOL/L (ref 3.5–5.1)
PROT SERPL-MCNC: 7 G/DL (ref 6–8.4)
RBC # BLD AUTO: 4.45 M/UL (ref 4–5.4)
SODIUM SERPL-SCNC: 142 MMOL/L (ref 136–145)
WBC # BLD AUTO: 7.73 K/UL (ref 3.9–12.7)

## 2021-01-19 PROCEDURE — 85651 RBC SED RATE NONAUTOMATED: CPT

## 2021-01-19 PROCEDURE — 80053 COMPREHEN METABOLIC PANEL: CPT

## 2021-01-19 PROCEDURE — 86140 C-REACTIVE PROTEIN: CPT

## 2021-01-19 PROCEDURE — 85025 COMPLETE CBC W/AUTO DIFF WBC: CPT

## 2021-01-19 PROCEDURE — 36415 COLL VENOUS BLD VENIPUNCTURE: CPT | Mod: PO

## 2021-01-26 ENCOUNTER — OFFICE VISIT (OUTPATIENT)
Dept: RHEUMATOLOGY | Facility: CLINIC | Age: 71
End: 2021-01-26
Payer: MEDICARE

## 2021-01-26 ENCOUNTER — INFUSION (OUTPATIENT)
Dept: INFUSION THERAPY | Facility: HOSPITAL | Age: 71
End: 2021-01-26
Attending: NURSE PRACTITIONER
Payer: MEDICARE

## 2021-01-26 VITALS
HEART RATE: 82 BPM | BODY MASS INDEX: 44.32 KG/M2 | WEIGHT: 254.19 LBS | SYSTOLIC BLOOD PRESSURE: 163 MMHG | DIASTOLIC BLOOD PRESSURE: 68 MMHG

## 2021-01-26 VITALS
RESPIRATION RATE: 18 BRPM | TEMPERATURE: 98 F | SYSTOLIC BLOOD PRESSURE: 154 MMHG | OXYGEN SATURATION: 96 % | BODY MASS INDEX: 44.32 KG/M2 | DIASTOLIC BLOOD PRESSURE: 74 MMHG | HEART RATE: 66 BPM | WEIGHT: 254.19 LBS

## 2021-01-26 DIAGNOSIS — Z79.899 ENCOUNTER FOR LONG-TERM (CURRENT) USE OF HIGH-RISK MEDICATION: ICD-10-CM

## 2021-01-26 DIAGNOSIS — L40.9 PSORIASIS: Primary | ICD-10-CM

## 2021-01-26 DIAGNOSIS — L40.50 PSORIATIC ARTHRITIS: ICD-10-CM

## 2021-01-26 DIAGNOSIS — M85.80 OSTEOPENIA WITH HIGH RISK OF FRACTURE: ICD-10-CM

## 2021-01-26 PROCEDURE — 99999 PR PBB SHADOW E&M-EST. PATIENT-LVL III: CPT | Mod: PBBFAC,,, | Performed by: INTERNAL MEDICINE

## 2021-01-26 PROCEDURE — 99999 PR PBB SHADOW E&M-EST. PATIENT-LVL III: ICD-10-PCS | Mod: PBBFAC,,, | Performed by: INTERNAL MEDICINE

## 2021-01-26 PROCEDURE — 99213 OFFICE O/P EST LOW 20 MIN: CPT | Mod: PBBFAC,25 | Performed by: INTERNAL MEDICINE

## 2021-01-26 PROCEDURE — 99214 OFFICE O/P EST MOD 30 MIN: CPT | Mod: S$PBB,,, | Performed by: INTERNAL MEDICINE

## 2021-01-26 PROCEDURE — 63600175 PHARM REV CODE 636 W HCPCS: Mod: JG | Performed by: INTERNAL MEDICINE

## 2021-01-26 PROCEDURE — 96372 THER/PROPH/DIAG INJ SC/IM: CPT

## 2021-01-26 PROCEDURE — 99214 PR OFFICE/OUTPT VISIT, EST, LEVL IV, 30-39 MIN: ICD-10-PCS | Mod: S$PBB,,, | Performed by: INTERNAL MEDICINE

## 2021-01-26 RX ADMIN — USTEKINUMAB 90 MG: 90 INJECTION, SOLUTION SUBCUTANEOUS at 01:01

## 2021-02-24 ENCOUNTER — LAB VISIT (OUTPATIENT)
Dept: LAB | Facility: HOSPITAL | Age: 71
End: 2021-02-24
Attending: NURSE PRACTITIONER
Payer: MEDICARE

## 2021-02-24 DIAGNOSIS — D50.0 IRON DEFICIENCY ANEMIA DUE TO CHRONIC BLOOD LOSS: ICD-10-CM

## 2021-02-24 LAB
ANION GAP SERPL CALC-SCNC: 10 MMOL/L (ref 8–16)
BASOPHILS # BLD AUTO: 0.04 K/UL (ref 0–0.2)
BASOPHILS NFR BLD: 0.6 % (ref 0–1.9)
BUN SERPL-MCNC: 19 MG/DL (ref 8–23)
CALCIUM SERPL-MCNC: 9 MG/DL (ref 8.7–10.5)
CHLORIDE SERPL-SCNC: 104 MMOL/L (ref 95–110)
CO2 SERPL-SCNC: 28 MMOL/L (ref 23–29)
CREAT SERPL-MCNC: 1 MG/DL (ref 0.5–1.4)
DIFFERENTIAL METHOD: ABNORMAL
EOSINOPHIL # BLD AUTO: 0.2 K/UL (ref 0–0.5)
EOSINOPHIL NFR BLD: 2.7 % (ref 0–8)
ERYTHROCYTE [DISTWIDTH] IN BLOOD BY AUTOMATED COUNT: 13.6 % (ref 11.5–14.5)
EST. GFR  (AFRICAN AMERICAN): >60 ML/MIN/1.73 M^2
EST. GFR  (NON AFRICAN AMERICAN): 58 ML/MIN/1.73 M^2
GLUCOSE SERPL-MCNC: 154 MG/DL (ref 70–110)
HCT VFR BLD AUTO: 40.6 % (ref 37–48.5)
HGB BLD-MCNC: 12.4 G/DL (ref 12–16)
IMM GRANULOCYTES # BLD AUTO: 0.02 K/UL (ref 0–0.04)
IMM GRANULOCYTES NFR BLD AUTO: 0.3 % (ref 0–0.5)
LYMPHOCYTES # BLD AUTO: 1.8 K/UL (ref 1–4.8)
LYMPHOCYTES NFR BLD: 26.5 % (ref 18–48)
MCH RBC QN AUTO: 29.1 PG (ref 27–31)
MCHC RBC AUTO-ENTMCNC: 30.5 G/DL (ref 32–36)
MCV RBC AUTO: 95 FL (ref 82–98)
MONOCYTES # BLD AUTO: 0.6 K/UL (ref 0.3–1)
MONOCYTES NFR BLD: 9.3 % (ref 4–15)
NEUTROPHILS # BLD AUTO: 4.1 K/UL (ref 1.8–7.7)
NEUTROPHILS NFR BLD: 60.6 % (ref 38–73)
NRBC BLD-RTO: 0 /100 WBC
PLATELET # BLD AUTO: 219 K/UL (ref 150–350)
PMV BLD AUTO: 10.4 FL (ref 9.2–12.9)
POTASSIUM SERPL-SCNC: 3.9 MMOL/L (ref 3.5–5.1)
RBC # BLD AUTO: 4.26 M/UL (ref 4–5.4)
SODIUM SERPL-SCNC: 142 MMOL/L (ref 136–145)
WBC # BLD AUTO: 6.78 K/UL (ref 3.9–12.7)

## 2021-02-24 PROCEDURE — 82728 ASSAY OF FERRITIN: CPT

## 2021-02-24 PROCEDURE — 85025 COMPLETE CBC W/AUTO DIFF WBC: CPT

## 2021-02-24 PROCEDURE — 80048 BASIC METABOLIC PNL TOTAL CA: CPT

## 2021-02-24 PROCEDURE — 36415 COLL VENOUS BLD VENIPUNCTURE: CPT | Mod: PO

## 2021-02-24 PROCEDURE — 83540 ASSAY OF IRON: CPT

## 2021-02-25 LAB
FERRITIN SERPL-MCNC: 54 NG/ML (ref 20–300)
IRON SERPL-MCNC: 76 UG/DL (ref 30–160)
SATURATED IRON: 22 % (ref 20–50)
TOTAL IRON BINDING CAPACITY: 349 UG/DL (ref 250–450)
TRANSFERRIN SERPL-MCNC: 236 MG/DL (ref 200–375)

## 2021-03-02 ENCOUNTER — OFFICE VISIT (OUTPATIENT)
Dept: HEMATOLOGY/ONCOLOGY | Facility: CLINIC | Age: 71
End: 2021-03-02
Payer: MEDICARE

## 2021-03-02 VITALS
WEIGHT: 252.63 LBS | OXYGEN SATURATION: 98 % | HEIGHT: 64 IN | DIASTOLIC BLOOD PRESSURE: 78 MMHG | TEMPERATURE: 98 F | RESPIRATION RATE: 16 BRPM | BODY MASS INDEX: 43.13 KG/M2 | HEART RATE: 62 BPM | SYSTOLIC BLOOD PRESSURE: 137 MMHG

## 2021-03-02 DIAGNOSIS — D50.0 IRON DEFICIENCY ANEMIA DUE TO CHRONIC BLOOD LOSS: Primary | ICD-10-CM

## 2021-03-02 DIAGNOSIS — E53.8 B12 DEFICIENCY: ICD-10-CM

## 2021-03-02 PROCEDURE — 99214 OFFICE O/P EST MOD 30 MIN: CPT | Mod: S$PBB,,, | Performed by: NURSE PRACTITIONER

## 2021-03-02 PROCEDURE — 99215 OFFICE O/P EST HI 40 MIN: CPT | Mod: PBBFAC | Performed by: NURSE PRACTITIONER

## 2021-03-02 PROCEDURE — 99999 PR PBB SHADOW E&M-EST. PATIENT-LVL V: ICD-10-PCS | Mod: PBBFAC,,, | Performed by: NURSE PRACTITIONER

## 2021-03-02 PROCEDURE — 99999 PR PBB SHADOW E&M-EST. PATIENT-LVL V: CPT | Mod: PBBFAC,,, | Performed by: NURSE PRACTITIONER

## 2021-03-02 PROCEDURE — 99214 PR OFFICE/OUTPT VISIT, EST, LEVL IV, 30-39 MIN: ICD-10-PCS | Mod: S$PBB,,, | Performed by: NURSE PRACTITIONER

## 2021-03-09 ENCOUNTER — OFFICE VISIT (OUTPATIENT)
Dept: TRANSPLANT | Facility: CLINIC | Age: 71
End: 2021-03-09
Payer: MEDICARE

## 2021-03-09 VITALS
HEART RATE: 75 BPM | SYSTOLIC BLOOD PRESSURE: 150 MMHG | HEIGHT: 64 IN | DIASTOLIC BLOOD PRESSURE: 65 MMHG | WEIGHT: 251.31 LBS | BODY MASS INDEX: 42.9 KG/M2 | OXYGEN SATURATION: 97 %

## 2021-03-09 DIAGNOSIS — E78.49 OTHER HYPERLIPIDEMIA: ICD-10-CM

## 2021-03-09 DIAGNOSIS — Z95.2 S/P TAVR (TRANSCATHETER AORTIC VALVE REPLACEMENT): ICD-10-CM

## 2021-03-09 DIAGNOSIS — I25.118 CORONARY ARTERY DISEASE OF NATIVE ARTERY OF NATIVE HEART WITH STABLE ANGINA PECTORIS: Primary | ICD-10-CM

## 2021-03-09 DIAGNOSIS — I10 ESSENTIAL HYPERTENSION: Chronic | ICD-10-CM

## 2021-03-09 DIAGNOSIS — I25.118 CORONARY ARTERY DISEASE OF NATIVE ARTERY OF NATIVE HEART WITH STABLE ANGINA PECTORIS: ICD-10-CM

## 2021-03-09 DIAGNOSIS — Z88.8 ALLERGY TO STATIN MEDICATION: ICD-10-CM

## 2021-03-09 DIAGNOSIS — Z95.1 S/P CABG (CORONARY ARTERY BYPASS GRAFT): Chronic | ICD-10-CM

## 2021-03-09 DIAGNOSIS — I10 ESSENTIAL HYPERTENSION: ICD-10-CM

## 2021-03-09 DIAGNOSIS — I35.0 NONRHEUMATIC AORTIC VALVE STENOSIS: Chronic | ICD-10-CM

## 2021-03-09 PROCEDURE — 99215 OFFICE O/P EST HI 40 MIN: CPT | Mod: PBBFAC | Performed by: NUCLEAR MEDICINE

## 2021-03-09 PROCEDURE — 99999 PR PBB SHADOW E&M-EST. PATIENT-LVL V: ICD-10-PCS | Mod: PBBFAC,,, | Performed by: NUCLEAR MEDICINE

## 2021-03-09 PROCEDURE — 99999 PR PBB SHADOW E&M-EST. PATIENT-LVL V: CPT | Mod: PBBFAC,,, | Performed by: NUCLEAR MEDICINE

## 2021-03-09 PROCEDURE — 99214 OFFICE O/P EST MOD 30 MIN: CPT | Mod: S$PBB,,, | Performed by: NUCLEAR MEDICINE

## 2021-03-09 PROCEDURE — 99214 PR OFFICE/OUTPT VISIT, EST, LEVL IV, 30-39 MIN: ICD-10-PCS | Mod: S$PBB,,, | Performed by: NUCLEAR MEDICINE

## 2021-03-09 RX ORDER — VALSARTAN AND HYDROCHLOROTHIAZIDE 160; 12.5 MG/1; MG/1
1 TABLET, FILM COATED ORAL DAILY
Qty: 90 TABLET | Refills: 3 | Status: SHIPPED | OUTPATIENT
Start: 2021-03-09 | End: 2021-03-09 | Stop reason: SDUPTHER

## 2021-03-09 RX ORDER — CLOPIDOGREL BISULFATE 75 MG/1
75 TABLET ORAL DAILY
Qty: 90 TABLET | Refills: 3 | Status: SHIPPED | OUTPATIENT
Start: 2021-03-09 | End: 2021-03-10 | Stop reason: SDUPTHER

## 2021-03-09 RX ORDER — METOPROLOL SUCCINATE 100 MG/1
100 TABLET, EXTENDED RELEASE ORAL 2 TIMES DAILY
Qty: 180 TABLET | Refills: 3 | Status: SHIPPED | OUTPATIENT
Start: 2021-03-09 | End: 2021-03-09 | Stop reason: SDUPTHER

## 2021-03-09 RX ORDER — VALSARTAN AND HYDROCHLOROTHIAZIDE 160; 12.5 MG/1; MG/1
1 TABLET, FILM COATED ORAL DAILY
Qty: 90 TABLET | Refills: 3 | Status: SHIPPED | OUTPATIENT
Start: 2021-03-09 | End: 2021-03-10 | Stop reason: SDUPTHER

## 2021-03-09 RX ORDER — CLOPIDOGREL BISULFATE 75 MG/1
75 TABLET ORAL DAILY
Qty: 90 TABLET | Refills: 3 | Status: SHIPPED | OUTPATIENT
Start: 2021-03-09 | End: 2021-03-09 | Stop reason: SDUPTHER

## 2021-03-09 RX ORDER — METOPROLOL SUCCINATE 100 MG/1
100 TABLET, EXTENDED RELEASE ORAL 2 TIMES DAILY
Qty: 180 TABLET | Refills: 3 | Status: SHIPPED | OUTPATIENT
Start: 2021-03-09 | End: 2021-03-10 | Stop reason: SDUPTHER

## 2021-03-10 DIAGNOSIS — I10 ESSENTIAL HYPERTENSION: ICD-10-CM

## 2021-03-10 DIAGNOSIS — I25.118 CORONARY ARTERY DISEASE OF NATIVE ARTERY OF NATIVE HEART WITH STABLE ANGINA PECTORIS: ICD-10-CM

## 2021-03-10 DIAGNOSIS — E78.49 OTHER HYPERLIPIDEMIA: ICD-10-CM

## 2021-03-10 DIAGNOSIS — Z88.8 ALLERGY TO STATIN MEDICATION: ICD-10-CM

## 2021-03-10 RX ORDER — VALSARTAN AND HYDROCHLOROTHIAZIDE 160; 12.5 MG/1; MG/1
1 TABLET, FILM COATED ORAL DAILY
Qty: 90 TABLET | Refills: 3 | Status: SHIPPED | OUTPATIENT
Start: 2021-03-10 | End: 2022-01-24 | Stop reason: SDUPTHER

## 2021-03-10 RX ORDER — CLOPIDOGREL BISULFATE 75 MG/1
75 TABLET ORAL DAILY
Qty: 90 TABLET | Refills: 3 | Status: SHIPPED | OUTPATIENT
Start: 2021-03-10 | End: 2022-01-24 | Stop reason: SDUPTHER

## 2021-03-10 RX ORDER — METOPROLOL SUCCINATE 100 MG/1
100 TABLET, EXTENDED RELEASE ORAL 2 TIMES DAILY
Qty: 180 TABLET | Refills: 3 | Status: SHIPPED | OUTPATIENT
Start: 2021-03-10 | End: 2022-01-24 | Stop reason: SDUPTHER

## 2021-04-20 ENCOUNTER — TELEPHONE (OUTPATIENT)
Dept: RHEUMATOLOGY | Facility: CLINIC | Age: 71
End: 2021-04-20

## 2021-04-20 ENCOUNTER — LAB VISIT (OUTPATIENT)
Dept: LAB | Facility: HOSPITAL | Age: 71
End: 2021-04-20
Attending: INTERNAL MEDICINE
Payer: MEDICARE

## 2021-04-20 DIAGNOSIS — L40.9 PSORIASIS: ICD-10-CM

## 2021-04-20 LAB
ALBUMIN SERPL BCP-MCNC: 3.5 G/DL (ref 3.5–5.2)
ALP SERPL-CCNC: 39 U/L (ref 55–135)
ALT SERPL W/O P-5'-P-CCNC: 17 U/L (ref 10–44)
ANION GAP SERPL CALC-SCNC: 9 MMOL/L (ref 8–16)
AST SERPL-CCNC: 24 U/L (ref 10–40)
BASOPHILS # BLD AUTO: 0.04 K/UL (ref 0–0.2)
BASOPHILS NFR BLD: 0.5 % (ref 0–1.9)
BILIRUB SERPL-MCNC: 0.2 MG/DL (ref 0.1–1)
BUN SERPL-MCNC: 18 MG/DL (ref 8–23)
CALCIUM SERPL-MCNC: 9.3 MG/DL (ref 8.7–10.5)
CHLORIDE SERPL-SCNC: 103 MMOL/L (ref 95–110)
CO2 SERPL-SCNC: 30 MMOL/L (ref 23–29)
CREAT SERPL-MCNC: 1.2 MG/DL (ref 0.5–1.4)
CRP SERPL-MCNC: 3.4 MG/L (ref 0–8.2)
DIFFERENTIAL METHOD: ABNORMAL
EOSINOPHIL # BLD AUTO: 0.2 K/UL (ref 0–0.5)
EOSINOPHIL NFR BLD: 2 % (ref 0–8)
ERYTHROCYTE [DISTWIDTH] IN BLOOD BY AUTOMATED COUNT: 13.2 % (ref 11.5–14.5)
ERYTHROCYTE [SEDIMENTATION RATE] IN BLOOD BY WESTERGREN METHOD: 23 MM/HR (ref 0–20)
EST. GFR  (AFRICAN AMERICAN): 53 ML/MIN/1.73 M^2
EST. GFR  (NON AFRICAN AMERICAN): 46 ML/MIN/1.73 M^2
GLUCOSE SERPL-MCNC: 119 MG/DL (ref 70–110)
HCT VFR BLD AUTO: 41.1 % (ref 37–48.5)
HGB BLD-MCNC: 13.1 G/DL (ref 12–16)
IMM GRANULOCYTES # BLD AUTO: 0.03 K/UL (ref 0–0.04)
IMM GRANULOCYTES NFR BLD AUTO: 0.4 % (ref 0–0.5)
LYMPHOCYTES # BLD AUTO: 2.1 K/UL (ref 1–4.8)
LYMPHOCYTES NFR BLD: 28.9 % (ref 18–48)
MCH RBC QN AUTO: 29 PG (ref 27–31)
MCHC RBC AUTO-ENTMCNC: 31.9 G/DL (ref 32–36)
MCV RBC AUTO: 91 FL (ref 82–98)
MONOCYTES # BLD AUTO: 0.7 K/UL (ref 0.3–1)
MONOCYTES NFR BLD: 9.6 % (ref 4–15)
NEUTROPHILS # BLD AUTO: 4.3 K/UL (ref 1.8–7.7)
NEUTROPHILS NFR BLD: 58.6 % (ref 38–73)
NRBC BLD-RTO: 0 /100 WBC
PLATELET # BLD AUTO: 232 K/UL (ref 150–450)
PMV BLD AUTO: 10.3 FL (ref 9.2–12.9)
POTASSIUM SERPL-SCNC: 4.1 MMOL/L (ref 3.5–5.1)
PROT SERPL-MCNC: 6.8 G/DL (ref 6–8.4)
RBC # BLD AUTO: 4.51 M/UL (ref 4–5.4)
SODIUM SERPL-SCNC: 142 MMOL/L (ref 136–145)
WBC # BLD AUTO: 7.4 K/UL (ref 3.9–12.7)

## 2021-04-20 PROCEDURE — 85025 COMPLETE CBC W/AUTO DIFF WBC: CPT | Performed by: INTERNAL MEDICINE

## 2021-04-20 PROCEDURE — 80053 COMPREHEN METABOLIC PANEL: CPT | Performed by: INTERNAL MEDICINE

## 2021-04-20 PROCEDURE — 36415 COLL VENOUS BLD VENIPUNCTURE: CPT | Mod: PO | Performed by: INTERNAL MEDICINE

## 2021-04-20 PROCEDURE — 85651 RBC SED RATE NONAUTOMATED: CPT | Performed by: INTERNAL MEDICINE

## 2021-04-20 PROCEDURE — 86140 C-REACTIVE PROTEIN: CPT | Performed by: INTERNAL MEDICINE

## 2021-04-21 ENCOUNTER — TELEPHONE (OUTPATIENT)
Dept: RHEUMATOLOGY | Facility: CLINIC | Age: 71
End: 2021-04-21

## 2021-04-22 ENCOUNTER — TELEPHONE (OUTPATIENT)
Dept: RHEUMATOLOGY | Facility: CLINIC | Age: 71
End: 2021-04-22

## 2021-04-27 ENCOUNTER — OFFICE VISIT (OUTPATIENT)
Dept: RHEUMATOLOGY | Facility: CLINIC | Age: 71
End: 2021-04-27
Payer: MEDICARE

## 2021-04-27 ENCOUNTER — INFUSION (OUTPATIENT)
Dept: INFUSION THERAPY | Facility: HOSPITAL | Age: 71
End: 2021-04-27
Attending: INTERNAL MEDICINE
Payer: MEDICARE

## 2021-04-27 VITALS
BODY MASS INDEX: 43.8 KG/M2 | DIASTOLIC BLOOD PRESSURE: 62 MMHG | HEART RATE: 58 BPM | SYSTOLIC BLOOD PRESSURE: 131 MMHG | WEIGHT: 251.19 LBS

## 2021-04-27 VITALS
DIASTOLIC BLOOD PRESSURE: 75 MMHG | OXYGEN SATURATION: 96 % | RESPIRATION RATE: 18 BRPM | HEART RATE: 84 BPM | SYSTOLIC BLOOD PRESSURE: 126 MMHG | TEMPERATURE: 97 F

## 2021-04-27 DIAGNOSIS — L40.9 PSORIASIS: Primary | ICD-10-CM

## 2021-04-27 DIAGNOSIS — Z79.899 ENCOUNTER FOR LONG-TERM (CURRENT) USE OF HIGH-RISK MEDICATION: ICD-10-CM

## 2021-04-27 DIAGNOSIS — L40.50 PSORIATIC ARTHRITIS: ICD-10-CM

## 2021-04-27 DIAGNOSIS — N18.30 STAGE 3 CHRONIC KIDNEY DISEASE, UNSPECIFIED WHETHER STAGE 3A OR 3B CKD: ICD-10-CM

## 2021-04-27 DIAGNOSIS — D84.9 IMMUNOSUPPRESSED STATUS: ICD-10-CM

## 2021-04-27 PROCEDURE — 96372 THER/PROPH/DIAG INJ SC/IM: CPT

## 2021-04-27 PROCEDURE — 99213 OFFICE O/P EST LOW 20 MIN: CPT | Mod: PBBFAC,25 | Performed by: INTERNAL MEDICINE

## 2021-04-27 PROCEDURE — 99999 PR PBB SHADOW E&M-EST. PATIENT-LVL III: ICD-10-PCS | Mod: PBBFAC,,, | Performed by: INTERNAL MEDICINE

## 2021-04-27 PROCEDURE — 63600175 PHARM REV CODE 636 W HCPCS: Mod: JG | Performed by: INTERNAL MEDICINE

## 2021-04-27 PROCEDURE — 99214 OFFICE O/P EST MOD 30 MIN: CPT | Mod: S$PBB,,, | Performed by: INTERNAL MEDICINE

## 2021-04-27 PROCEDURE — 99999 PR PBB SHADOW E&M-EST. PATIENT-LVL III: CPT | Mod: PBBFAC,,, | Performed by: INTERNAL MEDICINE

## 2021-04-27 PROCEDURE — 99214 PR OFFICE/OUTPT VISIT, EST, LEVL IV, 30-39 MIN: ICD-10-PCS | Mod: S$PBB,,, | Performed by: INTERNAL MEDICINE

## 2021-04-27 RX ADMIN — USTEKINUMAB 90 MG: 90 INJECTION, SOLUTION SUBCUTANEOUS at 03:04

## 2021-04-28 ENCOUNTER — OFFICE VISIT (OUTPATIENT)
Dept: FAMILY MEDICINE | Facility: CLINIC | Age: 71
End: 2021-04-28
Attending: FAMILY MEDICINE
Payer: MEDICARE

## 2021-04-28 ENCOUNTER — HOSPITAL ENCOUNTER (OUTPATIENT)
Dept: RADIOLOGY | Facility: HOSPITAL | Age: 71
Discharge: HOME | End: 2021-04-28
Attending: FAMILY MEDICINE
Payer: MEDICARE

## 2021-04-28 VITALS
HEART RATE: 83 BPM | HEIGHT: 64 IN | SYSTOLIC BLOOD PRESSURE: 114 MMHG | WEIGHT: 252.63 LBS | TEMPERATURE: 97 F | DIASTOLIC BLOOD PRESSURE: 64 MMHG | BODY MASS INDEX: 43.13 KG/M2 | OXYGEN SATURATION: 96 %

## 2021-04-28 DIAGNOSIS — M54.9 DORSALGIA, UNSPECIFIED: ICD-10-CM

## 2021-04-28 DIAGNOSIS — R73.03 PRE-DIABETES: ICD-10-CM

## 2021-04-28 DIAGNOSIS — Z80.3 FAMILY HISTORY OF BREAST CANCER: Primary | ICD-10-CM

## 2021-04-28 DIAGNOSIS — R60.0 LEG EDEMA: ICD-10-CM

## 2021-04-28 DIAGNOSIS — E03.9 HYPOTHYROIDISM, UNSPECIFIED TYPE: ICD-10-CM

## 2021-04-28 DIAGNOSIS — R79.9 ABNORMAL FINDING OF BLOOD CHEMISTRY, UNSPECIFIED: ICD-10-CM

## 2021-04-28 PROBLEM — Z51.81 MEDICATION MONITORING ENCOUNTER: Status: RESOLVED | Noted: 2017-09-07 | Resolved: 2021-04-28

## 2021-04-28 PROBLEM — R55 NEAR SYNCOPE: Status: RESOLVED | Noted: 2019-01-02 | Resolved: 2021-04-28

## 2021-04-28 PROBLEM — R79.89 ELEVATED BRAIN NATRIURETIC PEPTIDE (BNP) LEVEL: Status: RESOLVED | Noted: 2017-12-19 | Resolved: 2021-04-28

## 2021-04-28 PROBLEM — R50.9 FEVER: Status: RESOLVED | Noted: 2017-12-19 | Resolved: 2021-04-28

## 2021-04-28 PROCEDURE — 99215 OFFICE O/P EST HI 40 MIN: CPT | Mod: S$PBB,,, | Performed by: FAMILY MEDICINE

## 2021-04-28 PROCEDURE — 99999 PR PBB SHADOW E&M-EST. PATIENT-LVL V: ICD-10-PCS | Mod: PBBFAC,,, | Performed by: FAMILY MEDICINE

## 2021-04-28 PROCEDURE — 72100 X-RAY EXAM L-S SPINE 2/3 VWS: CPT | Mod: 26,,, | Performed by: RADIOLOGY

## 2021-04-28 PROCEDURE — 99215 OFFICE O/P EST HI 40 MIN: CPT | Mod: PBBFAC,PO | Performed by: FAMILY MEDICINE

## 2021-04-28 PROCEDURE — 99215 PR OFFICE/OUTPT VISIT, EST, LEVL V, 40-54 MIN: ICD-10-PCS | Mod: S$PBB,,, | Performed by: FAMILY MEDICINE

## 2021-04-28 PROCEDURE — 72100 XR LUMBAR SPINE AP AND LATERAL: ICD-10-PCS | Mod: 26,,, | Performed by: RADIOLOGY

## 2021-04-28 PROCEDURE — 99999 PR PBB SHADOW E&M-EST. PATIENT-LVL V: CPT | Mod: PBBFAC,,, | Performed by: FAMILY MEDICINE

## 2021-04-28 PROCEDURE — 72100 X-RAY EXAM L-S SPINE 2/3 VWS: CPT | Mod: TC,PO

## 2021-04-29 ENCOUNTER — TELEPHONE (OUTPATIENT)
Dept: FAMILY MEDICINE | Facility: CLINIC | Age: 71
End: 2021-04-29

## 2021-05-03 ENCOUNTER — OFFICE VISIT (OUTPATIENT)
Dept: SURGERY | Facility: CLINIC | Age: 71
End: 2021-05-03
Payer: MEDICARE

## 2021-05-03 ENCOUNTER — LAB VISIT (OUTPATIENT)
Dept: LAB | Facility: HOSPITAL | Age: 71
End: 2021-05-03
Attending: FAMILY MEDICINE
Payer: MEDICARE

## 2021-05-03 ENCOUNTER — PATIENT OUTREACH (OUTPATIENT)
Dept: ADMINISTRATIVE | Facility: OTHER | Age: 71
End: 2021-05-03

## 2021-05-03 VITALS
BODY MASS INDEX: 45.04 KG/M2 | TEMPERATURE: 99 F | HEART RATE: 88 BPM | HEIGHT: 63 IN | RESPIRATION RATE: 16 BRPM | SYSTOLIC BLOOD PRESSURE: 147 MMHG | WEIGHT: 254.19 LBS | DIASTOLIC BLOOD PRESSURE: 74 MMHG | OXYGEN SATURATION: 97 %

## 2021-05-03 DIAGNOSIS — Z80.3 FAMILY HISTORY OF BREAST CANCER: Primary | ICD-10-CM

## 2021-05-03 DIAGNOSIS — Z71.9 HEALTH EDUCATION/COUNSELING: ICD-10-CM

## 2021-05-03 DIAGNOSIS — R60.0 LEG EDEMA: ICD-10-CM

## 2021-05-03 DIAGNOSIS — Z71.89 COUNSELING AND COORDINATION OF CARE: ICD-10-CM

## 2021-05-03 DIAGNOSIS — E03.9 HYPOTHYROIDISM, UNSPECIFIED TYPE: ICD-10-CM

## 2021-05-03 DIAGNOSIS — Z71.89 COUNSELING ON HEALTH PROMOTION AND DISEASE PREVENTION: ICD-10-CM

## 2021-05-03 DIAGNOSIS — R73.03 PRE-DIABETES: ICD-10-CM

## 2021-05-03 DIAGNOSIS — N64.4 MASTODYNIA: ICD-10-CM

## 2021-05-03 DIAGNOSIS — R79.9 ABNORMAL FINDING OF BLOOD CHEMISTRY, UNSPECIFIED: ICD-10-CM

## 2021-05-03 LAB
ALBUMIN SERPL BCP-MCNC: 3.3 G/DL (ref 3.5–5.2)
ALP SERPL-CCNC: 34 U/L (ref 55–135)
ALT SERPL W/O P-5'-P-CCNC: 15 U/L (ref 10–44)
ANION GAP SERPL CALC-SCNC: 13 MMOL/L (ref 8–16)
AST SERPL-CCNC: 22 U/L (ref 10–40)
BILIRUB SERPL-MCNC: 0.4 MG/DL (ref 0.1–1)
BUN SERPL-MCNC: 15 MG/DL (ref 8–23)
CALCIUM SERPL-MCNC: 8.9 MG/DL (ref 8.7–10.5)
CHLORIDE SERPL-SCNC: 106 MMOL/L (ref 95–110)
CO2 SERPL-SCNC: 25 MMOL/L (ref 23–29)
CREAT SERPL-MCNC: 1 MG/DL (ref 0.5–1.4)
EST. GFR  (AFRICAN AMERICAN): >60 ML/MIN/1.73 M^2
EST. GFR  (NON AFRICAN AMERICAN): 58 ML/MIN/1.73 M^2
ESTIMATED AVG GLUCOSE: 137 MG/DL (ref 68–131)
GLUCOSE SERPL-MCNC: 125 MG/DL (ref 70–110)
HBA1C MFR BLD: 6.4 % (ref 4–5.6)
POTASSIUM SERPL-SCNC: 4.2 MMOL/L (ref 3.5–5.1)
PROT SERPL-MCNC: 6.4 G/DL (ref 6–8.4)
SODIUM SERPL-SCNC: 144 MMOL/L (ref 136–145)
TSH SERPL DL<=0.005 MIU/L-ACNC: 1.66 UIU/ML (ref 0.4–4)

## 2021-05-03 PROCEDURE — 99214 OFFICE O/P EST MOD 30 MIN: CPT | Mod: PBBFAC | Performed by: NURSE PRACTITIONER

## 2021-05-03 PROCEDURE — 84443 ASSAY THYROID STIM HORMONE: CPT | Performed by: FAMILY MEDICINE

## 2021-05-03 PROCEDURE — 99215 PR OFFICE/OUTPT VISIT, EST, LEVL V, 40-54 MIN: ICD-10-PCS | Mod: S$PBB,,, | Performed by: NURSE PRACTITIONER

## 2021-05-03 PROCEDURE — 83036 HEMOGLOBIN GLYCOSYLATED A1C: CPT | Performed by: FAMILY MEDICINE

## 2021-05-03 PROCEDURE — 99999 PR PBB SHADOW E&M-EST. PATIENT-LVL IV: ICD-10-PCS | Mod: PBBFAC,,, | Performed by: NURSE PRACTITIONER

## 2021-05-03 PROCEDURE — 83880 ASSAY OF NATRIURETIC PEPTIDE: CPT | Performed by: FAMILY MEDICINE

## 2021-05-03 PROCEDURE — 99215 OFFICE O/P EST HI 40 MIN: CPT | Mod: S$PBB,,, | Performed by: NURSE PRACTITIONER

## 2021-05-03 PROCEDURE — 80061 LIPID PANEL: CPT | Performed by: FAMILY MEDICINE

## 2021-05-03 PROCEDURE — 99999 PR PBB SHADOW E&M-EST. PATIENT-LVL IV: CPT | Mod: PBBFAC,,, | Performed by: NURSE PRACTITIONER

## 2021-05-03 PROCEDURE — 80053 COMPREHEN METABOLIC PANEL: CPT | Performed by: FAMILY MEDICINE

## 2021-05-04 LAB
BNP SERPL-MCNC: 200 PG/ML (ref 0–99)
CHOLEST SERPL-MCNC: 134 MG/DL (ref 120–199)
CHOLEST/HDLC SERPL: 2.5 {RATIO} (ref 2–5)
HDLC SERPL-MCNC: 53 MG/DL (ref 40–75)
HDLC SERPL: 39.6 % (ref 20–50)
LDLC SERPL CALC-MCNC: 53.6 MG/DL (ref 63–159)
NONHDLC SERPL-MCNC: 81 MG/DL
TRIGL SERPL-MCNC: 137 MG/DL (ref 30–150)

## 2021-05-06 ENCOUNTER — OFFICE VISIT (OUTPATIENT)
Dept: HEMATOLOGY/ONCOLOGY | Facility: CLINIC | Age: 71
End: 2021-05-06
Payer: MEDICARE

## 2021-05-06 VITALS
HEIGHT: 64 IN | BODY MASS INDEX: 43.51 KG/M2 | DIASTOLIC BLOOD PRESSURE: 60 MMHG | TEMPERATURE: 97 F | OXYGEN SATURATION: 97 % | HEART RATE: 62 BPM | SYSTOLIC BLOOD PRESSURE: 136 MMHG | WEIGHT: 254.88 LBS

## 2021-05-06 DIAGNOSIS — D50.0 IRON DEFICIENCY ANEMIA DUE TO CHRONIC BLOOD LOSS: Primary | ICD-10-CM

## 2021-05-06 DIAGNOSIS — E53.8 B12 DEFICIENCY: ICD-10-CM

## 2021-05-06 PROCEDURE — 99999 PR PBB SHADOW E&M-EST. PATIENT-LVL IV: ICD-10-PCS | Mod: PBBFAC,,, | Performed by: NURSE PRACTITIONER

## 2021-05-06 PROCEDURE — 99999 PR PBB SHADOW E&M-EST. PATIENT-LVL IV: CPT | Mod: PBBFAC,,, | Performed by: NURSE PRACTITIONER

## 2021-05-06 PROCEDURE — 99214 PR OFFICE/OUTPT VISIT, EST, LEVL IV, 30-39 MIN: ICD-10-PCS | Mod: S$PBB,,, | Performed by: NURSE PRACTITIONER

## 2021-05-06 PROCEDURE — 99214 OFFICE O/P EST MOD 30 MIN: CPT | Mod: S$PBB,,, | Performed by: NURSE PRACTITIONER

## 2021-05-06 PROCEDURE — 99214 OFFICE O/P EST MOD 30 MIN: CPT | Mod: PBBFAC | Performed by: NURSE PRACTITIONER

## 2021-05-06 RX ORDER — SODIUM CHLORIDE 0.9 % (FLUSH) 0.9 %
10 SYRINGE (ML) INJECTION
Status: CANCELLED | OUTPATIENT
Start: 2021-05-06

## 2021-05-06 RX ORDER — HEPARIN 100 UNIT/ML
500 SYRINGE INTRAVENOUS
Status: CANCELLED | OUTPATIENT
Start: 2021-05-06

## 2021-05-10 ENCOUNTER — OFFICE VISIT (OUTPATIENT)
Dept: TRANSPLANT | Facility: CLINIC | Age: 71
End: 2021-05-10
Payer: MEDICARE

## 2021-05-10 VITALS
DIASTOLIC BLOOD PRESSURE: 70 MMHG | OXYGEN SATURATION: 98 % | HEIGHT: 64 IN | WEIGHT: 257.94 LBS | HEART RATE: 89 BPM | BODY MASS INDEX: 44.04 KG/M2 | SYSTOLIC BLOOD PRESSURE: 150 MMHG

## 2021-05-10 DIAGNOSIS — Z95.2 S/P TAVR (TRANSCATHETER AORTIC VALVE REPLACEMENT): Primary | ICD-10-CM

## 2021-05-10 DIAGNOSIS — Z95.1 S/P CABG (CORONARY ARTERY BYPASS GRAFT): Chronic | ICD-10-CM

## 2021-05-10 DIAGNOSIS — I10 ESSENTIAL HYPERTENSION: Chronic | ICD-10-CM

## 2021-05-10 DIAGNOSIS — I25.118 CORONARY ARTERY DISEASE OF NATIVE ARTERY OF NATIVE HEART WITH STABLE ANGINA PECTORIS: ICD-10-CM

## 2021-05-10 PROCEDURE — 99999 PR PBB SHADOW E&M-EST. PATIENT-LVL V: ICD-10-PCS | Mod: PBBFAC,,, | Performed by: NUCLEAR MEDICINE

## 2021-05-10 PROCEDURE — 99214 PR OFFICE/OUTPT VISIT, EST, LEVL IV, 30-39 MIN: ICD-10-PCS | Mod: S$PBB,,, | Performed by: NUCLEAR MEDICINE

## 2021-05-10 PROCEDURE — 99999 PR PBB SHADOW E&M-EST. PATIENT-LVL V: CPT | Mod: PBBFAC,,, | Performed by: NUCLEAR MEDICINE

## 2021-05-10 PROCEDURE — 99214 OFFICE O/P EST MOD 30 MIN: CPT | Mod: S$PBB,,, | Performed by: NUCLEAR MEDICINE

## 2021-05-10 PROCEDURE — 99215 OFFICE O/P EST HI 40 MIN: CPT | Mod: PBBFAC | Performed by: NUCLEAR MEDICINE

## 2021-05-18 ENCOUNTER — INFUSION (OUTPATIENT)
Dept: INFUSION THERAPY | Facility: HOSPITAL | Age: 71
End: 2021-05-18
Attending: INTERNAL MEDICINE
Payer: MEDICARE

## 2021-05-18 VITALS
DIASTOLIC BLOOD PRESSURE: 62 MMHG | TEMPERATURE: 97 F | RESPIRATION RATE: 18 BRPM | SYSTOLIC BLOOD PRESSURE: 129 MMHG | HEART RATE: 64 BPM | OXYGEN SATURATION: 98 % | WEIGHT: 255.19 LBS | BODY MASS INDEX: 44.49 KG/M2

## 2021-05-18 DIAGNOSIS — D50.0 IRON DEFICIENCY ANEMIA DUE TO CHRONIC BLOOD LOSS: Primary | ICD-10-CM

## 2021-05-18 PROCEDURE — 96375 TX/PRO/DX INJ NEW DRUG ADDON: CPT

## 2021-05-18 PROCEDURE — 63600175 PHARM REV CODE 636 W HCPCS: Performed by: NURSE PRACTITIONER

## 2021-05-18 PROCEDURE — 96374 THER/PROPH/DIAG INJ IV PUSH: CPT

## 2021-05-18 RX ORDER — METHYLPREDNISOLONE SOD SUCC 125 MG
40 VIAL (EA) INJECTION ONCE
Status: COMPLETED | OUTPATIENT
Start: 2021-05-18 | End: 2021-05-18

## 2021-05-18 RX ADMIN — IRON SUCROSE 200 MG: 20 INJECTION, SOLUTION INTRAVENOUS at 03:05

## 2021-05-18 RX ADMIN — METHYLPREDNISOLONE SODIUM SUCCINATE 40 MG: 125 INJECTION, POWDER, FOR SOLUTION INTRAMUSCULAR; INTRAVENOUS at 03:05

## 2021-05-20 ENCOUNTER — PATIENT OUTREACH (OUTPATIENT)
Dept: ADMINISTRATIVE | Facility: HOSPITAL | Age: 71
End: 2021-05-20

## 2021-05-24 RX ORDER — HEPARIN 100 UNIT/ML
500 SYRINGE INTRAVENOUS
Status: CANCELLED | OUTPATIENT
Start: 2021-05-24

## 2021-05-24 RX ORDER — SODIUM CHLORIDE 0.9 % (FLUSH) 0.9 %
10 SYRINGE (ML) INJECTION
Status: CANCELLED | OUTPATIENT
Start: 2021-05-24

## 2021-05-25 ENCOUNTER — INFUSION (OUTPATIENT)
Dept: INFUSION THERAPY | Facility: HOSPITAL | Age: 71
End: 2021-05-25
Attending: INTERNAL MEDICINE
Payer: MEDICARE

## 2021-05-25 VITALS
TEMPERATURE: 98 F | HEART RATE: 68 BPM | SYSTOLIC BLOOD PRESSURE: 130 MMHG | OXYGEN SATURATION: 97 % | DIASTOLIC BLOOD PRESSURE: 74 MMHG | RESPIRATION RATE: 16 BRPM

## 2021-05-25 DIAGNOSIS — D50.0 IRON DEFICIENCY ANEMIA DUE TO CHRONIC BLOOD LOSS: Primary | ICD-10-CM

## 2021-05-25 PROCEDURE — 96374 THER/PROPH/DIAG INJ IV PUSH: CPT

## 2021-05-25 PROCEDURE — 96375 TX/PRO/DX INJ NEW DRUG ADDON: CPT

## 2021-05-25 PROCEDURE — 63600175 PHARM REV CODE 636 W HCPCS: Performed by: NURSE PRACTITIONER

## 2021-05-25 RX ORDER — METHYLPREDNISOLONE SOD SUCC 125 MG
40 VIAL (EA) INJECTION ONCE
Status: COMPLETED | OUTPATIENT
Start: 2021-05-25 | End: 2021-05-25

## 2021-05-25 RX ADMIN — METHYLPREDNISOLONE SODIUM SUCCINATE 40 MG: 125 INJECTION, POWDER, FOR SOLUTION INTRAMUSCULAR; INTRAVENOUS at 03:05

## 2021-05-25 RX ADMIN — IRON SUCROSE 200 MG: 20 INJECTION, SOLUTION INTRAVENOUS at 03:05

## 2021-05-31 RX ORDER — HEPARIN 100 UNIT/ML
500 SYRINGE INTRAVENOUS
Status: CANCELLED | OUTPATIENT
Start: 2021-05-31

## 2021-05-31 RX ORDER — SODIUM CHLORIDE 0.9 % (FLUSH) 0.9 %
10 SYRINGE (ML) INJECTION
Status: CANCELLED | OUTPATIENT
Start: 2021-05-31

## 2021-06-01 ENCOUNTER — INFUSION (OUTPATIENT)
Dept: INFUSION THERAPY | Facility: HOSPITAL | Age: 71
End: 2021-06-01
Attending: INTERNAL MEDICINE
Payer: MEDICARE

## 2021-06-01 VITALS
SYSTOLIC BLOOD PRESSURE: 130 MMHG | DIASTOLIC BLOOD PRESSURE: 60 MMHG | HEART RATE: 68 BPM | TEMPERATURE: 98 F | OXYGEN SATURATION: 95 % | RESPIRATION RATE: 20 BRPM

## 2021-06-01 DIAGNOSIS — D50.0 IRON DEFICIENCY ANEMIA DUE TO CHRONIC BLOOD LOSS: Primary | ICD-10-CM

## 2021-06-01 PROCEDURE — 96374 THER/PROPH/DIAG INJ IV PUSH: CPT

## 2021-06-01 PROCEDURE — 96375 TX/PRO/DX INJ NEW DRUG ADDON: CPT

## 2021-06-01 PROCEDURE — 63600175 PHARM REV CODE 636 W HCPCS: Performed by: NURSE PRACTITIONER

## 2021-06-01 RX ORDER — SODIUM CHLORIDE 0.9 % (FLUSH) 0.9 %
10 SYRINGE (ML) INJECTION
Status: DISCONTINUED | OUTPATIENT
Start: 2021-06-01 | End: 2021-06-01 | Stop reason: HOSPADM

## 2021-06-01 RX ORDER — METHYLPREDNISOLONE SOD SUCC 125 MG
40 VIAL (EA) INJECTION ONCE
Status: COMPLETED | OUTPATIENT
Start: 2021-06-01 | End: 2021-06-01

## 2021-06-01 RX ADMIN — METHYLPREDNISOLONE SODIUM SUCCINATE 40 MG: 125 INJECTION, POWDER, FOR SOLUTION INTRAMUSCULAR; INTRAVENOUS at 03:06

## 2021-06-01 RX ADMIN — IRON SUCROSE 200 MG: 20 INJECTION, SOLUTION INTRAVENOUS at 03:06

## 2021-06-07 RX ORDER — HEPARIN 100 UNIT/ML
500 SYRINGE INTRAVENOUS
Status: CANCELLED | OUTPATIENT
Start: 2021-06-07

## 2021-06-07 RX ORDER — HEPARIN 100 UNIT/ML
500 SYRINGE INTRAVENOUS
Status: CANCELLED | OUTPATIENT
Start: 2021-06-09

## 2021-06-07 RX ORDER — HEPARIN 100 UNIT/ML
500 SYRINGE INTRAVENOUS
Status: CANCELLED | OUTPATIENT
Start: 2021-06-16

## 2021-06-07 RX ORDER — SODIUM CHLORIDE 0.9 % (FLUSH) 0.9 %
10 SYRINGE (ML) INJECTION
Status: CANCELLED | OUTPATIENT
Start: 2021-06-30

## 2021-06-07 RX ORDER — SODIUM CHLORIDE 0.9 % (FLUSH) 0.9 %
10 SYRINGE (ML) INJECTION
Status: CANCELLED | OUTPATIENT
Start: 2021-06-16

## 2021-06-07 RX ORDER — SODIUM CHLORIDE 0.9 % (FLUSH) 0.9 %
10 SYRINGE (ML) INJECTION
Status: CANCELLED | OUTPATIENT
Start: 2021-06-09

## 2021-06-07 RX ORDER — SODIUM CHLORIDE 0.9 % (FLUSH) 0.9 %
10 SYRINGE (ML) INJECTION
Status: CANCELLED | OUTPATIENT
Start: 2021-06-07

## 2021-06-07 RX ORDER — HEPARIN 100 UNIT/ML
500 SYRINGE INTRAVENOUS
Status: CANCELLED | OUTPATIENT
Start: 2021-06-30

## 2021-06-07 RX ORDER — SODIUM CHLORIDE 0.9 % (FLUSH) 0.9 %
10 SYRINGE (ML) INJECTION
Status: CANCELLED | OUTPATIENT
Start: 2021-06-23

## 2021-06-07 RX ORDER — HEPARIN 100 UNIT/ML
500 SYRINGE INTRAVENOUS
Status: CANCELLED | OUTPATIENT
Start: 2021-06-23

## 2021-06-08 ENCOUNTER — INFUSION (OUTPATIENT)
Dept: INFUSION THERAPY | Facility: HOSPITAL | Age: 71
End: 2021-06-08
Attending: INTERNAL MEDICINE
Payer: MEDICARE

## 2021-06-08 VITALS
TEMPERATURE: 98 F | RESPIRATION RATE: 18 BRPM | DIASTOLIC BLOOD PRESSURE: 62 MMHG | HEART RATE: 69 BPM | OXYGEN SATURATION: 99 % | SYSTOLIC BLOOD PRESSURE: 157 MMHG

## 2021-06-08 DIAGNOSIS — D50.0 IRON DEFICIENCY ANEMIA DUE TO CHRONIC BLOOD LOSS: Primary | ICD-10-CM

## 2021-06-08 PROCEDURE — 63600175 PHARM REV CODE 636 W HCPCS: Performed by: NURSE PRACTITIONER

## 2021-06-08 PROCEDURE — 96374 THER/PROPH/DIAG INJ IV PUSH: CPT

## 2021-06-08 PROCEDURE — 96375 TX/PRO/DX INJ NEW DRUG ADDON: CPT

## 2021-06-08 RX ORDER — METHYLPREDNISOLONE SOD SUCC 125 MG
40 VIAL (EA) INJECTION ONCE
Status: COMPLETED | OUTPATIENT
Start: 2021-06-08 | End: 2021-06-08

## 2021-06-08 RX ADMIN — METHYLPREDNISOLONE SODIUM SUCCINATE 40 MG: 125 INJECTION, POWDER, FOR SOLUTION INTRAMUSCULAR; INTRAVENOUS at 03:06

## 2021-06-08 RX ADMIN — IRON SUCROSE 200 MG: 20 INJECTION, SOLUTION INTRAVENOUS at 03:06

## 2021-06-14 DIAGNOSIS — E53.8 FOLIC ACID DEFICIENCY: ICD-10-CM

## 2021-06-14 DIAGNOSIS — E03.9 HYPOTHYROIDISM, UNSPECIFIED TYPE: ICD-10-CM

## 2021-06-14 RX ORDER — FOLIC ACID 1 MG/1
1 TABLET ORAL DAILY
Qty: 90 TABLET | Refills: 1 | Status: SHIPPED | OUTPATIENT
Start: 2021-06-14 | End: 2021-06-15 | Stop reason: SDUPTHER

## 2021-06-14 RX ORDER — LEVOTHYROXINE SODIUM 112 UG/1
112 TABLET ORAL DAILY
Qty: 90 TABLET | Refills: 1 | Status: SHIPPED | OUTPATIENT
Start: 2021-06-14 | End: 2021-06-15 | Stop reason: SDUPTHER

## 2021-06-15 ENCOUNTER — INFUSION (OUTPATIENT)
Dept: INFUSION THERAPY | Facility: HOSPITAL | Age: 71
End: 2021-06-15
Attending: INTERNAL MEDICINE
Payer: MEDICARE

## 2021-06-15 ENCOUNTER — TELEPHONE (OUTPATIENT)
Dept: FAMILY MEDICINE | Facility: CLINIC | Age: 71
End: 2021-06-15

## 2021-06-15 VITALS
DIASTOLIC BLOOD PRESSURE: 61 MMHG | RESPIRATION RATE: 16 BRPM | SYSTOLIC BLOOD PRESSURE: 141 MMHG | HEART RATE: 62 BPM | OXYGEN SATURATION: 98 % | TEMPERATURE: 98 F

## 2021-06-15 DIAGNOSIS — E03.9 HYPOTHYROIDISM, UNSPECIFIED TYPE: ICD-10-CM

## 2021-06-15 DIAGNOSIS — D50.0 IRON DEFICIENCY ANEMIA DUE TO CHRONIC BLOOD LOSS: Primary | ICD-10-CM

## 2021-06-15 DIAGNOSIS — E53.8 FOLIC ACID DEFICIENCY: ICD-10-CM

## 2021-06-15 PROCEDURE — 96375 TX/PRO/DX INJ NEW DRUG ADDON: CPT

## 2021-06-15 PROCEDURE — 96374 THER/PROPH/DIAG INJ IV PUSH: CPT

## 2021-06-15 PROCEDURE — 63600175 PHARM REV CODE 636 W HCPCS: Performed by: NURSE PRACTITIONER

## 2021-06-15 PROCEDURE — 25000003 PHARM REV CODE 250: Performed by: NURSE PRACTITIONER

## 2021-06-15 RX ORDER — LEVOTHYROXINE SODIUM 112 UG/1
112 TABLET ORAL DAILY
Qty: 90 TABLET | Refills: 1 | Status: SHIPPED | OUTPATIENT
Start: 2021-06-15 | End: 2021-06-16 | Stop reason: SDUPTHER

## 2021-06-15 RX ORDER — METHYLPREDNISOLONE SOD SUCC 125 MG
40 VIAL (EA) INJECTION ONCE
Status: COMPLETED | OUTPATIENT
Start: 2021-06-15 | End: 2021-06-15

## 2021-06-15 RX ORDER — FOLIC ACID 1 MG/1
1 TABLET ORAL DAILY
Qty: 90 TABLET | Refills: 1 | Status: SHIPPED | OUTPATIENT
Start: 2021-06-15 | End: 2021-06-16 | Stop reason: SDUPTHER

## 2021-06-15 RX ADMIN — SODIUM CHLORIDE: 0.9 INJECTION, SOLUTION INTRAVENOUS at 03:06

## 2021-06-15 RX ADMIN — IRON SUCROSE 200 MG: 20 INJECTION, SOLUTION INTRAVENOUS at 03:06

## 2021-06-15 RX ADMIN — METHYLPREDNISOLONE SODIUM SUCCINATE 40 MG: 125 INJECTION, POWDER, FOR SOLUTION INTRAMUSCULAR; INTRAVENOUS at 03:06

## 2021-06-16 DIAGNOSIS — E53.8 FOLIC ACID DEFICIENCY: ICD-10-CM

## 2021-06-16 DIAGNOSIS — E03.9 HYPOTHYROIDISM, UNSPECIFIED TYPE: ICD-10-CM

## 2021-06-16 RX ORDER — LEVOTHYROXINE SODIUM 112 UG/1
112 TABLET ORAL DAILY
Qty: 90 TABLET | Refills: 1 | Status: SHIPPED | OUTPATIENT
Start: 2021-06-16 | End: 2021-12-15 | Stop reason: SDUPTHER

## 2021-06-16 RX ORDER — FOLIC ACID 1 MG/1
1 TABLET ORAL DAILY
Qty: 90 TABLET | Refills: 1 | Status: SHIPPED | OUTPATIENT
Start: 2021-06-16 | End: 2021-12-15 | Stop reason: SDUPTHER

## 2021-06-22 ENCOUNTER — INFUSION (OUTPATIENT)
Dept: INFUSION THERAPY | Facility: HOSPITAL | Age: 71
End: 2021-06-22
Attending: INTERNAL MEDICINE
Payer: MEDICARE

## 2021-06-22 VITALS
OXYGEN SATURATION: 98 % | DIASTOLIC BLOOD PRESSURE: 53 MMHG | RESPIRATION RATE: 18 BRPM | SYSTOLIC BLOOD PRESSURE: 130 MMHG | TEMPERATURE: 97 F | HEART RATE: 60 BPM

## 2021-06-22 DIAGNOSIS — D50.0 IRON DEFICIENCY ANEMIA DUE TO CHRONIC BLOOD LOSS: Primary | ICD-10-CM

## 2021-06-22 PROCEDURE — 96374 THER/PROPH/DIAG INJ IV PUSH: CPT

## 2021-06-22 PROCEDURE — 96375 TX/PRO/DX INJ NEW DRUG ADDON: CPT

## 2021-06-22 PROCEDURE — 63600175 PHARM REV CODE 636 W HCPCS: Performed by: NURSE PRACTITIONER

## 2021-06-22 RX ORDER — METHYLPREDNISOLONE SOD SUCC 125 MG
40 VIAL (EA) INJECTION ONCE
Status: COMPLETED | OUTPATIENT
Start: 2021-06-22 | End: 2021-06-22

## 2021-06-22 RX ADMIN — IRON SUCROSE 200 MG: 20 INJECTION, SOLUTION INTRAVENOUS at 02:06

## 2021-06-22 RX ADMIN — METHYLPREDNISOLONE SODIUM SUCCINATE 40 MG: 125 INJECTION, POWDER, FOR SOLUTION INTRAMUSCULAR; INTRAVENOUS at 02:06

## 2021-06-29 ENCOUNTER — INFUSION (OUTPATIENT)
Dept: INFUSION THERAPY | Facility: HOSPITAL | Age: 71
End: 2021-06-29
Attending: INTERNAL MEDICINE
Payer: MEDICARE

## 2021-06-29 VITALS
OXYGEN SATURATION: 97 % | DIASTOLIC BLOOD PRESSURE: 60 MMHG | TEMPERATURE: 97 F | SYSTOLIC BLOOD PRESSURE: 141 MMHG | RESPIRATION RATE: 18 BRPM | HEART RATE: 66 BPM

## 2021-06-29 DIAGNOSIS — D50.0 IRON DEFICIENCY ANEMIA DUE TO CHRONIC BLOOD LOSS: Primary | ICD-10-CM

## 2021-06-29 PROCEDURE — 96374 THER/PROPH/DIAG INJ IV PUSH: CPT

## 2021-06-29 PROCEDURE — 96375 TX/PRO/DX INJ NEW DRUG ADDON: CPT

## 2021-06-29 PROCEDURE — 63600175 PHARM REV CODE 636 W HCPCS: Performed by: NURSE PRACTITIONER

## 2021-06-29 RX ORDER — METHYLPREDNISOLONE SOD SUCC 125 MG
40 VIAL (EA) INJECTION ONCE
Status: COMPLETED | OUTPATIENT
Start: 2021-06-29 | End: 2021-06-29

## 2021-06-29 RX ADMIN — IRON SUCROSE 200 MG: 20 INJECTION, SOLUTION INTRAVENOUS at 02:06

## 2021-06-29 RX ADMIN — METHYLPREDNISOLONE SODIUM SUCCINATE 40 MG: 125 INJECTION, POWDER, FOR SOLUTION INTRAMUSCULAR; INTRAVENOUS at 02:06

## 2021-06-30 DIAGNOSIS — L40.50 PSORIATIC ARTHRITIS: Primary | ICD-10-CM

## 2021-07-06 ENCOUNTER — INFUSION (OUTPATIENT)
Dept: INFUSION THERAPY | Facility: HOSPITAL | Age: 71
End: 2021-07-06
Attending: INTERNAL MEDICINE
Payer: MEDICARE

## 2021-07-06 VITALS
OXYGEN SATURATION: 97 % | RESPIRATION RATE: 18 BRPM | DIASTOLIC BLOOD PRESSURE: 58 MMHG | TEMPERATURE: 98 F | SYSTOLIC BLOOD PRESSURE: 132 MMHG | HEART RATE: 68 BPM

## 2021-07-06 DIAGNOSIS — D50.0 IRON DEFICIENCY ANEMIA DUE TO CHRONIC BLOOD LOSS: Primary | ICD-10-CM

## 2021-07-06 PROCEDURE — 96374 THER/PROPH/DIAG INJ IV PUSH: CPT

## 2021-07-06 PROCEDURE — 63600175 PHARM REV CODE 636 W HCPCS: Performed by: NURSE PRACTITIONER

## 2021-07-06 PROCEDURE — 96375 TX/PRO/DX INJ NEW DRUG ADDON: CPT

## 2021-07-06 RX ORDER — METHYLPREDNISOLONE SOD SUCC 125 MG
40 VIAL (EA) INJECTION ONCE
Status: COMPLETED | OUTPATIENT
Start: 2021-07-06 | End: 2021-07-06

## 2021-07-06 RX ADMIN — IRON SUCROSE 200 MG: 20 INJECTION, SOLUTION INTRAVENOUS at 03:07

## 2021-07-06 RX ADMIN — METHYLPREDNISOLONE SODIUM SUCCINATE 40 MG: 125 INJECTION, POWDER, FOR SOLUTION INTRAMUSCULAR; INTRAVENOUS at 02:07

## 2021-07-11 ENCOUNTER — NURSE TRIAGE (OUTPATIENT)
Dept: ADMINISTRATIVE | Facility: CLINIC | Age: 71
End: 2021-07-11

## 2021-07-12 ENCOUNTER — TELEPHONE (OUTPATIENT)
Dept: CARDIOLOGY | Facility: CLINIC | Age: 71
End: 2021-07-12

## 2021-07-13 ENCOUNTER — OFFICE VISIT (OUTPATIENT)
Dept: CARDIOLOGY | Facility: CLINIC | Age: 71
End: 2021-07-13
Payer: MEDICARE

## 2021-07-13 ENCOUNTER — LAB VISIT (OUTPATIENT)
Dept: LAB | Facility: HOSPITAL | Age: 71
End: 2021-07-13
Attending: NURSE PRACTITIONER
Payer: MEDICARE

## 2021-07-13 VITALS
HEART RATE: 82 BPM | WEIGHT: 253.75 LBS | DIASTOLIC BLOOD PRESSURE: 56 MMHG | BODY MASS INDEX: 44.24 KG/M2 | OXYGEN SATURATION: 95 % | SYSTOLIC BLOOD PRESSURE: 118 MMHG

## 2021-07-13 DIAGNOSIS — Z95.1 S/P CABG (CORONARY ARTERY BYPASS GRAFT): Chronic | ICD-10-CM

## 2021-07-13 DIAGNOSIS — I10 ESSENTIAL HYPERTENSION: Chronic | ICD-10-CM

## 2021-07-13 DIAGNOSIS — R06.09 DOE (DYSPNEA ON EXERTION): ICD-10-CM

## 2021-07-13 DIAGNOSIS — I25.118 CORONARY ARTERY DISEASE OF NATIVE ARTERY OF NATIVE HEART WITH STABLE ANGINA PECTORIS: Primary | ICD-10-CM

## 2021-07-13 DIAGNOSIS — Z78.9 STATIN INTOLERANCE: ICD-10-CM

## 2021-07-13 DIAGNOSIS — G47.33 OSA (OBSTRUCTIVE SLEEP APNEA): ICD-10-CM

## 2021-07-13 DIAGNOSIS — I47.10 SUPRAVENTRICULAR TACHYCARDIA: Chronic | ICD-10-CM

## 2021-07-13 DIAGNOSIS — E78.00 PURE HYPERCHOLESTEROLEMIA: Chronic | ICD-10-CM

## 2021-07-13 LAB
ANION GAP SERPL CALC-SCNC: 15 MMOL/L (ref 8–16)
BASOPHILS # BLD AUTO: 0.04 K/UL (ref 0–0.2)
BASOPHILS NFR BLD: 0.4 % (ref 0–1.9)
BNP SERPL-MCNC: 108 PG/ML (ref 0–99)
BUN SERPL-MCNC: 23 MG/DL (ref 8–23)
CALCIUM SERPL-MCNC: 9.3 MG/DL (ref 8.7–10.5)
CHLORIDE SERPL-SCNC: 101 MMOL/L (ref 95–110)
CO2 SERPL-SCNC: 26 MMOL/L (ref 23–29)
CREAT SERPL-MCNC: 1.3 MG/DL (ref 0.5–1.4)
DIFFERENTIAL METHOD: ABNORMAL
EOSINOPHIL # BLD AUTO: 0.2 K/UL (ref 0–0.5)
EOSINOPHIL NFR BLD: 1.7 % (ref 0–8)
ERYTHROCYTE [DISTWIDTH] IN BLOOD BY AUTOMATED COUNT: 15.3 % (ref 11.5–14.5)
EST. GFR  (AFRICAN AMERICAN): 48 ML/MIN/1.73 M^2
EST. GFR  (NON AFRICAN AMERICAN): 42 ML/MIN/1.73 M^2
GLUCOSE SERPL-MCNC: 147 MG/DL (ref 70–110)
HCT VFR BLD AUTO: 39.8 % (ref 37–48.5)
HGB BLD-MCNC: 12.9 G/DL (ref 12–16)
IMM GRANULOCYTES # BLD AUTO: 0.05 K/UL (ref 0–0.04)
IMM GRANULOCYTES NFR BLD AUTO: 0.5 % (ref 0–0.5)
LYMPHOCYTES # BLD AUTO: 2.1 K/UL (ref 1–4.8)
LYMPHOCYTES NFR BLD: 23.2 % (ref 18–48)
MCH RBC QN AUTO: 30.6 PG (ref 27–31)
MCHC RBC AUTO-ENTMCNC: 32.4 G/DL (ref 32–36)
MCV RBC AUTO: 94 FL (ref 82–98)
MONOCYTES # BLD AUTO: 0.9 K/UL (ref 0.3–1)
MONOCYTES NFR BLD: 9.9 % (ref 4–15)
NEUTROPHILS # BLD AUTO: 5.9 K/UL (ref 1.8–7.7)
NEUTROPHILS NFR BLD: 64.3 % (ref 38–73)
NRBC BLD-RTO: 0 /100 WBC
PLATELET # BLD AUTO: 244 K/UL (ref 150–450)
PMV BLD AUTO: 9.7 FL (ref 9.2–12.9)
POTASSIUM SERPL-SCNC: 4.2 MMOL/L (ref 3.5–5.1)
RBC # BLD AUTO: 4.22 M/UL (ref 4–5.4)
SODIUM SERPL-SCNC: 142 MMOL/L (ref 136–145)
WBC # BLD AUTO: 9.19 K/UL (ref 3.9–12.7)

## 2021-07-13 PROCEDURE — 99999 PR PBB SHADOW E&M-EST. PATIENT-LVL IV: CPT | Mod: PBBFAC,,, | Performed by: NURSE PRACTITIONER

## 2021-07-13 PROCEDURE — 99214 PR OFFICE/OUTPT VISIT, EST, LEVL IV, 30-39 MIN: ICD-10-PCS | Mod: S$PBB,,, | Performed by: NURSE PRACTITIONER

## 2021-07-13 PROCEDURE — 99999 PR PBB SHADOW E&M-EST. PATIENT-LVL IV: ICD-10-PCS | Mod: PBBFAC,,, | Performed by: NURSE PRACTITIONER

## 2021-07-13 PROCEDURE — 83880 ASSAY OF NATRIURETIC PEPTIDE: CPT | Performed by: NURSE PRACTITIONER

## 2021-07-13 PROCEDURE — 99214 OFFICE O/P EST MOD 30 MIN: CPT | Mod: S$PBB,,, | Performed by: NURSE PRACTITIONER

## 2021-07-13 PROCEDURE — 36415 COLL VENOUS BLD VENIPUNCTURE: CPT | Performed by: NURSE PRACTITIONER

## 2021-07-13 PROCEDURE — 99214 OFFICE O/P EST MOD 30 MIN: CPT | Mod: PBBFAC | Performed by: NURSE PRACTITIONER

## 2021-07-13 PROCEDURE — 80048 BASIC METABOLIC PNL TOTAL CA: CPT | Performed by: NURSE PRACTITIONER

## 2021-07-13 PROCEDURE — 85025 COMPLETE CBC W/AUTO DIFF WBC: CPT | Performed by: NURSE PRACTITIONER

## 2021-07-14 ENCOUNTER — TELEPHONE (OUTPATIENT)
Dept: CARDIOLOGY | Facility: CLINIC | Age: 71
End: 2021-07-14

## 2021-07-14 DIAGNOSIS — N18.9 CHRONIC KIDNEY DISEASE, UNSPECIFIED CKD STAGE: ICD-10-CM

## 2021-07-24 ENCOUNTER — HOSPITAL ENCOUNTER (EMERGENCY)
Facility: HOSPITAL | Age: 71
Discharge: HOME | End: 2021-07-24
Attending: FAMILY MEDICINE
Payer: MEDICARE

## 2021-07-24 VITALS
RESPIRATION RATE: 18 BRPM | OXYGEN SATURATION: 97 % | SYSTOLIC BLOOD PRESSURE: 137 MMHG | BODY MASS INDEX: 43.94 KG/M2 | HEART RATE: 81 BPM | DIASTOLIC BLOOD PRESSURE: 72 MMHG | WEIGHT: 248 LBS | TEMPERATURE: 99 F | HEIGHT: 63 IN
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
## 2021-07-24 DIAGNOSIS — R52 PAIN: ICD-10-CM

## 2021-07-24 PROCEDURE — 73552 X-RAY EXAM OF FEMUR 2/>: CPT | Mod: TC,50,FY

## 2021-07-24 PROCEDURE — 73080 X-RAY EXAM OF ELBOW: CPT | Mod: TC,FY,RT

## 2021-07-24 PROCEDURE — 73562 XR KNEE 3 VIEW LEFT: ICD-10-PCS | Mod: 26,LT,, | Performed by: RADIOLOGY

## 2021-07-24 PROCEDURE — 73080 X-RAY EXAM OF ELBOW: CPT | Mod: 26,RT,, | Performed by: RADIOLOGY

## 2021-07-24 PROCEDURE — 73562 X-RAY EXAM OF KNEE 3: CPT | Mod: TC,FY,LT

## 2021-07-24 PROCEDURE — 73552 X-RAY EXAM OF FEMUR 2/>: CPT | Mod: 26,50,, | Performed by: RADIOLOGY

## 2021-07-24 PROCEDURE — 99284 EMERGENCY DEPT VISIT MOD MDM: CPT | Mod: 25

## 2021-07-24 PROCEDURE — 73552 XR FEMUR 2 VIEW BILATERAL: ICD-10-PCS | Mod: 26,50,, | Performed by: RADIOLOGY

## 2021-07-24 PROCEDURE — 73562 X-RAY EXAM OF KNEE 3: CPT | Mod: 26,LT,, | Performed by: RADIOLOGY

## 2021-07-24 PROCEDURE — 73080 XR ELBOW COMPLETE 3 VIEW RIGHT: ICD-10-PCS | Mod: 26,RT,, | Performed by: RADIOLOGY

## 2021-07-29 ENCOUNTER — LAB VISIT (OUTPATIENT)
Dept: LAB | Facility: HOSPITAL | Age: 71
End: 2021-07-29
Attending: INTERNAL MEDICINE
Payer: MEDICARE

## 2021-07-29 DIAGNOSIS — L40.50 PSORIATIC ARTHRITIS: ICD-10-CM

## 2021-07-29 DIAGNOSIS — D50.0 IRON DEFICIENCY ANEMIA DUE TO CHRONIC BLOOD LOSS: ICD-10-CM

## 2021-07-29 LAB
ALBUMIN SERPL BCP-MCNC: 3.6 G/DL (ref 3.5–5.2)
ALP SERPL-CCNC: 38 U/L (ref 55–135)
ALT SERPL W/O P-5'-P-CCNC: 20 U/L (ref 10–44)
ANION GAP SERPL CALC-SCNC: 15 MMOL/L (ref 8–16)
ANION GAP SERPL CALC-SCNC: 15 MMOL/L (ref 8–16)
AST SERPL-CCNC: 23 U/L (ref 10–40)
BASOPHILS # BLD AUTO: 0.06 K/UL (ref 0–0.2)
BASOPHILS # BLD AUTO: 0.06 K/UL (ref 0–0.2)
BASOPHILS NFR BLD: 0.7 % (ref 0–1.9)
BASOPHILS NFR BLD: 0.7 % (ref 0–1.9)
BILIRUB SERPL-MCNC: 0.4 MG/DL (ref 0.1–1)
BUN SERPL-MCNC: 19 MG/DL (ref 8–23)
BUN SERPL-MCNC: 19 MG/DL (ref 8–23)
CALCIUM SERPL-MCNC: 9.7 MG/DL (ref 8.7–10.5)
CALCIUM SERPL-MCNC: 9.7 MG/DL (ref 8.7–10.5)
CHLORIDE SERPL-SCNC: 100 MMOL/L (ref 95–110)
CHLORIDE SERPL-SCNC: 100 MMOL/L (ref 95–110)
CO2 SERPL-SCNC: 28 MMOL/L (ref 23–29)
CO2 SERPL-SCNC: 28 MMOL/L (ref 23–29)
CREAT SERPL-MCNC: 1.1 MG/DL (ref 0.5–1.4)
CREAT SERPL-MCNC: 1.1 MG/DL (ref 0.5–1.4)
CRP SERPL-MCNC: 8.5 MG/L (ref 0–8.2)
DIFFERENTIAL METHOD: ABNORMAL
DIFFERENTIAL METHOD: ABNORMAL
EOSINOPHIL # BLD AUTO: 0.1 K/UL (ref 0–0.5)
EOSINOPHIL # BLD AUTO: 0.1 K/UL (ref 0–0.5)
EOSINOPHIL NFR BLD: 1.6 % (ref 0–8)
EOSINOPHIL NFR BLD: 1.6 % (ref 0–8)
ERYTHROCYTE [DISTWIDTH] IN BLOOD BY AUTOMATED COUNT: 14.7 % (ref 11.5–14.5)
ERYTHROCYTE [DISTWIDTH] IN BLOOD BY AUTOMATED COUNT: 14.7 % (ref 11.5–14.5)
ERYTHROCYTE [SEDIMENTATION RATE] IN BLOOD BY WESTERGREN METHOD: 21 MM/HR (ref 0–20)
EST. GFR  (AFRICAN AMERICAN): 59 ML/MIN/1.73 M^2
EST. GFR  (AFRICAN AMERICAN): 59 ML/MIN/1.73 M^2
EST. GFR  (NON AFRICAN AMERICAN): 51 ML/MIN/1.73 M^2
EST. GFR  (NON AFRICAN AMERICAN): 51 ML/MIN/1.73 M^2
GLUCOSE SERPL-MCNC: 133 MG/DL (ref 70–110)
GLUCOSE SERPL-MCNC: 133 MG/DL (ref 70–110)
HCT VFR BLD AUTO: 40.4 % (ref 37–48.5)
HCT VFR BLD AUTO: 40.4 % (ref 37–48.5)
HGB BLD-MCNC: 12.8 G/DL (ref 12–16)
HGB BLD-MCNC: 12.8 G/DL (ref 12–16)
IMM GRANULOCYTES # BLD AUTO: 0.05 K/UL (ref 0–0.04)
IMM GRANULOCYTES # BLD AUTO: 0.05 K/UL (ref 0–0.04)
IMM GRANULOCYTES NFR BLD AUTO: 0.6 % (ref 0–0.5)
IMM GRANULOCYTES NFR BLD AUTO: 0.6 % (ref 0–0.5)
LYMPHOCYTES # BLD AUTO: 2.3 K/UL (ref 1–4.8)
LYMPHOCYTES # BLD AUTO: 2.3 K/UL (ref 1–4.8)
LYMPHOCYTES NFR BLD: 26.7 % (ref 18–48)
LYMPHOCYTES NFR BLD: 26.7 % (ref 18–48)
MCH RBC QN AUTO: 30.5 PG (ref 27–31)
MCH RBC QN AUTO: 30.5 PG (ref 27–31)
MCHC RBC AUTO-ENTMCNC: 31.7 G/DL (ref 32–36)
MCHC RBC AUTO-ENTMCNC: 31.7 G/DL (ref 32–36)
MCV RBC AUTO: 96 FL (ref 82–98)
MCV RBC AUTO: 96 FL (ref 82–98)
MONOCYTES # BLD AUTO: 0.8 K/UL (ref 0.3–1)
MONOCYTES # BLD AUTO: 0.8 K/UL (ref 0.3–1)
MONOCYTES NFR BLD: 9.6 % (ref 4–15)
MONOCYTES NFR BLD: 9.6 % (ref 4–15)
NEUTROPHILS # BLD AUTO: 5.2 K/UL (ref 1.8–7.7)
NEUTROPHILS # BLD AUTO: 5.2 K/UL (ref 1.8–7.7)
NEUTROPHILS NFR BLD: 60.8 % (ref 38–73)
NEUTROPHILS NFR BLD: 60.8 % (ref 38–73)
NRBC BLD-RTO: 0 /100 WBC
NRBC BLD-RTO: 0 /100 WBC
PLATELET # BLD AUTO: 225 K/UL (ref 150–450)
PLATELET # BLD AUTO: 225 K/UL (ref 150–450)
PMV BLD AUTO: 10.3 FL (ref 9.2–12.9)
PMV BLD AUTO: 10.3 FL (ref 9.2–12.9)
POTASSIUM SERPL-SCNC: 3.6 MMOL/L (ref 3.5–5.1)
POTASSIUM SERPL-SCNC: 3.6 MMOL/L (ref 3.5–5.1)
PROT SERPL-MCNC: 6.9 G/DL (ref 6–8.4)
RBC # BLD AUTO: 4.19 M/UL (ref 4–5.4)
RBC # BLD AUTO: 4.19 M/UL (ref 4–5.4)
SODIUM SERPL-SCNC: 143 MMOL/L (ref 136–145)
SODIUM SERPL-SCNC: 143 MMOL/L (ref 136–145)
WBC # BLD AUTO: 8.5 K/UL (ref 3.9–12.7)
WBC # BLD AUTO: 8.5 K/UL (ref 3.9–12.7)

## 2021-07-29 PROCEDURE — 82728 ASSAY OF FERRITIN: CPT | Performed by: NURSE PRACTITIONER

## 2021-07-29 PROCEDURE — 80053 COMPREHEN METABOLIC PANEL: CPT | Performed by: PHYSICIAN ASSISTANT

## 2021-07-29 PROCEDURE — 36415 COLL VENOUS BLD VENIPUNCTURE: CPT | Mod: PO | Performed by: NURSE PRACTITIONER

## 2021-07-29 PROCEDURE — 83540 ASSAY OF IRON: CPT | Performed by: NURSE PRACTITIONER

## 2021-07-29 PROCEDURE — 85025 COMPLETE CBC W/AUTO DIFF WBC: CPT | Performed by: NURSE PRACTITIONER

## 2021-07-29 PROCEDURE — 85651 RBC SED RATE NONAUTOMATED: CPT | Performed by: PHYSICIAN ASSISTANT

## 2021-07-29 PROCEDURE — 86140 C-REACTIVE PROTEIN: CPT | Performed by: PHYSICIAN ASSISTANT

## 2021-07-30 LAB
FERRITIN SERPL-MCNC: 544 NG/ML (ref 20–300)
IRON SERPL-MCNC: 77 UG/DL (ref 30–160)
SATURATED IRON: 22 % (ref 20–50)
TOTAL IRON BINDING CAPACITY: 349 UG/DL (ref 250–450)
TRANSFERRIN SERPL-MCNC: 236 MG/DL (ref 200–375)

## 2021-08-02 ENCOUNTER — PATIENT OUTREACH (OUTPATIENT)
Dept: ADMINISTRATIVE | Facility: OTHER | Age: 71
End: 2021-08-02

## 2021-08-02 ENCOUNTER — TELEPHONE (OUTPATIENT)
Dept: RHEUMATOLOGY | Facility: CLINIC | Age: 71
End: 2021-08-02

## 2021-08-03 ENCOUNTER — OFFICE VISIT (OUTPATIENT)
Dept: RHEUMATOLOGY | Facility: CLINIC | Age: 71
End: 2021-08-03
Payer: MEDICARE

## 2021-08-03 ENCOUNTER — TELEPHONE (OUTPATIENT)
Dept: TRANSPLANT | Facility: CLINIC | Age: 71
End: 2021-08-03

## 2021-08-03 VITALS
BODY MASS INDEX: 43.93 KG/M2 | DIASTOLIC BLOOD PRESSURE: 72 MMHG | HEIGHT: 63 IN | HEART RATE: 75 BPM | SYSTOLIC BLOOD PRESSURE: 153 MMHG

## 2021-08-03 DIAGNOSIS — L40.50 PSORIATIC ARTHRITIS: ICD-10-CM

## 2021-08-03 DIAGNOSIS — L40.9 PSORIASIS: Primary | ICD-10-CM

## 2021-08-03 DIAGNOSIS — G62.9 POLYNEUROPATHY: ICD-10-CM

## 2021-08-03 DIAGNOSIS — M85.80 OSTEOPENIA WITH HIGH RISK OF FRACTURE: ICD-10-CM

## 2021-08-03 DIAGNOSIS — Z79.899 HIGH RISK MEDICATION USE: ICD-10-CM

## 2021-08-03 PROCEDURE — 99215 OFFICE O/P EST HI 40 MIN: CPT | Mod: PBBFAC | Performed by: PHYSICIAN ASSISTANT

## 2021-08-03 PROCEDURE — 99999 PR PBB SHADOW E&M-EST. PATIENT-LVL V: CPT | Mod: PBBFAC,,, | Performed by: PHYSICIAN ASSISTANT

## 2021-08-03 PROCEDURE — 99214 OFFICE O/P EST MOD 30 MIN: CPT | Mod: S$PBB,,, | Performed by: PHYSICIAN ASSISTANT

## 2021-08-03 PROCEDURE — 99999 PR PBB SHADOW E&M-EST. PATIENT-LVL V: ICD-10-PCS | Mod: PBBFAC,,, | Performed by: PHYSICIAN ASSISTANT

## 2021-08-03 PROCEDURE — 99214 PR OFFICE/OUTPT VISIT, EST, LEVL IV, 30-39 MIN: ICD-10-PCS | Mod: S$PBB,,, | Performed by: PHYSICIAN ASSISTANT

## 2021-08-03 RX ORDER — CLOBETASOL PROPIONATE 0.5 MG/G
AEROSOL, FOAM TOPICAL 2 TIMES DAILY
Qty: 300 G | Refills: 3 | Status: SHIPPED | OUTPATIENT
Start: 2021-08-03 | End: 2021-08-03

## 2021-08-03 RX ORDER — GABAPENTIN 100 MG/1
CAPSULE ORAL
COMMUNITY
End: 2021-08-03 | Stop reason: SDUPTHER

## 2021-08-03 RX ORDER — CLOBETASOL PROPIONATE 0.05 G/100ML
SHAMPOO TOPICAL 2 TIMES DAILY
Qty: 118 ML | Refills: 4 | Status: SHIPPED | OUTPATIENT
Start: 2021-08-03 | End: 2021-08-03

## 2021-08-03 RX ORDER — CLOBETASOL PROPIONATE 0.5 MG/G
AEROSOL, FOAM TOPICAL 2 TIMES DAILY
Qty: 300 G | Refills: 3 | Status: SHIPPED | OUTPATIENT
Start: 2021-08-03 | End: 2022-08-31

## 2021-08-03 RX ORDER — GABAPENTIN 100 MG/1
200 CAPSULE ORAL 3 TIMES DAILY
Qty: 540 CAPSULE | Refills: 3 | Status: SHIPPED | OUTPATIENT
Start: 2021-08-03 | End: 2021-08-03

## 2021-08-03 RX ORDER — CLOBETASOL PROPIONATE 0.05 G/100ML
SHAMPOO TOPICAL 2 TIMES DAILY
Qty: 118 ML | Refills: 4 | Status: SHIPPED | OUTPATIENT
Start: 2021-08-03 | End: 2022-08-31 | Stop reason: SDUPTHER

## 2021-08-03 RX ORDER — GABAPENTIN 100 MG/1
200 CAPSULE ORAL 3 TIMES DAILY
Qty: 540 CAPSULE | Refills: 3 | Status: SHIPPED | OUTPATIENT
Start: 2021-08-03 | End: 2023-03-06

## 2021-08-05 ENCOUNTER — TELEPHONE (OUTPATIENT)
Dept: RHEUMATOLOGY | Facility: CLINIC | Age: 71
End: 2021-08-05

## 2021-08-06 ENCOUNTER — OFFICE VISIT (OUTPATIENT)
Dept: HEMATOLOGY/ONCOLOGY | Facility: CLINIC | Age: 71
End: 2021-08-06
Payer: MEDICARE

## 2021-08-06 ENCOUNTER — INFUSION (OUTPATIENT)
Dept: INFUSION THERAPY | Facility: HOSPITAL | Age: 71
End: 2021-08-06
Attending: INTERNAL MEDICINE
Payer: MEDICARE

## 2021-08-06 VITALS
RESPIRATION RATE: 16 BRPM | DIASTOLIC BLOOD PRESSURE: 64 MMHG | BODY MASS INDEX: 43.84 KG/M2 | HEART RATE: 59 BPM | SYSTOLIC BLOOD PRESSURE: 132 MMHG | HEART RATE: 73 BPM | DIASTOLIC BLOOD PRESSURE: 73 MMHG | HEIGHT: 64 IN | WEIGHT: 256.81 LBS | SYSTOLIC BLOOD PRESSURE: 139 MMHG | OXYGEN SATURATION: 98 % | TEMPERATURE: 98 F | OXYGEN SATURATION: 99 % | TEMPERATURE: 98 F

## 2021-08-06 DIAGNOSIS — M79.604 BILATERAL LEG PAIN: ICD-10-CM

## 2021-08-06 DIAGNOSIS — M79.605 BILATERAL LEG PAIN: ICD-10-CM

## 2021-08-06 DIAGNOSIS — L40.9 PSORIASIS: Primary | ICD-10-CM

## 2021-08-06 DIAGNOSIS — D50.0 IRON DEFICIENCY ANEMIA DUE TO CHRONIC BLOOD LOSS: Primary | ICD-10-CM

## 2021-08-06 DIAGNOSIS — L40.50 PSORIATIC ARTHRITIS: ICD-10-CM

## 2021-08-06 PROCEDURE — 99999 PR PBB SHADOW E&M-EST. PATIENT-LVL III: ICD-10-PCS | Mod: PBBFAC,,, | Performed by: NURSE PRACTITIONER

## 2021-08-06 PROCEDURE — 96372 THER/PROPH/DIAG INJ SC/IM: CPT

## 2021-08-06 PROCEDURE — 99213 OFFICE O/P EST LOW 20 MIN: CPT | Mod: PBBFAC | Performed by: NURSE PRACTITIONER

## 2021-08-06 PROCEDURE — 99213 OFFICE O/P EST LOW 20 MIN: CPT | Mod: S$PBB,,, | Performed by: NURSE PRACTITIONER

## 2021-08-06 PROCEDURE — 99213 PR OFFICE/OUTPT VISIT, EST, LEVL III, 20-29 MIN: ICD-10-PCS | Mod: S$PBB,,, | Performed by: NURSE PRACTITIONER

## 2021-08-06 PROCEDURE — 63600175 PHARM REV CODE 636 W HCPCS: Mod: JG | Performed by: INTERNAL MEDICINE

## 2021-08-06 PROCEDURE — 99999 PR PBB SHADOW E&M-EST. PATIENT-LVL III: CPT | Mod: PBBFAC,,, | Performed by: NURSE PRACTITIONER

## 2021-08-06 RX ADMIN — USTEKINUMAB 90 MG: 90 INJECTION, SOLUTION SUBCUTANEOUS at 03:08

## 2021-08-10 ENCOUNTER — TELEPHONE (OUTPATIENT)
Dept: RADIOLOGY | Facility: HOSPITAL | Age: 71
End: 2021-08-10

## 2021-08-11 ENCOUNTER — HOSPITAL ENCOUNTER (OUTPATIENT)
Dept: RADIOLOGY | Facility: HOSPITAL | Age: 71
Discharge: HOME | End: 2021-08-11
Attending: NURSE PRACTITIONER
Payer: MEDICARE

## 2021-08-11 ENCOUNTER — TELEPHONE (OUTPATIENT)
Dept: RHEUMATOLOGY | Facility: CLINIC | Age: 71
End: 2021-08-11

## 2021-08-11 DIAGNOSIS — M79.604 BILATERAL LEG PAIN: ICD-10-CM

## 2021-08-11 DIAGNOSIS — M79.605 BILATERAL LEG PAIN: ICD-10-CM

## 2021-08-11 PROCEDURE — 93970 EXTREMITY STUDY: CPT | Mod: 26,,, | Performed by: RADIOLOGY

## 2021-08-11 PROCEDURE — 93970 US LOWER EXTREMITY VEINS BILATERAL: ICD-10-PCS | Mod: 26,,, | Performed by: RADIOLOGY

## 2021-08-11 PROCEDURE — 93970 EXTREMITY STUDY: CPT | Mod: TC

## 2021-08-12 ENCOUNTER — TELEPHONE (OUTPATIENT)
Dept: OBSTETRICS AND GYNECOLOGY | Facility: CLINIC | Age: 71
End: 2021-08-12

## 2021-08-12 ENCOUNTER — TELEPHONE (OUTPATIENT)
Dept: RHEUMATOLOGY | Facility: CLINIC | Age: 71
End: 2021-08-12

## 2021-09-09 DIAGNOSIS — I25.118 CORONARY ARTERY DISEASE OF NATIVE ARTERY OF NATIVE HEART WITH STABLE ANGINA PECTORIS: ICD-10-CM

## 2021-09-09 RX ORDER — POTASSIUM CHLORIDE 20 MEQ/1
20 TABLET, EXTENDED RELEASE ORAL DAILY
Qty: 90 TABLET | Refills: 3 | Status: SHIPPED | OUTPATIENT
Start: 2021-09-09 | End: 2021-09-14 | Stop reason: SDUPTHER

## 2021-09-09 RX ORDER — FUROSEMIDE 20 MG/1
20 TABLET ORAL DAILY
Qty: 90 TABLET | Refills: 3 | Status: SHIPPED | OUTPATIENT
Start: 2021-09-09 | End: 2021-09-14 | Stop reason: SDUPTHER

## 2021-09-13 ENCOUNTER — OFFICE VISIT (OUTPATIENT)
Dept: TRANSPLANT | Facility: CLINIC | Age: 71
End: 2021-09-13
Payer: MEDICARE

## 2021-09-13 VITALS
SYSTOLIC BLOOD PRESSURE: 164 MMHG | BODY MASS INDEX: 43.69 KG/M2 | WEIGHT: 255.94 LBS | HEART RATE: 64 BPM | HEIGHT: 64 IN | DIASTOLIC BLOOD PRESSURE: 62 MMHG

## 2021-09-13 DIAGNOSIS — Z95.1 S/P CABG (CORONARY ARTERY BYPASS GRAFT): Chronic | ICD-10-CM

## 2021-09-13 DIAGNOSIS — I25.118 CORONARY ARTERY DISEASE OF NATIVE ARTERY OF NATIVE HEART WITH STABLE ANGINA PECTORIS: ICD-10-CM

## 2021-09-13 DIAGNOSIS — I35.0 NONRHEUMATIC AORTIC VALVE STENOSIS: Primary | Chronic | ICD-10-CM

## 2021-09-13 DIAGNOSIS — I10 ESSENTIAL HYPERTENSION: Chronic | ICD-10-CM

## 2021-09-13 DIAGNOSIS — R55 NEAR SYNCOPE: ICD-10-CM

## 2021-09-13 DIAGNOSIS — Z95.2 S/P TAVR (TRANSCATHETER AORTIC VALVE REPLACEMENT): ICD-10-CM

## 2021-09-13 PROCEDURE — 99999 PR PBB SHADOW E&M-EST. PATIENT-LVL III: CPT | Mod: PBBFAC,,, | Performed by: NUCLEAR MEDICINE

## 2021-09-13 PROCEDURE — 93005 ELECTROCARDIOGRAM TRACING: CPT

## 2021-09-13 PROCEDURE — 99215 PR OFFICE/OUTPT VISIT, EST, LEVL V, 40-54 MIN: ICD-10-PCS | Mod: S$PBB,,, | Performed by: NUCLEAR MEDICINE

## 2021-09-13 PROCEDURE — 93010 EKG 12-LEAD: ICD-10-PCS | Mod: ,,, | Performed by: INTERNAL MEDICINE

## 2021-09-13 PROCEDURE — 99999 PR PBB SHADOW E&M-EST. PATIENT-LVL III: ICD-10-PCS | Mod: PBBFAC,,, | Performed by: NUCLEAR MEDICINE

## 2021-09-13 PROCEDURE — 99215 OFFICE O/P EST HI 40 MIN: CPT | Mod: S$PBB,,, | Performed by: NUCLEAR MEDICINE

## 2021-09-13 PROCEDURE — 99213 OFFICE O/P EST LOW 20 MIN: CPT | Mod: PBBFAC | Performed by: NUCLEAR MEDICINE

## 2021-09-13 PROCEDURE — 93010 ELECTROCARDIOGRAM REPORT: CPT | Mod: ,,, | Performed by: INTERNAL MEDICINE

## 2021-09-14 DIAGNOSIS — I25.118 CORONARY ARTERY DISEASE OF NATIVE ARTERY OF NATIVE HEART WITH STABLE ANGINA PECTORIS: ICD-10-CM

## 2021-09-14 RX ORDER — POTASSIUM CHLORIDE 20 MEQ/1
20 TABLET, EXTENDED RELEASE ORAL DAILY
Qty: 90 TABLET | Refills: 3 | Status: SHIPPED | OUTPATIENT
Start: 2021-09-14 | End: 2022-01-24 | Stop reason: SDUPTHER

## 2021-09-14 RX ORDER — FUROSEMIDE 20 MG/1
20 TABLET ORAL DAILY
Qty: 90 TABLET | Refills: 3 | Status: SHIPPED | OUTPATIENT
Start: 2021-09-14 | End: 2022-01-24 | Stop reason: SDUPTHER

## 2021-09-21 ENCOUNTER — HOSPITAL ENCOUNTER (OUTPATIENT)
Dept: CARDIOLOGY | Facility: HOSPITAL | Age: 71
Discharge: HOME | End: 2021-09-21
Attending: NUCLEAR MEDICINE
Payer: MEDICARE

## 2021-09-21 VITALS
WEIGHT: 255 LBS | HEIGHT: 64 IN | DIASTOLIC BLOOD PRESSURE: 62 MMHG | SYSTOLIC BLOOD PRESSURE: 164 MMHG | BODY MASS INDEX: 43.54 KG/M2

## 2021-09-21 DIAGNOSIS — Z95.2 S/P TAVR (TRANSCATHETER AORTIC VALVE REPLACEMENT): ICD-10-CM

## 2021-09-21 DIAGNOSIS — I35.0 NONRHEUMATIC AORTIC VALVE STENOSIS: ICD-10-CM

## 2021-09-21 DIAGNOSIS — R55 NEAR SYNCOPE: ICD-10-CM

## 2021-09-21 LAB
AV INDEX (PROSTH): 0.55
AV MEAN GRADIENT: 10 MMHG
AV PEAK GRADIENT: 18 MMHG
AV VALVE AREA: 1.64 CM2
AV VELOCITY RATIO: 0.44
BSA FOR ECHO PROCEDURE: 2.29 M2
CV ECHO LV RWT: 0.57 CM
DOP CALC AO PEAK VEL: 2.1 M/S
DOP CALC AO VTI: 43.83 CM
DOP CALC LVOT AREA: 3 CM2
DOP CALC LVOT DIAMETER: 1.95 CM
DOP CALC LVOT PEAK VEL: 0.93 M/S
DOP CALC LVOT STROKE VOLUME: 71.85 CM3
DOP CALC RVOT PEAK VEL: 0.76 M/S
DOP CALC RVOT VTI: 19.49 CM
DOP CALCLVOT PEAK VEL VTI: 24.07 CM
E WAVE DECELERATION TIME: 268.34 MSEC
E/A RATIO: 0.88
E/E' RATIO: 13.5 M/S
ECHO LV POSTERIOR WALL: 1.39 CM (ref 0.6–1.1)
EJECTION FRACTION: 55 %
FRACTIONAL SHORTENING: 24 % (ref 28–44)
INTERVENTRICULAR SEPTUM: 1.34 CM (ref 0.6–1.1)
IVRT: 68.51 MSEC
LA MAJOR: 5.99 CM
LA MINOR: 5.85 CM
LA WIDTH: 3.82 CM
LEFT ATRIUM SIZE: 3.42 CM
LEFT ATRIUM VOLUME INDEX: 30.3 ML/M2
LEFT ATRIUM VOLUME: 65.73 CM3
LEFT INTERNAL DIMENSION IN SYSTOLE: 3.72 CM (ref 2.1–4)
LEFT VENTRICLE DIASTOLIC VOLUME INDEX: 52.39 ML/M2
LEFT VENTRICLE DIASTOLIC VOLUME: 113.68 ML
LEFT VENTRICLE MASS INDEX: 126 G/M2
LEFT VENTRICLE SYSTOLIC VOLUME INDEX: 27.2 ML/M2
LEFT VENTRICLE SYSTOLIC VOLUME: 59.05 ML
LEFT VENTRICULAR INTERNAL DIMENSION IN DIASTOLE: 4.92 CM (ref 3.5–6)
LEFT VENTRICULAR MASS: 274 G
LV LATERAL E/E' RATIO: 12 M/S
LV SEPTAL E/E' RATIO: 15.43 M/S
MV PEAK A VEL: 1.23 M/S
MV PEAK E VEL: 1.08 M/S
PISA TR MAX VEL: 2.63 M/S
PV MEAN GRADIENT: 1 MMHG
PV PEAK VELOCITY: 1.15 CM/S
RA MAJOR: 4.78 CM
RA WIDTH: 3.37 CM
RIGHT VENTRICULAR END-DIASTOLIC DIMENSION: 2.76 CM
SINUS: 1.77 CM
STJ: 1.83 CM
TDI LATERAL: 0.09 M/S
TDI SEPTAL: 0.07 M/S
TDI: 0.08 M/S
TR MAX PG: 28 MMHG

## 2021-09-21 PROCEDURE — 93247 EXT ECG>7D<15D SCAN A/R: CPT

## 2021-09-21 PROCEDURE — 93248 CV CARDIAC MONITOR - 3-15 DAY ADULT (CUPID ONLY): ICD-10-PCS | Mod: ,,, | Performed by: INTERNAL MEDICINE

## 2021-09-21 PROCEDURE — 93306 ECHO (CUPID ONLY): ICD-10-PCS | Mod: 26,,, | Performed by: INTERNAL MEDICINE

## 2021-09-21 PROCEDURE — 93306 TTE W/DOPPLER COMPLETE: CPT

## 2021-09-21 PROCEDURE — 93246 EXT ECG>7D<15D RECORDING: CPT

## 2021-09-21 PROCEDURE — 93306 TTE W/DOPPLER COMPLETE: CPT | Mod: 26,,, | Performed by: INTERNAL MEDICINE

## 2021-09-21 PROCEDURE — 93248 EXT ECG>7D<15D REV&INTERPJ: CPT | Mod: ,,, | Performed by: INTERNAL MEDICINE

## 2021-09-24 ENCOUNTER — TELEPHONE (OUTPATIENT)
Dept: CARDIOLOGY | Facility: CLINIC | Age: 71
End: 2021-09-24

## 2021-10-15 ENCOUNTER — TELEPHONE (OUTPATIENT)
Dept: FAMILY MEDICINE | Facility: CLINIC | Age: 71
End: 2021-10-15

## 2021-10-21 ENCOUNTER — TELEPHONE (OUTPATIENT)
Dept: HEMATOLOGY/ONCOLOGY | Facility: CLINIC | Age: 71
End: 2021-10-21

## 2021-11-05 ENCOUNTER — LAB VISIT (OUTPATIENT)
Dept: LAB | Facility: HOSPITAL | Age: 71
End: 2021-11-05
Attending: NURSE PRACTITIONER
Payer: MEDICARE

## 2021-11-05 DIAGNOSIS — D50.0 IRON DEFICIENCY ANEMIA DUE TO CHRONIC BLOOD LOSS: ICD-10-CM

## 2021-11-05 LAB
BASOPHILS # BLD AUTO: 0.05 K/UL (ref 0–0.2)
BASOPHILS NFR BLD: 0.6 % (ref 0–1.9)
DIFFERENTIAL METHOD: ABNORMAL
EOSINOPHIL # BLD AUTO: 0.1 K/UL (ref 0–0.5)
EOSINOPHIL NFR BLD: 1.7 % (ref 0–8)
ERYTHROCYTE [DISTWIDTH] IN BLOOD BY AUTOMATED COUNT: 12.7 % (ref 11.5–14.5)
FERRITIN SERPL-MCNC: 85 NG/ML (ref 20–300)
HCT VFR BLD AUTO: 43.1 % (ref 37–48.5)
HGB BLD-MCNC: 13.4 G/DL (ref 12–16)
IMM GRANULOCYTES # BLD AUTO: 0.02 K/UL (ref 0–0.04)
IMM GRANULOCYTES NFR BLD AUTO: 0.2 % (ref 0–0.5)
IRON SERPL-MCNC: 64 UG/DL (ref 30–160)
LYMPHOCYTES # BLD AUTO: 2.5 K/UL (ref 1–4.8)
LYMPHOCYTES NFR BLD: 30.3 % (ref 18–48)
MCH RBC QN AUTO: 29.5 PG (ref 27–31)
MCHC RBC AUTO-ENTMCNC: 31.1 G/DL (ref 32–36)
MCV RBC AUTO: 95 FL (ref 82–98)
MONOCYTES # BLD AUTO: 0.7 K/UL (ref 0.3–1)
MONOCYTES NFR BLD: 8.8 % (ref 4–15)
NEUTROPHILS # BLD AUTO: 4.8 K/UL (ref 1.8–7.7)
NEUTROPHILS NFR BLD: 58.4 % (ref 38–73)
NRBC BLD-RTO: 0 /100 WBC
PLATELET # BLD AUTO: 264 K/UL (ref 150–450)
PMV BLD AUTO: 11 FL (ref 9.2–12.9)
RBC # BLD AUTO: 4.54 M/UL (ref 4–5.4)
SATURATED IRON: 17 % (ref 20–50)
TOTAL IRON BINDING CAPACITY: 380 UG/DL (ref 250–450)
TRANSFERRIN SERPL-MCNC: 257 MG/DL (ref 200–375)
WBC # BLD AUTO: 8.26 K/UL (ref 3.9–12.7)

## 2021-11-05 PROCEDURE — 36415 COLL VENOUS BLD VENIPUNCTURE: CPT | Mod: PO | Performed by: NURSE PRACTITIONER

## 2021-11-05 PROCEDURE — 85025 COMPLETE CBC W/AUTO DIFF WBC: CPT | Performed by: NURSE PRACTITIONER

## 2021-11-05 PROCEDURE — 82728 ASSAY OF FERRITIN: CPT | Performed by: NURSE PRACTITIONER

## 2021-11-05 PROCEDURE — 84466 ASSAY OF TRANSFERRIN: CPT | Performed by: NURSE PRACTITIONER

## 2021-11-09 ENCOUNTER — OFFICE VISIT (OUTPATIENT)
Dept: HEMATOLOGY/ONCOLOGY | Facility: CLINIC | Age: 71
End: 2021-11-09
Payer: MEDICARE

## 2021-11-09 VITALS
SYSTOLIC BLOOD PRESSURE: 151 MMHG | BODY MASS INDEX: 44.33 KG/M2 | HEART RATE: 69 BPM | TEMPERATURE: 97 F | OXYGEN SATURATION: 98 % | DIASTOLIC BLOOD PRESSURE: 68 MMHG | HEIGHT: 64 IN | WEIGHT: 259.69 LBS

## 2021-11-09 DIAGNOSIS — J44.9 CHRONIC OBSTRUCTIVE PULMONARY DISEASE, UNSPECIFIED COPD TYPE: ICD-10-CM

## 2021-11-09 DIAGNOSIS — R59.0 ENLARGED LYMPH NODE IN NECK: Primary | ICD-10-CM

## 2021-11-09 DIAGNOSIS — G81.91 HEMIPLEGIA AFFECTING RIGHT DOMINANT SIDE, UNSPECIFIED ETIOLOGY, UNSPECIFIED HEMIPLEGIA TYPE: ICD-10-CM

## 2021-11-09 DIAGNOSIS — E66.01 MORBID OBESITY WITH BMI OF 45.0-49.9, ADULT: ICD-10-CM

## 2021-11-09 PROCEDURE — 99999 PR PBB SHADOW E&M-EST. PATIENT-LVL V: ICD-10-PCS | Mod: PBBFAC,,, | Performed by: INTERNAL MEDICINE

## 2021-11-09 PROCEDURE — 99999 PR PBB SHADOW E&M-EST. PATIENT-LVL V: CPT | Mod: PBBFAC,,, | Performed by: INTERNAL MEDICINE

## 2021-11-09 PROCEDURE — 99215 OFFICE O/P EST HI 40 MIN: CPT | Mod: PBBFAC | Performed by: INTERNAL MEDICINE

## 2021-11-09 PROCEDURE — 99214 OFFICE O/P EST MOD 30 MIN: CPT | Mod: S$PBB,,, | Performed by: INTERNAL MEDICINE

## 2021-11-09 PROCEDURE — 99214 PR OFFICE/OUTPT VISIT, EST, LEVL IV, 30-39 MIN: ICD-10-PCS | Mod: S$PBB,,, | Performed by: INTERNAL MEDICINE

## 2021-11-12 ENCOUNTER — LAB VISIT (OUTPATIENT)
Dept: LAB | Facility: HOSPITAL | Age: 71
End: 2021-11-12
Attending: FAMILY MEDICINE
Payer: MEDICARE

## 2021-11-12 DIAGNOSIS — R59.0 ENLARGED LYMPH NODE IN NECK: ICD-10-CM

## 2021-11-12 DIAGNOSIS — L40.50 PSORIATIC ARTHRITIS: ICD-10-CM

## 2021-11-12 LAB
ALBUMIN SERPL BCP-MCNC: 3.2 G/DL (ref 3.5–5.2)
ALP SERPL-CCNC: 40 U/L (ref 55–135)
ALT SERPL W/O P-5'-P-CCNC: 15 U/L (ref 10–44)
ANION GAP SERPL CALC-SCNC: 13 MMOL/L (ref 8–16)
AST SERPL-CCNC: 21 U/L (ref 10–40)
BASOPHILS # BLD AUTO: 0.04 K/UL (ref 0–0.2)
BASOPHILS NFR BLD: 0.5 % (ref 0–1.9)
BILIRUB SERPL-MCNC: 0.3 MG/DL (ref 0.1–1)
BUN SERPL-MCNC: 16 MG/DL (ref 8–23)
CALCIUM SERPL-MCNC: 9.4 MG/DL (ref 8.7–10.5)
CHLORIDE SERPL-SCNC: 103 MMOL/L (ref 95–110)
CO2 SERPL-SCNC: 24 MMOL/L (ref 23–29)
CREAT SERPL-MCNC: 1.2 MG/DL (ref 0.5–1.4)
CREAT SERPL-MCNC: 1.2 MG/DL (ref 0.5–1.4)
CRP SERPL-MCNC: 5.7 MG/L (ref 0–8.2)
DIFFERENTIAL METHOD: ABNORMAL
EOSINOPHIL # BLD AUTO: 0.1 K/UL (ref 0–0.5)
EOSINOPHIL NFR BLD: 1.4 % (ref 0–8)
ERYTHROCYTE [DISTWIDTH] IN BLOOD BY AUTOMATED COUNT: 12.9 % (ref 11.5–14.5)
ERYTHROCYTE [SEDIMENTATION RATE] IN BLOOD BY WESTERGREN METHOD: 23 MM/HR (ref 0–20)
EST. GFR  (AFRICAN AMERICAN): 53 ML/MIN/1.73 M^2
EST. GFR  (AFRICAN AMERICAN): 53 ML/MIN/1.73 M^2
EST. GFR  (NON AFRICAN AMERICAN): 46 ML/MIN/1.73 M^2
EST. GFR  (NON AFRICAN AMERICAN): 46 ML/MIN/1.73 M^2
GLUCOSE SERPL-MCNC: 220 MG/DL (ref 70–110)
HCT VFR BLD AUTO: 41.3 % (ref 37–48.5)
HGB BLD-MCNC: 13.1 G/DL (ref 12–16)
IMM GRANULOCYTES # BLD AUTO: 0.02 K/UL (ref 0–0.04)
IMM GRANULOCYTES NFR BLD AUTO: 0.3 % (ref 0–0.5)
LYMPHOCYTES # BLD AUTO: 2 K/UL (ref 1–4.8)
LYMPHOCYTES NFR BLD: 25.8 % (ref 18–48)
MCH RBC QN AUTO: 29.4 PG (ref 27–31)
MCHC RBC AUTO-ENTMCNC: 31.7 G/DL (ref 32–36)
MCV RBC AUTO: 93 FL (ref 82–98)
MONOCYTES # BLD AUTO: 0.6 K/UL (ref 0.3–1)
MONOCYTES NFR BLD: 7.4 % (ref 4–15)
NEUTROPHILS # BLD AUTO: 4.9 K/UL (ref 1.8–7.7)
NEUTROPHILS NFR BLD: 64.6 % (ref 38–73)
NRBC BLD-RTO: 0 /100 WBC
PLATELET # BLD AUTO: 237 K/UL (ref 150–450)
PMV BLD AUTO: 10.6 FL (ref 9.2–12.9)
POTASSIUM SERPL-SCNC: 3.9 MMOL/L (ref 3.5–5.1)
PROT SERPL-MCNC: 6.7 G/DL (ref 6–8.4)
RBC # BLD AUTO: 4.46 M/UL (ref 4–5.4)
SODIUM SERPL-SCNC: 140 MMOL/L (ref 136–145)
WBC # BLD AUTO: 7.59 K/UL (ref 3.9–12.7)

## 2021-11-12 PROCEDURE — 36415 COLL VENOUS BLD VENIPUNCTURE: CPT | Mod: PO | Performed by: INTERNAL MEDICINE

## 2021-11-12 PROCEDURE — 86140 C-REACTIVE PROTEIN: CPT | Performed by: PHYSICIAN ASSISTANT

## 2021-11-12 PROCEDURE — 82565 ASSAY OF CREATININE: CPT | Performed by: INTERNAL MEDICINE

## 2021-11-12 PROCEDURE — 85651 RBC SED RATE NONAUTOMATED: CPT | Performed by: PHYSICIAN ASSISTANT

## 2021-11-12 PROCEDURE — 85025 COMPLETE CBC W/AUTO DIFF WBC: CPT | Mod: 59 | Performed by: PHYSICIAN ASSISTANT

## 2021-11-12 PROCEDURE — 80053 COMPREHEN METABOLIC PANEL: CPT | Performed by: PHYSICIAN ASSISTANT

## 2021-11-16 ENCOUNTER — HOSPITAL ENCOUNTER (OUTPATIENT)
Dept: RADIOLOGY | Facility: HOSPITAL | Age: 71
Discharge: HOME | End: 2021-11-16
Attending: INTERNAL MEDICINE
Payer: MEDICARE

## 2021-11-16 DIAGNOSIS — R59.0 ENLARGED LYMPH NODE IN NECK: ICD-10-CM

## 2021-11-16 PROCEDURE — 25500020 PHARM REV CODE 255: Performed by: INTERNAL MEDICINE

## 2021-11-16 PROCEDURE — 70491 CT SOFT TISSUE NECK W/DYE: CPT | Mod: TC

## 2021-11-16 RX ADMIN — IOHEXOL 75 ML: 350 INJECTION, SOLUTION INTRAVENOUS at 10:11

## 2021-11-18 ENCOUNTER — INFUSION (OUTPATIENT)
Dept: INFUSION THERAPY | Facility: HOSPITAL | Age: 71
End: 2021-11-18
Attending: INTERNAL MEDICINE
Payer: MEDICARE

## 2021-11-18 VITALS
RESPIRATION RATE: 16 BRPM | OXYGEN SATURATION: 98 % | DIASTOLIC BLOOD PRESSURE: 67 MMHG | HEART RATE: 66 BPM | SYSTOLIC BLOOD PRESSURE: 128 MMHG | TEMPERATURE: 98 F

## 2021-11-18 DIAGNOSIS — L40.50 PSORIATIC ARTHRITIS: ICD-10-CM

## 2021-11-18 DIAGNOSIS — L40.9 PSORIASIS: Primary | ICD-10-CM

## 2021-11-18 PROCEDURE — 63600175 PHARM REV CODE 636 W HCPCS: Mod: JG | Performed by: PHYSICIAN ASSISTANT

## 2021-11-18 PROCEDURE — 96372 THER/PROPH/DIAG INJ SC/IM: CPT

## 2021-11-18 RX ADMIN — USTEKINUMAB 90 MG: 90 INJECTION, SOLUTION SUBCUTANEOUS at 03:11

## 2021-11-22 ENCOUNTER — OFFICE VISIT (OUTPATIENT)
Dept: HEMATOLOGY/ONCOLOGY | Facility: CLINIC | Age: 71
End: 2021-11-22
Payer: MEDICARE

## 2021-11-22 ENCOUNTER — PATIENT MESSAGE (OUTPATIENT)
Dept: HEMATOLOGY/ONCOLOGY | Facility: CLINIC | Age: 71
End: 2021-11-22

## 2021-11-22 ENCOUNTER — PATIENT OUTREACH (OUTPATIENT)
Dept: ADMINISTRATIVE | Facility: OTHER | Age: 71
End: 2021-11-22
Payer: MEDICARE

## 2021-11-22 VITALS
WEIGHT: 259.25 LBS | OXYGEN SATURATION: 98 % | HEIGHT: 64 IN | SYSTOLIC BLOOD PRESSURE: 150 MMHG | BODY MASS INDEX: 44.26 KG/M2 | DIASTOLIC BLOOD PRESSURE: 68 MMHG | TEMPERATURE: 98 F | HEART RATE: 64 BPM

## 2021-11-22 DIAGNOSIS — D50.0 IRON DEFICIENCY ANEMIA DUE TO CHRONIC BLOOD LOSS: Primary | ICD-10-CM

## 2021-11-22 DIAGNOSIS — Z12.31 ENCOUNTER FOR SCREENING MAMMOGRAM FOR BREAST CANCER: Primary | ICD-10-CM

## 2021-11-22 DIAGNOSIS — I65.23 ASYMPTOMATIC BILATERAL CAROTID ARTERY STENOSIS: ICD-10-CM

## 2021-11-22 DIAGNOSIS — R59.0 ENLARGED LYMPH NODE IN NECK: ICD-10-CM

## 2021-11-22 PROCEDURE — 99215 OFFICE O/P EST HI 40 MIN: CPT | Mod: PBBFAC | Performed by: INTERNAL MEDICINE

## 2021-11-22 PROCEDURE — 99214 OFFICE O/P EST MOD 30 MIN: CPT | Mod: S$PBB,,, | Performed by: INTERNAL MEDICINE

## 2021-11-22 PROCEDURE — 99999 PR PBB SHADOW E&M-EST. PATIENT-LVL V: ICD-10-PCS | Mod: PBBFAC,,, | Performed by: INTERNAL MEDICINE

## 2021-11-22 PROCEDURE — 99214 PR OFFICE/OUTPT VISIT, EST, LEVL IV, 30-39 MIN: ICD-10-PCS | Mod: S$PBB,,, | Performed by: INTERNAL MEDICINE

## 2021-11-22 PROCEDURE — 99999 PR PBB SHADOW E&M-EST. PATIENT-LVL V: CPT | Mod: PBBFAC,,, | Performed by: INTERNAL MEDICINE

## 2021-11-23 ENCOUNTER — OFFICE VISIT (OUTPATIENT)
Dept: OTOLARYNGOLOGY | Facility: CLINIC | Age: 71
End: 2021-11-23
Payer: MEDICARE

## 2021-11-23 VITALS — HEIGHT: 63 IN | WEIGHT: 259.5 LBS | BODY MASS INDEX: 45.98 KG/M2

## 2021-11-23 DIAGNOSIS — H93.8X3 PRESSURE SENSATION IN BOTH EARS: Primary | ICD-10-CM

## 2021-11-23 DIAGNOSIS — Z86.69 HISTORY OF HEARING LOSS: ICD-10-CM

## 2021-11-23 PROCEDURE — 99999 PR PBB SHADOW E&M-EST. PATIENT-LVL V: ICD-10-PCS | Mod: PBBFAC,,, | Performed by: PHYSICIAN ASSISTANT

## 2021-11-23 PROCEDURE — 99215 OFFICE O/P EST HI 40 MIN: CPT | Mod: PBBFAC | Performed by: PHYSICIAN ASSISTANT

## 2021-11-23 PROCEDURE — 99999 PR PBB SHADOW E&M-EST. PATIENT-LVL V: CPT | Mod: PBBFAC,,, | Performed by: PHYSICIAN ASSISTANT

## 2021-11-23 PROCEDURE — 99213 OFFICE O/P EST LOW 20 MIN: CPT | Mod: S$PBB,ICN,, | Performed by: PHYSICIAN ASSISTANT

## 2021-11-23 PROCEDURE — 99213 PR OFFICE/OUTPT VISIT, EST, LEVL III, 20-29 MIN: ICD-10-PCS | Mod: S$PBB,ICN,, | Performed by: PHYSICIAN ASSISTANT

## 2021-11-24 ENCOUNTER — CLINICAL SUPPORT (OUTPATIENT)
Dept: AUDIOLOGY | Facility: CLINIC | Age: 71
End: 2021-11-24
Payer: MEDICARE

## 2021-11-24 DIAGNOSIS — H90.3 SENSORY HEARING LOSS, BILATERAL: Primary | ICD-10-CM

## 2021-11-24 PROCEDURE — 92567 TYMPANOMETRY: CPT | Mod: PBBFAC | Performed by: AUDIOLOGIST-HEARING AID FITTER

## 2021-11-24 PROCEDURE — 92557 COMPREHENSIVE HEARING TEST: CPT | Mod: PBBFAC | Performed by: AUDIOLOGIST-HEARING AID FITTER

## 2021-12-15 ENCOUNTER — OFFICE VISIT (OUTPATIENT)
Dept: FAMILY MEDICINE | Facility: CLINIC | Age: 71
End: 2021-12-15
Attending: FAMILY MEDICINE
Payer: MEDICARE

## 2021-12-15 VITALS
HEIGHT: 63 IN | SYSTOLIC BLOOD PRESSURE: 136 MMHG | DIASTOLIC BLOOD PRESSURE: 72 MMHG | BODY MASS INDEX: 45.75 KG/M2 | WEIGHT: 258.19 LBS | TEMPERATURE: 97 F | HEART RATE: 76 BPM | OXYGEN SATURATION: 96 %

## 2021-12-15 DIAGNOSIS — R93.3 ABNORMAL FINDINGS ON DIAGNOSTIC IMAGING OF OTHER PARTS OF DIGESTIVE TRACT: ICD-10-CM

## 2021-12-15 DIAGNOSIS — E03.9 HYPOTHYROIDISM, UNSPECIFIED TYPE: ICD-10-CM

## 2021-12-15 DIAGNOSIS — R73.03 PRE-DIABETES: Primary | ICD-10-CM

## 2021-12-15 DIAGNOSIS — E53.8 FOLIC ACID DEFICIENCY: ICD-10-CM

## 2021-12-15 DIAGNOSIS — E66.01 MORBID OBESITY WITH BMI OF 45.0-49.9, ADULT: ICD-10-CM

## 2021-12-15 PROCEDURE — 99214 OFFICE O/P EST MOD 30 MIN: CPT | Mod: PBBFAC,PO | Performed by: FAMILY MEDICINE

## 2021-12-15 PROCEDURE — 99214 PR OFFICE/OUTPT VISIT, EST, LEVL IV, 30-39 MIN: ICD-10-PCS | Mod: S$PBB,,, | Performed by: FAMILY MEDICINE

## 2021-12-15 PROCEDURE — 99999 PR PBB SHADOW E&M-EST. PATIENT-LVL IV: CPT | Mod: PBBFAC,,, | Performed by: FAMILY MEDICINE

## 2021-12-15 PROCEDURE — 99999 PR PBB SHADOW E&M-EST. PATIENT-LVL IV: ICD-10-PCS | Mod: PBBFAC,,, | Performed by: FAMILY MEDICINE

## 2021-12-15 PROCEDURE — 99214 OFFICE O/P EST MOD 30 MIN: CPT | Mod: S$PBB,,, | Performed by: FAMILY MEDICINE

## 2021-12-15 RX ORDER — LEVOTHYROXINE SODIUM 112 UG/1
112 TABLET ORAL DAILY
Qty: 90 TABLET | Refills: 1 | Status: SHIPPED | OUTPATIENT
Start: 2021-12-15 | End: 2022-01-19 | Stop reason: SDUPTHER

## 2021-12-15 RX ORDER — FOLIC ACID 1 MG/1
1 TABLET ORAL DAILY
Qty: 90 TABLET | Refills: 1 | Status: SHIPPED | OUTPATIENT
Start: 2021-12-15 | End: 2022-01-19 | Stop reason: SDUPTHER

## 2021-12-28 NOTE — PLAN OF CARE
PT states she has many allergies/health issues.    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 presents with acute psychosis.          Encountered patient on unit today.  Reviewed history.  Briefly, states that he was feeling acutely overwhelmed when returning home from [facility] yesterday.  He states he started acting in such a way that the police were called and brought him to the hospital.    Recently hospitalized at [facility] and then [facility].  Was discharged from [facility] yesterday.  Was causing a disturbance at home yesterday.  At [facility] received both Invega loading doses.  Reports also being on depakote.  States he does not know the dose.    States no adderall in the last 3 weeks.  However, refuses to allow his psychiatrist to be contacted.      Recent thoughts of suicide due to your apartment being a mess      Past Psychiatric History:   Dx:Bipolar Disorder  First saw psychiatrist in his teenage years  Current prescriber:Yadira Sims  No therapist  Total number of psychiatric admissions: 6   No prior suicide attempts    Substance Use History  1ppd  EtOH - never  denies    Past Medical History:   Past Medical History:   Diagnosis Date   • Asthma    • Attention deficit hyperactivity disorder (ADHD) 1/3/2021   • Bipolar disorder, unspecified (CMS/HCC)    • Cigarette nicotine use disorder 1/3/2021   • RAD (reactive airway disease)    • Schizoaffective disorder (CMS/HCC)        Allergies:   ALLERGIES:   Allergen Reactions   • Fish Allergy   (Food Or Med) Other (See Comments)   • Kiwi   (Food Or Med)  Other (See Comments)     \"mouth becomes swollen\"   • Pollen Other (See Comments)        Family Psychiatric History  Mom with bipolar disorder  Unknown paternal family psychiatric history      Social History:   [employer] and then [employer]  Raised by Mom & Grandparents  Father in [place], not involved  Only child  HS at [employer]  [employer] [employer] [facility] months in high school due to \"skipping some school\"  Full ride at [employer], stopped during second year  Have been doing \"a whole lot of nothing\"  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      Objective    Vitals:  Blood pressure 113/61, pulse 80, temperature 98.1 °F (36.7 °C), temperature source Oral, resp. rate 16, height 5' 5\" (1.651 m), weight 71.5 kg (157 lb 10.1 oz), SpO2 99 %.   Temp:  [97.9 °F (36.6 °C)-98.1 °F (36.7 °C)] 98.1 °F (36.7 °C)  Heart Rate:  [] 80  Resp:  [16-18] 16  BP: (106-164)/(52-98) 113/61      Mental Status Exam:     APPEARANCE: 32 year old male appears stated age, fair grooming/hygiene, wearing hospital attire, no acute distress    BEHAVIOR: engaged, cooperative with interview and fair eye contact    SPEECH:  RRR, normal volume, normal tone and Spontaneous speech without stuttering/stammering    MOTOR: No PMR or PMA noted. and No tics/tremors or other abnormal motor movements visible    MOOD: \"stressed out\"  AFFECT: bizarre    THOUGHT PROCESS: perseverative     THOUGHT CONTENT: Denies SI, Denies HI, + Paranoia   and + Delusions      PERCEPTIONS: Denies AVH and does not appear to be responding to internal stimuli    INSIGHT: poor    JUDGMENT: poor    COGNITION: A and Ox3, fair concentration, with intact short and long term memory         138 98 17 115   4.3 21 0.82      8.9 14.6 244     43.3       POC Serum Alcohol testing result   Alcohol (mg/dL)   Date Value   12/27/2021 None Detected       POC Urine Drug Screen Results    Amphetamines, Urine (no units)   Date Value   12/27/2021 Negative     Barbiturates, Urine (no units)   Date Value   12/27/2021 Negative     Benzodiazepines, Urine (no units)   Date Value   12/27/2021 Negative     Cannabinoids, Urine (no units)   Date Value   12/27/2021 Negative     Opiates, Urine (no units)   Date Value   12/27/2021 Negative     Phencyclidine, Urine (no units)   Date Value   12/27/2021 Negative       TSH (mcUnits/mL)   Date Value   12/28/2021 1.921       GOT/AST (Units/L)   Date Value   12/28/2020 34     GPT/ALT (Units/L)   Date Value   12/28/2020 23     No results found for: GGTP  Alkaline Phosphatase (Units/L)   Date Value   12/28/2020 60     Bilirubin, Total (mg/dL)   Date Value   12/28/2020 1.0       Cholesterol (mg/dL)   Date Value   12/28/2021 183     HDL (mg/dL)   Date Value   12/28/2021 72     Cholesterol/ HDL Ratio (no units)   Date Value   12/28/2021 2.5     Triglycerides (mg/dL)   Date Value   12/28/2021 22     LDL (mg/dL)   Date Value   12/28/2021 107       Hemoglobin A1C (%)   Date Value   12/28/2020 5.0         Current Medications:  Current Facility-Administered Medications   Medication Dose Route Frequency Provider Last Rate Last Admin   • divalproex (DEPAKOTE ER) 24 hr ER tablet 1,000 mg  1,000 mg Oral Nightly Louis Rosales MD       • risperiDONE (RisperDAL) tablet 1 mg  1 mg Oral 2 times per day Juanita Vega MD   1 mg at 12/28/21 0828   • traZODone (DESYREL) tablet 50 mg  50 mg Oral Nightly PRN Louis Rosales MD   50 mg at 12/27/21 4053   • benztropine mesylate (COGENTIN) 1 MG/ML injection 1 mg  1 mg Intramuscular Q4H PRN Louis Rosales MD        Or   • benztropine (COGENTIN) tablet 1 mg  1 mg Oral Q4H PRN Louis Rosales MD       • hydrOXYzine (ATARAX) tablet 50 mg  50 mg Oral Q4H PRN Louis Rosales MD       • LORazepam  (ATIVAN) injection 2 mg  2 mg Intramuscular Q4H PRN Louis Rosales MD        Or   • LORazepam (ATIVAN) tablet 2 mg  2 mg Oral Q4H PRN Louis Rosales MD   2 mg at 12/28/21 1137   • acetaminophen (TYLENOL) tablet 650 mg  650 mg Oral Q4H PRN Louis Rosales MD       • aluminum-magnesium hydroxide-simethicone (MAALOX) 200-200-20 MG/5ML suspension 30 mL  30 mL Oral Q4H PRN Louis Rosales MD       • haloperidol lactate (HALDOL) 5 MG/ML short-acting injection 5 mg  5 mg Intramuscular Q4H PRN Louis Rosales MD       • magnesium hydroxide (MILK OF MAGNESIA) 400 MG/5ML suspension 30 mL  30 mL Oral Daily PRN Louis Rosales MD       • nicotine polacrilex (NICORETTE) gum 2 mg  2 mg Oral Q1H PRN Louis Rosales MD       • haloperidol (HALDOL) tablet 5 mg  5 mg Oral Q4H PRN Louis Rosales MD             Assessment:       Assessment & Plan   Principal diagnosis:  Bipolar affective disorder, current episode manic (CMS/HCC)    Other diagnoses to be addressed:  Active Hospital Problems    Diagnosis    • Bipolar affective disorder, current episode manic (CMS/HCC)        Assessment/Medical Decision making and narrative plan:  Junior Maria 32 year old male with history of has Bipolar affective disorder, current episode manic (CMS/HCC); Other stimulant use, 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 presents with ongoing symptoms of psychosis consistent with unresolved gabo.  Suspect that patients stimulant abuse is exacerbating bipolar condition.  Will restart depakote.  Currently on a long acting injection of invega though still symptomatic.  Will augment with oral risperidone.        Plan:   1. Admit to Unit 631 Risk of harm to self  2. Will start depakote 1000mg at bedtime  3. Continue risperidone 1mg BID  4. Discussed risk, benefits, side effects, and alternatives to  treatment. Patient voiced understanding and gave consent.  5. Patient will be stabilized via medication management and mileu therapy  6. Collateral obtained from EMR  7. Contact family    After examining the patient and reviewing the data, my clinical impression is that the patient does not present with serious suicidal ideation requiring 1:1 continuous monitoring at arms length in the current setting.     I certify the following:   Inpatient Psychiatric hospital services furnished since the previous certifications or recertification were and continue to be medically neccessary for either treatment which could reasonably be expected to improve the patients condition or diagnostic study and the hospital records indicate that the services furnished were either intensive treatment services, admission, and related services neccessary for diagnostic study or equivalent services    The patient continues to need, on a daily basis, active treatment furnished directly by or requiring or the supervision of inpatient psychiatric facility personnel.     I anticipate the patient will remain in the hospital for 5-7 days.       I expect that Junior Maria will require at least 2 consecutive overnights of inpatient care for treatment of above active problems.

## 2022-01-13 ENCOUNTER — LAB VISIT (OUTPATIENT)
Dept: LAB | Facility: HOSPITAL | Age: 72
End: 2022-01-13
Attending: FAMILY MEDICINE
Payer: MEDICARE

## 2022-01-13 DIAGNOSIS — E03.9 HYPOTHYROIDISM, UNSPECIFIED TYPE: ICD-10-CM

## 2022-01-13 DIAGNOSIS — R73.03 PRE-DIABETES: ICD-10-CM

## 2022-01-13 DIAGNOSIS — R93.3 ABNORMAL FINDINGS ON DIAGNOSTIC IMAGING OF OTHER PARTS OF DIGESTIVE TRACT: ICD-10-CM

## 2022-01-13 DIAGNOSIS — E53.8 FOLIC ACID DEFICIENCY: ICD-10-CM

## 2022-01-13 LAB
ALBUMIN SERPL BCP-MCNC: 3.4 G/DL (ref 3.5–5.2)
ALP SERPL-CCNC: 40 U/L (ref 55–135)
ALT SERPL W/O P-5'-P-CCNC: 14 U/L (ref 10–44)
ANION GAP SERPL CALC-SCNC: 10 MMOL/L (ref 8–16)
AST SERPL-CCNC: 29 U/L (ref 10–40)
BASOPHILS # BLD AUTO: 0.05 K/UL (ref 0–0.2)
BASOPHILS NFR BLD: 0.6 % (ref 0–1.9)
BILIRUB SERPL-MCNC: 0.5 MG/DL (ref 0.1–1)
BUN SERPL-MCNC: 19 MG/DL (ref 8–23)
CALCIUM SERPL-MCNC: 9.7 MG/DL (ref 8.7–10.5)
CHLORIDE SERPL-SCNC: 104 MMOL/L (ref 95–110)
CHOLEST SERPL-MCNC: 136 MG/DL (ref 120–199)
CHOLEST/HDLC SERPL: 3.2 {RATIO} (ref 2–5)
CO2 SERPL-SCNC: 29 MMOL/L (ref 23–29)
CREAT SERPL-MCNC: 1.1 MG/DL (ref 0.5–1.4)
DIFFERENTIAL METHOD: ABNORMAL
EOSINOPHIL # BLD AUTO: 0.1 K/UL (ref 0–0.5)
EOSINOPHIL NFR BLD: 1.6 % (ref 0–8)
ERYTHROCYTE [DISTWIDTH] IN BLOOD BY AUTOMATED COUNT: 13.4 % (ref 11.5–14.5)
EST. GFR  (AFRICAN AMERICAN): 58.8 ML/MIN/1.73 M^2
EST. GFR  (NON AFRICAN AMERICAN): 51 ML/MIN/1.73 M^2
ESTIMATED AVG GLUCOSE: 143 MG/DL (ref 68–131)
GLUCOSE SERPL-MCNC: 133 MG/DL (ref 70–110)
HBA1C MFR BLD: 6.6 % (ref 4–5.6)
HCT VFR BLD AUTO: 37.6 % (ref 37–48.5)
HDLC SERPL-MCNC: 43 MG/DL (ref 40–75)
HDLC SERPL: 31.6 % (ref 20–50)
HGB BLD-MCNC: 11.5 G/DL (ref 12–16)
IMM GRANULOCYTES # BLD AUTO: 0.02 K/UL (ref 0–0.04)
IMM GRANULOCYTES NFR BLD AUTO: 0.2 % (ref 0–0.5)
LDLC SERPL CALC-MCNC: 69.2 MG/DL (ref 63–159)
LYMPHOCYTES # BLD AUTO: 1.8 K/UL (ref 1–4.8)
LYMPHOCYTES NFR BLD: 22 % (ref 18–48)
MCH RBC QN AUTO: 28.1 PG (ref 27–31)
MCHC RBC AUTO-ENTMCNC: 30.6 G/DL (ref 32–36)
MCV RBC AUTO: 92 FL (ref 82–98)
MONOCYTES # BLD AUTO: 0.7 K/UL (ref 0.3–1)
MONOCYTES NFR BLD: 9.2 % (ref 4–15)
NEUTROPHILS # BLD AUTO: 5.3 K/UL (ref 1.8–7.7)
NEUTROPHILS NFR BLD: 66.4 % (ref 38–73)
NONHDLC SERPL-MCNC: 93 MG/DL
NRBC BLD-RTO: 0 /100 WBC
PLATELET # BLD AUTO: 264 K/UL (ref 150–450)
PMV BLD AUTO: 10.6 FL (ref 9.2–12.9)
POTASSIUM SERPL-SCNC: 4.7 MMOL/L (ref 3.5–5.1)
PROT SERPL-MCNC: 6.9 G/DL (ref 6–8.4)
RBC # BLD AUTO: 4.09 M/UL (ref 4–5.4)
SODIUM SERPL-SCNC: 143 MMOL/L (ref 136–145)
T4 FREE SERPL-MCNC: 0.7 NG/DL (ref 0.71–1.51)
TRIGL SERPL-MCNC: 119 MG/DL (ref 30–150)
TSH SERPL DL<=0.005 MIU/L-ACNC: 7.98 UIU/ML (ref 0.4–4)
WBC # BLD AUTO: 8.01 K/UL (ref 3.9–12.7)

## 2022-01-13 PROCEDURE — 80053 COMPREHEN METABOLIC PANEL: CPT | Performed by: FAMILY MEDICINE

## 2022-01-13 PROCEDURE — 84443 ASSAY THYROID STIM HORMONE: CPT | Performed by: FAMILY MEDICINE

## 2022-01-13 PROCEDURE — 83036 HEMOGLOBIN GLYCOSYLATED A1C: CPT | Performed by: FAMILY MEDICINE

## 2022-01-13 PROCEDURE — 85025 COMPLETE CBC W/AUTO DIFF WBC: CPT | Performed by: FAMILY MEDICINE

## 2022-01-13 PROCEDURE — 84439 ASSAY OF FREE THYROXINE: CPT | Performed by: FAMILY MEDICINE

## 2022-01-13 PROCEDURE — 80061 LIPID PANEL: CPT | Performed by: FAMILY MEDICINE

## 2022-01-13 PROCEDURE — 36415 COLL VENOUS BLD VENIPUNCTURE: CPT | Mod: PO | Performed by: FAMILY MEDICINE

## 2022-01-18 ENCOUNTER — TELEPHONE (OUTPATIENT)
Dept: FAMILY MEDICINE | Facility: CLINIC | Age: 72
End: 2022-01-18
Payer: MEDICARE

## 2022-01-18 NOTE — TELEPHONE ENCOUNTER
----- Message from Magy Mg sent at 1/18/2022 11:11 AM CST -----  Regarding: results  Contact: pt  The pt request a return call concerning her lab results, the pt no longer has MyChat, no additional info given and can be reached at [phone]///[id number]

## 2022-01-19 ENCOUNTER — TELEPHONE (OUTPATIENT)
Dept: FAMILY MEDICINE | Facility: CLINIC | Age: 72
End: 2022-01-19
Payer: MEDICARE

## 2022-01-19 DIAGNOSIS — E53.8 FOLIC ACID DEFICIENCY: ICD-10-CM

## 2022-01-19 DIAGNOSIS — E03.9 HYPOTHYROIDISM, UNSPECIFIED TYPE: ICD-10-CM

## 2022-01-19 RX ORDER — LEVOTHYROXINE SODIUM 112 UG/1
112 TABLET ORAL DAILY
Qty: 90 TABLET | Refills: 1 | Status: SHIPPED | OUTPATIENT
Start: 2022-01-19 | End: 2022-08-08 | Stop reason: SDUPTHER

## 2022-01-19 RX ORDER — FOLIC ACID 1 MG/1
1 TABLET ORAL DAILY
Qty: 90 TABLET | Refills: 1 | Status: SHIPPED | OUTPATIENT
Start: 2022-01-19 | End: 2022-08-08 | Stop reason: SDUPTHER

## 2022-01-19 NOTE — TELEPHONE ENCOUNTER
----- Message from Farideh Jett MD sent at 1/18/2022 12:42 PM CST -----  Continue levothyroxine  112 mcg qd . Labs in 2 m . Please pend rf if needed

## 2022-01-19 NOTE — TELEPHONE ENCOUNTER
----- Message from Padmini Barksdale sent at 1/19/2022  3:57 PM CST -----  Contact: JOI LYON [[id number]]  .Type:  Patient Returning Call    Who Called:JOI LYON [[id number]]  Who Left Message for Patient:nurse  Does the patient know what this is regarding?:no  Would the patient rather a call back or a response via [facility]? call  Best Call Back Number:.[phone] (home)    Additional Information:

## 2022-01-24 ENCOUNTER — OFFICE VISIT (OUTPATIENT)
Dept: CARDIOLOGY | Facility: CLINIC | Age: 72
End: 2022-01-24
Payer: MEDICARE

## 2022-01-24 VITALS
HEART RATE: 72 BPM | BODY MASS INDEX: 46.06 KG/M2 | HEIGHT: 63 IN | WEIGHT: 259.94 LBS | SYSTOLIC BLOOD PRESSURE: 138 MMHG | DIASTOLIC BLOOD PRESSURE: 72 MMHG | OXYGEN SATURATION: 98 %

## 2022-01-24 DIAGNOSIS — E78.49 OTHER HYPERLIPIDEMIA: ICD-10-CM

## 2022-01-24 DIAGNOSIS — I25.118 CORONARY ARTERY DISEASE OF NATIVE ARTERY OF NATIVE HEART WITH STABLE ANGINA PECTORIS: Primary | ICD-10-CM

## 2022-01-24 DIAGNOSIS — Z95.1 S/P CABG (CORONARY ARTERY BYPASS GRAFT): Chronic | ICD-10-CM

## 2022-01-24 DIAGNOSIS — G81.91 HEMIPLEGIA AFFECTING RIGHT DOMINANT SIDE, UNSPECIFIED ETIOLOGY, UNSPECIFIED HEMIPLEGIA TYPE: ICD-10-CM

## 2022-01-24 DIAGNOSIS — R00.2 PALPITATIONS: ICD-10-CM

## 2022-01-24 DIAGNOSIS — G45.9 TRANSIENT ISCHEMIC ATTACK (TIA): ICD-10-CM

## 2022-01-24 DIAGNOSIS — I47.10 SUPRAVENTRICULAR TACHYCARDIA: Chronic | ICD-10-CM

## 2022-01-24 DIAGNOSIS — G47.33 OSA (OBSTRUCTIVE SLEEP APNEA): ICD-10-CM

## 2022-01-24 DIAGNOSIS — Z78.9 STATIN INTOLERANCE: ICD-10-CM

## 2022-01-24 DIAGNOSIS — R73.03 PREDIABETES: ICD-10-CM

## 2022-01-24 DIAGNOSIS — I20.9 ANGINA, CLASS II: Chronic | ICD-10-CM

## 2022-01-24 DIAGNOSIS — J44.9 CHRONIC OBSTRUCTIVE PULMONARY DISEASE, UNSPECIFIED COPD TYPE: ICD-10-CM

## 2022-01-24 DIAGNOSIS — Z95.2 S/P TAVR (TRANSCATHETER AORTIC VALVE REPLACEMENT): ICD-10-CM

## 2022-01-24 DIAGNOSIS — E55.9 VITAMIN D DEFICIENCY: ICD-10-CM

## 2022-01-24 DIAGNOSIS — Z88.8 ALLERGY TO STATIN MEDICATION: ICD-10-CM

## 2022-01-24 DIAGNOSIS — I25.810: ICD-10-CM

## 2022-01-24 DIAGNOSIS — I65.29 STENOSIS OF CAROTID ARTERY, UNSPECIFIED LATERALITY: ICD-10-CM

## 2022-01-24 DIAGNOSIS — I35.0 NONRHEUMATIC AORTIC VALVE STENOSIS: Chronic | ICD-10-CM

## 2022-01-24 DIAGNOSIS — E66.01 MORBID OBESITY WITH BMI OF 40.0-44.9, ADULT: ICD-10-CM

## 2022-01-24 DIAGNOSIS — Z86.73 HISTORY OF CVA (CEREBROVASCULAR ACCIDENT): ICD-10-CM

## 2022-01-24 DIAGNOSIS — I10 ESSENTIAL HYPERTENSION: Chronic | ICD-10-CM

## 2022-01-24 PROCEDURE — 99214 OFFICE O/P EST MOD 30 MIN: CPT | Mod: S$PBB,,, | Performed by: INTERNAL MEDICINE

## 2022-01-24 PROCEDURE — 99214 PR OFFICE/OUTPT VISIT, EST, LEVL IV, 30-39 MIN: ICD-10-PCS | Mod: S$PBB,,, | Performed by: INTERNAL MEDICINE

## 2022-01-24 PROCEDURE — 99214 OFFICE O/P EST MOD 30 MIN: CPT | Mod: PBBFAC | Performed by: INTERNAL MEDICINE

## 2022-01-24 PROCEDURE — 99999 PR PBB SHADOW E&M-EST. PATIENT-LVL IV: ICD-10-PCS | Mod: PBBFAC,,, | Performed by: INTERNAL MEDICINE

## 2022-01-24 PROCEDURE — 99999 PR PBB SHADOW E&M-EST. PATIENT-LVL IV: CPT | Mod: PBBFAC,,, | Performed by: INTERNAL MEDICINE

## 2022-01-24 RX ORDER — METOPROLOL SUCCINATE 100 MG/1
100 TABLET, EXTENDED RELEASE ORAL 2 TIMES DAILY
Qty: 180 TABLET | Refills: 3 | Status: SHIPPED | OUTPATIENT
Start: 2022-01-24 | End: 2023-03-14

## 2022-01-24 RX ORDER — VALSARTAN AND HYDROCHLOROTHIAZIDE 160; 12.5 MG/1; MG/1
1 TABLET, FILM COATED ORAL DAILY
Qty: 90 TABLET | Refills: 3 | Status: SHIPPED | OUTPATIENT
Start: 2022-01-24 | End: 2022-07-08

## 2022-01-24 RX ORDER — CLOPIDOGREL BISULFATE 75 MG/1
75 TABLET ORAL DAILY
Qty: 90 TABLET | Refills: 3 | Status: SHIPPED | OUTPATIENT
Start: 2022-01-24 | End: 2023-03-14

## 2022-01-24 RX ORDER — NAPROXEN SODIUM 220 MG/1
81 TABLET, FILM COATED ORAL DAILY
Qty: 30 TABLET | Refills: 6 | Status: SHIPPED | OUTPATIENT
Start: 2022-01-24 | End: 2022-02-08 | Stop reason: SDUPTHER

## 2022-01-24 RX ORDER — POTASSIUM CHLORIDE 20 MEQ/1
20 TABLET, EXTENDED RELEASE ORAL DAILY
Qty: 90 TABLET | Refills: 3 | Status: SHIPPED | OUTPATIENT
Start: 2022-01-24 | End: 2023-02-13 | Stop reason: SDUPTHER

## 2022-01-24 RX ORDER — FUROSEMIDE 20 MG/1
20 TABLET ORAL DAILY
Qty: 90 TABLET | Refills: 3 | Status: SHIPPED | OUTPATIENT
Start: 2022-01-24 | End: 2023-03-14

## 2022-01-24 NOTE — PROGRESS NOTES
Subjective:   Patient ID:  Qi Ortiz is a 71 y.o. female who presents for follow up of No chief complaint on file.      JANIYA DRUMMOND 7/13/2021  Ms. Ortiz's current medical conditions include CAD s/p CABG x2, HTN, HLP, hypothyroid,  obesity, IRIS on CPAP, AS s/p TAVR, COPD, CKD. Had COVID in Dec 2020.  Has history of CVA.   [facility] in Sept 2020 with  of LAD, patent OLIVA to LAD, collaterals to OM, occluded OM1   Still being treated for iron deficiency anemia. Last iron infusion last week.   Phoned clinic yesterday with reports of increased SOB, edema, BROOKS with walking from her kitchen to restroom. Denies any PND or orthopnea. Denies any chest pain.   Her biggest complaint is SOB.   Given instructions to increase Lasix to 40mg BID. Has extensive list of med allergies/reactions which limits optimization of her medical therapy for CAD and other co morbidities   Has not tried taking SL nitro  Has not tried to use her nebulizer either    Feels that she has terrible dry mouth since increasing Lasix   Has been eating more salt lately. Reports eating a hamburger, hotdog and frozen meals lately.   Presents to clinic for follow up since making change.       1/24/2022   HERE FOR F/U . HAS BEEN COMPLIANT WITH DIET TAKES HER MEDS REGULARILY WALKS FOR EXERCISE . SHE HAS HOWEVER DIFFICULTY WITH BALANCE NO FALLS. USES ELECTRIC CART. TODAY IS UPSET BECAUSE  SHE NEARLY GOT INTO AN ACCIDENT. HAS NO PALPITATION NO SYNCOPE HAS EXERTIONAL SHORTNESS OF BREATHS TABLE. HAS NOT USED CPAP SHE COULD NOT TOLERAT E THAT.   Past Medical History:   Diagnosis Date    Carotid stenosis     19%    COPD (chronic obstructive pulmonary disease)     No meds    Coronary artery disease     CVA (cerebral vascular accident)     Dr. 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 Hope    Psoriatic arthritis      Rheumatology       Past Surgical History:   Procedure Laterality Date    BREAST BIOPSY      R sided/benign    CARDIAC SURGERY      2016    CERVICAL FUSION      CHOLECYSTECTOMY      CORONARY ANGIOPLASTY      CORONARY ARTERY BYPASS GRAFT      triple bypass    CORONARY STENT PLACEMENT      EYE SURGERY      INTRAUTERINE DEVICE INSERTION      LEFT HEART CATHETERIZATION Left 2020    Procedure: CATHETERIZATION, HEART, LEFT;  Surgeon: Veronica Ibarra MD;  Location[facility] CATH LAB;  Service: Cardiology;  Laterality: Left;  7am start time    mass removed from R groin      TOTAL ABDOMINAL HYSTERECTOMY W/ BILATERAL SALPINGOOPHORECTOMY      due to benign mass, adhesions    TUBAL LIGATION         Social History     Tobacco Use    Smoking status: Never Smoker    Smokeless tobacco: Never Used   Substance Use Topics    Alcohol use: No    Drug use: No       Family History   Problem Relation Age of Onset    Breast cancer Maternal Grandfather     Breast cancer Paternal Aunt     Stroke Unknown     Breast cancer Sister 60    Leukemia Sister 8         as child    Lung cancer Paternal Grandfather     Heart disease Unknown     Diabetes Daughter        Current Outpatient Medications   Medication Sig    acetaminophen (TYLENOL) 650 MG TbSR as directed    albuterol (PROVENTIL) 2.5 mg /3 mL (0.083 %) nebulizer solution Take 3 mLs (2.5 mg total) by nebulization every 6 (six) hours as needed for Wheezing. Rescue    calcium carbonate 650 mg calcium (1,625 mg) tablet Take 1 tablet by mouth once daily.    chlorhexidine (BETASEPT SURGICAL SCRUB) 4 % external liquid Apply topically daily as needed. Consider 90 day supply    clobetasoL (CLOBEX) 0.05 % shampoo Apply topically 2 (two) times daily.    clobetasoL (OLUX) 0.05 % Foam Apply topically 2 (two) times daily.    cyanocobalamin 2000 MCG tablet Take 2,000 mcg by mouth once daily.    docusate sodium (COLACE) 100 MG capsule Take 1 capsule (100 mg  total) by mouth 2 (two) times daily.    folic acid (FOLVITE) 1 MG tablet Take 1 tablet (1 mg total) by mouth once daily.    gabapentin (NEURONTIN) 100 MG capsule Take 2 capsules (200 mg total) by mouth 3 (three) times daily.    garlic 2,000 mg Cap Take 3,000 mg by mouth once daily.     hydrocortisone 2.5 % cream Apply topically 2 (two) times daily.    levothyroxine (SYNTHROID) 112 MCG tablet Take 1 tablet (112 mcg total) by mouth once daily.    mupirocin (BACTROBAN) 2 % ointment Bactroban 2 % topical ointment   Apply 1 application twice a day by topical route as directed for 30 days.    nitroGLYCERIN (NITROSTAT) 0.4 MG SL tablet Place 1 tablet (0.4 mg total) under the tongue every 5 (five) minutes as needed for Chest pain.    pyridoxine, vitamin B6, (B-6) 100 MG Tab Take 200 mg by mouth once daily.     ustekinumab (STELARA) 90 mg/mL Syrg syringe Inject 1 mL (90 mg total) into the skin every 3 (three) months.    vitamin D 1000 units Tab Take 185 mg by mouth once daily.    aspirin 81 MG Chew Take 1 tablet (81 mg total) by mouth once daily.    clopidogreL (PLAVIX) 75 mg tablet Take 1 tablet (75 mg total) by mouth once daily.    evolocumab (REPATHA SURECLICK) 140 mg/mL PnIj Inject 1 mL (140 mg total) into the skin every 14 (fourteen) days.    furosemide (LASIX) 20 MG tablet Take 1 tablet (20 mg total) by mouth once daily.    metoprolol succinate (TOPROL-XL) 100 MG 24 hr tablet Take 1 tablet (100 mg total) by mouth 2 (two) times daily. 1 tab (100mg) in morning. And 100 mg at bedtime.  Generic ok    potassium chloride SA (K-DUR,KLOR-CON) 20 MEQ tablet Take 1 tablet (20 mEq total) by mouth once daily.    valsartan-hydrochlorothiazide (DIOVAN-HCT) 160-12.5 mg per tablet Take 1 tablet by mouth once daily.     No current facility-administered medications for this visit.     Current Outpatient Medications on File Prior to Visit   Medication Sig    acetaminophen (TYLENOL) 650 MG TbSR as directed    albuterol  (PROVENTIL) 2.5 mg /3 mL (0.083 %) nebulizer solution Take 3 mLs (2.5 mg total) by nebulization every 6 (six) hours as needed for Wheezing. Rescue    calcium carbonate 650 mg calcium (1,625 mg) tablet Take 1 tablet by mouth once daily.    chlorhexidine (BETASEPT SURGICAL SCRUB) 4 % external liquid Apply topically daily as needed. Consider 90 day supply    clobetasoL (CLOBEX) 0.05 % shampoo Apply topically 2 (two) times daily.    clobetasoL (OLUX) 0.05 % Foam Apply topically 2 (two) times daily.    cyanocobalamin 2000 MCG tablet Take 2,000 mcg by mouth once daily.    docusate sodium (COLACE) 100 MG capsule Take 1 capsule (100 mg total) by mouth 2 (two) times daily.    folic acid (FOLVITE) 1 MG tablet Take 1 tablet (1 mg total) by mouth once daily.    gabapentin (NEURONTIN) 100 MG capsule Take 2 capsules (200 mg total) by mouth 3 (three) times daily.    garlic 2,000 mg Cap Take 3,000 mg by mouth once daily.     hydrocortisone 2.5 % cream Apply topically 2 (two) times daily.    levothyroxine (SYNTHROID) 112 MCG tablet Take 1 tablet (112 mcg total) by mouth once daily.    mupirocin (BACTROBAN) 2 % ointment Bactroban 2 % topical ointment   Apply 1 application twice a day by topical route as directed for 30 days.    nitroGLYCERIN (NITROSTAT) 0.4 MG SL tablet Place 1 tablet (0.4 mg total) under the tongue every 5 (five) minutes as needed for Chest pain.    pyridoxine, vitamin B6, (B-6) 100 MG Tab Take 200 mg by mouth once daily.     ustekinumab (STELARA) 90 mg/mL Syrg syringe Inject 1 mL (90 mg total) into the skin every 3 (three) months.    vitamin D 1000 units Tab Take 185 mg by mouth once daily.    [DISCONTINUED] aspirin 81 MG Chew Take 81 mg by mouth once daily.     [DISCONTINUED] clopidogreL (PLAVIX) 75 mg tablet Take 1 tablet (75 mg total) by mouth once daily.    [DISCONTINUED] evolocumab (REPATHA SURECLICK) 140 mg/mL PnIj Inject 1 mL (140 mg total) into the skin every 14 (fourteen) days.     [DISCONTINUED] furosemide (LASIX) 20 MG tablet Take 1 tablet (20 mg total) by mouth once daily.    [DISCONTINUED] metoprolol succinate (TOPROL-XL) 100 MG 24 hr tablet Take 1 tablet (100 mg total) by mouth 2 (two) times daily. 1 tab (100mg) in morning. And 100 mg at bedtime.  Generic ok    [DISCONTINUED] potassium chloride SA (K-DUR,KLOR-CON) 20 MEQ tablet Take 1 tablet (20 mEq total) by mouth once daily.    [DISCONTINUED] valsartan-hydrochlorothiazide (DIOVAN-HCT) 160-12.5 mg per tablet Take 1 tablet by mouth once daily.     No current facility-administered medications on file prior to visit.     Review of patient's allergies indicates:   Allergen Reactions    Celexa [citalopram] Anaphylaxis    Clindamycin Itching and Swelling    Codeine Shortness Of Breath, Itching and Swelling    Crestor [rosuvastatin] Anaphylaxis    Cytotec [misoprostol] Anaphylaxis and Other (See Comments)     Difficulty breathing    Kiwi (actinidia chinensis) Blisters     Oral  Blisters/burning    Lisinopril Anaphylaxis    Magnesium citrate Shortness Of Breath    Stadol [butorphanol tartrate] Anaphylaxis     Coded    Vicodin [hydrocodone-acetaminophen] Shortness Of Breath    Adhesive      EKG Electrodes    Aggrenox [aspirin-dipyridamole] Other (See Comments)     headaches    Avelox [moxifloxacin]      PATIENT STATES DO NOT GIVE UNDER ANY CIRCUSTANCES    Azithromycin     Demerol [meperidine]     Isosorbide Other (See Comments)     Severe headache    Kenalog [triamcinolone acetonide]     Medrol [methylprednisolone] Other (See Comments)     Patient reports taking IV in the past without any issues. Reports allergy to oral preparation     Mobic [meloxicam]     Morphine     Nitroglycerin      Long acting    Pholcodine     Prednisone      GASTRIC PAIN    Ranexa [ranolazine] Nausea And Vomiting    Reclast [zoledronic acid-mannitol-water] Other (See Comments)     Bones hurt    Sulfa (sulfonamide antibiotics) Other (See  Comments)     unknown    Talwin [pentazocine lactate]     Tetracycline     Tetracyclines     Tilade [nedocromil]     Zetia [ezetimibe]      Review of Systems   Constitutional: Negative for diaphoresis, malaise/fatigue and weight gain.   HENT: Negative for hoarse voice.    Eyes: Negative for double vision and visual disturbance.   Cardiovascular: Positive for dyspnea on exertion. Negative for chest pain, claudication, cyanosis, irregular heartbeat, leg swelling, near-syncope, orthopnea, palpitations, paroxysmal nocturnal dyspnea and syncope.   Respiratory: Positive for shortness of breath. Negative for cough, hemoptysis and snoring.    Hematologic/Lymphatic: Negative for bleeding problem. Does not bruise/bleed easily.   Skin: Negative for color change and poor wound healing.   Musculoskeletal: Negative for muscle cramps, muscle weakness and myalgias.   Gastrointestinal: Negative for bloating, abdominal pain, change in bowel habit, diarrhea, heartburn, hematemesis, hematochezia, melena and nausea.   Neurological: Negative for excessive daytime sleepiness, dizziness, headaches, light-headedness, loss of balance, numbness and weakness.   Psychiatric/Behavioral: Negative for memory loss. The patient does not have insomnia.    Allergic/Immunologic: Negative for hives.       Objective:   Physical Exam  Vitals and nursing note reviewed.   Constitutional:       General: She is not in acute distress.     Appearance: Normal appearance. She is well-developed and well-nourished. She is not ill-appearing.   HENT:      Head: Normocephalic and atraumatic.   Eyes:      General: No scleral icterus.     Extraocular Movements: EOM normal.      Pupils: Pupils are equal, round, and reactive to light.   Neck:      Thyroid: No thyromegaly.      Vascular: Normal carotid pulses. No carotid bruit, hepatojugular reflux or JVD.      Trachea: No tracheal deviation.   Cardiovascular:      Rate and Rhythm: Normal rate and regular rhythm.       "Pulses: Normal pulses.      Heart sounds: Murmur heard.    Harsh midsystolic murmur is present with a grade of 2/6 at the upper right sternal border radiating to the neck.  No friction rub. No gallop.    Pulmonary:      Effort: Pulmonary effort is normal. No respiratory distress.      Breath sounds: Normal breath sounds. No wheezing, rhonchi or rales.   Chest:      Chest wall: No tenderness.   Abdominal:      General: Bowel sounds are normal. There is no ascites or abdominal bruit. Aorta is normal.      Palpations: Abdomen is soft. There is no hepatomegaly or pulsatile mass.      Tenderness: There is no abdominal tenderness.   Musculoskeletal:         General: No edema.      Right shoulder: No deformity.      Cervical back: Normal range of motion and neck supple.      Right lower leg: No edema.      Left lower leg: No edema.   Skin:     General: Skin is warm and dry.      Findings: No erythema or rash.      Nails: There is no clubbing or cyanosis.   Neurological:      Mental Status: She is alert and oriented to person, place, and time.      Cranial Nerves: No cranial nerve deficit.      Coordination: Coordination normal.      Deep Tendon Reflexes: Strength normal.   Psychiatric:         Mood and Affect: Mood and affect normal.         Speech: Speech normal.         Behavior: Behavior normal.         Judgment: Judgment normal.       Vitals:    01/24/22 1325 01/24/22 1328 01/24/22 1346   BP: (!) 154/68 (!) 160/70 138/72   BP Location: Left arm Right arm Right arm   Patient Position: Sitting Sitting Sitting   BP Method:   Large (Manual)   Pulse: 72     SpO2: 98%     Weight: 117.9 kg (259 lb 14.8 oz)     Height: 5' 3" (1.6 m)       Lab Results   Component Value Date    CHOL 136 01/13/2022    CHOL 134 05/03/2021    CHOL 167 10/01/2020     Lab Results   Component Value Date    HDL 43 01/13/2022    HDL 53 05/03/2021    HDL 41 10/01/2020     Lab Results   Component Value Date    LDLCALC 69.2 01/13/2022    LDLCALC 53.6 (L) " 05/03/2021    LDLCALC 101.8 10/01/2020     Lab Results   Component Value Date    TRIG 119 01/13/2022    TRIG 137 05/03/2021    TRIG 121 10/01/2020     Lab Results   Component Value Date    CHOLHDL 31.6 01/13/2022    CHOLHDL 39.6 05/03/2021    CHOLHDL 24.6 10/01/2020       Chemistry        Component Value Date/Time     01/13/2022 1106    K 4.7 01/13/2022 1106     01/13/2022 1106    CO2 29 01/13/2022 1106    BUN 19 01/13/2022 1106    CREATININE 1.1 01/13/2022 1106     (H) 01/13/2022 1106        Component Value Date/Time    CALCIUM 9.7 01/13/2022 1106    ALKPHOS 40 (L) 01/13/2022 1106    AST 29 01/13/2022 1106    ALT 14 01/13/2022 1106    BILITOT 0.5 01/13/2022 1106    ESTGFRAFRICA 58.8 (A) 01/13/2022 1106    EGFRNONAA 51.0 (A) 01/13/2022 1106        Lab Results   Component Value Date    HGBA1C 6.6 (H) 01/13/2022       Lab Results   Component Value Date    TSH 7.980 (H) 01/13/2022     Lab Results   Component Value Date    INR 1.0 09/28/2020    INR 1.0 12/18/2017     Lab Results   Component Value Date    WBC 8.01 01/13/2022    HGB 11.5 (L) 01/13/2022    HCT 37.6 01/13/2022    MCV 92 01/13/2022     01/13/2022     BMP  Sodium   Date Value Ref Range Status   01/13/2022 143 136 - 145 mmol/L Final     Potassium   Date Value Ref Range Status   01/13/2022 4.7 3.5 - 5.1 mmol/L Final     Chloride   Date Value Ref Range Status   01/13/2022 104 95 - 110 mmol/L Final     CO2   Date Value Ref Range Status   01/13/2022 29 23 - 29 mmol/L Final     BUN   Date Value Ref Range Status   01/13/2022 19 8 - 23 mg/dL Final     Creatinine   Date Value Ref Range Status   01/13/2022 1.1 0.5 - 1.4 mg/dL Final     Calcium   Date Value Ref Range Status   01/13/2022 9.7 8.7 - 10.5 mg/dL Final     Anion Gap   Date Value Ref Range Status   01/13/2022 10 8 - 16 mmol/L Final     eGFR if    Date Value Ref Range Status   01/13/2022 58.8 (A) >60 mL/min/1.73 m^2 Final     eGFR if non    Date Value  Ref Range Status   01/13/2022 51.0 (A) >60 mL/min/1.73 m^2 Final     Comment:     Calculation used to obtain the estimated glomerular filtration  rate (eGFR) is the CKD-EPI equation.        CrCl cannot be calculated (Patient's most recent lab result is older than the maximum 7 days allowed.).    Assessment:     1. Coronary artery disease of native artery of native heart with stable angina pectoris    2. History of CVA (cerebrovascular accident)    3. Hemiplegia affecting right dominant side, unspecified etiology, unspecified hemiplegia type    4. Transient ischemic attack (TIA)    5. Chronic obstructive pulmonary disease, unspecified COPD type    6. Angina, class II    7. 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    8. Essential hypertension    9. Stenosis of carotid artery, unspecified laterality    10. Nonrheumatic aortic valve stenosis    11. S/P CABG (coronary artery bypass graft)    12. S/P TAVR (transcatheter aortic valve replacement)    13. Palpitations    14. Supraventricular tachycardia    15. Morbid obesity with BMI of 40.0-44.9, adult    16. Prediabetes    17. Vitamin D deficiency    18. IRIS (obstructive sleep apnea)    19. Statin intolerance    20. Other hyperlipidemia    21. Allergy to statin medication      CAD S/P CABG ASYMPTOMATIC NO ANGINA STABLE EXERCISE TOLERANCE   IRIS NOT ON THERAPY DUE TO INABILITY TO TOLERATE MASK WILL CONTINUE ADVICE FOR WEIGHT LOSS.  HTN CONTROLLED CONTINUE SAME MEDS LOW SALT DIET WEIGHT LOSS.   HP INTOLERANT TO STATINS ON PCSK9 INHIBITORS ASYMPTOMATIC.  COPD STABLE CONTINUE CURRENT THERAPY WEIGHT LOSS ADVISED.   PREDIABETES A1C ON [employer] COUNSELED ABOUT COMPLIANCE WEIGHT LOSS CONTINUE SAME   THYROID DISORDER ON [facility]LE[facility] WILL CONTINUE THE SAME.   S/P TAVR EXAM UNCHANGED COUNSLED ABOUT CURRENT MEDICAL TEHRAPY RISK FACTOR MODIFICATION.  Plan:   Continue current therapy  Cardiac low salt diet.  Risk factor modification and excercise  program./WEIGHT LOSS  F/u in 6 months with lipid cmp A1C

## 2022-02-01 ENCOUNTER — TELEPHONE (OUTPATIENT)
Dept: FAMILY MEDICINE | Facility: CLINIC | Age: 72
End: 2022-02-01
Payer: MEDICARE

## 2022-02-01 DIAGNOSIS — D84.9 IMMUNOSUPPRESSED STATUS: Primary | ICD-10-CM

## 2022-02-01 NOTE — TELEPHONE ENCOUNTER
----- Message from Tahira Gandara sent at 2/1/2022 12:16 PM CST -----  Contact: self [phone]  Would like to consult with nurse regarding a paper copy of her lab results she took on Jan 13th, please call her at [phone]. Thanks ah

## 2022-02-07 ENCOUNTER — LAB VISIT (OUTPATIENT)
Dept: LAB | Facility: HOSPITAL | Age: 72
End: 2022-02-07
Attending: INTERNAL MEDICINE
Payer: MEDICARE

## 2022-02-07 DIAGNOSIS — D84.9 IMMUNOSUPPRESSED STATUS: ICD-10-CM

## 2022-02-07 PROCEDURE — 86480 TB TEST CELL IMMUN MEASURE: CPT | Performed by: INTERNAL MEDICINE

## 2022-02-07 PROCEDURE — 36415 COLL VENOUS BLD VENIPUNCTURE: CPT | Mod: PO | Performed by: INTERNAL MEDICINE

## 2022-02-08 ENCOUNTER — TELEPHONE (OUTPATIENT)
Dept: CARDIOLOGY | Facility: CLINIC | Age: 72
End: 2022-02-08
Payer: MEDICARE

## 2022-02-08 DIAGNOSIS — R07.2 PRECORDIAL PAIN: ICD-10-CM

## 2022-02-08 DIAGNOSIS — I25.118 CORONARY ARTERY DISEASE OF NATIVE ARTERY OF NATIVE HEART WITH STABLE ANGINA PECTORIS: ICD-10-CM

## 2022-02-08 RX ORDER — NITROGLYCERIN 0.4 MG/1
0.4 TABLET SUBLINGUAL EVERY 5 MIN PRN
Qty: 100 TABLET | Refills: 3 | Status: SHIPPED | OUTPATIENT
Start: 2022-02-08 | End: 2022-02-09 | Stop reason: SDUPTHER

## 2022-02-08 RX ORDER — NAPROXEN SODIUM 220 MG/1
81 TABLET, FILM COATED ORAL DAILY
Qty: 90 TABLET | Refills: 3 | Status: SHIPPED | OUTPATIENT
Start: 2022-02-08

## 2022-02-08 NOTE — TELEPHONE ENCOUNTER
[facility] for patient to call back regarding prescriptions.    ----- Message from Mary Mg sent at 2/8/2022  1:31 PM CST -----  Contact: [phone]/ Qi  Patient would like to get medical advice.    Comments:   Calling to speak to the nurse regarding her prescriptions. No other information left.

## 2022-02-08 NOTE — TELEPHONE ENCOUNTER
----- Message from Florinda Goldberg sent at 2/8/2022  2:14 PM CST -----  Contact: pt- [phone]  Patient is returning a phone call.    Who left a message for the patient: nurse    Does patient know what this is regarding:  yes    Would you like a call back, or a response through your [facility] portal?:   call  Comments:

## 2022-02-09 ENCOUNTER — TELEPHONE (OUTPATIENT)
Dept: RHEUMATOLOGY | Facility: CLINIC | Age: 72
End: 2022-02-09
Payer: MEDICARE

## 2022-02-09 ENCOUNTER — PATIENT OUTREACH (OUTPATIENT)
Dept: ADMINISTRATIVE | Facility: OTHER | Age: 72
End: 2022-02-09
Payer: MEDICARE

## 2022-02-09 LAB — MAYO MISCELLANEOUS RESULT (REF): NORMAL

## 2022-02-09 RX ORDER — NITROGLYCERIN 0.4 MG/1
0.4 TABLET SUBLINGUAL EVERY 5 MIN PRN
Qty: 100 TABLET | Refills: 3 | Status: SHIPPED | OUTPATIENT
Start: 2022-02-09 | End: 2023-03-14

## 2022-02-10 ENCOUNTER — INFUSION (OUTPATIENT)
Dept: INFUSION THERAPY | Facility: HOSPITAL | Age: 72
End: 2022-02-10
Attending: INTERNAL MEDICINE
Payer: MEDICARE

## 2022-02-10 ENCOUNTER — HOSPITAL ENCOUNTER (OUTPATIENT)
Dept: RADIOLOGY | Facility: HOSPITAL | Age: 72
Discharge: HOME | End: 2022-02-10
Attending: INTERNAL MEDICINE
Payer: MEDICARE

## 2022-02-10 ENCOUNTER — OFFICE VISIT (OUTPATIENT)
Dept: RHEUMATOLOGY | Facility: CLINIC | Age: 72
End: 2022-02-10
Payer: MEDICARE

## 2022-02-10 VITALS
DIASTOLIC BLOOD PRESSURE: 59 MMHG | HEART RATE: 70 BPM | RESPIRATION RATE: 20 BRPM | TEMPERATURE: 98 F | OXYGEN SATURATION: 98 % | SYSTOLIC BLOOD PRESSURE: 136 MMHG

## 2022-02-10 VITALS
BODY MASS INDEX: 44.65 KG/M2 | DIASTOLIC BLOOD PRESSURE: 72 MMHG | SYSTOLIC BLOOD PRESSURE: 150 MMHG | HEIGHT: 63 IN | WEIGHT: 252 LBS | HEART RATE: 76 BPM

## 2022-02-10 DIAGNOSIS — Z79.899 HIGH RISK MEDICATION USE: ICD-10-CM

## 2022-02-10 DIAGNOSIS — L40.9 PSORIASIS: Primary | ICD-10-CM

## 2022-02-10 DIAGNOSIS — M81.0 AGE-RELATED OSTEOPOROSIS WITHOUT CURRENT PATHOLOGICAL FRACTURE: ICD-10-CM

## 2022-02-10 DIAGNOSIS — L40.50 PSORIATIC ARTHRITIS: ICD-10-CM

## 2022-02-10 DIAGNOSIS — Z71.89 COUNSELING ON HEALTH PROMOTION AND DISEASE PREVENTION: ICD-10-CM

## 2022-02-10 DIAGNOSIS — L40.9 PSORIASIS: ICD-10-CM

## 2022-02-10 DIAGNOSIS — L40.50 PSORIATIC ARTHRITIS: Primary | ICD-10-CM

## 2022-02-10 PROCEDURE — 73630 X-RAY EXAM OF FOOT: CPT | Mod: 26,50,, | Performed by: RADIOLOGY

## 2022-02-10 PROCEDURE — 63600175 PHARM REV CODE 636 W HCPCS: Mod: JG | Performed by: INTERNAL MEDICINE

## 2022-02-10 PROCEDURE — 99215 OFFICE O/P EST HI 40 MIN: CPT | Mod: PBBFAC | Performed by: INTERNAL MEDICINE

## 2022-02-10 PROCEDURE — 73130 XR HAND COMPLETE 3 VIEWS BILATERAL: ICD-10-PCS | Mod: 26,50,, | Performed by: RADIOLOGY

## 2022-02-10 PROCEDURE — 96372 THER/PROPH/DIAG INJ SC/IM: CPT

## 2022-02-10 PROCEDURE — 73130 X-RAY EXAM OF HAND: CPT | Mod: 26,50,, | Performed by: RADIOLOGY

## 2022-02-10 PROCEDURE — 73630 X-RAY EXAM OF FOOT: CPT | Mod: TC,50

## 2022-02-10 PROCEDURE — 99214 PR OFFICE/OUTPT VISIT, EST, LEVL IV, 30-39 MIN: ICD-10-PCS | Mod: S$PBB,,, | Performed by: INTERNAL MEDICINE

## 2022-02-10 PROCEDURE — 99214 OFFICE O/P EST MOD 30 MIN: CPT | Mod: S$PBB,,, | Performed by: INTERNAL MEDICINE

## 2022-02-10 PROCEDURE — 73130 X-RAY EXAM OF HAND: CPT | Mod: TC,50

## 2022-02-10 PROCEDURE — 73630 XR FOOT COMPLETE 3 VIEW BILATERAL: ICD-10-PCS | Mod: 26,50,, | Performed by: RADIOLOGY

## 2022-02-10 PROCEDURE — 99999 PR PBB SHADOW E&M-EST. PATIENT-LVL V: CPT | Mod: PBBFAC,,, | Performed by: INTERNAL MEDICINE

## 2022-02-10 PROCEDURE — 99999 PR PBB SHADOW E&M-EST. PATIENT-LVL V: ICD-10-PCS | Mod: PBBFAC,,, | Performed by: INTERNAL MEDICINE

## 2022-02-10 RX ORDER — CHLORHEXIDINE GLUCONATE 40 MG/ML
SOLUTION TOPICAL DAILY PRN
Qty: 473 ML | Refills: 3 | Status: SHIPPED | OUTPATIENT
Start: 2022-02-10 | End: 2022-05-11

## 2022-02-10 RX ADMIN — USTEKINUMAB 90 MG: 90 INJECTION, SOLUTION SUBCUTANEOUS at 11:02

## 2022-02-10 NOTE — NURSING
.Injection given without difficulties.Bandaid applied. Patient instructed to stay in the clinic for 15 minutes. Patient verbalized understanding and will notify nurse with any complaints. Pt to return to clinic 3/22/22

## 2022-02-10 NOTE — DISCHARGE INSTRUCTIONS
.[facility]  [address] [address]  [address]  [phone] phone     [phone] fax  Hours of Operation: Monday- Friday 8:00am- 5:00pm  After hours phone  [phone]  Hematology / Oncology Physicians on call    Dr. Hank Bean      Nurse Practitioners:    Radha Richardson, SAMEERA Alanis NP    Please don't hesitate to call if you have any concerns.    .FALL PREVENTION   Falls often occur due to slipping, tripping or losing your balance. Here are ways to reduce your risk of falling again.   Was there anything that caused your fall that can be fixed, removed or replaced?   Make your home safe by keeping walkways clear of objects you may trip over.   Use non-slip pads under rugs.   Do not walk in poorly lit areas.   Do not stand on chairs or wobbly ladders.   Use caution when reaching overhead or looking upward. This position can cause a loss of balance.   Be sure your shoes fit properly, have non-slip bottoms and are in good condition.   Be cautious when going up and down stairs, curbs, and when walking on uneven sidewalks.   If your balance is poor, consider using a cane or walker.   If your fall was related to alcohol use, stop or limit alcohol intake.   If your fall was related to use of sleeping medicines, talk to your doctor about this. You may need to reduce your dosage at bedtime if you awaken during the night to go to the bathroom.   To reduce the need for nighttime bathroom trips:   Avoid drinking fluids for several hours before going to bed   Empty your bladder before going to bed   Men can keep a urinal at the bedside   © 7736-3874 [employer], [address] [place] [address], [place] [address]. All rights reserved. This information is not intended as a substitute for professional medical care. Always follow your healthcare professional's instructions.  .WAYS TO HELP PREVENT INFECTION         WASH YOUR HANDS OFTEN  DURING THE DAY, ESPECIALLY BEFORE YOU EAT, AFTER USING THE BATHROOM, AND AFTER TOUCHING ANIMALS     STAY AWAY FROM PEOPLE WHO HAVE ILLNESSES YOU CAN CATCH; SUCH AS COLDS, FLU, CHICKEN POX     TRY TO AVOID CROWDS     STAY AWAY FROM CHILDREN WHO RECENTLY HAVE RECEIVED LIVE VIRUS VACCINES     MAINTAIN GOOD MOUTH CARE     DO NOT SQUEEZE OR SCRATCH PIMPLES     CLEAN CUTS & SCRAPES RIGHT AWAY AND DAILY UNTIL HEALED WITH WARM WATER, SOAP & AN ANTISEPTIC     AVOID CONTACT WITH LITTER BOXES, BIRD CAGES, & FISH TANKS     AVOID STANDING WATER, IE., BIRD BATHS, FLOWER POTS/VASES, OR HUMIDIFIERS     WEAR GLOVES WHEN GARDENING OR CLEANING UP AFTER OTHERS, ESPECIALLY BABIES & SMALL CHILDREN     DO NOT EAT RAW FISH, SEAFOOD, MEAT, OR EGGS

## 2022-02-10 NOTE — PROGRESS NOTES
Health Maintenance Due   Topic Date Due    COVID-19 Vaccine (1) Never done    Pneumococcal Vaccines (Age 65+) (1 of 4 - PCV13) Never done    TETANUS VACCINE  Never done    Colorectal Cancer Screening  04/18/2016    Shingles Vaccine (2 of 3) 05/05/2016    Mammogram  10/23/2016     Updates were requested from care everywhere.  Chart was reviewed for overdue Proactive [facility] Encounters (KAYLAN) topics (CRS, Breast Cancer Screening, Eye exam)  Health Maintenance has been updated.  LINKS immunization registry triggered.  Immunizations were reconciled.

## 2022-02-15 ENCOUNTER — TELEPHONE (OUTPATIENT)
Dept: HEMATOLOGY/ONCOLOGY | Facility: CLINIC | Age: 72
End: 2022-02-15
Payer: MEDICARE

## 2022-02-15 NOTE — TELEPHONE ENCOUNTER
Spoke with patient who states she thinks her iron is low again.  She is requesting an earlier appointment and labs. Lab appt and clinic appointment made per her request and approval. She acknowledged all appts. Call ended well.     ----- Message from Mayuri Donnelly sent at 2/15/2022 11:39 AM CST -----  Contact: Self   Pt states after her last lab visit she shows she is anemic again. Please advise

## 2022-02-16 ENCOUNTER — LAB VISIT (OUTPATIENT)
Dept: LAB | Facility: HOSPITAL | Age: 72
End: 2022-02-16
Attending: INTERNAL MEDICINE
Payer: MEDICARE

## 2022-02-16 DIAGNOSIS — D50.0 IRON DEFICIENCY ANEMIA DUE TO CHRONIC BLOOD LOSS: ICD-10-CM

## 2022-02-16 DIAGNOSIS — R59.0 ENLARGED LYMPH NODE IN NECK: ICD-10-CM

## 2022-02-16 DIAGNOSIS — D84.9 IMMUNOSUPPRESSED STATUS: ICD-10-CM

## 2022-02-16 LAB
ANION GAP SERPL CALC-SCNC: 10 MMOL/L (ref 8–16)
BASOPHILS # BLD AUTO: 0.05 K/UL (ref 0–0.2)
BASOPHILS NFR BLD: 0.6 % (ref 0–1.9)
BUN SERPL-MCNC: 22 MG/DL (ref 8–23)
CALCIUM SERPL-MCNC: 9.5 MG/DL (ref 8.7–10.5)
CHLORIDE SERPL-SCNC: 101 MMOL/L (ref 95–110)
CO2 SERPL-SCNC: 30 MMOL/L (ref 23–29)
CREAT SERPL-MCNC: 1.4 MG/DL (ref 0.5–1.4)
DIFFERENTIAL METHOD: ABNORMAL
EOSINOPHIL # BLD AUTO: 0.1 K/UL (ref 0–0.5)
EOSINOPHIL NFR BLD: 1.5 % (ref 0–8)
ERYTHROCYTE [DISTWIDTH] IN BLOOD BY AUTOMATED COUNT: 13.6 % (ref 11.5–14.5)
EST. GFR  (AFRICAN AMERICAN): 44 ML/MIN/1.73 M^2
EST. GFR  (NON AFRICAN AMERICAN): 38 ML/MIN/1.73 M^2
FERRITIN SERPL-MCNC: 11 NG/ML (ref 20–300)
GLUCOSE SERPL-MCNC: 170 MG/DL (ref 70–110)
HCT VFR BLD AUTO: 35.6 % (ref 37–48.5)
HGB BLD-MCNC: 10.8 G/DL (ref 12–16)
IMM GRANULOCYTES # BLD AUTO: 0.03 K/UL (ref 0–0.04)
IMM GRANULOCYTES NFR BLD AUTO: 0.3 % (ref 0–0.5)
IRON SERPL-MCNC: 28 UG/DL (ref 30–160)
LYMPHOCYTES # BLD AUTO: 2.1 K/UL (ref 1–4.8)
LYMPHOCYTES NFR BLD: 23.6 % (ref 18–48)
MCH RBC QN AUTO: 26.6 PG (ref 27–31)
MCHC RBC AUTO-ENTMCNC: 30.3 G/DL (ref 32–36)
MCV RBC AUTO: 88 FL (ref 82–98)
MONOCYTES # BLD AUTO: 0.7 K/UL (ref 0.3–1)
MONOCYTES NFR BLD: 7.4 % (ref 4–15)
NEUTROPHILS # BLD AUTO: 5.8 K/UL (ref 1.8–7.7)
NEUTROPHILS NFR BLD: 66.6 % (ref 38–73)
NRBC BLD-RTO: 0 /100 WBC
PLATELET # BLD AUTO: 312 K/UL (ref 150–450)
PMV BLD AUTO: 10.3 FL (ref 9.2–12.9)
POTASSIUM SERPL-SCNC: 4.2 MMOL/L (ref 3.5–5.1)
RBC # BLD AUTO: 4.06 M/UL (ref 4–5.4)
SATURATED IRON: 6 % (ref 20–50)
SODIUM SERPL-SCNC: 141 MMOL/L (ref 136–145)
TOTAL IRON BINDING CAPACITY: 453 UG/DL (ref 250–450)
TRANSFERRIN SERPL-MCNC: 306 MG/DL (ref 200–375)
WBC # BLD AUTO: 8.74 K/UL (ref 3.9–12.7)

## 2022-02-16 PROCEDURE — 36415 COLL VENOUS BLD VENIPUNCTURE: CPT | Mod: PO | Performed by: INTERNAL MEDICINE

## 2022-02-16 PROCEDURE — 85025 COMPLETE CBC W/AUTO DIFF WBC: CPT | Performed by: INTERNAL MEDICINE

## 2022-02-16 PROCEDURE — 84466 ASSAY OF TRANSFERRIN: CPT | Performed by: INTERNAL MEDICINE

## 2022-02-16 PROCEDURE — 82728 ASSAY OF FERRITIN: CPT | Performed by: INTERNAL MEDICINE

## 2022-02-16 PROCEDURE — 86480 TB TEST CELL IMMUN MEASURE: CPT | Performed by: INTERNAL MEDICINE

## 2022-02-16 PROCEDURE — 80048 BASIC METABOLIC PNL TOTAL CA: CPT | Performed by: INTERNAL MEDICINE

## 2022-02-18 ENCOUNTER — OFFICE VISIT (OUTPATIENT)
Dept: HEMATOLOGY/ONCOLOGY | Facility: CLINIC | Age: 72
End: 2022-02-18
Payer: MEDICARE

## 2022-02-18 VITALS
SYSTOLIC BLOOD PRESSURE: 117 MMHG | HEART RATE: 66 BPM | BODY MASS INDEX: 46.21 KG/M2 | WEIGHT: 260.81 LBS | DIASTOLIC BLOOD PRESSURE: 59 MMHG | OXYGEN SATURATION: 98 % | TEMPERATURE: 98 F | HEIGHT: 63 IN

## 2022-02-18 DIAGNOSIS — D50.0 IRON DEFICIENCY ANEMIA DUE TO CHRONIC BLOOD LOSS: Primary | ICD-10-CM

## 2022-02-18 PROCEDURE — 99214 OFFICE O/P EST MOD 30 MIN: CPT | Mod: S$PBB,,,

## 2022-02-18 PROCEDURE — 99214 PR OFFICE/OUTPT VISIT, EST, LEVL IV, 30-39 MIN: ICD-10-PCS | Mod: S$PBB,,,

## 2022-02-18 PROCEDURE — 99999 PR PBB SHADOW E&M-EST. PATIENT-LVL V: CPT | Mod: PBBFAC,,,

## 2022-02-18 PROCEDURE — 99999 PR PBB SHADOW E&M-EST. PATIENT-LVL V: ICD-10-PCS | Mod: PBBFAC,,,

## 2022-02-18 PROCEDURE — 99215 OFFICE O/P EST HI 40 MIN: CPT | Mod: PBBFAC

## 2022-02-18 RX ORDER — HEPARIN 100 UNIT/ML
500 SYRINGE INTRAVENOUS
Status: CANCELLED | OUTPATIENT
Start: 2022-02-18

## 2022-02-18 RX ORDER — METHYLPREDNISOLONE SOD SUCC 125 MG
40 VIAL (EA) INJECTION
Status: CANCELLED
Start: 2022-02-18

## 2022-02-18 RX ORDER — SODIUM CHLORIDE 0.9 % (FLUSH) 0.9 %
10 SYRINGE (ML) INJECTION
Status: CANCELLED | OUTPATIENT
Start: 2022-02-18

## 2022-02-18 RX ORDER — METHYLPREDNISOLONE SOD SUCC 125 MG
80 VIAL (EA) INJECTION
Status: CANCELLED
Start: 2022-02-18

## 2022-02-18 NOTE — ASSESSMENT & PLAN NOTE
"Iron levels reflect iron deficiency-Sat. Iron 6; Ferritin 11; TIBC 453  Hemoglobin 10.8g/dL  She denies abnormal bleeding at present time but notes intermittent blood noted in stool which she believes is related to hemorrhoids.  Patient has not yet had follow up with GI service for endoscopies due to "having a lot of things going on" and "so many deaths in my family in December"  She has been encouraged to follow up with GI and nephrology. Rationale discussed in detail. Pt states she will establish care with providers outside of [facility].  Patient unable to tolerate oral iron due to GI upset/constipation.      Arrange Venofer weekly x 8.  F/u 3 months with repeat labs.     "

## 2022-02-18 NOTE — PROGRESS NOTES
Subjective:      Patient ID: Qi Ortiz is a 71 y.o. female.    Chief Complaint: No chief complaint on file.      HPI:  Ms. Ortiz is a 71-year-old female with a PMHx of DM, CVA, COPD, CAD, HTN, CKD, and CAREN. She presents today for follow-up and lab evaluation to assess need for IV iron. She c/o sob with exertion and extreme fatigue. She denies CP, cough, fever, chills, NVDC.     Social History     Socioeconomic History    Marital status: Single    Number of children: 3   Occupational History    Occupation: Not working   Tobacco Use    Smoking status: Never Smoker    Smokeless tobacco: Never Used   Substance and Sexual Activity    Alcohol use: No    Drug use: No    Sexual activity: Never     Partners: Male       Family History   Problem Relation Age of Onset    Breast cancer Maternal Grandfather     Breast cancer Paternal Aunt     Stroke Unknown     Breast cancer Sister 60    Leukemia Sister 8         as child    Lung cancer Paternal Grandfather     Heart disease Unknown     Diabetes Daughter        Past Surgical History:   Procedure Laterality Date    BREAST BIOPSY      R sided/benign    CARDIAC SURGERY      2016    CERVICAL FUSION      CHOLECYSTECTOMY      CORONARY ANGIOPLASTY      CORONARY ARTERY BYPASS GRAFT      triple bypass    CORONARY STENT PLACEMENT      EYE SURGERY      INTRAUTERINE DEVICE INSERTION      LEFT HEART CATHETERIZATION Left 2020    Procedure: CATHETERIZATION, HEART, LEFT;  Surgeon: Veronica Ibarra MD;  Location: [facility] CATH LAB;  Service: Cardiology;  Laterality: Left;  7am start time    mass removed from R groin      TOTAL ABDOMINAL HYSTERECTOMY W/ BILATERAL SALPINGOOPHORECTOMY      due to benign mass, adhesions    TUBAL LIGATION         Past Medical History:   Diagnosis Date    Carotid stenosis     19%    COPD (chronic obstructive pulmonary disease)     No meds    Coronary artery disease     CVA (cerebral vascular accident)     Dr. Hoffman     Depression     Double ectopic ureters     Dr. Porras    Hyperlipidemia     Hypertension     Hypothyroid     OP (osteoporosis)     IRIS (obstructive sleep apnea)     Dr. Hope    Psoriatic arthritis     Rheumatology       Review of Systems   Constitutional: Positive for fatigue. Negative for activity change, appetite change, chills, diaphoresis, fever and unexpected weight change.   HENT: Negative for congestion, dental problem, drooling, ear discharge, ear pain, facial swelling, hearing loss, mouth sores, nosebleeds, postnasal drip, rhinorrhea, sinus pressure, sneezing, sore throat, tinnitus, trouble swallowing and voice change.    Eyes: Negative for photophobia, pain, discharge, redness, itching and visual disturbance.   Respiratory: Positive for shortness of breath (with exertion). Negative for cough, choking, chest tightness, wheezing and stridor.    Cardiovascular: Negative for chest pain, palpitations and leg swelling.   Gastrointestinal: Negative for abdominal distention, abdominal pain, anal bleeding, blood in stool, constipation, diarrhea, nausea, rectal pain and vomiting.   Endocrine: Negative for cold intolerance, heat intolerance, polydipsia, polyphagia and polyuria.   Genitourinary: Negative for decreased urine volume, difficulty urinating, dyspareunia, dysuria, enuresis, flank pain, frequency, genital sores, hematuria, menstrual problem, pelvic pain, urgency, vaginal bleeding, vaginal discharge and vaginal pain.   Musculoskeletal: Negative for arthralgias, back pain, gait problem, joint swelling, myalgias, neck pain and neck stiffness.   Skin: Negative for color change, pallor and rash.   Allergic/Immunologic: Negative for environmental allergies, food allergies and immunocompromised state.   Neurological: Positive for weakness. Negative for dizziness, tremors, seizures, syncope, facial asymmetry, speech difficulty, light-headedness, numbness and headaches.   Hematological: Negative for  adenopathy. Does not bruise/bleed easily.   Psychiatric/Behavioral: Negative for agitation, behavioral problems, confusion, decreased concentration, dysphoric mood, hallucinations, self-injury, sleep disturbance and suicidal ideas. The patient is nervous/anxious. The patient is not hyperactive.           Medication List with Changes/Refills   Current Medications    ACETAMINOPHEN (TYLENOL) 650 MG TBSR    as directed    ALBUTEROL (PROVENTIL) 2.5 MG /3 ML (0.083 %) NEBULIZER SOLUTION    Take 3 mLs (2.5 mg total) by nebulization every 6 (six) hours as needed for Wheezing. Rescue    ASPIRIN 81 MG CHEW    Take 1 tablet (81 mg total) by mouth once daily.    CALCIUM CARBONATE 650 MG CALCIUM (1,625 MG) TABLET    Take 1 tablet by mouth once daily.    CHLORHEXIDINE (BETASEPT SURGICAL SCRUB) 4 % EXTERNAL LIQUID    Apply topically daily as needed (active skin lesions).    CLOBETASOL (CLOBEX) 0.05 % SHAMPOO    Apply topically 2 (two) times daily.    CLOBETASOL (OLUX) 0.05 % FOAM    Apply topically 2 (two) times daily.    CLOPIDOGREL (PLAVIX) 75 MG TABLET    Take 1 tablet (75 mg total) by mouth once daily.    CYANOCOBALAMIN 2000 MCG TABLET    Take 2,000 mcg by mouth once daily.    DOCUSATE SODIUM (COLACE) 100 MG CAPSULE    Take 1 capsule (100 mg total) by mouth 2 (two) times daily.    EVOLOCUMAB (REPATHA SURECLICK) 140 MG/ML PNIJ    Inject 1 mL (140 mg total) into the skin every 14 (fourteen) days.    FOLIC ACID (FOLVITE) 1 MG TABLET    Take 1 tablet (1 mg total) by mouth once daily.    FUROSEMIDE (LASIX) 20 MG TABLET    Take 1 tablet (20 mg total) by mouth once daily.    GABAPENTIN (NEURONTIN) 100 MG CAPSULE    Take 2 capsules (200 mg total) by mouth 3 (three) times daily.    GARLIC 2,000 MG CAP    Take 3,000 mg by mouth once daily.     HYDROCORTISONE 2.5 % CREAM    Apply topically 2 (two) times daily.    LEVOTHYROXINE (SYNTHROID) 112 MCG TABLET    Take 1 tablet (112 mcg total) by mouth once daily.    METOPROLOL SUCCINATE  (TOPROL-XL) 100 MG 24 HR TABLET    Take 1 tablet (100 mg total) by mouth 2 (two) times daily. 1 tab (100mg) in morning. And 100 mg at bedtime.  Generic ok    MUPIROCIN (BACTROBAN) 2 % OINTMENT    Bactroban 2 % topical ointment   Apply 1 application twice a day by topical route as directed for 30 days.    NITROGLYCERIN (NITROSTAT) 0.4 MG SL TABLET    Place 1 tablet (0.4 mg total) under the tongue every 5 (five) minutes as needed for Chest pain.    POTASSIUM CHLORIDE SA (K-DUR,KLOR-CON) 20 MEQ TABLET    Take 1 tablet (20 mEq total) by mouth once daily.    PYRIDOXINE, VITAMIN B6, (B-6) 100 MG TAB    Take 200 mg by mouth once daily.     USTEKINUMAB (STELARA) 90 MG/ML SYRG SYRINGE    Inject 1 mL (90 mg total) into the skin every 3 (three) months.    VALSARTAN-HYDROCHLOROTHIAZIDE (DIOVAN-HCT) 160-12.5 MG PER TABLET    Take 1 tablet by mouth once daily.    VITAMIN D 1000 UNITS TAB    Take 185 mg by mouth once daily.        Objective:     Vitals:    02/18/22 1318   BP: (!) 117/59   Pulse: 66   Temp: 98 °F (36.7 °C)       Physical Exam  Vitals and nursing note reviewed.   Constitutional:       General: She is not in acute distress.     Appearance: She is well-developed. She is obese. She is not diaphoretic.   HENT:      Head: Normocephalic and atraumatic.      Right Ear: External ear normal.      Left Ear: External ear normal.      Nose: Nose normal.      Right Sinus: No maxillary sinus tenderness or frontal sinus tenderness.      Left Sinus: No maxillary sinus tenderness or frontal sinus tenderness.      Mouth/Throat:      Pharynx: No oropharyngeal exudate.   Eyes:      General: Lids are normal. No scleral icterus.        Right eye: No discharge.         Left eye: No discharge.      Conjunctiva/sclera: Conjunctivae normal.      Right eye: Right conjunctiva is not injected. No hemorrhage.     Left eye: Left conjunctiva is not injected. No hemorrhage.     Pupils: Pupils are equal, round, and reactive to light.   Neck:       Thyroid: No thyromegaly.      Vascular: No JVD.      Trachea: No tracheal deviation.   Cardiovascular:      Rate and Rhythm: Normal rate.   Pulmonary:      Effort: Pulmonary effort is normal. No respiratory distress.      Breath sounds: No stridor.   Chest:      Chest wall: No tenderness.   Breasts:      Right: No supraclavicular adenopathy.      Left: No supraclavicular adenopathy.       Abdominal:      General: Bowel sounds are normal. There is no distension.      Palpations: Abdomen is soft. There is no hepatomegaly, splenomegaly or mass.      Tenderness: There is no abdominal tenderness. There is no rebound.   Musculoskeletal:         General: No tenderness. Normal range of motion.      Cervical back: Normal range of motion and neck supple.      Right lower leg: No edema.      Left lower leg: No edema.   Lymphadenopathy:      Cervical: No cervical adenopathy.      Upper Body:      Right upper body: No supraclavicular adenopathy.      Left upper body: No supraclavicular adenopathy.   Skin:     General: Skin is dry.      Findings: No erythema or rash.   Neurological:      Mental Status: She is alert and oriented to person, place, and time.      Cranial Nerves: No cranial nerve deficit.      Coordination: Coordination normal.      Gait: Gait abnormal.   Psychiatric:         Behavior: Behavior normal.         Thought Content: Thought content normal.         Judgment: Judgment normal.                  Lab Results   Component Value Date    WBC 8.74 02/16/2022    HGB 10.8 (L) 02/16/2022    HCT 35.6 (L) 02/16/2022    MCV 88 02/16/2022     02/16/2022       Lab Results   Component Value Date     02/16/2022    K 4.2 02/16/2022     02/16/2022    CO2 30 (H) 02/16/2022    BUN 22 02/16/2022    CREATININE 1.4 02/16/2022    CALCIUM 9.5 02/16/2022    ANIONGAP 10 02/16/2022    ESTGFRAFRICA 44 (A) 02/16/2022    EGFRNONAA 38 (A) 02/16/2022     Lab Results   Component Value Date    ALT 14 01/13/2022    AST 29  "01/13/2022    ALKPHOS 40 (L) 01/13/2022    BILITOT 0.5 01/13/2022       Plan:     Problem List Items Addressed This Visit        Oncology    Iron deficiency anemia due to chronic blood loss - Primary     Iron levels reflect iron deficiency-Sat. Iron 6; Ferritin 11; TIBC 453  Hemoglobin 10.8g/dL  She denies abnormal bleeding at present time but notes intermittent blood noted in stool which she believes is related to hemorrhoids.  Patient has not yet had follow up with GI service for endoscopies due to "having a lot of things going on" and "so many deaths in my family in December"  She has been encouraged to follow up with GI and nephrology. Rationale discussed in detail. Pt states she will establish care with providers outside of [facility].  Patient unable to tolerate oral iron due to GI upset/constipation.      Arrange Venofer weekly x 8.  F/u 3 months with repeat labs.            Relevant Orders    CBC Auto Differential    Comprehensive Metabolic Panel    Iron and TIBC    Ferritin              Thank You,    EMIR Maharaj    "

## 2022-02-19 LAB
GAMMA INTERFERON BACKGROUND BLD IA-ACNC: 0.02 IU/ML
M TB IFN-G CD4+ BCKGRND COR BLD-ACNC: 0 IU/ML
M TB IFN-G CD4+CD8+ BCKGRND COR BLD-ACNC: 0 IU/ML
MITOGEN IGNF BCKGRD COR BLD-ACNC: >10 IU/ML
TB GOLD PLUS: NEGATIVE

## 2022-02-23 ENCOUNTER — TELEPHONE (OUTPATIENT)
Dept: HEMATOLOGY/ONCOLOGY | Facility: CLINIC | Age: 72
End: 2022-02-23
Payer: MEDICARE

## 2022-02-23 NOTE — TELEPHONE ENCOUNTER
Called Pt to follow up with recent distress level of 5. Pt stated that she is fine besides needed someone to come and cut trees in her yard. Offered to link Pt to local tree cutting services. Pt stated that she have called them and she currently does not have the money for it. Encourage Pt to call if an issue or concern arise that we could assist her with.

## 2022-02-25 RX ORDER — SODIUM CHLORIDE 0.9 % (FLUSH) 0.9 %
10 SYRINGE (ML) INJECTION
Status: CANCELLED | OUTPATIENT
Start: 2022-03-05

## 2022-02-25 RX ORDER — HEPARIN 100 UNIT/ML
500 SYRINGE INTRAVENOUS
Status: CANCELLED | OUTPATIENT
Start: 2022-04-16

## 2022-02-25 RX ORDER — METHYLPREDNISOLONE SOD SUCC 125 MG
40 VIAL (EA) INJECTION
Status: CANCELLED
Start: 2022-03-26

## 2022-02-25 RX ORDER — SODIUM CHLORIDE 0.9 % (FLUSH) 0.9 %
10 SYRINGE (ML) INJECTION
Status: CANCELLED | OUTPATIENT
Start: 2022-04-09

## 2022-02-25 RX ORDER — METHYLPREDNISOLONE SOD SUCC 125 MG
40 VIAL (EA) INJECTION
Status: CANCELLED
Start: 2022-04-09

## 2022-02-25 RX ORDER — METHYLPREDNISOLONE SOD SUCC 125 MG
40 VIAL (EA) INJECTION
Status: CANCELLED
Start: 2022-04-02

## 2022-02-25 RX ORDER — SODIUM CHLORIDE 0.9 % (FLUSH) 0.9 %
10 SYRINGE (ML) INJECTION
Status: CANCELLED | OUTPATIENT
Start: 2022-03-19

## 2022-02-25 RX ORDER — HEPARIN 100 UNIT/ML
500 SYRINGE INTRAVENOUS
Status: CANCELLED | OUTPATIENT
Start: 2022-03-05

## 2022-02-25 RX ORDER — SODIUM CHLORIDE 0.9 % (FLUSH) 0.9 %
10 SYRINGE (ML) INJECTION
Status: CANCELLED | OUTPATIENT
Start: 2022-03-26

## 2022-02-25 RX ORDER — SODIUM CHLORIDE 0.9 % (FLUSH) 0.9 %
10 SYRINGE (ML) INJECTION
Status: CANCELLED | OUTPATIENT
Start: 2022-04-16

## 2022-02-25 RX ORDER — HEPARIN 100 UNIT/ML
500 SYRINGE INTRAVENOUS
Status: CANCELLED | OUTPATIENT
Start: 2022-03-26

## 2022-02-25 RX ORDER — METHYLPREDNISOLONE SOD SUCC 125 MG
40 VIAL (EA) INJECTION
Status: CANCELLED
Start: 2022-03-05

## 2022-02-25 RX ORDER — HEPARIN 100 UNIT/ML
500 SYRINGE INTRAVENOUS
Status: CANCELLED | OUTPATIENT
Start: 2022-03-12

## 2022-02-25 RX ORDER — METHYLPREDNISOLONE SOD SUCC 125 MG
40 VIAL (EA) INJECTION
Status: CANCELLED
Start: 2022-03-12

## 2022-02-25 RX ORDER — METHYLPREDNISOLONE SOD SUCC 125 MG
40 VIAL (EA) INJECTION
Status: CANCELLED
Start: 2022-03-19

## 2022-02-25 RX ORDER — HEPARIN 100 UNIT/ML
500 SYRINGE INTRAVENOUS
Status: CANCELLED | OUTPATIENT
Start: 2022-04-02

## 2022-02-25 RX ORDER — SODIUM CHLORIDE 0.9 % (FLUSH) 0.9 %
10 SYRINGE (ML) INJECTION
Status: CANCELLED | OUTPATIENT
Start: 2022-04-02

## 2022-02-25 RX ORDER — HEPARIN 100 UNIT/ML
500 SYRINGE INTRAVENOUS
Status: CANCELLED | OUTPATIENT
Start: 2022-04-09

## 2022-02-25 RX ORDER — METHYLPREDNISOLONE SOD SUCC 125 MG
40 VIAL (EA) INJECTION
Status: CANCELLED
Start: 2022-04-16

## 2022-02-25 RX ORDER — HEPARIN 100 UNIT/ML
500 SYRINGE INTRAVENOUS
Status: CANCELLED | OUTPATIENT
Start: 2022-03-19

## 2022-02-25 RX ORDER — SODIUM CHLORIDE 0.9 % (FLUSH) 0.9 %
10 SYRINGE (ML) INJECTION
Status: CANCELLED | OUTPATIENT
Start: 2022-03-12

## 2022-03-04 ENCOUNTER — INFUSION (OUTPATIENT)
Dept: INFUSION THERAPY | Facility: HOSPITAL | Age: 72
End: 2022-03-04
Payer: MEDICARE

## 2022-03-04 VITALS
TEMPERATURE: 98 F | HEIGHT: 63 IN | BODY MASS INDEX: 46.07 KG/M2 | RESPIRATION RATE: 20 BRPM | SYSTOLIC BLOOD PRESSURE: 153 MMHG | DIASTOLIC BLOOD PRESSURE: 63 MMHG | WEIGHT: 260 LBS | OXYGEN SATURATION: 97 % | HEART RATE: 65 BPM

## 2022-03-04 DIAGNOSIS — D50.0 IRON DEFICIENCY ANEMIA DUE TO CHRONIC BLOOD LOSS: Primary | ICD-10-CM

## 2022-03-04 PROCEDURE — 96375 TX/PRO/DX INJ NEW DRUG ADDON: CPT

## 2022-03-04 PROCEDURE — 63600175 PHARM REV CODE 636 W HCPCS

## 2022-03-04 PROCEDURE — 96374 THER/PROPH/DIAG INJ IV PUSH: CPT

## 2022-03-04 PROCEDURE — 25000003 PHARM REV CODE 250

## 2022-03-04 RX ORDER — METHYLPREDNISOLONE SOD SUCC 125 MG
40 VIAL (EA) INJECTION
Status: COMPLETED | OUTPATIENT
Start: 2022-03-04 | End: 2022-03-04

## 2022-03-04 RX ADMIN — METHYLPREDNISOLONE SODIUM SUCCINATE 40 MG: 125 INJECTION, POWDER, FOR SOLUTION INTRAMUSCULAR; INTRAVENOUS at 02:03

## 2022-03-04 RX ADMIN — SODIUM CHLORIDE: 0.9 INJECTION, SOLUTION INTRAVENOUS at 02:03

## 2022-03-04 RX ADMIN — IRON SUCROSE 200 MG: 20 INJECTION, SOLUTION INTRAVENOUS at 02:03

## 2022-03-04 NOTE — DISCHARGE INSTRUCTIONS
.[facility]  [address] [facility]  [address]  [phone] phone     [phone] fax  Hours of Operation: Monday- Friday 8:00am- 5:00pm  After hours phone  [phone]  Hematology / Oncology Physicians on call    Dr. Hank Sanford      Nurse Practitioners:    Radha Richardson, SAMEERA Javed, SAMEERA Alanis, SAMEERA Caban, SAMEERA Styles, PA      Please don't hesitate to call if you have any concerns.    .FALL PREVENTION   Falls often occur due to slipping, tripping or losing your balance. Here are ways to reduce your risk of falling again.   Was there anything that caused your fall that can be fixed, removed or replaced?   Make your home safe by keeping walkways clear of objects you may trip over.   Use non-slip pads under rugs.   Do not walk in poorly lit areas.   Do not stand on chairs or wobbly ladders.   Use caution when reaching overhead or looking upward. This position can cause a loss of balance.   Be sure your shoes fit properly, have non-slip bottoms and are in good condition.   Be cautious when going up and down stairs, curbs, and when walking on uneven sidewalks.   If your balance is poor, consider using a cane or walker.   If your fall was related to alcohol use, stop or limit alcohol intake.   If your fall was related to use of sleeping medicines, talk to your doctor about this. You may need to reduce your dosage at bedtime if you awaken during the night to go to the bathroom.   To reduce the need for nighttime bathroom trips:   Avoid drinking fluids for several hours before going to bed   Empty your bladder before going to bed   Men can keep a urinal at the bedside   © 5342-2060 [employer], [address] [place] [address], [place] [address]. All rights reserved. This information is not intended as a substitute for professional medical care. Always follow your healthcare professional's instructions.  .WAYS  TO HELP PREVENT INFECTION        WASH YOUR HANDS OFTEN DURING THE DAY, ESPECIALLY BEFORE YOU EAT, AFTER USING THE BATHROOM, AND AFTER TOUCHING ANIMALS    STAY AWAY FROM PEOPLE WHO HAVE ILLNESSES YOU CAN CATCH; SUCH AS COLDS, FLU, CHICKEN POX    TRY TO AVOID CROWDS    STAY AWAY FROM CHILDREN WHO RECENTLY HAVE RECEIVED LIVE VIRUS VACCINES    MAINTAIN GOOD MOUTH CARE    DO NOT SQUEEZE OR SCRATCH PIMPLES    CLEAN CUTS & SCRAPES RIGHT AWAY AND DAILY UNTIL HEALED WITH WARM WATER, SOAP & AN ANTISEPTIC    AVOID CONTACT WITH LITTER BOXES, BIRD CAGES, & FISH TANKS    AVOID STANDING WATER, IE., BIRD BATHS, FLOWER POTS/VASES, OR HUMIDIFIERS    WEAR GLOVES WHEN GARDENING OR CLEANING UP AFTER OTHERS, ESPECIALLY BABIES & SMALL CHILDREN    DO NOT EAT RAW FISH, SEAFOOD, MEAT, OR EGGS

## 2022-03-09 ENCOUNTER — PATIENT OUTREACH (OUTPATIENT)
Dept: ADMINISTRATIVE | Facility: HOSPITAL | Age: 72
End: 2022-03-09
Payer: MEDICARE

## 2022-03-09 NOTE — PROGRESS NOTES
Working Mammogram report; chart searched; 2020 mammogram already in pt chart.  I Called pt to schedule appointment for overdue mammogram exam. She declined due to pains in her body.

## 2022-03-11 ENCOUNTER — INFUSION (OUTPATIENT)
Dept: INFUSION THERAPY | Facility: HOSPITAL | Age: 72
End: 2022-03-11
Payer: MEDICARE

## 2022-03-11 VITALS
TEMPERATURE: 98 F | SYSTOLIC BLOOD PRESSURE: 145 MMHG | HEART RATE: 61 BPM | DIASTOLIC BLOOD PRESSURE: 67 MMHG | OXYGEN SATURATION: 98 % | RESPIRATION RATE: 20 BRPM

## 2022-03-11 DIAGNOSIS — D50.0 IRON DEFICIENCY ANEMIA DUE TO CHRONIC BLOOD LOSS: Primary | ICD-10-CM

## 2022-03-11 PROCEDURE — 63600175 PHARM REV CODE 636 W HCPCS

## 2022-03-11 PROCEDURE — 96375 TX/PRO/DX INJ NEW DRUG ADDON: CPT

## 2022-03-11 PROCEDURE — 96374 THER/PROPH/DIAG INJ IV PUSH: CPT

## 2022-03-11 RX ORDER — METHYLPREDNISOLONE SOD SUCC 125 MG
40 VIAL (EA) INJECTION
Status: COMPLETED | OUTPATIENT
Start: 2022-03-11 | End: 2022-03-11

## 2022-03-11 RX ADMIN — IRON SUCROSE 200 MG: 20 INJECTION, SOLUTION INTRAVENOUS at 02:03

## 2022-03-11 RX ADMIN — METHYLPREDNISOLONE SODIUM SUCCINATE 40 MG: 125 INJECTION, POWDER, FOR SOLUTION INTRAMUSCULAR; INTRAVENOUS at 02:03

## 2022-03-11 NOTE — NURSING
1515 Patient to unit today for Venofer. Patient tolerated well. Discharged without distress or complaints.

## 2022-03-11 NOTE — PLAN OF CARE
Problem: Adult Inpatient Plan of Care  Goal: Plan of Care Review  Description: Pt likes feet elevated  Outcome: Ongoing, Progressing  Flowsheets (Taken 3/11/2022 1439)  Plan of Care Reviewed With: patient  Goal: Patient-Specific Goal (Individualized)  Description: Pt will tolerate iron IVP   Outcome: Ongoing, Progressing  Flowsheets (Taken 3/11/2022 1439)  Anxieties, Fears or Concerns: None  Individualized Care Needs: Feet elevated, warm blanket. Requires special attention. Peripheral IV.  Patient-Specific Goals (Include Timeframe): Tolerated treatment as prescribed.  Goal: Optimal Comfort and Wellbeing  Outcome: Ongoing, Progressing  Intervention: Provide Person-Centered Care  Flowsheets (Taken 3/11/2022 1439)  Trust Relationship/Rapport:   care explained   choices provided   emotional support provided   empathic listening provided   thoughts/feelings acknowledged   reassurance provided   questions encouraged   questions answered     Problem: Fall Injury Risk  Goal: Absence of Fall and Fall-Related Injury  Outcome: Ongoing, Progressing  Intervention: Promote Injury-Free Environment  Flowsheets (Taken 3/11/2022 1439)  Safety Promotion/Fall Prevention:   assistive device/personal item within reach   high risk medications identified   medications reviewed     Problem: Anemia  Goal: Anemia Symptom Improvement  Outcome: Ongoing, Progressing  Intervention: Monitor and Manage Anemia  Flowsheets (Taken 3/11/2022 1439)  Safety Promotion/Fall Prevention:   assistive device/personal item within reach   high risk medications identified   medications reviewed  Fatigue Management: paced activity encouraged

## 2022-03-11 NOTE — DISCHARGE INSTRUCTIONS
.THANKS FOR ALLOWING US TO CARE FOR YOU!!!!           THANKS FOR CHOOSING [facility]!!!        [facility]  [address] [address]  [phone] phone     [phone] fax  Hours of Operation: Monday- Friday 8:00am- 5:00pm  After hours phone  [phone]  Hematology / Oncology Physicians on call      SAMEERA Ash Dr., Dr., Dr., NP    Please call with any concerns regarding your appointment today.

## 2022-03-14 NOTE — ASSESSMENT & PLAN NOTE
-Admit to Obs  -Troponin Result in Er #2 elevated  -EKG in ED reviewed PVCs noted, but patient has hx of past ekgs with PCVs: stable  -Continue Serial enzymes  -ASA 81mg daily  -Cardiology if need  -monitor        1 Principal Discharge DX:	Exacerbation of reactive airway disease

## 2022-03-18 ENCOUNTER — INFUSION (OUTPATIENT)
Dept: INFUSION THERAPY | Facility: HOSPITAL | Age: 72
End: 2022-03-18
Payer: MEDICARE

## 2022-03-18 VITALS
HEART RATE: 67 BPM | OXYGEN SATURATION: 97 % | TEMPERATURE: 98 F | RESPIRATION RATE: 18 BRPM | DIASTOLIC BLOOD PRESSURE: 64 MMHG | SYSTOLIC BLOOD PRESSURE: 149 MMHG

## 2022-03-18 DIAGNOSIS — D50.0 IRON DEFICIENCY ANEMIA DUE TO CHRONIC BLOOD LOSS: Primary | ICD-10-CM

## 2022-03-18 PROCEDURE — 25000003 PHARM REV CODE 250

## 2022-03-18 PROCEDURE — 63600175 PHARM REV CODE 636 W HCPCS

## 2022-03-18 PROCEDURE — 96374 THER/PROPH/DIAG INJ IV PUSH: CPT

## 2022-03-18 PROCEDURE — 96375 TX/PRO/DX INJ NEW DRUG ADDON: CPT

## 2022-03-18 RX ORDER — METHYLPREDNISOLONE SOD SUCC 125 MG
40 VIAL (EA) INJECTION
Status: COMPLETED | OUTPATIENT
Start: 2022-03-18 | End: 2022-03-18

## 2022-03-18 RX ADMIN — IRON SUCROSE 200 MG: 20 INJECTION, SOLUTION INTRAVENOUS at 02:03

## 2022-03-18 RX ADMIN — METHYLPREDNISOLONE SODIUM SUCCINATE 40 MG: 125 INJECTION, POWDER, FOR SOLUTION INTRAMUSCULAR; INTRAVENOUS at 01:03

## 2022-03-18 RX ADMIN — SODIUM CHLORIDE: 9 INJECTION, SOLUTION INTRAVENOUS at 02:03

## 2022-03-25 ENCOUNTER — INFUSION (OUTPATIENT)
Dept: INFUSION THERAPY | Facility: HOSPITAL | Age: 72
End: 2022-03-25
Payer: MEDICARE

## 2022-03-25 VITALS
HEART RATE: 64 BPM | SYSTOLIC BLOOD PRESSURE: 155 MMHG | DIASTOLIC BLOOD PRESSURE: 64 MMHG | TEMPERATURE: 97 F | RESPIRATION RATE: 18 BRPM | OXYGEN SATURATION: 98 %

## 2022-03-25 DIAGNOSIS — D50.0 IRON DEFICIENCY ANEMIA DUE TO CHRONIC BLOOD LOSS: Primary | ICD-10-CM

## 2022-03-25 LAB
BASOPHILS # BLD AUTO: 0.06 K/UL (ref 0–0.2)
BASOPHILS NFR BLD: 0.7 % (ref 0–1.9)
DIFFERENTIAL METHOD: ABNORMAL
EOSINOPHIL # BLD AUTO: 0.1 K/UL (ref 0–0.5)
EOSINOPHIL NFR BLD: 1.7 % (ref 0–8)
ERYTHROCYTE [DISTWIDTH] IN BLOOD BY AUTOMATED COUNT: 18.2 % (ref 11.5–14.5)
HCT VFR BLD AUTO: 34 % (ref 37–48.5)
HGB BLD-MCNC: 10.5 G/DL (ref 12–16)
IMM GRANULOCYTES # BLD AUTO: 0.03 K/UL (ref 0–0.04)
IMM GRANULOCYTES NFR BLD AUTO: 0.4 % (ref 0–0.5)
LYMPHOCYTES # BLD AUTO: 2 K/UL (ref 1–4.8)
LYMPHOCYTES NFR BLD: 24.6 % (ref 18–48)
MCH RBC QN AUTO: 26.2 PG (ref 27–31)
MCHC RBC AUTO-ENTMCNC: 30.9 G/DL (ref 32–36)
MCV RBC AUTO: 85 FL (ref 82–98)
MONOCYTES # BLD AUTO: 0.7 K/UL (ref 0.3–1)
MONOCYTES NFR BLD: 8.1 % (ref 4–15)
NEUTROPHILS # BLD AUTO: 5.3 K/UL (ref 1.8–7.7)
NEUTROPHILS NFR BLD: 64.5 % (ref 38–73)
NRBC BLD-RTO: 0 /100 WBC
PLATELET # BLD AUTO: 217 K/UL (ref 150–450)
PMV BLD AUTO: 10 FL (ref 9.2–12.9)
RBC # BLD AUTO: 4.01 M/UL (ref 4–5.4)
WBC # BLD AUTO: 8.28 K/UL (ref 3.9–12.7)

## 2022-03-25 PROCEDURE — 63600175 PHARM REV CODE 636 W HCPCS

## 2022-03-25 PROCEDURE — 96374 THER/PROPH/DIAG INJ IV PUSH: CPT

## 2022-03-25 PROCEDURE — 85025 COMPLETE CBC W/AUTO DIFF WBC: CPT

## 2022-03-25 PROCEDURE — 96375 TX/PRO/DX INJ NEW DRUG ADDON: CPT

## 2022-03-25 RX ORDER — METHYLPREDNISOLONE SOD SUCC 125 MG
40 VIAL (EA) INJECTION
Status: COMPLETED | OUTPATIENT
Start: 2022-03-25 | End: 2022-03-25

## 2022-03-25 RX ADMIN — METHYLPREDNISOLONE SODIUM SUCCINATE 40 MG: 125 INJECTION, POWDER, FOR SOLUTION INTRAMUSCULAR; INTRAVENOUS at 02:03

## 2022-03-25 RX ADMIN — IRON SUCROSE 200 MG: 20 INJECTION, SOLUTION INTRAVENOUS at 02:03

## 2022-03-25 NOTE — NURSING
Patient reported having an episode, Wednesday 5/23/22, of weakness and falling onto the toilet at which time she had a large amount of blood expelled from her rectum. Reports the bleeding has since stopped. Pt says she has been weak since the incident but decided against reporting it to anyone until she saw us today. SAMEERA Barrios notified. CBC ordered and pt encouraged to follow up with the ER if she is still feeling so weak. Patient refuses to go to the ER. GI consult placed.

## 2022-03-25 NOTE — PLAN OF CARE
Problem: Adult Inpatient Plan of Care  Goal: Plan of Care Review  Outcome: Ongoing, Progressing  Flowsheets (Taken 3/25/2022 1512)  Plan of Care Reviewed With: patient  Goal: Patient-Specific Goal (Individualized)  Outcome: Ongoing, Progressing  Flowsheets (Taken 3/25/2022 1512)  Anxieties, Fears or Concerns: Patient voiced concern about some recent rectal bleeding. NP notified.  Individualized Care Needs: Reclined position, small bag of fluids infused during IVP of iron  Patient-Specific Goals (Include Timeframe): Tolerate infusion today  Goal: Optimal Comfort and Wellbeing  Outcome: Ongoing, Progressing  Intervention: Provide Person-Centered Care  Flowsheets (Taken 3/25/2022 1512)  Trust Relationship/Rapport:   care explained   reassurance provided   choices provided   thoughts/feelings acknowledged   emotional support provided   empathic listening provided   questions answered   questions encouraged

## 2022-04-01 ENCOUNTER — INFUSION (OUTPATIENT)
Dept: INFUSION THERAPY | Facility: HOSPITAL | Age: 72
End: 2022-04-01
Payer: MEDICARE

## 2022-04-01 ENCOUNTER — HOSPITAL ENCOUNTER (EMERGENCY)
Facility: HOSPITAL | Age: 72
Discharge: HOME | End: 2022-04-01
Attending: FAMILY MEDICINE
Payer: COMMERCIAL

## 2022-04-01 VITALS
OXYGEN SATURATION: 98 % | DIASTOLIC BLOOD PRESSURE: 68 MMHG | RESPIRATION RATE: 18 BRPM | TEMPERATURE: 98 F | HEART RATE: 80 BPM | SYSTOLIC BLOOD PRESSURE: 131 MMHG

## 2022-04-01 VITALS
BODY MASS INDEX: 46.06 KG/M2 | OXYGEN SATURATION: 99 % | RESPIRATION RATE: 18 BRPM | HEART RATE: 76 BPM | SYSTOLIC BLOOD PRESSURE: 154 MMHG | DIASTOLIC BLOOD PRESSURE: 79 MMHG | TEMPERATURE: 98 F | WEIGHT: 260 LBS

## 2022-04-01 DIAGNOSIS — D50.0 IRON DEFICIENCY ANEMIA DUE TO CHRONIC BLOOD LOSS: Primary | ICD-10-CM

## 2022-04-01 DIAGNOSIS — R07.9 CHEST PAIN: ICD-10-CM
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
## 2022-04-01 LAB
ALBUMIN SERPL BCP-MCNC: 3.5 G/DL (ref 3.5–5.2)
ALP SERPL-CCNC: 47 U/L (ref 55–135)
ALT SERPL W/O P-5'-P-CCNC: 20 U/L (ref 10–44)
ANION GAP SERPL CALC-SCNC: 15 MMOL/L (ref 8–16)
AST SERPL-CCNC: 28 U/L (ref 10–40)
BASOPHILS # BLD AUTO: 0.03 K/UL (ref 0–0.2)
BASOPHILS NFR BLD: 0.4 % (ref 0–1.9)
BILIRUB SERPL-MCNC: 0.3 MG/DL (ref 0.1–1)
BNP SERPL-MCNC: 249 PG/ML (ref 0–99)
BUN SERPL-MCNC: 19 MG/DL (ref 8–23)
CALCIUM SERPL-MCNC: 9.7 MG/DL (ref 8.7–10.5)
CHLORIDE SERPL-SCNC: 104 MMOL/L (ref 95–110)
CO2 SERPL-SCNC: 23 MMOL/L (ref 23–29)
CREAT SERPL-MCNC: 1.4 MG/DL (ref 0.5–1.4)
DIFFERENTIAL METHOD: ABNORMAL
EOSINOPHIL # BLD AUTO: 0 K/UL (ref 0–0.5)
EOSINOPHIL NFR BLD: 0 % (ref 0–8)
ERYTHROCYTE [DISTWIDTH] IN BLOOD BY AUTOMATED COUNT: 19.2 % (ref 11.5–14.5)
EST. GFR  (AFRICAN AMERICAN): 44 ML/MIN/1.73 M^2
EST. GFR  (NON AFRICAN AMERICAN): 38 ML/MIN/1.73 M^2
GLUCOSE SERPL-MCNC: 260 MG/DL (ref 70–110)
HCT VFR BLD AUTO: 39.1 % (ref 37–48.5)
HGB BLD-MCNC: 12.3 G/DL (ref 12–16)
IMM GRANULOCYTES # BLD AUTO: 0.04 K/UL (ref 0–0.04)
IMM GRANULOCYTES NFR BLD AUTO: 0.5 % (ref 0–0.5)
LYMPHOCYTES # BLD AUTO: 0.9 K/UL (ref 1–4.8)
LYMPHOCYTES NFR BLD: 10.2 % (ref 18–48)
MCH RBC QN AUTO: 26.6 PG (ref 27–31)
MCHC RBC AUTO-ENTMCNC: 31.5 G/DL (ref 32–36)
MCV RBC AUTO: 85 FL (ref 82–98)
MONOCYTES # BLD AUTO: 0.1 K/UL (ref 0.3–1)
MONOCYTES NFR BLD: 1.5 % (ref 4–15)
NEUTROPHILS # BLD AUTO: 7.4 K/UL (ref 1.8–7.7)
NEUTROPHILS NFR BLD: 87.4 % (ref 38–73)
NRBC BLD-RTO: 0 /100 WBC
PLATELET # BLD AUTO: 232 K/UL (ref 150–450)
PMV BLD AUTO: 10 FL (ref 9.2–12.9)
POTASSIUM SERPL-SCNC: 4.2 MMOL/L (ref 3.5–5.1)
PROT SERPL-MCNC: 7.4 G/DL (ref 6–8.4)
RBC # BLD AUTO: 4.62 M/UL (ref 4–5.4)
SODIUM SERPL-SCNC: 142 MMOL/L (ref 136–145)
TROPONIN I SERPL DL<=0.01 NG/ML-MCNC: 0.02 NG/ML (ref 0–0.03)
WBC # BLD AUTO: 8.5 K/UL (ref 3.9–12.7)

## 2022-04-01 PROCEDURE — 99285 EMERGENCY DEPT VISIT HI MDM: CPT | Mod: 25

## 2022-04-01 PROCEDURE — 93010 EKG 12-LEAD: ICD-10-PCS | Mod: ,,, | Performed by: INTERNAL MEDICINE

## 2022-04-01 PROCEDURE — 83880 ASSAY OF NATRIURETIC PEPTIDE: CPT | Performed by: FAMILY MEDICINE

## 2022-04-01 PROCEDURE — 93005 ELECTROCARDIOGRAM TRACING: CPT

## 2022-04-01 PROCEDURE — 96375 TX/PRO/DX INJ NEW DRUG ADDON: CPT

## 2022-04-01 PROCEDURE — 80053 COMPREHEN METABOLIC PANEL: CPT | Performed by: FAMILY MEDICINE

## 2022-04-01 PROCEDURE — 96374 THER/PROPH/DIAG INJ IV PUSH: CPT

## 2022-04-01 PROCEDURE — 93010 ELECTROCARDIOGRAM REPORT: CPT | Mod: ,,, | Performed by: INTERNAL MEDICINE

## 2022-04-01 PROCEDURE — 63600175 PHARM REV CODE 636 W HCPCS

## 2022-04-01 PROCEDURE — 85025 COMPLETE CBC W/AUTO DIFF WBC: CPT | Performed by: FAMILY MEDICINE

## 2022-04-01 PROCEDURE — 84484 ASSAY OF TROPONIN QUANT: CPT | Performed by: FAMILY MEDICINE

## 2022-04-01 RX ORDER — METHYLPREDNISOLONE SOD SUCC 125 MG
40 VIAL (EA) INJECTION
Status: COMPLETED | OUTPATIENT
Start: 2022-04-01 | End: 2022-04-01

## 2022-04-01 RX ADMIN — METHYLPREDNISOLONE SODIUM SUCCINATE 40 MG: 125 INJECTION, POWDER, FOR SOLUTION INTRAMUSCULAR; INTRAVENOUS at 01:04

## 2022-04-01 RX ADMIN — IRON SUCROSE 200 MG: 20 INJECTION, SOLUTION INTRAVENOUS at 02:04
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
## 2022-04-01 NOTE — NURSING
Venofer ivp given without difficulties per protocol. Pressure dressing applied. Patient monitored after per protocol and d/c'd in no distress with vss and f/u appts.

## 2022-04-08 ENCOUNTER — INFUSION (OUTPATIENT)
Dept: INFUSION THERAPY | Facility: HOSPITAL | Age: 72
End: 2022-04-08
Payer: MEDICARE

## 2022-04-08 VITALS
DIASTOLIC BLOOD PRESSURE: 59 MMHG | TEMPERATURE: 97 F | SYSTOLIC BLOOD PRESSURE: 132 MMHG | HEART RATE: 59 BPM | OXYGEN SATURATION: 97 % | RESPIRATION RATE: 16 BRPM

## 2022-04-08 DIAGNOSIS — D50.0 IRON DEFICIENCY ANEMIA DUE TO CHRONIC BLOOD LOSS: Primary | ICD-10-CM

## 2022-04-08 PROCEDURE — 96375 TX/PRO/DX INJ NEW DRUG ADDON: CPT

## 2022-04-08 PROCEDURE — 96374 THER/PROPH/DIAG INJ IV PUSH: CPT

## 2022-04-08 PROCEDURE — 63600175 PHARM REV CODE 636 W HCPCS

## 2022-04-08 RX ORDER — METHYLPREDNISOLONE SOD SUCC 125 MG
40 VIAL (EA) INJECTION
Status: COMPLETED | OUTPATIENT
Start: 2022-04-08 | End: 2022-04-08

## 2022-04-08 RX ADMIN — IRON SUCROSE 200 MG: 20 INJECTION, SOLUTION INTRAVENOUS at 03:04

## 2022-04-08 RX ADMIN — METHYLPREDNISOLONE SODIUM SUCCINATE 40 MG: 125 INJECTION, POWDER, FOR SOLUTION INTRAMUSCULAR; INTRAVENOUS at 02:04

## 2022-04-08 NOTE — PLAN OF CARE
Problem: Adult Inpatient Plan of Care  Goal: Plan of Care Review  Outcome: Ongoing, Progressing  Goal: Patient-Specific Goal (Individualized)  Outcome: Ongoing, Progressing  Flowsheets (Taken 4/8/2022 1515)  Anxieties, Fears or Concerns: None  Individualized Care Needs: Reclined positions, flush used while pushing Venofer.  Patient-Specific Goals (Include Timeframe): Tolerate Venofer as prescribed.  Goal: Optimal Comfort and Wellbeing  Outcome: Ongoing, Progressing  Intervention: Provide Person-Centered Care  Flowsheets (Taken 4/8/2022 1515)  Trust Relationship/Rapport:   care explained   choices provided   emotional support provided   empathic listening provided   thoughts/feelings acknowledged   reassurance provided   questions encouraged   questions answered     Problem: Anemia  Goal: Anemia Symptom Improvement  Outcome: Ongoing, Progressing  Intervention: Monitor and Manage Anemia  Flowsheets (Taken 4/8/2022 1515)  Safety Promotion/Fall Prevention:   assistive device/personal item within reach   high risk medications identified   medications reviewed  Fatigue Management:   paced activity encouraged   frequent rest breaks encouraged     Problem: Fall Injury Risk  Goal: Absence of Fall and Fall-Related Injury  Outcome: Ongoing, Progressing  Intervention: Promote Injury-Free Environment  Flowsheets (Taken 4/8/2022 1515)  Safety Promotion/Fall Prevention:   assistive device/personal item within reach   high risk medications identified   medications reviewed

## 2022-04-14 ENCOUNTER — INFUSION (OUTPATIENT)
Dept: INFUSION THERAPY | Facility: HOSPITAL | Age: 72
End: 2022-04-14
Payer: MEDICARE

## 2022-04-14 VITALS
DIASTOLIC BLOOD PRESSURE: 53 MMHG | OXYGEN SATURATION: 97 % | RESPIRATION RATE: 16 BRPM | SYSTOLIC BLOOD PRESSURE: 114 MMHG | HEART RATE: 64 BPM | TEMPERATURE: 98 F

## 2022-04-14 DIAGNOSIS — D50.0 IRON DEFICIENCY ANEMIA DUE TO CHRONIC BLOOD LOSS: Primary | ICD-10-CM

## 2022-04-14 PROCEDURE — 96374 THER/PROPH/DIAG INJ IV PUSH: CPT

## 2022-04-14 PROCEDURE — 63600175 PHARM REV CODE 636 W HCPCS

## 2022-04-14 PROCEDURE — 96375 TX/PRO/DX INJ NEW DRUG ADDON: CPT

## 2022-04-14 RX ORDER — METHYLPREDNISOLONE SOD SUCC 125 MG
40 VIAL (EA) INJECTION
Status: COMPLETED | OUTPATIENT
Start: 2022-04-14 | End: 2022-04-14

## 2022-04-14 RX ADMIN — IRON SUCROSE 200 MG: 20 INJECTION, SOLUTION INTRAVENOUS at 02:04

## 2022-04-14 RX ADMIN — METHYLPREDNISOLONE SODIUM SUCCINATE 40 MG: 125 INJECTION, POWDER, FOR SOLUTION INTRAMUSCULAR; INTRAVENOUS at 02:04

## 2022-04-19 DIAGNOSIS — D50.0 IRON DEFICIENCY ANEMIA SECONDARY TO BLOOD LOSS (CHRONIC): Primary | ICD-10-CM

## 2022-04-22 ENCOUNTER — INFUSION (OUTPATIENT)
Dept: INFUSION THERAPY | Facility: HOSPITAL | Age: 72
End: 2022-04-22
Payer: MEDICARE

## 2022-04-22 VITALS
DIASTOLIC BLOOD PRESSURE: 65 MMHG | OXYGEN SATURATION: 98 % | HEART RATE: 61 BPM | SYSTOLIC BLOOD PRESSURE: 142 MMHG | RESPIRATION RATE: 18 BRPM | TEMPERATURE: 98 F

## 2022-04-22 DIAGNOSIS — D50.0 IRON DEFICIENCY ANEMIA DUE TO CHRONIC BLOOD LOSS: Primary | ICD-10-CM

## 2022-04-22 PROCEDURE — 96374 THER/PROPH/DIAG INJ IV PUSH: CPT

## 2022-04-22 PROCEDURE — 63600175 PHARM REV CODE 636 W HCPCS

## 2022-04-22 PROCEDURE — 96375 TX/PRO/DX INJ NEW DRUG ADDON: CPT

## 2022-04-22 RX ORDER — HEPARIN 100 UNIT/ML
500 SYRINGE INTRAVENOUS
Status: CANCELLED | OUTPATIENT
Start: 2022-04-25

## 2022-04-22 RX ORDER — METHYLPREDNISOLONE SOD SUCC 125 MG
40 VIAL (EA) INJECTION
Status: COMPLETED | OUTPATIENT
Start: 2022-04-22 | End: 2022-04-22

## 2022-04-22 RX ORDER — SODIUM CHLORIDE 0.9 % (FLUSH) 0.9 %
10 SYRINGE (ML) INJECTION
Status: CANCELLED | OUTPATIENT
Start: 2022-04-25

## 2022-04-22 RX ADMIN — METHYLPREDNISOLONE SODIUM SUCCINATE 40 MG: 125 INJECTION, POWDER, FOR SOLUTION INTRAMUSCULAR; INTRAVENOUS at 02:04

## 2022-04-22 RX ADMIN — IRON SUCROSE 200 MG: 20 INJECTION, SOLUTION INTRAVENOUS at 02:04

## 2022-04-28 ENCOUNTER — OFFICE VISIT (OUTPATIENT)
Dept: FAMILY MEDICINE | Facility: CLINIC | Age: 72
End: 2022-04-28
Payer: MEDICARE

## 2022-04-28 VITALS
TEMPERATURE: 98 F | HEIGHT: 63 IN | BODY MASS INDEX: 45.08 KG/M2 | HEART RATE: 78 BPM | WEIGHT: 254.44 LBS | DIASTOLIC BLOOD PRESSURE: 70 MMHG | OXYGEN SATURATION: 97 % | SYSTOLIC BLOOD PRESSURE: 112 MMHG

## 2022-04-28 DIAGNOSIS — L02.411 ABSCESS OF RIGHT AXILLA: Primary | ICD-10-CM

## 2022-04-28 DIAGNOSIS — D84.9 IMMUNOCOMPROMISED: ICD-10-CM

## 2022-04-28 PROCEDURE — 99215 OFFICE O/P EST HI 40 MIN: CPT | Mod: PBBFAC,PO | Performed by: NURSE PRACTITIONER

## 2022-04-28 PROCEDURE — 99215 PR OFFICE/OUTPT VISIT, EST, LEVL V, 40-54 MIN: ICD-10-PCS | Mod: S$PBB,,, | Performed by: NURSE PRACTITIONER

## 2022-04-28 PROCEDURE — 99999 PR PBB SHADOW E&M-EST. PATIENT-LVL V: CPT | Mod: PBBFAC,,, | Performed by: NURSE PRACTITIONER

## 2022-04-28 PROCEDURE — 99999 PR PBB SHADOW E&M-EST. PATIENT-LVL V: ICD-10-PCS | Mod: PBBFAC,,, | Performed by: NURSE PRACTITIONER

## 2022-04-28 PROCEDURE — 99215 OFFICE O/P EST HI 40 MIN: CPT | Mod: S$PBB,,, | Performed by: NURSE PRACTITIONER

## 2022-04-28 NOTE — PROGRESS NOTES
Subjective:       Patient ID: Qi Ortiz is a 71 y.o. female.    Chief Complaint: Abscess  Pt reports to clinic with chief complaint of right axilla abscess.  Pt reports 4 days ago she noticed pain to right axilla.  Developed chills following day.  Applied topical antimicrobial ointment to the area without relief.  No drainage.      Past Medical History:   Diagnosis Date    Carotid stenosis     19%    COPD (chronic obstructive pulmonary disease)     No meds    Coronary artery disease     CVA (cerebral vascular accident)     Dr. 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 Hope    Psoriatic arthritis     Rheumatology     Abscess  Chronicity:  New  Onset:  3-5 days ago  Progression Since Onset: gradually worsening  Size:  5-7cm  Associated Symptoms: chills  Characteristics: painful and redness    Pain Scale:  8/10  Treatments Tried:  Topical antibiotics  Relieved by:  Nothing    Review of Systems   Constitutional: Positive for chills.   HENT: Negative.    Respiratory: Negative.    Cardiovascular: Negative.    Gastrointestinal: Negative.        Objective:      Physical Exam  Vitals reviewed.   Constitutional:       Appearance: She is obese.   HENT:      Head: Normocephalic.      Right Ear: External ear normal.      Left Ear: External ear normal.   Eyes:      Extraocular Movements: Extraocular movements intact.   Cardiovascular:      Rate and Rhythm: Normal rate.   Pulmonary:      Effort: Pulmonary effort is normal. No respiratory distress.   Skin:     Findings: Abscess and erythema present.          Neurological:      Mental Status: She is alert and oriented to person, place, and time.   Psychiatric:         Behavior: Behavior normal.         Assessment:       1. Abscess of right axilla    2. Immunocompromised        Plan:   Abscess of right axilla  -pt request sedation  "for I&D.  Informed patient will need to see general surgeon, but I can not guarantee OR procedure vs in office I&D.  Will need to discuss with surgeon.  Pt is upset with answer.  Informed patient she has allergies listed to 31 medications including Sulfa, clindamycin and doxycycline.  Asked patient if she ever experienced anaphylaxis from medications.  "I don't remember, but it most of them makes me nauseous and itch".  Denies rash eruption, or angioedema.  Informed patient I will send clindamycin to the pharmacy and place on daily antihistamine.  Pt states, "No you will not send me anything on the list".  "amoxil 875 works for my body".  Explained to patient PCN drugs are not used to treat staph.  Pt becomes upset, verbally loud and argumentative. "I am the patient you should give me what I tell you works for my body".  Up to date literature shown to patient.  Patient closes her eyes to avoid seeing literature and once again requests amoxil.  Not able to educate patient further at this time due to anger.  Pt stated she is leaving.  Pt asked to remain in room to sign AMA .  Patient walks out before signing.     Immunocompromised      No follow-ups on file.    "

## 2022-05-06 ENCOUNTER — TELEPHONE (OUTPATIENT)
Dept: INFUSION THERAPY | Facility: HOSPITAL | Age: 72
End: 2022-05-06
Payer: MEDICARE

## 2022-05-06 NOTE — TELEPHONE ENCOUNTER
Patient wants to reschedule her stelara appointment on 3/11.  Tried to call her left message on voice mail

## 2022-05-09 ENCOUNTER — TELEPHONE (OUTPATIENT)
Dept: INFUSION THERAPY | Facility: HOSPITAL | Age: 72
End: 2022-05-09
Payer: MEDICARE

## 2022-05-09 NOTE — TELEPHONE ENCOUNTER
Patient called.  She said she has a real bad staph infection in her arm  And need to reschedule her Stelara treatment from 5/11 to 6/8.    Appointment rescheduled.

## 2022-05-11 ENCOUNTER — OFFICE VISIT (OUTPATIENT)
Dept: PRIMARY CARE CLINIC | Facility: CLINIC | Age: 72
End: 2022-05-11
Payer: MEDICARE

## 2022-05-11 VITALS
TEMPERATURE: 99 F | OXYGEN SATURATION: 95 % | BODY MASS INDEX: 45.55 KG/M2 | DIASTOLIC BLOOD PRESSURE: 70 MMHG | HEART RATE: 80 BPM | SYSTOLIC BLOOD PRESSURE: 118 MMHG | HEIGHT: 63 IN | WEIGHT: 257.06 LBS

## 2022-05-11 DIAGNOSIS — E11.65 TYPE 2 DIABETES MELLITUS WITH HYPERGLYCEMIA, WITHOUT LONG-TERM CURRENT USE OF INSULIN: ICD-10-CM

## 2022-05-11 DIAGNOSIS — R42 POSTURAL DIZZINESS WITH NEAR SYNCOPE: ICD-10-CM

## 2022-05-11 DIAGNOSIS — D50.0 IRON DEFICIENCY ANEMIA DUE TO CHRONIC BLOOD LOSS: ICD-10-CM

## 2022-05-11 DIAGNOSIS — I47.10 SUPRAVENTRICULAR TACHYCARDIA: Chronic | ICD-10-CM

## 2022-05-11 DIAGNOSIS — N18.30 STAGE 3 CHRONIC KIDNEY DISEASE, UNSPECIFIED WHETHER STAGE 3A OR 3B CKD: ICD-10-CM

## 2022-05-11 DIAGNOSIS — R55 POSTURAL DIZZINESS WITH NEAR SYNCOPE: ICD-10-CM

## 2022-05-11 DIAGNOSIS — M85.89 OSTEOPENIA OF MULTIPLE SITES: ICD-10-CM

## 2022-05-11 DIAGNOSIS — G47.33 OSA (OBSTRUCTIVE SLEEP APNEA): ICD-10-CM

## 2022-05-11 DIAGNOSIS — E66.01 MORBID OBESITY WITH BODY MASS INDEX (BMI) OF 45.0 TO 49.9 IN ADULT: Chronic | ICD-10-CM

## 2022-05-11 DIAGNOSIS — L40.50 PSORIATIC ARTHRITIS: Chronic | ICD-10-CM

## 2022-05-11 DIAGNOSIS — E78.5 HYPERLIPIDEMIA, UNSPECIFIED HYPERLIPIDEMIA TYPE: Chronic | ICD-10-CM

## 2022-05-11 DIAGNOSIS — L40.9 PSORIASIS: ICD-10-CM

## 2022-05-11 DIAGNOSIS — F41.9 ANXIETY: Primary | ICD-10-CM

## 2022-05-11 DIAGNOSIS — G62.9 POLYNEUROPATHY: ICD-10-CM

## 2022-05-11 DIAGNOSIS — I65.21 STENOSIS OF RIGHT CAROTID ARTERY: ICD-10-CM

## 2022-05-11 DIAGNOSIS — Z86.73 HISTORY OF CVA (CEREBROVASCULAR ACCIDENT): ICD-10-CM

## 2022-05-11 DIAGNOSIS — E03.9 ACQUIRED HYPOTHYROIDISM: Chronic | ICD-10-CM

## 2022-05-11 PROCEDURE — 99215 OFFICE O/P EST HI 40 MIN: CPT | Mod: PBBFAC | Performed by: INTERNAL MEDICINE

## 2022-05-11 PROCEDURE — 99999 PR PBB SHADOW E&M-EST. PATIENT-LVL V: ICD-10-PCS | Mod: PBBFAC,,, | Performed by: INTERNAL MEDICINE

## 2022-05-11 PROCEDURE — 99999 PR PBB SHADOW E&M-EST. PATIENT-LVL V: CPT | Mod: PBBFAC,,, | Performed by: INTERNAL MEDICINE

## 2022-05-11 PROCEDURE — 99215 OFFICE O/P EST HI 40 MIN: CPT | Mod: S$PBB,,, | Performed by: INTERNAL MEDICINE

## 2022-05-11 PROCEDURE — 99417 PR PROLONGED SVC, OUTPT, W/WO DIRECT PT CONTACT,  EA ADDTL 15 MIN: ICD-10-PCS | Mod: S$PBB,,, | Performed by: INTERNAL MEDICINE

## 2022-05-11 PROCEDURE — 99417 PROLNG OP E/M EACH 15 MIN: CPT | Mod: S$PBB,,, | Performed by: INTERNAL MEDICINE

## 2022-05-11 PROCEDURE — 99215 PR OFFICE/OUTPT VISIT, EST, LEVL V, 40-54 MIN: ICD-10-PCS | Mod: S$PBB,,, | Performed by: INTERNAL MEDICINE

## 2022-05-11 RX ORDER — CEPHALEXIN 500 MG/1
CAPSULE ORAL
COMMUNITY
Start: 2022-04-30 | End: 2022-05-27 | Stop reason: ALTCHOICE

## 2022-05-11 RX ORDER — DOCUSATE SODIUM 100 MG/1
CAPSULE, LIQUID FILLED ORAL
COMMUNITY
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
## 2022-05-20 ENCOUNTER — LAB VISIT (OUTPATIENT)
Dept: LAB | Facility: HOSPITAL | Age: 72
End: 2022-05-20
Attending: INTERNAL MEDICINE
Payer: MEDICARE

## 2022-05-20 ENCOUNTER — TELEPHONE (OUTPATIENT)
Dept: CARDIOLOGY | Facility: HOSPITAL | Age: 72
End: 2022-05-20
Payer: MEDICARE

## 2022-05-20 ENCOUNTER — OFFICE VISIT (OUTPATIENT)
Dept: PRIMARY CARE CLINIC | Facility: CLINIC | Age: 72
End: 2022-05-20
Payer: MEDICARE

## 2022-05-20 VITALS
BODY MASS INDEX: 45.5 KG/M2 | TEMPERATURE: 97 F | SYSTOLIC BLOOD PRESSURE: 140 MMHG | HEART RATE: 64 BPM | OXYGEN SATURATION: 98 % | DIASTOLIC BLOOD PRESSURE: 66 MMHG | HEIGHT: 63 IN | WEIGHT: 256.81 LBS

## 2022-05-20 DIAGNOSIS — N18.32 STAGE 3B CHRONIC KIDNEY DISEASE: ICD-10-CM

## 2022-05-20 DIAGNOSIS — I10 ESSENTIAL HYPERTENSION: Chronic | ICD-10-CM

## 2022-05-20 DIAGNOSIS — M85.89 OSTEOPENIA OF MULTIPLE SITES: ICD-10-CM

## 2022-05-20 DIAGNOSIS — D52.9 ANEMIA DUE TO FOLIC ACID DEFICIENCY, UNSPECIFIED DEFICIENCY TYPE: ICD-10-CM

## 2022-05-20 DIAGNOSIS — E03.9 ACQUIRED HYPOTHYROIDISM: ICD-10-CM

## 2022-05-20 DIAGNOSIS — E66.01 MORBID OBESITY WITH BODY MASS INDEX (BMI) OF 45.0 TO 49.9 IN ADULT: Chronic | ICD-10-CM

## 2022-05-20 DIAGNOSIS — E55.9 VITAMIN D DEFICIENCY: ICD-10-CM

## 2022-05-20 DIAGNOSIS — G62.9 POLYNEUROPATHY: ICD-10-CM

## 2022-05-20 DIAGNOSIS — E11.65 TYPE 2 DIABETES MELLITUS WITH HYPERGLYCEMIA, WITHOUT LONG-TERM CURRENT USE OF INSULIN: ICD-10-CM

## 2022-05-20 DIAGNOSIS — G45.9 TRANSIENT ISCHEMIC ATTACK (TIA): ICD-10-CM

## 2022-05-20 DIAGNOSIS — E53.8 B12 DEFICIENCY: Primary | ICD-10-CM

## 2022-05-20 DIAGNOSIS — I20.9 ANGINA, CLASS II: Chronic | ICD-10-CM

## 2022-05-20 DIAGNOSIS — E53.8 B12 DEFICIENCY: ICD-10-CM

## 2022-05-20 DIAGNOSIS — L40.50 PSORIATIC ARTHRITIS: Chronic | ICD-10-CM

## 2022-05-20 DIAGNOSIS — E78.5 HYPERLIPIDEMIA, UNSPECIFIED HYPERLIPIDEMIA TYPE: Chronic | ICD-10-CM

## 2022-05-20 PROBLEM — F41.9 ANXIETY: Status: ACTIVE | Noted: 2022-05-11

## 2022-05-20 LAB
ALBUMIN SERPL BCP-MCNC: 3.7 G/DL (ref 3.5–5.2)
ANION GAP SERPL CALC-SCNC: 12 MMOL/L (ref 8–16)
BUN SERPL-MCNC: 22 MG/DL (ref 8–23)
CALCIUM SERPL-MCNC: 9.9 MG/DL (ref 8.7–10.5)
CHLORIDE SERPL-SCNC: 101 MMOL/L (ref 95–110)
CO2 SERPL-SCNC: 29 MMOL/L (ref 23–29)
CREAT SERPL-MCNC: 1.1 MG/DL (ref 0.5–1.4)
EST. GFR  (AFRICAN AMERICAN): 58.8 ML/MIN/1.73 M^2
EST. GFR  (NON AFRICAN AMERICAN): 51 ML/MIN/1.73 M^2
ESTIMATED AVG GLUCOSE: 157 MG/DL (ref 68–131)
FOLATE SERPL-MCNC: 18.7 NG/ML (ref 4–24)
GLUCOSE SERPL-MCNC: 122 MG/DL (ref 70–110)
HBA1C MFR BLD: 7.1 % (ref 4–5.6)
MAGNESIUM SERPL-MCNC: 2 MG/DL (ref 1.6–2.6)
PHOSPHATE SERPL-MCNC: 4.1 MG/DL (ref 2.7–4.5)
POTASSIUM SERPL-SCNC: 4.1 MMOL/L (ref 3.5–5.1)
PTH-INTACT SERPL-MCNC: 91.5 PG/ML (ref 9–77)
SODIUM SERPL-SCNC: 142 MMOL/L (ref 136–145)
T4 FREE SERPL-MCNC: 0.98 NG/DL (ref 0.71–1.51)
TSH SERPL DL<=0.005 MIU/L-ACNC: 1 UIU/ML (ref 0.4–4)
VIT B12 SERPL-MCNC: >2000 PG/ML (ref 210–950)

## 2022-05-20 PROCEDURE — 99417 PR PROLONGED SVC, OUTPT, W/WO DIRECT PT CONTACT,  EA ADDTL 15 MIN: ICD-10-PCS | Mod: S$PBB,,, | Performed by: INTERNAL MEDICINE

## 2022-05-20 PROCEDURE — 82746 ASSAY OF FOLIC ACID SERUM: CPT | Performed by: INTERNAL MEDICINE

## 2022-05-20 PROCEDURE — 83970 ASSAY OF PARATHORMONE: CPT | Performed by: INTERNAL MEDICINE

## 2022-05-20 PROCEDURE — 82607 VITAMIN B-12: CPT | Performed by: INTERNAL MEDICINE

## 2022-05-20 PROCEDURE — 99215 OFFICE O/P EST HI 40 MIN: CPT | Mod: S$PBB,,, | Performed by: INTERNAL MEDICINE

## 2022-05-20 PROCEDURE — 83921 ORGANIC ACID SINGLE QUANT: CPT | Performed by: INTERNAL MEDICINE

## 2022-05-20 PROCEDURE — 99215 OFFICE O/P EST HI 40 MIN: CPT | Mod: PBBFAC | Performed by: INTERNAL MEDICINE

## 2022-05-20 PROCEDURE — 83735 ASSAY OF MAGNESIUM: CPT | Performed by: INTERNAL MEDICINE

## 2022-05-20 PROCEDURE — 99999 PR PBB SHADOW E&M-EST. PATIENT-LVL V: ICD-10-PCS | Mod: PBBFAC,,, | Performed by: INTERNAL MEDICINE

## 2022-05-20 PROCEDURE — 84439 ASSAY OF FREE THYROXINE: CPT | Performed by: INTERNAL MEDICINE

## 2022-05-20 PROCEDURE — 99417 PROLNG OP E/M EACH 15 MIN: CPT | Mod: S$PBB,,, | Performed by: INTERNAL MEDICINE

## 2022-05-20 PROCEDURE — 36415 COLL VENOUS BLD VENIPUNCTURE: CPT | Performed by: INTERNAL MEDICINE

## 2022-05-20 PROCEDURE — 83036 HEMOGLOBIN GLYCOSYLATED A1C: CPT | Performed by: INTERNAL MEDICINE

## 2022-05-20 PROCEDURE — 80069 RENAL FUNCTION PANEL: CPT | Performed by: INTERNAL MEDICINE

## 2022-05-20 PROCEDURE — 99999 PR PBB SHADOW E&M-EST. PATIENT-LVL V: CPT | Mod: PBBFAC,,, | Performed by: INTERNAL MEDICINE

## 2022-05-20 PROCEDURE — 84443 ASSAY THYROID STIM HORMONE: CPT | Performed by: INTERNAL MEDICINE

## 2022-05-20 PROCEDURE — 99215 PR OFFICE/OUTPT VISIT, EST, LEVL V, 40-54 MIN: ICD-10-PCS | Mod: S$PBB,,, | Performed by: INTERNAL MEDICINE

## 2022-05-20 NOTE — TELEPHONE ENCOUNTER
Patient contacted and LVM to return the call to the clinic, so that we may confirm her appt. We have her scheduled for a hosp f/u in her area where she lives.

## 2022-05-20 NOTE — PROGRESS NOTES
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 food/beverage choices, work on increasing daily physical activity to extent able. Discussed briefly diabetes medication that may also help w weight loss: metformin, GLP1 agonist (discussed common GI side effects also w these medications). Declines referral to Diabetes Management at this time.             Relevant Orders    Hemoglobin A1C (Completed)       Orthopedic    Psoriatic arthritis (Chronic)     Doing well w Stelara - cont f/u w Dr. Ackerman, Rheumatology           Osteopenia of multiple sites     Cont calcium - vit D supplementation - advise increase daily vit D to 2,000 or 5,000 units daily (or consider 50,000 unit Qweek)                  Health Maintenance       Date Due Completion Date    COVID-19 Vaccine (1) Never done ---    Pneumococcal Vaccines (Age 65+) (1 - PCV) Never done ---    TETANUS VACCINE Never done ---    Colorectal Cancer Screening 04/18/2016 4/18/2011    Shingles Vaccine (1 of 2) 05/05/2016 3/10/2016    Mammogram 10/23/2016 10/23/2015    Lipid Panel 01/13/2023 1/13/2022    Aspirin/Antiplatelet Therapy 05/11/2023 5/11/2022    DEXA Scan 08/18/2023 8/18/2020        Declines CoVid vaccine due to fear of allergic  reaction.  Tetanus/Shingles/Pneumonia vaccine?  Mammogram 2020?  Colonoscopy?    -Patient's recent lab results and imaging studies were reviewed and discussed with patient  -Patient was given the opportunity to ask questions and be an active participant in their medical care. Patient had no further questions or concerns at this time. Plan for further follow-up next week to discuss patient priorities, goals, treatment options and alternatives.  -Patient is an overall HIGH risk for health complications from their medical conditions.     Follow up: Follow up in about 1 month (around 6/22/2022) for Follow Up.    After visit summary printed and given to patient upon discharge.  Patient care plan included in after visit summary.    TOTAL TIME evaluating and managing this patient for this encounter was greater than 80 minutes. This time was spent personally by me on the following activities: review of patient's past medical history, assessing age-appropriate health maintenance needs, review of any interval history, review and interpretation of lab results, review and interpretation of imaging test results, review and interpretation of cardiology test results, reviewing consulting specialist notes, obtaining history from the patient, examination of the patient, medication reconciliation, educating patient and answering their questions, discussing planned follow-up and final documentation of the visit. This time was exclusive of any separately billable procedures for this patient and exclusive of time spent treating any other patients.     Next visit: Patient Priorities? ACP? BATHE? PHQ4?     Addendum  Echocardiogram 5/26/22: The left ventricle is normal in size with normal systolic function. Indeterminate left ventricular diastolic function. estimated PA systolic pressure 40 mmHg. Small anterior pericardial effusion. There is a transcutaneously-placed aortic bioprosthesis present. There is no aortic insufficiency present.  Prosthetic aortic valve is normal.

## 2022-05-20 NOTE — ASSESSMENT & PLAN NOTE
Unable to take oral iron supplement - completing venofer infusions w Heme/Onc - colonoscopy recommended

## 2022-05-20 NOTE — ASSESSMENT & PLAN NOTE
1/13/22: TSH 7.980 w freeT4 0.90 advised to cont synthroid 112mcg QD at that time - recheck TSH/free T4 next clinic visit (or w next routine scheduled labs)

## 2022-05-20 NOTE — ASSESSMENT & PLAN NOTE
Encourage healthier food/beverage choices, more  Movement. Discuss diabetes medication that may also help w weight loss

## 2022-05-20 NOTE — ASSESSMENT & PLAN NOTE
CT neck 11/16/21: R carotid 40% stenosis R ICA   Cont plavix, repatha, BP and HR management - discuss next visit

## 2022-05-20 NOTE — TELEPHONE ENCOUNTER
Please arrange appt with any available MD early next week. Urgent.    Please advise patient to go to ED with any refractory or concerning chest pain symptoms.    Thanks

## 2022-05-20 NOTE — PATIENT INSTRUCTIONS
Limit carbs, sugary drinks    Labwork today - will call you Monday w results    Please let us know if contacted for earlier appointment w Dr. Ibarra, Cards    Please request copy of progress notes/imaging reports from Dr. Jaimes, [facility]    [facility] 4 [facility] (or earlier if needed)

## 2022-05-20 NOTE — ASSESSMENT & PLAN NOTE
1/13 HgbA1c 6.6% No current Rx for diabetes - repeat HgbA1c ordered for 7/18 - discuss w Ms. Ortiz whether or not to add Rx

## 2022-05-21 PROBLEM — D52.9 ANEMIA DUE TO FOLIC ACID DEFICIENCY: Status: ACTIVE | Noted: 2022-05-21

## 2022-05-22 PROBLEM — G81.91 HEMIPLEGIA AFFECTING RIGHT DOMINANT SIDE: Status: RESOLVED | Noted: 2021-11-09 | Resolved: 2022-05-22

## 2022-05-22 NOTE — ASSESSMENT & PLAN NOTE
Chronic small vesselt ischemic dz noted on brain imaging - cont preventative measures/risk reduction - intolerant of statin - cont repatha, plavix, BP management - BP a bit above goal today however has not taken diuretic yet today

## 2022-05-22 NOTE — ASSESSMENT & PLAN NOTE
Cont calcium - vit D supplementation - advise increase daily vit D to 2,000 or 5,000 units daily (or consider 50,000 unit Qweek)

## 2022-05-22 NOTE — ASSESSMENT & PLAN NOTE
etiology unclear - spinal? - has been prediabetic but only recently diagnosed w type 2 diabetes - reports relief w gabapentin 100mg to 200mg prn (h/o B12 deficiency but recent B12 levels wnl or elevated) advised can discuss also w her neurologist and/or rheumatologist

## 2022-05-22 NOTE — ASSESSMENT & PLAN NOTE
B12 level wnl or elevated recently and at present - however can have falsely elevated levels w certain conditions - f/u on mma level, in process

## 2022-05-22 NOTE — ASSESSMENT & PLAN NOTE
5/20/22: TSH/free T4 wnl at present. Cont current Rx and recheck TFTs in about 8 weeks (or earlier if concerns)

## 2022-05-22 NOTE — ASSESSMENT & PLAN NOTE
Message sent to office of Dr. Ibarra, cardiology. Dr. Ibarra is out of town but his staff contacting Ms. Ortiz to set up cardiology appt for her closer to her home for f/u on reports of recently increased anginal symptoms - discussed w Ms. Ortiz if no relief w nitro x 2 to go to ED/call [phone]

## 2022-05-22 NOTE — ASSESSMENT & PLAN NOTE
Encourage healthier food/beverage choices, work on increasing daily physical activity to extent able. Discussed briefly diabetes medication that may also help w weight loss: metformin, GLP1 agonist? Including common GI side effects

## 2022-05-22 NOTE — ASSESSMENT & PLAN NOTE
Note prescribed both low dose HCTZ(w valsartan) and furosemide - check if cardiologist intends to continue both

## 2022-05-22 NOTE — ASSESSMENT & PLAN NOTE
PTHi elevated - encourage cont vit D supplementation - recommend increase to 2,000 or 5,000 u daily (consider 50,000 unit Qweek)

## 2022-05-22 NOTE — ASSESSMENT & PLAN NOTE
Crt/eGFR improved - CKD stage 3a - cont monitor - stay hydrated - limit nephrotoxic medication exposure

## 2022-05-22 NOTE — ASSESSMENT & PLAN NOTE
5/21/22 HgbA1c 7.1% Encourage healthier food/beverage choices, work on increasing daily physical activity to extent able. Discussed briefly diabetes medication that may also help w weight loss: metformin, GLP1 agonist (discussed common GI side effects also w these medications). Declines referral to Diabetes Management at this time.

## 2022-05-23 ENCOUNTER — LAB VISIT (OUTPATIENT)
Dept: LAB | Facility: HOSPITAL | Age: 72
End: 2022-05-23
Attending: INTERNAL MEDICINE
Payer: MEDICARE

## 2022-05-23 ENCOUNTER — OFFICE VISIT (OUTPATIENT)
Dept: CARDIOLOGY | Facility: CLINIC | Age: 72
End: 2022-05-23
Payer: MEDICARE

## 2022-05-23 VITALS
SYSTOLIC BLOOD PRESSURE: 140 MMHG | WEIGHT: 255.75 LBS | HEART RATE: 70 BPM | HEIGHT: 63 IN | RESPIRATION RATE: 16 BRPM | DIASTOLIC BLOOD PRESSURE: 64 MMHG | OXYGEN SATURATION: 99 % | BODY MASS INDEX: 45.32 KG/M2

## 2022-05-23 DIAGNOSIS — I20.9 AP (ANGINA PECTORIS): ICD-10-CM

## 2022-05-23 DIAGNOSIS — I25.10 CAD, MULTIPLE VESSEL: ICD-10-CM

## 2022-05-23 DIAGNOSIS — I10 ESSENTIAL HYPERTENSION: Chronic | ICD-10-CM

## 2022-05-23 DIAGNOSIS — I35.0 NONRHEUMATIC AORTIC VALVE STENOSIS: Chronic | ICD-10-CM

## 2022-05-23 DIAGNOSIS — Z95.1 S/P CABG (CORONARY ARTERY BYPASS GRAFT): Chronic | ICD-10-CM

## 2022-05-23 DIAGNOSIS — I50.32 CHRONIC DIASTOLIC HEART FAILURE: ICD-10-CM

## 2022-05-23 DIAGNOSIS — I50.32 CHRONIC DIASTOLIC HEART FAILURE: Primary | ICD-10-CM

## 2022-05-23 DIAGNOSIS — Z95.2 S/P TAVR (TRANSCATHETER AORTIC VALVE REPLACEMENT): ICD-10-CM

## 2022-05-23 PROCEDURE — 36415 COLL VENOUS BLD VENIPUNCTURE: CPT | Performed by: INTERNAL MEDICINE

## 2022-05-23 PROCEDURE — 99999 PR PBB SHADOW E&M-EST. PATIENT-LVL III: CPT | Mod: PBBFAC,,, | Performed by: INTERNAL MEDICINE

## 2022-05-23 PROCEDURE — 99214 PR OFFICE/OUTPT VISIT, EST, LEVL IV, 30-39 MIN: ICD-10-PCS | Mod: S$PBB,,, | Performed by: INTERNAL MEDICINE

## 2022-05-23 PROCEDURE — 99999 PR PBB SHADOW E&M-EST. PATIENT-LVL III: ICD-10-PCS | Mod: PBBFAC,,, | Performed by: INTERNAL MEDICINE

## 2022-05-23 PROCEDURE — 99214 OFFICE O/P EST MOD 30 MIN: CPT | Mod: S$PBB,,, | Performed by: INTERNAL MEDICINE

## 2022-05-23 PROCEDURE — 80053 COMPREHEN METABOLIC PANEL: CPT | Performed by: INTERNAL MEDICINE

## 2022-05-23 PROCEDURE — 99213 OFFICE O/P EST LOW 20 MIN: CPT | Mod: PBBFAC | Performed by: INTERNAL MEDICINE

## 2022-05-23 PROCEDURE — 83880 ASSAY OF NATRIURETIC PEPTIDE: CPT | Performed by: INTERNAL MEDICINE
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
## 2022-05-24 LAB
ALBUMIN SERPL BCP-MCNC: 3.6 G/DL (ref 3.5–5.2)
ALP SERPL-CCNC: 47 U/L (ref 55–135)
ALT SERPL W/O P-5'-P-CCNC: 30 U/L (ref 10–44)
ANION GAP SERPL CALC-SCNC: 14 MMOL/L (ref 8–16)
AST SERPL-CCNC: 36 U/L (ref 10–40)
BILIRUB SERPL-MCNC: 0.4 MG/DL (ref 0.1–1)
BNP SERPL-MCNC: 137 PG/ML (ref 0–99)
BUN SERPL-MCNC: 22 MG/DL (ref 8–23)
CALCIUM SERPL-MCNC: 10.1 MG/DL (ref 8.7–10.5)
CHLORIDE SERPL-SCNC: 101 MMOL/L (ref 95–110)
CO2 SERPL-SCNC: 27 MMOL/L (ref 23–29)
CREAT SERPL-MCNC: 1.1 MG/DL (ref 0.5–1.4)
EST. GFR  (AFRICAN AMERICAN): 58.8 ML/MIN/1.73 M^2
EST. GFR  (NON AFRICAN AMERICAN): 51 ML/MIN/1.73 M^2
GLUCOSE SERPL-MCNC: 115 MG/DL (ref 70–110)
METHYLMALONATE SERPL-SCNC: 0.47 UMOL/L
POTASSIUM SERPL-SCNC: 5 MMOL/L (ref 3.5–5.1)
PROT SERPL-MCNC: 7 G/DL (ref 6–8.4)
SODIUM SERPL-SCNC: 142 MMOL/L (ref 136–145)

## 2022-05-24 NOTE — PROGRESS NOTES
MMA level mildly elevated - suspect elevated B12 level may be secondary to GI/hepatic issues? If you have a chance, could you please call Ms. Ortiz to wish her Happy Birthday and let her know I will try to give her a call tomorrow to discuss lab results. Meanwhile advise to continue taking her B12 and folate and let me know if I need to order vitamin D supplement for her (She doesn't use [facility] or have a cell phone and is also quite hard of hearing FYI but says can hear on her home phone) - Thank you!

## 2022-05-25 ENCOUNTER — HOSPITAL ENCOUNTER (OUTPATIENT)
Dept: CARDIOLOGY | Facility: HOSPITAL | Age: 72
Discharge: HOME | End: 2022-05-25
Attending: INTERNAL MEDICINE
Payer: MEDICARE

## 2022-05-25 VITALS
SYSTOLIC BLOOD PRESSURE: 140 MMHG | BODY MASS INDEX: 45.18 KG/M2 | HEIGHT: 63 IN | DIASTOLIC BLOOD PRESSURE: 64 MMHG | WEIGHT: 255 LBS

## 2022-05-25 DIAGNOSIS — I10 ESSENTIAL HYPERTENSION: ICD-10-CM

## 2022-05-25 DIAGNOSIS — I50.32 CHRONIC DIASTOLIC HEART FAILURE: ICD-10-CM

## 2022-05-25 DIAGNOSIS — E11.9 TYPE 2 DIABETES MELLITUS WITHOUT COMPLICATION: ICD-10-CM

## 2022-05-25 DIAGNOSIS — I25.10 CAD, MULTIPLE VESSEL: ICD-10-CM

## 2022-05-25 DIAGNOSIS — I35.0 NONRHEUMATIC AORTIC VALVE STENOSIS: ICD-10-CM

## 2022-05-25 DIAGNOSIS — Z95.1 S/P CABG (CORONARY ARTERY BYPASS GRAFT): ICD-10-CM

## 2022-05-25 DIAGNOSIS — I20.9 AP (ANGINA PECTORIS): ICD-10-CM

## 2022-05-25 DIAGNOSIS — Z95.2 S/P TAVR (TRANSCATHETER AORTIC VALVE REPLACEMENT): ICD-10-CM

## 2022-05-25 PROCEDURE — 93306 ECHO (CUPID ONLY): ICD-10-PCS | Mod: 26,,, | Performed by: INTERNAL MEDICINE

## 2022-05-25 PROCEDURE — 93306 TTE W/DOPPLER COMPLETE: CPT

## 2022-05-25 PROCEDURE — 93306 TTE W/DOPPLER COMPLETE: CPT | Mod: 26,,, | Performed by: INTERNAL MEDICINE

## 2022-05-26 LAB
AORTIC ROOT ANNULUS: 2.97 CM
ASCENDING AORTA: 2.83 CM
AV INDEX (PROSTH): 0.58
AV MEAN GRADIENT: 11 MMHG
AV PEAK GRADIENT: 21 MMHG
AV REGURGITATION PRESSURE HALF TIME: 634.96 MS
AV VALVE AREA: 1.88 CM2
AV VELOCITY RATIO: 0.55
BSA FOR ECHO PROCEDURE: 2.27 M2
CV ECHO LV RWT: 0.35 CM
DOP CALC AO PEAK VEL: 2.31 M/S
DOP CALC AO VTI: 48.4 CM
DOP CALC LVOT AREA: 3.2 CM2
DOP CALC LVOT DIAMETER: 2.03 CM
DOP CALC LVOT PEAK VEL: 1.26 M/S
DOP CALC LVOT STROKE VOLUME: 91.22 CM3
DOP CALC MV VTI: 41.6 CM
DOP CALCLVOT PEAK VEL VTI: 28.2 CM
E WAVE DECELERATION TIME: 312.95 MSEC
E/A RATIO: 0.86
E/E' RATIO: 12.75 M/S
ECHO LV POSTERIOR WALL: 0.96 CM (ref 0.6–1.1)
EJECTION FRACTION: 55 %
FRACTIONAL SHORTENING: 20 % (ref 28–44)
INTERVENTRICULAR SEPTUM: 1.3 CM (ref 0.6–1.1)
IVC DIAMETER: 1.32 CM
IVRT: 108.47 MSEC
LA MAJOR: 4.6 CM
LA MINOR: 5.03 CM
LA WIDTH: 3.6 CM
LEFT ATRIUM SIZE: 3.63 CM
LEFT ATRIUM VOLUME INDEX MOD: 23.4 ML/M2
LEFT ATRIUM VOLUME INDEX: 24.9 ML/M2
LEFT ATRIUM VOLUME MOD: 50 CM3
LEFT ATRIUM VOLUME: 53.38 CM3
LEFT INTERNAL DIMENSION IN SYSTOLE: 4.42 CM (ref 2.1–4)
LEFT VENTRICLE DIASTOLIC VOLUME INDEX: 68.79 ML/M2
LEFT VENTRICLE DIASTOLIC VOLUME: 147.21 ML
LEFT VENTRICLE MASS INDEX: 117 G/M2
LEFT VENTRICLE SYSTOLIC VOLUME INDEX: 41.4 ML/M2
LEFT VENTRICLE SYSTOLIC VOLUME: 88.63 ML
LEFT VENTRICULAR INTERNAL DIMENSION IN DIASTOLE: 5.5 CM (ref 3.5–6)
LEFT VENTRICULAR MASS: 250.96 G
LV LATERAL E/E' RATIO: 9.27 M/S
LV SEPTAL E/E' RATIO: 20.4 M/S
LVOT MG: 3.48 MMHG
LVOT MV: 0.86 CM/S
MV MEAN GRADIENT: 2 MMHG
MV PEAK A VEL: 1.18 M/S
MV PEAK E VEL: 1.02 M/S
MV PEAK GRADIENT: 6 MMHG
MV STENOSIS PRESSURE HALF TIME: 90.76 MS
MV VALVE AREA BY CONTINUITY EQUATION: 2.19 CM2
MV VALVE AREA P 1/2 METHOD: 2.42 CM2
PISA AR MAX VEL: 3.78 M/S
PISA TR MAX VEL: 3.04 M/S
PULM VEIN S/D RATIO: 1.16
PV PEAK D VEL: 0.38 M/S
PV PEAK S VEL: 0.44 M/S
RA MAJOR: 4.27 CM
RA PRESSURE: 3 MMHG
RA WIDTH: 3.57 CM
RIGHT VENTRICULAR END-DIASTOLIC DIMENSION: 2.96 CM
SINUS: 2.85 CM
STJ: 2.4 CM
TDI LATERAL: 0.11 M/S
TDI SEPTAL: 0.05 M/S
TDI: 0.08 M/S
TR MAX PG: 37 MMHG
TRICUSPID ANNULAR PLANE SYSTOLIC EXCURSION: 1.44 CM
TV REST PULMONARY ARTERY PRESSURE: 40 MMHG

## 2022-05-27 ENCOUNTER — TELEPHONE (OUTPATIENT)
Dept: PRIMARY CARE CLINIC | Facility: CLINIC | Age: 72
End: 2022-05-27
Payer: MEDICARE

## 2022-05-27 RX ORDER — METFORMIN HYDROCHLORIDE 500 MG/1
500 TABLET ORAL
Qty: 30 TABLET | Refills: 0 | Status: SHIPPED | OUTPATIENT
Start: 2022-05-27 | End: 2022-06-22 | Stop reason: DRUGHIGH

## 2022-05-27 RX ORDER — CHOLECALCIFEROL (VITAMIN D3) 25 MCG
2000 TABLET ORAL DAILY
Qty: 60 TABLET | Refills: 11 | Status: CANCELLED | OUTPATIENT
Start: 2022-05-27 | End: 2023-05-27

## 2022-05-27 NOTE — TELEPHONE ENCOUNTER
Patient call was returned, she informed of the pharmacy she uses. Pharmacy information was updated. Patient verbally understood the information.

## 2022-05-27 NOTE — TELEPHONE ENCOUNTER
----- Message from Mary Jo Sena sent at 5/27/2022  2:22 PM CDT -----  Contact: self  Type:  Patient Returning Call    Who Called:Qi Ortiz   Who Left Message for Patient: nurse   Does the patient know what this is regarding?: medication   Would the patient rather a call back or a response via [facility]?  Call back   Best Call Back Number: [phone]   Additional Information:

## 2022-05-27 NOTE — TELEPHONE ENCOUNTER
Patient call was returned. She was given her test results & verbally understood the information given.

## 2022-05-27 NOTE — TELEPHONE ENCOUNTER
Spoke w Ms. Ortiz by phone regarding Hemoglobin A1c of 7.1%. She has been trying to limit sugary drinks and simple carbs. She is willing to try metformin. Discussed common GI side effects. We can discuss other medications that may be worth considering at next visit  (would need to check if covered by her insurance).  Ms. Lebron usually drives to [facility] to  her medications. She asked me to call the local [facility] and [facility] to see which covers the cost for medications for . I did contact these pharmacies in her area but may not have had correct information. I provided these pharmacy numbers to Ms. Ortiz and she is going to let us know to which pharmacy to send this first month's metformin prescription.    Also reviewed w Ms. Ortiz recent echocardiogram report and answered questions regarding congestive heart failure, chronic kidney disease and other lab results. Advised to continue B12, folate, potasium and calcium supplements (though may recommend to decrease calcium supplementation). Advised to increase vitamin D to 2,000 u daily.

## 2022-05-27 NOTE — TELEPHONE ENCOUNTER
Patient says her Metformin Rx could be sent to [facility] in [place] on [address]. I will update the pharmacy in her chart. She states that she does not need Vit-D, she already has some & purchases it OTC.

## 2022-05-27 NOTE — TELEPHONE ENCOUNTER
----- Message from Lisa Porter MD sent at 5/27/2022  2:42 PM CDT -----  Regarding: metformin/vitamin D  Ms. Ortiz is supposed to call us back w which pharmacy she wants metformin prescription sent to.    I advised her to increase vit D to 2,000 u QD - she may be getting OTC - if she gets prescription where does she want me to send this prescription?     Thank you!

## 2022-05-27 NOTE — TELEPHONE ENCOUNTER
----- Message from Vaughn Dumont sent at 5/27/2022 11:31 AM CDT -----  Patient is returning a phone call.  Who left a message for the patient: Hortencia  Does patient know what this is regarding:  No

## 2022-05-29 ENCOUNTER — TELEPHONE (OUTPATIENT)
Dept: CARDIOLOGY | Facility: HOSPITAL | Age: 72
End: 2022-05-29
Payer: MEDICARE

## 2022-05-29 NOTE — TELEPHONE ENCOUNTER
Please call pt.  Labs and echo stable.  Echo report indicates aortic valve replacement to be stable.  F/u with Dr. Ibarra in future.    Dr Cheney

## 2022-05-30 NOTE — TELEPHONE ENCOUNTER
Spoke with patient, she voiced understanding. No questions or concerns at this time.     Please call pt.  Labs and echo stable.  Echo report indicates aortic valve replacement to be stable.  F/u with Dr. Ibarra in future.     Dr Cheney

## 2022-06-08 ENCOUNTER — INFUSION (OUTPATIENT)
Dept: INFUSION THERAPY | Facility: HOSPITAL | Age: 72
End: 2022-06-08
Attending: INTERNAL MEDICINE
Payer: MEDICARE

## 2022-06-08 VITALS
RESPIRATION RATE: 16 BRPM | DIASTOLIC BLOOD PRESSURE: 61 MMHG | HEART RATE: 65 BPM | OXYGEN SATURATION: 98 % | TEMPERATURE: 97 F | SYSTOLIC BLOOD PRESSURE: 143 MMHG

## 2022-06-08 DIAGNOSIS — L40.50 PSORIATIC ARTHRITIS: ICD-10-CM

## 2022-06-08 DIAGNOSIS — L40.9 PSORIASIS: Primary | ICD-10-CM

## 2022-06-08 PROCEDURE — 63600175 PHARM REV CODE 636 W HCPCS: Mod: JG | Performed by: INTERNAL MEDICINE

## 2022-06-08 PROCEDURE — 96372 THER/PROPH/DIAG INJ SC/IM: CPT

## 2022-06-08 RX ADMIN — USTEKINUMAB 90 MG: 90 INJECTION, SOLUTION SUBCUTANEOUS at 01:06

## 2022-06-08 NOTE — NURSING
Patient to unit today for Stelara. Injection given without difficulties to left lower abdomen.Bandaid applied. Patient instructed to stay in the clinic for 15 minutes. Patient verbalized understanding and will notify nurse with any complaints.

## 2022-06-08 NOTE — PLAN OF CARE
Problem: Adult Inpatient Plan of Care  Goal: Plan of Care Review  Outcome: Ongoing, Progressing  Flowsheets (Taken 6/8/2022 1456)  Plan of Care Reviewed With: patient  Goal: Patient-Specific Goal (Individualized)  Outcome: Ongoing, Progressing  Flowsheets (Taken 6/8/2022 1456)  Anxieties, Fears or Concerns: None  Individualized Care Needs: Reclined position  Patient-Specific Goals (Include Timeframe): Patient will tolerate Stelara as prescribed.  Goal: Optimal Comfort and Wellbeing  Outcome: Ongoing, Progressing  Intervention: Provide Person-Centered Care  Flowsheets (Taken 6/8/2022 1456)  Trust Relationship/Rapport:   care explained   choices provided   emotional support provided   empathic listening provided   reassurance provided   thoughts/feelings acknowledged   questions encouraged   questions answered     Problem: Infection  Goal: Absence of Infection Signs and Symptoms  Outcome: Ongoing, Progressing  Intervention: Prevent or Manage Infection  Flowsheets (Taken 6/8/2022 1456)  Infection Management: aseptic technique maintained

## 2022-06-08 NOTE — DISCHARGE INSTRUCTIONS
.THANKS FOR ALLOWING ME TO CARE FOR YOU!!!! ~Manju          THANKS FOR CHOOSING [facility]!!!        [facility]  [address] [facility]  [address] [place] [address] Drive  [phone] phone     [phone] fax  Hours of Operation: Monday- Friday 8:00am- 5:00pm  After hours phone  [phone]  Hematology / Oncology Physicians on call      SAMEERA Ash Dr[address] Dr., Dr., NP    Please call with any concerns regarding your appointment today.

## 2022-06-10 ENCOUNTER — TELEPHONE (OUTPATIENT)
Dept: CARDIOLOGY | Facility: CLINIC | Age: 72
End: 2022-06-10
Payer: MEDICARE

## 2022-06-22 ENCOUNTER — OFFICE VISIT (OUTPATIENT)
Dept: PODIATRY | Facility: CLINIC | Age: 72
End: 2022-06-22
Payer: MEDICARE

## 2022-06-22 ENCOUNTER — OFFICE VISIT (OUTPATIENT)
Dept: PRIMARY CARE CLINIC | Facility: CLINIC | Age: 72
End: 2022-06-22
Payer: MEDICARE

## 2022-06-22 ENCOUNTER — HOSPITAL ENCOUNTER (OUTPATIENT)
Dept: RADIOLOGY | Facility: HOSPITAL | Age: 72
Discharge: HOME | End: 2022-06-22
Attending: PODIATRIST
Payer: MEDICARE

## 2022-06-22 VITALS
HEIGHT: 63 IN | DIASTOLIC BLOOD PRESSURE: 74 MMHG | BODY MASS INDEX: 44.65 KG/M2 | SYSTOLIC BLOOD PRESSURE: 144 MMHG | TEMPERATURE: 98 F | OXYGEN SATURATION: 96 % | WEIGHT: 252 LBS | HEART RATE: 84 BPM

## 2022-06-22 VITALS — WEIGHT: 251 LBS | HEIGHT: 63 IN | BODY MASS INDEX: 44.47 KG/M2

## 2022-06-22 DIAGNOSIS — L02.619 CELLULITIS AND ABSCESS OF FOOT, EXCEPT TOES: ICD-10-CM

## 2022-06-22 DIAGNOSIS — L03.119 CELLULITIS AND ABSCESS OF FOOT, EXCEPT TOES: ICD-10-CM

## 2022-06-22 DIAGNOSIS — E11.42 TYPE 2 DIABETES MELLITUS WITH PERIPHERAL NEUROPATHY: Chronic | ICD-10-CM

## 2022-06-22 DIAGNOSIS — I27.20 PULMONARY HYPERTENSION: Chronic | ICD-10-CM

## 2022-06-22 DIAGNOSIS — G62.9 POLYNEUROPATHY: Chronic | ICD-10-CM

## 2022-06-22 DIAGNOSIS — I10 ESSENTIAL HYPERTENSION: Chronic | ICD-10-CM

## 2022-06-22 DIAGNOSIS — E11.22 TYPE 2 DIABETES MELLITUS WITH STAGE 3A CHRONIC KIDNEY DISEASE, WITHOUT LONG-TERM CURRENT USE OF INSULIN: ICD-10-CM

## 2022-06-22 DIAGNOSIS — D50.0 IRON DEFICIENCY ANEMIA DUE TO CHRONIC BLOOD LOSS: ICD-10-CM

## 2022-06-22 DIAGNOSIS — L03.119 CELLULITIS AND ABSCESS OF FOOT, EXCEPT TOES: Primary | ICD-10-CM

## 2022-06-22 DIAGNOSIS — L02.619 CELLULITIS AND ABSCESS OF FOOT, EXCEPT TOES: Primary | ICD-10-CM

## 2022-06-22 DIAGNOSIS — E03.9 ACQUIRED HYPOTHYROIDISM: Chronic | ICD-10-CM

## 2022-06-22 DIAGNOSIS — E53.8 B12 DEFICIENCY: ICD-10-CM

## 2022-06-22 DIAGNOSIS — N18.31 TYPE 2 DIABETES MELLITUS WITH STAGE 3A CHRONIC KIDNEY DISEASE, WITHOUT LONG-TERM CURRENT USE OF INSULIN: ICD-10-CM

## 2022-06-22 PROBLEM — E66.01 MORBID OBESITY WITH BMI OF 40.0-44.9, ADULT: Chronic | Status: ACTIVE | Noted: 2022-06-22

## 2022-06-22 PROCEDURE — 99215 OFFICE O/P EST HI 40 MIN: CPT | Mod: S$PBB,,, | Performed by: INTERNAL MEDICINE

## 2022-06-22 PROCEDURE — 99999 PR PBB SHADOW E&M-EST. PATIENT-LVL V: CPT | Mod: PBBFAC,,, | Performed by: INTERNAL MEDICINE

## 2022-06-22 PROCEDURE — 73630 XR FOOT COMPLETE 3 VIEW LEFT: ICD-10-PCS | Mod: 26,LT,, | Performed by: RADIOLOGY

## 2022-06-22 PROCEDURE — 99999 PR PBB SHADOW E&M-EST. PATIENT-LVL V: ICD-10-PCS | Mod: PBBFAC,,, | Performed by: INTERNAL MEDICINE

## 2022-06-22 PROCEDURE — 99204 OFFICE O/P NEW MOD 45 MIN: CPT | Mod: S$PBB,,, | Performed by: PODIATRIST

## 2022-06-22 PROCEDURE — 99204 PR OFFICE/OUTPT VISIT, NEW, LEVL IV, 45-59 MIN: ICD-10-PCS | Mod: S$PBB,,, | Performed by: PODIATRIST

## 2022-06-22 PROCEDURE — 73630 X-RAY EXAM OF FOOT: CPT | Mod: TC,FY,PO,LT

## 2022-06-22 PROCEDURE — 73630 X-RAY EXAM OF FOOT: CPT | Mod: 26,LT,, | Performed by: RADIOLOGY

## 2022-06-22 PROCEDURE — 99215 PR OFFICE/OUTPT VISIT, EST, LEVL V, 40-54 MIN: ICD-10-PCS | Mod: S$PBB,,, | Performed by: INTERNAL MEDICINE

## 2022-06-22 PROCEDURE — 99999 PR PBB SHADOW E&M-EST. PATIENT-LVL V: ICD-10-PCS | Mod: PBBFAC,,, | Performed by: PODIATRIST

## 2022-06-22 PROCEDURE — 99999 PR PBB SHADOW E&M-EST. PATIENT-LVL V: CPT | Mod: PBBFAC,,, | Performed by: PODIATRIST

## 2022-06-22 PROCEDURE — 99215 OFFICE O/P EST HI 40 MIN: CPT | Mod: 25,27,PBBFAC,PO | Performed by: PODIATRIST

## 2022-06-22 PROCEDURE — 99215 OFFICE O/P EST HI 40 MIN: CPT | Mod: PBBFAC,27 | Performed by: INTERNAL MEDICINE

## 2022-06-22 RX ORDER — AMOXICILLIN AND CLAVULANATE POTASSIUM 875; 125 MG/1; MG/1
1 TABLET, FILM COATED ORAL 2 TIMES DAILY
Qty: 20 TABLET | Refills: 0 | Status: SHIPPED | OUTPATIENT
Start: 2022-06-22 | End: 2022-07-02

## 2022-06-22 RX ORDER — ANTISEPTIC SURGICAL SCRUB 0.04 MG/ML
SOLUTION TOPICAL
COMMUNITY
Start: 2022-06-13 | End: 2023-04-11 | Stop reason: ALTCHOICE

## 2022-06-22 RX ORDER — METFORMIN HYDROCHLORIDE 500 MG/1
500 TABLET, EXTENDED RELEASE ORAL 2 TIMES DAILY WITH MEALS
Qty: 60 TABLET | Refills: 2 | Status: SHIPPED | OUTPATIENT
Start: 2022-06-22 | End: 2022-08-08 | Stop reason: SINTOL

## 2022-06-22 RX ORDER — MUPIROCIN 20 MG/G
OINTMENT TOPICAL 2 TIMES DAILY
Qty: 15 G | Refills: 1 | Status: SHIPPED | OUTPATIENT
Start: 2022-06-22 | End: 2022-07-02

## 2022-06-22 NOTE — PROGRESS NOTES
PODIATRIC MEDICINE AND SURGERY  6/25/2022    PCP: Dr. Lisa Porter MD    CHIEF COMPLAINT   Chief Complaint   Patient presents with    Foot Problem     C/o a cut under Left foot, red and swollen, medical/dorsal aspect, rates pain 3/10, diabetic wears tennis shoes and socks, Last seen PCP Dr. Porter 06/22/2022       HPI:    Qi Ortiz is a 72 y.o. female who has a past medical history of Carotid stenosis, COPD (chronic obstructive pulmonary disease), Coronary artery disease, CVA (cerebral vascular accident), Depression, Double ectopic ureters, Hemiplegia affecting right dominant side (11/9/2021), Hyperlipidemia, Hypertension, Hypothyroid, OP (osteoporosis), IRIS (obstructive sleep apnea), and Psoriatic arthritis.   Qi presents to clinic today complaining of possible foreign body left foot. Pt denies any occult trauma, but states she possibly stepped on foreign body. She noticed redness and pain to LEFT great toe. She reported to PCP and then same day podiatry referral requested. She denies F/SOB/Chills.       Patient denies other pedal complaints at this time.     Hemoglobin A1C   Date Value Ref Range Status   05/20/2022 7.1 (H) 4.0 - 5.6 % Final     Comment:     ADA Screening Guidelines:  5.7-6.4%  Consistent with prediabetes  >or=6.5%  Consistent with diabetes    High levels of fetal hemoglobin interfere with the HbA1C  assay. Heterozygous hemoglobin variants (HbS, HgC, etc)do  not significantly interfere with this assay.   However, presence of multiple variants may affect accuracy.     01/13/2022 6.6 (H) 4.0 - 5.6 % Final     Comment:     ADA Screening Guidelines:  5.7-6.4%  Consistent with prediabetes  >or=6.5%  Consistent with diabetes    High levels of fetal hemoglobin interfere with the HbA1C  assay. Heterozygous hemoglobin variants (HbS, HgC, etc)do  not significantly interfere with this assay.   However, presence of multiple variants may affect accuracy.     05/03/2021 6.4 (H) 4.0 -  5.6 % Final     Comment:     ADA Screening Guidelines:  5.7-6.4%  Consistent with prediabetes  >or=6.5%  Consistent with diabetes    High levels of fetal hemoglobin interfere with the HbA1C  assay. Heterozygous hemoglobin variants (HbS, HgC, etc)do  not significantly interfere with this assay.   However, presence of multiple variants may affect accuracy.         PMH  Past Medical History:   Diagnosis Date    Carotid stenosis     19%    COPD (chronic obstructive pulmonary disease)     No meds    Coronary artery disease     CVA (cerebral vascular accident)     Dr. Hoffman    Depression     Double ectopic ureters     Dr. Porras    Hemiplegia affecting right dominant side 11/9/2021    Hyperlipidemia     Hypertension     Hypothyroid     OP (osteoporosis)     IRIS (obstructive sleep apnea)     Dr. Hope    Psoriatic arthritis     Rheumatology       PROBLEM LIST  Patient Active Problem List    Diagnosis Date Noted    Morbid obesity with BMI of 40.0-44.9, adult 06/22/2022    Pulmonary hypertension 06/22/2022    Type 2 diabetes mellitus with peripheral neuropathy 06/22/2022    Chronic diastolic heart failure 05/23/2022    AP (angina pectoris) 05/23/2022    CAD, multiple vessel 05/23/2022    Anemia due to folic acid deficiency 05/21/2022    Type 2 diabetes mellitus with chronic kidney disease, without long-term current use of insulin 05/11/2022    Anxiety 05/11/2022    S/P TAVR (transcatheter aortic valve replacement) 10/06/2020    Nonrheumatic aortic valve stenosis 06/30/2020    Palpitations 01/31/2019    Supraventricular tachycardia 01/31/2019    Transient ischemic attack (TIA) 01/02/2019    Near syncope 01/02/2019    Allergy to statin medication 03/13/2018    Iron deficiency anemia due to chronic blood loss 01/19/2018    B12 deficiency 01/19/2018    Iron deficiency anemia due to chronic blood loss 01/19/2018    History of CVA (cerebrovascular accident) 01/08/2018    Chronic obstructive  pulmonary disease 01/08/2018    Psoriasis 09/07/2017    Osteopenia of multiple sites 06/07/2017    Stage 3 chronic kidney disease 03/03/2017    Postural dizziness with near syncope 12/19/2016    Statin intolerance 06/21/2016    Primary osteoarthritis involving multiple joints 02/24/2016    Angina, class II 02/17/2016    Carotid stenosis 12/15/2015    IRIS (obstructive sleep apnea) 12/15/2015    Double ectopic ureters 12/15/2015    Psoriatic arthritis 12/15/2015    Essential hypertension 07/30/2015    CAD (coronary artery disease), with stents  07/30/2015    S/P CABG (coronary artery bypass graft) 07/30/2015    Vitamin D deficiency 04/22/2015    OP (osteoporosis)     Metabolic syndrome 01/13/2015    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 09/17/2013    Degenerative arthritis of lumbar spine 08/07/2013    Polyneuropathy 08/07/2013    Hyperlipidemia 07/24/2013    Hypothyroidism 07/24/2013       MEDS  Current Outpatient Medications on File Prior to Visit   Medication Sig Dispense Refill    acetaminophen (TYLENOL) 650 MG TbSR as directed      albuterol (PROVENTIL) 2.5 mg /3 mL (0.083 %) nebulizer solution Take 3 mLs (2.5 mg total) by nebulization every 6 (six) hours as needed for Wheezing. Rescue 25 each 5    amoxicillin-clavulanate 875-125mg (AUGMENTIN) 875-125 mg per tablet Take 1 tablet by mouth 2 (two) times daily. for 10 days 20 tablet 0    aspirin 81 MG Chew Take 1 tablet (81 mg total) by mouth once daily. 90 tablet 3    BETASEPT SURGICAL SCRUB 4 % external liquid Apply topically.      calcium carbonate 650 mg calcium (1,625 mg) tablet Take 1 tablet by mouth once daily.      clobetasoL (CLOBEX) 0.05 % shampoo Apply topically 2 (two) times daily. 118 mL 4    clobetasoL (OLUX) 0.05 % Foam Apply topically 2 (two) times daily. 300 g 3    clopidogreL (PLAVIX) 75 mg tablet Take 1 tablet (75 mg total) by mouth once daily. 90 tablet 3    cyanocobalamin 2000 MCG tablet  Take 2,000 mcg by mouth once daily.      docusate sodium (COLACE) 100 MG capsule Take 1 capsule (100 mg total) by mouth 2 (two) times daily. 60 capsule 0    docusate sodium (COLACE) 100 MG capsule Colace Take No date recorded No form recorded No frequency recorded No route recorded No set duration recorded No set duration amount recorded active No dosage strength recorded No dosage strength units of measure recorded      evolocumab (REPATHA SURECLICK) 140 mg/mL PnIj Inject 1 mL (140 mg total) into the skin every 14 (fourteen) days. 6 mL 3    folic acid (FOLVITE) 1 MG tablet Take 1 tablet (1 mg total) by mouth once daily. 90 tablet 1    furosemide (LASIX) 20 MG tablet Take 1 tablet (20 mg total) by mouth once daily. 90 tablet 3    gabapentin (NEURONTIN) 100 MG capsule Take 2 capsules (200 mg total) by mouth 3 (three) times daily. 540 capsule 3    garlic 2,000 mg Cap Take 3,000 mg by mouth once daily.       hydrocortisone 2.5 % cream Apply topically 2 (two) times daily. 20 g 0    levothyroxine (SYNTHROID) 112 MCG tablet Take 1 tablet (112 mcg total) by mouth once daily. 90 tablet 1    metFORMIN (GLUCOPHAGE-XR) 500 MG ER 24hr tablet Take 1 tablet (500 mg total) by mouth 2 (two) times daily with meals. 60 tablet 2    metoprolol succinate (TOPROL-XL) 100 MG 24 hr tablet Take 1 tablet (100 mg total) by mouth 2 (two) times daily. 1 tab (100mg) in morning. And 100 mg at bedtime.  Generic ok 180 tablet 3    mupirocin (BACTROBAN) 2 % ointment Apply topically 2 (two) times daily. for 10 days 15 g 1    nitroGLYCERIN (NITROSTAT) 0.4 MG SL tablet Place 1 tablet (0.4 mg total) under the tongue every 5 (five) minutes as needed for Chest pain. 100 tablet 3    potassium chloride SA (K-DUR,KLOR-CON) 20 MEQ tablet Take 1 tablet (20 mEq total) by mouth once daily. 90 tablet 3    pyridoxine, vitamin B6, (B-6) 100 MG Tab Take 200 mg by mouth once daily.       ustekinumab (STELARA) 90 mg/mL Syrg syringe Inject 1 mL (90 mg  total) into the skin every 3 (three) months. 1 Syringe 3    valsartan-hydrochlorothiazide (DIOVAN-HCT) 160-12.5 mg per tablet Take 1 tablet by mouth once daily. 90 tablet 3    vitamin D 1000 units Tab Take 185 mg by mouth once daily.       No current facility-administered medications on file prior to visit.       Medication List with Changes/Refills   Current Medications    ACETAMINOPHEN (TYLENOL) 650 MG TBSR    as directed    ALBUTEROL (PROVENTIL) 2.5 MG /3 ML (0.083 %) NEBULIZER SOLUTION    Take 3 mLs (2.5 mg total) by nebulization every 6 (six) hours as needed for Wheezing. Rescue    AMOXICILLIN-CLAVULANATE 875-125MG (AUGMENTIN) 875-125 MG PER TABLET    Take 1 tablet by mouth 2 (two) times daily. for 10 days    ASPIRIN 81 MG CHEW    Take 1 tablet (81 mg total) by mouth once daily.    BETASEPT SURGICAL SCRUB 4 % EXTERNAL LIQUID    Apply topically.    CALCIUM CARBONATE 650 MG CALCIUM (1,625 MG) TABLET    Take 1 tablet by mouth once daily.    CLOBETASOL (CLOBEX) 0.05 % SHAMPOO    Apply topically 2 (two) times daily.    CLOBETASOL (OLUX) 0.05 % FOAM    Apply topically 2 (two) times daily.    CLOPIDOGREL (PLAVIX) 75 MG TABLET    Take 1 tablet (75 mg total) by mouth once daily.    CYANOCOBALAMIN 2000 MCG TABLET    Take 2,000 mcg by mouth once daily.    DOCUSATE SODIUM (COLACE) 100 MG CAPSULE    Take 1 capsule (100 mg total) by mouth 2 (two) times daily.    DOCUSATE SODIUM (COLACE) 100 MG CAPSULE    Colace Take No date recorded No form recorded No frequency recorded No route recorded No set duration recorded No set duration amount recorded active No dosage strength recorded No dosage strength units of measure recorded    EVOLOCUMAB (REPATHA SURECLICK) 140 MG/ML PNIJ    Inject 1 mL (140 mg total) into the skin every 14 (fourteen) days.    FOLIC ACID (FOLVITE) 1 MG TABLET    Take 1 tablet (1 mg total) by mouth once daily.    FUROSEMIDE (LASIX) 20 MG TABLET    Take 1 tablet (20 mg total) by mouth once daily.     GABAPENTIN (NEURONTIN) 100 MG CAPSULE    Take 2 capsules (200 mg total) by mouth 3 (three) times daily.    GARLIC 2,000 MG CAP    Take 3,000 mg by mouth once daily.     HYDROCORTISONE 2.5 % CREAM    Apply topically 2 (two) times daily.    LEVOTHYROXINE (SYNTHROID) 112 MCG TABLET    Take 1 tablet (112 mcg total) by mouth once daily.    METFORMIN (GLUCOPHAGE-XR) 500 MG ER 24HR TABLET    Take 1 tablet (500 mg total) by mouth 2 (two) times daily with meals.    METOPROLOL SUCCINATE (TOPROL-XL) 100 MG 24 HR TABLET    Take 1 tablet (100 mg total) by mouth 2 (two) times daily. 1 tab (100mg) in morning. And 100 mg at bedtime.  Generic ok    MUPIROCIN (BACTROBAN) 2 % OINTMENT    Apply topically 2 (two) times daily. for 10 days    NITROGLYCERIN (NITROSTAT) 0.4 MG SL TABLET    Place 1 tablet (0.4 mg total) under the tongue every 5 (five) minutes as needed for Chest pain.    POTASSIUM CHLORIDE SA (K-DUR,KLOR-CON) 20 MEQ TABLET    Take 1 tablet (20 mEq total) by mouth once daily.    PYRIDOXINE, VITAMIN B6, (B-6) 100 MG TAB    Take 200 mg by mouth once daily.     USTEKINUMAB (STELARA) 90 MG/ML SYRG SYRINGE    Inject 1 mL (90 mg total) into the skin every 3 (three) months.    VALSARTAN-HYDROCHLOROTHIAZIDE (DIOVAN-HCT) 160-12.5 MG PER TABLET    Take 1 tablet by mouth once daily.    VITAMIN D 1000 UNITS TAB    Take 185 mg by mouth once daily.       PSH     Past Surgical History:   Procedure Laterality Date    BREAST BIOPSY      R sided/benign    CARDIAC SURGERY      sept 28 2016    CERVICAL FUSION      CHOLECYSTECTOMY      CORONARY ANGIOPLASTY      CORONARY ARTERY BYPASS GRAFT      triple bypass    CORONARY STENT PLACEMENT      EYE SURGERY      INTRAUTERINE DEVICE INSERTION      LEFT HEART CATHETERIZATION Left 9/8/2020    Procedure: CATHETERIZATION, HEART, LEFT;  Surgeon: Veronica Ibarra MD;  Location[facility] CATH LAB;  Service: Cardiology;  Laterality: Left;  7am start time    mass removed from R groin      TOTAL ABDOMINAL  HYSTERECTOMY W/ BILATERAL SALPINGOOPHORECTOMY      due to benign mass, adhesions    TUBAL LIGATION          ALL  Review of patient's allergies indicates:   Allergen Reactions    Celexa [citalopram] Anaphylaxis    Clindamycin Itching and Swelling    Codeine Shortness Of Breath, Itching and Swelling    Crestor [rosuvastatin] Anaphylaxis    Cytotec [misoprostol] Anaphylaxis and Other (See Comments)     Difficulty breathing    Kiwi (actinidia chinensis) Blisters     Oral  Blisters/burning    Lisinopril Anaphylaxis    Magnesium citrate Shortness Of Breath    Stadol [butorphanol tartrate] Anaphylaxis     Coded    Vicodin [hydrocodone-acetaminophen] Shortness Of Breath    Adhesive      EKG Electrodes    Aggrenox [aspirin-dipyridamole] Other (See Comments)     headaches    Avelox [moxifloxacin]      PATIENT STATES DO NOT GIVE UNDER ANY CIRCUSTANCES    Azithromycin     Butorphanol     Cleocin [clindamycin hcl]     Demerol [meperidine]     Isosorbide Other (See Comments)     Severe headache    Kenalog [triamcinolone acetonide]     Medrol [methylprednisolone] Other (See Comments)     Patient reports taking IV in the past without any issues. Reports allergy to oral preparation     Mobic [meloxicam]     Morphine     Nitroglycerin      Long acting    Pholcodine     Prednisone      GASTRIC PAIN    Ranexa [ranolazine] Nausea And Vomiting    Reclast [zoledronic acid-mannitol-water] Other (See Comments)     Bones hurt    Sulfa (sulfonamide antibiotics) Other (See Comments)     unknown    Talwin [pentazocine lactate]     Tetracycline     Tetracyclines     Tilade [nedocromil]     Zetia [ezetimibe]        SOC     Social History     Tobacco Use    Smoking status: Never Smoker    Smokeless tobacco: Never Used   Substance Use Topics    Alcohol use: No    Drug use: No         FAMILY HX    Family History   Problem Relation Age of Onset    Breast cancer Maternal Grandfather     Breast cancer Paternal  "Aunt     Stroke Unknown     Breast cancer Sister 60    Leukemia Sister 8         as child    Lung cancer Paternal Grandfather     Heart disease Unknown     Diabetes Daughter             REVIEW OF SYSTEMS  General: This patient is well-developed, well-nourished and appears stated age, well-oriented to person, place and time, and cooperative and pleasant on today's visit  Constitutional: Negative for chills and fever.   Respiratory: Negative for shortness of breath.    Cardiovascular: Negative for chest pain, palpitations, orthopnea  Gastrointestinal: Negative for diarrhea, nausea and vomiting.   Musculoskeletal: Positive for above noted in HPI  Skin: Positive  for skin and/or nail changes   Neurological: Positive for tingling and sensory changes  Peripheral Vascular: no claudication or cyanosis  Psychiatric/Behavioral: Negative for altered mental status     PHYSICAL EXAM:      Vitals:    22 1358   Weight: 113.9 kg (251 lb)   Height: 5' 3" (1.6 m)   PainSc:   3         LOWER EXTREMITY PHYSICAL EXAM  VASCULAR  Dorsalis pedis and posterior tibial pulses palpable 1/4 bilaterally. Capillary refill time immediate to the toes. Feet are warm to the touch. Skin temperature warm to warm from proximally to distally There are no varicosities, telangiectasias noted to bilateral foot and ankle regions. There are no ecchymoses noted to bilateral foot and ankle regions. There is moderate gross lower extremity edema.    DERMATOLOGIC  Skin moist with healthy texture and turgor.There is small pinpoint wound sub 1st metatarsal head, LEFT foot. Post debridement reveals no retained or appreciable foreign body.there is localized erythema and circumferentially 1st MTPJ LEFT foot . There is negative ascending erythema.    NEUROLOGIC  Epicritic sensation is intact as the patient is able to sense light touch to bilateral foot and ankle regions. Achilles and patellar deep tendon reflexes intact. Babinski reflex " absent    ORTHOPEDIC/BIOMECHANICAL  TTP LEFT 1st MTPJ.  Muscle strength AT/EHL/EDL/PT: 5/5; Achilles/Gastroc/Soleus: 5/5; PB/PL: 5/5 Muscle tone is normal.     IMAGING  Ordered     ASSESSMENT     Encounter Diagnosis   Name Primary?    Cellulitis and abscess of foot, except toes          PLAN  Patient was educated about clinical and imaging findings, and verbalizes understanding of above.     Diagnoses and all orders for this visit:  Cellulitis and abscess of foot, except toes  -     Ambulatory referral/consult to Podiatry  -     X-Ray Foot Complete Left; Future; Expected date: 06/22/2022  -     CBC auto differential; Future; Expected date: 06/25/2022  -     Sedimentation rate; Future; Expected date: 06/25/2022  -     Uric acid; Future; Expected date: 06/25/2022  -     CREATININE, SERUM; Future; Expected date: 06/25/2022  -     MRI Foot Toes W WO Contrast Left; Future; Expected date: 06/25/2022      -With patient's permission via verbal consent, the involved area where pinpoint possible entry wound was cleansed with an alcohol swab. Trimming of hyperkeratotic lesions deep to epidermal layer x 1 was performed with a #15 blade without incident. No appreciable foreign body noted or open drainage. Epsom salt soaks TWICE daily. Pt has multiple drug allergies. Continue with oral antibiotics- Cephalexin.     Baseline xray will be ordered, labs, and MRI pending bloodwork.     Pt requests to follow up at [facility] due to proximity. Will schedule at first available with a provider at [facility].    The patient is alerted to watch for any signs of worsening of infection (redness, pus, pain, increased swelling or fever) and call if such occurs or report to Emergency room if after hours.        Future Appointments   Date Time Provider Department Center   6/30/2022 11:30 AM Foster Beltran DPM [facility] POD BR [facility]   7/1/2022  1:30 PM LABORATORY, [facility] LAB [facility]   7/6/2022  1:30 PM Denis Caban NP [facility]  HEM ONC [place]   7/8/2022  1:20 PM Lisa Porter MD [facility] MED CLN  Medical C   7/18/2022  8:10 AM LABORATORY, [facility]   7/25/2022  3:00 PM [facility] BMD1 ONL DEXABMD  Medical C   8/2/2022  4:00 PM CHAIR 02 [facility] INFSN [facility]   8/5/2022  4:40 PM Veronica Ibarra MD [facility] VAS CAR [place]   8/31/2022 11:30 AM Jacky Ackerman MD [facility] RHEU  Medical C           Disclaimer:  This note may have been prepared using voice recognition software, it may have not been extensively proofed, as such there could be errors within the text such as sound alike errors.         Report Electronically Signed By:     Mireya Rojo DPM   Podiatry  [facility]-   6/25/2022

## 2022-06-22 NOTE — ASSESSMENT & PLAN NOTE
Reviewed recent echocardiogram report w Ms. Ortiz - discussed monitoring, management, possible symptoms - next appt w her cardiologist in August

## 2022-06-22 NOTE — PROGRESS NOTES
Qi Ortiz   06/22/2022  [id number]    Lisa Porter MD  Patient Care Team:  Lisa Porter MD as PCP - General (Internal Medicine)  Cynthia Pruitt LPN as Care Coordinator (Family Medicine)  Elma Hernandez NP as Nurse Practitioner (Family Medicine)    Visit Type:a scheduled routine follow-up visit   w new urgent issue - suspected foreign body, diabetic foot, peripheral neuropathy    Chief Complaint:  Chief Complaint   Patient presents with    1 mth f/u     Discuss labs     Possible splinter in left foot     Ms. Ortiz reports she noticed redness and swelling medial side of L forefoot this morning. Had not noticed anything yesterday. She does walk barefoot at home w plywood floor. No known h/o gout and denies having similar flare previously.    History of Present Illness:   Ms. Qi Ortiz is a 72 year old female w multiple chronic medical issues self-[facility]. PMHx includes type 2 DM, h/o CVA, CAD s/p CABG x 2, AS s/p TAVR, HTN, HLP (intolerant of statin), CKD stage 3, COPD, hypothyroidism, psoriasis and psoriatic arthritis, CAREN, obesity, and IRIS (intolerant of CPAP). Ms. Ortiz has multiple drug and contact allergies and sensitivities. Food allergies include kiwi fruit and crab boil.    Ms. Ortiz is hearing impaired - gradual hearing loss over past 15 years, now severe. Unble to use hearing aids due to psoriasis in her ear canals.      Ms. Ortiz is a 's  and drives to [facility] [employer] [place] once every 90 days to  her maintenance medications. Acute medications can be filled at [facility] in [place]. Ms. Ortiz does not have a cell phone, smart phone or internet. She does have a land-line w a speaker to make louder. Ms. Ortiz's daughters, Elsa and Shyanne, live close by. They work during the day. Shyanne is Ms. Ortiz's HCPOA - OK to discuss medical issues w her.    Ms. Ortiz reports wooziness, dizziness previously reported have  resolved. Less anxiety, no recent chest pain. Reportedly [place] [facility] [place]. Says she has noted weight loss since starting metformin. Stools a little loose but no nausea, bloating or diarrhea.    Discussed that she taking both furosemide and low dose HCTZ. Ms. Ortiz reports that most days she takes the furosemide 20 mg once daily. She will hold if will be driving a distance or out of the house for an extended time.  If skips a day, she may take an extra dose the next day.    Recent Appointments  6/15/22 Neuro Dr. Jaimes [facility]  6/08/22 Rheum Dr. Tyron JOHNSON [facility]  6/01/22 MRI brain [facility]  5/26/22 Urology Dr. Vern JOHNSON frequent UTIs, bladder lesions, annual US and UA  5/25/22 Echocardiogram  5/23/22 Cards Dr. Cheney (Dr. Ibarra unavailable)  5/17/22 Neuro Dr. Jaimes [facility] f/u TIAs  4/22/22 Heme/Onc Juli IV iron infusion completed #8 of 8 (h/o rectal bleeding - colonoscopy recommended.)   (per Ms. Ortiz needed clearance from cardiology and rheumatology before can proceed w colonoscopy)     Upcoming Outside Appointments  Sept CT Surg for s/p AVR f/u [facility] (previously seen by Dr. Wilder who's retired)  Opthalmology [facility] (last eye exam > 2 years ago)    [facility]  Future Appointments     Date Provider Specialty Appt Notes    6/30/2022 Foster Beltran DPM Podiatry 1 week f/u    7/1/2022  Lab [facility]    7/6/2022 Denis Caban NP Hematology and Oncology f/u     7/8/2022 Lisa Porter MD Primary Care 2 week f/u     7/11/2022  Radiology     7/18/2022  Lab .    7/25/2022  Radiology [facility]    8/2/2022  Chemotherapy stelera SQ/ow/ngwv    8/5/2022 Veronica Ibarra MD Cardiology 6 mo f/u    8/31/2022 Jacky Ackerman MD Rheumatology ep/rtc/5months/labs/dexa/ca           The following were reviewed: Active problem list, medication list, allergies, family history, social history, and Health Maintenance.     History:  Past Medical History:   Diagnosis Date    Carotid  stenosis     19%    COPD (chronic obstructive pulmonary disease)     No meds    Coronary artery disease     CVA (cerebral vascular accident)     Dr. Hoffman    Depression     Double ectopic ureters     Dr. Porras    Hemiplegia affecting right dominant side 2021    Hyperlipidemia     Hypertension     Hypothyroid     OP (osteoporosis)     IRIS (obstructive sleep apnea)     Dr. Hope    Psoriatic arthritis     Rheumatology     Past Surgical History:   Procedure Laterality Date    BREAST BIOPSY      R sided/benign    CARDIAC SURGERY      2016    CERVICAL FUSION      CHOLECYSTECTOMY      CORONARY ANGIOPLASTY      CORONARY ARTERY BYPASS GRAFT      triple bypass    CORONARY STENT PLACEMENT      EYE SURGERY      INTRAUTERINE DEVICE INSERTION      LEFT HEART CATHETERIZATION Left 2020    Procedure: CATHETERIZATION, HEART, LEFT;  Surgeon: Veronica Ibarra MD;  Location: [facility] CATH LAB;  Service: Cardiology;  Laterality: Left;  7am start time    mass removed from R groin      TOTAL ABDOMINAL HYSTERECTOMY W/ BILATERAL SALPINGOOPHORECTOMY      due to benign mass, adhesions    TUBAL LIGATION       Family History   Problem Relation Age of Onset    Breast cancer Maternal Grandfather     Breast cancer Paternal Aunt     Stroke Unknown     Breast cancer Sister 60    Leukemia Sister 8         as child    Lung cancer Paternal Grandfather     Heart disease Unknown     Diabetes Daughter      Social History     Socioeconomic History    Marital status: Single    Number of children: 3   Occupational History    Occupation: Not working   Tobacco Use    Smoking status: Never Smoker    Smokeless tobacco: Never Used   Substance and Sexual Activity    Alcohol use: No    Drug use: No    Sexual activity: Never     Partners: Male     Patient Active Problem List   Diagnosis    Hyperlipidemia    Hypothyroidism    Degenerative arthritis of lumbar spine    Polyneuropathy    Metabolic syndrome     Vitamin D deficiency    OP (osteoporosis)    Essential hypertension    CAD (coronary artery disease), with stents     S/P CABG (coronary artery bypass graft)    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    Carotid stenosis    IRIS (obstructive sleep apnea)    Double ectopic ureters    Psoriatic arthritis    Morbid obesity with BMI of 40.0-44.9, adult    Angina, class II    Primary osteoarthritis involving multiple joints    Statin intolerance    Postural dizziness with near syncope    Stage 3 chronic kidney disease    Osteopenia of multiple sites    Psoriasis    History of CVA (cerebrovascular accident)    Chronic obstructive pulmonary disease    Iron deficiency anemia due to chronic blood loss    B12 deficiency    Iron deficiency anemia due to chronic blood loss    Allergy to statin medication    Type 2 diabetes mellitus with chronic kidney disease, without long-term current use of insulin    Transient ischemic attack (TIA)    Near syncope    Palpitations    Supraventricular tachycardia    Nonrheumatic aortic valve stenosis    S/P TAVR (transcatheter aortic valve replacement)    Anxiety    Anemia due to folic acid deficiency    Chronic diastolic heart failure    AP (angina pectoris)    CAD, multiple vessel    Pulmonary hypertension    Type 2 diabetes mellitus with peripheral neuropathy    Cellulitis and abscess of foot, except toes     Review of patient's allergies indicates:   Allergen Reactions    Celexa [citalopram] Anaphylaxis    Clindamycin Itching and Swelling    Codeine Shortness Of Breath, Itching and Swelling    Crestor [rosuvastatin] Anaphylaxis    Cytotec [misoprostol] Anaphylaxis and Other (See Comments)     Difficulty breathing    Kiwi (actinidia chinensis) Blisters     Oral  Blisters/burning    Lisinopril Anaphylaxis    Magnesium citrate Shortness Of Breath    Stadol [butorphanol tartrate] Anaphylaxis     Coded    Vicodin  [hydrocodone-acetaminophen] Shortness Of Breath    Adhesive      EKG Electrodes    Aggrenox [aspirin-dipyridamole] Other (See Comments)     headaches    Avelox [moxifloxacin]      PATIENT STATES DO NOT GIVE UNDER ANY CIRCUSTANCES    Azithromycin     Butorphanol     Cleocin [clindamycin hcl]     Demerol [meperidine]     Isosorbide Other (See Comments)     Severe headache    Kenalog [triamcinolone acetonide]     Medrol [methylprednisolone] Other (See Comments)     Patient reports taking IV in the past without any issues. Reports allergy to oral preparation     Mobic [meloxicam]     Morphine     Nitroglycerin      Long acting    Pholcodine     Prednisone      GASTRIC PAIN    Ranexa [ranolazine] Nausea And Vomiting    Reclast [zoledronic acid-mannitol-water] Other (See Comments)     Bones hurt    Sulfa (sulfonamide antibiotics) Other (See Comments)     unknown    Talwin [pentazocine lactate]     Tetracycline     Tetracyclines     Tilade [nedocromil]     Zetia [ezetimibe]        Medications:  Current Outpatient Medications on File Prior to Visit   Medication Sig Dispense Refill    acetaminophen (TYLENOL) 650 MG TbSR as directed      albuterol (PROVENTIL) 2.5 mg /3 mL (0.083 %) nebulizer solution Take 3 mLs (2.5 mg total) by nebulization every 6 (six) hours as needed for Wheezing. Rescue 25 each 5    aspirin 81 MG Chew Take 1 tablet (81 mg total) by mouth once daily. 90 tablet 3    BETASEPT SURGICAL SCRUB 4 % external liquid Apply topically.      calcium carbonate 650 mg calcium (1,625 mg) tablet Take 1 tablet by mouth once daily.      clobetasoL (CLOBEX) 0.05 % shampoo Apply topically 2 (two) times daily. 118 mL 4    clobetasoL (OLUX) 0.05 % Foam Apply topically 2 (two) times daily. 300 g 3    clopidogreL (PLAVIX) 75 mg tablet Take 1 tablet (75 mg total) by mouth once daily. 90 tablet 3    cyanocobalamin 2000 MCG tablet Take 2,000 mcg by mouth once daily.      docusate sodium (COLACE) 100  MG capsule Take 1 capsule (100 mg total) by mouth 2 (two) times daily. 60 capsule 0    docusate sodium (COLACE) 100 MG capsule Colace Take No date recorded No form recorded No frequency recorded No route recorded No set duration recorded No set duration amount recorded active No dosage strength recorded No dosage strength units of measure recorded      evolocumab (REPATHA SURECLICK) 140 mg/mL PnIj Inject 1 mL (140 mg total) into the skin every 14 (fourteen) days. 6 mL 3    folic acid (FOLVITE) 1 MG tablet Take 1 tablet (1 mg total) by mouth once daily. 90 tablet 1    furosemide (LASIX) 20 MG tablet Take 1 tablet (20 mg total) by mouth once daily. 90 tablet 3    gabapentin (NEURONTIN) 100 MG capsule Take 2 capsules (200 mg total) by mouth 3 (three) times daily. 540 capsule 3    garlic 2,000 mg Cap Take 3,000 mg by mouth once daily.       hydrocortisone 2.5 % cream Apply topically 2 (two) times daily. 20 g 0    levothyroxine (SYNTHROID) 112 MCG tablet Take 1 tablet (112 mcg total) by mouth once daily. 90 tablet 1    metoprolol succinate (TOPROL-XL) 100 MG 24 hr tablet Take 1 tablet (100 mg total) by mouth 2 (two) times daily. 1 tab (100mg) in morning. And 100 mg at bedtime.  Generic ok 180 tablet 3    nitroGLYCERIN (NITROSTAT) 0.4 MG SL tablet Place 1 tablet (0.4 mg total) under the tongue every 5 (five) minutes as needed for Chest pain. 100 tablet 3    potassium chloride SA (K-DUR,KLOR-CON) 20 MEQ tablet Take 1 tablet (20 mEq total) by mouth once daily. 90 tablet 3    pyridoxine, vitamin B6, (B-6) 100 MG Tab Take 200 mg by mouth once daily.       ustekinumab (STELARA) 90 mg/mL Syrg syringe Inject 1 mL (90 mg total) into the skin every 3 (three) months. 1 Syringe 3    valsartan-hydrochlorothiazide (DIOVAN-HCT) 160-12.5 mg per tablet Take 1 tablet by mouth once daily. 90 tablet 3    vitamin D 1000 units Tab Take 185 mg by mouth once daily.       No current facility-administered medications on file prior  to visit.       Medications have been reviewed and reconciled with patient at visit today.    Barriers to medications present (yes )  Multiple drug allergies and sensitivities  Has to drive to [facility] every 90 days to  maintenance medications    Adverse reactions to current medications (no)    Review of Systems   Constitutional: Negative for fever.   HENT: Positive for hearing loss.    Eyes: Positive for photophobia and visual disturbance.   Respiratory: Negative for cough, chest tightness and shortness of breath.    Gastrointestinal: Negative for abdominal distention, abdominal pain, constipation, diarrhea and nausea.   Integumentary:  Positive for wound.   Neurological: Positive for numbness. Negative for dizziness, vertigo, syncope and light-headedness.      Exam:  Vitals:    06/22/22 1055   BP: (!) 144/74   Pulse: 84   Temp: 97.9 °F (36.6 °C)     Weight: 114.3 kg (251 lb 15.8 oz)   Body mass index is 44.64 kg/m².     5/11/22 weight 257     BP Readings from Last 3 Encounters:   06/22/22 (!) 144/74   06/08/22 (!) 143/61   05/25/22 (!) 140/64        Physical Exam  Constitutional:       General: She is not in acute distress.     Appearance: Normal appearance. She is obese.   HENT:      Head: Normocephalic and atraumatic.   Eyes:      General:         Right eye: No discharge.      Extraocular Movements: Extraocular movements intact.   Musculoskeletal:         General: Swelling present.      Comments: Small open area L medial forefoot w adjacent erythema, swelling and warmth   Skin:     General: Skin is warm and dry.      Findings: Erythema present.   Neurological:      Mental Status: She is alert.   Psychiatric:         Mood and Affect: Mood normal.         Behavior: Behavior normal.         Thought Content: Thought content normal.       Laboratory Reviewed: (Yes)  Lab Results   Component Value Date    WBC 9.22 06/27/2022    HGB 12.7 06/27/2022    HCT 40.4 06/27/2022     06/27/2022    MCV 95 06/27/2022     CHOL 136 01/13/2022    TRIG 119 01/13/2022    HDL 43 01/13/2022    LDLCALC 69.2 01/13/2022    ALT 30 05/23/2022    AST 36 05/23/2022     05/23/2022    K 5.0 05/23/2022     05/23/2022    CREATININE 1.4 06/27/2022    BUN 22 05/23/2022    CO2 27 05/23/2022    MG 2.0 05/20/2022    TSH 0.997 05/20/2022    FREET4 0.98 05/20/2022    INR 1.0 09/28/2020    HGBA1C 7.1 (H) 05/20/2022    CRP 5.7 11/12/2021 5/20/22: eGFR 51.0 PTHi 91.5(H) folate 18.7(wnl) B12> 2,000(H) mma 0.47(H)  4/01/22: (H) mcv 85 rdw 19.2 eGFR 38  1/13/22: TSH 7.980(H) free T4 0.70(L)   9/18/20: folate 32(H) B12 1816(H)     Imaging ED 4/01/22:  CT C-spine: Fusion with multilevel degenerative disc disease (radha at C6-7) . No acute bony abnormality is identified.     CT Head: No intracranial hemorrhage or acute findings. Small vessel ischemic changes in the periventricular white matter. Paranasal sinuses are clear.     CXR: The heart is normal in size. Sternotomy wires. Aortic atherosclerosis. No acute infiltrates.     ECG: Sinus rhythm with Premature atrial complexes. ST and T wave abnormality, consider lateral ischemia. No STEMI.    Assessment:   72 y.o. female with multiple co-morbid illnesses here for continued follow up of medical problems.      The primary encounter diagnosis was Cellulitis and abscess of foot, except toes. Diagnoses of Pulmonary hypertension, Essential hypertension, Polyneuropathy, Type 2 diabetes mellitus with peripheral neuropathy, Iron deficiency anemia due to chronic blood loss, B12 deficiency, Acquired hypothyroidism, and Type 2 diabetes mellitus with stage 3a chronic kidney disease, without long-term current use of insulin were also pertinent to this visit.      Plan:     Problem List Items Addressed This Visit        Neuro    Polyneuropathy (Chronic)     Cont gabapentin              Pulmonary    Pulmonary hypertension (Chronic)     Reviewed recent echocardiogram report w Ms. Ortiz - discussed  monitoring, management, possible symptoms - next appt w her cardiologist in August              Cardiac/Vascular    Essential hypertension (Chronic)     SBP a bit elevated today (did not take furosemide this morning) goal BP < 130/80              ID    Cellulitis and abscess of foot, except toes - Primary     We were able to secure appt w Podiatrist at another location for today for further evaluation and recommendations - will start oral antibiotic for now - advised not to go barefoot!           Relevant Orders    Ambulatory referral/consult to Podiatry       Oncology    Iron deficiency anemia due to chronic blood loss     S/p iron infusions - H/H wnl - F/u regarding recommendation for colonoscopy at next vist              Endocrine    Hypothyroidism (Chronic)     TSH lower end of normal w T4 wnl - cont current Rx - recheck mid-July           Type 2 diabetes mellitus with peripheral neuropathy (Chronic)     Tolerating low dose metformin - agrees w increasing dose - advised if GI upset to go back to previous dose - consider adding SGLT2 inhibitor - cont gabapentin           Relevant Medications    metFORMIN (GLUCOPHAGE-XR) 500 MG ER 24hr tablet    B12 deficiency     mma slightly elevated - which can be seen w CKD and in older persons - and B12 level > 2,000 but falsely elevated B12 level often noted w GI pathology - cont B12 supplementation for now but consider decrease or even discontinue            Type 2 diabetes mellitus with chronic kidney disease, without long-term current use of insulin     Tolerating low dose metformin - agrees w increasing dose - advised if GI upset to go back to previous dose - consider adding SGLT2 inhibitor              Relevant Medications    metFORMIN (GLUCOPHAGE-XR) 500 MG ER 24hr tablet          Health Maintenance       Date Due Completion Date    Diabetes Urine Screening Never done ---    COVID-19 Vaccine (1) Never done ---    Pneumococcal Vaccines (Age 65+) (1 - PCV) Never done ---     Foot Exam Never done ---    Eye Exam Never done ---    TETANUS VACCINE Never done ---    Colorectal Cancer Screening 04/18/2016 4/18/2011    Shingles Vaccine (1 of 2) 05/05/2016 3/10/2016    Mammogram 10/23/2016 10/23/2015    Hemoglobin A1c 11/20/2022 5/20/2022    Lipid Panel 01/13/2023 1/13/2022    Aspirin/Antiplatelet Therapy 06/22/2023 6/22/2022    DEXA Scan 08/18/2023 8/18/2020        Some of above likely completed outside of [facility]   Declines CoVid vaccine    -Patient's lab results and echocardiogram report were reviewed and discussed with patient  -Treatment options and alternatives were discussed with the patient. Patient expressed understanding. Patient was given the opportunity to ask questions and be an active participant in their medical care. Patient had no further questions or concerns at this time.   --Patient is an overall HIGH risk for health complications from their medical conditions.     Follow up: Dr. Porter 1-2 weeks    After visit summary printed and given to patient upon discharge.  Patient care plan included in After visit summary.    TOTAL TIME evaluating and managing this patient for this encounter was greater than 60 minutes. This time was spent personally by me on the following activities: review of patient's past medical history, assessing age-appropriate health maintenance needs, review of any interval history, review and interpretation of lab results, review and interpretation of imaging test results, review and interpretation of cardiology test results, reviewing consulting specialist notes, obtaining history from the patient and family, examination of the patient, medication reconciliation, managing and/or ordering prescription medications, ordering imaging tests, ordering referral to subspecialty provider(s), educating patient and answering their questions about diagnosis, treatment plan, and goals of treatment, discussing planned follow-up and final documentation of  the visit. This time was exclusive of any separately billable procedures for this patient and exclusive of time spent treating any other patients.     Addendum   Labs ordered by Podiatry resulted 6/27/22:  uric acid - 11.6(H!)  creatinine 1.4 eGFR 37.8 (baseline crt 1.1)  ESR 68 (H)  CBC essentially wnl    Erythema of L 1st MTP quite likely acute gout flare rather than infection!  She has a number of risk factors for gout - consider short course prednisone (GI intolerance listed) -or colchicine? Once flare resolves discuss adding allopurinol.

## 2022-06-22 NOTE — PATIENT INSTRUCTIONS
F/u Podiatry today    F/u Dr. Porter next week if possible    Augmentin 875 mg BID x 10 days pending alternate rec or Rx from Podiatry    Metformin ER 500mg BID WM (let me know if cost an issue can reorder short acting)    Ok to cont calcium supplement but recommend decrease to once daily    Bactroban to open wound as needed

## 2022-06-27 ENCOUNTER — LAB VISIT (OUTPATIENT)
Dept: LAB | Facility: HOSPITAL | Age: 72
End: 2022-06-27
Attending: PODIATRIST
Payer: MEDICARE

## 2022-06-27 DIAGNOSIS — L03.119 CELLULITIS AND ABSCESS OF FOOT, EXCEPT TOES: ICD-10-CM

## 2022-06-27 DIAGNOSIS — L02.619 CELLULITIS AND ABSCESS OF FOOT, EXCEPT TOES: ICD-10-CM

## 2022-06-27 LAB
BASOPHILS # BLD AUTO: 0.03 K/UL (ref 0–0.2)
BASOPHILS NFR BLD: 0.3 % (ref 0–1.9)
CREAT SERPL-MCNC: 1.4 MG/DL (ref 0.5–1.4)
DIFFERENTIAL METHOD: ABNORMAL
EOSINOPHIL # BLD AUTO: 0.1 K/UL (ref 0–0.5)
EOSINOPHIL NFR BLD: 1.3 % (ref 0–8)
ERYTHROCYTE [DISTWIDTH] IN BLOOD BY AUTOMATED COUNT: 13.6 % (ref 11.5–14.5)
ERYTHROCYTE [SEDIMENTATION RATE] IN BLOOD BY PHOTOMETRIC METHOD: 68 MM/HR (ref 0–36)
EST. GFR  (AFRICAN AMERICAN): 43.6 ML/MIN/1.73 M^2
EST. GFR  (NON AFRICAN AMERICAN): 37.8 ML/MIN/1.73 M^2
HCT VFR BLD AUTO: 40.4 % (ref 37–48.5)
HGB BLD-MCNC: 12.7 G/DL (ref 12–16)
IMM GRANULOCYTES # BLD AUTO: 0.03 K/UL (ref 0–0.04)
IMM GRANULOCYTES NFR BLD AUTO: 0.3 % (ref 0–0.5)
LYMPHOCYTES # BLD AUTO: 1.9 K/UL (ref 1–4.8)
LYMPHOCYTES NFR BLD: 20.6 % (ref 18–48)
MCH RBC QN AUTO: 30 PG (ref 27–31)
MCHC RBC AUTO-ENTMCNC: 31.4 G/DL (ref 32–36)
MCV RBC AUTO: 95 FL (ref 82–98)
MONOCYTES # BLD AUTO: 0.8 K/UL (ref 0.3–1)
MONOCYTES NFR BLD: 8.1 % (ref 4–15)
NEUTROPHILS # BLD AUTO: 6.4 K/UL (ref 1.8–7.7)
NEUTROPHILS NFR BLD: 69.4 % (ref 38–73)
NRBC BLD-RTO: 0 /100 WBC
PLATELET # BLD AUTO: 268 K/UL (ref 150–450)
PMV BLD AUTO: 10.6 FL (ref 9.2–12.9)
RBC # BLD AUTO: 4.24 M/UL (ref 4–5.4)
URATE SERPL-MCNC: 11.6 MG/DL (ref 2.4–5.7)
WBC # BLD AUTO: 9.22 K/UL (ref 3.9–12.7)

## 2022-06-27 PROCEDURE — 84550 ASSAY OF BLOOD/URIC ACID: CPT | Performed by: PODIATRIST

## 2022-06-27 PROCEDURE — 85652 RBC SED RATE AUTOMATED: CPT | Performed by: PODIATRIST

## 2022-06-27 PROCEDURE — 82565 ASSAY OF CREATININE: CPT | Performed by: PODIATRIST

## 2022-06-27 PROCEDURE — 36415 COLL VENOUS BLD VENIPUNCTURE: CPT | Mod: PO | Performed by: PODIATRIST

## 2022-06-27 PROCEDURE — 85025 COMPLETE CBC W/AUTO DIFF WBC: CPT | Performed by: PODIATRIST

## 2022-06-28 ENCOUNTER — TELEPHONE (OUTPATIENT)
Dept: PRIMARY CARE CLINIC | Facility: CLINIC | Age: 72
End: 2022-06-28
Payer: MEDICARE

## 2022-06-28 PROBLEM — L03.119 CELLULITIS AND ABSCESS OF FOOT, EXCEPT TOES: Status: ACTIVE | Noted: 2022-06-22

## 2022-06-28 PROBLEM — L02.619 CELLULITIS AND ABSCESS OF FOOT, EXCEPT TOES: Status: ACTIVE | Noted: 2022-06-28

## 2022-06-28 PROBLEM — L03.119 CELLULITIS AND ABSCESS OF FOOT, EXCEPT TOES: Status: ACTIVE | Noted: 2022-06-28

## 2022-06-28 PROBLEM — L02.619 CELLULITIS AND ABSCESS OF FOOT, EXCEPT TOES: Status: ACTIVE | Noted: 2022-06-22

## 2022-06-28 NOTE — TELEPHONE ENCOUNTER
Yes thank you for letting me know - that was what I advised her to do - if causing bloating, diarrhea to go back to just once a day for now.

## 2022-06-28 NOTE — TELEPHONE ENCOUNTER
----- Message from Octavia Avalos sent at 6/28/2022 10:13 AM CDT -----  Contact: pt  Pt states she's calling rg her metformin and req a call back and can be reached at [phone]//thanks/dbw

## 2022-06-28 NOTE — TELEPHONE ENCOUNTER
Patient called in to inform that the Metformin (Time release) is causing her severe diarrhea and just over all making her feel bad. She says that she was taking the two a day but has started to taking one per day.

## 2022-06-29 ENCOUNTER — TELEPHONE (OUTPATIENT)
Dept: PRIMARY CARE CLINIC | Facility: CLINIC | Age: 72
End: 2022-06-29
Payer: MEDICARE

## 2022-06-29 VITALS — SYSTOLIC BLOOD PRESSURE: 138 MMHG | DIASTOLIC BLOOD PRESSURE: 88 MMHG

## 2022-06-29 NOTE — ASSESSMENT & PLAN NOTE
We were able to secure appt w Podiatrist at another location for today for further evaluation and recommendations - will start oral antibiotic for now - advised not to go barefoot!

## 2022-06-29 NOTE — ASSESSMENT & PLAN NOTE
mma slightly elevated - which can be seen w CKD and in older persons - and B12 level > 2,000 but falsely elevated B12 level often noted w GI pathology - cont B12 supplementation for now but consider decrease or even discontinue

## 2022-06-29 NOTE — ASSESSMENT & PLAN NOTE
Tolerating low dose metformin - agrees w increasing dose - advised if GI upset to go back to previous dose - consider adding SGLT2 inhibitor

## 2022-06-30 ENCOUNTER — OFFICE VISIT (OUTPATIENT)
Dept: PODIATRY | Facility: CLINIC | Age: 72
End: 2022-06-30
Payer: MEDICARE

## 2022-06-30 VITALS — WEIGHT: 251.13 LBS | BODY MASS INDEX: 44.5 KG/M2 | HEIGHT: 63 IN

## 2022-06-30 DIAGNOSIS — L03.032 CELLULITIS AND ABSCESS OF TOE OF LEFT FOOT: Primary | ICD-10-CM

## 2022-06-30 DIAGNOSIS — L02.612 CELLULITIS AND ABSCESS OF TOE OF LEFT FOOT: Primary | ICD-10-CM

## 2022-06-30 DIAGNOSIS — M79.675 PAIN OF LEFT GREAT TOE: ICD-10-CM

## 2022-06-30 DIAGNOSIS — N18.31 STAGE 3A CHRONIC KIDNEY DISEASE: ICD-10-CM

## 2022-06-30 DIAGNOSIS — E11.42 TYPE 2 DIABETES MELLITUS WITH PERIPHERAL NEUROPATHY: Chronic | ICD-10-CM

## 2022-06-30 DIAGNOSIS — M1A.3721 CHRONIC GOUT DUE TO RENAL IMPAIRMENT INVOLVING TOE OF LEFT FOOT WITH TOPHUS: ICD-10-CM

## 2022-06-30 PROCEDURE — 99999 PR PBB SHADOW E&M-EST. PATIENT-LVL III: CPT | Mod: PBBFAC,,, | Performed by: PODIATRIST

## 2022-06-30 PROCEDURE — 99999 PR PBB SHADOW E&M-EST. PATIENT-LVL III: ICD-10-PCS | Mod: PBBFAC,,, | Performed by: PODIATRIST

## 2022-06-30 PROCEDURE — 87186 SC STD MICRODIL/AGAR DIL: CPT | Performed by: PODIATRIST

## 2022-06-30 PROCEDURE — 87077 CULTURE AEROBIC IDENTIFY: CPT | Performed by: PODIATRIST

## 2022-06-30 PROCEDURE — 87070 CULTURE OTHR SPECIMN AEROBIC: CPT | Performed by: PODIATRIST

## 2022-06-30 PROCEDURE — 99215 OFFICE O/P EST HI 40 MIN: CPT | Mod: 25,S$PBB,, | Performed by: PODIATRIST

## 2022-06-30 PROCEDURE — 10060 I&D ABSCESS SIMPLE/SINGLE: CPT | Mod: PBBFAC | Performed by: PODIATRIST

## 2022-06-30 PROCEDURE — 99215 PR OFFICE/OUTPT VISIT, EST, LEVL V, 40-54 MIN: ICD-10-PCS | Mod: 25,S$PBB,, | Performed by: PODIATRIST

## 2022-06-30 PROCEDURE — 10060 I&D ABSCESS SIMPLE/SINGLE: CPT | Mod: S$PBB,,, | Performed by: PODIATRIST

## 2022-06-30 PROCEDURE — 10060 PR DRAIN SKIN ABSCESS SIMPLE: ICD-10-PCS | Mod: S$PBB,,, | Performed by: PODIATRIST

## 2022-06-30 PROCEDURE — 99213 OFFICE O/P EST LOW 20 MIN: CPT | Mod: PBBFAC,25 | Performed by: PODIATRIST

## 2022-06-30 RX ORDER — ALLOPURINOL 300 MG/1
300 TABLET ORAL DAILY
Qty: 30 TABLET | Refills: 0 | Status: SHIPPED | OUTPATIENT
Start: 2022-06-30 | End: 2022-07-30

## 2022-06-30 NOTE — Clinical Note
s/p in office I&D today with moderate abscess noted. Uric Acid off the charts. Started Zyloprim 300mg QD for now. Will repeat in 30 days with [Cr] level as well. D/C MRI.

## 2022-06-30 NOTE — PROGRESS NOTES
Subjective:       Patient ID: Qi Ortiz is a 72 y.o. female.    Chief Complaint: Follow-up (Left hallux swelling, mild pain. Diabetic PCP: Dr. Porter last seen 06/22/22)    HPI: Qi Ortiz presents to the clinic today, for evaluation and treatment concerning an ulceration/wound/bulla(e) to the left plantar 1st MTPJ. Patient's Primary Care Provider is Lisa Porter MD. The PMHx. does include DM w/ peripheral neuropathy, DM w/ CKD/ESRD and morbid obesity. The wound(s) have/has been present for a week or so. Most recent local wound care w/ topical ABx. Did see my colleague Dr. Mireya Rojo prior as well as her PCP for this issue. She does have CKD and does take diuretics. Is on blood thinners as well. Is on Augmentin at present.     Hemoglobin A1C   Date Value Ref Range Status   05/20/2022 7.1 (H) 4.0 - 5.6 % Final     Comment:     ADA Screening Guidelines:  5.7-6.4%  Consistent with prediabetes  >or=6.5%  Consistent with diabetes    High levels of fetal hemoglobin interfere with the HbA1C  assay. Heterozygous hemoglobin variants (HbS, HgC, etc)do  not significantly interfere with this assay.   However, presence of multiple variants may affect accuracy.     01/13/2022 6.6 (H) 4.0 - 5.6 % Final     Comment:     ADA Screening Guidelines:  5.7-6.4%  Consistent with prediabetes  >or=6.5%  Consistent with diabetes    High levels of fetal hemoglobin interfere with the HbA1C  assay. Heterozygous hemoglobin variants (HbS, HgC, etc)do  not significantly interfere with this assay.   However, presence of multiple variants may affect accuracy.     05/03/2021 6.4 (H) 4.0 - 5.6 % Final     Comment:     ADA Screening Guidelines:  5.7-6.4%  Consistent with prediabetes  >or=6.5%  Consistent with diabetes    High levels of fetal hemoglobin interfere with the HbA1C  assay. Heterozygous hemoglobin variants (HbS, HgC, etc)do  not significantly interfere with this assay.   However, presence of multiple variants may  affect accuracy.         Review of patient's allergies indicates:   Allergen Reactions    Celexa [citalopram] Anaphylaxis    Clindamycin Itching and Swelling    Codeine Shortness Of Breath, Itching and Swelling    Crestor [rosuvastatin] Anaphylaxis    Cytotec [misoprostol] Anaphylaxis and Other (See Comments)     Difficulty breathing    Kiwi (actinidia chinensis) Blisters     Oral  Blisters/burning    Lisinopril Anaphylaxis    Magnesium citrate Shortness Of Breath    Stadol [butorphanol tartrate] Anaphylaxis     Coded    Vicodin [hydrocodone-acetaminophen] Shortness Of Breath    Adhesive      EKG Electrodes    Aggrenox [aspirin-dipyridamole] Other (See Comments)     headaches    Avelox [moxifloxacin]      PATIENT STATES DO NOT GIVE UNDER ANY CIRCUSTANCES    Azithromycin     Butorphanol     Cleocin [clindamycin hcl]     Demerol [meperidine]     Isosorbide Other (See Comments)     Severe headache    Kenalog [triamcinolone acetonide]     Medrol [methylprednisolone] Other (See Comments)     Patient reports taking IV in the past without any issues. Reports allergy to oral preparation     Mobic [meloxicam]     Morphine     Nitroglycerin      Long acting    Pholcodine     Prednisone      GASTRIC PAIN    Ranexa [ranolazine] Nausea And Vomiting    Reclast [zoledronic acid-mannitol-water] Other (See Comments)     Bones hurt    Sulfa (sulfonamide antibiotics) Other (See Comments)     unknown    Talwin [pentazocine lactate]     Tetracycline     Tetracyclines     Tilade [nedocromil]     Zetia [ezetimibe]        Past Medical History:   Diagnosis Date    Carotid stenosis     19%    COPD (chronic obstructive pulmonary disease)     No meds    Coronary artery disease     CVA (cerebral vascular accident)     Dr. Hoffman    Depression     Double ectopic ureters     Dr. Porras    Hemiplegia affecting right dominant side 11/9/2021    Hyperlipidemia     Hypertension     Hypothyroid     OP  (osteoporosis)     IRIS (obstructive sleep apnea)     Dr. Hope    Psoriatic arthritis     Rheumatology       Family History   Problem Relation Age of Onset    Breast cancer Maternal Grandfather     Breast cancer Paternal Aunt     Stroke Unknown     Breast cancer Sister 60    Leukemia Sister 8         as child    Lung cancer Paternal Grandfather     Heart disease Unknown     Diabetes Daughter        Social History     Socioeconomic History    Marital status: Single    Number of children: 3   Occupational History    Occupation: Not working   Tobacco Use    Smoking status: Never Smoker    Smokeless tobacco: Never Used   Substance and Sexual Activity    Alcohol use: No    Drug use: No    Sexual activity: Never     Partners: Male       Past Surgical History:   Procedure Laterality Date    BREAST BIOPSY      R sided/benign    CARDIAC SURGERY      2016    CERVICAL FUSION      CHOLECYSTECTOMY      CORONARY ANGIOPLASTY      CORONARY ARTERY BYPASS GRAFT      triple bypass    CORONARY STENT PLACEMENT      EYE SURGERY      INTRAUTERINE DEVICE INSERTION      LEFT HEART CATHETERIZATION Left 2020    Procedure: CATHETERIZATION, HEART, LEFT;  Surgeon: Veronica Ibarra MD;  Location: [facility] CATH LAB;  Service: Cardiology;  Laterality: Left;  7am start time    mass removed from R groin      TOTAL ABDOMINAL HYSTERECTOMY W/ BILATERAL SALPINGOOPHORECTOMY      due to benign mass, adhesions    TUBAL LIGATION         Review of Systems   Constitutional: Negative for chills, fatigue and fever.   HENT: Negative for hearing loss.    Eyes: Negative for photophobia and visual disturbance.   Respiratory: Negative for cough, chest tightness, shortness of breath and wheezing.    Cardiovascular: Negative for chest pain and palpitations.   Gastrointestinal: Negative for constipation, diarrhea, nausea and vomiting.   Endocrine: Negative for cold intolerance and heat intolerance.   Genitourinary: Negative for  "flank pain.   Musculoskeletal: Negative for neck pain and neck stiffness.   Skin: Positive for color change and wound.   Neurological: Negative for light-headedness and headaches.   Psychiatric/Behavioral: Negative for sleep disturbance.          Objective:   Ht 5' 3" (1.6 m)   Wt 113.9 kg (251 lb 1.7 oz)   BMI 44.48 kg/m²     Physical Exam  LOWER EXTREMITY PHYSICAL EXAMINATION    DERMATOLOGY: Moderate cellulitis, LLE 1st MTPJ, mostly plantarly. Calor is noted. Fluctuance is noted. No gouty tophi are noted. Upon I&D of the site, purulence and abscess is noted. No osseous exposure is noted. No foreign body is noted.     VASCULAR: The LLE dorsalis pedis pulse is 1/4 and the posterior tibial pulse is 1/4. Hair growth is noted on the dorsal foot and digits. Proximal to distal, warm to warm. Capillary refill time is WNL at less than 3s. Edema is noted, B/L.    ORTHOPEDIC: Manual Muscle Testing is 5/5 in all planes on the LLE, without pains, with and without resistance. Moderate edema is noted, LLE 1st MTPJ and toe. Antalgic gait is noted.    NEUROLOGY: Proprioception is intact. Sensation to light touch is intact. Protective sensation is not intact via 5.07 [place] monofilament.       Assessment:     1. Cellulitis and abscess of toe of left foot    2. Pain of left great toe    3. 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 Stage 3a chronic kidney disease    5. Type 2 diabetes mellitus with peripheral neuropathy        Plan:     Pain of left great toe  Cellulitis and abscess of toe of left foot  -     Aerobic culture (Specify Source)    Thorough discussion is had with the patient today, concerning the diagnosis, its etiology, and the treatment algorithm at present.     XRAYS are reviewed in detail with the patient. All questions and concerns regarding findings and its/their implications are outlined and discussed.    D/C MRI.    A TIME-OUT was completed. The area of " pathology was incised and drained to the level of subcutaneous tissues, after adequate prep with alcohol and/or betadine paint. The procedure was performed using sharp #10/#15 blade, followed by either a tissue nipper or a hemostat to probe the wound deep, to allow for further evacuation of abscess/hematoma/fluid/foreign body. Post debridement measurements are as above. Hemostasis was achieved. Patient tolerated procedure well and without complications. Local woundcare with topical ABx ointment dressings and bandage thereafter.     Normal shoe gear for gait.    BID warm water salt soaks.     Continue PO ABx. Await Wound C&S.    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  Dispense: 30 tablet; Refill: 0    Patient's past medical history is thoroughly reviewed today with the patient and/or family members. Complete chart review is performed at this time.    Stage 3a chronic kidney disease  Type 2 diabetes mellitus with peripheral neuropathy  Patient advised to follow up with Primary Care Physician for management of comorbid states.          Future Appointments   Date Time Provider Department Center   7/1/2022  1:30 PM LABORATORY, [facility]   7/6/2022  1:30 PM Denis Caban NP [facility]   7/8/2022  1:20 PM Lisa Porter MD [facility] MED CLN  [facility]   7/18/2022  8:10 AM LABORATORY, [facility]   7/25/2022  2:15 PM Foster Beltran DPM [facility] POD  [facility]   7/25/2022  3:00 PM [facility] BMD1 [facility] DEXABMD [facility]   8/2/2022  4:00 PM CHAIR 02 [facility]   8/5/2022  4:40 PM Veronica Ibarra MD [facility] VAS CAR [place]   8/31/2022 11:30 AM Jacky Ackerman MD [facility] RHEU  [facility]

## 2022-06-30 NOTE — PROGRESS NOTES
Addend- gout in addition to abscess. She did see Dr. Beltran f/u and abscess was noted. Follow plan as instructed for local wound care

## 2022-07-01 ENCOUNTER — HOSPITAL ENCOUNTER (OUTPATIENT)
Dept: RADIOLOGY | Facility: HOSPITAL | Age: 72
Discharge: HOME | End: 2022-07-01
Attending: PODIATRIST
Payer: MEDICARE

## 2022-07-01 DIAGNOSIS — L03.119 CELLULITIS AND ABSCESS OF FOOT, EXCEPT TOES: ICD-10-CM

## 2022-07-01 DIAGNOSIS — L02.619 CELLULITIS AND ABSCESS OF FOOT, EXCEPT TOES: ICD-10-CM

## 2022-07-01 PROCEDURE — 73720 MRI LWR EXTREMITY W/O&W/DYE: CPT | Mod: TC,LT

## 2022-07-01 PROCEDURE — 73720 MRI FOOT TOES W WO CONTRAST LEFT: ICD-10-PCS | Mod: 26,LT,, | Performed by: RADIOLOGY

## 2022-07-01 PROCEDURE — 73720 MRI LWR EXTREMITY W/O&W/DYE: CPT | Mod: 26,LT,, | Performed by: RADIOLOGY

## 2022-07-01 PROCEDURE — A9585 GADOBUTROL INJECTION: HCPCS | Performed by: PODIATRIST

## 2022-07-01 PROCEDURE — 25500020 PHARM REV CODE 255: Performed by: PODIATRIST

## 2022-07-01 RX ORDER — GADOBUTROL 604.72 MG/ML
10 INJECTION INTRAVENOUS
Status: COMPLETED | OUTPATIENT
Start: 2022-07-01 | End: 2022-07-01

## 2022-07-01 RX ADMIN — GADOBUTROL 10 ML: 604.72 INJECTION INTRAVENOUS at 02:07

## 2022-07-04 DIAGNOSIS — L03.032 CELLULITIS AND ABSCESS OF TOE OF LEFT FOOT: Primary | ICD-10-CM

## 2022-07-04 DIAGNOSIS — L02.612 CELLULITIS AND ABSCESS OF TOE OF LEFT FOOT: Primary | ICD-10-CM

## 2022-07-04 LAB — BACTERIA SPEC AEROBE CULT: ABNORMAL

## 2022-07-04 RX ORDER — CIPROFLOXACIN 500 MG/1
500 TABLET ORAL EVERY 12 HOURS
Qty: 14 TABLET | Refills: 0 | Status: SHIPPED | OUTPATIENT
Start: 2022-07-04 | End: 2022-07-11

## 2022-07-05 ENCOUNTER — TELEPHONE (OUTPATIENT)
Dept: PRIMARY CARE CLINIC | Facility: CLINIC | Age: 72
End: 2022-07-05
Payer: MEDICARE

## 2022-07-05 NOTE — TELEPHONE ENCOUNTER
Patient states she will start the antibiotic tonight when her daughter gets home just in case something happens (side effects)

## 2022-07-05 NOTE — TELEPHONE ENCOUNTER
----- Message from Elma Hernandez NP sent at 7/5/2022 11:02 AM CDT -----  Regarding: FW: antibiotic    ----- Message -----  From: Lisa Porter MD  Sent: 7/5/2022   9:54 AM CDT  To: Hortencia Fraser MA, Elma Hernandez NP  Subject: antibiotic                                       Good morning - could one of you please call Ms. Ortiz to make sure she got the new antibiotic prescription from Dr. Beltran? He prescribed a new antibiotic (cipro which thankfully is NOT on her list of allergies) but I don't see anything confirming anyone spoke w Ms. Ortiz.

## 2022-07-05 NOTE — TELEPHONE ENCOUNTER
----- Message from Padmini Barksdale sent at 7/5/2022  2:38 PM CDT -----  Contact: JOI LYON [[id number]]  .Type:  Patient Returning Call    Who Called:JOI LYON [[id number]]  Who Left Message for Patient:  Does the patient know what this is regarding?:  Would the patient rather a call back or a response via [facility]? Call   Best Call Back Number:.[phone] (home)    Additional Information:

## 2022-07-06 ENCOUNTER — TELEPHONE (OUTPATIENT)
Dept: PRIMARY CARE CLINIC | Facility: CLINIC | Age: 72
End: 2022-07-06
Payer: MEDICARE

## 2022-07-06 ENCOUNTER — OFFICE VISIT (OUTPATIENT)
Dept: HEMATOLOGY/ONCOLOGY | Facility: CLINIC | Age: 72
End: 2022-07-06
Payer: MEDICARE

## 2022-07-06 VITALS
BODY MASS INDEX: 44.76 KG/M2 | WEIGHT: 252.63 LBS | DIASTOLIC BLOOD PRESSURE: 60 MMHG | TEMPERATURE: 98 F | HEART RATE: 60 BPM | SYSTOLIC BLOOD PRESSURE: 130 MMHG | HEIGHT: 63 IN | OXYGEN SATURATION: 97 %

## 2022-07-06 DIAGNOSIS — D50.0 IRON DEFICIENCY ANEMIA DUE TO CHRONIC BLOOD LOSS: Primary | ICD-10-CM

## 2022-07-06 PROCEDURE — 99214 OFFICE O/P EST MOD 30 MIN: CPT | Mod: S$PBB,,,

## 2022-07-06 PROCEDURE — 99999 PR PBB SHADOW E&M-EST. PATIENT-LVL V: CPT | Mod: PBBFAC,,,

## 2022-07-06 PROCEDURE — 99215 OFFICE O/P EST HI 40 MIN: CPT | Mod: PBBFAC

## 2022-07-06 PROCEDURE — 99999 PR PBB SHADOW E&M-EST. PATIENT-LVL V: ICD-10-PCS | Mod: PBBFAC,,,

## 2022-07-06 PROCEDURE — 99214 PR OFFICE/OUTPT VISIT, EST, LEVL IV, 30-39 MIN: ICD-10-PCS | Mod: S$PBB,,,

## 2022-07-06 NOTE — TELEPHONE ENCOUNTER
----- Message from Lisa Porter MD sent at 7/5/2022  9:50 AM CDT -----  Regarding: antibiotic  Good morning - could one of you please call Ms. Ortiz to make sure she got the new antibiotic prescription from Dr. Beltran? He prescribed a new antibiotic (cipro which thankfully is NOT on her list of allergies) but I don't see anything confirming anyone spoke w Ms. Ortiz.

## 2022-07-06 NOTE — TELEPHONE ENCOUNTER
Message left for patient to call the office back. Need to see if she picked up her antibiotic that was called in by Dr. Beltran.

## 2022-07-06 NOTE — TELEPHONE ENCOUNTER
----- Message from Abbie Alegre sent at 7/6/2022 11:33 AM CDT -----  Contact: [phone]  Patient is returning a phone call.    Who left a message for the patient: Hortencia     Does patient know what this is regarding:  Returning a phone call    Comments:  Qi is calling to inform the nurse that she did  her antibiotic that Dr. Beltran called in.

## 2022-07-08 ENCOUNTER — OFFICE VISIT (OUTPATIENT)
Dept: PRIMARY CARE CLINIC | Facility: CLINIC | Age: 72
End: 2022-07-08
Payer: MEDICARE

## 2022-07-08 ENCOUNTER — HOSPITAL ENCOUNTER (OUTPATIENT)
Dept: RADIOLOGY | Facility: HOSPITAL | Age: 72
Discharge: HOME | End: 2022-07-08
Attending: INTERNAL MEDICINE
Payer: MEDICARE

## 2022-07-08 VITALS
SYSTOLIC BLOOD PRESSURE: 120 MMHG | HEIGHT: 63 IN | TEMPERATURE: 98 F | DIASTOLIC BLOOD PRESSURE: 70 MMHG | WEIGHT: 250.88 LBS | OXYGEN SATURATION: 96 % | RESPIRATION RATE: 18 BRPM | BODY MASS INDEX: 44.45 KG/M2 | HEART RATE: 80 BPM

## 2022-07-08 DIAGNOSIS — R94.6 BORDERLINE ABNORMAL THYROID FUNCTION TEST: ICD-10-CM

## 2022-07-08 DIAGNOSIS — L08.9 LEFT FOOT INFECTION: ICD-10-CM
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
## 2022-07-08 DIAGNOSIS — E11.22 TYPE 2 DIABETES MELLITUS WITH STAGE 3A CHRONIC KIDNEY DISEASE, WITHOUT LONG-TERM CURRENT USE OF INSULIN: Chronic | ICD-10-CM

## 2022-07-08 DIAGNOSIS — L08.9 LEFT FOOT INFECTION: Primary | ICD-10-CM

## 2022-07-08 DIAGNOSIS — E03.9 HYPOTHYROIDISM, UNSPECIFIED TYPE: Chronic | ICD-10-CM

## 2022-07-08 DIAGNOSIS — N18.31 TYPE 2 DIABETES MELLITUS WITH STAGE 3A CHRONIC KIDNEY DISEASE, WITHOUT LONG-TERM CURRENT USE OF INSULIN: Chronic | ICD-10-CM

## 2022-07-08 DIAGNOSIS — E79.0 ABNORMAL BLOOD LEVEL OF URIC ACID: ICD-10-CM

## 2022-07-08 DIAGNOSIS — N18.30 STAGE 3 CHRONIC KIDNEY DISEASE, UNSPECIFIED WHETHER STAGE 3A OR 3B CKD: Chronic | ICD-10-CM

## 2022-07-08 PROCEDURE — 99215 OFFICE O/P EST HI 40 MIN: CPT | Mod: S$PBB,,, | Performed by: INTERNAL MEDICINE

## 2022-07-08 PROCEDURE — 99999 PR PBB SHADOW E&M-EST. PATIENT-LVL V: ICD-10-PCS | Mod: PBBFAC,,, | Performed by: INTERNAL MEDICINE

## 2022-07-08 PROCEDURE — 73630 X-RAY EXAM OF FOOT: CPT | Mod: 26,LT,, | Performed by: RADIOLOGY

## 2022-07-08 PROCEDURE — 99215 OFFICE O/P EST HI 40 MIN: CPT | Mod: PBBFAC | Performed by: INTERNAL MEDICINE

## 2022-07-08 PROCEDURE — 73630 XR FOOT COMPLETE 3 VIEW LEFT: ICD-10-PCS | Mod: 26,LT,, | Performed by: RADIOLOGY

## 2022-07-08 PROCEDURE — 99215 PR OFFICE/OUTPT VISIT, EST, LEVL V, 40-54 MIN: ICD-10-PCS | Mod: S$PBB,,, | Performed by: INTERNAL MEDICINE

## 2022-07-08 PROCEDURE — 99999 PR PBB SHADOW E&M-EST. PATIENT-LVL V: CPT | Mod: PBBFAC,,, | Performed by: INTERNAL MEDICINE

## 2022-07-08 PROCEDURE — 73630 X-RAY EXAM OF FOOT: CPT | Mod: TC,LT

## 2022-07-08 RX ORDER — ONDANSETRON HYDROCHLORIDE 8 MG/1
8 TABLET, FILM COATED ORAL EVERY 8 HOURS PRN
Qty: 30 TABLET | Refills: 0 | Status: SHIPPED | OUTPATIENT
Start: 2022-07-08 | End: 2022-07-20

## 2022-07-08 RX ORDER — VALSARTAN 320 MG/1
160 TABLET ORAL DAILY
Qty: 45 TABLET | Refills: 0 | Status: SHIPPED | OUTPATIENT
Start: 2022-07-08 | End: 2022-10-03 | Stop reason: SDUPTHER

## 2022-07-08 NOTE — PATIENT INSTRUCTIONS
Please continue cipro as prescribed - can take ondansetron 30-60 min prior and try taking w fuad    Will call you w result of Xray and plan    Please discontinue Diovan - start valsartan (without HCTZ) instead    Please let us know if continued nausea, fever or other concerning symptoms.

## 2022-07-08 NOTE — PROGRESS NOTES
Qi Ortiz   07/08/2022  [id number]    Lisa Porter MD  Patient Care Team:  Lisa Porter MD as PCP - General (Internal Medicine)  Cynthia Pruitt LPN as Care Coordinator (Family Medicine)  Elma Hernandez NP as Nurse Practitioner (Family Medicine)    Visit Type:a scheduled routine follow-up visit     Chief Complaint:  Chief Complaint   Patient presents with    Follow-up    Fall     Fall 07/05/2022     F/u L foot infection, gout    Fall at home    History of Present Illness:   Ms. Qi Ortiz is a 72 year old female w multiple chronic medical issues self-referred [facility]. PMHx includes type 2 DM, h/o CVA, CAD s/p CABG x 2, AS s/p TAVR, HTN, HLP (intolerant of statin), CKD stage 3, COPD, hypothyroidism, psoriasis and psoriatic arthritis, CAREN, obesity, and IRIS (intolerant of CPAP). Ms. Ortiz has multiple drug and contact allergies and sensitivities. Food allergies include kiwi fruit and crab boil.    Ms. Ortiz is hearing impaired - gradual hearing loss over past 15 years, now severe. Unble to use hearing aids due to psoriasis in her ear canals.      Ms. Ortiz is a 's  and drives to [facility] [email or web address] [place] once every 90 days to  her maintenance medications. Acute medications can be filled at [facility] in [place]. Ms. Ortiz does not have a cell phone, smart phone or internet. She does have a land-line w a speaker to make louder. Ms. Ortiz's daughters, Elsa and Shyanne, live close to her. They work during the day. Shyanne is Ms. Ortiz's HCPOA - OK to discuss medical issues w her.    At last visit Ms. Ortiz presented w pain, swelling and erythema over L medial forefoot (1st MCP). While uric acid level was significantly elevated, wound culture sent 7/02 returned positive for enterobacter cloacae 7/04 and she was prescribed ciprofloxin by Dr. Beltran Podiatry though she didn't actually start taking until Tuesday. She reports that she has been taking  it but did not this morning due to nausea. Discussed importance of completing antibiotic course as prescribed. Advised to take after a meal and will add an anti-emetic as welll that she can take 3-60 minutes prior to taking the cipro.     Did not tolerate the increase of metformin but willing to continue at previous dose of 500 mg once daily. The newly added allopurinol may also be contributing to some GI upset.     Since she is already prescribed furosemide, I recommended to discontinue the low dose HCTZ as thiazides can increase uric acid levels and risk for gout. Following our discussion, Ms. Ortiz agreed to this plan.     Ms. Ortiz reports fall at home w bruising and discomfort to L side. Was carrying groceries in both arms and lost her footing. No head injury or LOC, achey but mobile w no sig change in usual activities.     Recent Appointments  7/06/22 Heme/Onc Caban  6/30/22 Pod Dr. Beltran  6/22/22 Pod Dr. Rojo  6/15/22 Neuro Dr. Jaimes [facility]  6/08/22 Rheum Dr. Tyron JOHNSON [facility] inj  6/01/22 MRI brain [facility]  5/26/22 Urology Dr. Vern JOHNSON frequent UTIs, bladder lesions, annual US and UA  5/25/22 Echocardiogram  5/23/22 Cards Dr. Cheney (Dr. Ibarra unavailable)  5/17/22 Neuro Dr. Jaimes [facility] f/u TIAs  4/22/22 Heme/Onc Juli IV iron infusion completed #8 of 8 (h/o rectal bleeding - colonoscopy recommended - per Ms. Ortiz needs clearance from cardiology and rheumatology before can proceed w colonoscopy)     Upcoming Outside Appointments  Sept CT Surg for s/p AVR f/u [facility] (previously seen by Dr. Wilder who's retired)  Opthalmology [facility] (last eye exam > 2 years ago)    [facility]  Future Appointments     Date Provider Specialty Appt Notes    7/18/2022  Lab .    7/25/2022 Foster Beltran DPM Podiatry F/U Uric Acid     7/25/2022  Radiology dexa-Tyron    8/2/2022  Chemotherapy stelera SQ/ow/ngwv    8/5/2022 Veronica Ibarra MD Cardiology 6 mo f/u    8/8/2022 Lisa Porter MD Primary  Care 17 day f/u     2022 Jacky Ackerman MD Rheumatology ep/rtc/5months/labs/dexa/ca         The following were reviewed: Active problem list, medication list, allergies, family history, social history, and Health Maintenance.     History:  Past Medical History:   Diagnosis Date    Carotid stenosis     19%    COPD (chronic obstructive pulmonary disease)     No meds    Coronary artery disease     CVA (cerebral vascular accident)     Dr. Hoffman    Depression     Double ectopic ureters     Dr. Porras    Hemiplegia affecting right dominant side 2021    Hyperlipidemia     Hypertension     Hypothyroid     OP (osteoporosis)     IRIS (obstructive sleep apnea)     Dr. Hope    Psoriatic arthritis     Rheumatology     Past Surgical History:   Procedure Laterality Date    BREAST BIOPSY      R sided/benign    CARDIAC SURGERY      2016    CERVICAL FUSION      CHOLECYSTECTOMY      CORONARY ANGIOPLASTY      CORONARY ARTERY BYPASS GRAFT      triple bypass    CORONARY STENT PLACEMENT      EYE SURGERY      INTRAUTERINE DEVICE INSERTION      LEFT HEART CATHETERIZATION Left 2020    Procedure: CATHETERIZATION, HEART, LEFT;  Surgeon: Veronica Ibarra MD;  Location: [facility] CATH LAB;  Service: Cardiology;  Laterality: Left;  7am start time    mass removed from R groin      TOTAL ABDOMINAL HYSTERECTOMY W/ BILATERAL SALPINGOOPHORECTOMY      due to benign mass, adhesions    TUBAL LIGATION       Family History   Problem Relation Age of Onset    Breast cancer Maternal Grandfather     Breast cancer Paternal Aunt     Stroke Unknown     Breast cancer Sister 60    Leukemia Sister 8         as child    Lung cancer Paternal Grandfather     Heart disease Unknown     Diabetes Daughter      Social History     Socioeconomic History    Marital status: Single    Number of children: 3   Occupational History    Occupation: Not working   Tobacco Use    Smoking status: Never Smoker    Smokeless tobacco:  Never Used   Substance and Sexual Activity    Alcohol use: No    Drug use: No    Sexual activity: Never     Partners: Male     Patient Active Problem List   Diagnosis    Hyperlipidemia    Hypothyroidism    Degenerative arthritis of lumbar spine    Polyneuropathy    Metabolic syndrome    Vitamin D deficiency    OP (osteoporosis)    Essential hypertension    CAD (coronary artery disease), with stents     S/P CABG (coronary artery bypass graft)    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    Carotid stenosis    IRIS (obstructive sleep apnea)    Double ectopic ureters    Psoriatic arthritis    Morbid obesity with BMI of 40.0-44.9, adult    Angina, class II    Primary osteoarthritis involving multiple joints    Statin intolerance    Postural dizziness with near syncope    Stage 3 chronic kidney disease    Osteopenia of multiple sites    Psoriasis    History of CVA (cerebrovascular accident)    Chronic obstructive pulmonary disease    Iron deficiency anemia due to chronic blood loss    B12 deficiency    Iron deficiency anemia due to chronic blood loss    Allergy to statin medication    Type 2 diabetes mellitus with chronic kidney disease, without long-term current use of insulin    Transient ischemic attack (TIA)    Near syncope    Palpitations    Supraventricular tachycardia    Nonrheumatic aortic valve stenosis    S/P TAVR (transcatheter aortic valve replacement)    Anxiety    Anemia due to folic acid deficiency    Chronic diastolic heart failure    AP (angina pectoris)    CAD, multiple vessel    Pulmonary hypertension    Type 2 diabetes mellitus with peripheral neuropathy    Cellulitis and abscess of foot, except toes    Borderline abnormal thyroid function test    Abnormal blood level of uric acid    Left foot infection    Fall at home, initial encounter     Review of patient's allergies indicates:   Allergen Reactions    Celexa [citalopram]  Anaphylaxis    Clindamycin Itching and Swelling    Codeine Shortness Of Breath, Itching and Swelling    Crestor [rosuvastatin] Anaphylaxis    Cytotec [misoprostol] Anaphylaxis and Other (See Comments)     Difficulty breathing    Kiwi (actinidia chinensis) Blisters     Oral  Blisters/burning    Lisinopril Anaphylaxis    Magnesium citrate Shortness Of Breath    Stadol [butorphanol tartrate] Anaphylaxis     Coded    Vicodin [hydrocodone-acetaminophen] Shortness Of Breath    Adhesive      EKG Electrodes    Aggrenox [aspirin-dipyridamole] Other (See Comments)     headaches    Avelox [moxifloxacin]      PATIENT STATES DO NOT GIVE UNDER ANY CIRCUSTANCES    Azithromycin     Butorphanol     Cleocin [clindamycin hcl]     Demerol [meperidine]     Isosorbide Other (See Comments)     Severe headache    Kenalog [triamcinolone acetonide]     Medrol [methylprednisolone] Other (See Comments)     Patient reports taking IV in the past without any issues. Reports allergy to oral preparation     Mobic [meloxicam]     Morphine     Nitroglycerin      Long acting    Pholcodine     Prednisone      GASTRIC PAIN    Ranexa [ranolazine] Nausea And Vomiting    Reclast [zoledronic acid-mannitol-water] Other (See Comments)     Bones hurt    Sulfa (sulfonamide antibiotics) Other (See Comments)     unknown    Talwin [pentazocine lactate]     Tetracycline     Tetracyclines     Tilade [nedocromil]     Zetia [ezetimibe]        Medications:  Current Outpatient Medications on File Prior to Visit   Medication Sig Dispense Refill    acetaminophen (TYLENOL) 650 MG TbSR as directed      albuterol (PROVENTIL) 2.5 mg /3 mL (0.083 %) nebulizer solution Take 3 mLs (2.5 mg total) by nebulization every 6 (six) hours as needed for Wheezing. Rescue 25 each 5    allopurinoL (ZYLOPRIM) 300 MG tablet Take 1 tablet (300 mg total) by mouth once daily. 30 tablet 0    aspirin 81 MG Chew Take 1 tablet (81 mg total) by mouth once daily. 90  tablet 3    BETASEPT SURGICAL SCRUB 4 % external liquid Apply topically.      calcium carbonate 650 mg calcium (1,625 mg) tablet Take 1 tablet by mouth once daily.      clobetasoL (CLOBEX) 0.05 % shampoo Apply topically 2 (two) times daily. 118 mL 4    clobetasoL (OLUX) 0.05 % Foam Apply topically 2 (two) times daily. 300 g 3    clopidogreL (PLAVIX) 75 mg tablet Take 1 tablet (75 mg total) by mouth once daily. 90 tablet 3    cyanocobalamin 2000 MCG tablet Take 2,000 mcg by mouth once daily.      evolocumab (REPATHA SURECLICK) 140 mg/mL PnIj Inject 1 mL (140 mg total) into the skin every 14 (fourteen) days. 6 mL 3    folic acid (FOLVITE) 1 MG tablet Take 1 tablet (1 mg total) by mouth once daily. 90 tablet 1    furosemide (LASIX) 20 MG tablet Take 1 tablet (20 mg total) by mouth once daily. 90 tablet 3    gabapentin (NEURONTIN) 100 MG capsule Take 2 capsules (200 mg total) by mouth 3 (three) times daily. 540 capsule 3    garlic 2,000 mg Cap Take 3,000 mg by mouth once daily.       hydrocortisone 2.5 % cream Apply topically 2 (two) times daily. 20 g 0    levothyroxine (SYNTHROID) 112 MCG tablet Take 1 tablet (112 mcg total) by mouth once daily. 90 tablet 1    metFORMIN (GLUCOPHAGE-XR) 500 MG ER 24hr tablet Take 1 tablet (500 mg total) by mouth 2 (two) times daily with meals. 60 tablet 2    metoprolol succinate (TOPROL-XL) 100 MG 24 hr tablet Take 1 tablet (100 mg total) by mouth 2 (two) times daily. 1 tab (100mg) in morning. And 100 mg at bedtime.  Generic ok 180 tablet 3    nitroGLYCERIN (NITROSTAT) 0.4 MG SL tablet Place 1 tablet (0.4 mg total) under the tongue every 5 (five) minutes as needed for Chest pain. 100 tablet 3    potassium chloride SA (K-DUR,KLOR-CON) 20 MEQ tablet Take 1 tablet (20 mEq total) by mouth once daily. 90 tablet 3    pyridoxine, vitamin B6, (B-6) 100 MG Tab Take 200 mg by mouth once daily.       ustekinumab (STELARA) 90 mg/mL Syrg syringe Inject 1 mL (90 mg total) into the  skin every 3 (three) months. 1 Syringe 3    vitamin D 1000 units Tab Take 185 mg by mouth once daily.      docusate sodium (COLACE) 100 MG capsule Take 1 capsule (100 mg total) by mouth 2 (two) times daily. (Patient not taking: Reported on 7/8/2022) 60 capsule 0    docusate sodium (COLACE) 100 MG capsule Colace Take No date recorded No form recorded No frequency recorded No route recorded No set duration recorded No set duration amount recorded active No dosage strength recorded No dosage strength units of measure recorded       No current facility-administered medications on file prior to visit.       Medications have been reviewed and reconciled with patient at visit today.    Barriers to medications present (yes )  Multiple drug allergies and sensitivities  Has to drive to [facility] every 90 days to  maintenance medications    Adverse reactions to current medications (no)    Review of Systems   Constitutional: Positive for appetite change. Negative for fever.   HENT: Positive for hearing loss.    Eyes: Positive for photophobia and visual disturbance.   Respiratory: Negative for cough, chest tightness and shortness of breath.    Cardiovascular: Negative for chest pain and palpitations.   Gastrointestinal: Positive for nausea.        Abdominal discomfort and excessive loose stool reported w increase of metformin to BID - better since went back to once daily but now w c/o nausea w cipro   Musculoskeletal: Positive for arthralgias and myalgias.        L side from recent fall   Integumentary:  Positive for wound.        Redness and swelling base of L MCP - denies pain   Allergic/Immunologic: Positive for environmental allergies.   Neurological: Positive for numbness. Negative for dizziness, vertigo, syncope and light-headedness.      Exam:  Vitals:    07/08/22 1321   BP: 120/70   Pulse: 80   Resp: 18   Temp: 98.2 °F (36.8 °C)     Weight: 113.8 kg (250 lb 14.1 oz)   Body mass index is 44.44 kg/m².     5/11/22  weight 257     BP Readings from Last 3 Encounters:   07/08/22 120/70   07/06/22 130/60   06/29/22 138/88        Physical Exam  Constitutional:       General: She is not in acute distress.     Appearance: Normal appearance. She is obese.   HENT:      Head: Normocephalic and atraumatic.   Eyes:      General:         Right eye: No discharge.         Left eye: No discharge.      Extraocular Movements: Extraocular movements intact.   Neck:      Comments: ? Carotid bruit vs radiating aortic murmur?  Cardiovascular:      Rate and Rhythm: Normal rate and regular rhythm.      Heart sounds: Murmur heard.   Pulmonary:      Effort: Pulmonary effort is normal. No respiratory distress.      Breath sounds: Normal breath sounds.   Abdominal:      General: Bowel sounds are normal.   Musculoskeletal:         General: Swelling present.      Comments: Small open area L medial forefoot w adjacent erythema, swelling and warmth   Skin:     General: Skin is warm and dry.      Findings: Erythema present.      Comments: Bruising L arm  L foot erythematous area over L 1st MCP w swelling, fluid. Some drainage noted on sock.   Neurological:      Mental Status: She is alert.   Psychiatric:         Mood and Affect: Mood normal.         Behavior: Behavior normal.         Thought Content: Thought content normal.       Laboratory Reviewed: (Yes)  Lab Results   Component Value Date    WBC 7.25 07/01/2022    HGB 12.9 07/01/2022    HCT 39.5 07/01/2022     07/01/2022    MCV 94 07/01/2022    CHOL 136 01/13/2022    TRIG 119 01/13/2022    HDL 43 01/13/2022    LDLCALC 69.2 01/13/2022    ALT 23 07/01/2022    AST 24 07/01/2022     07/01/2022    K 4.2 07/01/2022     07/01/2022    CREATININE 1.3 07/01/2022    BUN 19 07/01/2022    CO2 29 07/01/2022    MG 2.0 05/20/2022    TSH 0.997 05/20/2022    FREET4 0.98 05/20/2022    INR 1.0 09/28/2020    HGBA1C 7.1 (H) 05/20/2022    CRP 5.7 11/12/2021 6/27/22: uric acid - 11.6(H!) creatinine 1.4 eGFR  37.8 (baseline crt 1.1) ESR 68 (H)    5/20/22: eGFR 51.0 PTHi 91.5(H) folate 18.7(wnl) B12> 2,000(H) mma 0.47(H)  4/01/22: (H) mcv 85 rdw 19.2 eGFR 38  1/13/22: TSH 7.980(H) free T4 0.70(L)   9/18/20: folate 32(H) B12 1816(H)    Xray L foot 7/08/22: No acute osseous abnormality present. Similar chronic findings noted. Arterial vascular calcifications present. Generalized soft tissue edema of the foot suspected, improved the dorsal aspect. No soft tissue air foreign body.    MRI L foot 7/01/22: Enhancement in the soft tissues along the lateral and plantar aspect of the foot at the level of the 1st MTP joint which may relate to recent intervention and or cellulitis/phlegmon. No evidence of abscess or drainable fluid collection. Small focal skin ulceration along the plantar medial aspect of the foot. Nonspecific mild synovitis at the 1st MTP joint.  No evidence of foreign body as was questioned. Other findings as above.    Xray L foot 6/22/22: Enhancement in the soft tissues along the lateral and plantar aspect of the foot at the level of the 1st MTP joint which may relate to recent intervention and or cellulitis/phlegmon. No evidence of abscess or drainable fluid collection.  Small focal skin ulceration along the plantar medial aspect of the foot. Nonspecific mild synovitis at the 1st MTP joint.  No evidence of foreign body as was questioned. Other findings as above.     [facility] 4/01/22:  CT C-spine: Fusion with multilevel degenerative disc disease (radha at C6-7) . No acute bony abnormality is identified.     CT Head: No intracranial hemorrhage or acute findings. Small vessel ischemic changes in the periventricular white matter. Paranasal sinuses are clear.     CXR: The heart is normal in size. Sternotomy wires. Aortic atherosclerosis. No acute infiltrates.     ECG: Sinus rhythm with Premature atrial complexes. ST and T wave abnormality, consider lateral ischemia. No STEMI.    Assessment:   72 y.o. female  with multiple co-morbid illnesses here for continued follow up of medical problems.      The primary encounter diagnosis was Left foot infection. Diagnoses of Stage 3 chronic kidney disease, unspecified whether stage 3a or 3b CKD, Borderline abnormal thyroid function test, Abnormal blood level of uric acid, Hypothyroidism, unspecified type, Type 2 diabetes mellitus with stage 3a chronic kidney disease, without long-term current use of insulin, and Fall at home, initial encounter were also pertinent to this visit.      Plan:     Problem List Items Addressed This Visit        Renal/    Stage 3 chronic kidney disease    Relevant Orders    URIC ACID       ID    Left foot infection - Primary    Relevant Orders    X-Ray Foot Complete 3 view Left (Completed)       Endocrine    Hypothyroidism (Chronic)    Type 2 diabetes mellitus with chronic kidney disease, without long-term current use of insulin    Borderline abnormal thyroid function test    Relevant Orders    TSH    T4, Free       Orthopedic    Abnormal blood level of uric acid    Relevant Orders    URIC ACID       Other    Fall at home, initial encounter          Health Maintenance       Date Due Completion Date    Diabetes Urine Screening Never done ---    COVID-19 Vaccine (1) Never done ---    Pneumococcal Vaccines (Age 65+) (1 - PCV) Never done ---    Foot Exam Never done ---    Eye Exam Never done ---    TETANUS VACCINE Never done ---    Colorectal Cancer Screening 04/18/2016 4/18/2011    Shingles Vaccine (1 of 2) 05/05/2016 3/10/2016    Mammogram 10/23/2016 10/23/2015    Hemoglobin A1c 11/20/2022 5/20/2022    Lipid Panel 01/13/2023 1/13/2022    Aspirin/Antiplatelet Therapy 06/22/2023 6/22/2022    DEXA Scan 08/18/2023 8/18/2020        Some of above may have been completed outside of [facility] system   Declines CoVid vaccine    -Patient's lab results and echocardiogram report were reviewed and discussed with patient  -Treatment options and alternatives were  discussed with the patient. Patient expressed understanding. Patient was given the opportunity to ask questions and be an active participant in their medical care. Patient had no further questions or concerns at this time.   --Patient is an overall HIGH risk for health complications from their medical conditions.     Follow up: Dr. Porter 4 weeks    After visit summary printed and given to patient upon discharge.  Patient care plan included in After visit summary.    TOTAL TIME evaluating and managing this patient for this encounter was greater than 60 minutes. This time was spent personally by me on the following activities: review of patient's past medical history, assessing age-appropriate health maintenance needs, review of any interval history, review and interpretation of lab results, review and interpretation of imaging test results, review and interpretation of cardiology test results, reviewing consulting specialist notes, obtaining history from the patient and family, examination of the patient, medication reconciliation, managing and/or ordering prescription medications, ordering imaging tests, ordering referral to subspecialty provider(s), educating patient and answering their questions about diagnosis, treatment plan, and goals of treatment, discussing planned follow-up and final documentation of the visit. This time was exclusive of any separately billable procedures for this patient and exclusive of time spent treating any other patients.     Addendum  Was able to reach Ms. Ortiz by phone to review Xray L foot report. Advised to cont allopurinol and cipro. F/u August 8th or earlier if needed.

## 2022-07-17 NOTE — ASSESSMENT & PLAN NOTE
Cont monitor - allopurinol added - nephroprotective - cont ARB and low dose metformin - recheck RFP 7/18

## 2022-07-17 NOTE — ASSESSMENT & PLAN NOTE
Concerned about development of fluid filled area over recent L foot wound - Xray ordered to ensure no air within soft tissue - added anti-emetic and strongly advised to complete antibiotic course as prescribed

## 2022-07-17 NOTE — ASSESSMENT & PLAN NOTE
May have been more off balance due to current L foot infection - assess balance, gait, in more detail next visit

## 2022-07-18 ENCOUNTER — TELEPHONE (OUTPATIENT)
Dept: PRIMARY CARE CLINIC | Facility: CLINIC | Age: 72
End: 2022-07-18
Payer: MEDICARE

## 2022-07-18 ENCOUNTER — LAB VISIT (OUTPATIENT)
Dept: LAB | Facility: HOSPITAL | Age: 72
End: 2022-07-18
Attending: INTERNAL MEDICINE
Payer: MEDICARE

## 2022-07-18 DIAGNOSIS — L40.50 PSORIATIC ARTHRITIS: ICD-10-CM

## 2022-07-18 DIAGNOSIS — D84.9 IMMUNOSUPPRESSED STATUS: ICD-10-CM

## 2022-07-18 DIAGNOSIS — E79.0 ABNORMAL BLOOD LEVEL OF URIC ACID: ICD-10-CM

## 2022-07-18 DIAGNOSIS — Z79.899 HIGH RISK MEDICATION USE: ICD-10-CM

## 2022-07-18 DIAGNOSIS — N18.30 STAGE 3 CHRONIC KIDNEY DISEASE, UNSPECIFIED WHETHER STAGE 3A OR 3B CKD: Chronic | ICD-10-CM

## 2022-07-18 DIAGNOSIS — I25.118 CORONARY ARTERY DISEASE OF NATIVE ARTERY OF NATIVE HEART WITH STABLE ANGINA PECTORIS: ICD-10-CM

## 2022-07-18 DIAGNOSIS — R73.03 PREDIABETES: ICD-10-CM

## 2022-07-18 DIAGNOSIS — R94.6 BORDERLINE ABNORMAL THYROID FUNCTION TEST: ICD-10-CM

## 2022-07-18 LAB
ALBUMIN SERPL BCP-MCNC: 3.4 G/DL (ref 3.5–5.2)
ALP SERPL-CCNC: 38 U/L (ref 55–135)
ALT SERPL W/O P-5'-P-CCNC: 15 U/L (ref 10–44)
ANION GAP SERPL CALC-SCNC: 16 MMOL/L (ref 8–16)
AST SERPL-CCNC: 18 U/L (ref 10–40)
BASOPHILS # BLD AUTO: 0.05 K/UL (ref 0–0.2)
BASOPHILS NFR BLD: 0.6 % (ref 0–1.9)
BILIRUB SERPL-MCNC: 0.4 MG/DL (ref 0.1–1)
BUN SERPL-MCNC: 17 MG/DL (ref 8–23)
CALCIUM SERPL-MCNC: 9 MG/DL (ref 8.7–10.5)
CHLORIDE SERPL-SCNC: 103 MMOL/L (ref 95–110)
CHOLEST SERPL-MCNC: 119 MG/DL (ref 120–199)
CHOLEST/HDLC SERPL: 2.8 {RATIO} (ref 2–5)
CO2 SERPL-SCNC: 27 MMOL/L (ref 23–29)
CREAT SERPL-MCNC: 1.3 MG/DL (ref 0.5–1.4)
CRP SERPL-MCNC: 6.6 MG/L (ref 0–8.2)
DIFFERENTIAL METHOD: ABNORMAL
EOSINOPHIL # BLD AUTO: 0.2 K/UL (ref 0–0.5)
EOSINOPHIL NFR BLD: 2.7 % (ref 0–8)
ERYTHROCYTE [DISTWIDTH] IN BLOOD BY AUTOMATED COUNT: 14.3 % (ref 11.5–14.5)
ERYTHROCYTE [SEDIMENTATION RATE] IN BLOOD BY WESTERGREN METHOD: 38 MM/HR (ref 0–20)
EST. GFR  (AFRICAN AMERICAN): 48 ML/MIN/1.73 M^2
EST. GFR  (NON AFRICAN AMERICAN): 41 ML/MIN/1.73 M^2
ESTIMATED AVG GLUCOSE: 140 MG/DL (ref 68–131)
GLUCOSE SERPL-MCNC: 112 MG/DL (ref 70–110)
HBA1C MFR BLD: 6.5 % (ref 4–5.6)
HCT VFR BLD AUTO: 37.6 % (ref 37–48.5)
HDLC SERPL-MCNC: 42 MG/DL (ref 40–75)
HDLC SERPL: 35.3 % (ref 20–50)
HGB BLD-MCNC: 11.5 G/DL (ref 12–16)
IMM GRANULOCYTES # BLD AUTO: 0.02 K/UL (ref 0–0.04)
IMM GRANULOCYTES NFR BLD AUTO: 0.2 % (ref 0–0.5)
LDLC SERPL CALC-MCNC: 55.4 MG/DL (ref 63–159)
LYMPHOCYTES # BLD AUTO: 1.8 K/UL (ref 1–4.8)
LYMPHOCYTES NFR BLD: 22.4 % (ref 18–48)
MCH RBC QN AUTO: 29.9 PG (ref 27–31)
MCHC RBC AUTO-ENTMCNC: 30.6 G/DL (ref 32–36)
MCV RBC AUTO: 98 FL (ref 82–98)
MONOCYTES # BLD AUTO: 0.7 K/UL (ref 0.3–1)
MONOCYTES NFR BLD: 8.2 % (ref 4–15)
NEUTROPHILS # BLD AUTO: 5.3 K/UL (ref 1.8–7.7)
NEUTROPHILS NFR BLD: 65.9 % (ref 38–73)
NONHDLC SERPL-MCNC: 77 MG/DL
NRBC BLD-RTO: 0 /100 WBC
PLATELET # BLD AUTO: 247 K/UL (ref 150–450)
PMV BLD AUTO: 10.4 FL (ref 9.2–12.9)
POTASSIUM SERPL-SCNC: 4.3 MMOL/L (ref 3.5–5.1)
PROT SERPL-MCNC: 6.4 G/DL (ref 6–8.4)
RBC # BLD AUTO: 3.85 M/UL (ref 4–5.4)
SODIUM SERPL-SCNC: 146 MMOL/L (ref 136–145)
T4 FREE SERPL-MCNC: 1.08 NG/DL (ref 0.71–1.51)
TRIGL SERPL-MCNC: 108 MG/DL (ref 30–150)
TSH SERPL DL<=0.005 MIU/L-ACNC: 1.2 UIU/ML (ref 0.4–4)
URATE SERPL-MCNC: 4.1 MG/DL (ref 2.4–5.7)
WBC # BLD AUTO: 8.07 K/UL (ref 3.9–12.7)

## 2022-07-18 PROCEDURE — 84439 ASSAY OF FREE THYROXINE: CPT | Performed by: INTERNAL MEDICINE

## 2022-07-18 PROCEDURE — 36415 COLL VENOUS BLD VENIPUNCTURE: CPT | Mod: PO | Performed by: INTERNAL MEDICINE

## 2022-07-18 PROCEDURE — 86140 C-REACTIVE PROTEIN: CPT | Performed by: INTERNAL MEDICINE

## 2022-07-18 PROCEDURE — 84443 ASSAY THYROID STIM HORMONE: CPT | Performed by: INTERNAL MEDICINE

## 2022-07-18 PROCEDURE — 85651 RBC SED RATE NONAUTOMATED: CPT | Performed by: INTERNAL MEDICINE

## 2022-07-18 PROCEDURE — 85025 COMPLETE CBC W/AUTO DIFF WBC: CPT | Performed by: INTERNAL MEDICINE

## 2022-07-18 PROCEDURE — 80053 COMPREHEN METABOLIC PANEL: CPT | Performed by: INTERNAL MEDICINE

## 2022-07-18 PROCEDURE — 84550 ASSAY OF BLOOD/URIC ACID: CPT | Performed by: INTERNAL MEDICINE

## 2022-07-18 PROCEDURE — 80061 LIPID PANEL: CPT | Performed by: INTERNAL MEDICINE

## 2022-07-18 PROCEDURE — 86480 TB TEST CELL IMMUN MEASURE: CPT | Performed by: INTERNAL MEDICINE

## 2022-07-18 PROCEDURE — 83036 HEMOGLOBIN GLYCOSYLATED A1C: CPT | Performed by: INTERNAL MEDICINE

## 2022-07-18 NOTE — TELEPHONE ENCOUNTER
Left voicemail for MsThierno Diana regarding lab results. Advised to continue current Rx, stay hydrated, watch sodium intake! F/u as scheduled.

## 2022-07-20 LAB
GAMMA INTERFERON BACKGROUND BLD IA-ACNC: 0 IU/ML
M TB IFN-G CD4+ BCKGRND COR BLD-ACNC: 0 IU/ML
MITOGEN IGNF BCKGRD COR BLD-ACNC: 6.74 IU/ML
TB GOLD PLUS: NEGATIVE
TB2 - NIL: 0 IU/ML

## 2022-07-21 ENCOUNTER — TELEPHONE (OUTPATIENT)
Dept: RHEUMATOLOGY | Facility: CLINIC | Age: 72
End: 2022-07-21
Payer: MEDICARE

## 2022-07-21 NOTE — TELEPHONE ENCOUNTER
Patient informed of test results and Dr. Ackerman recommendations. Patient verbalized understanding .

## 2022-07-21 NOTE — TELEPHONE ENCOUNTER
----- Message from Jacky Ackerman MD sent at 7/21/2022 11:33 AM CDT -----  Results look within acceptable range.

## 2022-07-25 ENCOUNTER — APPOINTMENT (OUTPATIENT)
Dept: RADIOLOGY | Facility: HOSPITAL | Age: 72
End: 2022-07-25
Attending: INTERNAL MEDICINE
Payer: MEDICARE

## 2022-07-25 ENCOUNTER — OFFICE VISIT (OUTPATIENT)
Dept: PODIATRY | Facility: CLINIC | Age: 72
End: 2022-07-25
Payer: MEDICARE

## 2022-07-25 VITALS — BODY MASS INDEX: 44.45 KG/M2 | HEIGHT: 63 IN | WEIGHT: 250.88 LBS

## 2022-07-25 DIAGNOSIS — N18.31 STAGE 3A CHRONIC KIDNEY DISEASE: ICD-10-CM

## 2022-07-25 DIAGNOSIS — M1A.3721 CHRONIC GOUT DUE TO RENAL IMPAIRMENT INVOLVING TOE OF LEFT FOOT WITH TOPHUS: Primary | ICD-10-CM

## 2022-07-25 DIAGNOSIS — M79.675 PAIN OF LEFT GREAT TOE: ICD-10-CM

## 2022-07-25 DIAGNOSIS — M81.0 AGE-RELATED OSTEOPOROSIS WITHOUT CURRENT PATHOLOGICAL FRACTURE: ICD-10-CM

## 2022-07-25 DIAGNOSIS — E11.42 TYPE 2 DIABETES MELLITUS WITH PERIPHERAL NEUROPATHY: ICD-10-CM

## 2022-07-25 PROCEDURE — 99999 PR PBB SHADOW E&M-EST. PATIENT-LVL IV: CPT | Mod: PBBFAC,,, | Performed by: PODIATRIST

## 2022-07-25 PROCEDURE — 99999 PR PBB SHADOW E&M-EST. PATIENT-LVL IV: ICD-10-PCS | Mod: PBBFAC,,, | Performed by: PODIATRIST

## 2022-07-25 PROCEDURE — 77080 DXA BONE DENSITY AXIAL: CPT | Mod: TC

## 2022-07-25 PROCEDURE — 99213 PR OFFICE/OUTPT VISIT, EST, LEVL III, 20-29 MIN: ICD-10-PCS | Mod: S$PBB,,, | Performed by: PODIATRIST

## 2022-07-25 PROCEDURE — 77080 DXA BONE DENSITY AXIAL: CPT | Mod: 26,,, | Performed by: RADIOLOGY

## 2022-07-25 PROCEDURE — 99213 OFFICE O/P EST LOW 20 MIN: CPT | Mod: S$PBB,,, | Performed by: PODIATRIST

## 2022-07-25 PROCEDURE — 99214 OFFICE O/P EST MOD 30 MIN: CPT | Mod: PBBFAC,25 | Performed by: PODIATRIST

## 2022-07-25 PROCEDURE — 77080 DEXA BONE DENSITY SPINE HIP: ICD-10-PCS | Mod: 26,,, | Performed by: RADIOLOGY

## 2022-07-25 NOTE — PROGRESS NOTES
Subjective:       Patient ID: Qi Ortiz is a 72 y.o. female.    Chief Complaint: Follow-up (Uric acid f/u. Diabetic PCP: Dr. Porter last seen 07/08/22)    HPI: Qi Ortiz presents to the clinic today, for follow evaluation and treatment concerning gout of the left great toe. She did also have a I&D of the left 1st MTPJ at the last evaluation. Was started on Allopurinol at her last appt. Most recent Uric Acid was WNL. Wants to D/C Allopurinol. Did complete PO ABx.     Hemoglobin A1C   Date Value Ref Range Status   07/18/2022 6.5 (H) 4.0 - 5.6 % Final     Comment:     ADA Screening Guidelines:  5.7-6.4%  Consistent with prediabetes  >or=6.5%  Consistent with diabetes    High levels of fetal hemoglobin interfere with the HbA1C  assay. Heterozygous hemoglobin variants (HbS, HgC, etc)do  not significantly interfere with this assay.   However, presence of multiple variants may affect accuracy.     05/20/2022 7.1 (H) 4.0 - 5.6 % Final     Comment:     ADA Screening Guidelines:  5.7-6.4%  Consistent with prediabetes  >or=6.5%  Consistent with diabetes    High levels of fetal hemoglobin interfere with the HbA1C  assay. Heterozygous hemoglobin variants (HbS, HgC, etc)do  not significantly interfere with this assay.   However, presence of multiple variants may affect accuracy.     01/13/2022 6.6 (H) 4.0 - 5.6 % Final     Comment:     ADA Screening Guidelines:  5.7-6.4%  Consistent with prediabetes  >or=6.5%  Consistent with diabetes    High levels of fetal hemoglobin interfere with the HbA1C  assay. Heterozygous hemoglobin variants (HbS, HgC, etc)do  not significantly interfere with this assay.   However, presence of multiple variants may affect accuracy.         Review of patient's allergies indicates:   Allergen Reactions    Celexa [citalopram] Anaphylaxis    Clindamycin Itching and Swelling    Codeine Shortness Of Breath, Itching and Swelling    Crestor [rosuvastatin] Anaphylaxis    Cytotec  [misoprostol] Anaphylaxis and Other (See Comments)     Difficulty breathing    Kiwi (actinidia chinensis) Blisters     Oral  Blisters/burning    Lisinopril Anaphylaxis    Magnesium citrate Shortness Of Breath    Stadol [butorphanol tartrate] Anaphylaxis     Coded    Vicodin [hydrocodone-acetaminophen] Shortness Of Breath    Adhesive      EKG Electrodes    Aggrenox [aspirin-dipyridamole] Other (See Comments)     headaches    Avelox [moxifloxacin]      PATIENT STATES DO NOT GIVE UNDER ANY CIRCUSTANCES    Azithromycin     Butorphanol     Cleocin [clindamycin hcl]     Demerol [meperidine]     Isosorbide Other (See Comments)     Severe headache    Kenalog [triamcinolone acetonide]     Medrol [methylprednisolone] Other (See Comments)     Patient reports taking IV in the past without any issues. Reports allergy to oral preparation     Mobic [meloxicam]     Morphine     Nitroglycerin      Long acting    Pholcodine     Prednisone      GASTRIC PAIN    Ranexa [ranolazine] Nausea And Vomiting    Reclast [zoledronic acid-mannitol-water] Other (See Comments)     Bones hurt    Sulfa (sulfonamide antibiotics) Other (See Comments)     unknown    Talwin [pentazocine lactate]     Tetracycline     Tetracyclines     Tilade [nedocromil]     Zetia [ezetimibe]        Past Medical History:   Diagnosis Date    Carotid stenosis     19%    COPD (chronic obstructive pulmonary disease)     No meds    Coronary artery disease     CVA (cerebral vascular accident)     Dr. Hoffman    Depression     Double ectopic ureters     Dr. Porras    Hemiplegia affecting right dominant side 11/9/2021    Hyperlipidemia     Hypertension     Hypothyroid     OP (osteoporosis)     IRIS (obstructive sleep apnea)     Dr. Hope    Psoriatic arthritis     Rheumatology       Family History   Problem Relation Age of Onset    Breast cancer Maternal Grandfather     Breast cancer Paternal Aunt     Stroke Unknown     Breast cancer  "Sister 60    Leukemia Sister 8         as child    Lung cancer Paternal Grandfather     Heart disease Unknown     Diabetes Daughter        Social History     Socioeconomic History    Marital status: Single    Number of children: 3   Occupational History    Occupation: Not working   Tobacco Use    Smoking status: Never Smoker    Smokeless tobacco: Never Used   Substance and Sexual Activity    Alcohol use: No    Drug use: No    Sexual activity: Never     Partners: Male       Past Surgical History:   Procedure Laterality Date    BREAST BIOPSY      R sided/benign    CARDIAC SURGERY      2016    CERVICAL FUSION      CHOLECYSTECTOMY      CORONARY ANGIOPLASTY      CORONARY ARTERY BYPASS GRAFT      triple bypass    CORONARY STENT PLACEMENT      EYE SURGERY      INTRAUTERINE DEVICE INSERTION      LEFT HEART CATHETERIZATION Left 2020    Procedure: CATHETERIZATION, HEART, LEFT;  Surgeon: Veronica Ibarra MD;  Location: [facility] CATH LAB;  Service: Cardiology;  Laterality: Left;  7am start time    mass removed from R groin      TOTAL ABDOMINAL HYSTERECTOMY W/ BILATERAL SALPINGOOPHORECTOMY      due to benign mass, adhesions    TUBAL LIGATION         Review of Systems   Constitutional: Negative for chills, fatigue and fever.   HENT: Negative for hearing loss.    Eyes: Negative for photophobia and visual disturbance.   Respiratory: Negative for cough, chest tightness, shortness of breath and wheezing.    Cardiovascular: Negative for chest pain and palpitations.   Gastrointestinal: Negative for constipation, diarrhea, nausea and vomiting.   Endocrine: Negative for cold intolerance and heat intolerance.   Genitourinary: Negative for flank pain.   Musculoskeletal: Negative for neck pain and neck stiffness.   Skin: Positive for color change and wound.   Neurological: Negative for light-headedness and headaches.   Psychiatric/Behavioral: Negative for sleep disturbance.          Objective:   Ht 5' 3" " (1.6 m)   Wt 113.8 kg (250 lb 14.1 oz)   BMI 44.44 kg/m²     Physical Exam  LOWER EXTREMITY PHYSICAL EXAMINATION    DERMATOLOGY: Skin is supple, dry and intact. No infection is noted. No gouty tophi are noted.    VASCULAR: The LLE dorsalis pedis pulse is 1/4 and the posterior tibial pulse is 1/4. Hair growth is noted on the dorsal foot and digits. Proximal to distal, warm to warm. Capillary refill time is WNL at less than 3s. Edema is noted, B/L.    ORTHOPEDIC: Manual Muscle Testing is 5/5 in all planes on the LLE, without pains, with and without resistance. No edema noted, LLE 1st MTPJ and toe. Non-antalgic gait is noted.    Assessment:     1. 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 Stage 3a chronic kidney disease    3. Pain of left great toe    4. 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     MRI is reviewed in detail with the patient. All questions and concerns regarding findings and its/their implications are outlined and discussed.    Most recent labs reviewed in detail with the patient. All questions and concerns regarding findings and its/their implications are outlined and discussed.    States side effects of the Allopurinol and wants to stop the medication due to WNL Uric Acid. I did explain that the Uric Acid is WNL because of the medication. Nonetheless, she will D/C.        Future Appointments   Date Time Provider Department Center   7/25/2022  3:00 PM [facility] BMD1 [facility] D[facility]   8/2/2022  4:00 PM CHAIR 02 [facility] INFSN [facility]   8/5/2022  4:40 PM Veronica Ibarra MD [facility] VAS CAR [facility]   8/8/2022  11:00 AM Lisa Porter MD [facility] MED CLN [facility]   8/31/2022 11:30 AM Jacky Ackerman MD [facility] RHEU [facility]

## 2022-07-26 NOTE — ASSESSMENT & PLAN NOTE
Lab Results   Component Value Date    IRON 57 07/01/2022    TIBC 321 07/01/2022    FERRITIN 259 07/01/2022   Saturated iron 18%  Hgb 12.9 g/dl    S/p Venofer x 8 doses  Patient unable to tolerate oral iron due to GI upset/constipation.  Discussed and encouraged iron rich foods to incorporate in diet  Reiterated importance of GI follow-up       F/u 3 months with repeat labs. Discussed S&S to report sooner

## 2022-07-26 NOTE — PROGRESS NOTES
Subjective:      Patient ID: iQ Ortiz is a 72 y.o. female.    Chief Complaint: Follow-up (Iron deficiency anemia)      HPI:  Ms. Ortiz is a 71-year-old female with a PMHx of DM, CVA, COPD, CAD, HTN, CKD, and CAREN. She presents today for follow-up after completion of IV iron. Pt notes improvement in energy. She cannot tolerate po iron supplementation as it causes GI upset. She denies CP, cough, fever, chills, NVDC, bleeding and is scheduled to f/u with PCP 22.     Social History     Socioeconomic History    Marital status: Single    Number of children: 3   Occupational History    Occupation: Not working   Tobacco Use    Smoking status: Never Smoker    Smokeless tobacco: Never Used   Substance and Sexual Activity    Alcohol use: No    Drug use: No    Sexual activity: Never     Partners: Male       Family History   Problem Relation Age of Onset    Breast cancer Maternal Grandfather     Breast cancer Paternal Aunt     Stroke Unknown     Breast cancer Sister 60    Leukemia Sister 8         as child    Lung cancer Paternal Grandfather     Heart disease Unknown     Diabetes Daughter        Past Surgical History:   Procedure Laterality Date    BREAST BIOPSY      R sided/benign    CARDIAC SURGERY      2016    CERVICAL FUSION      CHOLECYSTECTOMY      CORONARY ANGIOPLASTY      CORONARY ARTERY BYPASS GRAFT      triple bypass    CORONARY STENT PLACEMENT      EYE SURGERY      INTRAUTERINE DEVICE INSERTION      LEFT HEART CATHETERIZATION Left 2020    Procedure: CATHETERIZATION, HEART, LEFT;  Surgeon: Veronica Ibarra MD;  Location: [facility] CATH LAB;  Service: Cardiology;  Laterality: Left;  7am start time    mass removed from R groin      TOTAL ABDOMINAL HYSTERECTOMY W/ BILATERAL SALPINGOOPHORECTOMY      due to benign mass, adhesions    TUBAL LIGATION         Past Medical History:   Diagnosis Date    Carotid stenosis     19%    COPD (chronic obstructive pulmonary disease)      No meds    Coronary artery disease     CVA (cerebral vascular accident)     Dr. Hoffman    Depression     Double ectopic ureters     Dr. Porras    Hemiplegia affecting right dominant side 11/9/2021    Hyperlipidemia     Hypertension     Hypothyroid     OP (osteoporosis)     IRIS (obstructive sleep apnea)     Dr. Hope    Psoriatic arthritis     Rheumatology       Review of Systems   Constitutional: Positive for fatigue. Negative for activity change, appetite change, chills, diaphoresis, fever and unexpected weight change.   HENT: Negative for congestion, dental problem, drooling, ear discharge, ear pain, facial swelling, hearing loss, mouth sores, nosebleeds, postnasal drip, rhinorrhea, sinus pressure, sneezing, sore throat, tinnitus, trouble swallowing and voice change.    Eyes: Negative for photophobia, pain, discharge, redness, itching and visual disturbance.   Respiratory: Positive for shortness of breath (with exertion). Negative for cough, choking, chest tightness, wheezing and stridor.    Cardiovascular: Negative for chest pain, palpitations and leg swelling.   Gastrointestinal: Negative for abdominal distention, abdominal pain, anal bleeding, blood in stool, constipation, diarrhea, nausea, rectal pain and vomiting.   Endocrine: Negative for cold intolerance, heat intolerance, polydipsia, polyphagia and polyuria.   Genitourinary: Negative for decreased urine volume, difficulty urinating, dyspareunia, dysuria, enuresis, flank pain, frequency, genital sores, hematuria, menstrual problem, pelvic pain, urgency, vaginal bleeding, vaginal discharge and vaginal pain.   Musculoskeletal: Negative for arthralgias, back pain, gait problem, joint swelling, myalgias, neck pain and neck stiffness.   Skin: Negative for color change, pallor and rash.   Allergic/Immunologic: Negative for environmental allergies, food allergies and immunocompromised state.   Neurological: Positive for weakness. Negative for  dizziness, tremors, seizures, syncope, facial asymmetry, speech difficulty, light-headedness, numbness and headaches.   Hematological: Negative for adenopathy. Does not bruise/bleed easily.   Psychiatric/Behavioral: Negative for agitation, behavioral problems, confusion, decreased concentration, dysphoric mood, hallucinations, self-injury, sleep disturbance and suicidal ideas. The patient is nervous/anxious. The patient is not hyperactive.           Medication List with Changes/Refills   New Medications    VALSARTAN (DIOVAN) 320 MG TABLET    Take 0.5 tablets (160 mg total) by mouth once daily.   Current Medications    ACETAMINOPHEN (TYLENOL) 650 MG TBSR    as directed    ALBUTEROL (PROVENTIL) 2.5 MG /3 ML (0.083 %) NEBULIZER SOLUTION    Take 3 mLs (2.5 mg total) by nebulization every 6 (six) hours as needed for Wheezing. Rescue    ALLOPURINOL (ZYLOPRIM) 300 MG TABLET    Take 1 tablet (300 mg total) by mouth once daily.    ASPIRIN 81 MG CHEW    Take 1 tablet (81 mg total) by mouth once daily.    BETASEPT SURGICAL SCRUB 4 % EXTERNAL LIQUID    Apply topically.    CALCIUM CARBONATE 650 MG CALCIUM (1,625 MG) TABLET    Take 1 tablet by mouth once daily.    CLOBETASOL (CLOBEX) 0.05 % SHAMPOO    Apply topically 2 (two) times daily.    CLOBETASOL (OLUX) 0.05 % FOAM    Apply topically 2 (two) times daily.    CLOPIDOGREL (PLAVIX) 75 MG TABLET    Take 1 tablet (75 mg total) by mouth once daily.    CYANOCOBALAMIN 2000 MCG TABLET    Take 2,000 mcg by mouth once daily.    DOCUSATE SODIUM (COLACE) 100 MG CAPSULE    Take 1 capsule (100 mg total) by mouth 2 (two) times daily.    DOCUSATE SODIUM (COLACE) 100 MG CAPSULE    Colace Take No date recorded No form recorded No frequency recorded No route recorded No set duration recorded No set duration amount recorded active No dosage strength recorded No dosage strength units of measure recorded    EVOLOCUMAB (REPATHA SURECLICK) 140 MG/ML IMAN    Inject 1 mL (140 mg total) into the skin  every 14 (fourteen) days.    FOLIC ACID (FOLVITE) 1 MG TABLET    Take 1 tablet (1 mg total) by mouth once daily.    FUROSEMIDE (LASIX) 20 MG TABLET    Take 1 tablet (20 mg total) by mouth once daily.    GABAPENTIN (NEURONTIN) 100 MG CAPSULE    Take 2 capsules (200 mg total) by mouth 3 (three) times daily.    GARLIC 2,000 MG CAP    Take 3,000 mg by mouth once daily.     HYDROCORTISONE 2.5 % CREAM    Apply topically 2 (two) times daily.    LEVOTHYROXINE (SYNTHROID) 112 MCG TABLET    Take 1 tablet (112 mcg total) by mouth once daily.    METFORMIN (GLUCOPHAGE-XR) 500 MG ER 24HR TABLET    Take 1 tablet (500 mg total) by mouth 2 (two) times daily with meals.    METOPROLOL SUCCINATE (TOPROL-XL) 100 MG 24 HR TABLET    Take 1 tablet (100 mg total) by mouth 2 (two) times daily. 1 tab (100mg) in morning. And 100 mg at bedtime.  Generic ok    NITROGLYCERIN (NITROSTAT) 0.4 MG SL TABLET    Place 1 tablet (0.4 mg total) under the tongue every 5 (five) minutes as needed for Chest pain.    POTASSIUM CHLORIDE SA (K-DUR,KLOR-CON) 20 MEQ TABLET    Take 1 tablet (20 mEq total) by mouth once daily.    PYRIDOXINE, VITAMIN B6, (B-6) 100 MG TAB    Take 200 mg by mouth once daily.     USTEKINUMAB (STELARA) 90 MG/ML SYRG SYRINGE    Inject 1 mL (90 mg total) into the skin every 3 (three) months.    VITAMIN D 1000 UNITS TAB    Take 185 mg by mouth once daily.   Discontinued Medications    VALSARTAN-HYDROCHLOROTHIAZIDE (DIOVAN-HCT) 160-12.5 MG PER TABLET    Take 1 tablet by mouth once daily.        Objective:     Vitals:    07/06/22 1344   BP: 130/60   Pulse: 60   Temp: 97.6 °F (36.4 °C)       Physical Exam  Vitals and nursing note reviewed.   Constitutional:       General: She is not in acute distress.     Appearance: She is well-developed. She is obese. She is not diaphoretic.   HENT:      Head: Normocephalic and atraumatic.      Right Ear: External ear normal.      Left Ear: External ear normal.      Nose: Nose normal.      Right Sinus: No  maxillary sinus tenderness or frontal sinus tenderness.      Left Sinus: No maxillary sinus tenderness or frontal sinus tenderness.      Mouth/Throat:      Pharynx: No oropharyngeal exudate.   Eyes:      General: Lids are normal. No scleral icterus.        Right eye: No discharge.         Left eye: No discharge.      Conjunctiva/sclera: Conjunctivae normal.      Right eye: Right conjunctiva is not injected. No hemorrhage.     Left eye: Left conjunctiva is not injected. No hemorrhage.     Pupils: Pupils are equal, round, and reactive to light.   Neck:      Thyroid: No thyromegaly.      Vascular: No JVD.      Trachea: No tracheal deviation.   Cardiovascular:      Rate and Rhythm: Normal rate.   Pulmonary:      Effort: Pulmonary effort is normal. No respiratory distress.      Breath sounds: No stridor.   Chest:      Chest wall: No tenderness.   Breasts:      Right: No supraclavicular adenopathy.      Left: No supraclavicular adenopathy.       Abdominal:      General: Bowel sounds are normal. There is no distension.      Palpations: Abdomen is soft. There is no hepatomegaly, splenomegaly or mass.      Tenderness: There is no abdominal tenderness. There is no rebound.   Musculoskeletal:         General: No tenderness. Normal range of motion.      Cervical back: Normal range of motion and neck supple.      Right lower leg: No edema.      Left lower leg: No edema.   Lymphadenopathy:      Cervical: No cervical adenopathy.      Upper Body:      Right upper body: No supraclavicular adenopathy.      Left upper body: No supraclavicular adenopathy.   Skin:     General: Skin is dry.      Findings: No erythema or rash.   Neurological:      Mental Status: She is alert and oriented to person, place, and time.      Cranial Nerves: No cranial nerve deficit.      Coordination: Coordination normal.      Gait: Gait abnormal.   Psychiatric:         Behavior: Behavior normal.         Thought Content: Thought content normal.          Judgment: Judgment normal.                  Lab Results   Component Value Date    WBC 8.07 07/18/2022    HGB 11.5 (L) 07/18/2022    HCT 37.6 07/18/2022    MCV 98 07/18/2022     07/18/2022       Lab Results   Component Value Date     (H) 07/18/2022    K 4.3 07/18/2022     07/18/2022    CO2 27 07/18/2022    BUN 17 07/18/2022    CREATININE 1.3 07/18/2022    CALCIUM 9.0 07/18/2022    ANIONGAP 16 07/18/2022    ESTGFRAFRICA 48 (A) 07/18/2022    EGFRNONAA 41 (A) 07/18/2022     Lab Results   Component Value Date    ALT 15 07/18/2022    AST 18 07/18/2022    ALKPHOS 38 (L) 07/18/2022    BILITOT 0.4 07/18/2022       Plan:     Problem List Items Addressed This Visit        Oncology    Iron deficiency anemia due to chronic blood loss - Primary     Lab Results   Component Value Date    IRON 57 07/01/2022    TIBC 321 07/01/2022    FERRITIN 259 07/01/2022   Saturated iron 18%  Hgb 12.9 g/dl    S/p Venofer x 8 doses  Patient unable to tolerate oral iron due to GI upset/constipation.  Discussed and encouraged iron rich foods to incorporate in diet  Reiterated importance of GI follow-up       F/u 3 months with repeat labs. Discussed S&S to report sooner             Relevant Orders    CBC Auto Differential    Comprehensive Metabolic Panel    Ferritin    Iron and TIBC                EMIR Gupta  Hematology/Oncology

## 2022-07-27 ENCOUNTER — TELEPHONE (OUTPATIENT)
Dept: PRIMARY CARE CLINIC | Facility: CLINIC | Age: 72
End: 2022-07-27
Payer: MEDICARE

## 2022-07-27 NOTE — TELEPHONE ENCOUNTER
Patient called in stating she can not tolerate the metFORMIN (GLUCOPHAGE-XR) 500 MG ER 24hr tablet  She says she constantly goes to the restroom 3x or more a day. Patient stated she is not going to take this medication anymore.

## 2022-07-27 NOTE — TELEPHONE ENCOUNTER
----- Message from Dimitris Gutierrez sent at 7/27/2022 12:33 PM CDT -----  Contact: self  Pt is asking for an return call in reference to medication metFORMIN (GLUCOPHAGE-XR) 500 MG ER 24hr tablet, pt states she cannot tolerate medication, please call back at .[phone] Thx CJ

## 2022-07-31 NOTE — PROGRESS NOTES
MsThierno Diana does not use the patient portal. Please call Ms. Rodriguezon to let her know that the DEXA scan looks good!     Thank you!

## 2022-08-01 ENCOUNTER — TELEPHONE (OUTPATIENT)
Dept: PRIMARY CARE CLINIC | Facility: CLINIC | Age: 72
End: 2022-08-01
Payer: MEDICARE

## 2022-08-01 DIAGNOSIS — M1A.3721 CHRONIC GOUT DUE TO RENAL IMPAIRMENT INVOLVING TOE OF LEFT FOOT WITH TOPHUS: Primary | ICD-10-CM

## 2022-08-01 RX ORDER — ALLOPURINOL 300 MG/1
300 TABLET ORAL DAILY
Qty: 30 TABLET | Refills: 0 | Status: SHIPPED | OUTPATIENT
Start: 2022-08-01 | End: 2022-08-08

## 2022-08-01 NOTE — TELEPHONE ENCOUNTER
"Patient called in stating she can not tolerate the metFORMIN (GLUCOPHAGE-XR) 500 MG ER 24hr tablet  She says she constantly goes to the restroom 3x or more a day. Patient stated she is only taking the regular 500 mg regular "pills."  "

## 2022-08-01 NOTE — TELEPHONE ENCOUNTER
----- Message from Mary Jo Sena sent at 8/1/2022  1:03 PM CDT -----  Contact: self  Type:  Patient Returning Call    Who Called:Qi Ortiz   Who Left Message for Patient: Hortencia   Does the patient know what this is regarding?: medication   Would the patient rather a call back or a response via [facility]?  Call back   Best Call Back Number: [phone]   Additional Information:

## 2022-08-02 ENCOUNTER — INFUSION (OUTPATIENT)
Dept: INFUSION THERAPY | Facility: HOSPITAL | Age: 72
End: 2022-08-02
Attending: INTERNAL MEDICINE
Payer: MEDICARE

## 2022-08-02 VITALS — TEMPERATURE: 98 F | OXYGEN SATURATION: 98 % | RESPIRATION RATE: 18 BRPM | HEART RATE: 69 BPM

## 2022-08-02 DIAGNOSIS — L40.50 PSORIATIC ARTHRITIS: ICD-10-CM

## 2022-08-02 DIAGNOSIS — L40.9 PSORIASIS: Primary | ICD-10-CM

## 2022-08-03 ENCOUNTER — TELEPHONE (OUTPATIENT)
Dept: PRIMARY CARE CLINIC | Facility: CLINIC | Age: 72
End: 2022-08-03
Payer: MEDICARE

## 2022-08-05 ENCOUNTER — OFFICE VISIT (OUTPATIENT)
Dept: CARDIOLOGY | Facility: CLINIC | Age: 72
End: 2022-08-05
Payer: MEDICARE

## 2022-08-05 VITALS
HEIGHT: 63 IN | OXYGEN SATURATION: 98 % | WEIGHT: 247.81 LBS | HEART RATE: 67 BPM | SYSTOLIC BLOOD PRESSURE: 168 MMHG | BODY MASS INDEX: 43.91 KG/M2 | DIASTOLIC BLOOD PRESSURE: 88 MMHG

## 2022-08-05 DIAGNOSIS — I25.118 CORONARY ARTERY DISEASE OF NATIVE ARTERY OF NATIVE HEART WITH STABLE ANGINA PECTORIS: ICD-10-CM

## 2022-08-05 DIAGNOSIS — N18.31 STAGE 3A CHRONIC KIDNEY DISEASE: ICD-10-CM

## 2022-08-05 DIAGNOSIS — N18.31 TYPE 2 DIABETES MELLITUS WITH STAGE 3A CHRONIC KIDNEY DISEASE, WITHOUT LONG-TERM CURRENT USE OF INSULIN: ICD-10-CM

## 2022-08-05 DIAGNOSIS — I25.810: ICD-10-CM

## 2022-08-05 DIAGNOSIS — Z95.1 S/P CABG (CORONARY ARTERY BYPASS GRAFT): Chronic | ICD-10-CM

## 2022-08-05 DIAGNOSIS — I35.0 NONRHEUMATIC AORTIC VALVE STENOSIS: Chronic | ICD-10-CM

## 2022-08-05 DIAGNOSIS — R00.2 PALPITATIONS: ICD-10-CM

## 2022-08-05 DIAGNOSIS — E66.01 MORBID OBESITY WITH BMI OF 40.0-44.9, ADULT: Chronic | ICD-10-CM

## 2022-08-05 DIAGNOSIS — E11.42 TYPE 2 DIABETES MELLITUS WITH PERIPHERAL NEUROPATHY: Chronic | ICD-10-CM

## 2022-08-05 DIAGNOSIS — G45.9 TRANSIENT ISCHEMIC ATTACK (TIA): ICD-10-CM

## 2022-08-05 DIAGNOSIS — J44.9 CHRONIC OBSTRUCTIVE PULMONARY DISEASE, UNSPECIFIED COPD TYPE: ICD-10-CM

## 2022-08-05 DIAGNOSIS — I50.32 CHRONIC DIASTOLIC HEART FAILURE: ICD-10-CM

## 2022-08-05 DIAGNOSIS — G62.9 POLYNEUROPATHY: Chronic | ICD-10-CM

## 2022-08-05 DIAGNOSIS — I10 ESSENTIAL HYPERTENSION: Chronic | ICD-10-CM

## 2022-08-05 DIAGNOSIS — I65.21 STENOSIS OF RIGHT CAROTID ARTERY: ICD-10-CM

## 2022-08-05 DIAGNOSIS — Z95.2 S/P TAVR (TRANSCATHETER AORTIC VALVE REPLACEMENT): Primary | ICD-10-CM

## 2022-08-05 DIAGNOSIS — I27.20 PULMONARY HYPERTENSION: Chronic | ICD-10-CM

## 2022-08-05 DIAGNOSIS — G47.33 OSA (OBSTRUCTIVE SLEEP APNEA): ICD-10-CM

## 2022-08-05 DIAGNOSIS — Z78.9 STATIN INTOLERANCE: ICD-10-CM

## 2022-08-05 DIAGNOSIS — E78.5 HYPERLIPIDEMIA, UNSPECIFIED HYPERLIPIDEMIA TYPE: Chronic | ICD-10-CM

## 2022-08-05 DIAGNOSIS — E11.22 TYPE 2 DIABETES MELLITUS WITH STAGE 3A CHRONIC KIDNEY DISEASE, WITHOUT LONG-TERM CURRENT USE OF INSULIN: ICD-10-CM

## 2022-08-05 DIAGNOSIS — Z86.73 HISTORY OF CVA (CEREBROVASCULAR ACCIDENT): ICD-10-CM

## 2022-08-05 PROCEDURE — 99999 PR PBB SHADOW E&M-EST. PATIENT-LVL V: ICD-10-PCS | Mod: PBBFAC,,, | Performed by: INTERNAL MEDICINE

## 2022-08-05 PROCEDURE — 99214 PR OFFICE/OUTPT VISIT, EST, LEVL IV, 30-39 MIN: ICD-10-PCS | Mod: S$PBB,,, | Performed by: INTERNAL MEDICINE

## 2022-08-05 PROCEDURE — 99215 OFFICE O/P EST HI 40 MIN: CPT | Mod: PBBFAC | Performed by: INTERNAL MEDICINE

## 2022-08-05 PROCEDURE — 99999 PR PBB SHADOW E&M-EST. PATIENT-LVL V: CPT | Mod: PBBFAC,,, | Performed by: INTERNAL MEDICINE

## 2022-08-05 PROCEDURE — 99214 OFFICE O/P EST MOD 30 MIN: CPT | Mod: S$PBB,,, | Performed by: INTERNAL MEDICINE

## 2022-08-05 RX ORDER — AMLODIPINE BESYLATE 5 MG/1
5 TABLET ORAL DAILY
Qty: 30 TABLET | Refills: 11 | Status: SHIPPED | OUTPATIENT
Start: 2022-08-05 | End: 2022-09-16 | Stop reason: SINTOL
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
## 2022-08-08 ENCOUNTER — HOSPITAL ENCOUNTER (OUTPATIENT)
Dept: RADIOLOGY | Facility: HOSPITAL | Age: 72
Discharge: HOME | End: 2022-08-08
Attending: INTERNAL MEDICINE
Payer: MEDICARE

## 2022-08-08 ENCOUNTER — OFFICE VISIT (OUTPATIENT)
Dept: PRIMARY CARE CLINIC | Facility: CLINIC | Age: 72
End: 2022-08-08
Payer: MEDICARE

## 2022-08-08 VITALS
SYSTOLIC BLOOD PRESSURE: 130 MMHG | OXYGEN SATURATION: 96 % | HEART RATE: 66 BPM | TEMPERATURE: 98 F | RESPIRATION RATE: 20 BRPM | DIASTOLIC BLOOD PRESSURE: 70 MMHG | BODY MASS INDEX: 43.86 KG/M2 | HEIGHT: 63 IN | WEIGHT: 247.56 LBS

## 2022-08-08 DIAGNOSIS — L40.50 PSORIATIC ARTHRITIS: Chronic | ICD-10-CM

## 2022-08-08 DIAGNOSIS — G47.33 OSA (OBSTRUCTIVE SLEEP APNEA): ICD-10-CM

## 2022-08-08 DIAGNOSIS — M47.896 OTHER OSTEOARTHRITIS OF SPINE, LUMBAR REGION: Chronic | ICD-10-CM

## 2022-08-08 DIAGNOSIS — M1A.3721 CHRONIC GOUT DUE TO RENAL IMPAIRMENT INVOLVING TOE OF LEFT FOOT WITH TOPHUS: ICD-10-CM

## 2022-08-08 DIAGNOSIS — L40.9 PSORIASIS: ICD-10-CM

## 2022-08-08 DIAGNOSIS — E11.42 TYPE 2 DIABETES MELLITUS WITH PERIPHERAL NEUROPATHY: Chronic | ICD-10-CM

## 2022-08-08 DIAGNOSIS — Z95.2 S/P TAVR (TRANSCATHETER AORTIC VALVE REPLACEMENT): ICD-10-CM

## 2022-08-08 DIAGNOSIS — M25.562 PAIN IN LATERAL PORTION OF LEFT KNEE: Primary | ICD-10-CM

## 2022-08-08 DIAGNOSIS — E03.9 HYPOTHYROIDISM, UNSPECIFIED TYPE: ICD-10-CM

## 2022-08-08 DIAGNOSIS — E53.8 FOLIC ACID DEFICIENCY: ICD-10-CM

## 2022-08-08 DIAGNOSIS — E66.01 MORBID OBESITY WITH BMI OF 40.0-44.9, ADULT: Chronic | ICD-10-CM

## 2022-08-08 DIAGNOSIS — M25.562 PAIN IN LATERAL PORTION OF LEFT KNEE: ICD-10-CM

## 2022-08-08 DIAGNOSIS — E78.5 HYPERLIPIDEMIA, UNSPECIFIED HYPERLIPIDEMIA TYPE: Chronic | ICD-10-CM

## 2022-08-08 DIAGNOSIS — N18.30 STAGE 3 CHRONIC KIDNEY DISEASE, UNSPECIFIED WHETHER STAGE 3A OR 3B CKD: ICD-10-CM

## 2022-08-08 DIAGNOSIS — I10 ESSENTIAL HYPERTENSION: Chronic | ICD-10-CM

## 2022-08-08 PROCEDURE — 99999 PR PBB SHADOW E&M-EST. PATIENT-LVL V: CPT | Mod: PBBFAC,,, | Performed by: INTERNAL MEDICINE

## 2022-08-08 PROCEDURE — 73560 X-RAY EXAM OF KNEE 1 OR 2: CPT | Mod: 26,RT,, | Performed by: RADIOLOGY

## 2022-08-08 PROCEDURE — 73560 XR KNEE ORTHO LEFT: ICD-10-PCS | Mod: 26,RT,, | Performed by: RADIOLOGY

## 2022-08-08 PROCEDURE — 73560 X-RAY EXAM OF KNEE 1 OR 2: CPT | Mod: TC,RT

## 2022-08-08 PROCEDURE — 99417 PROLNG OP E/M EACH 15 MIN: CPT | Mod: S$PBB,,, | Performed by: INTERNAL MEDICINE

## 2022-08-08 PROCEDURE — 99215 OFFICE O/P EST HI 40 MIN: CPT | Mod: S$PBB,,, | Performed by: INTERNAL MEDICINE

## 2022-08-08 PROCEDURE — 99999 PR PBB SHADOW E&M-EST. PATIENT-LVL V: ICD-10-PCS | Mod: PBBFAC,,, | Performed by: INTERNAL MEDICINE

## 2022-08-08 PROCEDURE — 73562 X-RAY EXAM OF KNEE 3: CPT | Mod: 26,LT,, | Performed by: RADIOLOGY

## 2022-08-08 PROCEDURE — 99417 PR PROLONGED SVC, OUTPT, W/WO DIRECT PT CONTACT,  EA ADDTL 15 MIN: ICD-10-PCS | Mod: S$PBB,,, | Performed by: INTERNAL MEDICINE

## 2022-08-08 PROCEDURE — 73562 XR KNEE ORTHO LEFT: ICD-10-PCS | Mod: 26,LT,, | Performed by: RADIOLOGY

## 2022-08-08 PROCEDURE — 99215 PR OFFICE/OUTPT VISIT, EST, LEVL V, 40-54 MIN: ICD-10-PCS | Mod: S$PBB,,, | Performed by: INTERNAL MEDICINE

## 2022-08-08 PROCEDURE — 99215 OFFICE O/P EST HI 40 MIN: CPT | Mod: PBBFAC | Performed by: INTERNAL MEDICINE

## 2022-08-08 RX ORDER — ALLOPURINOL 300 MG/1
150 TABLET ORAL DAILY
Qty: 15 TABLET | Refills: 2 | Status: SHIPPED | OUTPATIENT
Start: 2022-08-08 | End: 2022-09-08 | Stop reason: DRUGHIGH

## 2022-08-08 RX ORDER — LEVOTHYROXINE SODIUM 112 UG/1
112 TABLET ORAL DAILY
Qty: 90 TABLET | Refills: 1 | Status: SHIPPED | OUTPATIENT
Start: 2022-08-08 | End: 2023-01-31 | Stop reason: SDUPTHER

## 2022-08-08 RX ORDER — FOLIC ACID 1 MG/1
1 TABLET ORAL DAILY
Qty: 90 TABLET | Refills: 3 | Status: SHIPPED | OUTPATIENT
Start: 2022-08-08 | End: 2023-03-06

## 2022-08-08 RX ORDER — METFORMIN HYDROCHLORIDE 500 MG/1
250 TABLET ORAL 2 TIMES DAILY WITH MEALS
Qty: 90 TABLET | Refills: 3 | Status: SHIPPED | OUTPATIENT
Start: 2022-08-08 | End: 2022-09-08 | Stop reason: SINTOL

## 2022-08-08 NOTE — PATIENT INSTRUCTIONS
Encourage movement to extent able     Decreased metformin dose to 250 mg daily due to cont loose bowels - can increase to 250 mg twice daily as tolerated    Decreased allopurinol to 100 mg daily to see if helps w nausea     Stay hydrated! Increase fiber in diet as tolerated
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
## 2022-08-09 PROBLEM — M1A.3721: Status: ACTIVE | Noted: 2022-08-08

## 2022-08-09 PROBLEM — E53.8 FOLIC ACID DEFICIENCY: Status: ACTIVE | Noted: 2022-08-09

## 2022-08-09 PROBLEM — L03.119 CELLULITIS AND ABSCESS OF FOOT, EXCEPT TOES: Status: RESOLVED | Noted: 2022-06-22 | Resolved: 2022-08-09

## 2022-08-09 PROBLEM — L02.619 CELLULITIS AND ABSCESS OF FOOT, EXCEPT TOES: Status: RESOLVED | Noted: 2022-06-22 | Resolved: 2022-08-09

## 2022-08-09 PROBLEM — M25.562 PAIN IN LATERAL PORTION OF LEFT KNEE: Status: ACTIVE | Noted: 2022-08-08

## 2022-08-09 PROBLEM — M25.562 PAIN IN LATERAL PORTION OF LEFT KNEE: Status: ACTIVE | Noted: 2022-08-09
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
## 2022-08-09 PROBLEM — M1A.3721: Chronic | Status: ACTIVE | Noted: 2022-08-08

## 2022-08-09 PROBLEM — M1A.3721: Status: ACTIVE | Noted: 2022-08-09

## 2022-08-09 PROBLEM — L08.9 LEFT FOOT INFECTION: Status: RESOLVED | Noted: 2022-07-08 | Resolved: 2022-08-09
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
## 2022-08-09 NOTE — ASSESSMENT & PLAN NOTE
Uric acid level sig reduced w allopurinol but w cont GI complaint - will see if does better w decreased dose - recheck uric acid level at later date

## 2022-08-09 NOTE — ASSESSMENT & PLAN NOTE
"Chronic - no "red flag" symptoms - cont monitor - encourage to stay mobile and active to extent able  "

## 2022-08-09 NOTE — ASSESSMENT & PLAN NOTE
Encourage on-going efforts to adjust diet and eating habits - she would like to cont low dose metformin - does not want to start checking BSs at home but may be willing to do so - would like to revisit after rechecking HgbA1c in October

## 2022-08-09 NOTE — ASSESSMENT & PLAN NOTE
Cont gabapentin, low dose metformin, dietary adjustments - advise supportive footwear - not to go barefoot or w socks only in house - f/u w podiatry as needed

## 2022-08-09 NOTE — ASSESSMENT & PLAN NOTE
Cont ice - encourage to stay mobile and active to extent able - if no improvement consider further imaging and/or referral to ortho and/or PT

## 2022-08-09 NOTE — ASSESSMENT & PLAN NOTE
Intolerant of CPAP - denies excessive daytime sleepiness or disrupted sleep - cont encourage weight loss and other efforts to improve health and well-being

## 2022-08-09 NOTE — ASSESSMENT & PLAN NOTE
eGFR 38- 51 - Cont monitor - renal dose medication where appropriate - avoid nephrotoxins - allopurinol added - dose decreased due to GI upset -  cont ARB and low dose metformin

## 2022-08-31 ENCOUNTER — OFFICE VISIT (OUTPATIENT)
Dept: RHEUMATOLOGY | Facility: CLINIC | Age: 72
End: 2022-08-31
Payer: MEDICARE

## 2022-08-31 VITALS
HEIGHT: 63 IN | BODY MASS INDEX: 43.48 KG/M2 | SYSTOLIC BLOOD PRESSURE: 135 MMHG | WEIGHT: 245.38 LBS | DIASTOLIC BLOOD PRESSURE: 58 MMHG | HEART RATE: 61 BPM

## 2022-08-31 DIAGNOSIS — L40.9 PSORIASIS: Primary | ICD-10-CM

## 2022-08-31 DIAGNOSIS — Z79.899 HIGH RISK MEDICATION USE: ICD-10-CM

## 2022-08-31 DIAGNOSIS — Z51.81 MEDICATION MONITORING ENCOUNTER: ICD-10-CM

## 2022-08-31 DIAGNOSIS — L40.50 PSORIATIC ARTHRITIS: ICD-10-CM

## 2022-08-31 PROCEDURE — 99215 PR OFFICE/OUTPT VISIT, EST, LEVL V, 40-54 MIN: ICD-10-PCS | Mod: S$PBB,,, | Performed by: INTERNAL MEDICINE

## 2022-08-31 PROCEDURE — 99214 OFFICE O/P EST MOD 30 MIN: CPT | Mod: PBBFAC | Performed by: INTERNAL MEDICINE

## 2022-08-31 PROCEDURE — 99999 PR PBB SHADOW E&M-EST. PATIENT-LVL IV: ICD-10-PCS | Mod: PBBFAC,,, | Performed by: INTERNAL MEDICINE

## 2022-08-31 PROCEDURE — 99215 OFFICE O/P EST HI 40 MIN: CPT | Mod: S$PBB,,, | Performed by: INTERNAL MEDICINE

## 2022-08-31 PROCEDURE — 99999 PR PBB SHADOW E&M-EST. PATIENT-LVL IV: CPT | Mod: PBBFAC,,, | Performed by: INTERNAL MEDICINE

## 2022-08-31 RX ORDER — CLOBETASOL PROPIONATE 0.05 G/100ML
SHAMPOO TOPICAL DAILY
Qty: 473 ML | Refills: 0 | Status: SHIPPED | OUTPATIENT
Start: 2022-08-31 | End: 2023-01-09

## 2022-08-31 RX ORDER — SECUKINUMAB 150 MG/ML
INJECTION SUBCUTANEOUS
Qty: 2 EACH | Refills: 6 | Status: SHIPPED | OUTPATIENT
Start: 2022-08-31 | End: 2023-03-02

## 2022-08-31 NOTE — PROGRESS NOTES
RHEUMATOLOGY OUTPATIENT CLINIC NOTE    8/31/2022    Attending Rheumatologist: Jacky Ackerman  Primary Care Provider/Physician Requesting Consultation: Lisa Porter MD   Chief Complaint/Reason For Consultation:  Psoriatic Arthritis and Osteoporosis      Subjective:     Qi Ortiz is a 72 y.o. White female with history of psoriatic arthritis for follow-up encounter.    Main concern of refractory skin rash on scalp with psoriasiform characteristics.  Refers resolution rash with topical therapy clobetasol shampoo.    Review of Systems   Constitutional:  Negative for fever.   Eyes:  Negative for photophobia and pain.   Respiratory:  Negative for shortness of breath.    Gastrointestinal:  Negative for blood in stool and melena.   Musculoskeletal:  Positive for joint pain. Negative for back pain and falls.   Skin:  Positive for rash.   Neurological:  Negative for focal weakness.     Chronic comorbid conditions affecting medical decision making today:  Past Medical History:   Diagnosis Date    Carotid stenosis     19%    Cellulitis and abscess of foot, except toes 6/22/2022    COPD (chronic obstructive pulmonary disease)     No meds    Coronary artery disease     CVA (cerebral vascular accident)     Dr. Hoffman    Depression     Double ectopic ureters     Dr. Porras    Hemiplegia affecting right dominant side 11/9/2021    Hyperlipidemia     Hypertension     Hypothyroid     Left foot infection 7/8/2022    OP (osteoporosis)     IRIS (obstructive sleep apnea)     Dr. Hope    Psoriatic arthritis     Rheumatology     Past Surgical History:   Procedure Laterality Date    BREAST BIOPSY      R sided/benign    CARDIAC SURGERY      sept 28 2016    CERVICAL FUSION      CHOLECYSTECTOMY      CORONARY ANGIOPLASTY      CORONARY ARTERY BYPASS GRAFT      triple bypass    CORONARY STENT PLACEMENT      EYE SURGERY      INTRAUTERINE DEVICE INSERTION      LEFT HEART CATHETERIZATION Left 9/8/2020    Procedure: CATHETERIZATION, HEART,  LEFT;  Surgeon: Veronica Ibarra MD;  Location: [facility] CATH LAB;  Service: Cardiology;  Laterality: Left;  7am start time    mass removed from R groin      TOTAL ABDOMINAL HYSTERECTOMY W/ BILATERAL SALPINGOOPHORECTOMY      due to benign mass, adhesions    TUBAL LIGATION       Family History   Problem Relation Age of Onset    Breast cancer Maternal Grandfather     Breast cancer Paternal Aunt     Stroke Unknown     Breast cancer Sister 60    Leukemia Sister 8         as child    Lung cancer Paternal Grandfather     Heart disease Unknown     Diabetes Daughter      Social History     Tobacco Use   Smoking Status Never   Smokeless Tobacco Never       Current Outpatient Medications:     acetaminophen (TYLENOL) 650 MG TbSR, as directed, Disp: , Rfl:     albuterol (PROVENTIL) 2.5 mg /3 mL (0.083 %) nebulizer solution, Take 3 mLs (2.5 mg total) by nebulization every 6 (six) hours as needed for Wheezing. Rescue, Disp: 25 each, Rfl: 5    allopurinoL (ZYLOPRIM) 300 MG tablet, Take 0.5 tablets (150 mg total) by mouth once daily., Disp: 15 tablet, Rfl: 2    amLODIPine (NORVASC) 5 MG tablet, Take 1 tablet (5 mg total) by mouth once daily., Disp: 30 tablet, Rfl: 11    aspirin 81 MG Chew, Take 1 tablet (81 mg total) by mouth once daily., Disp: 90 tablet, Rfl: 3    BETASEPT SURGICAL SCRUB 4 % external liquid, Apply topically., Disp: , Rfl:     calcium carbonate 650 mg calcium (1,625 mg) tablet, Take 1 tablet by mouth once daily., Disp: , Rfl:     clopidogreL (PLAVIX) 75 mg tablet, Take 1 tablet (75 mg total) by mouth once daily., Disp: 90 tablet, Rfl: 3    docusate sodium (COLACE) 100 MG capsule, Take 1 capsule (100 mg total) by mouth 2 (two) times daily., Disp: 60 capsule, Rfl: 0    evolocumab (REPATHA SURECLICK) 140 mg/mL PnIj, Inject 1 mL (140 mg total) into the skin every 14 (fourteen) days., Disp: 6 mL, Rfl: 3    folic acid (FOLVITE) 1 MG tablet, Take 1 tablet (1 mg total) by mouth once daily., Disp: 90 tablet, Rfl: 3     furosemide (LASIX) 20 MG tablet, Take 1 tablet (20 mg total) by mouth once daily., Disp: 90 tablet, Rfl: 3    gabapentin (NEURONTIN) 100 MG capsule, Take 2 capsules (200 mg total) by mouth 3 (three) times daily., Disp: 540 capsule, Rfl: 3    garlic 2,000 mg Cap, Take 3,000 mg by mouth once daily. , Disp: , Rfl:     hydrocortisone 2.5 % cream, Apply topically 2 (two) times daily., Disp: 20 g, Rfl: 0    levothyroxine (SYNTHROID) 112 MCG tablet, Take 1 tablet (112 mcg total) by mouth once daily., Disp: 90 tablet, Rfl: 1    metoprolol succinate (TOPROL-XL) 100 MG 24 hr tablet, Take 1 tablet (100 mg total) by mouth 2 (two) times daily. 1 tab (100mg) in morning. And 100 mg at bedtime.  Generic ok, Disp: 180 tablet, Rfl: 3    nitroGLYCERIN (NITROSTAT) 0.4 MG SL tablet, Place 1 tablet (0.4 mg total) under the tongue every 5 (five) minutes as needed for Chest pain., Disp: 100 tablet, Rfl: 3    potassium chloride SA (K-DUR,KLOR-CON) 20 MEQ tablet, Take 1 tablet (20 mEq total) by mouth once daily., Disp: 90 tablet, Rfl: 3    pyridoxine, vitamin B6, (B-6) 100 MG Tab, Take 200 mg by mouth once daily. , Disp: , Rfl:     ustekinumab (STELARA) 90 mg/mL Syrg syringe, Inject 1 mL (90 mg total) into the skin every 3 (three) months., Disp: 1 Syringe, Rfl: 3    valsartan (DIOVAN) 320 MG tablet, Take 0.5 tablets (160 mg total) by mouth once daily., Disp: 45 tablet, Rfl: 0    vitamin D 1000 units Tab, Take 185 mg by mouth once daily., Disp: , Rfl:     clobetasoL (CLOBEX) 0.05 % shampoo, Apply topically 2 (two) times daily. (Patient not taking: No sig reported), Disp: 118 mL, Rfl: 4    clobetasoL (OLUX) 0.05 % Foam, Apply topically 2 (two) times daily. (Patient not taking: No sig reported), Disp: 300 g, Rfl: 3    cyanocobalamin 2000 MCG tablet, Take 2,000 mcg by mouth once daily., Disp: , Rfl:     docusate sodium (COLACE) 100 MG capsule, Colace Take No date recorded No form recorded No frequency recorded No route recorded No set duration  recorded No set duration amount recorded active No dosage strength recorded No dosage strength units of measure recorded, Disp: , Rfl:     metFORMIN (GLUCOPHAGE) 500 MG tablet, Take 0.5 tablets (250 mg total) by mouth 2 (two) times daily with meals. (Patient not taking: Reported on 8/31/2022), Disp: 90 tablet, Rfl: 3     Objective:     Vitals:    08/31/22 1147   BP: (!) 135/58   Pulse: 61     Physical Exam   Eyes: Conjunctivae are normal.   Pulmonary/Chest: Effort normal. No respiratory distress.   Musculoskeletal:         General: Tenderness present. No swelling. Normal range of motion.   Neurological: She displays no weakness.   Skin: Rash noted.     Reviewed available old and all outside pertinent medical records available.    All lab results personally reviewed and interpreted by me.       ASSESSMENT:     Psoriasis    Psoriatic arthritis    High risk medication use    Medication monitoring encounter    Gout    PLAN:     Follow-up encounter for historical diagnosis of psoriatic arthritis.  On chronic therapy with Stelara infusions every 3 months.  Persistent active psoriasis with arthralgias with mixed pattern of joint pain.  Patient refers good response with clobetasol shampoo requesting refills, refractory disease noted off topical therapy.  Review of systems negative for uveitis, dactylitis, or IBD symptoms.  Patient hesitant regarding switching Stelara.  In view of active disease, recommend switching to Cosentyx or different biologic.  Inadequate response with methotrexate and Otezla previously.  Sulfa allergy.  No response to Enbrel and frequent skin infections with Humira.  Recommend trial of Cosentyx to replace Stelara hoping better psoriasis control.  Will refill topical clobetasol shampoo per patient request.  If need further refills, recommend to follow with Dermatology as I do not prescribe topical corticosteroids for psoriasis management.  Side effects of therapy discussed, written literature  provided.      Interval diagnosis of gout based on podagra and hyperuricemia, adequately managed with urate lowering therapy with last uric acid at goal.  No history of arthrocentesis.  Adherence with urate lowering therapy and dietary/lifestyle restrictions reinforced.      Disclaimer: This note is prepared using voice recognition software and as such is likely to have errors and has not been proof read. Please contact me for questions.     60 minutes of total time spent on the encounter, which includes face to face time and non-face to face time preparing to see the patient (eg, review of tests), Obtaining and/or reviewing separately obtained history, Documenting clinical information in the electronic or other health record, Independently interpreting results (not separately reported) and communicating results to the patient/family/caregiver, or Care coordination (not separately reported).     Jacky Ackerman M.D.

## 2022-09-01 ENCOUNTER — TELEPHONE (OUTPATIENT)
Dept: PRIMARY CARE CLINIC | Facility: CLINIC | Age: 72
End: 2022-09-01
Payer: MEDICARE

## 2022-09-01 NOTE — TELEPHONE ENCOUNTER
Pt states that she started having rectal bleeding again after starting metformin.  States she decreased from 500mg to 250mg and bleeding continued.  Pt states she stopped medication and the bleeding resolved. Advised pt I would let Dr. Porter know.

## 2022-09-01 NOTE — TELEPHONE ENCOUNTER
----- Message from Priti Doherty sent at 9/1/2022 11:19 AM CDT -----  Contact: Qi Busby is calling in regards to she has stop taking the metFORMIN. Please call her back [phone]      Thanks  Cf

## 2022-09-02 ENCOUNTER — SPECIALTY PHARMACY (OUTPATIENT)
Dept: PHARMACY | Facility: CLINIC | Age: 72
End: 2022-09-02
Payer: MEDICARE

## 2022-09-02 DIAGNOSIS — L40.50 PSORIATIC ARTHRITIS: Primary | ICD-10-CM

## 2022-09-02 NOTE — TELEPHONE ENCOUNTER
Khadijah, this is Perri Springer with [facility].  We are working on your prescription that your doctor has sent us. We will be working with your insurance to get this approved for you. We will be calling you along the way with updates on your medication.  If you have any questions, you can reach us at [phone].    Welcome call outcome: Patient/caregiver reached    Pt inquired what the copay would be if filled at [facility].  Informed pt copay is unknown since PA is required.  Pt stated she fills her Repatha with [facility] and copay is lower.  However, pt has issues with her deliveries from [facility] and would like to try to fill at [facility].  Informed pt will contact insurance once approved and see if she is penalized or charged higher if script is filled at [facility].  Pt verbalized understanding.

## 2022-09-07 NOTE — TELEPHONE ENCOUNTER
Outgoing call to insurance to verify if [facility] is in network and patient's estimated copay for Cosentyx.  Rep Rosa stated pt has to fill at [facility] and would be a $20 copay for a 60 day supply.      Outgoing call to pt to inform her of copay amount through [facility].  Pt stated [facility]O did not go over dosing schedule.  Went over dosing schedule with pt and informed her that she can call [facility] back with any questions after she receives Cosentyx.  Pt verbalized understanding.  Routing script to [facility] and closing out pt.

## 2022-09-07 NOTE — TELEPHONE ENCOUNTER
Cosentyx PA approved.  CaseId:[id number];Status:Approved;Review Type:Prior Auth;Coverage Start Date:08/03/2022;Coverage End Date:12/31/2099.      Outgoing call to pt to inform her of Cosentyx PA approval.  Pt inquired about copay.  Informed pt unable to run test claim to give her copay amount.  Will call insurance and confirm copay at [facility] vs [facility].  Pt verbalized understanding.

## 2022-09-08 DIAGNOSIS — M1A.3721 CHRONIC GOUT DUE TO RENAL IMPAIRMENT INVOLVING TOE OF LEFT FOOT WITH TOPHUS: ICD-10-CM

## 2022-09-08 RX ORDER — ALLOPURINOL 300 MG/1
150 TABLET ORAL DAILY
Qty: 15 TABLET | Refills: 2 | Status: CANCELLED | OUTPATIENT
Start: 2022-09-08 | End: 2022-12-07

## 2022-09-08 RX ORDER — ALLOPURINOL 100 MG/1
100 TABLET ORAL DAILY
Qty: 30 TABLET | Refills: 5 | Status: SHIPPED | OUTPATIENT
Start: 2022-09-08 | End: 2022-10-26

## 2022-09-08 NOTE — TELEPHONE ENCOUNTER
----- Message from Ana Luisa Sidhulas sent at 9/8/2022 10:03 AM CDT -----  Contact: Qi  Type:  RX Refill Request    Who Called: Qi  Refill or New Rx:Refill  RX Name and Strength:allopurinoL (ZYLOPRIM) 300 MG tablet  How is the patient currently taking it? (ex. 1XDay): as prescribed   Is this a 30 day or 90 day RX: 90  Preferred Pharmacy with phone number:   [id number] - [place], [place] - [address]  [address] [facility] [address]  Phone: [phone] Fax: [phone]  Local or Mail Order:local   Ordering Provider: Dr. Porter   Would the patient rather a call back or a response via [facility]? Call back   Best Call Back Number: [phone]  Additional Information:       Thanks   ADE

## 2022-09-08 NOTE — TELEPHONE ENCOUNTER
Pt states that she would [facility] [place]. Also told her that we will check her uric acid levels in a few weeks. Verbalized understanding.

## 2022-09-09 ENCOUNTER — TELEPHONE (OUTPATIENT)
Dept: INTERNAL MEDICINE | Facility: CLINIC | Age: 72
End: 2022-09-09
Payer: MEDICARE

## 2022-09-09 NOTE — TELEPHONE ENCOUNTER
----- Message from Demetra Cheney sent at 1/16/2020  2:08 PM CST -----  Contact: [phone]  Would like to consult with the  Nurse, Patient would like to Reschedule her Appt but needs to speak with the nurse concerning getting Another One , Please call back at [phone], Thank stephenie   Quality 110: Preventive Care And Screening: Influenza Immunization: Influenza immunization was not ordered or administered, reason not given Quality 431: Preventive Care And Screening: Unhealthy Alcohol Use - Screening: Patient not identified as an unhealthy alcohol user when screened for unhealthy alcohol use using a systematic screening method Quality 226: Preventive Care And Screening: Tobacco Use: Screening And Cessation Intervention: Patient screened for tobacco use and is an ex/non-smoker Detail Level: Detailed Quality 130: Documentation Of Current Medications In The Medical Record: Current Medications Documented

## 2022-09-09 NOTE — TELEPHONE ENCOUNTER
----- Message from Gardenia Alexis sent at 9/9/2022 11:13 AM CDT -----  Type:  Patient Returning Call    Who Called:patient  Who Left Message for Patient:nurse  Does the patient know what this is regarding?:appt reschedule for new location  Would the patient rather a call back or a response via [facility]? Call back  Best Call Back Number:[phone]  Additional Information: na

## 2022-09-15 ENCOUNTER — TELEPHONE (OUTPATIENT)
Dept: INTERNAL MEDICINE | Facility: CLINIC | Age: 72
End: 2022-09-15
Payer: MEDICARE

## 2022-09-15 NOTE — TELEPHONE ENCOUNTER
----- Message from Mariama Nguyen sent at 9/15/2022 11:24 AM CDT -----  Patient is requesting that she gets a call,[phone].Thanks

## 2022-09-15 NOTE — TELEPHONE ENCOUNTER
Returned call to pt. Pt states that she is having fluid build up due to a change in blood pressure medicines. States that she took lasix 20 mg twice today and the fluid is still in her abdomen, chest and behind her ears giving her vertigo. Scheduled appointment for pt to see her pcp on 09/16/22 to be evaluated. Verbalized understanding.

## 2022-09-16 ENCOUNTER — OFFICE VISIT (OUTPATIENT)
Dept: PRIMARY CARE CLINIC | Facility: CLINIC | Age: 72
End: 2022-09-16
Payer: MEDICARE

## 2022-09-16 ENCOUNTER — TELEPHONE (OUTPATIENT)
Dept: RHEUMATOLOGY | Facility: CLINIC | Age: 72
End: 2022-09-16
Payer: MEDICARE

## 2022-09-16 VITALS
RESPIRATION RATE: 18 BRPM | BODY MASS INDEX: 42.86 KG/M2 | TEMPERATURE: 97 F | SYSTOLIC BLOOD PRESSURE: 128 MMHG | WEIGHT: 241.88 LBS | OXYGEN SATURATION: 98 % | HEIGHT: 63 IN | DIASTOLIC BLOOD PRESSURE: 70 MMHG | HEART RATE: 62 BPM

## 2022-09-16 DIAGNOSIS — E11.42 TYPE 2 DIABETES MELLITUS WITH PERIPHERAL NEUROPATHY: Chronic | ICD-10-CM

## 2022-09-16 DIAGNOSIS — R55 POSTURAL DIZZINESS WITH NEAR SYNCOPE: ICD-10-CM

## 2022-09-16 DIAGNOSIS — R42 POSTURAL DIZZINESS WITH NEAR SYNCOPE: ICD-10-CM

## 2022-09-16 DIAGNOSIS — I10 ESSENTIAL HYPERTENSION: Primary | Chronic | ICD-10-CM

## 2022-09-16 DIAGNOSIS — L40.9 PSORIASIS: ICD-10-CM

## 2022-09-16 PROCEDURE — 99215 OFFICE O/P EST HI 40 MIN: CPT | Mod: PBBFAC | Performed by: INTERNAL MEDICINE

## 2022-09-16 PROCEDURE — 99999 PR PBB SHADOW E&M-EST. PATIENT-LVL V: ICD-10-PCS | Mod: PBBFAC,,, | Performed by: INTERNAL MEDICINE

## 2022-09-16 PROCEDURE — 99999 PR PBB SHADOW E&M-EST. PATIENT-LVL V: CPT | Mod: PBBFAC,,, | Performed by: INTERNAL MEDICINE

## 2022-09-16 PROCEDURE — 99215 OFFICE O/P EST HI 40 MIN: CPT | Mod: S$PBB,,, | Performed by: INTERNAL MEDICINE

## 2022-09-16 PROCEDURE — 99215 PR OFFICE/OUTPT VISIT, EST, LEVL V, 40-54 MIN: ICD-10-PCS | Mod: S$PBB,,, | Performed by: INTERNAL MEDICINE

## 2022-09-16 NOTE — TELEPHONE ENCOUNTER
----- Message from Mary Jo Sena sent at 9/16/2022 12:43 PM CDT -----  Contact: Patient  Qi JEFF Ortiz would like a call back at [phone], in regards to speaking with Alexandra about her Hepatitis B blood work.

## 2022-09-16 NOTE — PROGRESS NOTES
"Qi Ortiz   09/16/2022  [id number]    Lisa Porter MD  Patient Care Team:  Lisa Porter MD as PCP - General (Internal Medicine)  Cynthia Pruitt LPN as Care Coordinator (Family Medicine)  Elma Hernandez NP as Nurse Practitioner (Family Medicine)    Visit Type:a scheduled routine follow-up visit    Chief Complaint:  Chief Complaint   Patient presents with    Edema      History of Present Illness:   Ms. Qi Ortiz is a 72 year old female w multiple chronic medical issues self-[facility]. PMHx includes type 2 DM, h/o CVA, CAD s/p CABG x 2, AS s/p TAVR, HTN, HLP (intolerant of statin), CKD stage 3, hypothyroidism, psoriasis and psoriatic arthritis, CAREN, obesity, and IRIS (intolerant of CPAP). Ms. Ortiz has multiple drug and contact allergies and sensitivities. Food allergies include kiwi fruit and crab boil.     Ms. Ortiz is hearing impaired - gradual hearing loss over past 15 years, now severe. Unble to use hearing aids due to psoriasis in her ear canals. Ms. Ortiz does not have a cell phone, smart phone or internet. She does have a land-line w a speaker to make louder. Ms. Ortiz's daughters, Elsa and Shyanne, live close to her. They work during the day. Shyanne is Ms. Ortiz's HCPOA - OK to discuss medical issues w her.    Did not tolerate metformin at lower dose and stopped taking. Reports increased LE edema w amlodipine - took 40 mg of lasix last night and experienced brief period of vertigo. Reports still slightly "off balance" today.     Recent appointments:   8/31 Rheum Ackerman d/c stelara plan to start Co-syntex inj Qweek x 4 weeks then Qmos  8/08 [facility] Porter  8/05 Card Dr. Ibarra s/p TAVR  7/25 Podiatry Dr. 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 Jaimes [place]  6[place]01/22 [place] [facility] [place]  5/26/22 Urology Dr. Vern JOHNSON frequent " UTIs, bladder lesions, annual US and UA  5/17/22 [place] Dr. Ja[place] [facility] [place]    8/01 allopurinol 300mg added by Podiatry Dr. Beltran - she planned to discontinue but following discussion w Dr. Beltran decided to continue.     4/22/22 Heme/Onc Juli IV iron infusion completed #8 of 8 (h/o rectal bleeding - colonoscopy recommended - per Ms. Ortiz needs clearance from cardiology and rheumatology before can proceed w colonoscopy)    Upcoming Outside Appointments  Sept CT Surg for s/p AVR f/u [facility] (previously followed by Dr. Wilder who's retired)  Opthalmology [facility] (last eye exam > 2 years ago)    Upcoming appointments: [facility]  Future Appointments       Date Provider Specialty Appt Notes    10/14/2022 Lisa Porter MD Primary Care 4 wk f/u    2/3/2023  Lab .    2/13/2023 Veronica Ibarra MD Cardiology 6 mon    3/2/2023  Lab     3/2/2023 Jacky Ackerman MD Rheumatology 5month [facility]/bc           The following were reviewed: Active problem list, medication list, allergies, family history, social history, and Health Maintenance.     History:  Past Medical History:   Diagnosis Date    Carotid stenosis     19%    Cellulitis and abscess of foot, except toes 6/22/2022    COPD (chronic obstructive pulmonary disease)     No meds    Coronary artery disease     CVA (cerebral vascular accident)     Dr. Hoffman    Depression     Double ectopic ureters     Dr. Porras    Hemiplegia affecting right dominant side 11/9/2021    Hyperlipidemia     Hypertension     Hypothyroid     Left foot infection 7/8/2022    OP (osteoporosis)     IRIS (obstructive sleep apnea)     Dr. Hope    Psoriatic arthritis     Rheumatology     Patient Active Problem List   Diagnosis    Hyperlipidemia    Hypothyroidism    Degenerative arthritis of lumbar spine    Polyneuropathy    Metabolic syndrome    Vitamin D deficiency    OP (osteoporosis)    Essential hypertension    CAD (coronary artery disease), with stents     S/P CABG  (coronary artery bypass graft)    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    Carotid stenosis    IRIS (obstructive sleep apnea)    Double ectopic ureters    Psoriatic arthritis    Morbid obesity with BMI of 40.0-44.9, adult    Angina, class II    Primary osteoarthritis involving multiple joints    Statin intolerance    Postural dizziness with near syncope    Stage 3 chronic kidney disease    Osteopenia of multiple sites    Psoriasis    History of CVA (cerebrovascular accident)    Iron deficiency anemia due to chronic blood loss    B12 deficiency    Iron deficiency anemia due to chronic blood loss    Allergy to statin medication    Type 2 diabetes mellitus with chronic kidney disease, without long-term current use of insulin    Transient ischemic attack (TIA)    Near syncope    Palpitations    Supraventricular tachycardia    Nonrheumatic aortic valve stenosis    S/P TAVR (transcatheter aortic valve replacement)    Anxiety    Anemia due to folic acid deficiency    Chronic diastolic heart failure    AP (angina pectoris)    CAD, multiple vessel    Pulmonary hypertension    Type 2 diabetes mellitus with peripheral neuropathy    Abnormal blood level of uric acid    Folic acid deficiency    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    Pain in lateral portion of left knee     Review of patient's allergies indicates:   Allergen Reactions    Celexa [citalopram] Anaphylaxis    Clindamycin Itching and Swelling    Codeine Shortness Of Breath, Itching and Swelling    Crestor [rosuvastatin] Anaphylaxis    Cytotec [misoprostol] Anaphylaxis and Other (See Comments)     Difficulty breathing    Kiwi (actinidia chinensis) Blisters     Oral  Blisters/burning    Lisinopril Anaphylaxis    Magnesium citrate Shortness Of Breath    Stadol [butorphanol tartrate] Anaphylaxis     Coded    Vicodin [hydrocodone-acetaminophen] Shortness Of Breath    Adhesive      EKG Electrodes    Aggrenox  [aspirin-dipyridamole] Other (See Comments)     headaches    Avelox [moxifloxacin]      PATIENT STATES DO NOT GIVE UNDER ANY CIRCUSTANCES    Azithromycin     Butorphanol     Cleocin [clindamycin hcl]     Demerol [meperidine]     Isosorbide Other (See Comments)     Severe headache    Kenalog [triamcinolone acetonide]     Medrol [methylprednisolone] Other (See Comments)     Patient reports taking IV in the past without any issues. Reports allergy to oral preparation     Mobic [meloxicam]     Morphine     Nitroglycerin      Long acting    Pholcodine     Prednisone      GASTRIC PAIN    Ranexa [ranolazine] Nausea And Vomiting    Reclast [zoledronic acid-mannitol-water] Other (See Comments)     Bones hurt    Sulfa (sulfonamide antibiotics) Other (See Comments)     unknown    Talwin [pentazocine lactate]     Tetracycline     Tetracyclines     Tilade [nedocromil]     Zetia [ezetimibe]      Medications:  Current Outpatient Medications on File Prior to Visit   Medication Sig Dispense Refill    acetaminophen (TYLENOL) 650 MG TbSR as directed      allopurinoL (ZYLOPRIM) 100 MG tablet Take 1 tablet (100 mg total) by mouth once daily. 30 tablet 5    aspirin 81 MG Chew Take 1 tablet (81 mg total) by mouth once daily. 90 tablet 3    BETASEPT SURGICAL SCRUB 4 % external liquid Apply topically.      calcium carbonate 650 mg calcium (1,625 mg) tablet Take 1 tablet by mouth once daily.      clobetasoL (CLOBEX) 0.05 % shampoo Apply topically once daily. 473 mL 0    clopidogreL (PLAVIX) 75 mg tablet Take 1 tablet (75 mg total) by mouth once daily. 90 tablet 3    cyanocobalamin 2000 MCG tablet Take 2,000 mcg by mouth once daily.      docusate sodium (COLACE) 100 MG capsule Take 1 capsule (100 mg total) by mouth 2 (two) times daily. 60 capsule 0    docusate sodium (COLACE) 100 MG capsule Colace Take No date recorded No form recorded No frequency recorded No route recorded No set duration recorded No set duration amount recorded active No  dosage strength recorded No dosage strength units of measure recorded      evolocumab (REPATHA SURECLICK) 140 mg/mL PnIj Inject 1 mL (140 mg total) into the skin every 14 (fourteen) days. 6 mL 3    folic acid (FOLVITE) 1 MG tablet Take 1 tablet (1 mg total) by mouth once daily. 90 tablet 3    furosemide (LASIX) 20 MG tablet Take 1 tablet (20 mg total) by mouth once daily. 90 tablet 3    gabapentin (NEURONTIN) 100 MG capsule Take 2 capsules (200 mg total) by mouth 3 (three) times daily. 540 capsule 3    garlic 2,000 mg Cap Take 3,000 mg by mouth once daily.       hydrocortisone 2.5 % cream Apply topically 2 (two) times daily. 20 g 0    levothyroxine (SYNTHROID) 112 MCG tablet Take 1 tablet (112 mcg total) by mouth once daily. 90 tablet 1    metoprolol succinate (TOPROL-XL) 100 MG 24 hr tablet Take 1 tablet (100 mg total) by mouth 2 (two) times daily. 1 tab (100mg) in morning. And 100 mg at bedtime.  Generic ok 180 tablet 3    nitroGLYCERIN (NITROSTAT) 0.4 MG SL tablet Place 1 tablet (0.4 mg total) under the tongue every 5 (five) minutes as needed for Chest pain. 100 tablet 3    potassium chloride SA (K-DUR,KLOR-CON) 20 MEQ tablet Take 1 tablet (20 mEq total) by mouth once daily. 90 tablet 3    pyridoxine, vitamin B6, (B-6) 100 MG Tab Take 200 mg by mouth once daily.       secukinumab (COSENTYX PEN, 2 PENS,) 150 mg/mL PnIj With a loading dose: 150 mg at weeks 0, 1, 2, 3, and 4 followed by 150 mg every 4 weeks 2 each 6    vitamin D 1000 units Tab Take 185 mg by mouth once daily.       No current facility-administered medications on file prior to visit.     Medications have been reviewed and reconciled with patient at visit today.    Barriers to medications present (yes)  Multiple drug allergies and sensitivities    Adverse reactions to current medications (yes)  C/o nausea and loose bowels attributing to recent antibiotic, metformin and/or allopurinol    Over the counter medications reviewed (Yes) and if needed added  to active Medication list.    Review of Systems   Constitutional:  Positive for appetite change and unexpected weight change.   HENT:  Positive for hearing loss. Negative for ear pain, sinus pressure/congestion and sore throat.    Eyes:  Positive for visual disturbance. Negative for photophobia and pain.   Respiratory:  Negative for cough, chest tightness, shortness of breath and wheezing.    Cardiovascular:  Positive for leg swelling. Negative for chest pain and palpitations.   Gastrointestinal:  Positive for change in bowel habit, nausea and change in bowel habit. Negative for blood in stool, constipation and vomiting.   Genitourinary:  Negative for difficulty urinating, dysuria and hematuria.   Musculoskeletal:  Positive for arthralgias and back pain.   Allergic/Immunologic: Positive for food allergies. Negative for environmental allergies.   Neurological:  Positive for dizziness and vertigo. Negative for headaches, coordination difficulties and coordination difficulties.        Dizziness/vertigo last night    Psychiatric/Behavioral:  Negative for confusion and sleep disturbance.       Exam:  Vitals:    09/16/22 0819   BP: 128/70   Pulse: 62   Resp: 18   Temp: 97.4 °F (36.3 °C)       Weight: 109.7 kg (241 lb 13.5 oz)   Body mass index is 42.84 kg/m².    Weight 8/08/22 247    Previous weight   5/11/22 257    Patient reports 20 lbs weight loss over past 6 weeks going by her own home scale.    Physical Exam  Constitutional:       General: She is not in acute distress.     Appearance: She is obese.   HENT:      Head: Normocephalic and atraumatic.      Nose: Nose normal. No congestion or rhinorrhea.      Mouth/Throat:      Mouth: Mucous membranes are moist.      Pharynx: Oropharynx is clear. No oropharyngeal exudate or posterior oropharyngeal erythema.      Comments: dentures  Eyes:      General:         Right eye: No discharge.         Left eye: No discharge.      Extraocular Movements: Extraocular movements intact.       Conjunctiva/sclera: Conjunctivae normal.   Cardiovascular:      Rate and Rhythm: Normal rate and regular rhythm.      Heart sounds: Normal heart sounds. No murmur heard.  Pulmonary:      Effort: Pulmonary effort is normal.      Breath sounds: Normal breath sounds. No wheezing, rhonchi or rales.   Abdominal:      General: Bowel sounds are normal. There is no distension.      Palpations: Abdomen is soft.      Tenderness: There is no abdominal tenderness. There is no guarding.   Musculoskeletal:         General: Deformity present.      Right lower leg: Edema present.      Left lower leg: Edema present.      Comments: 1+ LE edema   Skin:     General: Skin is warm and dry.   Neurological:      General: No focal deficit present.      Mental Status: She is alert and oriented to person, place, and time. Mental status is at baseline.      Gait: Gait abnormal.   Psychiatric:         Mood and Affect: Mood normal.         Behavior: Behavior normal.         Thought Content: Thought content normal.     Laboratory Reviewed: (Yes)  Lab Results   Component Value Date    WBC 8.07 07/18/2022    HGB 11.5 (L) 07/18/2022    HCT 37.6 07/18/2022     07/18/2022    MCV 98 07/18/2022    CHOL 119 (L) 07/18/2022    TRIG 108 07/18/2022    HDL 42 07/18/2022    LDLCALC 55.4 (L) 07/18/2022    ALT 15 07/18/2022    AST 18 07/18/2022     (H) 07/18/2022    K 4.3 07/18/2022     07/18/2022    CREATININE 1.3 07/18/2022    BUN 17 07/18/2022    CO2 27 07/18/2022    MG 2.0 05/20/2022    TSH 1.196 07/18/2022    FREET4 1.08 07/18/2022    INR 1.0 09/28/2020    HGBA1C 6.5 (H) 07/18/2022    CRP 6.6 07/18/2022 7/18/22: eGFR 41(range 38-51 past 6 mos) ESR 38(wnl for age) uric acid 4.1     6/27/22: uric acid 11.6(H) ESR 68 (H)     5/20/22: PTHi 91.5(H) folate 18.7(wnl) B12> 2,000(H) mma 0.47(H)  4/01/22: (H) mcv 85 rdw 19.2 eGFR 38  1/13/22: TSH 7.980(H) free T4 0.70(L)     Xray L Knee 8/08/22: Stable degenerative changes left knee  most pronounced in the medial compartment with moderate joint space narrowing and adjacent osteophyte formation. Chondrocalcinosis involving the lateral compartment noted. No fracture or dislocation.  Small calcified bodies project about the posterior left knee. Calcification medial to the medial femoral condyle noted. Small joint effusion. Atherosclerosis. Surgical clips project about thep bhe medial posterior left knee. Overall, no significant interval change in the appearance of the left knee. Mild degenerative changes involving the medial compartment contralateral right knee also noted.    DEXA 7/25/22: The L1 to L4 vertebral bone mineral density is equal to 1.177 g/cm squared with a T score of 0.  There has been 6.2% increase relative to the prior study. The left femoral neck bone mineral density is equal to 0.911 g/cm squared with a T score of -0.9.  There has been  7.2% increase relative to the prior study.    5/25/22 Echocardiogram: The left ventricle is normal in size with normal systolic function. The estimated ejection fraction is 55%. Indeterminate left ventricular diastolic function. The estimated PA systolic pressure is 40 mmHg. Normal right ventricular size with normal right ventricular systolic function. Small anterior pericardial effusion. There is a transcutaneously-placed aortic bioprosthesis present. There is no aortic insufficiency present. Prosthetic aortic valve is normal.      Assessment:   72 y.o. female with multiple co-morbid illnesses here for continued follow up of medical problems.      The primary encounter diagnosis was Essential hypertension. Diagnoses of Postural dizziness with near syncope, Psoriasis, and Type 2 diabetes mellitus with peripheral neuropathy were also pertinent to this visit.      Plan:     Problem List Items Addressed This Visit          Derm    Psoriasis (Chronic)     Cont f/u w Dr. Ackerman, Rheumatology and new medication regimen            Cardiac/Vascular     Essential hypertension - Primary (Chronic)     Discussed that LE edema common w amlodipine due to mechanism of action and ways to manage            Endocrine    Type 2 diabetes mellitus with peripheral neuropathy (Chronic)     Can recheck HgbA1c in October - encourage healthier food and beverage choices, more movement - consider trial GLP1 agonist?            Other    Postural dizziness with near syncope     Reports episode last night after taking furosemide - may have been related to low BP? Cont monitor            Health Maintenance         Date Due Completion Date    COVID-19 Vaccine (1) Never done ---    Diabetes Urine Screening Never done ---    Pneumococcal Vaccines (Age 65+) (1 - PCV) Never done ---    Foot Exam Never done ---    Eye Exam Never done ---    TETANUS VACCINE Never done ---    Colorectal Cancer Screening 04/18/2016 4/18/2011    Shingles Vaccine (1 of 2) 05/05/2016 3/10/2016    Mammogram 10/23/2016 10/23/2015    Hemoglobin A1c 01/18/2023 7/18/2022    Lipid Panel 07/18/2023 7/18/2022    Aspirin/Antiplatelet Therapy 08/05/2023 8/5/2022    DEXA Scan 07/25/2025 7/25/2022          -Patient's lab and imaging results were reviewed and discussed with patient  -Treatment options and alternatives were discussed with the patient. Patient expressed understanding. Patient was given the opportunity to ask questions and be an active participant in their medical care. Patient had no further questions or concerns at this time.   -Patient is an overall HIGH risk for health complications from their medical conditions.     Follow up: Follow up in about 4 weeks (around 10/14/2022).    After visit summary printed and given to patient upon discharge.  Patient goals and care plan are included in After visit summary.    TOTAL TIME evaluating and managing this patient for this encounter was greater than 40 minutes. This time was spent personally by me on the following activities: review of patient's past medical history,  assessing age-appropriate health maintenance needs, review of any interval history, review and interpretation of lab results, review and interpretation of imaging test results, review and interpretation of cardiology test results, reviewing consulting specialist notes, obtaining history from the patient and family, examination of the patient, medication reconciliation, managing and/or ordering prescription medications, ordering imaging tests, ordering referral to subspecialty provider(s), educating patient and answering their questions about diagnosis, treatment plan, and goals of treatment, discussing planned follow-up and final documentation of the visit. This time was exclusive of any separately billable procedures for this patient and exclusive of time spent treating any other patients.

## 2022-09-16 NOTE — TELEPHONE ENCOUNTER
Spoke with patient . She wants to know why Dr. Ackerman has ordered Hepatitis B labs . Patient informed that it is mandatory that when we have patient on injectable medications for their arthritis . Some injectable will cause dormant ( if exposed ) to activate. Patient verbalized understanding .

## 2022-09-16 NOTE — PATIENT INSTRUCTIONS
Go to Pharmacy for Shingrix vaccine #2    Ok to discontinue amlodipine - furosemide 20 mg as needed    Consider sitagliptan or semaglutide for diabetes

## 2022-09-27 DIAGNOSIS — J44.9 CHRONIC OBSTRUCTIVE PULMONARY DISEASE, UNSPECIFIED COPD TYPE: ICD-10-CM

## 2022-09-27 NOTE — TELEPHONE ENCOUNTER
----- Message from Summer Ramsey sent at 9/27/2022  8:55 AM CDT -----  Contact: Pt [phone]  Pt calling to speak with the nurse regarding she been exposed to covid and would like to see if the doctor can send the mediation they talk about last time. Also she would like  nebulizer solution. Please call to advise.       [id number] - [place], [place] - [address] AT SEC OF [place] [address] & [address] [facility]  [address]  [place] [address]  Phone: [phone] Fax: [phone]

## 2022-09-28 RX ORDER — ALBUTEROL SULFATE 0.83 MG/ML
2.5 SOLUTION RESPIRATORY (INHALATION) EVERY 6 HOURS PRN
Qty: 25 EACH | Refills: 5 | OUTPATIENT
Start: 2022-09-28 | End: 2022-10-04 | Stop reason: SDUPTHER

## 2022-10-03 DIAGNOSIS — J44.9 CHRONIC OBSTRUCTIVE PULMONARY DISEASE, UNSPECIFIED COPD TYPE: ICD-10-CM

## 2022-10-04 DIAGNOSIS — J44.9 CHRONIC OBSTRUCTIVE PULMONARY DISEASE, UNSPECIFIED COPD TYPE: ICD-10-CM

## 2022-10-04 RX ORDER — ALBUTEROL SULFATE 0.83 MG/ML
2.5 SOLUTION RESPIRATORY (INHALATION) EVERY 6 HOURS PRN
Qty: 25 EACH | Refills: 5 | OUTPATIENT
Start: 2022-10-04 | End: 2022-10-06 | Stop reason: SDUPTHER

## 2022-10-04 RX ORDER — ALBUTEROL SULFATE 0.83 MG/ML
2.5 SOLUTION RESPIRATORY (INHALATION) EVERY 6 HOURS PRN
Qty: 25 EACH | Refills: 5 | OUTPATIENT
Start: 2022-10-04 | End: 2022-10-04 | Stop reason: SDUPTHER

## 2022-10-04 RX ORDER — VALSARTAN 320 MG/1
160 TABLET ORAL DAILY
Qty: 45 TABLET | Refills: 0 | Status: SHIPPED | OUTPATIENT
Start: 2022-10-04 | End: 2023-01-04 | Stop reason: SDUPTHER

## 2022-10-06 ENCOUNTER — TELEPHONE (OUTPATIENT)
Dept: PRIMARY CARE CLINIC | Facility: CLINIC | Age: 72
End: 2022-10-06
Payer: MEDICARE

## 2022-10-06 DIAGNOSIS — J44.9 CHRONIC OBSTRUCTIVE PULMONARY DISEASE, UNSPECIFIED COPD TYPE: ICD-10-CM

## 2022-10-06 RX ORDER — ALBUTEROL SULFATE 0.83 MG/ML
2.5 SOLUTION RESPIRATORY (INHALATION) EVERY 6 HOURS PRN
Qty: 25 EACH | Refills: 5 | OUTPATIENT
Start: 2022-10-06 | End: 2022-10-14 | Stop reason: SDUPTHER

## 2022-10-06 NOTE — TELEPHONE ENCOUNTER
----- Message from Asmita Ruiz sent at 10/6/2022 11:09 AM CDT -----  Pt needs albuterol sent pharmacy.

## 2022-10-13 PROBLEM — L40.9 PSORIASIS: Chronic | Status: ACTIVE | Noted: 2017-09-07

## 2022-10-13 NOTE — ASSESSMENT & PLAN NOTE
Reports episode last night after taking furosemide - may have been related to low BP? Cont monitor

## 2022-10-13 NOTE — ASSESSMENT & PLAN NOTE
Can recheck HgbA1c in October - encourage healthier food and beverage choices, more movement - consider trial GLP1 agonist?

## 2022-10-14 ENCOUNTER — OFFICE VISIT (OUTPATIENT)
Dept: PRIMARY CARE CLINIC | Facility: CLINIC | Age: 72
End: 2022-10-14
Payer: MEDICARE

## 2022-10-14 ENCOUNTER — LAB VISIT (OUTPATIENT)
Dept: LAB | Facility: HOSPITAL | Age: 72
End: 2022-10-14
Attending: INTERNAL MEDICINE
Payer: MEDICARE

## 2022-10-14 VITALS
HEIGHT: 63 IN | TEMPERATURE: 98 F | WEIGHT: 249.19 LBS | HEART RATE: 60 BPM | SYSTOLIC BLOOD PRESSURE: 130 MMHG | DIASTOLIC BLOOD PRESSURE: 68 MMHG | BODY MASS INDEX: 44.15 KG/M2 | OXYGEN SATURATION: 97 %

## 2022-10-14 DIAGNOSIS — M1A.3721 CHRONIC GOUT DUE TO RENAL IMPAIRMENT INVOLVING TOE OF LEFT FOOT WITH TOPHUS: Chronic | ICD-10-CM

## 2022-10-14 DIAGNOSIS — E11.42 TYPE 2 DIABETES MELLITUS WITH PERIPHERAL NEUROPATHY: Chronic | ICD-10-CM

## 2022-10-14 DIAGNOSIS — N25.81 SECONDARY HYPERPARATHYROIDISM: ICD-10-CM

## 2022-10-14 DIAGNOSIS — D50.0 IRON DEFICIENCY ANEMIA DUE TO CHRONIC BLOOD LOSS: ICD-10-CM

## 2022-10-14 DIAGNOSIS — E11.42 TYPE 2 DIABETES MELLITUS WITH PERIPHERAL NEUROPATHY: Primary | Chronic | ICD-10-CM

## 2022-10-14 LAB
25(OH)D3+25(OH)D2 SERPL-MCNC: 34 NG/ML (ref 30–96)
ESTIMATED AVG GLUCOSE: 131 MG/DL (ref 68–131)
FERRITIN SERPL-MCNC: 28 NG/ML (ref 20–300)
HBA1C MFR BLD: 6.2 % (ref 4–5.6)
IRON SERPL-MCNC: 47 UG/DL (ref 30–160)
SATURATED IRON: 12 % (ref 20–50)
TOTAL IRON BINDING CAPACITY: 394 UG/DL (ref 250–450)
TRANSFERRIN SERPL-MCNC: 266 MG/DL (ref 200–375)
URATE SERPL-MCNC: 6.1 MG/DL (ref 2.4–5.7)

## 2022-10-14 PROCEDURE — 82306 VITAMIN D 25 HYDROXY: CPT | Performed by: INTERNAL MEDICINE

## 2022-10-14 PROCEDURE — 83036 HEMOGLOBIN GLYCOSYLATED A1C: CPT | Performed by: INTERNAL MEDICINE

## 2022-10-14 PROCEDURE — 99999 PR PBB SHADOW E&M-EST. PATIENT-LVL V: ICD-10-PCS | Mod: PBBFAC,,, | Performed by: INTERNAL MEDICINE

## 2022-10-14 PROCEDURE — 84550 ASSAY OF BLOOD/URIC ACID: CPT | Performed by: INTERNAL MEDICINE

## 2022-10-14 PROCEDURE — 84466 ASSAY OF TRANSFERRIN: CPT | Performed by: INTERNAL MEDICINE

## 2022-10-14 PROCEDURE — 36415 COLL VENOUS BLD VENIPUNCTURE: CPT | Mod: PO | Performed by: INTERNAL MEDICINE

## 2022-10-14 PROCEDURE — 99215 PR OFFICE/OUTPT VISIT, EST, LEVL V, 40-54 MIN: ICD-10-PCS | Mod: S$PBB,,, | Performed by: INTERNAL MEDICINE

## 2022-10-14 PROCEDURE — 99215 OFFICE O/P EST HI 40 MIN: CPT | Mod: S$PBB,,, | Performed by: INTERNAL MEDICINE

## 2022-10-14 PROCEDURE — 99215 OFFICE O/P EST HI 40 MIN: CPT | Mod: PBBFAC,PN | Performed by: INTERNAL MEDICINE

## 2022-10-14 PROCEDURE — 99999 PR PBB SHADOW E&M-EST. PATIENT-LVL V: CPT | Mod: PBBFAC,,, | Performed by: INTERNAL MEDICINE

## 2022-10-14 PROCEDURE — 82728 ASSAY OF FERRITIN: CPT | Performed by: INTERNAL MEDICINE

## 2022-10-14 RX ORDER — ALBUTEROL SULFATE 0.83 MG/ML
2.5 SOLUTION RESPIRATORY (INHALATION) EVERY 6 HOURS PRN
Qty: 30 EACH | Refills: 3 | Status: SHIPPED | OUTPATIENT
Start: 2022-10-14 | End: 2023-10-14

## 2022-10-14 NOTE — PROGRESS NOTES
"Qi Ortiz   10/14/2022  [id number]    Lisa Porter MD  Patient Care Team:  Lisa Porter MD as PCP - General (Internal Medicine)  Cynthia Pruitt LPN as Care Coordinator (Family Medicine)  Elma Hernandez NP as Nurse Practitioner (Family Medicine)    Visit Type:a scheduled routine follow-up visit    Chief Complaint:  Chief Complaint   Patient presents with    Follow-up     Patient in for four week follow up with no complaints. Refill on nebulizer solution     History of Present Illness:   Ms. Qi Ortiz is a 72 year old female w multiple chronic medical issues self-referred to [facility]. PMHx includes type 2 DM, h/o CVA, CAD s/p CABG x 2, AS s/p TAVR, HTN, HLP (intolerant of statin), CKD stage 3, hypothyroidism, psoriasis and psoriatic arthritis, CAREN, obesity, and IRIS (intolerant of CPAP). Ms. Ortiz has multiple drug and contact allergies and sensitivities. Food allergies include kiwi fruit and crab boil.     Ms. Ortiz is hearing impaired - gradual hearing loss over past 15 years, now severe. Unble to use hearing aids due to psoriasis in her ear canals. Ms. Ortiz does not have a cell phone, smart phone or internet. She does have a land-line w a speaker to make louder. Ms. Ortiz's daughters, Elsa and Shyanne, live close to her. They work during the day. Shyanne is Ms. Ortiz's HCPOA - OK to discuss medical issues w her.    Ms. Ortiz reports she has been feeling well. Less fatigue. Has not regained weight lost while taking metformin. Trying to limit portion sizes. 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 Taking furosemide 10mg Qam and 10mg Qpm. Stopped taking metformin. Reports occasion "spotting" w BM due to hemorrhoids but diarrhea and any sig GI bleeding resolved. She is having increased psoriatic joint pain - ice packs help. Hasn't started new medication from Rheum - secukinumab - as had shingles vaccine 10/03 w plan for second dose 11/04.     Recent appointments:   9/16  " Cassandra Porter  8/31 Rheum Ackerman d/c stelara plan to start Co-syntex inj Qweek x 4 weeks then Qmos  8/08 [facility] Porter  8/05 Card Dr. Ibarra s/p TAVR  7/25 Podiatry Dr. 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 Beltran. Dose decreased to 100mg in Sept  by me due to c/o stomach upset and repeat uric acid level of 4.     April IV iron infusion completed #8 of 8 (h/o rectal bleeding - colonoscopy recommended - per Ms. Ortiz needs clearance from cardiology and rheumatology before can proceed w colonoscopy)    Outside appointments:   6/15/22 Neuro Dr. Jaimes [facility]  6/01/22 MRI brain [facility]  5/26/22 Urology Dr. Vern JOHNSON frequent UTIs, bladder lesions, annual US and UA  5/17/22 Neuro Dr. Jaimes [facility] f/u TIAs    No need for further f/u w [facility] Surg for AVR as being followed now by [facility], Dr. Ibarra  Has not yet followed up w [facility] (last eye exam > 2 years ago)    Upcoming appointments: [facility]  Future Appointments       Date Provider Specialty Appt Notes    10/14/2022  Lab type 2 diabetes mellitus with peripheral neuropathy     12/14/2022 Elma Hernandez NP Primary Care f/u    2/3/2023  Lab .    2/13/2023 Veronica Ibarra MD Cardiology 6 mon    3/2/2023  Lab     3/2/2023 Jacky Ackerman MD Rheumatology 5month [facility]/bc           The following were reviewed: Active problem list, medication list, allergies, family history, social history, and Health Maintenance.     History:  Past Medical History:   Diagnosis Date    Carotid stenosis     19%    Cellulitis and abscess of foot, except toes 6/22/2022    COPD (chronic obstructive pulmonary disease)     No meds    Coronary artery disease     CVA (cerebral vascular accident)     Dr. Hoffman    Depression     Double ectopic ureters     Dr. Porras    Hemiplegia affecting right dominant side 11/9/2021     Hyperlipidemia     Hypertension     Hypothyroid     Left foot infection 7/8/2022    OP (osteoporosis)     IRIS (obstructive sleep apnea)     Dr. Hope    Psoriatic arthritis     Rheumatology     Patient Active Problem List   Diagnosis    Hyperlipidemia    Hypothyroidism    Degenerative arthritis of lumbar spine    Polyneuropathy    Metabolic syndrome    Vitamin D deficiency    OP (osteoporosis)    Essential hypertension    CAD (coronary artery disease), with stents     S/P CABG (coronary artery bypass graft)    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    Carotid stenosis    IRIS (obstructive sleep apnea)    Double ectopic ureters    Psoriatic arthritis    Morbid obesity with BMI of 40.0-44.9, adult    Angina, class II    Primary osteoarthritis involving multiple joints    Statin intolerance    Postural dizziness with near syncope    Stage 3 chronic kidney disease    Osteopenia of multiple sites    Psoriasis    History of CVA (cerebrovascular accident)    Iron deficiency anemia due to chronic blood loss    B12 deficiency    Iron deficiency anemia due to chronic blood loss    Allergy to statin medication    Type 2 diabetes mellitus with chronic kidney disease, without long-term current use of insulin    Transient ischemic attack (TIA)    Near syncope    Palpitations    Supraventricular tachycardia    Nonrheumatic aortic valve stenosis    S/P TAVR (transcatheter aortic valve replacement)    Anxiety    Anemia due to folic acid deficiency    Chronic diastolic heart failure    AP (angina pectoris)    CAD, multiple vessel    Pulmonary hypertension    Type 2 diabetes mellitus with peripheral neuropathy    Abnormal blood level of uric acid    Folic acid deficiency    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    Pain in lateral portion of left knee    Secondary hyperparathyroidism     Review of patient's allergies indicates:   Allergen Reactions    Celexa [citalopram]  Anaphylaxis    Clindamycin Itching and Swelling    Codeine Shortness Of Breath, Itching and Swelling    Crestor [rosuvastatin] Anaphylaxis    Cytotec [misoprostol] Anaphylaxis and Other (See Comments)     Difficulty breathing    Kiwi (actinidia chinensis) Blisters     Oral  Blisters/burning    Lisinopril Anaphylaxis    Magnesium citrate Shortness Of Breath    Stadol [butorphanol tartrate] Anaphylaxis     Coded    Vicodin [hydrocodone-acetaminophen] Shortness Of Breath    Adhesive      EKG Electrodes    Aggrenox [aspirin-dipyridamole] Other (See Comments)     headaches    Avelox [moxifloxacin]      PATIENT STATES DO NOT GIVE UNDER ANY CIRCUSTANCES    Azithromycin     Butorphanol     Cleocin [clindamycin hcl]     Demerol [meperidine]     Isosorbide Other (See Comments)     Severe headache    Kenalog [triamcinolone acetonide]     Medrol [methylprednisolone] Other (See Comments)     Patient reports taking IV in the past without any issues. Reports allergy to oral preparation     Mobic [meloxicam]     Morphine     Nitroglycerin      Long acting    Pholcodine     Prednisone      GASTRIC PAIN    Ranexa [ranolazine] Nausea And Vomiting    Reclast [zoledronic acid-mannitol-water] Other (See Comments)     Bones hurt    Sulfa (sulfonamide antibiotics) Other (See Comments)     unknown    Talwin [pentazocine lactate]     Tetracycline     Tetracyclines     Tilade [nedocromil]     Zetia [ezetimibe]      Medications:  Current Outpatient Medications on File Prior to Visit   Medication Sig Dispense Refill    acetaminophen (TYLENOL) 650 MG TbSR as directed      allopurinoL (ZYLOPRIM) 100 MG tablet Take 1 tablet (100 mg total) by mouth once daily. 30 tablet 5    aspirin 81 MG Chew Take 1 tablet (81 mg total) by mouth once daily. 90 tablet 3    BETASEPT SURGICAL SCRUB 4 % external liquid Apply topically.      calcium carbonate 650 mg calcium (1,625 mg) tablet Take 1 tablet by mouth once daily.      clopidogreL (PLAVIX) 75 mg tablet Take  1 tablet (75 mg total) by mouth once daily. 90 tablet 3    cyanocobalamin 2000 MCG tablet Take 2,000 mcg by mouth once daily.      docusate sodium (COLACE) 100 MG capsule Take 1 capsule (100 mg total) by mouth 2 (two) times daily. 60 capsule 0    evolocumab (REPATHA SURECLICK) 140 mg/mL PnIj Inject 1 mL (140 mg total) into the skin every 14 (fourteen) days. 6 mL 3    folic acid (FOLVITE) 1 MG tablet Take 1 tablet (1 mg total) by mouth once daily. 90 tablet 3    furosemide (LASIX) 20 MG tablet Take 1 tablet (20 mg total) by mouth once daily. 90 tablet 3    gabapentin (NEURONTIN) 100 MG capsule Take 2 capsules (200 mg total) by mouth 3 (three) times daily. 540 capsule 3    garlic 2,000 mg Cap Take 3,000 mg by mouth once daily.       hydrocortisone 2.5 % cream Apply topically 2 (two) times daily. 20 g 0    levothyroxine (SYNTHROID) 112 MCG tablet Take 1 tablet (112 mcg total) by mouth once daily. 90 tablet 1    metoprolol succinate (TOPROL-XL) 100 MG 24 hr tablet Take 1 tablet (100 mg total) by mouth 2 (two) times daily. 1 tab (100mg) in morning. And 100 mg at bedtime.  Generic ok 180 tablet 3    nitroGLYCERIN (NITROSTAT) 0.4 MG SL tablet Place 1 tablet (0.4 mg total) under the tongue every 5 (five) minutes as needed for Chest pain. 100 tablet 3    potassium chloride SA (K-DUR,KLOR-CON) 20 MEQ tablet Take 1 tablet (20 mEq total) by mouth once daily. 90 tablet 3    pyridoxine, vitamin B6, (B-6) 100 MG Tab Take 200 mg by mouth once daily.       valsartan (DIOVAN) 320 MG tablet Take 0.5 tablets (160 mg total) by mouth once daily. 45 tablet 0    vitamin D 1000 units Tab Take 185 mg by mouth once daily.      [DISCONTINUED] albuterol (PROVENTIL) 2.5 mg /3 mL (0.083 %) nebulizer solution Take 3 mLs (2.5 mg total) by nebulization every 6 (six) hours as needed for Wheezing. Rescue 25 each 5    clobetasoL (CLOBEX) 0.05 % shampoo Apply topically once daily. 473 mL 0    docusate sodium (COLACE) 100 MG capsule Colace Take No date  "recorded No form recorded No frequency recorded No route recorded No set duration recorded No set duration amount recorded active No dosage strength recorded No dosage strength units of measure recorded      secukinumab (COSENTYX PEN, 2 PENS,) 150 mg/mL PnIj With a loading dose: 150 mg at weeks 0, 1, 2, 3, and 4 followed by 150 mg every 4 weeks (Patient not taking: Reported on 10/14/2022) 2 each 6     No current facility-administered medications on file prior to visit.     Medications have been reviewed and reconciled with patient at visit today.    Barriers to medications present (yes)  Multiple drug allergies and sensitivities    Adverse reactions to current medications (no)    Over the counter medications reviewed (Yes) and if needed added to active Medication list.    Review of Systems   Constitutional:  Positive for activity change and appetite change. Negative for fatigue and unexpected weight change.   HENT:  Positive for hearing loss. Negative for ear pain, sinus pressure/congestion and sore throat.    Eyes:  Positive for visual disturbance. Negative for photophobia and pain.   Respiratory:  Negative for cough, chest tightness, shortness of breath and wheezing.    Cardiovascular:  Positive for leg swelling. Negative for chest pain and palpitations.        Less LE edema w resumption of furosemide 10 mg BID   Gastrointestinal:  Negative for abdominal pain, constipation, diarrhea, nausea, rectal pain and vomiting.        Occasional "spotting" w BM   Genitourinary:  Negative for difficulty urinating, dysuria and hematuria.   Musculoskeletal:  Positive for arthralgias, joint swelling and joint deformity.        Joints aching in feet, hands, knees relief w ice packs   Integumentary:  Negative for rash and wound.   Allergic/Immunologic: Positive for food allergies. Negative for environmental allergies.   Neurological:  Negative for dizziness, vertigo, headaches, coordination difficulties and coordination " difficulties.        Dizziness/vertigo last night    Psychiatric/Behavioral:  Negative for confusion and sleep disturbance.       Exam:  Initial VSS: /60 P 60 sat 97% T 98.1  Vitals:    10/14/22 1020   BP: 130/68   Pulse:    Temp:      Weight: 113 kg (249 lb 3.2 oz)   Body mass index is 44.14 kg/m².    Previous weights   8/08/22 247  5/11/22 257  2/18/22 260  12/15/21 258    Physical Exam  Vitals reviewed.   Constitutional:       General: She is not in acute distress.     Appearance: She is obese.   HENT:      Head: Normocephalic and atraumatic.      Right Ear: Tympanic membrane, ear canal and external ear normal.      Left Ear: Tympanic membrane, ear canal and external ear normal.      Nose: Nose normal. No congestion or rhinorrhea.      Mouth/Throat:      Mouth: Mucous membranes are moist.      Pharynx: Oropharynx is clear. No oropharyngeal exudate or posterior oropharyngeal erythema.      Comments: dentures  Eyes:      General:         Right eye: No discharge.         Left eye: No discharge.      Extraocular Movements: Extraocular movements intact.      Conjunctiva/sclera: Conjunctivae normal.   Neck:      Vascular: No carotid bruit.   Cardiovascular:      Rate and Rhythm: Normal rate and regular rhythm.      Heart sounds: Normal heart sounds. No murmur heard.  Pulmonary:      Effort: Pulmonary effort is normal.      Breath sounds: Normal breath sounds. No wheezing, rhonchi or rales.   Abdominal:      General: Bowel sounds are normal. There is no distension.      Palpations: Abdomen is soft.      Tenderness: There is no abdominal tenderness. There is no guarding.      Comments: Large abd hernia - soft   Musculoskeletal:         General: Deformity present.      Right lower leg: Edema present.      Left lower leg: Edema present.      Comments: 1+ LE edema   Lymphadenopathy:      Cervical: No cervical adenopathy.   Skin:     General: Skin is warm and dry.   Neurological:      General: No focal deficit present.       Mental Status: She is alert and oriented to person, place, and time. Mental status is at baseline.      Coordination: Coordination normal.      Gait: Gait abnormal.   Psychiatric:         Mood and Affect: Mood normal.         Behavior: Behavior normal.         Thought Content: Thought content normal.     Laboratory Reviewed: (Yes)  Lab Results   Component Value Date    WBC 8.07 07/18/2022    HGB 11.5 (L) 07/18/2022    HCT 37.6 07/18/2022     07/18/2022    MCV 98 07/18/2022    CHOL 119 (L) 07/18/2022    TRIG 108 07/18/2022    HDL 42 07/18/2022    LDLCALC 55.4 (L) 07/18/2022    ALT 15 07/18/2022    AST 18 07/18/2022     (H) 07/18/2022    K 4.3 07/18/2022     07/18/2022    CREATININE 1.3 07/18/2022    BUN 17 07/18/2022    CO2 27 07/18/2022    MG 2.0 05/20/2022    TSH 1.196 07/18/2022    FREET4 1.08 07/18/2022    INR 1.0 09/28/2020    HGBA1C 6.5 (H) 07/18/2022    CRP 6.6 07/18/2022 7/18/22: eGFR 41(range 38-51 past 6 mos) ESR 38(wnl for age) uric acid 4.1     6/27/22: uric acid 11.6(H) ESR 68 (H)     5/20/22: PTHi 91.5(H) folate 18.7(wnl) B12> 2,000(H) mma 0.47(H)  4/01/22: (H) mcv 85 rdw 19.2 eGFR 38  1/13/22: TSH 7.980(H) free T4 0.70(L)     Xray L Knee 8/08/22: Stable degenerative changes left knee most pronounced in the medial compartment with moderate joint space narrowing and adjacent osteophyte formation. Chondrocalcinosis involving the lateral compartment noted. No fracture or dislocation.  Small calcified bodies project about the posterior left knee. Calcification medial to the medial femoral condyle noted. Small joint effusion. Atherosclerosis. Surgical clips project about thep bhe medial posterior left knee. Overall, no significant interval change in the appearance of the left knee. Mild degenerative changes involving the medial compartment contralateral right knee also noted.    DEXA 7/25/22: The L1 to L4 vertebral bone mineral density is equal to 1.177 g/cm squared with a  T score of 0.  There has been 6.2% increase relative to the prior study. The left femoral neck bone mineral density is equal to 0.911 g/cm squared with a T score of -0.9.  There has been  7.2% increase relative to the prior study.    5/25/22 Echocardiogram: The left ventricle is normal in size with normal systolic function. The estimated ejection fraction is 55%. Indeterminate left ventricular diastolic function. The estimated PA systolic pressure is 40 mmHg. Normal right ventricular size with normal right ventricular systolic function. Small anterior pericardial effusion. There is a transcutaneously-placed aortic bioprosthesis present. There is no aortic insufficiency present. Prosthetic aortic valve is normal.      Assessment:   72 y.o. female with multiple co-morbid illnesses here for continued follow up of medical problems.      The primary encounter diagnosis was Type 2 diabetes mellitus with peripheral neuropathy. 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 (Chronic)    Relevant Orders    URIC ACID       Health Maintenance         Date Due Completion Date    COVID-19 Vaccine (1) Never done ---    Diabetes Urine Screening Never done ---    Pneumococcal Vaccines (Age 65+) (1 - PCV) Never done ---    Foot Exam Never done ---    Eye Exam Never done ---     TETANUS VACCINE Never done ---    Colorectal Cancer Screening 04/18/2016 4/18/2011    Shingles Vaccine (1 of 2) 05/05/2016 3/10/2016    Mammogram 10/23/2016 10/23/2015    Hemoglobin A1c 01/18/2023 7/18/2022    Lipid Panel 07/18/2023 7/18/2022    Aspirin/Antiplatelet Therapy 08/05/2023 8/5/2022    DEXA Scan 07/25/2025 7/25/2022          She has had shingles vaccine #1 10/03/22 and scheduled for #2 11/04/22  Declines CoVid vaccine. Td? PCV-20?  Urine micro alb/crt ordered today  Plan for [facility] w [facility]. Diabetic foot exam?   Mammogram? Colonoscopy?    -Patient's prior lab results were reviewed and discussed with patient  -Treatment options and alternatives were discussed with the patient. Patient expressed understanding. Patient was given the opportunity to ask questions and be an active participant in their medical care. Patient had no further questions or concerns at this time.   -Patient is an overall HIGH risk for health complications from their medical conditions.     Follow up: Follow up in about 2 months (around 12/14/2022) for Follow Up cali estrada appt around 11am, Follow Up.    After visit summary printed and given to patient upon discharge.  Patient care plan included in After visit summary.    TOTAL TIME evaluating and managing this patient for this encounter was greater than 40 minutes. This time was spent personally by me on the following activities: review of patient's past medical history, assessing age-appropriate health maintenance needs, review of any interval history, review and interpretation of lab results, review and interpretation of imaging test results, review and interpretation of cardiology test results, reviewing consulting specialist notes, obtaining history from the patient and family, examination of the patient, medication reconciliation, managing and/or ordering prescription medications, educating patient and answering their questions about diagnosis, treatment  plan, and goals of treatment, discussing planned follow-up and final documentation of the visit. This time was exclusive of any separately billable procedures for this patient and exclusive of time spent treating any other patients.

## 2022-10-24 ENCOUNTER — TELEPHONE (OUTPATIENT)
Dept: PRIMARY CARE CLINIC | Facility: CLINIC | Age: 72
End: 2022-10-24
Payer: MEDICARE

## 2022-10-24 NOTE — TELEPHONE ENCOUNTER
----- Message from Rae Wong sent at 10/24/2022  1:06 PM CDT -----  Contact: Self/[phone]  Pt is calling in regards to getting lab results. Please give her a call back at [phone]. Thank you s/g

## 2022-10-26 ENCOUNTER — TELEPHONE (OUTPATIENT)
Dept: PRIMARY CARE CLINIC | Facility: CLINIC | Age: 72
End: 2022-10-26
Payer: MEDICARE

## 2022-10-26 RX ORDER — ALLOPURINOL 100 MG/1
200 TABLET ORAL DAILY
Qty: 60 TABLET | Refills: 5 | Status: SHIPPED | OUTPATIENT
Start: 2022-10-26 | End: 2023-03-06

## 2022-10-26 NOTE — TELEPHONE ENCOUNTER
----- Message from Rae Wong sent at 10/26/2022  9:20 AM CDT -----  Contact: Self/[phone]  Pt is calling in regards to lab results and have a question, she states this is her second message and no one has called her back. Please give her a call back at [phone]. Thank you s/g

## 2022-10-28 ENCOUNTER — TELEPHONE (OUTPATIENT)
Dept: HEMATOLOGY/ONCOLOGY | Facility: CLINIC | Age: 72
End: 2022-10-28
Payer: MEDICARE

## 2022-10-28 DIAGNOSIS — D50.0 IRON DEFICIENCY ANEMIA DUE TO CHRONIC BLOOD LOSS: Primary | ICD-10-CM

## 2022-10-28 NOTE — TELEPHONE ENCOUNTER
Pt notified and verbalized her understanding.      ----- Message from Denis Caban NP sent at 10/28/2022  3:32 PM CDT -----  Yeah lets get her scheduled with a visit and CBC        ----- Message -----  From: Amber Stokes MA  Sent: 10/28/2022   3:19 PM CDT  To: Denis Caban NP  Hey she had labs done on 10/14/2022, pt states  thinks she need to start back infusion due to lab results. Pt wanting to know if we can get her scheduled or does she need to come in and be seen prior to getting iv iron scheduled. Please advised.        ----- Message -----  From: Briana Bowles  Sent: 10/28/2022   2:30 PM CDT  To: Arsh Barrios Staff  Qi is calling in regards to her lab work on 10/17. Patient states  think she should start back getting infusion. Please call her back at [phone]  Thanks  CF

## 2022-11-01 ENCOUNTER — OFFICE VISIT (OUTPATIENT)
Dept: HEMATOLOGY/ONCOLOGY | Facility: CLINIC | Age: 72
End: 2022-11-01
Payer: MEDICARE

## 2022-11-01 ENCOUNTER — LAB VISIT (OUTPATIENT)
Dept: LAB | Facility: HOSPITAL | Age: 72
End: 2022-11-01
Payer: MEDICARE

## 2022-11-01 VITALS
OXYGEN SATURATION: 97 % | HEART RATE: 66 BPM | HEIGHT: 64 IN | SYSTOLIC BLOOD PRESSURE: 125 MMHG | WEIGHT: 245.81 LBS | BODY MASS INDEX: 41.97 KG/M2 | DIASTOLIC BLOOD PRESSURE: 64 MMHG | TEMPERATURE: 98 F

## 2022-11-01 DIAGNOSIS — D50.0 IRON DEFICIENCY ANEMIA DUE TO CHRONIC BLOOD LOSS: ICD-10-CM

## 2022-11-01 DIAGNOSIS — N18.32 STAGE 3B CHRONIC KIDNEY DISEASE: ICD-10-CM

## 2022-11-01 DIAGNOSIS — D50.0 IRON DEFICIENCY ANEMIA DUE TO CHRONIC BLOOD LOSS: Primary | ICD-10-CM

## 2022-11-01 LAB
BASOPHILS # BLD AUTO: 0.06 K/UL (ref 0–0.2)
BASOPHILS NFR BLD: 0.8 % (ref 0–1.9)
DIFFERENTIAL METHOD: NORMAL
EOSINOPHIL # BLD AUTO: 0.2 K/UL (ref 0–0.5)
EOSINOPHIL NFR BLD: 2.3 % (ref 0–8)
ERYTHROCYTE [DISTWIDTH] IN BLOOD BY AUTOMATED COUNT: 14.5 % (ref 11.5–14.5)
HCT VFR BLD AUTO: 38.8 % (ref 37–48.5)
HGB BLD-MCNC: 12.4 G/DL (ref 12–16)
IMM GRANULOCYTES # BLD AUTO: 0.02 K/UL (ref 0–0.04)
IMM GRANULOCYTES NFR BLD AUTO: 0.3 % (ref 0–0.5)
LYMPHOCYTES # BLD AUTO: 1.7 K/UL (ref 1–4.8)
LYMPHOCYTES NFR BLD: 22 % (ref 18–48)
MCH RBC QN AUTO: 29 PG (ref 27–31)
MCHC RBC AUTO-ENTMCNC: 32 G/DL (ref 32–36)
MCV RBC AUTO: 91 FL (ref 82–98)
MONOCYTES # BLD AUTO: 0.7 K/UL (ref 0.3–1)
MONOCYTES NFR BLD: 9.1 % (ref 4–15)
NEUTROPHILS # BLD AUTO: 5.1 K/UL (ref 1.8–7.7)
NEUTROPHILS NFR BLD: 65.5 % (ref 38–73)
NRBC BLD-RTO: 0 /100 WBC
PLATELET # BLD AUTO: 259 K/UL (ref 150–450)
PMV BLD AUTO: 10.1 FL (ref 9.2–12.9)
RBC # BLD AUTO: 4.27 M/UL (ref 4–5.4)
WBC # BLD AUTO: 7.71 K/UL (ref 3.9–12.7)

## 2022-11-01 PROCEDURE — 99213 PR OFFICE/OUTPT VISIT, EST, LEVL III, 20-29 MIN: ICD-10-PCS | Mod: S$PBB,,, | Performed by: NURSE PRACTITIONER

## 2022-11-01 PROCEDURE — 99999 PR PBB SHADOW E&M-EST. PATIENT-LVL V: ICD-10-PCS | Mod: PBBFAC,,, | Performed by: NURSE PRACTITIONER

## 2022-11-01 PROCEDURE — 99215 OFFICE O/P EST HI 40 MIN: CPT | Mod: PBBFAC | Performed by: NURSE PRACTITIONER

## 2022-11-01 PROCEDURE — 99999 PR PBB SHADOW E&M-EST. PATIENT-LVL V: CPT | Mod: PBBFAC,,, | Performed by: NURSE PRACTITIONER

## 2022-11-01 PROCEDURE — 99213 OFFICE O/P EST LOW 20 MIN: CPT | Mod: S$PBB,,, | Performed by: NURSE PRACTITIONER

## 2022-11-01 PROCEDURE — 85025 COMPLETE CBC W/AUTO DIFF WBC: CPT

## 2022-11-01 PROCEDURE — 36415 COLL VENOUS BLD VENIPUNCTURE: CPT

## 2022-11-01 RX ORDER — HEPARIN 100 UNIT/ML
500 SYRINGE INTRAVENOUS
Status: CANCELLED | OUTPATIENT
Start: 2022-11-01

## 2022-11-01 RX ORDER — SODIUM CHLORIDE 0.9 % (FLUSH) 0.9 %
10 SYRINGE (ML) INJECTION
Status: CANCELLED | OUTPATIENT
Start: 2022-11-01

## 2022-11-01 RX ORDER — METHYLPREDNISOLONE SOD SUCC 125 MG
40 VIAL (EA) INJECTION
Status: CANCELLED
Start: 2022-11-01

## 2022-11-01 NOTE — PROGRESS NOTES
Subjective:       Patient ID: Qi Ortiz is a 72 y.o. female.    Chief Complaint:   1. Iron deficiency anemia due to chronic blood loss  Ferritin    Iron and TIBC    CBC Auto Differential    Comprehensive Metabolic Panel      2. Stage 3b chronic kidney disease          Current Treatment:  Folvite 1mg po daily     Treatment History:  Venofer x 8 doses    HPI: This is a 72 year old woman with medical history significant for HTN, carotid stenosis, hypothyroidism, COPD, psoriatic arthritis, hyperlipidemia, CVA with right hemiplegia, depression, CAD, osteoporosis, IRIS, and double ectopic ureters who is seen in Hem/Onc for iron deficiency anemia.     She has a history of intolerance of oral iron supplementation as it caused GI upset, so she was given IV iron after which she reported having more energy.     Her primary Hematologist/Oncologist is Dr. Mckinney.    Interval History: Patient presents for follow up on labs. She presents alone and has her head on the desk upon entering the room. She complains of being tired a lot and SOB. She has not been having dizziness but states she is very careful about position changes.     Reviewed labs with patient:   CBC:   Recent Labs   Lab 11/01/22  1006   WBC 7.71   RBC 4.27   Hemoglobin 12.4   Hematocrit 38.8   Platelets 259   MCV 91   MCH 29.0   MCHC 32.0     CMP:  Recent Labs   Lab 07/18/22  0803   Glucose 112 H   Calcium 9.0   Albumin 3.4 L   Total Protein 6.4   Sodium 146 H   Potassium 4.3   CO2 27   Chloride 103   BUN 17   Creatinine 1.3   Alkaline Phosphatase 38 L   ALT 15   AST 18   Total Bilirubin 0.4     Lab Results   Component Value Date    IRON 47 10/14/2022    TRANSFERRIN 266 10/14/2022    TIBC 394 10/14/2022    FESATURATED 12 (L) 10/14/2022      Lab Results   Component Value Date    FERRITIN 28 10/14/2022     She states Metformin caused her to have GI bleeding; once she stopped taking it, the bleeding stopped. She states she has hemorrhoids she can feel but they are  not bleeding.     Social History     Socioeconomic History    Marital status: Single    Number of children: 3   Occupational History    Occupation: Not working   Tobacco Use    Smoking status: Never    Smokeless tobacco: Never   Substance and Sexual Activity    Alcohol use: No    Drug use: No    Sexual activity: Never     Partners: Male     Past Medical History:   Diagnosis Date    Carotid stenosis     19%    Cellulitis and abscess of foot, except toes 2022    COPD (chronic obstructive pulmonary disease)     No meds    Coronary artery disease     CVA (cerebral vascular accident)     Dr. Hoffman    Depression     Double ectopic ureters     Dr. Porras    Hemiplegia affecting right dominant side 2021    Hyperlipidemia     Hypertension     Hypothyroid     Left foot infection 2022    OP (osteoporosis)     IRIS (obstructive sleep apnea)     Dr. Hope    Psoriatic arthritis     Rheumatology     Family History   Problem Relation Age of Onset    Breast cancer Maternal Grandfather     Breast cancer Paternal Aunt     Stroke Unknown     Breast cancer Sister 60    Leukemia Sister 8         as child    Lung cancer Paternal Grandfather     Heart disease Unknown     Diabetes Daughter      Past Surgical History:   Procedure Laterality Date    BREAST BIOPSY      R sided/benign    CARDIAC SURGERY      2016    CERVICAL FUSION      CHOLECYSTECTOMY      CORONARY ANGIOPLASTY      CORONARY ARTERY BYPASS GRAFT      triple bypass    CORONARY STENT PLACEMENT      EYE SURGERY      INTRAUTERINE DEVICE INSERTION      LEFT HEART CATHETERIZATION Left 2020    Procedure: CATHETERIZATION, HEART, LEFT;  Surgeon: Veronica Ibarra MD;  Location: [facility] CATH LAB;  Service: Cardiology;  Laterality: Left;  7am start time    mass removed from R groin      TOTAL ABDOMINAL HYSTERECTOMY W/ BILATERAL SALPINGOOPHORECTOMY      due to benign mass, adhesions    TUBAL LIGATION       Review of Systems   Constitutional:  Positive for fatigue.  Negative for appetite change.   HENT:  Negative for mouth sores, rhinorrhea and sore throat.    Eyes: Negative.    Respiratory:  Positive for shortness of breath.    Cardiovascular: Negative.    Gastrointestinal:  Negative for constipation, diarrhea, nausea and vomiting.   Endocrine: Positive for cold intolerance.   Genitourinary: Negative.    Musculoskeletal: Negative.    Integumentary:  Negative.   Allergic/Immunologic: Negative.    Neurological:  Negative for dizziness, weakness, light-headedness, numbness and headaches.   Hematological: Negative.    Psychiatric/Behavioral: Negative.         Medication List with Changes/Refills   Current Medications    ACETAMINOPHEN (TYLENOL) 650 MG TBSR    as directed    ALBUTEROL (PROVENTIL) 2.5 MG /3 ML (0.083 %) NEBULIZER SOLUTION    Take 3 mLs (2.5 mg total) by nebulization every 6 (six) hours as needed for Wheezing. Rescue    ALLOPURINOL (ZYLOPRIM) 100 MG TABLET    Take 2 tablets (200 mg total) by mouth once daily.    ASPIRIN 81 MG CHEW    Take 1 tablet (81 mg total) by mouth once daily.    BETASEPT SURGICAL SCRUB 4 % EXTERNAL LIQUID    Apply topically.    CALCIUM CARBONATE 650 MG CALCIUM (1,625 MG) TABLET    Take 1 tablet by mouth once daily.    CLOBETASOL (CLOBEX) 0.05 % SHAMPOO    Apply topically once daily.    CLOPIDOGREL (PLAVIX) 75 MG TABLET    Take 1 tablet (75 mg total) by mouth once daily.    CYANOCOBALAMIN 2000 MCG TABLET    Take 2,000 mcg by mouth once daily.    DOCUSATE SODIUM (COLACE) 100 MG CAPSULE    Take 1 capsule (100 mg total) by mouth 2 (two) times daily.    DOCUSATE SODIUM (COLACE) 100 MG CAPSULE    Colace Take No date recorded No form recorded No frequency recorded No route recorded No set duration recorded No set duration amount recorded active No dosage strength recorded No dosage strength units of measure recorded    EVOLOCUMAB (REPATHA SURECLICK) 140 MG/ML PNIJ    Inject 1 mL (140 mg total) into the skin every 14 (fourteen) days.    FOLIC ACID  (FOLVITE) 1 MG TABLET    Take 1 tablet (1 mg total) by mouth once daily.    FUROSEMIDE (LASIX) 20 MG TABLET    Take 1 tablet (20 mg total) by mouth once daily.    GABAPENTIN (NEURONTIN) 100 MG CAPSULE    Take 2 capsules (200 mg total) by mouth 3 (three) times daily.    GARLIC 2,000 MG CAP    Take 3,000 mg by mouth once daily.     HYDROCORTISONE 2.5 % CREAM    Apply topically 2 (two) times daily.    LEVOTHYROXINE (SYNTHROID) 112 MCG TABLET    Take 1 tablet (112 mcg total) by mouth once daily.    METOPROLOL SUCCINATE (TOPROL-XL) 100 MG 24 HR TABLET    Take 1 tablet (100 mg total) by mouth 2 (two) times daily. 1 tab (100mg) in morning. And 100 mg at bedtime.  Generic ok    NITROGLYCERIN (NITROSTAT) 0.4 MG SL TABLET    Place 1 tablet (0.4 mg total) under the tongue every 5 (five) minutes as needed for Chest pain.    POTASSIUM CHLORIDE SA (K-DUR,KLOR-CON) 20 MEQ TABLET    Take 1 tablet (20 mEq total) by mouth once daily.    PYRIDOXINE, VITAMIN B6, (B-6) 100 MG TAB    Take 200 mg by mouth once daily.     SECUKINUMAB (COSENTYX PEN, 2 PENS,) 150 MG/ML PNIJ    With a loading dose: 150 mg at weeks 0, 1, 2, 3, and 4 followed by 150 mg every 4 weeks    VALSARTAN (DIOVAN) 320 MG TABLET    Take 0.5 tablets (160 mg total) by mouth once daily.    VITAMIN D 1000 UNITS TAB    Take 185 mg by mouth once daily.     Objective:     Vitals:    11/01/22 1035   BP: 125/64   Pulse: 66   Temp: 97.6 °F (36.4 °C)     Physical Exam  Vitals reviewed.   Constitutional:       Appearance: Normal appearance.   HENT:      Head: Normocephalic.      Ears:      Comments: Hard of hearing     Mouth/Throat:      Comments: Not wearing mask    Eyes:      Extraocular Movements: Extraocular movements intact.      Pupils: Pupils are equal, round, and reactive to light.   Cardiovascular:      Rate and Rhythm: Normal rate and regular rhythm.      Heart sounds: Normal heart sounds.   Pulmonary:      Effort: Pulmonary effort is normal.      Breath sounds: Normal  breath sounds.   Abdominal:      General: Bowel sounds are normal.      Palpations: Abdomen is soft.      Comments: rounded     Genitourinary:     Comments: deferred    Musculoskeletal:         General: Normal range of motion.      Cervical back: Normal range of motion and neck supple.   Skin:     General: Skin is warm and dry.   Neurological:      Mental Status: She is alert and oriented to person, place, and time.   Psychiatric:         Behavior: Behavior normal.         Thought Content: Thought content normal.        (2) Ambulatory and capable of self care, unable to carry out work activity, up and about > 50% or waking hours  Assessment:     Problem List Items Addressed This Visit          Renal/    Stage 3b chronic kidney disease       Oncology    Iron deficiency anemia due to chronic blood loss - Primary    Relevant Orders    Ferritin    Iron and TIBC    CBC Auto Differential    Comprehensive Metabolic Panel     Plan:     Iron deficiency anemia due to chronic blood loss  -     Ferritin; Future; Expected date: 02/01/2023  -     Iron and TIBC; Future; Expected date: 02/01/2023  -     CBC Auto Differential; Future; Expected date: 02/01/2023  -     Comprehensive Metabolic Panel; Future; Expected date: 02/01/2023    Stage 3b chronic kidney disease    Other orders  -     iron sucrose (VENOFER) 100 mg in sodium chloride 0.9% 100 mL IVPB  -     sodium chloride 0.9% 250 mL flush bag  -     sodium chloride 0.9% flush 10 mL  -     heparin, porcine (PF) 100 unit/mL injection flush 500 Units  -     alteplase injection 2 mg  -     methylPREDNISolone sodium succinate injection 40 mg    Labs reviewed.   Venofer x 8 weekly doses with Solumedrol premed and fluids.  Continue folic acid daily.   Follow up in  12 weeks  with  iron profile, ferritin, CBC, and Comprehensive Metabolic Panel.    I will review assessment/plan with collaborating physician.      MICHELL Jack

## 2022-11-14 ENCOUNTER — TELEPHONE (OUTPATIENT)
Dept: INFUSION THERAPY | Facility: HOSPITAL | Age: 72
End: 2022-11-14
Payer: MEDICARE

## 2022-11-14 NOTE — TELEPHONE ENCOUNTER
----- Message from Gardenia Alexis sent at 11/14/2022 10:13 AM CST -----  Please call pt @ [phone] regarding scheduling treatment, pt states she been waiting over 2wks

## 2022-11-21 ENCOUNTER — TELEPHONE (OUTPATIENT)
Dept: INFUSION THERAPY | Facility: HOSPITAL | Age: 72
End: 2022-11-21
Payer: MEDICARE

## 2022-11-29 ENCOUNTER — INFUSION (OUTPATIENT)
Dept: INFUSION THERAPY | Facility: HOSPITAL | Age: 72
End: 2022-11-29
Attending: INTERNAL MEDICINE
Payer: MEDICARE

## 2022-11-29 VITALS
HEART RATE: 60 BPM | OXYGEN SATURATION: 97 % | DIASTOLIC BLOOD PRESSURE: 72 MMHG | SYSTOLIC BLOOD PRESSURE: 156 MMHG | TEMPERATURE: 98 F | RESPIRATION RATE: 18 BRPM

## 2022-11-29 DIAGNOSIS — M81.0 OSTEOPOROSIS, UNSPECIFIED OSTEOPOROSIS TYPE, UNSPECIFIED PATHOLOGICAL FRACTURE PRESENCE: ICD-10-CM

## 2022-11-29 DIAGNOSIS — D50.0 IRON DEFICIENCY ANEMIA DUE TO CHRONIC BLOOD LOSS: Primary | ICD-10-CM

## 2022-11-29 PROCEDURE — 63600175 PHARM REV CODE 636 W HCPCS: Performed by: NURSE PRACTITIONER

## 2022-11-29 PROCEDURE — 96365 THER/PROPH/DIAG IV INF INIT: CPT

## 2022-11-29 PROCEDURE — 96375 TX/PRO/DX INJ NEW DRUG ADDON: CPT

## 2022-11-29 PROCEDURE — 25000003 PHARM REV CODE 250: Performed by: NURSE PRACTITIONER

## 2022-11-29 RX ORDER — METHYLPREDNISOLONE SOD SUCC 125 MG
40 VIAL (EA) INJECTION
Status: COMPLETED | OUTPATIENT
Start: 2022-11-29 | End: 2022-11-29

## 2022-11-29 RX ORDER — SODIUM CHLORIDE 0.9 % (FLUSH) 0.9 %
10 SYRINGE (ML) INJECTION
Status: CANCELLED | OUTPATIENT
Start: 2022-12-06

## 2022-11-29 RX ORDER — METHYLPREDNISOLONE SOD SUCC 125 MG
40 VIAL (EA) INJECTION
Status: CANCELLED
Start: 2022-12-06

## 2022-11-29 RX ORDER — HEPARIN 100 UNIT/ML
500 SYRINGE INTRAVENOUS
Status: CANCELLED | OUTPATIENT
Start: 2022-12-06

## 2022-11-29 RX ADMIN — METHYLPREDNISOLONE SODIUM SUCCINATE 40 MG: 125 INJECTION, POWDER, FOR SOLUTION INTRAMUSCULAR; INTRAVENOUS at 01:11

## 2022-11-29 RX ADMIN — SODIUM CHLORIDE: 0.9 INJECTION, SOLUTION INTRAVENOUS at 01:11

## 2022-11-29 RX ADMIN — IRON SUCROSE 100 MG: 20 INJECTION, SOLUTION INTRAVENOUS at 01:11

## 2022-11-29 NOTE — DISCHARGE INSTRUCTIONS
.[facility]  [address] [address]  [address]  [phone] phone     [phone] fax  Hours of Operation: Monday- Friday 8:00am- 5:00pm  After hours phone  [phone]  Hematology / Oncology Physicians on call    Dr. Hank Gore      Nurse Practitioners:    Radha Richardson, SAMEERA Javed, SAMEERA Alanis, SAMEERA Caban, SAMEERA Delgado, SAMEERA Styles, PA      Please don't hesitate to call if you have any concerns.    .WAYS TO HELP PREVENT INFECTION        WASH YOUR HANDS OFTEN DURING THE DAY, ESPECIALLY BEFORE YOU EAT, AFTER USING THE BATHROOM, AND AFTER TOUCHING ANIMALS    STAY AWAY FROM PEOPLE WHO HAVE ILLNESSES YOU CAN CATCH; SUCH AS COLDS, FLU, CHICKEN POX    TRY TO AVOID CROWDS    STAY AWAY FROM CHILDREN WHO RECENTLY HAVE RECEIVED LIVE VIRUS VACCINES    MAINTAIN GOOD MOUTH CARE    DO NOT SQUEEZE OR SCRATCH PIMPLES    CLEAN CUTS & SCRAPES RIGHT AWAY AND DAILY UNTIL HEALED WITH WARM WATER, SOAP & AN ANTISEPTIC    AVOID CONTACT WITH LITTER BOXES, BIRD CAGES, & FISH TANKS    AVOID STANDING WATER, IE., BIRD BATHS, FLOWER POTS/VASES, OR HUMIDIFIERS    WEAR GLOVES WHEN GARDENING OR CLEANING UP AFTER OTHERS, ESPECIALLY BABIES & SMALL CHILDREN    DO NOT EAT RAW FISH, SEAFOOD, MEAT, OR EGGS

## 2022-11-29 NOTE — NURSING
Patient stated that the NP told her that she would be getting fluids as well as her iron infusion. Informed patient there wasn't any additional fluids ordered but I could give her the entire 250cc flush bag, patient was good with that. She states if she doesn't get fluids with the infusion she will be severely constipated a few days later. Her BP was elevated, patient states its been on the dae side lately and contributes it to her breathing treatments she had been doing because he had been sick last week. Also states she was taking the wrong kind of cough medications at first. She stated that she will call her doctor tomorrow and let her know about the recent readings. Patient denies a headache or dizziness. RTC next week and patient is aware of time and date.

## 2022-11-29 NOTE — PLAN OF CARE
Patient states she feels pretty good today just tired. Patient had a lot to say and I empathetically listened. Discussed POC and answered all questions.     Problem: Adult Inpatient Plan of Care  Goal: Plan of Care Review  Outcome: Ongoing, Progressing  Flowsheets (Taken 11/29/2022 1403)  Plan of Care Reviewed With: patient  Goal: Patient-Specific Goal (Individualized)  Outcome: Ongoing, Progressing  Flowsheets (Taken 11/29/2022 1403)  Anxieties, Fears or Concerns: No concerns expressed  Individualized Care Needs: Elevated legs and provided warm blankets.  Patient-Specific Goals (Include Timeframe): To tolerate treatment today  Goal: Optimal Comfort and Wellbeing  Outcome: Ongoing, Progressing  Intervention: Provide Person-Centered Care  Flowsheets (Taken 11/29/2022 1403)  Trust Relationship/Rapport:   care explained   choices provided   emotional support provided   empathic listening provided   questions answered   questions encouraged   reassurance provided   thoughts/feelings acknowledged     Problem: Anemia  Goal: Anemia Symptom Improvement  Outcome: Ongoing, Progressing  Intervention: Monitor and Manage Anemia  Flowsheets (Taken 11/29/2022 1403)  Safety Promotion/Fall Prevention:   in recliner, wheels locked   lighting adjusted   medications reviewed  Fatigue Management: paced activity encouraged

## 2022-11-30 ENCOUNTER — OFFICE VISIT (OUTPATIENT)
Dept: PRIMARY CARE CLINIC | Facility: CLINIC | Age: 72
End: 2022-11-30
Payer: MEDICARE

## 2022-11-30 VITALS
OXYGEN SATURATION: 95 % | SYSTOLIC BLOOD PRESSURE: 151 MMHG | HEART RATE: 66 BPM | WEIGHT: 245.5 LBS | HEIGHT: 64 IN | TEMPERATURE: 98 F | DIASTOLIC BLOOD PRESSURE: 68 MMHG | BODY MASS INDEX: 41.91 KG/M2

## 2022-11-30 DIAGNOSIS — D50.0 IRON DEFICIENCY ANEMIA DUE TO CHRONIC BLOOD LOSS: ICD-10-CM

## 2022-11-30 DIAGNOSIS — M1A.3721 CHRONIC GOUT DUE TO RENAL IMPAIRMENT INVOLVING TOE OF LEFT FOOT WITH TOPHUS: Chronic | ICD-10-CM

## 2022-11-30 DIAGNOSIS — L40.50 PSORIATIC ARTHRITIS: Chronic | ICD-10-CM

## 2022-11-30 DIAGNOSIS — J44.1 CHRONIC OBSTRUCTIVE PULMONARY DISEASE WITH ACUTE EXACERBATION: Chronic | ICD-10-CM

## 2022-11-30 DIAGNOSIS — E66.01 MORBID OBESITY WITH BMI OF 40.0-44.9, ADULT: Chronic | ICD-10-CM

## 2022-11-30 DIAGNOSIS — I70.0 AORTIC ATHEROSCLEROSIS: ICD-10-CM

## 2022-11-30 DIAGNOSIS — I10 ESSENTIAL HYPERTENSION: Chronic | ICD-10-CM

## 2022-11-30 DIAGNOSIS — E78.5 HYPERLIPIDEMIA, UNSPECIFIED HYPERLIPIDEMIA TYPE: Chronic | ICD-10-CM

## 2022-11-30 DIAGNOSIS — J45.901 MILD ASTHMA WITH EXACERBATION, UNSPECIFIED WHETHER PERSISTENT: Primary | ICD-10-CM

## 2022-11-30 DIAGNOSIS — H61.21 CERUMEN DEBRIS ON TYMPANIC MEMBRANE OF RIGHT EAR: ICD-10-CM

## 2022-11-30 PROCEDURE — 99215 OFFICE O/P EST HI 40 MIN: CPT | Mod: PBBFAC,PN | Performed by: INTERNAL MEDICINE

## 2022-11-30 PROCEDURE — 99999 PR PBB SHADOW E&M-EST. PATIENT-LVL V: CPT | Mod: PBBFAC,,, | Performed by: INTERNAL MEDICINE

## 2022-11-30 PROCEDURE — 99999 PR PBB SHADOW E&M-EST. PATIENT-LVL V: ICD-10-PCS | Mod: PBBFAC,,, | Performed by: INTERNAL MEDICINE

## 2022-11-30 PROCEDURE — 99215 OFFICE O/P EST HI 40 MIN: CPT | Mod: S$PBB,,, | Performed by: INTERNAL MEDICINE

## 2022-11-30 PROCEDURE — 99215 PR OFFICE/OUTPT VISIT, EST, LEVL V, 40-54 MIN: ICD-10-PCS | Mod: S$PBB,,, | Performed by: INTERNAL MEDICINE

## 2022-11-30 RX ORDER — FLUTICASONE PROPIONATE AND SALMETEROL 250; 50 UG/1; UG/1
1 POWDER RESPIRATORY (INHALATION) 2 TIMES DAILY
Qty: 60 EACH | Refills: 0 | Status: SHIPPED | OUTPATIENT
Start: 2022-11-30 | End: 2023-02-20

## 2022-11-30 NOTE — PROGRESS NOTES
Qi Ortiz   11/30/2022  [id number]    Lisa Porter MD  Patient Care Team:  Lisa Porter MD as PCP - General (Internal Medicine)  Cynthia Pruitt LPN as Care Coordinator (Family Medicine)  Elma Hernandez NP as Nurse Practitioner (Family Medicine)    Visit Type: C/o elevated BP and sinus infection    Chief Complaint:  Chief Complaint   Patient presents with    Possible sinus infection    Hypertension     History of Present Illness:   Ms. Qi Ortiz is a 72 year old female w multiple chronic medical issues self-referred to [facility]. PMHx includes type 2 DM, h/o CVA, CAD s/p CABG x 2, AS s/p TAVR, HTN, HLP (intolerant of statin), CKD stage 3, hypothyroidism, psoriasis and psoriatic arthritis, CAREN, obesity, and IRIS (intolerant of CPAP). Ms. Ortiz has multiple drug and contact allergies and sensitivities. Food allergies include kiwi fruit and crab boil.     Ms. Ortiz is hearing impaired - gradual hearing loss over past 15 years, now severe. Unble to use hearing aids due to psoriasis in her ear canals. Ms. Ortiz does not have a cell phone, smart phone or internet. She does have a land-line w a speaker to make louder. Ms. Ortiz's daughters, Elsa and Shyanne, live close to her. They work during the day. Shyanne is Ms. Ortiz's HCPOA - OK to discuss medical issues w her.    11/30/22 - Pt is worried due to elevated B/P x 2 weeks  2 weeks ago was taking OTC cold meds. Was having SOB, went to [facility] in [place]. Dx with Viral URI. B/P elevated at that time. Is albuterol causing elevated B/P?  Using albuterol nebs less frequently.  No further OTC cold medicines but still using albuterol - B/P still elevated 185/90  Denies anxiety and stress  11/29/22 Did receive Solu Medrol 40 mg IV pre med with iron infusion yesterday.  (Plan for weekly iron infusions x 8 weeks - note IV steriod pre-med will likely increase BP and BSs)    Hosp/ED/Urgent Care  [facility]  Espinoza viral URI    Recent appointments:   11/29/22 Infusion Venofer #1/8 Qwk  11/01/22 Heme/Onc Delgado NP  10/14/22 [facility]   9/16/22  [facility] Dr. Porter  8/31/2 Rheum Ackerman d/c stelara plan to start Co-syntex inj Qweek x 4 weeks then Qmos    No need for further f/u w [facility] Surg for AVR as being followed now by [facility], Dr. Ibarra  Has not yet followed up w [facility], [place], operated by [facility] (last eye exam > 2 years ago)  Colonoscopy recommended (per Ms. Ortiz needs clearance from cardiology and rheumatology before can proceed w colonoscopy)    F/u BRIANDA's to [facility] Dr. Jaimes and for MRI report (May/June 2022) and to Dr. Porras, Urology (May 2022)?    Upcoming appointments: [facility]  Future Appointments       Date Provider Specialty Appt Notes    12/6/2022  Chemotherapy venofer/ivpb 100 mg over 1 hr/ow/ngwv  with steriod    12/13/2022  Chemotherapy venofer/ivpb 100 mg over 1 hr/ow/ngwv  with steriod    12/14/2022 Elma Hernandez, NP Primary Care f/u    12/20/2022  Chemotherapy venofer/ivpb 100 mg over 1 hr/ow/ngwv  with steriod    12/27/2022  Chemotherapy venofer/ivpb 100 mg over 1 hr/ow/ngwv  with steriod    1/3/2023  Chemotherapy venofer/ivpb 100 mg over 1 hr/ow/ngwv  with steriod    1/10/2023  Chemotherapy venofer/ivpb 100 mg over 1 hr/ow/ngwv  with steriod    1/17/2023  Chemotherapy venofer/ivpb 100 mg over 1 hr/ow/ngwv  with steriod    2/3/2023  Lab .    2/13/2023 Veronica Ibarra MD Cardiology 6 mon           The following were reviewed: Active problem list, medication list, allergies, family history, social history, and Health Maintenance.     History:  Past Medical History:   Diagnosis Date    Carotid stenosis     19%    Cellulitis and abscess of foot, except toes 6/22/2022    COPD (chronic obstructive pulmonary disease)     No meds    Coronary artery disease     CVA (cerebral vascular accident)     Dr. Hoffman    Depression     Double ectopic ureters     Dr. Porras    Hemiplegia  affecting right dominant side 11/9/2021    Hyperlipidemia     Hypertension     Hypothyroid     Left foot infection 7/8/2022    OP (osteoporosis)     IRIS (obstructive sleep apnea)     Dr. Hope    Psoriatic arthritis     Rheumatology     Patient Active Problem List   Diagnosis    Hyperlipidemia    Hypothyroidism    Degenerative arthritis of lumbar spine    Polyneuropathy    Metabolic syndrome    Vitamin D deficiency    OP (osteoporosis)    Essential hypertension    CAD (coronary artery disease), with stents     S/P CABG (coronary artery bypass graft)    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    Carotid stenosis    COPD (chronic obstructive pulmonary disease)    IRIS (obstructive sleep apnea)    Double ectopic ureters    Psoriatic arthritis    Morbid obesity with BMI of 40.0-44.9, adult    Angina, class II    Primary osteoarthritis involving multiple joints    Statin intolerance    Postural dizziness with near syncope    Stage 3b chronic kidney disease    Osteopenia of multiple sites    Psoriasis    History of CVA (cerebrovascular accident)    Iron deficiency anemia due to chronic blood loss    B12 deficiency    Iron deficiency anemia due to chronic blood loss    Allergy to statin medication    Type 2 diabetes mellitus with chronic kidney disease, without long-term current use of insulin    Transient ischemic attack (TIA)    Near syncope    Palpitations    Supraventricular tachycardia    Nonrheumatic aortic valve stenosis    S/P TAVR (transcatheter aortic valve replacement)    Anxiety    Anemia due to folic acid deficiency    Chronic diastolic heart failure    AP (angina pectoris)    CAD, multiple vessel    Pulmonary hypertension    Type 2 diabetes mellitus with peripheral neuropathy    Abnormal blood level of uric acid    Folic acid deficiency    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    Pain in lateral portion of left knee    Secondary hyperparathyroidism     Mild asthma with exacerbation    Aortic atherosclerosis    Cerumen debris on tympanic membrane of right ear     Review of patient's allergies indicates:   Allergen Reactions    Celexa [citalopram] Anaphylaxis    Clindamycin Itching and Swelling    Codeine Shortness Of Breath, Itching and Swelling    Crestor [rosuvastatin] Anaphylaxis    Cytotec [misoprostol] Anaphylaxis and Other (See Comments)     Difficulty breathing    Kiwi (actinidia chinensis) Blisters     Oral  Blisters/burning    Lisinopril Anaphylaxis    Magnesium citrate Shortness Of Breath    Stadol [butorphanol tartrate] Anaphylaxis     Coded    Vicodin [hydrocodone-acetaminophen] Shortness Of Breath    Adhesive      EKG Electrodes    Aggrenox [aspirin-dipyridamole] Other (See Comments)     headaches    Avelox [moxifloxacin]      PATIENT STATES DO NOT GIVE UNDER ANY CIRCUSTANCES    Azithromycin     Butorphanol     Cleocin [clindamycin hcl]     Demerol [meperidine]     Isosorbide Other (See Comments)     Severe headache    Kenalog [triamcinolone acetonide]     Medrol [methylprednisolone] Other (See Comments)     Patient reports taking IV in the past without any issues. Reports allergy to oral preparation     Mobic [meloxicam]     Morphine     Nitroglycerin      Long acting    Pholcodine     Prednisone      GASTRIC PAIN    Ranexa [ranolazine] Nausea And Vomiting    Reclast [zoledronic acid-mannitol-water] Other (See Comments)     Bones hurt    Sulfa (sulfonamide antibiotics) Other (See Comments)     unknown    Talwin [pentazocine lactate]     Tetracycline     Tetracyclines     Tilade [nedocromil]     Zetia [ezetimibe]      Medications:  Current Outpatient Medications on File Prior to Visit   Medication Sig Dispense Refill    acetaminophen (TYLENOL) 650 MG TbSR as directed      albuterol (PROVENTIL) 2.5 mg /3 mL (0.083 %) nebulizer solution Take 3 mLs (2.5 mg total) by nebulization every 6 (six) hours as needed for Wheezing. Rescue 30 each 3     allopurinoL (ZYLOPRIM) 100 MG tablet Take 2 tablets (200 mg total) by mouth once daily. 60 tablet 5    aspirin 81 MG Chew Take 1 tablet (81 mg total) by mouth once daily. 90 tablet 3    BETASEPT SURGICAL SCRUB 4 % external liquid Apply topically.      calcium carbonate 650 mg calcium (1,625 mg) tablet Take 1 tablet by mouth once daily.      clopidogreL (PLAVIX) 75 mg tablet Take 1 tablet (75 mg total) by mouth once daily. 90 tablet 3    cyanocobalamin 2000 MCG tablet Take 2,000 mcg by mouth once daily.      docusate sodium (COLACE) 100 MG capsule Take 1 capsule (100 mg total) by mouth 2 (two) times daily. 60 capsule 0    docusate sodium (COLACE) 100 MG capsule Colace Take No date recorded No form recorded No frequency recorded No route recorded No set duration recorded No set duration amount recorded active No dosage strength recorded No dosage strength units of measure recorded      evolocumab (REPATHA SURECLICK) 140 mg/mL PnIj Inject 1 mL (140 mg total) into the skin every 14 (fourteen) days. 6 mL 3    folic acid (FOLVITE) 1 MG tablet Take 1 tablet (1 mg total) by mouth once daily. 90 tablet 3    furosemide (LASIX) 20 MG tablet Take 1 tablet (20 mg total) by mouth once daily. 90 tablet 3    gabapentin (NEURONTIN) 100 MG capsule Take 2 capsules (200 mg total) by mouth 3 (three) times daily. 540 capsule 3    garlic 2,000 mg Cap Take 3,000 mg by mouth once daily.       hydrocortisone 2.5 % cream Apply topically 2 (two) times daily. 20 g 0    levothyroxine (SYNTHROID) 112 MCG tablet Take 1 tablet (112 mcg total) by mouth once daily. 90 tablet 1    metoprolol succinate (TOPROL-XL) 100 MG 24 hr tablet Take 1 tablet (100 mg total) by mouth 2 (two) times daily. 1 tab (100mg) in morning. And 100 mg at bedtime.  Generic ok 180 tablet 3    nitroGLYCERIN (NITROSTAT) 0.4 MG SL tablet Place 1 tablet (0.4 mg total) under the tongue every 5 (five) minutes as needed for Chest pain. 100 tablet 3    potassium chloride SA  (K-DUR,KLOR-CON) 20 MEQ tablet Take 1 tablet (20 mEq total) by mouth once daily. 90 tablet 3    pyridoxine, vitamin B6, (B-6) 100 MG Tab Take 200 mg by mouth once daily.       secukinumab (COSENTYX PEN, 2 PENS,) 150 mg/mL PnIj With a loading dose: 150 mg at weeks 0, 1, 2, 3, and 4 followed by 150 mg every 4 weeks 2 each 6    valsartan (DIOVAN) 320 MG tablet Take 0.5 tablets (160 mg total) by mouth once daily. 45 tablet 0    vitamin D 1000 units Tab Take 185 mg by mouth once daily.      clobetasoL (CLOBEX) 0.05 % shampoo Apply topically once daily. 473 mL 0     No current facility-administered medications on file prior to visit.     Medications have been reviewed and reconciled with patient at visit today.    Barriers to medications present (yes)  Multiple drug allergies and sensitivities    Adverse reactions to current medications (no)    Over the counter medications reviewed (Yes) and if needed added to active Medication list.    Review of Systems   Constitutional:  Positive for activity change and appetite change. Negative for fatigue, fever and unexpected weight change.   HENT:  Positive for hearing loss, sinus pressure/congestion and sneezing. Negative for ear pain, sore throat and trouble swallowing.    Eyes:  Positive for visual disturbance (bilateral cataracts). Negative for photophobia and pain.   Respiratory:  Positive for cough, shortness of breath and wheezing. Negative for chest tightness.         Coughing up clear sputum   Cardiovascular:  Positive for leg swelling. Negative for chest pain and palpitations.   Gastrointestinal:  Negative for abdominal pain, constipation, diarrhea, nausea, rectal pain and vomiting.   Genitourinary:  Negative for difficulty urinating, dysuria and hematuria.   Musculoskeletal:  Positive for joint swelling and joint deformity. Negative for arthralgias.        Joints feel better since started CoSyntex   Integumentary:  Negative for rash and wound.   Allergic/Immunologic:  Positive for environmental allergies and food allergies.   Neurological:  Positive for headaches. Negative for dizziness, vertigo, coordination difficulties and coordination difficulties.   Psychiatric/Behavioral:  Negative for confusion and sleep disturbance.       Exam:  Initial VSS: /68 P 66 sat 95% T 98.0  Vitals:    11/30/22 1631   BP: (!) 151/68   Pulse:    Temp:      Weight: 111.4 kg (245 lb 8 oz)   Body mass index is 42.81 kg/m².    Previous weights   8/08/22 247  5/11/22 257  2/18/22 260  12/15/21 258    Physical Exam  Vitals reviewed.   Constitutional:       General: She is not in acute distress.     Appearance: She is obese.   HENT:      Head: Normocephalic and atraumatic.      Right Ear: Ear canal and external ear normal. There is impacted cerumen.      Left Ear: Tympanic membrane, ear canal and external ear normal.      Nose: Nose normal. No congestion or rhinorrhea.      Right Turbinates: Not enlarged.      Left Turbinates: Not enlarged.      Right Sinus: No maxillary sinus tenderness or frontal sinus tenderness.      Left Sinus: No maxillary sinus tenderness or frontal sinus tenderness.      Mouth/Throat:      Mouth: Mucous membranes are moist.      Pharynx: Oropharynx is clear. No oropharyngeal exudate or posterior oropharyngeal erythema.      Comments: dentures  Eyes:      General: No scleral icterus.        Right eye: No discharge.         Left eye: No discharge.      Extraocular Movements: Extraocular movements intact.      Conjunctiva/sclera: Conjunctivae normal.      Pupils: Pupils are equal, round, and reactive to light.   Neck:      Vascular: No carotid bruit.   Cardiovascular:      Rate and Rhythm: Normal rate. Rhythm irregular.      Heart sounds: Normal heart sounds. No murmur heard.  Pulmonary:      Effort: Pulmonary effort is normal.      Breath sounds: Decreased air movement present. Wheezing present. No rhonchi or rales.      Comments: Coarse dry coughing noted  Abdominal:       General: Bowel sounds are normal. There is no distension.      Palpations: Abdomen is soft.      Tenderness: There is no abdominal tenderness. There is no guarding.      Comments: Large abd hernia - soft   Musculoskeletal:         General: Deformity present.      Right lower leg: Edema present.      Left lower leg: Edema present.      Comments: trace LE edema   Lymphadenopathy:      Cervical: No cervical adenopathy.   Skin:     General: Skin is warm and dry.   Neurological:      General: No focal deficit present.      Mental Status: She is alert and oriented to person, place, and time. Mental status is at baseline.      Coordination: Coordination normal.      Gait: Gait abnormal.   Psychiatric:         Mood and Affect: Mood normal.         Behavior: Behavior normal.         Thought Content: Thought content normal.     Laboratory Reviewed: (Yes)  Lab Results   Component Value Date    WBC 7.71 11/01/2022    HGB 12.4 11/01/2022    HCT 38.8 11/01/2022     11/01/2022    MCV 91 11/01/2022    CHOL 119 (L) 07/18/2022    TRIG 108 07/18/2022    HDL 42 07/18/2022    LDLCALC 55.4 (L) 07/18/2022    ALT 15 07/18/2022    AST 18 07/18/2022     (H) 07/18/2022    K 4.3 07/18/2022     07/18/2022    CREATININE 1.3 07/18/2022    BUN 17 07/18/2022    CO2 27 07/18/2022    MG 2.0 05/20/2022    TSH 1.196 07/18/2022    FREET4 1.08 07/18/2022    INR 1.0 09/28/2020    HGBA1C 6.2 (H) 10/14/2022    CRP 6.6 07/18/2022     10/20/22 urine microalb/crt wnl     10/14/22: iron 47(low normal) sat% 12(L) ferritin 28(low normal) uric acid 6.1(slightly elevated) vit D 34    7/18/22: eGFR 41(range 38-51 past 6 mos) ESR 38(wnl for age) uric acid 4.1     6/27/22: uric acid 11.6(H) ESR 68 (H)     5/20/22: PTHi 91.5(H) folate 18.7(wnl) B12> 2,000(H) mma 0.47(H)  4/01/22: (H) mcv 85 rdw 19.2 eGFR 38  1/13/22: TSH 7.980(H) free T4 0.70(L)     Xray L Knee 8/08/22: Stable degenerative changes left knee most pronounced in the medial  compartment with moderate joint space narrowing and adjacent osteophyte formation. Chondrocalcinosis involving the lateral compartment noted. No fracture or dislocation.  Small calcified bodies project about the posterior left knee. Calcification medial to the medial femoral condyle noted. Small joint effusion. Atherosclerosis. Surgical clips project about thep bhe medial posterior left knee. Overall, no significant interval change in the appearance of the left knee. Mild degenerative changes involving the medial compartment contralateral right knee also noted.    DEXA 7/25/22: The L1 to L4 vertebral bone mineral density is equal to 1.177 g/cm squared with a T score of 0.  There has been 6.2% increase relative to the prior study. The left femoral neck bone mineral density is equal to 0.911 g/cm squared with a T score of -0.9.  There has been  7.2% increase relative to the prior study.    5/25/22 Echocardiogram: The left ventricle is normal in size with normal systolic function. The estimated ejection fraction is 55%. Indeterminate left ventricular diastolic function. The estimated PA systolic pressure is 40 mmHg. Normal right ventricular size with normal right ventricular systolic function. Small anterior pericardial effusion. There is a transcutaneously-placed aortic bioprosthesis present. There is no aortic insufficiency present. Prosthetic aortic valve is normal.      Assessment:   72 y.o. female with multiple co-morbid illnesses here for continued follow up of medical problems.      The primary encounter diagnosis was Mild asthma with exacerbation, unspecified whether persistent. 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      Plan:     Problem List Items Addressed This Visit          ENT    Cerumen debris on tympanic membrane of right ear     Repeat debrox Tx to R ear canal - recheck next f/u            Pulmonary    COPD (chronic obstructive pulmonary disease) (Chronic)     Cont alb prn - adding "controller" medication for use for 1 mos then to reassess         Mild asthma with exacerbation - Primary    Relevant Medications    fluticasone-salmeterol diskus inhaler 250-50 mcg       Cardiac/Vascular    Hyperlipidemia (Chronic)     Intolerant of statin - recheck lipid panel after Jan 18 - if LDL < 60 consider decrease Repatha          Essential hypertension (Chronic)     BP may be running higher than usual due to OTC cold medicines, increased use of albuterol, feeling ill and IV steriod - advise to cont monitor - watch and limit sodium intake - taper use of albuterol as tolerated - avoid OTC decongestants like pseudoephedrine or phenylephrine         Aortic atherosclerosis (Chronic)     Intolerant of statin - cont repatha - goal LDL 70            Oncology    Iron deficiency anemia due to chronic blood loss (Chronic)     Cont Venofer infusions - note will anticipate elevated BS and BP w weekly IV solumedrol pre-treatment - perhaps steriod dose could be lowered for future infusions? - pt feels she needs clearance from her rheumatologist and cardiologist prior to consenting to colonoscopy - advise pt to please discuss w them at next f/u appts            Endocrine    Morbid obesity with BMI of 40.0-44.9, adult (Chronic)     15 lbs wt loss over past 10 mos - Encourage on-going efforts to adjust diet and eating habits - intolerant of metformin - expressing interest in trial w GLP1 agonist - will want to first discuss common GI side effects in detail and how to minimize them if do decide to try taking            " Orthopedic    Psoriatic arthritis (Chronic)     Doing well w new Cosyntex from Dr. Ackerman, Rheum (when last seen? 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 (Chronic)     Cont allopurinol 200 mg daily            Health Maintenance         Date Due Completion Date    COVID-19 Vaccine (1) Never done ---    Diabetes Urine Screening Never done ---    Pneumococcal Vaccines (Age 65+) (1 - PCV) Never done ---    Foot Exam Never done ---    Eye Exam Never done ---    TETANUS VACCINE Never done ---    Colorectal Cancer Screening 04/18/2016 4/18/2011    Shingles Vaccine (1 of 2) 05/05/2016 3/10/2016    Mammogram 10/23/2016 10/23/2015    Hemoglobin A1c 01/18/2023 7/18/2022    Lipid Panel 07/18/2023 7/18/2022    Aspirin/Antiplatelet Therapy 08/05/2023 8/5/2022    DEXA Scan 07/25/2025 7/25/2022          Reports has had shingles vaccine #1 10/03/22 and #2 11/04/22  Declines CoVid vaccine. Td? PCV-20?    Plan for eye exam w [facility]. Diabetic foot exam?   Mammogram? Colonoscopy?    -Patient's prior lab results were reviewed and discussed with patient  -Treatment options and alternatives were discussed with the patient. Patient expressed understanding. Patient was given the opportunity to ask questions and be an active participant in their medical care. Patient had no further questions or concerns at this time.   -Patient is an overall HIGH risk for health complications from their medical conditions.     Follow up: Follow up in about 2 weeks (around 12/14/2022) for Follow Up.    After visit summary printed and given to patient upon discharge.  Patient care plan included in After visit summary.    TOTAL TIME evaluating and managing this patient for this encounter was greater than 40 minutes. This time was spent personally by me on the following activities: review of patient's past medical history, assessing age-appropriate health maintenance needs, review of any  interval history, review and interpretation of lab results, review and interpretation of imaging test results, review and interpretation of cardiology test results, reviewing consulting specialist notes, obtaining history from the patient and family, examination of the patient, medication reconciliation, managing and/or ordering prescription medications, educating patient and answering their questions about diagnosis, treatment plan, and goals of treatment, discussing planned follow-up and final documentation of the visit. This time was exclusive of any separately billable procedures for this patient and exclusive of time spent treating any other patients.

## 2022-11-30 NOTE — PATIENT INSTRUCTIONS
Continue to use peroxide to right ear canal    Take fluid pill when you return home    If blood pressure still high or breathing worsening - please update Dr. Porter

## 2022-12-04 PROBLEM — H61.21 CERUMEN DEBRIS ON TYMPANIC MEMBRANE OF RIGHT EAR: Status: ACTIVE | Noted: 2022-11-30

## 2022-12-04 PROBLEM — I70.0 AORTIC ATHEROSCLEROSIS: Chronic | Status: ACTIVE | Noted: 2022-11-30

## 2022-12-04 PROBLEM — I70.0 AORTIC ATHEROSCLEROSIS: Chronic | Status: ACTIVE | Noted: 2022-12-04

## 2022-12-04 PROBLEM — I70.0 AORTIC ATHEROSCLEROSIS: Status: ACTIVE | Noted: 2022-12-04

## 2022-12-04 PROBLEM — D50.0 IRON DEFICIENCY ANEMIA DUE TO CHRONIC BLOOD LOSS: Chronic | Status: ACTIVE | Noted: 2018-01-19

## 2022-12-04 PROBLEM — J45.901 MILD ASTHMA WITH EXACERBATION: Status: ACTIVE | Noted: 2022-12-04

## 2022-12-05 ENCOUNTER — PATIENT OUTREACH (OUTPATIENT)
Dept: ADMINISTRATIVE | Facility: HOSPITAL | Age: 72
End: 2022-12-05
Payer: MEDICARE

## 2022-12-05 NOTE — ASSESSMENT & PLAN NOTE
Cont Venofer infusions - note will anticipate elevated BS and BP w weekly IV solumedrol pre-treatment - perhaps steriod dose could be lowered for future infusions? - pt feels she needs clearance from her rheumatologist and cardiologist prior to consenting to colonoscopy - advise pt to please discuss w them at next f/u appts

## 2022-12-05 NOTE — ASSESSMENT & PLAN NOTE
BP may be running higher than usual due to OTC cold medicines, increased use of albuterol, feeling ill and IV steriod - advise to cont monitor - watch and limit sodium intake - taper use of albuterol as tolerated - avoid OTC decongestants like pseudoephedrine or phenylephrine

## 2022-12-05 NOTE — ASSESSMENT & PLAN NOTE
15 lbs wt loss over past 10 mos - Encourage on-going efforts to adjust diet and eating habits - intolerant of metformin - expressing interest in trial w GLP1 agonist - will want to first discuss common GI side effects in detail and how to minimize them if do decide to try taking

## 2022-12-06 ENCOUNTER — INFUSION (OUTPATIENT)
Dept: INFUSION THERAPY | Facility: HOSPITAL | Age: 72
End: 2022-12-06
Attending: INTERNAL MEDICINE
Payer: MEDICARE

## 2022-12-06 VITALS
OXYGEN SATURATION: 95 % | RESPIRATION RATE: 18 BRPM | HEART RATE: 60 BPM | SYSTOLIC BLOOD PRESSURE: 141 MMHG | DIASTOLIC BLOOD PRESSURE: 60 MMHG | TEMPERATURE: 97 F

## 2022-12-06 DIAGNOSIS — D50.0 IRON DEFICIENCY ANEMIA DUE TO CHRONIC BLOOD LOSS: Primary | ICD-10-CM

## 2022-12-06 DIAGNOSIS — M81.0 OSTEOPOROSIS, UNSPECIFIED OSTEOPOROSIS TYPE, UNSPECIFIED PATHOLOGICAL FRACTURE PRESENCE: ICD-10-CM

## 2022-12-06 PROCEDURE — 96365 THER/PROPH/DIAG IV INF INIT: CPT

## 2022-12-06 PROCEDURE — 96375 TX/PRO/DX INJ NEW DRUG ADDON: CPT

## 2022-12-06 PROCEDURE — 63600175 PHARM REV CODE 636 W HCPCS: Performed by: NURSE PRACTITIONER

## 2022-12-06 PROCEDURE — 25000003 PHARM REV CODE 250: Performed by: NURSE PRACTITIONER

## 2022-12-06 RX ORDER — HEPARIN 100 UNIT/ML
500 SYRINGE INTRAVENOUS
Status: CANCELLED | OUTPATIENT
Start: 2022-12-13

## 2022-12-06 RX ORDER — SODIUM CHLORIDE 0.9 % (FLUSH) 0.9 %
10 SYRINGE (ML) INJECTION
Status: CANCELLED | OUTPATIENT
Start: 2022-12-13

## 2022-12-06 RX ORDER — METHYLPREDNISOLONE SOD SUCC 125 MG
40 VIAL (EA) INJECTION
Status: COMPLETED | OUTPATIENT
Start: 2022-12-06 | End: 2022-12-06

## 2022-12-06 RX ORDER — METHYLPREDNISOLONE SOD SUCC 125 MG
40 VIAL (EA) INJECTION
Status: CANCELLED
Start: 2022-12-13

## 2022-12-06 RX ADMIN — IRON SUCROSE 100 MG: 20 INJECTION, SOLUTION INTRAVENOUS at 01:12

## 2022-12-06 RX ADMIN — SODIUM CHLORIDE: 9 INJECTION, SOLUTION INTRAVENOUS at 01:12

## 2022-12-06 RX ADMIN — METHYLPREDNISOLONE SODIUM SUCCINATE 40 MG: 125 INJECTION, POWDER, FOR SOLUTION INTRAMUSCULAR; INTRAVENOUS at 01:12

## 2022-12-06 NOTE — PLAN OF CARE
Problem: Adult Inpatient Plan of Care  Goal: Plan of Care Review  Outcome: Ongoing, Progressing  Flowsheets (Taken 12/6/2022 1349)  Plan of Care Reviewed With: patient  Goal: Patient-Specific Goal (Individualized)  Outcome: Ongoing, Progressing  Flowsheets (Taken 12/6/2022 1349)  Anxieties, Fears or Concerns: patient voices no concerns at this time  Individualized Care Needs: [place] offered for comfort measures  Patient-Specific Goals (Include Timeframe): tolerate treatment without adverse reaction  Goal: Optimal Comfort and Wellbeing  Outcome: Ongoing, Progressing  Intervention: Provide Person-Centered Care  Flowsheets (Taken 12/6/2022 1350)  Trust Relationship/Rapport:   care explained   questions encouraged   choices provided   reassurance provided   emotional support provided   empathic listening provided   questions answered   thoughts/feelings acknowledged

## 2022-12-13 ENCOUNTER — INFUSION (OUTPATIENT)
Dept: INFUSION THERAPY | Facility: HOSPITAL | Age: 72
End: 2022-12-13
Attending: INTERNAL MEDICINE
Payer: MEDICARE

## 2022-12-13 VITALS
SYSTOLIC BLOOD PRESSURE: 166 MMHG | DIASTOLIC BLOOD PRESSURE: 70 MMHG | HEART RATE: 59 BPM | OXYGEN SATURATION: 97 % | TEMPERATURE: 98 F | RESPIRATION RATE: 16 BRPM

## 2022-12-13 DIAGNOSIS — D50.0 IRON DEFICIENCY ANEMIA DUE TO CHRONIC BLOOD LOSS: Primary | ICD-10-CM

## 2022-12-13 DIAGNOSIS — M81.0 OSTEOPOROSIS, UNSPECIFIED OSTEOPOROSIS TYPE, UNSPECIFIED PATHOLOGICAL FRACTURE PRESENCE: ICD-10-CM

## 2022-12-13 PROCEDURE — 96365 THER/PROPH/DIAG IV INF INIT: CPT

## 2022-12-13 PROCEDURE — 96375 TX/PRO/DX INJ NEW DRUG ADDON: CPT

## 2022-12-13 PROCEDURE — 25000003 PHARM REV CODE 250: Performed by: NURSE PRACTITIONER

## 2022-12-13 PROCEDURE — 63600175 PHARM REV CODE 636 W HCPCS: Performed by: NURSE PRACTITIONER

## 2022-12-13 RX ORDER — METHYLPREDNISOLONE SOD SUCC 125 MG
40 VIAL (EA) INJECTION
Status: COMPLETED | OUTPATIENT
Start: 2022-12-13 | End: 2022-12-13

## 2022-12-13 RX ORDER — SODIUM CHLORIDE 0.9 % (FLUSH) 0.9 %
10 SYRINGE (ML) INJECTION
Status: CANCELLED | OUTPATIENT
Start: 2022-12-20

## 2022-12-13 RX ORDER — METHYLPREDNISOLONE SOD SUCC 125 MG
40 VIAL (EA) INJECTION
Status: CANCELLED
Start: 2022-12-20

## 2022-12-13 RX ORDER — HEPARIN 100 UNIT/ML
500 SYRINGE INTRAVENOUS
Status: CANCELLED | OUTPATIENT
Start: 2022-12-20

## 2022-12-13 RX ORDER — SODIUM CHLORIDE 0.9 % (FLUSH) 0.9 %
10 SYRINGE (ML) INJECTION
Status: DISCONTINUED | OUTPATIENT
Start: 2022-12-13 | End: 2022-12-13 | Stop reason: HOSPADM

## 2022-12-13 RX ADMIN — IRON SUCROSE 100 MG: 20 INJECTION, SOLUTION INTRAVENOUS at 01:12

## 2022-12-13 RX ADMIN — METHYLPREDNISOLONE SODIUM SUCCINATE 40 MG: 125 INJECTION, POWDER, FOR SOLUTION INTRAMUSCULAR; INTRAVENOUS at 01:12

## 2022-12-20 ENCOUNTER — INFUSION (OUTPATIENT)
Dept: INFUSION THERAPY | Facility: HOSPITAL | Age: 72
End: 2022-12-20
Attending: INTERNAL MEDICINE
Payer: MEDICARE

## 2022-12-20 VITALS
RESPIRATION RATE: 18 BRPM | TEMPERATURE: 98 F | HEART RATE: 62 BPM | DIASTOLIC BLOOD PRESSURE: 70 MMHG | OXYGEN SATURATION: 96 % | SYSTOLIC BLOOD PRESSURE: 145 MMHG

## 2022-12-20 DIAGNOSIS — M81.0 OSTEOPOROSIS, UNSPECIFIED OSTEOPOROSIS TYPE, UNSPECIFIED PATHOLOGICAL FRACTURE PRESENCE: ICD-10-CM

## 2022-12-20 DIAGNOSIS — D50.0 IRON DEFICIENCY ANEMIA DUE TO CHRONIC BLOOD LOSS: Primary | ICD-10-CM

## 2022-12-20 PROCEDURE — 96375 TX/PRO/DX INJ NEW DRUG ADDON: CPT

## 2022-12-20 PROCEDURE — 63600175 PHARM REV CODE 636 W HCPCS: Performed by: NURSE PRACTITIONER

## 2022-12-20 PROCEDURE — 96365 THER/PROPH/DIAG IV INF INIT: CPT

## 2022-12-20 PROCEDURE — 25000003 PHARM REV CODE 250: Performed by: NURSE PRACTITIONER

## 2022-12-20 RX ORDER — HEPARIN 100 UNIT/ML
500 SYRINGE INTRAVENOUS
Status: CANCELLED | OUTPATIENT
Start: 2022-12-27

## 2022-12-20 RX ORDER — SODIUM CHLORIDE 0.9 % (FLUSH) 0.9 %
10 SYRINGE (ML) INJECTION
Status: CANCELLED | OUTPATIENT
Start: 2022-12-27

## 2022-12-20 RX ORDER — METHYLPREDNISOLONE SOD SUCC 125 MG
40 VIAL (EA) INJECTION
Status: COMPLETED | OUTPATIENT
Start: 2022-12-20 | End: 2022-12-20

## 2022-12-20 RX ORDER — METHYLPREDNISOLONE SOD SUCC 125 MG
40 VIAL (EA) INJECTION
Status: CANCELLED
Start: 2022-12-27

## 2022-12-20 RX ADMIN — METHYLPREDNISOLONE SODIUM SUCCINATE 40 MG: 125 INJECTION, POWDER, FOR SOLUTION INTRAMUSCULAR; INTRAVENOUS at 01:12

## 2022-12-20 RX ADMIN — IRON SUCROSE 100 MG: 20 INJECTION, SOLUTION INTRAVENOUS at 01:12

## 2022-12-20 RX ADMIN — SODIUM CHLORIDE: 0.9 INJECTION, SOLUTION INTRAVENOUS at 01:12

## 2022-12-20 NOTE — PLAN OF CARE
Discussed plan of care with pt. Addressed any and ongoing concerns. Pt denies   Problem: Adult Inpatient Plan of Care  Goal: Plan of Care Review  Outcome: Ongoing, Progressing  Goal: Patient-Specific Goal (Individualized)  Outcome: Ongoing, Progressing  Flowsheets (Taken 12/20/2022 1555)  Anxieties, Fears or Concerns: Pt wants to make sure that she receives hydration with this infusion of iron  Individualized Care Needs: Reclined warm blanket and pillow for comfort  Patient-Specific Goals (Include Timeframe): Will toleraterate infusion without adverse reaction  Goal: Absence of Hospital-Acquired Illness or Injury  Outcome: Ongoing, Progressing  Intervention: Identify and Manage Fall Risk  Flowsheets (Taken 12/20/2022 1555)  Safety Promotion/Fall Prevention: in recliner, wheels locked  Intervention: Prevent Infection  Flowsheets (Taken 12/20/2022 1555)  Infection Prevention:   equipment surfaces disinfected   hand hygiene promoted   personal protective equipment utilized  Goal: Optimal Comfort and Wellbeing  Outcome: Ongoing, Progressing  Intervention: Provide Person-Centered Care  Flowsheets (Taken 12/20/2022 1555)  Trust Relationship/Rapport:   care explained   choices provided   empathic listening provided   questions answered   questions encouraged   reassurance provided   thoughts/feelings acknowledged

## 2022-12-27 ENCOUNTER — INFUSION (OUTPATIENT)
Dept: INFUSION THERAPY | Facility: HOSPITAL | Age: 72
End: 2022-12-27
Attending: INTERNAL MEDICINE
Payer: MEDICARE

## 2022-12-27 VITALS
OXYGEN SATURATION: 98 % | HEART RATE: 61 BPM | RESPIRATION RATE: 16 BRPM | TEMPERATURE: 98 F | SYSTOLIC BLOOD PRESSURE: 140 MMHG | DIASTOLIC BLOOD PRESSURE: 62 MMHG

## 2022-12-27 DIAGNOSIS — M81.0 OSTEOPOROSIS, UNSPECIFIED OSTEOPOROSIS TYPE, UNSPECIFIED PATHOLOGICAL FRACTURE PRESENCE: ICD-10-CM

## 2022-12-27 DIAGNOSIS — D50.0 IRON DEFICIENCY ANEMIA DUE TO CHRONIC BLOOD LOSS: Primary | ICD-10-CM

## 2022-12-27 PROCEDURE — 96365 THER/PROPH/DIAG IV INF INIT: CPT

## 2022-12-27 PROCEDURE — 63600175 PHARM REV CODE 636 W HCPCS: Performed by: NURSE PRACTITIONER

## 2022-12-27 PROCEDURE — 25000003 PHARM REV CODE 250: Performed by: NURSE PRACTITIONER

## 2022-12-27 PROCEDURE — 96375 TX/PRO/DX INJ NEW DRUG ADDON: CPT

## 2022-12-27 RX ORDER — METHYLPREDNISOLONE SOD SUCC 125 MG
40 VIAL (EA) INJECTION
Status: CANCELLED
Start: 2023-01-03

## 2022-12-27 RX ORDER — METHYLPREDNISOLONE SOD SUCC 125 MG
40 VIAL (EA) INJECTION
Status: COMPLETED | OUTPATIENT
Start: 2022-12-27 | End: 2022-12-27

## 2022-12-27 RX ORDER — SODIUM CHLORIDE 0.9 % (FLUSH) 0.9 %
10 SYRINGE (ML) INJECTION
Status: DISCONTINUED | OUTPATIENT
Start: 2022-12-27 | End: 2022-12-27 | Stop reason: HOSPADM

## 2022-12-27 RX ORDER — SODIUM CHLORIDE 0.9 % (FLUSH) 0.9 %
10 SYRINGE (ML) INJECTION
Status: CANCELLED | OUTPATIENT
Start: 2023-01-03

## 2022-12-27 RX ORDER — HEPARIN 100 UNIT/ML
500 SYRINGE INTRAVENOUS
Status: CANCELLED | OUTPATIENT
Start: 2023-01-03

## 2022-12-27 RX ADMIN — SODIUM CHLORIDE: 9 INJECTION, SOLUTION INTRAVENOUS at 01:12

## 2022-12-27 RX ADMIN — SODIUM CHLORIDE 100 MG: 9 INJECTION, SOLUTION INTRAVENOUS at 01:12

## 2022-12-27 RX ADMIN — METHYLPREDNISOLONE SODIUM SUCCINATE 40 MG: 125 INJECTION, POWDER, FOR SOLUTION INTRAMUSCULAR; INTRAVENOUS at 01:12

## 2022-12-27 NOTE — DISCHARGE INSTRUCTIONS
[facility]  [address] [facility]  [address]  [phone] phone     [phone] fax  Hours of Operation: Monday- Friday 8:00am- 5:00pm  After hours phone  [phone]  Hematology / Oncology Physicians on call      SLAVA Duncan Dr., Dr., NP Sydney Prescott, SAMEERA Valverde FNP    Please call with any concerns regarding your appointment today.

## 2022-12-27 NOTE — PLAN OF CARE
Problem: Adult Inpatient Plan of Care  Goal: Plan of Care Review  Outcome: Ongoing, Progressing  Flowsheets (Taken 12/27/2022 1357)  Plan of Care Reviewed With: patient  Goal: Patient-Specific Goal (Individualized)  Outcome: Ongoing, Progressing  Flowsheets (Taken 12/27/2022 1357)  Anxieties, Fears or Concerns: patient reports fatigue and weakness  Individualized Care Needs: Feet elevated in recliner and pillow provided for comfort measures  Patient-Specific Goals (Include Timeframe): Tolerate treatment without adverse reaction  Goal: Optimal Comfort and Wellbeing  Outcome: Ongoing, Progressing  Intervention: Provide Person-Centered Care  Flowsheets (Taken 12/27/2022 1357)  Trust Relationship/Rapport:   care explained   questions encouraged   choices provided   reassurance provided   emotional support provided   thoughts/feelings acknowledged   empathic listening provided   questions answered

## 2023-01-03 ENCOUNTER — INFUSION (OUTPATIENT)
Dept: INFUSION THERAPY | Facility: HOSPITAL | Age: 73
End: 2023-01-03
Attending: INTERNAL MEDICINE
Payer: MEDICARE

## 2023-01-03 VITALS
DIASTOLIC BLOOD PRESSURE: 67 MMHG | SYSTOLIC BLOOD PRESSURE: 126 MMHG | OXYGEN SATURATION: 97 % | RESPIRATION RATE: 16 BRPM | TEMPERATURE: 97 F | HEART RATE: 68 BPM

## 2023-01-03 DIAGNOSIS — M81.0 OSTEOPOROSIS, UNSPECIFIED OSTEOPOROSIS TYPE, UNSPECIFIED PATHOLOGICAL FRACTURE PRESENCE: ICD-10-CM

## 2023-01-03 DIAGNOSIS — D50.0 IRON DEFICIENCY ANEMIA DUE TO CHRONIC BLOOD LOSS: Primary | ICD-10-CM

## 2023-01-03 PROCEDURE — 63600175 PHARM REV CODE 636 W HCPCS: Performed by: NURSE PRACTITIONER

## 2023-01-03 PROCEDURE — 25000003 PHARM REV CODE 250: Performed by: NURSE PRACTITIONER

## 2023-01-03 PROCEDURE — 96365 THER/PROPH/DIAG IV INF INIT: CPT

## 2023-01-03 PROCEDURE — 96375 TX/PRO/DX INJ NEW DRUG ADDON: CPT

## 2023-01-03 RX ORDER — SODIUM CHLORIDE 0.9 % (FLUSH) 0.9 %
10 SYRINGE (ML) INJECTION
Status: CANCELLED | OUTPATIENT
Start: 2023-01-10

## 2023-01-03 RX ORDER — METHYLPREDNISOLONE SOD SUCC 125 MG
40 VIAL (EA) INJECTION
Status: CANCELLED
Start: 2023-01-10

## 2023-01-03 RX ORDER — HEPARIN 100 UNIT/ML
500 SYRINGE INTRAVENOUS
Status: CANCELLED | OUTPATIENT
Start: 2023-01-10

## 2023-01-03 RX ORDER — METHYLPREDNISOLONE SOD SUCC 125 MG
40 VIAL (EA) INJECTION
Status: COMPLETED | OUTPATIENT
Start: 2023-01-03 | End: 2023-01-03

## 2023-01-03 RX ADMIN — SODIUM CHLORIDE 100 MG: 9 INJECTION, SOLUTION INTRAVENOUS at 01:01

## 2023-01-03 RX ADMIN — METHYLPREDNISOLONE SODIUM SUCCINATE 40 MG: 125 INJECTION, POWDER, FOR SOLUTION INTRAMUSCULAR; INTRAVENOUS at 01:01

## 2023-01-03 NOTE — PLAN OF CARE
Problem: Adult Inpatient Plan of Care  Goal: Plan of Care Review  Outcome: Ongoing, Progressing  Flowsheets (Taken 1/3/2023 1310)  Plan of Care Reviewed With: patient  Goal: Optimal Comfort and Wellbeing  Outcome: Ongoing, Progressing  Intervention: Provide Person-Centered Care  Flowsheets (Taken 1/3/2023 1310)  Trust Relationship/Rapport:   care explained   choices provided   emotional support provided   empathic listening provided   questions answered   questions encouraged   reassurance provided   thoughts/feelings acknowledged

## 2023-01-04 RX ORDER — VALSARTAN 320 MG/1
160 TABLET ORAL DAILY
Qty: 45 TABLET | Refills: 0 | Status: SHIPPED | OUTPATIENT
Start: 2023-01-04 | End: 2023-03-06

## 2023-01-04 NOTE — TELEPHONE ENCOUNTER
Tried reaching patient to inform her of her valsartan (DIOVAN) 320 MG tablet being sent to the pharmacy. Unable to reach her but a message was left for her to call back.

## 2023-01-05 ENCOUNTER — TELEPHONE (OUTPATIENT)
Dept: PRIMARY CARE CLINIC | Facility: CLINIC | Age: 73
End: 2023-01-05
Payer: MEDICARE

## 2023-01-05 NOTE — TELEPHONE ENCOUNTER
Patient called in stating that she was returning a missed call from our office. She was informed that I tried reaching her yesterday to inform that her Diovan prescription was sent to the pharmacy for her. Patient was informed of the information & verbally understood the information given.

## 2023-01-09 ENCOUNTER — OFFICE VISIT (OUTPATIENT)
Dept: PRIMARY CARE CLINIC | Facility: CLINIC | Age: 73
End: 2023-01-09
Payer: MEDICARE

## 2023-01-09 VITALS
BODY MASS INDEX: 40.97 KG/M2 | TEMPERATURE: 98 F | HEART RATE: 57 BPM | OXYGEN SATURATION: 96 % | SYSTOLIC BLOOD PRESSURE: 133 MMHG | HEIGHT: 64 IN | DIASTOLIC BLOOD PRESSURE: 62 MMHG | WEIGHT: 240 LBS

## 2023-01-09 DIAGNOSIS — D84.9 IMMUNOCOMPROMISED: ICD-10-CM

## 2023-01-09 DIAGNOSIS — L40.9 PSORIASIS: Chronic | ICD-10-CM

## 2023-01-09 DIAGNOSIS — E66.01 MORBID OBESITY WITH BMI OF 40.0-44.9, ADULT: ICD-10-CM

## 2023-01-09 DIAGNOSIS — I49.9 CARDIAC ARRHYTHMIA, UNSPECIFIED CARDIAC ARRHYTHMIA TYPE: ICD-10-CM

## 2023-01-09 DIAGNOSIS — I70.0 AORTIC ATHEROSCLEROSIS: ICD-10-CM

## 2023-01-09 DIAGNOSIS — I25.118 CORONARY ARTERY DISEASE OF NATIVE ARTERY OF NATIVE HEART WITH STABLE ANGINA PECTORIS: ICD-10-CM

## 2023-01-09 DIAGNOSIS — N18.32 STAGE 3B CHRONIC KIDNEY DISEASE: ICD-10-CM

## 2023-01-09 DIAGNOSIS — J44.1 CHRONIC OBSTRUCTIVE PULMONARY DISEASE WITH ACUTE EXACERBATION: Chronic | ICD-10-CM

## 2023-01-09 DIAGNOSIS — M81.0 AGE-RELATED OSTEOPOROSIS WITHOUT CURRENT PATHOLOGICAL FRACTURE: ICD-10-CM

## 2023-01-09 DIAGNOSIS — I47.10 SUPRAVENTRICULAR TACHYCARDIA: ICD-10-CM

## 2023-01-09 DIAGNOSIS — L40.50 PSORIATIC ARTHRITIS: ICD-10-CM

## 2023-01-09 DIAGNOSIS — R30.0 DYSURIA: Primary | ICD-10-CM

## 2023-01-09 DIAGNOSIS — E11.42 TYPE 2 DIABETES MELLITUS WITH PERIPHERAL NEUROPATHY: ICD-10-CM

## 2023-01-09 DIAGNOSIS — N25.81 SECONDARY HYPERPARATHYROIDISM: ICD-10-CM

## 2023-01-09 DIAGNOSIS — I50.32 CHRONIC DIASTOLIC HEART FAILURE: ICD-10-CM

## 2023-01-09 PROCEDURE — 99999 PR PBB SHADOW E&M-EST. PATIENT-LVL V: ICD-10-PCS | Mod: PBBFAC,,, | Performed by: NURSE PRACTITIONER

## 2023-01-09 PROCEDURE — 99999 PR PBB SHADOW E&M-EST. PATIENT-LVL V: CPT | Mod: PBBFAC,,, | Performed by: NURSE PRACTITIONER

## 2023-01-09 PROCEDURE — 99215 OFFICE O/P EST HI 40 MIN: CPT | Mod: S$PBB,,, | Performed by: NURSE PRACTITIONER

## 2023-01-09 PROCEDURE — 93010 EKG 12-LEAD: ICD-10-PCS | Mod: S$PBB,,, | Performed by: INTERNAL MEDICINE

## 2023-01-09 PROCEDURE — 80053 COMPREHEN METABOLIC PANEL: CPT | Performed by: NURSE PRACTITIONER

## 2023-01-09 PROCEDURE — 99215 OFFICE O/P EST HI 40 MIN: CPT | Mod: PBBFAC,PN | Performed by: NURSE PRACTITIONER

## 2023-01-09 PROCEDURE — 99215 PR OFFICE/OUTPT VISIT, EST, LEVL V, 40-54 MIN: ICD-10-PCS | Mod: S$PBB,,, | Performed by: NURSE PRACTITIONER

## 2023-01-09 PROCEDURE — 93010 ELECTROCARDIOGRAM REPORT: CPT | Mod: S$PBB,,, | Performed by: INTERNAL MEDICINE

## 2023-01-09 PROCEDURE — 93005 ELECTROCARDIOGRAM TRACING: CPT | Mod: PBBFAC,PN | Performed by: INTERNAL MEDICINE

## 2023-01-09 NOTE — PROGRESS NOTES
Qi Ortiz  01/12/2023  [id number]    Lisa Porter MD  Patient Care Team:  Lisa Porter MD as PCP - General (Internal Medicine)  Cynthia Pruitt LPN as Care Coordinator (Family Medicine)  Elma Hernandez NP as Nurse Practitioner (Family Medicine)      [facility]      Chief Complaint:  Chief Complaint   Patient presents with    2 mth f/u        History of Present Illness:  HPI    Two month follow up.    72 year old female with significant medical h/o DMII, CAD s/p CABG, AS s/p TAVR, HTN, HLD, CKD 3, hypothyroidism, psoriatic arthritis.     F/U COPD. Advair added 11/30/22.   Elevated BP then attributed to decongestant use.     She did NOT start using Advair because she believes it would've adversely affect her BP. States she stopped albuterol and other cold meds at that time and felt fine. Denies wheezing or cough. Reports chronic dyspnea that has not changed recently.     Intolerant of metformin. Would consider GLP-1 inhibitor.      Reports some PVCs recently that she could feel. Has happened previously and she reports this is her baseline. Relieved quickly by rest.     Joint pain recently. About 2 weeks ago. Relieved by ice packs. Sx much improved now.     She is concerned about her dx of osteoporosis with unspecified fracture presence. She does not believe it is accurate since she has no fx.        Review of Systems   Constitutional:  Negative for fever, malaise/fatigue and weight loss.   HENT:  Positive for tinnitus (pulsitile, chronic).    Eyes:  Negative for blurred vision.   Respiratory:  Negative for cough, shortness of breath and wheezing.    Cardiovascular:  Negative for chest pain.   Gastrointestinal:  Negative for abdominal pain, heartburn, nausea and vomiting.   Genitourinary:  Negative for dysuria.        Dysuria last week decreased by cranberry juice.   Musculoskeletal:  Positive for joint pain. Negative for falls and myalgias.   Neurological:  Negative for dizziness  and headaches.       The following were reviewed: Active problem list, medication list, allergies, family history, social history, and Health Maintenance.     History:  Past Medical History:   Diagnosis Date    Carotid stenosis     19%    Cellulitis and abscess of foot, except toes 2022    COPD (chronic obstructive pulmonary disease)     No meds    Coronary artery disease     CVA (cerebral vascular accident)     Dr. Hoffman    Depression     Double ectopic ureters     Dr. Porras    Hemiplegia affecting right dominant side 2021    Hyperlipidemia     Hypertension     Hypothyroid     Left foot infection 2022    OP (osteoporosis)     IRIS (obstructive sleep apnea)     Dr. Hope    Psoriatic arthritis     Rheumatology     Past Surgical History:   Procedure Laterality Date    BREAST BIOPSY      R sided/benign    CARDIAC SURGERY      2016    CERVICAL FUSION      CHOLECYSTECTOMY      CORONARY ANGIOPLASTY      CORONARY ARTERY BYPASS GRAFT      triple bypass    CORONARY STENT PLACEMENT      EYE SURGERY      INTRAUTERINE DEVICE INSERTION      LEFT HEART CATHETERIZATION Left 2020    Procedure: CATHETERIZATION, HEART, LEFT;  Surgeon: Veronica Ibarra MD;  Location: [facility] CATH LAB;  Service: Cardiology;  Laterality: Left;  7am start time    mass removed from R groin      TOTAL ABDOMINAL HYSTERECTOMY W/ BILATERAL SALPINGOOPHORECTOMY      due to benign mass, adhesions    TUBAL LIGATION       Family History   Problem Relation Age of Onset    Breast cancer Maternal Grandfather     Breast cancer Paternal Aunt     Stroke Unknown     Breast cancer Sister 60    Leukemia Sister 8         as child    Lung cancer Paternal Grandfather     Heart disease Unknown     Diabetes Daughter      Social History     Socioeconomic History    Marital status: Single    Number of children: 3   Occupational History    Occupation: Not working   Tobacco Use    Smoking status: Never    Smokeless tobacco: Never   Substance and Sexual  Activity    Alcohol use: No    Drug use: No    Sexual activity: Never     Partners: Male     Patient Active Problem List   Diagnosis    Hyperlipidemia    Hypothyroidism    Degenerative arthritis of lumbar spine    Polyneuropathy    Metabolic syndrome    Vitamin D deficiency    Age-related osteoporosis without current pathological fracture    Essential hypertension    CAD (coronary artery disease), with stents     S/P CABG (coronary artery bypass graft)    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    Carotid stenosis    COPD (chronic obstructive pulmonary disease)    IRIS (obstructive sleep apnea)    Double ectopic ureters    Psoriatic arthritis    Morbid obesity with BMI of 40.0-44.9, adult    Angina, class II    Primary osteoarthritis involving multiple joints    Statin intolerance    Postural dizziness with near syncope    Stage 3b chronic kidney disease    Osteopenia of multiple sites    Psoriasis    History of CVA (cerebrovascular accident)    Iron deficiency anemia due to chronic blood loss    B12 deficiency    Iron deficiency anemia due to chronic blood loss    Allergy to statin medication    Type 2 diabetes mellitus with chronic kidney disease, without long-term current use of insulin    Transient ischemic attack (TIA)    Near syncope    Palpitations    Supraventricular tachycardia    Nonrheumatic aortic valve stenosis    S/P TAVR (transcatheter aortic valve replacement)    Anxiety    Anemia due to folic acid deficiency    Chronic diastolic heart failure    AP (angina pectoris)    CAD, multiple vessel    Pulmonary hypertension    Type 2 diabetes mellitus with peripheral neuropathy    Abnormal blood level of uric acid    Folic acid deficiency    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    Pain in lateral portion of left knee    Secondary hyperparathyroidism    Mild asthma with exacerbation    Aortic atherosclerosis    Cerumen debris on tympanic membrane of  right ear    Immunocompromised     Review of patient's allergies indicates:   Allergen Reactions    Citalopram Anaphylaxis     Other reaction(s): Not Indicated    Clindamycin Itching and Swelling     Other reaction(s): Not Indicated    Codeine Shortness Of Breath, Itching and Swelling     Other reaction(s): Not Indicated    Hydrocodone-acetaminophen Shortness Of Breath     Other reaction(s): Not Indicated    Kiwi (actinidia chinensis) Blisters     Oral  Blisters/burning    Lisinopril Anaphylaxis     Other reaction(s): Not Indicated    Magnesium citrate Shortness Of Breath     Other reaction(s): Not Indicated    Misoprostol Anaphylaxis and Other (See Comments)     Difficulty breathing  Other reaction(s): Not Indicated    Rosuvastatin Anaphylaxis     Other reaction(s): Not Indicated    Stadol [butorphanol tartrate] Anaphylaxis     Coded    Adhesive      EKG Electrodes    Aspirin-dipyridamole Other (See Comments)     headaches  Other reaction(s): Not Indicated    Azithromycin      Other reaction(s): Not Indicated    Butorphanol      Other reaction(s): Not Indicated    Cleocin [clindamycin hcl]     Ezetimibe      Other reaction(s): Not Indicated    Isosorbide Other (See Comments)     Severe headache  Other reaction(s): Not Indicated    Meloxicam      Other reaction(s): Not Indicated    Meperidine      Other reaction(s): Not Indicated    Methylprednisolone Other (See Comments)     Patient reports taking IV in the past without any issues. Reports allergy to oral preparation   Other reaction(s): Not Indicated    Morphine      Other reaction(s): Not Indicated    Moxifloxacin      PATIENT STATES DO NOT GIVE UNDER ANY CIRCUSTANCES  Other reaction(s): Not Indicated    Nedocromil      Other reaction(s): Not Indicated    Nitroglycerin      Long acting  Other reaction(s): Not Indicated    Pentazocine lactate      Other reaction(s): Not Indicated    Pholcodine     Prednisone      GASTRIC PAIN  Other reaction(s): Not Indicated     Ranolazine Nausea And Vomiting     Other reaction(s): Not Indicated    Sulfa (sulfonamide antibiotics) Other (See Comments)     unknown    Tetracycline     Tetracyclines     Triamcinolone acetonide      Other reaction(s): Not Indicated    Zoledronic acid-mannitol-water Other (See Comments)     Bones hurt  Other reaction(s): Not Indicated       Medications:  Current Outpatient Medications on File Prior to Visit   Medication Sig Dispense Refill    acetaminophen (TYLENOL) 650 MG TbSR as directed      albuterol (PROVENTIL) 2.5 mg /3 mL (0.083 %) nebulizer solution Take 3 mLs (2.5 mg total) by nebulization every 6 (six) hours as needed for Wheezing. Rescue 30 each 3    allopurinoL (ZYLOPRIM) 100 MG tablet Take 2 tablets (200 mg total) by mouth once daily. 60 tablet 5    aspirin 81 MG Chew Take 1 tablet (81 mg total) by mouth once daily. 90 tablet 3    BETASEPT SURGICAL SCRUB 4 % external liquid Apply topically.      calcium carbonate 650 mg calcium (1,625 mg) tablet Take 1 tablet by mouth once daily.      clopidogreL (PLAVIX) 75 mg tablet Take 1 tablet (75 mg total) by mouth once daily. 90 tablet 3    cyanocobalamin 2000 MCG tablet Take 2,000 mcg by mouth once daily.      docusate sodium (COLACE) 100 MG capsule Take 1 capsule (100 mg total) by mouth 2 (two) times daily. 60 capsule 0    docusate sodium (COLACE) 100 MG capsule Colace Take No date recorded No form recorded No frequency recorded No route recorded No set duration recorded No set duration amount recorded active No dosage strength recorded No dosage strength units of measure recorded      evolocumab (REPATHA SURECLICK) 140 mg/mL PnIj Inject 1 mL (140 mg total) into the skin every 14 (fourteen) days. 6 mL 3    folic acid (FOLVITE) 1 MG tablet Take 1 tablet (1 mg total) by mouth once daily. 90 tablet 3    furosemide (LASIX) 20 MG tablet Take 1 tablet (20 mg total) by mouth once daily. 90 tablet 3    gabapentin (NEURONTIN) 100 MG capsule Take 2 capsules (200 mg  total) by mouth 3 (three) times daily. 540 capsule 3    garlic 2,000 mg Cap Take 3,000 mg by mouth once daily.       levothyroxine (SYNTHROID) 112 MCG tablet Take 1 tablet (112 mcg total) by mouth once daily. 90 tablet 1    metoprolol succinate (TOPROL-XL) 100 MG 24 hr tablet Take 1 tablet (100 mg total) by mouth 2 (two) times daily. 1 tab (100mg) in morning. And 100 mg at bedtime.  Generic ok 180 tablet 3    nitroGLYCERIN (NITROSTAT) 0.4 MG SL tablet Place 1 tablet (0.4 mg total) under the tongue every 5 (five) minutes as needed for Chest pain. 100 tablet 3    potassium chloride SA (K-DUR,KLOR-CON) 20 MEQ tablet Take 1 tablet (20 mEq total) by mouth once daily. 90 tablet 3    pyridoxine, vitamin B6, (B-6) 100 MG Tab Take 200 mg by mouth once daily.       secukinumab (COSENTYX PEN, 2 PENS,) 150 mg/mL PnIj With a loading dose: 150 mg at weeks 0, 1, 2, 3, and 4 followed by 150 mg every 4 weeks 2 each 6    valsartan (DIOVAN) 320 MG tablet Take 0.5 tablets (160 mg total) by mouth once daily. 45 tablet 0    vitamin D 1000 units Tab Take 185 mg by mouth once daily.      fluticasone-salmeterol diskus inhaler 250-50 mcg Inhale 1 puff into the lungs 2 (two) times daily. Controller (Patient not taking: Reported on 1/9/2023) 60 each 0     No current facility-administered medications on file prior to visit.       Medications have been reviewed and reconciled with patient at visit today.    Barriers to medications present (yes )    Fall since last office visit (no )      Exam:  Vitals:    01/09/23 1401   BP: 133/62   Pulse: (!) 57   Temp: 98.1 °F (36.7 °C)     Weight: 108.9 kg (240 lb)   Body mass index is 41.85 kg/m².      BP Readings from Last 3 Encounters:   01/10/23 136/63   01/09/23 133/62   01/03/23 126/67     Wt Readings from Last 3 Encounters:   01/09/23 1401 108.9 kg (240 lb)   11/30/22 1545 111.4 kg (245 lb 8 oz)   11/01/22 1035 111.5 kg (245 lb 13 oz)            Physical Exam  Constitutional:       General: She is not  in acute distress.     Appearance: She is not toxic-appearing.   HENT:      Right Ear: Tympanic membrane normal.      Left Ear: Tympanic membrane normal.      Ears:      Comments: Very [place]     Mouth/Throat:      Mouth: Mucous membranes are moist.   Eyes:      General: No scleral icterus.  Cardiovascular:      Rate and Rhythm: Normal rate. Rhythm irregular.      Heart sounds: Murmur heard.      Comments: Regular irregular rhythm.   Abdominal:      General: There is no distension.      Palpations: Abdomen is soft.      Tenderness: There is no abdominal tenderness.   Musculoskeletal:         General: No swelling.      Cervical back: Neck supple.   Skin:     General: Skin is warm and dry.      Findings: Rash present.      Comments: Scattered anular erythematous scaly patches bilateral forearms/hands.   Neurological:      General: No focal deficit present.      Mental Status: She is alert.       Laboratory Reviewed: (Yes)  Lab Results   Component Value Date    WBC 7.71 11/01/2022    HGB 12.4 11/01/2022    HCT 38.8 11/01/2022     11/01/2022    CHOL 119 (L) 07/18/2022    TRIG 108 07/18/2022    HDL 42 07/18/2022    ALT 13 01/09/2023    AST 25 01/09/2023     01/09/2023    K 4.8 01/09/2023     01/09/2023    CREATININE 1.3 01/09/2023    BUN 23 01/09/2023    CO2 30 (H) 01/09/2023    TSH 1.196 07/18/2022    INR 1.0 09/28/2020    HGBA1C 6.2 (H) 10/14/2022           Health Maintenance  Health Maintenance Topics with due status: Not Due       Topic Last Completion Date    Lipid Panel 07/18/2022    DEXA Scan 07/25/2022    Diabetes Urine Screening 10/14/2022    Hemoglobin A1c 10/14/2022    Aspirin/Antiplatelet Therapy 01/09/2023     Health Maintenance Due   Topic Date Due    COVID-19 Vaccine (1) Never done    Pneumococcal Vaccines (Age 65+) (1 - PCV) Never done    Foot Exam  Never done    Eye Exam  Never done    TETANUS VACCINE  Never done    Colorectal Cancer Screening  04/18/2016    Mammogram  10/23/2016     Shingles Vaccine (2 of 2) 11/28/2022       Assessment:  Problem List Items Addressed This Visit          Derm    Psoriasis (Chronic)     Now with new scale/patches to arms. Declines topical steroid tx.   Advised to discuss with rheumatology at next appt.             Pulmonary    COPD (chronic obstructive pulmonary disease) (Chronic)     Did not start Advair and has stopped rescue inhaler.   Discussed benefits versus risks. No acute sx presently.   Monitor.             Cardiac/Vascular    Supraventricular tachycardia (Chronic)     NSR today.  Continue follow up with cardiology.         Aortic atherosclerosis (Chronic)     Continue HTN and DMII mgmt, statin.         Chronic diastolic heart failure     No acute sx.  Continue valsartan, metoprolol, furosemide.         CAD (coronary artery disease), with stents      EKG changes noted today.  No new sx.  Keep upcoming appt with cardiology.             Renal/    Stage 3b chronic kidney disease     HTN, DMII mgmt.   Repeat CMP today.              Immunology/Multi System    Immunocompromised     Due to tx for psoriatic arthritis.   Infection prevention, low threshold for tx with antibiotics.   Declines urinalysis today due to recent dysuria.            Endocrine    Morbid obesity with BMI of 40.0-44.9, adult (Chronic)     Encourage diet/exercise.   Consider RD referral.         Type 2 diabetes mellitus with peripheral neuropathy (Chronic)     Not tolerant of metformin.   Last A1C 6.2. Monitor.          Relevant Orders    Comprehensive Metabolic Panel (Completed)    Secondary hyperparathyroidism     Lab Results   Component Value Date    PTH 91.5 (H) 05/20/2022    CALCIUM 9.0 07/18/2022    PHOS 4.1 05/20/2022   Monitor. Check Ca++ with next lab. No sx hypercalcemia.               Orthopedic    Psoriatic arthritis (Chronic)    Age-related osteoporosis without current pathological fracture     Per record review, she had DXA with osteoporosis in 2017 and completed 3 years of  [place].           Other Visit Diagnoses       Dysuria    -  Primary    Does not want to check urinalysis today.    Cardiac arrhythmia, unspecified cardiac arrhythmia type        Relevant Orders    IN OFFICE EKG 12-LEAD (to [place]) (Completed)              Plan:  Dysuria  Comments:  Does not want to check urinalysis today.    Age-related osteoporosis without current pathological fracture    Cardiac arrhythmia, unspecified cardiac arrhythmia type  -     IN OFFICE EKG 12-LEAD (to [place])    Chronic obstructive pulmonary disease with acute exacerbation    Immunocompromised    Type 2 diabetes mellitus with peripheral neuropathy  -     Comprehensive Metabolic Panel; Future; Expected date: 01/09/2023    Morbid obesity with BMI of 40.0-44.9, adult    Secondary hyperparathyroidism    Psoriatic arthritis    Chronic diastolic heart failure    Coronary artery disease of native artery of native heart with stable angina pectoris    Supraventricular tachycardia    Aortic atherosclerosis    Stage 3b chronic kidney disease    Psoriasis      -Patient's lab results were reviewed and discussed with patient  -Treatment options and alternatives were discussed with the patient. Patient expressed understanding. Patient was given the opportunity to ask questions and be an active participant in their medical care. Patient had no further questions or concerns at this time.   -Documentation of patient's health and condition was obtained from family member who was present during visit.  -Patient is an overall moderate risk for health complications from their medical conditions.       Follow up: Follow up in about 3 months (around 4/9/2023).      After visit summary printed and given to patient upon discharge.  Patient goals and care plan are included in After visit summary.    Total medical decision making time was 45 min.  The following issues were discussed: The primary encounter diagnosis was Dysuria. Diagnoses of Age-related osteoporosis  without current pathological fracture, Cardiac arrhythmia, unspecified cardiac arrhythmia type, Chronic obstructive pulmonary disease with acute exacerbation, Immunocompromised, Type 2 diabetes mellitus with peripheral neuropathy, Morbid obesity with BMI of 40.0-44.9, adult, Secondary hyperparathyroidism, Psoriatic arthritis, Chronic diastolic heart failure, Coronary artery disease of native artery of native heart with stable angina pectoris, Supraventricular tachycardia, Aortic atherosclerosis, Stage 3b chronic kidney disease, and Psoriasis were also pertinent to this visit.    Health maintenance needs, recent test results and goals of care discussed with pt and questions answered.

## 2023-01-09 NOTE — ASSESSMENT & PLAN NOTE
Did not start Advair and has stopped rescue inhaler.   Discussed benefits versus risks. No acute sx presently.   Monitor.

## 2023-01-09 NOTE — ASSESSMENT & PLAN NOTE
Lab Results   Component Value Date    PTH 91.5 (H) 05/20/2022    CALCIUM 9.0 07/18/2022    PHOS 4.1 05/20/2022   Monitor. Check Ca++ with next lab. No sx hypercalcemia.

## 2023-01-09 NOTE — ASSESSMENT & PLAN NOTE
Due to tx for psoriatic arthritis.   Infection prevention, low threshold for tx with antibiotics.   Declines urinalysis today due to recent dysuria.

## 2023-01-10 ENCOUNTER — TELEPHONE (OUTPATIENT)
Dept: PRIMARY CARE CLINIC | Facility: CLINIC | Age: 73
End: 2023-01-10
Payer: MEDICARE

## 2023-01-10 ENCOUNTER — INFUSION (OUTPATIENT)
Dept: INFUSION THERAPY | Facility: HOSPITAL | Age: 73
End: 2023-01-10
Attending: INTERNAL MEDICINE
Payer: MEDICARE

## 2023-01-10 VITALS
RESPIRATION RATE: 18 BRPM | OXYGEN SATURATION: 96 % | DIASTOLIC BLOOD PRESSURE: 63 MMHG | TEMPERATURE: 98 F | HEART RATE: 64 BPM | SYSTOLIC BLOOD PRESSURE: 136 MMHG

## 2023-01-10 DIAGNOSIS — D50.0 IRON DEFICIENCY ANEMIA DUE TO CHRONIC BLOOD LOSS: Primary | ICD-10-CM

## 2023-01-10 DIAGNOSIS — M81.0 AGE-RELATED OSTEOPOROSIS WITHOUT CURRENT PATHOLOGICAL FRACTURE: ICD-10-CM

## 2023-01-10 LAB
ALBUMIN SERPL BCP-MCNC: 3.6 G/DL (ref 3.5–5.2)
ALP SERPL-CCNC: 48 U/L (ref 55–135)
ALT SERPL W/O P-5'-P-CCNC: 13 U/L (ref 10–44)
ANION GAP SERPL CALC-SCNC: 8 MMOL/L (ref 8–16)
AST SERPL-CCNC: 25 U/L (ref 10–40)
BILIRUB SERPL-MCNC: 0.5 MG/DL (ref 0.1–1)
BUN SERPL-MCNC: 23 MG/DL (ref 8–23)
CALCIUM SERPL-MCNC: 10.1 MG/DL (ref 8.7–10.5)
CHLORIDE SERPL-SCNC: 105 MMOL/L (ref 95–110)
CO2 SERPL-SCNC: 30 MMOL/L (ref 23–29)
CREAT SERPL-MCNC: 1.3 MG/DL (ref 0.5–1.4)
EST. GFR  (NO RACE VARIABLE): 44 ML/MIN/1.73 M^2
GLUCOSE SERPL-MCNC: 94 MG/DL (ref 70–110)
POTASSIUM SERPL-SCNC: 4.8 MMOL/L (ref 3.5–5.1)
PROT SERPL-MCNC: 7 G/DL (ref 6–8.4)
SODIUM SERPL-SCNC: 143 MMOL/L (ref 136–145)

## 2023-01-10 PROCEDURE — 96365 THER/PROPH/DIAG IV INF INIT: CPT

## 2023-01-10 PROCEDURE — 96375 TX/PRO/DX INJ NEW DRUG ADDON: CPT

## 2023-01-10 PROCEDURE — 25000003 PHARM REV CODE 250: Performed by: NURSE PRACTITIONER

## 2023-01-10 PROCEDURE — 63600175 PHARM REV CODE 636 W HCPCS: Performed by: NURSE PRACTITIONER

## 2023-01-10 RX ORDER — HEPARIN 100 UNIT/ML
500 SYRINGE INTRAVENOUS
Status: CANCELLED | OUTPATIENT
Start: 2023-01-17

## 2023-01-10 RX ORDER — SODIUM CHLORIDE 0.9 % (FLUSH) 0.9 %
10 SYRINGE (ML) INJECTION
Status: CANCELLED | OUTPATIENT
Start: 2023-01-17

## 2023-01-10 RX ORDER — METHYLPREDNISOLONE SOD SUCC 125 MG
40 VIAL (EA) INJECTION
Status: COMPLETED | OUTPATIENT
Start: 2023-01-10 | End: 2023-01-10

## 2023-01-10 RX ORDER — METHYLPREDNISOLONE SOD SUCC 125 MG
40 VIAL (EA) INJECTION
Status: CANCELLED
Start: 2023-01-17

## 2023-01-10 RX ADMIN — SODIUM CHLORIDE: 0.9 INJECTION, SOLUTION INTRAVENOUS at 01:01

## 2023-01-10 RX ADMIN — SODIUM CHLORIDE 100 MG: 9 INJECTION, SOLUTION INTRAVENOUS at 01:01

## 2023-01-10 RX ADMIN — METHYLPREDNISOLONE SODIUM SUCCINATE 40 MG: 125 INJECTION, POWDER, FOR SOLUTION INTRAMUSCULAR; INTRAVENOUS at 01:01

## 2023-01-10 NOTE — DISCHARGE INSTRUCTIONS
.[facility]  [address] [address] [facility]  [address]  [phone] phone     [phone] fax  Hours of Operation: Monday- Friday 8:00am- 5:00pm  After hours phone  [phone]  Hematology / Oncology Physicians on call    Dr. Hank Gore      Nurse Practitioners:    Radha Richardson, SAMEERA Javed, SAMEERA Alanis, SAMEERA Caban, SAMEERA Delgado, SAMEERA Styles, PA      Please don't hesitate to call if you have any concerns.    .FALL PREVENTION   Falls often occur due to slipping, tripping or losing your balance. Here are ways to reduce your risk of falling again.   Was there anything that caused your fall that can be fixed, removed or replaced?   Make your home safe by keeping walkways clear of objects you may trip over.   Use non-slip pads under rugs.   Do not walk in poorly lit areas.   Do not stand on chairs or wobbly ladders.   Use caution when reaching overhead or looking upward. This position can cause a loss of balance.   Be sure your shoes fit properly, have non-slip bottoms and are in good condition.   Be cautious when going up and down stairs, curbs, and when walking on uneven sidewalks.   If your balance is poor, consider using a cane or walker.   If your fall was related to alcohol use, stop or limit alcohol intake.   If your fall was related to use of sleeping medicines, talk to your doctor about this. You may need to reduce your dosage at bedtime if you awaken during the night to go to the bathroom.   To reduce the need for nighttime bathroom trips:   Avoid drinking fluids for several hours before going to bed   Empty your bladder before going to bed   Men can keep a urinal at the bedside   © 2298-1270 [employer], [address] [place] [address], [place] [address]. All rights reserved. This information is not intended as a substitute for professional medical care. Always follow your healthcare  professional's instructions.  .WAYS TO HELP PREVENT INFECTION        WASH YOUR HANDS OFTEN DURING THE DAY, ESPECIALLY BEFORE YOU EAT, AFTER USING THE BATHROOM, AND AFTER TOUCHING ANIMALS    STAY AWAY FROM PEOPLE WHO HAVE ILLNESSES YOU CAN CATCH; SUCH AS COLDS, FLU, CHICKEN POX    TRY TO AVOID CROWDS    STAY AWAY FROM CHILDREN WHO RECENTLY HAVE RECEIVED LIVE VIRUS VACCINES    MAINTAIN GOOD MOUTH CARE    DO NOT SQUEEZE OR SCRATCH PIMPLES    CLEAN CUTS & SCRAPES RIGHT AWAY AND DAILY UNTIL HEALED WITH WARM WATER, SOAP & AN ANTISEPTIC    AVOID CONTACT WITH LITTER BOXES, BIRD CAGES, & FISH TANKS    AVOID STANDING WATER, IE., BIRD BATHS, FLOWER POTS/VASES, OR HUMIDIFIERS    WEAR GLOVES WHEN GARDENING OR CLEANING UP AFTER OTHERS, ESPECIALLY BABIES & SMALL CHILDREN    DO NOT EAT RAW FISH, SEAFOOD, MEAT, OR EGG

## 2023-01-10 NOTE — TELEPHONE ENCOUNTER
----- Message from Lisa Porter MD sent at 1/10/2023  2:31 PM CST -----  Regarding: ecg  Ms. Ortiz doesn't use the [facility] patient portal - please call her regarding some changes note on ECG and advice to f/u w cardiology - I think she's getting iron infusion today so maybe better to call tomorrow    ----- Message -----  From: Lorna, Lab [facility]  Sent: 1/9/2023   9:09 PM CST  To: Lisa Porter MD

## 2023-01-10 NOTE — PLAN OF CARE
Problem: Adult Inpatient Plan of Care  Goal: Plan of Care Review  Outcome: Ongoing, Progressing  Flowsheets (Taken 1/10/2023 7482)  Plan of Care Reviewed With: patient  Goal: Optimal Comfort and Wellbeing  Outcome: Ongoing, Progressing     Problem: Anemia  Goal: Anemia Symptom Improvement  Outcome: Ongoing, Progressing

## 2023-01-12 NOTE — ASSESSMENT & PLAN NOTE
Now with new scale/patches to arms. Declines topical steroid tx.   Advised to discuss with rheumatology at next appt.

## 2023-01-13 ENCOUNTER — TELEPHONE (OUTPATIENT)
Dept: PRIMARY CARE CLINIC | Facility: CLINIC | Age: 73
End: 2023-01-13
Payer: MEDICARE

## 2023-01-17 ENCOUNTER — INFUSION (OUTPATIENT)
Dept: INFUSION THERAPY | Facility: HOSPITAL | Age: 73
End: 2023-01-17
Attending: INTERNAL MEDICINE
Payer: MEDICARE

## 2023-01-17 VITALS
HEART RATE: 59 BPM | DIASTOLIC BLOOD PRESSURE: 56 MMHG | RESPIRATION RATE: 16 BRPM | SYSTOLIC BLOOD PRESSURE: 128 MMHG | TEMPERATURE: 98 F | OXYGEN SATURATION: 97 %

## 2023-01-17 DIAGNOSIS — M81.0 AGE-RELATED OSTEOPOROSIS WITHOUT CURRENT PATHOLOGICAL FRACTURE: ICD-10-CM

## 2023-01-17 DIAGNOSIS — D50.0 IRON DEFICIENCY ANEMIA DUE TO CHRONIC BLOOD LOSS: Primary | ICD-10-CM

## 2023-01-17 PROCEDURE — 96375 TX/PRO/DX INJ NEW DRUG ADDON: CPT

## 2023-01-17 PROCEDURE — 63600175 PHARM REV CODE 636 W HCPCS: Performed by: NURSE PRACTITIONER

## 2023-01-17 PROCEDURE — 96365 THER/PROPH/DIAG IV INF INIT: CPT

## 2023-01-17 PROCEDURE — 25000003 PHARM REV CODE 250: Performed by: NURSE PRACTITIONER

## 2023-01-17 RX ORDER — METHYLPREDNISOLONE SOD SUCC 125 MG
40 VIAL (EA) INJECTION
Status: COMPLETED | OUTPATIENT
Start: 2023-01-17 | End: 2023-01-17

## 2023-01-17 RX ORDER — HEPARIN 100 UNIT/ML
500 SYRINGE INTRAVENOUS
Status: CANCELLED | OUTPATIENT
Start: 2023-01-24

## 2023-01-17 RX ORDER — METHYLPREDNISOLONE SOD SUCC 125 MG
40 VIAL (EA) INJECTION
Status: CANCELLED
Start: 2023-01-24

## 2023-01-17 RX ORDER — SODIUM CHLORIDE 0.9 % (FLUSH) 0.9 %
10 SYRINGE (ML) INJECTION
Status: CANCELLED | OUTPATIENT
Start: 2023-01-24

## 2023-01-17 RX ADMIN — SODIUM CHLORIDE 100 MG: 9 INJECTION, SOLUTION INTRAVENOUS at 01:01

## 2023-01-17 RX ADMIN — METHYLPREDNISOLONE SODIUM SUCCINATE 40 MG: 125 INJECTION, POWDER, FOR SOLUTION INTRAMUSCULAR; INTRAVENOUS at 01:01

## 2023-01-17 NOTE — PLAN OF CARE
Problem: Adult Inpatient Plan of Care  Goal: Optimal Comfort and Wellbeing  Outcome: Ongoing, Progressing  Intervention: Provide Person-Centered Care  Flowsheets (Taken 1/17/2023 2299)  Trust Relationship/Rapport:   care explained   choices provided   emotional support provided   empathic listening provided   questions encouraged   questions answered   reassurance provided   thoughts/feelings acknowledged

## 2023-01-27 ENCOUNTER — TELEPHONE (OUTPATIENT)
Dept: PRIMARY CARE CLINIC | Facility: CLINIC | Age: 73
End: 2023-01-27
Payer: MEDICARE

## 2023-01-27 NOTE — TELEPHONE ENCOUNTER
Pt complaining of vaginal itching and irritation. Tried OTC Monistat. Babs in [place]- States that she thinks she's allergic to Monistat like products. [facility] [place]

## 2023-01-31 DIAGNOSIS — N76.0 ACUTE VAGINITIS: Primary | ICD-10-CM

## 2023-01-31 DIAGNOSIS — E03.9 HYPOTHYROIDISM, UNSPECIFIED TYPE: ICD-10-CM

## 2023-01-31 RX ORDER — FLUCONAZOLE 150 MG/1
150 TABLET ORAL ONCE
Qty: 1 TABLET | Refills: 0 | Status: SHIPPED | OUTPATIENT
Start: 2023-01-31 | End: 2023-02-02 | Stop reason: SDUPTHER

## 2023-01-31 RX ORDER — LEVOTHYROXINE SODIUM 112 UG/1
112 TABLET ORAL DAILY
Qty: 90 TABLET | Refills: 1 | Status: SHIPPED | OUTPATIENT
Start: 2023-01-31 | End: 2023-02-02 | Stop reason: SDUPTHER

## 2023-01-31 NOTE — TELEPHONE ENCOUNTER
Patient checking the status of her call from Friday. Patient stated she has a yeast infection that she has been talking OTC monistat for it and has been eating yogurt and Crangrape juice. She wants to know if something can be called in to the pharmacy for her.

## 2023-01-31 NOTE — PROGRESS NOTES
Subjective:       Patient ID: Qi Ortiz is a 72 y.o. female.    Chief Complaint:   1. Iron deficiency anemia due to chronic blood loss        2. Stage 3b chronic kidney disease          Current Treatment:  Folvite 1mg po daily      Treatment History:  VENOFER 100mg IVPB (IRON SUCROSE) QW x 8 doses    HPI: This is a 72 year old woman with medical history significant for HTN, carotid stenosis, hypothyroidism, COPD, psoriatic arthritis, hyperlipidemia, CVA with right hemiplegia, depression, CAD, osteoporosis, IRIS, and double ectopic ureters who is seen in Hem/Onc for iron deficiency anemia.      She has a history of intolerance of oral iron supplementation as it caused GI upset, so she was given IV iron after which she reported having more energy.    Her primary Hematologist/Oncologist is Dr. Mckinney.    Interval History: Patient presents for follow up on labs. She presents alone and reports fatigue, weakness, and SOB with physical activity. She reports adherence to folic acid. Iron studies pending at time of visit but she reports having more stamina since receiving IV iron.     Reviewed labs with patient:   CBC:   Recent Labs   Lab 02/01/23  1318   WBC 6.47   RBC 4.22   Hemoglobin 12.6   Hematocrit 39.2   Platelets 235   MCV 93   MCH 29.9   MCHC 32.1     CMP:  Recent Labs   Lab 02/01/23  1318   Glucose 183 H   Calcium 9.6   Albumin 3.3 L   Total Protein 6.7   Sodium 142   Potassium 4.2   CO2 24   Chloride 106   BUN 15   Creatinine 1.3   Alkaline Phosphatase 52 L   ALT 16   AST 20   Total Bilirubin 0.3     Social History     Socioeconomic History    Marital status: Single    Number of children: 3   Occupational History    Occupation: Not working   Tobacco Use    Smoking status: Never    Smokeless tobacco: Never   Substance and Sexual Activity    Alcohol use: No    Drug use: No    Sexual activity: Never     Partners: Male     Past Medical History:   Diagnosis Date    Carotid stenosis     19%    Cellulitis and  abscess of foot, except toes 2022    COPD (chronic obstructive pulmonary disease)     No meds    Coronary artery disease     CVA (cerebral vascular accident)     Dr. Hoffman    Depression     Double ectopic ureters     Dr. Porras    Hemiplegia affecting right dominant side 2021    Hyperlipidemia     Hypertension     Hypothyroid     Left foot infection 2022    OP (osteoporosis)     IRIS (obstructive sleep apnea)     Dr. Hope    Psoriatic arthritis     Rheumatology     Family History   Problem Relation Age of Onset    Breast cancer Maternal Grandfather     Breast cancer Paternal Aunt     Stroke Unknown     Breast cancer Sister 60    Leukemia Sister 8         as child    Lung cancer Paternal Grandfather     Heart disease Unknown     Diabetes Daughter      Past Surgical History:   Procedure Laterality Date    BREAST BIOPSY      R sided/benign    CARDIAC SURGERY      2016    CERVICAL FUSION      CHOLECYSTECTOMY      CORONARY ANGIOPLASTY      CORONARY ARTERY BYPASS GRAFT      triple bypass    CORONARY STENT PLACEMENT      EYE SURGERY      INTRAUTERINE DEVICE INSERTION      LEFT HEART CATHETERIZATION Left 2020    Procedure: CATHETERIZATION, HEART, LEFT;  Surgeon: Veronica Ibarra MD;  Location: [facility] CATH LAB;  Service: Cardiology;  Laterality: Left;  7am start time    mass removed from R groin      TOTAL ABDOMINAL HYSTERECTOMY W/ BILATERAL SALPINGOOPHORECTOMY      due to benign mass, adhesions    TUBAL LIGATION       Review of Systems   Constitutional:  Positive for fatigue. Negative for appetite change.   HENT:  Negative for mouth sores, rhinorrhea and sore throat.    Eyes: Negative.    Respiratory:  Positive for shortness of breath (with walking long distances).    Cardiovascular: Negative.    Gastrointestinal:  Negative for constipation, diarrhea, nausea and vomiting.   Endocrine: Negative.    Genitourinary: Negative.    Musculoskeletal: Negative.    Integumentary:  Negative.  "  Allergic/Immunologic: Negative.    Neurological:  Positive for weakness. Negative for numbness.        "Off balance"   Hematological: Negative.    Psychiatric/Behavioral: Negative.         Medication List with Changes/Refills   Current Medications    ACETAMINOPHEN (TYLENOL) 650 MG TBSR    as directed    ALBUTEROL (PROVENTIL) 2.5 MG /3 ML (0.083 %) NEBULIZER SOLUTION    Take 3 mLs (2.5 mg total) by nebulization every 6 (six) hours as needed for Wheezing. Rescue    ALLOPURINOL (ZYLOPRIM) 100 MG TABLET    Take 2 tablets (200 mg total) by mouth once daily.    ASPIRIN 81 MG CHEW    Take 1 tablet (81 mg total) by mouth once daily.    BETASEPT SURGICAL SCRUB 4 % EXTERNAL LIQUID    Apply topically.    CALCIUM CARBONATE 650 MG CALCIUM (1,625 MG) TABLET    Take 1 tablet by mouth once daily.    CLOPIDOGREL (PLAVIX) 75 MG TABLET    Take 1 tablet (75 mg total) by mouth once daily.    CYANOCOBALAMIN 2000 MCG TABLET    Take 2,000 mcg by mouth once daily.    DOCUSATE SODIUM (COLACE) 100 MG CAPSULE    Take 1 capsule (100 mg total) by mouth 2 (two) times daily.    DOCUSATE SODIUM (COLACE) 100 MG CAPSULE    Colace Take No date recorded No form recorded No frequency recorded No route recorded No set duration recorded No set duration amount recorded active No dosage strength recorded No dosage strength units of measure recorded    FLUTICASONE-SALMETEROL DISKUS INHALER 250-50 MCG    Inhale 1 puff into the lungs 2 (two) times daily. Controller    FOLIC ACID (FOLVITE) 1 MG TABLET    Take 1 tablet (1 mg total) by mouth once daily.    FUROSEMIDE (LASIX) 20 MG TABLET    Take 1 tablet (20 mg total) by mouth once daily.    GABAPENTIN (NEURONTIN) 100 MG CAPSULE    Take 2 capsules (200 mg total) by mouth 3 (three) times daily.    GARLIC 2,000 MG CAP    Take 3,000 mg by mouth once daily.     LEVOTHYROXINE (SYNTHROID) 112 MCG TABLET    Take 1 tablet (112 mcg total) by mouth once daily.    METOPROLOL SUCCINATE (TOPROL-XL) 100 MG 24 HR TABLET    " Take 1 tablet (100 mg total) by mouth 2 (two) times daily. 1 tab (100mg) in morning. And 100 mg at bedtime.  Generic ok    NITROGLYCERIN (NITROSTAT) 0.4 MG SL TABLET    Place 1 tablet (0.4 mg total) under the tongue every 5 (five) minutes as needed for Chest pain.    POTASSIUM CHLORIDE SA (K-DUR,KLOR-CON) 20 MEQ TABLET    Take 1 tablet (20 mEq total) by mouth once daily.    PYRIDOXINE, VITAMIN B6, (B-6) 100 MG TAB    Take 200 mg by mouth once daily.     SECUKINUMAB (COSENTYX PEN, 2 PENS,) 150 MG/ML PNIJ    With a loading dose: 150 mg at weeks 0, 1, 2, 3, and 4 followed by 150 mg every 4 weeks    VALSARTAN (DIOVAN) 320 MG TABLET    Take 0.5 tablets (160 mg total) by mouth once daily.    VITAMIN D 1000 UNITS TAB    Take 185 mg by mouth once daily.     Objective:     Vitals:    02/01/23 1339   BP: (!) 123/54   Pulse: 63   Temp: 97.4 °F (36.3 °C)     Physical Exam  Vitals reviewed.   Constitutional:       Appearance: Normal appearance.   HENT:      Head: Normocephalic.      Mouth/Throat:      Comments: Wearing mask    Eyes:      Extraocular Movements: Extraocular movements intact.      Pupils: Pupils are equal, round, and reactive to light.   Cardiovascular:      Rate and Rhythm: Normal rate and regular rhythm.      Heart sounds: Normal heart sounds.   Pulmonary:      Effort: Pulmonary effort is normal.      Breath sounds: Normal breath sounds.   Abdominal:      General: Bowel sounds are normal.      Palpations: Abdomen is soft.      Comments: rounded     Genitourinary:     Comments: deferred    Musculoskeletal:         General: Normal range of motion.      Cervical back: Normal range of motion and neck supple.   Skin:     General: Skin is warm and dry.   Neurological:      Mental Status: She is alert and oriented to person, place, and time.   Psychiatric:         Behavior: Behavior normal.         Thought Content: Thought content normal.        (1) Restricted in physically strenuous activity, ambulatory and able to do  work of light nature  Assessment:     Problem List Items Addressed This Visit          Renal/    Stage 3b chronic kidney disease       Oncology    Iron deficiency anemia due to chronic blood loss - Primary (Chronic)     Plan:     Iron deficiency anemia due to chronic blood loss    Stage 3b chronic kidney disease    Labs reviewed; iron studies pending at time of visit.   Continue folic acid daily.  Follow up in  4 months  with  iron profile, ferritin, CBC, and Comprehensive Metabolic Panel.    Route Chart for Scheduling    Med Onc Chart Routing      Follow up with physician    Follow up with [facility] 4 months. [facility]   Infusion scheduling note    Injection scheduling note    Labs CBC, CMP, ferritin and iron and TIBC   Lab interval:  4 months at [facility], 2 days prior   Imaging None      Pharmacy appointment No pharmacy appointment needed      Other referrals No additional referrals needed       I will review assessment/plan with collaborating physician.      MICHELL Jack

## 2023-02-01 ENCOUNTER — OFFICE VISIT (OUTPATIENT)
Dept: HEMATOLOGY/ONCOLOGY | Facility: CLINIC | Age: 73
End: 2023-02-01
Payer: MEDICARE

## 2023-02-01 ENCOUNTER — LAB VISIT (OUTPATIENT)
Dept: LAB | Facility: HOSPITAL | Age: 73
End: 2023-02-01
Attending: NURSE PRACTITIONER
Payer: MEDICARE

## 2023-02-01 VITALS
HEIGHT: 63 IN | HEART RATE: 63 BPM | WEIGHT: 241.88 LBS | BODY MASS INDEX: 42.86 KG/M2 | DIASTOLIC BLOOD PRESSURE: 54 MMHG | TEMPERATURE: 97 F | OXYGEN SATURATION: 97 % | SYSTOLIC BLOOD PRESSURE: 123 MMHG

## 2023-02-01 DIAGNOSIS — D50.0 IRON DEFICIENCY ANEMIA DUE TO CHRONIC BLOOD LOSS: ICD-10-CM

## 2023-02-01 DIAGNOSIS — N18.32 STAGE 3B CHRONIC KIDNEY DISEASE: ICD-10-CM

## 2023-02-01 DIAGNOSIS — D50.0 IRON DEFICIENCY ANEMIA DUE TO CHRONIC BLOOD LOSS: Primary | ICD-10-CM

## 2023-02-01 LAB
ALBUMIN SERPL BCP-MCNC: 3.3 G/DL (ref 3.5–5.2)
ALP SERPL-CCNC: 52 U/L (ref 55–135)
ALT SERPL W/O P-5'-P-CCNC: 16 U/L (ref 10–44)
ANION GAP SERPL CALC-SCNC: 12 MMOL/L (ref 8–16)
AST SERPL-CCNC: 20 U/L (ref 10–40)
BASOPHILS # BLD AUTO: 0.04 K/UL (ref 0–0.2)
BASOPHILS NFR BLD: 0.6 % (ref 0–1.9)
BILIRUB SERPL-MCNC: 0.3 MG/DL (ref 0.1–1)
BUN SERPL-MCNC: 15 MG/DL (ref 8–23)
CALCIUM SERPL-MCNC: 9.6 MG/DL (ref 8.7–10.5)
CHLORIDE SERPL-SCNC: 106 MMOL/L (ref 95–110)
CO2 SERPL-SCNC: 24 MMOL/L (ref 23–29)
CREAT SERPL-MCNC: 1.3 MG/DL (ref 0.5–1.4)
DIFFERENTIAL METHOD: ABNORMAL
EOSINOPHIL # BLD AUTO: 0.2 K/UL (ref 0–0.5)
EOSINOPHIL NFR BLD: 3.1 % (ref 0–8)
ERYTHROCYTE [DISTWIDTH] IN BLOOD BY AUTOMATED COUNT: 15.8 % (ref 11.5–14.5)
EST. GFR  (NO RACE VARIABLE): 44 ML/MIN/1.73 M^2
GLUCOSE SERPL-MCNC: 183 MG/DL (ref 70–110)
HCT VFR BLD AUTO: 39.2 % (ref 37–48.5)
HGB BLD-MCNC: 12.6 G/DL (ref 12–16)
IMM GRANULOCYTES # BLD AUTO: 0.02 K/UL (ref 0–0.04)
IMM GRANULOCYTES NFR BLD AUTO: 0.3 % (ref 0–0.5)
LYMPHOCYTES # BLD AUTO: 1.3 K/UL (ref 1–4.8)
LYMPHOCYTES NFR BLD: 19.9 % (ref 18–48)
MCH RBC QN AUTO: 29.9 PG (ref 27–31)
MCHC RBC AUTO-ENTMCNC: 32.1 G/DL (ref 32–36)
MCV RBC AUTO: 93 FL (ref 82–98)
MONOCYTES # BLD AUTO: 0.5 K/UL (ref 0.3–1)
MONOCYTES NFR BLD: 8.2 % (ref 4–15)
NEUTROPHILS # BLD AUTO: 4.4 K/UL (ref 1.8–7.7)
NEUTROPHILS NFR BLD: 67.9 % (ref 38–73)
NRBC BLD-RTO: 0 /100 WBC
PLATELET # BLD AUTO: 235 K/UL (ref 150–450)
PMV BLD AUTO: 10.2 FL (ref 9.2–12.9)
POTASSIUM SERPL-SCNC: 4.2 MMOL/L (ref 3.5–5.1)
PROT SERPL-MCNC: 6.7 G/DL (ref 6–8.4)
RBC # BLD AUTO: 4.22 M/UL (ref 4–5.4)
SODIUM SERPL-SCNC: 142 MMOL/L (ref 136–145)
WBC # BLD AUTO: 6.47 K/UL (ref 3.9–12.7)

## 2023-02-01 PROCEDURE — 80053 COMPREHEN METABOLIC PANEL: CPT | Performed by: NURSE PRACTITIONER

## 2023-02-01 PROCEDURE — 99214 PR OFFICE/OUTPT VISIT, EST, LEVL IV, 30-39 MIN: ICD-10-PCS | Mod: S$PBB,,, | Performed by: NURSE PRACTITIONER

## 2023-02-01 PROCEDURE — 99214 OFFICE O/P EST MOD 30 MIN: CPT | Mod: S$PBB,,, | Performed by: NURSE PRACTITIONER

## 2023-02-01 PROCEDURE — 99999 PR PBB SHADOW E&M-EST. PATIENT-LVL IV: CPT | Mod: PBBFAC,,, | Performed by: NURSE PRACTITIONER

## 2023-02-01 PROCEDURE — 85025 COMPLETE CBC W/AUTO DIFF WBC: CPT | Performed by: NURSE PRACTITIONER

## 2023-02-01 PROCEDURE — 99999 PR PBB SHADOW E&M-EST. PATIENT-LVL IV: ICD-10-PCS | Mod: PBBFAC,,, | Performed by: NURSE PRACTITIONER

## 2023-02-01 PROCEDURE — 99214 OFFICE O/P EST MOD 30 MIN: CPT | Mod: PBBFAC | Performed by: NURSE PRACTITIONER

## 2023-02-01 PROCEDURE — 82728 ASSAY OF FERRITIN: CPT | Performed by: NURSE PRACTITIONER

## 2023-02-01 PROCEDURE — 36415 COLL VENOUS BLD VENIPUNCTURE: CPT | Performed by: NURSE PRACTITIONER

## 2023-02-01 PROCEDURE — 84466 ASSAY OF TRANSFERRIN: CPT | Performed by: NURSE PRACTITIONER

## 2023-02-02 ENCOUNTER — TELEPHONE (OUTPATIENT)
Dept: PRIMARY CARE CLINIC | Facility: CLINIC | Age: 73
End: 2023-02-02
Payer: MEDICARE

## 2023-02-02 DIAGNOSIS — N76.0 ACUTE VAGINITIS: ICD-10-CM

## 2023-02-02 DIAGNOSIS — E03.9 HYPOTHYROIDISM, UNSPECIFIED TYPE: ICD-10-CM

## 2023-02-02 LAB
FERRITIN SERPL-MCNC: 205 NG/ML (ref 20–300)
IRON SERPL-MCNC: 63 UG/DL (ref 30–160)
SATURATED IRON: 21 % (ref 20–50)
TOTAL IRON BINDING CAPACITY: 302 UG/DL (ref 250–450)
TRANSFERRIN SERPL-MCNC: 204 MG/DL (ref 200–375)

## 2023-02-02 RX ORDER — FLUCONAZOLE 150 MG/1
150 TABLET ORAL ONCE
Qty: 1 TABLET | Refills: 0 | Status: SHIPPED | OUTPATIENT
Start: 2023-02-02 | End: 2023-02-02

## 2023-02-02 RX ORDER — LEVOTHYROXINE SODIUM 112 UG/1
112 TABLET ORAL DAILY
Qty: 90 TABLET | Refills: 1 | Status: SHIPPED | OUTPATIENT
Start: 2023-02-02 | End: 2023-03-06

## 2023-02-02 NOTE — TELEPHONE ENCOUNTER
----- Message from Andrew Rome sent at 2/2/2023 11:42 AM CST -----  Contact: [phone]  Patient would like to consult with a nurse. The patient did not disclose of any more information. Please call back at [phone]. Thanks crys

## 2023-02-03 ENCOUNTER — LAB VISIT (OUTPATIENT)
Dept: LAB | Facility: HOSPITAL | Age: 73
End: 2023-02-03
Attending: INTERNAL MEDICINE
Payer: MEDICARE

## 2023-02-03 DIAGNOSIS — N18.31 TYPE 2 DIABETES MELLITUS WITH STAGE 3A CHRONIC KIDNEY DISEASE, WITHOUT LONG-TERM CURRENT USE OF INSULIN: ICD-10-CM

## 2023-02-03 DIAGNOSIS — E66.01 MORBID OBESITY WITH BMI OF 40.0-44.9, ADULT: Chronic | ICD-10-CM

## 2023-02-03 DIAGNOSIS — E11.22 TYPE 2 DIABETES MELLITUS WITH STAGE 3A CHRONIC KIDNEY DISEASE, WITHOUT LONG-TERM CURRENT USE OF INSULIN: ICD-10-CM

## 2023-02-03 LAB
ALBUMIN SERPL BCP-MCNC: 3.8 G/DL (ref 3.5–5.2)
ALP SERPL-CCNC: 53 U/L (ref 55–135)
ALT SERPL W/O P-5'-P-CCNC: 18 U/L (ref 10–44)
ANION GAP SERPL CALC-SCNC: 12 MMOL/L (ref 8–16)
AST SERPL-CCNC: 23 U/L (ref 10–40)
BILIRUB SERPL-MCNC: 0.5 MG/DL (ref 0.1–1)
BUN SERPL-MCNC: 21 MG/DL (ref 8–23)
CALCIUM SERPL-MCNC: 10 MG/DL (ref 8.7–10.5)
CHLORIDE SERPL-SCNC: 101 MMOL/L (ref 95–110)
CHOLEST SERPL-MCNC: 125 MG/DL (ref 120–199)
CHOLEST/HDLC SERPL: 2.9 {RATIO} (ref 2–5)
CO2 SERPL-SCNC: 31 MMOL/L (ref 23–29)
CREAT SERPL-MCNC: 1.4 MG/DL (ref 0.5–1.4)
EST. GFR  (NO RACE VARIABLE): 40.2 ML/MIN/1.73 M^2
ESTIMATED AVG GLUCOSE: 128 MG/DL (ref 68–131)
GLUCOSE SERPL-MCNC: 142 MG/DL (ref 70–110)
HBA1C MFR BLD: 6.1 % (ref 4–5.6)
HDLC SERPL-MCNC: 43 MG/DL (ref 40–75)
HDLC SERPL: 34.4 % (ref 20–50)
LDLC SERPL CALC-MCNC: 50 MG/DL (ref 63–159)
NONHDLC SERPL-MCNC: 82 MG/DL
POTASSIUM SERPL-SCNC: 4.9 MMOL/L (ref 3.5–5.1)
PROT SERPL-MCNC: 6.8 G/DL (ref 6–8.4)
SODIUM SERPL-SCNC: 144 MMOL/L (ref 136–145)
TRIGL SERPL-MCNC: 160 MG/DL (ref 30–150)

## 2023-02-03 PROCEDURE — 83036 HEMOGLOBIN GLYCOSYLATED A1C: CPT | Performed by: INTERNAL MEDICINE

## 2023-02-03 PROCEDURE — 80061 LIPID PANEL: CPT | Performed by: INTERNAL MEDICINE

## 2023-02-03 PROCEDURE — 80053 COMPREHEN METABOLIC PANEL: CPT | Performed by: INTERNAL MEDICINE

## 2023-02-03 PROCEDURE — 36415 COLL VENOUS BLD VENIPUNCTURE: CPT | Performed by: INTERNAL MEDICINE

## 2023-02-13 ENCOUNTER — OFFICE VISIT (OUTPATIENT)
Dept: CARDIOLOGY | Facility: CLINIC | Age: 73
End: 2023-02-13
Payer: MEDICARE

## 2023-02-13 VITALS
DIASTOLIC BLOOD PRESSURE: 50 MMHG | WEIGHT: 238.56 LBS | BODY MASS INDEX: 42.27 KG/M2 | HEART RATE: 74 BPM | OXYGEN SATURATION: 98 % | SYSTOLIC BLOOD PRESSURE: 118 MMHG | HEIGHT: 63 IN

## 2023-02-13 DIAGNOSIS — Z78.9 STATIN INTOLERANCE: ICD-10-CM

## 2023-02-13 DIAGNOSIS — R42 POSTURAL DIZZINESS WITH NEAR SYNCOPE: ICD-10-CM

## 2023-02-13 DIAGNOSIS — I70.0 AORTIC ATHEROSCLEROSIS: Chronic | ICD-10-CM

## 2023-02-13 DIAGNOSIS — Z88.8 ALLERGY TO STATIN MEDICATION: ICD-10-CM

## 2023-02-13 DIAGNOSIS — E55.9 VITAMIN D DEFICIENCY: ICD-10-CM

## 2023-02-13 DIAGNOSIS — Z95.1 S/P CABG (CORONARY ARTERY BYPASS GRAFT): Chronic | ICD-10-CM

## 2023-02-13 DIAGNOSIS — Z86.73 HISTORY OF CVA (CEREBROVASCULAR ACCIDENT): ICD-10-CM

## 2023-02-13 DIAGNOSIS — I27.20 PULMONARY HYPERTENSION: Chronic | ICD-10-CM

## 2023-02-13 DIAGNOSIS — R55 NEAR SYNCOPE: ICD-10-CM

## 2023-02-13 DIAGNOSIS — E66.01 MORBID OBESITY WITH BMI OF 40.0-44.9, ADULT: Chronic | ICD-10-CM

## 2023-02-13 DIAGNOSIS — G45.9 TRANSIENT ISCHEMIC ATTACK (TIA): ICD-10-CM

## 2023-02-13 DIAGNOSIS — N18.31 TYPE 2 DIABETES MELLITUS WITH STAGE 3A CHRONIC KIDNEY DISEASE, WITHOUT LONG-TERM CURRENT USE OF INSULIN: ICD-10-CM

## 2023-02-13 DIAGNOSIS — G47.33 OSA (OBSTRUCTIVE SLEEP APNEA): ICD-10-CM

## 2023-02-13 DIAGNOSIS — I25.118 CORONARY ARTERY DISEASE OF NATIVE ARTERY OF NATIVE HEART WITH STABLE ANGINA PECTORIS: Primary | ICD-10-CM

## 2023-02-13 DIAGNOSIS — I25.10 CAD, MULTIPLE VESSEL: ICD-10-CM

## 2023-02-13 DIAGNOSIS — I10 ESSENTIAL HYPERTENSION: Chronic | ICD-10-CM

## 2023-02-13 DIAGNOSIS — I65.21 STENOSIS OF RIGHT CAROTID ARTERY: ICD-10-CM

## 2023-02-13 DIAGNOSIS — I20.9 ANGINA, CLASS II: Chronic | ICD-10-CM

## 2023-02-13 DIAGNOSIS — J45.901 MILD ASTHMA WITH EXACERBATION, UNSPECIFIED WHETHER PERSISTENT: ICD-10-CM

## 2023-02-13 DIAGNOSIS — D50.0 IRON DEFICIENCY ANEMIA DUE TO CHRONIC BLOOD LOSS: Chronic | ICD-10-CM

## 2023-02-13 DIAGNOSIS — J44.1 CHRONIC OBSTRUCTIVE PULMONARY DISEASE WITH ACUTE EXACERBATION: Chronic | ICD-10-CM

## 2023-02-13 DIAGNOSIS — D52.9 ANEMIA DUE TO FOLIC ACID DEFICIENCY, UNSPECIFIED DEFICIENCY TYPE: ICD-10-CM

## 2023-02-13 DIAGNOSIS — Z95.2 S/P TAVR (TRANSCATHETER AORTIC VALVE REPLACEMENT): ICD-10-CM

## 2023-02-13 DIAGNOSIS — R55 POSTURAL DIZZINESS WITH NEAR SYNCOPE: ICD-10-CM

## 2023-02-13 DIAGNOSIS — E78.5 HYPERLIPIDEMIA, UNSPECIFIED HYPERLIPIDEMIA TYPE: Chronic | ICD-10-CM

## 2023-02-13 DIAGNOSIS — I35.0 NONRHEUMATIC AORTIC VALVE STENOSIS: Chronic | ICD-10-CM

## 2023-02-13 DIAGNOSIS — I25.810: ICD-10-CM

## 2023-02-13 DIAGNOSIS — E11.42 TYPE 2 DIABETES MELLITUS WITH PERIPHERAL NEUROPATHY: Chronic | ICD-10-CM

## 2023-02-13 DIAGNOSIS — N18.32 STAGE 3B CHRONIC KIDNEY DISEASE: ICD-10-CM

## 2023-02-13 DIAGNOSIS — G62.9 POLYNEUROPATHY: Chronic | ICD-10-CM

## 2023-02-13 DIAGNOSIS — E11.22 TYPE 2 DIABETES MELLITUS WITH STAGE 3A CHRONIC KIDNEY DISEASE, WITHOUT LONG-TERM CURRENT USE OF INSULIN: ICD-10-CM

## 2023-02-13 DIAGNOSIS — R00.2 PALPITATIONS: ICD-10-CM

## 2023-02-13 PROCEDURE — 99213 OFFICE O/P EST LOW 20 MIN: CPT | Mod: S$PBB,,, | Performed by: INTERNAL MEDICINE

## 2023-02-13 PROCEDURE — 99214 OFFICE O/P EST MOD 30 MIN: CPT | Mod: PBBFAC | Performed by: INTERNAL MEDICINE

## 2023-02-13 PROCEDURE — 99213 PR OFFICE/OUTPT VISIT, EST, LEVL III, 20-29 MIN: ICD-10-PCS | Mod: S$PBB,,, | Performed by: INTERNAL MEDICINE

## 2023-02-13 PROCEDURE — 99999 PR PBB SHADOW E&M-EST. PATIENT-LVL IV: CPT | Mod: PBBFAC,,, | Performed by: INTERNAL MEDICINE

## 2023-02-13 PROCEDURE — 99999 PR PBB SHADOW E&M-EST. PATIENT-LVL IV: ICD-10-PCS | Mod: PBBFAC,,, | Performed by: INTERNAL MEDICINE

## 2023-02-13 RX ORDER — POTASSIUM CHLORIDE 20 MEQ/1
20 TABLET, EXTENDED RELEASE ORAL DAILY
Qty: 90 TABLET | Refills: 3 | Status: SHIPPED | OUTPATIENT
Start: 2023-02-13 | End: 2023-03-06
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
## 2023-02-14 ENCOUNTER — TELEPHONE (OUTPATIENT)
Dept: PRIMARY CARE CLINIC | Facility: CLINIC | Age: 73
End: 2023-02-14
Payer: MEDICARE

## 2023-02-14 NOTE — TELEPHONE ENCOUNTER
----- Message from Martha Gomez sent at 2/14/2023  9:37 AM CST -----  Pot would like to schedule an appt for bladder problem. Call back number is .[phone]. Thx. EL

## 2023-02-15 ENCOUNTER — OFFICE VISIT (OUTPATIENT)
Dept: PRIMARY CARE CLINIC | Facility: CLINIC | Age: 73
End: 2023-02-15
Payer: MEDICARE

## 2023-02-15 VITALS
DIASTOLIC BLOOD PRESSURE: 66 MMHG | TEMPERATURE: 98 F | BODY MASS INDEX: 42.28 KG/M2 | OXYGEN SATURATION: 97 % | SYSTOLIC BLOOD PRESSURE: 143 MMHG | HEART RATE: 68 BPM | HEIGHT: 63 IN | WEIGHT: 238.63 LBS

## 2023-02-15 DIAGNOSIS — R30.0 DYSURIA: Primary | ICD-10-CM

## 2023-02-15 DIAGNOSIS — D50.0 IRON DEFICIENCY ANEMIA DUE TO CHRONIC BLOOD LOSS: Chronic | ICD-10-CM

## 2023-02-15 DIAGNOSIS — K64.3 GRADE IV HEMORRHOIDS: ICD-10-CM

## 2023-02-15 DIAGNOSIS — Q62.8: ICD-10-CM

## 2023-02-15 DIAGNOSIS — N89.8 VAGINAL IRRITATION: ICD-10-CM

## 2023-02-15 DIAGNOSIS — E03.9 HYPOTHYROIDISM, UNSPECIFIED TYPE: Chronic | ICD-10-CM

## 2023-02-15 DIAGNOSIS — N18.31 TYPE 2 DIABETES MELLITUS WITH STAGE 3A CHRONIC KIDNEY DISEASE, WITHOUT LONG-TERM CURRENT USE OF INSULIN: ICD-10-CM

## 2023-02-15 DIAGNOSIS — E11.22 TYPE 2 DIABETES MELLITUS WITH STAGE 3A CHRONIC KIDNEY DISEASE, WITHOUT LONG-TERM CURRENT USE OF INSULIN: ICD-10-CM

## 2023-02-15 PROCEDURE — 99215 PR OFFICE/OUTPT VISIT, EST, LEVL V, 40-54 MIN: ICD-10-PCS | Mod: S$PBB,,, | Performed by: NURSE PRACTITIONER

## 2023-02-15 PROCEDURE — 99215 OFFICE O/P EST HI 40 MIN: CPT | Mod: S$PBB,,, | Performed by: NURSE PRACTITIONER

## 2023-02-15 PROCEDURE — 99215 OFFICE O/P EST HI 40 MIN: CPT | Mod: PBBFAC,PN | Performed by: NURSE PRACTITIONER

## 2023-02-15 PROCEDURE — 99999 PR PBB SHADOW E&M-EST. PATIENT-LVL V: ICD-10-PCS | Mod: PBBFAC,,, | Performed by: NURSE PRACTITIONER

## 2023-02-15 PROCEDURE — 99999 PR PBB SHADOW E&M-EST. PATIENT-LVL V: CPT | Mod: PBBFAC,,, | Performed by: NURSE PRACTITIONER

## 2023-02-15 NOTE — PROGRESS NOTES
Qi Ortiz  02/15/2023  [id number]    Lisa Porter MD  Patient Care Team:  Lisa Porter MD as PCP - General (Internal Medicine)  Cynthia Pruitt LPN as Care Coordinator (Family Medicine)  Elma Hernandez NP as Nurse Practitioner (Family Medicine)      [facility] Note      Chief Complaint:  Chief Complaint   Patient presents with    Bladder issues     Hemorrhoids    Possible yeast infection        History of Present Illness:  HPI    Still having pelvic pressure, severe irritation to perineum.   Some improvement in sx with Diflucan on 2/2/23, less itching and irritation.   Has to strain to urinate.     Had episode of no voiding for two days. Occurred 3 weeks ago.   Seen last month for dysuria, declined urinalysis then.     Presently no pain but hemorrhoids itching and feels pressure to bladder. Using OTC preparation H with lidocaine/phenylephrine, Tucks. No relief. Used Aspercreme to perineum, immediately wiped off because it caused burning. Long standing problems with hemorrhoids, onset after last colonoscopy.     LBM this AM, attributes to hemorrhoid irritation to this. Doesn't strain with BM.     Review of Systems   Constitutional:  Negative for chills, fever and weight loss.   Respiratory:  Negative for cough.    Cardiovascular:  Negative for chest pain and leg swelling.   Gastrointestinal:  Negative for abdominal pain, constipation, nausea and vomiting.   Genitourinary:  Positive for dysuria. Negative for flank pain, hematuria and urgency.   Musculoskeletal:  Negative for myalgias.   Neurological:  Negative for dizziness and headaches.       The following were reviewed: Active problem list, medication list, allergies, family history, social history, and Health Maintenance.     History:  Past Medical History:   Diagnosis Date    Carotid stenosis     19%    Cellulitis and abscess of foot, except toes 6/22/2022    COPD (chronic obstructive pulmonary disease)     No meds    Coronary  artery disease     CVA (cerebral vascular accident)     Dr. Hoffman    Depression     Double ectopic ureters     Dr. Porras    Hemiplegia affecting right dominant side 2021    Hyperlipidemia     Hypertension     Hypothyroid     Left foot infection 2022    OP (osteoporosis)     IRIS (obstructive sleep apnea)     Dr. Hpoe    Psoriatic arthritis     Rheumatology     Past Surgical History:   Procedure Laterality Date    BREAST BIOPSY      R sided/benign    CARDIAC SURGERY      2016    CERVICAL FUSION      CHOLECYSTECTOMY      CORONARY ANGIOPLASTY      CORONARY ARTERY BYPASS GRAFT      triple bypass    CORONARY STENT PLACEMENT      EYE SURGERY      INTRAUTERINE DEVICE INSERTION      LEFT HEART CATHETERIZATION Left 2020    Procedure: CATHETERIZATION, HEART, LEFT;  Surgeon: Veronica Ibarra MD;  Location: [facility] CATH LAB;  Service: Cardiology;  Laterality: Left;  7am start time    mass removed from R groin      TOTAL ABDOMINAL HYSTERECTOMY W/ BILATERAL SALPINGOOPHORECTOMY      due to benign mass, adhesions    TUBAL LIGATION       Family History   Problem Relation Age of Onset    Breast cancer Maternal Grandfather     Breast cancer Paternal Aunt     Stroke Unknown     Breast cancer Sister 60    Leukemia Sister 8         as child    Lung cancer Paternal Grandfather     Heart disease Unknown     Diabetes Daughter      Social History     Socioeconomic History    Marital status: Single    Number of children: 3   Occupational History    Occupation: Not working   Tobacco Use    Smoking status: Never    Smokeless tobacco: Never   Substance and Sexual Activity    Alcohol use: No    Drug use: No    Sexual activity: Never     Partners: Male     Patient Active Problem List   Diagnosis    Hyperlipidemia    Hypothyroidism    Degenerative arthritis of lumbar spine    Polyneuropathy    Metabolic syndrome    Vitamin D deficiency    Age-related osteoporosis without current pathological fracture    Essential hypertension     CAD (coronary artery disease), with stents     S/P CABG (coronary artery bypass graft)    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    Carotid stenosis    COPD (chronic obstructive pulmonary disease)    IRIS (obstructive sleep apnea)    Double ectopic ureters    Psoriatic arthritis    Morbid obesity with BMI of 40.0-44.9, adult    Angina, class II    Primary osteoarthritis involving multiple joints    Statin intolerance    Postural dizziness with near syncope    Stage 3b chronic kidney disease    Osteopenia of multiple sites    Psoriasis    History of CVA (cerebrovascular accident)    Iron deficiency anemia due to chronic blood loss    B12 deficiency    Iron deficiency anemia due to chronic blood loss    Allergy to statin medication    Type 2 diabetes mellitus with chronic kidney disease, without long-term current use of insulin    Transient ischemic attack (TIA)    Near syncope    Palpitations    Supraventricular tachycardia    Nonrheumatic aortic valve stenosis    S/P TAVR (transcatheter aortic valve replacement)    Anxiety    Anemia due to folic acid deficiency    Chronic diastolic heart failure    AP (angina pectoris)    CAD, multiple vessel    Pulmonary hypertension    Type 2 diabetes mellitus with peripheral neuropathy    Abnormal blood level of uric acid    Folic acid deficiency    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    Pain in lateral portion of left knee    Secondary hyperparathyroidism    Mild asthma with exacerbation    Aortic atherosclerosis    Cerumen debris on tympanic membrane of right ear    Immunocompromised    Dysuria    Grade IV hemorrhoids     Review of patient's allergies indicates:   Allergen Reactions    Citalopram Anaphylaxis     Other reaction(s): Not Indicated    Clindamycin Itching and Swelling     Other reaction(s): Not Indicated    Codeine Shortness Of Breath, Itching and Swelling     Other reaction(s): Not Indicated     Hydrocodone-acetaminophen Shortness Of Breath     Other reaction(s): Not Indicated    Kiwi (actinidia chinensis) Blisters     Oral  Blisters/burning    Lisinopril Anaphylaxis     Other reaction(s): Not Indicated    Magnesium citrate Shortness Of Breath     Other reaction(s): Not Indicated    Misoprostol Anaphylaxis and Other (See Comments)     Difficulty breathing  Other reaction(s): Not Indicated    Rosuvastatin Anaphylaxis     Other reaction(s): Not Indicated    Stadol [butorphanol tartrate] Anaphylaxis     Coded    Adhesive      EKG Electrodes    Aspirin-dipyridamole Other (See Comments)     headaches  Other reaction(s): Not Indicated    Azithromycin      Other reaction(s): Not Indicated    Butorphanol      Other reaction(s): Not Indicated    Cleocin [clindamycin hcl]     Ezetimibe      Other reaction(s): Not Indicated    Isosorbide Other (See Comments)     Severe headache  Other reaction(s): Not Indicated    Meloxicam      Other reaction(s): Not Indicated    Meperidine      Other reaction(s): Not Indicated    Methylprednisolone Other (See Comments)     Patient reports taking IV in the past without any issues. Reports allergy to oral preparation   Other reaction(s): Not Indicated    Morphine      Other reaction(s): Not Indicated    Moxifloxacin      PATIENT STATES DO NOT GIVE UNDER ANY CIRCUSTANCES  Other reaction(s): Not Indicated    Nedocromil      Other reaction(s): Not Indicated    Nitroglycerin      Long acting  Other reaction(s): Not Indicated    Pentazocine lactate      Other reaction(s): Not Indicated    Pholcodine     Prednisone      GASTRIC PAIN  Other reaction(s): Not Indicated    Ranolazine Nausea And Vomiting     Other reaction(s): Not Indicated    Sulfa (sulfonamide antibiotics) Other (See Comments)     unknown    Tetracycline     Tetracyclines     Triamcinolone acetonide      Other reaction(s): Not Indicated    Zoledronic acid-mannitol-water Other (See Comments)     Bones hurt  Other reaction(s):  Not Indicated       Medications:  Current Outpatient Medications on File Prior to Visit   Medication Sig Dispense Refill    acetaminophen (TYLENOL) 650 MG TbSR as directed      albuterol (PROVENTIL) 2.5 mg /3 mL (0.083 %) nebulizer solution Take 3 mLs (2.5 mg total) by nebulization every 6 (six) hours as needed for Wheezing. Rescue 30 each 3    allopurinoL (ZYLOPRIM) 100 MG tablet Take 2 tablets (200 mg total) by mouth once daily. 60 tablet 5    aspirin 81 MG Chew Take 1 tablet (81 mg total) by mouth once daily. 90 tablet 3    BETASEPT SURGICAL SCRUB 4 % external liquid Apply topically.      calcium carbonate 650 mg calcium (1,625 mg) tablet Take 1 tablet by mouth once daily.      clopidogreL (PLAVIX) 75 mg tablet Take 1 tablet (75 mg total) by mouth once daily. 90 tablet 3    cyanocobalamin 2000 MCG tablet Take 2,000 mcg by mouth once daily.      docusate sodium (COLACE) 100 MG capsule Take 1 capsule (100 mg total) by mouth 2 (two) times daily. 60 capsule 0    docusate sodium (COLACE) 100 MG capsule Colace Take No date recorded No form recorded No frequency recorded No route recorded No set duration recorded No set duration amount recorded active No dosage strength recorded No dosage strength units of measure recorded      folic acid (FOLVITE) 1 MG tablet Take 1 tablet (1 mg total) by mouth once daily. 90 tablet 3    furosemide (LASIX) 20 MG tablet Take 1 tablet (20 mg total) by mouth once daily. 90 tablet 3    gabapentin (NEURONTIN) 100 MG capsule Take 2 capsules (200 mg total) by mouth 3 (three) times daily. 540 capsule 3    garlic 2,000 mg Cap Take 3,000 mg by mouth once daily.       levothyroxine (SYNTHROID) 112 MCG tablet Take 1 tablet (112 mcg total) by mouth once daily. 90 tablet 1    metoprolol succinate (TOPROL-XL) 100 MG 24 hr tablet Take 1 tablet (100 mg total) by mouth 2 (two) times daily. 1 tab (100mg) in morning. And 100 mg at bedtime.  Generic ok 180 tablet 3    nitroGLYCERIN (NITROSTAT) 0.4 MG SL  tablet Place 1 tablet (0.4 mg total) under the tongue every 5 (five) minutes as needed for Chest pain. 100 tablet 3    potassium chloride SA (K-DUR,KLOR-CON) 20 MEQ tablet Take 1 tablet (20 mEq total) by mouth once daily. 90 tablet 3    pyridoxine, vitamin B6, (B-6) 100 MG Tab Take 200 mg by mouth once daily.       secukinumab (COSENTYX PEN, 2 PENS,) 150 mg/mL PnIj With a loading dose: 150 mg at weeks 0, 1, 2, 3, and 4 followed by 150 mg every 4 weeks 2 each 6    valsartan (DIOVAN) 320 MG tablet Take 0.5 tablets (160 mg total) by mouth once daily. 45 tablet 0    vitamin D 1000 units Tab Take 185 mg by mouth once daily.      fluticasone-salmeterol diskus inhaler 250-50 mcg Inhale 1 puff into the lungs 2 (two) times daily. Controller (Patient not taking: Reported on 1/9/2023) 60 each 0     No current facility-administered medications on file prior to visit.       Medications have been reviewed and reconciled with patient at visit today.    Barriers to medications present (no )    Fall since last office visit (no )      Exam:  Vitals:    02/15/23 1256   BP: (!) 143/66   Pulse: 68   Temp: 98.3 °F (36.8 °C)     Weight: 108.2 kg (238 lb 9.6 oz)   Body mass index is 42.27 kg/m².      BP Readings from Last 3 Encounters:   02/15/23 (!) 143/66   02/13/23 (!) 118/50   02/01/23 (!) 123/54     Wt Readings from Last 3 Encounters:   02/15/23 1256 108.2 kg (238 lb 9.6 oz)   02/13/23 1421 108.2 kg (238 lb 8.6 oz)   02/01/23 1339 109.7 kg (241 lb 13.5 oz)            Physical Exam  Constitutional:       General: She is not in acute distress.  HENT:      Mouth/Throat:      Mouth: Mucous membranes are moist.   Eyes:      General: No scleral icterus.  Cardiovascular:      Rate and Rhythm: Normal rate and regular rhythm.   Abdominal:      General: There is no distension.      Palpations: Abdomen is soft.      Tenderness: There is no abdominal tenderness.   Genitourinary:      Musculoskeletal:         General: No swelling.      Cervical  back: Neck supple.   Skin:     General: Skin is warm and dry.   Neurological:      Mental Status: She is alert. Mental status is at baseline.       Laboratory Reviewed: (Yes)  Lab Results   Component Value Date    WBC 6.47 02/01/2023    HGB 12.6 02/01/2023    HCT 39.2 02/01/2023     02/01/2023    CHOL 125 02/03/2023    TRIG 160 (H) 02/03/2023    HDL 43 02/03/2023    ALT 18 02/03/2023    AST 23 02/03/2023     02/03/2023    K 4.9 02/03/2023     02/03/2023    CREATININE 1.4 02/03/2023    BUN 21 02/03/2023    CO2 31 (H) 02/03/2023    TSH 1.196 07/18/2022    INR 1.0 09/28/2020    HGBA1C 6.1 (H) 02/03/2023           Health Maintenance  Health Maintenance Topics with due status: Not Due       Topic Last Completion Date    DEXA Scan 07/25/2022    Diabetes Urine Screening 10/14/2022    Lipid Panel 02/03/2023    Hemoglobin A1c 02/03/2023    Aspirin/Antiplatelet Therapy 02/15/2023     Health Maintenance Due   Topic Date Due    COVID-19 Vaccine (1) Never done    Pneumococcal Vaccines (Age 65+) (1 - PCV) Never done    Foot Exam  Never done    Eye Exam  Never done    TETANUS VACCINE  Never done    Colorectal Cancer Screening  04/18/2016    Mammogram  10/23/2016    Shingles Vaccine (2 of 2) 11/28/2022       Assessment:  Problem List Items Addressed This Visit          Renal/    Dysuria - Primary     With urinary retention and significant pressure to perineum.   Referral to GYN.         Relevant Orders    POCT URINALYSIS    Urinalysis, Reflex to Urine Culture Urine, Clean Catch    Ambulatory referral/consult to Gynecology    Double ectopic ureters     Followed closely by Dr Porras.   Keep appt next month.             Oncology    Iron deficiency anemia due to chronic blood loss (Chronic)     Lab Results   Component Value Date    HGB 12.6 02/01/2023               Endocrine    Hypothyroidism (Chronic)     Lab Results   Component Value Date    TSH 1.196 07/18/2022            Type 2 diabetes mellitus with chronic  kidney disease, without long-term current use of insulin     Lab Results   Component Value Date    HGBA1C 6.1 (H) 02/03/2023               GI    Grade IV hemorrhoids     With thrombosis.   Recommend surgical evaluation due to extent of pain. She declines.   Discussed comfort measures, maintaining soft stool. Pt is aware.          Other Visit Diagnoses       Vaginal irritation        Relevant Orders    Ambulatory referral/consult to Gynecology              Plan:  Dysuria  -     POCT URINALYSIS  -     Urinalysis, Reflex to Urine Culture Urine, Clean Catch; Future; Expected date: 02/15/2023  -     Ambulatory referral/consult to Gynecology; Future; Expected date: 02/22/2023    Vaginal irritation  -     Ambulatory referral/consult to Gynecology; Future; Expected date: 02/22/2023    Double ectopic ureters    Iron deficiency anemia due to chronic blood loss    Hypothyroidism, unspecified type    Type 2 diabetes mellitus with stage 3a chronic kidney disease, without long-term current use of insulin    Grade IV hemorrhoids      -Patient's lab results were reviewed and discussed with patient  -Treatment options and alternatives were discussed with the patient. Patient expressed understanding. Patient was given the opportunity to ask questions and be an active participant in their medical care. Patient had no further questions or concerns at this time.   -Documentation of patient's health and condition was obtained from family member who was present during visit.  -Patient is an overall moderate risk for health complications from their medical conditions.       Follow up: Follow up in about 4 weeks (around 3/15/2023).      After visit summary printed and given to patient upon discharge.  Patient goals and care plan are included in After visit summary.    Total medical decision making time was 46 min.  The following issues were discussed: The primary encounter diagnosis was Dysuria. Diagnoses of Vaginal irritation, Double  ectopic ureters, Iron deficiency anemia due to chronic blood loss, Hypothyroidism, unspecified type, Type 2 diabetes mellitus with stage 3a chronic kidney disease, without long-term current use of insulin, and Grade IV hemorrhoids were also pertinent to this visit.    Health maintenance needs, recent test results and goals of care discussed with pt and questions answered.

## 2023-02-15 NOTE — ASSESSMENT & PLAN NOTE
With thrombosis.   Recommend surgical evaluation due to extent of pain. She declines.   Discussed comfort measures, maintaining soft stool. Pt is aware.

## 2023-02-15 NOTE — PATIENT INSTRUCTIONS
I recommend seeing a surgeon to consider excision of hemorrhoids.     Okay to use Preparation H HYDROCORTISONE topically as directed for itching/burning to hemorrhoids/perineum.     I am concerned about you not voiding for two days. Need urinalysis, okay to take specimen cup home to void. If you have no infection in urine then you should see urology to evaluate further and determine cause. If you are unable to void again you

## 2023-02-16 ENCOUNTER — OFFICE VISIT (OUTPATIENT)
Dept: OBSTETRICS AND GYNECOLOGY | Facility: CLINIC | Age: 73
End: 2023-02-16
Payer: MEDICARE

## 2023-02-16 ENCOUNTER — TELEPHONE (OUTPATIENT)
Dept: PRIMARY CARE CLINIC | Facility: CLINIC | Age: 73
End: 2023-02-16
Payer: MEDICARE

## 2023-02-16 VITALS
DIASTOLIC BLOOD PRESSURE: 70 MMHG | BODY MASS INDEX: 42.58 KG/M2 | HEIGHT: 63 IN | WEIGHT: 240.31 LBS | SYSTOLIC BLOOD PRESSURE: 124 MMHG

## 2023-02-16 DIAGNOSIS — R10.30 LOWER ABDOMINAL PAIN: Primary | ICD-10-CM

## 2023-02-16 DIAGNOSIS — N89.8 VAGINAL IRRITATION: ICD-10-CM

## 2023-02-16 DIAGNOSIS — R30.0 DYSURIA: ICD-10-CM

## 2023-02-16 PROCEDURE — 99215 OFFICE O/P EST HI 40 MIN: CPT | Mod: PBBFAC | Performed by: NURSE PRACTITIONER

## 2023-02-16 PROCEDURE — 99213 PR OFFICE/OUTPT VISIT, EST, LEVL III, 20-29 MIN: ICD-10-PCS | Mod: S$PBB,ICN,, | Performed by: NURSE PRACTITIONER

## 2023-02-16 PROCEDURE — 99999 PR PBB SHADOW E&M-EST. PATIENT-LVL V: ICD-10-PCS | Mod: PBBFAC,,, | Performed by: NURSE PRACTITIONER

## 2023-02-16 PROCEDURE — 99999 PR PBB SHADOW E&M-EST. PATIENT-LVL V: CPT | Mod: PBBFAC,,, | Performed by: NURSE PRACTITIONER

## 2023-02-16 PROCEDURE — 99213 OFFICE O/P EST LOW 20 MIN: CPT | Mod: S$PBB,ICN,, | Performed by: NURSE PRACTITIONER

## 2023-02-16 NOTE — TELEPHONE ENCOUNTER
----- Message from Elma Hernandez NP sent at 2/16/2023  3:00 PM CST -----  Large amount WBC in urine and small amount blood with pressure to perineum. No definite infection. Please schedule CT abd/pelvis stone protocol.

## 2023-02-16 NOTE — PROGRESS NOTES
Subjective:       Patient ID: Qi Ortiz is a 72 y.o. female.    Chief Complaint:  Vaginitis    No LMP recorded. Patient has had a hysterectomy.  History of Present Illness  tates that she feel like her bladder is falling out and her PCP is doing her urine culture due to having trouble with urination. She already has a urologist and she has and appt with him already scheduled. Presents to GYN for evaluation of vaginal walls thinning out causing vaginal irritation. She is diabetic but is unable to take medication for it because all of the medication make her sick, therefore they are just observing her diabetes for now.         OB History   No obstetric history on file.       Review of Systems  Review of Systems        Objective:    Physical Exam  Genitourinary:     Comments: Vulva atrophy       No prolapse visualized   Assessment:     1. Dysuria    2. Vaginal irritation              Plan:   Qi was seen today for vaginitis.    Diagnoses and all orders for this visit:    Dysuria  -     Ambulatory referral/consult to Gynecology    Vaginal irritation  -     Ambulatory referral/consult to Gynecology      States that she already has a urologist for dysuria   2/20 Clobetasol ointment 0.05% 30 grams apply to affected area twice a day 2 refills (spoke with wanda)

## 2023-02-20 ENCOUNTER — HOSPITAL ENCOUNTER (OUTPATIENT)
Dept: RADIOLOGY | Facility: HOSPITAL | Age: 73
Discharge: HOME | End: 2023-02-20
Attending: NURSE PRACTITIONER
Payer: MEDICARE

## 2023-02-20 ENCOUNTER — HOSPITAL ENCOUNTER (OUTPATIENT)
Dept: CARDIOLOGY | Facility: HOSPITAL | Age: 73
Discharge: HOME | End: 2023-02-20
Attending: INTERNAL MEDICINE
Payer: MEDICARE

## 2023-02-20 VITALS
DIASTOLIC BLOOD PRESSURE: 72 MMHG | BODY MASS INDEX: 42.17 KG/M2 | SYSTOLIC BLOOD PRESSURE: 123 MMHG | WEIGHT: 238 LBS | HEIGHT: 63 IN

## 2023-02-20 DIAGNOSIS — N18.31 TYPE 2 DIABETES MELLITUS WITH STAGE 3A CHRONIC KIDNEY DISEASE, WITHOUT LONG-TERM CURRENT USE OF INSULIN: ICD-10-CM

## 2023-02-20 DIAGNOSIS — G45.9 TRANSIENT ISCHEMIC ATTACK (TIA): ICD-10-CM

## 2023-02-20 DIAGNOSIS — Z86.73 HISTORY OF CVA (CEREBROVASCULAR ACCIDENT): ICD-10-CM

## 2023-02-20 DIAGNOSIS — R10.30 LOWER ABDOMINAL PAIN: ICD-10-CM

## 2023-02-20 DIAGNOSIS — E11.22 TYPE 2 DIABETES MELLITUS WITH STAGE 3A CHRONIC KIDNEY DISEASE, WITHOUT LONG-TERM CURRENT USE OF INSULIN: ICD-10-CM

## 2023-02-20 DIAGNOSIS — I25.118 CORONARY ARTERY DISEASE OF NATIVE ARTERY OF NATIVE HEART WITH STABLE ANGINA PECTORIS: ICD-10-CM

## 2023-02-20 LAB
LEFT ARM DIASTOLIC BLOOD PRESSURE: 75 MMHG
LEFT ARM SYSTOLIC BLOOD PRESSURE: 120 MMHG
LEFT CBA DIAS: 14 CM/S
LEFT CBA SYS: 87 CM/S
LEFT CCA DIST DIAS: 16 CM/S
LEFT CCA DIST SYS: 93 CM/S
LEFT CCA MID DIAS: 14 CM/S
LEFT CCA MID SYS: 95 CM/S
LEFT CCA PROX DIAS: 18 CM/S
LEFT CCA PROX SYS: 128 CM/S
LEFT ECA DIAS: 12 CM/S
LEFT ECA SYS: 142 CM/S
LEFT ICA DIST DIAS: 23 CM/S
LEFT ICA DIST SYS: 66 CM/S
LEFT ICA MID DIAS: 20 CM/S
LEFT ICA MID SYS: 80 CM/S
LEFT ICA PROX DIAS: 27 CM/S
LEFT ICA PROX SYS: 121 CM/S
LEFT VERTEBRAL DIAS: 10 CM/S
LEFT VERTEBRAL SYS: 46 CM/S
OHS CV CAROTID RIGHT ICA EDV HIGHEST: 37
OHS CV CAROTID ULTRASOUND LEFT ICA/CCA RATIO: 1.3
OHS CV CAROTID ULTRASOUND RIGHT ICA/CCA RATIO: 1.25
OHS CV PV CAROTID LEFT HIGHEST CCA: 128
OHS CV PV CAROTID LEFT HIGHEST ICA: 121
OHS CV PV CAROTID RIGHT HIGHEST CCA: 91
OHS CV PV CAROTID RIGHT HIGHEST ICA: 99
OHS CV US CAROTID LEFT HIGHEST EDV: 27
RIGHT ARM DIASTOLIC BLOOD PRESSURE: 72 MMHG
RIGHT ARM SYSTOLIC BLOOD PRESSURE: 123 MMHG
RIGHT CBA DIAS: 9 CM/S
RIGHT CBA SYS: 57 CM/S
RIGHT CCA DIST DIAS: 16 CM/S
RIGHT CCA DIST SYS: 79 CM/S
RIGHT CCA MID DIAS: 23 CM/S
RIGHT CCA MID SYS: 91 CM/S
RIGHT CCA PROX DIAS: 11 CM/S
RIGHT CCA PROX SYS: 55 CM/S
RIGHT ECA DIAS: 6 CM/S
RIGHT ECA SYS: 68 CM/S
RIGHT ICA DIST DIAS: 37 CM/S
RIGHT ICA DIST SYS: 99 CM/S
RIGHT ICA MID DIAS: 28 CM/S
RIGHT ICA MID SYS: 80 CM/S
RIGHT ICA PROX DIAS: 23 CM/S
RIGHT ICA PROX SYS: 82 CM/S
RIGHT VERTEBRAL DIAS: 7 CM/S
RIGHT VERTEBRAL SYS: 33 CM/S

## 2023-02-20 PROCEDURE — 93880 EXTRACRANIAL BILAT STUDY: CPT

## 2023-02-20 PROCEDURE — 93880 CV US DOPPLER CAROTID (CUPID ONLY): ICD-10-PCS | Mod: 26,,, | Performed by: INTERNAL MEDICINE

## 2023-02-20 PROCEDURE — 74176 CT ABD & PELVIS W/O CONTRAST: CPT | Mod: 26,,, | Performed by: STUDENT IN AN ORGANIZED HEALTH CARE EDUCATION/TRAINING PROGRAM

## 2023-02-20 PROCEDURE — 93880 EXTRACRANIAL BILAT STUDY: CPT | Mod: 26,,, | Performed by: INTERNAL MEDICINE

## 2023-02-20 PROCEDURE — 74176 CT ABD & PELVIS W/O CONTRAST: CPT | Mod: TC

## 2023-02-20 PROCEDURE — 74176 CT RENAL STONE STUDY ABD PELVIS WO: ICD-10-PCS | Mod: 26,,, | Performed by: STUDENT IN AN ORGANIZED HEALTH CARE EDUCATION/TRAINING PROGRAM

## 2023-02-20 RX ORDER — LEVOFLOXACIN 750 MG/1
750 TABLET ORAL DAILY
Qty: 5 TABLET | Refills: 0 | Status: SHIPPED | OUTPATIENT
Start: 2023-02-20 | End: 2023-02-21 | Stop reason: SDUPTHER

## 2023-02-20 NOTE — PROGRESS NOTES
"Spoke w patient by phone - says she's feeling "miserable", "paining", "feels like something's poking me".  Discussed positive urine culture resistant to many antibiotics.  Levofloxacin is the only option available for oral antibiotic.  Levofloxacin is not on her list of allergies though Moxifloxacin is.   She is in agreement w trying the levofloxacin.  Advised if adverse reaction or no improvement in symptoms may need to go to ED for IV antibiotic.  "

## 2023-02-21 ENCOUNTER — TELEPHONE (OUTPATIENT)
Dept: CARDIOLOGY | Facility: CLINIC | Age: 73
End: 2023-02-21
Payer: MEDICARE

## 2023-02-21 ENCOUNTER — TELEPHONE (OUTPATIENT)
Dept: PRIMARY CARE CLINIC | Facility: CLINIC | Age: 73
End: 2023-02-21
Payer: MEDICARE

## 2023-02-21 RX ORDER — LEVOFLOXACIN 750 MG/1
750 TABLET ORAL DAILY
Qty: 5 TABLET | Refills: 0 | Status: SHIPPED | OUTPATIENT
Start: 2023-02-21 | End: 2023-02-26

## 2023-02-21 NOTE — TELEPHONE ENCOUNTER
Pt notified pb    ----- Message from Veronica Ibarra MD sent at 2/20/2023  5:56 PM CST -----  MILD BLOCKAGE IN CAROTIDS

## 2023-02-21 NOTE — TELEPHONE ENCOUNTER
Spoke with pt, states that she is still in pain and has not started antibiotics as of yet. States that she will start them today. Advised that if she develops fever or intractable vomiting she should go to the ED. Pt verbalized understanding.

## 2023-02-21 NOTE — TELEPHONE ENCOUNTER
Patient was contacted and informed of her levoFLOXacin (LEVAQUIN) 750 MG tablet  being sent to the pharmacy for her. Patient verbally understood the information given.

## 2023-02-21 NOTE — TELEPHONE ENCOUNTER
----- Message from Lisa Porter MD sent at 2/20/2023  6:02 PM CST -----  Regarding: UTI  Please f/u w Ms. Ortiz to see if she was able to get the levofloxacin and has she started taking it?  If develop fever, intractable nausea vomiting or unable to tolerate the levofloxacin advise to go to ED.

## 2023-02-27 ENCOUNTER — TELEPHONE (OUTPATIENT)
Dept: PRIMARY CARE CLINIC | Facility: CLINIC | Age: 73
End: 2023-02-27
Payer: MEDICARE

## 2023-02-27 NOTE — TELEPHONE ENCOUNTER
Called patient and notified of the results and recommendations. Patient verbalized understanding, Chantal Langley

## 2023-02-27 NOTE — TELEPHONE ENCOUNTER
----- Message from Elma Hernandez NP sent at 2/24/2023 11:47 AM CST -----  Please let patient know that results are at baseline.   Continue plan of care as discussed.

## 2023-03-02 ENCOUNTER — LAB VISIT (OUTPATIENT)
Dept: LAB | Facility: HOSPITAL | Age: 73
End: 2023-03-02
Attending: INTERNAL MEDICINE
Payer: MEDICARE

## 2023-03-02 ENCOUNTER — OFFICE VISIT (OUTPATIENT)
Dept: RHEUMATOLOGY | Facility: CLINIC | Age: 73
End: 2023-03-02
Payer: MEDICARE

## 2023-03-02 VITALS
WEIGHT: 239.63 LBS | SYSTOLIC BLOOD PRESSURE: 134 MMHG | HEIGHT: 63 IN | BODY MASS INDEX: 42.46 KG/M2 | HEART RATE: 70 BPM | DIASTOLIC BLOOD PRESSURE: 79 MMHG

## 2023-03-02 DIAGNOSIS — Z79.899 HIGH RISK MEDICATION USE: ICD-10-CM

## 2023-03-02 DIAGNOSIS — M1A.09X0 CHRONIC GOUT OF MULTIPLE SITES, UNSPECIFIED CAUSE: ICD-10-CM

## 2023-03-02 DIAGNOSIS — E11.42 TYPE 2 DIABETES MELLITUS WITH PERIPHERAL NEUROPATHY: Chronic | ICD-10-CM

## 2023-03-02 DIAGNOSIS — L40.50 PSORIATIC ARTHRITIS: ICD-10-CM

## 2023-03-02 DIAGNOSIS — Z95.1 S/P CABG (CORONARY ARTERY BYPASS GRAFT): Chronic | ICD-10-CM

## 2023-03-02 DIAGNOSIS — N18.32 STAGE 3B CHRONIC KIDNEY DISEASE: ICD-10-CM

## 2023-03-02 DIAGNOSIS — L40.9 PSORIASIS: Chronic | ICD-10-CM

## 2023-03-02 DIAGNOSIS — L40.9 PSORIASIS: ICD-10-CM

## 2023-03-02 DIAGNOSIS — M81.0 AGE-RELATED OSTEOPOROSIS WITHOUT CURRENT PATHOLOGICAL FRACTURE: ICD-10-CM

## 2023-03-02 DIAGNOSIS — Z51.81 MEDICATION MONITORING ENCOUNTER: ICD-10-CM

## 2023-03-02 DIAGNOSIS — E66.01 MORBID OBESITY WITH BMI OF 40.0-44.9, ADULT: Chronic | ICD-10-CM

## 2023-03-02 DIAGNOSIS — L40.50 PSORIATIC ARTHRITIS: Primary | Chronic | ICD-10-CM

## 2023-03-02 LAB
ALBUMIN SERPL BCP-MCNC: 3.3 G/DL (ref 3.5–5.2)
ALP SERPL-CCNC: 46 U/L (ref 55–135)
ALT SERPL W/O P-5'-P-CCNC: 11 U/L (ref 10–44)
ANION GAP SERPL CALC-SCNC: 11 MMOL/L (ref 8–16)
AST SERPL-CCNC: 22 U/L (ref 10–40)
BASOPHILS # BLD AUTO: 0.04 K/UL (ref 0–0.2)
BASOPHILS NFR BLD: 0.6 % (ref 0–1.9)
BILIRUB SERPL-MCNC: 0.2 MG/DL (ref 0.1–1)
BUN SERPL-MCNC: 21 MG/DL (ref 8–23)
CALCIUM SERPL-MCNC: 9.3 MG/DL (ref 8.7–10.5)
CHLORIDE SERPL-SCNC: 105 MMOL/L (ref 95–110)
CO2 SERPL-SCNC: 27 MMOL/L (ref 23–29)
CREAT SERPL-MCNC: 1.1 MG/DL (ref 0.5–1.4)
DIFFERENTIAL METHOD: ABNORMAL
EOSINOPHIL # BLD AUTO: 0.2 K/UL (ref 0–0.5)
EOSINOPHIL NFR BLD: 3.2 % (ref 0–8)
ERYTHROCYTE [DISTWIDTH] IN BLOOD BY AUTOMATED COUNT: 14.9 % (ref 11.5–14.5)
EST. GFR  (NO RACE VARIABLE): 54 ML/MIN/1.73 M^2
GLUCOSE SERPL-MCNC: 126 MG/DL (ref 70–110)
HCT VFR BLD AUTO: 36.8 % (ref 37–48.5)
HGB BLD-MCNC: 11.7 G/DL (ref 12–16)
IMM GRANULOCYTES # BLD AUTO: 0.02 K/UL (ref 0–0.04)
IMM GRANULOCYTES NFR BLD AUTO: 0.3 % (ref 0–0.5)
LYMPHOCYTES # BLD AUTO: 1.5 K/UL (ref 1–4.8)
LYMPHOCYTES NFR BLD: 23.6 % (ref 18–48)
MCH RBC QN AUTO: 29.8 PG (ref 27–31)
MCHC RBC AUTO-ENTMCNC: 31.8 G/DL (ref 32–36)
MCV RBC AUTO: 94 FL (ref 82–98)
MONOCYTES # BLD AUTO: 0.7 K/UL (ref 0.3–1)
MONOCYTES NFR BLD: 11.3 % (ref 4–15)
NEUTROPHILS # BLD AUTO: 3.8 K/UL (ref 1.8–7.7)
NEUTROPHILS NFR BLD: 61 % (ref 38–73)
NRBC BLD-RTO: 0 /100 WBC
PLATELET # BLD AUTO: 194 K/UL (ref 150–450)
PMV BLD AUTO: 9.9 FL (ref 9.2–12.9)
POTASSIUM SERPL-SCNC: 4.1 MMOL/L (ref 3.5–5.1)
PROT SERPL-MCNC: 6.1 G/DL (ref 6–8.4)
RBC # BLD AUTO: 3.93 M/UL (ref 4–5.4)
SODIUM SERPL-SCNC: 143 MMOL/L (ref 136–145)
WBC # BLD AUTO: 6.22 K/UL (ref 3.9–12.7)

## 2023-03-02 PROCEDURE — 99214 OFFICE O/P EST MOD 30 MIN: CPT | Mod: PBBFAC | Performed by: INTERNAL MEDICINE

## 2023-03-02 PROCEDURE — 99215 OFFICE O/P EST HI 40 MIN: CPT | Mod: S$PBB,,, | Performed by: INTERNAL MEDICINE

## 2023-03-02 PROCEDURE — 85025 COMPLETE CBC W/AUTO DIFF WBC: CPT | Performed by: INTERNAL MEDICINE

## 2023-03-02 PROCEDURE — 99215 PR OFFICE/OUTPT VISIT, EST, LEVL V, 40-54 MIN: ICD-10-PCS | Mod: S$PBB,,, | Performed by: INTERNAL MEDICINE

## 2023-03-02 PROCEDURE — 99999 PR PBB SHADOW E&M-EST. PATIENT-LVL IV: ICD-10-PCS | Mod: PBBFAC,,, | Performed by: INTERNAL MEDICINE

## 2023-03-02 PROCEDURE — 99999 PR PBB SHADOW E&M-EST. PATIENT-LVL IV: CPT | Mod: PBBFAC,,, | Performed by: INTERNAL MEDICINE

## 2023-03-02 PROCEDURE — 87340 HEPATITIS B SURFACE AG IA: CPT | Performed by: INTERNAL MEDICINE

## 2023-03-02 PROCEDURE — 80053 COMPREHEN METABOLIC PANEL: CPT | Performed by: INTERNAL MEDICINE

## 2023-03-02 PROCEDURE — 86704 HEP B CORE ANTIBODY TOTAL: CPT | Performed by: INTERNAL MEDICINE

## 2023-03-02 PROCEDURE — 36415 COLL VENOUS BLD VENIPUNCTURE: CPT | Performed by: INTERNAL MEDICINE

## 2023-03-02 RX ORDER — SECUKINUMAB 150 MG/ML
300 INJECTION SUBCUTANEOUS
Qty: 2 EACH | Refills: 6 | OUTPATIENT
Start: 2023-03-02 | End: 2023-03-02

## 2023-03-02 RX ORDER — SECUKINUMAB 150 MG/ML
300 INJECTION SUBCUTANEOUS
Qty: 2 EACH | Refills: 6 | Status: SHIPPED | OUTPATIENT
Start: 2023-03-02 | End: 2023-03-30

## 2023-03-02 NOTE — PROGRESS NOTES
RHEUMATOLOGY OUTPATIENT CLINIC NOTE    3/2/2023    Attending Rheumatologist: Jacky Ackerman  Primary Care Provider/Physician Requesting Consultation: Lisa Porter MD   Chief Complaint/Reason For Consultation:  Psoriatic Arthritis      Subjective:     Qi Ortiz is a 72 y.o. White female with PsA for f/u visit.    Inadequate response to Cosentyx (12/2022-), currently 150mg per month. Refractory PsO and inflammatory pattern pain.    Review of Systems   Constitutional:  Negative for fever.   Eyes:  Negative for photophobia and pain.   Gastrointestinal:  Negative for blood in stool.        Hx of diverticulosis   Musculoskeletal:  Positive for back pain and joint pain.   Skin:  Positive for rash.   Neurological:  Negative for weakness.   Endo/Heme/Allergies:         No hx of dvt/pe     Chronic comorbid conditions affecting medical decision making today:  Past Medical History:   Diagnosis Date    Carotid stenosis     19%    Cellulitis and abscess of foot, except toes 6/22/2022    COPD (chronic obstructive pulmonary disease)     No meds    Coronary artery disease     CVA (cerebral vascular accident)     Dr. Hoffman    Depression     Double ectopic ureters     Dr. Porras    Hemiplegia affecting right dominant side 11/9/2021    Hyperlipidemia     Hypertension     Hypothyroid     Left foot infection 7/8/2022    OP (osteoporosis)     IRIS (obstructive sleep apnea)     Dr. Hope    Psoriatic arthritis     Rheumatology     Past Surgical History:   Procedure Laterality Date    BREAST BIOPSY      R sided/benign    CARDIAC SURGERY      sept 28 2016    CERVICAL FUSION      CHOLECYSTECTOMY      CORONARY ANGIOPLASTY      CORONARY ARTERY BYPASS GRAFT      triple bypass    CORONARY STENT PLACEMENT      EYE SURGERY      INTRAUTERINE DEVICE INSERTION      LEFT HEART CATHETERIZATION Left 9/8/2020    Procedure: CATHETERIZATION, HEART, LEFT;  Surgeon: Veronica Ibarra MD;  Location: [facility] CATH LAB;  Service: Cardiology;   Laterality: Left;  7am start time    mass removed from R groin      TOTAL ABDOMINAL HYSTERECTOMY W/ BILATERAL SALPINGOOPHORECTOMY      due to benign mass, adhesions    TUBAL LIGATION       Family History   Problem Relation Age of Onset    Breast cancer Maternal Grandfather     Breast cancer Paternal Aunt     Stroke Unknown     Breast cancer Sister 60    Leukemia Sister 8         as child    Lung cancer Paternal Grandfather     Heart disease Unknown     Diabetes Daughter      Social History     Tobacco Use   Smoking Status Never   Smokeless Tobacco Never       Current Outpatient Medications:     acetaminophen (TYLENOL) 650 MG TbSR, as directed, Disp: , Rfl:     albuterol (PROVENTIL) 2.5 mg /3 mL (0.083 %) nebulizer solution, Take 3 mLs (2.5 mg total) by nebulization every 6 (six) hours as needed for Wheezing. Rescue, Disp: 30 each, Rfl: 3    allopurinoL (ZYLOPRIM) 100 MG tablet, Take 2 tablets (200 mg total) by mouth once daily., Disp: 60 tablet, Rfl: 5    aspirin 81 MG Chew, Take 1 tablet (81 mg total) by mouth once daily., Disp: 90 tablet, Rfl: 3    BETASEPT SURGICAL SCRUB 4 % external liquid, Apply topically., Disp: , Rfl:     calcium carbonate 650 mg calcium (1,625 mg) tablet, Take 1 tablet by mouth once daily., Disp: , Rfl:     clopidogreL (PLAVIX) 75 mg tablet, Take 1 tablet (75 mg total) by mouth once daily., Disp: 90 tablet, Rfl: 3    cyanocobalamin 2000 MCG tablet, Take 2,000 mcg by mouth once daily., Disp: , Rfl:     docusate sodium (COLACE) 100 MG capsule, Take 1 capsule (100 mg total) by mouth 2 (two) times daily., Disp: 60 capsule, Rfl: 0    folic acid (FOLVITE) 1 MG tablet, Take 1 tablet (1 mg total) by mouth once daily., Disp: 90 tablet, Rfl: 3    furosemide (LASIX) 20 MG tablet, Take 1 tablet (20 mg total) by mouth once daily., Disp: 90 tablet, Rfl: 3    gabapentin (NEURONTIN) 100 MG capsule, Take 2 capsules (200 mg total) by mouth 3 (three) times daily., Disp: 540 capsule, Rfl: 3    garlic 2,000  mg Cap, Take 3,000 mg by mouth once daily. , Disp: , Rfl:     levothyroxine (SYNTHROID) 112 MCG tablet, Take 1 tablet (112 mcg total) by mouth once daily., Disp: 90 tablet, Rfl: 1    metoprolol succinate (TOPROL-XL) 100 MG 24 hr tablet, Take 1 tablet (100 mg total) by mouth 2 (two) times daily. 1 tab (100mg) in morning. And 100 mg at bedtime.  Generic ok, Disp: 180 tablet, Rfl: 3    nitroGLYCERIN (NITROSTAT) 0.4 MG SL tablet, Place 1 tablet (0.4 mg total) under the tongue every 5 (five) minutes as needed for Chest pain., Disp: 100 tablet, Rfl: 3    potassium chloride SA (K-DUR,KLOR-CON) 20 MEQ tablet, Take 1 tablet (20 mEq total) by mouth once daily., Disp: 90 tablet, Rfl: 3    pyridoxine, vitamin B6, (B-6) 100 MG Tab, Take 200 mg by mouth once daily. , Disp: , Rfl:     secukinumab (COSENTYX PEN, 2 PENS,) 150 mg/mL PnIj, With a loading dose: 150 mg at weeks 0, 1, 2, 3, and 4 followed by 150 mg every 4 weeks, Disp: 2 each, Rfl: 6    valsartan (DIOVAN) 320 MG tablet, Take 0.5 tablets (160 mg total) by mouth once daily., Disp: 45 tablet, Rfl: 0    vitamin D 1000 units Tab, Take 185 mg by mouth once daily., Disp: , Rfl:     docusate sodium (COLACE) 100 MG capsule, Colace Take No date recorded No form recorded No frequency recorded No route recorded No set duration recorded No set duration amount recorded active No dosage strength recorded No dosage strength units of measure recorded, Disp: , Rfl:      Objective:     Vitals:    03/02/23 1221   BP: 134/79   Pulse: 70     Physical Exam   Constitutional: She appears obese.   Eyes: Conjunctivae are normal.   Pulmonary/Chest: Effort normal. No respiratory distress.   Musculoskeletal:         General: Swelling and tenderness present. Normal range of motion.   Neurological: She displays no weakness.   Skin: Rash noted.     Reviewed available old and all outside pertinent medical records available.    All lab results personally reviewed and interpreted by me.       ASSESSMENT       Encounter Diagnoses   Name Primary?    Psoriasis     S/P CABG (coronary artery bypass graft)     Stage 3b chronic kidney disease     Morbid obesity with BMI of 40.0-44.9, adult     Type 2 diabetes mellitus with peripheral neuropathy     Psoriatic arthritis Yes    Age-related osteoporosis without current pathological fracture     Chronic gout of multiple sites, unspecified cause     High risk medication use     Medication monitoring encounter       PLAN     High disease activity.  Initial good response to Cosentyx during loading dose, refractory inflammatory widespread pain and PsO skin lesions with 150mg of Cosentyx.  Resulted labs w/o concern of medication related toxicity.  Recommend increasing Cosentyx dose to 300mg per month.  Monitor response to increase until next visit.  Repeat labs close to f/u visit.     Jacky Ackerman M.D.

## 2023-03-03 LAB
HBV CORE AB SERPL QL IA: NORMAL
HBV SURFACE AG SERPL QL IA: NORMAL

## 2023-03-06 ENCOUNTER — TELEPHONE (OUTPATIENT)
Dept: CARDIOLOGY | Facility: CLINIC | Age: 73
End: 2023-03-06
Payer: MEDICARE

## 2023-03-06 ENCOUNTER — TELEPHONE (OUTPATIENT)
Dept: RHEUMATOLOGY | Facility: CLINIC | Age: 73
End: 2023-03-06
Payer: MEDICARE

## 2023-03-06 DIAGNOSIS — I10 ESSENTIAL HYPERTENSION: Chronic | ICD-10-CM

## 2023-03-06 DIAGNOSIS — I25.118 CORONARY ARTERY DISEASE OF NATIVE ARTERY OF NATIVE HEART WITH STABLE ANGINA PECTORIS: ICD-10-CM

## 2023-03-06 LAB
LEFT EYE DM RETINOPATHY: NEGATIVE
RIGHT EYE DM RETINOPATHY: NEGATIVE

## 2023-03-06 NOTE — TELEPHONE ENCOUNTER
----- Message from Manisha Prescott sent at 3/6/2023  1:14 PM CST -----  Contact: Qi  Patient is calling to speak with the nurse regarding questions and concerns. Reports needing to update pharmacy. Please give patient a call back at .[phone].

## 2023-03-06 NOTE — TELEPHONE ENCOUNTER
"Returned patients phone call. Patient stated that she called [facility] and in order for her to get more than a 1 month supply of Cosentyx Dr. Ackerman would need to write the next prescription saying 2 2 pen doses for a 56 day supply. They are going to send her her medication on Friday but will need a new script for April written like this. Patient will call back about a week ahead of time when she is due to a refill to request it. Patient also stated taht Dr. Ackerman asked her at her appointment last week when she went to the eye doctor last. She went this morning and everything was good. All questions answered.      Dorinda Story" Shauna, [facility]  Rheumatology Department    "

## 2023-03-06 NOTE — TELEPHONE ENCOUNTER
Just an Fyi she wanted to let you know that she went to the eye doctor and has no new problems. Her eye are the same, same cataracts and same glasses. She will be going every 6 months.----- Message from Manisha Prescott sent at 3/6/2023  1:11 PM CST -----  Contact: Qi Gordon is calling to speak with the nurse regarding questions and concerns. Reports wants to let the provider know the patient went to the eye doctor. Please give patient a call back at .[phone].

## 2023-03-07 RX ORDER — METOPROLOL SUCCINATE 100 MG/1
100 TABLET, EXTENDED RELEASE ORAL 2 TIMES DAILY
Qty: 180 TABLET | Refills: 3 | OUTPATIENT
Start: 2023-03-07

## 2023-03-07 RX ORDER — FUROSEMIDE 20 MG/1
20 TABLET ORAL DAILY
Qty: 90 TABLET | Refills: 3 | OUTPATIENT
Start: 2023-03-07

## 2023-03-07 RX ORDER — CLOPIDOGREL BISULFATE 75 MG/1
75 TABLET ORAL DAILY
Qty: 90 TABLET | Refills: 3 | OUTPATIENT
Start: 2023-03-07

## 2023-03-15 ENCOUNTER — OFFICE VISIT (OUTPATIENT)
Dept: PRIMARY CARE CLINIC | Facility: CLINIC | Age: 73
End: 2023-03-15
Payer: MEDICARE

## 2023-03-15 ENCOUNTER — PATIENT OUTREACH (OUTPATIENT)
Dept: ADMINISTRATIVE | Facility: HOSPITAL | Age: 73
End: 2023-03-15
Payer: MEDICARE

## 2023-03-15 DIAGNOSIS — E03.9 HYPOTHYROIDISM, UNSPECIFIED TYPE: Primary | Chronic | ICD-10-CM

## 2023-03-15 DIAGNOSIS — I20.9 AP (ANGINA PECTORIS): ICD-10-CM

## 2023-03-15 DIAGNOSIS — L40.50 PSORIATIC ARTHRITIS: Chronic | ICD-10-CM

## 2023-03-15 DIAGNOSIS — Z78.9 STATIN INTOLERANCE: ICD-10-CM

## 2023-03-15 PROCEDURE — 99215 OFFICE O/P EST HI 40 MIN: CPT | Mod: PBBFAC,PN | Performed by: NURSE PRACTITIONER

## 2023-03-15 PROCEDURE — 99215 PR OFFICE/OUTPT VISIT, EST, LEVL V, 40-54 MIN: ICD-10-PCS | Mod: S$PBB,,, | Performed by: NURSE PRACTITIONER

## 2023-03-15 PROCEDURE — 99999 PR PBB SHADOW E&M-EST. PATIENT-LVL V: ICD-10-PCS | Mod: PBBFAC,,, | Performed by: NURSE PRACTITIONER

## 2023-03-15 PROCEDURE — 99215 OFFICE O/P EST HI 40 MIN: CPT | Mod: S$PBB,,, | Performed by: NURSE PRACTITIONER

## 2023-03-15 PROCEDURE — 99999 PR PBB SHADOW E&M-EST. PATIENT-LVL V: CPT | Mod: PBBFAC,,, | Performed by: NURSE PRACTITIONER

## 2023-03-15 RX ORDER — EVOLOCUMAB 140 MG/ML
INJECTION, SOLUTION SUBCUTANEOUS
COMMUNITY
End: 2023-05-11
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
## 2023-03-16 VITALS
WEIGHT: 241.38 LBS | TEMPERATURE: 98 F | OXYGEN SATURATION: 97 % | SYSTOLIC BLOOD PRESSURE: 142 MMHG | BODY MASS INDEX: 42.76 KG/M2 | HEART RATE: 62 BPM | DIASTOLIC BLOOD PRESSURE: 80 MMHG

## 2023-03-21 ENCOUNTER — TELEPHONE (OUTPATIENT)
Dept: PRIMARY CARE CLINIC | Facility: CLINIC | Age: 73
End: 2023-03-21
Payer: MEDICARE

## 2023-03-21 DIAGNOSIS — R30.0 DYSURIA: Primary | ICD-10-CM

## 2023-03-21 NOTE — TELEPHONE ENCOUNTER
Pt states that she is having UTI sx again- pressure, irritation, frequency, dysuria- please advise

## 2023-03-22 ENCOUNTER — LAB VISIT (OUTPATIENT)
Dept: LAB | Facility: HOSPITAL | Age: 73
End: 2023-03-22
Attending: INTERNAL MEDICINE
Payer: MEDICARE

## 2023-03-22 DIAGNOSIS — R30.0 DYSURIA: ICD-10-CM

## 2023-03-22 PROCEDURE — 81001 URINALYSIS AUTO W/SCOPE: CPT | Performed by: NURSE PRACTITIONER

## 2023-03-22 PROCEDURE — 87086 URINE CULTURE/COLONY COUNT: CPT | Performed by: NURSE PRACTITIONER

## 2023-03-23 ENCOUNTER — TELEPHONE (OUTPATIENT)
Dept: PRIMARY CARE CLINIC | Facility: CLINIC | Age: 73
End: 2023-03-23
Payer: MEDICARE

## 2023-03-23 LAB
BACTERIA #/AREA URNS AUTO: ABNORMAL /HPF
BILIRUB UR QL STRIP: NEGATIVE
CLARITY UR REFRACT.AUTO: CLEAR
COLOR UR AUTO: YELLOW
GLUCOSE UR QL STRIP: NEGATIVE
HGB UR QL STRIP: NEGATIVE
KETONES UR QL STRIP: NEGATIVE
LEUKOCYTE ESTERASE UR QL STRIP: ABNORMAL
MICROSCOPIC COMMENT: ABNORMAL
NITRITE UR QL STRIP: NEGATIVE
PH UR STRIP: 6 [PH] (ref 5–8)
PROT UR QL STRIP: NEGATIVE
RBC #/AREA URNS AUTO: 3 /HPF (ref 0–4)
SP GR UR STRIP: 1.02 (ref 1–1.03)
SQUAMOUS #/AREA URNS AUTO: 0 /HPF
URN SPEC COLLECT METH UR: ABNORMAL
WBC #/AREA URNS AUTO: 35 /HPF (ref 0–5)

## 2023-03-24 LAB
BACTERIA UR CULT: NORMAL
BACTERIA UR CULT: NORMAL

## 2023-03-30 ENCOUNTER — TELEPHONE (OUTPATIENT)
Dept: RHEUMATOLOGY | Facility: CLINIC | Age: 73
End: 2023-03-30
Payer: MEDICARE

## 2023-03-30 DIAGNOSIS — L40.50 PSORIATIC ARTHRITIS: Primary | ICD-10-CM

## 2023-03-30 RX ORDER — SECUKINUMAB 150 MG/ML
2 INJECTION SUBCUTANEOUS WEEKLY
Qty: 2 EACH | Refills: 0 | Status: SHIPPED | OUTPATIENT
Start: 2023-03-30 | End: 2023-03-30

## 2023-03-30 RX ORDER — SECUKINUMAB 150 MG/ML
INJECTION SUBCUTANEOUS
Qty: 2 EACH | Refills: 0 | Status: SHIPPED | OUTPATIENT
Start: 2023-03-30 | End: 2023-03-30

## 2023-03-30 RX ORDER — SECUKINUMAB 150 MG/ML
2 INJECTION SUBCUTANEOUS WEEKLY
Qty: 8 EACH | Refills: 5 | Status: SHIPPED | OUTPATIENT
Start: 2023-03-30 | End: 2023-03-30 | Stop reason: SDUPTHER

## 2023-03-30 RX ORDER — SECUKINUMAB 150 MG/ML
2 INJECTION SUBCUTANEOUS
Qty: 2 EACH | Refills: 5 | Status: SHIPPED | OUTPATIENT
Start: 2023-03-30 | End: 2023-04-05 | Stop reason: SDUPTHER

## 2023-03-30 RX ORDER — SECUKINUMAB 150 MG/ML
2 INJECTION SUBCUTANEOUS WEEKLY
Qty: 2 EACH | Refills: 5 | Status: SHIPPED | OUTPATIENT
Start: 2023-03-30 | End: 2023-03-30

## 2023-03-30 NOTE — TELEPHONE ENCOUNTER
----- Message from Dorinda Villatoro CMA sent at 3/30/2023  2:55 PM CDT -----  Contact: Qi    ----- Message -----  From: Ryann Herring  Sent: 3/30/2023   1:28 PM CDT  To: Tyron Rico Staff    Qi is needing a new prescription for a 56 day supply (Must be on Rx per Insurance) for her Cosentyx pens. Please call back at [phone].    Please send it to:   [facility] - [address] [address]  Phone: [phone] Fax: [phone]

## 2023-03-30 NOTE — TELEPHONE ENCOUNTER
Notified patient that medication was sent. Pt verbalized understanding. No other questions or concerns at this time.

## 2023-03-31 ENCOUNTER — TELEPHONE (OUTPATIENT)
Dept: PRIMARY CARE CLINIC | Facility: CLINIC | Age: 73
End: 2023-03-31
Payer: MEDICARE

## 2023-03-31 NOTE — TELEPHONE ENCOUNTER
Pt states she saw urologist Wednesday- ultrasound of bladder and cystoscope- also did biopsy- now on Macrobid 100mg QHS also Pyridium.  States that she is having some pain but is doing ok.

## 2023-04-03 ENCOUNTER — TELEPHONE (OUTPATIENT)
Dept: PRIMARY CARE CLINIC | Facility: CLINIC | Age: 73
End: 2023-04-03
Payer: MEDICARE

## 2023-04-03 NOTE — TELEPHONE ENCOUNTER
"Spoke w Ms. Ortiz   Reports dysuria symptoms "80% better"  Plan to cont macrobid prophylaxis x 4 mos (or longer)   Has taken for prophylaxis in the distant past and tolerated  Next clinic f/u w me 4/11/23  "

## 2023-04-05 ENCOUNTER — TELEPHONE (OUTPATIENT)
Dept: RHEUMATOLOGY | Facility: CLINIC | Age: 73
End: 2023-04-05
Payer: MEDICARE

## 2023-04-05 DIAGNOSIS — L40.50 PSORIATIC ARTHRITIS: ICD-10-CM

## 2023-04-05 RX ORDER — SECUKINUMAB 150 MG/ML
2 INJECTION SUBCUTANEOUS
Qty: 2 EACH | Refills: 5 | Status: SHIPPED | OUTPATIENT
Start: 2023-04-05 | End: 2023-05-31

## 2023-04-05 NOTE — TELEPHONE ENCOUNTER
----- Message from Briana Bowles sent at 4/5/2023 10:51 AM CDT -----  Contact: Veronika Busby is calling in regards to secukinumab (COSENTYX PEN) 150 mg/mL PnIj. Patient states the Rx was written wrong. Please call her back at [phone]    Thanks  CF

## 2023-04-05 NOTE — TELEPHONE ENCOUNTER
"Returned patients phone call. All questions answered.       Dorinda Villatoro (Allye)[facility]  Rheumatology Department        Patient stated that Dr. Ackerman sent in her prescription wrong. Per her insurance company and for her not to pay an extra 20 dollars that she doesn't have the prescription has to read 2 2 pen doses for a 56 day supply.       Called in prescription to [facility] for  at [phone].    secukinumab (COSENTYX PEN) 150 mg/mL PnIj  Sig - Route: Inject 2 pens into the skin every 28 days. For a total of 300 mg every 28 days. - Subcutaneous    DISP: 4  Refills: 3      Patient wanted to let Dr. Ackerman know that the medication is working. Her Psoriasis is almost completely gone and she able to walk without having to almost put herself in the wheel chair. She said to tell Dr. Ackerman Thank you!     Dorinda Villatoro (Allye), [facility]  Rheumatology Department   "

## 2023-04-11 ENCOUNTER — OFFICE VISIT (OUTPATIENT)
Dept: PRIMARY CARE CLINIC | Facility: CLINIC | Age: 73
End: 2023-04-11
Payer: MEDICARE

## 2023-04-11 VITALS
SYSTOLIC BLOOD PRESSURE: 152 MMHG | TEMPERATURE: 98 F | WEIGHT: 237.38 LBS | DIASTOLIC BLOOD PRESSURE: 72 MMHG | OXYGEN SATURATION: 99 % | HEART RATE: 61 BPM | BODY MASS INDEX: 42.06 KG/M2 | HEIGHT: 63 IN

## 2023-04-11 DIAGNOSIS — I10 ESSENTIAL HYPERTENSION: Primary | Chronic | ICD-10-CM

## 2023-04-11 DIAGNOSIS — I70.0 AORTIC ATHEROSCLEROSIS: Chronic | ICD-10-CM

## 2023-04-11 DIAGNOSIS — D50.0 IRON DEFICIENCY ANEMIA DUE TO CHRONIC BLOOD LOSS: Chronic | ICD-10-CM

## 2023-04-11 DIAGNOSIS — E66.01 MORBID OBESITY WITH BMI OF 40.0-44.9, ADULT: Chronic | ICD-10-CM

## 2023-04-11 PROCEDURE — 99215 OFFICE O/P EST HI 40 MIN: CPT | Mod: S$PBB,,, | Performed by: INTERNAL MEDICINE

## 2023-04-11 PROCEDURE — 99215 PR OFFICE/OUTPT VISIT, EST, LEVL V, 40-54 MIN: ICD-10-PCS | Mod: S$PBB,,, | Performed by: INTERNAL MEDICINE

## 2023-04-11 PROCEDURE — 99215 OFFICE O/P EST HI 40 MIN: CPT | Mod: PBBFAC,PN | Performed by: INTERNAL MEDICINE

## 2023-04-11 PROCEDURE — 99999 PR PBB SHADOW E&M-EST. PATIENT-LVL V: ICD-10-PCS | Mod: PBBFAC,,, | Performed by: INTERNAL MEDICINE

## 2023-04-11 PROCEDURE — 99999 PR PBB SHADOW E&M-EST. PATIENT-LVL V: CPT | Mod: PBBFAC,,, | Performed by: INTERNAL MEDICINE

## 2023-04-11 RX ORDER — PHENAZOPYRIDINE HYDROCHLORIDE 200 MG/1
200 TABLET, FILM COATED ORAL
COMMUNITY
Start: 2023-03-31 | End: 2023-04-11

## 2023-04-11 RX ORDER — NITROFURANTOIN (MACROCRYSTALS) 100 MG/1
100 CAPSULE ORAL NIGHTLY
COMMUNITY
Start: 2023-03-29

## 2023-04-11 NOTE — PROGRESS NOTES
Qi Ortiz  04/11/2023  [id number]    Lisa Porter MD  Patient Care Team:  Lisa Porter MD as PCP - General (Internal Medicine)  Elma Hernandez NP as Nurse Practitioner (Family Medicine)    Visit Type: Follow-up    Chief Complaint:  Chief Complaint   Patient presents with    3 mth f/u      History of Present Illness: Ms. Qi Ortiz is a 72 year old female w multiple chronic medical issues here for scheduled f/u. Last seen by me in November.     Medical issues include type 2 DM, h/o CVA, CAD s/p CABG x 2, AS s/p TAVR, HTN, HLP (intolerant of statin), CKD stage 3, hypothyroidism, psoriasis and psoriatic arthritis, CRAEN, obesity, and IRIS (intolerant of CPAP). Ms. Ortiz has multiple drug and contact allergies and sensitivities. Food allergies include kiwi fruit and crab boil.     Ms. Ortiz is hearing impaired - gradual hearing loss over past 15 years, now severe. Unble to use hearing aids due to psoriasis in her ear canals. Ms. Ortiz does not have a cell phone, smart phone or internet. She does have a land-line w a speaker to make louder. Ms. Ortiz's daughters, Elsa and Shyanne, live close to her. They work during the day. Shyanne is Ms. Ortiz's HCPOA - OK to discuss medical issues w her.    Recent issues w recurrent UTIs  Seeing Urologist Dr. Porras at [facility]  Friday first day able to void w/o problem in a long time. Now taking prophylactic macrobid 100 mg daily.    Reports increased psoriatic arthritis pain - Cosentyx monthly due this Wednesday    Reports home BP's 130's/60's  Does not check BS at home    PHQ-4 Score: 0     Recent appointments:   3/15/23 O65+ Mary  3/02/23 Rheum Ackerman Psoriatic arthritis Cosentyx 300 mg  2/15/23 O65+ Mary dysuria  2/13/23 Card Khuri  2/01/23 Heme/Onc Delgado  1/17/23 IV iron infusion  1/10/23 IV iron infusion  1/09/23 O65+ Mary dysuria    Urology Dr. Porras 2 weeks ago  Eye [facility] cataracts   Neurology [facility] Jan     Upcoming appointments:  Future  Appointments       Date Provider Specialty Appt Notes    5/22/2023 Elma Hernandez NP Primary Care AWV    7/6/2023  Lab Labs    7/13/2023 Jacky Ackerman MD Rheumatology [email or web address]    7/20/2023 Elma Hernandez NP Primary Care 3 month f/u    8/24/2023  Lab .    8/31/2023 Veronica Ibarra MD Cardiology 6 m           The following were reviewed: Active problem list, medication list, allergies, family history, social history, and Health Maintenance.     Medications have been reviewed and reconciled with patient at visit today.    Review of Systems   See HPI above    Exam: Initial VS /77 P 61 sat 99% temp 98.3  Vitals:    04/11/23 1207   BP: (!) 152/72   Pulse:    Temp:      Weight: 107.7 kg (237 lb 6.4 oz)   Body mass index is 42.05 kg/m².    BP Readings from Last 3 Encounters:   04/11/23 (!) 198/77   03/16/23 (!) 142/80   03/02/23 134/79      Wt Readings from Last 3 Encounters:   04/11/23 1105 107.7 kg (237 lb 6.4 oz)   03/15/23 1317 109.5 kg (241 lb 6.4 oz)   03/02/23 1221 108.7 kg (239 lb 10.2 oz)     Physical Exam  Vitals reviewed.   Constitutional:       General: She is not in acute distress.     Appearance: She is obese.   HENT:      Head: Normocephalic and atraumatic.      Right Ear: External ear normal.      Left Ear: External ear normal.      Nose: Nose normal.      Mouth/Throat:      Mouth: Mucous membranes are moist.      Pharynx: Oropharynx is clear.      Comments: dentures  Eyes:      Extraocular Movements: Extraocular movements intact.      Conjunctiva/sclera: Conjunctivae normal.      Comments: Sensitive to light   Neck:      Vascular: Carotid bruit present.   Cardiovascular:      Rate and Rhythm: Normal rate and regular rhythm.      Heart sounds: Murmur heard.   Pulmonary:      Effort: Pulmonary effort is normal.      Breath sounds: Normal breath sounds. No wheezing, rhonchi or rales.   Abdominal:      General: Bowel sounds are normal.      Palpations: Abdomen is soft.      Tenderness:  There is no abdominal tenderness. There is no guarding.   Musculoskeletal:      Right lower leg: Edema present.      Left lower leg: Edema present.      Comments: Ambulates w/o AD  Able to move from chair onto and off of exam table though w some difficulty   Lymphadenopathy:      Cervical: No cervical adenopathy.   Skin:     General: Skin is warm and dry.      Findings: Rash present.      Comments: Dry peeling skin   Neurological:      Mental Status: She is alert and oriented to person, place, and time. Mental status is at baseline.      Coordination: Coordination normal.      Gait: Gait abnormal.      Comments: Hard of hearing  Slight speech impediment   Psychiatric:         Behavior: Behavior normal.         Thought Content: Thought content normal.      Laboratory Reviewed  Lab Results   Component Value Date    WBC 6.22 03/02/2023    HGB 11.7 (L) 03/02/2023    HCT 36.8 (L) 03/02/2023     03/02/2023    MCV 94 03/02/2023    CHOL 125 02/03/2023    TRIG 160 (H) 02/03/2023    HDL 43 02/03/2023    LDLCALC 50.0 (L) 02/03/2023    ALT 11 03/02/2023    AST 22 03/02/2023     03/02/2023    K 4.1 03/02/2023     03/02/2023    CREATININE 1.1 03/02/2023    BUN 21 03/02/2023    CO2 27 03/02/2023    MG 2.0 05/20/2022    TSH 1.196 07/18/2022    FREET4 1.08 07/18/2022    INR 1.0 09/28/2020    HGBA1C 6.1 (H) 02/03/2023    CRP 6.6 07/18/2022     3/22/23 Ucx multiple organisms  3/02/23 eGFR 54  2/16/23 Ucx klebsiella MDR sens levofloxacin    CT Renal stone 2/20/23: Kidneys/ Ureters: No hydronephrosis.  No obstructive uropathy.  Left renal simple cyst.  No nonobstructive nephrolithiasis. Bladder: Decompressed. Reproductive organs: Uterus is surgically absent. GI Tract/Mesentery: No evidence of bowel obstruction or inflammation. Abdominal wall: Unremarkable. Vasculature: mild aortoiliac atherosclerosis.  No aneurysm. Bones: No acute fracture.  Multifocal degenerative changes.    Carotid US 2/20/23:  Right Carotid The  right distal internal carotid artery has 0-19% stenosis.   The right distal internal carotid artery is tortuous.      Left Carotid The left proximal internal carotid artery has 20-39% stenosis. There is mild homogeneous plaque in the left proximal internal carotid artery.  The left proximal internal carotid artery is tortuous.     Assessment:   72 y.o. female with multiple co-morbid illnesses here for continued follow up of medical problems.      The primary encounter diagnosis was Essential hypertension. Diagnoses of Iron deficiency anemia due to chronic blood loss, Morbid obesity with BMI of 40.0-44.9, adult, and Aortic atherosclerosis were also pertinent to this visit.      Plan:   1. Essential hypertension  Assessment & Plan:  Reports home SBP's 130's - elevated here - better on repeat measurement - will ask staff to f/u on home BP measurements - make sure home monitor calibrated w clinic's      2. Iron deficiency anemia due to chronic blood loss  Overview:  Elida Alanis NP 8/6/2021  Iron levels replenished s/p Venofer x 8. She denies abnormal bleeding at preset time but notes intermittent blood noted in stool. Patient has not yet had follow up with GI service for endoscopies due to ongoing COVID concerns previously. She has been encouraged to follow up with GI. Rationale discussed in detail.   Patient unable to tolerate oral iron due to GI upset/constipation. F/u 3 months with repeat labs. Discussed S&S to report sooner      Assessment & Plan:  Cont encourage colonoscopy - s/p IV iron infusions - recheck iron levels, CBC?      3. Morbid obesity with BMI of 40.0-44.9, adult  Assessment & Plan:  Wt fluctuating - 11/30/22 245 lbs  Encourage healthier food/beverage choices   Work on moving more as tolerated  Did not tolerate metformin  Cont furosemide      4. Aortic atherosclerosis  Assessment & Plan:  Intolerant of statin - cont repatha, plavix (and asa?)           Health Maintenance         Date Due  Completion Date    COVID-19 Vaccine (1) Never done ---    Pneumococcal Vaccines (Age 65+) (1 - PCV) Never done ---    Foot Exam Never done ---    TETANUS VACCINE Never done ---    Colorectal Cancer Screening 04/18/2016 4/18/2011    Mammogram 10/23/2016 10/23/2015    Shingles Vaccine (2 of 2) 11/28/2022 10/3/2022    Hemoglobin A1c 08/03/2023 2/3/2023    Diabetes Urine Screening 10/14/2023 10/14/2022    Lipid Panel 02/03/2024 2/3/2023    Eye Exam 03/06/2024 3/6/2023    Aspirin/Antiplatelet Therapy 03/15/2024 3/15/2023    DEXA Scan 07/25/2025 7/25/2022          Declines CoVid-19 vaccine, pneumonia vaccine, Td vaccine  Has been advised against taking shingles vaccine while taking Cosentyx  Does not feel she's in a position to schedule colonoscopy due to other health issues (her own and her daughters)    -Patient's lab results were reviewed and discussed with patient  -Treatment options and alternatives were discussed with the patient. Patient expressed understanding. Patient was given the opportunity to ask questions and be an active participant in their medical care. Patient had no further questions or concerns at this time.   -Patient is an overall moderate to HIGH risk for health complications from their medical conditions.     Follow up: Follow up in about 3 months (around 7/11/2023) for Follow Up w me (and before that REYV cali Wills).    After visit summary printed and given to patient upon discharge.  Patient care plan included in After visit summary.    TOTAL TIME evaluating and managing this patient for this encounter was greater than 55 minutes. This time was spent personally by me on some of the following activities: review of patient's past medical history, assessing age-appropriate health maintenance needs, review of any interval history, review and interpretation of lab results, review and interpretation of imaging test results, review and interpretation of cardiology test results, reviewing consulting  specialist notes, obtaining history from the patient and family, examination of the patient, medication reconciliation, managing and/or ordering prescription medications, ordering imaging tests, ordering referral to subspecialty provider(s), educating patient and answering their questions about diagnosis, treatment plan, and goals of treatment, discussing planned follow-up and final documentation of the visit. This time was exclusive of any separately billable procedures for this patient and exclusive of time spent treating any other patients.

## 2023-04-20 ENCOUNTER — DOCUMENTATION ONLY (OUTPATIENT)
Dept: PRIMARY CARE CLINIC | Facility: CLINIC | Age: 73
End: 2023-04-20
Payer: MEDICARE

## 2023-04-20 NOTE — PROGRESS NOTES
Record review urology Dr Porras 3/29/23    PVR 37 cc  Renal cyst   Bladder pain  Rx macrodantin 100 mg daily. Disp #30, rf 11  Rx Pyridium

## 2023-05-11 RX ORDER — EVOLOCUMAB 140 MG/ML
INJECTION, SOLUTION SUBCUTANEOUS
Qty: 6 ML | Refills: 3 | Status: SHIPPED | OUTPATIENT
Start: 2023-05-11

## 2023-05-13 PROBLEM — J45.901 MILD ASTHMA WITH EXACERBATION: Status: RESOLVED | Noted: 2022-12-04 | Resolved: 2023-05-13

## 2023-05-13 NOTE — ASSESSMENT & PLAN NOTE
Reports home SBP's 130's - elevated here - better on repeat measurement - will ask staff to f/u on home BP measurements - make sure home monitor calibrated w clinic's

## 2023-05-13 NOTE — ASSESSMENT & PLAN NOTE
Wt fluctuating - 11/30/22 245 lbs  Encourage healthier food/beverage choices   Work on moving more as tolerated  Did not tolerate metformin  Cont furosemide

## 2023-05-15 ENCOUNTER — TELEPHONE (OUTPATIENT)
Dept: PRIMARY CARE CLINIC | Facility: CLINIC | Age: 73
End: 2023-05-15
Payer: MEDICARE

## 2023-05-15 NOTE — TELEPHONE ENCOUNTER
----- Message from Lisa Porter MD sent at 5/13/2023  5:22 PM CDT -----  Regarding: 3 mos f/u  Ms. Ortiz has AWV w Elma coming up soon  Can you please make her July 3mos f/u to be w me?  Can you check to see how she's doing and how her home BP's have been?   Thank you

## 2023-05-15 NOTE — TELEPHONE ENCOUNTER
Spoke with pt  states that blood pressure has been up and down. States that has been 133/68.  States one day it went up to 199/100 but came back down to 157/70. Pt denies any headaches or chest pain.

## 2023-05-22 ENCOUNTER — OFFICE VISIT (OUTPATIENT)
Dept: PRIMARY CARE CLINIC | Facility: CLINIC | Age: 73
End: 2023-05-22
Payer: MEDICARE

## 2023-05-22 VITALS
WEIGHT: 230.69 LBS | BODY MASS INDEX: 40.88 KG/M2 | OXYGEN SATURATION: 96 % | DIASTOLIC BLOOD PRESSURE: 58 MMHG | HEART RATE: 64 BPM | SYSTOLIC BLOOD PRESSURE: 132 MMHG | HEIGHT: 63 IN | TEMPERATURE: 98 F

## 2023-05-22 DIAGNOSIS — E11.22 TYPE 2 DIABETES MELLITUS WITH STAGE 3A CHRONIC KIDNEY DISEASE, WITHOUT LONG-TERM CURRENT USE OF INSULIN: ICD-10-CM

## 2023-05-22 DIAGNOSIS — E78.5 HYPERLIPIDEMIA, UNSPECIFIED HYPERLIPIDEMIA TYPE: Chronic | ICD-10-CM

## 2023-05-22 DIAGNOSIS — N18.31 TYPE 2 DIABETES MELLITUS WITH STAGE 3A CHRONIC KIDNEY DISEASE, WITHOUT LONG-TERM CURRENT USE OF INSULIN: ICD-10-CM

## 2023-05-22 DIAGNOSIS — Z78.9 STATIN INTOLERANCE: ICD-10-CM

## 2023-05-22 DIAGNOSIS — Z00.00 ENCOUNTER FOR PREVENTIVE HEALTH EXAMINATION: ICD-10-CM

## 2023-05-22 DIAGNOSIS — E66.01 MORBID OBESITY WITH BMI OF 40.0-44.9, ADULT: Chronic | ICD-10-CM

## 2023-05-22 DIAGNOSIS — D52.9 ANEMIA DUE TO FOLIC ACID DEFICIENCY, UNSPECIFIED DEFICIENCY TYPE: ICD-10-CM

## 2023-05-22 DIAGNOSIS — E53.8 B12 DEFICIENCY: ICD-10-CM

## 2023-05-22 DIAGNOSIS — N25.81 SECONDARY HYPERPARATHYROIDISM: ICD-10-CM

## 2023-05-22 DIAGNOSIS — D84.9 IMMUNOCOMPROMISED: ICD-10-CM

## 2023-05-22 DIAGNOSIS — Z12.11 COLON CANCER SCREENING: Primary | ICD-10-CM

## 2023-05-22 DIAGNOSIS — E11.42 TYPE 2 DIABETES MELLITUS WITH PERIPHERAL NEUROPATHY: Chronic | ICD-10-CM

## 2023-05-22 DIAGNOSIS — Z12.31 BREAST CANCER SCREENING BY MAMMOGRAM: ICD-10-CM

## 2023-05-22 DIAGNOSIS — L40.50 PSORIATIC ARTHRITIS: Chronic | ICD-10-CM

## 2023-05-22 DIAGNOSIS — I10 ESSENTIAL HYPERTENSION: Chronic | ICD-10-CM

## 2023-05-22 DIAGNOSIS — I70.0 AORTIC ATHEROSCLEROSIS: Chronic | ICD-10-CM

## 2023-05-22 DIAGNOSIS — J43.9 PULMONARY EMPHYSEMA, UNSPECIFIED EMPHYSEMA TYPE: Chronic | ICD-10-CM

## 2023-05-22 DIAGNOSIS — G62.9 POLYNEUROPATHY: Chronic | ICD-10-CM

## 2023-05-22 DIAGNOSIS — E03.9 HYPOTHYROIDISM, UNSPECIFIED TYPE: Chronic | ICD-10-CM

## 2023-05-22 DIAGNOSIS — I25.118 CORONARY ARTERY DISEASE OF NATIVE ARTERY OF NATIVE HEART WITH STABLE ANGINA PECTORIS: ICD-10-CM

## 2023-05-22 DIAGNOSIS — I25.10 CAD, MULTIPLE VESSEL: ICD-10-CM

## 2023-05-22 DIAGNOSIS — R19.4 CHANGE IN BOWEL HABIT: ICD-10-CM

## 2023-05-22 PROCEDURE — G0439 PR MEDICARE ANNUAL WELLNESS SUBSEQUENT VISIT: ICD-10-PCS | Mod: ,,, | Performed by: NURSE PRACTITIONER

## 2023-05-22 PROCEDURE — 99999 PR PBB SHADOW E&M-EST. PATIENT-LVL V: CPT | Mod: PBBFAC,,, | Performed by: NURSE PRACTITIONER

## 2023-05-22 PROCEDURE — 99215 OFFICE O/P EST HI 40 MIN: CPT | Mod: PBBFAC,PN | Performed by: NURSE PRACTITIONER

## 2023-05-22 PROCEDURE — 99999 PR PBB SHADOW E&M-EST. PATIENT-LVL V: ICD-10-PCS | Mod: PBBFAC,,, | Performed by: NURSE PRACTITIONER

## 2023-05-22 PROCEDURE — G0439 PPPS, SUBSEQ VISIT: HCPCS | Mod: ,,, | Performed by: NURSE PRACTITIONER

## 2023-05-22 RX ORDER — SPIRONOLACTONE 25 MG/1
25 TABLET ORAL DAILY
Qty: 30 TABLET | Refills: 11 | Status: SHIPPED | OUTPATIENT
Start: 2023-05-22 | End: 2023-05-24 | Stop reason: SDUPTHER

## 2023-05-22 NOTE — ASSESSMENT & PLAN NOTE
New T wave inversion leads II, II, aVF, V1 and V2.   Schedule appt cardiology ASAP.   Call EMS if chest pressure or dyspnea returns/unrelieved by rest.   Continues repatha, HTN mgmt, clopidogrel, ASA.

## 2023-05-22 NOTE — ASSESSMENT & PLAN NOTE
BP Readings from Last 3 Encounters:   05/22/23 (!) 132/58   04/25/23 (!) 170/68   04/11/23 (!) 152/72   Not optimally controlled. Valsartan, metoprolol, furosemide.  Add spirinolactone, check RFP/BP in 2 weeks.

## 2023-05-22 NOTE — ASSESSMENT & PLAN NOTE
Lab Results   Component Value Date    HGB 11.7 (L) 03/02/2023   Improved. Continue vit B replacement

## 2023-05-22 NOTE — PROGRESS NOTES
"  Qi Ortiz presented for a follow-up Medicare AWV today. The following components were reviewed and updated:    Medical history  Family History  Social history  Allergies and Current Medications  Health Risk Assessment  Health Maintenance  Care Team    **See Completed Assessments for Annual Wellness visit with in the encounter summary    The following assessments were completed:  Depression Screening  Cognitive function Screening  Timed Get Up Test  Whisper Test  Nutrition Screening  PAQ      Vitals:    05/22/23 1309 05/22/23 1335   BP: (!) 171/74 (!) 132/58   BP Location: Right arm    Patient Position: Sitting    BP Method: X-Large (Automatic)    Pulse: 64    Temp: 98.2 °F (36.8 °C)    TempSrc: Oral    SpO2: 96%    Weight: 104.6 kg (230 lb 11.2 oz)    Height: 5' 3" (1.6 m)      Body mass index is 40.87 kg/m².     BP elevated at home also.   Believes she had a TIA 5/14/23. Expressive aphasia, words jumbled. Last several hours. Also fatigued. No other sx. Valsartan dose previously increased. BP decreased a few days afterward then elevated again. No unusual pain.     Feels dehydrated if she takes furosemide more than a few days.  Episode of chest pressure last night with dyspnea. This was relieved within an hour of taking diuretic. No wheeze/chest congestion.    Doesn't feel ill.   Followed by Dr Ibarra.     During respiratory exam/deep breathing, felt like she may pass out. Remains alert, oriented, appropriately responsive. Lasted a few minutes. /58, HR 78. Multiple similar episodes at home, relieved quickly by rest. Onset several years ago after second cardiac surgery. Reports sx, severity and frequency are unchanged.    Doesn't use albuterol inhaler. Someone told her she had normal PFTs. States completing PFT make her pass out.         Review of Systems   Constitutional:  Negative for activity change and unexpected weight change.   Respiratory:  Negative for chest tightness.    Cardiovascular:  Positive " for chest pain. Negative for leg swelling.   Gastrointestinal:         Feels like abdomen is swollen.    Genitourinary:  Negative for difficulty urinating and dysuria.   Neurological:  Negative for dizziness.    Physical Exam  Constitutional:       General: She is not in acute distress.     Appearance: She is obese.   HENT:      Ears:      Comments: [place]     Mouth/Throat:      Mouth: Mucous membranes are moist.      Pharynx: No oropharyngeal exudate or posterior oropharyngeal erythema.   Eyes:      General: No scleral icterus.     Extraocular Movements: Extraocular movements intact.      Conjunctiva/sclera: Conjunctivae normal.      Pupils: Pupils are equal, round, and reactive to light.   Cardiovascular:      Rate and Rhythm: Normal rate and regular rhythm.   Pulmonary:      Effort: Pulmonary effort is normal.      Breath sounds: No stridor. Rhonchi present. No rales.   Abdominal:      General: Bowel sounds are normal.      Palpations: Abdomen is soft.   Musculoskeletal:         General: No swelling.      Cervical back: Neck supple.   Skin:     General: Skin is warm and dry.   Neurological:      General: No focal deficit present.      Mental Status: She is alert. Mental status is at baseline.      Motor: No weakness.      Gait: Gait normal.   Psychiatric:         Mood and Affect: Mood normal.         Behavior: Behavior normal.         Thought Content: Thought content normal.          Health Maintenance         Date Due Completion Date    COVID-19 Vaccine (1) Never done ---    Pneumococcal Vaccines (Age 65+) (1 - PCV) Never done ---    Foot Exam Never done ---    TETANUS VACCINE Never done ---    Colorectal Cancer Screening 04/18/2016 4/18/2011    Mammogram 10/23/2016 10/23/2015    Shingles Vaccine (2 of 2) 11/28/2022 10/3/2022    Hemoglobin A1c 08/03/2023 2/3/2023    Diabetes Urine Screening 10/14/2023 10/14/2022    Lipid Panel 02/03/2024 2/3/2023    Eye Exam 03/06/2024 3/6/2023    Aspirin/Antiplatelet Therapy  05/13/2024 5/13/2023    DEXA Scan 07/25/2025 7/25/2022          EKG: sinus bradycardia, v rate 58 bpm, new T wave inversions leads II, III, aVF, V1 and V2. QTc 443.    Refuses mammogram, pain left breast with direct pressure. Takes gabapentin for pain relief. Declines any further testing. Doesn't have family to bring her to colonoscopy.       Diagnoses and health risks identified today and associated recommendations/orders:  Psoriatic arthritis  Followed by rheumatology. Sx controlled.   Continues Cosentyx.    Statin intolerance  Lab Results   Component Value Date    LDLCALC 50.0 (L) 02/03/2023   Continues Repatha.     Anemia due to folic acid deficiency  Lab Results   Component Value Date    HGB 11.7 (L) 03/02/2023   Improved. Continue vit B replacement      Immunocompromised  Due to psoriatic arthritis, Cosentyx.        CAD (coronary artery disease), with stents   New T wave inversion leads II, II, aVF, V1 and V2.   Schedule appt cardiology ASAP.   Call EMS if chest pressure or dyspnea returns/unrelieved by rest.   Continues repatha, HTN mgmt, clopidogrel, ASA.    Essential hypertension  BP Readings from Last 3 Encounters:   05/22/23 (!) 132/58   04/25/23 (!) 170/68   04/11/23 (!) 152/72   Not optimally controlled. Valsartan, metoprolol, furosemide.  Add spirinolactone, check RFP/BP in 2 weeks.        COPD (chronic obstructive pulmonary disease)  Albuterol PRN. Sx controlled.     Polyneuropathy  Sx. Managed with gabapentin.     Hypothyroidism  Lab Results   Component Value Date    TSH 1.196 07/18/2022         Morbid obesity with BMI of 40.0-44.9, adult  Gradual, slow weight loss. Monitor.     Type 2 diabetes mellitus with peripheral neuropathy  Lab Results   Component Value Date    HGBA1C 6.1 (H) 02/03/2023   Diabetes controlled. Continue POC.      B12 deficiency  Continue B12 replacement    Type 2 diabetes mellitus with chronic kidney disease, without long-term current use of insulin  DMII is controlled.    Continue POC    Secondary hyperparathyroidism  Lab Results   Component Value Date    PTH 91.5 (H) 05/20/2022    CALCIUM 9.3 03/02/2023    PHOS 4.1 05/20/2022   Monitor.       CAD, multiple vessel  Followed by Dr Ibarra. Continues ASA, clopidogrel, metoprolol and Repatha.     Aortic atherosclerosis  Need better BP control. Adding aldactone.   Continues Repatha.     Hyperlipidemia  Lab Results   Component Value Date    LDLCALC 50.0 (L) 02/03/2023   Continues Repatha.     EKG reviewed - new ST depression in leads III, V1 and V2. Will schedule cardiology appt. Consider calling EMS if chest pressure returns.     Problem List Items Addressed This Visit          Neuro    Polyneuropathy (Chronic)     Sx. Managed with gabapentin.               Pulmonary    COPD (chronic obstructive pulmonary disease) (Chronic)     Albuterol PRN. Sx controlled.               Cardiac/Vascular    Hyperlipidemia (Chronic)     Lab Results   Component Value Date    LDLCALC 50.0 (L) 02/03/2023   Continues Repatha.         Essential hypertension (Chronic)     BP Readings from Last 3 Encounters:   05/22/23 (!) 132/58   04/25/23 (!) 170/68   04/11/23 (!) 152/72   Not optimally controlled. Valsartan, metoprolol, furosemide.  Add spirinolactone, check RFP/BP in 2 weeks.             Relevant Medications    spironolactone (ALDACTONE) 25 MG tablet    Other Relevant Orders    RENAL FUNCTION PANEL    Aortic atherosclerosis (Chronic)     Need better BP control. Adding aldactone.   Continues Repatha.            CAD, multiple vessel     Followed by Dr Ibarra. Continues ASA, clopidogrel, metoprolol and Repatha.            CAD (coronary artery disease), with stents      New T wave inversion leads II, II, aVF, V1 and V2.   Schedule appt cardiology ASAP.   Call EMS if chest pressure or dyspnea returns/unrelieved by rest.   Continues repatha, HTN mgmt, clopidogrel, ASA.           Relevant Orders    IN OFFICE EKG 12-LEAD (to [facility])       Immunology/Multi System     Immunocompromised     Due to psoriatic arthritis, Cosentyx.                  Oncology    Anemia due to folic acid deficiency     Lab Results   Component Value Date    HGB 11.7 (L) 03/02/2023   Improved. Continue vit B replacement              Endocrine    Type 2 diabetes mellitus with peripheral neuropathy (Chronic)     Lab Results   Component Value Date    HGBA1C 6.1 (H) 02/03/2023   Diabetes controlled. Continue POC.           Morbid obesity with BMI of 40.0-44.9, adult (Chronic)     Gradual, slow weight loss. Monitor.            Hypothyroidism (Chronic)     Lab Results   Component Value Date    TSH 1.196 07/18/2022            Type 2 diabetes mellitus with chronic kidney disease, without long-term current use of insulin     DMII is controlled.   Continue POC           Secondary hyperparathyroidism     Lab Results   Component Value Date    PTH 91.5 (H) 05/20/2022    CALCIUM 9.3 03/02/2023    PHOS 4.1 05/20/2022   Monitor.            B12 deficiency     Continue B12 replacement              Orthopedic    Psoriatic arthritis (Chronic)     Followed by rheumatology. Sx controlled.   Continues Cosentyx.              Other    Statin intolerance     Lab Results   Component Value Date    LDLCALC 50.0 (L) 02/03/2023   Continues Repatha.           Other Visit Diagnoses       Colon cancer screening    -  Primary    Relevant Orders    Case Request Endoscopy: COLONOSCOPY (Completed)    Breast cancer screening by mammogram        Change in bowel habit        Relevant Orders    Case Request Endoscopy: COLONOSCOPY (Completed)    Encounter for preventive health examination                Provided Qi with a 5-10 year written screening schedule and personal prevention plan. Recommendations were developed using the USPSTF age appropriate recommendations. Education, counseling, and referrals were provided as needed.  After Visit Summary printed and given to patient which includes a list of additional screenings\tests  needed.    Follow up in about 3 weeks (around 6/12/2023) for HTN management, BP recheck.      SAMEERA Julian, KJP-CI offered to discuss advanced care planning, including how to pick a person who would make decisions for you if you were unable to make them for yourself, called a health care power of , and what kind of decisions you might make such as use of life sustaining treatments such as ventilators and tube feeding when faced with a life limiting illness recorded on a living will that they will need to know. (How you want to be cared for as you near the end of your natural life)     X Patient is interested in learning more about how to make advanced directives.  I provided them paperwork and offered to discuss this with them.

## 2023-05-22 NOTE — PATIENT INSTRUCTIONS
Counseling and Referral of Other Preventative  (Italic type indicates deductible and co-insurance are waived)    Patient Name: Qi Ortiz  Today's Date: 5/22/2023        Will schedule cardiology appt.   Call EMS if chest pressure returns.       Health Maintenance         Date Due Completion Date    COVID-19 Vaccine (1) Never done ---    Pneumococcal Vaccines (Age 65+) (1 - PCV) Never done ---    Foot Exam Never done ---    TETANUS VACCINE Never done ---    Colorectal Cancer Screening 04/18/2016 4/18/2011    Mammogram 10/23/2016 10/23/2015    Shingles Vaccine (2 of 2) 11/28/2022 10/3/2022    Hemoglobin A1c 08/03/2023 2/3/2023    Diabetes Urine Screening 10/14/2023 10/14/2022    Lipid Panel 02/03/2024 2/3/2023    Eye Exam 03/06/2024 3/6/2023    Aspirin/Antiplatelet Therapy 05/22/2024 5/22/2023    DEXA Scan 07/25/2025 7/25/2022          Orders Placed This Encounter   Procedures    Mammo Digital Screening Bilat w/ Rolando    RENAL FUNCTION PANEL    IN OFFICE EKG 12-LEAD (to [place])     The following information is provided to all patients.  This information is to help you find resources for any of the problems found today that may be affecting your health:                Living healthy guide: www.[facility].[place].gov      Understanding Diabetes: www.diabetes.org      Eating healthy: www.cdc.gov/healthyweight      CDC home safety checklist: www.cdc.gov/steadi/patient.html      Agency on Aging: www.goea.[place].[facility]      Alcoholics anonymous (AA): www.aa.org      Physical Activity: www.[place].nih.gov/oi0ntrh      Tobacco use: www.quitwithusla.org

## 2023-05-22 NOTE — ASSESSMENT & PLAN NOTE
Lab Results   Component Value Date    PTH 91.5 (H) 05/20/2022    CALCIUM 9.3 03/02/2023    PHOS 4.1 05/20/2022   Monitor.

## 2023-05-23 ENCOUNTER — TELEPHONE (OUTPATIENT)
Dept: CARDIOLOGY | Facility: CLINIC | Age: 73
End: 2023-05-23
Payer: MEDICARE

## 2023-05-23 NOTE — TELEPHONE ENCOUNTER
"Pt states that she had EKG done at [facility] yesterday and was seen by Elma Hernandez, NP-CI, and it showed changes. Elma messaged Stacey:  "Good afternoon Dr Ibarra - Ms Ortiz had episode of CP last night, relieved by rest. Today looks like new T wave inversion in inferior leads. We scheduled her an appt to see Rosa Child on 5/14 - you didn't have openings. Please let me know if you have other recommendations -Thank you!"  I stated that if we cannot view the EKG, we will reach out to them. We also confirmed her appt for tomorrow. Pt vu w/o q/c  "

## 2023-05-23 NOTE — PROGRESS NOTES
Subjective:   Patient ID:  Qi Ortiz is a 72 y.o. female who presents for evaluation of No chief complaint on file.      [id number] F PMHx of COPD, HLD, HTN, CABG x2, SVT, AS, CAD, s/p TAVR 20', chronic diastolic HF, CKD, DM, obesity, IRIS, statin intolerance followed by Dr. Ibarra in clinic last seen 2/13/23. Sent from PCP for EKG changes. Here today with multiple complaints, reports on Sunday she felt SOB and fatigue. Also endorses that she gets dizzy sometimes and has near-syncope episodes.    Pt is hesitant to take aldactone due to possible side effects, questioning her lasix and K supplement    Home Bps fluctuating she reports some readings systolic 130-190s at home    Carotid US Feb20' mild blockages  EKG with chronic st-t abnl, today EKG noted to have slight changes in t waves   LHC 20'  LAD, patent OLIVA to LAD, collaterals to OM, occluded OM1 med tx advised  Past Medical History:   Diagnosis Date    Carotid stenosis     19%    Cellulitis and abscess of foot, except toes 6/22/2022    COPD (chronic obstructive pulmonary disease)     No meds    Coronary artery disease     CVA (cerebral vascular accident)     Dr. Hoffman    Depression     Double ectopic ureters     Dr. Porras    Hemiplegia affecting right dominant side 11/9/2021    Hyperlipidemia     Hypertension     Hypothyroid     Left foot infection 7/8/2022    Mild asthma with exacerbation 12/4/2022    OP (osteoporosis)     IRIS (obstructive sleep apnea)     Dr. Hope    Psoriatic arthritis     Rheumatology       Past Surgical History:   Procedure Laterality Date    BREAST BIOPSY      R sided/benign    CARDIAC SURGERY      sept 28 2016    CERVICAL FUSION      CHOLECYSTECTOMY      CORONARY ANGIOPLASTY      CORONARY ARTERY BYPASS GRAFT      triple bypass    CORONARY STENT PLACEMENT      EYE SURGERY      INTRAUTERINE DEVICE INSERTION      LEFT HEART CATHETERIZATION Left 9/8/2020    Procedure: CATHETERIZATION, HEART, LEFT;  Surgeon: Veronica Ibarra MD;   Location: [facility] CATH LAB;  Service: Cardiology;  Laterality: Left;  7am start time    mass removed from R groin      TOTAL ABDOMINAL HYSTERECTOMY W/ BILATERAL SALPINGOOPHORECTOMY      due to benign mass, adhesions    TUBAL LIGATION         Social History     Tobacco Use    Smoking status: Never    Smokeless tobacco: Never   Substance Use Topics    Alcohol use: No    Drug use: No       Family History   Problem Relation Age of Onset    Breast cancer Maternal Grandfather     Breast cancer Paternal Aunt     Stroke Unknown     Breast cancer Sister 60    Leukemia Sister 8         as child    Lung cancer Paternal Grandfather     Heart disease Unknown     Diabetes Daughter        Current Outpatient Medications on File Prior to Visit   Medication Sig Dispense Refill    acetaminophen (TYLENOL) 650 MG TbSR as directed      albuterol (PROVENTIL) 2.5 mg /3 mL (0.083 %) nebulizer solution Take 3 mLs (2.5 mg total) by nebulization every 6 (six) hours as needed for Wheezing. Rescue 30 each 3    allopurinoL (ZYLOPRIM) 100 MG tablet Take 2 tablets (200 mg total) by mouth once daily. 180 tablet 1    aspirin 81 MG Chew Take 1 tablet (81 mg total) by mouth once daily. 90 tablet 3    calcium carbonate 650 mg calcium (1,625 mg) tablet Take 1 tablet by mouth once daily.      clopidogreL (PLAVIX) 75 mg tablet Take 1 tablet (75 mg total) by mouth once. for 1 dose 90 tablet 3    cyanocobalamin 2000 MCG tablet Take 2,000 mcg by mouth once daily.      docusate sodium (COLACE) 100 MG capsule Take 1 capsule (100 mg total) by mouth 2 (two) times daily. 60 capsule 0    docusate sodium (COLACE) 100 MG capsule Colace Take No date recorded No form recorded No frequency recorded No route recorded No set duration recorded No set duration amount recorded active No dosage strength recorded No dosage strength units of measure recorded      folic acid (FOLVITE) 1 MG tablet Take 1 tablet (1 mg total) by mouth once daily. 90 tablet 1    furosemide (LASIX)  20 MG tablet Take 1 tablet (20 mg total) by mouth once daily. 90 tablet 3    gabapentin (NEURONTIN) 100 MG capsule Take 2 capsules (200 mg total) by mouth 3 (three) times daily. 540 capsule 1    garlic 2,000 mg Cap Take 3,000 mg by mouth once daily.       levothyroxine (SYNTHROID) 112 MCG tablet Take 1 tablet (112 mcg total) by mouth before breakfast. 90 tablet 1    metoprolol succinate (TOPROL-XL) 100 MG 24 hr tablet Take 1 tablet (100 mg total) by mouth 2 (two) times daily. 180 tablet 3    nitrofurantoin (MACRODANTIN) 100 MG capsule Take 100 mg by mouth every evening.      nitroGLYCERIN (NITROSTAT) 0.4 MG SL tablet Place 1 tablet (0.4 mg total) under the tongue every 5 (five) minutes as needed for Chest pain. 90 tablet 1    potassium chloride SA (K-DUR,KLOR-CON) 20 MEQ tablet Take 1 tablet (20 mEq total) by mouth once daily. 90 tablet 1    pyridoxine, vitamin B6, (B-6) 100 MG Tab Take 200 mg by mouth once daily.       REPATHA SURECLICK 140 mg/mL PnIj INJECT 1 ML (140 MG) UNDER THE SKIN EVERY 14 DAYS 6 mL 3    secukinumab (COSENTYX PEN) 150 mg/mL PnIj Inject 2 pens into the skin every 28 days. For a total of 300 mg every 28 days. 2 each 5    spironolactone (ALDACTONE) 25 MG tablet Take 1 tablet (25 mg total) by mouth once daily. 30 tablet 11    valsartan (DIOVAN) 320 MG tablet Take 1 tablet (320 mg total) by mouth once daily. 90 tablet 1    vitamin D 1000 units Tab Take 185 mg by mouth once daily.       No current facility-administered medications on file prior to visit.      Wt Readings from Last 3 Encounters:   05/22/23 104.6 kg (230 lb 11.2 oz)   04/25/23 106.9 kg (235 lb 9.6 oz)   04/11/23 107.7 kg (237 lb 6.4 oz)     Temp Readings from Last 3 Encounters:   05/22/23 98.2 °F (36.8 °C) (Oral)   04/11/23 98.3 °F (36.8 °C) (Oral)   03/15/23 98 °F (36.7 °C) (Oral)     BP Readings from Last 3 Encounters:   05/22/23 (!) 132/58   04/25/23 (!) 170/68   04/11/23 (!) 152/72     Pulse Readings from Last 3 Encounters:    05/22/23 64   04/11/23 61   03/15/23 62        Review of Systems   Constitutional: Positive for malaise/fatigue.   HENT: Negative.     Eyes: Negative.    Cardiovascular:  Positive for chest pain and near-syncope.   Respiratory: Negative.     Skin: Negative.    Musculoskeletal:  Positive for arthritis, back pain, joint pain and myalgias.   Gastrointestinal: Negative.    Genitourinary: Negative.    Neurological:  Positive for dizziness and weakness.   Psychiatric/Behavioral:  The patient is nervous/anxious.      Objective:   Physical Exam  Vitals and nursing note reviewed.   Constitutional:       Appearance: She is obese.   HENT:      Head: Normocephalic.   Eyes:      Pupils: Pupils are equal, round, and reactive to light.   Cardiovascular:      Rate and Rhythm: Normal rate and regular rhythm.      Heart sounds: Normal heart sounds, S1 normal and S2 normal. No murmur heard.    No S3 or S4 sounds.   Pulmonary:      Effort: Pulmonary effort is normal.      Breath sounds: Normal breath sounds.   Abdominal:      General: Bowel sounds are normal.      Palpations: Abdomen is soft.   Musculoskeletal:         General: Normal range of motion.      Cervical back: Normal range of motion.   Skin:     Capillary Refill: Capillary refill takes less than 2 seconds.   Neurological:      General: No focal deficit present.      Mental Status: She is alert and oriented to person, place, and time.      Motor: Weakness present.   Psychiatric:         Mood and Affect: Mood is anxious.         Behavior: Behavior normal.         Thought Content: Thought content normal.       Lab Results   Component Value Date    CHOL 125 02/03/2023    CHOL 119 (L) 07/18/2022    CHOL 136 01/13/2022     Lab Results   Component Value Date    HDL 43 02/03/2023    HDL 42 07/18/2022    HDL 43 01/13/2022     Lab Results   Component Value Date    LDLCALC 50.0 (L) 02/03/2023    LDLCALC 55.4 (L) 07/18/2022    LDLCALC 69.2 01/13/2022     Lab Results   Component Value  Date    TRIG 160 (H) 02/03/2023    TRIG 108 07/18/2022    TRIG 119 01/13/2022     Lab Results   Component Value Date    CHOLHDL 34.4 02/03/2023    CHOLHDL 35.3 07/18/2022    CHOLHDL 31.6 01/13/2022       Chemistry        Component Value Date/Time     03/02/2023 1210    K 4.1 03/02/2023 1210     03/02/2023 1210    CO2 27 03/02/2023 1210    BUN 21 03/02/2023 1210    CREATININE 1.1 03/02/2023 1210     (H) 03/02/2023 1210        Component Value Date/Time    CALCIUM 9.3 03/02/2023 1210    ALKPHOS 46 (L) 03/02/2023 1210    AST 22 03/02/2023 1210    ALT 11 03/02/2023 1210    BILITOT 0.2 03/02/2023 1210    ESTGFRAFRICA 48 (A) 07/18/2022 0803    EGFRNONAA 41 (A) 07/18/2022 0803          Lab Results   Component Value Date    TSH 1.196 07/18/2022     Lab Results   Component Value Date    INR 1.0 09/28/2020    INR 1.0 12/18/2017     @RESUFAST(WBC,HGB,HCT,MCV,PLT)  @LABRCNTIP(BNP,BNPTRIAGEBLO)@  CrCl cannot be calculated (Patient's most recent lab result is older than the maximum 7 days allowed.).     Results for orders placed during the hospital encounter of 05/25/22    Echo    Interpretation Summary  · The left ventricle is normal in size with normal systolic function.  · Indeterminate left ventricular diastolic function.  · The estimated PA systolic pressure is 40 mmHg.  · Normal right ventricular size with normal right ventricular systolic function.  · Normal central venous pressure (3 mmHg).  · Small anterior pericardial effusion.  · The estimated ejection fraction is 55%.  · There is a transcutaneously-placed aortic bioprosthesis present. There is no aortic insufficiency present. Prosthetic aortic valve is normal.  · The aortic valve mean gradient is 11 mmHg with a dimensionless index of 0.58.  · Mild tricuspid regurgitation.     No results found for this or any previous visit.     Assessment:      1. Hyperlipidemia, unspecified hyperlipidemia type    2. Essential hypertension    3. S/P CABG (coronary  artery bypass graft)    4. Supraventricular tachycardia    5. Nonrheumatic aortic valve stenosis    6. Aortic atherosclerosis    7. Coronary artery disease of native artery of native heart with stable angina pectoris    8. 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    9. Stenosis of right carotid artery    10. Palpitations    11. S/P TAVR (transcatheter aortic valve replacement)    12. Chronic diastolic heart failure    13. CAD, multiple vessel    14. IRIS (obstructive sleep apnea)        Plan:   Hyperlipidemia, unspecified hyperlipidemia type    Essential hypertension    S/P CABG (coronary artery bypass graft)    Supraventricular tachycardia    Nonrheumatic aortic valve stenosis    Aortic atherosclerosis    Coronary artery disease of native artery of native heart with stable angina pectoris    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    Stenosis of right carotid artery    Palpitations    S/P TAVR (transcatheter aortic valve replacement)    Chronic diastolic heart failure    CAD, multiple vessel    IRIS (obstructive sleep apnea)      ASA, plavix, repatha- CAD  Repatha- HLD  ASA- TAVR  metoprolol, aldactone, valsartan, lasix- CHF  Monitor RFP  Metoprolol, valsartan, aldactone- HTN  SL nitro PRN- chest pains    RF modification, daily exercise as tolerated, weight loss  Medication compliance    RFP and BNP next week after starting aldactone, can hold K supplement  Stress  48 hour holter ordered- dizziness, near syncope    Rosa Child, FNP-C [facility], Cardiology

## 2023-05-24 ENCOUNTER — HOSPITAL ENCOUNTER (OUTPATIENT)
Dept: CARDIOLOGY | Facility: HOSPITAL | Age: 73
Discharge: HOME | End: 2023-05-24
Payer: MEDICARE

## 2023-05-24 ENCOUNTER — OFFICE VISIT (OUTPATIENT)
Dept: CARDIOLOGY | Facility: CLINIC | Age: 73
End: 2023-05-24
Payer: MEDICARE

## 2023-05-24 VITALS
SYSTOLIC BLOOD PRESSURE: 136 MMHG | WEIGHT: 233.44 LBS | HEIGHT: 63 IN | DIASTOLIC BLOOD PRESSURE: 62 MMHG | OXYGEN SATURATION: 97 % | HEART RATE: 68 BPM | BODY MASS INDEX: 41.36 KG/M2

## 2023-05-24 DIAGNOSIS — I50.32 CHRONIC DIASTOLIC HEART FAILURE: ICD-10-CM

## 2023-05-24 DIAGNOSIS — I65.21 STENOSIS OF RIGHT CAROTID ARTERY: ICD-10-CM

## 2023-05-24 DIAGNOSIS — I25.810: ICD-10-CM

## 2023-05-24 DIAGNOSIS — I10 ESSENTIAL HYPERTENSION: Chronic | ICD-10-CM

## 2023-05-24 DIAGNOSIS — I35.0 NONRHEUMATIC AORTIC VALVE STENOSIS: Chronic | ICD-10-CM

## 2023-05-24 DIAGNOSIS — R00.2 PALPITATIONS: ICD-10-CM

## 2023-05-24 DIAGNOSIS — Z78.9 STATIN INTOLERANCE: ICD-10-CM

## 2023-05-24 DIAGNOSIS — I25.10 CAD, MULTIPLE VESSEL: ICD-10-CM

## 2023-05-24 DIAGNOSIS — Z95.2 S/P TAVR (TRANSCATHETER AORTIC VALVE REPLACEMENT): ICD-10-CM

## 2023-05-24 DIAGNOSIS — I25.118 CORONARY ARTERY DISEASE OF NATIVE ARTERY OF NATIVE HEART WITH STABLE ANGINA PECTORIS: Primary | ICD-10-CM

## 2023-05-24 DIAGNOSIS — G47.33 OSA (OBSTRUCTIVE SLEEP APNEA): ICD-10-CM

## 2023-05-24 DIAGNOSIS — E78.5 HYPERLIPIDEMIA, UNSPECIFIED HYPERLIPIDEMIA TYPE: Chronic | ICD-10-CM

## 2023-05-24 DIAGNOSIS — Z95.1 S/P CABG (CORONARY ARTERY BYPASS GRAFT): Chronic | ICD-10-CM

## 2023-05-24 DIAGNOSIS — I47.10 SUPRAVENTRICULAR TACHYCARDIA: Chronic | ICD-10-CM

## 2023-05-24 DIAGNOSIS — I70.0 AORTIC ATHEROSCLEROSIS: Chronic | ICD-10-CM

## 2023-05-24 PROCEDURE — 99999 PR PBB SHADOW E&M-EST. PATIENT-LVL IV: ICD-10-PCS | Mod: PBBFAC,,,

## 2023-05-24 PROCEDURE — 99214 OFFICE O/P EST MOD 30 MIN: CPT | Mod: PBBFAC

## 2023-05-24 PROCEDURE — 99999 PR PBB SHADOW E&M-EST. PATIENT-LVL IV: CPT | Mod: PBBFAC,,,

## 2023-05-24 PROCEDURE — 99215 OFFICE O/P EST HI 40 MIN: CPT | Mod: S$PBB,,,

## 2023-05-24 PROCEDURE — 93010 EKG 12-LEAD: ICD-10-PCS | Mod: ,,, | Performed by: INTERNAL MEDICINE

## 2023-05-24 PROCEDURE — 93005 ELECTROCARDIOGRAM TRACING: CPT | Mod: PN

## 2023-05-24 PROCEDURE — 93010 ELECTROCARDIOGRAM REPORT: CPT | Mod: ,,, | Performed by: INTERNAL MEDICINE

## 2023-05-24 PROCEDURE — 99215 PR OFFICE/OUTPT VISIT, EST, LEVL V, 40-54 MIN: ICD-10-PCS | Mod: S$PBB,,,

## 2023-05-25 RX ORDER — SPIRONOLACTONE 25 MG/1
25 TABLET ORAL DAILY
Qty: 30 TABLET | Refills: 11 | Status: SHIPPED | OUTPATIENT
Start: 2023-05-25 | End: 2023-07-12 | Stop reason: SDUPTHER

## 2023-05-29 ENCOUNTER — HOSPITAL ENCOUNTER (OUTPATIENT)
Dept: CARDIOLOGY | Facility: HOSPITAL | Age: 73
Discharge: HOME | End: 2023-05-29
Payer: MEDICARE

## 2023-05-29 DIAGNOSIS — I25.118 CORONARY ARTERY DISEASE OF NATIVE ARTERY OF NATIVE HEART WITH STABLE ANGINA PECTORIS: ICD-10-CM

## 2023-05-29 PROCEDURE — 93225 XTRNL ECG REC<48 HRS REC: CPT

## 2023-05-29 PROCEDURE — 93227 HOLTER MONITOR - 48 HOUR (CUPID ONLY): ICD-10-PCS | Mod: ,,, | Performed by: INTERNAL MEDICINE

## 2023-05-29 PROCEDURE — 93227 XTRNL ECG REC<48 HR R&I: CPT | Mod: ,,, | Performed by: INTERNAL MEDICINE

## 2023-06-01 ENCOUNTER — TELEPHONE (OUTPATIENT)
Dept: PRIMARY CARE CLINIC | Facility: CLINIC | Age: 73
End: 2023-06-01
Payer: MEDICARE

## 2023-06-01 LAB
OHS CV EVENT MONITOR DAY: 0
OHS CV HOLTER LENGTH DECIMAL HOURS: 48
OHS CV HOLTER LENGTH HOURS: 48
OHS CV HOLTER LENGTH MINUTES: 0
OHS CV HOLTER SINUS AVERAGE HR: 61
OHS CV HOLTER SINUS MAX HR: 101
OHS CV HOLTER SINUS MIN HR: 38

## 2023-06-01 NOTE — TELEPHONE ENCOUNTER
----- Message from [facility] sent at 6/1/2023 11:22 AM CDT -----  Pt needs you to call them in reguards to the medication and monitor, they can be reached at [phone]

## 2023-06-01 NOTE — TELEPHONE ENCOUNTER
Pt states she started Aldactone on Monday- since then she has been off balance - almost fell 3 times. States she is extremely tired. Last blood pressure /70 P 65 this am  before any meds.

## 2023-06-02 ENCOUNTER — TELEPHONE (OUTPATIENT)
Dept: CARDIOLOGY | Facility: CLINIC | Age: 73
End: 2023-06-02
Payer: MEDICARE

## 2023-06-02 NOTE — TELEPHONE ENCOUNTER
Patient was notified of results. All questions were answered. Pt verbalized understanding. Pt will call back with any other questions or concerns. ----- Message from Veronica Ibarra MD sent at 6/2/2023 10:18 AM CDT -----  She ahs few skipped beats from upper and lower chamber of the heart will observe.

## 2023-06-05 ENCOUNTER — TELEPHONE (OUTPATIENT)
Dept: CARDIOLOGY | Facility: CLINIC | Age: 73
End: 2023-06-05
Payer: MEDICARE

## 2023-06-05 ENCOUNTER — TELEPHONE (OUTPATIENT)
Dept: FAMILY MEDICINE | Facility: CLINIC | Age: 73
End: 2023-06-05
Payer: MEDICARE

## 2023-06-05 ENCOUNTER — LAB VISIT (OUTPATIENT)
Dept: LAB | Facility: HOSPITAL | Age: 73
End: 2023-06-05
Attending: NURSE PRACTITIONER
Payer: MEDICARE

## 2023-06-05 ENCOUNTER — OFFICE VISIT (OUTPATIENT)
Dept: PRIMARY CARE CLINIC | Facility: CLINIC | Age: 73
End: 2023-06-05
Payer: MEDICARE

## 2023-06-05 VITALS
WEIGHT: 225 LBS | DIASTOLIC BLOOD PRESSURE: 70 MMHG | OXYGEN SATURATION: 96 % | TEMPERATURE: 98 F | HEART RATE: 53 BPM | BODY MASS INDEX: 38.41 KG/M2 | HEIGHT: 64 IN | SYSTOLIC BLOOD PRESSURE: 159 MMHG

## 2023-06-05 DIAGNOSIS — R53.1 GENERALIZED WEAKNESS: ICD-10-CM

## 2023-06-05 DIAGNOSIS — R42 POSTURAL DIZZINESS WITH NEAR SYNCOPE: ICD-10-CM

## 2023-06-05 DIAGNOSIS — R55 POSTURAL DIZZINESS WITH NEAR SYNCOPE: ICD-10-CM

## 2023-06-05 DIAGNOSIS — I25.118 CORONARY ARTERY DISEASE OF NATIVE ARTERY OF NATIVE HEART WITH STABLE ANGINA PECTORIS: ICD-10-CM

## 2023-06-05 DIAGNOSIS — I10 ESSENTIAL HYPERTENSION: Chronic | ICD-10-CM

## 2023-06-05 DIAGNOSIS — I27.20 PULMONARY HYPERTENSION: Chronic | ICD-10-CM

## 2023-06-05 DIAGNOSIS — R00.2 PALPITATIONS: Primary | ICD-10-CM

## 2023-06-05 DIAGNOSIS — R00.1 SINUS BRADYCARDIA: ICD-10-CM

## 2023-06-05 LAB
ALBUMIN SERPL BCP-MCNC: 3.2 G/DL (ref 3.5–5.2)
ANION GAP SERPL CALC-SCNC: 7 MMOL/L (ref 8–16)
BUN SERPL-MCNC: 13 MG/DL (ref 8–23)
CALCIUM SERPL-MCNC: 9.5 MG/DL (ref 8.7–10.5)
CHLORIDE SERPL-SCNC: 107 MMOL/L (ref 95–110)
CO2 SERPL-SCNC: 29 MMOL/L (ref 23–29)
CREAT SERPL-MCNC: 1 MG/DL (ref 0.5–1.4)
EST. GFR  (NO RACE VARIABLE): 59.9 ML/MIN/1.73 M^2
GLUCOSE SERPL-MCNC: 124 MG/DL (ref 70–110)
GLUCOSE SERPL-MCNC: 94 MG/DL (ref 70–110)
PHOSPHATE SERPL-MCNC: 3.8 MG/DL (ref 2.7–4.5)
POTASSIUM SERPL-SCNC: 3.8 MMOL/L (ref 3.5–5.1)
SODIUM SERPL-SCNC: 143 MMOL/L (ref 136–145)

## 2023-06-05 PROCEDURE — 93005 ELECTROCARDIOGRAM TRACING: CPT | Mod: PBBFAC,PN | Performed by: INTERNAL MEDICINE

## 2023-06-05 PROCEDURE — 99999 PR PBB SHADOW E&M-EST. PATIENT-LVL V: ICD-10-PCS | Mod: PBBFAC,,, | Performed by: NURSE PRACTITIONER

## 2023-06-05 PROCEDURE — 82962 GLUCOSE BLOOD TEST: CPT | Mod: PBBFAC,PN | Performed by: NURSE PRACTITIONER

## 2023-06-05 PROCEDURE — 99215 PR OFFICE/OUTPT VISIT, EST, LEVL V, 40-54 MIN: ICD-10-PCS | Mod: S$PBB,,, | Performed by: NURSE PRACTITIONER

## 2023-06-05 PROCEDURE — 99999 PR PBB SHADOW E&M-EST. PATIENT-LVL V: CPT | Mod: PBBFAC,,, | Performed by: NURSE PRACTITIONER

## 2023-06-05 PROCEDURE — 93010 EKG 12-LEAD: ICD-10-PCS | Mod: S$PBB,,, | Performed by: INTERNAL MEDICINE

## 2023-06-05 PROCEDURE — 93010 ELECTROCARDIOGRAM REPORT: CPT | Mod: S$PBB,,, | Performed by: INTERNAL MEDICINE

## 2023-06-05 PROCEDURE — 80069 RENAL FUNCTION PANEL: CPT | Performed by: NURSE PRACTITIONER

## 2023-06-05 PROCEDURE — 36415 COLL VENOUS BLD VENIPUNCTURE: CPT | Mod: PO | Performed by: NURSE PRACTITIONER

## 2023-06-05 PROCEDURE — 99417 PR PROLONGED SVC, OUTPT, W/WO DIRECT PT CONTACT,  EA ADDTL 15 MIN: ICD-10-PCS | Mod: S$PBB,,, | Performed by: NURSE PRACTITIONER

## 2023-06-05 PROCEDURE — 99215 OFFICE O/P EST HI 40 MIN: CPT | Mod: PBBFAC,PN | Performed by: NURSE PRACTITIONER

## 2023-06-05 PROCEDURE — 99417 PROLNG OP E/M EACH 15 MIN: CPT | Mod: S$PBB,,, | Performed by: NURSE PRACTITIONER

## 2023-06-05 PROCEDURE — 99215 OFFICE O/P EST HI 40 MIN: CPT | Mod: S$PBB,,, | Performed by: NURSE PRACTITIONER

## 2023-06-05 RX ORDER — METOPROLOL SUCCINATE 50 MG/1
50 TABLET, EXTENDED RELEASE ORAL 2 TIMES DAILY
Qty: 60 TABLET | Refills: 0 | Status: SHIPPED | OUTPATIENT
Start: 2023-06-05 | End: 2023-06-13 | Stop reason: DRUGHIGH

## 2023-06-05 NOTE — ASSESSMENT & PLAN NOTE
EKG - sinus bradycardia - change Toprol XL from 100 mg bid to 50 mg bid on advise of Dr. Ibarra    Pt is scheduled to see LINDA Child NP Cardiology 6/5/23 at 9:30

## 2023-06-05 NOTE — TELEPHONE ENCOUNTER
Pt was at the [facility] location for blood work today and stated that she was having some issues that felt similar to when she had to have heart surgery. No pain, but called it a pressure in her chest. She felt sob and weak when walking. [facility] tech stated that pt was sweaty and clammy when she walked in the room. I advised that these are concerning symptoms and would suggest ER. She stated that she was told not to go to the ER for these problems and has asked that we reach out to her cardiologist. Pt stated she would just go home and lie down while she waits for instruction. Please contact pt to further discuss.   Thank you

## 2023-06-05 NOTE — ASSESSMENT & PLAN NOTE
Pt advised for need for Emergent evaluation in the ED however, she refused  She wants someone familiar with her history to admit her  Vital signs stable, no hypotension, heart rate = 52, pulse ox 96 % (pt with pulmonary HTN)  Pt left clinic driving her personal vehicle - no acute distress noted.

## 2023-06-05 NOTE — PROGRESS NOTES
Qi Ortiz  06/05/2023  [id number]    Lisa Porter MD  Patient Care Team:  Lisa Porter MD as PCP - General (Internal Medicine)  Elma Hernandez NP as Nurse Practitioner (Family Medicine)      [facility]      Chief Complaint:  Chief Complaint   Patient presents with    Establish Care    Shortness of Breath     Feeling fullness in chest    Dizziness    Irregular Heart Beat    Fatigue       History of Present Illness:  Pt was at the [facility] location for blood work today and stated that she was having some issues that felt similar to when she had to have heart surgery. No pain, but called it a pressure in her chest. She felt sob and weak when walking. Xray tech stated that pt was sweaty and clammy when she walked in the room. I advised that these are concerning symptoms and would suggest ER. She stated that she was told not to go to the ER for these problems and has asked that we reach out to her cardiologist. Pt stated she would just go home and lie down while she waits for instruction. Please contact pt to further discuss    Pt presents for evaluation  Onset of symptoms 5/13 - waxing and waning in intensity  Pt went to get labs today and felt chest fullness, + palpitations  Severe fatigue, increased sleeping  Dizziness and lightheadedness, she feels groggy - no spinning  Lives alone at home  Reports SOB and BROOKS, chronic but worsened at this time  Generalized weakness, poor appetite  Symptoms have worsened since starting on aldactone - last week  Not using CPAP at home  Is not taking lasix - Cardiology stopped K+                  The following were reviewed: Active problem list, medication list, allergies, family history, social history, and Health Maintenance.     History:  Past Medical History:   Diagnosis Date    Carotid stenosis     19%    Cellulitis and abscess of foot, except toes 6/22/2022    COPD (chronic obstructive pulmonary disease)     No meds    Coronary artery disease      CVA (cerebral vascular accident)     Dr. 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 Hope    Psoriatic arthritis     Rheumatology     Past Surgical History:   Procedure Laterality Date    BREAST BIOPSY      R sided/benign    CARDIAC SURGERY      2016    CERVICAL FUSION      CHOLECYSTECTOMY      CORONARY ANGIOPLASTY      CORONARY ARTERY BYPASS GRAFT      triple bypass    CORONARY STENT PLACEMENT      EYE SURGERY      INTRAUTERINE DEVICE INSERTION      LEFT HEART CATHETERIZATION Left 2020    Procedure: CATHETERIZATION, HEART, LEFT;  Surgeon: Veronica Ibarra MD;  Location: [facility] CATH LAB;  Service: Cardiology;  Laterality: Left;  7am start time    mass removed from R groin      TOTAL ABDOMINAL HYSTERECTOMY W/ BILATERAL SALPINGOOPHORECTOMY      due to benign mass, adhesions    TUBAL LIGATION       Family History   Problem Relation Age of Onset    Breast cancer Maternal Grandfather     Breast cancer Paternal Aunt     Stroke Unknown     Breast cancer Sister 60    Leukemia Sister 8         as child    Lung cancer Paternal Grandfather     Heart disease Unknown     Diabetes Daughter      Patient Active Problem List   Diagnosis    Hyperlipidemia    Hypothyroidism    Degenerative arthritis of lumbar spine    Polyneuropathy    Metabolic syndrome    Vitamin D deficiency    Age-related osteoporosis without current pathological fracture    Essential hypertension    CAD (coronary artery disease), with stents     S/P CABG (coronary artery bypass graft)    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    Carotid stenosis    COPD (chronic obstructive pulmonary disease)    IRIS (obstructive sleep apnea)    Double ectopic ureters    Psoriatic  arthritis    Morbid obesity with BMI of 40.0-44.9, adult    Angina, class II    Primary osteoarthritis involving multiple joints    Statin intolerance    Postural dizziness with near syncope    Stage 3b chronic kidney disease    Osteopenia of multiple sites    Psoriasis    History of CVA (cerebrovascular accident)    Iron deficiency anemia due to chronic blood loss    B12 deficiency    Iron deficiency anemia due to chronic blood loss    Allergy to statin medication    Type 2 diabetes mellitus with chronic kidney disease, without long-term current use of insulin    Transient ischemic attack (TIA)    Near syncope    Palpitations    Supraventricular tachycardia    Nonrheumatic aortic valve stenosis    S/P TAVR (transcatheter aortic valve replacement)    Anxiety    Anemia due to folic acid deficiency    Chronic diastolic heart failure    AP (angina pectoris)    CAD, multiple vessel    Pulmonary hypertension    Type 2 diabetes mellitus with peripheral neuropathy    Abnormal blood level of uric acid    Folic acid deficiency    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    Pain in lateral portion of left knee    Secondary hyperparathyroidism    Aortic atherosclerosis    Cerumen debris on tympanic membrane of right ear    Immunocompromised    Dysuria    Grade IV hemorrhoids    Generalized weakness     Review of patient's allergies indicates:   Allergen Reactions    Citalopram Anaphylaxis     Other reaction(s): Not Indicated    Clindamycin Itching and Swelling     Other reaction(s): Not Indicated    Codeine Shortness Of Breath, Itching and Swelling     Other reaction(s): Not Indicated    Hydrocodone-acetaminophen Shortness Of Breath     Other reaction(s): Not Indicated    Kiwi (actinidia chinensis) Blisters     Oral  Blisters/burning    Lisinopril Anaphylaxis     Other reaction(s): Not Indicated    Magnesium citrate Shortness Of Breath     Other reaction(s): Not Indicated    Misoprostol  Anaphylaxis and Other (See Comments)     Difficulty breathing  Other reaction(s): Not Indicated    Rosuvastatin Anaphylaxis     Other reaction(s): Not Indicated    Stadol [butorphanol tartrate] Anaphylaxis     Coded    Hydromorphone Hallucinations    Adhesive      EKG Electrodes    Aspirin-dipyridamole Other (See Comments)     headaches  Other reaction(s): Not Indicated    Azithromycin      Other reaction(s): Not Indicated    Butorphanol      Other reaction(s): Not Indicated    Cleocin [clindamycin hcl]     Ezetimibe      Other reaction(s): Not Indicated    Isosorbide Other (See Comments)     Severe headache  Other reaction(s): Not Indicated    Meloxicam      Other reaction(s): Not Indicated    Meperidine      Other reaction(s): Not Indicated    Methylprednisolone Other (See Comments)     Patient reports taking IV in the past without any issues. Reports allergy to oral preparation   Other reaction(s): Not Indicated    Morphine      Other reaction(s): Not Indicated    Moxifloxacin      PATIENT STATES DO NOT GIVE UNDER ANY CIRCUSTANCES  Other reaction(s): Not Indicated    Nedocromil      Other reaction(s): Not Indicated    Nitroglycerin      Long acting  Other reaction(s): Not Indicated    Pentazocine lactate      Other reaction(s): Not Indicated    Pholcodine     Prednisone      GASTRIC PAIN  Other reaction(s): Not Indicated    Ranolazine Nausea And Vomiting     Other reaction(s): Not Indicated    Sulfa (sulfonamide antibiotics) Other (See Comments)     unknown    Tetracycline     Tetracyclines     Triamcinolone acetonide      Other reaction(s): Not Indicated    Zoledronic acid-mannitol-water Other (See Comments)     Bones hurt  Other reaction(s): Not Indicated       Medications:  Current Outpatient Medications on File Prior to Visit   Medication Sig Dispense Refill    acetaminophen (TYLENOL) 650 MG TbSR as directed      albuterol (PROVENTIL) 2.5 mg /3 mL (0.083 %) nebulizer solution Take 3 mLs (2.5 mg total) by  nebulization every 6 (six) hours as needed for Wheezing. Rescue 30 each 3    allopurinoL (ZYLOPRIM) 100 MG tablet Take 2 tablets (200 mg total) by mouth once daily. 180 tablet 1    aspirin 81 MG Chew Take 1 tablet (81 mg total) by mouth once daily. 90 tablet 3    calcium carbonate 650 mg calcium (1,625 mg) tablet Take 1 tablet by mouth once daily.      cyanocobalamin 2000 MCG tablet Take 2,000 mcg by mouth once daily.      docusate sodium (COLACE) 100 MG capsule Take 1 capsule (100 mg total) by mouth 2 (two) times daily. 60 capsule 0    docusate sodium (COLACE) 100 MG capsule Colace Take No date recorded No form recorded No frequency recorded No route recorded No set duration recorded No set duration amount recorded active No dosage strength recorded No dosage strength units of measure recorded      folic acid (FOLVITE) 1 MG tablet Take 1 tablet (1 mg total) by mouth once daily. 90 tablet 1    furosemide (LASIX) 20 MG tablet Take 1 tablet (20 mg total) by mouth once daily. 90 tablet 3    gabapentin (NEURONTIN) 100 MG capsule Take 2 capsules (200 mg total) by mouth 3 (three) times daily. 540 capsule 1    garlic 2,000 mg Cap Take 3,000 mg by mouth once daily.       levothyroxine (SYNTHROID) 112 MCG tablet Take 1 tablet (112 mcg total) by mouth before breakfast. 90 tablet 1    nitrofurantoin (MACRODANTIN) 100 MG capsule Take 100 mg by mouth every evening.      nitroGLYCERIN (NITROSTAT) 0.4 MG SL tablet Place 1 tablet (0.4 mg total) under the tongue every 5 (five) minutes as needed for Chest pain. 90 tablet 1    pyridoxine, vitamin B6, (B-6) 100 MG Tab Take 200 mg by mouth once daily.       REPATHA SURECLICK 140 mg/mL PnIj INJECT 1 ML (140 MG) UNDER THE SKIN EVERY 14 DAYS 6 mL 3    spironolactone (ALDACTONE) 25 MG tablet Take 1 tablet (25 mg total) by mouth once daily. 30 tablet 11    valsartan (DIOVAN) 320 MG tablet Take 1 tablet (320 mg total) by mouth once daily. 90 tablet 1    vitamin D 1000 units Tab Take 185 mg by  mouth once daily.      [DISCONTINUED] metoprolol succinate (TOPROL-XL) 100 MG 24 hr tablet Take 1 tablet (100 mg total) by mouth 2 (two) times daily. 180 tablet 3    clopidogreL (PLAVIX) 75 mg tablet Take 1 tablet (75 mg total) by mouth once. for 1 dose 90 tablet 3     No current facility-administered medications on file prior to visit.       Medications have been reviewed and reconciled with patient at visit today.      Exam:  Vitals:    06/05/23 1425   BP: (!) 159/70   Pulse: (!) 53   Temp: 97.9 °F (36.6 °C)     Weight: 102.1 kg (225 lb)   Body mass index is 38.62 kg/m².      BP Readings from Last 3 Encounters:   06/05/23 (!) 159/70   05/24/23 136/62   05/22/23 (!) 132/58     Wt Readings from Last 3 Encounters:   06/05/23 1425 102.1 kg (225 lb)   05/24/23 0956 105.9 kg (233 lb 7.5 oz)   05/22/23 1309 104.6 kg (230 lb 11.2 oz)          Review of Systems   Constitutional:  Positive for malaise/fatigue. Negative for chills and fever.   HENT:  Negative for congestion, ear discharge, ear pain and sore throat.    Respiratory:  Positive for shortness of breath. Negative for cough.    Cardiovascular:  Positive for chest pain and palpitations. Negative for leg swelling.   Gastrointestinal:  Negative for abdominal pain, diarrhea, nausea and vomiting.   Genitourinary:  Negative for dysuria and frequency.   Musculoskeletal:  Negative for back pain and myalgias.   Neurological:  Positive for dizziness, weakness and headaches. Negative for focal weakness.   Psychiatric/Behavioral:  Negative for depression. The patient is not nervous/anxious.      Physical Exam  Vitals reviewed.   Constitutional:       Appearance: Normal appearance. She is obese. She is ill-appearing.      Comments: Very weak in appearance   HENT:      Head: Normocephalic and atraumatic.      Nose: Nose normal.      Mouth/Throat:      Mouth: Mucous membranes are moist.   Eyes:      Conjunctiva/sclera: Conjunctivae normal.   Cardiovascular:      Rate and Rhythm:  Regular rhythm. Bradycardia present.      Heart sounds: Murmur heard.   Pulmonary:      Effort: Pulmonary effort is normal. No respiratory distress.      Breath sounds: Normal breath sounds. No wheezing, rhonchi or rales.   Abdominal:      Palpations: Abdomen is soft. There is no mass.      Tenderness: There is no abdominal tenderness.   Musculoskeletal:         General: No swelling or deformity. Normal range of motion.      Cervical back: Normal range of motion and neck supple.      Right lower leg: No edema.      Left lower leg: No edema.   Skin:     General: Skin is warm and dry.   Neurological:      Mental Status: She is alert and oriented to person, place, and time. Mental status is at baseline.      Motor: Weakness present.   Psychiatric:         Mood and Affect: Mood normal.         Thought Content: Thought content normal.       Laboratory Reviewed:   Lab Results   Component Value Date    WBC 6.22 03/02/2023    HGB 11.7 (L) 03/02/2023    HCT 36.8 (L) 03/02/2023     03/02/2023    CHOL 125 02/03/2023    TRIG 160 (H) 02/03/2023    HDL 43 02/03/2023    ALT 11 03/02/2023    AST 22 03/02/2023     03/02/2023    K 4.1 03/02/2023     03/02/2023    CREATININE 1.1 03/02/2023    BUN 21 03/02/2023    CO2 27 03/02/2023    TSH 1.196 07/18/2022    INR 1.0 09/28/2020    HGBA1C 6.1 (H) 02/03/2023           Health Maintenance  Health Maintenance Topics with due status: Not Due       Topic Last Completion Date    DEXA Scan 07/25/2022    Diabetes Urine Screening 10/14/2022    Lipid Panel 02/03/2023    Hemoglobin A1c 02/03/2023    Eye Exam 03/06/2023    Aspirin/Antiplatelet Therapy 06/05/2023     Health Maintenance Due   Topic Date Due    COVID-19 Vaccine (1) Never done    Pneumococcal Vaccines (Age 65+) (1 - PCV) Never done    Foot Exam  Never done    TETANUS VACCINE  Never done    Colorectal Cancer Screening  04/18/2016    Shingles Vaccine (2 of 2) 11/28/2022       Assessment and Plan:  1.  Palpitations  Assessment & Plan:  EKG - sinus bradycardia - change Toprol XL from 100 mg bid to 50 mg bid on advise of Dr. Ibarra    Pt is scheduled to see LINDA Child NP Cardiology 6/5/23 at 9:30      2. Generalized weakness  -     POCT Glucose, Hand-Held Device  -     Nursing communication    3. Postural dizziness with near syncope    4. Coronary artery disease of native artery of native heart with stable angina pectoris  Overview:  Dr Coker.    Orders:  -     metoprolol succinate (TOPROL-XL) 50 MG 24 hr tablet; Take 1 tablet (50 mg total) by mouth 2 (two) times daily.  Dispense: 60 tablet; Refill: 0    5. Essential hypertension  -     metoprolol succinate (TOPROL-XL) 50 MG 24 hr tablet; Take 1 tablet (50 mg total) by mouth 2 (two) times daily.  Dispense: 60 tablet; Refill: 0    6. Sinus bradycardia  -     metoprolol succinate (TOPROL-XL) 50 MG 24 hr tablet; Take 1 tablet (50 mg total) by mouth 2 (two) times daily.  Dispense: 60 tablet; Refill: 0    7. Pulmonary hypertension  Assessment & Plan:  SOB chronic - pulse ox 96 %  Respirations even and unlabored  Lung sounds - clear                   -Patient's lab results were reviewed and discussed with patient  -Treatment options and alternatives were discussed with the patient. Patient expressed understanding. Patient was given the opportunity to ask questions and be an active participant in their medical care. Patient had no further questions or concerns at this time.         Future Appointments   Date Time Provider Department [place]   6/6/2023  9:30 AM Rosa Child NP [facility]   6/13/2023  1:00 PM Lisa Porter MD [facility]   7/6/2023 11:50 AM LABORATORY, [facility]   7/12/2023 10:00 AM Lisa Porter MD [facility]   7/13/2023 11:00 AM [facility]    8/24/2023  8:00 AM LABORATORY, [facility]   8/31/2023  1:20 PM Veronica Ibarra MD ON  [facility]          After visit summary printed and given to patient upon discharge.  Patient goals and care plan are included in After visit summary.    Total medical decision making time was 60 min.    The following issues were discussed: The primary encounter diagnosis was Palpitations. Diagnoses of Generalized weakness, Postural dizziness with near syncope, Coronary artery disease of native artery of native heart with stable angina pectoris, Essential hypertension, Sinus bradycardia, and Pulmonary hypertension were also pertinent to this visit.    Health maintenance needs, recent test results and goals of care discussed with pt and questions answered.           ANA LILIA Posaad, NP-C  [facility] [address], [place] [address]

## 2023-06-06 ENCOUNTER — OFFICE VISIT (OUTPATIENT)
Dept: CARDIOLOGY | Facility: CLINIC | Age: 73
End: 2023-06-06
Payer: MEDICARE

## 2023-06-06 ENCOUNTER — TELEPHONE (OUTPATIENT)
Dept: PRIMARY CARE CLINIC | Facility: CLINIC | Age: 73
End: 2023-06-06
Payer: MEDICARE

## 2023-06-06 VITALS
SYSTOLIC BLOOD PRESSURE: 163 MMHG | WEIGHT: 237.63 LBS | DIASTOLIC BLOOD PRESSURE: 73 MMHG | OXYGEN SATURATION: 98 % | HEIGHT: 64 IN | HEART RATE: 62 BPM | BODY MASS INDEX: 40.57 KG/M2

## 2023-06-06 DIAGNOSIS — R00.2 PALPITATIONS: Primary | ICD-10-CM

## 2023-06-06 DIAGNOSIS — I47.10 SUPRAVENTRICULAR TACHYCARDIA: Chronic | ICD-10-CM

## 2023-06-06 DIAGNOSIS — I20.9 ANGINA, CLASS II: Primary | Chronic | ICD-10-CM

## 2023-06-06 DIAGNOSIS — I70.0 AORTIC ATHEROSCLEROSIS: Chronic | ICD-10-CM

## 2023-06-06 DIAGNOSIS — I10 ESSENTIAL HYPERTENSION: Chronic | ICD-10-CM

## 2023-06-06 DIAGNOSIS — Z95.2 S/P TAVR (TRANSCATHETER AORTIC VALVE REPLACEMENT): ICD-10-CM

## 2023-06-06 DIAGNOSIS — E66.01 MORBID OBESITY WITH BMI OF 40.0-44.9, ADULT: Chronic | ICD-10-CM

## 2023-06-06 DIAGNOSIS — I25.810: ICD-10-CM

## 2023-06-06 DIAGNOSIS — E78.5 HYPERLIPIDEMIA, UNSPECIFIED HYPERLIPIDEMIA TYPE: Chronic | ICD-10-CM

## 2023-06-06 DIAGNOSIS — I65.21 STENOSIS OF RIGHT CAROTID ARTERY: ICD-10-CM

## 2023-06-06 DIAGNOSIS — Z95.1 S/P CABG (CORONARY ARTERY BYPASS GRAFT): Chronic | ICD-10-CM

## 2023-06-06 DIAGNOSIS — I25.10 CAD, MULTIPLE VESSEL: ICD-10-CM

## 2023-06-06 DIAGNOSIS — I50.32 CHRONIC DIASTOLIC HEART FAILURE: ICD-10-CM

## 2023-06-06 DIAGNOSIS — I35.0 NONRHEUMATIC AORTIC VALVE STENOSIS: Chronic | ICD-10-CM

## 2023-06-06 DIAGNOSIS — I20.9 AP (ANGINA PECTORIS): ICD-10-CM

## 2023-06-06 DIAGNOSIS — I25.118 CORONARY ARTERY DISEASE OF NATIVE ARTERY OF NATIVE HEART WITH STABLE ANGINA PECTORIS: ICD-10-CM

## 2023-06-06 PROCEDURE — 99215 OFFICE O/P EST HI 40 MIN: CPT | Mod: S$PBB,,,

## 2023-06-06 PROCEDURE — 99999 PR PBB SHADOW E&M-EST. PATIENT-LVL IV: ICD-10-PCS | Mod: PBBFAC,,,

## 2023-06-06 PROCEDURE — 99214 OFFICE O/P EST MOD 30 MIN: CPT | Mod: PBBFAC

## 2023-06-06 PROCEDURE — 99215 PR OFFICE/OUTPT VISIT, EST, LEVL V, 40-54 MIN: ICD-10-PCS | Mod: S$PBB,,,

## 2023-06-06 PROCEDURE — 99999 PR PBB SHADOW E&M-EST. PATIENT-LVL IV: CPT | Mod: PBBFAC,,,

## 2023-06-06 NOTE — PROGRESS NOTES
Subjective:   Patient ID:  Qi Ortiz is a 73 y.o. female who presents for evaluation of Shortness of Breath      Shortness of Breath  Associated symptoms include chest pain. 71y/o F PMHx of COPD, HLD, HTN, CABG x2, SVT, AS, CAD, s/p TAVR 20', chronic diastolic HF, CKD, DM, obesity, IRIS, statin intolerance followed by Dr. Ibarra in clinic last seen 2/13/23. Sent from PCP for EKG changes. Here today with multiple complaints, reports on Sunday she felt SOB and fatigue. Also endorses that she gets dizzy sometimes and has near-syncope episodes.    Pt is hesitant to take aldactone due to possible side effects, questioning her lasix and K supplement    Home Bps fluctuating she reports some readings systolic 130-190s at home    Carotid US Feb20' mild blockages  EKG with chronic st-t abnl, today EKG noted to have slight changes in t waves   LHC 20'  LAD, patent OLIVA to LAD, collaterals to OM, occluded OM1 med tx advised    6/6/23  Here today for episode of chest discomfort that occurred yesterday while at appt, pt also has multiple complaints. Pt reports she had chest discomfort, diaphoresis and near-syncope. Pt was advised to go to ED but refused. Labs resulted yesterday revealed Na 124. Discussed with Dr. Ibarra and recommended pt go to ED. Pt was very direct about wanting care in the clinic and that she has been told to monitor her symptoms for years, discussed in detail about her lab results and the danger of her driving and having a possible syncopal episode. Pt agreed to go to ED to rule out any emergent situations. BP elevated in clinic, not orthostatic, reports she did not take her morning medications yet.   Past Medical History:   Diagnosis Date    Carotid stenosis     19%    Cellulitis and abscess of foot, except toes 6/22/2022    COPD (chronic obstructive pulmonary disease)     No meds    Coronary artery disease     CVA (cerebral vascular accident)     Dr. Hoffman    Depression     Double ectopic ureters      Dr. Porras    Hemiplegia affecting right dominant side 2021    Hyperlipidemia     Hypertension     Hypothyroid     Left foot infection 2022    Mild asthma with exacerbation 2022    OP (osteoporosis)     IRIS (obstructive sleep apnea)     Dr. Hope    Psoriatic arthritis     Rheumatology       Past Surgical History:   Procedure Laterality Date    BREAST BIOPSY      R sided/benign    CARDIAC SURGERY      2016    CERVICAL FUSION      CHOLECYSTECTOMY      CORONARY ANGIOPLASTY      CORONARY ARTERY BYPASS GRAFT      triple bypass    CORONARY STENT PLACEMENT      EYE SURGERY      INTRAUTERINE DEVICE INSERTION      LEFT HEART CATHETERIZATION Left 2020    Procedure: CATHETERIZATION, HEART, LEFT;  Surgeon: Veronica Ibarra MD;  Location: [facility] CATH LAB;  Service: Cardiology;  Laterality: Left;  7am start time    mass removed from R groin      TOTAL ABDOMINAL HYSTERECTOMY W/ BILATERAL SALPINGOOPHORECTOMY      due to benign mass, adhesions    TUBAL LIGATION         Social History     Tobacco Use    Smoking status: Never    Smokeless tobacco: Never   Substance Use Topics    Alcohol use: No    Drug use: No       Family History   Problem Relation Age of Onset    Breast cancer Maternal Grandfather     Breast cancer Paternal Aunt     Stroke Unknown     Breast cancer Sister 60    Leukemia Sister 8         as child    Lung cancer Paternal Grandfather     Heart disease Unknown     Diabetes Daughter        Current Outpatient Medications on File Prior to Visit   Medication Sig Dispense Refill    acetaminophen (TYLENOL) 650 MG TbSR as directed      albuterol (PROVENTIL) 2.5 mg /3 mL (0.083 %) nebulizer solution Take 3 mLs (2.5 mg total) by nebulization every 6 (six) hours as needed for Wheezing. Rescue 30 each 3    allopurinoL (ZYLOPRIM) 100 MG tablet Take 2 tablets (200 mg total) by mouth once daily. 180 tablet 1    aspirin 81 MG Chew Take 1 tablet (81 mg total) by mouth once daily. 90 tablet 3    calcium  carbonate 650 mg calcium (1,625 mg) tablet Take 1 tablet by mouth once daily.      cyanocobalamin 2000 MCG tablet Take 2,000 mcg by mouth once daily.      docusate sodium (COLACE) 100 MG capsule Take 1 capsule (100 mg total) by mouth 2 (two) times daily. 60 capsule 0    docusate sodium (COLACE) 100 MG capsule Colace Take No date recorded No form recorded No frequency recorded No route recorded No set duration recorded No set duration amount recorded active No dosage strength recorded No dosage strength units of measure recorded      folic acid (FOLVITE) 1 MG tablet Take 1 tablet (1 mg total) by mouth once daily. 90 tablet 1    furosemide (LASIX) 20 MG tablet Take 1 tablet (20 mg total) by mouth once daily. 90 tablet 3    gabapentin (NEURONTIN) 100 MG capsule Take 2 capsules (200 mg total) by mouth 3 (three) times daily. 540 capsule 1    garlic 2,000 mg Cap Take 3,000 mg by mouth once daily.       levothyroxine (SYNTHROID) 112 MCG tablet Take 1 tablet (112 mcg total) by mouth before breakfast. 90 tablet 1    metoprolol succinate (TOPROL-XL) 50 MG 24 hr tablet Take 1 tablet (50 mg total) by mouth 2 (two) times daily. 60 tablet 0    nitrofurantoin (MACRODANTIN) 100 MG capsule Take 100 mg by mouth every evening.      nitroGLYCERIN (NITROSTAT) 0.4 MG SL tablet Place 1 tablet (0.4 mg total) under the tongue every 5 (five) minutes as needed for Chest pain. 90 tablet 1    pyridoxine, vitamin B6, (B-6) 100 MG Tab Take 200 mg by mouth once daily.       REPATHA SURECLICK 140 mg/mL PnIj INJECT 1 ML (140 MG) UNDER THE SKIN EVERY 14 DAYS 6 mL 3    spironolactone (ALDACTONE) 25 MG tablet Take 1 tablet (25 mg total) by mouth once daily. 30 tablet 11    valsartan (DIOVAN) 320 MG tablet Take 1 tablet (320 mg total) by mouth once daily. 90 tablet 1    vitamin D 1000 units Tab Take 185 mg by mouth once daily.      clopidogreL (PLAVIX) 75 mg tablet Take 1 tablet (75 mg total) by mouth once. for 1 dose 90 tablet 3     No current  facility-administered medications on file prior to visit.      Wt Readings from Last 3 Encounters:   06/06/23 107.8 kg (237 lb 10.5 oz)   06/05/23 102.1 kg (225 lb)   05/24/23 105.9 kg (233 lb 7.5 oz)     Temp Readings from Last 3 Encounters:   06/05/23 97.9 °F (36.6 °C) (Oral)   05/22/23 98.2 °F (36.8 °C) (Oral)   04/11/23 98.3 °F (36.8 °C) (Oral)     BP Readings from Last 3 Encounters:   06/06/23 (!) 163/73   06/05/23 (!) 159/70   05/24/23 136/62     Pulse Readings from Last 3 Encounters:   06/06/23 62   06/05/23 (!) 53   05/24/23 68        Review of Systems   Constitutional: Positive for malaise/fatigue.   HENT: Negative.     Eyes: Negative.    Cardiovascular:  Positive for chest pain and near-syncope.   Respiratory:  Positive for shortness of breath.    Skin: Negative.    Musculoskeletal:  Positive for arthritis, back pain, joint pain and myalgias.   Gastrointestinal: Negative.    Genitourinary: Negative.    Neurological:  Positive for dizziness and weakness.   Psychiatric/Behavioral:  The patient is nervous/anxious.      Objective:   Physical Exam  Vitals and nursing note reviewed.   Constitutional:       Appearance: She is obese.   HENT:      Head: Normocephalic.   Eyes:      Pupils: Pupils are equal, round, and reactive to light.   Cardiovascular:      Rate and Rhythm: Normal rate and regular rhythm.      Heart sounds: Normal heart sounds, S1 normal and S2 normal. No murmur heard.    No S3 or S4 sounds.   Pulmonary:      Effort: Pulmonary effort is normal.      Breath sounds: Normal breath sounds.   Abdominal:      General: Bowel sounds are normal.      Palpations: Abdomen is soft.   Musculoskeletal:         General: Normal range of motion.      Cervical back: Normal range of motion.   Skin:     Capillary Refill: Capillary refill takes less than 2 seconds.   Neurological:      General: No focal deficit present.      Mental Status: She is alert and oriented to person, place, and time.      Motor: Weakness  present.   Psychiatric:         Mood and Affect: Mood is anxious.         Behavior: Behavior normal.         Thought Content: Thought content normal.       Lab Results   Component Value Date    CHOL 125 02/03/2023    CHOL 119 (L) 07/18/2022    CHOL 136 01/13/2022     Lab Results   Component Value Date    HDL 43 02/03/2023    HDL 42 07/18/2022    HDL 43 01/13/2022     Lab Results   Component Value Date    LDLCALC 50.0 (L) 02/03/2023    LDLCALC 55.4 (L) 07/18/2022    LDLCALC 69.2 01/13/2022     Lab Results   Component Value Date    TRIG 160 (H) 02/03/2023    TRIG 108 07/18/2022    TRIG 119 01/13/2022     Lab Results   Component Value Date    CHOLHDL 34.4 02/03/2023    CHOLHDL 35.3 07/18/2022    CHOLHDL 31.6 01/13/2022       Chemistry        Component Value Date/Time     06/05/2023 1058    K 3.8 06/05/2023 1058     06/05/2023 1058    CO2 29 06/05/2023 1058    BUN 13 06/05/2023 1058    CREATININE 1.0 06/05/2023 1058     (H) 06/05/2023 1058        Component Value Date/Time    CALCIUM 9.5 06/05/2023 1058    ALKPHOS 46 (L) 03/02/2023 1210    AST 22 03/02/2023 1210    ALT 11 03/02/2023 1210    BILITOT 0.2 03/02/2023 1210    ESTGFRAFRICA 48 (A) 07/18/2022 0803    EGFRNONAA 41 (A) 07/18/2022 0803          Lab Results   Component Value Date    TSH 1.196 07/18/2022     Lab Results   Component Value Date    INR 1.0 09/28/2020    INR 1.0 12/18/2017     @RESUFAST(WBC,HGB,HCT,MCV,PLT)  @LABRCNTIP(BNP,BNPTRIAGEBLO)@  Estimated Creatinine Clearance: 60.9 mL/min (based on SCr of 1 mg/dL).     Results for orders placed during the hospital encounter of 05/25/22    Echo    Interpretation Summary  · The left ventricle is normal in size with normal systolic function.  · Indeterminate left ventricular diastolic function.  · The estimated PA systolic pressure is 40 mmHg.  · Normal right ventricular size with normal right ventricular systolic function.  · Normal central venous pressure (3 mmHg).  · Small anterior  pericardial effusion.  · The estimated ejection fraction is 55%.  · There is a transcutaneously-placed aortic bioprosthesis present. There is no aortic insufficiency present. Prosthetic aortic valve is normal.  · The aortic valve mean gradient is 11 mmHg with a dimensionless index of 0.58.  · Mild tricuspid regurgitation.     No results found for this or any previous visit.     Assessment:      1. Angina, class II    2. Hyperlipidemia, unspecified hyperlipidemia type    3. Essential hypertension    4. S/P CABG (coronary artery bypass graft)    5. Supraventricular tachycardia    6. Nonrheumatic aortic valve stenosis    7. Aortic atherosclerosis    8. Coronary artery disease of native artery of native heart with stable angina pectoris    9. 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    10. Stenosis of right carotid artery    11. S/P TAVR (transcatheter aortic valve replacement)    12. Chronic diastolic heart failure    13. CAD, multiple vessel    14. AP (angina pectoris)    15. Morbid obesity with BMI of 40.0-44.9, adult          Plan:   Angina, class II  -     Echo; Future  -     Cardiac Monitor - 3-15 Day Adult (Cupid Only); Future    Hyperlipidemia, unspecified hyperlipidemia type    Essential hypertension    S/P CABG (coronary artery bypass graft)    Supraventricular tachycardia    Nonrheumatic aortic valve stenosis    Aortic atherosclerosis    Coronary artery disease of native artery of native heart with stable angina pectoris    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    Stenosis of right carotid artery    S/P TAVR (transcatheter aortic valve replacement)    Chronic diastolic heart failure    CAD, multiple vessel    AP (angina pectoris)    Morbid obesity with BMI of 40.0-44.9, adult        ASA, plavix, repatha- CAD  Repatha- HLD  ASA- TAVR  metoprolol, aldactone, valsartan, lasix- CHF  Monitor RFP  Metoprolol, valsartan, aldactone- HTN  SL nitro PRN- chest pains  RF  modification, daily exercise as tolerated, weight loss  Medication compliance    Recommended pt go to ED for eval, pt agreed but requested EMS.   Na 124- ED  Stress test ordered at last visit, not scheduled yet  Echo  Will need 2 week vital connect OP    Courtney Guillot, FNP-C [facility], Cardiology

## 2023-06-08 ENCOUNTER — TELEPHONE (OUTPATIENT)
Dept: CARDIOLOGY | Facility: CLINIC | Age: 73
End: 2023-06-08
Payer: MEDICARE

## 2023-06-08 ENCOUNTER — TELEPHONE (OUTPATIENT)
Dept: PRIMARY CARE CLINIC | Facility: CLINIC | Age: 73
End: 2023-06-08
Payer: MEDICARE

## 2023-06-08 NOTE — TELEPHONE ENCOUNTER
Pt called stating that she is still taking the Metoprolol 100mg, she did not change to 50mg. States she will discuss with Dr. Porter at next weeks appt.

## 2023-06-08 NOTE — TELEPHONE ENCOUNTER
Pt states that went to ER on Tuesday - [facility] - states that she was dx with CHF.  Pt states she feels much better at this time. Has appt next week with Dr. Porter.

## 2023-06-08 NOTE — TELEPHONE ENCOUNTER
Spoke to  patient and advised her that NP she saw in Primary care on 6/5 changed prescription to  50 mg BID. Patient verbalized an understanding----- Message from Manisha Prescott sent at 6/8/2023 12:04 PM CDT -----  Contact: Qi  Patient is calling to speak with the nurse regarding medication. Reports having questions and concerns about why the medication has been stopped. Please give patient a call back at .[phone].

## 2023-06-08 NOTE — TELEPHONE ENCOUNTER
Spoke to patient and informed her of below and she said she will go back to 100 mg anyways and she will see her PCP on Wednesday and get the prescription straight----- Message from Veronica Ibarra MD sent at 6/8/2023  1:53 PM CDT -----  Contact: Qi  LAST OFFICE NOTE WITH yehuda was 50 bid  ----- Message -----  From: Violeta Jewell LPN  Sent: 6/8/2023   1:26 PM CDT  To: Veronica Ibarra MD    Is patient supposed to stay on 50 mg BID or changed back to the 100 mg BID of the metoprolol?   ----- Message -----  From: Manisha Prescott  Sent: 6/8/2023  12:07 PM CDT  To: Stacey CABELLO Staff    Patient is calling to speak with the nurse regarding medication. Reports having questions and concerns about why the medication has been stopped. Please give patient a call back at .[phone].

## 2023-06-13 ENCOUNTER — OFFICE VISIT (OUTPATIENT)
Dept: PRIMARY CARE CLINIC | Facility: CLINIC | Age: 73
End: 2023-06-13
Payer: MEDICARE

## 2023-06-13 VITALS
SYSTOLIC BLOOD PRESSURE: 113 MMHG | HEART RATE: 69 BPM | WEIGHT: 225.88 LBS | TEMPERATURE: 98 F | OXYGEN SATURATION: 96 % | BODY MASS INDEX: 38.56 KG/M2 | DIASTOLIC BLOOD PRESSURE: 61 MMHG | HEIGHT: 64 IN

## 2023-06-13 DIAGNOSIS — I95.1 ORTHOSTATIC HYPOTENSION: ICD-10-CM

## 2023-06-13 DIAGNOSIS — I47.10 SUPRAVENTRICULAR TACHYCARDIA: Chronic | ICD-10-CM

## 2023-06-13 DIAGNOSIS — E53.8 B12 DEFICIENCY: Primary | ICD-10-CM

## 2023-06-13 DIAGNOSIS — I10 HYPERTENSION, UNSPECIFIED TYPE: ICD-10-CM

## 2023-06-13 DIAGNOSIS — N18.31 TYPE 2 DIABETES MELLITUS WITH STAGE 3A CHRONIC KIDNEY DISEASE, WITHOUT LONG-TERM CURRENT USE OF INSULIN: ICD-10-CM

## 2023-06-13 DIAGNOSIS — E11.22 TYPE 2 DIABETES MELLITUS WITH STAGE 3A CHRONIC KIDNEY DISEASE, WITHOUT LONG-TERM CURRENT USE OF INSULIN: ICD-10-CM

## 2023-06-13 DIAGNOSIS — E11.42 TYPE 2 DIABETES MELLITUS WITH PERIPHERAL NEUROPATHY: Chronic | ICD-10-CM

## 2023-06-13 DIAGNOSIS — I10 ESSENTIAL HYPERTENSION: Chronic | ICD-10-CM

## 2023-06-13 DIAGNOSIS — E55.9 VITAMIN D DEFICIENCY: ICD-10-CM

## 2023-06-13 PROBLEM — H61.21 CERUMEN DEBRIS ON TYMPANIC MEMBRANE OF RIGHT EAR: Status: RESOLVED | Noted: 2022-11-30 | Resolved: 2023-06-13

## 2023-06-13 PROBLEM — F41.9 ANXIETY: Status: RESOLVED | Noted: 2022-05-11 | Resolved: 2023-06-13

## 2023-06-13 PROBLEM — J45.901 MILD ASTHMA WITH EXACERBATION: Chronic | Status: RESOLVED | Noted: 2022-12-04 | Resolved: 2023-05-13

## 2023-06-13 PROBLEM — G45.9 TRANSIENT ISCHEMIC ATTACK (TIA): Status: RESOLVED | Noted: 2019-01-02 | Resolved: 2023-06-13

## 2023-06-13 LAB
25(OH)D3+25(OH)D2 SERPL-MCNC: 45 NG/ML (ref 30–96)
ALBUMIN SERPL BCP-MCNC: 3.9 G/DL (ref 3.5–5.2)
ANION GAP SERPL CALC-SCNC: 11 MMOL/L (ref 8–16)
BUN SERPL-MCNC: 37 MG/DL (ref 8–23)
CALCIUM SERPL-MCNC: 10.4 MG/DL (ref 8.7–10.5)
CHLORIDE SERPL-SCNC: 102 MMOL/L (ref 95–110)
CO2 SERPL-SCNC: 27 MMOL/L (ref 23–29)
CREAT SERPL-MCNC: 1.8 MG/DL (ref 0.5–1.4)
EST. GFR  (NO RACE VARIABLE): 29.6 ML/MIN/1.73 M^2
ESTIMATED AVG GLUCOSE: 120 MG/DL (ref 68–131)
GLUCOSE SERPL-MCNC: 107 MG/DL (ref 70–110)
HBA1C MFR BLD: 5.8 % (ref 4–5.6)
MAGNESIUM SERPL-MCNC: 2 MG/DL (ref 1.6–2.6)
PHOSPHATE SERPL-MCNC: 4.9 MG/DL (ref 2.7–4.5)
POTASSIUM SERPL-SCNC: 5.7 MMOL/L (ref 3.5–5.1)
SODIUM SERPL-SCNC: 140 MMOL/L (ref 136–145)
VIT B12 SERPL-MCNC: >2000 PG/ML (ref 210–950)

## 2023-06-13 PROCEDURE — 80069 RENAL FUNCTION PANEL: CPT | Performed by: INTERNAL MEDICINE

## 2023-06-13 PROCEDURE — 83036 HEMOGLOBIN GLYCOSYLATED A1C: CPT | Performed by: INTERNAL MEDICINE

## 2023-06-13 PROCEDURE — 99215 OFFICE O/P EST HI 40 MIN: CPT | Mod: S$PBB,,, | Performed by: INTERNAL MEDICINE

## 2023-06-13 PROCEDURE — 99999 PR PBB SHADOW E&M-EST. PATIENT-LVL V: CPT | Mod: PBBFAC,,, | Performed by: INTERNAL MEDICINE

## 2023-06-13 PROCEDURE — 99215 OFFICE O/P EST HI 40 MIN: CPT | Mod: PBBFAC,PN | Performed by: INTERNAL MEDICINE

## 2023-06-13 PROCEDURE — 99215 PR OFFICE/OUTPT VISIT, EST, LEVL V, 40-54 MIN: ICD-10-PCS | Mod: S$PBB,,, | Performed by: INTERNAL MEDICINE

## 2023-06-13 PROCEDURE — 99417 PROLNG OP E/M EACH 15 MIN: CPT | Mod: S$PBB,,, | Performed by: INTERNAL MEDICINE

## 2023-06-13 PROCEDURE — 82607 VITAMIN B-12: CPT | Performed by: INTERNAL MEDICINE

## 2023-06-13 PROCEDURE — 82306 VITAMIN D 25 HYDROXY: CPT | Performed by: INTERNAL MEDICINE

## 2023-06-13 PROCEDURE — 99999 PR PBB SHADOW E&M-EST. PATIENT-LVL V: ICD-10-PCS | Mod: PBBFAC,,, | Performed by: INTERNAL MEDICINE

## 2023-06-13 PROCEDURE — 99417 PR PROLONGED SVC, OUTPT, W/WO DIRECT PT CONTACT,  EA ADDTL 15 MIN: ICD-10-PCS | Mod: S$PBB,,, | Performed by: INTERNAL MEDICINE

## 2023-06-13 PROCEDURE — 83735 ASSAY OF MAGNESIUM: CPT | Performed by: INTERNAL MEDICINE

## 2023-06-13 RX ORDER — VALSARTAN 320 MG/1
160 TABLET ORAL DAILY
Qty: 45 TABLET | Refills: 1 | Status: SHIPPED | OUTPATIENT
Start: 2023-06-13 | End: 2023-06-30

## 2023-06-13 RX ORDER — METOPROLOL SUCCINATE 100 MG/1
100 TABLET, EXTENDED RELEASE ORAL 2 TIMES DAILY
Qty: 180 TABLET | Refills: 3 | Status: SHIPPED | OUTPATIENT
Start: 2023-06-13 | End: 2024-02-05

## 2023-06-13 NOTE — PROGRESS NOTES
Qi Ortiz  06/13/2023  [id number]    Lisa Porter MD  Patient Care Team:  Lisa Porter MD as PCP - General (Internal Medicine)  Elma Hernandez NP as Nurse Practitioner (Family Medicine)    Visit Type: Follow-up    Chief Complaint:  Chief Complaint   Patient presents with    Follow-up     Pt is here for a hospital follow up.     History of Present Illness:     Ms. Ortiz was seen here 6/05 by Aurora Lomax w c/o weakness, SOB, chest pressure and palpitations.  F/u appt w Cards was arranged for 6/06 following which she went to [facility].    Ms. Ortiz reports on initial presentation @ [facility] her BP was 205/100. She was aggressively diuresed - reports 44 lbs weight loss (note wt today similar to 6/05/23 but 12 lbs less than 6/06/23)  Reports has been a little lightheaded w home BP's 100's/60's    Ms. Ortiz is upset O65+ NP Rukhsana Lomax decreased prescribed metoprolol from 100 mg to 50 mg when Ms. Ortiz wanted to just cut those she had in half for the recommended 3 days. Says she had a lot of difficulty getting her previous prescription resumed.     She is upset also w Cards NP Mateusz because she didn't like how she explained results of the cardiac monitor or her instructions regarding holding anti-hypertensive medication.    Ms. Ortiz reports that when she went to [facility] ED she was told that she was in CHF and likely had been when seen earlier in clinic on 6/05 as well.    Current c/o muscle spasms.    Taking Neurontin 100mg BID & 200mg QHS    Recently resumed taking OTC B12 2,000 mcg daily because she was feeling weak (was previously discontinued due to supra-therapeutic levels)    She would like to go through her chart to update and correct diagnoses and allergies. For example, does not have COPD or emphysema. Reports she was diagnosed w mild asthma as an adult at one point but hasn't needed alb more than 1-2 x since 2020.  Advised will do what we can today but may not be able to get to everything in  today's visit.    PHQ-4 Score: 0     Hosp/ED/Urgent Care:  6/06 - 6/0723 [facility] CHF exacerbation    Recent appointments:   6/06/23 [facility] Mateusz Angina  6/05/23 O65+ Dami    Upcoming appointments:  Future Appointments       Date Provider Specialty Appt Notes    6/22/2023 Veronica Ibarra MD Cardiology f/u    7/6/2023  Lab Labs    7/12/2023 Lisa Porter MD Primary Care 3 month f/u    7/13/2023 Jacky Ackerman MD Rheumatology [email or web address]    8/24/2023  Lab .    8/31/2023 Veronica Ibarra MD Cardiology 6 m           The following were reviewed: Active problem list, medication list, allergies, family history, social history, and Health Maintenance.     Medications have been reviewed and reconciled with patient at visit today.    Review of Systems   See HPI above    Exam: Initial VS /58 P 64 sat 96% Temp 97.8  Vitals:    06/13/23 1414   BP: 113/61   Pulse: 69   Temp:      Weight: 102.5 kg (225 lb 14.4 oz)   Body mass index is 38.78 kg/m².    BP Readings from Last 3 Encounters:   06/13/23 113/61   06/06/23 (!) 163/73   06/05/23 (!) 159/70      Wt Readings from Last 3 Encounters:   06/13/23 1258 102.5 kg (225 lb 14.4 oz)   06/06/23 0943 107.8 kg (237 lb 10.5 oz)   06/05/23 1425 102.1 kg (225 lb)      Physical Exam  Vitals reviewed.   Constitutional:       General: She is not in acute distress.     Appearance: Normal appearance. She is obese.   HENT:      Head: Normocephalic and atraumatic.      Ears:      Comments: Hard of hearing     Nose: Nose normal.      Mouth/Throat:      Mouth: Mucous membranes are moist.      Pharynx: Oropharynx is clear.   Eyes:      General: No scleral icterus.     Extraocular Movements: Extraocular movements intact.      Comments: photosensitivity   Neck:      Vascular: No carotid bruit.   Cardiovascular:      Rate and Rhythm: Normal rate and regular rhythm.   Pulmonary:      Effort: Pulmonary effort is normal.      Breath sounds: Normal breath sounds. No wheezing, rhonchi or rales.    Musculoskeletal:      Right lower leg: No edema.      Left lower leg: No edema.      Comments: Ambulates independently   Lymphadenopathy:      Cervical: No cervical adenopathy.   Skin:     General: Skin is warm and dry.   Neurological:      General: No focal deficit present.      Mental Status: She is alert.      Laboratory Reviewed  Lab Results   Component Value Date    WBC 6.22 03/02/2023    HGB 11.7 (L) 03/02/2023    HCT 36.8 (L) 03/02/2023     03/02/2023    MCV 94 03/02/2023    CHOL 125 02/03/2023    TRIG 160 (H) 02/03/2023    HDL 43 02/03/2023    LDLCALC 50.0 (L) 02/03/2023    ALT 11 03/02/2023    AST 22 03/02/2023     06/05/2023    K 3.8 06/05/2023     06/05/2023    CREATININE 1.0 06/05/2023    BUN 13 06/05/2023    CO2 29 06/05/2023    MG 2.0 05/20/2022    TSH 1.196 07/18/2022    FREET4 1.08 07/18/2022    INR 1.0 09/28/2020    HGBA1C 6.1 (H) 02/03/2023    CRP 6.6 07/18/2022     Lab Results   Component Value Date    PTH 91.5 (H) 05/20/2022    CALCIUM 9.5 06/05/2023    PHOS 3.8 06/05/2023      Lab Results   Component Value Date    OMMZNSKA79 >2000 (H) 05/20/2022     Lab Results   Component Value Date    FOLATE 18.7 05/20/2022      Lab Results   Component Value Date    IRON 63 02/01/2023    TRANSFERRIN 204 02/01/2023    TIBC 302 02/01/2023    FESATURATED 21 02/01/2023      Lab Results   Component Value Date    EGFRNORACEVR 59.9 (A) 06/05/2023    ALBUMIN 3.2 (L) 06/05/2023     (H) 05/23/2022     From [facility]   Labs 6/07/23 CBC WBC/plts wnl H/H 10.1/32.9 mcv 81 CMP crt 1.09 alb 3 eGFR 54 coags wnl  6/06/23 .2 TSH 1.870 lactic acid wnl trop wnl    CXR 6/06/23 mild cardiomegaly s/p TAVR lungs clear no pleural effusions no acute findings      Assessment:   73 y.o. female with multiple co-morbid illnesses here for continued follow up of medical problems.      The primary encounter diagnosis was B12 deficiency. Diagnoses of Type 2 diabetes mellitus with peripheral neuropathy, Vitamin D  deficiency, Type 2 diabetes mellitus with stage 3a chronic kidney disease, without long-term current use of insulin, Hypertension, unspecified type, Essential hypertension, Supraventricular tachycardia, and Orthostatic hypotension were also pertinent to this visit.      Plan:   1. B12 deficiency  -     Vitamin B12; Future; Expected date: 06/13/2023    2. Type 2 diabetes mellitus with peripheral neuropathy  -     Hemoglobin A1C; Future; Expected date: 06/13/2023    3. Vitamin D deficiency  -     Vitamin D; Future; Expected date: 06/13/2023    4. Type 2 diabetes mellitus with stage 3a chronic kidney disease, without long-term current use of insulin  -     RENAL FUNCTION PANEL; Future; Expected date: 06/13/2023    5. Hypertension, unspecified type  -     RENAL FUNCTION PANEL; Future; Expected date: 06/13/2023  -     Magnesium; Future; Expected date: 06/13/2023    6. Essential hypertension  Assessment & Plan:  Advised to decrease valsartan to 1/2 tab - 160 mg daily  - and furosemide to 20 mg for now pending further instructions - monitor BP and pulse closely at home - limit sodium but maintain hydration      7. Supraventricular tachycardia  Assessment & Plan:  Cont metoprolol 100 mg BID pending alternate recs from cardiology      8. Orthostatic hypotension  Assessment & Plan:  Suspect over diuresis - advised decrease valsartan and furosemide - hydrate - caution when moving from sit to stand - cont monitor BP and pulse at home - f/u w cardiology as scheduled - f/u labs      Other orders  -     metoprolol succinate (TOPROL-XL) 100 MG 24 hr tablet; Take 1 tablet (100 mg total) by mouth 2 (two) times daily.  Dispense: 180 tablet; Refill: 3  -     valsartan (DIOVAN) 320 MG tablet; Take 0.5 tablets (160 mg total) by mouth once daily.  Dispense: 45 tablet; Refill: 1         Health Maintenance         Date Due Completion Date    COVID-19 Vaccine (1) Never done ---    Pneumococcal Vaccines (Age 65+) (1 - PCV) Never done ---     Foot Exam Never done ---    TETANUS VACCINE Never done ---    Colorectal Cancer Screening 04/18/2016 4/18/2011    Shingles Vaccine (2 of 2) 11/28/2022 10/3/2022    Mammogram 05/22/2024 (Originally 10/23/2016) 10/23/2015    Diabetes Urine Screening 10/14/2023 10/14/2022    Hemoglobin A1c 12/13/2023 6/13/2023    Lipid Panel 02/03/2024 2/3/2023    Eye Exam 03/06/2024 3/6/2023    Aspirin/Antiplatelet Therapy 06/13/2024 6/13/2023    DEXA Scan 07/25/2025 7/25/2022            -Patient's lab results were reviewed and discussed with patient  -Treatment options and alternatives were discussed with the patient. Patient expressed understanding. Patient was given the opportunity to ask questions and be an active participant in their medical care. Patient had no further questions or concerns at this time.   -Patient is an overall moderate to HIGH risk for health complications from their medical conditions.     Follow up: as scheduled 7/12/23 O65+ Porter    After visit summary printed and given to patient upon discharge.  Patient care plan are included in After visit summary.    TOTAL TIME evaluating and managing this patient for this encounter was greater than 90 minutes. This time was spent personally by me on some of the following activities: review of patient's past medical history, assessing age-appropriate health maintenance needs, review of any interval history, review and interpretation of lab results, review and interpretation of imaging test results, review and interpretation of cardiology test results, reviewing consulting specialist notes, obtaining history from the patient and family, examination of the patient, medication reconciliation, managing and/or ordering prescription medications, ordering imaging tests, ordering referral to subspecialty provider(s), educating patient and answering their questions about diagnosis, treatment plan, and goals of treatment, discussing planned follow-up and final documentation of  the visit. This time was exclusive of any separately billable procedures for this patient and exclusive of time spent treating any other patients.

## 2023-06-13 NOTE — PATIENT INSTRUCTIONS
Please decrease valsartan (Diovan) 320 mg to 1/2 tablet daily (160 mg)  Please continue to check and chart home blood pressures and heart rate  Let us know how they are running

## 2023-06-14 DIAGNOSIS — E87.5 HYPERKALEMIA: Primary | ICD-10-CM

## 2023-06-14 DIAGNOSIS — N17.9 ACUTE RENAL INJURY: ICD-10-CM

## 2023-06-14 NOTE — PROGRESS NOTES
Patient contacted and her STAT lab scheduled for Friday at the [place] location. Patient verbally understood the information given.

## 2023-06-14 NOTE — PROGRESS NOTES
Spoke w Ms. Ortiz by phone  Reports  this am prior to taking BP medication  She did still take BP medication (valsartan 160 mg, furosemide 20 mg , spironolactone 25 mg, metoprolol 100 mg)  and reports when rechecked at 3pm today SBP still 104    Advised potassium level is elevated and renal function decreased   Advised to please HOLD valsartan and spironolactone  Advised to continue taking furosemide and metoprolol  Advised to Hydrate!!    Repeat RFP ordered for Friday morning and Ms. Ortiz to cont monitoring home BP and let us know how they are running  Advised I will contact Ms. Ortiz once repeat RFP resulted - hopefully same day Friday    Ms. Ortiz expressed understanding and correctly repeated above instructions back to me.

## 2023-06-15 NOTE — PROGRESS NOTES
Pt does not use [facility] pt portal - I already spoke w Ms. Ortiz by phone yesterday - see documentation note in Epic - she is to hold valsartan and spironolactone, hydrate, repeat RFP Friday am in [place] (stat) scheduled already and communicated to Ms. Ortiz by Hortencia I believe

## 2023-06-16 ENCOUNTER — LAB VISIT (OUTPATIENT)
Dept: LAB | Facility: HOSPITAL | Age: 73
End: 2023-06-16
Attending: INTERNAL MEDICINE
Payer: MEDICARE

## 2023-06-16 DIAGNOSIS — N17.9 ACUTE RENAL INJURY: ICD-10-CM

## 2023-06-16 DIAGNOSIS — E87.5 HYPERKALEMIA: ICD-10-CM

## 2023-06-16 LAB
ALBUMIN SERPL BCP-MCNC: 3.5 G/DL (ref 3.5–5.2)
ANION GAP SERPL CALC-SCNC: 13 MMOL/L (ref 8–16)
BUN SERPL-MCNC: 40 MG/DL (ref 8–23)
CALCIUM SERPL-MCNC: 9.9 MG/DL (ref 8.7–10.5)
CHLORIDE SERPL-SCNC: 105 MMOL/L (ref 95–110)
CO2 SERPL-SCNC: 23 MMOL/L (ref 23–29)
CREAT SERPL-MCNC: 1.5 MG/DL (ref 0.5–1.4)
EST. GFR  (NO RACE VARIABLE): 36.8 ML/MIN/1.73 M^2
GLUCOSE SERPL-MCNC: 143 MG/DL (ref 70–110)
PHOSPHATE SERPL-MCNC: 4.5 MG/DL (ref 2.7–4.5)
POTASSIUM SERPL-SCNC: 4.5 MMOL/L (ref 3.5–5.1)
SODIUM SERPL-SCNC: 141 MMOL/L (ref 136–145)

## 2023-06-16 PROCEDURE — 36415 COLL VENOUS BLD VENIPUNCTURE: CPT | Mod: PO | Performed by: INTERNAL MEDICINE

## 2023-06-16 PROCEDURE — 80069 RENAL FUNCTION PANEL: CPT | Performed by: INTERNAL MEDICINE

## 2023-06-17 ENCOUNTER — TELEPHONE (OUTPATIENT)
Dept: PRIMARY CARE CLINIC | Facility: CLINIC | Age: 73
End: 2023-06-17
Payer: MEDICARE

## 2023-06-17 NOTE — TELEPHONE ENCOUNTER
Spoke w Ms. Ortiz by phone.   Informed her that potassium level is good, renal function improving.  She has been holding the spironolactone and valsartan as advised.  She is taking metoprolol 100 mg BID and furosemide 20 mg Qam as advised.  Making urine - denies dysuria.  BP's 100's/50 mostly - less lightheaded.  Advised to cont to hydrate well.  Advised to HOLD furosemide tomorrow and Monday morning and will f/u w her again on Monday.

## 2023-06-18 PROBLEM — I95.1 ORTHOSTATIC HYPOTENSION: Status: ACTIVE | Noted: 2023-06-13

## 2023-06-18 NOTE — ASSESSMENT & PLAN NOTE
Suspect over diuresis - advised decrease valsartan and furosemide - hydrate - caution when moving from sit to stand - cont monitor BP and pulse at home - f/u w cardiology as scheduled - f/u labs

## 2023-06-18 NOTE — ASSESSMENT & PLAN NOTE
Advised to decrease valsartan to 1/2 tab - 160 mg daily  - and furosemide to 20 mg for now pending further instructions - monitor BP and pulse closely at home - limit sodium but maintain hydration

## 2023-06-19 ENCOUNTER — TELEPHONE (OUTPATIENT)
Dept: PRIMARY CARE CLINIC | Facility: CLINIC | Age: 73
End: 2023-06-19
Payer: MEDICARE

## 2023-06-19 DIAGNOSIS — N17.9 ACUTE RENAL INJURY: Primary | ICD-10-CM

## 2023-06-19 NOTE — TELEPHONE ENCOUNTER
Spoke w Ms. Ortiz by phone  Reports BP's 136-141/63-74  She restarted furosemide this morning since BP had improved and noted edema in her hands    Her Thurs appt w Dr. Ibarra was cancelled - she has rescheduled w Dr. Paez who she had seen previously   Advised to cont monitoring BP - let us know if SBP > 150  Will order repeat RFP for next Monday so will have prior to 6/30 appt w Dr. Paez    Reports R hip pain started Thursday night - hurts to sit or lie down - has been using walker  Applying ice, aspercreme, taking paragesic - has had this a couple of times before - reports getting a bit better

## 2023-06-21 ENCOUNTER — TELEPHONE (OUTPATIENT)
Dept: PRIMARY CARE CLINIC | Facility: CLINIC | Age: 73
End: 2023-06-21

## 2023-06-21 DIAGNOSIS — I10 ESSENTIAL HYPERTENSION: Chronic | ICD-10-CM

## 2023-06-22 NOTE — TELEPHONE ENCOUNTER
Blood Pressure 155/88 today    Pt states that she feels ok, denies any chest pain or SOB.     Pt wanted you to know that it is over 150.   
Blood Pressure 158/74 this am    States she is starting to get headaches again     Denies any chest pain or SOB- states walking with no issues.   
No respiratory distress. No stridor, Lungs sounds clear with good aeration bilaterally.

## 2023-06-23 NOTE — PROGRESS NOTES
Spoke w Ms. Ortiz by phone regarding elevated BP  She is reluctant to add amlodipine given issues w edema when taken previously  Advised to try low dose - 2.5 mg to start - may increase to 5 mg if needed   Advised also that edema should not be as much of an issue now taking furosemide  Ms. Ortiz says she thinks she still has some amlodipine from previous prescription  Advised to let us know if she does not and needs short script sent to her pharmacy  Advised to HOLD on resuming spironolactone or valsartan pending results of labwork Monday

## 2023-06-23 NOTE — TELEPHONE ENCOUNTER
----- Message from Sophia San sent at 6/23/2023  9:44 AM CDT -----  Contact: Qi Busby is calling to speak to the nurse Chantal, she stated she was returning a phone call. Please give her a call back at [phone]    Thanks  LJ

## 2023-06-24 RX ORDER — AMLODIPINE BESYLATE 2.5 MG/1
2.5 TABLET ORAL DAILY
Qty: 30 TABLET | Refills: 0 | Status: SHIPPED | OUTPATIENT
Start: 2023-06-24 | End: 2023-06-30

## 2023-06-27 ENCOUNTER — LAB VISIT (OUTPATIENT)
Dept: LAB | Facility: HOSPITAL | Age: 73
End: 2023-06-27
Attending: INTERNAL MEDICINE
Payer: MEDICARE

## 2023-06-27 DIAGNOSIS — N17.9 ACUTE RENAL INJURY: ICD-10-CM

## 2023-06-27 LAB
ALBUMIN SERPL BCP-MCNC: 3.1 G/DL (ref 3.5–5.2)
ANION GAP SERPL CALC-SCNC: 8 MMOL/L (ref 8–16)
BUN SERPL-MCNC: 13 MG/DL (ref 8–23)
CALCIUM SERPL-MCNC: 9.4 MG/DL (ref 8.7–10.5)
CHLORIDE SERPL-SCNC: 104 MMOL/L (ref 95–110)
CO2 SERPL-SCNC: 28 MMOL/L (ref 23–29)
CREAT SERPL-MCNC: 1 MG/DL (ref 0.5–1.4)
EST. GFR  (NO RACE VARIABLE): 59.9 ML/MIN/1.73 M^2
GLUCOSE SERPL-MCNC: 152 MG/DL (ref 70–110)
PHOSPHATE SERPL-MCNC: 3.5 MG/DL (ref 2.7–4.5)
POTASSIUM SERPL-SCNC: 3.9 MMOL/L (ref 3.5–5.1)
SODIUM SERPL-SCNC: 140 MMOL/L (ref 136–145)

## 2023-06-27 PROCEDURE — 80069 RENAL FUNCTION PANEL: CPT | Performed by: INTERNAL MEDICINE

## 2023-06-27 PROCEDURE — 36415 COLL VENOUS BLD VENIPUNCTURE: CPT | Mod: PO | Performed by: INTERNAL MEDICINE

## 2023-06-29 ENCOUNTER — TELEPHONE (OUTPATIENT)
Dept: PRIMARY CARE CLINIC | Facility: CLINIC | Age: 73
End: 2023-06-29
Payer: MEDICARE

## 2023-06-29 NOTE — PROGRESS NOTES
Pt does not use [facility] pt portal - please call w message below:    Potassium is back to normal and renal function too.  Please check to see how her BP has been.  If BP not low she can resume taking the spironolactone and discontinue the low dose amlodipine if she wishes.  If BP high she can resume taking the spironolactone but may want to continue taking the low dose amlodipine.  If she wants to discuss further please let her know I can call her tomorrow.  Thank you!

## 2023-06-30 ENCOUNTER — OFFICE VISIT (OUTPATIENT)
Dept: CARDIOLOGY | Facility: CLINIC | Age: 73
End: 2023-06-30
Payer: MEDICARE

## 2023-06-30 ENCOUNTER — TELEPHONE (OUTPATIENT)
Dept: PRIMARY CARE CLINIC | Facility: CLINIC | Age: 73
End: 2023-06-30
Payer: MEDICARE

## 2023-06-30 VITALS
HEART RATE: 69 BPM | BODY MASS INDEX: 39.03 KG/M2 | DIASTOLIC BLOOD PRESSURE: 66 MMHG | SYSTOLIC BLOOD PRESSURE: 122 MMHG | HEIGHT: 64 IN | OXYGEN SATURATION: 97 % | WEIGHT: 228.63 LBS

## 2023-06-30 DIAGNOSIS — I50.32 CHRONIC DIASTOLIC HEART FAILURE: Primary | ICD-10-CM

## 2023-06-30 DIAGNOSIS — I20.9 ANGINA, CLASS II: Chronic | ICD-10-CM

## 2023-06-30 DIAGNOSIS — I25.118 CORONARY ARTERY DISEASE OF NATIVE ARTERY OF NATIVE HEART WITH STABLE ANGINA PECTORIS: ICD-10-CM

## 2023-06-30 DIAGNOSIS — Z95.2 S/P TAVR (TRANSCATHETER AORTIC VALVE REPLACEMENT): ICD-10-CM

## 2023-06-30 DIAGNOSIS — Z86.73 HISTORY OF CVA (CEREBROVASCULAR ACCIDENT): ICD-10-CM

## 2023-06-30 DIAGNOSIS — I10 ESSENTIAL HYPERTENSION: Chronic | ICD-10-CM

## 2023-06-30 DIAGNOSIS — E11.42 TYPE 2 DIABETES MELLITUS WITH PERIPHERAL NEUROPATHY: Chronic | ICD-10-CM

## 2023-06-30 DIAGNOSIS — Z95.1 S/P CABG (CORONARY ARTERY BYPASS GRAFT): Chronic | ICD-10-CM

## 2023-06-30 PROCEDURE — 99214 OFFICE O/P EST MOD 30 MIN: CPT | Mod: PBBFAC | Performed by: INTERNAL MEDICINE

## 2023-06-30 PROCEDURE — 99214 PR OFFICE/OUTPT VISIT, EST, LEVL IV, 30-39 MIN: ICD-10-PCS | Mod: S$PBB,,, | Performed by: INTERNAL MEDICINE

## 2023-06-30 PROCEDURE — 99214 OFFICE O/P EST MOD 30 MIN: CPT | Mod: S$PBB,,, | Performed by: INTERNAL MEDICINE

## 2023-06-30 PROCEDURE — 99999 PR PBB SHADOW E&M-EST. PATIENT-LVL IV: ICD-10-PCS | Mod: PBBFAC,,, | Performed by: INTERNAL MEDICINE

## 2023-06-30 PROCEDURE — 99999 PR PBB SHADOW E&M-EST. PATIENT-LVL IV: CPT | Mod: PBBFAC,,, | Performed by: INTERNAL MEDICINE

## 2023-06-30 NOTE — PROGRESS NOTES
Subjective:   Patient ID:  Qi Ortiz is a 73 y.o. female who presents for follow up of No chief complaint on file.      71y/o F PMHx of COPD, HLD, HTN, CABG x2, SVT, AS, CAD, s/p TAVR 20', chronic diastolic HF, CKD, DM, obesity, IRIS, statin intolerance    06/06/23 went to [facility] ER for SOB. Rx for CHF and d/c o/n.  Now sob stable no orthopnea leg swelling  No chest pain   Refused Jardiance      Past Medical History:   Diagnosis Date    Carotid stenosis     19%    Cellulitis and abscess of foot, except toes 06/22/2022    Cerumen debris on tympanic membrane of right ear 11/30/2022    Coronary artery disease     CVA (cerebral vascular accident)     Dr. 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 Hope    Psoriatic arthritis     Rheumatology    Transient ischemic attack (TIA) 01/02/2019       Past Surgical History:   Procedure Laterality Date    BREAST BIOPSY      R sided/benign    CARDIAC SURGERY      sept 28 2016    CERVICAL FUSION      CHOLECYSTECTOMY      CORONARY ANGIOPLASTY      CORONARY ARTERY BYPASS GRAFT      triple bypass    CORONARY STENT PLACEMENT      EYE SURGERY      INTRAUTERINE DEVICE INSERTION      LEFT HEART CATHETERIZATION Left 9/8/2020    Procedure: CATHETERIZATION, HEART, LEFT;  Surgeon: Veronica Ibarra MD;  Location: [facility] CATH LAB;  Service: Cardiology;  Laterality: Left;  7am start time    mass removed from R groin      TOTAL ABDOMINAL HYSTERECTOMY W/ BILATERAL SALPINGOOPHORECTOMY      due to benign mass, adhesions    TUBAL LIGATION         Social History     Tobacco Use    Smoking status: Never    Smokeless tobacco: Never   Substance Use Topics    Alcohol use: No    Drug use: No       Family History   Problem Relation Age of Onset     Breast cancer Maternal Grandfather     Breast cancer Paternal Aunt     Stroke Unknown     Breast cancer Sister 60    Leukemia Sister 8         as child    Lung cancer Paternal Grandfather     Heart disease Unknown     Diabetes Daughter          Review of Systems   Constitutional: Positive for malaise/fatigue. Negative for decreased appetite, diaphoresis, fever and night sweats.   HENT:  Negative for nosebleeds.    Eyes:  Negative for blurred vision and double vision.   Cardiovascular:  Positive for dyspnea on exertion. Negative for chest pain, claudication, irregular heartbeat, leg swelling, near-syncope, orthopnea, palpitations, paroxysmal nocturnal dyspnea and syncope.   Respiratory:  Negative for cough, shortness of breath, sleep disturbances due to breathing, snoring, sputum production and wheezing.    Endocrine: Negative for cold intolerance and polyuria.   Hematologic/Lymphatic: Does not bruise/bleed easily.   Skin:  Negative for rash.   Musculoskeletal:  Negative for back pain, falls, joint pain, joint swelling and neck pain.   Gastrointestinal:  Negative for abdominal pain, heartburn, nausea and vomiting.   Genitourinary:  Negative for dysuria, frequency and hematuria.   Neurological:  Negative for difficulty with concentration, dizziness, focal weakness, headaches, light-headedness, numbness, seizures and weakness.   Psychiatric/Behavioral:  Negative for depression, memory loss and substance abuse. The patient does not have insomnia.    Allergic/Immunologic: Negative for HIV exposure and hives.     Objective:   Physical Exam  HENT:      Head: Normocephalic.   Eyes:      Pupils: Pupils are equal, round, and reactive to light.   Neck:      Thyroid: No thyromegaly.      Vascular: Normal carotid pulses. No carotid bruit or JVD.   Cardiovascular:      Rate and Rhythm: Normal rate and regular rhythm. No extrasystoles are present.     Chest Wall: PMI is not displaced.      Pulses: Normal pulses.      Heart  sounds: Normal heart sounds. No murmur heard.    No gallop. No S3 sounds.   Pulmonary:      Effort: No respiratory distress.      Breath sounds: Normal breath sounds. No stridor.   Abdominal:      General: Bowel sounds are normal.      Palpations: Abdomen is soft.      Tenderness: There is no abdominal tenderness. There is no rebound.   Musculoskeletal:         General: Normal range of motion.   Skin:     Findings: No rash.   Neurological:      Mental Status: She is alert and oriented to person, place, and time.   Psychiatric:         Behavior: Behavior normal.       Lab Results   Component Value Date    CHOL 125 02/03/2023    CHOL 119 (L) 07/18/2022    CHOL 136 01/13/2022     Lab Results   Component Value Date    HDL 43 02/03/2023    HDL 42 07/18/2022    HDL 43 01/13/2022     Lab Results   Component Value Date    LDLCALC 50.0 (L) 02/03/2023    LDLCALC 55.4 (L) 07/18/2022    LDLCALC 69.2 01/13/2022     Lab Results   Component Value Date    TRIG 160 (H) 02/03/2023    TRIG 108 07/18/2022    TRIG 119 01/13/2022     Lab Results   Component Value Date    CHOLHDL 34.4 02/03/2023    CHOLHDL 35.3 07/18/2022    CHOLHDL 31.6 01/13/2022       Chemistry        Component Value Date/Time     06/27/2023 1137    K 3.9 06/27/2023 1137     06/27/2023 1137    CO2 28 06/27/2023 1137    BUN 13 06/27/2023 1137    CREATININE 1.0 06/27/2023 1137     (H) 06/27/2023 1137        Component Value Date/Time    CALCIUM 9.4 06/27/2023 1137    ALKPHOS 46 (L) 03/02/2023 1210    AST 22 03/02/2023 1210    ALT 11 03/02/2023 1210    BILITOT 0.2 03/02/2023 1210    ESTGFRAFRICA 48 (A) 07/18/2022 0803    EGFRNONAA 41 (A) 07/18/2022 0803          Lab Results   Component Value Date    HGBA1C 5.8 (H) 06/13/2023     Lab Results   Component Value Date    TSH 1.196 07/18/2022     Lab Results   Component Value Date    INR 1.0 09/28/2020    INR 1.0 12/18/2017     Lab Results   Component Value Date    WBC 6.22 03/02/2023    HGB 11.7 (L)  03/02/2023    HCT 36.8 (L) 03/02/2023    MCV 94 03/02/2023     03/02/2023     BMP  Sodium   Date Value Ref Range Status   06/27/2023 140 136 - 145 mmol/L Final     Potassium   Date Value Ref Range Status   06/27/2023 3.9 3.5 - 5.1 mmol/L Final     Chloride   Date Value Ref Range Status   06/27/2023 104 95 - 110 mmol/L Final     CO2   Date Value Ref Range Status   06/27/2023 28 23 - 29 mmol/L Final     BUN   Date Value Ref Range Status   06/27/2023 13 8 - 23 mg/dL Final     Creatinine   Date Value Ref Range Status   06/27/2023 1.0 0.5 - 1.4 mg/dL Final     Calcium   Date Value Ref Range Status   06/27/2023 9.4 8.7 - 10.5 mg/dL Final     Anion Gap   Date Value Ref Range Status   06/27/2023 8 8 - 16 mmol/L Final     eGFR if    Date Value Ref Range Status   07/18/2022 48 (A) >60 mL/min/1.73 m^2 Final     eGFR if non    Date Value Ref Range Status   07/18/2022 41 (A) >60 mL/min/1.73 m^2 Final     Comment:     Calculation used to obtain the estimated glomerular filtration  rate (eGFR) is the CKD-EPI equation.        BNP  @LABRCNTIP(BNP,BNPTRIAGEBLO)@  @LABRCNTIP(troponini)@  CrCl cannot be calculated (Patient's most recent lab result is older than the maximum 7 days allowed.).  No results found in the last 24 hours.  No results found in the last 24 hours.  No results found in the last 24 hours.    Assessment:      1. Chronic diastolic heart failure    2. History of CVA (cerebrovascular accident)    3. S/P CABG (coronary artery bypass graft)    4. Essential hypertension    5. Angina, class II    6. S/P TAVR (transcatheter aortic valve replacement)    7. Coronary artery disease of native artery of native heart with stable angina pectoris    8. Type 2 diabetes mellitus with peripheral neuropathy        Plan:   Continue Lasix 20 mg daily aldactone 25 mg toprolXL repatha ASA Plavix  Check BP at home    Repeat BNP and BMP in 2 weeks  Daily weight. Please call the office if gain 3 pounds in  1 day or 5 pounds in 1 week.  Fluid restriction 1.5 liters a day  Na< 2 gm      RTC in 2 m with Dr. Ibarra as scheduled

## 2023-07-10 ENCOUNTER — LAB VISIT (OUTPATIENT)
Dept: LAB | Facility: HOSPITAL | Age: 73
End: 2023-07-10
Attending: INTERNAL MEDICINE
Payer: MEDICARE

## 2023-07-10 DIAGNOSIS — Z51.81 MEDICATION MONITORING ENCOUNTER: ICD-10-CM

## 2023-07-10 DIAGNOSIS — Z79.899 HIGH RISK MEDICATION USE: ICD-10-CM

## 2023-07-10 DIAGNOSIS — L40.50 PSORIATIC ARTHRITIS: Chronic | ICD-10-CM

## 2023-07-10 LAB
ALBUMIN SERPL BCP-MCNC: 3.4 G/DL (ref 3.5–5.2)
ALP SERPL-CCNC: 50 U/L (ref 55–135)
ALT SERPL W/O P-5'-P-CCNC: 13 U/L (ref 10–44)
ANION GAP SERPL CALC-SCNC: 12 MMOL/L (ref 8–16)
AST SERPL-CCNC: 21 U/L (ref 10–40)
BASOPHILS # BLD AUTO: 0.05 K/UL (ref 0–0.2)
BASOPHILS NFR BLD: 0.7 % (ref 0–1.9)
BILIRUB SERPL-MCNC: 0.4 MG/DL (ref 0.1–1)
BUN SERPL-MCNC: 22 MG/DL (ref 8–23)
CALCIUM SERPL-MCNC: 9.2 MG/DL (ref 8.7–10.5)
CHLORIDE SERPL-SCNC: 104 MMOL/L (ref 95–110)
CO2 SERPL-SCNC: 28 MMOL/L (ref 23–29)
CREAT SERPL-MCNC: 1.2 MG/DL (ref 0.5–1.4)
DIFFERENTIAL METHOD: ABNORMAL
EOSINOPHIL # BLD AUTO: 0.2 K/UL (ref 0–0.5)
EOSINOPHIL NFR BLD: 3.3 % (ref 0–8)
ERYTHROCYTE [DISTWIDTH] IN BLOOD BY AUTOMATED COUNT: 17.2 % (ref 11.5–14.5)
EST. GFR  (NO RACE VARIABLE): 48 ML/MIN/1.73 M^2
GLUCOSE SERPL-MCNC: 191 MG/DL (ref 70–110)
HCT VFR BLD AUTO: 36.8 % (ref 37–48.5)
HGB BLD-MCNC: 10.9 G/DL (ref 12–16)
IMM GRANULOCYTES # BLD AUTO: 0.02 K/UL (ref 0–0.04)
IMM GRANULOCYTES NFR BLD AUTO: 0.3 % (ref 0–0.5)
LYMPHOCYTES # BLD AUTO: 1.8 K/UL (ref 1–4.8)
LYMPHOCYTES NFR BLD: 25.4 % (ref 18–48)
MCH RBC QN AUTO: 24.2 PG (ref 27–31)
MCHC RBC AUTO-ENTMCNC: 29.6 G/DL (ref 32–36)
MCV RBC AUTO: 82 FL (ref 82–98)
MONOCYTES # BLD AUTO: 0.6 K/UL (ref 0.3–1)
MONOCYTES NFR BLD: 9.1 % (ref 4–15)
NEUTROPHILS # BLD AUTO: 4.3 K/UL (ref 1.8–7.7)
NEUTROPHILS NFR BLD: 61.2 % (ref 38–73)
NRBC BLD-RTO: 0 /100 WBC
PLATELET # BLD AUTO: 306 K/UL (ref 150–450)
PMV BLD AUTO: 10.4 FL (ref 9.2–12.9)
POTASSIUM SERPL-SCNC: 4.3 MMOL/L (ref 3.5–5.1)
PROT SERPL-MCNC: 6.7 G/DL (ref 6–8.4)
RBC # BLD AUTO: 4.51 M/UL (ref 4–5.4)
SODIUM SERPL-SCNC: 144 MMOL/L (ref 136–145)
WBC # BLD AUTO: 7.06 K/UL (ref 3.9–12.7)

## 2023-07-10 PROCEDURE — 36415 COLL VENOUS BLD VENIPUNCTURE: CPT | Mod: PO | Performed by: INTERNAL MEDICINE

## 2023-07-10 PROCEDURE — 85025 COMPLETE CBC W/AUTO DIFF WBC: CPT | Performed by: INTERNAL MEDICINE

## 2023-07-10 PROCEDURE — 80053 COMPREHEN METABOLIC PANEL: CPT | Performed by: INTERNAL MEDICINE

## 2023-07-12 ENCOUNTER — OFFICE VISIT (OUTPATIENT)
Dept: PRIMARY CARE CLINIC | Facility: CLINIC | Age: 73
End: 2023-07-12
Payer: MEDICARE

## 2023-07-12 VITALS
OXYGEN SATURATION: 99 % | WEIGHT: 226.63 LBS | BODY MASS INDEX: 38.69 KG/M2 | HEART RATE: 61 BPM | SYSTOLIC BLOOD PRESSURE: 132 MMHG | DIASTOLIC BLOOD PRESSURE: 62 MMHG | HEIGHT: 64 IN | TEMPERATURE: 98 F

## 2023-07-12 DIAGNOSIS — N18.32 STAGE 3B CHRONIC KIDNEY DISEASE: Chronic | ICD-10-CM

## 2023-07-12 DIAGNOSIS — E66.01 CLASS 2 SEVERE OBESITY DUE TO EXCESS CALORIES WITH SERIOUS COMORBIDITY AND BODY MASS INDEX (BMI) OF 38.0 TO 38.9 IN ADULT: ICD-10-CM

## 2023-07-12 DIAGNOSIS — I50.32 CHRONIC DIASTOLIC HEART FAILURE: Primary | Chronic | ICD-10-CM

## 2023-07-12 DIAGNOSIS — E11.42 TYPE 2 DIABETES MELLITUS WITH PERIPHERAL NEUROPATHY: Chronic | ICD-10-CM

## 2023-07-12 DIAGNOSIS — I10 ESSENTIAL HYPERTENSION: Chronic | ICD-10-CM

## 2023-07-12 DIAGNOSIS — D50.0 IRON DEFICIENCY ANEMIA DUE TO CHRONIC BLOOD LOSS: Chronic | ICD-10-CM

## 2023-07-12 PROBLEM — R53.1 GENERALIZED WEAKNESS: Status: RESOLVED | Noted: 2023-06-05 | Resolved: 2023-07-12

## 2023-07-12 PROBLEM — E66.812 CLASS 2 SEVERE OBESITY DUE TO EXCESS CALORIES WITH SERIOUS COMORBIDITY AND BODY MASS INDEX (BMI) OF 38.0 TO 38.9 IN ADULT: Status: ACTIVE | Noted: 2023-07-12

## 2023-07-12 PROCEDURE — 99215 OFFICE O/P EST HI 40 MIN: CPT | Mod: S$PBB,,, | Performed by: INTERNAL MEDICINE

## 2023-07-12 PROCEDURE — 99999 PR PBB SHADOW E&M-EST. PATIENT-LVL V: ICD-10-PCS | Mod: PBBFAC,,, | Performed by: INTERNAL MEDICINE

## 2023-07-12 PROCEDURE — 99215 OFFICE O/P EST HI 40 MIN: CPT | Mod: PBBFAC,PN | Performed by: INTERNAL MEDICINE

## 2023-07-12 PROCEDURE — 99999 PR PBB SHADOW E&M-EST. PATIENT-LVL V: CPT | Mod: PBBFAC,,, | Performed by: INTERNAL MEDICINE

## 2023-07-12 PROCEDURE — 99215 PR OFFICE/OUTPT VISIT, EST, LEVL V, 40-54 MIN: ICD-10-PCS | Mod: S$PBB,,, | Performed by: INTERNAL MEDICINE

## 2023-07-12 RX ORDER — SPIRONOLACTONE 25 MG/1
25 TABLET ORAL DAILY
Qty: 90 TABLET | Refills: 3 | Status: SHIPPED | OUTPATIENT
Start: 2023-07-12 | End: 2024-07-11

## 2023-07-12 NOTE — PATIENT INSTRUCTIONS
Cont current plan of care!    Let us know if any issues or concerns prior to next scheduled follow-up

## 2023-07-12 NOTE — PROGRESS NOTES
Qi Ortiz  07/12/2023  [id number]    Lisa Porter MD  Patient Care Team:  Lisa Porter MD as PCP - General (Internal Medicine)  Elma Hernandez NP as Nurse Practitioner (Family Medicine)    Visit Type: Follow-up    Chief Complaint:  Chief Complaint   Patient presents with    1 mth f/u      History of Present Illness: Ms. Qi Ortiz is a 73 year old female w multiple chronic medical issues here for scheduled f/u.      Medical issues include type 2 DM, h/o CVA, CAD s/p CABG x 2, AS s/p TAVR, HTN, HLP (intolerant of statin), CKD stage 3, hypothyroidism, psoriasis and psoriatic arthritis, CAREN, obesity, and IRIS (intolerant of CPAP). Ms. Ortiz has multiple drug and contact allergies and sensitivities. Food allergies include kiwi fruit and crab boil.     Ms. Ortiz is hearing impaired - gradual hearing loss over past 15 years, now severe. Unble to use hearing aids due to psoriasis in her ear canals. Ms. Ortiz does not have a cell phone, smart phone or internet. She does have a land-line w a speaker to make louder. Ms. Ortiz's daughters, Elsa and Shyanne, live close to her. They work during the day. Shyanne is Ms. Ortiz's HCPOA - OK to discuss medical issues w her.    Has been checking manual BP's at home and they've been 120's-130's/68ish   Thinks home automatic BP monitor reading too high    No LE edema  Dyspnea w exertion much better  No more dizziness    PHQ-4 Score: 0     Hosp/ED/Urgent Care:  6/06 - 6/0723 [facility] CHF exacerbation    Recent appointments:   6/30/23 Cards Enrique  6/13/23 O65+ Porter  6/06/23 Cards Mateusz Angina  6/05/23 O65+ Cook  5/22/23 O65+ Mary AWV    Upcoming appointments:  Future Appointments       Date Provider Specialty Appt Notes    7/13/2023  Lab .    7/13/2023 Jacky Ackerman MD Rheumatology PSORIATIC--OP--GOUT//.ARS    8/24/2023  Lab .    8/31/2023 Veronica Ibarra MD Cardiology 6 m           The following were reviewed: Active problem list, medication list,  allergies, family history, social history, and Health Maintenance.     Medications have been reviewed and reconciled with patient at visit today.    Review of Systems   See HPI above    Exam: sat 99% RA  Vitals:    07/12/23 1005   BP: 132/62   Pulse: 61   Temp: 98.1 °F (36.7 °C)     Weight: 102.8 kg (226 lb 9.6 oz)   Body mass index is 38.9 kg/m².    BP Readings from Last 3 Encounters:   07/12/23 132/62   06/30/23 122/66   06/13/23 113/61      Wt Readings from Last 3 Encounters:   07/12/23 1005 102.8 kg (226 lb 9.6 oz)   06/30/23 1422 103.7 kg (228 lb 9.9 oz)   06/13/23 1258 102.5 kg (225 lb 14.4 oz)     03/13/23 241 lb  11/30/22 245 lb  07/08/22 250 lb    Physical Exam  Vitals reviewed.   Constitutional:       General: She is not in acute distress.     Appearance: She is obese.   HENT:      Head: Normocephalic and atraumatic.      Right Ear: External ear normal.      Left Ear: External ear normal.      Ears:      Comments: Hard of hearing     Nose: Nose normal.   Eyes:      General: No scleral icterus.     Extraocular Movements: Extraocular movements intact.      Conjunctiva/sclera: Conjunctivae normal.      Comments: photosensitivity   Cardiovascular:      Rate and Rhythm: Normal rate.   Pulmonary:      Effort: Pulmonary effort is normal. No respiratory distress.   Musculoskeletal:      Right lower leg: No edema.      Left lower leg: No edema.   Skin:     General: Skin is warm and dry.      Coloration: Skin is pale.   Neurological:      General: No focal deficit present.      Mental Status: She is alert and oriented to person, place, and time. Mental status is at baseline.      Coordination: Coordination normal.   Psychiatric:         Mood and Affect: Mood normal.         Behavior: Behavior normal.         Thought Content: Thought content normal.      Laboratory Reviewed  Lab Results   Component Value Date    WBC 7.06 07/10/2023    HGB 10.9 (L) 07/10/2023    HCT 36.8 (L) 07/10/2023     07/10/2023    MCV 82  07/10/2023    CHOL 125 02/03/2023    TRIG 160 (H) 02/03/2023    HDL 43 02/03/2023    LDLCALC 50.0 (L) 02/03/2023    ALT 13 07/10/2023    AST 21 07/10/2023     07/13/2023    K 4.0 07/13/2023     07/13/2023    CREATININE 1.2 07/13/2023    BUN 18 07/13/2023    CO2 27 07/13/2023    MG 2.0 06/13/2023    TSH 1.196 07/18/2022    FREET4 1.08 07/18/2022    INR 1.0 09/28/2020    HGBA1C 5.8 (H) 06/13/2023    CRP 6.6 07/18/2022     Lab Results   Component Value Date    PTH 91.5 (H) 05/20/2022    CALCIUM 9.4 07/13/2023    PHOS 3.5 06/27/2023      Lab Results   Component Value Date    XWOYHBCZ76 >2000 (H) 06/13/2023     Lab Results   Component Value Date    FOLATE 18.7 05/20/2022      Lab Results   Component Value Date    IRON 63 02/01/2023    TRANSFERRIN 204 02/01/2023    TIBC 302 02/01/2023    FESATURATED 21 02/01/2023      Lab Results   Component Value Date    EGFRNORACEVR 48.1 (A) 07/13/2023    ALBUMIN 3.4 (L) 07/10/2023     (H) 07/13/2023        Assessment:   73 y.o. female with multiple co-morbid illnesses here for continued follow up of medical problems.      The primary encounter diagnosis was Chronic diastolic heart failure. Diagnoses of Essential hypertension, Type 2 diabetes mellitus with peripheral neuropathy, Class 2 severe obesity due to excess calories with serious comorbidity and body mass index (BMI) of 38.0 to 38.9 in adult, Stage 3b chronic kidney disease, and Iron deficiency anemia due to chronic blood loss were also pertinent to this visit.      Plan:   1. Chronic diastolic heart failure  Assessment & Plan:  Symptoms improved - cont current Rx - f/u cardiology Dr. Paez tomorrow as scheduled - cont monitor wt at home - cont monitor/limit salt/sodium intake      2. Essential hypertension  Assessment & Plan:  BP at goal w current Rx - cont hold ARB for now - f/u cardiology recommendations regarding diuretic    Orders:  -     spironolactone (ALDACTONE) 25 MG tablet; Take 1 tablet (25 mg total)  by mouth once daily.  Dispense: 90 tablet; Refill: 3    3. Type 2 diabetes mellitus with peripheral neuropathy  Assessment & Plan:  Taking gabapentin - no current Rx for diabetes - managing w diet      4. Class 2 severe obesity due to excess calories with serious comorbidity and body mass index (BMI) of 38.0 to 38.9 in adult  Assessment & Plan:  Note continued gradual weight loss - 25 lbs over past year - cont encourage movement, healthy food and beverage choices      5. Stage 3b chronic kidney disease  Assessment & Plan:  Cont monitor - avoid nephrotoxins       6. Iron deficiency anemia due to chronic blood loss  Overview:  Elida Alanis NP 8/6/2021  Iron levels replenished s/p Venofer x 8. She denies abnormal bleeding at preset time but notes intermittent blood noted in stool. Patient has not yet had follow up with GI service for endoscopies due to ongoing COVID concerns previously. She has been encouraged to follow up with GI. Rationale discussed in detail.   Patient unable to tolerate oral iron due to GI upset/constipation. F/u 3 months with repeat labs. Discussed S&S to report sooner      Assessment & Plan:  Mild anemia - last iron levels wnl s/p iron infusions - f/u again regarding overdue colon cancer screening (colonoscopy)            Health Maintenance         Date Due Completion Date    COVID-19 Vaccine (1) Never done ---    Pneumococcal Vaccines (Age 65+) (1 - PCV) Never done ---    Foot Exam Never done ---    TETANUS VACCINE Never done ---    Colorectal Cancer Screening 04/18/2016 4/18/2011    Shingles Vaccine (2 of 2) 11/28/2022 10/3/2022    Mammogram 05/22/2024 (Originally 10/23/2016) 10/23/2015    Diabetes Urine Screening 10/14/2023 10/14/2022    Hemoglobin A1c 12/13/2023 6/13/2023    Lipid Panel 02/03/2024 2/3/2023    Eye Exam 03/06/2024 3/6/2023    Aspirin/Antiplatelet Therapy 07/13/2024 7/13/2023    DEXA Scan 07/25/2025 7/25/2022            -Patient's lab results were reviewed and discussed  with patient  -Treatment options and alternatives were discussed with the patient. Patient expressed understanding. Patient was given the opportunity to ask questions and be an active participant in their medical care. Patient had no further questions or concerns at this time.   -Patient is an overall moderate to HIGH risk for health complications from their medical conditions.     Follow up: Follow up in about 3 months (around 10/12/2023) for Follow Up cali Wills.    After visit summary printed and given to patient upon discharge.  Patient care plan included in After visit summary.    TOTAL TIME evaluating and managing this patient for this encounter was greater than 60 minutes. This time was spent personally by me on some of the following activities: review of patient's past medical history, assessing age-appropriate health maintenance needs, review of any interval history, review and interpretation of lab results, review and interpretation of imaging test results, review and interpretation of cardiology test results, reviewing consulting specialist notes, obtaining history from the patient and family, examination of the patient, medication reconciliation, managing and/or ordering prescription medications, ordering imaging tests, ordering referral to subspecialty provider(s), educating patient and answering their questions about diagnosis, treatment plan, and goals of treatment, discussing planned follow-up and final documentation of the visit. This time was exclusive of any separately billable procedures for this patient and exclusive of time spent treating any other patients.

## 2023-07-13 ENCOUNTER — LAB VISIT (OUTPATIENT)
Dept: LAB | Facility: HOSPITAL | Age: 73
End: 2023-07-13
Attending: INTERNAL MEDICINE
Payer: MEDICARE

## 2023-07-13 ENCOUNTER — OFFICE VISIT (OUTPATIENT)
Dept: RHEUMATOLOGY | Facility: CLINIC | Age: 73
End: 2023-07-13
Payer: MEDICARE

## 2023-07-13 VITALS — HEIGHT: 64 IN | BODY MASS INDEX: 38.69 KG/M2 | WEIGHT: 226.63 LBS

## 2023-07-13 DIAGNOSIS — E11.42 TYPE 2 DIABETES MELLITUS WITH PERIPHERAL NEUROPATHY: Chronic | ICD-10-CM

## 2023-07-13 DIAGNOSIS — D84.9 IMMUNOCOMPROMISED: ICD-10-CM

## 2023-07-13 DIAGNOSIS — I50.32 CHRONIC DIASTOLIC HEART FAILURE: ICD-10-CM

## 2023-07-13 DIAGNOSIS — E03.9 HYPOTHYROIDISM, UNSPECIFIED TYPE: Chronic | ICD-10-CM

## 2023-07-13 DIAGNOSIS — K64.3 GRADE IV HEMORRHOIDS: ICD-10-CM

## 2023-07-13 DIAGNOSIS — E66.01 CLASS 2 SEVERE OBESITY DUE TO EXCESS CALORIES WITH SERIOUS COMORBIDITY AND BODY MASS INDEX (BMI) OF 38.0 TO 38.9 IN ADULT: ICD-10-CM

## 2023-07-13 DIAGNOSIS — Z86.73 HISTORY OF CVA (CEREBROVASCULAR ACCIDENT): ICD-10-CM

## 2023-07-13 DIAGNOSIS — D50.0 IRON DEFICIENCY ANEMIA DUE TO CHRONIC BLOOD LOSS: ICD-10-CM

## 2023-07-13 DIAGNOSIS — Z95.1 S/P CABG (CORONARY ARTERY BYPASS GRAFT): Chronic | ICD-10-CM

## 2023-07-13 DIAGNOSIS — M47.896 OTHER OSTEOARTHRITIS OF SPINE, LUMBAR REGION: Chronic | ICD-10-CM

## 2023-07-13 DIAGNOSIS — Z51.81 MEDICATION MONITORING ENCOUNTER: ICD-10-CM

## 2023-07-13 DIAGNOSIS — L40.50 PSORIATIC ARTHRITIS: Primary | ICD-10-CM

## 2023-07-13 DIAGNOSIS — N18.32 STAGE 3B CHRONIC KIDNEY DISEASE: ICD-10-CM

## 2023-07-13 DIAGNOSIS — G62.9 POLYNEUROPATHY: Chronic | ICD-10-CM

## 2023-07-13 DIAGNOSIS — L40.9 PSORIASIS: ICD-10-CM

## 2023-07-13 DIAGNOSIS — M1A.3721 CHRONIC GOUT DUE TO RENAL IMPAIRMENT INVOLVING TOE OF LEFT FOOT WITH TOPHUS: Chronic | ICD-10-CM

## 2023-07-13 LAB
ANION GAP SERPL CALC-SCNC: 11 MMOL/L (ref 8–16)
BNP SERPL-MCNC: 242 PG/ML (ref 0–99)
BUN SERPL-MCNC: 18 MG/DL (ref 8–23)
CALCIUM SERPL-MCNC: 9.4 MG/DL (ref 8.7–10.5)
CHLORIDE SERPL-SCNC: 104 MMOL/L (ref 95–110)
CO2 SERPL-SCNC: 27 MMOL/L (ref 23–29)
CREAT SERPL-MCNC: 1.2 MG/DL (ref 0.5–1.4)
EST. GFR  (NO RACE VARIABLE): 48.1 ML/MIN/1.73 M^2
GLUCOSE SERPL-MCNC: 119 MG/DL (ref 70–110)
POTASSIUM SERPL-SCNC: 4 MMOL/L (ref 3.5–5.1)
SODIUM SERPL-SCNC: 142 MMOL/L (ref 136–145)

## 2023-07-13 PROCEDURE — 83880 ASSAY OF NATRIURETIC PEPTIDE: CPT | Performed by: INTERNAL MEDICINE

## 2023-07-13 PROCEDURE — 99999 PR PBB SHADOW E&M-EST. PATIENT-LVL III: ICD-10-PCS | Mod: PBBFAC,,, | Performed by: INTERNAL MEDICINE

## 2023-07-13 PROCEDURE — 99214 OFFICE O/P EST MOD 30 MIN: CPT | Mod: S$PBB,,, | Performed by: INTERNAL MEDICINE

## 2023-07-13 PROCEDURE — 99214 PR OFFICE/OUTPT VISIT, EST, LEVL IV, 30-39 MIN: ICD-10-PCS | Mod: S$PBB,,, | Performed by: INTERNAL MEDICINE

## 2023-07-13 PROCEDURE — 99213 OFFICE O/P EST LOW 20 MIN: CPT | Mod: PBBFAC | Performed by: INTERNAL MEDICINE

## 2023-07-13 PROCEDURE — 36415 COLL VENOUS BLD VENIPUNCTURE: CPT | Performed by: INTERNAL MEDICINE

## 2023-07-13 PROCEDURE — 80048 BASIC METABOLIC PNL TOTAL CA: CPT | Performed by: INTERNAL MEDICINE

## 2023-07-13 PROCEDURE — 99999 PR PBB SHADOW E&M-EST. PATIENT-LVL III: CPT | Mod: PBBFAC,,, | Performed by: INTERNAL MEDICINE

## 2023-07-13 RX ORDER — CALCIPOTRIENE 50 UG/G
CREAM TOPICAL 2 TIMES DAILY
Qty: 120 G | Refills: 0 | Status: SHIPPED | OUTPATIENT
Start: 2023-07-13 | End: 2024-07-12

## 2023-07-13 NOTE — PROGRESS NOTES
RHEUMATOLOGY OUTPATIENT CLINIC NOTE    7/13/2023    Attending Rheumatologist: Jacky Ackerman  Primary Care Provider/Physician Requesting Consultation: Lisa Porter MD   Chief Complaint/Reason For Consultation:  Psoriatic Arthritis, Osteoporosis, and Gout      Subjective:     Qi Ortiz is a 73 y.o. White female with PsA for f/u    Excellent results to 300mg of Cosentyx once per month.  Describes intermittent trigger finger.  Rash almost completely clear.    Review of Systems   Constitutional:  Negative for fever.   Eyes:  Negative for pain.   Respiratory:  Negative for shortness of breath.    Gastrointestinal:  Negative for blood in stool and melena.   Genitourinary:  Negative for hematuria.   Musculoskeletal:  Negative for back pain and joint pain.   Skin:  Positive for rash.   Neurological:  Negative for focal weakness and weakness.     Chronic comorbid conditions affecting medical decision making today:  Past Medical History:   Diagnosis Date    Carotid stenosis     19%    Cellulitis and abscess of foot, except toes 06/22/2022    Cerumen debris on tympanic membrane of right ear 11/30/2022    Coronary artery disease     CVA (cerebral vascular accident)     Dr. 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 Hope    Psoriatic arthritis     Rheumatology    Transient ischemic attack (TIA) 01/02/2019     Past Surgical History:   Procedure Laterality Date    BREAST BIOPSY      R sided/benign    CARDIAC SURGERY      sept 28 2016    CERVICAL FUSION      CHOLECYSTECTOMY      CORONARY ANGIOPLASTY      CORONARY ARTERY BYPASS GRAFT      triple bypass    CORONARY STENT PLACEMENT      EYE SURGERY      INTRAUTERINE DEVICE INSERTION      LEFT HEART  CATHETERIZATION Left 2020    Procedure: CATHETERIZATION, HEART, LEFT;  Surgeon: Veronica Ibarra MD;  Location: [facility] CATH LAB;  Service: Cardiology;  Laterality: Left;  7am start time    mass removed from R groin      TOTAL ABDOMINAL HYSTERECTOMY W/ BILATERAL SALPINGOOPHORECTOMY      due to benign mass, adhesions    TUBAL LIGATION       Family History   Problem Relation Age of Onset    Breast cancer Maternal Grandfather     Breast cancer Paternal Aunt     Stroke Unknown     Breast cancer Sister 60    Leukemia Sister 8         as child    Lung cancer Paternal Grandfather     Heart disease Unknown     Diabetes Daughter      Social History     Tobacco Use   Smoking Status Never   Smokeless Tobacco Never       Current Outpatient Medications:     acetaminophen (TYLENOL) 650 MG TbSR, as directed, Disp: , Rfl:     albuterol (PROVENTIL) 2.5 mg /3 mL (0.083 %) nebulizer solution, Take 3 mLs (2.5 mg total) by nebulization every 6 (six) hours as needed for Wheezing. Rescue, Disp: 30 each, Rfl: 3    allopurinoL (ZYLOPRIM) 100 MG tablet, Take 2 tablets (200 mg total) by mouth once daily., Disp: 180 tablet, Rfl: 1    aspirin 81 MG Chew, Take 1 tablet (81 mg total) by mouth once daily., Disp: 90 tablet, Rfl: 3    calcium carbonate 650 mg calcium (1,625 mg) tablet, Take 1 tablet by mouth once daily., Disp: , Rfl:     clopidogreL (PLAVIX) 75 mg tablet, Take 1 tablet (75 mg total) by mouth once. for 1 dose, Disp: 90 tablet, Rfl: 3    docusate sodium (COLACE) 100 MG capsule, Take 1 capsule (100 mg total) by mouth 2 (two) times daily., Disp: 60 capsule, Rfl: 0    docusate sodium (COLACE) 100 MG capsule, Colace Take No date recorded No form recorded No frequency recorded No route recorded No set duration recorded No set duration amount recorded active No dosage strength recorded No dosage strength units of measure recorded, Disp: , Rfl:     folic acid (FOLVITE) 1 MG tablet, Take 1 tablet (1 mg total) by mouth once daily., Disp:  90 tablet, Rfl: 1    furosemide (LASIX) 20 MG tablet, Take 1 tablet (20 mg total) by mouth once daily., Disp: 90 tablet, Rfl: 3    gabapentin (NEURONTIN) 100 MG capsule, Take 2 capsules (200 mg total) by mouth 3 (three) times daily., Disp: 540 capsule, Rfl: 1    garlic 2,000 mg Cap, Take 3,000 mg by mouth once daily. , Disp: , Rfl:     levothyroxine (SYNTHROID) 112 MCG tablet, Take 1 tablet (112 mcg total) by mouth before breakfast., Disp: 90 tablet, Rfl: 1    metoprolol succinate (TOPROL-XL) 100 MG 24 hr tablet, Take 1 tablet (100 mg total) by mouth 2 (two) times daily., Disp: 180 tablet, Rfl: 3    nitrofurantoin (MACRODANTIN) 100 MG capsule, Take 100 mg by mouth every evening., Disp: , Rfl:     nitroGLYCERIN (NITROSTAT) 0.4 MG SL tablet, Place 1 tablet (0.4 mg total) under the tongue every 5 (five) minutes as needed for Chest pain., Disp: 90 tablet, Rfl: 1    pyridoxine, vitamin B6, (B-6) 100 MG Tab, Take 200 mg by mouth once daily. , Disp: , Rfl:     REPATHA SURECLICK 140 mg/mL PnIj, INJECT 1 ML (140 MG) UNDER THE SKIN EVERY 14 DAYS, Disp: 6 mL, Rfl: 3    spironolactone (ALDACTONE) 25 MG tablet, Take 1 tablet (25 mg total) by mouth once daily., Disp: 90 tablet, Rfl: 3    vitamin D 1000 units Tab, Take 185 mg by mouth once daily., Disp: , Rfl:      Objective:     There were no vitals filed for this visit.  Physical Exam   Constitutional: She appears obese.   Eyes: Conjunctivae are normal.   Pulmonary/Chest: Effort normal. No respiratory distress.   Musculoskeletal:         General: Deformity present. No swelling or tenderness. Normal range of motion.   Neurological: She displays no weakness.   Skin: Rash noted.     Reviewed available old and all outside pertinent medical records available.    All lab results personally reviewed and interpreted by me.       ASSESSMENT      Encounter Diagnoses   Name Primary?    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  Excellent response to Cosentyx dose increase to 300mg per day prescribed last encounter.  Intermittent trigger finger, not active today.  Mild refractory PsO <10%BSA.  No squeeze tenderness or enthesitis on exam.  No active dactylitis.  Labs w/o concern of biologic toxicity.  DJD/DDD changes on imaging.  Active synovitis previously reported on MRI.  DXA w/o significant bone loss.  Plan to c/w Cosentyx 300mg per month unchanged.  Voltaren gel and finger splint PRN.  Consider orthopedics eval. If recurrent trigger finger.  Trial of Dovonex for refractory skin PsO, if no improvement suggest f/u w/ Dermatology.  Lab repeat close to f/u visit.    Jacky Ackerman M.D.

## 2023-07-14 ENCOUNTER — TELEPHONE (OUTPATIENT)
Dept: PRIMARY CARE CLINIC | Facility: CLINIC | Age: 73
End: 2023-07-14
Payer: MEDICARE

## 2023-07-14 ENCOUNTER — TELEPHONE (OUTPATIENT)
Dept: CARDIOLOGY | Facility: CLINIC | Age: 73
End: 2023-07-14
Payer: MEDICARE

## 2023-07-14 DIAGNOSIS — I50.32 CHRONIC DIASTOLIC HEART FAILURE: Primary | Chronic | ICD-10-CM

## 2023-07-14 DIAGNOSIS — I25.118 CORONARY ARTERY DISEASE OF NATIVE ARTERY OF NATIVE HEART WITH STABLE ANGINA PECTORIS: ICD-10-CM

## 2023-07-14 RX ORDER — FUROSEMIDE 20 MG/1
20 TABLET ORAL 2 TIMES DAILY
Qty: 60 TABLET | Refills: 5 | Status: SHIPPED | OUTPATIENT
Start: 2023-07-14 | End: 2023-10-27 | Stop reason: SDUPTHER

## 2023-07-14 NOTE — TELEPHONE ENCOUNTER
----- Message from Jhon Springer sent at 7/14/2023  3:12 PM CDT -----  Contact: [phone]  Dr Philippe arriola they want to up dose lasix of 20mg once to 20mg twice a day. Getting lab work done on 7/28

## 2023-07-14 NOTE — TELEPHONE ENCOUNTER
Pt was contacted about results:     The lab showed that remains CHF  Increase Lasix rto 20 mg bid  Repeat BMP in 2 weeks     Labs scheduled. Pt confirmed date/time/location      Pt verbalized understanding with no questions or concerns.        ----- Message from Asif Paez MD sent at 7/14/2023  2:14 PM CDT -----  The lab showed that remains CHF  Increase Lasix rto 20 mg bid  Repeat BMP in 2 weeks

## 2023-07-16 PROBLEM — N18.32 STAGE 3B CHRONIC KIDNEY DISEASE: Chronic | Status: ACTIVE | Noted: 2017-03-03

## 2023-07-16 PROBLEM — R30.0 DYSURIA: Status: RESOLVED | Noted: 2023-02-15 | Resolved: 2023-07-16

## 2023-07-16 NOTE — ASSESSMENT & PLAN NOTE
BP at goal w current Rx - cont hold ARB for now - f/u cardiology recommendations regarding diuretic

## 2023-07-16 NOTE — ASSESSMENT & PLAN NOTE
Symptoms improved - cont current Rx - f/u cardiology Dr. Paez tomorrow as scheduled - cont monitor wt at home - cont monitor/limit salt/sodium intake

## 2023-07-16 NOTE — ASSESSMENT & PLAN NOTE
Mild anemia - last iron levels wnl s/p iron infusions - f/u again regarding overdue colon cancer screening (colonoscopy)

## 2023-07-16 NOTE — ASSESSMENT & PLAN NOTE
Note continued gradual weight loss - 25 lbs over past year - cont encourage movement, healthy food and beverage choices

## 2023-07-28 ENCOUNTER — LAB VISIT (OUTPATIENT)
Dept: LAB | Facility: HOSPITAL | Age: 73
End: 2023-07-28
Attending: INTERNAL MEDICINE
Payer: MEDICARE

## 2023-07-28 DIAGNOSIS — I50.32 CHRONIC DIASTOLIC HEART FAILURE: Chronic | ICD-10-CM

## 2023-07-28 LAB
ANION GAP SERPL CALC-SCNC: 12 MMOL/L (ref 8–16)
BUN SERPL-MCNC: 15 MG/DL (ref 8–23)
CALCIUM SERPL-MCNC: 9.1 MG/DL (ref 8.7–10.5)
CHLORIDE SERPL-SCNC: 103 MMOL/L (ref 95–110)
CO2 SERPL-SCNC: 28 MMOL/L (ref 23–29)
CREAT SERPL-MCNC: 1 MG/DL (ref 0.5–1.4)
EST. GFR  (NO RACE VARIABLE): 59.9 ML/MIN/1.73 M^2
GLUCOSE SERPL-MCNC: 142 MG/DL (ref 70–110)
POTASSIUM SERPL-SCNC: 3.8 MMOL/L (ref 3.5–5.1)
SODIUM SERPL-SCNC: 143 MMOL/L (ref 136–145)

## 2023-07-28 PROCEDURE — 36415 COLL VENOUS BLD VENIPUNCTURE: CPT | Mod: PO | Performed by: INTERNAL MEDICINE

## 2023-07-28 PROCEDURE — 80048 BASIC METABOLIC PNL TOTAL CA: CPT | Performed by: INTERNAL MEDICINE

## 2023-07-28 RX ORDER — ALLOPURINOL 100 MG/1
TABLET ORAL
Qty: 180 TABLET | Refills: 3 | Status: SHIPPED | OUTPATIENT
Start: 2023-07-28

## 2023-07-30 RX ORDER — SPIRONOLACTONE 25 MG/1
25 TABLET ORAL DAILY
Qty: 30 TABLET | Refills: 11 | OUTPATIENT
Start: 2023-07-30 | End: 2024-07-29

## 2023-07-31 ENCOUNTER — TELEPHONE (OUTPATIENT)
Dept: CARDIOLOGY | Facility: CLINIC | Age: 73
End: 2023-07-31
Payer: MEDICARE

## 2023-07-31 DIAGNOSIS — I50.32 CHRONIC DIASTOLIC HEART FAILURE: Primary | Chronic | ICD-10-CM

## 2023-07-31 NOTE — TELEPHONE ENCOUNTER
Contacted patient; Patient received and understood results and schedule follow up lab work in 4 weeks.     ----- Message from Asif Paez MD sent at 7/31/2023  4:41 PM CDT -----  The lab showed stable  Continue Lasix 20 mg bid  Repeat BMP in 4 weeks

## 2023-08-09 RX ORDER — GABAPENTIN 100 MG/1
200 CAPSULE ORAL 3 TIMES DAILY
Qty: 540 CAPSULE | Refills: 3 | Status: SHIPPED | OUTPATIENT
Start: 2023-08-09

## 2023-08-16 DIAGNOSIS — R07.2 PRECORDIAL PAIN: ICD-10-CM

## 2023-08-16 RX ORDER — NITROGLYCERIN 0.4 MG/1
TABLET SUBLINGUAL
Qty: 75 TABLET | Refills: 3 | Status: SHIPPED | OUTPATIENT
Start: 2023-08-16

## 2023-08-24 ENCOUNTER — LAB VISIT (OUTPATIENT)
Dept: LAB | Facility: HOSPITAL | Age: 73
End: 2023-08-24
Attending: INTERNAL MEDICINE
Payer: MEDICARE

## 2023-08-24 DIAGNOSIS — G45.9 TRANSIENT ISCHEMIC ATTACK (TIA): ICD-10-CM

## 2023-08-24 DIAGNOSIS — I25.118 CORONARY ARTERY DISEASE OF NATIVE ARTERY OF NATIVE HEART WITH STABLE ANGINA PECTORIS: ICD-10-CM

## 2023-08-24 DIAGNOSIS — E11.22 TYPE 2 DIABETES MELLITUS WITH STAGE 3A CHRONIC KIDNEY DISEASE, WITHOUT LONG-TERM CURRENT USE OF INSULIN: ICD-10-CM

## 2023-08-24 DIAGNOSIS — N18.31 TYPE 2 DIABETES MELLITUS WITH STAGE 3A CHRONIC KIDNEY DISEASE, WITHOUT LONG-TERM CURRENT USE OF INSULIN: ICD-10-CM

## 2023-08-24 DIAGNOSIS — Z86.73 HISTORY OF CVA (CEREBROVASCULAR ACCIDENT): ICD-10-CM

## 2023-08-24 LAB
ALBUMIN SERPL BCP-MCNC: 3.2 G/DL (ref 3.5–5.2)
ALP SERPL-CCNC: 52 U/L (ref 55–135)
ALT SERPL W/O P-5'-P-CCNC: 12 U/L (ref 10–44)
ANION GAP SERPL CALC-SCNC: 8 MMOL/L (ref 8–16)
AST SERPL-CCNC: 20 U/L (ref 10–40)
BILIRUB SERPL-MCNC: 0.4 MG/DL (ref 0.1–1)
BUN SERPL-MCNC: 14 MG/DL (ref 8–23)
CALCIUM SERPL-MCNC: 9.4 MG/DL (ref 8.7–10.5)
CHLORIDE SERPL-SCNC: 107 MMOL/L (ref 95–110)
CHOLEST SERPL-MCNC: 137 MG/DL (ref 120–199)
CHOLEST/HDLC SERPL: 3.9 {RATIO} (ref 2–5)
CO2 SERPL-SCNC: 29 MMOL/L (ref 23–29)
CREAT SERPL-MCNC: 1.1 MG/DL (ref 0.5–1.4)
EST. GFR  (NO RACE VARIABLE): 53.4 ML/MIN/1.73 M^2
ESTIMATED AVG GLUCOSE: 128 MG/DL (ref 68–131)
GLUCOSE SERPL-MCNC: 122 MG/DL (ref 70–110)
HBA1C MFR BLD: 6.1 % (ref 4–5.6)
HDLC SERPL-MCNC: 35 MG/DL (ref 40–75)
HDLC SERPL: 25.5 % (ref 20–50)
LDLC SERPL CALC-MCNC: 69.4 MG/DL (ref 63–159)
NONHDLC SERPL-MCNC: 102 MG/DL
POTASSIUM SERPL-SCNC: 4 MMOL/L (ref 3.5–5.1)
PROT SERPL-MCNC: 6.5 G/DL (ref 6–8.4)
SODIUM SERPL-SCNC: 144 MMOL/L (ref 136–145)
TRIGL SERPL-MCNC: 163 MG/DL (ref 30–150)

## 2023-08-24 PROCEDURE — 36415 COLL VENOUS BLD VENIPUNCTURE: CPT | Mod: PO | Performed by: INTERNAL MEDICINE

## 2023-08-24 PROCEDURE — 80053 COMPREHEN METABOLIC PANEL: CPT | Performed by: INTERNAL MEDICINE

## 2023-08-24 PROCEDURE — 80061 LIPID PANEL: CPT | Performed by: INTERNAL MEDICINE

## 2023-08-24 PROCEDURE — 83036 HEMOGLOBIN GLYCOSYLATED A1C: CPT | Performed by: INTERNAL MEDICINE

## 2023-08-31 ENCOUNTER — OFFICE VISIT (OUTPATIENT)
Dept: CARDIOLOGY | Facility: CLINIC | Age: 73
End: 2023-08-31
Payer: MEDICARE

## 2023-08-31 VITALS
OXYGEN SATURATION: 98 % | SYSTOLIC BLOOD PRESSURE: 176 MMHG | DIASTOLIC BLOOD PRESSURE: 90 MMHG | HEART RATE: 85 BPM | WEIGHT: 230.63 LBS | BODY MASS INDEX: 39.37 KG/M2 | HEIGHT: 64 IN | RESPIRATION RATE: 16 BRPM

## 2023-08-31 DIAGNOSIS — E11.22 TYPE 2 DIABETES MELLITUS WITH STAGE 3A CHRONIC KIDNEY DISEASE, WITHOUT LONG-TERM CURRENT USE OF INSULIN: ICD-10-CM

## 2023-08-31 DIAGNOSIS — N18.32 STAGE 3B CHRONIC KIDNEY DISEASE: Chronic | ICD-10-CM

## 2023-08-31 DIAGNOSIS — E66.01 CLASS 2 SEVERE OBESITY DUE TO EXCESS CALORIES WITH SERIOUS COMORBIDITY AND BODY MASS INDEX (BMI) OF 38.0 TO 38.9 IN ADULT: ICD-10-CM

## 2023-08-31 DIAGNOSIS — I70.0 AORTIC ATHEROSCLEROSIS: Chronic | ICD-10-CM

## 2023-08-31 DIAGNOSIS — I50.32 CHRONIC DIASTOLIC HEART FAILURE: Chronic | ICD-10-CM

## 2023-08-31 DIAGNOSIS — Z88.8 ALLERGY TO STATIN MEDICATION: ICD-10-CM

## 2023-08-31 DIAGNOSIS — I25.118 CORONARY ARTERY DISEASE OF NATIVE ARTERY OF NATIVE HEART WITH STABLE ANGINA PECTORIS: Primary | ICD-10-CM

## 2023-08-31 DIAGNOSIS — D50.0 IRON DEFICIENCY ANEMIA DUE TO CHRONIC BLOOD LOSS: Chronic | ICD-10-CM

## 2023-08-31 DIAGNOSIS — E78.5 HYPERLIPIDEMIA, UNSPECIFIED HYPERLIPIDEMIA TYPE: Chronic | ICD-10-CM

## 2023-08-31 DIAGNOSIS — I35.0 NONRHEUMATIC AORTIC VALVE STENOSIS: Chronic | ICD-10-CM

## 2023-08-31 DIAGNOSIS — G47.33 OSA (OBSTRUCTIVE SLEEP APNEA): ICD-10-CM

## 2023-08-31 DIAGNOSIS — N18.31 TYPE 2 DIABETES MELLITUS WITH STAGE 3A CHRONIC KIDNEY DISEASE, WITHOUT LONG-TERM CURRENT USE OF INSULIN: ICD-10-CM

## 2023-08-31 DIAGNOSIS — E88.810 METABOLIC SYNDROME: ICD-10-CM

## 2023-08-31 DIAGNOSIS — I27.20 PULMONARY HYPERTENSION: Chronic | ICD-10-CM

## 2023-08-31 DIAGNOSIS — I65.21 STENOSIS OF RIGHT CAROTID ARTERY: ICD-10-CM

## 2023-08-31 DIAGNOSIS — I25.810: ICD-10-CM

## 2023-08-31 DIAGNOSIS — Z95.2 S/P TAVR (TRANSCATHETER AORTIC VALVE REPLACEMENT): ICD-10-CM

## 2023-08-31 DIAGNOSIS — I10 ESSENTIAL HYPERTENSION: Chronic | ICD-10-CM

## 2023-08-31 DIAGNOSIS — I25.10 CAD, MULTIPLE VESSEL: ICD-10-CM

## 2023-08-31 DIAGNOSIS — G62.9 POLYNEUROPATHY: Chronic | ICD-10-CM

## 2023-08-31 DIAGNOSIS — M1A.3721 CHRONIC GOUT DUE TO RENAL IMPAIRMENT INVOLVING TOE OF LEFT FOOT WITH TOPHUS: Chronic | ICD-10-CM

## 2023-08-31 DIAGNOSIS — E11.42 TYPE 2 DIABETES MELLITUS WITH PERIPHERAL NEUROPATHY: Chronic | ICD-10-CM

## 2023-08-31 DIAGNOSIS — I47.10 SUPRAVENTRICULAR TACHYCARDIA: Chronic | ICD-10-CM

## 2023-08-31 DIAGNOSIS — E03.9 HYPOTHYROIDISM, UNSPECIFIED TYPE: Chronic | ICD-10-CM

## 2023-08-31 DIAGNOSIS — I20.9 ANGINA, CLASS II: Chronic | ICD-10-CM

## 2023-08-31 DIAGNOSIS — Z95.1 S/P CABG (CORONARY ARTERY BYPASS GRAFT): Chronic | ICD-10-CM

## 2023-08-31 PROCEDURE — 99214 PR OFFICE/OUTPT VISIT, EST, LEVL IV, 30-39 MIN: ICD-10-PCS | Mod: S$PBB,,, | Performed by: INTERNAL MEDICINE

## 2023-08-31 PROCEDURE — 99999 PR PBB SHADOW E&M-EST. PATIENT-LVL IV: CPT | Mod: PBBFAC,,, | Performed by: INTERNAL MEDICINE

## 2023-08-31 PROCEDURE — 99999 PR PBB SHADOW E&M-EST. PATIENT-LVL IV: ICD-10-PCS | Mod: PBBFAC,,, | Performed by: INTERNAL MEDICINE

## 2023-08-31 PROCEDURE — 99214 OFFICE O/P EST MOD 30 MIN: CPT | Mod: S$PBB,,, | Performed by: INTERNAL MEDICINE

## 2023-08-31 PROCEDURE — 99214 OFFICE O/P EST MOD 30 MIN: CPT | Mod: PBBFAC | Performed by: INTERNAL MEDICINE
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
## 2023-09-12 ENCOUNTER — TELEPHONE (OUTPATIENT)
Dept: TRANSPLANT | Facility: CLINIC | Age: 73
End: 2023-09-12
Payer: MEDICARE

## 2023-09-12 NOTE — TELEPHONE ENCOUNTER
I returned call. Appt rescheduled. ----- Message from Meseret Castillo sent at 9/12/2023 10:33 AM CDT -----  Contact: patient  [phone]  Patient called requesting a call back from the clinical staff, to reschedule her appt if possible

## 2023-09-19 NOTE — Clinical Note
Good afternoon Dr Ibarra - Ms Ortiz had episode of CP last night, relieved by rest. Today looks like new T wave inversion in inferior leads. We scheduled her an appt to see Rosa Child on 5/14 - you didn't have openings. Please let me know if you have other recommendations -Thank you! Provider Procedure Text (B): After obtaining clear surgical margins the defect was repaired by another provider.

## 2023-09-20 DIAGNOSIS — E03.9 HYPOTHYROIDISM, UNSPECIFIED TYPE: ICD-10-CM

## 2023-09-20 DIAGNOSIS — E53.8 FOLIC ACID DEFICIENCY: ICD-10-CM

## 2023-09-20 RX ORDER — FOLIC ACID 1 MG/1
1000 TABLET ORAL
Qty: 90 TABLET | Refills: 3 | Status: SHIPPED | OUTPATIENT
Start: 2023-09-20

## 2023-09-20 RX ORDER — LEVOTHYROXINE SODIUM 112 UG/1
112 TABLET ORAL
Qty: 90 TABLET | Refills: 3 | Status: SHIPPED | OUTPATIENT
Start: 2023-09-20 | End: 2023-09-30

## 2023-09-25 NOTE — PLAN OF CARE
Ms. Ortiz states she feels like she has more energy this week.   Arava Counseling:  Patient counseled regarding adverse effects of Arava including but not limited to nausea, vomiting, abnormalities in liver function tests. Patients may develop mouth sores, rash, diarrhea, and abnormalities in blood counts. The patient understands that monitoring is required including LFTs and blood counts.  There is a rare possibility of scarring of the liver and lung problems that can occur when taking methotrexate. Persistent nausea, loss of appetite, pale stools, dark urine, cough, and shortness of breath should be reported immediately. Patient advised to discontinue Arava treatment and consult with a physician prior to attempting conception. The patient will have to undergo a treatment to eliminate Arava from the body prior to conception.

## 2023-09-28 ENCOUNTER — TELEPHONE (OUTPATIENT)
Dept: CARDIOLOGY | Facility: CLINIC | Age: 73
End: 2023-09-28
Payer: MEDICARE

## 2023-09-28 ENCOUNTER — CLINICAL SUPPORT (OUTPATIENT)
Dept: CARDIOLOGY | Facility: CLINIC | Age: 73
End: 2023-09-28
Payer: MEDICARE

## 2023-09-28 VITALS — DIASTOLIC BLOOD PRESSURE: 74 MMHG | SYSTOLIC BLOOD PRESSURE: 137 MMHG

## 2023-09-28 DIAGNOSIS — I10 ESSENTIAL HYPERTENSION: Primary | ICD-10-CM

## 2023-09-28 PROCEDURE — 99999 PR PBB SHADOW E&M-EST. PATIENT-LVL I: CPT | Mod: PBBFAC,,,

## 2023-09-28 PROCEDURE — 99999 PR PBB SHADOW E&M-EST. PATIENT-LVL I: ICD-10-PCS | Mod: PBBFAC,,,

## 2023-09-28 PROCEDURE — 99211 OFF/OP EST MAY X REQ PHY/QHP: CPT | Mod: PBBFAC

## 2023-09-28 NOTE — TELEPHONE ENCOUNTER
Please document in chart that her bp is controlled. Thxs    Blood pressures are documented in chart already.

## 2023-09-28 NOTE — TELEPHONE ENCOUNTER
Patient came in for nurse visit for b/p check. Left 122/62   right 122/66. Her machine registered 137/74 on the left. Also I uploaded her readings in

## 2023-09-29 ENCOUNTER — TELEPHONE (OUTPATIENT)
Dept: PRIMARY CARE CLINIC | Facility: CLINIC | Age: 73
End: 2023-09-29
Payer: MEDICARE

## 2023-09-29 ENCOUNTER — OFFICE VISIT (OUTPATIENT)
Dept: PRIMARY CARE CLINIC | Facility: CLINIC | Age: 73
End: 2023-09-29
Payer: MEDICARE

## 2023-09-29 VITALS
BODY MASS INDEX: 38.21 KG/M2 | TEMPERATURE: 97 F | HEIGHT: 64 IN | WEIGHT: 223.81 LBS | HEART RATE: 68 BPM | SYSTOLIC BLOOD PRESSURE: 175 MMHG | DIASTOLIC BLOOD PRESSURE: 79 MMHG | OXYGEN SATURATION: 98 %

## 2023-09-29 DIAGNOSIS — M1A.3721 CHRONIC GOUT DUE TO RENAL IMPAIRMENT INVOLVING TOE OF LEFT FOOT WITH TOPHUS: Chronic | ICD-10-CM

## 2023-09-29 DIAGNOSIS — Z86.73 HISTORY OF CVA (CEREBROVASCULAR ACCIDENT): Chronic | ICD-10-CM

## 2023-09-29 DIAGNOSIS — E61.1 IRON DEFICIENCY: Primary | ICD-10-CM

## 2023-09-29 DIAGNOSIS — H91.93 BILATERAL HEARING LOSS, UNSPECIFIED HEARING LOSS TYPE: Chronic | ICD-10-CM

## 2023-09-29 DIAGNOSIS — E53.8 B12 DEFICIENCY: ICD-10-CM

## 2023-09-29 DIAGNOSIS — G62.9 POLYNEUROPATHY: Chronic | ICD-10-CM

## 2023-09-29 DIAGNOSIS — R26.89 BALANCE PROBLEMS: Chronic | ICD-10-CM

## 2023-09-29 DIAGNOSIS — I10 ESSENTIAL HYPERTENSION: Chronic | ICD-10-CM

## 2023-09-29 DIAGNOSIS — I50.32 CHRONIC DIASTOLIC HEART FAILURE: Chronic | ICD-10-CM

## 2023-09-29 DIAGNOSIS — E03.9 HYPOTHYROIDISM, UNSPECIFIED TYPE: Chronic | ICD-10-CM

## 2023-09-29 DIAGNOSIS — I95.1 ORTHOSTATIC HYPOTENSION: ICD-10-CM

## 2023-09-29 DIAGNOSIS — R73.03 PREDIABETES: ICD-10-CM

## 2023-09-29 DIAGNOSIS — Z86.2 HISTORY OF IRON DEFICIENCY ANEMIA: ICD-10-CM

## 2023-09-29 DIAGNOSIS — R00.2 PALPITATIONS: ICD-10-CM

## 2023-09-29 DIAGNOSIS — R54 FRAILTY SYNDROME IN GERIATRIC PATIENT: ICD-10-CM

## 2023-09-29 PROCEDURE — 99215 OFFICE O/P EST HI 40 MIN: CPT | Mod: S$PBB,,, | Performed by: INTERNAL MEDICINE

## 2023-09-29 PROCEDURE — 99215 PR OFFICE/OUTPT VISIT, EST, LEVL V, 40-54 MIN: ICD-10-PCS | Mod: S$PBB,,, | Performed by: INTERNAL MEDICINE

## 2023-09-29 PROCEDURE — 99999 PR PBB SHADOW E&M-EST. PATIENT-LVL V: ICD-10-PCS | Mod: PBBFAC,,, | Performed by: INTERNAL MEDICINE

## 2023-09-29 PROCEDURE — 99417 PROLNG OP E/M EACH 15 MIN: CPT | Mod: S$PBB,,, | Performed by: INTERNAL MEDICINE

## 2023-09-29 PROCEDURE — 99999 PR PBB SHADOW E&M-EST. PATIENT-LVL V: CPT | Mod: PBBFAC,,, | Performed by: INTERNAL MEDICINE

## 2023-09-29 PROCEDURE — 99215 OFFICE O/P EST HI 40 MIN: CPT | Mod: PBBFAC,PN | Performed by: INTERNAL MEDICINE

## 2023-09-29 PROCEDURE — 99417 PR PROLONGED SVC, OUTPT, W/WO DIRECT PT CONTACT,  EA ADDTL 15 MIN: ICD-10-PCS | Mod: S$PBB,,, | Performed by: INTERNAL MEDICINE

## 2023-09-29 NOTE — TELEPHONE ENCOUNTER
Spoke with pt- states that she is having trouble with fatigue. Also concerned about PVC's yesterday. Told pt to come today for 2:00.

## 2023-09-29 NOTE — PROGRESS NOTES
Qi Ortiz  09/29/2023  [id number]    Lisa Porter MD  Patient Care Team:  Lisa Porter MD as PCP - General (Internal Medicine)  Elma Hernandez NP as Nurse Practitioner (Family Medicine)    Visit Type: Follow-up    Chief Complaint: Fatigue  Chief Complaint   Patient presents with    [facility] Care    Fatigue     History of Present Illness: Ms. Qi Ortiz is a 73 year old female w multiple chronic medical issues here w c/o fatigue.      Chronic medical issues include type 2 DM, h/o CVA, CAD s/p CABG x 2, AS s/p TAVR, HTN, HLP (intolerant of statin), CKD stage 3, hypothyroidism, psoriasis and psoriatic arthritis, CAREN, obesity, and IRIS (intolerant of CPAP). Ms. Ortiz has multiple drug and contact allergies and sensitivities. Food allergies include kiwi fruit and crab boil.     Ms. Ortiz is hearing impaired - gradual hearing loss over past 15 years, now severe. Unble to use hearing aids due to psoriasis in her ear canals. Ms. Ortiz does not have a cell phone, smart phone or internet. She does have a land-line w a speaker to make louder. Ms. Ortiz's daughters, Elsa and Shyanne, live close to her. They work during the day. Shyanne is Ms. Ortiz's HCPOA - OK to discuss medical issues w her.     GI upset earlier this month w nausea and decreased appetite - better now  A lot of chaos in her neighborhood causing increased stress   Reports palpitations and lightheadedness while waiting for cardiology nurse visit  yesterday  Then lightheaded w near syncope getting out of car when got home yesterday  Denies any recent falls but issues w balance long-standing  Says tried using a walker but caused more problems than helped    PHQ-4 Score: 2     From last visit w me 7/12/23  Has been checking manual BP's at home and they've been 120's-130's/68ish   Thinks home automatic BP monitor reading too high  No LE edema  Dyspnea w exertion much better  No more dizziness  PHQ-4 Score: 0     Hosp/ED/Urgent  Care:  6/06 - 6/0723 [facility] CHF exacerbation    Recent appointments:   8/31/23 Cards Khuri  7/13/23 Rheum Ackerman psoriasis/arthritis  7/12/23 O65+ Porter  6/30/23 Cards Enrique  6/13/23 O65+ Porter  6/06/23 Cards Mateusz Angina  6/05/23 O65+ Cook  5/22/23 O65+ Mary EAWV    Upcoming appointments:  Future Appointments       Date Provider Specialty Appt Notes    10/12/2023 Elma Hernandez NP Primary Care 3 month f/u    1/25/2024  Lab l    2/7/2024 Jacky Ackerman MD Rheumatology suly/bc           The following were reviewed: Active problem list, medication list, allergies, family history, social history, and Health Maintenance.     Medications have been reviewed and reconciled with patient at visit today.    Review of Systems   See HPI above    Exam: Initial VS /72 P 71 sat 98% temp 97.4  Vitals:    09/29/23 1501   BP: (!) 175/79   Pulse: 68   Temp:      Weight: 101.5 kg (223 lb 12.8 oz)   Body mass index is 38.42 kg/m².    Today [facility]  BP  Pulse  Lying   198/82  62    Stand 1 min      141/66  70  Stand 3 min  175/79  68    Yesterday cardiology nurse visit   BP  Left 122/62   Right 122/66  w home monitor BP Left 137/74     BP Readings from Last 3 Encounters:   09/29/23 (!) 175/79   09/28/23 137/74   08/31/23 (!) 176/90     Wt Readings from Last 3 Encounters:   09/29/23 1347 101.5 kg (223 lb 12.8 oz)   08/31/23 1337 104.6 kg (230 lb 9.6 oz)   07/13/23 1101 102.8 kg (226 lb 10.1 oz)      Physical Exam  Constitutional:       General: She is not in acute distress.     Appearance: She is obese.   HENT:      Head: Normocephalic and atraumatic.      Right Ear: Ear canal and external ear normal.      Left Ear: Ear canal and external ear normal.      Ears:      Comments: Opaque TMs     Nose: Nose normal.      Mouth/Throat:      Mouth: Mucous membranes are moist.      Pharynx: Oropharynx is clear.      Comments: Loose dentures  Eyes:      Extraocular Movements: Extraocular movements intact.       Conjunctiva/sclera: Conjunctivae normal.      Comments: Photophobia - chronic   Neck:      Vascular: No carotid bruit.   Cardiovascular:      Rate and Rhythm: Normal rate and regular rhythm.      Heart sounds: Murmur heard.   Pulmonary:      Effort: Pulmonary effort is normal.      Breath sounds: Normal breath sounds. No wheezing, rhonchi or rales.   Abdominal:      General: Abdomen is flat. Bowel sounds are normal.      Palpations: Abdomen is soft.      Tenderness: There is no abdominal tenderness. There is no guarding.   Musculoskeletal:      Right lower leg: No edema.      Left lower leg: No edema.   Feet:      Right foot:      Protective Sensation: 4 sites tested.  0 sites sensed.      Skin integrity: Skin integrity normal. No skin breakdown.      Left foot:      Protective Sensation: 4 sites tested.  0 sites sensed.      Skin integrity: Skin integrity normal. No skin breakdown.   Lymphadenopathy:      Cervical: No cervical adenopathy.   Skin:     General: Skin is warm and dry.      Comments: Few small dry psoriatic spots   Neurological:      Mental Status: She is alert and oriented to person, place, and time.      Gait: Gait abnormal.      Comments: Very wobbly on moving from lying to stand  Decreased sensation to touch B lower legs and feet   Psychiatric:         Mood and Affect: Mood normal.         Behavior: Behavior normal.         Thought Content: Thought content normal.     Laboratory Reviewed  Lab Results   Component Value Date    WBC 7.67 09/29/2023    HGB 12.1 09/29/2023    HCT 40.9 09/29/2023     09/29/2023    MCV 84 09/29/2023    CHOL 137 08/24/2023    TRIG 163 (H) 08/24/2023    HDL 35 (L) 08/24/2023    LDLCALC 69.4 08/24/2023    ALT 12 08/24/2023    AST 20 08/24/2023     09/29/2023    K 4.2 09/29/2023     09/29/2023    CREATININE 1.1 09/29/2023    BUN 21 09/29/2023    CO2 29 09/29/2023    MG 2.0 06/13/2023    TSH 0.442 09/29/2023    FREET4 1.11 09/29/2023    INR 1.0 09/28/2020     HGBA1C 6.1 (H) 08/24/2023    CRP 6.6 07/18/2022     Lab Results   Component Value Date    PTH 91.5 (H) 05/20/2022    CALCIUM 9.6 09/29/2023    PHOS 3.8 09/29/2023      Lab Results   Component Value Date    DLPNTWYM26 >2000 (H) 06/13/2023     Lab Results   Component Value Date    FOLATE 18.7 05/20/2022      Lab Results   Component Value Date    IRON 38 09/29/2023    TRANSFERRIN 297 09/29/2023    TIBC 440 09/29/2023    FESATURATED 9 (L) 09/29/2023      Lab Results   Component Value Date    EGFRNORACEVR 53.4 (A) 09/29/2023    ALBUMIN 3.6 09/29/2023     (H) 09/29/2023 9/29/23 uric acid 3.7 ferritin 17 urine microalb/crt wnl  6/13/23 vit D 45  2/01/23 ferritin 205  10/14/22 uric acid 6.1  5/20/22 B12 > 2000 mma 0.47(H)    CT Abd Pelvis 2/20/23 Kidneys/ Ureters: No hydronephrosis.  No obstructive uropathy.  Left renal simple cyst.  No nonobstructive nephrolithiasis.  Bladder: Decompressed.  Vasculature: mild aortoiliac atherosclerosis.  No aneurysm  Bones: No acute fracture.  Multifocal degenerative changes.  Impression: No definite acute abnormality identified. Specifically no renal stones.      DEXA 7/25/22 No evidence of significant bone density loss    ECG 6/05/23 Sinus bradycardia ST and T wave abnormality, consider lateral ischemia     Holter 5/24-5/29/23 The diary was returned incomplete. There were rare hookup related artifacts. Overall, the study was of good quality. The tape was adequate (0 days , 48 hours, 0 minutes).  Predominant Rhythm Sinus rhythm with heart rates varying between 38 and 101 BPM with an average of 61BPM.  Ventricular Arrhythmias There were occasional PVCs totalling 1393 and averaging 29.02 per hour.  Supraventricular Arrhythmias There were rare PACs totalling 324 and averaging 6.75 per hour.  The circadian pattern of sinus rate variability followed a typical curve.    Echo 5/23/22 The left ventricle is normal in size with normal systolic function.  Indeterminate left ventricular  diastolic function.  The estimated PA systolic pressure is 40 mmHg.  Normal right ventricular size with normal right ventricular systolic function.  Normal central venous pressure (3 mmHg).  Small anterior pericardial effusion.  The estimated ejection fraction is 55%.  There is a transcutaneously-placed aortic bioprosthesis present. There is no aortic insufficiency present. Prosthetic aortic valve is normal.    Carotid US B 2/20/23 There is 0-19% right Internal Carotid Stenosis.  There is 20-39% left Internal Carotid Stenosis.      Assessment:   73 y.o. female with multiple co-morbid illnesses here for continued follow up of medical problems.      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      Plan:   1. Palpitations  Assessment & Plan:  Reports increased palpitations recently - PVCs noted on Holter study - check TFTs, electrolytes - next f/u cardiology?      2. Chronic diastolic heart failure  -     B-TYPE NATRIURETIC PEPTIDE; Future; Expected date: 09/29/2023    3. Frailty syndrome in geriatric patient  Assessment & Plan:  H/o CVA, orthostasis, balance problems, peripheral neuropathy - high risk for falls - declines to use AD - in agreement w consult to home PT for evaluation of home environment, balance and possibly recommendation and training in use of appropriate AD      4. Orthostatic hypotension  Assessment & Plan:  Elevated BP here today though significant drop in SBP moving from lying to standing and symptomatic    Orders:  -     Ambulatory  referral/consult to Home Health; Future; Expected date: 09/30/2023    5. Balance problems  -     Ambulatory referral/consult to Home Health; Future; Expected date: 09/30/2023    6. Polyneuropathy  -     Ambulatory referral/consult to Home Health; Future; Expected date: 09/30/2023    7. Hypothyroidism, unspecified type  -     TSH; Future; Expected date: 09/29/2023  -     T4, Free; Future; Expected date: 09/29/2023  -     levothyroxine (SYNTHROID) 100 MCG tablet; Take 1 tablet (100 mcg total) by mouth before breakfast.  Dispense: 90 tablet; Refill: 1    8. Iron deficiency anemia due to chronic blood loss  Overview:  Elida Alanis NP 8/6/2021  Iron levels replenished s/p Venofer x 8. She denies abnormal bleeding at preset time but notes intermittent blood noted in stool. Patient has not yet had follow up with GI service for endoscopies due to ongoing COVID concerns previously. She has been encouraged to follow up with GI. Rationale discussed in detail.   Patient unable to tolerate oral iron due to GI upset/constipation. F/u 3 months with repeat labs. Discussed S&S to report sooner        Assessment & Plan:  H/H wnl but iron and ferritin levels low - consider repeat IV iron infusions? Pt continues to defer recommended colonoscopy - strongly encourage pt to discuss w her daughters to arrange for one of them to accompany her     Orders:  -     CBC Auto Differential; Future; Expected date: 09/29/2023  -     Iron and TIBC; Future; Expected date: 09/29/2023  -     Ferritin; Future; Expected date: 09/29/2023    9. Prediabetes  -     Cancel: Microalbumin/creatinine urine ratio  -     Microalbumin/creatinine urine ratio; Future; Expected date: 09/29/2023    10. Essential hypertension  -     RENAL FUNCTION PANEL; Future; Expected date: 09/29/2023    11. History of CVA (cerebrovascular accident)  Assessment & Plan:  Cont secondary preventative measures - monitor BP HR at home       12. B12 deficiency  Assessment &  Plan:  Recommend continue/resume B12 supplementation (consider recheck B12, mma next routine labs)      13. 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 Bilateral hearing loss, unspecified hearing loss type  Assessment & Plan:  Very hard of hearing - says cannot use hearing aids      Other orders  -     cyanocobalamin, vitamin B-12, 1,000 mcg Subl; Place 1 tablet under the tongue once daily.  Dispense: 90 tablet; Refill: 1         Health Maintenance         Date Due Completion Date    COVID-19 Vaccine (1) Never done ---    Pneumococcal Vaccines (Age 65+) (1 - PCV) Never done ---    TETANUS VACCINE Never done ---    Colorectal Cancer Screening 04/18/2016 4/18/2011    Shingles Vaccine (2 of 2) 11/28/2022 10/3/2022    Mammogram 05/22/2024 (Originally 10/23/2016) 10/23/2015    Hemoglobin A1c 02/24/2024 8/24/2023    Eye Exam 03/06/2024 3/6/2023    Lipid Panel 08/24/2024 8/24/2023    Aspirin/Antiplatelet Therapy 09/29/2024 9/30/2023    Diabetes Urine Screening 09/29/2024 9/29/2023    Foot Exam 09/29/2024 9/29/2023 (Done)    Override on 9/29/2023: Done (pt w idiopathic peripheral neuropathy - not diabetic - prediabetic)    DEXA Scan 07/25/2025 7/25/2022          Discussed need for colonoscopy   Declines mammogram for L breast due to pain  Declines any vaccines    -Patient's lab results were reviewed and discussed with patient  -Treatment options and alternatives were discussed with the patient. Patient expressed understanding. Patient was given the opportunity to ask questions and be an active participant in their medical care. Patient had no further questions or concerns at this time.   -Patient is an overall moderate to HIGH risk for health complications from their medical conditions.     Follow up: Follow up for Follow Up already scheduled Oct 12 cali Wills.    After visit summary printed and given to patient upon discharge.  Patient  care plan included in After visit summary.    TOTAL TIME evaluating and managing this patient for this encounter was greater than 70 minutes. This time was spent personally by me on some of the following activities: review of patient's past medical history, assessing age-appropriate health maintenance needs, review of any interval history, review and interpretation of lab results, review and interpretation of imaging test results, review and interpretation of cardiology test results, reviewing consulting specialist notes, obtaining history from the patient and family, examination of the patient, medication reconciliation, managing and/or ordering prescription medications, ordering imaging tests, ordering referral to subspecialty provider(s), educating patient and answering their questions about diagnosis, treatment plan, and goals of treatment, discussing planned follow-up and final documentation of the visit. This time was exclusive of any separately billable procedures for this patient and exclusive of time spent treating any other patients.

## 2023-09-30 PROBLEM — R26.89 BALANCE PROBLEMS: Status: ACTIVE | Noted: 2023-09-30

## 2023-09-30 PROBLEM — H91.93 BILATERAL HEARING LOSS: Chronic | Status: ACTIVE | Noted: 2023-09-29

## 2023-09-30 PROBLEM — R54 FRAILTY SYNDROME IN GERIATRIC PATIENT: Status: ACTIVE | Noted: 2023-09-30

## 2023-09-30 PROBLEM — R54 FRAILTY SYNDROME IN GERIATRIC PATIENT: Chronic | Status: ACTIVE | Noted: 2023-09-29

## 2023-09-30 PROBLEM — R73.03 PREDIABETES: Status: ACTIVE | Noted: 2023-09-30

## 2023-09-30 PROBLEM — D50.0 IRON DEFICIENCY ANEMIA DUE TO CHRONIC BLOOD LOSS: Chronic | Status: ACTIVE | Noted: 2018-01-19

## 2023-09-30 PROBLEM — R54 FRAILTY SYNDROME IN GERIATRIC PATIENT: Chronic | Status: ACTIVE | Noted: 2023-09-30

## 2023-09-30 PROBLEM — E53.8 B12 DEFICIENCY: Chronic | Status: ACTIVE | Noted: 2018-01-19

## 2023-09-30 PROCEDURE — G0180 PR HOME HEALTH MD CERTIFICATION: ICD-10-PCS | Mod: ,,, | Performed by: INTERNAL MEDICINE

## 2023-09-30 PROCEDURE — G0180 MD CERTIFICATION HHA PATIENT: HCPCS | Mod: ,,, | Performed by: INTERNAL MEDICINE

## 2023-09-30 RX ORDER — LEVOTHYROXINE SODIUM 100 UG/1
100 TABLET ORAL
Qty: 90 TABLET | Refills: 1 | Status: SHIPPED | OUTPATIENT
Start: 2023-09-30 | End: 2024-03-12

## 2023-09-30 RX ORDER — MAGNESIUM 200 MG
1 TABLET ORAL DAILY
Qty: 90 TABLET | Refills: 1 | Status: SHIPPED | OUTPATIENT
Start: 2023-09-30 | End: 2024-03-28

## 2023-09-30 NOTE — ASSESSMENT & PLAN NOTE
Elevated BP here today though significant drop in SBP moving from lying to standing and symptomatic

## 2023-09-30 NOTE — ASSESSMENT & PLAN NOTE
H/H wnl but iron and ferritin levels low - consider repeat IV iron infusions? Pt continues to defer recommended colonoscopy - strongly encourage pt to discuss w her daughters to arrange for one of them to accompany her

## 2023-09-30 NOTE — ASSESSMENT & PLAN NOTE
H/o CVA, orthostasis, balance problems, peripheral neuropathy - high risk for falls - declines to use AD - in agreement w consult to home PT for evaluation of home environment, balance and possibly recommendation and training in use of appropriate AD

## 2023-09-30 NOTE — ASSESSMENT & PLAN NOTE
Reports increased palpitations recently - PVCs noted on Holter study - check TFTs, electrolytes - next f/u cardiology?

## 2023-10-02 ENCOUNTER — TELEPHONE (OUTPATIENT)
Dept: PRIMARY CARE CLINIC | Facility: CLINIC | Age: 73
End: 2023-10-02
Payer: MEDICARE

## 2023-10-02 NOTE — TELEPHONE ENCOUNTER
----- Message from Lisa Porter MD sent at 9/30/2023  9:31 AM CDT -----  Regarding: lab f/u  TSH lowest end of normal - increased PVCs could be related - recommend decrease levothyroxine dose - will send new prescription - CBC is ok but iron and ferritin levels are low again - will refer to Heme/Onc for IV iron infusions - did she stop taking B12 supplement? Recommend to restart at 1000 mg daily - I will send in prescription but if insurance doesn't cover can get OTC. BNP is a little up but better than in July. Continue current rx per cardiology recommendations. Uric acid level is good.

## 2023-10-03 ENCOUNTER — E-CONSULT (OUTPATIENT)
Dept: HEMATOLOGY/ONCOLOGY | Facility: CLINIC | Age: 73
End: 2023-10-03
Payer: MEDICARE

## 2023-10-03 ENCOUNTER — TELEPHONE (OUTPATIENT)
Dept: PRIMARY CARE CLINIC | Facility: CLINIC | Age: 73
End: 2023-10-03

## 2023-10-03 ENCOUNTER — OFFICE VISIT (OUTPATIENT)
Dept: PRIMARY CARE CLINIC | Facility: CLINIC | Age: 73
End: 2023-10-03
Payer: MEDICARE

## 2023-10-03 ENCOUNTER — LAB VISIT (OUTPATIENT)
Dept: LAB | Facility: HOSPITAL | Age: 73
End: 2023-10-03
Payer: MEDICARE

## 2023-10-03 VITALS
TEMPERATURE: 98 F | BODY MASS INDEX: 38.07 KG/M2 | DIASTOLIC BLOOD PRESSURE: 52 MMHG | WEIGHT: 223 LBS | HEIGHT: 64 IN | SYSTOLIC BLOOD PRESSURE: 130 MMHG | OXYGEN SATURATION: 98 % | HEART RATE: 58 BPM
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
## 2023-10-03 DIAGNOSIS — M79.652 PAIN OF LEFT THIGH: ICD-10-CM

## 2023-10-03 DIAGNOSIS — D50.0 IRON DEFICIENCY ANEMIA DUE TO CHRONIC BLOOD LOSS: Primary | ICD-10-CM

## 2023-10-03 DIAGNOSIS — M79.652 PAIN OF LEFT THIGH: Primary | ICD-10-CM

## 2023-10-03 LAB
BASOPHILS # BLD AUTO: 0.06 K/UL (ref 0–0.2)
BASOPHILS NFR BLD: 0.7 % (ref 0–1.9)
CRP SERPL-MCNC: 5.7 MG/L (ref 0–8.2)
DIFFERENTIAL METHOD: ABNORMAL
EOSINOPHIL # BLD AUTO: 0.2 K/UL (ref 0–0.5)
EOSINOPHIL NFR BLD: 2.1 % (ref 0–8)
ERYTHROCYTE [DISTWIDTH] IN BLOOD BY AUTOMATED COUNT: 17.9 % (ref 11.5–14.5)
HCT VFR BLD AUTO: 40.4 % (ref 37–48.5)
HGB BLD-MCNC: 11.9 G/DL (ref 12–16)
IMM GRANULOCYTES # BLD AUTO: 0.01 K/UL (ref 0–0.04)
IMM GRANULOCYTES NFR BLD AUTO: 0.1 % (ref 0–0.5)
LYMPHOCYTES # BLD AUTO: 1.9 K/UL (ref 1–4.8)
LYMPHOCYTES NFR BLD: 21.9 % (ref 18–48)
MCH RBC QN AUTO: 24.6 PG (ref 27–31)
MCHC RBC AUTO-ENTMCNC: 29.5 G/DL (ref 32–36)
MCV RBC AUTO: 84 FL (ref 82–98)
MONOCYTES # BLD AUTO: 0.8 K/UL (ref 0.3–1)
MONOCYTES NFR BLD: 9.5 % (ref 4–15)
NEUTROPHILS # BLD AUTO: 5.8 K/UL (ref 1.8–7.7)
NEUTROPHILS NFR BLD: 65.7 % (ref 38–73)
NRBC BLD-RTO: 0 /100 WBC
PLATELET # BLD AUTO: 277 K/UL (ref 150–450)
PMV BLD AUTO: 11.2 FL (ref 9.2–12.9)
RBC # BLD AUTO: 4.83 M/UL (ref 4–5.4)
WBC # BLD AUTO: 8.78 K/UL (ref 3.9–12.7)

## 2023-10-03 PROCEDURE — 99499 NO LOS: ICD-10-PCS | Mod: S$PBB,,, | Performed by: INTERNAL MEDICINE

## 2023-10-03 PROCEDURE — 86140 C-REACTIVE PROTEIN: CPT | Performed by: NURSE PRACTITIONER

## 2023-10-03 PROCEDURE — 99499 UNLISTED E&M SERVICE: CPT | Mod: S$PBB,,, | Performed by: INTERNAL MEDICINE

## 2023-10-03 PROCEDURE — 99999 PR PBB SHADOW E&M-EST. PATIENT-LVL V: CPT | Mod: PBBFAC,,, | Performed by: NURSE PRACTITIONER

## 2023-10-03 PROCEDURE — 36415 COLL VENOUS BLD VENIPUNCTURE: CPT | Mod: PO | Performed by: NURSE PRACTITIONER

## 2023-10-03 PROCEDURE — 85652 RBC SED RATE AUTOMATED: CPT | Performed by: NURSE PRACTITIONER

## 2023-10-03 PROCEDURE — 99215 OFFICE O/P EST HI 40 MIN: CPT | Mod: PBBFAC,PN | Performed by: NURSE PRACTITIONER

## 2023-10-03 PROCEDURE — 99214 OFFICE O/P EST MOD 30 MIN: CPT | Mod: S$PBB,,, | Performed by: NURSE PRACTITIONER

## 2023-10-03 PROCEDURE — 99214 PR OFFICE/OUTPT VISIT, EST, LEVL IV, 30-39 MIN: ICD-10-PCS | Mod: S$PBB,,, | Performed by: NURSE PRACTITIONER

## 2023-10-03 PROCEDURE — 85025 COMPLETE CBC W/AUTO DIFF WBC: CPT | Performed by: NURSE PRACTITIONER

## 2023-10-03 PROCEDURE — 99999 PR PBB SHADOW E&M-EST. PATIENT-LVL V: ICD-10-PCS | Mod: PBBFAC,,, | Performed by: NURSE PRACTITIONER

## 2023-10-03 RX ORDER — AMOXICILLIN AND CLAVULANATE POTASSIUM 875; 125 MG/1; MG/1
1 TABLET, FILM COATED ORAL
COMMUNITY
Start: 2023-10-02 | End: 2023-10-09

## 2023-10-03 NOTE — CONSULTS
[facility] - Hem Onc 3rd Fl  Response for E-Consult     Patient Name: Qi Ortiz  MRN: [id number]  Primary Care Provider: Lisa Porter MD   Requesting Provider: Lisa Porter MD  [facility]  Consult performed by: Joyce Flynn MD  Consult ordered by: Lisa Porter MD  Reason for consult: CAREN  Assessment/Recommendations: See consult           After evaluation of your eConsult clinical questions, I believe the patient should be scheduled for an office visit in our specialty due to the need for IV iron.     She has a normal Hb but likely symptomatic from low ferritin and iron saturation. Please refer for an in person or virtual visit for IV iron.     *This eConsult is based on the clinical data available to me and is furnished without benefit of a physician examination.  The eConsult will need to be interpreted in light of any clinical issues of changes in patient status not available to me at the time rime of filing this eConsults.  Significant changes in patient condition of level of acuity should result in a referral for in person consultation and reevaluation.  Please alert me if you have any furth questions.     Thank you for this eConsult referral.     Joyce Flynn MD  [facility] - Hem Onc 3rd Fl

## 2023-10-03 NOTE — ASSESSMENT & PLAN NOTE
Xray at ER not remarkable.   Received Tdap at [facility] ER yesterday.   Continue Augmentin.   Some pain proximal to bite.   ESR, CRP, CBC.   Plan to contact [facility] tomorrow to determine status of animal.

## 2023-10-03 NOTE — Clinical Note
She had this at [facility]:   diph,pertuss(acel),tet vac(PF) (ADACEL) vaccine 0.5 mL (0.5 mLs Intramuscular Given 10/2/23 1829.  Can we pull into her health maintenance?

## 2023-10-03 NOTE — PATIENT INSTRUCTIONS
Change dressing daily at bath time.     I expect your symptoms to gradually improve. If you develop any INCREASED pain, swelling, redness.

## 2023-10-03 NOTE — PROGRESS NOTES
Qi Ortiz  10/03/2023  [id number]    Lisa Barrios MD  Patient Care Team:  Lisa Barrios MD as PCP - General (Internal Medicine)  Elma Hernandez NP as Nurse Practitioner (Family Medicine)      [facility] Primary Care Note      Chief Complaint:  Chief Complaint   Patient presents with    Animal Bite     Pt was bitten on her left leg by a dog yesterday evening       History of Present Illness:  HPI    Bit by neighbor's dog left thigh yesterday evening at about 4PM. Dog belongs to neighbor, Pit bull. She does not amicable relationship with neighbor. States neighbor was arrested and another neighbor removed the dog before it could be captured. She does not know where animal is. Per pt dog appeared healthy. Immunization record not known. [facility]'s office was at scene. Went to [facility] ER last night:     73yof with aasi for dog bite by neighbors dog (unknown shots on dog) to left leg. Unknown last TD  diph,pertuss(acel),tet vac(PF) (ADACEL) vaccine 0.5 mL (0.5 mLs Intramuscular Given 10/2/23 1829)   amoxicillin-pot clavulanate (AUGMENTIN) 875-125 mg per tablet 1 tablet (1 tablet Oral Given 10/2/23 1829)   mupirocin (BACTROBAN) 2 % ointment ( Topical Given 10/2/23 1830)   XR Knee 4+ View Left   ED Interpretation   No foreign body at site of wound    1837 Discussed rabies vaccines with pt. She endorses dog is healthy and prefers to discuss with her doctor dr barrios tomorrow. Reasonable given dog can be observed and is her neighbors who got out.     Spoke to  deputy. Owner has 24 hours to quarantine dog. [place] will f/u with owner to ensure animal is quarantined at vet this evening at 5PM.       Review of Systems   Constitutional:  Negative for appetite change, chills and fatigue.   Respiratory:  Negative for cough and shortness of breath.    Gastrointestinal:  Negative for nausea and vomiting.   Musculoskeletal:  Negative for arthralgias and joint swelling.         The  following were reviewed: Active problem list, medication list, allergies, family history, social history, and Health Maintenance.       Medications:  Current Outpatient Medications on File Prior to Visit   Medication Sig Dispense Refill    acetaminophen (TYLENOL) 650 MG TbSR as directed      albuterol (PROVENTIL) 2.5 mg /3 mL (0.083 %) nebulizer solution Take 3 mLs (2.5 mg total) by nebulization every 6 (six) hours as needed for Wheezing. Rescue 30 each 3    allopurinoL (ZYLOPRIM) 100 MG tablet TAKE 2 TABLETS ONCE DAILY 180 tablet 3    amoxicillin-clavulanate 875-125mg (AUGMENTIN) 875-125 mg per tablet Take 1 tablet by mouth.      aspirin 81 MG Chew Take 1 tablet (81 mg total) by mouth once daily. 90 tablet 3    calcipotriene (DOVONOX) 0.005 % cream Apply topically 2 (two) times daily. 120 g 0    calcium carbonate 650 mg calcium (1,625 mg) tablet Take 1 tablet by mouth once daily.      cyanocobalamin, vitamin B-12, 1,000 mcg Subl Place 1 tablet under the tongue once daily. 90 tablet 1    docusate sodium (COLACE) 100 MG capsule Take 1 capsule (100 mg total) by mouth 2 (two) times daily. 60 capsule 0    docusate sodium (COLACE) 100 MG capsule Colace Take No date recorded No form recorded No frequency recorded No route recorded No set duration recorded No set duration amount recorded active No dosage strength recorded No dosage strength units of measure recorded      folic acid (FOLVITE) 1 MG tablet TAKE 1 TABLET DAILY 90 tablet 3    furosemide (LASIX) 20 MG tablet Take 1 tablet (20 mg total) by mouth 2 (two) times daily. 60 tablet 5    gabapentin (NEURONTIN) 100 MG capsule TAKE 2 CAPSULES THREE TIMES A  capsule 3    garlic 2,000 mg Cap Take 3,000 mg by mouth once daily.       levothyroxine (SYNTHROID) 100 MCG tablet Take 1 tablet (100 mcg total) by mouth before breakfast. 90 tablet 1    metoprolol succinate (TOPROL-XL) 100 MG 24 hr tablet Take 1 tablet (100 mg total) by mouth 2 (two) times daily. 180 tablet 3     nitrofurantoin (MACRODANTIN) 100 MG capsule Take 100 mg by mouth every evening.      nitroGLYCERIN (NITROSTAT) 0.4 MG SL tablet DISSOLVE 1 TABLET UNDER THE TONGUE EVERY 5 MINUTES AS NEEDED FOR CHEST PAIN 75 tablet 3    pyridoxine, vitamin B6, (B-6) 100 MG Tab Take 200 mg by mouth once daily.       REPATHA SURECLICK 140 mg/mL PnIj INJECT 1 ML (140 MG) UNDER THE SKIN EVERY 14 DAYS 6 mL 3    spironolactone (ALDACTONE) 25 MG tablet Take 1 tablet (25 mg total) by mouth once daily. 90 tablet 3    vitamin D 1000 units Tab Take 185 mg by mouth once daily.      clopidogreL (PLAVIX) 75 mg tablet Take 1 tablet (75 mg total) by mouth once. for 1 dose 90 tablet 3     No current facility-administered medications on file prior to visit.       Medications have been reviewed and reconciled with patient at visit today.    Barriers to medications present (no )    Fall since last office visit (no )      Exam:  Vitals:    10/03/23 0957   BP: (!) 130/52   Pulse: (!) 58   Temp: 98 °F (36.7 °C)     Weight: 101.2 kg (223 lb)   Body mass index is 38.28 kg/m².      BP Readings from Last 3 Encounters:   10/03/23 (!) 130/52   09/29/23 (!) 175/79   09/28/23 137/74     Wt Readings from Last 3 Encounters:   10/03/23 0957 101.2 kg (223 lb)   09/29/23 1347 101.5 kg (223 lb 12.8 oz)   08/31/23 1337 104.6 kg (230 lb 9.6 oz)            Physical Exam  Constitutional:       General: She is not in acute distress.  HENT:      Head: Normocephalic.   Eyes:      General: No scleral icterus.  Cardiovascular:      Rate and Rhythm: Normal rate and regular rhythm.   Pulmonary:      Effort: Pulmonary effort is normal. No respiratory distress.      Breath sounds: Normal breath sounds. No wheezing.   Musculoskeletal:         General: No swelling.   Lymphadenopathy:      Cervical: No cervical adenopathy.   Skin:     General: Skin is warm and dry.      Comments: Multiple puncture wounds/crusts with bruising at base left lateral distal thigh.   Full AROM without  difficulty left knee/ankle.   Neurological:      Mental Status: She is alert. Mental status is at baseline.   Psychiatric:         Mood and Affect: Mood normal.         Behavior: Behavior normal.         Thought Content: Thought content normal.         Laboratory Reviewed: (Yes)  Lab Results   Component Value Date    WBC 7.67 09/29/2023    HGB 12.1 09/29/2023    HCT 40.9 09/29/2023     09/29/2023    CHOL 137 08/24/2023    TRIG 163 (H) 08/24/2023    HDL 35 (L) 08/24/2023    ALT 12 08/24/2023    AST 20 08/24/2023     09/29/2023    K 4.2 09/29/2023     09/29/2023    CREATININE 1.1 09/29/2023    BUN 21 09/29/2023    CO2 29 09/29/2023    TSH 0.442 09/29/2023    INR 1.0 09/28/2020    HGBA1C 6.1 (H) 08/24/2023           Health Maintenance  Health Maintenance Topics with due status: Not Due       Topic Last Completion Date    DEXA Scan 07/25/2022    Eye Exam 03/06/2023    Lipid Panel 08/24/2023    Hemoglobin A1c 08/24/2023    Diabetes Urine Screening 09/29/2023    Foot Exam 09/29/2023    Aspirin/Antiplatelet Therapy 10/03/2023     Health Maintenance Due   Topic Date Due    COVID-19 Vaccine (1) Never done    Pneumococcal Vaccines (Age 65+) (1 - PCV) Never done    TETANUS VACCINE  Never done    Colorectal Cancer Screening  04/18/2016    Shingles Vaccine (2 of 2) 11/28/2022           Assessment:  Problem List Items Addressed This Visit          Orthopedic    Dog bite     Xray at ER not remarkable.   Received Tdap at [facility] ER yesterday.   Continue Augmentin.   Some pain proximal to bite.   ESR, CRP, CBC.   Plan to contact [facility] tomorrow to determine status of animal.         Relevant Orders    C-REACTIVE PROTEIN    Sedimentation rate    CBC Auto Differential     Other Visit Diagnoses       Pain of left thigh    -  Primary    Relevant Orders    C-REACTIVE PROTEIN    Sedimentation rate    CBC Auto Differential              Plan:  Pain of left thigh  -     C-REACTIVE PROTEIN; Future; Expected date: 10/03/2023  -      Sedimentation rate; Future; Expected date: 10/03/2023  -     CBC Auto Differential; Future; Expected date: 10/03/2023    Dog bite, initial encounter  -     C-REACTIVE PROTEIN; Future; Expected date: 10/03/2023  -     Sedimentation rate; Future; Expected date: 10/03/2023  -     CBC Auto Differential; Future; Expected date: 10/03/2023      -Patient's lab results were reviewed and discussed with patient  -Treatment options and alternatives were discussed with the patient. Patient expressed understanding. Patient was given the opportunity to ask questions and be an active participant in their medical care. Patient had no further questions or concerns at this time.   -Documentation of patient's health and condition was obtained from family member who was present during visit.  -Patient is an overall moderate risk for health complications from their medical conditions.       Follow up: Follow up if symptoms worsen or fail to improve.    Addendum: [facility] file [id number]. [facility]. Neither officers are aware of the dog being in quarantine or vaccination status of the dog. Will place e-consult to ID for guidance regarding unknown rabies exposure.     Note for ID reviewed. As advised pt instructed to proceed to ER to get immunoglobulin today and begin vaccine series.     After visit summary printed and given to patient upon discharge.  Patient goals and care plan are included in After visit summary.    Total medical decision making time was 38 min.  The following issues were discussed: The primary encounter diagnosis was Pain of left thigh. A diagnosis of Dog bite, initial encounter was also pertinent to this visit.    Health maintenance needs, recent test results and goals of care discussed with pt and questions answered.

## 2023-10-04 ENCOUNTER — TELEPHONE (OUTPATIENT)
Dept: CARDIOLOGY | Facility: CLINIC | Age: 73
End: 2023-10-04
Payer: MEDICARE

## 2023-10-04 ENCOUNTER — E-CONSULT (OUTPATIENT)
Dept: INFECTIOUS DISEASES | Facility: CLINIC | Age: 73
End: 2023-10-04
Payer: MEDICARE

## 2023-10-04 ENCOUNTER — TELEPHONE (OUTPATIENT)
Dept: PRIMARY CARE CLINIC | Facility: CLINIC | Age: 73
End: 2023-10-04
Payer: MEDICARE

## 2023-10-04 DIAGNOSIS — D84.9 IMMUNOCOMPROMISED: Primary | ICD-10-CM

## 2023-10-04 DIAGNOSIS — E61.1 IRON DEFICIENCY: Primary | ICD-10-CM

## 2023-10-04 DIAGNOSIS — N18.32 STAGE 3B CHRONIC KIDNEY DISEASE: Chronic | ICD-10-CM

## 2023-10-04 DIAGNOSIS — Z95.2 S/P TAVR (TRANSCATHETER AORTIC VALVE REPLACEMENT): ICD-10-CM

## 2023-10-04 LAB — ERYTHROCYTE [SEDIMENTATION RATE] IN BLOOD BY PHOTOMETRIC METHOD: 34 MM/HR (ref 0–36)

## 2023-10-04 PROCEDURE — 99451 PR INTERPROF, PHONE/INTERNET/EHR, CONSULT, >= 5MINS: ICD-10-PCS | Mod: ,,, | Performed by: INTERNAL MEDICINE

## 2023-10-04 PROCEDURE — 99451 NTRPROF PH1/NTRNET/EHR 5/>: CPT | Mod: ,,, | Performed by: INTERNAL MEDICINE

## 2023-10-04 NOTE — TELEPHONE ENCOUNTER
----- Message from Elma Hernandez NP sent at 10/4/2023  2:48 PM CDT -----  Contact: [phone]  She needs to go to [facility]. I am not certain [place] location will have vaccine.   ER notes that she received Tdap while there.   ----- Message -----  From: Chantal Langley RN  Sent: 10/4/2023   2:37 PM CDT  To: Elma Hernandez NP      ----- Message -----  From: Jhon Katz  Sent: 10/4/2023   2:04 PM CDT  To: [facility] Staff    Pt wanted to inform the doctor  1) [facility] [place] [facility] rabb[facility]     2) does not recall getting a tetanus shot on monday when in the ER.

## 2023-10-04 NOTE — TELEPHONE ENCOUNTER
Pt states that she does not remember getting shot in the ER. States that she is waiting for a supervisor from [employer] to call her.

## 2023-10-04 NOTE — CONSULTS
"[facility]  Response for E-Consult     Patient Name: Qi Ortiz  MRN: [id number]  Primary Care Provider: Lisa Barrios MD   Requesting Provider: Elma Hernandez NP  E-Consult to Infectious Disease  Consult performed by: Chai Montoya MD  Consult ordered by: Elma Hernandez NP  Reason for consult: dog bite, possible rabies      72 yo female with h/o immunosuppression, DM2, HTN, CAD, psoriasis, psoriatic arthritis, and multiple allergies who was bitten by a dog on the left thigh on 10/3/23.    From office visit note yesterday:  "Bit by neighbor's dog left thigh yesterday evening at about 4PM. Dog belongs to neighbor, Pit bull. She does not amicable relationship with neighbor. States neighbor was arrested and another neighbor removed the dog before it could be captured. She does not know where animal is. Per pt dog appeared healthy. Immunization record not known. [facility]'s office was at scene. Went to [facility] ER last night:      73yof with aasi for dog bite by neighbors dog (unknown shots on dog) to left leg. Unknown last TD  diph,pertuss(acel),tet vac(PF) (ADACEL) vaccine 0.5 mL (0.5 mLs Intramuscular Given 10/2/23 1829)   amoxicillin-pot clavulanate (AUGMENTIN) 875-125 mg per tablet 1 tablet (1 tablet Oral Given 10/2/23 1829)   mupirocin (BACTROBAN) 2 % ointment ( Topical Given 10/2/23 1830)   XR Knee 4+ View Left   ED Interpretation   No foreign body at site of wound    1837 Discussed rabies vaccines with pt. She endorses dog is healthy and prefers to discuss with her doctor dr barrios tomorrow. Reasonable given dog can be observed and is her neighbors who got out.      Spoke to [place] deputy. Owner has 24 hours to quarantine dog. [place] will f/u with owner to ensure animal is quarantined at vet this evening at 5PM.     Recommendation: Determine if dog is vaccinated or will be observed - regulations call for 10 day observation. "     https://ldh.la.gov/assets/oph/[facility]/[place]-/infectious-epi/VetInfo/Rabies/AppropriateObservationForRabiesOnset.pdf    If rabies vaccination is indicated, go to ER for rabies immunoglobulin injection into the wound, and then begin rabies vaccinations. Rabies vaccinations can be found at https://ldh.la.gov/assets/oph/[facility]/[place]-/infectious-epi/VetInfo/Rabies/Facilities_with_PEP_by_region_AB.pdf          Total time of Consultation: 10 minutes    I did not speak to the requesting provider verbally about this.     *This eConsult is based on the clinical data available to me and is furnished without benefit of a physical examination. The eConsult will need to be interpreted in light of any clinical issues or changes in patient status not available to me at the time of filing this eConsults. Significant changes in patient condition or level of acuity should result in immediate formal consultation and reevaluation. Please alert me if you have further questions.    Thank you for this eConsult referral.     Chai Montoya MD  [facility] - Infectious Disease [facility]

## 2023-10-04 NOTE — TELEPHONE ENCOUNTER
Pt came in to let us know that her nurse visit last week on 9/28 - she had just had a bout of PVCs happened while she was waiting for BP check and stopped before the nurse came and got her for the visit after she did the deep breathing to stop them but felt drained her- she did see her PCP the next day and she ordered blood work and such - looks like her iron is low and they are looking into iron infusions    On top of that, Monday, she got bit by her neighbor's pit bull and now is in pain and will need rabi vaccinations    All in all pt wanted to make sure you were aware  of the PVCs- pt ststed she no longer feels any more PVCs from last Thursday - she is in pain now from the dog bite and waiting to get iron infusions set up - she does not feel the need to see cardio at this time

## 2023-10-04 NOTE — TELEPHONE ENCOUNTER
----- Message from Elma Hernandez NP sent at 10/4/2023  8:58 AM CDT -----  Please let patient know that results are at baseline.   No need for further imaging.

## 2023-10-09 ENCOUNTER — TELEPHONE (OUTPATIENT)
Dept: HEMATOLOGY/ONCOLOGY | Facility: CLINIC | Age: 73
End: 2023-10-09
Payer: MEDICARE

## 2023-10-09 NOTE — TELEPHONE ENCOUNTER
----- Message from Ana Luisa Hernandez sent at 10/9/2023  1:42 PM CDT -----  Contact: niall  Evan is requesting a call in regards to her appointment. Please call her back at [phone].      Thanks  DD

## 2023-10-10 ENCOUNTER — TELEPHONE (OUTPATIENT)
Dept: PRIMARY CARE CLINIC | Facility: CLINIC | Age: 73
End: 2023-10-10
Payer: MEDICARE

## 2023-10-10 NOTE — TELEPHONE ENCOUNTER
Spoke with pt- states she decided not to get rabies shots. States that wounds are healing and she is not having any discomfort. Advised to call with any concerns.

## 2023-10-12 ENCOUNTER — TELEPHONE (OUTPATIENT)
Dept: PRIMARY CARE CLINIC | Facility: CLINIC | Age: 73
End: 2023-10-12
Payer: MEDICARE

## 2023-10-12 ENCOUNTER — TELEPHONE (OUTPATIENT)
Dept: CARDIOLOGY | Facility: CLINIC | Age: 73
End: 2023-10-12
Payer: MEDICARE

## 2023-10-12 ENCOUNTER — OFFICE VISIT (OUTPATIENT)
Dept: PRIMARY CARE CLINIC | Facility: CLINIC | Age: 73
End: 2023-10-12
Payer: MEDICARE

## 2023-10-12 ENCOUNTER — HOSPITAL ENCOUNTER (OUTPATIENT)
Dept: RADIOLOGY | Facility: HOSPITAL | Age: 73
Discharge: HOME | End: 2023-10-12
Attending: NURSE PRACTITIONER
Payer: MEDICARE

## 2023-10-12 VITALS
WEIGHT: 221.31 LBS | TEMPERATURE: 98 F | BODY MASS INDEX: 37.78 KG/M2 | OXYGEN SATURATION: 100 % | HEART RATE: 66 BPM | DIASTOLIC BLOOD PRESSURE: 72 MMHG | HEIGHT: 64 IN | SYSTOLIC BLOOD PRESSURE: 142 MMHG

## 2023-10-12 DIAGNOSIS — R53.1 WEAKNESS: Primary | ICD-10-CM

## 2023-10-12 DIAGNOSIS — E61.1 IRON DEFICIENCY: ICD-10-CM

## 2023-10-12 DIAGNOSIS — R06.00 DYSPNEA, UNSPECIFIED TYPE: ICD-10-CM

## 2023-10-12 DIAGNOSIS — E03.9 HYPOTHYROIDISM, UNSPECIFIED TYPE: Chronic | ICD-10-CM

## 2023-10-12 DIAGNOSIS — R06.09 DOE (DYSPNEA ON EXERTION): ICD-10-CM

## 2023-10-12 PROCEDURE — 71046 X-RAY EXAM CHEST 2 VIEWS: CPT | Mod: 26,,, | Performed by: RADIOLOGY

## 2023-10-12 PROCEDURE — 99215 PR OFFICE/OUTPT VISIT, EST, LEVL V, 40-54 MIN: ICD-10-PCS | Mod: S$PBB,,, | Performed by: NURSE PRACTITIONER

## 2023-10-12 PROCEDURE — 99215 OFFICE O/P EST HI 40 MIN: CPT | Mod: PBBFAC,25,PN | Performed by: NURSE PRACTITIONER

## 2023-10-12 PROCEDURE — 93005 ELECTROCARDIOGRAM TRACING: CPT | Mod: PBBFAC,PN | Performed by: INTERNAL MEDICINE

## 2023-10-12 PROCEDURE — 93010 ELECTROCARDIOGRAM REPORT: CPT | Mod: S$PBB,,, | Performed by: INTERNAL MEDICINE

## 2023-10-12 PROCEDURE — 99999 PR PBB SHADOW E&M-EST. PATIENT-LVL V: CPT | Mod: PBBFAC,,, | Performed by: NURSE PRACTITIONER

## 2023-10-12 PROCEDURE — 99215 OFFICE O/P EST HI 40 MIN: CPT | Mod: S$PBB,,, | Performed by: NURSE PRACTITIONER

## 2023-10-12 PROCEDURE — 71046 XR CHEST PA AND LATERAL: ICD-10-PCS | Mod: 26,,, | Performed by: RADIOLOGY

## 2023-10-12 PROCEDURE — 99999 PR PBB SHADOW E&M-EST. PATIENT-LVL V: ICD-10-PCS | Mod: PBBFAC,,, | Performed by: NURSE PRACTITIONER

## 2023-10-12 PROCEDURE — 93010 EKG 12-LEAD: ICD-10-PCS | Mod: S$PBB,,, | Performed by: INTERNAL MEDICINE

## 2023-10-12 PROCEDURE — 71046 X-RAY EXAM CHEST 2 VIEWS: CPT | Mod: TC,FY,PO

## 2023-10-12 NOTE — ASSESSMENT & PLAN NOTE
Has appt with hematology next month to address.   Lab Results   Component Value Date    HGB 11.9 (L) 10/03/2023   BROOKS and fatigue, sx today disproportionate to Fe deficiency with normal Hgb.

## 2023-10-12 NOTE — ASSESSMENT & PLAN NOTE
Unclear cause.   CAREN may be contributing to worsening CAREN but sx seem more severe than expected.   Message sent to hematology staff for sooner appt.  EKG today - discussed changes with pt, possible new ischemia with worsening BROOKS. Declines evaluation in ER. Advised cardiology appt ASAP. Ms Ortiz is pretty adamant that sx not cardiac and are related to low Fe.    CBC, CXR, BNP, chem panel.     Addendum: elevated BNP, pulm congestion on CXR. Advise pt to take furosemide 40 mg BID x3 days then resume previous dose. Messages left by staff to discuss with pt.

## 2023-10-12 NOTE — PROGRESS NOTES
Qi Ortiz  10/13/2023  [id number]    Lisa Porter MD  Patient Care Team:  Lisa Porter MD as PCP - General (Internal Medicine)  Elma Hernandez NP as Nurse Practitioner (Family Medicine)      [facility] Note      Chief Complaint:  Chief Complaint   Patient presents with    Follow-up     3 month follow up/ need appt. with hemato. Pt states short of breath and weak       History of Present Illness:  HPI    Very low energy, feels weak.   Dyspnea. No cough/chest congestion. BROOKS.   Sx worse the past 5-6 days.   Orthopnea.   She has felt similarly with low iron.   Reports palpitations, heart racing this AM.   Felt like she may pass out at times.   Appetite decreased. Denies any pain.     Followed by Dr Ibarra for CAD.     Chronic medical issues include type 2 DM, h/o CVA, CAD s/p CABG x 2, AS s/p TAVR, HTN, HLP (intolerant of statin), CKD stage 3, hypothyroidism, psoriasis and psoriatic arthritis, CAREN, obesity, and IRIS (intolerant of CPAP). Ms. Ortiz has multiple drug and contact allergies and sensitivities. Food allergies include kiwi fruit and crab boil.    Spouse is in Hospice at his daughter's home in [place]. Dying. She will not be able to see him before he passes.     Psoriasis doing well per pt.         Review of Systems   Constitutional:  Positive for activity change and fatigue. Negative for fever.   HENT:  Negative for congestion, rhinorrhea, sinus pressure and sinus pain.    Respiratory:  Positive for shortness of breath. Negative for cough, chest tightness and wheezing.    Cardiovascular:  Positive for palpitations. Negative for chest pain and leg swelling.   Gastrointestinal:  Negative for abdominal pain, diarrhea, nausea and vomiting.   Genitourinary:  Negative for decreased urine volume and dysuria.   Musculoskeletal:  Negative for back pain and myalgias.   Neurological:  Negative for dizziness.         The following were reviewed: Active problem list, medication list,  allergies, family history, social history, and Health Maintenance.       Medications:  Current Outpatient Medications on File Prior to Visit   Medication Sig Dispense Refill    acetaminophen (TYLENOL) 650 MG TbSR as directed      albuterol (PROVENTIL) 2.5 mg /3 mL (0.083 %) nebulizer solution Take 3 mLs (2.5 mg total) by nebulization every 6 (six) hours as needed for Wheezing. Rescue 30 each 3    allopurinoL (ZYLOPRIM) 100 MG tablet TAKE 2 TABLETS ONCE DAILY 180 tablet 3    aspirin 81 MG Chew Take 1 tablet (81 mg total) by mouth once daily. 90 tablet 3    calcipotriene (DOVONOX) 0.005 % cream Apply topically 2 (two) times daily. 120 g 0    calcium carbonate 650 mg calcium (1,625 mg) tablet Take 1 tablet by mouth once daily.      cyanocobalamin, vitamin B-12, 1,000 mcg Subl Place 1 tablet under the tongue once daily. 90 tablet 1    docusate sodium (COLACE) 100 MG capsule Take 1 capsule (100 mg total) by mouth 2 (two) times daily. 60 capsule 0    docusate sodium (COLACE) 100 MG capsule Colace Take No date recorded No form recorded No frequency recorded No route recorded No set duration recorded No set duration amount recorded active No dosage strength recorded No dosage strength units of measure recorded      folic acid (FOLVITE) 1 MG tablet TAKE 1 TABLET DAILY 90 tablet 3    furosemide (LASIX) 20 MG tablet Take 1 tablet (20 mg total) by mouth 2 (two) times daily. 60 tablet 5    gabapentin (NEURONTIN) 100 MG capsule TAKE 2 CAPSULES THREE TIMES A  capsule 3    garlic 2,000 mg Cap Take 3,000 mg by mouth once daily.       levothyroxine (SYNTHROID) 100 MCG tablet Take 1 tablet (100 mcg total) by mouth before breakfast. 90 tablet 1    metoprolol succinate (TOPROL-XL) 100 MG 24 hr tablet Take 1 tablet (100 mg total) by mouth 2 (two) times daily. 180 tablet 3    nitrofurantoin (MACRODANTIN) 100 MG capsule Take 100 mg by mouth every evening.      nitroGLYCERIN (NITROSTAT) 0.4 MG SL tablet DISSOLVE 1 TABLET UNDER THE  TONGUE EVERY 5 MINUTES AS NEEDED FOR CHEST PAIN 75 tablet 3    pyridoxine, vitamin B6, (B-6) 100 MG Tab Take 200 mg by mouth once daily.       REPATHA SURECLICK 140 mg/mL PnIj INJECT 1 ML (140 MG) UNDER THE SKIN EVERY 14 DAYS 6 mL 3    spironolactone (ALDACTONE) 25 MG tablet Take 1 tablet (25 mg total) by mouth once daily. 90 tablet 3    vitamin D 1000 units Tab Take 185 mg by mouth once daily.      clopidogreL (PLAVIX) 75 mg tablet Take 1 tablet (75 mg total) by mouth once. for 1 dose 90 tablet 3     No current facility-administered medications on file prior to visit.       Medications have been reviewed and reconciled with patient at visit today.    Barriers to medications present (no )    Fall since last office visit (no )      Exam:  Vitals:    10/12/23 1311   BP: (!) 142/72   Pulse: 66   Temp: 98 °F (36.7 °C)     Weight: 100.4 kg (221 lb 4.8 oz)   Body mass index is 37.99 kg/m².      BP Readings from Last 3 Encounters:   10/13/23 127/72   10/12/23 (!) 142/72   10/03/23 (!) 130/52     Wt Readings from Last 3 Encounters:   10/13/23 1102 100.2 kg (221 lb)   10/12/23 1311 100.4 kg (221 lb 4.8 oz)   10/03/23 0957 101.2 kg (223 lb)            Physical Exam  Constitutional:       General: She is not in acute distress.     Appearance: She is ill-appearing (mildy, upset in clinic).   HENT:      Head: Normocephalic.      Right Ear: There is no impacted cerumen.      Left Ear: There is no impacted cerumen.      Nose: Nose normal.      Mouth/Throat:      Mouth: Mucous membranes are moist.      Pharynx: No oropharyngeal exudate or posterior oropharyngeal erythema.   Eyes:      General: No scleral icterus.     Conjunctiva/sclera: Conjunctivae normal.   Cardiovascular:      Rate and Rhythm: Normal rate and regular rhythm.   Pulmonary:      Effort: Pulmonary effort is normal. No respiratory distress.      Breath sounds: Normal breath sounds. No wheezing, rhonchi or rales.   Abdominal:      General: There is no distension.       Palpations: Abdomen is soft.      Tenderness: There is no abdominal tenderness. There is no right CVA tenderness or left CVA tenderness.   Musculoskeletal:         General: No swelling.      Cervical back: Neck supple.   Lymphadenopathy:      Cervical: No cervical adenopathy.   Skin:     General: Skin is warm and dry.      Coloration: Skin is not pale.      Findings: Rash (few scales to wrist) present. No bruising or erythema.   Neurological:      Mental Status: She is alert. Mental status is at baseline.   Psychiatric:         Mood and Affect: Mood normal.         Behavior: Behavior normal.         Laboratory Reviewed: (Yes)  Lab Results   Component Value Date    WBC 8.40 10/12/2023    HGB 11.6 (L) 10/12/2023    HCT 39.0 10/12/2023     10/12/2023    CHOL 137 08/24/2023    TRIG 163 (H) 08/24/2023    HDL 35 (L) 08/24/2023    ALT 15 10/12/2023    AST 23 10/12/2023     10/12/2023    K 3.6 10/12/2023     10/12/2023    CREATININE 1.1 10/12/2023    BUN 16 10/12/2023    CO2 29 10/12/2023    TSH 0.442 09/29/2023    INR 1.0 09/28/2020    HGBA1C 6.1 (H) 08/24/2023 5/25/22 ECHO  Summary    The left ventricle is normal in size with normal systolic function.  Indeterminate left ventricular diastolic function.  The estimated PA systolic pressure is 40 mmHg.  Normal right ventricular size with normal right ventricular systolic function.  Normal central venous pressure (3 mmHg).  Small anterior pericardial effusion.  The estimated ejection fraction is 55%.  There is a transcutaneously-placed aortic bioprosthesis present. There is no aortic insufficiency present. Prosthetic aortic valve is normal.  The aortic valve mean gradient is 11 mmHg with a dimensionless index of 0.58.  Mild tricuspid regurgitation.        Health Maintenance  Health Maintenance Topics with due status: Not Due       Topic Last Completion Date    DEXA Scan 07/25/2022    Eye Exam 03/06/2023    Lipid Panel 08/24/2023    Hemoglobin A1c  08/24/2023    Diabetes Urine Screening 09/29/2023    Foot Exam 09/29/2023    TETANUS VACCINE 10/02/2023    Aspirin/Antiplatelet Therapy 10/12/2023     Health Maintenance Due   Topic Date Due    COVID-19 Vaccine (1) Never done    Pneumococcal Vaccines (Age 65+) (1 - PCV) Never done    Colorectal Cancer Screening  04/18/2016    Shingles Vaccine (2 of 2) 11/28/2022       Assessment:  Problem List Items Addressed This Visit          Cardiac/Vascular    BROOKS (dyspnea on exertion)     Unclear cause.   CAREN may be contributing to worsening CAREN but sx seem more severe than expected.   Message sent to hematology staff for sooner appt.  EKG today - discussed changes with pt, possible new ischemia with worsening BROOKS. Declines evaluation in ER. Advised cardiology appt ASAP. Ms Ortiz is pretty adamant that sx not cardiac and are related to low Fe.    CBC, CXR, BNP, chem panel.     Addendum: elevated BNP, pulm congestion on CXR. Advise pt to take furosemide 40 mg BID x3 days then resume previous dose. Messages left by staff to discuss with pt.            Oncology    Iron deficiency     Has appt with hematology next month to address.   Lab Results   Component Value Date    HGB 11.9 (L) 10/03/2023   BROOKS and fatigue, sx today disproportionate to Fe deficiency with normal Hgb.             Endocrine    Hypothyroidism (Chronic)     Lab Results   Component Value Date    TSH 0.442 09/29/2023   Continue POC            Other Visit Diagnoses       Weakness    -  Primary    Relevant Orders    IN OFFICE EKG 12-LEAD (to Muse) (Completed)    CBC Auto Differential (Completed)    Comprehensive Metabolic Panel (Completed)    Sedimentation rate (Completed)    C-REACTIVE PROTEIN (Completed)    B-TYPE NATRIURETIC PEPTIDE (Completed)    Dyspnea, unspecified type        Relevant Orders    B-TYPE NATRIURETIC PEPTIDE (Completed)    X-Ray Chest PA And Lateral (Completed)              Plan:  Weakness  -     IN OFFICE EKG 12-LEAD (to Muse)  -     CBC Auto  Differential; Future; Expected date: 10/12/2023  -     Comprehensive Metabolic Panel; Future; Expected date: 10/12/2023  -     Sedimentation rate; Future; Expected date: 10/12/2023  -     C-REACTIVE PROTEIN; Future; Expected date: 10/12/2023  -     B-TYPE NATRIURETIC PEPTIDE; Future; Expected date: 10/12/2023    Dyspnea, unspecified type  -     B-TYPE NATRIURETIC PEPTIDE; Future; Expected date: 10/12/2023  -     X-Ray Chest PA And Lateral    Iron deficiency    BROOKS (dyspnea on exertion)    Hypothyroidism, unspecified type      -Patient's lab results were reviewed and discussed with patient  -Treatment options and alternatives were discussed with the patient. Patient expressed understanding. Patient was given the opportunity to ask questions and be an active participant in their medical care. Patient had no further questions or concerns at this time.   -Documentation of patient's health and condition was obtained from family member who was present during visit.  -Patient is an overall moderate risk for health complications from their medical conditions.       Follow up: Follow up in about 2 weeks (around 10/26/2023).      After visit summary printed and given to patient upon discharge.  Patient goals and care plan are included in After visit summary.    Total medical decision making time was 65 min.  The following issues were discussed: The primary encounter diagnosis was Weakness. Diagnoses of Dyspnea, unspecified type, Iron deficiency, BROOKS (dyspnea on exertion), and Hypothyroidism, unspecified type were also pertinent to this visit.    Health maintenance needs, recent test results and goals of care discussed with pt and questions answered.

## 2023-10-12 NOTE — TELEPHONE ENCOUNTER
Good afternoon! Patient was seen by NP today and EKG performed. She is requesting that Dr. Ibarra or someone in his group see this patient for follow up. Can you please assit with getting patient scheduled? Thanks, Claire Oh RN       LVM for patient to call back to get scheduled

## 2023-10-13 ENCOUNTER — TELEPHONE (OUTPATIENT)
Dept: PRIMARY CARE CLINIC | Facility: CLINIC | Age: 73
End: 2023-10-13
Payer: MEDICARE

## 2023-10-13 ENCOUNTER — OFFICE VISIT (OUTPATIENT)
Dept: HEMATOLOGY/ONCOLOGY | Facility: CLINIC | Age: 73
End: 2023-10-13
Payer: MEDICARE

## 2023-10-13 VITALS
BODY MASS INDEX: 37.73 KG/M2 | DIASTOLIC BLOOD PRESSURE: 72 MMHG | TEMPERATURE: 97 F | OXYGEN SATURATION: 98 % | HEART RATE: 66 BPM | HEIGHT: 64 IN | SYSTOLIC BLOOD PRESSURE: 127 MMHG | WEIGHT: 221 LBS

## 2023-10-13 DIAGNOSIS — D52.9 ANEMIA DUE TO FOLIC ACID DEFICIENCY, UNSPECIFIED DEFICIENCY TYPE: ICD-10-CM

## 2023-10-13 DIAGNOSIS — D64.9 ANEMIA, UNSPECIFIED TYPE: Primary | ICD-10-CM

## 2023-10-13 DIAGNOSIS — E53.8 B12 DEFICIENCY: ICD-10-CM

## 2023-10-13 DIAGNOSIS — E61.1 IRON DEFICIENCY: ICD-10-CM

## 2023-10-13 PROCEDURE — 99215 PR OFFICE/OUTPT VISIT, EST, LEVL V, 40-54 MIN: ICD-10-PCS | Mod: S$PBB,,, | Performed by: NURSE PRACTITIONER

## 2023-10-13 PROCEDURE — 99215 OFFICE O/P EST HI 40 MIN: CPT | Mod: S$PBB,,, | Performed by: NURSE PRACTITIONER

## 2023-10-13 RX ORDER — SODIUM CHLORIDE 0.9 % (FLUSH) 0.9 %
10 SYRINGE (ML) INJECTION
Status: CANCELLED | OUTPATIENT
Start: 2023-11-06

## 2023-10-13 RX ORDER — SODIUM CHLORIDE 0.9 % (FLUSH) 0.9 %
10 SYRINGE (ML) INJECTION
Status: CANCELLED | OUTPATIENT
Start: 2023-10-13

## 2023-10-13 RX ORDER — SODIUM CHLORIDE 0.9 % (FLUSH) 0.9 %
10 SYRINGE (ML) INJECTION
Status: CANCELLED | OUTPATIENT
Start: 2023-10-28

## 2023-10-13 RX ORDER — METHYLPREDNISOLONE SOD SUCC 125 MG
40 VIAL (EA) INJECTION
Status: CANCELLED | OUTPATIENT
Start: 2023-10-13

## 2023-10-13 RX ORDER — HEPARIN 100 UNIT/ML
500 SYRINGE INTRAVENOUS
Status: CANCELLED | OUTPATIENT
Start: 2023-10-13

## 2023-10-13 RX ORDER — SODIUM CHLORIDE 0.9 % (FLUSH) 0.9 %
10 SYRINGE (ML) INJECTION
Status: CANCELLED | OUTPATIENT
Start: 2023-11-14

## 2023-10-13 RX ORDER — HEPARIN 100 UNIT/ML
500 SYRINGE INTRAVENOUS
Status: CANCELLED | OUTPATIENT
Start: 2023-11-06

## 2023-10-13 RX ORDER — METHYLPREDNISOLONE SOD SUCC 125 MG
40 VIAL (EA) INJECTION
Status: CANCELLED | OUTPATIENT
Start: 2023-11-14

## 2023-10-13 RX ORDER — HEPARIN 100 UNIT/ML
500 SYRINGE INTRAVENOUS
Status: CANCELLED | OUTPATIENT
Start: 2023-11-14

## 2023-10-13 RX ORDER — HEPARIN 100 UNIT/ML
500 SYRINGE INTRAVENOUS
Status: CANCELLED | OUTPATIENT
Start: 2023-10-28

## 2023-10-13 RX ORDER — METHYLPREDNISOLONE SOD SUCC 125 MG
40 VIAL (EA) INJECTION
Status: CANCELLED | OUTPATIENT
Start: 2023-11-06

## 2023-10-13 RX ORDER — METHYLPREDNISOLONE SOD SUCC 125 MG
40 VIAL (EA) INJECTION
Status: CANCELLED | OUTPATIENT
Start: 2023-10-28

## 2023-10-13 NOTE — TELEPHONE ENCOUNTER
----- Message from Elma Hernandez NP sent at 10/12/2023  4:03 PM CDT -----  Pulmonary congestion/fluid in lungs on chest x-ray.  Increase Lasix to 40 mg twice daily for 3 days then resume previous dose.   Call me if no improvement in symptoms. To ER if symptoms persist or become worse.

## 2023-10-13 NOTE — PROGRESS NOTES
Subjective:      Patient ID: Qi Ortiz is a 73 y.o. female.    Chief Complaint:     HPI:  73 year old female presents today to evaluate labs.  Has been found in the past to have folic acid deficiency, recurrent CAREN requiring IV iron infusions and also anemia d/t CKD.       medical history significant for HTN, carotid stenosis, hypothyroidism, COPD, psoriatic arthritis, hyperlipidemia, CVA with right hemiplegia, depression, CAD, osteoporosis, IRIS, and double ectopic ureters.    Has been followed in the clinic by Dr. Mckinney, Elida Alanis NP, Jade Delgado NP, and Denis Caban NP, Today is the first time I am evaluating/assessing the patient.     Currently with normocytic anemia - hgb 11.6, mcv 83, ferritin 17.  Recently found with B12 deficiency and prescribed B12 1000 mcg SL daily - states that she hasn't started as of yet. States used to take B12.    States that she does have some hemorrhoidal bleeding but does not get in the toilet or stool.  Only seen when she wipes and not all the time.  Has not been able to tolerate GI prep for colonoscopy and has not had transportation to have EGD/colonoscopy done.       Social History     Socioeconomic History    Marital status: Single    Number of children: 3   Occupational History    Occupation: Not working   Tobacco Use    Smoking status: Never    Smokeless tobacco: Never   Substance and Sexual Activity    Alcohol use: No    Drug use: No    Sexual activity: Not Currently     Partners: Male     Social Determinants of Health     Financial Resource Strain: Low Risk  (9/29/2023)    Overall Financial Resource Strain (CARDIA)     Difficulty of Paying Living Expenses: Not hard at all   Food Insecurity: No Food Insecurity (9/29/2023)    Hunger Vital Sign     Worried About Running Out of Food in the Last Year: Never true     Ran Out of Food in the Last Year: Never true   Transportation Needs: No Transportation Needs (9/29/2023)    PRAPARE - Transportation     Lack of  Transportation (Medical): No     Lack of Transportation (Non-Medical): No   Physical Activity: Inactive (2023)    Exercise Vital Sign     Days of Exercise per Week: 0 days     Minutes of Exercise per Session: 0 min   Stress: Stress Concern Present (2023)    [employer] - Occupational Stress Questionnaire     Feeling of Stress : To some extent   Social Connections: Moderately Isolated (2023)    Social Connection and Isolation Panel [NHANES]     Frequency of Communication with Friends and Family: Three times a week     Frequency of Social Gatherings with Friends and Family: Once a week     Attends Temple Services: Never     Active Member of Clubs or Organizations: No     Attends Club or Organization Meetings: Never     Marital Status:    Housing Stability: High Risk (2023)    Housing Stability Vital Sign     Unable to Pay for Housing in the Last Year: Yes     Number of Places Lived in the Last Year: 1     Unstable Housing in the Last Year: No       Family History   Problem Relation Age of Onset    Breast cancer Maternal Grandfather     Breast cancer Paternal Aunt     Stroke Unknown     Breast cancer Sister 60    Leukemia Sister 8         as child    Lung cancer Paternal Grandfather     Heart disease Unknown     Diabetes Daughter        Past Surgical History:   Procedure Laterality Date    BREAST BIOPSY      R sided/benign    CARDIAC SURGERY      2016    CERVICAL FUSION      CHOLECYSTECTOMY      CORONARY ANGIOPLASTY      CORONARY ARTERY BYPASS GRAFT      triple bypass    CORONARY STENT PLACEMENT      EYE SURGERY      INTRAUTERINE DEVICE INSERTION      LEFT HEART CATHETERIZATION Left 2020    Procedure: CATHETERIZATION, HEART, LEFT;  Surgeon: Veronica Ibarra MD;  Location: [facility] CATH LAB;  Service: Cardiology;  Laterality: Left;  7am start time    mass removed from R groin      TOTAL ABDOMINAL HYSTERECTOMY W/ BILATERAL SALPINGOOPHORECTOMY      due  to benign mass, adhesions    TUBAL LIGATION         Past Medical History:   Diagnosis Date    Carotid stenosis     19%    Cellulitis and abscess of foot, except toes 06/22/2022    Cerumen debris on tympanic membrane of right ear 11/30/2022    Coronary artery disease     CVA (cerebral vascular accident)     Dr. 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 Hope    Psoriatic arthritis     Rheumatology    Transient ischemic attack (TIA) 01/02/2019       Review of Systems   Constitutional:  Positive for fatigue.   HENT: Negative.     Eyes: Negative.    Respiratory:  Positive for shortness of breath.    Gastrointestinal:  Positive for anal bleeding (bleeding hemorrhoids).   Endocrine: Positive for cold intolerance.   Genitourinary: Negative.    Musculoskeletal: Negative.    Skin:  Positive for wound (dog bite to left lateral leg - with scabbing - just finished antibiotics).   Allergic/Immunologic: Negative.    Neurological: Negative.    Psychiatric/Behavioral:  Positive for decreased concentration.           Medication List with Changes/Refills   Current Medications    ACETAMINOPHEN (TYLENOL) 650 MG TBSR    as directed    ALBUTEROL (PROVENTIL) 2.5 MG /3 ML (0.083 %) NEBULIZER SOLUTION    Take 3 mLs (2.5 mg total) by nebulization every 6 (six) hours as needed for Wheezing. Rescue    ALLOPURINOL (ZYLOPRIM) 100 MG TABLET    TAKE 2 TABLETS ONCE DAILY    ASPIRIN 81 MG CHEW    Take 1 tablet (81 mg total) by mouth once daily.    CALCIPOTRIENE (DOVONOX) 0.005 % CREAM    Apply topically 2 (two) times daily.    CALCIUM CARBONATE 650 MG CALCIUM (1,625 MG) TABLET    Take 1 tablet by mouth once daily.    CLOPIDOGREL (PLAVIX) 75 MG TABLET    Take 1 tablet (75 mg total) by  mouth once. for 1 dose    CYANOCOBALAMIN, VITAMIN B-12, 1,000 MCG SUBL    Place 1 tablet under the tongue once daily.    DOCUSATE SODIUM (COLACE) 100 MG CAPSULE    Take 1 capsule (100 mg total) by mouth 2 (two) times daily.    DOCUSATE SODIUM (COLACE) 100 MG CAPSULE    Colace Take No date recorded No form recorded No frequency recorded No route recorded No set duration recorded No set duration amount recorded active No dosage strength recorded No dosage strength units of measure recorded    FOLIC ACID (FOLVITE) 1 MG TABLET    TAKE 1 TABLET DAILY    FUROSEMIDE (LASIX) 20 MG TABLET    Take 1 tablet (20 mg total) by mouth 2 (two) times daily.    GABAPENTIN (NEURONTIN) 100 MG CAPSULE    TAKE 2 CAPSULES THREE TIMES A DAY    GARLIC 2,000 MG CAP    Take 3,000 mg by mouth once daily.     LEVOTHYROXINE (SYNTHROID) 100 MCG TABLET    Take 1 tablet (100 mcg total) by mouth before breakfast.    METOPROLOL SUCCINATE (TOPROL-XL) 100 MG 24 HR TABLET    Take 1 tablet (100 mg total) by mouth 2 (two) times daily.    NITROFURANTOIN (MACRODANTIN) 100 MG CAPSULE    Take 100 mg by mouth every evening.    NITROGLYCERIN (NITROSTAT) 0.4 MG SL TABLET    DISSOLVE 1 TABLET UNDER THE TONGUE EVERY 5 MINUTES AS NEEDED FOR CHEST PAIN    PYRIDOXINE, VITAMIN B6, (B-6) 100 MG TAB    Take 200 mg by mouth once daily.     REPATHA SURECLICK 140 MG/ML PNIJ    INJECT 1 ML (140 MG) UNDER THE SKIN EVERY 14 DAYS    SPIRONOLACTONE (ALDACTONE) 25 MG TABLET    Take 1 tablet (25 mg total) by mouth once daily.    VITAMIN D 1000 UNITS TAB    Take 185 mg by mouth once daily.        Objective:     Vitals:    10/13/23 1102   BP: 127/72   Pulse: 66   Temp: 97.4 °F (36.3 °C)       Physical Exam  Vitals and nursing note reviewed.   Constitutional:       Appearance: Normal appearance.   HENT:      Head: Normocephalic and atraumatic.      Right Ear: External ear normal.      Left Ear: External ear normal.   Cardiovascular:      Rate and Rhythm: Normal rate and regular  rhythm.      Heart sounds: Normal heart sounds, S1 normal and S2 normal.   Pulmonary:      Effort: Pulmonary effort is normal.      Breath sounds: Normal breath sounds.   Abdominal:      General: There is no distension.   Musculoskeletal:         General: Normal range of motion.      Cervical back: Normal range of motion.   Skin:     General: Skin is warm and dry.      Coloration: Skin is pale.             Comments: Dog bite - healing   Neurological:      General: No focal deficit present.      Mental Status: She is alert and oriented to person, place, and time.   Psychiatric:         Attention and Perception: Attention and perception normal.         Mood and Affect: Mood and affect normal.         Speech: Speech normal.         Behavior: Behavior normal. Behavior is cooperative.         Thought Content: Thought content normal.         Cognition and Memory: Cognition and memory normal.         Judgment: Judgment normal.         Assessment:     Problem List Items Addressed This Visit          Oncology    Anemia due to folic acid deficiency    Relevant Orders    CBC Auto Differential    Iron deficiency    Relevant Orders    CBC Auto Differential    Comprehensive Metabolic Panel    Ferritin    Iron and TIBC    Immunoglobulin Free LT Chains Blood       Endocrine    B12 deficiency (Chronic)    Relevant Orders    CBC Auto Differential     Other Visit Diagnoses       Anemia, unspecified type    -  Primary    Relevant Orders    CBC Auto Differential    Comprehensive Metabolic Panel    Ferritin    Iron and TIBC    Immunoglobulin Free LT Chains Blood            Lab Results   Component Value Date    WBC 8.40 10/12/2023    RBC 4.68 10/12/2023    HGB 11.6 (L) 10/12/2023    HCT 39.0 10/12/2023    MCV 83 10/12/2023    MCH 24.8 (L) 10/12/2023    MCHC 29.7 (L) 10/12/2023    RDW 17.9 (H) 10/12/2023     10/12/2023    MPV 11.3 10/12/2023    GRAN 5.6 10/12/2023    GRAN 66.3 10/12/2023    LYMPH 1.9 10/12/2023    LYMPH 23.0  10/12/2023    MONO 0.7 10/12/2023    MONO 8.1 10/12/2023    EOS 0.2 10/12/2023    BASO 0.05 10/12/2023    EOSINOPHIL 1.8 10/12/2023    BASOPHIL 0.6 10/12/2023      Lab Results   Component Value Date     10/12/2023    K 3.6 10/12/2023     10/12/2023    CO2 29 10/12/2023    BUN 16 10/12/2023    CREATININE 1.1 10/12/2023    CALCIUM 9.6 10/12/2023    ANIONGAP 10 10/12/2023    ESTGFRAFRICA 48 (A) 07/18/2022    EGFRNONAA 41 (A) 07/18/2022     Lab Results   Component Value Date    ALT 15 10/12/2023    AST 23 10/12/2023    ALKPHOS 61 10/12/2023    BILITOT 0.3 10/12/2023     Lab Results   Component Value Date    IRON 38 09/29/2023    TRANSFERRIN 297 09/29/2023    TIBC 440 09/29/2023    FESATURATED 9 (L) 09/29/2023      Lab Results   Component Value Date    FERRITIN 17 (L) 09/29/2023     Lab Results   Component Value Date    SJQCDLJC88 >2000 (H) 06/13/2023     Lab Results   Component Value Date    FOLATE 18.7 05/20/2022       Plan:   Anemia, unspecified type  -     CBC Auto Differential; Future; Expected date: 10/13/2023  -     Comprehensive Metabolic Panel; Future; Expected date: 10/13/2023  -     Ferritin; Future; Expected date: 10/13/2023  -     Iron and TIBC; Future; Expected date: 10/13/2023  -     Immunoglobulin Free LT Chains Blood; Future; Expected date: 10/13/2023    Iron deficiency  -     Ambulatory referral/consult to Hematology / Oncology  -     CBC Auto Differential; Future; Expected date: 10/13/2023  -     Comprehensive Metabolic Panel; Future; Expected date: 10/13/2023  -     Ferritin; Future; Expected date: 10/13/2023  -     Iron and TIBC; Future; Expected date: 10/13/2023  -     Immunoglobulin Free LT Chains Blood; Future; Expected date: 10/13/2023    Anemia due to folic acid deficiency, unspecified deficiency type  -     CBC Auto Differential; Future; Expected date: 10/13/2023    B12 deficiency  -     CBC Auto Differential; Future; Expected date: 10/13/2023    Other orders  -      methylPREDNISolone sodium succinate injection 40 mg  -     iron sucrose injection 200 mg  -     sodium chloride 0.9% 250 mL flush bag  -     sodium chloride 0.9% flush 10 mL  -     heparin, porcine (PF) 100 unit/mL injection flush 500 Units  -     alteplase injection 2 mg  -     methylPREDNISolone sodium succinate injection 40 mg  -     iron sucrose injection 200 mg  -     sodium chloride 0.9% 250 mL flush bag  -     sodium chloride 0.9% flush 10 mL  -     heparin, porcine (PF) 100 unit/mL injection flush 500 Units  -     alteplase injection 2 mg  -     methylPREDNISolone sodium succinate injection 40 mg  -     iron sucrose injection 200 mg  -     sodium chloride 0.9% 250 mL flush bag  -     sodium chloride 0.9% flush 10 mL  -     heparin, porcine (PF) 100 unit/mL injection flush 500 Units  -     alteplase injection 2 mg  -     methylPREDNISolone sodium succinate injection 40 mg  -     iron sucrose injection 200 mg  -     sodium chloride 0.9% 250 mL flush bag  -     sodium chloride 0.9% flush 10 mL  -     heparin, porcine (PF) 100 unit/mL injection flush 500 Units  -     alteplase injection 2 mg      Med Onc Chart Routing      Follow up with physician    Follow up with [facility] . F/u 5 weeks after last venofer infusions with labs prior - in person visit - TG   Infusion scheduling note   give venofer 200 mg IVPB on D1 and D4 x 5 doses at [facility]   Injection scheduling note n/a   Labs   Scheduling:  Preferred lab: [facility]  Lab interval:  cbc, cmp, iron studies, flc   Imaging   N/a   Pharmacy appointment No pharmacy appointment needed      Other referrals       No additional referrals needed  n/a          Total time spent on encounter: 60 minutes    Collaborating Provider:  Dr. Royal Mckinney    Thank You,  Warren Javed, FNP-C  Hematology Oncology

## 2023-10-13 NOTE — TELEPHONE ENCOUNTER
Called patient and notified of the results and recommendations. Patient verbalized understanding, pt states cardiology called her yesterday and she will call back today. Chantal Langley RN

## 2023-10-13 NOTE — TELEPHONE ENCOUNTER
----- Message from Elma Hernandez NP sent at 10/13/2023  8:02 AM CDT -----  Inflammatory marker very elevated.   Please be sure she gets message about CXR result.   Please schedule f/u appt here next week.   Also needs to see cardiology - EKG changes.  Nurse was contacting cardiology yesterday.

## 2023-10-16 ENCOUNTER — TELEPHONE (OUTPATIENT)
Dept: PRIMARY CARE CLINIC | Facility: CLINIC | Age: 73
End: 2023-10-16
Payer: MEDICARE

## 2023-10-17 ENCOUNTER — OFFICE VISIT (OUTPATIENT)
Dept: CARDIOLOGY | Facility: CLINIC | Age: 73
End: 2023-10-17
Payer: MEDICARE

## 2023-10-17 VITALS
HEIGHT: 64 IN | OXYGEN SATURATION: 99 % | HEART RATE: 66 BPM | SYSTOLIC BLOOD PRESSURE: 130 MMHG | BODY MASS INDEX: 35.85 KG/M2 | DIASTOLIC BLOOD PRESSURE: 60 MMHG | WEIGHT: 210 LBS

## 2023-10-17 DIAGNOSIS — I95.1 ORTHOSTATIC HYPOTENSION: Chronic | ICD-10-CM

## 2023-10-17 DIAGNOSIS — I65.21 STENOSIS OF RIGHT CAROTID ARTERY: ICD-10-CM

## 2023-10-17 DIAGNOSIS — I25.118 CORONARY ARTERY DISEASE OF NATIVE ARTERY OF NATIVE HEART WITH STABLE ANGINA PECTORIS: ICD-10-CM

## 2023-10-17 DIAGNOSIS — I25.10 CAD, MULTIPLE VESSEL: ICD-10-CM

## 2023-10-17 DIAGNOSIS — I70.0 AORTIC ATHEROSCLEROSIS: Chronic | ICD-10-CM

## 2023-10-17 DIAGNOSIS — Z95.2 S/P TAVR (TRANSCATHETER AORTIC VALVE REPLACEMENT): ICD-10-CM

## 2023-10-17 DIAGNOSIS — R94.31 EKG ABNORMALITIES: ICD-10-CM

## 2023-10-17 DIAGNOSIS — I50.32 CHRONIC DIASTOLIC HEART FAILURE: Primary | Chronic | ICD-10-CM

## 2023-10-17 DIAGNOSIS — Z95.1 S/P CABG (CORONARY ARTERY BYPASS GRAFT): Chronic | ICD-10-CM

## 2023-10-17 DIAGNOSIS — I27.20 PULMONARY HYPERTENSION: Chronic | ICD-10-CM

## 2023-10-17 DIAGNOSIS — E78.2 MIXED HYPERLIPIDEMIA: Chronic | ICD-10-CM

## 2023-10-17 DIAGNOSIS — I10 ESSENTIAL HYPERTENSION: Chronic | ICD-10-CM

## 2023-10-17 PROCEDURE — 99999 PR PBB SHADOW E&M-EST. PATIENT-LVL V: ICD-10-PCS | Mod: PBBFAC,,, | Performed by: INTERNAL MEDICINE

## 2023-10-17 PROCEDURE — 99999 PR PBB SHADOW E&M-EST. PATIENT-LVL V: CPT | Mod: PBBFAC,,, | Performed by: INTERNAL MEDICINE

## 2023-10-17 PROCEDURE — 99215 OFFICE O/P EST HI 40 MIN: CPT | Mod: PBBFAC | Performed by: INTERNAL MEDICINE

## 2023-10-17 PROCEDURE — 99214 OFFICE O/P EST MOD 30 MIN: CPT | Mod: S$PBB,,, | Performed by: INTERNAL MEDICINE

## 2023-10-17 PROCEDURE — 99214 PR OFFICE/OUTPT VISIT, EST, LEVL IV, 30-39 MIN: ICD-10-PCS | Mod: S$PBB,,, | Performed by: INTERNAL MEDICINE

## 2023-10-17 NOTE — PROGRESS NOTES
Subjective:   Patient ID:  Qi Ortiz is a 73 y.o. female who presents for follow up of Shortness of Breath      73y/o F came in for SOB  PMHx of COPD, HLD, HTN, CABG x2, SVT, AS, CAD, s/p TAVR 20', chronic diastolic HF, CKD, DM, obesity, IRIS, statin intolerance  06/06/23 went to [facility] ER for SOB. Rx for CHF and d/c o/n.  Now sob stable no orthopnea leg swelling  No chest pain   Refused Jardiance    Interval history  C/o SOB worse with exertion, no orthopnea and PND. Onyy trace leg swelling  BNP was 300's this week and on Lasix 40 mg bid for 3 days and weight 10lbs. Now on lasix 20 mg bid  The SOB improved significantly  Does drink a lot of water  Ekg reviewed and leads I and avl revealed misplaced limb leads          Past Medical History:   Diagnosis Date    Carotid stenosis     19%    Cellulitis and abscess of foot, except toes 06/22/2022    Cerumen debris on tympanic membrane of right ear 11/30/2022    Coronary artery disease     CVA (cerebral vascular accident)     Dr. 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 Hope    Psoriatic arthritis     Rheumatology    Transient ischemic attack (TIA) 01/02/2019       Past Surgical History:   Procedure Laterality Date    BREAST BIOPSY      R sided/benign    CARDIAC SURGERY      sept 28 2016    CERVICAL FUSION      CHOLECYSTECTOMY      CORONARY ANGIOPLASTY      CORONARY ARTERY BYPASS GRAFT      triple bypass    CORONARY STENT PLACEMENT      EYE SURGERY      INTRAUTERINE DEVICE INSERTION      LEFT HEART CATHETERIZATION Left 9/8/2020    Procedure: CATHETERIZATION, HEART, LEFT;  Surgeon: Veronica Ibarra MD;  Location: [facility] CATH LAB;  Service: Cardiology;  Laterality: Left;  7am start time    mass removed from R  groin      TOTAL ABDOMINAL HYSTERECTOMY W/ BILATERAL SALPINGOOPHORECTOMY      due to benign mass, adhesions    TUBAL LIGATION         Social History     Tobacco Use    Smoking status: Never    Smokeless tobacco: Never   Substance Use Topics    Alcohol use: No    Drug use: No       Family History   Problem Relation Age of Onset    Breast cancer Maternal Grandfather     Breast cancer Paternal Aunt     Stroke Unknown     Breast cancer Sister 60    Leukemia Sister 8         as child    Lung cancer Paternal Grandfather     Heart disease Unknown     Diabetes Daughter          ROS    Objective:   Physical Exam  HENT:      Head: Normocephalic.   Eyes:      Pupils: Pupils are equal, round, and reactive to light.   Neck:      Thyroid: No thyromegaly.      Vascular: Normal carotid pulses. No carotid bruit or JVD.   Cardiovascular:      Rate and Rhythm: Normal rate and regular rhythm. No extrasystoles are present.     Chest Wall: PMI is not displaced.      Pulses: Normal pulses.      Heart sounds: Normal heart sounds. No murmur heard.     No gallop. No S3 sounds.   Pulmonary:      Effort: No respiratory distress.      Breath sounds: Normal breath sounds. No stridor.   Abdominal:      General: Bowel sounds are normal.      Palpations: Abdomen is soft.      Tenderness: There is no abdominal tenderness. There is no rebound.   Musculoskeletal:         General: Normal range of motion.   Skin:     Findings: No rash.   Neurological:      Mental Status: She is alert and oriented to person, place, and time.   Psychiatric:         Behavior: Behavior normal.         Lab Results   Component Value Date    CHOL 137 2023    CHOL 125 2023    CHOL 119 (L) 2022     Lab Results   Component Value Date    HDL 35 (L) 2023    HDL 43 2023    HDL 42 2022     Lab Results   Component Value Date    LDLCALC 69.4 2023    LDLCALC 50.0 (L) 2023    LDLCALC 55.4 (L) 2022     Lab Results   Component Value  Date    TRIG 163 (H) 08/24/2023    TRIG 160 (H) 02/03/2023    TRIG 108 07/18/2022     Lab Results   Component Value Date    CHOLHDL 25.5 08/24/2023    CHOLHDL 34.4 02/03/2023    CHOLHDL 35.3 07/18/2022       Chemistry        Component Value Date/Time     10/12/2023 1456    K 3.6 10/12/2023 1456     10/12/2023 1456    CO2 29 10/12/2023 1456    BUN 16 10/12/2023 1456    CREATININE 1.1 10/12/2023 1456     (H) 10/12/2023 1456        Component Value Date/Time    CALCIUM 9.6 10/12/2023 1456    ALKPHOS 61 10/12/2023 1456    AST 23 10/12/2023 1456    ALT 15 10/12/2023 1456    BILITOT 0.3 10/12/2023 1456    ESTGFRAFRICA 48 (A) 07/18/2022 0803    EGFRNONAA 41 (A) 07/18/2022 0803          Lab Results   Component Value Date    HGBA1C 6.1 (H) 08/24/2023     Lab Results   Component Value Date    TSH 0.442 09/29/2023     Lab Results   Component Value Date    INR 1.0 09/28/2020    INR 1.0 12/18/2017     Lab Results   Component Value Date    WBC 8.40 10/12/2023    HGB 11.6 (L) 10/12/2023    HCT 39.0 10/12/2023    MCV 83 10/12/2023     10/12/2023     BMP  Sodium   Date Value Ref Range Status   10/12/2023 141 136 - 145 mmol/L Final     Potassium   Date Value Ref Range Status   10/12/2023 3.6 3.5 - 5.1 mmol/L Final     Chloride   Date Value Ref Range Status   10/12/2023 102 95 - 110 mmol/L Final     CO2   Date Value Ref Range Status   10/12/2023 29 23 - 29 mmol/L Final     BUN   Date Value Ref Range Status   10/12/2023 16 8 - 23 mg/dL Final     Creatinine   Date Value Ref Range Status   10/12/2023 1.1 0.5 - 1.4 mg/dL Final     Calcium   Date Value Ref Range Status   10/12/2023 9.6 8.7 - 10.5 mg/dL Final     Anion Gap   Date Value Ref Range Status   10/12/2023 10 8 - 16 mmol/L Final     eGFR if    Date Value Ref Range Status   07/18/2022 48 (A) >60 mL/min/1.73 m^2 Final     eGFR if non    Date Value Ref Range Status   07/18/2022 41 (A) >60 mL/min/1.73 m^2 Final     Comment:      Calculation used to obtain the estimated glomerular filtration  rate (eGFR) is the CKD-EPI equation.        BNP  @LABRCNTIP(BNP,BNPTRIAGEBLO)@  @LABRCNTIP(troponini)@  Estimated Creatinine Clearance: 51.7 mL/min (based on SCr of 1.1 mg/dL).  No results found in the last 24 hours.  No results found in the last 24 hours.  No results found in the last 24 hours.    Assessment:      1. Chronic diastolic heart failure    2. S/P TAVR (transcatheter aortic valve replacement)    3. Mixed hyperlipidemia    4. Stenosis of right carotid artery    5. CAD, multiple vessel    6. Aortic atherosclerosis    7. Pulmonary hypertension    8. Essential hypertension    9. Coronary artery disease of native artery of native heart with stable angina pectoris    10. S/P CABG (coronary artery bypass graft)    11. Orthostatic hypotension    12. EKG abnormalities      CHFpEF decompensated    Plan:   Continue lasix 20 mg bid  Daily weight. Please call the office if gain 3 pounds in 1 day or 5 pounds in 1 week.  Fluid restriction 1.5 liters a day  Na< 2 gm  Repeat BMP in 1 week  Reviewed EKG with pt  Repeat echo for CHF   Continue asa plavix toprolXl and aldactone  RTC in 2 m

## 2023-10-24 ENCOUNTER — INFUSION (OUTPATIENT)
Dept: INFUSION THERAPY | Facility: HOSPITAL | Age: 73
End: 2023-10-24
Attending: INTERNAL MEDICINE
Payer: MEDICARE

## 2023-10-24 VITALS
DIASTOLIC BLOOD PRESSURE: 72 MMHG | TEMPERATURE: 97 F | SYSTOLIC BLOOD PRESSURE: 164 MMHG | OXYGEN SATURATION: 99 % | RESPIRATION RATE: 18 BRPM | HEART RATE: 63 BPM

## 2023-10-24 DIAGNOSIS — M81.0 AGE-RELATED OSTEOPOROSIS WITHOUT CURRENT PATHOLOGICAL FRACTURE: ICD-10-CM

## 2023-10-24 DIAGNOSIS — D50.0 IRON DEFICIENCY ANEMIA DUE TO CHRONIC BLOOD LOSS: Primary | ICD-10-CM

## 2023-10-24 PROCEDURE — 25000003 PHARM REV CODE 250: Performed by: NURSE PRACTITIONER

## 2023-10-24 PROCEDURE — 96375 TX/PRO/DX INJ NEW DRUG ADDON: CPT

## 2023-10-24 PROCEDURE — 63600175 PHARM REV CODE 636 W HCPCS: Performed by: NURSE PRACTITIONER

## 2023-10-24 PROCEDURE — 96365 THER/PROPH/DIAG IV INF INIT: CPT

## 2023-10-24 PROCEDURE — 63600175 PHARM REV CODE 636 W HCPCS

## 2023-10-24 RX ORDER — SODIUM CHLORIDE 0.9 % (FLUSH) 0.9 %
10 SYRINGE (ML) INJECTION
Status: CANCELLED | OUTPATIENT
Start: 2023-10-31

## 2023-10-24 RX ORDER — SODIUM CHLORIDE 0.9 % (FLUSH) 0.9 %
10 SYRINGE (ML) INJECTION
Status: DISCONTINUED | OUTPATIENT
Start: 2023-10-24 | End: 2023-10-24 | Stop reason: HOSPADM

## 2023-10-24 RX ORDER — METHYLPREDNISOLONE SOD SUCC 125 MG
80 VIAL (EA) INJECTION
Status: CANCELLED
Start: 2023-10-24

## 2023-10-24 RX ORDER — HEPARIN 100 UNIT/ML
500 SYRINGE INTRAVENOUS
Status: CANCELLED | OUTPATIENT
Start: 2023-10-31

## 2023-10-24 RX ADMIN — SODIUM CHLORIDE: 9 INJECTION, SOLUTION INTRAVENOUS at 02:10

## 2023-10-24 RX ADMIN — METHYLPREDNISOLONE SODIUM SUCCINATE 80 MG: 40 INJECTION INTRAMUSCULAR; INTRAVENOUS at 01:10

## 2023-10-24 RX ADMIN — IRON SUCROSE 200 MG: 20 INJECTION, SOLUTION INTRAVENOUS at 02:10

## 2023-10-24 NOTE — PLAN OF CARE
Discussed plan of care with pt. Addressed any and ongoing concerns. Pt denies   Problem: Adult Inpatient Plan of Care  Goal: Plan of Care Review  Outcome: Ongoing, Progressing  Goal: Patient-Specific Goal (Individualized)  Outcome: Ongoing, Progressing  Flowsheets (Taken 10/24/2023 1518)  Anxieties, Fears or Concerns: Requests steroid and IVPB iron  Individualized Care Needs: Feet elevated, pillow and snacks offered  Goal: Absence of Hospital-Acquired Illness or Injury  Outcome: Ongoing, Progressing  Intervention: Identify and Manage Fall Risk  Flowsheets (Taken 10/24/2023 1518)  Safety Promotion/Fall Prevention: in recliner, wheels locked  Intervention: Prevent Infection  Flowsheets (Taken 10/24/2023 1518)  Infection Prevention:   equipment surfaces disinfected   hand hygiene promoted   personal protective equipment utilized  Goal: Optimal Comfort and Wellbeing  Outcome: Ongoing, Progressing  Intervention: Provide Person-Centered Care  Flowsheets (Taken 10/24/2023 1518)  Trust Relationship/Rapport: (adjusted medication orders per pt request to IVPB iron from IVP)   care explained   choices provided   empathic listening provided   questions answered   questions encouraged   thoughts/feelings acknowledged   other (see comments)

## 2023-10-25 ENCOUNTER — TELEPHONE (OUTPATIENT)
Dept: PRIMARY CARE CLINIC | Facility: CLINIC | Age: 73
End: 2023-10-25
Payer: MEDICARE

## 2023-10-25 NOTE — TELEPHONE ENCOUNTER
----- Message from Rubi Weinberg sent at 10/25/2023  3:33 PM CDT -----  Patient wants Chantal to call her. States her  passed and wants to speak to Priti about appointment. Patient gave name and date of birth and was asked to verify last 4 of SSN and was offended. [phone]

## 2023-10-25 NOTE — TELEPHONE ENCOUNTER
Spoke with pt - states  passed away on Saturday. Has been on hospice for 14 months. Pt states that she is doing ok just having to take care of paperwork and  things for her . Appt made for Nov. 8th to see Dr. Porter.

## 2023-10-26 ENCOUNTER — TELEPHONE (OUTPATIENT)
Dept: CARDIOLOGY | Facility: CLINIC | Age: 73
End: 2023-10-26
Payer: MEDICARE

## 2023-10-26 DIAGNOSIS — E87.6 HYPOKALEMIA: Primary | ICD-10-CM

## 2023-10-26 RX ORDER — POTASSIUM CHLORIDE 750 MG/1
10 TABLET, EXTENDED RELEASE ORAL DAILY
Qty: 30 TABLET | Refills: 5 | Status: SHIPPED | OUTPATIENT
Start: 2023-10-26

## 2023-10-27 ENCOUNTER — TELEPHONE (OUTPATIENT)
Dept: CARDIOLOGY | Facility: CLINIC | Age: 73
End: 2023-10-27
Payer: MEDICARE

## 2023-10-27 ENCOUNTER — INFUSION (OUTPATIENT)
Dept: INFUSION THERAPY | Facility: HOSPITAL | Age: 73
End: 2023-10-27
Payer: MEDICARE

## 2023-10-27 ENCOUNTER — HOSPITAL ENCOUNTER (OUTPATIENT)
Dept: CARDIOLOGY | Facility: HOSPITAL | Age: 73
Discharge: HOME | End: 2023-10-27
Attending: INTERNAL MEDICINE
Payer: MEDICARE

## 2023-10-27 VITALS
TEMPERATURE: 98 F | RESPIRATION RATE: 18 BRPM | HEART RATE: 53 BPM | OXYGEN SATURATION: 97 % | DIASTOLIC BLOOD PRESSURE: 60 MMHG | SYSTOLIC BLOOD PRESSURE: 127 MMHG

## 2023-10-27 VITALS
HEART RATE: 60 BPM | DIASTOLIC BLOOD PRESSURE: 68 MMHG | WEIGHT: 210 LBS | BODY MASS INDEX: 35.85 KG/M2 | SYSTOLIC BLOOD PRESSURE: 137 MMHG | HEIGHT: 64 IN

## 2023-10-27 DIAGNOSIS — D50.0 IRON DEFICIENCY ANEMIA DUE TO CHRONIC BLOOD LOSS: Primary | ICD-10-CM

## 2023-10-27 DIAGNOSIS — I50.32 CHRONIC DIASTOLIC HEART FAILURE: Chronic | ICD-10-CM

## 2023-10-27 DIAGNOSIS — I25.118 CORONARY ARTERY DISEASE OF NATIVE ARTERY OF NATIVE HEART WITH STABLE ANGINA PECTORIS: ICD-10-CM

## 2023-10-27 LAB
ASCENDING AORTA: 2.63 CM
AV INDEX (PROSTH): 0.52
AV MEAN GRADIENT: 9 MMHG
AV PEAK GRADIENT: 18 MMHG
AV VALVE AREA BY VELOCITY RATIO: 0.94 CM²
AV VALVE AREA: 1.14 CM²
AV VELOCITY RATIO: 0.43
BSA FOR ECHO PROCEDURE: 2.07 M2
CV ECHO LV RWT: 0.46 CM
DOP CALC AO PEAK VEL: 2.13 M/S
DOP CALC AO VTI: 44.5 CM
DOP CALC LVOT AREA: 2.2 CM2
DOP CALC LVOT DIAMETER: 1.67 CM
DOP CALC LVOT PEAK VEL: 0.91 M/S
DOP CALC LVOT STROKE VOLUME: 50.79 CM3
DOP CALC MV VTI: 54.1 CM
DOP CALC RVOT PEAK VEL: 0.7 M/S
DOP CALC RVOT VTI: 13.1 CM
DOP CALCLVOT PEAK VEL VTI: 23.2 CM
E WAVE DECELERATION TIME: 324.93 MSEC
E/A RATIO: 0.87
E/E' RATIO: 18.15 M/S
ECHO LV POSTERIOR WALL: 1.11 CM (ref 0.6–1.1)
FRACTIONAL SHORTENING: 35 % (ref 28–44)
INTERVENTRICULAR SEPTUM: 1.05 CM (ref 0.6–1.1)
LA MAJOR: 4.7 CM
LA MINOR: 4.94 CM
LA WIDTH: 3.2 CM
LEFT ATRIUM SIZE: 3.45 CM
LEFT ATRIUM VOLUME INDEX MOD: 19.2 ML/M2
LEFT ATRIUM VOLUME INDEX: 22.6 ML/M2
LEFT ATRIUM VOLUME MOD: 38.39 CM3
LEFT ATRIUM VOLUME: 45.2 CM3
LEFT INTERNAL DIMENSION IN SYSTOLE: 3.13 CM (ref 2.1–4)
LEFT VENTRICLE DIASTOLIC VOLUME INDEX: 54 ML/M2
LEFT VENTRICLE DIASTOLIC VOLUME: 108 ML
LEFT VENTRICLE MASS INDEX: 95 G/M2
LEFT VENTRICLE SYSTOLIC VOLUME INDEX: 19.4 ML/M2
LEFT VENTRICLE SYSTOLIC VOLUME: 38.86 ML
LEFT VENTRICULAR INTERNAL DIMENSION IN DIASTOLE: 4.81 CM (ref 3.5–6)
LEFT VENTRICULAR MASS: 189.73 G
LV LATERAL E/E' RATIO: 14.75 M/S
LV SEPTAL E/E' RATIO: 23.6 M/S
LVOT MG: 2.02 MMHG
LVOT MV: 0.66 CM/S
MV MEAN GRADIENT: 2 MMHG
MV PEAK A VEL: 1.36 M/S
MV PEAK E VEL: 1.18 M/S
MV PEAK GRADIENT: 8 MMHG
MV STENOSIS PRESSURE HALF TIME: 94.23 MS
MV VALVE AREA BY CONTINUITY EQUATION: 0.94 CM2
MV VALVE AREA P 1/2 METHOD: 2.33 CM2
PISA TR MAX VEL: 2.36 M/S
PV MEAN GRADIENT: 1 MMHG
PV PEAK GRADIENT: 3 MMHG
PV PEAK VELOCITY: 0.9 M/S
RA MAJOR: 4.66 CM
RA WIDTH: 3.32 CM
RIGHT VENTRICULAR END-DIASTOLIC DIMENSION: 3.05 CM
SINUS: 1.93 CM
STJ: 1.97 CM
TDI LATERAL: 0.08 M/S
TDI SEPTAL: 0.05 M/S
TDI: 0.07 M/S
TR MAX PG: 22 MMHG
Z-SCORE OF LEFT VENTRICULAR DIMENSION IN END DIASTOLE: -1.92
Z-SCORE OF LEFT VENTRICULAR DIMENSION IN END SYSTOLE: -1.06

## 2023-10-27 PROCEDURE — 96375 TX/PRO/DX INJ NEW DRUG ADDON: CPT

## 2023-10-27 PROCEDURE — 93306 ECHO (CUPID ONLY): ICD-10-PCS | Mod: 26,,, | Performed by: INTERNAL MEDICINE

## 2023-10-27 PROCEDURE — 93306 TTE W/DOPPLER COMPLETE: CPT | Mod: 26,,, | Performed by: INTERNAL MEDICINE

## 2023-10-27 PROCEDURE — 93306 TTE W/DOPPLER COMPLETE: CPT

## 2023-10-27 PROCEDURE — 63600175 PHARM REV CODE 636 W HCPCS

## 2023-10-27 PROCEDURE — 63600175 PHARM REV CODE 636 W HCPCS: Performed by: NURSE PRACTITIONER

## 2023-10-27 PROCEDURE — 96365 THER/PROPH/DIAG IV INF INIT: CPT

## 2023-10-27 RX ORDER — METHYLPREDNISOLONE SOD SUCC 125 MG
40 VIAL (EA) INJECTION
Status: DISCONTINUED | OUTPATIENT
Start: 2023-10-27 | End: 2023-10-27

## 2023-10-27 RX ORDER — METHYLPREDNISOLONE SOD SUCC 125 MG
80 VIAL (EA) INJECTION
Status: COMPLETED | OUTPATIENT
Start: 2023-10-27 | End: 2023-10-27

## 2023-10-27 RX ORDER — FUROSEMIDE 20 MG/1
20 TABLET ORAL 2 TIMES DAILY
Qty: 180 TABLET | Refills: 1 | Status: SHIPPED | OUTPATIENT
Start: 2023-10-27 | End: 2024-04-24

## 2023-10-27 RX ADMIN — IRON SUCROSE 200 MG: 20 INJECTION, SOLUTION INTRAVENOUS at 02:10

## 2023-10-27 RX ADMIN — METHYLPREDNISOLONE SODIUM SUCCINATE 80 MG: 125 INJECTION, POWDER, FOR SOLUTION INTRAMUSCULAR; INTRAVENOUS at 02:10

## 2023-10-27 NOTE — TELEPHONE ENCOUNTER
Called pt to go over test results and schedule lab work. Pt understood. Pt stated she did not need KCL due to her still having some left over.phone call ended well          ----- Message from Asif Paez MD sent at 10/26/2023  9:43 PM CDT -----  The lab showed K 3.3  Add kcl 10 meq daily when taking lasix  Repeat BMP in 1 week

## 2023-10-27 NOTE — DISCHARGE INSTRUCTIONS
..[facility]  [address] [facility]  [address]  [phone] phone     [phone] fax  Hours of Operation: Monday- Friday 8:00am- 5:00pm  After hours phone  [phone]  Hematology / Oncology Physicians on call    Dr. Hank Gore      Nurse Practitioners:    Radha Richardson, SAMEERA Javed, SAMEERA Alanis, SAMEERA Caban, SAMEERA Delgado, SAMEERA Styles, PA      Please don't hesitate to call if you have any concerns.    .FALL PREVENTION   Falls often occur due to slipping, tripping or losing your balance. Here are ways to reduce your risk of falling again.   Was there anything that caused your fall that can be fixed, removed or replaced?   Make your home safe by keeping walkways clear of objects you may trip over.   Use non-slip pads under rugs.   Do not walk in poorly lit areas.   Do not stand on chairs or wobbly ladders.   Use caution when reaching overhead or looking upward. This position can cause a loss of balance.   Be sure your shoes fit properly, have non-slip bottoms and are in good condition.   Be cautious when going up and down stairs, curbs, and when walking on uneven sidewalks.   If your balance is poor, consider using a cane or walker.   If your fall was related to alcohol use, stop or limit alcohol intake.   If your fall was related to use of sleeping medicines, talk to your doctor about this. You may need to reduce your dosage at bedtime if you awaken during the night to go to the bathroom.   To reduce the need for nighttime bathroom trips:   Avoid drinking fluids for several hours before going to bed   Empty your bladder before going to bed   Men can keep a urinal at the bedside   © 2894-0581 [employer], [address] [place] [address], [place] [address]. All rights reserved. This information is not intended as a substitute for professional medical care. Always follow your healthcare  professional's instructions.  .WAYS TO HELP PREVENT INFECTION        WASH YOUR HANDS OFTEN DURING THE DAY, ESPECIALLY BEFORE YOU EAT, AFTER USING THE BATHROOM, AND AFTER TOUCHING ANIMALS    STAY AWAY FROM PEOPLE WHO HAVE ILLNESSES YOU CAN CATCH; SUCH AS COLDS, FLU, CHICKEN POX    TRY TO AVOID CROWDS    STAY AWAY FROM CHILDREN WHO RECENTLY HAVE RECEIVED LIVE VIRUS VACCINES    MAINTAIN GOOD MOUTH CARE    DO NOT SQUEEZE OR SCRATCH PIMPLES    CLEAN CUTS & SCRAPES RIGHT AWAY AND DAILY UNTIL HEALED WITH WARM WATER, SOAP & AN ANTISEPTIC    AVOID CONTACT WITH LITTER BOXES, BIRD CAGES, & FISH TANKS    AVOID STANDING WATER, IE., BIRD BATHS, FLOWER POTS/VASES, OR HUMIDIFIERS    WEAR GLOVES WHEN GARDENING OR CLEANING UP AFTER OTHERS, ESPECIALLY BABIES & SMALL CHILDREN    DO NOT EAT RAW FISH, SEAFOOD, MEAT, OR EGGS

## 2023-10-27 NOTE — TELEPHONE ENCOUNTER
Pt stated that she wants the KCL cancelled due to her having 2 bottles full. Also stated that she wants her lasik [facility]. She needs 90 day supply       ----- Message from Asif Paez MD sent at 10/26/2023  9:43 PM CDT -----  The lab showed K 3.3  Add kcl 10 meq daily when taking lasix  Repeat BMP in 1 week

## 2023-10-31 ENCOUNTER — INFUSION (OUTPATIENT)
Dept: INFUSION THERAPY | Facility: HOSPITAL | Age: 73
End: 2023-10-31
Payer: MEDICARE

## 2023-10-31 ENCOUNTER — TELEPHONE (OUTPATIENT)
Dept: CARDIOLOGY | Facility: CLINIC | Age: 73
End: 2023-10-31
Payer: MEDICARE

## 2023-10-31 VITALS
HEART RATE: 59 BPM | OXYGEN SATURATION: 97 % | SYSTOLIC BLOOD PRESSURE: 157 MMHG | RESPIRATION RATE: 18 BRPM | TEMPERATURE: 98 F | DIASTOLIC BLOOD PRESSURE: 70 MMHG

## 2023-10-31 DIAGNOSIS — D50.0 IRON DEFICIENCY ANEMIA DUE TO CHRONIC BLOOD LOSS: Primary | ICD-10-CM

## 2023-10-31 PROCEDURE — 96375 TX/PRO/DX INJ NEW DRUG ADDON: CPT

## 2023-10-31 PROCEDURE — 25000003 PHARM REV CODE 250

## 2023-10-31 PROCEDURE — 96365 THER/PROPH/DIAG IV INF INIT: CPT

## 2023-10-31 PROCEDURE — 63600175 PHARM REV CODE 636 W HCPCS

## 2023-10-31 PROCEDURE — 25000003 PHARM REV CODE 250: Performed by: NURSE PRACTITIONER

## 2023-10-31 RX ORDER — HEPARIN 100 UNIT/ML
500 SYRINGE INTRAVENOUS
Status: CANCELLED | OUTPATIENT
Start: 2023-11-07

## 2023-10-31 RX ORDER — SODIUM CHLORIDE 0.9 % (FLUSH) 0.9 %
10 SYRINGE (ML) INJECTION
Status: CANCELLED | OUTPATIENT
Start: 2023-11-07

## 2023-10-31 RX ORDER — SODIUM CHLORIDE 0.9 % (FLUSH) 0.9 %
10 SYRINGE (ML) INJECTION
Status: DISCONTINUED | OUTPATIENT
Start: 2023-10-31 | End: 2023-10-31 | Stop reason: HOSPADM

## 2023-10-31 RX ORDER — METHYLPREDNISOLONE SOD SUCC 125 MG
80 VIAL (EA) INJECTION
Status: CANCELLED
Start: 2023-11-06

## 2023-10-31 RX ADMIN — METHYLPREDNISOLONE SODIUM SUCCINATE 80 MG: 40 INJECTION INTRAMUSCULAR; INTRAVENOUS at 03:10

## 2023-10-31 RX ADMIN — IRON SUCROSE 200 MG: 20 INJECTION, SOLUTION INTRAVENOUS at 03:10

## 2023-10-31 RX ADMIN — SODIUM CHLORIDE: 9 INJECTION, SOLUTION INTRAVENOUS at 03:10

## 2023-10-31 NOTE — TELEPHONE ENCOUNTER
Called pt to go over test results. lvm        ----- Message from Asif Paez MD sent at 10/30/2023  4:46 PM CDT -----  Echo showed normal function and good TAVR

## 2023-10-31 NOTE — PLAN OF CARE
Problem: Adult Inpatient Plan of Care  Goal: Plan of Care Review  Outcome: Ongoing, Progressing  Flowsheets (Taken 10/31/2023 1500)  Plan of Care Reviewed With: patient  Goal: Patient-Specific Goal (Individualized)  Outcome: Ongoing, Progressing  Flowsheets (Taken 10/31/2023 1500)  Anxieties, Fears or Concerns: pt overly concerned about iron, her bowels  Individualized Care Needs: warm blanket, pillow, legs elevated on pillow, shades on, reclining position     Problem: Anemia  Goal: Anemia Symptom Improvement  Outcome: Ongoing, Progressing  Intervention: Monitor and Manage Anemia  Flowsheets (Taken 10/31/2023 1500)  Safety Promotion/Fall Prevention:   instructed to call staff for mobility   room near unit station  Fatigue Management: frequent rest breaks encouraged

## 2023-11-01 ENCOUNTER — TELEPHONE (OUTPATIENT)
Dept: CARDIOLOGY | Facility: CLINIC | Age: 73
End: 2023-11-01
Payer: MEDICARE

## 2023-11-01 NOTE — TELEPHONE ENCOUNTER
Contacted patient; Patient received and understood results with no questions or concerns.       ----- Message from Nnamdi Bishop MA sent at 11/1/2023  1:03 PM CDT -----  Patient is returning a phone call.    Who left a message for the patient: Danay    Does patient know what this is regarding: Results    Would you like a call back, or a response through your [facility] portal?: Qi at [phone]      Comments:

## 2023-11-02 ENCOUNTER — EXTERNAL HOME HEALTH (OUTPATIENT)
Dept: HOME HEALTH SERVICES | Facility: HOSPITAL | Age: 73
End: 2023-11-02
Payer: MEDICARE

## 2023-11-02 RX ORDER — METHYLPREDNISOLONE SOD SUCC 125 MG
80 VIAL (EA) INJECTION
Status: CANCELLED
Start: 2023-11-14

## 2023-11-03 ENCOUNTER — INFUSION (OUTPATIENT)
Dept: INFUSION THERAPY | Facility: HOSPITAL | Age: 73
End: 2023-11-03
Payer: MEDICARE

## 2023-11-03 VITALS
DIASTOLIC BLOOD PRESSURE: 69 MMHG | RESPIRATION RATE: 18 BRPM | HEART RATE: 55 BPM | SYSTOLIC BLOOD PRESSURE: 145 MMHG | TEMPERATURE: 98 F | OXYGEN SATURATION: 98 %

## 2023-11-03 DIAGNOSIS — D50.0 IRON DEFICIENCY ANEMIA DUE TO CHRONIC BLOOD LOSS: Primary | ICD-10-CM

## 2023-11-03 PROCEDURE — 25000003 PHARM REV CODE 250: Performed by: NURSE PRACTITIONER

## 2023-11-03 PROCEDURE — 96365 THER/PROPH/DIAG IV INF INIT: CPT

## 2023-11-03 PROCEDURE — 63600175 PHARM REV CODE 636 W HCPCS

## 2023-11-03 PROCEDURE — 63600175 PHARM REV CODE 636 W HCPCS: Performed by: NURSE PRACTITIONER

## 2023-11-03 PROCEDURE — 96375 TX/PRO/DX INJ NEW DRUG ADDON: CPT

## 2023-11-03 RX ADMIN — METHYLPREDNISOLONE SODIUM SUCCINATE 80 MG: 40 INJECTION INTRAMUSCULAR; INTRAVENOUS at 02:11

## 2023-11-03 RX ADMIN — SODIUM CHLORIDE: 9 INJECTION, SOLUTION INTRAVENOUS at 02:11

## 2023-11-03 RX ADMIN — IRON SUCROSE 200 MG: 20 INJECTION, SOLUTION INTRAVENOUS at 02:11

## 2023-11-03 NOTE — PLAN OF CARE
Problem: Adult Inpatient Plan of Care  Goal: Plan of Care Review  Outcome: Ongoing, Progressing  Flowsheets (Taken 11/3/2023 1500)  Plan of Care Reviewed With: patient  Goal: Patient-Specific Goal (Individualized)  Outcome: Ongoing, Progressing  Flowsheets (Taken 11/3/2023 1500)  Anxieties, Fears or Concerns: pt concerned and talkative about iron infusions  Individualized Care Needs: warm blanket, pillow, reclining position, shades on     Problem: Anemia  Goal: Anemia Symptom Improvement  Outcome: Ongoing, Progressing  Intervention: Monitor and Manage Anemia  Flowsheets (Taken 11/3/2023 1500)  Safety Promotion/Fall Prevention:   instructed to call staff for mobility   room near unit station  Fatigue Management: frequent rest breaks encouraged

## 2023-11-06 ENCOUNTER — TELEPHONE (OUTPATIENT)
Dept: CARDIOLOGY | Facility: CLINIC | Age: 73
End: 2023-11-06
Payer: MEDICARE

## 2023-11-06 NOTE — TELEPHONE ENCOUNTER
Called pt to go over test results. Pt understood. Phone call ended well.      ----- Message from Asif Paez MD sent at 11/5/2023 11:52 AM CST -----  The lab showed K 3.5 in normal range  Continue KCL when taking Lasix

## 2023-11-07 ENCOUNTER — INFUSION (OUTPATIENT)
Dept: INFUSION THERAPY | Facility: HOSPITAL | Age: 73
End: 2023-11-07
Attending: INTERNAL MEDICINE
Payer: MEDICARE

## 2023-11-07 VITALS
SYSTOLIC BLOOD PRESSURE: 145 MMHG | HEART RATE: 59 BPM | OXYGEN SATURATION: 98 % | DIASTOLIC BLOOD PRESSURE: 69 MMHG | RESPIRATION RATE: 18 BRPM | TEMPERATURE: 98 F

## 2023-11-07 DIAGNOSIS — D50.0 IRON DEFICIENCY ANEMIA DUE TO CHRONIC BLOOD LOSS: Primary | ICD-10-CM

## 2023-11-07 PROCEDURE — 63600175 PHARM REV CODE 636 W HCPCS

## 2023-11-07 PROCEDURE — 25000003 PHARM REV CODE 250

## 2023-11-07 PROCEDURE — 96375 TX/PRO/DX INJ NEW DRUG ADDON: CPT

## 2023-11-07 PROCEDURE — 25000003 PHARM REV CODE 250: Performed by: NURSE PRACTITIONER

## 2023-11-07 PROCEDURE — 96365 THER/PROPH/DIAG IV INF INIT: CPT

## 2023-11-07 PROCEDURE — 63600175 PHARM REV CODE 636 W HCPCS: Performed by: NURSE PRACTITIONER

## 2023-11-07 RX ORDER — HEPARIN 100 UNIT/ML
500 SYRINGE INTRAVENOUS
Status: CANCELLED | OUTPATIENT
Start: 2023-12-01

## 2023-11-07 RX ORDER — SODIUM CHLORIDE 0.9 % (FLUSH) 0.9 %
10 SYRINGE (ML) INJECTION
Status: CANCELLED | OUTPATIENT
Start: 2023-12-01

## 2023-11-07 RX ORDER — HEPARIN 100 UNIT/ML
500 SYRINGE INTRAVENOUS
OUTPATIENT
Start: 2023-12-31

## 2023-11-07 RX ORDER — SODIUM CHLORIDE 0.9 % (FLUSH) 0.9 %
10 SYRINGE (ML) INJECTION
OUTPATIENT
Start: 2023-12-31

## 2023-11-07 RX ADMIN — SODIUM CHLORIDE: 9 INJECTION, SOLUTION INTRAVENOUS at 03:11

## 2023-11-07 RX ADMIN — METHYLPREDNISOLONE SODIUM SUCCINATE 80 MG: 40 INJECTION INTRAMUSCULAR; INTRAVENOUS at 02:11

## 2023-11-07 RX ADMIN — IRON SUCROSE 200 MG: 20 INJECTION, SOLUTION INTRAVENOUS at 03:11

## 2023-11-07 NOTE — PLAN OF CARE
Problem: Adult Inpatient Plan of Care  Goal: Plan of Care Review  Outcome: Ongoing, Progressing  Flowsheets (Taken 11/7/2023 1509)  Plan of Care Reviewed With: patient  Goal: Patient-Specific Goal (Individualized)  Outcome: Ongoing, Progressing  Flowsheets (Taken 11/7/2023 1509)  Anxieties, Fears or Concerns: Pt voices no concerns at this time  Individualized Care Needs: Pt in recliner, pillow under iv arm. OJ given.     Problem: Anemia  Goal: Anemia Symptom Improvement  Outcome: Ongoing, Progressing

## 2023-11-08 ENCOUNTER — OFFICE VISIT (OUTPATIENT)
Dept: PRIMARY CARE CLINIC | Facility: CLINIC | Age: 73
End: 2023-11-08
Payer: MEDICARE

## 2023-11-08 VITALS
TEMPERATURE: 99 F | OXYGEN SATURATION: 98 % | HEIGHT: 64 IN | WEIGHT: 220.38 LBS | BODY MASS INDEX: 37.62 KG/M2 | HEART RATE: 71 BPM | DIASTOLIC BLOOD PRESSURE: 66 MMHG | SYSTOLIC BLOOD PRESSURE: 160 MMHG

## 2023-11-08 DIAGNOSIS — H91.93 BILATERAL HEARING LOSS, UNSPECIFIED HEARING LOSS TYPE: Chronic | ICD-10-CM

## 2023-11-08 DIAGNOSIS — E53.8 B12 DEFICIENCY: Primary | Chronic | ICD-10-CM

## 2023-11-08 DIAGNOSIS — I10 ESSENTIAL HYPERTENSION: Chronic | ICD-10-CM

## 2023-11-08 DIAGNOSIS — D50.0 IRON DEFICIENCY ANEMIA DUE TO CHRONIC BLOOD LOSS: Chronic | ICD-10-CM

## 2023-11-08 DIAGNOSIS — G62.9 POLYNEUROPATHY: Chronic | ICD-10-CM

## 2023-11-08 DIAGNOSIS — R26.89 BALANCE PROBLEMS: ICD-10-CM

## 2023-11-08 PROCEDURE — 99999 PR PBB SHADOW E&M-EST. PATIENT-LVL V: ICD-10-PCS | Mod: PBBFAC,,, | Performed by: INTERNAL MEDICINE

## 2023-11-08 PROCEDURE — 99215 OFFICE O/P EST HI 40 MIN: CPT | Mod: S$PBB,,, | Performed by: INTERNAL MEDICINE

## 2023-11-08 PROCEDURE — 99215 OFFICE O/P EST HI 40 MIN: CPT | Mod: PBBFAC,PN | Performed by: INTERNAL MEDICINE

## 2023-11-08 PROCEDURE — 99215 PR OFFICE/OUTPT VISIT, EST, LEVL V, 40-54 MIN: ICD-10-PCS | Mod: S$PBB,,, | Performed by: INTERNAL MEDICINE

## 2023-11-08 PROCEDURE — 99999 PR PBB SHADOW E&M-EST. PATIENT-LVL V: CPT | Mod: PBBFAC,,, | Performed by: INTERNAL MEDICINE

## 2023-11-08 NOTE — PATIENT INSTRUCTIONS
If you are feeling unwell, we'd like to be the first ones to know here at [facility]! Please give us a call. Same day appointments are our top priority to keep you well and out of the emergency rooms and hospitals. Call [phone] for our direct line. After hours advice is always available. Please call [phone] after hours to speak to the on-call team.      Let us know if any of the supplements or herbals help with your neuropathy    Let us know when will be able to schedule colonoscopy    Let us know if would like referral to Physical Therapy near you

## 2023-11-08 NOTE — PROGRESS NOTES
Qi Ortiz  2023  [id number]    Lisa Porter MD  Patient Care Team:  Lisa Porter MD as PCP - General (Internal Medicine)  lEma Hernandez NP as Nurse Practitioner (Family Medicine)    Visit Type: Follow-up    Chief Complaint:  Chief Complaint   Patient presents with    Follow-up     Want to discuss nerve control     History of Present Illness: Ms. Qi rOtiz is a 73 year old female w multiple chronic medical issues here w c/o fatigue.      Chronic medical issues include type 2 DM, h/o CVA, CAD s/p CABG x 2, AS s/p TAVR, HTN, HLP (intolerant of statin), CKD stage 3, hypothyroidism, psoriasis and psoriatic arthritis, CAREN, obesity, and IRIS (intolerant of CPAP). Ms. Ortiz has multiple drug and contact allergies and sensitivities. Food allergies include kiwi fruit and crab boil.     Ms. Ortiz is hearing impaired - gradual hearing loss over past 15 years, now severe. Unble to use hearing aids due to psoriasis in her ear canals. Ms. Ortiz does not have a cell phone, smart phone or internet. She does have a land-line w a speaker to make louder. Ms. Ortiz's daughters, Elsa and Shyanne, live close to her. They work during the day. Shyanne is Ms. Ortiz's HCPOA - OK to discuss medical issues w her.     Iron infusions , , 10/31, 10/27, 10/24    Went to [facility] in [place] after bit by neighbor's dog  Ended up not getting rabies vaccinations reportedly because it was assumed dog was vaccinated as was healthy  Dog was never quarantined or tested but per Ms. Lebron is back with its owners and healthy     in [place]  10/21/23 buried 10/28/23    many years but they kept in touch  She's sad she didn't get to see him before he passed    Primary complaint neuropathy - gabapentin 100 mg - too sleepy with 200 mg   She is reluctant to try pregablin  Has a list of herbals/supplements she is considering trying    C/o poor balance   Did not qualify for  PT - outpatient PT  would likely be of benefit   Advised to let us know if we can refer her for PT somewhere near where she lives    Hemorrhoids - no recent blood in stool - continues to defer colonoscopy    PHQ-4 Score: 2     From last visit w me 9/29/23  GI upset earlier this month w nausea and decreased appetite - better now  A lot of chaos in her neighborhood causing increased stress   Reports palpitations and lightheadedness while waiting for cardiology nurse visit  yesterday  Then lightheaded w near syncope getting out of car when got home yesterday  Denies any recent falls but issues w balance long-standing  Says tried using a walker but caused more problems than helped  PHQ-4 Score: 2     Hosp/ED/Urgent Care:  10/05/23 ED f/u dog bite [facility]  10/02/23 ED dog bite [facility]  06/06- 6/07/23 ED SOB [facility]    Recent appointments:   10/17/23 Cards Paez CHF diastolic  10/13/23 Heme/Onc Tegan CAREN  10/12/23 [facility]   10/03/23 [facility] f/u dog bit  9/29/23 [facility] Porter    5/22/23 [facility] Mary EAWV    Upcoming appointments:  Future Appointments       Date Provider Specialty Appt Notes    12/13/2023  Lab fabiano    12/15/2023 Fabiano Javed NP Hematology and Oncology 5 wk post iron prior lab    12/18/2023 Asif Paez MD Cardiology 2 mos    1/25/2024  Lab l    2/7/2024 Jacky Ackerman MD Rheumatology suly/bc    2/9/2024 Elma Hernandez NP Primary Care AWV           The following were reviewed: Active problem list, medication list, allergies, family history, social history, and Health Maintenance.     Medications have been reviewed and reconciled with patient at visit today.    Review of Systems   See HPI above    Exam: Initial VS /60 P 71 temp 98.5 sat 98%  Vitals:    11/08/23 1359   BP: (!) 160/66   Pulse:    Temp:      Weight: 100 kg (220 lb 6.4 oz)   Body mass index is 37.83 kg/m².    BP Readings from Last 3 Encounters:   11/08/23 (!) 160/66   11/07/23 (!) 145/69 11/03/23 (!) 145/69      Wt  Readings from Last 3 Encounters:   11/08/23 1307 100 kg (220 lb 6.4 oz)   10/27/23 1429 95.3 kg (210 lb)   10/17/23 1520 95.3 kg (210 lb)      Physical Exam  Vitals reviewed.   Constitutional:       General: She is not in acute distress.     Appearance: Normal appearance. She is obese.   HENT:      Head: Normocephalic and atraumatic.      Right Ear: External ear normal.      Left Ear: External ear normal.      Ears:      Comments: Partial cerumen B  Cloudy TMs B     Nose: Nose normal.      Mouth/Throat:      Mouth: Mucous membranes are moist.      Pharynx: Oropharynx is clear.   Eyes:      General: No scleral icterus.     Extraocular Movements: Extraocular movements intact.      Conjunctiva/sclera: Conjunctivae normal.      Comments: photophobia   Neck:      Vascular: No carotid bruit.   Cardiovascular:      Rate and Rhythm: Normal rate and regular rhythm.      Heart sounds: No murmur heard.     Comments: No murmur noted today  Pulmonary:      Effort: Pulmonary effort is normal. No respiratory distress.      Breath sounds: No wheezing, rhonchi or rales.   Abdominal:      General: Bowel sounds are normal.      Palpations: Abdomen is soft.      Tenderness: There is no abdominal tenderness. There is no guarding.   Lymphadenopathy:      Cervical: No cervical adenopathy.   Skin:     General: Skin is warm and dry.   Neurological:      Mental Status: She is alert and oriented to person, place, and time. Mental status is at baseline.      Sensory: Sensory deficit present.   Psychiatric:         Behavior: Behavior normal.     Laboratory Reviewed  Lab Results   Component Value Date    WBC 8.40 10/12/2023    HGB 11.6 (L) 10/12/2023    HCT 39.0 10/12/2023     10/12/2023    MCV 83 10/12/2023    CHOL 137 08/24/2023    TRIG 163 (H) 08/24/2023    HDL 35 (L) 08/24/2023    LDLCALC 69.4 08/24/2023    ALT 15 10/12/2023    AST 23 10/12/2023     11/03/2023    K 3.5 11/03/2023     11/03/2023    CREATININE 1.1 11/03/2023     BUN 15 11/03/2023    CO2 26 11/03/2023    MG 2.0 06/13/2023    TSH 0.442 09/29/2023    FREET4 1.11 09/29/2023    INR 1.0 09/28/2020    HGBA1C 6.1 (H) 08/24/2023    CRP 6.6 10/12/2023     Lab Results   Component Value Date    PTH 91.5 (H) 05/20/2022    CALCIUM 9.2 11/03/2023    PHOS 3.8 09/29/2023      Lab Results   Component Value Date    NCPEICYP35 >2000 (H) 06/13/2023     Lab Results   Component Value Date    FOLATE 18.7 05/20/2022      Lab Results   Component Value Date    IRON 38 09/29/2023    TRANSFERRIN 297 09/29/2023    TIBC 440 09/29/2023    FESATURATED 9 (L) 09/29/2023      Lab Results   Component Value Date    EGFRNORACEVR 53.4 (A) 11/03/2023    ALBUMIN 3.4 (L) 10/12/2023     (H) 10/12/2023     Lab Results   Component Value Date    GGCNLWEZ27EG 45 06/13/2023 9/29/23 uric acid 3.7 ferritin 17 urine microalb/crt wnl  6/13/23 vit D 45  2/01/23 ferritin 205  10/14/22 uric acid 6.1  5/20/22 B12 > 2000 mma 0.47(H)    CXR 10/12/23 Stable cardiomegaly. Aortic valve replacement noted. Sternotomy wires are intact.  Mild pulmonary vascular congestion. Moderate scattered degenerative change and atherosclerotic disease.    XR L knee 10/02/23 [facility]  4 views of the left knee demonstrate joint space narrowing with chondrocalcinosis and loose bodies in the joint. No joint effusion. Clips in the leg and vascular calcifications. No other radiopaque foreign body.     CT Abd Pelvis 2/20/23 Kidneys/ Ureters: No hydronephrosis.  No obstructive uropathy.  Left renal simple cyst.  No nonobstructive nephrolithiasis.  Bladder: Decompressed.  Vasculature: mild aortoiliac atherosclerosis.  No aneurysm  Bones: No acute fracture.  Multifocal degenerative changes.  Impression: No definite acute abnormality identified. Specifically no renal stones.       DEXA 7/25/22 No evidence of significant bone density loss     Echo 10/27/23 Left Ventricle: The left ventricle is normal in size. Ventricular mass is normal.  Mildly increased wall thickness. There is concentric remodeling. Septal motion is consistent with post-operative status. There is normal systolic function with a visually estimated ejection fraction of 55 - 60%. There is normal diastolic function.  Aortic Valve: There is a transcatheter valve replacement in the aortic position that is appropriately positioned. Mild paravalvular regurgitation.    ECG 10/12/23 Normal sinus rhythm   Left posterior fascicular block   Inferior infarct ,age undetermined   ST and T wave abnormality, consider lateral ischemia     Holter 5/24-5/29/23 The diary was returned incomplete. There were rare hookup related artifacts. Overall, the study was of good quality. The tape was adequate (0 days , 48 hours, 0 minutes).  Predominant Rhythm Sinus rhythm with heart rates varying between 38 and 101 BPM with an average of 61BPM.  Ventricular Arrhythmias There were occasional PVCs totalling 1393 and averaging 29.02 per hour.  Supraventricular Arrhythmias There were rare PACs totalling 324 and averaging 6.75 per hour.  The circadian pattern of sinus rate variability followed a typical curve.      Carotid US B 2/20/23 There is 0-19% right Internal Carotid Stenosis.  There is 20-39% left Internal Carotid Stenosis.    Echo 5/23/22 The left ventricle is normal in size with normal systolic function.  Indeterminate left ventricular diastolic function.  The estimated PA systolic pressure is 40 mmHg.  Small anterior pericardial effusion.  The estimated ejection fraction is 55%.  There is a transcutaneously-placed aortic bioprosthesis present. There is no aortic insufficiency present. Prosthetic aortic valve is normal.    Assessment:   73 y.o. female with multiple co-morbid illnesses here for continued follow up of medical problems.      The primary encounter diagnosis was B12 deficiency. Diagnoses of Polyneuropathy, Bilateral hearing loss, unspecified hearing loss type, Essential hypertension, Iron  deficiency anemia due to chronic blood loss, and Balance problems were also pertinent to this visit.      Plan:   1. B12 deficiency  -     Methylmalonic Acid, Serum; Future; Expected date: 11/08/2023    2. Polyneuropathy  Assessment & Plan:  Tolerating low dose gabapentin - advised to let us know if any improvement w supplements      3. Bilateral hearing loss, unspecified hearing loss type  Assessment & Plan:  Declines hearing aids      4. Essential hypertension  Assessment & Plan:  Elevated BP - f/u home BPs      5. Iron deficiency anemia due to chronic blood loss  Overview:  Elida Alanis NP 8/6/2021  Iron levels replenished s/p Venofer x 8. She denies abnormal bleeding at preset time but notes intermittent blood noted in stool. Patient has not yet had follow up with GI service for endoscopies due to ongoing COVID concerns previously. She has been encouraged to follow up with GI. Rationale discussed in detail.   Patient unable to tolerate oral iron due to GI upset/constipation. F/u 3 months with repeat labs. Discussed S&S to report sooner      Assessment & Plan:  Doing well w IV iron infusions - f/u labwork scheduled for 12/13/23 - consider contact daughter cxn colonoscopy?       6. Balance problems  Assessment & Plan:  Outpatient PT would likely be of benefit - advised to let us know if there is somewhere near her where she would like to go           Health Maintenance         Date Due Completion Date    COVID-19 Vaccine (1) Never done ---    Pneumococcal Vaccines (Age 65+) (1 - PCV) Never done ---    RSV Vaccine (Age 60+) (1 - 1-dose 60+ series) Never done ---    Colorectal Cancer Screening 04/18/2016 4/18/2011    Shingles Vaccine (2 of 2) 11/28/2022 10/3/2022    Mammogram 05/22/2024 (Originally 10/23/2016) 10/23/2015    Hemoglobin A1c 02/24/2024 8/24/2023    Eye Exam 03/06/2024 3/6/2023    Lipid Panel 08/24/2024 8/24/2023    Diabetes Urine Screening 09/29/2024 9/29/2023    Foot Exam 09/29/2024 9/29/2023  (Done)    Override on 9/29/2023: Done (pt w idiopathic peripheral neuropathy - not diabetic - prediabetic)    Aspirin/Antiplatelet Therapy 10/17/2024 10/17/2023    DEXA Scan 07/25/2025 7/25/2022    TETANUS VACCINE 10/02/2033 10/2/2023          Declines vaccines  Again defers colonoscopy - says does not have anyone to go with her  (Consider contact her daughter to see if daughter can help w arrangements?)    -Patient's lab results were reviewed and discussed with patient  -Treatment options and alternatives were discussed with the patient. Patient expressed understanding. Patient was given the opportunity to ask questions and be an active participant in their medical care. Patient had no further questions or concerns at this time.   -Patient is an overall moderate to HIGH risk for health complications from their medical conditions.     Follow up: Follow up in about 3 months (around 2/8/2024) for Follow Up w Elma for EAWV.    After visit summary printed and given to patient upon discharge.  Patient care plan included in After visit summary.    TOTAL TIME evaluating and managing this patient for this encounter was greater than 60 minutes. This time was spent personally by me on some of the following activities: review of patient's past medical history, assessing age-appropriate health maintenance needs, review of any interval history, review and interpretation of lab results, review and interpretation of imaging test results, review and interpretation of cardiology test results, reviewing consulting specialist notes, obtaining history from the patient and family, examination of the patient, medication reconciliation, managing and/or ordering prescription medications, ordering imaging tests, ordering referral to subspecialty provider(s), educating patient and answering their questions about diagnosis, treatment plan, and goals of treatment, discussing planned follow-up and final documentation of the visit. This time  was exclusive of any separately billable procedures for this patient and exclusive of time spent treating any other patients.

## 2023-11-09 NOTE — ASSESSMENT & PLAN NOTE
Outpatient PT would likely be of benefit - advised to let us know if there is somewhere near her where she would like to go

## 2023-11-09 NOTE — ASSESSMENT & PLAN NOTE
Doing well w IV iron infusions - f/u labwork scheduled for 12/13/23 - consider contact daughter cxn colonoscopy?

## 2023-12-01 DIAGNOSIS — L40.50 PSORIATIC ARTHRITIS: Primary | ICD-10-CM

## 2023-12-01 RX ORDER — SECUKINUMAB 150 MG/ML
300 INJECTION SUBCUTANEOUS
COMMUNITY
End: 2023-12-01 | Stop reason: SDUPTHER

## 2023-12-04 RX ORDER — SECUKINUMAB 150 MG/ML
300 INJECTION SUBCUTANEOUS
Qty: 4 EACH | Refills: 3 | Status: SHIPPED | OUTPATIENT
Start: 2023-12-04 | End: 2024-01-03

## 2023-12-05 ENCOUNTER — TELEPHONE (OUTPATIENT)
Dept: PRIMARY CARE CLINIC | Facility: CLINIC | Age: 73
End: 2023-12-05
Payer: MEDICARE

## 2023-12-05 NOTE — TELEPHONE ENCOUNTER
Spoke with pt states that she is doing well blood pressure has been averaging 132/68 and pulse 68-74    Pt denies any sob or any other sx.

## 2023-12-12 DIAGNOSIS — I10 ESSENTIAL HYPERTENSION: Primary | Chronic | ICD-10-CM

## 2023-12-12 DIAGNOSIS — I50.32 CHRONIC DIASTOLIC HEART FAILURE: Chronic | ICD-10-CM

## 2023-12-13 ENCOUNTER — LAB VISIT (OUTPATIENT)
Dept: LAB | Facility: HOSPITAL | Age: 73
End: 2023-12-13
Attending: NURSE PRACTITIONER
Payer: MEDICARE

## 2023-12-13 DIAGNOSIS — D64.9 ANEMIA, UNSPECIFIED TYPE: ICD-10-CM

## 2023-12-13 DIAGNOSIS — E53.8 B12 DEFICIENCY: ICD-10-CM

## 2023-12-13 DIAGNOSIS — E61.1 IRON DEFICIENCY: ICD-10-CM

## 2023-12-13 DIAGNOSIS — D52.9 ANEMIA DUE TO FOLIC ACID DEFICIENCY, UNSPECIFIED DEFICIENCY TYPE: ICD-10-CM

## 2023-12-13 LAB
ALBUMIN SERPL BCP-MCNC: 3.6 G/DL (ref 3.5–5.2)
ALP SERPL-CCNC: 56 U/L (ref 55–135)
ALT SERPL W/O P-5'-P-CCNC: 20 U/L (ref 10–44)
ANION GAP SERPL CALC-SCNC: 11 MMOL/L (ref 8–16)
AST SERPL-CCNC: 30 U/L (ref 10–40)
BASOPHILS # BLD AUTO: 0.07 K/UL (ref 0–0.2)
BASOPHILS NFR BLD: 0.9 % (ref 0–1.9)
BILIRUB SERPL-MCNC: 0.4 MG/DL (ref 0.1–1)
BUN SERPL-MCNC: 17 MG/DL (ref 8–23)
CALCIUM SERPL-MCNC: 9.3 MG/DL (ref 8.7–10.5)
CHLORIDE SERPL-SCNC: 104 MMOL/L (ref 95–110)
CO2 SERPL-SCNC: 28 MMOL/L (ref 23–29)
CREAT SERPL-MCNC: 1.1 MG/DL (ref 0.5–1.4)
DIFFERENTIAL METHOD: ABNORMAL
EOSINOPHIL # BLD AUTO: 0.1 K/UL (ref 0–0.5)
EOSINOPHIL NFR BLD: 1.7 % (ref 0–8)
ERYTHROCYTE [DISTWIDTH] IN BLOOD BY AUTOMATED COUNT: 19.5 % (ref 11.5–14.5)
EST. GFR  (NO RACE VARIABLE): 53 ML/MIN/1.73 M^2
FERRITIN SERPL-MCNC: 257 NG/ML (ref 20–300)
GLUCOSE SERPL-MCNC: 107 MG/DL (ref 70–110)
HCT VFR BLD AUTO: 40.2 % (ref 37–48.5)
HGB BLD-MCNC: 13.1 G/DL (ref 12–16)
IMM GRANULOCYTES # BLD AUTO: 0.01 K/UL (ref 0–0.04)
IMM GRANULOCYTES NFR BLD AUTO: 0.1 % (ref 0–0.5)
IRON SERPL-MCNC: 74 UG/DL (ref 30–160)
LYMPHOCYTES # BLD AUTO: 2.2 K/UL (ref 1–4.8)
LYMPHOCYTES NFR BLD: 28.2 % (ref 18–48)
MCH RBC QN AUTO: 28.7 PG (ref 27–31)
MCHC RBC AUTO-ENTMCNC: 32.6 G/DL (ref 32–36)
MCV RBC AUTO: 88 FL (ref 82–98)
MONOCYTES # BLD AUTO: 0.6 K/UL (ref 0.3–1)
MONOCYTES NFR BLD: 8 % (ref 4–15)
NEUTROPHILS # BLD AUTO: 4.7 K/UL (ref 1.8–7.7)
NEUTROPHILS NFR BLD: 61.1 % (ref 38–73)
NRBC BLD-RTO: 0 /100 WBC
PLATELET # BLD AUTO: 236 K/UL (ref 150–450)
PMV BLD AUTO: 10.5 FL (ref 9.2–12.9)
POTASSIUM SERPL-SCNC: 4.1 MMOL/L (ref 3.5–5.1)
PROT SERPL-MCNC: 6.7 G/DL (ref 6–8.4)
RBC # BLD AUTO: 4.56 M/UL (ref 4–5.4)
SATURATED IRON: 22 % (ref 20–50)
SODIUM SERPL-SCNC: 143 MMOL/L (ref 136–145)
TOTAL IRON BINDING CAPACITY: 333 UG/DL (ref 250–450)
TRANSFERRIN SERPL-MCNC: 225 MG/DL (ref 200–375)
WBC # BLD AUTO: 7.74 K/UL (ref 3.9–12.7)

## 2023-12-13 PROCEDURE — 80053 COMPREHEN METABOLIC PANEL: CPT | Performed by: NURSE PRACTITIONER

## 2023-12-13 PROCEDURE — 83521 IG LIGHT CHAINS FREE EACH: CPT | Performed by: NURSE PRACTITIONER

## 2023-12-13 PROCEDURE — 82728 ASSAY OF FERRITIN: CPT | Performed by: NURSE PRACTITIONER

## 2023-12-13 PROCEDURE — 83540 ASSAY OF IRON: CPT | Performed by: NURSE PRACTITIONER

## 2023-12-13 PROCEDURE — 85025 COMPLETE CBC W/AUTO DIFF WBC: CPT | Performed by: NURSE PRACTITIONER

## 2023-12-13 PROCEDURE — 83921 ORGANIC ACID SINGLE QUANT: CPT | Performed by: INTERNAL MEDICINE

## 2023-12-13 PROCEDURE — 36415 COLL VENOUS BLD VENIPUNCTURE: CPT | Mod: PO | Performed by: INTERNAL MEDICINE

## 2023-12-13 PROCEDURE — 84466 ASSAY OF TRANSFERRIN: CPT | Performed by: NURSE PRACTITIONER

## 2023-12-14 LAB
KAPPA LC SER QL IA: 2.8 MG/DL (ref 0.33–1.94)
KAPPA LC/LAMBDA SER IA: 1.36 (ref 0.26–1.65)
LAMBDA LC SER QL IA: 2.06 MG/DL (ref 0.57–2.63)

## 2023-12-15 ENCOUNTER — OFFICE VISIT (OUTPATIENT)
Dept: HEMATOLOGY/ONCOLOGY | Facility: CLINIC | Age: 73
End: 2023-12-15
Payer: MEDICARE

## 2023-12-15 VITALS
HEART RATE: 67 BPM | OXYGEN SATURATION: 98 % | SYSTOLIC BLOOD PRESSURE: 179 MMHG | BODY MASS INDEX: 36.32 KG/M2 | DIASTOLIC BLOOD PRESSURE: 88 MMHG | WEIGHT: 205 LBS | HEIGHT: 63 IN

## 2023-12-15 DIAGNOSIS — E53.8 B12 DEFICIENCY: ICD-10-CM

## 2023-12-15 DIAGNOSIS — Z86.2 HISTORY OF IRON DEFICIENCY ANEMIA: Primary | ICD-10-CM

## 2023-12-15 DIAGNOSIS — E53.8 FOLIC ACID DEFICIENCY: ICD-10-CM

## 2023-12-15 PROCEDURE — 99214 OFFICE O/P EST MOD 30 MIN: CPT | Mod: PBBFAC | Performed by: NURSE PRACTITIONER

## 2023-12-15 PROCEDURE — 99215 PR OFFICE/OUTPT VISIT, EST, LEVL V, 40-54 MIN: ICD-10-PCS | Mod: S$PBB,,, | Performed by: NURSE PRACTITIONER

## 2023-12-15 PROCEDURE — 99999 PR PBB SHADOW E&M-EST. PATIENT-LVL IV: ICD-10-PCS | Mod: PBBFAC,,, | Performed by: NURSE PRACTITIONER

## 2023-12-15 PROCEDURE — 99999 PR PBB SHADOW E&M-EST. PATIENT-LVL IV: CPT | Mod: PBBFAC,,, | Performed by: NURSE PRACTITIONER

## 2023-12-15 PROCEDURE — 99215 OFFICE O/P EST HI 40 MIN: CPT | Mod: S$PBB,,, | Performed by: NURSE PRACTITIONER

## 2023-12-15 NOTE — PROGRESS NOTES
Subjective:      Patient ID: Qi Ortiz is a 73 y.o. female.    Chief Complaint: no complaints    HPI:  73 year old female presents today to evaluate labs.  Has been found in the past to have folic acid deficiency, recurrent CAREN requiring IV iron infusions and also anemia d/t CKD.        medical history significant for HTN, carotid stenosis, hypothyroidism, COPD, psoriatic arthritis, hyperlipidemia, CVA with right hemiplegia, depression, CAD, osteoporosis, IRIS, and double ectopic ureters.     Has been followed in the clinic by Dr. Mckinney, Elida Alanis NP, Jade Delgado NP, and Denis Caban NP, Today is the first time I am evaluating/assessing the patient.      Currently with normocytic anemia - hgb 11.6, mcv 83, ferritin 17.  Recently found with B12 deficiency and prescribed B12 1000 mcg SL daily - states that she hasn't started as of yet. States used to take B12.     States that she does have some hemorrhoidal bleeding but does not get in the toilet or stool.  Only seen when she wipes and not all the time.  Has not been able to tolerate GI prep for colonoscopy and has not had transportation to have EGD/colonoscopy done    Interval History:  12/15/2023  Received venofer 200 mg IV x 4 doses and presents today to evaluate response.  States that she tolerated the once weekly iron infusions.  States that she felt sick to her stomach with abdominal cramping.  States now her energy level is back up.  Has one small hemorrhoids that bleeds at times.       Social History     Socioeconomic History    Marital status: Single    Number of children: 3   Occupational History    Occupation: Not working   Tobacco Use    Smoking status: Never    Smokeless tobacco: Never   Substance and Sexual Activity    Alcohol use: No    Drug use: No    Sexual activity: Not Currently     Partners: Male     Social Determinants of Health     Financial Resource Strain: Low Risk  (9/29/2023)    Overall Financial Resource Strain (CARDIA)      Difficulty of Paying Living Expenses: Not hard at all   Food Insecurity: No Food Insecurity (2023)    Hunger Vital Sign     Worried About Running Out of Food in the Last Year: Never true     Ran Out of Food in the Last Year: Never true   Transportation Needs: No Transportation Needs (2023)    PRAPARE - Transportation     Lack of Transportation (Medical): No     Lack of Transportation (Non-Medical): No   Physical Activity: Inactive (2023)    Exercise Vital Sign     Days of Exercise per Week: 0 days     Minutes of Exercise per Session: 0 min   Stress: Stress Concern Present (2023)    [place] [employer] - Occupational Stress Questionnaire     Feeling of Stress : To some extent   Social Connections: Moderately Isolated (2023)    Social Connection and Isolation Panel [NHANES]     Frequency of Communication with Friends and Family: Three times a week     Frequency of Social Gatherings with Friends and Family: Once a week     Attends Confucianist Services: Never     Active Member of Clubs or Organizations: No     Attends Club or Organization Meetings: Never     Marital Status:    Housing Stability: High Risk (2023)    Housing Stability Vital Sign     Unable to Pay for Housing in the Last Year: Yes     Number of Places Lived in the Last Year: 1     Unstable Housing in the Last Year: No       Family History   Problem Relation Age of Onset    Breast cancer Maternal Grandfather     Breast cancer Paternal Aunt     Stroke Unknown     Breast cancer Sister 60    Leukemia Sister 8         as child    Lung cancer Paternal Grandfather     Heart disease Unknown     Diabetes Daughter        Past Surgical History:   Procedure Laterality Date    BREAST BIOPSY      R sided/benign    CARDIAC SURGERY      2016    CERVICAL FUSION      CHOLECYSTECTOMY      CORONARY ANGIOPLASTY      CORONARY ARTERY BYPASS GRAFT      triple bypass    CORONARY STENT PLACEMENT      EYE SURGERY       INTRAUTERINE DEVICE INSERTION      LEFT HEART CATHETERIZATION Left 9/8/2020    Procedure: CATHETERIZATION, HEART, LEFT;  Surgeon: Veronica Ibarra MD;  Location: [facility] CATH LAB;  Service: Cardiology;  Laterality: Left;  7am start time    mass removed from R groin      TOTAL ABDOMINAL HYSTERECTOMY W/ BILATERAL SALPINGOOPHORECTOMY      due to benign mass, adhesions    TUBAL LIGATION         Past Medical History:   Diagnosis Date    Carotid stenosis     19%    Cellulitis and abscess of foot, except toes 06/22/2022    Cerumen debris on tympanic membrane of right ear 11/30/2022    Coronary artery disease     CVA (cerebral vascular accident)     Dr. 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 Hope    Psoriatic arthritis     Rheumatology    Transient ischemic attack (TIA) 01/02/2019       Review of Systems   Constitutional:  Positive for fatigue.   HENT: Negative.     Eyes: Negative.    Respiratory:  Positive for shortness of breath.    Gastrointestinal:  Positive for anal bleeding (bleeding hemorrhoids).        Hemorrhoidal bleeding   Endocrine: Positive for cold intolerance.   Genitourinary: Negative.    Musculoskeletal: Negative.    Skin: Negative.    Allergic/Immunologic: Negative.    Neurological: Negative.    Hematological: Negative.    Psychiatric/Behavioral:  Positive for decreased concentration. The patient is nervous/anxious (stressed due to not beign able to get her husbands death benefits for a prolonged period of time).           Medication List with Changes/Refills   Current Medications    ACETAMINOPHEN (TYLENOL) 650 MG TBSR    as directed    ALBUTEROL (PROVENTIL) 2.5 MG /3 ML (0.083 %) NEBULIZER SOLUTION    Take 3 mLs (2.5 mg total) by  nebulization every 6 (six) hours as needed for Wheezing. Rescue    ALLOPURINOL (ZYLOPRIM) 100 MG TABLET    TAKE 2 TABLETS ONCE DAILY    ASPIRIN 81 MG CHEW    Take 1 tablet (81 mg total) by mouth once daily.    CALCIPOTRIENE (DOVONOX) 0.005 % CREAM    Apply topically 2 (two) times daily.    CALCIUM CARBONATE 650 MG CALCIUM (1,625 MG) TABLET    Take 1 tablet by mouth once daily.    CLOPIDOGREL (PLAVIX) 75 MG TABLET    Take 1 tablet (75 mg total) by mouth once. for 1 dose    CYANOCOBALAMIN, VITAMIN B-12, 1,000 MCG SUBL    Place 1 tablet under the tongue once daily.    DOCUSATE SODIUM (COLACE) 100 MG CAPSULE    Take 1 capsule (100 mg total) by mouth 2 (two) times daily.    DOCUSATE SODIUM (COLACE) 100 MG CAPSULE    Colace Take No date recorded No form recorded No frequency recorded No route recorded No set duration recorded No set duration amount recorded active No dosage strength recorded No dosage strength units of measure recorded    FOLIC ACID (FOLVITE) 1 MG TABLET    TAKE 1 TABLET DAILY    FUROSEMIDE (LASIX) 20 MG TABLET    Take 1 tablet (20 mg total) by mouth 2 (two) times daily.    GABAPENTIN (NEURONTIN) 100 MG CAPSULE    TAKE 2 CAPSULES THREE TIMES A DAY    GARLIC 2,000 MG CAP    Take 3,000 mg by mouth once daily.     LEVOTHYROXINE (SYNTHROID) 100 MCG TABLET    Take 1 tablet (100 mcg total) by mouth before breakfast.    METOPROLOL SUCCINATE (TOPROL-XL) 100 MG 24 HR TABLET    Take 1 tablet (100 mg total) by mouth 2 (two) times daily.    NITROFURANTOIN (MACRODANTIN) 100 MG CAPSULE    Take 100 mg by mouth every evening.    NITROGLYCERIN (NITROSTAT) 0.4 MG SL TABLET    DISSOLVE 1 TABLET UNDER THE TONGUE EVERY 5 MINUTES AS NEEDED FOR CHEST PAIN    POTASSIUM CHLORIDE SA (K-DUR,KLOR-CON M) 10 MEQ TABLET    Take 1 tablet (10 mEq total) by mouth once daily.    PYRIDOXINE, VITAMIN B6, (B-6) 100 MG TAB    Take 200 mg by mouth once daily.     REPATHA SURECLICK 140 MG/ML PNIJ    INJECT 1 ML (140 MG) UNDER THE SKIN EVERY 14  DAYS    SECUKINUMAB (COSENTYX PEN, 2 PENS,) 150 MG/ML PNIJ    Inject 300 mg into the skin every 28 days.    SPIRONOLACTONE (ALDACTONE) 25 MG TABLET    Take 1 tablet (25 mg total) by mouth once daily.    VITAMIN D 1000 UNITS TAB    Take 185 mg by mouth once daily.        Objective:     Vitals:    12/15/23 1408   BP: (!) 179/88   Pulse: 67       Physical Exam  Vitals reviewed.   Constitutional:       Appearance: Normal appearance.   HENT:      Head: Normocephalic and atraumatic.      Right Ear: External ear normal.      Left Ear: External ear normal.   Cardiovascular:      Rate and Rhythm: Normal rate and regular rhythm.      Heart sounds: Normal heart sounds, S1 normal and S2 normal.   Pulmonary:      Effort: Pulmonary effort is normal.      Breath sounds: Normal breath sounds.   Abdominal:      General: There is no distension.   Musculoskeletal:         General: Normal range of motion.      Cervical back: Normal range of motion.   Skin:     General: Skin is warm and dry.      Coloration: Skin is pale.             Comments: Dog bite - healing   Neurological:      General: No focal deficit present.      Mental Status: She is alert and oriented to person, place, and time.   Psychiatric:         Attention and Perception: Attention and perception normal.         Mood and Affect: Mood and affect normal.         Speech: Speech normal.         Behavior: Behavior normal. Behavior is cooperative.         Thought Content: Thought content normal.         Cognition and Memory: Cognition and memory normal.         Judgment: Judgment normal.         Assessment:     Problem List Items Addressed This Visit          Endocrine    B12 deficiency (Chronic)    Relevant Orders    CBC Auto Differential    Folic acid deficiency    Relevant Orders    CBC Auto Differential     Other Visit Diagnoses       History of iron deficiency anemia    -  Primary    Relevant Orders    CBC Auto Differential    Comprehensive Metabolic Panel    Ferritin     Iron and TIBC            Lab Results   Component Value Date    WBC 7.74 12/13/2023    RBC 4.56 12/13/2023    HGB 13.1 12/13/2023    HCT 40.2 12/13/2023    MCV 88 12/13/2023    MCH 28.7 12/13/2023    MCHC 32.6 12/13/2023    RDW 19.5 (H) 12/13/2023     12/13/2023    MPV 10.5 12/13/2023    GRAN 4.7 12/13/2023    GRAN 61.1 12/13/2023    LYMPH 2.2 12/13/2023    LYMPH 28.2 12/13/2023    MONO 0.6 12/13/2023    MONO 8.0 12/13/2023    EOS 0.1 12/13/2023    BASO 0.07 12/13/2023    EOSINOPHIL 1.7 12/13/2023    BASOPHIL 0.9 12/13/2023      Lab Results   Component Value Date     12/13/2023    K 4.1 12/13/2023     12/13/2023    CO2 28 12/13/2023    BUN 17 12/13/2023    CREATININE 1.1 12/13/2023    CALCIUM 9.3 12/13/2023    ANIONGAP 11 12/13/2023    ESTGFRAFRICA 48 (A) 07/18/2022    EGFRNONAA 41 (A) 07/18/2022     Lab Results   Component Value Date    ALT 20 12/13/2023    AST 30 12/13/2023    ALKPHOS 56 12/13/2023    BILITOT 0.4 12/13/2023     Lab Results   Component Value Date    IRON 74 12/13/2023    TRANSFERRIN 225 12/13/2023    TIBC 333 12/13/2023    FESATURATED 22 12/13/2023      Lab Results   Component Value Date    FERRITIN 257 12/13/2023     Lab Results   Component Value Date    UGCFWIEJ70 >2000 (H) 06/13/2023     Lab Results   Component Value Date    FOLATE 18.7 05/20/2022     Lab Results   Component Value Date    TSH 0.442 09/29/2023     Plan:   History of iron deficiency anemia  -     CBC Auto Differential; Future; Expected date: 12/15/2023  -     Comprehensive Metabolic Panel; Future; Expected date: 12/15/2023  -     Ferritin; Future; Expected date: 12/15/2023  -     Iron and TIBC; Future; Expected date: 12/15/2023    Folic acid deficiency  -     CBC Auto Differential; Future; Expected date: 12/15/2023    B12 deficiency  -     CBC Auto Differential; Future; Expected date: 12/15/2023    In the future she needs to have once a week in fusions of venofer vs biweekly infusions due to feeling nauseated and  sick to her stomach. Continue folic acid and B12 supplementation daily.      Med Onc Chart Routing      Follow up with physician    Follow up with [facility] 3 months. in person visit at [facility]   Infusion scheduling note   n/a   Injection scheduling note n/a   Labs   Scheduling:  Preferred lab: [facility]  Lab interval:  cbc, cmp, iron studies   Imaging   N/a   Pharmacy appointment No pharmacy appointment needed      Other referrals       No additional referrals needed  n/a          Total time spent on encounter: 40 minutes    Collaborating Provider:  Dr. Royal Mckinney    Thank You,  Warren Javed, KJP-C  Hematology Oncology

## 2023-12-18 ENCOUNTER — HOSPITAL ENCOUNTER (OUTPATIENT)
Dept: CARDIOLOGY | Facility: HOSPITAL | Age: 73
Discharge: HOME | End: 2023-12-18
Attending: INTERNAL MEDICINE
Payer: MEDICARE

## 2023-12-18 ENCOUNTER — OFFICE VISIT (OUTPATIENT)
Dept: CARDIOLOGY | Facility: CLINIC | Age: 73
End: 2023-12-18
Payer: MEDICARE

## 2023-12-18 VITALS
BODY MASS INDEX: 38.45 KG/M2 | WEIGHT: 217 LBS | HEIGHT: 63 IN | DIASTOLIC BLOOD PRESSURE: 70 MMHG | OXYGEN SATURATION: 97 % | HEART RATE: 67 BPM | SYSTOLIC BLOOD PRESSURE: 130 MMHG

## 2023-12-18 VITALS — BODY MASS INDEX: 38.55 KG/M2 | WEIGHT: 217.63 LBS

## 2023-12-18 DIAGNOSIS — I47.10 SUPRAVENTRICULAR TACHYCARDIA: Chronic | ICD-10-CM

## 2023-12-18 DIAGNOSIS — I70.0 AORTIC ATHEROSCLEROSIS: Chronic | ICD-10-CM

## 2023-12-18 DIAGNOSIS — I25.708 CORONARY ARTERY DISEASE OF BYPASS GRAFT OF NATIVE HEART WITH STABLE ANGINA PECTORIS: ICD-10-CM

## 2023-12-18 DIAGNOSIS — E78.2 MIXED HYPERLIPIDEMIA: Chronic | ICD-10-CM

## 2023-12-18 DIAGNOSIS — Z95.1 S/P CABG (CORONARY ARTERY BYPASS GRAFT): Chronic | ICD-10-CM

## 2023-12-18 DIAGNOSIS — I35.0 NONRHEUMATIC AORTIC VALVE STENOSIS: Chronic | ICD-10-CM

## 2023-12-18 DIAGNOSIS — I25.10 CAD, MULTIPLE VESSEL: ICD-10-CM

## 2023-12-18 DIAGNOSIS — I10 ESSENTIAL HYPERTENSION: Chronic | ICD-10-CM

## 2023-12-18 DIAGNOSIS — I50.32 CHRONIC DIASTOLIC HEART FAILURE: Primary | Chronic | ICD-10-CM

## 2023-12-18 DIAGNOSIS — E66.01 CLASS 2 SEVERE OBESITY DUE TO EXCESS CALORIES WITH SERIOUS COMORBIDITY AND BODY MASS INDEX (BMI) OF 38.0 TO 38.9 IN ADULT: ICD-10-CM

## 2023-12-18 DIAGNOSIS — I50.32 CHRONIC DIASTOLIC HEART FAILURE: Chronic | ICD-10-CM

## 2023-12-18 DIAGNOSIS — Z95.2 S/P TAVR (TRANSCATHETER AORTIC VALVE REPLACEMENT): ICD-10-CM

## 2023-12-18 DIAGNOSIS — I27.20 PULMONARY HYPERTENSION: Chronic | ICD-10-CM

## 2023-12-18 DIAGNOSIS — I25.810: ICD-10-CM

## 2023-12-18 LAB — METHYLMALONATE SERPL-SCNC: 0.2 UMOL/L

## 2023-12-18 PROCEDURE — 99214 OFFICE O/P EST MOD 30 MIN: CPT | Mod: S$PBB,,, | Performed by: INTERNAL MEDICINE

## 2023-12-18 PROCEDURE — 99999 PR PBB SHADOW E&M-EST. PATIENT-LVL V: ICD-10-PCS | Mod: PBBFAC,,, | Performed by: INTERNAL MEDICINE

## 2023-12-18 PROCEDURE — 93010 ELECTROCARDIOGRAM REPORT: CPT | Mod: ,,, | Performed by: INTERNAL MEDICINE

## 2023-12-18 PROCEDURE — 93010 EKG 12-LEAD: ICD-10-PCS | Mod: ,,, | Performed by: INTERNAL MEDICINE

## 2023-12-18 PROCEDURE — 93005 ELECTROCARDIOGRAM TRACING: CPT

## 2023-12-18 PROCEDURE — 99999 PR PBB SHADOW E&M-EST. PATIENT-LVL V: CPT | Mod: PBBFAC,,, | Performed by: INTERNAL MEDICINE

## 2023-12-18 PROCEDURE — 99214 PR OFFICE/OUTPT VISIT, EST, LEVL IV, 30-39 MIN: ICD-10-PCS | Mod: S$PBB,,, | Performed by: INTERNAL MEDICINE

## 2023-12-18 PROCEDURE — 99215 OFFICE O/P EST HI 40 MIN: CPT | Mod: PBBFAC | Performed by: INTERNAL MEDICINE

## 2023-12-18 NOTE — PROGRESS NOTES
Subjective:   Patient ID:  Qi Ortiz is a 73 y.o. female who presents for follow up of No chief complaint on file.      73y/o F came in for SOB  PMH CAD s/p CABG x2, SVT, AS, s/p TAVR 20', chronic diastolic HF, HTN HLD COPD, CKD, DM, obesity, IRIS, statin intolerance  06/06/23 went to [facility] ER for SOB. Rx for CHF and d/c o/n.  Now sob stable no orthopnea leg swelling  No chest pain   Refused Jardiance    10/23 visit  C/o SOB worse with exertion, no orthopnea and PND. Onyy trace leg swelling  BNP was 300's this week and on Lasix 40 mg bid for 3 days and weight 10lbs. Now on lasix 20 mg bid  The SOB improved significantly  Does drink a lot of water  Ekg reviewed and leads I and avl revealed misplaced limb leads    Interval history  The  passed and stressful now. No orthopnea PND. On lasix 20 mg bid  10/23 echo ERF nl s/p TAVR TG 9 mmHg          Past Medical History:   Diagnosis Date    Carotid stenosis     19%    Cellulitis and abscess of foot, except toes 06/22/2022    Cerumen debris on tympanic membrane of right ear 11/30/2022    Coronary artery disease     CVA (cerebral vascular accident)     Dr. 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 Hope    Psoriatic arthritis     Rheumatology    Transient ischemic attack (TIA) 01/02/2019       Past Surgical History:   Procedure Laterality Date    BREAST BIOPSY      R sided/benign    CARDIAC SURGERY      sept 28 2016    CERVICAL FUSION      CHOLECYSTECTOMY      CORONARY ANGIOPLASTY      CORONARY ARTERY BYPASS GRAFT      triple bypass    CORONARY STENT PLACEMENT      EYE SURGERY      INTRAUTERINE DEVICE INSERTION      LEFT HEART CATHETERIZATION Left 9/8/2020    Procedure: CATHETERIZATION, HEART, LEFT;   Surgeon: Veronica Ibarra MD;  Location: [facility] CATH LAB;  Service: Cardiology;  Laterality: Left;  7am start time    mass removed from R groin      TOTAL ABDOMINAL HYSTERECTOMY W/ BILATERAL SALPINGOOPHORECTOMY      due to benign mass, adhesions    TUBAL LIGATION         Social History     Tobacco Use    Smoking status: Never    Smokeless tobacco: Never   Substance Use Topics    Alcohol use: No    Drug use: No       Family History   Problem Relation Age of Onset    Breast cancer Maternal Grandfather     Breast cancer Paternal Aunt     Stroke Unknown     Breast cancer Sister 60    Leukemia Sister 8         as child    Lung cancer Paternal Grandfather     Heart disease Unknown     Diabetes Daughter          ROS    Objective:   Physical Exam  HENT:      Head: Normocephalic.   Eyes:      Pupils: Pupils are equal, round, and reactive to light.   Neck:      Thyroid: No thyromegaly.      Vascular: Normal carotid pulses. No carotid bruit or JVD.   Cardiovascular:      Rate and Rhythm: Normal rate and regular rhythm. No extrasystoles are present.     Chest Wall: PMI is not displaced.      Pulses: Normal pulses.      Heart sounds: Normal heart sounds. No murmur heard.     No gallop. No S3 sounds.   Pulmonary:      Effort: No respiratory distress.      Breath sounds: Normal breath sounds. No stridor.   Abdominal:      General: Bowel sounds are normal.      Palpations: Abdomen is soft.      Tenderness: There is no abdominal tenderness. There is no rebound.   Musculoskeletal:         General: Normal range of motion.   Skin:     Findings: No rash.   Neurological:      Mental Status: She is alert and oriented to person, place, and time.   Psychiatric:         Behavior: Behavior normal.         Lab Results   Component Value Date    CHOL 137 2023    CHOL 125 2023    CHOL 119 (L) 2022     Lab Results   Component Value Date    HDL 35 (L) 2023    HDL 43 2023    HDL 42 2022     Lab Results    Component Value Date    LDLCALC 69.4 08/24/2023    LDLCALC 50.0 (L) 02/03/2023    LDLCALC 55.4 (L) 07/18/2022     Lab Results   Component Value Date    TRIG 163 (H) 08/24/2023    TRIG 160 (H) 02/03/2023    TRIG 108 07/18/2022     Lab Results   Component Value Date    CHOLHDL 25.5 08/24/2023    CHOLHDL 34.4 02/03/2023    CHOLHDL 35.3 07/18/2022       Chemistry        Component Value Date/Time     12/13/2023 1344    K 4.1 12/13/2023 1344     12/13/2023 1344    CO2 28 12/13/2023 1344    BUN 17 12/13/2023 1344    CREATININE 1.1 12/13/2023 1344     12/13/2023 1344        Component Value Date/Time    CALCIUM 9.3 12/13/2023 1344    ALKPHOS 56 12/13/2023 1344    AST 30 12/13/2023 1344    ALT 20 12/13/2023 1344    BILITOT 0.4 12/13/2023 1344    ESTGFRAFRICA 48 (A) 07/18/2022 0803    EGFRNONAA 41 (A) 07/18/2022 0803          Lab Results   Component Value Date    HGBA1C 6.1 (H) 08/24/2023     Lab Results   Component Value Date    TSH 0.442 09/29/2023     Lab Results   Component Value Date    INR 1.0 09/28/2020    INR 1.0 12/18/2017     Lab Results   Component Value Date    WBC 7.74 12/13/2023    HGB 13.1 12/13/2023    HCT 40.2 12/13/2023    MCV 88 12/13/2023     12/13/2023     BMP  Sodium   Date Value Ref Range Status   12/13/2023 143 136 - 145 mmol/L Final     Potassium   Date Value Ref Range Status   12/13/2023 4.1 3.5 - 5.1 mmol/L Final     Chloride   Date Value Ref Range Status   12/13/2023 104 95 - 110 mmol/L Final     CO2   Date Value Ref Range Status   12/13/2023 28 23 - 29 mmol/L Final     BUN   Date Value Ref Range Status   12/13/2023 17 8 - 23 mg/dL Final     Creatinine   Date Value Ref Range Status   12/13/2023 1.1 0.5 - 1.4 mg/dL Final     Calcium   Date Value Ref Range Status   12/13/2023 9.3 8.7 - 10.5 mg/dL Final     Anion Gap   Date Value Ref Range Status   12/13/2023 11 8 - 16 mmol/L Final     eGFR if    Date Value Ref Range Status   07/18/2022 48 (A) >60 mL/min/1.73  m^2 Final     eGFR if non    Date Value Ref Range Status   07/18/2022 41 (A) >60 mL/min/1.73 m^2 Final     Comment:     Calculation used to obtain the estimated glomerular filtration  rate (eGFR) is the CKD-EPI equation.        BNP  @LABRCNTIP(BNP,BNPTRIAGEBLO)@  @LABRCNTIP(troponini)@  Estimated Creatinine Clearance: 51.7 mL/min (based on SCr of 1.1 mg/dL).  No results found in the last 24 hours.  No results found in the last 24 hours.  No results found in the last 24 hours.    Assessment:      1. Chronic diastolic heart failure    2. Mixed hyperlipidemia    3. Essential hypertension    4. S/P CABG (coronary artery bypass graft)    5. Nonrheumatic aortic valve stenosis    6. Supraventricular tachycardia    7. Pulmonary hypertension    8. Aortic atherosclerosis    9. Coronary artery disease of bypass graft of native heart with stable angina pectoris    10. 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 S/P TAVR (transcatheter aortic valve replacement)    12. CAD, multiple vessel    13. Class 2 severe obesity due to excess calories with serious comorbidity and body mass index (BMI) of 38.0 to 38.9 in adult        Plan:   Continue lasix asa plavix statin repatha metoprolol aldactone    Counseled DASH  Check Lipid profile with PCP in 6 months  Recommend heart-healthy diet, weight control   Milind. Risk modification.   I have reviewed all pertinent labs and cardiac studies independently. Plans and recommendations have been formulated under my direct supervision. All questions answered and patient voiced understanding.   If symptoms persist go to the ED  RTC in 6 months

## 2024-01-26 ENCOUNTER — LAB VISIT (OUTPATIENT)
Dept: LAB | Facility: HOSPITAL | Age: 74
End: 2024-01-26
Attending: INTERNAL MEDICINE
Payer: MEDICARE

## 2024-01-26 DIAGNOSIS — L40.50 PSORIATIC ARTHRITIS: ICD-10-CM

## 2024-01-26 LAB
ALBUMIN SERPL BCP-MCNC: 3.4 G/DL (ref 3.5–5.2)
ALP SERPL-CCNC: 44 U/L (ref 55–135)
ALT SERPL W/O P-5'-P-CCNC: 14 U/L (ref 10–44)
ANION GAP SERPL CALC-SCNC: 11 MMOL/L (ref 8–16)
AST SERPL-CCNC: 21 U/L (ref 10–40)
BASOPHILS # BLD AUTO: 0.04 K/UL (ref 0–0.2)
BASOPHILS NFR BLD: 0.5 % (ref 0–1.9)
BILIRUB SERPL-MCNC: 0.5 MG/DL (ref 0.1–1)
BUN SERPL-MCNC: 13 MG/DL (ref 8–23)
CALCIUM SERPL-MCNC: 9.1 MG/DL (ref 8.7–10.5)
CHLORIDE SERPL-SCNC: 105 MMOL/L (ref 95–110)
CO2 SERPL-SCNC: 26 MMOL/L (ref 23–29)
CREAT SERPL-MCNC: 1 MG/DL (ref 0.5–1.4)
CRP SERPL-MCNC: 3.1 MG/L (ref 0–8.2)
DIFFERENTIAL METHOD BLD: ABNORMAL
EOSINOPHIL # BLD AUTO: 0.2 K/UL (ref 0–0.5)
EOSINOPHIL NFR BLD: 2.2 % (ref 0–8)
ERYTHROCYTE [DISTWIDTH] IN BLOOD BY AUTOMATED COUNT: 15.3 % (ref 11.5–14.5)
EST. GFR  (NO RACE VARIABLE): 60 ML/MIN/1.73 M^2
GLUCOSE SERPL-MCNC: 108 MG/DL (ref 70–110)
HCT VFR BLD AUTO: 40.2 % (ref 37–48.5)
HGB BLD-MCNC: 13.1 G/DL (ref 12–16)
IMM GRANULOCYTES # BLD AUTO: 0.02 K/UL (ref 0–0.04)
IMM GRANULOCYTES NFR BLD AUTO: 0.2 % (ref 0–0.5)
LYMPHOCYTES # BLD AUTO: 1.7 K/UL (ref 1–4.8)
LYMPHOCYTES NFR BLD: 20.8 % (ref 18–48)
MCH RBC QN AUTO: 29.8 PG (ref 27–31)
MCHC RBC AUTO-ENTMCNC: 32.6 G/DL (ref 32–36)
MCV RBC AUTO: 92 FL (ref 82–98)
MONOCYTES # BLD AUTO: 0.7 K/UL (ref 0.3–1)
MONOCYTES NFR BLD: 8.8 % (ref 4–15)
NEUTROPHILS # BLD AUTO: 5.5 K/UL (ref 1.8–7.7)
NEUTROPHILS NFR BLD: 67.5 % (ref 38–73)
NRBC BLD-RTO: 0 /100 WBC
PLATELET # BLD AUTO: 230 K/UL (ref 150–450)
PMV BLD AUTO: 10 FL (ref 9.2–12.9)
POTASSIUM SERPL-SCNC: 4.2 MMOL/L (ref 3.5–5.1)
PROT SERPL-MCNC: 6.6 G/DL (ref 6–8.4)
RBC # BLD AUTO: 4.39 M/UL (ref 4–5.4)
SODIUM SERPL-SCNC: 142 MMOL/L (ref 136–145)
WBC # BLD AUTO: 8.21 K/UL (ref 3.9–12.7)

## 2024-01-26 PROCEDURE — 86140 C-REACTIVE PROTEIN: CPT | Performed by: INTERNAL MEDICINE

## 2024-01-26 PROCEDURE — 80053 COMPREHEN METABOLIC PANEL: CPT | Performed by: INTERNAL MEDICINE

## 2024-01-26 PROCEDURE — 85025 COMPLETE CBC W/AUTO DIFF WBC: CPT | Performed by: INTERNAL MEDICINE

## 2024-01-26 PROCEDURE — 36415 COLL VENOUS BLD VENIPUNCTURE: CPT | Mod: PO | Performed by: INTERNAL MEDICINE

## 2024-02-05 RX ORDER — METOPROLOL SUCCINATE 100 MG/1
100 TABLET, EXTENDED RELEASE ORAL 2 TIMES DAILY
Qty: 180 TABLET | Refills: 3 | Status: SHIPPED | OUTPATIENT
Start: 2024-02-05

## 2024-02-07 ENCOUNTER — TELEPHONE (OUTPATIENT)
Dept: RHEUMATOLOGY | Facility: CLINIC | Age: 74
End: 2024-02-07
Payer: MEDICARE

## 2024-02-07 NOTE — TELEPHONE ENCOUNTER
Patient states that she has been exposed to Covid. Offered to change her appointment today to a virtual . Patient declined stating she can not do that . Patient state that daughter has covid -- the cat is sick and she is bring it to the vet at noon . Booked patient 8-15 with Dr. Ackerman patient placed on cancellation list .

## 2024-02-07 NOTE — TELEPHONE ENCOUNTER
----- Message from Felecia Smith sent at 2/7/2024  8:24 AM CST -----  States she has been exposed to Covid. States she had her lab work last week. She would like the nurse to give her a call. Please call pt [phone]. Thank you

## 2024-02-19 DIAGNOSIS — I25.118 CORONARY ARTERY DISEASE OF NATIVE ARTERY OF NATIVE HEART WITH STABLE ANGINA PECTORIS: ICD-10-CM

## 2024-02-20 RX ORDER — CLOPIDOGREL BISULFATE 75 MG/1
TABLET ORAL
Qty: 90 TABLET | Refills: 3 | Status: SHIPPED | OUTPATIENT
Start: 2024-02-20

## 2024-03-11 DIAGNOSIS — E03.9 HYPOTHYROIDISM, UNSPECIFIED TYPE: Chronic | ICD-10-CM

## 2024-03-12 ENCOUNTER — TELEPHONE (OUTPATIENT)
Dept: PRIMARY CARE CLINIC | Facility: CLINIC | Age: 74
End: 2024-03-12
Payer: MEDICARE

## 2024-03-12 RX ORDER — LEVOTHYROXINE SODIUM 100 UG/1
100 TABLET ORAL
Qty: 90 TABLET | Refills: 3 | Status: SHIPPED | OUTPATIENT
Start: 2024-03-12

## 2024-03-12 NOTE — TELEPHONE ENCOUNTER
----- Message from Iveth Coy sent at 3/12/2024 12:01 PM CDT -----  .Type:  Needs Medical Advice    Who Called: pt    Would the patient rather a call back or a response via [facility]? Call back  Best Call Back Number: [phone]  Additional Information:     Pt stated she would like a call back concerning her appointment on 5/22 with Rukhsana Lomax

## 2024-03-21 ENCOUNTER — TELEPHONE (OUTPATIENT)
Dept: HEMATOLOGY/ONCOLOGY | Facility: CLINIC | Age: 74
End: 2024-03-21
Payer: MEDICARE

## 2024-03-21 NOTE — TELEPHONE ENCOUNTER
----- Message from Shahnaz Farah sent at 3/21/2024  4:43 PM CDT -----  Name of Who is Calling:JOI LYON [[id number]]        What is the request in detail:Pt would like a callback from the office to discuss nature of appt, pt does not recall having appt scheduled.Please advise thank you       Can the clinic reply by [facility]:NO        What Number to Call Back if not [facility]Telephone Information:  Mobile          [phone]

## 2024-03-21 NOTE — TELEPHONE ENCOUNTER
"Nurse spoke with pt in regards to rescheduling her appt due to needing labs drawn prior to her visit. Pt declined , stating "she has a lot going on right now" . Pt stated she will call back in regards to rescheduling her appt. Nurse verbalized understanding.call ended well.   "

## 2024-04-05 ENCOUNTER — LAB VISIT (OUTPATIENT)
Dept: LAB | Facility: HOSPITAL | Age: 74
End: 2024-04-05
Attending: NURSE PRACTITIONER
Payer: MEDICARE

## 2024-04-05 ENCOUNTER — OFFICE VISIT (OUTPATIENT)
Dept: PRIMARY CARE CLINIC | Facility: CLINIC | Age: 74
End: 2024-04-05
Payer: MEDICARE

## 2024-04-05 VITALS
HEART RATE: 66 BPM | SYSTOLIC BLOOD PRESSURE: 140 MMHG | BODY MASS INDEX: 37.75 KG/M2 | WEIGHT: 213.06 LBS | DIASTOLIC BLOOD PRESSURE: 64 MMHG | OXYGEN SATURATION: 98 % | TEMPERATURE: 98 F | HEIGHT: 63 IN

## 2024-04-05 DIAGNOSIS — R79.9 ABNORMAL FINDING OF BLOOD CHEMISTRY, UNSPECIFIED: ICD-10-CM

## 2024-04-05 DIAGNOSIS — I10 ESSENTIAL HYPERTENSION: Chronic | ICD-10-CM

## 2024-04-05 DIAGNOSIS — D50.9 IRON DEFICIENCY ANEMIA, UNSPECIFIED IRON DEFICIENCY ANEMIA TYPE: ICD-10-CM

## 2024-04-05 DIAGNOSIS — I10 ESSENTIAL HYPERTENSION: Primary | Chronic | ICD-10-CM

## 2024-04-05 DIAGNOSIS — I50.32 CHRONIC DIASTOLIC HEART FAILURE: Chronic | ICD-10-CM

## 2024-04-05 LAB
ALBUMIN SERPL BCP-MCNC: 3.4 G/DL (ref 3.5–5.2)
ALP SERPL-CCNC: 48 U/L (ref 55–135)
ALT SERPL W/O P-5'-P-CCNC: 13 U/L (ref 10–44)
ANION GAP SERPL CALC-SCNC: 14 MMOL/L (ref 8–16)
AST SERPL-CCNC: 19 U/L (ref 10–40)
BASOPHILS # BLD AUTO: 0.06 K/UL (ref 0–0.2)
BASOPHILS NFR BLD: 0.7 % (ref 0–1.9)
BILIRUB SERPL-MCNC: 0.3 MG/DL (ref 0.1–1)
BNP SERPL-MCNC: 290 PG/ML (ref 0–99)
BUN SERPL-MCNC: 19 MG/DL (ref 8–23)
CALCIUM SERPL-MCNC: 9.5 MG/DL (ref 8.7–10.5)
CHLORIDE SERPL-SCNC: 105 MMOL/L (ref 95–110)
CHOLEST SERPL-MCNC: 154 MG/DL (ref 120–199)
CHOLEST/HDLC SERPL: 4.4 {RATIO} (ref 2–5)
CO2 SERPL-SCNC: 23 MMOL/L (ref 23–29)
CREAT SERPL-MCNC: 1.1 MG/DL (ref 0.5–1.4)
DIFFERENTIAL METHOD BLD: ABNORMAL
EOSINOPHIL # BLD AUTO: 0.2 K/UL (ref 0–0.5)
EOSINOPHIL NFR BLD: 2.1 % (ref 0–8)
ERYTHROCYTE [DISTWIDTH] IN BLOOD BY AUTOMATED COUNT: 13.5 % (ref 11.5–14.5)
EST. GFR  (NO RACE VARIABLE): 53.4 ML/MIN/1.73 M^2
ESTIMATED AVG GLUCOSE: 126 MG/DL (ref 68–131)
FERRITIN SERPL-MCNC: 22 NG/ML (ref 20–300)
GLUCOSE SERPL-MCNC: 104 MG/DL (ref 70–110)
HBA1C MFR BLD: 6 % (ref 4–5.6)
HCT VFR BLD AUTO: 42.5 % (ref 37–48.5)
HDLC SERPL-MCNC: 35 MG/DL (ref 40–75)
HDLC SERPL: 22.7 % (ref 20–50)
HGB BLD-MCNC: 13.3 G/DL (ref 12–16)
IMM GRANULOCYTES # BLD AUTO: 0.02 K/UL (ref 0–0.04)
IMM GRANULOCYTES NFR BLD AUTO: 0.2 % (ref 0–0.5)
IRON SERPL-MCNC: 42 UG/DL (ref 30–160)
LDLC SERPL CALC-MCNC: 88.6 MG/DL (ref 63–159)
LYMPHOCYTES # BLD AUTO: 1.6 K/UL (ref 1–4.8)
LYMPHOCYTES NFR BLD: 18.6 % (ref 18–48)
MCH RBC QN AUTO: 29.5 PG (ref 27–31)
MCHC RBC AUTO-ENTMCNC: 31.3 G/DL (ref 32–36)
MCV RBC AUTO: 94 FL (ref 82–98)
MONOCYTES # BLD AUTO: 0.8 K/UL (ref 0.3–1)
MONOCYTES NFR BLD: 9.2 % (ref 4–15)
NEUTROPHILS # BLD AUTO: 5.9 K/UL (ref 1.8–7.7)
NEUTROPHILS NFR BLD: 69.2 % (ref 38–73)
NONHDLC SERPL-MCNC: 119 MG/DL
NRBC BLD-RTO: 0 /100 WBC
PLATELET # BLD AUTO: 228 K/UL (ref 150–450)
PMV BLD AUTO: 11.2 FL (ref 9.2–12.9)
POTASSIUM SERPL-SCNC: 3.8 MMOL/L (ref 3.5–5.1)
PROT SERPL-MCNC: 6.7 G/DL (ref 6–8.4)
RBC # BLD AUTO: 4.51 M/UL (ref 4–5.4)
RETICS/RBC NFR AUTO: 1.4 % (ref 0.5–2.5)
SATURATED IRON: 11 % (ref 20–50)
SODIUM SERPL-SCNC: 142 MMOL/L (ref 136–145)
TOTAL IRON BINDING CAPACITY: 379 UG/DL (ref 250–450)
TRANSFERRIN SERPL-MCNC: 256 MG/DL (ref 200–375)
TRANSFERRIN SERPL-MCNC: 256 MG/DL (ref 200–375)
TRIGL SERPL-MCNC: 152 MG/DL (ref 30–150)
WBC # BLD AUTO: 8.49 K/UL (ref 3.9–12.7)

## 2024-04-05 PROCEDURE — 83880 ASSAY OF NATRIURETIC PEPTIDE: CPT | Performed by: NURSE PRACTITIONER

## 2024-04-05 PROCEDURE — 82728 ASSAY OF FERRITIN: CPT | Performed by: NURSE PRACTITIONER

## 2024-04-05 PROCEDURE — 99999 PR PBB SHADOW E&M-EST. PATIENT-LVL V: CPT | Mod: PBBFAC,,, | Performed by: NURSE PRACTITIONER

## 2024-04-05 PROCEDURE — 85045 AUTOMATED RETICULOCYTE COUNT: CPT | Performed by: NURSE PRACTITIONER

## 2024-04-05 PROCEDURE — 99215 OFFICE O/P EST HI 40 MIN: CPT | Mod: PBBFAC,PN | Performed by: NURSE PRACTITIONER

## 2024-04-05 PROCEDURE — 83036 HEMOGLOBIN GLYCOSYLATED A1C: CPT | Performed by: NURSE PRACTITIONER

## 2024-04-05 PROCEDURE — 85025 COMPLETE CBC W/AUTO DIFF WBC: CPT | Performed by: NURSE PRACTITIONER

## 2024-04-05 PROCEDURE — 83540 ASSAY OF IRON: CPT | Performed by: NURSE PRACTITIONER

## 2024-04-05 PROCEDURE — 99214 OFFICE O/P EST MOD 30 MIN: CPT | Mod: S$PBB,,, | Performed by: NURSE PRACTITIONER

## 2024-04-05 PROCEDURE — 80061 LIPID PANEL: CPT | Performed by: NURSE PRACTITIONER

## 2024-04-05 PROCEDURE — 36415 COLL VENOUS BLD VENIPUNCTURE: CPT | Mod: PO | Performed by: NURSE PRACTITIONER

## 2024-04-05 PROCEDURE — 80053 COMPREHEN METABOLIC PANEL: CPT | Performed by: NURSE PRACTITIONER

## 2024-04-05 NOTE — ASSESSMENT & PLAN NOTE
Wheeze/rhonchi with swelling today. BNP.  Increase Lasix to 40 mg qAM and 20 mg q PM x 3 days.   Continue asa, plavix, statin, repatha, metoprolol, aldactone.

## 2024-04-05 NOTE — ASSESSMENT & PLAN NOTE
BP Readings from Last 3 Encounters:   04/05/24 (!) 140/64   12/18/23 130/70   12/15/23 (!) 179/88   Slightly elevated today.   Continues furosemide, metoprolol, adlactone.

## 2024-04-05 NOTE — PROGRESS NOTES
Qi Ortiz  2024  [id number]    Lisa Porter MD  Patient Care Team:  Lisa Porter MD as PCP - General (Internal Medicine)  Elma Hernandez NP as Nurse Practitioner (Family Medicine)      [facility] Note      Chief Complaint:  Chief Complaint   Patient presents with    Follow-up     Wants glucose tested/ want to discuss blood pressure       History of Present Illness:  HPI      Here for routine f/u.     Chronic medical issues include type 2 DM, h/o CVA, CAD s/p CABG x 2, AS s/p TAVR, HTN, HLP (intolerant of statin), CKD stage 3, hypothyroidism, psoriasis and psoriatic arthritis, CAREN, obesity, and IRIS (intolerant of CPAP). Ms. Ortiz has multiple drug and contact allergies and sensitivities. Food allergies include kiwi fruit and crab boil.     Spouse passed away in [place] last October. Lots of stress since with SSI, death cert,  home.     Followed by hematology for CAREN.   Some dyspnea lately, relates to low iron levels.     Doing well overall. Stopped gabapentin for 6 months. Less brain fog, balance is much improved. Still has some peripheral neuropathy sx but this is manageable. Feels like head is much clearer! Taking ALA and Lutein now, feels like her neuropathy and vision are better. Also using topicals for pain.     Lab Results   Component Value Date    HGBA1C 6.1 (H) 2023     Takes Lasix BID routinely. Previously had to increase AM dose.     Review of Systems   Constitutional:  Positive for fatigue. Negative for activity change.   HENT:  Negative for congestion, sinus pressure and sore throat.    Respiratory:  Positive for shortness of breath. Negative for wheezing.    Gastrointestinal:  Negative for abdominal pain, constipation and diarrhea.   Genitourinary:  Negative for difficulty urinating and dysuria.   Musculoskeletal:  Negative for arthralgias and myalgias.   Neurological:  Negative for dizziness and headaches.       The following were reviewed: Active  problem list, medication list, allergies, family history, social history, and Health Maintenance.       Medications:  Current Outpatient Medications on File Prior to Visit   Medication Sig Dispense Refill    acetaminophen (TYLENOL) 650 MG TbSR as directed      allopurinoL (ZYLOPRIM) 100 MG tablet TAKE 2 TABLETS ONCE DAILY 180 tablet 3    aspirin 81 MG Chew Take 1 tablet (81 mg total) by mouth once daily. 90 tablet 3    calcipotriene (DOVONOX) 0.005 % cream Apply topically 2 (two) times daily. 120 g 0    calcium carbonate 650 mg calcium (1,625 mg) tablet Take 1 tablet by mouth once daily.      clopidogreL (PLAVIX) 75 mg tablet TAKE 1 TABLET ONCE AS DIRECTED 90 tablet 3    docusate sodium (COLACE) 100 MG capsule Take 1 capsule (100 mg total) by mouth 2 (two) times daily. 60 capsule 0    docusate sodium (COLACE) 100 MG capsule Colace Take No date recorded No form recorded No frequency recorded No route recorded No set duration recorded No set duration amount recorded active No dosage strength recorded No dosage strength units of measure recorded      folic acid (FOLVITE) 1 MG tablet TAKE 1 TABLET DAILY 90 tablet 3    furosemide (LASIX) 20 MG tablet Take 1 tablet (20 mg total) by mouth 2 (two) times daily. 180 tablet 1    garlic 2,000 mg Cap Take 3,000 mg by mouth once daily.       levothyroxine (SYNTHROID) 100 MCG tablet TAKE 1 TABLET BEFORE BREAKFAST 90 tablet 3    metoprolol succinate (TOPROL-XL) 100 MG 24 hr tablet TAKE 1 TABLET TWICE A  tablet 3    nitrofurantoin (MACRODANTIN) 100 MG capsule Take 100 mg by mouth every evening.      nitroGLYCERIN (NITROSTAT) 0.4 MG SL tablet DISSOLVE 1 TABLET UNDER THE TONGUE EVERY 5 MINUTES AS NEEDED FOR CHEST PAIN 75 tablet 3    potassium chloride SA (K-DUR,KLOR-CON M) 10 MEQ tablet Take 1 tablet (10 mEq total) by mouth once daily. 30 tablet 5    pyridoxine, vitamin B6, (B-6) 100 MG Tab Take 200 mg by mouth once daily.       REPATHA SURECLICK 140 mg/mL PnIj INJECT 1 ML (140  MG) UNDER THE SKIN EVERY 14 DAYS 6 mL 3    spironolactone (ALDACTONE) 25 MG tablet Take 1 tablet (25 mg total) by mouth once daily. 90 tablet 3    vitamin D 1000 units Tab Take 185 mg by mouth once daily.      albuterol (PROVENTIL) 2.5 mg /3 mL (0.083 %) nebulizer solution Take 3 mLs (2.5 mg total) by nebulization every 6 (six) hours as needed for Wheezing. Rescue 30 each 3    [DISCONTINUED] gabapentin (NEURONTIN) 100 MG capsule TAKE 2 CAPSULES THREE TIMES A DAY (Patient not taking: Reported on 4/5/2024) 540 capsule 3     No current facility-administered medications on file prior to visit.       Medications have been reviewed and reconciled with patient at visit today.    Barriers to medications present (no )    Fall since last office visit (no )      Exam:  Vitals:    04/05/24 1054   BP: (!) 140/64   Pulse: 66   Temp: 98.3 °F (36.8 °C)     Weight: 96.6 kg (213 lb 1.2 oz)   Body mass index is 37.74 kg/m².      BP Readings from Last 3 Encounters:   04/05/24 (!) 140/64   12/18/23 130/70   12/15/23 (!) 179/88     Wt Readings from Last 3 Encounters:   04/05/24 1054 96.6 kg (213 lb 1.2 oz)   12/18/23 1300 98.7 kg (217 lb 9.5 oz)   12/18/23 1358 98.4 kg (217 lb)            Physical Exam  Constitutional:       General: She is not in acute distress.     Appearance: She is not ill-appearing.   HENT:      Mouth/Throat:      Mouth: Mucous membranes are moist.   Eyes:      General: No scleral icterus.  Cardiovascular:      Rate and Rhythm: Normal rate and regular rhythm.      Heart sounds: Murmur heard.   Pulmonary:      Effort: Pulmonary effort is normal.      Breath sounds: Wheezing (fwe faint wheezes.) and rhonchi present.   Abdominal:      General: There is no distension.      Palpations: Abdomen is soft.      Tenderness: There is no abdominal tenderness.   Musculoskeletal:      Cervical back: Neck supple.      Right lower leg: Edema present.      Left lower leg: Edema present.      Comments: Mild BLE nonpitting edema.     Lymphadenopathy:      Cervical: No cervical adenopathy.   Skin:     General: Skin is warm and dry.      Findings: No rash.   Neurological:      Mental Status: She is alert. Mental status is at baseline.   Psychiatric:         Mood and Affect: Mood normal.         Behavior: Behavior normal.         Thought Content: Thought content normal.         Laboratory Reviewed: (Yes)  Lab Results   Component Value Date    WBC 8.21 01/26/2024    HGB 13.1 01/26/2024    HCT 40.2 01/26/2024     01/26/2024    CHOL 137 08/24/2023    TRIG 163 (H) 08/24/2023    HDL 35 (L) 08/24/2023    ALT 14 01/26/2024    AST 21 01/26/2024     01/26/2024    K 4.2 01/26/2024     01/26/2024    CREATININE 1.0 01/26/2024    BUN 13 01/26/2024    CO2 26 01/26/2024    TSH 0.442 09/29/2023    INR 1.0 09/28/2020    HGBA1C 6.1 (H) 08/24/2023           Health Maintenance  Health Maintenance Topics with due status: Not Due       Topic Last Completion Date    DEXA Scan 07/25/2022    Lipid Panel 08/24/2023    Diabetes Urine Screening 09/29/2023    Foot Exam 09/29/2023    TETANUS VACCINE 10/02/2023    Aspirin/Antiplatelet Therapy 04/05/2024     Health Maintenance Due   Topic Date Due    COVID-19 Vaccine (1) Never done    Pneumococcal Vaccines (Age 65+) (1 of 2 - PCV) Never done    RSV Vaccine (Age 60+ and Pregnant patients) (1 - 1-dose 60+ series) Never done    Colorectal Cancer Screening  04/18/2016    Shingles Vaccine (2 of 2) 11/28/2022    Hemoglobin A1c  02/24/2024    Eye Exam  03/06/2024           Assessment:  Problem List Items Addressed This Visit          Cardiac/Vascular    Essential hypertension - Primary (Chronic)     BP Readings from Last 3 Encounters:   04/05/24 (!) 140/64   12/18/23 130/70   12/15/23 (!) 179/88   Slightly elevated today.   Continues furosemide, metoprolol, adlactone.             Relevant Orders    Hemoglobin A1C    Comprehensive Metabolic Panel    CBC Auto Differential    Lipid Panel    Chronic diastolic heart  failure (Chronic)     Wheeze/rhonchi with swelling today. BNP.  Increase Lasix to 40 mg qAM and 20 mg q PM x 3 days.   Continue asa, plavix, statin, repatha, metoprolol, aldactone.              Relevant Orders    B-TYPE NATRIURETIC PEPTIDE     Other Visit Diagnoses       Abnormal finding of blood chemistry, unspecified        Relevant Orders    Hemoglobin A1C    Iron deficiency anemia, unspecified iron deficiency anemia type        Relevant Orders    Ferritin    Iron and TIBC    Reticulocytes    TRANSFERRIN              Plan:  Essential hypertension  -     Hemoglobin A1C; Future; Expected date: 04/05/2024  -     Comprehensive Metabolic Panel; Future; Expected date: 04/05/2024  -     CBC Auto Differential; Future; Expected date: 04/05/2024  -     Lipid Panel; Future; Expected date: 04/05/2024    Abnormal finding of blood chemistry, unspecified  -     Hemoglobin A1C; Future; Expected date: 04/05/2024    Iron deficiency anemia, unspecified iron deficiency anemia type  -     Ferritin; Future; Expected date: 04/05/2024  -     Iron and TIBC; Future; Expected date: 04/05/2024  -     Reticulocytes; Future; Expected date: 04/05/2024  -     TRANSFERRIN; Future; Expected date: 04/05/2024    Chronic diastolic heart failure  -     B-TYPE NATRIURETIC PEPTIDE; Future; Expected date: 04/05/2024      -Patient's lab results were reviewed and discussed with patient  -Treatment options and alternatives were discussed with the patient. Patient expressed understanding. Patient was given the opportunity to ask questions and be an active participant in their medical care. Patient had no further questions or concerns at this time.   -Documentation of patient's health and condition was obtained from family member who was present during visit.  -Patient is an overall moderate risk for health complications from their medical conditions.         Follow up: Follow up in about 1 month (around 5/5/2024).        After visit summary printed and  given to patient upon discharge.  Patient goals and care plan are included in After visit summary.    Total medical decision making time was 32 min.  The following issues were discussed: The primary encounter diagnosis was Essential hypertension. Diagnoses of Abnormal finding of blood chemistry, unspecified, Iron deficiency anemia, unspecified iron deficiency anemia type, and Chronic diastolic heart failure were also pertinent to this visit.    Health maintenance needs, recent test results and goals of care discussed with pt and questions answered.

## 2024-04-05 NOTE — PATIENT INSTRUCTIONS
If you are feeling unwell, we'd like to be the first ones to know here at [facility]! Please give us a call. Same day appointments are our top priority to keep you well and out of the emergency rooms and hospitals. Call [phone] for our direct line. After hours advice is always available. Please call [phone] after hours to speak to the on-call team.

## 2024-04-08 ENCOUNTER — TELEPHONE (OUTPATIENT)
Dept: PRIMARY CARE CLINIC | Facility: CLINIC | Age: 74
End: 2024-04-08
Payer: MEDICARE

## 2024-04-08 NOTE — TELEPHONE ENCOUNTER
----- Message from Elma Hernandez NP sent at 4/8/2024  7:58 AM CDT -----  BNP is elevated and iron saturation is low.  How is your shortness of breath since taking higher amount of furosemide this weekend?  Recommend scheduling f/u with hematology. Also should schedule 2 week f/u here for heart failure follow up.

## 2024-04-08 NOTE — TELEPHONE ENCOUNTER
Called patient and notified of the results and recommendations. Patient verbalized understanding.  Pt states that she is not having any SOB at this time. States she is going to call hematology for appt.  Chantal Langley RN

## 2024-04-09 DIAGNOSIS — E61.1 IRON DEFICIENCY: Primary | ICD-10-CM

## 2024-04-09 RX ORDER — METHYLPREDNISOLONE SOD SUCC 125 MG
80 VIAL (EA) INJECTION
Status: CANCELLED
Start: 2024-04-09

## 2024-04-09 RX ORDER — EPINEPHRINE 0.3 MG/.3ML
0.3 INJECTION SUBCUTANEOUS ONCE AS NEEDED
Status: CANCELLED | OUTPATIENT
Start: 2024-04-09

## 2024-04-09 RX ORDER — DIPHENHYDRAMINE HYDROCHLORIDE 50 MG/ML
50 INJECTION INTRAMUSCULAR; INTRAVENOUS ONCE AS NEEDED
Status: CANCELLED | OUTPATIENT
Start: 2024-04-09

## 2024-04-09 RX ORDER — SODIUM CHLORIDE 0.9 % (FLUSH) 0.9 %
10 SYRINGE (ML) INJECTION
Status: CANCELLED | OUTPATIENT
Start: 2024-04-09

## 2024-04-09 RX ORDER — HEPARIN 100 UNIT/ML
500 SYRINGE INTRAVENOUS
Status: CANCELLED | OUTPATIENT
Start: 2024-04-09

## 2024-04-12 DIAGNOSIS — I25.118 CORONARY ARTERY DISEASE OF NATIVE ARTERY OF NATIVE HEART WITH STABLE ANGINA PECTORIS: ICD-10-CM

## 2024-04-12 RX ORDER — FUROSEMIDE 20 MG/1
20 TABLET ORAL 2 TIMES DAILY
Qty: 180 TABLET | Refills: 3 | Status: SHIPPED | OUTPATIENT
Start: 2024-04-12

## 2024-04-19 ENCOUNTER — OFFICE VISIT (OUTPATIENT)
Dept: PRIMARY CARE CLINIC | Facility: CLINIC | Age: 74
End: 2024-04-19
Payer: MEDICARE

## 2024-04-19 VITALS
WEIGHT: 215.94 LBS | SYSTOLIC BLOOD PRESSURE: 126 MMHG | OXYGEN SATURATION: 98 % | BODY MASS INDEX: 38.26 KG/M2 | HEART RATE: 65 BPM | DIASTOLIC BLOOD PRESSURE: 50 MMHG | TEMPERATURE: 98 F | HEIGHT: 63 IN

## 2024-04-19 DIAGNOSIS — I70.0 AORTIC ATHEROSCLEROSIS: ICD-10-CM

## 2024-04-19 DIAGNOSIS — N25.81 SECONDARY HYPERPARATHYROIDISM: ICD-10-CM

## 2024-04-19 DIAGNOSIS — I47.10 SUPRAVENTRICULAR TACHYCARDIA: ICD-10-CM

## 2024-04-19 DIAGNOSIS — I25.708 CORONARY ARTERY DISEASE OF BYPASS GRAFT OF NATIVE HEART WITH STABLE ANGINA PECTORIS: ICD-10-CM

## 2024-04-19 DIAGNOSIS — E11.65 TYPE 2 DIABETES MELLITUS WITH HYPERGLYCEMIA, WITHOUT LONG-TERM CURRENT USE OF INSULIN: ICD-10-CM

## 2024-04-19 DIAGNOSIS — E66.01 CLASS 2 SEVERE OBESITY DUE TO EXCESS CALORIES WITH SERIOUS COMORBIDITY AND BODY MASS INDEX (BMI) OF 38.0 TO 38.9 IN ADULT: ICD-10-CM

## 2024-04-19 DIAGNOSIS — L40.50 PSORIATIC ARTHRITIS: ICD-10-CM

## 2024-04-19 DIAGNOSIS — N18.31 CKD STAGE 3A, GFR 45-59 ML/MIN: ICD-10-CM

## 2024-04-19 DIAGNOSIS — D84.9 IMMUNOCOMPROMISED: ICD-10-CM

## 2024-04-19 DIAGNOSIS — E03.9 HYPOTHYROIDISM, UNSPECIFIED TYPE: Primary | ICD-10-CM

## 2024-04-19 PROBLEM — E66.812 CLASS 2 SEVERE OBESITY DUE TO EXCESS CALORIES WITH SERIOUS COMORBIDITY AND BODY MASS INDEX (BMI) OF 38.0 TO 38.9 IN ADULT: Chronic | Status: ACTIVE | Noted: 2023-07-12

## 2024-04-19 PROBLEM — N18.32 STAGE 3B CHRONIC KIDNEY DISEASE: Chronic | Status: RESOLVED | Noted: 2017-03-03 | Resolved: 2024-04-19

## 2024-04-19 LAB
T4 FREE SERPL-MCNC: 1.01 NG/DL (ref 0.71–1.51)
TSH SERPL DL<=0.005 MIU/L-ACNC: 1.77 UIU/ML (ref 0.4–4)

## 2024-04-19 PROCEDURE — 99999 PR PBB SHADOW E&M-EST. PATIENT-LVL IV: CPT | Mod: PBBFAC,,, | Performed by: INTERNAL MEDICINE

## 2024-04-19 PROCEDURE — 99215 OFFICE O/P EST HI 40 MIN: CPT | Mod: S$PBB,,, | Performed by: INTERNAL MEDICINE

## 2024-04-19 PROCEDURE — 84439 ASSAY OF FREE THYROXINE: CPT | Performed by: INTERNAL MEDICINE

## 2024-04-19 PROCEDURE — 84443 ASSAY THYROID STIM HORMONE: CPT | Performed by: INTERNAL MEDICINE

## 2024-04-19 PROCEDURE — 99214 OFFICE O/P EST MOD 30 MIN: CPT | Mod: PBBFAC,PN | Performed by: INTERNAL MEDICINE

## 2024-04-19 NOTE — ASSESSMENT & PLAN NOTE
Doing well w Cosentyx 300mg once per month - followed by Rheum Dr. Ackerman - declines vaccines due to fear of adverse reactions

## 2024-04-19 NOTE — PATIENT INSTRUCTIONS
If you are feeling unwell, we'd like to be the first ones to know here at [facility]! Please give us a call. Same day appointments are our top priority to keep you well and out of the emergency rooms and hospitals. Call [phone] for our direct line. After hours advice is always available. Please call [phone] after hours to speak to the on-call team.
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
## 2024-04-20 NOTE — PROGRESS NOTES
Pt does not use [facility] pt portal - please call w message below:    Your thyroid function tests look good!  Please continue current dose levothyroxine.

## 2024-04-22 ENCOUNTER — INFUSION (OUTPATIENT)
Dept: INFUSION THERAPY | Facility: HOSPITAL | Age: 74
End: 2024-04-22
Attending: INTERNAL MEDICINE
Payer: MEDICARE

## 2024-04-22 ENCOUNTER — TELEPHONE (OUTPATIENT)
Dept: PRIMARY CARE CLINIC | Facility: CLINIC | Age: 74
End: 2024-04-22
Payer: MEDICARE

## 2024-04-22 VITALS
HEART RATE: 50 BPM | OXYGEN SATURATION: 98 % | SYSTOLIC BLOOD PRESSURE: 160 MMHG | RESPIRATION RATE: 16 BRPM | TEMPERATURE: 97 F | DIASTOLIC BLOOD PRESSURE: 61 MMHG

## 2024-04-22 DIAGNOSIS — D50.0 IRON DEFICIENCY ANEMIA DUE TO CHRONIC BLOOD LOSS: Primary | ICD-10-CM

## 2024-04-22 PROCEDURE — 96375 TX/PRO/DX INJ NEW DRUG ADDON: CPT

## 2024-04-22 PROCEDURE — 63600175 PHARM REV CODE 636 W HCPCS

## 2024-04-22 PROCEDURE — 96374 THER/PROPH/DIAG INJ IV PUSH: CPT

## 2024-04-22 RX ORDER — HEPARIN 100 UNIT/ML
500 SYRINGE INTRAVENOUS
Status: CANCELLED | OUTPATIENT
Start: 2024-04-29

## 2024-04-22 RX ORDER — METHYLPREDNISOLONE SOD SUCC 125 MG
80 VIAL (EA) INJECTION
Status: CANCELLED
Start: 2024-04-29

## 2024-04-22 RX ORDER — EPINEPHRINE 0.3 MG/.3ML
0.3 INJECTION SUBCUTANEOUS ONCE AS NEEDED
Status: CANCELLED | OUTPATIENT
Start: 2024-04-29

## 2024-04-22 RX ORDER — SODIUM CHLORIDE 0.9 % (FLUSH) 0.9 %
10 SYRINGE (ML) INJECTION
Status: CANCELLED | OUTPATIENT
Start: 2024-04-29

## 2024-04-22 RX ORDER — METHYLPREDNISOLONE SOD SUCC 125 MG
80 VIAL (EA) INJECTION
Status: COMPLETED | OUTPATIENT
Start: 2024-04-22 | End: 2024-04-22

## 2024-04-22 RX ORDER — DIPHENHYDRAMINE HYDROCHLORIDE 50 MG/ML
50 INJECTION INTRAMUSCULAR; INTRAVENOUS ONCE AS NEEDED
Status: CANCELLED | OUTPATIENT
Start: 2024-04-29

## 2024-04-22 RX ADMIN — IRON SUCROSE 200 MG: 20 INJECTION, SOLUTION INTRAVENOUS at 01:04

## 2024-04-22 RX ADMIN — METHYLPREDNISOLONE SODIUM SUCCINATE 80 MG: 125 INJECTION, POWDER, FOR SOLUTION INTRAMUSCULAR; INTRAVENOUS at 01:04

## 2024-04-22 NOTE — TELEPHONE ENCOUNTER
----- Message from Lisa Porter MD sent at 4/20/2024  5:58 PM CDT -----  Pt does not use [facility] pt portal - please call w message below:    Your thyroid function tests look good!  Please continue current dose levothyroxine.

## 2024-04-22 NOTE — NURSING
Pt tolerated Venofer well. No adverse reaction noted. Pt education reinforced on possible side effects, what to expect, and when to call her provider. Pt verbalized understanding. I reviewed pt calendar w/ pt and understanding verbalized. IV flushed w/ NS and D/C per protocol.

## 2024-04-29 ENCOUNTER — INFUSION (OUTPATIENT)
Dept: INFUSION THERAPY | Facility: HOSPITAL | Age: 74
End: 2024-04-29
Attending: INTERNAL MEDICINE
Payer: MEDICARE

## 2024-04-29 VITALS
SYSTOLIC BLOOD PRESSURE: 149 MMHG | OXYGEN SATURATION: 97 % | DIASTOLIC BLOOD PRESSURE: 69 MMHG | HEART RATE: 60 BPM | RESPIRATION RATE: 18 BRPM | TEMPERATURE: 98 F

## 2024-04-29 DIAGNOSIS — D50.0 IRON DEFICIENCY ANEMIA DUE TO CHRONIC BLOOD LOSS: Primary | ICD-10-CM

## 2024-04-29 PROCEDURE — 96374 THER/PROPH/DIAG INJ IV PUSH: CPT

## 2024-04-29 PROCEDURE — 63600175 PHARM REV CODE 636 W HCPCS

## 2024-04-29 PROCEDURE — 25000003 PHARM REV CODE 250

## 2024-04-29 PROCEDURE — 96375 TX/PRO/DX INJ NEW DRUG ADDON: CPT

## 2024-04-29 RX ORDER — DIPHENHYDRAMINE HYDROCHLORIDE 50 MG/ML
50 INJECTION INTRAMUSCULAR; INTRAVENOUS ONCE AS NEEDED
Status: CANCELLED | OUTPATIENT
Start: 2024-05-06

## 2024-04-29 RX ORDER — HEPARIN 100 UNIT/ML
500 SYRINGE INTRAVENOUS
Status: CANCELLED | OUTPATIENT
Start: 2024-05-06

## 2024-04-29 RX ORDER — EPINEPHRINE 0.3 MG/.3ML
0.3 INJECTION SUBCUTANEOUS ONCE AS NEEDED
Status: CANCELLED | OUTPATIENT
Start: 2024-05-06

## 2024-04-29 RX ORDER — METHYLPREDNISOLONE SOD SUCC 125 MG
80 VIAL (EA) INJECTION
Status: COMPLETED | OUTPATIENT
Start: 2024-04-29 | End: 2024-04-29

## 2024-04-29 RX ORDER — SODIUM CHLORIDE 0.9 % (FLUSH) 0.9 %
10 SYRINGE (ML) INJECTION
Status: CANCELLED | OUTPATIENT
Start: 2024-05-06

## 2024-04-29 RX ORDER — METHYLPREDNISOLONE SOD SUCC 125 MG
80 VIAL (EA) INJECTION
Status: CANCELLED
Start: 2024-05-06

## 2024-04-29 RX ADMIN — METHYLPREDNISOLONE SODIUM SUCCINATE 80 MG: 125 INJECTION, POWDER, FOR SOLUTION INTRAMUSCULAR; INTRAVENOUS at 01:04

## 2024-04-29 RX ADMIN — SODIUM CHLORIDE: 9 INJECTION, SOLUTION INTRAVENOUS at 01:04

## 2024-04-29 RX ADMIN — IRON SUCROSE 200 MG: 20 INJECTION, SOLUTION INTRAVENOUS at 01:04

## 2024-04-29 NOTE — DISCHARGE INSTRUCTIONS
..[facility]  [address] [address]  [address]  [phone] phone     [phone] fax  Hours of Operation: Monday- Friday 8:00am- 5:00pm  After hours phone  [phone]  Hematology / Oncology Physicians on call    Dr. Hank Pickett        Nurse Practitioners:    Radha Ricahrdson, SAMEERA Javed, SAMEERA Alanis, SAMEERA Caban, SAMEERA Delgado, SAMEERA Styles, PA      Please don't hesitate to call if you have any concerns.    .FALL PREVENTION   Falls often occur due to slipping, tripping or losing your balance. Here are ways to reduce your risk of falling again.   Was there anything that caused your fall that can be fixed, removed or replaced?   Make your home safe by keeping walkways clear of objects you may trip over.   Use non-slip pads under rugs.   Do not walk in poorly lit areas.   Do not stand on chairs or wobbly ladders.   Use caution when reaching overhead or looking upward. This position can cause a loss of balance.   Be sure your shoes fit properly, have non-slip bottoms and are in good condition.   Be cautious when going up and down stairs, curbs, and when walking on uneven sidewalks.   If your balance is poor, consider using a cane or walker.   If your fall was related to alcohol use, stop or limit alcohol intake.   If your fall was related to use of sleeping medicines, talk to your doctor about this. You may need to reduce your dosage at bedtime if you awaken during the night to go to the bathroom.   To reduce the need for nighttime bathroom trips:   Avoid drinking fluids for several hours before going to bed   Empty your bladder before going to bed   Men can keep a urinal at the bedside   © 6081-9908 [employer], [address], [place] [address]. All rights reserved. This information is not intended as a substitute for professional medical care. Always follow your healthcare  professional's instructions.  .WAYS TO HELP PREVENT INFECTION        WASH YOUR HANDS OFTEN DURING THE DAY, ESPECIALLY BEFORE YOU EAT, AFTER USING THE BATHROOM, AND AFTER TOUCHING ANIMALS    STAY AWAY FROM PEOPLE WHO HAVE ILLNESSES YOU CAN CATCH; SUCH AS COLDS, FLU, CHICKEN POX    TRY TO AVOID CROWDS    STAY AWAY FROM CHILDREN WHO RECENTLY HAVE RECEIVED LIVE VIRUS VACCINES    MAINTAIN GOOD MOUTH CARE    DO NOT SQUEEZE OR SCRATCH PIMPLES    CLEAN CUTS & SCRAPES RIGHT AWAY AND DAILY UNTIL HEALED WITH WARM WATER, SOAP & AN ANTISEPTIC    AVOID CONTACT WITH LITTER BOXES, BIRD CAGES, & FISH TANKS    AVOID STANDING WATER, IE., BIRD BATHS, FLOWER POTS/VASES, OR HUMIDIFIERS    WEAR GLOVES WHEN GARDENING OR CLEANING UP AFTER OTHERS, ESPECIALLY BABIES & SMALL CHILDREN    DO NOT EAT RAW FISH, SEAFOOD, MEAT, OR EGGS

## 2024-04-29 NOTE — PLAN OF CARE
Problem: Adult Inpatient Plan of Care  Goal: Plan of Care Review  Outcome: Progressing  Flowsheets (Taken 4/29/2024 1545)  Plan of Care Reviewed With: patient  Goal: Patient-Specific Goal (Individualized)  Outcome: Progressing  Flowsheets (Taken 4/29/2024 1545)  Anxieties, Fears or Concerns: Patient stated she didn't feel well after last weeks iron. Advised her to send a message to her doctor if that happens again this week.  Individualized Care Needs: Patient in recliner, pillow provided and OJ  Goal: Absence of Hospital-Acquired Illness or Injury  Outcome: Progressing  Goal: Optimal Comfort and Wellbeing  Outcome: Progressing     Problem: Fall Injury Risk  Goal: Absence of Fall and Fall-Related Injury  Outcome: Progressing     Problem: Anemia  Goal: Anemia Symptom Improvement  Outcome: Progressing

## 2024-05-06 ENCOUNTER — INFUSION (OUTPATIENT)
Dept: INFUSION THERAPY | Facility: HOSPITAL | Age: 74
End: 2024-05-06
Payer: MEDICARE

## 2024-05-06 VITALS
DIASTOLIC BLOOD PRESSURE: 63 MMHG | RESPIRATION RATE: 20 BRPM | TEMPERATURE: 98 F | OXYGEN SATURATION: 98 % | SYSTOLIC BLOOD PRESSURE: 161 MMHG | HEART RATE: 70 BPM

## 2024-05-06 DIAGNOSIS — D50.0 IRON DEFICIENCY ANEMIA DUE TO CHRONIC BLOOD LOSS: Primary | ICD-10-CM

## 2024-05-06 PROCEDURE — 63600175 PHARM REV CODE 636 W HCPCS

## 2024-05-06 PROCEDURE — 96374 THER/PROPH/DIAG INJ IV PUSH: CPT

## 2024-05-06 PROCEDURE — 96375 TX/PRO/DX INJ NEW DRUG ADDON: CPT

## 2024-05-06 RX ORDER — METHYLPREDNISOLONE SOD SUCC 125 MG
80 VIAL (EA) INJECTION
Status: CANCELLED
Start: 2024-05-13

## 2024-05-06 RX ORDER — SODIUM CHLORIDE 0.9 % (FLUSH) 0.9 %
10 SYRINGE (ML) INJECTION
Status: CANCELLED | OUTPATIENT
Start: 2024-05-13

## 2024-05-06 RX ORDER — METHYLPREDNISOLONE SOD SUCC 125 MG
80 VIAL (EA) INJECTION
Status: COMPLETED | OUTPATIENT
Start: 2024-05-06 | End: 2024-05-06

## 2024-05-06 RX ORDER — EPINEPHRINE 0.3 MG/.3ML
0.3 INJECTION SUBCUTANEOUS ONCE AS NEEDED
Status: CANCELLED | OUTPATIENT
Start: 2024-05-13

## 2024-05-06 RX ORDER — DIPHENHYDRAMINE HYDROCHLORIDE 50 MG/ML
50 INJECTION INTRAMUSCULAR; INTRAVENOUS ONCE AS NEEDED
Status: CANCELLED | OUTPATIENT
Start: 2024-05-13

## 2024-05-06 RX ORDER — HEPARIN 100 UNIT/ML
500 SYRINGE INTRAVENOUS
Status: CANCELLED | OUTPATIENT
Start: 2024-05-13

## 2024-05-06 RX ORDER — SODIUM CHLORIDE 0.9 % (FLUSH) 0.9 %
10 SYRINGE (ML) INJECTION
Status: DISCONTINUED | OUTPATIENT
Start: 2024-05-06 | End: 2024-05-06 | Stop reason: HOSPADM

## 2024-05-06 RX ADMIN — METHYLPREDNISOLONE SODIUM SUCCINATE 80 MG: 125 INJECTION, POWDER, FOR SOLUTION INTRAMUSCULAR; INTRAVENOUS at 01:05

## 2024-05-06 RX ADMIN — IRON SUCROSE 200 MG: 20 INJECTION, SOLUTION INTRAVENOUS at 02:05

## 2024-05-06 NOTE — PLAN OF CARE
Problem: Adult Inpatient Plan of Care  Goal: Plan of Care Review  Outcome: Progressing  Flowsheets (Taken 5/6/2024 1259)  Plan of Care Reviewed With: patient  Goal: Patient-Specific Goal (Individualized)  Outcome: Progressing  Flowsheets (Taken 5/6/2024 1259)  Individualized Care Needs: patient in reclined postion for comfort  Anxieties, Fears or Concerns: patient voices no changes since last infusion  Patient/Family-Specific Goals (Include Timeframe): tolerate iron without reaction  Goal: Optimal Comfort and Wellbeing  Outcome: Progressing  Intervention: Provide Person-Centered Care  Flowsheets (Taken 5/6/2024 1259)  Trust Relationship/Rapport:   care explained   reassurance provided   choices provided   thoughts/feelings acknowledged   emotional support provided   empathic listening provided   questions answered   questions encouraged

## 2024-05-06 NOTE — DISCHARGE INSTRUCTIONS
[facility]  [address] [address]  [address]  [phone] phone     [phone] fax  Hours of Operation: Monday- Friday 8:00am- 5:00pm  After hours phone  [phone]  Hematology / Oncology Physicians on call      SLAVA Sanz Dr., NP Phaon Dunbar, NP Khelsea Conley, FNP    Please call with any concerns regarding your appointment today.

## 2024-05-09 ENCOUNTER — TELEPHONE (OUTPATIENT)
Dept: PRIMARY CARE CLINIC | Facility: CLINIC | Age: 74
End: 2024-05-09

## 2024-05-09 ENCOUNTER — OFFICE VISIT (OUTPATIENT)
Dept: PRIMARY CARE CLINIC | Facility: CLINIC | Age: 74
End: 2024-05-09
Payer: MEDICARE

## 2024-05-09 ENCOUNTER — HOSPITAL ENCOUNTER (OUTPATIENT)
Dept: RADIOLOGY | Facility: HOSPITAL | Age: 74
Discharge: HOME | End: 2024-05-09
Attending: NURSE PRACTITIONER
Payer: MEDICARE

## 2024-05-09 ENCOUNTER — TELEPHONE (OUTPATIENT)
Dept: PRIMARY CARE CLINIC | Facility: CLINIC | Age: 74
End: 2024-05-09
Payer: MEDICARE

## 2024-05-09 VITALS
BODY MASS INDEX: 38.03 KG/M2 | HEART RATE: 61 BPM | OXYGEN SATURATION: 98 % | DIASTOLIC BLOOD PRESSURE: 64 MMHG | SYSTOLIC BLOOD PRESSURE: 152 MMHG | WEIGHT: 214.63 LBS | HEIGHT: 63 IN
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
## 2024-05-09 DIAGNOSIS — S20.211A CHEST WALL CONTUSION, RIGHT, INITIAL ENCOUNTER: ICD-10-CM

## 2024-05-09 DIAGNOSIS — S40.011A CONTUSION OF RIGHT SHOULDER, INITIAL ENCOUNTER: ICD-10-CM

## 2024-05-09 PROCEDURE — 73030 X-RAY EXAM OF SHOULDER: CPT | Mod: TC,FY,PO,RT

## 2024-05-09 PROCEDURE — 73030 X-RAY EXAM OF SHOULDER: CPT | Mod: 26,RT,, | Performed by: RADIOLOGY

## 2024-05-09 PROCEDURE — 99215 OFFICE O/P EST HI 40 MIN: CPT | Mod: PBBFAC,25,PN | Performed by: NURSE PRACTITIONER

## 2024-05-09 PROCEDURE — 71100 X-RAY EXAM RIBS UNI 2 VIEWS: CPT | Mod: TC,FY,PO,RT

## 2024-05-09 PROCEDURE — 99215 OFFICE O/P EST HI 40 MIN: CPT | Mod: S$PBB,,, | Performed by: NURSE PRACTITIONER

## 2024-05-09 PROCEDURE — 71100 X-RAY EXAM RIBS UNI 2 VIEWS: CPT | Mod: 26,RT,, | Performed by: RADIOLOGY

## 2024-05-09 PROCEDURE — 99999 PR PBB SHADOW E&M-EST. PATIENT-LVL V: CPT | Mod: PBBFAC,,, | Performed by: NURSE PRACTITIONER

## 2024-05-09 NOTE — PROGRESS NOTES
Qi Ortiz  05/09/2024  [id number]    Lisa Porter MD  Patient Care Team:  Lisa Porter MD as PCP - General (Internal Medicine)  Elma Hernandez NP as Nurse Practitioner (Family Medicine)        [facility]      Chief Complaint:  Chief Complaint   Patient presents with    Fall     Pt had a fall on 5/7/24, complaining of right side pain.          Assessment/Plan:  1. Fall, initial encounter  Assessment & Plan:  Discussed fall precautions    Orders:  -     X-Ray Shoulder Trauma 3 view Right; Future; Expected date: 05/09/2024  -     Cancel: XR Ribs Min 3 Views w/PA Chest Right; Future; Expected date: 05/09/2024    2. Chest wall contusion, right, initial encounter  Assessment & Plan:  Application of ice  Continue perigesic OTC med  Slow, deep breaths exercises      3. Contusion of right shoulder, initial encounter          Worry Score: 3  History of Present Illness:      Ms. Ortiz presents with c/o right sided pain after a mechanical fall in her yard 3 days ago.  She slipped on wet grass falling and hitting the right anterior chest wall. Also, describes pain to right shoulder and underneath right breast. She denies hitting her head. She was able to turn over on her abdomen and stand up from ground.  Pain to chest wall is exacerbated with inspiration of breathing. Also, she has painful ROM to right shoulder. There is not obvious bruising, swelling, bony abnormalities. Has been taking Perigesic for pain and applying ice. She is intolerate to opiate analgesia.           The following were reviewed: Active problem list, medication list, allergies, family history, social history, and Health Maintenance.     History:  Past Medical History:   Diagnosis Date    Carotid stenosis     19%    Cellulitis and abscess of foot, except toes 06/22/2022    Cerumen debris on tympanic membrane of right ear 11/30/2022    Coronary artery disease     CVA (cerebral vascular accident)     Dr. 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 Hope    Psoriatic arthritis     Rheumatology    Transient ischemic attack (TIA) 2019     Past Surgical History:   Procedure Laterality Date    BREAST BIOPSY      R sided/benign    CARDIAC SURGERY      2016    CERVICAL FUSION      CHOLECYSTECTOMY      CORONARY ANGIOPLASTY      CORONARY ARTERY BYPASS GRAFT      triple bypass    CORONARY STENT PLACEMENT      EYE SURGERY      INTRAUTERINE DEVICE INSERTION      LEFT HEART CATHETERIZATION Left 2020    Procedure: CATHETERIZATION, HEART, LEFT;  Surgeon: Veronica Ibarra MD;  Location: [facility] CATH LAB;  Service: Cardiology;  Laterality: Left;  7am start time    mass removed from R groin      TOTAL ABDOMINAL HYSTERECTOMY W/ BILATERAL SALPINGOOPHORECTOMY      due to benign mass, adhesions    TUBAL LIGATION       Family History   Problem Relation Name Age of Onset    Breast cancer Maternal Grandfather      Breast cancer Paternal Aunt      Stroke Unknown      Breast cancer Sister  60    Leukemia Sister  8         as child    Lung cancer Paternal Grandfather      Heart disease Unknown      Diabetes Daughter a      Patient Active Problem List   Diagnosis    Hyperlipidemia    Hypothyroidism    Degenerative arthritis of lumbar spine    Polyneuropathy    Metabolic syndrome    Vitamin D deficiency    Age-related osteoporosis without current pathological fracture    Essential hypertension    CAD (coronary artery disease), with stents     S/P CABG (coronary artery bypass graft)    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    Carotid stenosis    IRIS (obstructive sleep apnea)    Double ectopic ureters    Psoriatic arthritis    Angina, class II    Primary  osteoarthritis involving multiple joints    Statin intolerance    Osteopenia of multiple sites    Psoriasis    History of CVA (cerebrovascular accident)    Iron deficiency anemia due to chronic blood loss    B12 deficiency    Iron deficiency anemia due to chronic blood loss    Allergy to statin medication    Near syncope    Palpitations    Supraventricular tachycardia    Nonrheumatic aortic valve stenosis    S/P TAVR (transcatheter aortic valve replacement)    Anemia due to folic acid deficiency    Chronic diastolic heart failure    AP (angina pectoris)    CAD, multiple vessel    Pulmonary hypertension    Abnormal blood level of uric acid    Folic acid deficiency    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    Pain in lateral portion of left knee    Secondary hyperparathyroidism    Aortic atherosclerosis    Immunocompromised    Grade IV hemorrhoids    Orthostatic hypotension    Class 2 severe obesity due to excess calories with serious comorbidity and body mass index (BMI) of 38.0 to 38.9 in adult    Prediabetes    Balance problems    Frailty syndrome in geriatric patient    Bilateral hearing loss    Dog bite    Iron deficiency    BROOKS (dyspnea on exertion)    EKG abnormalities    Type 2 diabetes mellitus with hyperglycemia, without long-term current use of insulin    CKD stage 3a, GFR 45-59 ml/min    Fall    Chest wall contusion, right, initial encounter    Contusion of right shoulder     Review of patient's allergies indicates:   Allergen Reactions    Adhesive Nausea And Vomiting     EKG Electrodes    Aspirin-dipyridamole Other (See Comments)     headaches  Other reaction(s): Not Indicated  Other reaction(s): unknown    Butorphanol      Other reaction(s): Not Indicated  Other reaction(s): cardiac arrest    Citalopram Anaphylaxis     Other reaction(s): Not Indicated  Other reaction(s): unknown    Clindamycin Itching and Swelling     Other reaction(s): Not Indicated  Other  "reaction(s): unknown    Codeine Shortness Of Breath, Itching and Swelling     Other reaction(s): Not Indicated  Other reaction(s): hives/trouble breathing/"possible cardiac arrest"    Ezetimibe      Other reaction(s): Not Indicated  Other reaction(s): unknown    Hydrocodone-acetaminophen Shortness Of Breath     Other reaction(s): Not Indicated  Other reaction(s): unknown    Isosorbide Other (See Comments)     Severe headache  Other reaction(s): Not Indicated  Other reaction(s): arrhythmia    Kiwi (actinidia chinensis) Blisters     Oral  Blisters/burning    Lisinopril Anaphylaxis     Other reaction(s): Not Indicated  Other reaction(s): unknown    Magnesium citrate Shortness Of Breath     Other reaction(s): Not Indicated    Meloxicam      Other reaction(s): Not Indicated  Other reaction(s): Caused Acute renal failure    Methylprednisolone Other (See Comments)     Patient reports taking IV in the past without any issues. Reports allergy to oral preparation   Other reaction(s): Not Indicated  Other reaction(s): unknown    Metronidazole Nausea And Vomiting    Misoprostol Anaphylaxis and Other (See Comments)     Difficulty breathing  Other reaction(s): Not Indicated  Other reaction(s): unknown    Morphine      Other reaction(s): Not Indicated  Other reaction(s): trouble breathing/"possible cardiac arrest"    Nedocromil      Other reaction(s): Not Indicated  Other reaction(s): unknown    Neuromuscular blockers, steroidal Other (See Comments)    Pentazocine lactate      Other reaction(s): Not Indicated  Other reaction(s): unknown    Ranolazine Nausea And Vomiting     Other reaction(s): Not Indicated  Other reaction(s): PVC's/ SOB    Rosuvastatin Anaphylaxis     Other reaction(s): Not Indicated  Other reaction(s): gastric sleeve    Stadol [butorphanol tartrate] Anaphylaxis     Coded    Sulfa (sulfonamide antibiotics) Other (See Comments)     unknown  Other reaction(s): hives/trouble breathing    Tetracyclines      Other " reaction(s): unknown    Triamcinolone acetonide      Other reaction(s): Not Indicated  Other reaction(s): unknown    Zoledronic acid-mannitol-water Other (See Comments)     Bones hurt  Other reaction(s): Not Indicated  Other reaction(s): unknown    Hydromorphone Hallucinations    Azithromycin      Other reaction(s): Not Indicated    Cleocin [clindamycin hcl]     Meperidine      Other reaction(s): Not Indicated    Moxifloxacin      PATIENT STATES DO NOT GIVE UNDER ANY CIRCUSTANCES  Other reaction(s): Not Indicated    Nitroglycerin      Long acting  Other reaction(s): Not Indicated    Pholcodine     Prednisone      GASTRIC PAIN  Other reaction(s): Not Indicated    Tetracycline        Medications:  Current Outpatient Medications on File Prior to Visit   Medication Sig Dispense Refill    acetaminophen (TYLENOL) 650 MG TbSR as directed      allopurinoL (ZYLOPRIM) 100 MG tablet TAKE 2 TABLETS ONCE DAILY 180 tablet 3    aspirin 81 MG Chew Take 1 tablet (81 mg total) by mouth once daily. 90 tablet 3    calcipotriene (DOVONOX) 0.005 % cream Apply topically 2 (two) times daily. 120 g 0    calcium carbonate 650 mg calcium (1,625 mg) tablet Take 1 tablet by mouth once daily.      clopidogreL (PLAVIX) 75 mg tablet TAKE 1 TABLET ONCE AS DIRECTED 90 tablet 3    docusate sodium (COLACE) 100 MG capsule Take 1 capsule (100 mg total) by mouth 2 (two) times daily. 60 capsule 0    docusate sodium (COLACE) 100 MG capsule Colace Take No date recorded No form recorded No frequency recorded No route recorded No set duration recorded No set duration amount recorded active No dosage strength recorded No dosage strength units of measure recorded      folic acid (FOLVITE) 1 MG tablet TAKE 1 TABLET DAILY 90 tablet 3    furosemide (LASIX) 20 MG tablet TAKE 1 TABLET TWICE A  tablet 3    garlic 2,000 mg Cap Take 3,000 mg by mouth once daily.       levothyroxine (SYNTHROID) 100 MCG tablet TAKE 1 TABLET BEFORE BREAKFAST 90 tablet 3     metoprolol succinate (TOPROL-XL) 100 MG 24 hr tablet TAKE 1 TABLET TWICE A  tablet 3    nitroGLYCERIN (NITROSTAT) 0.4 MG SL tablet DISSOLVE 1 TABLET UNDER THE TONGUE EVERY 5 MINUTES AS NEEDED FOR CHEST PAIN 75 tablet 3    potassium chloride SA (K-DUR,KLOR-CON M) 10 MEQ tablet Take 1 tablet (10 mEq total) by mouth once daily. 30 tablet 5    pyridoxine, vitamin B6, (B-6) 100 MG Tab Take 200 mg by mouth once daily.       REPATHA SURECLICK 140 mg/mL PnIj INJECT 1 ML (140 MG) UNDER THE SKIN EVERY 14 DAYS 6 mL 3    spironolactone (ALDACTONE) 25 MG tablet Take 1 tablet (25 mg total) by mouth once daily. 90 tablet 3    vitamin D 1000 units Tab Take 185 mg by mouth once daily.      albuterol (PROVENTIL) 2.5 mg /3 mL (0.083 %) nebulizer solution Take 3 mLs (2.5 mg total) by nebulization every 6 (six) hours as needed for Wheezing. Rescue 30 each 3     No current facility-administered medications on file prior to visit.       Medications have been reviewed and reconciled with patient at visit today.      Exam:  Vitals:    05/09/24 1107   BP: (!) 152/64   Pulse: 61     Weight: 97.3 kg (214 lb 9.9 oz)   Body mass index is 38.02 kg/m².      BP Readings from Last 3 Encounters:   05/09/24 (!) 152/64   05/06/24 (!) 161/63   04/29/24 (!) 149/69     Wt Readings from Last 3 Encounters:   05/09/24 1107 97.3 kg (214 lb 9.9 oz)   04/19/24 1013 98 kg (215 lb 15.1 oz)   04/05/24 1054 96.6 kg (213 lb 1.2 oz)        REVIEW OF SYSTEMS  Review of Systems   Constitutional:  Negative for chills and fever.   HENT:  Negative for congestion, postnasal drip and sore throat.    Respiratory:  Positive for shortness of breath. Negative for cough.    Cardiovascular:  Positive for chest pain. Negative for palpitations.   Gastrointestinal:  Negative for abdominal pain, nausea and vomiting.   Genitourinary:  Negative for dysuria and frequency.   Musculoskeletal:  Positive for arthralgias and myalgias. Negative for joint swelling.   Skin:  Negative for  color change, rash and wound.   Neurological:  Negative for dizziness and weakness.   Psychiatric/Behavioral:  Negative for confusion.         Physical Exam  Vitals reviewed.   Constitutional:       Appearance: Normal appearance.   HENT:      Head: Normocephalic and atraumatic.      Nose: Nose normal.      Mouth/Throat:      Mouth: Mucous membranes are moist.   Eyes:      General: No scleral icterus.     Conjunctiva/sclera: Conjunctivae normal.   Cardiovascular:      Rate and Rhythm: Normal rate and regular rhythm.      Heart sounds: No murmur heard.  Pulmonary:      Effort: Pulmonary effort is normal. No respiratory distress.      Breath sounds: Normal breath sounds.   Chest:      Chest wall: Tenderness present. No deformity, swelling or crepitus.      Comments: TTP at the right chest wall underneath right breast  Abdominal:      Palpations: Abdomen is soft. There is no mass.      Tenderness: There is no abdominal tenderness.   Musculoskeletal:      Right shoulder: Tenderness present. No swelling or deformity. Normal range of motion.      Cervical back: Normal, normal range of motion and neck supple. No tenderness. No spinous process tenderness or muscular tenderness.      Thoracic back: Normal.      Lumbar back: Normal.      Right lower leg: No edema.      Left lower leg: No edema.   Skin:     General: Skin is warm and dry.   Neurological:      Mental Status: She is alert and oriented to person, place, and time. Mental status is at baseline.   Psychiatric:         Mood and Affect: Mood normal.         Thought Content: Thought content normal.          Laboratory Reviewed:     Lab Results   Component Value Date    WBC 8.49 04/05/2024    HGB 13.3 04/05/2024    HCT 42.5 04/05/2024     04/05/2024    CHOL 154 04/05/2024    TRIG 152 (H) 04/05/2024    HDL 35 (L) 04/05/2024    ALT 13 04/05/2024    AST 19 04/05/2024     04/05/2024    K 3.8 04/05/2024     04/05/2024    CREATININE 1.1 04/05/2024    BUN 19  04/05/2024    CO2 23 04/05/2024    TSH 1.770 04/19/2024    INR 1.0 09/28/2020    HGBA1C 6.0 (H) 04/05/2024       Screening or Prevention Patient's value Goal Complete/Controlled?   HgA1C Testing and Control   Lab Results   Component Value Date    HGBA1C 6.0 (H) 04/05/2024      Annually/Less than 8% Yes   Lipid profile : 04/05/2024 Annually Yes   LDL control Lab Results   Component Value Date    LDLCALC 88.6 04/05/2024    Annually/Less than 100 mg/dl  Yes   Nephropathy screening Lab Results   Component Value Date    LABMICR 5.0 09/29/2023     Lab Results   Component Value Date    PROTEINUA Negative 03/22/2023    Annually Yes   Blood pressure BP Readings from Last 1 Encounters:   05/09/24 (!) 152/64    Less than 140/90 No   Dilated retinal exam : 03/06/2023 Annually Yes   Foot exam   : 09/29/2023 Annually Yes       Health Maintenance  Health Maintenance Topics with due status: Not Due       Topic Last Completion Date    DEXA Scan 07/25/2022    Diabetes Urine Screening 09/29/2023    Foot Exam 09/29/2023    TETANUS VACCINE 10/02/2023    Lipid Panel 04/05/2024    Hemoglobin A1c 04/05/2024    Aspirin/Antiplatelet Therapy 05/09/2024     Health Maintenance Due   Topic Date Due    COVID-19 Vaccine (1) Never done    Pneumococcal Vaccines (Age 65+) (1 of 2 - PCV) Never done    RSV Vaccine (Age 60+ and Pregnant patients) (1 - 1-dose 60+ series) Never done    Colorectal Cancer Screening  04/18/2016    Shingles Vaccine (2 of 2) 11/28/2022    Eye Exam  03/06/2024               -Patient's lab results were reviewed and discussed with patient  -Treatment options and alternatives were discussed with the patient. Patient expressed understanding. Patient was given the opportunity to ask questions and be an active participant in their medical care. Patient had no further questions or concerns at this time.         Future Appointments   Date Time Provider Department Center   5/13/2024  1:00 PM CHAIR 08 RAFAELA RUSSO [facility]   5/20/2024   1:00 PM CHAIR 01 [facility]   5/21/2024  1:00 PM Rukhsana Lomax NP [facility]   6/24/2024  2:00 PM Asif Paez MD [facility] CARDIO  Medical C   7/5/2024  3:00 PM Lisa Porter MD [facility]   8/15/2024  2:00 PM Jacky Ackerman MD [facility] RHEU  Medical C          After visit summary printed and given to patient upon discharge.  Patient goals and care plan are included in After visit summary.      The following issues were discussed: The primary encounter diagnosis was Fall, initial encounter. Diagnoses of Chest wall contusion, right, initial encounter and Contusion of right shoulder, initial encounter were also pertinent to this visit.    Health maintenance needs, recent test results and goals of care discussed with pt and questions answered.           ANA LILIA Posada, NP-C  [facility] [address] [place] [address]

## 2024-05-13 ENCOUNTER — INFUSION (OUTPATIENT)
Dept: INFUSION THERAPY | Facility: HOSPITAL | Age: 74
End: 2024-05-13
Attending: INTERNAL MEDICINE
Payer: MEDICARE

## 2024-05-13 VITALS
TEMPERATURE: 97 F | SYSTOLIC BLOOD PRESSURE: 159 MMHG | OXYGEN SATURATION: 98 % | RESPIRATION RATE: 18 BRPM | DIASTOLIC BLOOD PRESSURE: 60 MMHG | HEART RATE: 67 BPM

## 2024-05-13 DIAGNOSIS — D50.0 IRON DEFICIENCY ANEMIA DUE TO CHRONIC BLOOD LOSS: Primary | ICD-10-CM

## 2024-05-13 PROCEDURE — 96375 TX/PRO/DX INJ NEW DRUG ADDON: CPT

## 2024-05-13 PROCEDURE — 96374 THER/PROPH/DIAG INJ IV PUSH: CPT

## 2024-05-13 PROCEDURE — 63600175 PHARM REV CODE 636 W HCPCS

## 2024-05-13 RX ORDER — SODIUM CHLORIDE 0.9 % (FLUSH) 0.9 %
10 SYRINGE (ML) INJECTION
Status: CANCELLED | OUTPATIENT
Start: 2024-05-20

## 2024-05-13 RX ORDER — HEPARIN 100 UNIT/ML
500 SYRINGE INTRAVENOUS
Status: CANCELLED | OUTPATIENT
Start: 2024-05-20

## 2024-05-13 RX ORDER — METHYLPREDNISOLONE SOD SUCC 125 MG
80 VIAL (EA) INJECTION
Status: COMPLETED | OUTPATIENT
Start: 2024-05-13 | End: 2024-05-13

## 2024-05-13 RX ORDER — DIPHENHYDRAMINE HYDROCHLORIDE 50 MG/ML
50 INJECTION INTRAMUSCULAR; INTRAVENOUS ONCE AS NEEDED
Status: CANCELLED | OUTPATIENT
Start: 2024-05-20

## 2024-05-13 RX ORDER — EPINEPHRINE 0.3 MG/.3ML
0.3 INJECTION SUBCUTANEOUS ONCE AS NEEDED
Status: CANCELLED | OUTPATIENT
Start: 2024-05-20

## 2024-05-13 RX ORDER — METHYLPREDNISOLONE SOD SUCC 125 MG
80 VIAL (EA) INJECTION
Status: CANCELLED
Start: 2024-05-20

## 2024-05-13 RX ADMIN — METHYLPREDNISOLONE SODIUM SUCCINATE 80 MG: 125 INJECTION, POWDER, FOR SOLUTION INTRAMUSCULAR; INTRAVENOUS at 12:05

## 2024-05-13 RX ADMIN — IRON SUCROSE 200 MG: 20 INJECTION, SOLUTION INTRAVENOUS at 01:05

## 2024-05-13 NOTE — PLAN OF CARE
Problem: Adult Inpatient Plan of Care  Goal: Plan of Care Review  Outcome: Progressing  Flowsheets (Taken 5/13/2024 1244)  Plan of Care Reviewed With: patient  Goal: Optimal Comfort and Wellbeing  Outcome: Progressing  Intervention: Provide Person-Centered Care  Flowsheets (Taken 5/13/2024 1244)  Trust Relationship/Rapport:   care explained   choices provided   emotional support provided   empathic listening provided   questions answered   questions encouraged   reassurance provided   thoughts/feelings acknowledged

## 2024-05-20 ENCOUNTER — INFUSION (OUTPATIENT)
Dept: INFUSION THERAPY | Facility: HOSPITAL | Age: 74
End: 2024-05-20
Attending: INTERNAL MEDICINE
Payer: MEDICARE

## 2024-05-20 VITALS
HEART RATE: 54 BPM | OXYGEN SATURATION: 97 % | TEMPERATURE: 98 F | SYSTOLIC BLOOD PRESSURE: 157 MMHG | DIASTOLIC BLOOD PRESSURE: 65 MMHG | RESPIRATION RATE: 18 BRPM

## 2024-05-20 DIAGNOSIS — D50.0 IRON DEFICIENCY ANEMIA DUE TO CHRONIC BLOOD LOSS: Primary | ICD-10-CM

## 2024-05-20 PROCEDURE — 96374 THER/PROPH/DIAG INJ IV PUSH: CPT

## 2024-05-20 PROCEDURE — 63600175 PHARM REV CODE 636 W HCPCS

## 2024-05-20 PROCEDURE — 96375 TX/PRO/DX INJ NEW DRUG ADDON: CPT

## 2024-05-20 RX ORDER — HEPARIN 100 UNIT/ML
500 SYRINGE INTRAVENOUS
OUTPATIENT
Start: 2024-05-27

## 2024-05-20 RX ORDER — DIPHENHYDRAMINE HYDROCHLORIDE 50 MG/ML
50 INJECTION INTRAMUSCULAR; INTRAVENOUS ONCE AS NEEDED
OUTPATIENT
Start: 2024-05-27

## 2024-05-20 RX ORDER — EPINEPHRINE 0.3 MG/.3ML
0.3 INJECTION SUBCUTANEOUS ONCE AS NEEDED
OUTPATIENT
Start: 2024-05-27

## 2024-05-20 RX ORDER — METHYLPREDNISOLONE SOD SUCC 125 MG
80 VIAL (EA) INJECTION
Start: 2024-05-27

## 2024-05-20 RX ORDER — SODIUM CHLORIDE 0.9 % (FLUSH) 0.9 %
10 SYRINGE (ML) INJECTION
OUTPATIENT
Start: 2024-05-27

## 2024-05-20 RX ORDER — METHYLPREDNISOLONE SOD SUCC 125 MG
80 VIAL (EA) INJECTION
Status: COMPLETED | OUTPATIENT
Start: 2024-05-20 | End: 2024-05-20

## 2024-05-20 RX ADMIN — IRON SUCROSE 200 MG: 20 INJECTION, SOLUTION INTRAVENOUS at 01:05

## 2024-05-20 RX ADMIN — METHYLPREDNISOLONE SODIUM SUCCINATE 80 MG: 125 INJECTION, POWDER, FOR SOLUTION INTRAMUSCULAR; INTRAVENOUS at 01:05

## 2024-05-20 NOTE — PLAN OF CARE
Problem: Adult Inpatient Plan of Care  Goal: Plan of Care Review  Outcome: Progressing  Flowsheets (Taken 5/20/2024 1248)  Plan of Care Reviewed With: patient  Goal: Optimal Comfort and Wellbeing  Outcome: Progressing  Intervention: Provide Person-Centered Care  Flowsheets (Taken 5/20/2024 1248)  Trust Relationship/Rapport:   care explained   choices provided   emotional support provided   empathic listening provided   questions answered   questions encouraged   reassurance provided   thoughts/feelings acknowledged

## 2024-05-21 ENCOUNTER — OFFICE VISIT (OUTPATIENT)
Dept: PRIMARY CARE CLINIC | Facility: CLINIC | Age: 74
End: 2024-05-21
Payer: MEDICARE

## 2024-05-21 VITALS
OXYGEN SATURATION: 95 % | HEIGHT: 63 IN | BODY MASS INDEX: 31.05 KG/M2 | WEIGHT: 175.25 LBS | DIASTOLIC BLOOD PRESSURE: 68 MMHG | HEART RATE: 66 BPM | TEMPERATURE: 98 F | SYSTOLIC BLOOD PRESSURE: 162 MMHG

## 2024-05-21 DIAGNOSIS — I70.0 AORTIC ATHEROSCLEROSIS: Chronic | ICD-10-CM

## 2024-05-21 DIAGNOSIS — N18.31 CKD STAGE 3A, GFR 45-59 ML/MIN: ICD-10-CM

## 2024-05-21 DIAGNOSIS — E78.2 MIXED HYPERLIPIDEMIA: Chronic | ICD-10-CM

## 2024-05-21 DIAGNOSIS — I50.32 CHRONIC DIASTOLIC HEART FAILURE: Chronic | ICD-10-CM

## 2024-05-21 DIAGNOSIS — D84.9 IMMUNOCOMPROMISED: Chronic | ICD-10-CM

## 2024-05-21 DIAGNOSIS — M85.89 OSTEOPENIA OF MULTIPLE SITES: ICD-10-CM

## 2024-05-21 DIAGNOSIS — N25.81 SECONDARY HYPERPARATHYROIDISM: Chronic | ICD-10-CM

## 2024-05-21 DIAGNOSIS — G47.33 OSA (OBSTRUCTIVE SLEEP APNEA): ICD-10-CM

## 2024-05-21 DIAGNOSIS — M81.0 AGE-RELATED OSTEOPOROSIS WITHOUT CURRENT PATHOLOGICAL FRACTURE: ICD-10-CM

## 2024-05-21 DIAGNOSIS — Z74.09 OTHER REDUCED MOBILITY: ICD-10-CM

## 2024-05-21 DIAGNOSIS — E11.22 TYPE 2 DIABETES MELLITUS WITH CHRONIC KIDNEY DISEASE, WITHOUT LONG-TERM CURRENT USE OF INSULIN, UNSPECIFIED CKD STAGE: ICD-10-CM

## 2024-05-21 DIAGNOSIS — Z00.00 ENCOUNTER FOR PREVENTIVE HEALTH EXAMINATION: Primary | ICD-10-CM

## 2024-05-21 DIAGNOSIS — I20.9 AP (ANGINA PECTORIS): ICD-10-CM

## 2024-05-21 DIAGNOSIS — E11.51 TYPE 2 DIABETES MELLITUS WITH DIABETIC PERIPHERAL ANGIOPATHY WITHOUT GANGRENE, WITHOUT LONG-TERM CURRENT USE OF INSULIN: ICD-10-CM

## 2024-05-21 DIAGNOSIS — I27.20 PULMONARY HYPERTENSION: Chronic | ICD-10-CM

## 2024-05-21 DIAGNOSIS — L40.50 PSORIATIC ARTHRITIS: Chronic | ICD-10-CM

## 2024-05-21 DIAGNOSIS — E11.65 TYPE 2 DIABETES MELLITUS WITH HYPERGLYCEMIA, WITHOUT LONG-TERM CURRENT USE OF INSULIN: ICD-10-CM

## 2024-05-21 DIAGNOSIS — I25.10 CAD, MULTIPLE VESSEL: ICD-10-CM

## 2024-05-21 DIAGNOSIS — Z95.1 S/P CABG (CORONARY ARTERY BYPASS GRAFT): Chronic | ICD-10-CM

## 2024-05-21 DIAGNOSIS — R54 FRAILTY SYNDROME IN GERIATRIC PATIENT: Chronic | ICD-10-CM

## 2024-05-21 DIAGNOSIS — E11.40 TYPE 2 DIABETES MELLITUS WITH DIABETIC NEUROPATHY, WITHOUT LONG-TERM CURRENT USE OF INSULIN: ICD-10-CM

## 2024-05-21 DIAGNOSIS — E03.8 OTHER SPECIFIED HYPOTHYROIDISM: Chronic | ICD-10-CM

## 2024-05-21 DIAGNOSIS — I10 ESSENTIAL HYPERTENSION: Chronic | ICD-10-CM

## 2024-05-21 DIAGNOSIS — H91.93 BILATERAL HEARING LOSS, UNSPECIFIED HEARING LOSS TYPE: Chronic | ICD-10-CM

## 2024-05-21 DIAGNOSIS — I47.10 SUPRAVENTRICULAR TACHYCARDIA: Chronic | ICD-10-CM

## 2024-05-21 PROCEDURE — 99999 PR PBB SHADOW E&M-EST. PATIENT-LVL IV: CPT | Mod: PBBFAC,,, | Performed by: NURSE PRACTITIONER

## 2024-05-21 PROCEDURE — G0439 PPPS, SUBSEQ VISIT: HCPCS | Mod: ,,, | Performed by: NURSE PRACTITIONER

## 2024-05-21 PROCEDURE — 99214 OFFICE O/P EST MOD 30 MIN: CPT | Mod: PBBFAC,PN | Performed by: NURSE PRACTITIONER

## 2024-05-21 NOTE — PROGRESS NOTES
"  Qi Ortiz presented for a follow-up Medicare AWV today. The following components were reviewed and updated:    Medical history  Family History  Social history  Allergies and Current Medications  Health Risk Assessment  Health Maintenance  Care Team    **See Completed Assessments for Annual Wellness visit with in the encounter summary    The following assessments were completed:  Depression Screening  Cognitive function Screening  Timed Get Up Test  Whisper Test  Nutrition Screening    Vitals:    05/21/24 1321   BP: (!) 162/68   BP Location: Right arm   Patient Position: Sitting   Pulse: 66   Temp: 97.9 °F (36.6 °C)   TempSrc: Oral   SpO2: 95%   Weight: 79.5 kg (175 lb 4.3 oz)   Height: 5' 3" (1.6 m)     Body mass index is 31.05 kg/m².     There is improvement to the right anterior rib cage and pain radiates to right scapular area. With movement of right arm worsens with pain.   Did not take her diuretic today, mild SOB.        Review of Systems   Constitutional:  Negative for activity change, appetite change, chills, fatigue and fever.   HENT:  Positive for hearing loss. Negative for congestion, ear pain, postnasal drip, rhinorrhea, sore throat and trouble swallowing.    Respiratory:  Negative for cough, shortness of breath and wheezing.    Cardiovascular:  Positive for palpitations and leg swelling. Negative for chest pain.   Gastrointestinal:  Negative for abdominal pain, constipation, diarrhea, nausea and vomiting.   Genitourinary:  Negative for difficulty urinating, dysuria, frequency and hematuria.   Musculoskeletal:  Positive for arthralgias. Negative for back pain, myalgias and neck pain.   Skin:  Negative for rash and wound.   Neurological:  Positive for numbness. Negative for dizziness, speech difficulty, weakness, light-headedness and headaches.   Psychiatric/Behavioral:  Negative for dysphoric mood and sleep disturbance. The patient is not nervous/anxious.            Physical Exam  Vitals reviewed. "   Constitutional:       General: She is not in acute distress.     Appearance: Normal appearance.   HENT:      Head: Normocephalic and atraumatic.      Nose: Nose normal.      Mouth/Throat:      Mouth: Mucous membranes are moist.   Eyes:      General: No scleral icterus.     Conjunctiva/sclera: Conjunctivae normal.   Cardiovascular:      Rate and Rhythm: Normal rate and regular rhythm.      Heart sounds: No murmur heard.  Pulmonary:      Effort: Pulmonary effort is normal. No respiratory distress.      Breath sounds: Normal breath sounds.   Abdominal:      Palpations: Abdomen is soft. There is no mass.      Tenderness: There is no abdominal tenderness.   Musculoskeletal:      Cervical back: Normal range of motion and neck supple.      Right lower leg: No edema.      Left lower leg: No edema.   Lymphadenopathy:      Cervical: No cervical adenopathy.   Skin:     General: Skin is warm and dry.   Neurological:      Mental Status: She is alert and oriented to person, place, and time. Mental status is at baseline.   Psychiatric:         Mood and Affect: Mood normal.         Thought Content: Thought content normal.            Health Maintenance         Date Due Completion Date    COVID-19 Vaccine (1) Never done ---    Pneumococcal Vaccines (Age 65+) (1 of 2 - PCV) Never done ---    RSV Vaccine (Age 60+ and Pregnant patients) (1 - 1-dose 60+ series) Never done ---    Colorectal Cancer Screening 04/18/2016 4/18/2011    Shingles Vaccine (2 of 2) 11/28/2022 10/3/2022    Eye Exam 03/06/2024 3/6/2023    Mammogram 05/22/2024 (Originally 10/23/2016) 10/23/2015    Diabetes Urine Screening 09/29/2024 9/29/2023    Foot Exam 09/29/2024 9/29/2023 (Done)    Override on 9/29/2023: Done (pt w idiopathic peripheral neuropathy - not diabetic - prediabetic)    Hemoglobin A1c 10/05/2024 4/5/2024    Lipid Panel 04/05/2025 4/5/2024    Aspirin/Antiplatelet Therapy 05/21/2025 5/21/2024    DEXA Scan 07/25/2025 7/25/2022    TETANUS VACCINE  10/02/2033 10/2/2023              PROBLEM LIST ITEMS ADDRESSED THIS VISIT:    1. Encounter for preventive health examination  Assessment & Plan:  2024 - diabetic eye exam and labs    Review for Opioid Screening: Pt does not have Rx for Opioids      Review for Substance Use Disorders: Patient does not use illicit substances as reported        2. CKD stage 3a, GFR 45-59 ml/min  Assessment & Plan:  Assess and monitor  Lab Results   Component Value Date    CREATININE 1.1 04/05/2024    CREATININE 1.0 01/26/2024    CREATININE 1.1 12/13/2023     Lab Results   Component Value Date    EGFRNONAA 41 (A) 07/18/2022    EGFRNONAA 41 (A) 07/01/2022    EGFRNONAA 37.8 (A) 06/27/2022   Avoid nephrotoxic agents  Denies any prerenal GI losses, postrenal complaints  F/U with PCP or Nephrology       3. Frailty syndrome in geriatric patient  Assessment & Plan:  Recent fall with contusion  Has knee pain which affects her activity level      4. IRIS (obstructive sleep apnea)  Overview:  Dr. Hope    Assessment & Plan:  Nightly CPaP  F/U Pulmonology      5. Osteopenia of multiple sites  Assessment & Plan:  FINDINGS:  The L1 to L4 vertebral bone mineral density is equal to 1.177 g/cm squared with a T score of 0.  There has been 6.2% increase relative to the prior study.     The left femoral neck bone mineral density is equal to 0.911 g/cm squared with a T score of -0.9.  There has been  7.2% increase relative to the prior study.  Impression:   No evidence of significant bone density loss  Fall precautions  Continue vitamin D, Ca supplement      6. Bilateral hearing loss, unspecified hearing loss type  Assessment & Plan:  Had hearing aids but were not adjusted therefore, she does not wear anymore      7. S/P CABG (coronary artery bypass graft)    8. Pulmonary hypertension  Assessment & Plan:  Chronic, stable  Continue current tx plan  F/U with PCP       9. Mixed hyperlipidemia  Assessment & Plan:     Lab Results   Component Value Date    CHOL  154 04/05/2024    HDL 35 (L) 04/05/2024    LDLCALC 88.6 04/05/2024    TRIG 152 (H) 04/05/2024     Chronic, stable  Continue Repatha  Lowfat diet  F/U PCP       10. Essential hypertension  Assessment & Plan:  Wt Readings from Last 3 Encounters:   05/21/24 79.5 kg (175 lb 4.3 oz)   05/09/24 97.3 kg (214 lb 9.9 oz)   04/19/24 98 kg (215 lb 15.1 oz)     Temp Readings from Last 3 Encounters:   05/21/24 97.9 °F (36.6 °C) (Oral)   05/20/24 97.9 °F (36.6 °C)   05/13/24 96.6 °F (35.9 °C)     BP Readings from Last 3 Encounters:   05/21/24 (!) 162/68   05/20/24 (!) 157/65   05/13/24 (!) 159/60     Pulse Readings from Last 3 Encounters:   05/21/24 66   05/20/24 (!) 54   05/13/24 67      Moderately elevated  Continue Toprol XL and lasix  F./U with PCP      11. CAD, multiple vessel  Assessment & Plan:  Denies recent chest pain  Continue ASA, Plavix, Toprol XL and Repatha  F/U with Cardiology      12. AP (angina pectoris)  Assessment & Plan:  Denies chest pain  Has not use nitro recently  F/U with Cardiology      13. Aortic atherosclerosis  Assessment & Plan:     Lab Results   Component Value Date    CHOL 154 04/05/2024    HDL 35 (L) 04/05/2024    LDLCALC 88.6 04/05/2024    TRIG 152 (H) 04/05/2024     Chronic, stable  Continue Repatha, control BP and diabetes  F/u with PCP      14. Immunocompromised  Assessment & Plan:  Monitor for S/S of infection  Chronic, stable  Continue current tx plan  F/U with PCP       15. Type 2 diabetes mellitus with hyperglycemia, without long-term current use of insulin  Assessment & Plan:  Hemoglobin A1C   Date Value Ref Range Status   04/05/2024 6.0 (H) 4.0 - 5.6 % Final     Comment:     ADA Screening Guidelines:  5.7-6.4%  Consistent with prediabetes  >or=6.5%  Consistent with diabetes    High levels of fetal hemoglobin interfere with the HbA1C  assay. Heterozygous hemoglobin variants (HbS, HgC, etc)do  not significantly interfere with this assay.   However, presence of multiple variants may affect  accuracy.     08/24/2023 6.1 (H) 4.0 - 5.6 % Final     Comment:     ADA Screening Guidelines:  5.7-6.4%  Consistent with prediabetes  >or=6.5%  Consistent with diabetes    High levels of fetal hemoglobin interfere with the HbA1C  assay. Heterozygous hemoglobin variants (HbS, HgC, etc)do  not significantly interfere with this assay.   However, presence of multiple variants may affect accuracy.     06/13/2023 5.8 (H) 4.0 - 5.6 % Final     Comment:     ADA Screening Guidelines:  5.7-6.4%  Consistent with prediabetes  >or=6.5%  Consistent with diabetes    High levels of fetal hemoglobin interfere with the HbA1C  assay. Heterozygous hemoglobin variants (HbS, HgC, etc)do  not significantly interfere with this assay.   However, presence of multiple variants may affect accuracy.       Assess and monitor  Diet controlled  Less CHO and sugars  F/U with PCP       16. Secondary hyperparathyroidism  Assessment & Plan:  Lab Results   Component Value Date    PTH 91.5 (H) 05/20/2022    CALCIUM 9.5 04/05/2024    PHOS 3.8 09/29/2023    Chronic,stable  F/U with PCP      17. Other specified hypothyroidism  Assessment & Plan:  Lab Results   Component Value Date    TSH 1.770 04/19/2024    Continue levothyroxine  F/U PCP      18. Age-related osteoporosis without current pathological fracture  Assessment & Plan:  No significant bone loss      19. Psoriatic arthritis  Overview:  Rheumatology    Assessment & Plan:  Chronic, stable  Continue current tx plan  F/U with Rheumatology      20. Other reduced mobility  Assessment & Plan:  Fall prevention      21. Type 2 diabetes mellitus with diabetic neuropathy, without long-term current use of insulin  Assessment & Plan:  Stopped gabapentin and feels neuropathy has improved  Chronic, stable  Continue current tx plan  F/U with PCP       22. Type 2 diabetes mellitus with diabetic peripheral angiopathy without gangrene, without long-term current use of insulin    23. Type 2 diabetes mellitus with  chronic kidney disease, without long-term current use of insulin, unspecified CKD stage  Assessment & Plan:  CKD stage 3 A  Hemoglobin A1C   Date Value Ref Range Status   04/05/2024 6.0 (H) 4.0 - 5.6 % Final     Comment:     ADA Screening Guidelines:  5.7-6.4%  Consistent with prediabetes  >or=6.5%  Consistent with diabetes    High levels of fetal hemoglobin interfere with the HbA1C  assay. Heterozygous hemoglobin variants (HbS, HgC, etc)do  not significantly interfere with this assay.   However, presence of multiple variants may affect accuracy.     08/24/2023 6.1 (H) 4.0 - 5.6 % Final     Comment:     ADA Screening Guidelines:  5.7-6.4%  Consistent with prediabetes  >or=6.5%  Consistent with diabetes    High levels of fetal hemoglobin interfere with the HbA1C  assay. Heterozygous hemoglobin variants (HbS, HgC, etc)do  not significantly interfere with this assay.   However, presence of multiple variants may affect accuracy.     06/13/2023 5.8 (H) 4.0 - 5.6 % Final     Comment:     ADA Screening Guidelines:  5.7-6.4%  Consistent with prediabetes  >or=6.5%  Consistent with diabetes    High levels of fetal hemoglobin interfere with the HbA1C  assay. Heterozygous hemoglobin variants (HbS, HgC, etc)do  not significantly interfere with this assay.   However, presence of multiple variants may affect accuracy.       Assess and monitor  Prediabetes  F/U with PCP       24. Chronic diastolic heart failure  Assessment & Plan:  Compensated  Chronic, stable  Continue current tx plan - lasix and BP control  F/U with PCP       25. Supraventricular tachycardia  Assessment & Plan:  Chronic, stable  Continue current tx plan - Toprol XL  F/U with PCP/Cardiology            Provided Qi with a 5-10 year written screening schedule and personal prevention plan. Recommendations were developed using the USPSTF age appropriate recommendations. Education, counseling, and referrals were provided as needed.  After Visit Summary printed and  given to patient which includes a list of additional screenings\tests needed.    Future Appointments   Date Time Provider Department Center   6/24/2024 10:40 AM LABORATORY, [facility] LAB [facility]   6/24/2024  2:00 PM Asif Paez MD ON CARDIO  Medical C   6/27/2024 11:00 AM Elida Alanis NP [facility] HEM ONC [facility]   7/5/2024  3:00 PM Lisa Porter MD BS [id number] Senior    8/15/2024  2:00 PM Jacky Ackerman MD ON RHEU  Medical C          ANA LILIA Posada, NP-C  [facility] [address] [place] [address]    I offered to discuss advanced care planning, including how to pick a person who would make decisions for you if you were unable to make them for yourself, called a health care power of , and what kind of decisions you might make such as use of life sustaining treatments such as ventilators and tube feeding when faced with a life limiting illness recorded on a living will that they will need to know. (How you want to be cared for as you near the end of your natural life)     X  Patient has advanced directives on file, which we reviewed, and they do not wish to make changes.   Patient baseline mental status

## 2024-05-22 NOTE — ASSESSMENT & PLAN NOTE
Wt Readings from Last 3 Encounters:   05/21/24 79.5 kg (175 lb 4.3 oz)   05/09/24 97.3 kg (214 lb 9.9 oz)   04/19/24 98 kg (215 lb 15.1 oz)     Temp Readings from Last 3 Encounters:   05/21/24 97.9 °F (36.6 °C) (Oral)   05/20/24 97.9 °F (36.6 °C)   05/13/24 96.6 °F (35.9 °C)     BP Readings from Last 3 Encounters:   05/21/24 (!) 162/68   05/20/24 (!) 157/65   05/13/24 (!) 159/60     Pulse Readings from Last 3 Encounters:   05/21/24 66   05/20/24 (!) 54   05/13/24 67      Moderately elevated  Continue Toprol XL and lasix  F./U with PCP

## 2024-05-22 NOTE — ASSESSMENT & PLAN NOTE
CKD stage 3 A  Hemoglobin A1C   Date Value Ref Range Status   04/05/2024 6.0 (H) 4.0 - 5.6 % Final     Comment:     ADA Screening Guidelines:  5.7-6.4%  Consistent with prediabetes  >or=6.5%  Consistent with diabetes    High levels of fetal hemoglobin interfere with the HbA1C  assay. Heterozygous hemoglobin variants (HbS, HgC, etc)do  not significantly interfere with this assay.   However, presence of multiple variants may affect accuracy.     08/24/2023 6.1 (H) 4.0 - 5.6 % Final     Comment:     ADA Screening Guidelines:  5.7-6.4%  Consistent with prediabetes  >or=6.5%  Consistent with diabetes    High levels of fetal hemoglobin interfere with the HbA1C  assay. Heterozygous hemoglobin variants (HbS, HgC, etc)do  not significantly interfere with this assay.   However, presence of multiple variants may affect accuracy.     06/13/2023 5.8 (H) 4.0 - 5.6 % Final     Comment:     ADA Screening Guidelines:  5.7-6.4%  Consistent with prediabetes  >or=6.5%  Consistent with diabetes    High levels of fetal hemoglobin interfere with the HbA1C  assay. Heterozygous hemoglobin variants (HbS, HgC, etc)do  not significantly interfere with this assay.   However, presence of multiple variants may affect accuracy.       Assess and monitor  Prediabetes  F/U with PCP

## 2024-05-22 NOTE — ASSESSMENT & PLAN NOTE
Lab Results   Component Value Date    PTH 91.5 (H) 05/20/2022    CALCIUM 9.5 04/05/2024    PHOS 3.8 09/29/2023    Chronic,stable  F/U with PCP

## 2024-05-22 NOTE — ASSESSMENT & PLAN NOTE
Assess and monitor  Lab Results   Component Value Date    CREATININE 1.1 04/05/2024    CREATININE 1.0 01/26/2024    CREATININE 1.1 12/13/2023     Lab Results   Component Value Date    EGFRNONAA 41 (A) 07/18/2022    EGFRNONAA 41 (A) 07/01/2022    EGFRNONAA 37.8 (A) 06/27/2022   Avoid nephrotoxic agents  Denies any prerenal GI losses, postrenal complaints  F/U with PCP or Nephrology

## 2024-05-22 NOTE — ASSESSMENT & PLAN NOTE
Lab Results   Component Value Date    CHOL 154 04/05/2024    HDL 35 (L) 04/05/2024    LDLCALC 88.6 04/05/2024    TRIG 152 (H) 04/05/2024     Chronic, stable  Continue Repatha, control BP and diabetes  F/u with PCP

## 2024-05-22 NOTE — ASSESSMENT & PLAN NOTE
Hemoglobin A1C   Date Value Ref Range Status   04/05/2024 6.0 (H) 4.0 - 5.6 % Final     Comment:     ADA Screening Guidelines:  5.7-6.4%  Consistent with prediabetes  >or=6.5%  Consistent with diabetes    High levels of fetal hemoglobin interfere with the HbA1C  assay. Heterozygous hemoglobin variants (HbS, HgC, etc)do  not significantly interfere with this assay.   However, presence of multiple variants may affect accuracy.     08/24/2023 6.1 (H) 4.0 - 5.6 % Final     Comment:     ADA Screening Guidelines:  5.7-6.4%  Consistent with prediabetes  >or=6.5%  Consistent with diabetes    High levels of fetal hemoglobin interfere with the HbA1C  assay. Heterozygous hemoglobin variants (HbS, HgC, etc)do  not significantly interfere with this assay.   However, presence of multiple variants may affect accuracy.     06/13/2023 5.8 (H) 4.0 - 5.6 % Final     Comment:     ADA Screening Guidelines:  5.7-6.4%  Consistent with prediabetes  >or=6.5%  Consistent with diabetes    High levels of fetal hemoglobin interfere with the HbA1C  assay. Heterozygous hemoglobin variants (HbS, HgC, etc)do  not significantly interfere with this assay.   However, presence of multiple variants may affect accuracy.       Assess and monitor  Diet controlled  Less CHO and sugars  F/U with PCP

## 2024-05-22 NOTE — ASSESSMENT & PLAN NOTE
Lab Results   Component Value Date    CHOL 154 04/05/2024    HDL 35 (L) 04/05/2024    LDLCALC 88.6 04/05/2024    TRIG 152 (H) 04/05/2024     Chronic, stable  Continue Repatha  Lowfat diet  F/U PCP

## 2024-05-22 NOTE — ASSESSMENT & PLAN NOTE
Stopped gabapentin and feels neuropathy has improved  Chronic, stable  Continue current tx plan  F/U with PCP

## 2024-05-22 NOTE — PATIENT INSTRUCTIONS
Counseling and Referral of Other Preventative  (Italic type indicates deductible and co-insurance are waived)    Patient Name: Qi Ortiz  Today's Date: 5/21/2024    Health Maintenance       Date Due Completion Date    COVID-19 Vaccine (1) Never done ---    Pneumococcal Vaccines (Age 65+) (1 of 2 - PCV) Never done ---    RSV Vaccine (Age 60+ and Pregnant patients) (1 - 1-dose 60+ series) Never done ---    Colorectal Cancer Screening 04/18/2016 4/18/2011    Shingles Vaccine (2 of 2) 11/28/2022 10/3/2022    Eye Exam 03/06/2024 3/6/2023    Mammogram 05/22/2024 (Originally 10/23/2016) 10/23/2015    Diabetes Urine Screening 09/29/2024 9/29/2023    Foot Exam 09/29/2024 9/29/2023 (Done)    Override on 9/29/2023: Done (pt w idiopathic peripheral neuropathy - not diabetic - prediabetic)    Hemoglobin A1c 10/05/2024 4/5/2024    Lipid Panel 04/05/2025 4/5/2024    Aspirin/Antiplatelet Therapy 05/21/2025 5/21/2024    DEXA Scan 07/25/2025 7/25/2022    TETANUS VACCINE 10/02/2033 10/2/2023        No orders of the defined types were placed in this encounter.    The following information is provided to all patients.  This information is to help you find resources for any of the problems found today that may be affecting your health:                  Living healthy guide: www.[facility].[place].gov      Understanding Diabetes: www.diabetes.org      Eating healthy: www.cdc.gov/healthyweight      CDC home safety checklist: www.cdc.gov/steadi/patient.html      Agency on Aging: www.goea.[place].gov      Alcoholics anonymous (AA): www.aa.org      Physical Activity: www.[place].nih.gov/zj4mzra      Tobacco use: www.quitwithusla.org

## 2024-05-22 NOTE — ASSESSMENT & PLAN NOTE
2024 - diabetic eye exam and labs    Review for Opioid Screening: Pt does not have Rx for Opioids      Review for Substance Use Disorders: Patient does not use illicit substances as reported

## 2024-05-22 NOTE — ASSESSMENT & PLAN NOTE
FINDINGS:  The L1 to L4 vertebral bone mineral density is equal to 1.177 g/cm squared with a T score of 0.  There has been 6.2% increase relative to the prior study.     The left femoral neck bone mineral density is equal to 0.911 g/cm squared with a T score of -0.9.  There has been  7.2% increase relative to the prior study.  Impression:   No evidence of significant bone density loss  Fall precautions  Continue vitamin D, Ca supplement

## 2024-06-03 ENCOUNTER — TELEPHONE (OUTPATIENT)
Dept: HEMATOLOGY/ONCOLOGY | Facility: CLINIC | Age: 74
End: 2024-06-03
Payer: MEDICARE

## 2024-06-03 NOTE — TELEPHONE ENCOUNTER
Reached out to pt regarding missed appts. Pt voiced frustration regarding appts being scheduled and no communication of dates and or times. Pt also c/o that she does not know how to do virtual appts and does not want to be scheduled for any going forward.

## 2024-06-03 NOTE — TELEPHONE ENCOUNTER
----- Message from Jayne Flores sent at 6/3/2024  4:25 PM CDT -----  Pt is calling in regards to getting appt for missed visit. Pt can be reached at [phone].        Thanks  HEATHER

## 2024-06-06 ENCOUNTER — OFFICE VISIT (OUTPATIENT)
Dept: PRIMARY CARE CLINIC | Facility: CLINIC | Age: 74
End: 2024-06-06
Payer: MEDICARE

## 2024-06-06 VITALS
OXYGEN SATURATION: 98 % | TEMPERATURE: 98 F | WEIGHT: 175.25 LBS | BODY MASS INDEX: 31.05 KG/M2 | HEART RATE: 63 BPM | HEIGHT: 63 IN | SYSTOLIC BLOOD PRESSURE: 170 MMHG | DIASTOLIC BLOOD PRESSURE: 62 MMHG

## 2024-06-06 DIAGNOSIS — T14.8XXA TRAUMATIC ABRASION: ICD-10-CM

## 2024-06-06 DIAGNOSIS — L03.116 CELLULITIS OF LEFT LOWER EXTREMITY: ICD-10-CM

## 2024-06-06 DIAGNOSIS — L03.90 CELLULITIS OF SKIN: Primary | ICD-10-CM

## 2024-06-06 PROCEDURE — 99213 OFFICE O/P EST LOW 20 MIN: CPT | Mod: S$PBB,,, | Performed by: NURSE PRACTITIONER

## 2024-06-06 PROCEDURE — 99215 OFFICE O/P EST HI 40 MIN: CPT | Mod: PBBFAC,PN | Performed by: NURSE PRACTITIONER

## 2024-06-06 PROCEDURE — 99999 PR PBB SHADOW E&M-EST. PATIENT-LVL V: CPT | Mod: PBBFAC,,, | Performed by: NURSE PRACTITIONER

## 2024-06-06 RX ORDER — AMOXICILLIN AND CLAVULANATE POTASSIUM 875; 125 MG/1; MG/1
1 TABLET, FILM COATED ORAL EVERY 12 HOURS
Qty: 14 TABLET | Refills: 0 | Status: SHIPPED | OUTPATIENT
Start: 2024-06-06 | End: 2024-06-13

## 2024-06-06 NOTE — ASSESSMENT & PLAN NOTE
Wash with soap and water daily  Avoid placing in dirty bath water  Apply neosporin daily  Start Augmentin prescription  Monitor for continued swelling, pain, redness and drainage - will call the office with no improvement

## 2024-06-06 NOTE — PATIENT INSTRUCTIONS
Wash daily with soap and water    Continue to apply the neosporin ointment to site    Avoid dirty bath water    Start the Augmentin (oral antibiotic) for the skin infection

## 2024-06-06 NOTE — PROGRESS NOTES
Qi Ortiz  06/06/2024  [id number]    Lisa Porter MD  Patient Care Team:  Lisa Porter MD as PCP - General (Internal Medicine)  Elma Hernandez NP as Nurse Practitioner (Family Medicine)        [facility]      Chief Complaint:  Chief Complaint   Patient presents with    Abrasion         Assessment/Plan:  1. Cellulitis of skin  -     amoxicillin-clavulanate 875-125mg (AUGMENTIN) 875-125 mg per tablet; Take 1 tablet by mouth every 12 (twelve) hours. for 7 days  Dispense: 14 tablet; Refill: 0    2. Traumatic abrasion  Assessment & Plan:  Wash with soap and water daily  Avoid placing in dirty bath water  Apply neosporin daily  Start Augmentin prescription  Monitor for continued swelling, pain, redness and drainage - will call the office with no improvement      3. Cellulitis of left lower extremity          Worry Score: 3  History of Present Illness:      Ms. Ortiz returns today for c/o wound to pretibial left lower leg. Noticed late Tuesday evening. Does not remember any trauma. Laborers have been repairing fence and other things in her yard. Over last 2 days have been applying  neosporin ointment. She is concerned regarding increasing erythema &.  stinging burning pain Up to date with tetanus vaccine  Mild swelling present to leg  Oozing blood/yellowing mucus  Denies fever, chills. She feels she needs an antibiotic given her chronic medical problems.                 The following were reviewed: Active problem list, medication list, allergies, family history, social history, and Health Maintenance.     History:  Past Medical History:   Diagnosis Date    Carotid stenosis     19%    Cellulitis and abscess of foot, except toes 06/22/2022    Cerumen debris on tympanic membrane of right ear 11/30/2022    Coronary artery disease     CVA (cerebral vascular accident)     Dr. Hoffman    Depression     Double ectopic ureters     Dr. Porras    Hemiplegia affecting right dominant side  2021    Hyperlipidemia     Hypertension     Hypothyroid     Left foot infection 2022    Mild asthma with exacerbation 2022    OP (osteoporosis)     IRIS (obstructive sleep apnea)     Dr. Hope    Psoriatic arthritis     Rheumatology    Transient ischemic attack (TIA) 2019     Past Surgical History:   Procedure Laterality Date    BREAST BIOPSY      R sided/benign    CARDIAC SURGERY      2016    CERVICAL FUSION      CHOLECYSTECTOMY      CORONARY ANGIOPLASTY      CORONARY ARTERY BYPASS GRAFT      triple bypass    CORONARY STENT PLACEMENT      EYE SURGERY      INTRAUTERINE DEVICE INSERTION      LEFT HEART CATHETERIZATION Left 2020    Procedure: CATHETERIZATION, HEART, LEFT;  Surgeon: Veronica Ibarra MD;  Location: [facility] CATH LAB;  Service: Cardiology;  Laterality: Left;  7am start time    mass removed from R groin      TOTAL ABDOMINAL HYSTERECTOMY W/ BILATERAL SALPINGOOPHORECTOMY      due to benign mass, adhesions    TUBAL LIGATION       Family History   Problem Relation Name Age of Onset    Breast cancer Maternal Grandfather      Breast cancer Paternal Aunt      Stroke Unknown      Breast cancer Sister  60    Leukemia Sister  8         as child    Lung cancer Paternal Grandfather      Heart disease Unknown      Diabetes Daughter a      Patient Active Problem List   Diagnosis    Hyperlipidemia    Hypothyroidism    Degenerative arthritis of lumbar spine    Polyneuropathy    Metabolic syndrome    Vitamin D deficiency    Age-related osteoporosis without current pathological fracture    Essential hypertension    CAD (coronary artery disease), with stents     S/P CABG (coronary artery bypass graft)    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    Carotid stenosis    IRIS (obstructive sleep apnea)    Double ectopic ureters    Psoriatic arthritis    Angina, class II    Primary osteoarthritis involving multiple joints    Statin intolerance    Osteopenia of multiple sites     Psoriasis    History of CVA (cerebrovascular accident)    Iron deficiency anemia due to chronic blood loss    B12 deficiency    Iron deficiency anemia due to chronic blood loss    Allergy to statin medication    Near syncope    Palpitations    Supraventricular tachycardia    Nonrheumatic aortic valve stenosis    S/P TAVR (transcatheter aortic valve replacement)    Anemia due to folic acid deficiency    Chronic diastolic heart failure    AP (angina pectoris)    CAD, multiple vessel    Pulmonary hypertension    Abnormal blood level of uric acid    Folic acid deficiency    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    Pain in lateral portion of left knee    Secondary hyperparathyroidism    Aortic atherosclerosis    Immunocompromised    Grade IV hemorrhoids    Orthostatic hypotension    Class 2 severe obesity due to excess calories with serious comorbidity and body mass index (BMI) of 38.0 to 38.9 in adult    Prediabetes    Balance problems    Frailty syndrome in geriatric patient    Bilateral hearing loss    Dog bite    Iron deficiency    BROOKS (dyspnea on exertion)    EKG abnormalities    Type 2 diabetes mellitus with hyperglycemia, without long-term current use of insulin    CKD stage 3a, GFR 45-59 ml/min    Fall    Chest wall contusion, right, initial encounter    Contusion of right shoulder    Encounter for preventive health examination    Type 2 diabetes mellitus with chronic kidney disease, without long-term current use of insulin    Type 2 diabetes mellitus with diabetic neuropathy, without long-term current use of insulin    Other reduced mobility    Traumatic abrasion    Cellulitis of left lower extremity     Review of patient's allergies indicates:   Allergen Reactions    Adhesive Nausea And Vomiting     EKG Electrodes    Aspirin-dipyridamole Other (See Comments)     headaches  Other reaction(s): Not Indicated  Other reaction(s): unknown    Butorphanol      Other reaction(s):  "Not Indicated  Other reaction(s): cardiac arrest    Citalopram Anaphylaxis     Other reaction(s): Not Indicated  Other reaction(s): unknown    Clindamycin Itching and Swelling     Other reaction(s): Not Indicated  Other reaction(s): unknown    Codeine Shortness Of Breath, Itching and Swelling     Other reaction(s): Not Indicated  Other reaction(s): hives/trouble breathing/"possible cardiac arrest"    Ezetimibe      Other reaction(s): Not Indicated  Other reaction(s): unknown    Hydrocodone-acetaminophen Shortness Of Breath     Other reaction(s): Not Indicated  Other reaction(s): unknown    Isosorbide Other (See Comments)     Severe headache  Other reaction(s): Not Indicated  Other reaction(s): arrhythmia    Kiwi (actinidia chinensis) Blisters     Oral  Blisters/burning    Lisinopril Anaphylaxis     Other reaction(s): Not Indicated  Other reaction(s): unknown    Magnesium citrate Shortness Of Breath     Other reaction(s): Not Indicated    Meloxicam      Other reaction(s): Not Indicated  Other reaction(s): Caused Acute renal failure    Methylprednisolone Other (See Comments)     Patient reports taking IV in the past without any issues. Reports allergy to oral preparation   Other reaction(s): Not Indicated  Other reaction(s): unknown    Metronidazole Nausea And Vomiting    Misoprostol Anaphylaxis and Other (See Comments)     Difficulty breathing  Other reaction(s): Not Indicated  Other reaction(s): unknown    Morphine      Other reaction(s): Not Indicated  Other reaction(s): trouble breathing/"possible cardiac arrest"    Nedocromil      Other reaction(s): Not Indicated  Other reaction(s): unknown    Neuromuscular blockers, steroidal Other (See Comments)    Pentazocine lactate      Other reaction(s): Not Indicated  Other reaction(s): unknown    Ranolazine Nausea And Vomiting     Other reaction(s): Not Indicated  Other reaction(s): PVC's/ SOB    Rosuvastatin Anaphylaxis     Other reaction(s): Not Indicated  Other " reaction(s): gastric sleeve    Stadol [butorphanol tartrate] Anaphylaxis     Coded    Sulfa (sulfonamide antibiotics) Other (See Comments)     unknown  Other reaction(s): hives/trouble breathing    Tetracyclines      Other reaction(s): unknown    Triamcinolone acetonide      Other reaction(s): Not Indicated  Other reaction(s): unknown    Zoledronic acid-mannitol-water Other (See Comments)     Bones hurt  Other reaction(s): Not Indicated  Other reaction(s): unknown    Hydromorphone Hallucinations    Azithromycin      Other reaction(s): Not Indicated    Cleocin [clindamycin hcl]     Meperidine      Other reaction(s): Not Indicated    Moxifloxacin      PATIENT STATES DO NOT GIVE UNDER ANY CIRCUSTANCES  Other reaction(s): Not Indicated    Nitroglycerin      Long acting  Other reaction(s): Not Indicated    Pholcodine     Prednisone      GASTRIC PAIN  Other reaction(s): Not Indicated    Tetracycline        Medications:  Current Outpatient Medications on File Prior to Visit   Medication Sig Dispense Refill    acetaminophen (TYLENOL) 650 MG TbSR as directed      allopurinoL (ZYLOPRIM) 100 MG tablet TAKE 2 TABLETS ONCE DAILY 180 tablet 3    aspirin 81 MG Chew Take 1 tablet (81 mg total) by mouth once daily. 90 tablet 3    calcipotriene (DOVONOX) 0.005 % cream Apply topically 2 (two) times daily. 120 g 0    calcium carbonate 650 mg calcium (1,625 mg) tablet Take 1 tablet by mouth once daily.      clopidogreL (PLAVIX) 75 mg tablet TAKE 1 TABLET ONCE AS DIRECTED 90 tablet 3    docusate sodium (COLACE) 100 MG capsule Take 1 capsule (100 mg total) by mouth 2 (two) times daily. 60 capsule 0    docusate sodium (COLACE) 100 MG capsule Colace Take No date recorded No form recorded No frequency recorded No route recorded No set duration recorded No set duration amount recorded active No dosage strength recorded No dosage strength units of measure recorded      folic acid (FOLVITE) 1 MG tablet TAKE 1 TABLET DAILY 90 tablet 3     furosemide (LASIX) 20 MG tablet TAKE 1 TABLET TWICE A  tablet 3    garlic 2,000 mg Cap Take 3,000 mg by mouth once daily.       levothyroxine (SYNTHROID) 100 MCG tablet TAKE 1 TABLET BEFORE BREAKFAST 90 tablet 3    metoprolol succinate (TOPROL-XL) 100 MG 24 hr tablet TAKE 1 TABLET TWICE A  tablet 3    nitroGLYCERIN (NITROSTAT) 0.4 MG SL tablet DISSOLVE 1 TABLET UNDER THE TONGUE EVERY 5 MINUTES AS NEEDED FOR CHEST PAIN 75 tablet 3    potassium chloride SA (K-DUR,KLOR-CON M) 10 MEQ tablet Take 1 tablet (10 mEq total) by mouth once daily. 30 tablet 5    pyridoxine, vitamin B6, (B-6) 100 MG Tab Take 200 mg by mouth once daily.       REPATHA SURECLICK 140 mg/mL PnIj INJECT 1 ML (140 MG) UNDER THE SKIN EVERY 14 DAYS 6 mL 3    spironolactone (ALDACTONE) 25 MG tablet Take 1 tablet (25 mg total) by mouth once daily. 90 tablet 3    vitamin D 1000 units Tab Take 185 mg by mouth once daily.      albuterol (PROVENTIL) 2.5 mg /3 mL (0.083 %) nebulizer solution Take 3 mLs (2.5 mg total) by nebulization every 6 (six) hours as needed for Wheezing. Rescue 30 each 3     No current facility-administered medications on file prior to visit.       Medications have been reviewed and reconciled with patient at visit today.      Exam:  Vitals:    06/06/24 1314   BP: (!) 170/62   Pulse:    Temp:      Weight: 79.5 kg (175 lb 4.3 oz)   Body mass index is 31.05 kg/m².      BP Readings from Last 3 Encounters:   06/06/24 (!) 170/62   05/21/24 (!) 162/68   05/20/24 (!) 157/65     Wt Readings from Last 3 Encounters:   06/06/24 1302 79.5 kg (175 lb 4.3 oz)   05/21/24 1321 79.5 kg (175 lb 4.3 oz)   05/09/24 1107 97.3 kg (214 lb 9.9 oz)        REVIEW OF SYSTEMS  Review of Systems   Constitutional:  Negative for chills and fatigue.   Cardiovascular:  Positive for leg swelling.   Gastrointestinal:  Negative for nausea and vomiting.   Musculoskeletal:  Positive for myalgias. Negative for arthralgias.   Skin:  Positive for wound.         Physical Exam  Vitals reviewed.   Constitutional:       Appearance: She is obese.   HENT:      Head: Atraumatic.   Eyes:      Conjunctiva/sclera: Conjunctivae normal.   Cardiovascular:      Rate and Rhythm: Normal rate and regular rhythm.   Pulmonary:      Effort: Pulmonary effort is normal.   Musculoskeletal:      Cervical back: Neck supple.      Left knee: Normal.      Left lower leg: Swelling and laceration present.   Neurological:      Mental Status: She is alert and oriented to person, place, and time.          Laboratory Reviewed:     Lab Results   Component Value Date    WBC 8.49 04/05/2024    HGB 13.3 04/05/2024    HCT 42.5 04/05/2024     04/05/2024    CHOL 154 04/05/2024    TRIG 152 (H) 04/05/2024    HDL 35 (L) 04/05/2024    ALT 13 04/05/2024    AST 19 04/05/2024     04/05/2024    K 3.8 04/05/2024     04/05/2024    CREATININE 1.1 04/05/2024    BUN 19 04/05/2024    CO2 23 04/05/2024    TSH 1.770 04/19/2024    INR 1.0 09/28/2020    HGBA1C 6.0 (H) 04/05/2024       Screening or Prevention Patient's value Goal Complete/Controlled?   HgA1C Testing and Control   Lab Results   Component Value Date    HGBA1C 6.0 (H) 04/05/2024      Annually/Less than 8% Yes   Lipid profile : 04/05/2024 Annually Yes   LDL control Lab Results   Component Value Date    LDLCALC 88.6 04/05/2024    Annually/Less than 100 mg/dl  Yes   Nephropathy screening Lab Results   Component Value Date    LABMICR 5.0 09/29/2023     Lab Results   Component Value Date    PROTEINUA Negative 03/22/2023    Annually Yes   Blood pressure BP Readings from Last 1 Encounters:   06/06/24 (!) 170/62    Less than 140/90 No   Dilated retinal exam : 03/06/2023 Annually Yes   Foot exam   : 09/29/2023 Annually Yes       Health Maintenance  Health Maintenance Topics with due status: Not Due       Topic Last Completion Date    DEXA Scan 07/25/2022    Diabetes Urine Screening 09/29/2023    Foot Exam 09/29/2023    TETANUS VACCINE 10/02/2023    Lipid  Panel 04/05/2024    Hemoglobin A1c 04/05/2024    Aspirin/Antiplatelet Therapy 06/06/2024     Health Maintenance Due   Topic Date Due    COVID-19 Vaccine (1) Never done    Pneumococcal Vaccines (Age 65+) (1 of 2 - PCV) Never done    RSV Vaccine (Age 60+ and Pregnant patients) (1 - 1-dose 60+ series) Never done    Colorectal Cancer Screening  04/18/2016    Mammogram  10/23/2016    Shingles Vaccine (2 of 2) 11/28/2022    Eye Exam  03/06/2024               -Patient's lab results were reviewed and discussed with patient  -Treatment options and alternatives were discussed with the patient. Patient expressed understanding. Patient was given the opportunity to ask questions and be an active participant in their medical care. Patient had no further questions or concerns at this time.         Future Appointments   Date Time Provider Department Center   6/24/2024  2:00 PM Asif Paez MD ON CARDIO  Medical C   7/26/2024  3:00 PM Lisa Porter MD [facility]   7/30/2024 11:00 AM LABORATORY, [facility]   8/2/2024  3:00 PM Trinidad Javed NP [facility]   8/15/2024  2:00 PM [facility]          After visit summary printed and given to patient upon discharge.  Patient goals and care plan are included in After visit summary.      The following issues were discussed: The primary encounter diagnosis was Cellulitis of skin. Diagnoses of Traumatic abrasion and Cellulitis of left lower extremity were also pertinent to this visit.    Health maintenance needs, recent test results and goals of care discussed with pt and questions answered.           Rukhsana Lomax, ANA LILIA, NP-C  [facility] [address] [place] [address]

## 2024-06-12 DIAGNOSIS — R07.2 PRECORDIAL PAIN: ICD-10-CM

## 2024-06-12 RX ORDER — NITROGLYCERIN 0.4 MG/1
TABLET SUBLINGUAL
Qty: 75 TABLET | Refills: 3 | Status: SHIPPED | OUTPATIENT
Start: 2024-06-12

## 2024-06-12 NOTE — TELEPHONE ENCOUNTER
Please see the attached refill request.   Pt has not taken his blood pressure at home since discharge from hospital

## 2024-06-17 ENCOUNTER — TELEPHONE (OUTPATIENT)
Dept: HEMATOLOGY/ONCOLOGY | Facility: CLINIC | Age: 74
End: 2024-06-17
Payer: MEDICARE

## 2024-06-17 ENCOUNTER — LAB VISIT (OUTPATIENT)
Dept: LAB | Facility: HOSPITAL | Age: 74
End: 2024-06-17
Attending: NURSE PRACTITIONER
Payer: MEDICARE

## 2024-06-17 DIAGNOSIS — E53.8 B12 DEFICIENCY: ICD-10-CM

## 2024-06-17 DIAGNOSIS — E53.8 FOLIC ACID DEFICIENCY: ICD-10-CM

## 2024-06-17 DIAGNOSIS — Z86.2 HISTORY OF IRON DEFICIENCY ANEMIA: ICD-10-CM

## 2024-06-17 LAB
BASOPHILS # BLD AUTO: 0.06 K/UL (ref 0–0.2)
BASOPHILS NFR BLD: 0.8 % (ref 0–1.9)
DIFFERENTIAL METHOD BLD: ABNORMAL
EOSINOPHIL # BLD AUTO: 0.2 K/UL (ref 0–0.5)
EOSINOPHIL NFR BLD: 2.3 % (ref 0–8)
ERYTHROCYTE [DISTWIDTH] IN BLOOD BY AUTOMATED COUNT: 15.3 % (ref 11.5–14.5)
FERRITIN SERPL-MCNC: 217 NG/ML (ref 20–300)
HCT VFR BLD AUTO: 40.4 % (ref 37–48.5)
HGB BLD-MCNC: 13 G/DL (ref 12–16)
IMM GRANULOCYTES # BLD AUTO: 0.02 K/UL (ref 0–0.04)
IMM GRANULOCYTES NFR BLD AUTO: 0.3 % (ref 0–0.5)
IRON SERPL-MCNC: 70 UG/DL (ref 30–160)
LYMPHOCYTES # BLD AUTO: 1.9 K/UL (ref 1–4.8)
LYMPHOCYTES NFR BLD: 24.3 % (ref 18–48)
MCH RBC QN AUTO: 30.2 PG (ref 27–31)
MCHC RBC AUTO-ENTMCNC: 32.2 G/DL (ref 32–36)
MCV RBC AUTO: 94 FL (ref 82–98)
MONOCYTES # BLD AUTO: 0.8 K/UL (ref 0.3–1)
MONOCYTES NFR BLD: 9.4 % (ref 4–15)
NEUTROPHILS # BLD AUTO: 5 K/UL (ref 1.8–7.7)
NEUTROPHILS NFR BLD: 62.9 % (ref 38–73)
NRBC BLD-RTO: 0 /100 WBC
PLATELET # BLD AUTO: 251 K/UL (ref 150–450)
PMV BLD AUTO: 10.2 FL (ref 9.2–12.9)
RBC # BLD AUTO: 4.3 M/UL (ref 4–5.4)
SATURATED IRON: 21 % (ref 20–50)
TOTAL IRON BINDING CAPACITY: 337 UG/DL (ref 250–450)
TRANSFERRIN SERPL-MCNC: 228 MG/DL (ref 200–375)
WBC # BLD AUTO: 7.94 K/UL (ref 3.9–12.7)

## 2024-06-17 PROCEDURE — 36415 COLL VENOUS BLD VENIPUNCTURE: CPT | Mod: PO | Performed by: NURSE PRACTITIONER

## 2024-06-17 PROCEDURE — 85025 COMPLETE CBC W/AUTO DIFF WBC: CPT | Performed by: NURSE PRACTITIONER

## 2024-06-17 PROCEDURE — 82728 ASSAY OF FERRITIN: CPT | Performed by: NURSE PRACTITIONER

## 2024-06-17 PROCEDURE — 83540 ASSAY OF IRON: CPT | Performed by: NURSE PRACTITIONER

## 2024-06-17 NOTE — TELEPHONE ENCOUNTER
Nurse spoke with pt in regards to scheduling labs to check her iron. Pt has been scheduled. Verbalized understanding.

## 2024-06-17 NOTE — TELEPHONE ENCOUNTER
----- Message from Beatrice Alanis sent at 6/17/2024 11:04 AM CDT -----  Contact: self  Patient requesting a call back regarding getting her iron level rechecked. Please call back @ [phone]

## 2024-06-18 DIAGNOSIS — I10 ESSENTIAL HYPERTENSION: Chronic | ICD-10-CM

## 2024-06-19 RX ORDER — SPIRONOLACTONE 25 MG/1
25 TABLET ORAL
Qty: 90 TABLET | Refills: 3 | Status: SHIPPED | OUTPATIENT
Start: 2024-06-19

## 2024-06-20 ENCOUNTER — TELEPHONE (OUTPATIENT)
Dept: HEMATOLOGY/ONCOLOGY | Facility: CLINIC | Age: 74
End: 2024-06-20
Payer: MEDICARE

## 2024-06-20 NOTE — TELEPHONE ENCOUNTER
----- Message from Trinidad Javed NP sent at 6/19/2024  4:52 PM CDT -----  Contact: Qi  Spoke to patient regarding results of her labs  ----- Message -----  From: Anthony Green MA  Sent: 6/19/2024   1:05 PM CDT  To: Trinidad Javed NP    Please advise pts labs results from 6/17 .    Thank you   Anthony  ----- Message -----  From: Iman Way  Sent: 6/19/2024  12:45 PM CDT  To: Tegan Abdi Staff    Pt is calling in regards to her test resuls.please call back at .[phone]           Thanks  [facility]

## 2024-06-24 ENCOUNTER — OFFICE VISIT (OUTPATIENT)
Dept: CARDIOLOGY | Facility: CLINIC | Age: 74
DRG: 322 | End: 2024-06-24
Payer: MEDICARE

## 2024-06-24 ENCOUNTER — LAB VISIT (OUTPATIENT)
Dept: LAB | Facility: HOSPITAL | Age: 74
End: 2024-06-24
Attending: INTERNAL MEDICINE
Payer: MEDICARE

## 2024-06-24 VITALS
HEIGHT: 63 IN | HEART RATE: 76 BPM | DIASTOLIC BLOOD PRESSURE: 80 MMHG | OXYGEN SATURATION: 99 % | BODY MASS INDEX: 38.28 KG/M2 | WEIGHT: 216.06 LBS | SYSTOLIC BLOOD PRESSURE: 154 MMHG | RESPIRATION RATE: 16 BRPM

## 2024-06-24 DIAGNOSIS — I47.10 SUPRAVENTRICULAR TACHYCARDIA: Chronic | ICD-10-CM

## 2024-06-24 DIAGNOSIS — Z95.1 S/P CABG (CORONARY ARTERY BYPASS GRAFT): Chronic | ICD-10-CM

## 2024-06-24 DIAGNOSIS — E66.01 CLASS 2 SEVERE OBESITY DUE TO EXCESS CALORIES WITH SERIOUS COMORBIDITY AND BODY MASS INDEX (BMI) OF 38.0 TO 38.9 IN ADULT: Chronic | ICD-10-CM

## 2024-06-24 DIAGNOSIS — Z95.2 S/P TAVR (TRANSCATHETER AORTIC VALVE REPLACEMENT): ICD-10-CM

## 2024-06-24 DIAGNOSIS — E11.40 TYPE 2 DIABETES MELLITUS WITH DIABETIC NEUROPATHY, WITHOUT LONG-TERM CURRENT USE OF INSULIN: ICD-10-CM

## 2024-06-24 DIAGNOSIS — R07.9 CHEST PAIN, UNSPECIFIED TYPE: Primary | ICD-10-CM

## 2024-06-24 DIAGNOSIS — I50.32 CHRONIC DIASTOLIC HEART FAILURE: Chronic | ICD-10-CM

## 2024-06-24 DIAGNOSIS — I25.110 ATHEROSCLEROSIS OF NATIVE CORONARY ARTERY OF NATIVE HEART WITH UNSTABLE ANGINA PECTORIS: Primary | ICD-10-CM

## 2024-06-24 DIAGNOSIS — I65.21 STENOSIS OF RIGHT CAROTID ARTERY: ICD-10-CM

## 2024-06-24 DIAGNOSIS — I20.9 ANGINA, CLASS II: Chronic | ICD-10-CM

## 2024-06-24 DIAGNOSIS — I27.20 PULMONARY HYPERTENSION: Chronic | ICD-10-CM

## 2024-06-24 DIAGNOSIS — Z78.9 STATIN INTOLERANCE: ICD-10-CM

## 2024-06-24 DIAGNOSIS — Z86.73 HISTORY OF CVA (CEREBROVASCULAR ACCIDENT): Chronic | ICD-10-CM

## 2024-06-24 DIAGNOSIS — I25.708 CORONARY ARTERY DISEASE OF BYPASS GRAFT OF NATIVE HEART WITH STABLE ANGINA PECTORIS: Chronic | ICD-10-CM

## 2024-06-24 DIAGNOSIS — I10 ESSENTIAL HYPERTENSION: Chronic | ICD-10-CM

## 2024-06-24 DIAGNOSIS — I20.9 AP (ANGINA PECTORIS): ICD-10-CM

## 2024-06-24 DIAGNOSIS — I25.810: ICD-10-CM

## 2024-06-24 DIAGNOSIS — I21.4 NSTEMI (NON-ST ELEVATED MYOCARDIAL INFARCTION): ICD-10-CM

## 2024-06-24 DIAGNOSIS — I70.0 AORTIC ATHEROSCLEROSIS: Chronic | ICD-10-CM

## 2024-06-24 DIAGNOSIS — I35.0 NONRHEUMATIC AORTIC VALVE STENOSIS: Chronic | ICD-10-CM

## 2024-06-24 DIAGNOSIS — E78.2 MIXED HYPERLIPIDEMIA: Chronic | ICD-10-CM

## 2024-06-24 DIAGNOSIS — I25.110 ATHEROSCLEROSIS OF NATIVE CORONARY ARTERY OF NATIVE HEART WITH UNSTABLE ANGINA PECTORIS: ICD-10-CM

## 2024-06-24 LAB
BNP SERPL-MCNC: 621 PG/ML (ref 0–99)
TROPONIN I SERPL DL<=0.01 NG/ML-MCNC: 0.1 NG/ML (ref 0–0.03)

## 2024-06-24 PROCEDURE — 84484 ASSAY OF TROPONIN QUANT: CPT | Performed by: INTERNAL MEDICINE

## 2024-06-24 PROCEDURE — 99999 PR PBB SHADOW E&M-EST. PATIENT-LVL IV: CPT | Mod: PBBFAC,,, | Performed by: INTERNAL MEDICINE

## 2024-06-24 PROCEDURE — 99214 OFFICE O/P EST MOD 30 MIN: CPT | Mod: PBBFAC | Performed by: INTERNAL MEDICINE

## 2024-06-24 PROCEDURE — 83880 ASSAY OF NATRIURETIC PEPTIDE: CPT | Performed by: INTERNAL MEDICINE

## 2024-06-24 PROCEDURE — 36415 COLL VENOUS BLD VENIPUNCTURE: CPT | Performed by: INTERNAL MEDICINE

## 2024-06-24 RX ORDER — AMLODIPINE BESYLATE 2.5 MG/1
2.5 TABLET ORAL DAILY
Qty: 30 TABLET | Refills: 11 | Status: ON HOLD | OUTPATIENT
Start: 2024-06-24 | End: 2024-06-28

## 2024-06-24 RX ORDER — NITROGLYCERIN 40 MG/1
1 PATCH TRANSDERMAL DAILY
Qty: 30 PATCH | Refills: 11 | Status: ON HOLD | OUTPATIENT
Start: 2024-06-24 | End: 2024-06-26

## 2024-06-24 NOTE — PROGRESS NOTES
Subjective:   Patient ID:  Qi Ortiz is a 74 y.o. female who presents for follow up of No chief complaint on file.      73y/o F came in for 6m f/u  PMH CAD s/p CABG twice in 1998 and 2016, SVT, AS, s/p TAVR 20', chronic diastolic HF, HTN HLD COPD, CKD, DM, obesity, IRIS, statin intolerance  06/06/23 went to [facility] ER for SOB. Rx for CHF and d/c o/n.  Now sob stable no orthopnea leg swelling  No chest pain   Refused Jardiance    10/23 visit  C/o SOB worse with exertion, no orthopnea and PND. Onyy trace leg swelling  BNP was 300's this week and on Lasix 40 mg bid for 3 days and weight 10lbs. Now on lasix 20 mg bid  The SOB improved significantly  Does drink a lot of water  Ekg reviewed and leads I and avl revealed misplaced limb leads    12/23 visit  The  passed and stressful now. No orthopnea PND. On lasix 20 mg bid  10/23 echo EF nl s/p TAVR TG 9 mmHg    Interval history  05/06/24 fell and syncope in the yard. Woke up in 15 min and back pain. In the two days was in and out?, CXR rib on 05/09 negative  EKG reviewed by myself today reveals NSR nonspecific STT change  C/o chest pain at exertion and fatigue  BP high  LDL 88            Past Medical History:   Diagnosis Date    Carotid stenosis     19%    Cellulitis and abscess of foot, except toes 06/22/2022    Cerumen debris on tympanic membrane of right ear 11/30/2022    Coronary artery disease     CVA (cerebral vascular accident)     Dr. 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 Hope    Psoriatic arthritis     Rheumatology    Transient ischemic attack (TIA) 01/02/2019       Past Surgical History:   Procedure Laterality Date    BREAST BIOPSY      R sided/benign    CARDIAC SURGERY      sept 28  2016    CERVICAL FUSION      CHOLECYSTECTOMY      CORONARY ANGIOPLASTY      CORONARY ARTERY BYPASS GRAFT      triple bypass    CORONARY BYPASS GRAFT ANGIOGRAPHY  2024    Procedure: Bypass graft study;  Surgeon: Veronica Ibarra MD;  Location: [facility] CATH LAB;  Service: Cardiology;;    CORONARY STENT PLACEMENT      EYE SURGERY      INTRAUTERINE DEVICE INSERTION      LEFT HEART CATHETERIZATION Left 2020    Procedure: CATHETERIZATION, HEART, LEFT;  Surgeon: Veronica Ibarra MD;  Location: [facility] CATH LAB;  Service: Cardiology;  Laterality: Left;  7am start time    LEFT HEART CATHETERIZATION Left 2024    Procedure: Left heart cath;  Surgeon: Veronica Ibarra MD;  Location: [facility] CATH LAB;  Service: Cardiology;  Laterality: Left;    mass removed from R groin      PERCUTANEOUS CORONARY INTERVENTION, ARTERY N/A 2024    Procedure: Percutaneous coronary intervention;  Surgeon: Veronica Ibarra MD;  Location: [facility] CATH LAB;  Service: Cardiology;  Laterality: N/A;    STENT, DRUG ELUTING, SINGLE VESSEL, CORONARY  2024    Procedure: Stent, Drug Eluting, Single Vessel, Coronary;  Surgeon: Veronica Ibarra MD;  Location: [facility] CATH LAB;  Service: Cardiology;;    TOTAL ABDOMINAL HYSTERECTOMY W/ BILATERAL SALPINGOOPHORECTOMY      due to benign mass, adhesions    TUBAL LIGATION         Social History     Tobacco Use    Smoking status: Never    Smokeless tobacco: Never   Substance Use Topics    Alcohol use: No    Drug use: No       Family History   Problem Relation Name Age of Onset    Breast cancer Maternal Grandfather      Breast cancer Paternal Aunt      Stroke Unknown      Breast cancer Sister  60    Leukemia Sister  8         as child    Lung cancer Paternal Grandfather      Heart disease Unknown      Diabetes Daughter a          ROS    Objective:   Physical Exam  HENT:      Head: Normocephalic.   Eyes:      Pupils: Pupils are equal, round, and reactive to light.   Neck:      Thyroid: No thyromegaly.       Vascular: Normal carotid pulses. No carotid bruit or JVD.   Cardiovascular:      Rate and Rhythm: Normal rate and regular rhythm. No extrasystoles are present.     Chest Wall: PMI is not displaced.      Pulses: Normal pulses.      Heart sounds: Normal heart sounds. No murmur heard.     No gallop. No S3 sounds.   Pulmonary:      Effort: No respiratory distress.      Breath sounds: Normal breath sounds. No stridor.   Abdominal:      General: Bowel sounds are normal.      Palpations: Abdomen is soft.      Tenderness: There is no abdominal tenderness. There is no rebound.   Musculoskeletal:         General: Swelling present.   Skin:     Findings: No rash.   Neurological:      Mental Status: She is alert and oriented to person, place, and time.   Psychiatric:         Behavior: Behavior normal.         Lab Results   Component Value Date    CHOL 154 04/05/2024    CHOL 137 08/24/2023    CHOL 125 02/03/2023     Lab Results   Component Value Date    HDL 35 (L) 04/05/2024    HDL 35 (L) 08/24/2023    HDL 43 02/03/2023     Lab Results   Component Value Date    LDLCALC 88.6 04/05/2024    LDLCALC 69.4 08/24/2023    LDLCALC 50.0 (L) 02/03/2023     Lab Results   Component Value Date    TRIG 152 (H) 04/05/2024    TRIG 163 (H) 08/24/2023    TRIG 160 (H) 02/03/2023     Lab Results   Component Value Date    CHOLHDL 22.7 04/05/2024    CHOLHDL 25.5 08/24/2023    CHOLHDL 34.4 02/03/2023       Chemistry        Component Value Date/Time     06/29/2024 1715    K 3.5 06/29/2024 1715     06/29/2024 1715    CO2 23 06/29/2024 1715    BUN 13 06/29/2024 1715    CREATININE 1.1 06/29/2024 1715     (H) 06/29/2024 1715        Component Value Date/Time    CALCIUM 10.0 06/29/2024 1715    ALKPHOS 59 06/29/2024 1715    AST 24 06/29/2024 1715    ALT 16 06/29/2024 1715    BILITOT 0.7 06/29/2024 1715    ESTGFRAFRICA 48 (A) 07/18/2022 0803    EGFRNONAA 41 (A) 07/18/2022 0803          Lab Results   Component Value Date    HGBA1C 6.0 (H)  04/05/2024     Lab Results   Component Value Date    TSH 1.770 04/19/2024     Lab Results   Component Value Date    INR 1.0 06/25/2024    INR 1.0 09/28/2020    INR 1.0 12/18/2017     Lab Results   Component Value Date    WBC 7.74 06/29/2024    HGB 12.8 06/29/2024    HCT 38.9 06/29/2024    MCV 91 06/29/2024     06/29/2024     BMP  Sodium   Date Value Ref Range Status   06/29/2024 142 136 - 145 mmol/L Final     Potassium   Date Value Ref Range Status   06/29/2024 3.5 3.5 - 5.1 mmol/L Final     Chloride   Date Value Ref Range Status   06/29/2024 105 95 - 110 mmol/L Final     CO2   Date Value Ref Range Status   06/29/2024 23 23 - 29 mmol/L Final     BUN   Date Value Ref Range Status   06/29/2024 13 8 - 23 mg/dL Final     Creatinine   Date Value Ref Range Status   06/29/2024 1.1 0.5 - 1.4 mg/dL Final     Calcium   Date Value Ref Range Status   06/29/2024 10.0 8.7 - 10.5 mg/dL Final     Anion Gap   Date Value Ref Range Status   06/29/2024 14 8 - 16 mmol/L Final     eGFR if    Date Value Ref Range Status   07/18/2022 48 (A) >60 mL/min/1.73 m^2 Final     eGFR if non    Date Value Ref Range Status   07/18/2022 41 (A) >60 mL/min/1.73 m^2 Final     Comment:     Calculation used to obtain the estimated glomerular filtration  rate (eGFR) is the CKD-EPI equation.        BNP  @LABRCNTIP(BNP,BNPTRIAGEBLO)@  @LABRCNTIP(troponini)@  Estimated Creatinine Clearance: 50.8 mL/min (based on SCr of 1.1 mg/dL).  No results found in the last 24 hours.  No results found in the last 24 hours.  No results found in the last 24 hours.    Assessment:      1. Atherosclerosis of native coronary artery of native heart with unstable angina pectoris    2. History of CVA (cerebrovascular accident)    3. Angina, class II    4. Aortic atherosclerosis    5. AP (angina pectoris)    6. 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    7. Coronary artery disease of bypass graft of native heart with stable  angina pectoris    8. Stenosis of right carotid artery    9. Chronic diastolic heart failure    10. Essential hypertension    11. Mixed hyperlipidemia    12. Nonrheumatic aortic valve stenosis    13. NSTEMI (non-ST elevated myocardial infarction)    14. Pulmonary hypertension    15. S/P CABG (coronary artery bypass graft)    16. S/P TAVR (transcatheter aortic valve replacement)    17. Supraventricular tachycardia    18. Class 2 severe obesity due to excess calories with serious comorbidity and body mass index (BMI) of 38.0 to 38.9 in adult    19. Type 2 diabetes mellitus with diabetic neuropathy, without long-term current use of insulin    20. Statin intolerance        Plan:   Add nTG 0.2 pates   Add amlodipine 2.5 mg daily for HTN   Check BNP and troponin  Phar MPI and echo r/o ischemia and EF eval  Continue asa lasix toprolXL repatha aldactone brilinta  RTC 6 weeks    DM Rx per PCP  Counseled DASH  Check Lipid profile with PCP in 6 months  Recommend heart-healthy diet, weight control and regular exercise.  Milind. Risk modification.   I have reviewed all pertinent labs and cardiac studies independently. Plans and recommendations have been formulated under my direct supervision. All questions answered and patient voiced understanding.   If symptoms persist go to the ED

## 2024-06-25 ENCOUNTER — TELEPHONE (OUTPATIENT)
Dept: PRIMARY CARE CLINIC | Facility: CLINIC | Age: 74
End: 2024-06-25
Payer: MEDICARE

## 2024-06-25 ENCOUNTER — HOSPITAL ENCOUNTER (INPATIENT)
Facility: HOSPITAL | Age: 74
LOS: 2 days | Discharge: HOME | DRG: 322 | End: 2024-06-28
Attending: EMERGENCY MEDICINE | Admitting: HOSPITALIST
Payer: MEDICARE

## 2024-06-25 ENCOUNTER — DOCUMENTATION ONLY (OUTPATIENT)
Dept: PRIMARY CARE CLINIC | Facility: CLINIC | Age: 74
End: 2024-06-25
Payer: MEDICARE

## 2024-06-25 DIAGNOSIS — E11.65 TYPE 2 DIABETES MELLITUS WITH HYPERGLYCEMIA, WITHOUT LONG-TERM CURRENT USE OF INSULIN: ICD-10-CM

## 2024-06-25 DIAGNOSIS — R07.9 CHEST PAIN: ICD-10-CM

## 2024-06-25 DIAGNOSIS — D52.9 ANEMIA DUE TO FOLIC ACID DEFICIENCY, UNSPECIFIED DEFICIENCY TYPE: ICD-10-CM

## 2024-06-25 DIAGNOSIS — R79.89 ELEVATED TROPONIN: ICD-10-CM

## 2024-06-25 DIAGNOSIS — I25.10 CAD, MULTIPLE VESSEL: ICD-10-CM

## 2024-06-25 DIAGNOSIS — Z95.1 S/P CABG (CORONARY ARTERY BYPASS GRAFT): ICD-10-CM

## 2024-06-25 DIAGNOSIS — I21.4 NON-ST ELEVATION MYOCARDIAL INFARCTION (NSTEMI): ICD-10-CM

## 2024-06-25 DIAGNOSIS — R07.89 TIGHTNESS IN CHEST: ICD-10-CM

## 2024-06-25 DIAGNOSIS — I21.4 NSTEMI (NON-ST ELEVATED MYOCARDIAL INFARCTION): Primary | ICD-10-CM

## 2024-06-25 DIAGNOSIS — I21.4 NSTEMI (NON-ST ELEVATION MYOCARDIAL INFARCTION): ICD-10-CM

## 2024-06-25 LAB
ALBUMIN SERPL BCP-MCNC: 3.3 G/DL (ref 3.5–5.2)
ALP SERPL-CCNC: 54 U/L (ref 55–135)
ALT SERPL W/O P-5'-P-CCNC: 12 U/L (ref 10–44)
ANION GAP SERPL CALC-SCNC: 12 MMOL/L (ref 8–16)
APTT PPP: 27.5 SEC (ref 21–32)
AST SERPL-CCNC: 17 U/L (ref 10–40)
BASOPHILS # BLD AUTO: 0.04 K/UL (ref 0–0.2)
BASOPHILS NFR BLD: 0.5 % (ref 0–1.9)
BILIRUB SERPL-MCNC: 0.4 MG/DL (ref 0.1–1)
BNP SERPL-MCNC: 535 PG/ML (ref 0–99)
BUN SERPL-MCNC: 18 MG/DL (ref 8–23)
CALCIUM SERPL-MCNC: 9.5 MG/DL (ref 8.7–10.5)
CHLORIDE SERPL-SCNC: 104 MMOL/L (ref 95–110)
CO2 SERPL-SCNC: 29 MMOL/L (ref 23–29)
CREAT SERPL-MCNC: 1.2 MG/DL (ref 0.5–1.4)
DIFFERENTIAL METHOD BLD: ABNORMAL
EOSINOPHIL # BLD AUTO: 0.2 K/UL (ref 0–0.5)
EOSINOPHIL NFR BLD: 1.8 % (ref 0–8)
ERYTHROCYTE [DISTWIDTH] IN BLOOD BY AUTOMATED COUNT: 14.8 % (ref 11.5–14.5)
EST. GFR  (NO RACE VARIABLE): 48 ML/MIN/1.73 M^2
GLUCOSE SERPL-MCNC: 101 MG/DL (ref 70–110)
HCT VFR BLD AUTO: 37.2 % (ref 37–48.5)
HGB BLD-MCNC: 12.2 G/DL (ref 12–16)
IMM GRANULOCYTES # BLD AUTO: 0.03 K/UL (ref 0–0.04)
IMM GRANULOCYTES NFR BLD AUTO: 0.4 % (ref 0–0.5)
INR PPP: 1 (ref 0.8–1.2)
LYMPHOCYTES # BLD AUTO: 1.6 K/UL (ref 1–4.8)
LYMPHOCYTES NFR BLD: 19 % (ref 18–48)
MCH RBC QN AUTO: 30 PG (ref 27–31)
MCHC RBC AUTO-ENTMCNC: 32.8 G/DL (ref 32–36)
MCV RBC AUTO: 92 FL (ref 82–98)
MONOCYTES # BLD AUTO: 0.7 K/UL (ref 0.3–1)
MONOCYTES NFR BLD: 9.1 % (ref 4–15)
NEUTROPHILS # BLD AUTO: 5.6 K/UL (ref 1.8–7.7)
NEUTROPHILS NFR BLD: 69.2 % (ref 38–73)
NRBC BLD-RTO: 0 /100 WBC
PLATELET # BLD AUTO: 214 K/UL (ref 150–450)
PMV BLD AUTO: 10.2 FL (ref 9.2–12.9)
POTASSIUM SERPL-SCNC: 3.4 MMOL/L (ref 3.5–5.1)
PROT SERPL-MCNC: 6.6 G/DL (ref 6–8.4)
PROTHROMBIN TIME: 10.9 SEC (ref 9–12.5)
RBC # BLD AUTO: 4.06 M/UL (ref 4–5.4)
SODIUM SERPL-SCNC: 145 MMOL/L (ref 136–145)
TROPONIN I SERPL DL<=0.01 NG/ML-MCNC: 0.07 NG/ML (ref 0–0.03)
WBC # BLD AUTO: 8.14 K/UL (ref 3.9–12.7)

## 2024-06-25 PROCEDURE — 84484 ASSAY OF TROPONIN QUANT: CPT | Performed by: NURSE PRACTITIONER

## 2024-06-25 PROCEDURE — 63600175 PHARM REV CODE 636 W HCPCS: Performed by: EMERGENCY MEDICINE

## 2024-06-25 PROCEDURE — 93005 ELECTROCARDIOGRAM TRACING: CPT

## 2024-06-25 PROCEDURE — 85610 PROTHROMBIN TIME: CPT | Performed by: EMERGENCY MEDICINE

## 2024-06-25 PROCEDURE — 80053 COMPREHEN METABOLIC PANEL: CPT | Performed by: NURSE PRACTITIONER

## 2024-06-25 PROCEDURE — 85730 THROMBOPLASTIN TIME PARTIAL: CPT | Performed by: EMERGENCY MEDICINE

## 2024-06-25 PROCEDURE — 83735 ASSAY OF MAGNESIUM: CPT | Performed by: NURSE PRACTITIONER

## 2024-06-25 PROCEDURE — 96375 TX/PRO/DX INJ NEW DRUG ADDON: CPT

## 2024-06-25 PROCEDURE — 25000003 PHARM REV CODE 250: Performed by: EMERGENCY MEDICINE

## 2024-06-25 PROCEDURE — 83880 ASSAY OF NATRIURETIC PEPTIDE: CPT | Performed by: NURSE PRACTITIONER

## 2024-06-25 PROCEDURE — 96365 THER/PROPH/DIAG IV INF INIT: CPT

## 2024-06-25 PROCEDURE — 85025 COMPLETE CBC W/AUTO DIFF WBC: CPT | Performed by: NURSE PRACTITIONER

## 2024-06-25 PROCEDURE — 93010 ELECTROCARDIOGRAM REPORT: CPT | Mod: ,,, | Performed by: STUDENT IN AN ORGANIZED HEALTH CARE EDUCATION/TRAINING PROGRAM

## 2024-06-25 PROCEDURE — 25000242 PHARM REV CODE 250 ALT 637 W/ HCPCS: Performed by: EMERGENCY MEDICINE

## 2024-06-25 PROCEDURE — 93010 ELECTROCARDIOGRAM REPORT: CPT | Mod: 76,,, | Performed by: STUDENT IN AN ORGANIZED HEALTH CARE EDUCATION/TRAINING PROGRAM

## 2024-06-25 PROCEDURE — 99291 CRITICAL CARE FIRST HOUR: CPT | Mod: 25

## 2024-06-25 RX ORDER — NAPROXEN SODIUM 220 MG/1
324 TABLET, FILM COATED ORAL
Status: COMPLETED | OUTPATIENT
Start: 2024-06-25 | End: 2024-06-25

## 2024-06-25 RX ORDER — NITROGLYCERIN 0.4 MG/1
0.4 TABLET SUBLINGUAL EVERY 5 MIN PRN
Status: DISCONTINUED | OUTPATIENT
Start: 2024-06-25 | End: 2024-06-26

## 2024-06-25 RX ORDER — FUROSEMIDE 10 MG/ML
20 INJECTION INTRAMUSCULAR; INTRAVENOUS
Status: COMPLETED | OUTPATIENT
Start: 2024-06-25 | End: 2024-06-25

## 2024-06-25 RX ORDER — HEPARIN SODIUM,PORCINE/D5W 25000/250
0-40 INTRAVENOUS SOLUTION INTRAVENOUS CONTINUOUS
Status: DISCONTINUED | OUTPATIENT
Start: 2024-06-25 | End: 2024-06-27

## 2024-06-25 RX ORDER — AMLODIPINE BESYLATE 5 MG/1
5 TABLET ORAL
Status: COMPLETED | OUTPATIENT
Start: 2024-06-25 | End: 2024-06-25

## 2024-06-25 RX ADMIN — HEPARIN SODIUM AND DEXTROSE 12 UNITS/KG/HR: 10000; 5 INJECTION INTRAVENOUS at 11:06

## 2024-06-25 RX ADMIN — FUROSEMIDE 20 MG: 10 INJECTION, SOLUTION INTRAMUSCULAR; INTRAVENOUS at 11:06

## 2024-06-25 RX ADMIN — ASPIRIN 81 MG CHEWABLE TABLET 324 MG: 81 TABLET CHEWABLE at 10:06

## 2024-06-25 RX ADMIN — AMLODIPINE BESYLATE 5 MG: 5 TABLET ORAL at 11:06

## 2024-06-25 RX ADMIN — NITROGLYCERIN 0.4 MG: 0.4 TABLET, ORALLY DISINTEGRATING SUBLINGUAL at 10:06

## 2024-06-25 NOTE — Clinical Note
All catheters were removed.  Patient : Michael Lamar Age: 80 year old Sex: male   MRN: [id number] Encounter Date: 8/14/2023    History     Chief Complaint   Patient presents with   • Wound Check       HPI    Michael Lamar is a 80 year old presenting to the emergency department friend after developing bleeding from chronic left leg wound at 4 AM.  Patient's last tetanus is unknown.  Patient seen by plastic specialist 3 weeks ago regarding wound.  Patient is on Plavix and Eliquis.  No bleeding noted on arrival to ED.    Old records reviewed: Patient has past medical history significant for atrial fibrillation, squamous cell carcinoma of the skin, CAD, high cholesterol, hepatitis C.    Allergies   Allergen Reactions   • Levaquin Other (See Comments)   • Morphine HIVES, NAUSEA and Other (See Comments)     Itchy nose and nausea.       • Trimethoprim ANAPHYLAXIS   • Bactrim Ds RASH and Other (See Comments)       No current facility-administered medications for this encounter.     Current Outpatient Medications   Medication Sig   • latanoprost (XALATAN) 0.005 % ophthalmic solution INSTILL 1 DROP IN BOTH EYES EVERY NIGHT   • losartan-hydrochlorothiazide (HYZAAR) 100-25 MG per tablet Take 1 tablet by mouth daily.   • omeprazole (PrilOSEC) 20 MG capsule Take 20 mg by mouth daily.   • clopidogrel (PLAVIX) 75 MG tablet    • atorvastatin (LIPITOR) 80 MG tablet    • apixaBAN (ELIQUIS) 5 MG Tab Take 1 tablet by mouth in the morning and 1 tablet in the evening.       Past Medical History:   Diagnosis Date   • AF (atrial fibrillation) (CMD)    • Coronary artery disease    • Hepatitis C    • High cholesterol    • Malignant neoplasm (CMD)    • Myocardial infarction (CMD)    • Squamous cell carcinoma of skin        Past Surgical History:   Procedure Laterality Date   • Cervical spine surgery  1985   • Hernia repair  1969   • Hip surgery  1962   • Shoulder surg proc unlisted Right        Family History   Problem Relation Age of Onset   • Heart  disease Mother    • Dementia/Alzheimers Father    • Multiple Sclerosis Brother        Social History     Tobacco Use   • Smoking status: Former     Current packs/day: 1.00     Average packs/day: 1 pack/day for 42.6 years (42.6 ttl pk-yrs)     Types: Cigarettes     Start date: 1981   • Smokeless tobacco: Never   Vaping Use   • Vaping Use: never used   Substance Use Topics   • Alcohol use: Yes   • Drug use: Never       Review of Systems     Review of Systems  See HPI    Physical Exam     ED Triage Vitals   ED Triage Vitals Group      Temp 08/14/23 0901 98.1 °F (36.7 °C)      Heart Rate 08/14/23 0901 70      Resp 08/14/23 0901 16      BP 08/14/23 0901 130/74      SpO2 08/14/23 0901 97 %      EtCO2 mmHg --       Height 08/14/23 0901 5' 6\" (1.676 m)      Weight 08/14/23 0943 153 lb (69.4 kg)      Weight Scale Used 08/14/23 0943 Scale in bed      BMI (Calculated) 08/14/23 0943 24.69      IBW/kg (Calculated) 08/14/23 0901 63.8       Physical Exam  Vitals and nursing note reviewed.   Constitutional:       General: He is not in acute distress.  HENT:      Head: Normocephalic and atraumatic.      Right Ear: External ear normal.      Left Ear: External ear normal.      Nose: Nose normal.      Mouth/Throat:      Mouth: Mucous membranes are moist.      Neck: Normal range of motion and neck supple.   Eyes:      Extraocular Movements: Extraocular movements intact.   Cardiovascular:      Rate and Rhythm: Normal rate.   Pulmonary:      Effort: Pulmonary effort is normal. No respiratory distress.   Abdominal:      General: There is no distension.   Musculoskeletal:         General: Normal range of motion.   Skin:     General: Skin is warm and dry.      Comments: Chronic leg wound noted to left lower leg with swelling, no active bleeding.  No erythema, warmth noted.   Neurological:      General: No focal deficit present.      Mental Status: He is alert. Mental status is at baseline.   Psychiatric:         Behavior: Behavior is  cooperative.           Procedures     Procedures    Lab Results     No results found for this visit on 08/14/23.    EKG     No EKG performed    Radiology Results     Imaging Results    None         ED Medications     ED Medication Orders (From admission, onward)    Ordered Start     Status Ordering Provider    08/14/23 0950 08/14/23 1000  diphtheria-pertussis (acellular)-tetanus (BOOSTRIX) vaccine 0.5 mL  ONCE         Last MAR action: Given VIRGINIA TEE          ED Course     Vitals:    08/14/23 0901 08/14/23 0943 08/14/23 1022 08/14/23 1127   BP: 130/74  113/78 128/62   BP Location: RUE - Right upper extremity      Patient Position: Sitting/High-Jackson's      Pulse: 70   74   Resp: 16   16   Temp: 98.1 °F (36.7 °C)      TempSrc: Oral      SpO2: 97%  99% 98%   Weight:  69.4 kg (153 lb)     Height: 5' 6\" (1.676 m)          ED Course as of 08/14/23 1240   Mon Aug 14, 2023   0952 80-year-old male with longstanding chronic left leg wound on blood thinners who developed spontaneous bleeding at 4 AM from the wound that appears to have resolved on arrival.  Will update patient's tetanus status.  Will apply dressing with either Gelfoam or Surgicel and likely discharge patient home.  Patient reports no lightheadedness, vital signs are stable.  No need for blood work at this time. [SS]   1133 VSS stable, no distress.  Dressing in place, no bleeding noted.  Patient deemed stable for discharge home.  Patient has appointment with plastic surgeon in 1 week.  Told to leave dressing on overnight. [SS]      ED Course User Index  [SS] Virginia Tee MD       Independent Review Completed in ED course      Consults                ED Consults done in the ED course        MDM                               MDM done in ED Course    Does the Patient have sepsis: NO     Critical Care       No Critical Care        Disposition       Clinical Impression and Diagnosis  12:40 PM       ED Diagnosis     Diagnosis Comment Associated Orders        Final diagnosis    Bleeding from wound -- --          Follow Up:  Saran Elizabeth MD  [address]  [address]  [facility] [id number] [phone]          [facility] EMERGENCY  [address] [address]  [phone]        Mario Hendrix MD  [address]  [facility] [address]  [phone]                Summary of your Discharge Medications      You have not been prescribed any medications.         Pt is discharged to home/self care in stable condition.                Discharge 8/14/2023 11:32 AM  Michael Lamar discharge to home/self care.                       Virginia Tee MD  08/14/23 5947

## 2024-06-25 NOTE — TELEPHONE ENCOUNTER
Pt called stating that she saw Dr. Paez yesterday- chest pain , sob    Wants to start pt on Nitro patch and amlodipine - pt states she's had problems in the past with nitro and also adhesives     Pt concerned - would like you to advise about medications     States she thinks she was taken off amlodipine in the past but can't remember    Pt concerned that she lives alone and might have reaction to meds

## 2024-06-25 NOTE — PROGRESS NOTES
Spoke w Ms. Ortiz regarding new medication recommendations from cardiologist Dr. MARKOS Paez  She is reluctant to start nitro patch due to severe HA w imdur previously and difficulty tolerating adhesives on patches  Advised if not adding nitro patch to be sure she has nitro SL tabs w her and reviewed how and when to take and when to call 911.    Reviewed that she has taken and tolerated amlodipine in the past - previously prescribed amlodipine 5 mg and had discontinued due to increased LE edema  I have actually also encouraged resuming amlodipine at same lower dose of 2.5 mg recommended by Dr. Paez.  Encouraged Ms. Ortiz to start taking the amlodipine 2.5 mg daily as prescribed by Dr. Paez and let us know how it goes.

## 2024-06-25 NOTE — Clinical Note
380 ml of contrast were injected throughout the case. 20 mL of contrast was the total wasted during the case. 400 mL was the total amount used during the case.

## 2024-06-26 PROBLEM — I21.4 NSTEMI (NON-ST ELEVATED MYOCARDIAL INFARCTION): Status: ACTIVE | Noted: 2024-06-26

## 2024-06-26 LAB
ABO + RH BLD: NORMAL
ANION GAP SERPL CALC-SCNC: 11 MMOL/L (ref 8–16)
AORTIC ROOT ANNULUS: 2.9 CM
APTT PPP: 40.6 SEC (ref 21–32)
APTT PPP: 43.7 SEC (ref 21–32)
ASCENDING AORTA: 3.09 CM
AV INDEX (PROSTH): 0.51
AV MEAN GRADIENT: 11 MMHG
AV PEAK GRADIENT: 21 MMHG
AV VALVE AREA BY VELOCITY RATIO: 1.27 CM²
AV VALVE AREA: 1.52 CM²
AV VELOCITY RATIO: 0.42
BASOPHILS # BLD AUTO: 0.04 K/UL (ref 0–0.2)
BASOPHILS NFR BLD: 0.5 % (ref 0–1.9)
BLD GP AB SCN CELLS X3 SERPL QL: NORMAL
BSA FOR ECHO PROCEDURE: 2.11 M2
BUN SERPL-MCNC: 18 MG/DL (ref 8–23)
CALCIUM SERPL-MCNC: 9.1 MG/DL (ref 8.7–10.5)
CHLORIDE SERPL-SCNC: 105 MMOL/L (ref 95–110)
CO2 SERPL-SCNC: 28 MMOL/L (ref 23–29)
CREAT SERPL-MCNC: 0.9 MG/DL (ref 0.5–1.4)
CV ECHO LV RWT: 0.41 CM
CV STRESS BASE HR: 62 BPM
DIASTOLIC BLOOD PRESSURE: 73 MMHG
DIFFERENTIAL METHOD BLD: ABNORMAL
DOP CALC AO PEAK VEL: 2.31 M/S
DOP CALC AO VTI: 56.6 CM
DOP CALC LVOT AREA: 3 CM2
DOP CALC LVOT DIAMETER: 1.95 CM
DOP CALC LVOT PEAK VEL: 0.98 M/S
DOP CALC LVOT STROKE VOLUME: 85.97 CM3
DOP CALC MV VTI: 54.2 CM
DOP CALC RVOT PEAK VEL: 0.73 M/S
DOP CALC RVOT VTI: 17.1 CM
DOP CALCLVOT PEAK VEL VTI: 28.8 CM
E WAVE DECELERATION TIME: 308.83 MSEC
E/A RATIO: 1.37
E/E' RATIO: 23.23 M/S
ECHO LV POSTERIOR WALL: 1.18 CM (ref 0.6–1.1)
EJECTION FRACTION- HIGH: 73 %
EJECTION FRACTION: 60 %
END DIASTOLIC INDEX-HIGH: 165 ML/M2
END DIASTOLIC INDEX-LOW: 101 ML/M2
END SYSTOLIC INDEX-HIGH: 64 ML/M2
END SYSTOLIC INDEX-LOW: 28 ML/M2
EOSINOPHIL # BLD AUTO: 0.2 K/UL (ref 0–0.5)
EOSINOPHIL NFR BLD: 1.8 % (ref 0–8)
ERYTHROCYTE [DISTWIDTH] IN BLOOD BY AUTOMATED COUNT: 14.7 % (ref 11.5–14.5)
EST. GFR  (NO RACE VARIABLE): >60 ML/MIN/1.73 M^2
FRACTIONAL SHORTENING: 31 % (ref 28–44)
GLUCOSE SERPL-MCNC: 91 MG/DL (ref 70–110)
HCT VFR BLD AUTO: 35.2 % (ref 37–48.5)
HGB BLD-MCNC: 11.5 G/DL (ref 12–16)
IMM GRANULOCYTES # BLD AUTO: 0.03 K/UL (ref 0–0.04)
IMM GRANULOCYTES NFR BLD AUTO: 0.3 % (ref 0–0.5)
INTERVENTRICULAR SEPTUM: 1.16 CM (ref 0.6–1.1)
IVC DIAMETER: 1.55 CM
IVRT: 71.36 MSEC
LA MAJOR: 6.1 CM
LA MINOR: 6.03 CM
LA WIDTH: 4.3 CM
LEFT ATRIUM SIZE: 3.21 CM
LEFT ATRIUM VOLUME INDEX: 35.4 ML/M2
LEFT ATRIUM VOLUME: 71.16 CM3
LEFT INTERNAL DIMENSION IN SYSTOLE: 3.96 CM (ref 2.1–4)
LEFT VENTRICLE DIASTOLIC VOLUME INDEX: 82.05 ML/M2
LEFT VENTRICLE DIASTOLIC VOLUME: 164.92 ML
LEFT VENTRICLE MASS INDEX: 142 G/M2
LEFT VENTRICLE SYSTOLIC VOLUME INDEX: 34 ML/M2
LEFT VENTRICLE SYSTOLIC VOLUME: 68.29 ML
LEFT VENTRICULAR INTERNAL DIMENSION IN DIASTOLE: 5.78 CM (ref 3.5–6)
LEFT VENTRICULAR MASS: 285.38 G
LV LATERAL E/E' RATIO: 16.78 M/S
LV SEPTAL E/E' RATIO: 37.75 M/S
LVED V (TEICH): 164.92 ML
LVES V (TEICH): 68.29 ML
LVOT MG: 2.61 MMHG
LVOT MV: 0.78 CM/S
LYMPHOCYTES # BLD AUTO: 2.5 K/UL (ref 1–4.8)
LYMPHOCYTES NFR BLD: 28.2 % (ref 18–48)
MAGNESIUM SERPL-MCNC: 1.7 MG/DL (ref 1.6–2.6)
MCH RBC QN AUTO: 30.1 PG (ref 27–31)
MCHC RBC AUTO-ENTMCNC: 32.7 G/DL (ref 32–36)
MCV RBC AUTO: 92 FL (ref 82–98)
MONOCYTES # BLD AUTO: 0.7 K/UL (ref 0.3–1)
MONOCYTES NFR BLD: 8.4 % (ref 4–15)
MV MEAN GRADIENT: 3 MMHG
MV PEAK A VEL: 1.1 M/S
MV PEAK E VEL: 1.51 M/S
MV PEAK GRADIENT: 11 MMHG
MV VALVE AREA BY CONTINUITY EQUATION: 1.59 CM2
NEUTROPHILS # BLD AUTO: 5.3 K/UL (ref 1.8–7.7)
NEUTROPHILS NFR BLD: 60.8 % (ref 38–73)
NRBC BLD-RTO: 0 /100 WBC
NUC REST DIASTOLIC VOLUME INDEX: 115
NUC REST EJECTION FRACTION: 70
NUC REST SYSTOLIC VOLUME INDEX: 35
NUC STRESS DIASTOLIC VOLUME INDEX: 115
NUC STRESS EJECTION FRACTION: 59 %
NUC STRESS SYSTOLIC VOLUME INDEX: 47
OHS CV CPX 85 PERCENT MAX PREDICTED HEART RATE MALE: 125
OHS CV CPX MAX PREDICTED HEART RATE: 147
OHS CV CPX PATIENT IS FEMALE: 1
OHS CV CPX PATIENT IS MALE: 0
OHS CV CPX PEAK DIASTOLIC BLOOD PRESSURE: 62 MMHG
OHS CV CPX PEAK HEAR RATE: 75 BPM
OHS CV CPX PEAK RATE PRESSURE PRODUCT: NORMAL
OHS CV CPX PEAK SYSTOLIC BLOOD PRESSURE: 150 MMHG
OHS CV CPX PERCENT MAX PREDICTED HEART RATE ACHIEVED: 53
OHS CV CPX RATE PRESSURE PRODUCT PRESENTING: 8866
PISA MRMAX VEL: 5.45 M/S
PISA TR MAX VEL: 3.38 M/S
PLATELET # BLD AUTO: 184 K/UL (ref 150–450)
PMV BLD AUTO: 10 FL (ref 9.2–12.9)
POTASSIUM SERPL-SCNC: 3.4 MMOL/L (ref 3.5–5.1)
PV MEAN GRADIENT: 1 MMHG
RA MAJOR: 4.49 CM
RA PRESSURE ESTIMATED: 3 MMHG
RA WIDTH: 2.7 CM
RBC # BLD AUTO: 3.82 M/UL (ref 4–5.4)
RETIRED EF AND QEF - SEE NOTES: 59 %
RV TB RVSP: 6 MMHG
SODIUM SERPL-SCNC: 144 MMOL/L (ref 136–145)
SPECIMEN OUTDATE: NORMAL
STJ: 3.46 CM
SYSTOLIC BLOOD PRESSURE: 143 MMHG
TDI LATERAL: 0.09 M/S
TDI SEPTAL: 0.04 M/S
TDI: 0.07 M/S
TR MAX PG: 46 MMHG
TRICUSPID ANNULAR PLANE SYSTOLIC EXCURSION: 2.5 CM
TROPONIN I SERPL DL<=0.01 NG/ML-MCNC: 0.07 NG/ML (ref 0–0.03)
TV REST PULMONARY ARTERY PRESSURE: 49 MMHG
WBC # BLD AUTO: 8.72 K/UL (ref 3.9–12.7)
Z-SCORE OF LEFT VENTRICULAR DIMENSION IN END DIASTOLE: -0.19
Z-SCORE OF LEFT VENTRICULAR DIMENSION IN END SYSTOLE: 0.72

## 2024-06-26 PROCEDURE — 36415 COLL VENOUS BLD VENIPUNCTURE: CPT | Mod: XB | Performed by: STUDENT IN AN ORGANIZED HEALTH CARE EDUCATION/TRAINING PROGRAM

## 2024-06-26 PROCEDURE — 96366 THER/PROPH/DIAG IV INF ADDON: CPT

## 2024-06-26 PROCEDURE — A9502 TC99M TETROFOSMIN: HCPCS | Performed by: STUDENT IN AN ORGANIZED HEALTH CARE EDUCATION/TRAINING PROGRAM

## 2024-06-26 PROCEDURE — 86850 RBC ANTIBODY SCREEN: CPT | Performed by: NURSE PRACTITIONER

## 2024-06-26 PROCEDURE — 84484 ASSAY OF TROPONIN QUANT: CPT | Mod: 91 | Performed by: EMERGENCY MEDICINE

## 2024-06-26 PROCEDURE — 63600175 PHARM REV CODE 636 W HCPCS: Performed by: STUDENT IN AN ORGANIZED HEALTH CARE EDUCATION/TRAINING PROGRAM

## 2024-06-26 PROCEDURE — 86901 BLOOD TYPING SEROLOGIC RH(D): CPT | Performed by: NURSE PRACTITIONER

## 2024-06-26 PROCEDURE — 25000003 PHARM REV CODE 250: Performed by: NURSE PRACTITIONER

## 2024-06-26 PROCEDURE — 36415 COLL VENOUS BLD VENIPUNCTURE: CPT | Mod: XB | Performed by: HOSPITALIST

## 2024-06-26 PROCEDURE — 85025 COMPLETE CBC W/AUTO DIFF WBC: CPT | Performed by: EMERGENCY MEDICINE

## 2024-06-26 PROCEDURE — 86900 BLOOD TYPING SEROLOGIC ABO: CPT | Performed by: NURSE PRACTITIONER

## 2024-06-26 PROCEDURE — 84484 ASSAY OF TROPONIN QUANT: CPT | Mod: 91 | Performed by: STUDENT IN AN ORGANIZED HEALTH CARE EDUCATION/TRAINING PROGRAM

## 2024-06-26 PROCEDURE — 25000242 PHARM REV CODE 250 ALT 637 W/ HCPCS: Performed by: NURSE PRACTITIONER

## 2024-06-26 PROCEDURE — 93005 ELECTROCARDIOGRAM TRACING: CPT

## 2024-06-26 PROCEDURE — 85730 THROMBOPLASTIN TIME PARTIAL: CPT | Mod: 91 | Performed by: HOSPITALIST

## 2024-06-26 PROCEDURE — 21400001 HC TELEMETRY ROOM

## 2024-06-26 PROCEDURE — 63600175 PHARM REV CODE 636 W HCPCS: Performed by: NURSE PRACTITIONER

## 2024-06-26 PROCEDURE — 80048 BASIC METABOLIC PNL TOTAL CA: CPT | Performed by: NURSE PRACTITIONER

## 2024-06-26 PROCEDURE — 36415 COLL VENOUS BLD VENIPUNCTURE: CPT | Performed by: EMERGENCY MEDICINE

## 2024-06-26 RX ORDER — TRAMADOL HYDROCHLORIDE 50 MG/1
50 TABLET ORAL EVERY 6 HOURS PRN
Status: DISCONTINUED | OUTPATIENT
Start: 2024-06-26 | End: 2024-06-28 | Stop reason: HOSPADM

## 2024-06-26 RX ORDER — REGADENOSON 0.08 MG/ML
0.4 INJECTION, SOLUTION INTRAVENOUS
Status: COMPLETED | OUTPATIENT
Start: 2024-06-26 | End: 2024-06-26

## 2024-06-26 RX ORDER — FUROSEMIDE 10 MG/ML
40 INJECTION INTRAMUSCULAR; INTRAVENOUS ONCE
Status: COMPLETED | OUTPATIENT
Start: 2024-06-26 | End: 2024-06-26

## 2024-06-26 RX ORDER — AMLODIPINE BESYLATE 2.5 MG/1
2.5 TABLET ORAL DAILY
Status: DISCONTINUED | OUTPATIENT
Start: 2024-06-26 | End: 2024-06-28

## 2024-06-26 RX ORDER — LEVOTHYROXINE SODIUM 100 UG/1
100 TABLET ORAL
Status: DISCONTINUED | OUTPATIENT
Start: 2024-06-26 | End: 2024-06-28 | Stop reason: HOSPADM

## 2024-06-26 RX ORDER — POTASSIUM CHLORIDE 750 MG/1
10 TABLET, EXTENDED RELEASE ORAL DAILY
Status: DISCONTINUED | OUTPATIENT
Start: 2024-06-26 | End: 2024-06-28 | Stop reason: HOSPADM

## 2024-06-26 RX ORDER — FUROSEMIDE 20 MG/1
20 TABLET ORAL 2 TIMES DAILY
Status: DISCONTINUED | OUTPATIENT
Start: 2024-06-26 | End: 2024-06-28 | Stop reason: HOSPADM

## 2024-06-26 RX ORDER — SPIRONOLACTONE 25 MG/1
25 TABLET ORAL DAILY
Status: DISCONTINUED | OUTPATIENT
Start: 2024-06-26 | End: 2024-06-28 | Stop reason: HOSPADM

## 2024-06-26 RX ORDER — METOPROLOL SUCCINATE 50 MG/1
100 TABLET, EXTENDED RELEASE ORAL 2 TIMES DAILY
Status: DISCONTINUED | OUTPATIENT
Start: 2024-06-26 | End: 2024-06-28 | Stop reason: HOSPADM

## 2024-06-26 RX ORDER — NAPROXEN SODIUM 220 MG/1
81 TABLET, FILM COATED ORAL DAILY
Status: DISCONTINUED | OUTPATIENT
Start: 2024-06-27 | End: 2024-06-28 | Stop reason: HOSPADM

## 2024-06-26 RX ORDER — POTASSIUM CHLORIDE 20 MEQ/1
20 TABLET, EXTENDED RELEASE ORAL ONCE
Status: COMPLETED | OUTPATIENT
Start: 2024-06-26 | End: 2024-06-26

## 2024-06-26 RX ORDER — AMLODIPINE BESYLATE 2.5 MG/1
2.5 TABLET ORAL ONCE
Status: COMPLETED | OUTPATIENT
Start: 2024-06-26 | End: 2024-06-26

## 2024-06-26 RX ORDER — NITROGLYCERIN 0.4 MG/1
0.4 TABLET SUBLINGUAL EVERY 5 MIN PRN
Status: DISCONTINUED | OUTPATIENT
Start: 2024-06-26 | End: 2024-06-28 | Stop reason: HOSPADM

## 2024-06-26 RX ORDER — ACETAMINOPHEN 325 MG/1
650 TABLET ORAL EVERY 6 HOURS PRN
Status: DISCONTINUED | OUTPATIENT
Start: 2024-06-26 | End: 2024-06-27 | Stop reason: SDUPTHER

## 2024-06-26 RX ORDER — SECUKINUMAB 150 MG/ML
300 INJECTION SUBCUTANEOUS
COMMUNITY

## 2024-06-26 RX ORDER — HYDRALAZINE HYDROCHLORIDE 20 MG/ML
10 INJECTION INTRAMUSCULAR; INTRAVENOUS EVERY 8 HOURS PRN
Status: DISCONTINUED | OUTPATIENT
Start: 2024-06-26 | End: 2024-06-28 | Stop reason: HOSPADM

## 2024-06-26 RX ADMIN — AMLODIPINE BESYLATE 2.5 MG: 2.5 TABLET ORAL at 08:06

## 2024-06-26 RX ADMIN — NITROGLYCERIN 0.4 MG: 0.4 TABLET, ORALLY DISINTEGRATING SUBLINGUAL at 02:06

## 2024-06-26 RX ADMIN — LEVOTHYROXINE SODIUM 100 MCG: 100 TABLET ORAL at 06:06

## 2024-06-26 RX ADMIN — POTASSIUM CHLORIDE 10 MEQ: 750 TABLET, EXTENDED RELEASE ORAL at 08:06

## 2024-06-26 RX ADMIN — REGADENOSON 0.4 MG: 0.08 INJECTION, SOLUTION INTRAVENOUS at 12:06

## 2024-06-26 RX ADMIN — FUROSEMIDE 20 MG: 20 TABLET ORAL at 08:06

## 2024-06-26 RX ADMIN — POTASSIUM CHLORIDE 20 MEQ: 1500 TABLET, EXTENDED RELEASE ORAL at 05:06

## 2024-06-26 RX ADMIN — FUROSEMIDE 40 MG: 10 INJECTION, SOLUTION INTRAMUSCULAR; INTRAVENOUS at 05:06

## 2024-06-26 RX ADMIN — TETROFOSMIN 10.2 MILLICURIE: 1.38 INJECTION, POWDER, LYOPHILIZED, FOR SOLUTION INTRAVENOUS at 10:06

## 2024-06-26 RX ADMIN — SPIRONOLACTONE 25 MG: 25 TABLET, FILM COATED ORAL at 08:06

## 2024-06-26 RX ADMIN — METOPROLOL SUCCINATE 100 MG: 50 TABLET, EXTENDED RELEASE ORAL at 08:06

## 2024-06-26 RX ADMIN — TETROFOSMIN 32 MILLICURIE: 1.38 INJECTION, POWDER, LYOPHILIZED, FOR SOLUTION INTRAVENOUS at 12:06

## 2024-06-26 RX ADMIN — AMLODIPINE BESYLATE 2.5 MG: 2.5 TABLET ORAL at 05:06

## 2024-06-26 NOTE — SUBJECTIVE & OBJECTIVE
Interval History: Stress test today, +Chest pain and SOB after stress test with elevated BP. EKG and troponin unchanged. Meds adjusted.     Review of Systems   Constitutional:  Positive for activity change. Negative for appetite change, chills, diaphoresis, fatigue, fever and unexpected weight change.   HENT:  Negative for congestion, nosebleeds, sinus pressure and sore throat.    Eyes:  Negative for pain, discharge and visual disturbance.   Respiratory:  Positive for shortness of breath. Negative for cough, chest tightness, wheezing and stridor.    Cardiovascular:  Positive for chest pain. Negative for palpitations and leg swelling.   Gastrointestinal:  Negative for abdominal distention, abdominal pain, blood in stool, constipation, diarrhea, nausea and vomiting.   Endocrine: Negative for cold intolerance and heat intolerance.   Genitourinary:  Negative for difficulty urinating, dysuria, flank pain, frequency and urgency.   Musculoskeletal:  Positive for arthralgias and back pain. Negative for joint swelling, myalgias, neck pain and neck stiffness.   Skin:  Negative for rash and wound.   Allergic/Immunologic: Negative for food allergies and immunocompromised state.   Neurological:  Negative for dizziness, seizures, syncope, facial asymmetry, speech difficulty, weakness, light-headedness, numbness and headaches.   Hematological:  Negative for adenopathy.   Psychiatric/Behavioral:  Negative for agitation, confusion and hallucinations.      Objective:     Vital Signs (Most Recent):  Temp: 97.7 °F (36.5 °C) (06/26/24 1557)  Pulse: (!) 56 (06/26/24 1557)  Resp: 13 (06/26/24 1557)  BP: (!) 164/68 (06/26/24 1557)  SpO2: 97 % (06/26/24 1557) Vital Signs (24h Range):  Temp:  [97.7 °F (36.5 °C)-98.7 °F (37.1 °C)] 97.7 °F (36.5 °C)  Pulse:  [51-70] 56  Resp:  [12-20] 13  SpO2:  [96 %-100 %] 97 %  BP: (133-208)/() 164/68     Weight: 98.4 kg (217 lb)  Body mass index is 38.44 kg/m².  No intake or output data in the 24  hours ending 06/26/24 1622      Physical Exam  Vitals and nursing note reviewed.   Constitutional:       General: She is not in acute distress.     Appearance: She is well-developed. She is not diaphoretic.   HENT:      Head: Normocephalic and atraumatic.      Nose: Nose normal.   Eyes:      General: No scleral icterus.     Conjunctiva/sclera: Conjunctivae normal.   Neck:      Trachea: No tracheal deviation.   Cardiovascular:      Rate and Rhythm: Normal rate and regular rhythm.      Heart sounds: Normal heart sounds. No murmur heard.     No friction rub. No gallop.   Pulmonary:      Effort: Pulmonary effort is normal. No respiratory distress.      Breath sounds: No stridor. Rales present. No wheezing.   Chest:      Chest wall: No tenderness.   Abdominal:      General: Bowel sounds are normal. There is no distension.      Palpations: Abdomen is soft. There is no mass.      Tenderness: There is no abdominal tenderness. There is no guarding or rebound.   Musculoskeletal:         General: No tenderness or deformity. Normal range of motion.      Cervical back: Normal range of motion and neck supple.   Skin:     General: Skin is warm and dry.      Coloration: Skin is not pale.      Findings: No erythema or rash.   Neurological:      Mental Status: She is alert and oriented to person, place, and time.      Cranial Nerves: No cranial nerve deficit.      Motor: No abnormal muscle tone.      Coordination: Coordination normal.   Psychiatric:         Behavior: Behavior normal.         Thought Content: Thought content normal.             Significant Labs: All pertinent labs within the past 24 hours have been reviewed.    Significant Imaging: I have reviewed all pertinent imaging results/findings within the past 24 hours.

## 2024-06-26 NOTE — ASSESSMENT & PLAN NOTE
Chronic, controlled. Latest blood pressure and vitals reviewed-     Temp:  [97.7 °F (36.5 °C)-98.7 °F (37.1 °C)]   Pulse:  [51-70]   Resp:  [12-20]   BP: (133-208)/()   SpO2:  [96 %-100 %] .   Home meds for hypertension were reviewed and noted below.   Hypertension Medications               amLODIPine (NORVASC) 2.5 MG tablet Take 1 tablet (2.5 mg total) by mouth once daily.    furosemide (LASIX) 20 MG tablet TAKE 1 TABLET TWICE A DAY    metoprolol succinate (TOPROL-XL) 100 MG 24 hr tablet TAKE 1 TABLET TWICE A DAY    nitroGLYCERIN (NITROSTAT) 0.4 MG SL tablet DISSOLVE 1 TABLET UNDER THE TONGUE EVERY 5 MINUTES AS NEEDED FOR CHEST PAIN    spironolactone (ALDACTONE) 25 MG tablet TAKE 1 TABLET DAILY            While in the hospital, will manage blood pressure as follows; Continue home antihypertensive regimen  Will utilize p.r.n. blood pressure medication only if patient's blood pressure greater than 160/100 and she develops symptoms such as worsening chest pain or shortness of breath.    Amlodipine dose increased   IV Lasix x one dose

## 2024-06-26 NOTE — HOSPITAL COURSE
The patient is a 75 yo female with CAD s/p CABG 1998 and 2016, SVT, AS s/p TAVR 20', chronic diastolic HF, HTN HLD COPD, CKD, DM, obesity, IRIS, statin intolerance, multiple drug allergies who was admitted for NSTEMI on IV heparin and continued on ASA, BB, and Plavix. EKG showed no ischemia-nonspecific changes. Troponin 0.098>0.074>0.067. Echo pending. Cardiology was consulted and recommended Nuclear stress test which is pending. After pt got back from chest test, she c/o of chest pain and SOB. Repeat EKG unchanged. Troponin 0.065. Mild relief with NTG x3. /74. Discussed with cards. Will increase amlodipine dose. Pt also c/o of SOB- will give IV Lasix x one dose. SOB improved.   Nuclear stress test showed reversible ischemia. Cardiology recommenced LHC which showed 2 vessel CAD s/p stents x 2, well seated TAVR. LHC was technically challenging which lead to prolonged fluoroscopy and significant amount of dye used -cardiology recommended IVFs overnight. Serum Cr remained stable. Left wrist cath site remained C/D/I without swelling. Pt counseled on left arm precautions. Pt ambulated down castillo- O2sats remained 98%. Cardiology added Brilinta to medication regimen. Pt will f/u with cardiology. The patient was seen and examined today and determined to be stable for discharge.

## 2024-06-26 NOTE — PLAN OF CARE
[facility] - Telemetry (Hospital)  Initial Discharge Assessment       Primary Care Provider: Lisa Porter MD    Admission Diagnosis: Non-ST elevation myocardial infarction (NSTEMI) [I21.4]  NSTEMI (non-ST elevation myocardial infarction) [I21.4]  Elevated troponin [R79.89]  Chest pain [R07.9]  NSTEMI (non-ST elevated myocardial infarction) [I21.4]    Admission Date: 6/25/2024  Expected Discharge Date:     Transition of Care Barriers: No family/friends to help    Payor: MEDICARE / Plan: MEDICARE PART A & B / Product Type: Government /     Extended Emergency Contact Information  Primary Emergency Contact: Shyanne Quintanilla  Address: [facility]  Home Phone: [phone]  Relation: Daughter    Discharge Plan A: Home Health         [facility]  for [place] [address]  Phone: [phone] Fax: [phone]    [facility] - [place] [address]  Phone: [phone] Fax: [phone]    [facility] [place] - [address]  [address]  [place] [address]  Phone: [phone] Fax: [phone]    [id number] - [place], [place] - [address] [facility]  [address] [facility] [address]  Phone: [phone] Fax: [phone]    [facility] - [place], [place] - [address]  [address]  [place] [address]  Phone: [phone] Fax: [phone]    [id number] - [place], [place] - [address]  [address]  [place] [address]  Phone: [phone] Fax: [phone]      Initial Assessment (most recent)       Adult Discharge Assessment - 06/26/24 5070          Discharge Assessment    Assessment Type Discharge Planning Assessment     Confirmed/corrected address, phone number and insurance  Yes     Confirmed Demographics Correct on Facesheet     Source of Information patient     Communicated DEMI with patient/caregiver Date not available/Unable to determine     Reason For Admission NSTEMI     People in Home child(glenda), adult     Facility Arrived From: home     Do you expect to return to your current living situation? Yes     Do you have help at home or someone to help you manage your care at home? No     Who are your caregiver(s) and their phone number(s)? daughters live across the yard but work     Prior to hospitilization cognitive status: Alert/Oriented     Current cognitive status: Alert/Oriented     Walking or Climbing Stairs Difficulty no     Dressing/Bathing Difficulty no     Equipment Currently Used at Home CPAP;other (see comments)   does not use    Readmission within 30 days? No     Patient currently being followed by outpatient case management? No     Do you currently have service(s) that help you manage your care at home? No     Do you take prescription medications? Yes     Do you have prescription coverage? Yes     Do you have any problems affording any of your prescribed medications? TBD     Is the patient taking medications as prescribed? yes     Who is going to help you get home at discharge? daughters     How do you get to doctors appointments? family or friend will provide     Are you on dialysis? No     Discharge Plan A Home Health     DME Needed Upon Discharge  none     Discharge Plan discussed with: Patient     Transition of Care Barriers No family/friends to help                   Anticipated DC Dispo: home  Prior Level of Function: independent, patient lives alone. Two daughters live across the yard.  PCP: Lisa Porter MD  Comments:  CM called into room to introduce role and discuss d/c planning. Patient is independent, lives alone. States she has a CPAP but no longer uses. Patient lost over 60 pounds and her provider stated if she was sleeping 6hrs per night she didn't have to  use it. Patient verbalized concerns regarding returning home. States both daughters work and she will be at home alone during the day. CM discussed HH, patient agreeable.    CM will continue to follow.

## 2024-06-26 NOTE — ASSESSMENT & PLAN NOTE
Chronic, controlled. Latest blood pressure and vitals reviewed-     Temp:  [98.4 °F (36.9 °C)-98.7 °F (37.1 °C)]   Pulse:  [51-68]   Resp:  [12-20]   BP: (153-208)/()   SpO2:  [96 %-100 %] .   Home meds for hypertension were reviewed and noted below.   Hypertension Medications               amLODIPine (NORVASC) 2.5 MG tablet Take 1 tablet (2.5 mg total) by mouth once daily.    furosemide (LASIX) 20 MG tablet TAKE 1 TABLET TWICE A DAY    metoprolol succinate (TOPROL-XL) 100 MG 24 hr tablet TAKE 1 TABLET TWICE A DAY    nitroGLYCERIN (NITROSTAT) 0.4 MG SL tablet DISSOLVE 1 TABLET UNDER THE TONGUE EVERY 5 MINUTES AS NEEDED FOR CHEST PAIN    spironolactone (ALDACTONE) 25 MG tablet TAKE 1 TABLET DAILY            While in the hospital, will manage blood pressure as follows; Continue home antihypertensive regimen    Will utilize p.r.n. blood pressure medication only if patient's blood pressure greater than 160/100 and she develops symptoms such as worsening chest pain or shortness of breath.

## 2024-06-26 NOTE — ASSESSMENT & PLAN NOTE
Patient is chronically on statin.will continue for now. Last Lipid Panel:   Lab Results   Component Value Date    CHOL 154 04/05/2024    HDL 35 (L) 04/05/2024    LDLCALC 88.6 04/05/2024    TRIG 152 (H) 04/05/2024    CHOLHDL 22.7 04/05/2024     Plan:  -Continue home medication  -low fat/low calorie diet

## 2024-06-26 NOTE — ASSESSMENT & PLAN NOTE
Patient with known CAD s/p stent placement and CABG, which is controlled Will continue ASA and Plavix and monitor for S/Sx of angina/ACS. Continue to monitor on telemetry.

## 2024-06-26 NOTE — ASSESSMENT & PLAN NOTE
Patient is identified as having Diastolic (HFpEF) heart failure that is Chronic. CHF is currently controlled. Latest ECHO performed and demonstrates- Results for orders placed during the hospital encounter of 10/27/23    Echo    Interpretation Summary    Left Ventricle: The left ventricle is normal in size. Ventricular mass is normal. Mildly increased wall thickness. There is concentric remodeling. Septal motion is consistent with post-operative status. There is normal systolic function with a visually estimated ejection fraction of 55 - 60%. There is normal diastolic function.    Right Ventricle: Normal right ventricular cavity size. Wall thickness is normal. Right ventricle wall motion  is normal. Systolic function is normal.    Aortic Valve: There is a transcatheter valve replacement in the aortic position that is appropriately positioned. Aortic valve area by VTI is 1.14 cm². Aortic valve peak velocity is 2.13 m/s. Mean gradient is 9 mmHg. The dimensionless index is 0.52. Mild paravalvular regurgitation.    Mitral Valve: There is mild mitral annular calcification present. There is mild regurgitation.  . Continue Beta Blocker Furosemide and monitor clinical status closely. Monitor on telemetry. Patient is off CHF pathway.  Monitor strict Is&Os and daily weights.  Place on fluid restriction of 1.5 L. Continue to stress to patient importance of self efficacy and  on diet for CHF. Last BNP reviewed- and noted below   Recent Labs   Lab 06/25/24  2221   *   .

## 2024-06-26 NOTE — CONSULTS
[facility] - Telemetry ([facility])  Cardiology  Consult Note    Patient Name: Qi Ortiz  MRN: [id number]  Admission Date: 6/25/2024  Hospital Length of Stay: 0 days  Code Status: Full Code   Attending Provider: Andrea Burrell MD   Consulting Provider: Rosa Child NP  Primary Care Physician: Lisa Porter MD  Principal Problem:NSTEMI (non-ST elevated myocardial infarction)    Patient information was obtained from patient and ER records.     Inpatient consult to Cardiology  Consult performed by: Rosa Child NP  Consult ordered by: Edvin Jackson NP        Subjective:     Chief Complaint:  SOB     HPI:   Qi Ortiz is a 74 y.o. female with a PMH CAD s/p CABG twice in 1998 and 2016, SVT, AS, s/p TAVR 20', chronic diastolic HF, HTN HLD COPD, CKD, DM, obesity, IRIS, statin intolerance. Presented to the ER for evaluation of an uncomfortable numbness to the chest. Symptom persisted from 2:00 p.m. yesterday till about 6:00 p.m. yesterday. She was seen by Dr. Paez yesterday. Patient had troponin ordered outpatient yesterday. Trop elevated at 0.098.  Repeat troponin 0.074.  Her EKG was abnormal but had similar findings compared to previous EKGs. Dr. Cohen with Cardiology was consulted.  He recommended initiating heparin drip, administered Lasix, continue home meds, administered nitro paste and echocardiogram in a.m. Hospital Medicine consulted to admit patient for NSTEMI.  Patient in agreement with treatment plan.  Patient admitted under inpatient status.    Cardiology consulted to assist with management. Pt seen and examined today resting in bed, c/o SOB and chest discomfort. Pt reports episode of SOB walking into [facility] clinic on Monday. OP troponin was elevated and she was sent to ED. Followed by Dr. Paez in clinic. Labs reviewed, troponin flat initially 0.98 trending down, .Echo in 23' EF nml, repeat pending. Nuc stress today           Past Medical History:   Diagnosis Date     Carotid stenosis     19%    Cellulitis and abscess of foot, except toes 06/22/2022    Cerumen debris on tympanic membrane of right ear 11/30/2022    Coronary artery disease     CVA (cerebral vascular accident)     Dr. 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 Hope    Psoriatic arthritis     Rheumatology    Transient ischemic attack (TIA) 01/02/2019       Past Surgical History:   Procedure Laterality Date    BREAST BIOPSY      R sided/benign    CARDIAC SURGERY      sept 28 2016    CERVICAL FUSION      CHOLECYSTECTOMY      CORONARY ANGIOPLASTY      CORONARY ARTERY BYPASS GRAFT      triple bypass    CORONARY STENT PLACEMENT      EYE SURGERY      INTRAUTERINE DEVICE INSERTION      LEFT HEART CATHETERIZATION Left 9/8/2020    Procedure: CATHETERIZATION, HEART, LEFT;  Surgeon: Veronica Ibarra MD;  Location: [facility] CATH LAB;  Service: Cardiology;  Laterality: Left;  7am start time    mass removed from R groin      TOTAL ABDOMINAL HYSTERECTOMY W/ BILATERAL SALPINGOOPHORECTOMY      due to benign mass, adhesions    TUBAL LIGATION         Review of patient's allergies indicates:   Allergen Reactions    Adhesive Nausea And Vomiting     EKG Electrodes    Aspirin-dipyridamole Other (See Comments)     headaches  Other reaction(s): Not Indicated  Other reaction(s): unknown    Butorphanol      Other reaction(s): Not Indicated  Other reaction(s): cardiac arrest    Citalopram Anaphylaxis     Other reaction(s): Not Indicated  Other reaction(s): unknown    Clindamycin Itching and Swelling     Other reaction(s): Not Indicated  Other reaction(s): unknown    Codeine Shortness Of Breath, Itching and Swelling     Other reaction(s): Not Indicated  Other reaction(s): hives/trouble  "breathing/"possible cardiac arrest"    Ezetimibe      Other reaction(s): Not Indicated  Other reaction(s): unknown    Hydrocodone-acetaminophen Shortness Of Breath     Other reaction(s): Not Indicated  Other reaction(s): unknown    Isosorbide Other (See Comments)     Severe headache  Other reaction(s): Not Indicated  Other reaction(s): arrhythmia    Kiwi (actinidia chinensis) Blisters     Oral  Blisters/burning    Lisinopril Anaphylaxis     Other reaction(s): Not Indicated  Other reaction(s): unknown    Magnesium citrate Shortness Of Breath     Other reaction(s): Not Indicated    Meloxicam      Other reaction(s): Not Indicated  Other reaction(s): Caused Acute renal failure    Methylprednisolone Other (See Comments)     Patient reports taking IV in the past without any issues. Reports allergy to oral preparation   Other reaction(s): Not Indicated  Other reaction(s): unknown    Metronidazole Nausea And Vomiting    Misoprostol Anaphylaxis and Other (See Comments)     Difficulty breathing  Other reaction(s): Not Indicated  Other reaction(s): unknown    Morphine      Other reaction(s): Not Indicated  Other reaction(s): trouble breathing/"possible cardiac arrest"    Nedocromil      Other reaction(s): Not Indicated  Other reaction(s): unknown    Neuromuscular blockers, steroidal Other (See Comments)    Pentazocine lactate      Other reaction(s): Not Indicated  Other reaction(s): unknown    Ranolazine Nausea And Vomiting     Other reaction(s): Not Indicated  Other reaction(s): PVC's/ SOB    Rosuvastatin Anaphylaxis     Other reaction(s): Not Indicated  Other reaction(s): gastric sleeve    Stadol [butorphanol tartrate] Anaphylaxis     Coded    Sulfa (sulfonamide antibiotics) Other (See Comments)     unknown  Other reaction(s): hives/trouble breathing    Tetracyclines      Other reaction(s): unknown    Triamcinolone acetonide      Other reaction(s): Not Indicated  Other reaction(s): unknown    Zoledronic acid-mannitol-water " Other (See Comments)     Bones hurt  Other reaction(s): Not Indicated  Other reaction(s): unknown    Hydromorphone Hallucinations    Azithromycin      Other reaction(s): Not Indicated    Cleocin [clindamycin hcl]     Meperidine      Other reaction(s): Not Indicated    Moxifloxacin      PATIENT STATES DO NOT GIVE UNDER ANY CIRCUSTANCES  Other reaction(s): Not Indicated    Nitroglycerin      Long acting  Other reaction(s): Not Indicated    Pholcodine     Prednisone      GASTRIC PAIN  Other reaction(s): Not Indicated    Tetracycline        No current facility-administered medications on file prior to encounter.     Current Outpatient Medications on File Prior to Encounter   Medication Sig    acetaminophen (TYLENOL) 650 MG TbSR as directed    allopurinoL (ZYLOPRIM) 100 MG tablet TAKE 2 TABLETS ONCE DAILY    amLODIPine (NORVASC) 2.5 MG tablet Take 1 tablet (2.5 mg total) by mouth once daily.    aspirin 81 MG Chew Take 1 tablet (81 mg total) by mouth once daily.    calcium carbonate 650 mg calcium (1,625 mg) tablet Take 1 tablet by mouth once daily.    clopidogreL (PLAVIX) 75 mg tablet TAKE 1 TABLET ONCE AS DIRECTED    docusate sodium (COLACE) 100 MG capsule Take 1 capsule (100 mg total) by mouth 2 (two) times daily.    folic acid (FOLVITE) 1 MG tablet TAKE 1 TABLET DAILY    furosemide (LASIX) 20 MG tablet TAKE 1 TABLET TWICE A DAY    garlic 2,000 mg Cap Take 3,000 mg by mouth once daily.     levothyroxine (SYNTHROID) 100 MCG tablet TAKE 1 TABLET BEFORE BREAKFAST    metoprolol succinate (TOPROL-XL) 100 MG 24 hr tablet TAKE 1 TABLET TWICE A DAY    potassium chloride SA (K-DUR,KLOR-CON M) 10 MEQ tablet Take 1 tablet (10 mEq total) by mouth once daily.    pyridoxine, vitamin B6, (B-6) 100 MG Tab Take 200 mg by mouth once daily.     REPATHA SURECLICK 140 mg/mL PnIj INJECT 1 ML (140 MG) UNDER THE SKIN EVERY 14 DAYS    secukinumab (COSENTYX PEN) 150 mg/mL PnIj Inject 300 mg into the skin every 28 days.    spironolactone  (ALDACTONE) 25 MG tablet TAKE 1 TABLET DAILY    vitamin D 1000 units Tab Take 185 mg by mouth once daily.    albuterol (PROVENTIL) 2.5 mg /3 mL (0.083 %) nebulizer solution Take 3 mLs (2.5 mg total) by nebulization every 6 (six) hours as needed for Wheezing. Rescue    calcipotriene (DOVONOX) 0.005 % cream Apply topically 2 (two) times daily.    nitroGLYCERIN (NITROSTAT) 0.4 MG SL tablet DISSOLVE 1 TABLET UNDER THE TONGUE EVERY 5 MINUTES AS NEEDED FOR CHEST PAIN    [DISCONTINUED] docusate sodium (COLACE) 100 MG capsule Colace Take No date recorded No form recorded No frequency recorded No route recorded No set duration recorded No set duration amount recorded active No dosage strength recorded No dosage strength units of measure recorded    [DISCONTINUED] nitroGLYCERIN 0.2 mg/hr TD PT24 (NITRODUR) 0.2 mg/hr Place 1 patch onto the skin once daily.     Family History       Problem Relation (Age of Onset)    Breast cancer Maternal Grandfather, Paternal Aunt, Sister (60)    Diabetes Daughter    Heart disease     Leukemia Sister (8)    Lung cancer Paternal Grandfather    Stroke           Tobacco Use    Smoking status: Never    Smokeless tobacco: Never   Substance and Sexual Activity    Alcohol use: No    Drug use: No    Sexual activity: Not Currently     Partners: Male     Review of Systems   Constitutional: Positive for malaise/fatigue.   HENT: Negative.     Eyes: Negative.    Cardiovascular:  Positive for chest pain, dyspnea on exertion and paroxysmal nocturnal dyspnea.   Respiratory:  Positive for cough and shortness of breath.    Skin: Negative.    Musculoskeletal:  Positive for arthritis, back pain, falls, muscle cramps and stiffness.   Gastrointestinal: Negative.    Genitourinary: Negative.    Neurological:  Positive for dizziness, light-headedness and weakness.   Psychiatric/Behavioral: Negative.       Objective:     Vital Signs (Most Recent):  Temp: 98 °F (36.7 °C) (06/26/24 0848)  Pulse: 62 (06/26/24 1223)  Resp:  20 (06/26/24 0848)  BP: (!) 143/73 (06/26/24 1223)  SpO2: 98 % (06/26/24 0848) Vital Signs (24h Range):  Temp:  [98 °F (36.7 °C)-98.7 °F (37.1 °C)] 98 °F (36.7 °C)  Pulse:  [51-68] 62  Resp:  [12-20] 20  SpO2:  [96 %-100 %] 98 %  BP: (143-208)/() 143/73     Weight: 98.4 kg (217 lb)  Body mass index is 38.44 kg/m².    SpO2: 98 %       No intake or output data in the 24 hours ending 06/26/24 1225    Lines/Drains/Airways       Peripheral Intravenous Line  Duration                  Peripheral IV - Single Lumen 22 G Anterior;Left Wrist -- days         Peripheral IV - Single Lumen 06/25/24 2234 20 G Left Antecubital <1 day                     Physical Exam  Vitals and nursing note reviewed.   Constitutional:       Appearance: Normal appearance.   HENT:      Head: Normocephalic.   Eyes:      Pupils: Pupils are equal, round, and reactive to light.   Cardiovascular:      Rate and Rhythm: Normal rate and regular rhythm.      Heart sounds: Normal heart sounds, S1 normal and S2 normal. No murmur heard.     No S3 or S4 sounds.   Pulmonary:      Effort: Pulmonary effort is normal.      Breath sounds: Rales present.   Abdominal:      General: Bowel sounds are normal.      Palpations: Abdomen is soft.   Musculoskeletal:         General: Normal range of motion.      Cervical back: Normal range of motion.   Skin:     Capillary Refill: Capillary refill takes less than 2 seconds.   Neurological:      General: No focal deficit present.      Mental Status: She is alert and oriented to person, place, and time.   Psychiatric:         Mood and Affect: Mood normal.         Behavior: Behavior normal.         Thought Content: Thought content normal.          Significant Labs: BMP:   Recent Labs   Lab 06/25/24 2221 06/26/24  0756    91    144   K 3.4* 3.4*    105   CO2 29 28   BUN 18 18   CREATININE 1.2 0.9   CALCIUM 9.5 9.1   MG 1.7  --    , CMP   Recent Labs   Lab 06/25/24 2221 06/26/24  0756    144   K 3.4*  "3.4*    105   CO2 29 28    91   BUN 18 18   CREATININE 1.2 0.9   CALCIUM 9.5 9.1   PROT 6.6  --    ALBUMIN 3.3*  --    BILITOT 0.4  --    ALKPHOS 54*  --    AST 17  --    ALT 12  --    ANIONGAP 12 11   , CBC   Recent Labs   Lab 06/25/24 2221 06/26/24 0221   WBC 8.14 8.72   HGB 12.2 11.5*   HCT 37.2 35.2*    184   , INR   Recent Labs   Lab 06/25/24 2226   INR 1.0   , Lipid Panel No results for input(s): "CHOL", "HDL", "LDLCALC", "TRIG", "CHOLHDL" in the last 48 hours., Troponin   Recent Labs   Lab 06/25/24 2221 06/26/24 0221 06/26/24  0756   TROPONINI 0.074* 0.074* 0.067*   , and All pertinent lab results from the last 24 hours have been reviewed.    Significant Imaging: Cardiac Cath: reviewed, Echocardiogram: Transthoracic echo (TTE) complete (Cupid Only):   Results for orders placed or performed during the hospital encounter of 06/25/24   Echo   Result Value Ref Range    BSA 2.11 m2    LA WIDTH 4.3 cm    RA Width 2.7 cm    LVOT stroke volume 85.97 cm3    LVIDd 5.78 3.5 - 6.0 cm    LV Systolic Volume 68.29 mL    LV Systolic Volume Index 34.0 mL/m2    LVIDs 3.96 2.1 - 4.0 cm    LV Diastolic Volume 164.92 mL    LV Diastolic Volume Index 82.05 mL/m2    Left Ventricular End Systolic Volume by Teichholz Method 68.29 mL    Left Ventricular End Diastolic Volume by Teichholz Method 164.92 mL    IVS 1.16 (A) 0.6 - 1.1 cm    LVOT diameter 1.95 cm    LVOT area 3.0 cm2    FS 31 28 - 44 %    Left Ventricle Relative Wall Thickness 0.41 cm    Posterior Wall 1.18 (A) 0.6 - 1.1 cm    LV mass 285.38 g    LV Mass Index 142 g/m2    MV Peak E Caden 1.51 m/s    TDI LATERAL 0.09 m/s    TDI SEPTAL 0.04 m/s    E/E' ratio 23.23 m/s    MV Peak A Caden 1.10 m/s    TR Max Caden 3.38 m/s    E/A ratio 1.37     IVRT 71.36 msec    E wave deceleration time 308.83 msec    LV SEPTAL E/E' RATIO 37.75 m/s    LA Volume Index 35.4 mL/m2    LV LATERAL E/E' RATIO 16.78 m/s    LA volume 71.16 cm3    LVOT peak caden 0.98 m/s    Left " Ventricular Outflow Tract Mean Velocity 0.78 cm/s    Left Ventricular Outflow Tract Mean Gradient 2.61 mmHg    RVOT peak VTI 17.1 cm    TAPSE 2.50 cm    LA size 3.21 cm    Left Atrium Minor Axis 6.03 cm    Left Atrium Major Axis 6.10 cm    RA Major Axis 4.49 cm    AV mean gradient 11 mmHg    AV peak gradient 21 mmHg    Ao peak rosanne 2.31 m/s    Ao VTI 56.60 cm    LVOT peak VTI 28.80 cm    AV valve area 1.52 cm²    AV Velocity Ratio 0.42     AV index (prosthetic) 0.51     ALFREDO by Velocity Ratio 1.27 cm²    Mr max rosanne 5.45 m/s    MV mean gradient 3 mmHg    MV peak gradient 11 mmHg    MV valve area by continuity eq 1.59 cm2    MV VTI 54.2 cm    Triscuspid Valve Regurgitation Peak Gradient 46 mmHg    PV mean gradient 1 mmHg    PV peak gradient 2     RVOT peak rosanne 0.73 m/s    Ao root annulus 2.90 cm    STJ 3.46 cm    Ascending aorta 3.09 cm    IVC diameter 1.55 cm    Mean e' 0.07 m/s    ZLVIDS 0.72     ZLVIDD -0.19    , EKG: reviewed, Stress Test: reviewed, and X-Ray: CXR: X-Ray Chest 1 View (CXR): No results found for this visit on 06/25/24.  Assessment and Plan:     * NSTEMI (non-ST elevated myocardial infarction)  Troponin peaked 0.98 trending down  Atypical chest pains  Hept gtt  Nuc stress  EKG with no acute dynamic changes noted    Type 2 diabetes mellitus with diabetic neuropathy, without long-term current use of insulin  Management per primary team    Chronic diastolic heart failure    Cont lasix, BB, aldactone  Echo pending  Nuc stress today    S/P TAVR (transcatheter aortic valve replacement)  Cont asa and metoprolol    S/P CABG (coronary artery bypass graft)  S/p CABG x2  Cont asa, BB    Essential hypertension  Titrate medications    Hyperlipidemia  Statin intol  On repatha OP        VTE Risk Mitigation (From admission, onward)           Ordered     IP VTE HIGH RISK PATIENT  Once         06/26/24 0017     Place sequential compression device  Until discontinued         06/26/24 0017     heparin 25,000 units  in dextrose 5% (100 units/ml) IV bolus from bag LOW INTENSITY nomogram - [facility]  As needed (PRN)        Question:  Heparin Infusion Adjustment (DO NOT MODIFY ANSWER)  Answer:  [email or web address]\epic\Images\Pharmacy\HeparinInfusions\heparin LOW INTENSITY nomogram for [facility] [id number]    06/25/24 2249     heparin 25,000 units in dextrose 5% (100 units/ml) IV bolus from bag LOW INTENSITY nomogram - [facility]  As needed (PRN)        Question:  Heparin Infusion Adjustment (DO NOT MODIFY ANSWER)  Answer:  [email or web address]\epic\Images\Pharmacy\HeparinInfusions\heparin LOW INTENSITY nomogram for [facility] [id number]    06/25/24 2249     heparin 25,000 units in dextrose 5% 250 mL (100 units/mL) infusion LOW INTENSITY nomogram - [facility]  Continuous        Question:  Begin at (units/kg/hr)  Answer:  12    06/25/24 2249                    Thank you for your consult. I will follow-up with patient. Please contact us if you have any additional questions.    Rosa Child, NP  Cardiology   [facility] - Telemetry ([facility])

## 2024-06-26 NOTE — ASSESSMENT & PLAN NOTE
"Patient's FSGs are controlled on current medication regimen.  Last A1c reviewed-   Lab Results   Component Value Date    HGBA1C 6.0 (H) 04/05/2024     Most recent fingerstick glucose reviewed- No results for input(s): "POCTGLUCOSE" in the last 24 hours.  Current correctional scale  Low  Maintain anti-hyperglycemic dose as follows-   Antihyperglycemics (From admission, onward)      None          Plan:  -SSI  -A1c  -Accu-checks  -Hold oral hypoglycemics while patient is in the hospital  -Continue home long-acting insulin at 20% decrease, titrate up as needed  -Hypoglycemic protocol        "

## 2024-06-26 NOTE — SUBJECTIVE & OBJECTIVE
Past Medical History:   Diagnosis Date    Carotid stenosis     19%    Cellulitis and abscess of foot, except toes 06/22/2022    Cerumen debris on tympanic membrane of right ear 11/30/2022    Coronary artery disease     CVA (cerebral vascular accident)     Dr. 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 Hope    Psoriatic arthritis     Rheumatology    Transient ischemic attack (TIA) 01/02/2019       Past Surgical History:   Procedure Laterality Date    BREAST BIOPSY      R sided/benign    CARDIAC SURGERY      sept 28 2016    CERVICAL FUSION      CHOLECYSTECTOMY      CORONARY ANGIOPLASTY      CORONARY ARTERY BYPASS GRAFT      triple bypass    CORONARY STENT PLACEMENT      EYE SURGERY      INTRAUTERINE DEVICE INSERTION      LEFT HEART CATHETERIZATION Left 9/8/2020    Procedure: CATHETERIZATION, HEART, LEFT;  Surgeon: Veronica Ibarra MD;  Location: [facility] CATH LAB;  Service: Cardiology;  Laterality: Left;  7am start time    mass removed from R groin      TOTAL ABDOMINAL HYSTERECTOMY W/ BILATERAL SALPINGOOPHORECTOMY      due to benign mass, adhesions    TUBAL LIGATION         Review of patient's allergies indicates:   Allergen Reactions    Adhesive Nausea And Vomiting     EKG Electrodes    Aspirin-dipyridamole Other (See Comments)     headaches  Other reaction(s): Not Indicated  Other reaction(s): unknown    Butorphanol      Other reaction(s): Not Indicated  Other reaction(s): cardiac arrest    Citalopram Anaphylaxis     Other reaction(s): Not Indicated  Other reaction(s): unknown    Clindamycin Itching and Swelling     Other reaction(s): Not Indicated  Other reaction(s): unknown    Codeine Shortness Of Breath, Itching and Swelling     Other reaction(s): Not  "Indicated  Other reaction(s): hives/trouble breathing/"possible cardiac arrest"    Ezetimibe      Other reaction(s): Not Indicated  Other reaction(s): unknown    Hydrocodone-acetaminophen Shortness Of Breath     Other reaction(s): Not Indicated  Other reaction(s): unknown    Isosorbide Other (See Comments)     Severe headache  Other reaction(s): Not Indicated  Other reaction(s): arrhythmia    Kiwi (actinidia chinensis) Blisters     Oral  Blisters/burning    Lisinopril Anaphylaxis     Other reaction(s): Not Indicated  Other reaction(s): unknown    Magnesium citrate Shortness Of Breath     Other reaction(s): Not Indicated    Meloxicam      Other reaction(s): Not Indicated  Other reaction(s): Caused Acute renal failure    Methylprednisolone Other (See Comments)     Patient reports taking IV in the past without any issues. Reports allergy to oral preparation   Other reaction(s): Not Indicated  Other reaction(s): unknown    Metronidazole Nausea And Vomiting    Misoprostol Anaphylaxis and Other (See Comments)     Difficulty breathing  Other reaction(s): Not Indicated  Other reaction(s): unknown    Morphine      Other reaction(s): Not Indicated  Other reaction(s): trouble breathing/"possible cardiac arrest"    Nedocromil      Other reaction(s): Not Indicated  Other reaction(s): unknown    Neuromuscular blockers, steroidal Other (See Comments)    Pentazocine lactate      Other reaction(s): Not Indicated  Other reaction(s): unknown    Ranolazine Nausea And Vomiting     Other reaction(s): Not Indicated  Other reaction(s): PVC's/ SOB    Rosuvastatin Anaphylaxis     Other reaction(s): Not Indicated  Other reaction(s): gastric sleeve    Stadol [butorphanol tartrate] Anaphylaxis     Coded    Sulfa (sulfonamide antibiotics) Other (See Comments)     unknown  Other reaction(s): hives/trouble breathing    Tetracyclines      Other reaction(s): unknown    Triamcinolone acetonide      Other reaction(s): Not Indicated  Other reaction(s): " unknown    Zoledronic acid-mannitol-water Other (See Comments)     Bones hurt  Other reaction(s): Not Indicated  Other reaction(s): unknown    Hydromorphone Hallucinations    Azithromycin      Other reaction(s): Not Indicated    Cleocin [clindamycin hcl]     Meperidine      Other reaction(s): Not Indicated    Moxifloxacin      PATIENT STATES DO NOT GIVE UNDER ANY CIRCUSTANCES  Other reaction(s): Not Indicated    Nitroglycerin      Long acting  Other reaction(s): Not Indicated    Pholcodine     Prednisone      GASTRIC PAIN  Other reaction(s): Not Indicated    Tetracycline        No current facility-administered medications on file prior to encounter.     Current Outpatient Medications on File Prior to Encounter   Medication Sig    acetaminophen (TYLENOL) 650 MG TbSR as directed    allopurinoL (ZYLOPRIM) 100 MG tablet TAKE 2 TABLETS ONCE DAILY    amLODIPine (NORVASC) 2.5 MG tablet Take 1 tablet (2.5 mg total) by mouth once daily.    aspirin 81 MG Chew Take 1 tablet (81 mg total) by mouth once daily.    calcium carbonate 650 mg calcium (1,625 mg) tablet Take 1 tablet by mouth once daily.    clopidogreL (PLAVIX) 75 mg tablet TAKE 1 TABLET ONCE AS DIRECTED    docusate sodium (COLACE) 100 MG capsule Take 1 capsule (100 mg total) by mouth 2 (two) times daily.    folic acid (FOLVITE) 1 MG tablet TAKE 1 TABLET DAILY    furosemide (LASIX) 20 MG tablet TAKE 1 TABLET TWICE A DAY    garlic 2,000 mg Cap Take 3,000 mg by mouth once daily.     levothyroxine (SYNTHROID) 100 MCG tablet TAKE 1 TABLET BEFORE BREAKFAST    metoprolol succinate (TOPROL-XL) 100 MG 24 hr tablet TAKE 1 TABLET TWICE A DAY    potassium chloride SA (K-DUR,KLOR-CON M) 10 MEQ tablet Take 1 tablet (10 mEq total) by mouth once daily.    pyridoxine, vitamin B6, (B-6) 100 MG Tab Take 200 mg by mouth once daily.     REPATHA SURECLICK 140 mg/mL PnIj INJECT 1 ML (140 MG) UNDER THE SKIN EVERY 14 DAYS    secukinumab (COSENTYX PEN) 150 mg/mL PnIj Inject 300 mg into the  skin every 28 days.    spironolactone (ALDACTONE) 25 MG tablet TAKE 1 TABLET DAILY    vitamin D 1000 units Tab Take 185 mg by mouth once daily.    albuterol (PROVENTIL) 2.5 mg /3 mL (0.083 %) nebulizer solution Take 3 mLs (2.5 mg total) by nebulization every 6 (six) hours as needed for Wheezing. Rescue    calcipotriene (DOVONOX) 0.005 % cream Apply topically 2 (two) times daily.    nitroGLYCERIN (NITROSTAT) 0.4 MG SL tablet DISSOLVE 1 TABLET UNDER THE TONGUE EVERY 5 MINUTES AS NEEDED FOR CHEST PAIN    [DISCONTINUED] docusate sodium (COLACE) 100 MG capsule Colace Take No date recorded No form recorded No frequency recorded No route recorded No set duration recorded No set duration amount recorded active No dosage strength recorded No dosage strength units of measure recorded    [DISCONTINUED] nitroGLYCERIN 0.2 mg/hr TD PT24 (NITRODUR) 0.2 mg/hr Place 1 patch onto the skin once daily.     Family History       Problem Relation (Age of Onset)    Breast cancer Maternal Grandfather, Paternal Aunt, Sister (60)    Diabetes Daughter    Heart disease     Leukemia Sister (8)    Lung cancer Paternal Grandfather    Stroke           Tobacco Use    Smoking status: Never    Smokeless tobacco: Never   Substance and Sexual Activity    Alcohol use: No    Drug use: No    Sexual activity: Not Currently     Partners: Male     Review of Systems   Respiratory:  Positive for chest tightness. Negative for shortness of breath and wheezing.    Cardiovascular:  Positive for chest pain. Negative for palpitations and leg swelling.   Gastrointestinal:  Positive for abdominal pain. Negative for diarrhea, nausea and vomiting.   All other systems reviewed and are negative.    Objective:     Vital Signs (Most Recent):  Temp: 98.4 °F (36.9 °C) (06/26/24 0418)  Pulse: 61 (06/26/24 0418)  Resp: 16 (06/26/24 0418)  BP: (!) 162/69 (06/26/24 0418)  SpO2: 98 % (06/26/24 0418) Vital Signs (24h Range):  Temp:  [98.4 °F (36.9 °C)-98.7 °F (37.1 °C)] 98.4 °F  (36.9 °C)  Pulse:  [51-68] 61  Resp:  [12-20] 16  SpO2:  [96 %-100 %] 98 %  BP: (153-208)/() 162/69     Weight: 98.7 kg (217 lb 9.5 oz)  Body mass index is 38.55 kg/m².     Physical Exam  Vitals and nursing note reviewed.   Constitutional:       General: She is awake. She is not in acute distress.     Appearance: Normal appearance. She is well-developed and well-groomed. She is not ill-appearing, toxic-appearing or diaphoretic.   HENT:      Head: Normocephalic and atraumatic.   Eyes:      Extraocular Movements: Extraocular movements intact.      Conjunctiva/sclera: Conjunctivae normal.   Cardiovascular:      Rate and Rhythm: Normal rate and regular rhythm.      Heart sounds: Normal heart sounds. No murmur heard.  Pulmonary:      Effort: Pulmonary effort is normal.      Breath sounds: Normal breath sounds. No decreased breath sounds or wheezing.   Abdominal:      General: Bowel sounds are normal.      Palpations: Abdomen is soft.      Tenderness: There is no abdominal tenderness.   Musculoskeletal:      Cervical back: Normal range of motion and neck supple.      Right lower leg: No edema.      Left lower leg: No edema.      Comments: 5/5 strength throughout   Skin:     General: Skin is warm and dry.      Capillary Refill: Capillary refill takes less than 2 seconds.   Neurological:      General: No focal deficit present.      Mental Status: She is alert and oriented to person, place, and time. Mental status is at baseline.      GCS: GCS eye subscore is 4. GCS verbal subscore is 5. GCS motor subscore is 6.      Cranial Nerves: Cranial nerves 2-12 are intact.      Sensory: Sensation is intact.      Motor: Motor function is intact.   Psychiatric:         Mood and Affect: Mood normal.         Speech: Speech normal.         Behavior: Behavior normal. Behavior is cooperative.              LABS:  Recent Results (from the past 24 hour(s))   CBC auto differential    Collection Time: 06/25/24 10:21 PM   Result Value Ref  Range    WBC 8.14 3.90 - 12.70 K/uL    RBC 4.06 4.00 - 5.40 M/uL    Hemoglobin 12.2 12.0 - 16.0 g/dL    Hematocrit 37.2 37.0 - 48.5 %    MCV 92 82 - 98 fL    MCH 30.0 27.0 - 31.0 pg    MCHC 32.8 32.0 - 36.0 g/dL    RDW 14.8 (H) 11.5 - 14.5 %    Platelets 214 150 - 450 K/uL    MPV 10.2 9.2 - 12.9 fL    Immature Granulocytes 0.4 0.0 - 0.5 %    Gran # (ANC) 5.6 1.8 - 7.7 K/uL    Immature Grans (Abs) 0.03 0.00 - 0.04 K/uL    Lymph # 1.6 1.0 - 4.8 K/uL    Mono # 0.7 0.3 - 1.0 K/uL    Eos # 0.2 0.0 - 0.5 K/uL    Baso # 0.04 0.00 - 0.20 K/uL    nRBC 0 0 /100 WBC    Gran % 69.2 38.0 - 73.0 %    Lymph % 19.0 18.0 - 48.0 %    Mono % 9.1 4.0 - 15.0 %    Eosinophil % 1.8 0.0 - 8.0 %    Basophil % 0.5 0.0 - 1.9 %    Differential Method Automated    Comprehensive metabolic panel    Collection Time: 06/25/24 10:21 PM   Result Value Ref Range    Sodium 145 136 - 145 mmol/L    Potassium 3.4 (L) 3.5 - 5.1 mmol/L    Chloride 104 95 - 110 mmol/L    CO2 29 23 - 29 mmol/L    Glucose 101 70 - 110 mg/dL    BUN 18 8 - 23 mg/dL    Creatinine 1.2 0.5 - 1.4 mg/dL    Calcium 9.5 8.7 - 10.5 mg/dL    Total Protein 6.6 6.0 - 8.4 g/dL    Albumin 3.3 (L) 3.5 - 5.2 g/dL    Total Bilirubin 0.4 0.1 - 1.0 mg/dL    Alkaline Phosphatase 54 (L) 55 - 135 U/L    AST 17 10 - 40 U/L    ALT 12 10 - 44 U/L    eGFR 48 (A) >60 mL/min/1.73 m^2    Anion Gap 12 8 - 16 mmol/L   Troponin I #1    Collection Time: 06/25/24 10:21 PM   Result Value Ref Range    Troponin I 0.074 (H) 0.000 - 0.026 ng/mL   BNP    Collection Time: 06/25/24 10:21 PM   Result Value Ref Range     (H) 0 - 99 pg/mL   Magnesium    Collection Time: 06/25/24 10:21 PM   Result Value Ref Range    Magnesium 1.7 1.6 - 2.6 mg/dL   Protime-INR    Collection Time: 06/25/24 10:26 PM   Result Value Ref Range    Prothrombin Time 10.9 9.0 - 12.5 sec    INR 1.0 0.8 - 1.2   APTT    Collection Time: 06/25/24 10:26 PM   Result Value Ref Range    aPTT 27.5 21.0 - 32.0 sec   Type & Screen    Collection Time:  06/26/24  1:20 AM   Result Value Ref Range    Specimen Outdate 06/29/2024 23:59    CBC auto differential    Collection Time: 06/26/24  2:21 AM   Result Value Ref Range    WBC 8.72 3.90 - 12.70 K/uL    RBC 3.82 (L) 4.00 - 5.40 M/uL    Hemoglobin 11.5 (L) 12.0 - 16.0 g/dL    Hematocrit 35.2 (L) 37.0 - 48.5 %    MCV 92 82 - 98 fL    MCH 30.1 27.0 - 31.0 pg    MCHC 32.7 32.0 - 36.0 g/dL    RDW 14.7 (H) 11.5 - 14.5 %    Platelets 184 150 - 450 K/uL    MPV 10.0 9.2 - 12.9 fL    Immature Granulocytes 0.3 0.0 - 0.5 %    Gran # (ANC) 5.3 1.8 - 7.7 K/uL    Immature Grans (Abs) 0.03 0.00 - 0.04 K/uL    Lymph # 2.5 1.0 - 4.8 K/uL    Mono # 0.7 0.3 - 1.0 K/uL    Eos # 0.2 0.0 - 0.5 K/uL    Baso # 0.04 0.00 - 0.20 K/uL    nRBC 0 0 /100 WBC    Gran % 60.8 38.0 - 73.0 %    Lymph % 28.2 18.0 - 48.0 %    Mono % 8.4 4.0 - 15.0 %    Eosinophil % 1.8 0.0 - 8.0 %    Basophil % 0.5 0.0 - 1.9 %    Differential Method Automated    Troponin I    Collection Time: 06/26/24  2:21 AM   Result Value Ref Range    Troponin I 0.074 (H) 0.000 - 0.026 ng/mL   APTT    Collection Time: 06/26/24  5:18 AM   Result Value Ref Range    aPTT 43.7 (H) 21.0 - 32.0 sec       RADIOLOGY  X-Ray Chest AP Portable    Result Date: 6/25/2024  EXAMINATION: XR CHEST AP PORTABLE CLINICAL HISTORY: Chest Pain; TECHNIQUE: Single frontal portable view of the chest was performed. COMPARISON: None FINDINGS: No pulmonary consolidation pleural effusion or convincing pneumothorax     no active pulmonary finding Electronically signed by: Meaghan Vaughn Date:    06/25/2024 Time:    22:24      EKG    MICROBIOLOGY    MDM     Amount and/or Complexity of Data Reviewed  Clinical lab tests: reviewed  Tests in the radiology section of CPT®: reviewed  Tests in the medicine section of CPT®: reviewed  Discussion of test results with the performing providers: yes  Decide to obtain previous medical records or to obtain history from someone other than the patient: yes  Obtain history from  someone other than the patient: yes  Review and summarize past medical records: yes  Discuss the patient with other providers: yes  Independent visualization of images, tracings, or specimens: yes

## 2024-06-26 NOTE — H&P (VIEW-ONLY)
[facility] - Telemetry ([facility])  Cardiology  Consult Note    Patient Name: Qi Ortiz  MRN: [id number]  Admission Date: 6/25/2024  Hospital Length of Stay: 0 days  Code Status: Full Code   Attending Provider: Andrea Burrell MD   Consulting Provider: Rosa Child NP  Primary Care Physician: Lisa Porter MD  Principal Problem:NSTEMI (non-ST elevated myocardial infarction)    Patient information was obtained from patient and ER records.     Inpatient consult to Cardiology  Consult performed by: Rosa Child NP  Consult ordered by: Edvin Jackson NP        Subjective:     Chief Complaint:  SOB     HPI:   Qi Ortiz is a 74 y.o. female with a PMH CAD s/p CABG twice in 1998 and 2016, SVT, AS, s/p TAVR 20', chronic diastolic HF, HTN HLD COPD, CKD, DM, obesity, IRIS, statin intolerance. Presented to the ER for evaluation of an uncomfortable numbness to the chest. Symptom persisted from 2:00 p.m. yesterday till about 6:00 p.m. yesterday. She was seen by Dr. Paez yesterday. Patient had troponin ordered outpatient yesterday. Trop elevated at 0.098.  Repeat troponin 0.074.  Her EKG was abnormal but had similar findings compared to previous EKGs. Dr. Cohen with Cardiology was consulted.  He recommended initiating heparin drip, administered Lasix, continue home meds, administered nitro paste and echocardiogram in a.m. Hospital Medicine consulted to admit patient for NSTEMI.  Patient in agreement with treatment plan.  Patient admitted under inpatient status.    Cardiology consulted to assist with management. Pt seen and examined today resting in bed, c/o SOB and chest discomfort. Pt reports episode of SOB walking into [facility] clinic on Monday. OP troponin was elevated and she was sent to ED. Followed by Dr. Paez in clinic. Labs reviewed, troponin flat initially 0.98 trending down, .Echo in 23' EF nml, repeat pending. Nuc stress today           Past Medical History:   Diagnosis Date     Carotid stenosis     19%    Cellulitis and abscess of foot, except toes 06/22/2022    Cerumen debris on tympanic membrane of right ear 11/30/2022    Coronary artery disease     CVA (cerebral vascular accident)     Dr. 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 Hope    Psoriatic arthritis     Rheumatology    Transient ischemic attack (TIA) 01/02/2019       Past Surgical History:   Procedure Laterality Date    BREAST BIOPSY      R sided/benign    CARDIAC SURGERY      sept 28 2016    CERVICAL FUSION      CHOLECYSTECTOMY      CORONARY ANGIOPLASTY      CORONARY ARTERY BYPASS GRAFT      triple bypass    CORONARY STENT PLACEMENT      EYE SURGERY      INTRAUTERINE DEVICE INSERTION      LEFT HEART CATHETERIZATION Left 9/8/2020    Procedure: CATHETERIZATION, HEART, LEFT;  Surgeon: Veronica Ibarra MD;  Location: [facility] CATH LAB;  Service: Cardiology;  Laterality: Left;  7am start time    mass removed from R groin      TOTAL ABDOMINAL HYSTERECTOMY W/ BILATERAL SALPINGOOPHORECTOMY      due to benign mass, adhesions    TUBAL LIGATION         Review of patient's allergies indicates:   Allergen Reactions    Adhesive Nausea And Vomiting     EKG Electrodes    Aspirin-dipyridamole Other (See Comments)     headaches  Other reaction(s): Not Indicated  Other reaction(s): unknown    Butorphanol      Other reaction(s): Not Indicated  Other reaction(s): cardiac arrest    Citalopram Anaphylaxis     Other reaction(s): Not Indicated  Other reaction(s): unknown    Clindamycin Itching and Swelling     Other reaction(s): Not Indicated  Other reaction(s): unknown    Codeine Shortness Of Breath, Itching and Swelling     Other reaction(s): Not Indicated  Other reaction(s): hives/trouble  "breathing/"possible cardiac arrest"    Ezetimibe      Other reaction(s): Not Indicated  Other reaction(s): unknown    Hydrocodone-acetaminophen Shortness Of Breath     Other reaction(s): Not Indicated  Other reaction(s): unknown    Isosorbide Other (See Comments)     Severe headache  Other reaction(s): Not Indicated  Other reaction(s): arrhythmia    Kiwi (actinidia chinensis) Blisters     Oral  Blisters/burning    Lisinopril Anaphylaxis     Other reaction(s): Not Indicated  Other reaction(s): unknown    Magnesium citrate Shortness Of Breath     Other reaction(s): Not Indicated    Meloxicam      Other reaction(s): Not Indicated  Other reaction(s): Caused Acute renal failure    Methylprednisolone Other (See Comments)     Patient reports taking IV in the past without any issues. Reports allergy to oral preparation   Other reaction(s): Not Indicated  Other reaction(s): unknown    Metronidazole Nausea And Vomiting    Misoprostol Anaphylaxis and Other (See Comments)     Difficulty breathing  Other reaction(s): Not Indicated  Other reaction(s): unknown    Morphine      Other reaction(s): Not Indicated  Other reaction(s): trouble breathing/"possible cardiac arrest"    Nedocromil      Other reaction(s): Not Indicated  Other reaction(s): unknown    Neuromuscular blockers, steroidal Other (See Comments)    Pentazocine lactate      Other reaction(s): Not Indicated  Other reaction(s): unknown    Ranolazine Nausea And Vomiting     Other reaction(s): Not Indicated  Other reaction(s): PVC's/ SOB    Rosuvastatin Anaphylaxis     Other reaction(s): Not Indicated  Other reaction(s): gastric sleeve    Stadol [butorphanol tartrate] Anaphylaxis     Coded    Sulfa (sulfonamide antibiotics) Other (See Comments)     unknown  Other reaction(s): hives/trouble breathing    Tetracyclines      Other reaction(s): unknown    Triamcinolone acetonide      Other reaction(s): Not Indicated  Other reaction(s): unknown    Zoledronic acid-mannitol-water " Other (See Comments)     Bones hurt  Other reaction(s): Not Indicated  Other reaction(s): unknown    Hydromorphone Hallucinations    Azithromycin      Other reaction(s): Not Indicated    Cleocin [clindamycin hcl]     Meperidine      Other reaction(s): Not Indicated    Moxifloxacin      PATIENT STATES DO NOT GIVE UNDER ANY CIRCUSTANCES  Other reaction(s): Not Indicated    Nitroglycerin      Long acting  Other reaction(s): Not Indicated    Pholcodine     Prednisone      GASTRIC PAIN  Other reaction(s): Not Indicated    Tetracycline        No current facility-administered medications on file prior to encounter.     Current Outpatient Medications on File Prior to Encounter   Medication Sig    acetaminophen (TYLENOL) 650 MG TbSR as directed    allopurinoL (ZYLOPRIM) 100 MG tablet TAKE 2 TABLETS ONCE DAILY    amLODIPine (NORVASC) 2.5 MG tablet Take 1 tablet (2.5 mg total) by mouth once daily.    aspirin 81 MG Chew Take 1 tablet (81 mg total) by mouth once daily.    calcium carbonate 650 mg calcium (1,625 mg) tablet Take 1 tablet by mouth once daily.    clopidogreL (PLAVIX) 75 mg tablet TAKE 1 TABLET ONCE AS DIRECTED    docusate sodium (COLACE) 100 MG capsule Take 1 capsule (100 mg total) by mouth 2 (two) times daily.    folic acid (FOLVITE) 1 MG tablet TAKE 1 TABLET DAILY    furosemide (LASIX) 20 MG tablet TAKE 1 TABLET TWICE A DAY    garlic 2,000 mg Cap Take 3,000 mg by mouth once daily.     levothyroxine (SYNTHROID) 100 MCG tablet TAKE 1 TABLET BEFORE BREAKFAST    metoprolol succinate (TOPROL-XL) 100 MG 24 hr tablet TAKE 1 TABLET TWICE A DAY    potassium chloride SA (K-DUR,KLOR-CON M) 10 MEQ tablet Take 1 tablet (10 mEq total) by mouth once daily.    pyridoxine, vitamin B6, (B-6) 100 MG Tab Take 200 mg by mouth once daily.     REPATHA SURECLICK 140 mg/mL PnIj INJECT 1 ML (140 MG) UNDER THE SKIN EVERY 14 DAYS    secukinumab (COSENTYX PEN) 150 mg/mL PnIj Inject 300 mg into the skin every 28 days.    spironolactone  (ALDACTONE) 25 MG tablet TAKE 1 TABLET DAILY    vitamin D 1000 units Tab Take 185 mg by mouth once daily.    albuterol (PROVENTIL) 2.5 mg /3 mL (0.083 %) nebulizer solution Take 3 mLs (2.5 mg total) by nebulization every 6 (six) hours as needed for Wheezing. Rescue    calcipotriene (DOVONOX) 0.005 % cream Apply topically 2 (two) times daily.    nitroGLYCERIN (NITROSTAT) 0.4 MG SL tablet DISSOLVE 1 TABLET UNDER THE TONGUE EVERY 5 MINUTES AS NEEDED FOR CHEST PAIN    [DISCONTINUED] docusate sodium (COLACE) 100 MG capsule Colace Take No date recorded No form recorded No frequency recorded No route recorded No set duration recorded No set duration amount recorded active No dosage strength recorded No dosage strength units of measure recorded    [DISCONTINUED] nitroGLYCERIN 0.2 mg/hr TD PT24 (NITRODUR) 0.2 mg/hr Place 1 patch onto the skin once daily.     Family History       Problem Relation (Age of Onset)    Breast cancer Maternal Grandfather, Paternal Aunt, Sister (60)    Diabetes Daughter    Heart disease     Leukemia Sister (8)    Lung cancer Paternal Grandfather    Stroke           Tobacco Use    Smoking status: Never    Smokeless tobacco: Never   Substance and Sexual Activity    Alcohol use: No    Drug use: No    Sexual activity: Not Currently     Partners: Male     Review of Systems   Constitutional: Positive for malaise/fatigue.   HENT: Negative.     Eyes: Negative.    Cardiovascular:  Positive for chest pain, dyspnea on exertion and paroxysmal nocturnal dyspnea.   Respiratory:  Positive for cough and shortness of breath.    Skin: Negative.    Musculoskeletal:  Positive for arthritis, back pain, falls, muscle cramps and stiffness.   Gastrointestinal: Negative.    Genitourinary: Negative.    Neurological:  Positive for dizziness, light-headedness and weakness.   Psychiatric/Behavioral: Negative.       Objective:     Vital Signs (Most Recent):  Temp: 98 °F (36.7 °C) (06/26/24 0848)  Pulse: 62 (06/26/24 1223)  Resp:  20 (06/26/24 0848)  BP: (!) 143/73 (06/26/24 1223)  SpO2: 98 % (06/26/24 0848) Vital Signs (24h Range):  Temp:  [98 °F (36.7 °C)-98.7 °F (37.1 °C)] 98 °F (36.7 °C)  Pulse:  [51-68] 62  Resp:  [12-20] 20  SpO2:  [96 %-100 %] 98 %  BP: (143-208)/() 143/73     Weight: 98.4 kg (217 lb)  Body mass index is 38.44 kg/m².    SpO2: 98 %       No intake or output data in the 24 hours ending 06/26/24 1225    Lines/Drains/Airways       Peripheral Intravenous Line  Duration                  Peripheral IV - Single Lumen 22 G Anterior;Left Wrist -- days         Peripheral IV - Single Lumen 06/25/24 2234 20 G Left Antecubital <1 day                     Physical Exam  Vitals and nursing note reviewed.   Constitutional:       Appearance: Normal appearance.   HENT:      Head: Normocephalic.   Eyes:      Pupils: Pupils are equal, round, and reactive to light.   Cardiovascular:      Rate and Rhythm: Normal rate and regular rhythm.      Heart sounds: Normal heart sounds, S1 normal and S2 normal. No murmur heard.     No S3 or S4 sounds.   Pulmonary:      Effort: Pulmonary effort is normal.      Breath sounds: Rales present.   Abdominal:      General: Bowel sounds are normal.      Palpations: Abdomen is soft.   Musculoskeletal:         General: Normal range of motion.      Cervical back: Normal range of motion.   Skin:     Capillary Refill: Capillary refill takes less than 2 seconds.   Neurological:      General: No focal deficit present.      Mental Status: She is alert and oriented to person, place, and time.   Psychiatric:         Mood and Affect: Mood normal.         Behavior: Behavior normal.         Thought Content: Thought content normal.          Significant Labs: BMP:   Recent Labs   Lab 06/25/24 2221 06/26/24  0756    91    144   K 3.4* 3.4*    105   CO2 29 28   BUN 18 18   CREATININE 1.2 0.9   CALCIUM 9.5 9.1   MG 1.7  --    , CMP   Recent Labs   Lab 06/25/24 2221 06/26/24  0756    144   K 3.4*  "3.4*    105   CO2 29 28    91   BUN 18 18   CREATININE 1.2 0.9   CALCIUM 9.5 9.1   PROT 6.6  --    ALBUMIN 3.3*  --    BILITOT 0.4  --    ALKPHOS 54*  --    AST 17  --    ALT 12  --    ANIONGAP 12 11   , CBC   Recent Labs   Lab 06/25/24 2221 06/26/24 0221   WBC 8.14 8.72   HGB 12.2 11.5*   HCT 37.2 35.2*    184   , INR   Recent Labs   Lab 06/25/24 2226   INR 1.0   , Lipid Panel No results for input(s): "CHOL", "HDL", "LDLCALC", "TRIG", "CHOLHDL" in the last 48 hours., Troponin   Recent Labs   Lab 06/25/24 2221 06/26/24 0221 06/26/24  0756   TROPONINI 0.074* 0.074* 0.067*   , and All pertinent lab results from the last 24 hours have been reviewed.    Significant Imaging: Cardiac Cath: reviewed, Echocardiogram: Transthoracic echo (TTE) complete ([facility] Only):   Results for orders placed or performed during the hospital encounter of 06/25/24   Echo   Result Value Ref Range    BSA 2.11 m2    LA WIDTH 4.3 cm    RA Width 2.7 cm    LVOT stroke volume 85.97 cm3    LVIDd 5.78 3.5 - 6.0 cm    LV Systolic Volume 68.29 mL    LV Systolic Volume Index 34.0 mL/m2    LVIDs 3.96 2.1 - 4.0 cm    LV Diastolic Volume 164.92 mL    LV Diastolic Volume Index 82.05 mL/m2    Left Ventricular End Systolic Volume by Teichholz Method 68.29 mL    Left Ventricular End Diastolic Volume by Teichholz Method 164.92 mL    IVS 1.16 (A) 0.6 - 1.1 cm    LVOT diameter 1.95 cm    LVOT area 3.0 cm2    FS 31 28 - 44 %    Left Ventricle Relative Wall Thickness 0.41 cm    Posterior Wall 1.18 (A) 0.6 - 1.1 cm    LV mass 285.38 g    LV Mass Index 142 g/m2    MV Peak E Caden 1.51 m/s    TDI LATERAL 0.09 m/s    TDI SEPTAL 0.04 m/s    E/E' ratio 23.23 m/s    MV Peak A Caden 1.10 m/s    TR Max Caden 3.38 m/s    E/A ratio 1.37     IVRT 71.36 msec    E wave deceleration time 308.83 msec    LV SEPTAL E/E' RATIO 37.75 m/s    LA Volume Index 35.4 mL/m2    LV LATERAL E/E' RATIO 16.78 m/s    LA volume 71.16 cm3    LVOT peak caden 0.98 m/s    Left " Ventricular Outflow Tract Mean Velocity 0.78 cm/s    Left Ventricular Outflow Tract Mean Gradient 2.61 mmHg    RVOT peak VTI 17.1 cm    TAPSE 2.50 cm    LA size 3.21 cm    Left Atrium Minor Axis 6.03 cm    Left Atrium Major Axis 6.10 cm    RA Major Axis 4.49 cm    AV mean gradient 11 mmHg    AV peak gradient 21 mmHg    Ao peak rosanne 2.31 m/s    Ao VTI 56.60 cm    LVOT peak VTI 28.80 cm    AV valve area 1.52 cm²    AV Velocity Ratio 0.42     AV index (prosthetic) 0.51     ALFREDO by Velocity Ratio 1.27 cm²    Mr max rosanne 5.45 m/s    MV mean gradient 3 mmHg    MV peak gradient 11 mmHg    MV valve area by continuity eq 1.59 cm2    MV VTI 54.2 cm    Triscuspid Valve Regurgitation Peak Gradient 46 mmHg    PV mean gradient 1 mmHg    PV peak gradient 2     RVOT peak rosanne 0.73 m/s    Ao root annulus 2.90 cm    STJ 3.46 cm    Ascending aorta 3.09 cm    IVC diameter 1.55 cm    Mean e' 0.07 m/s    ZLVIDS 0.72     ZLVIDD -0.19    , EKG: reviewed, Stress Test: reviewed, and X-Ray: CXR: X-Ray Chest 1 View (CXR): No results found for this visit on 06/25/24.  Assessment and Plan:     * NSTEMI (non-ST elevated myocardial infarction)  Troponin peaked 0.98 trending down  Atypical chest pains  Hept gtt  Nuc stress  EKG with no acute dynamic changes noted    Type 2 diabetes mellitus with diabetic neuropathy, without long-term current use of insulin  Management per primary team    Chronic diastolic heart failure    Cont lasix, BB, aldactone  Echo pending  Nuc stress today    S/P TAVR (transcatheter aortic valve replacement)  Cont asa and metoprolol    S/P CABG (coronary artery bypass graft)  S/p CABG x2  Cont asa, BB    Essential hypertension  Titrate medications    Hyperlipidemia  Statin intol  On repatha OP        VTE Risk Mitigation (From admission, onward)           Ordered     IP VTE HIGH RISK PATIENT  Once         06/26/24 0017     Place sequential compression device  Until discontinued         06/26/24 0017     heparin 25,000 units  in dextrose 5% (100 units/ml) IV bolus from bag LOW INTENSITY nomogram - [facility]  As needed (PRN)        Question:  Heparin Infusion Adjustment (DO NOT MODIFY ANSWER)  Answer:  [email or web address]\epic\Images\Pharmacy\HeparinInfusions\heparin LOW INTENSITY nomogram for [facility] [id number]    06/25/24 2249     heparin 25,000 units in dextrose 5% (100 units/ml) IV bolus from bag LOW INTENSITY nomogram - [facility]  As needed (PRN)        Question:  Heparin Infusion Adjustment (DO NOT MODIFY ANSWER)  Answer:  [email or web address]\epic\Images\Pharmacy\HeparinInfusions\heparin LOW INTENSITY nomogram for [facility] [id number]    06/25/24 2249     heparin 25,000 units in dextrose 5% 250 mL (100 units/mL) infusion LOW INTENSITY nomogram - [facility]  Continuous        Question:  Begin at (units/kg/hr)  Answer:  12    06/25/24 2249                    Thank you for your consult. I will follow-up with patient. Please contact us if you have any additional questions.    Rosa Child, NP  Cardiology   [facility] - Telemetry ([facility])

## 2024-06-26 NOTE — HPI
Qi Ortiz is a 74 y.o. female with a PMH  has a past medical history of Carotid stenosis, Cellulitis and abscess of foot, except toes (06/22/2022), Cerumen debris on tympanic membrane of right ear (11/30/2022), Coronary artery disease, CVA (cerebral vascular accident), Depression, Double ectopic ureters, Hemiplegia affecting right dominant side (11/09/2021), Hyperlipidemia, Hypertension, Hypothyroid, Left foot infection (07/08/2022), Mild asthma with exacerbation (12/04/2022), OP (osteoporosis), IRIS (obstructive sleep apnea), Psoriatic arthritis, and Transient ischemic attack (TIA) (01/02/2019).Presented to the ER for evaluation of an uncomfortable numbness to the chest. Symptom persisted from 2:00 p.m. yesterday till about 6:00 p.m. yesterday. She was seen by Dr. Paez yesterday. Patient had troponin ordered outpatient yesterday. Trop elevated at 0.098.  Repeat troponin 0.074.  Her EKG was abnormal but had similar findings compared to previous EKGs. Dr. Cohen with Cardiology was consulted.  He recommended initiating heparin drip, administered Lasix, continue home meds, administered nitro paste and echocardiogram in a.m. Hospital Medicine consulted to admit patient for NSTEMI.  Patient in agreement with treatment plan.  Patient admitted under inpatient status.    PCP: Lisa Porter

## 2024-06-26 NOTE — H&P
[facility]  History & Physical    Patient Name: Qi Ortiz  MRN: [id number]  Patient Class: IP- Inpatient  Admission Date: 6/25/2024  Attending Physician: Andrea Burrell MD   Primary Care Provider: Lisa Porter MD         Patient information was obtained from patient, relative(s), past medical records, and ER records.     Subjective:     Principal Problem:NSTEMI (non-ST elevated myocardial infarction)    Chief Complaint:   Chief Complaint   Patient presents with    Chest Pain     Pt reports mid sternal chest tightness that started yesterday, sent by MD for elvated Trop.        HPI: Qi Ortiz is a 74 y.o. female with a PMH  has a past medical history of Carotid stenosis, Cellulitis and abscess of foot, except toes (06/22/2022), Cerumen debris on tympanic membrane of right ear (11/30/2022), Coronary artery disease, CVA (cerebral vascular accident), Depression, Double ectopic ureters, Hemiplegia affecting right dominant side (11/09/2021), Hyperlipidemia, Hypertension, Hypothyroid, Left foot infection (07/08/2022), Mild asthma with exacerbation (12/04/2022), OP (osteoporosis), IRIS (obstructive sleep apnea), Psoriatic arthritis, and Transient ischemic attack (TIA) (01/02/2019).Presented to the ER for evaluation of an uncomfortable numbness to the chest. Symptom persisted from 2:00 p.m. yesterday till about 6:00 p.m. yesterday. She was seen by Dr. Paez yesterday. Patient had troponin ordered outpatient yesterday. Trop elevated at 0.098.  Repeat troponin 0.074.  Her EKG was abnormal but had similar findings compared to previous EKGs. Dr. Cohen with Cardiology was consulted.  He recommended initiating heparin drip, administered Lasix, continue home meds, administered nitro paste and echocardiogram in a.m. Hospital Medicine consulted to admit patient for NSTEMI.  Patient in agreement with treatment plan.  Patient admitted under inpatient status.    PCP:  Lisa Porter      Past Medical History:   Diagnosis Date    Carotid stenosis     19%    Cellulitis and abscess of foot, except toes 06/22/2022    Cerumen debris on tympanic membrane of right ear 11/30/2022    Coronary artery disease     CVA (cerebral vascular accident)     Dr. 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 Hope    Psoriatic arthritis     Rheumatology    Transient ischemic attack (TIA) 01/02/2019       Past Surgical History:   Procedure Laterality Date    BREAST BIOPSY      R sided/benign    CARDIAC SURGERY      sept 28 2016    CERVICAL FUSION      CHOLECYSTECTOMY      CORONARY ANGIOPLASTY      CORONARY ARTERY BYPASS GRAFT      triple bypass    CORONARY STENT PLACEMENT      EYE SURGERY      INTRAUTERINE DEVICE INSERTION      LEFT HEART CATHETERIZATION Left 9/8/2020    Procedure: CATHETERIZATION, HEART, LEFT;  Surgeon: Veronica Ibarra MD;  Location: [facility] CATH LAB;  Service: Cardiology;  Laterality: Left;  7am start time    mass removed from R groin      TOTAL ABDOMINAL HYSTERECTOMY W/ BILATERAL SALPINGOOPHORECTOMY      due to benign mass, adhesions    TUBAL LIGATION         Review of patient's allergies indicates:   Allergen Reactions    Adhesive Nausea And Vomiting     EKG Electrodes    Aspirin-dipyridamole Other (See Comments)     headaches  Other reaction(s): Not Indicated  Other reaction(s): unknown    Butorphanol      Other reaction(s): Not Indicated  Other reaction(s): cardiac arrest    Citalopram Anaphylaxis     Other reaction(s): Not Indicated  Other reaction(s): unknown    Clindamycin Itching and Swelling     Other reaction(s): Not Indicated  Other reaction(s): unknown    Codeine Shortness Of Breath, Itching and Swelling      "Other reaction(s): Not Indicated  Other reaction(s): hives/trouble breathing/"possible cardiac arrest"    Ezetimibe      Other reaction(s): Not Indicated  Other reaction(s): unknown    Hydrocodone-acetaminophen Shortness Of Breath     Other reaction(s): Not Indicated  Other reaction(s): unknown    Isosorbide Other (See Comments)     Severe headache  Other reaction(s): Not Indicated  Other reaction(s): arrhythmia    Kiwi (actinidia chinensis) Blisters     Oral  Blisters/burning    Lisinopril Anaphylaxis     Other reaction(s): Not Indicated  Other reaction(s): unknown    Magnesium citrate Shortness Of Breath     Other reaction(s): Not Indicated    Meloxicam      Other reaction(s): Not Indicated  Other reaction(s): Caused Acute renal failure    Methylprednisolone Other (See Comments)     Patient reports taking IV in the past without any issues. Reports allergy to oral preparation   Other reaction(s): Not Indicated  Other reaction(s): unknown    Metronidazole Nausea And Vomiting    Misoprostol Anaphylaxis and Other (See Comments)     Difficulty breathing  Other reaction(s): Not Indicated  Other reaction(s): unknown    Morphine      Other reaction(s): Not Indicated  Other reaction(s): trouble breathing/"possible cardiac arrest"    Nedocromil      Other reaction(s): Not Indicated  Other reaction(s): unknown    Neuromuscular blockers, steroidal Other (See Comments)    Pentazocine lactate      Other reaction(s): Not Indicated  Other reaction(s): unknown    Ranolazine Nausea And Vomiting     Other reaction(s): Not Indicated  Other reaction(s): PVC's/ SOB    Rosuvastatin Anaphylaxis     Other reaction(s): Not Indicated  Other reaction(s): gastric sleeve    Stadol [butorphanol tartrate] Anaphylaxis     Coded    Sulfa (sulfonamide antibiotics) Other (See Comments)     unknown  Other reaction(s): hives/trouble breathing    Tetracyclines      Other reaction(s): unknown    Triamcinolone acetonide      Other reaction(s): Not " Indicated  Other reaction(s): unknown    Zoledronic acid-mannitol-water Other (See Comments)     Bones hurt  Other reaction(s): Not Indicated  Other reaction(s): unknown    Hydromorphone Hallucinations    Azithromycin      Other reaction(s): Not Indicated    Cleocin [clindamycin hcl]     Meperidine      Other reaction(s): Not Indicated    Moxifloxacin      PATIENT STATES DO NOT GIVE UNDER ANY CIRCUSTANCES  Other reaction(s): Not Indicated    Nitroglycerin      Long acting  Other reaction(s): Not Indicated    Pholcodine     Prednisone      GASTRIC PAIN  Other reaction(s): Not Indicated    Tetracycline        No current facility-administered medications on file prior to encounter.     Current Outpatient Medications on File Prior to Encounter   Medication Sig    acetaminophen (TYLENOL) 650 MG TbSR as directed    allopurinoL (ZYLOPRIM) 100 MG tablet TAKE 2 TABLETS ONCE DAILY    amLODIPine (NORVASC) 2.5 MG tablet Take 1 tablet (2.5 mg total) by mouth once daily.    aspirin 81 MG Chew Take 1 tablet (81 mg total) by mouth once daily.    calcium carbonate 650 mg calcium (1,625 mg) tablet Take 1 tablet by mouth once daily.    clopidogreL (PLAVIX) 75 mg tablet TAKE 1 TABLET ONCE AS DIRECTED    docusate sodium (COLACE) 100 MG capsule Take 1 capsule (100 mg total) by mouth 2 (two) times daily.    folic acid (FOLVITE) 1 MG tablet TAKE 1 TABLET DAILY    furosemide (LASIX) 20 MG tablet TAKE 1 TABLET TWICE A DAY    garlic 2,000 mg Cap Take 3,000 mg by mouth once daily.     levothyroxine (SYNTHROID) 100 MCG tablet TAKE 1 TABLET BEFORE BREAKFAST    metoprolol succinate (TOPROL-XL) 100 MG 24 hr tablet TAKE 1 TABLET TWICE A DAY    potassium chloride SA (K-DUR,KLOR-CON M) 10 MEQ tablet Take 1 tablet (10 mEq total) by mouth once daily.    pyridoxine, vitamin B6, (B-6) 100 MG Tab Take 200 mg by mouth once daily.     REPATHA SURECLICK 140 mg/mL PnIj INJECT 1 ML (140 MG) UNDER THE SKIN EVERY 14 DAYS    secukinumab (COSENTYX PEN) 150 mg/mL  PnIj Inject 300 mg into the skin every 28 days.    spironolactone (ALDACTONE) 25 MG tablet TAKE 1 TABLET DAILY    vitamin D 1000 units Tab Take 185 mg by mouth once daily.    albuterol (PROVENTIL) 2.5 mg /3 mL (0.083 %) nebulizer solution Take 3 mLs (2.5 mg total) by nebulization every 6 (six) hours as needed for Wheezing. Rescue    calcipotriene (DOVONOX) 0.005 % cream Apply topically 2 (two) times daily.    nitroGLYCERIN (NITROSTAT) 0.4 MG SL tablet DISSOLVE 1 TABLET UNDER THE TONGUE EVERY 5 MINUTES AS NEEDED FOR CHEST PAIN    [DISCONTINUED] docusate sodium (COLACE) 100 MG capsule Colace Take No date recorded No form recorded No frequency recorded No route recorded No set duration recorded No set duration amount recorded active No dosage strength recorded No dosage strength units of measure recorded    [DISCONTINUED] nitroGLYCERIN 0.2 mg/hr TD PT24 (NITRODUR) 0.2 mg/hr Place 1 patch onto the skin once daily.     Family History       Problem Relation (Age of Onset)    Breast cancer Maternal Grandfather, Paternal Aunt, Sister (60)    Diabetes Daughter    Heart disease     Leukemia Sister (8)    Lung cancer Paternal Grandfather    Stroke           Tobacco Use    Smoking status: Never    Smokeless tobacco: Never   Substance and Sexual Activity    Alcohol use: No    Drug use: No    Sexual activity: Not Currently     Partners: Male     Review of Systems   Respiratory:  Positive for chest tightness. Negative for shortness of breath and wheezing.    Cardiovascular:  Positive for chest pain. Negative for palpitations and leg swelling.   Gastrointestinal:  Positive for abdominal pain. Negative for diarrhea, nausea and vomiting.   All other systems reviewed and are negative.    Objective:     Vital Signs (Most Recent):  Temp: 98.4 °F (36.9 °C) (06/26/24 0418)  Pulse: 61 (06/26/24 0418)  Resp: 16 (06/26/24 0418)  BP: (!) 162/69 (06/26/24 0418)  SpO2: 98 % (06/26/24 0418) Vital Signs (24h Range):  Temp:  [98.4 °F (36.9  °C)-98.7 °F (37.1 °C)] 98.4 °F (36.9 °C)  Pulse:  [51-68] 61  Resp:  [12-20] 16  SpO2:  [96 %-100 %] 98 %  BP: (153-208)/() 162/69     Weight: 98.7 kg (217 lb 9.5 oz)  Body mass index is 38.55 kg/m².     Physical Exam  Vitals and nursing note reviewed.   Constitutional:       General: She is awake. She is not in acute distress.     Appearance: Normal appearance. She is well-developed and well-groomed. She is not ill-appearing, toxic-appearing or diaphoretic.   HENT:      Head: Normocephalic and atraumatic.   Eyes:      Extraocular Movements: Extraocular movements intact.      Conjunctiva/sclera: Conjunctivae normal.   Cardiovascular:      Rate and Rhythm: Normal rate and regular rhythm.      Heart sounds: Normal heart sounds. No murmur heard.  Pulmonary:      Effort: Pulmonary effort is normal.      Breath sounds: Normal breath sounds. No decreased breath sounds or wheezing.   Abdominal:      General: Bowel sounds are normal.      Palpations: Abdomen is soft.      Tenderness: There is no abdominal tenderness.   Musculoskeletal:      Cervical back: Normal range of motion and neck supple.      Right lower leg: No edema.      Left lower leg: No edema.      Comments: 5/5 strength throughout   Skin:     General: Skin is warm and dry.      Capillary Refill: Capillary refill takes less than 2 seconds.   Neurological:      General: No focal deficit present.      Mental Status: She is alert and oriented to person, place, and time. Mental status is at baseline.      GCS: GCS eye subscore is 4. GCS verbal subscore is 5. GCS motor subscore is 6.      Cranial Nerves: Cranial nerves 2-12 are intact.      Sensory: Sensation is intact.      Motor: Motor function is intact.   Psychiatric:         Mood and Affect: Mood normal.         Speech: Speech normal.         Behavior: Behavior normal. Behavior is cooperative.              LABS:  Recent Results (from the past 24 hour(s))   CBC auto differential    Collection Time:  06/25/24 10:21 PM   Result Value Ref Range    WBC 8.14 3.90 - 12.70 K/uL    RBC 4.06 4.00 - 5.40 M/uL    Hemoglobin 12.2 12.0 - 16.0 g/dL    Hematocrit 37.2 37.0 - 48.5 %    MCV 92 82 - 98 fL    MCH 30.0 27.0 - 31.0 pg    MCHC 32.8 32.0 - 36.0 g/dL    RDW 14.8 (H) 11.5 - 14.5 %    Platelets 214 150 - 450 K/uL    MPV 10.2 9.2 - 12.9 fL    Immature Granulocytes 0.4 0.0 - 0.5 %    Gran # (ANC) 5.6 1.8 - 7.7 K/uL    Immature Grans (Abs) 0.03 0.00 - 0.04 K/uL    Lymph # 1.6 1.0 - 4.8 K/uL    Mono # 0.7 0.3 - 1.0 K/uL    Eos # 0.2 0.0 - 0.5 K/uL    Baso # 0.04 0.00 - 0.20 K/uL    nRBC 0 0 /100 WBC    Gran % 69.2 38.0 - 73.0 %    Lymph % 19.0 18.0 - 48.0 %    Mono % 9.1 4.0 - 15.0 %    Eosinophil % 1.8 0.0 - 8.0 %    Basophil % 0.5 0.0 - 1.9 %    Differential Method Automated    Comprehensive metabolic panel    Collection Time: 06/25/24 10:21 PM   Result Value Ref Range    Sodium 145 136 - 145 mmol/L    Potassium 3.4 (L) 3.5 - 5.1 mmol/L    Chloride 104 95 - 110 mmol/L    CO2 29 23 - 29 mmol/L    Glucose 101 70 - 110 mg/dL    BUN 18 8 - 23 mg/dL    Creatinine 1.2 0.5 - 1.4 mg/dL    Calcium 9.5 8.7 - 10.5 mg/dL    Total Protein 6.6 6.0 - 8.4 g/dL    Albumin 3.3 (L) 3.5 - 5.2 g/dL    Total Bilirubin 0.4 0.1 - 1.0 mg/dL    Alkaline Phosphatase 54 (L) 55 - 135 U/L    AST 17 10 - 40 U/L    ALT 12 10 - 44 U/L    eGFR 48 (A) >60 mL/min/1.73 m^2    Anion Gap 12 8 - 16 mmol/L   Troponin I #1    Collection Time: 06/25/24 10:21 PM   Result Value Ref Range    Troponin I 0.074 (H) 0.000 - 0.026 ng/mL   BNP    Collection Time: 06/25/24 10:21 PM   Result Value Ref Range     (H) 0 - 99 pg/mL   Magnesium    Collection Time: 06/25/24 10:21 PM   Result Value Ref Range    Magnesium 1.7 1.6 - 2.6 mg/dL   Protime-INR    Collection Time: 06/25/24 10:26 PM   Result Value Ref Range    Prothrombin Time 10.9 9.0 - 12.5 sec    INR 1.0 0.8 - 1.2   APTT    Collection Time: 06/25/24 10:26 PM   Result Value Ref Range    aPTT 27.5 21.0 - 32.0 sec    Type & Screen    Collection Time: 06/26/24  1:20 AM   Result Value Ref Range    Specimen Outdate 06/29/2024 23:59    CBC auto differential    Collection Time: 06/26/24  2:21 AM   Result Value Ref Range    WBC 8.72 3.90 - 12.70 K/uL    RBC 3.82 (L) 4.00 - 5.40 M/uL    Hemoglobin 11.5 (L) 12.0 - 16.0 g/dL    Hematocrit 35.2 (L) 37.0 - 48.5 %    MCV 92 82 - 98 fL    MCH 30.1 27.0 - 31.0 pg    MCHC 32.7 32.0 - 36.0 g/dL    RDW 14.7 (H) 11.5 - 14.5 %    Platelets 184 150 - 450 K/uL    MPV 10.0 9.2 - 12.9 fL    Immature Granulocytes 0.3 0.0 - 0.5 %    Gran # (ANC) 5.3 1.8 - 7.7 K/uL    Immature Grans (Abs) 0.03 0.00 - 0.04 K/uL    Lymph # 2.5 1.0 - 4.8 K/uL    Mono # 0.7 0.3 - 1.0 K/uL    Eos # 0.2 0.0 - 0.5 K/uL    Baso # 0.04 0.00 - 0.20 K/uL    nRBC 0 0 /100 WBC    Gran % 60.8 38.0 - 73.0 %    Lymph % 28.2 18.0 - 48.0 %    Mono % 8.4 4.0 - 15.0 %    Eosinophil % 1.8 0.0 - 8.0 %    Basophil % 0.5 0.0 - 1.9 %    Differential Method Automated    Troponin I    Collection Time: 06/26/24  2:21 AM   Result Value Ref Range    Troponin I 0.074 (H) 0.000 - 0.026 ng/mL   APTT    Collection Time: 06/26/24  5:18 AM   Result Value Ref Range    aPTT 43.7 (H) 21.0 - 32.0 sec       RADIOLOGY  X-Ray Chest AP Portable    Result Date: 6/25/2024  EXAMINATION: XR CHEST AP PORTABLE CLINICAL HISTORY: Chest Pain; TECHNIQUE: Single frontal portable view of the chest was performed. COMPARISON: None FINDINGS: No pulmonary consolidation pleural effusion or convincing pneumothorax     no active pulmonary finding Electronically signed by: Meaghan Vaughn Date:    06/25/2024 Time:    22:24      EKG    MICROBIOLOGY    MDM     Amount and/or Complexity of Data Reviewed  Clinical lab tests: reviewed  Tests in the radiology section of CPT®: reviewed  Tests in the medicine section of CPT®: reviewed  Discussion of test results with the performing providers: yes  Decide to obtain previous medical records or to obtain history from someone other than the  patient: yes  Obtain history from someone other than the patient: yes  Review and summarize past medical records: yes  Discuss the patient with other providers: yes  Independent visualization of images, tracings, or specimens: yes        Assessment/Plan:     * NSTEMI (non-ST elevated myocardial infarction)  Patient presents with NSTEMI. Chest pain is currently controlled. CONY score is 4. Patient is currently on NSTEMI Pathway.    EKG reviewed. Troponins reviewed and results noted-   Recent Labs   Lab 06/26/24  0221   TROPONINI 0.074*   .     Lipid panel reviewed and shows-     Lab Results   Component Value Date    LDLCALC 88.6 04/05/2024     Lab Results   Component Value Date    TRIG 152 (H) 04/05/2024         Medical management includes; Beta Blocker, Anticoagulation, and Nitrate Echo has not been performed. Latest ECHO results are as follows- Results for orders placed during the hospital encounter of 10/27/23    Echo    Interpretation Summary    Left Ventricle: The left ventricle is normal in size. Ventricular mass is normal. Mildly increased wall thickness. There is concentric remodeling. Septal motion is consistent with post-operative status. There is normal systolic function with a visually estimated ejection fraction of 55 - 60%. There is normal diastolic function.    Right Ventricle: Normal right ventricular cavity size. Wall thickness is normal. Right ventricle wall motion  is normal. Systolic function is normal.    Aortic Valve: There is a transcatheter valve replacement in the aortic position that is appropriately positioned. Aortic valve area by VTI is 1.14 cm². Aortic valve peak velocity is 2.13 m/s. Mean gradient is 9 mmHg. The dimensionless index is 0.52. Mild paravalvular regurgitation.    Mitral Valve: There is mild mitral annular calcification present. There is mild regurgitation.  .   Consult for cardiac rehab is ordered. Patient counseled on lifestyle modifications- follow a low fat, low  cholesterol diet, attempt to lose weight, decrease or avoid alcohol intake, reduce salt in diet and cooking, reduce exposure to stress, improve dietary compliance, continue current medications, continue current healthy lifestyle patterns, and return for routine annual checkups. Cardiology is consulted. Plan of care reviewed with cardiology team. Continue to monitor patient closely and adjust therapy as needed.        S/P CABG (coronary artery bypass graft)  Patient with known CAD s/p stent placement and CABG, which is controlled Will continue ASA and Plavix and monitor for S/Sx of angina/ACS. Continue to monitor on telemetry.         S/P TAVR (transcatheter aortic valve replacement)  See treatment above      Essential hypertension  Chronic, controlled. Latest blood pressure and vitals reviewed-     Temp:  [98.4 °F (36.9 °C)-98.7 °F (37.1 °C)]   Pulse:  [51-68]   Resp:  [12-20]   BP: (153-208)/()   SpO2:  [96 %-100 %] .   Home meds for hypertension were reviewed and noted below.   Hypertension Medications               amLODIPine (NORVASC) 2.5 MG tablet Take 1 tablet (2.5 mg total) by mouth once daily.    furosemide (LASIX) 20 MG tablet TAKE 1 TABLET TWICE A DAY    metoprolol succinate (TOPROL-XL) 100 MG 24 hr tablet TAKE 1 TABLET TWICE A DAY    nitroGLYCERIN (NITROSTAT) 0.4 MG SL tablet DISSOLVE 1 TABLET UNDER THE TONGUE EVERY 5 MINUTES AS NEEDED FOR CHEST PAIN    spironolactone (ALDACTONE) 25 MG tablet TAKE 1 TABLET DAILY            While in the hospital, will manage blood pressure as follows; Continue home antihypertensive regimen    Will utilize p.r.n. blood pressure medication only if patient's blood pressure greater than 160/100 and she develops symptoms such as worsening chest pain or shortness of breath.    Hyperlipidemia  Patient is chronically on statin.will continue for now. Last Lipid Panel:   Lab Results   Component Value Date    CHOL 154 04/05/2024    HDL 35 (L) 04/05/2024    LDLCALC 88.6  04/05/2024    TRIG 152 (H) 04/05/2024    CHOLHDL 22.7 04/05/2024     Plan:  -Continue home medication  -low fat/low calorie diet        Hypothyroidism  Patient has chronic hypothyroidism. TFTs reviewed-   Lab Results   Component Value Date    TSH 1.770 04/19/2024   . Will continue chronic levothyroxine and adjust for and clinical changes.        Iron deficiency anemia due to chronic blood loss  Patient's anemia is currently controlled. Has not received any PRBCs to date. Etiology likely d/t Iron deficiency  Current CBC reviewed-   Lab Results   Component Value Date    HGB 11.5 (L) 06/26/2024    HCT 35.2 (L) 06/26/2024     Monitor serial CBC and transfuse if patient becomes hemodynamically unstable, symptomatic or H/H drops below 7/21.  Continue iron supplement    Anemia due to folic acid deficiency  Patient's anemia is currently controlled. Has not received any PRBCs to date. Etiology likely d/t  combined folic acid and iron deficiency  Current CBC reviewed-   Lab Results   Component Value Date    HGB 11.5 (L) 06/26/2024    HCT 35.2 (L) 06/26/2024     Monitor serial CBC and transfuse if patient becomes hemodynamically unstable, symptomatic or H/H drops below 7/21.  Continue folic acid and iron supplement.    Chronic diastolic heart failure  Patient is identified as having Diastolic (HFpEF) heart failure that is Chronic. CHF is currently controlled. Latest ECHO performed and demonstrates- Results for orders placed during the hospital encounter of 10/27/23    Echo    Interpretation Summary    Left Ventricle: The left ventricle is normal in size. Ventricular mass is normal. Mildly increased wall thickness. There is concentric remodeling. Septal motion is consistent with post-operative status. There is normal systolic function with a visually estimated ejection fraction of 55 - 60%. There is normal diastolic function.    Right Ventricle: Normal right ventricular cavity size. Wall thickness is normal. Right ventricle  "wall motion  is normal. Systolic function is normal.    Aortic Valve: There is a transcatheter valve replacement in the aortic position that is appropriately positioned. Aortic valve area by VTI is 1.14 cm². Aortic valve peak velocity is 2.13 m/s. Mean gradient is 9 mmHg. The dimensionless index is 0.52. Mild paravalvular regurgitation.    Mitral Valve: There is mild mitral annular calcification present. There is mild regurgitation.  . Continue Beta Blocker Furosemide and monitor clinical status closely. Monitor on telemetry. Patient is off CHF pathway.  Monitor strict Is&Os and daily weights.  Place on fluid restriction of 1.5 L. Continue to stress to patient importance of self efficacy and  on diet for CHF. Last BNP reviewed- and noted below   Recent Labs   Lab 06/25/24  2221   *   .            Type 2 diabetes mellitus with diabetic neuropathy, without long-term current use of insulin  Patient's FSGs are controlled on current medication regimen.  Last A1c reviewed-   Lab Results   Component Value Date    HGBA1C 6.0 (H) 04/05/2024     Most recent fingerstick glucose reviewed- No results for input(s): "POCTGLUCOSE" in the last 24 hours.  Current correctional scale  Low  Maintain anti-hyperglycemic dose as follows-   Antihyperglycemics (From admission, onward)      None          Plan:  -SSI  -A1c  -Accu-checks  -Hold oral hypoglycemics while patient is in the hospital  -Continue home long-acting insulin at 20% decrease, titrate up as needed  -Hypoglycemic protocol            VTE Risk Mitigation (From admission, onward)           Ordered     IP VTE HIGH RISK PATIENT  Once         06/26/24 0017     Place sequential compression device  Until discontinued         06/26/24 0017     heparin 25,000 units in dextrose 5% (100 units/ml) IV bolus from bag LOW INTENSITY nomogram - [facility]  As needed (PRN)        Question:  Heparin Infusion Adjustment (DO NOT MODIFY ANSWER)  Answer:  " [email or web address]\epic\Images\Pharmacy\HeparinInfusions\heparin LOW INTENSITY nomogram for [facility] [id number]    06/25/24 2249     heparin 25,000 units in dextrose 5% (100 units/ml) IV bolus from bag LOW INTENSITY nomogram - [facility]  As needed (PRN)        Question:  Heparin Infusion Adjustment (DO NOT MODIFY ANSWER)  Answer:  [email or web address]\epic\Images\Pharmacy\HeparinInfusions\heparin LOW INTENSITY nomogram for [facility] [id number]    06/25/24 2249     heparin 25,000 units in dextrose 5% 250 mL (100 units/mL) infusion LOW INTENSITY nomogram - [facility]  Continuous        Question:  Begin at (units/kg/hr)  Answer:  12 06/25/24 2249                     //Core Measures   -DVT proph: SCDs, continue heparin drip  -Code status Full    -Surrogate:daughter    Components of this note were documented using a voice recognition system and are subject to errors not corrected at the time the document was proof read. Please contact the author for any clarifications.       Edvin Jackson NP  Department of Hospital Medicine  [facility] - Telemetry ([facility])

## 2024-06-26 NOTE — HPI
Qi Ortiz is a 74 y.o. female with a PMH CAD s/p CABG twice in 1998 and 2016, SVT, AS, s/p TAVR 20', chronic diastolic HF, HTN HLD COPD, CKD, DM, obesity, IRIS, statin intolerance. Presented to the ER for evaluation of an uncomfortable numbness to the chest. Symptom persisted from 2:00 p.m. yesterday till about 6:00 p.m. yesterday. She was seen by Dr. Paez yesterday. Patient had troponin ordered outpatient yesterday. Trop elevated at 0.098.  Repeat troponin 0.074.  Her EKG was abnormal but had similar findings compared to previous EKGs. Dr. Cohen with Cardiology was consulted.  He recommended initiating heparin drip, administered Lasix, continue home meds, administered nitro paste and echocardiogram in a.m. Hospital Medicine consulted to admit patient for NSTEMI.  Patient in agreement with treatment plan.  Patient admitted under inpatient status.    Cardiology consulted to assist with management. Pt seen and examined today resting in bed, c/o SOB and chest discomfort. Pt reports episode of SOB walking into cards clinic on Monday. OP troponin was elevated and she was sent to ED. Followed by Dr. Paez in clinic. Labs reviewed, troponin flat initially 0.98 trending down, .Echo in 23' EF nml, repeat pending. Nuc stress today

## 2024-06-26 NOTE — FIRST PROVIDER EVALUATION
Medical screening examination initiated.  I have conducted a focused provider triage encounter, findings are as follows:    Brief history of present illness:  Patient presents with chest pain and dyspnea on exertion.  Had elevated troponin yesterday.  Was told to come in tonight by cardiologist.    Vitals:    06/25/24 2157   BP: (!) 208/92   Pulse: 68   Resp: 20   Temp: 98.4 °F (36.9 °C)   TempSrc: Oral   SpO2: 96%       Pertinent physical exam:      Brief workup plan:  Cardiac workup    Preliminary workup initiated; this workup will be continued and followed by the physician or advanced practice provider that is assigned to the patient when roomed.

## 2024-06-26 NOTE — ASSESSMENT & PLAN NOTE
Cont asa and metoprolol   HPI   Chief Complaint   Patient presents with    Exposure to STD     34 y/o male states that he was told to be checked for chlamydia due to unprotected sex. Patient denies any symptoms at this time.       Lui is a 33-year-old male presenting to the emergency permit for an STD check.  He went to [place] this past weekend and on Saturday he had unprotected sexual intercourse with a female partner.  Today, that female partner texted him and told him that she tested positive for chlamydia.  He is not currently having any symptoms.  He denies any penile discharge, abdominal pain, fever, dysuria, hematuria, increased urinary frequency, cloudy urine, rash, penile lesion, shortness of breath, chest pain.  The patient states that he was treated several years ago for an STD with ceftriaxone and azithromycin and tolerated the medications well.  He does not want any extra testing for HIV or syphilis today.  Otherwise, patient is overall healthy.                          Tulio Coma Scale Score: 15                  Patient History   No past medical history on file.  No past surgical history on file.  No family history on file.  Social History     Tobacco Use    Smoking status: Not on file    Smokeless tobacco: Not on file   Substance Use Topics    Alcohol use: Not on file    Drug use: Not on file       Physical Exam   ED Triage Vitals [12/15/23 0300]   Temp Heart Rate Resp BP   36.5 °C (97.7 °F) 61 16 129/89      SpO2 Temp Source Heart Rate Source Patient Position   98 % Temporal Monitor Sitting      BP Location FiO2 (%)     Right arm --       Physical Exam  Constitutional:       Appearance: Normal appearance.   HENT:      Mouth/Throat:      Mouth: Mucous membranes are moist.      Pharynx: Oropharynx is clear.   Eyes:      Extraocular Movements: Extraocular movements intact.   Cardiovascular:      Rate and Rhythm: Normal rate and regular rhythm.      Pulses: Normal pulses.      Heart sounds: Normal heart sounds.    Pulmonary:      Effort: Pulmonary effort is normal. No respiratory distress.      Breath sounds: Normal breath sounds. No stridor. No wheezing, rhonchi or rales.   Abdominal:      General: Abdomen is flat. There is no distension.      Palpations: Abdomen is soft.      Tenderness: There is no abdominal tenderness. There is no guarding or rebound.   Musculoskeletal:         General: Normal range of motion.      Cervical back: Normal range of motion.   Skin:     General: Skin is warm and dry.   Neurological:      General: No focal deficit present.      Mental Status: He is alert.   Psychiatric:         Mood and Affect: Mood normal.         Behavior: Behavior normal.         ED Course & MDM   Diagnoses as of 12/15/23 0325   Exposure to sexually transmitted disease (STD)       Medical Decision Making  Yaw is a 33-year-old male presenting to the emerged permit for an STD check after he had unprotected intercourse with a male partner this past weekend.  Patient states that his female partner just tested positive for chlamydia today.  She is asymptomatic.  The patient denies any symptoms of penile discharge, fever, abdominal pain, urinary symptoms.    Differentials include chlamydia, gonorrhea, urinary tract infection.  A chlamydia/gonorrhea urine amplification and UA was ordered.  Patient was empirically treated with 1000 mg azithromycin p.o. and 500 mg ceftriaxone IM.  Discussed with patient further STD testing including HIV and syphilis, but he denied at this time.  May follow-up with PCP if he wishes to have further testing in the future.  Discussed importance of safe sex practices including using protection.  Patient tolerated antibiotics well and is stable to be discharged home.        Procedure  Procedures     Leona Owens PA-C  12/15/23 0002

## 2024-06-26 NOTE — ASSESSMENT & PLAN NOTE
Patient presents with NSTEMI. Chest pain is currently controlled. CONY score is 4. Patient is currently on NSTEMI Pathway.    EKG reviewed. Troponins reviewed and results noted-   Recent Labs   Lab 06/26/24  1513   TROPONINI 0.065*     .     Lipid panel reviewed and shows-     Lab Results   Component Value Date    LDLCALC 88.6 04/05/2024     Lab Results   Component Value Date    TRIG 152 (H) 04/05/2024         Medical management includes; Beta Blocker, Anticoagulation, and Nitrate Echo has not been performed. Latest ECHO results are as follows- Results for orders placed during the hospital encounter of 10/27/23    Echo    Interpretation Summary    Left Ventricle: The left ventricle is normal in size. Ventricular mass is normal. Mildly increased wall thickness. There is concentric remodeling. Septal motion is consistent with post-operative status. There is normal systolic function with a visually estimated ejection fraction of 55 - 60%. There is normal diastolic function.    Right Ventricle: Normal right ventricular cavity size. Wall thickness is normal. Right ventricle wall motion  is normal. Systolic function is normal.    Aortic Valve: There is a transcatheter valve replacement in the aortic position that is appropriately positioned. Aortic valve area by VTI is 1.14 cm². Aortic valve peak velocity is 2.13 m/s. Mean gradient is 9 mmHg. The dimensionless index is 0.52. Mild paravalvular regurgitation.    Mitral Valve: There is mild mitral annular calcification present. There is mild regurgitation.  .   Consult for cardiac rehab is ordered. Patient counseled on lifestyle modifications- follow a low fat, low cholesterol diet, attempt to lose weight, decrease or avoid alcohol intake, reduce salt in diet and cooking, reduce exposure to stress, improve dietary compliance, continue current medications, continue current healthy lifestyle patterns, and return for routine annual checkups. Cardiology is consulted. Plan of  care reviewed with cardiology team. Continue to monitor patient closely and adjust therapy as needed.    Echo and Stress test pending   Cont IV heparin, BB, ASA, Plavix.   Allergy to Statin

## 2024-06-26 NOTE — ASSESSMENT & PLAN NOTE
Patient's anemia is currently controlled. Has not received any PRBCs to date. Etiology likely d/t  combined folic acid and iron deficiency  Current CBC reviewed-   Lab Results   Component Value Date    HGB 11.5 (L) 06/26/2024    HCT 35.2 (L) 06/26/2024     Monitor serial CBC and transfuse if patient becomes hemodynamically unstable, symptomatic or H/H drops below 7/21.  Continue folic acid and iron supplement.

## 2024-06-26 NOTE — PROGRESS NOTES
[facility]  Progress Note    Patient Name: Qi Ortiz  MRN: [id number]  Patient Class: IP- Inpatient   Admission Date: 6/25/2024  Length of Stay: 0 days  Attending Physician: Andrea Burrell MD  Primary Care Provider: Lisa Porter MD        Subjective:     Principal Problem:NSTEMI (non-ST elevated myocardial infarction)        HPI:  Qi Ortiz is a 74 y.o. female with a PMH  has a past medical history of Carotid stenosis, Cellulitis and abscess of foot, except toes (06/22/2022), Cerumen debris on tympanic membrane of right ear (11/30/2022), Coronary artery disease, CVA (cerebral vascular accident), Depression, Double ectopic ureters, Hemiplegia affecting right dominant side (11/09/2021), Hyperlipidemia, Hypertension, Hypothyroid, Left foot infection (07/08/2022), Mild asthma with exacerbation (12/04/2022), OP (osteoporosis), IRIS (obstructive sleep apnea), Psoriatic arthritis, and Transient ischemic attack (TIA) (01/02/2019).Presented to the ER for evaluation of an uncomfortable numbness to the chest. Symptom persisted from 2:00 p.m. yesterday till about 6:00 p.m. yesterday. She was seen by Dr. Paez yesterday. Patient had troponin ordered outpatient yesterday. Trop elevated at 0.098.  Repeat troponin 0.074.  Her EKG was abnormal but had similar findings compared to previous EKGs. Dr. Cohen with Cardiology was consulted.  He recommended initiating heparin drip, administered Lasix, continue home meds, administered nitro paste and echocardiogram in a.. Hospital Medicine consulted to admit patient for NSTEMI.  Patient in agreement with treatment plan.  Patient admitted under inpatient status.    PCP: Lisa Porter      Overview/Hospital Course:  The patient is a 75 yo female with CAD s/p CABG 1998 and 2016, SVT, AS s/p TAVR 20', chronic diastolic HF, HTN HLD COPD, CKD, DM, obesity, IRIS, statin intolerance, multiple drug allergies who was admitted for  NSTEMI on IV heparin and continued on ASA, BB, and Plavix. EKG showed no ischemia-nonspecific changes. Troponin 0.098>0.074>0.067. Echo pending. Cardiology was consulted and recommended Nuclear stress test which is pending. After pt got back from chest test, she c/o of chest pain and SOB. Repeat EKG unchanged. Troponin 0.065. Mild relief with NTG x3. /74. Discussed with cards. Will increase amlodipine dose. Pt also c/o of SOB- will give IV Lasix x one dose.     Interval History: Stress test today, +Chest pain and SOB after stress test with elevated BP. EKG and troponin unchanged. Meds adjusted.     Review of Systems   Constitutional:  Positive for activity change. Negative for appetite change, chills, diaphoresis, fatigue, fever and unexpected weight change.   HENT:  Negative for congestion, nosebleeds, sinus pressure and sore throat.    Eyes:  Negative for pain, discharge and visual disturbance.   Respiratory:  Positive for shortness of breath. Negative for cough, chest tightness, wheezing and stridor.    Cardiovascular:  Positive for chest pain. Negative for palpitations and leg swelling.   Gastrointestinal:  Negative for abdominal distention, abdominal pain, blood in stool, constipation, diarrhea, nausea and vomiting.   Endocrine: Negative for cold intolerance and heat intolerance.   Genitourinary:  Negative for difficulty urinating, dysuria, flank pain, frequency and urgency.   Musculoskeletal:  Positive for arthralgias and back pain. Negative for joint swelling, myalgias, neck pain and neck stiffness.   Skin:  Negative for rash and wound.   Allergic/Immunologic: Negative for food allergies and immunocompromised state.   Neurological:  Negative for dizziness, seizures, syncope, facial asymmetry, speech difficulty, weakness, light-headedness, numbness and headaches.   Hematological:  Negative for adenopathy.   Psychiatric/Behavioral:  Negative for agitation, confusion and hallucinations.      Objective:      Vital Signs (Most Recent):  Temp: 97.7 °F (36.5 °C) (06/26/24 1557)  Pulse: (!) 56 (06/26/24 1557)  Resp: 13 (06/26/24 1557)  BP: (!) 164/68 (06/26/24 1557)  SpO2: 97 % (06/26/24 1557) Vital Signs (24h Range):  Temp:  [97.7 °F (36.5 °C)-98.7 °F (37.1 °C)] 97.7 °F (36.5 °C)  Pulse:  [51-70] 56  Resp:  [12-20] 13  SpO2:  [96 %-100 %] 97 %  BP: (133-208)/() 164/68     Weight: 98.4 kg (217 lb)  Body mass index is 38.44 kg/m².  No intake or output data in the 24 hours ending 06/26/24 1622      Physical Exam  Vitals and nursing note reviewed.   Constitutional:       General: She is not in acute distress.     Appearance: She is well-developed. She is not diaphoretic.   HENT:      Head: Normocephalic and atraumatic.      Nose: Nose normal.   Eyes:      General: No scleral icterus.     Conjunctiva/sclera: Conjunctivae normal.   Neck:      Trachea: No tracheal deviation.   Cardiovascular:      Rate and Rhythm: Normal rate and regular rhythm.      Heart sounds: Normal heart sounds. No murmur heard.     No friction rub. No gallop.   Pulmonary:      Effort: Pulmonary effort is normal. No respiratory distress.      Breath sounds: No stridor. Rales present. No wheezing.   Chest:      Chest wall: No tenderness.   Abdominal:      General: Bowel sounds are normal. There is no distension.      Palpations: Abdomen is soft. There is no mass.      Tenderness: There is no abdominal tenderness. There is no guarding or rebound.   Musculoskeletal:         General: No tenderness or deformity. Normal range of motion.      Cervical back: Normal range of motion and neck supple.   Skin:     General: Skin is warm and dry.      Coloration: Skin is not pale.      Findings: No erythema or rash.   Neurological:      Mental Status: She is alert and oriented to person, place, and time.      Cranial Nerves: No cranial nerve deficit.      Motor: No abnormal muscle tone.      Coordination: Coordination normal.   Psychiatric:         Behavior:  Behavior normal.         Thought Content: Thought content normal.             Significant Labs: All pertinent labs within the past 24 hours have been reviewed.    Significant Imaging: I have reviewed all pertinent imaging results/findings within the past 24 hours.    Assessment/Plan:      * NSTEMI (non-ST elevated myocardial infarction)  Patient presents with NSTEMI. Chest pain is currently controlled. CONY score is 4. Patient is currently on NSTEMI Pathway.    EKG reviewed. Troponins reviewed and results noted-   Recent Labs   Lab 06/26/24  1513   TROPONINI 0.065*     .     Lipid panel reviewed and shows-     Lab Results   Component Value Date    LDLCALC 88.6 04/05/2024     Lab Results   Component Value Date    TRIG 152 (H) 04/05/2024         Medical management includes; Beta Blocker, Anticoagulation, and Nitrate Echo has not been performed. Latest ECHO results are as follows- Results for orders placed during the hospital encounter of 10/27/23    Echo    Interpretation Summary    Left Ventricle: The left ventricle is normal in size. Ventricular mass is normal. Mildly increased wall thickness. There is concentric remodeling. Septal motion is consistent with post-operative status. There is normal systolic function with a visually estimated ejection fraction of 55 - 60%. There is normal diastolic function.    Right Ventricle: Normal right ventricular cavity size. Wall thickness is normal. Right ventricle wall motion  is normal. Systolic function is normal.    Aortic Valve: There is a transcatheter valve replacement in the aortic position that is appropriately positioned. Aortic valve area by VTI is 1.14 cm². Aortic valve peak velocity is 2.13 m/s. Mean gradient is 9 mmHg. The dimensionless index is 0.52. Mild paravalvular regurgitation.    Mitral Valve: There is mild mitral annular calcification present. There is mild regurgitation.  .   Consult for cardiac rehab is ordered. Patient counseled on lifestyle  "modifications- follow a low fat, low cholesterol diet, attempt to lose weight, decrease or avoid alcohol intake, reduce salt in diet and cooking, reduce exposure to stress, improve dietary compliance, continue current medications, continue current healthy lifestyle patterns, and return for routine annual checkups. Cardiology is consulted. Plan of care reviewed with cardiology team. Continue to monitor patient closely and adjust therapy as needed.    Echo and Stress test pending   Cont IV heparin, BB, ASA, Plavix.   Allergy to Statin     Type 2 diabetes mellitus with diabetic neuropathy, without long-term current use of insulin  Patient's FSGs are controlled on current medication regimen.  Last A1c reviewed-   Lab Results   Component Value Date    HGBA1C 6.0 (H) 04/05/2024     Most recent fingerstick glucose reviewed- No results for input(s): "POCTGLUCOSE" in the last 24 hours.  Current correctional scale  Low  Maintain anti-hyperglycemic dose as follows-   Antihyperglycemics (From admission, onward)      None          Plan:  -SSI  -A1c  -Accu-checks  -Hold oral hypoglycemics while patient is in the hospital  -Continue home long-acting insulin at 20% decrease, titrate up as needed  -Hypoglycemic protocol          Chronic diastolic heart failure  Patient is identified as having Diastolic (HFpEF) heart failure that is Chronic. CHF is currently controlled. Latest ECHO performed and demonstrates- Results for orders placed during the hospital encounter of 10/27/23    Echo    Interpretation Summary    Left Ventricle: The left ventricle is normal in size. Ventricular mass is normal. Mildly increased wall thickness. There is concentric remodeling. Septal motion is consistent with post-operative status. There is normal systolic function with a visually estimated ejection fraction of 55 - 60%. There is normal diastolic function.    Right Ventricle: Normal right ventricular cavity size. Wall thickness is normal. Right ventricle " wall motion  is normal. Systolic function is normal.    Aortic Valve: There is a transcatheter valve replacement in the aortic position that is appropriately positioned. Aortic valve area by VTI is 1.14 cm². Aortic valve peak velocity is 2.13 m/s. Mean gradient is 9 mmHg. The dimensionless index is 0.52. Mild paravalvular regurgitation.    Mitral Valve: There is mild mitral annular calcification present. There is mild regurgitation.  . Continue Beta Blocker Furosemide and monitor clinical status closely. Monitor on telemetry. Patient is off CHF pathway.  Monitor strict Is&Os and daily weights.  Place on fluid restriction of 1.5 L. Continue to stress to patient importance of self efficacy and  on diet for CHF. Last BNP reviewed- and noted below   Recent Labs   Lab 06/25/24  2221   *   .            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 Has not received any PRBCs to date. Etiology likely d/t  combined folic acid and iron deficiency  Current CBC reviewed-   Lab Results   Component Value Date    HGB 11.5 (L) 06/26/2024    HCT 35.2 (L) 06/26/2024     Monitor serial CBC and transfuse if patient becomes hemodynamically unstable, symptomatic or H/H drops below 7/21.  Continue folic acid and iron supplement.    S/P TAVR (transcatheter aortic valve replacement)  See treatment above      Iron deficiency anemia due to chronic blood loss  Patient's anemia is currently controlled. Has not received any PRBCs to date. Etiology likely d/t Iron deficiency  Current CBC reviewed-   Lab Results   Component Value Date    HGB 11.5 (L) 06/26/2024    HCT 35.2 (L) 06/26/2024     Monitor serial CBC and transfuse if patient becomes hemodynamically unstable, symptomatic or H/H drops below 7/21.  Continue iron supplement    S/P CABG (coronary artery bypass graft)  Patient with known CAD s/p stent placement and CABG, which is controlled Will continue ASA and Plavix and monitor for S/Sx of  angina/ACS. Continue to monitor on telemetry.         Essential hypertension  Chronic, controlled. Latest blood pressure and vitals reviewed-     Temp:  [97.7 °F (36.5 °C)-98.7 °F (37.1 °C)]   Pulse:  [51-70]   Resp:  [12-20]   BP: (133-208)/()   SpO2:  [96 %-100 %] .   Home meds for hypertension were reviewed and noted below.   Hypertension Medications               amLODIPine (NORVASC) 2.5 MG tablet Take 1 tablet (2.5 mg total) by mouth once daily.    furosemide (LASIX) 20 MG tablet TAKE 1 TABLET TWICE A DAY    metoprolol succinate (TOPROL-XL) 100 MG 24 hr tablet TAKE 1 TABLET TWICE A DAY    nitroGLYCERIN (NITROSTAT) 0.4 MG SL tablet DISSOLVE 1 TABLET UNDER THE TONGUE EVERY 5 MINUTES AS NEEDED FOR CHEST PAIN    spironolactone (ALDACTONE) 25 MG tablet TAKE 1 TABLET DAILY            While in the hospital, will manage blood pressure as follows; Continue home antihypertensive regimen  Will utilize p.r.n. blood pressure medication only if patient's blood pressure greater than 160/100 and she develops symptoms such as worsening chest pain or shortness of breath.    Amlodipine dose increased   IV Lasix x one dose     Hypothyroidism  Patient has chronic hypothyroidism. TFTs reviewed-   Lab Results   Component Value Date    TSH 1.770 04/19/2024   . Will continue chronic levothyroxine and adjust for and clinical changes.        Hyperlipidemia  Patient is chronically on statin.will continue for now. Last Lipid Panel:   Lab Results   Component Value Date    CHOL 154 04/05/2024    HDL 35 (L) 04/05/2024    LDLCALC 88.6 04/05/2024    TRIG 152 (H) 04/05/2024    CHOLHDL 22.7 04/05/2024     Plan:  -Continue home medication  -low fat/low calorie diet          VTE Risk Mitigation (From admission, onward)           Ordered     IP VTE HIGH RISK PATIENT  Once         06/26/24 0017     Place sequential compression device  Until discontinued         06/26/24 0017     heparin 25,000 units in dextrose 5% (100 units/ml) IV bolus from  bag LOW INTENSITY nomogram - [facility]  As needed (PRN)        Question:  Heparin Infusion Adjustment (DO NOT MODIFY ANSWER)  Answer:  [email or web address]\epic\Images\Pharmacy\HeparinInfusions\heparin LOW INTENSITY nomogram for [facility] [id number]    06/25/24 2249     heparin 25,000 units in dextrose 5% (100 units/ml) IV bolus from bag LOW INTENSITY nomogram - [facility]  As needed (PRN)        Question:  Heparin Infusion Adjustment (DO NOT MODIFY ANSWER)  Answer:  [email or web address]\epic\Images\Pharmacy\HeparinInfusions\heparin LOW INTENSITY nomogram for [facility] [id number]    06/25/24 2249     heparin 25,000 units in dextrose 5% 250 mL (100 units/mL) infusion LOW INTENSITY nomogram - [facility]  Continuous        Question:  Begin at (units/kg/hr)  Answer:  12    06/25/24 2249                    Discharge Planning   DEMI:      Code Status: Full Code   Is the patient medically ready for discharge?:     Reason for patient still in hospital (select all that apply): Patient trending condition  Discharge Plan A: Home Health                  Karla Cook NP  Department of Hospital Medicine   '[place] - Telemetry ([facility])

## 2024-06-26 NOTE — ASSESSMENT & PLAN NOTE
Patient has chronic hypothyroidism. TFTs reviewed-   Lab Results   Component Value Date    TSH 1.770 04/19/2024   . Will continue chronic levothyroxine and adjust for and clinical changes.

## 2024-06-26 NOTE — ED PROVIDER NOTES
"Emergency Medicine Provider Note - 6/25/2024       SCRIBE NOTE: I, Me-Michel Pinzon, am scribing for, and in the presence of, Alysha Archuleta DO.     History     Chief Complaint   Patient presents with    Chest Pain     Pt reports mid sternal chest tightness that started yesterday, sent by MD for elvated Trop.       Allergies:  Review of patient's allergies indicates:   Allergen Reactions    Adhesive Nausea And Vomiting     EKG Electrodes    Aspirin-dipyridamole Other (See Comments)     headaches  Other reaction(s): Not Indicated  Other reaction(s): unknown    Butorphanol      Other reaction(s): Not Indicated  Other reaction(s): cardiac arrest    Citalopram Anaphylaxis     Other reaction(s): Not Indicated  Other reaction(s): unknown    Clindamycin Itching and Swelling     Other reaction(s): Not Indicated  Other reaction(s): unknown    Codeine Shortness Of Breath, Itching and Swelling     Other reaction(s): Not Indicated  Other reaction(s): hives/trouble breathing/"possible cardiac arrest"    Ezetimibe      Other reaction(s): Not Indicated  Other reaction(s): unknown    Hydrocodone-acetaminophen Shortness Of Breath     Other reaction(s): Not Indicated  Other reaction(s): unknown    Isosorbide Other (See Comments)     Severe headache  Other reaction(s): Not Indicated  Other reaction(s): arrhythmia    Kiwi (actinidia chinensis) Blisters     Oral  Blisters/burning    Lisinopril Anaphylaxis     Other reaction(s): Not Indicated  Other reaction(s): unknown    Magnesium citrate Shortness Of Breath     Other reaction(s): Not Indicated    Meloxicam      Other reaction(s): Not Indicated  Other reaction(s): Caused Acute renal failure    Methylprednisolone Other (See Comments)     Patient reports taking IV in the past without any issues. Reports allergy to oral preparation   Other reaction(s): Not Indicated  Other reaction(s): unknown    Metronidazole Nausea And Vomiting    Misoprostol Anaphylaxis and Other (See Comments)     " "Difficulty breathing  Other reaction(s): Not Indicated  Other reaction(s): unknown    Morphine      Other reaction(s): Not Indicated  Other reaction(s): trouble breathing/"possible cardiac arrest"    Nedocromil      Other reaction(s): Not Indicated  Other reaction(s): unknown    Neuromuscular blockers, steroidal Other (See Comments)    Pentazocine lactate      Other reaction(s): Not Indicated  Other reaction(s): unknown    Ranolazine Nausea And Vomiting     Other reaction(s): Not Indicated  Other reaction(s): PVC's/ SOB    Rosuvastatin Anaphylaxis     Other reaction(s): Not Indicated  Other reaction(s): gastric sleeve    Stadol [butorphanol tartrate] Anaphylaxis     Coded    Sulfa (sulfonamide antibiotics) Other (See Comments)     unknown  Other reaction(s): hives/trouble breathing    Tetracyclines      Other reaction(s): unknown    Triamcinolone acetonide      Other reaction(s): Not Indicated  Other reaction(s): unknown    Zoledronic acid-mannitol-water Other (See Comments)     Bones hurt  Other reaction(s): Not Indicated  Other reaction(s): unknown    Hydromorphone Hallucinations    Azithromycin      Other reaction(s): Not Indicated    Cleocin [clindamycin hcl]     Meperidine      Other reaction(s): Not Indicated    Moxifloxacin      PATIENT STATES DO NOT GIVE UNDER ANY CIRCUSTANCES  Other reaction(s): Not Indicated    Nitroglycerin      Long acting  Other reaction(s): Not Indicated    Pholcodine     Prednisone      GASTRIC PAIN  Other reaction(s): Not Indicated    Tetracycline         History of Present Illness   HPI    6/25/2024, 10:33 PM  The history is provided by the patient    Qi Ortiz is a 74 y.o. female with a PMHx of carotid stenosis, CVA, hyperlipidemia, hypertension, hypothyroid, osteoporosis, psoriatic arthritis, and TIA presents to the ED for chest discomfort which onset yesterday. Pt has been complaining of an uncomfortable numbness to her chest that lasted from 2 PM yesterday to 6 PM " yesterday. She rates the uncomfortableness a 4/10 in severity. This was associated with a tingling sensation to her hands and states that it radiated to her shoulder blades. Pt states that the discomfort resolved after she laid down at home. Pt saw Dr. Philippe MD (Cardiology), yesterday and had troponin ordered outpatient. Troponin 0.098.  Patient asked to present to the ED. Chest discomfort returned today, prior to arrival. Pt states that symptoms are constant and are also a 4/10 in severity. No mitigating or exacerbating factors reported. Patient denies any fever,  chest pain, SOB, cough, nausea, vomiting, diarrhea, blood in stool, and all other sxs at this time. Pt took a baby aspirin today at 10 AM. No further complaints or concerns at this time.       Arrival mode: Private Vehicle     PCP: Lisa Porter MD     Past Medical History:  Past Medical History:   Diagnosis Date    Carotid stenosis     19%    Cellulitis and abscess of foot, except toes 06/22/2022    Cerumen debris on tympanic membrane of right ear 11/30/2022    Coronary artery disease     CVA (cerebral vascular accident)     Dr. 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 Hope    Psoriatic arthritis     Rheumatology    Transient ischemic attack (TIA) 01/02/2019       Past Surgical History:  Past Surgical History:   Procedure Laterality Date    BREAST BIOPSY      R sided/benign    CARDIAC SURGERY      sept 28 2016    CERVICAL FUSION      CHOLECYSTECTOMY      CORONARY ANGIOPLASTY      CORONARY ARTERY BYPASS GRAFT      triple bypass    CORONARY STENT PLACEMENT      EYE SURGERY      INTRAUTERINE DEVICE INSERTION      LEFT HEART CATHETERIZATION Left 9/8/2020    Procedure: CATHETERIZATION,  HEART, LEFT;  Surgeon: Veronica Ibarra MD;  Location[facility] CATH LAB;  Service: Cardiology;  Laterality: Left;  7am start time    mass removed from R groin      TOTAL ABDOMINAL HYSTERECTOMY W/ BILATERAL SALPINGOOPHORECTOMY      due to benign mass, adhesions    TUBAL LIGATION           Family History:  Family History   Problem Relation Name Age of Onset    Breast cancer Maternal Grandfather      Breast cancer Paternal Aunt      Stroke Unknown      Breast cancer Sister  60    Leukemia Sister  8         as child    Lung cancer Paternal Grandfather      Heart disease Unknown      Diabetes Daughter a        Social History:  Social History     Tobacco Use    Smoking status: Never    Smokeless tobacco: Never   Substance and Sexual Activity    Alcohol use: No    Drug use: No    Sexual activity: Not Currently     Partners: Male        Review of Systems   Review of Systems   Constitutional:  Negative for fever.   HENT:  Negative for sore throat.    Respiratory:  Positive for chest tightness (chest discomfort/uncomfortableness with numbness). Negative for cough and shortness of breath.    Cardiovascular:  Negative for chest pain.   Gastrointestinal:  Negative for blood in stool, diarrhea, nausea and vomiting.   Genitourinary:  Negative for dysuria.   Musculoskeletal:  Negative for back pain.   Skin:  Negative for rash.   Neurological:  Negative for weakness.   Hematological:  Does not bruise/bleed easily.   All other systems reviewed and are negative.       Physical Exam     Initial Vitals [24 2157]   BP Pulse Resp Temp SpO2   (!) 208/92 68 20 98.4 °F (36.9 °C) 96 %      MAP       --          Physical Exam    Nursing Notes and Vital Signs Reviewed.  Constitutional: Patient is in no acute distress. Well-developed and well-nourished.  Head: Atraumatic. Normocephalic.  Eyes: PERRL. EOM intact. Conjunctivae are not pale. No scleral icterus.  ENT: Mucous membranes are moist. Oropharynx is clear and symmetric.    Neck:  Supple. Full ROM. No lymphadenopathy.  Cardiovascular: Regular rate. Regular rhythm. No murmurs, rubs, or gallops. Distal pulses are 2+ and symmetric.  Pulmonary/Chest: No respiratory distress. Clear to auscultation bilaterally. No wheezing or rales.  Abdominal: Soft and non-distended.  There is no tenderness.  No rebound, guarding, or rigidity. Good bowel sounds.  Musculoskeletal: Moves all extremities. No obvious deformities. No edema. No calf tenderness.  Skin: Warm and dry.  Neurological:  Alert, awake, and appropriate.  Normal speech.  No acute focal neurological deficits are appreciated.  Psychiatric: Normal affect. Good eye contact. Appropriate in content.     ED Course   ED Procedures:  Critical Care    Date/Time: 6/25/2024 10:33 PM    Performed by: Alysha Archuleta DO  Authorized by: David Jackson MD  Direct patient critical care time: 17 minutes  Additional history critical care time: 2 minutes  Ordering / reviewing critical care time: 4 minutes  Documentation critical care time: 7 minutes  Consulting other physicians critical care time: 6 minutes  Total critical care time (exclusive of procedural time) : 36 minutes  Critical care time was exclusive of separately billable procedures and treating other patients.  Critical care was necessary to treat or prevent imminent or life-threatening deterioration of the following conditions: cardiac failure.  Critical care was time spent personally by me on the following activities: blood draw for specimens, development of treatment plan with patient or surrogate, discussions with consultants, interpretation of cardiac output measurements, evaluation of patient's response to treatment, examination of patient, obtaining history from patient or surrogate, ordering and performing treatments and interventions, ordering and review of laboratory studies, ordering and review of radiographic studies, pulse oximetry, re-evaluation of patient's condition and vascular  access procedures.          ED Vital Signs:  Vitals:    06/25/24 2157 06/25/24 2242 06/25/24 2248 06/25/24 2311   BP: (!) 208/92 (!) 175/70 (!) 182/85    Pulse: 68 (!) 56 (!) 57    Resp: 20 19 12    Temp: 98.4 °F (36.9 °C)      TempSrc: Oral      SpO2: 96% 97% 96%    Weight:    99.8 kg (220 lb)    06/26/24 0102   BP: (!) 161/93   Pulse: (!) 51   Resp: 12   Temp:    TempSrc:    SpO2: 97%   Weight:        Abnormal Lab Results:  Labs Reviewed   CBC W/ AUTO DIFFERENTIAL - Abnormal; Notable for the following components:       Result Value    RDW 14.8 (*)     All other components within normal limits   COMPREHENSIVE METABOLIC PANEL - Abnormal; Notable for the following components:    Potassium 3.4 (*)     Albumin 3.3 (*)     Alkaline Phosphatase 54 (*)     eGFR 48 (*)     All other components within normal limits   TROPONIN I - Abnormal; Notable for the following components:    Troponin I 0.074 (*)     All other components within normal limits   B-TYPE NATRIURETIC PEPTIDE - Abnormal; Notable for the following components:     (*)     All other components within normal limits   PROTIME-INR   APTT   MAGNESIUM   MAGNESIUM   CBC W/ AUTO DIFFERENTIAL   TROPONIN I   TYPE & SCREEN        All Lab Results:  Results for orders placed or performed during the hospital encounter of 06/25/24   CBC auto differential   Result Value Ref Range    WBC 8.14 3.90 - 12.70 K/uL    RBC 4.06 4.00 - 5.40 M/uL    Hemoglobin 12.2 12.0 - 16.0 g/dL    Hematocrit 37.2 37.0 - 48.5 %    MCV 92 82 - 98 fL    MCH 30.0 27.0 - 31.0 pg    MCHC 32.8 32.0 - 36.0 g/dL    RDW 14.8 (H) 11.5 - 14.5 %    Platelets 214 150 - 450 K/uL    MPV 10.2 9.2 - 12.9 fL    Immature Granulocytes 0.4 0.0 - 0.5 %    Gran # (ANC) 5.6 1.8 - 7.7 K/uL    Immature Grans (Abs) 0.03 0.00 - 0.04 K/uL    Lymph # 1.6 1.0 - 4.8 K/uL    Mono # 0.7 0.3 - 1.0 K/uL    Eos # 0.2 0.0 - 0.5 K/uL    Baso # 0.04 0.00 - 0.20 K/uL    nRBC 0 0 /100 WBC    Gran % 69.2 38.0 - 73.0 %    Lymph % 19.0  18.0 - 48.0 %    Mono % 9.1 4.0 - 15.0 %    Eosinophil % 1.8 0.0 - 8.0 %    Basophil % 0.5 0.0 - 1.9 %    Differential Method Automated    Comprehensive metabolic panel   Result Value Ref Range    Sodium 145 136 - 145 mmol/L    Potassium 3.4 (L) 3.5 - 5.1 mmol/L    Chloride 104 95 - 110 mmol/L    CO2 29 23 - 29 mmol/L    Glucose 101 70 - 110 mg/dL    BUN 18 8 - 23 mg/dL    Creatinine 1.2 0.5 - 1.4 mg/dL    Calcium 9.5 8.7 - 10.5 mg/dL    Total Protein 6.6 6.0 - 8.4 g/dL    Albumin 3.3 (L) 3.5 - 5.2 g/dL    Total Bilirubin 0.4 0.1 - 1.0 mg/dL    Alkaline Phosphatase 54 (L) 55 - 135 U/L    AST 17 10 - 40 U/L    ALT 12 10 - 44 U/L    eGFR 48 (A) >60 mL/min/1.73 m^2    Anion Gap 12 8 - 16 mmol/L   Troponin I #1   Result Value Ref Range    Troponin I 0.074 (H) 0.000 - 0.026 ng/mL   BNP   Result Value Ref Range     (H) 0 - 99 pg/mL   Protime-INR   Result Value Ref Range    Prothrombin Time 10.9 9.0 - 12.5 sec    INR 1.0 0.8 - 1.2   APTT   Result Value Ref Range    aPTT 27.5 21.0 - 32.0 sec   Magnesium   Result Value Ref Range    Magnesium 1.7 1.6 - 2.6 mg/dL     *Note: Due to a large number of results and/or encounters for the requested time period, some results have not been displayed. A complete set of results can be found in Results Review.       Reviewed Prior Labs:   Latest Reference Range & Units 06/24/24 14:57   Troponin I 0.000 - 0.026 ng/mL 0.098 (H)       Imaging Results:  Imaging Results              X-Ray Chest AP Portable (Final result)  Result time 06/25/24 22:24:47      Final result by Meaghan Vaughn MD (06/25/24 22:24:47)                   Impression:      no active pulmonary finding      Electronically signed by: Meaghan Vaughn  Date:    06/25/2024  Time:    22:24               Narrative:    EXAMINATION:  XR CHEST AP PORTABLE    CLINICAL HISTORY:  Chest Pain;    TECHNIQUE:  Single frontal portable view of the chest was performed.    COMPARISON:  None    FINDINGS:  No pulmonary  consolidation pleural effusion or convincing pneumothorax                                       Type of Interpretation: ED Physician (Independently Interpreted).  Radiology Procedure Done: Portable CXR.  Interpretation: Postoperative changes.  No infiltrate          The Emergency Provider reviewed the vital signs and test results, which are outlined above.     ED Discussion   ED Medication(s):  Medications   heparin 25,000 units in dextrose 5% 250 mL (100 units/mL) infusion LOW INTENSITY nomogram - [facility] (12 Units/kg/hr × 71.4 kg (Adjusted) Intravenous New Bag 6/25/24 2334)   heparin 25,000 units in dextrose 5% (100 units/ml) IV bolus from bag LOW INTENSITY nomogram - [facility] (has no administration in time range)   heparin 25,000 units in dextrose 5% (100 units/ml) IV bolus from bag LOW INTENSITY nomogram - [facility] (has no administration in time range)   nitroGLYCERIN 2% TD oint ointment 1 inch (1 inch Transdermal Not Given 6/25/24 2301)   nitroGLYCERIN SL tablet 0.4 mg (has no administration in time range)   aspirin chewable tablet 81 mg (has no administration in time range)   aspirin chewable tablet 324 mg (324 mg Oral Given 6/25/24 2247)   heparin 25,000 units in dextrose 5% (100 units/ml) IV bolus from bag LOW INTENSITY nomogram - [facility] (3,990 Units Intravenous Bolus from Bag 6/25/24 2335)   amLODIPine tablet 5 mg (5 mg Oral Given 6/25/24 2302)   furosemide injection 20 mg (20 mg Intravenous Given 6/25/24 2302)       ED Course as of 06/26/24 0138 Tue Jun 25, 2024 2324 BNP(!): 535 [LB]   2324 Troponin I(!): 0.074 [LB]   2352 Observation:   Med/tele. Dr. Jackson [LB]      ED Course User Index  [LB] Alysha Archuleta DO       10:42 PM: Discussed patient's case with Dr. Idalmis Cohen MD (Cardiology).  He reviewed patient's EKGs and states no STEMI and EKG is unchanged from the past. Recommends to trend troponin, heparin  drip, nitro patch, IV Lasix, and echo in the morning. Also recommends to give NORVASC 5 mg tonight  and NPO after midnight. Pt can resume home PO BP medications.     11:41 PM: Discussed case with Edvin Jackson NP ([facility]). Dr. David Jackson MD agrees with current care and management of pt and accepts admission.   Admitting Service: Hospital Medicine  Admitting Physician: Dr. Jackson  Admit to: Inpatient Med Tele    11:51 PM: Re-evaluated pt. I have discussed test results, shared treatment plan, and the need for admission with patient and family at bedside. Pt and family express understanding at this time and agree with all information. All questions answered. Pt and family have no further questions or concerns at this time. Pt is ready for admit.         MIPS Measures     Smoker? No     Hypertension: History of Hypertension: The patient has elevated blood pressure (higher than 120/80) while being treated in the ED but has a history of hypertension.       Medical Decision Making                 Medical Decision Making  Differential Diagnoses: Based on the available information and the initial assessment, the working DIFFERENTIAL DIAGNOSIS considered during this evaluation included, but not limited to: Acute Coronary Syndrome, Musculoskeletal Pain, Pericarditis, Pneumonia, and Pneumothorax    ED course: Patient was placed on monitor.  EKG: No ST segment elevation.  Secure chat with on-call cardiology-Dr. ROCCO Cohen:  No ST segment elevation noted on EKG.  WBC 8.4.  H/H normal.  Platelet count 214.  Potassium 3.4.  BUN 18.  Creatinine 1.2.  .  Magnesium 1.7.  Chest x-ray: No infiltrate or pleural effusion.   Troponin 0.074.  Previous yesterday on June 24th was 0.098.  Secure chat with Cardiology recommended Lasix, aspirin, Norvasc, heparin, NPO after midnight, echocardiogram.  These recommendations were shared with Hospital Medicine.  Patient placed in observation            Amount and/or Complexity of Data Reviewed  External Data Reviewed: labs.     Details: See ED course.   Labs:  ordered. Decision-making details documented in ED Course.  Radiology: ordered and independent interpretation performed. Decision-making details documented in ED Course.  ECG/medicine tests: ordered and independent interpretation performed. Decision-making details documented in ED Course.    Risk  OTC drugs.  Prescription drug management.  Drug therapy requiring intensive monitoring for toxicity.  Decision regarding hospitalization.    Critical Care  Total time providing critical care: 36 minutes        Coding    Prescription Management: I performed a review of the patient's current Rx medication list as input by nursing staff.    Current Discharge Medication List        CONTINUE these medications which have NOT CHANGED    Details   acetaminophen (TYLENOL) 650 MG TbSR as directed      albuterol (PROVENTIL) 2.5 mg /3 mL (0.083 %) nebulizer solution Take 3 mLs (2.5 mg total) by nebulization every 6 (six) hours as needed for Wheezing. Rescue  Qty: 30 each, Refills: 3      allopurinoL (ZYLOPRIM) 100 MG tablet TAKE 2 TABLETS ONCE DAILY  Qty: 180 tablet, Refills: 3      amLODIPine (NORVASC) 2.5 MG tablet Take 1 tablet (2.5 mg total) by mouth once daily.  Qty: 30 tablet, Refills: 11    Comments: .      aspirin 81 MG Chew Take 1 tablet (81 mg total) by mouth once daily.  Qty: 90 tablet, Refills: 3    Associated Diagnoses: Coronary artery disease of native artery of native heart with stable angina pectoris      calcipotriene (DOVONOX) 0.005 % cream Apply topically 2 (two) times daily.  Qty: 120 g, Refills: 0    Associated Diagnoses: Psoriasis      calcium carbonate 650 mg calcium (1,625 mg) tablet Take 1 tablet by mouth once daily.      clopidogreL (PLAVIX) 75 mg tablet TAKE 1 TABLET ONCE AS DIRECTED  Qty: 90 tablet, Refills: 3    Associated Diagnoses: Coronary artery disease of native artery of native heart with stable angina pectoris      !! docusate sodium (COLACE) 100 MG capsule Take 1 capsule (100 mg total) by mouth 2  (two) times daily.  Qty: 60 capsule, Refills: 0      !! docusate sodium (COLACE) 100 MG capsule Colace Take No date recorded No form recorded No frequency recorded No route recorded No set duration recorded No set duration amount recorded active No dosage strength recorded No dosage strength units of measure recorded      folic acid (FOLVITE) 1 MG tablet TAKE 1 TABLET DAILY  Qty: 90 tablet, Refills: 3    Associated Diagnoses: Folic acid deficiency      furosemide (LASIX) 20 MG tablet TAKE 1 TABLET TWICE A DAY  Qty: 180 tablet, Refills: 3    Associated Diagnoses: Coronary artery disease of native artery of native heart with stable angina pectoris      garlic 2,000 mg Cap Take 3,000 mg by mouth once daily.       levothyroxine (SYNTHROID) 100 MCG tablet TAKE 1 TABLET BEFORE BREAKFAST  Qty: 90 tablet, Refills: 3    Associated Diagnoses: Hypothyroidism, unspecified type      metoprolol succinate (TOPROL-XL) 100 MG 24 hr tablet TAKE 1 TABLET TWICE A DAY  Qty: 180 tablet, Refills: 3    Comments: .      nitroGLYCERIN (NITROSTAT) 0.4 MG SL tablet DISSOLVE 1 TABLET UNDER THE TONGUE EVERY 5 MINUTES AS NEEDED FOR CHEST PAIN  Qty: 75 tablet, Refills: 3    Associated Diagnoses: Precordial pain      nitroGLYCERIN 0.2 mg/hr TD PT24 (NITRODUR) 0.2 mg/hr Place 1 patch onto the skin once daily.  Qty: 30 patch, Refills: 11      potassium chloride SA (K-DUR,KLOR-CON M) 10 MEQ tablet Take 1 tablet (10 mEq total) by mouth once daily.  Qty: 30 tablet, Refills: 5      pyridoxine, vitamin B6, (B-6) 100 MG Tab Take 200 mg by mouth once daily.       REPATHA SURECLICK 140 mg/mL PnIj INJECT 1 ML (140 MG) UNDER THE SKIN EVERY 14 DAYS  Qty: 6 mL, Refills: 3      spironolactone (ALDACTONE) 25 MG tablet TAKE 1 TABLET DAILY  Qty: 90 tablet, Refills: 3    Comments: .  Associated Diagnoses: Essential hypertension      vitamin D 1000 units Tab Take 185 mg by mouth once daily.       !! - Potential duplicate medications found. Please discuss with  "provider.           Discussed case with:Hospital Medicine and Cardiology      Portions of this note may have been created with voice recognition software. Occasional "wrong-word" or "sound-a-like" substitutions may have occurred due to the inherent limitations of voice recognition software. Please, read the note carefully and recognize, using context, where substitutions have occurred.          Clinical Impression       ICD-10-CM ICD-9-CM   1. NSTEMI (non-ST elevated myocardial infarction)  I21.4 410.70   2. Chest pain  R07.9 786.50   3. Elevated troponin  R79.89 790.6                     ED Disposition     Disposition: Admit to Inpatient Med Tele  Patient condition:  Stable    Scribe Attestation:   Scribe #1: I performed the above scribed service and the documentation accurately describes the services I performed. I attest to the accuracy of the note.     Attending:   Physician Attestation Statement for Scribe #1: I, Alysha Archuleta, , personally performed the services described in this documentation, as scribed by Ana Pinzon, in my presence, and it is both accurate and complete.                 Alysha Archuleta DO  06/26/24 0139    "

## 2024-06-26 NOTE — ASSESSMENT & PLAN NOTE
Troponin peaked 0.98 trending down  Atypical chest pains  Hept gtt  Nuc stress  EKG with no acute dynamic changes noted

## 2024-06-26 NOTE — SUBJECTIVE & OBJECTIVE
Past Medical History:   Diagnosis Date    Carotid stenosis     19%    Cellulitis and abscess of foot, except toes 06/22/2022    Cerumen debris on tympanic membrane of right ear 11/30/2022    Coronary artery disease     CVA (cerebral vascular accident)     Dr. 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 Hope    Psoriatic arthritis     Rheumatology    Transient ischemic attack (TIA) 01/02/2019       Past Surgical History:   Procedure Laterality Date    BREAST BIOPSY      R sided/benign    CARDIAC SURGERY      sept 28 2016    CERVICAL FUSION      CHOLECYSTECTOMY      CORONARY ANGIOPLASTY      CORONARY ARTERY BYPASS GRAFT      triple bypass    CORONARY STENT PLACEMENT      EYE SURGERY      INTRAUTERINE DEVICE INSERTION      LEFT HEART CATHETERIZATION Left 9/8/2020    Procedure: CATHETERIZATION, HEART, LEFT;  Surgeon: Veronica Ibarra MD;  Location: [facility] CATH LAB;  Service: Cardiology;  Laterality: Left;  7am start time    mass removed from R groin      TOTAL ABDOMINAL HYSTERECTOMY W/ BILATERAL SALPINGOOPHORECTOMY      due to benign mass, adhesions    TUBAL LIGATION         Review of patient's allergies indicates:   Allergen Reactions    Adhesive Nausea And Vomiting     EKG Electrodes    Aspirin-dipyridamole Other (See Comments)     headaches  Other reaction(s): Not Indicated  Other reaction(s): unknown    Butorphanol      Other reaction(s): Not Indicated  Other reaction(s): cardiac arrest    Citalopram Anaphylaxis     Other reaction(s): Not Indicated  Other reaction(s): unknown    Clindamycin Itching and Swelling     Other reaction(s): Not Indicated  Other reaction(s): unknown    Codeine Shortness Of Breath, Itching and Swelling     Other reaction(s): Not  "Indicated  Other reaction(s): hives/trouble breathing/"possible cardiac arrest"    Ezetimibe      Other reaction(s): Not Indicated  Other reaction(s): unknown    Hydrocodone-acetaminophen Shortness Of Breath     Other reaction(s): Not Indicated  Other reaction(s): unknown    Isosorbide Other (See Comments)     Severe headache  Other reaction(s): Not Indicated  Other reaction(s): arrhythmia    Kiwi (actinidia chinensis) Blisters     Oral  Blisters/burning    Lisinopril Anaphylaxis     Other reaction(s): Not Indicated  Other reaction(s): unknown    Magnesium citrate Shortness Of Breath     Other reaction(s): Not Indicated    Meloxicam      Other reaction(s): Not Indicated  Other reaction(s): Caused Acute renal failure    Methylprednisolone Other (See Comments)     Patient reports taking IV in the past without any issues. Reports allergy to oral preparation   Other reaction(s): Not Indicated  Other reaction(s): unknown    Metronidazole Nausea And Vomiting    Misoprostol Anaphylaxis and Other (See Comments)     Difficulty breathing  Other reaction(s): Not Indicated  Other reaction(s): unknown    Morphine      Other reaction(s): Not Indicated  Other reaction(s): trouble breathing/"possible cardiac arrest"    Nedocromil      Other reaction(s): Not Indicated  Other reaction(s): unknown    Neuromuscular blockers, steroidal Other (See Comments)    Pentazocine lactate      Other reaction(s): Not Indicated  Other reaction(s): unknown    Ranolazine Nausea And Vomiting     Other reaction(s): Not Indicated  Other reaction(s): PVC's/ SOB    Rosuvastatin Anaphylaxis     Other reaction(s): Not Indicated  Other reaction(s): gastric sleeve    Stadol [butorphanol tartrate] Anaphylaxis     Coded    Sulfa (sulfonamide antibiotics) Other (See Comments)     unknown  Other reaction(s): hives/trouble breathing    Tetracyclines      Other reaction(s): unknown    Triamcinolone acetonide      Other reaction(s): Not Indicated  Other reaction(s): " unknown    Zoledronic acid-mannitol-water Other (See Comments)     Bones hurt  Other reaction(s): Not Indicated  Other reaction(s): unknown    Hydromorphone Hallucinations    Azithromycin      Other reaction(s): Not Indicated    Cleocin [clindamycin hcl]     Meperidine      Other reaction(s): Not Indicated    Moxifloxacin      PATIENT STATES DO NOT GIVE UNDER ANY CIRCUSTANCES  Other reaction(s): Not Indicated    Nitroglycerin      Long acting  Other reaction(s): Not Indicated    Pholcodine     Prednisone      GASTRIC PAIN  Other reaction(s): Not Indicated    Tetracycline        No current facility-administered medications on file prior to encounter.     Current Outpatient Medications on File Prior to Encounter   Medication Sig    acetaminophen (TYLENOL) 650 MG TbSR as directed    allopurinoL (ZYLOPRIM) 100 MG tablet TAKE 2 TABLETS ONCE DAILY    amLODIPine (NORVASC) 2.5 MG tablet Take 1 tablet (2.5 mg total) by mouth once daily.    aspirin 81 MG Chew Take 1 tablet (81 mg total) by mouth once daily.    calcium carbonate 650 mg calcium (1,625 mg) tablet Take 1 tablet by mouth once daily.    clopidogreL (PLAVIX) 75 mg tablet TAKE 1 TABLET ONCE AS DIRECTED    docusate sodium (COLACE) 100 MG capsule Take 1 capsule (100 mg total) by mouth 2 (two) times daily.    folic acid (FOLVITE) 1 MG tablet TAKE 1 TABLET DAILY    furosemide (LASIX) 20 MG tablet TAKE 1 TABLET TWICE A DAY    garlic 2,000 mg Cap Take 3,000 mg by mouth once daily.     levothyroxine (SYNTHROID) 100 MCG tablet TAKE 1 TABLET BEFORE BREAKFAST    metoprolol succinate (TOPROL-XL) 100 MG 24 hr tablet TAKE 1 TABLET TWICE A DAY    potassium chloride SA (K-DUR,KLOR-CON M) 10 MEQ tablet Take 1 tablet (10 mEq total) by mouth once daily.    pyridoxine, vitamin B6, (B-6) 100 MG Tab Take 200 mg by mouth once daily.     REPATHA SURECLICK 140 mg/mL PnIj INJECT 1 ML (140 MG) UNDER THE SKIN EVERY 14 DAYS    secukinumab (COSENTYX PEN) 150 mg/mL PnIj Inject 300 mg into the  skin every 28 days.    spironolactone (ALDACTONE) 25 MG tablet TAKE 1 TABLET DAILY    vitamin D 1000 units Tab Take 185 mg by mouth once daily.    albuterol (PROVENTIL) 2.5 mg /3 mL (0.083 %) nebulizer solution Take 3 mLs (2.5 mg total) by nebulization every 6 (six) hours as needed for Wheezing. Rescue    calcipotriene (DOVONOX) 0.005 % cream Apply topically 2 (two) times daily.    nitroGLYCERIN (NITROSTAT) 0.4 MG SL tablet DISSOLVE 1 TABLET UNDER THE TONGUE EVERY 5 MINUTES AS NEEDED FOR CHEST PAIN    [DISCONTINUED] docusate sodium (COLACE) 100 MG capsule Colace Take No date recorded No form recorded No frequency recorded No route recorded No set duration recorded No set duration amount recorded active No dosage strength recorded No dosage strength units of measure recorded    [DISCONTINUED] nitroGLYCERIN 0.2 mg/hr TD PT24 (NITRODUR) 0.2 mg/hr Place 1 patch onto the skin once daily.     Family History       Problem Relation (Age of Onset)    Breast cancer Maternal Grandfather, Paternal Aunt, Sister (60)    Diabetes Daughter    Heart disease     Leukemia Sister (8)    Lung cancer Paternal Grandfather    Stroke           Tobacco Use    Smoking status: Never    Smokeless tobacco: Never   Substance and Sexual Activity    Alcohol use: No    Drug use: No    Sexual activity: Not Currently     Partners: Male     Review of Systems   Constitutional: Positive for malaise/fatigue.   HENT: Negative.     Eyes: Negative.    Cardiovascular:  Positive for chest pain, dyspnea on exertion and paroxysmal nocturnal dyspnea.   Respiratory:  Positive for cough and shortness of breath.    Skin: Negative.    Musculoskeletal:  Positive for arthritis, back pain, falls, muscle cramps and stiffness.   Gastrointestinal: Negative.    Genitourinary: Negative.    Neurological:  Positive for dizziness, light-headedness and weakness.   Psychiatric/Behavioral: Negative.       Objective:     Vital Signs (Most Recent):  Temp: 98 °F (36.7 °C) (06/26/24  0848)  Pulse: 62 (06/26/24 1223)  Resp: 20 (06/26/24 0848)  BP: (!) 143/73 (06/26/24 1223)  SpO2: 98 % (06/26/24 0848) Vital Signs (24h Range):  Temp:  [98 °F (36.7 °C)-98.7 °F (37.1 °C)] 98 °F (36.7 °C)  Pulse:  [51-68] 62  Resp:  [12-20] 20  SpO2:  [96 %-100 %] 98 %  BP: (143-208)/() 143/73     Weight: 98.4 kg (217 lb)  Body mass index is 38.44 kg/m².    SpO2: 98 %       No intake or output data in the 24 hours ending 06/26/24 1225    Lines/Drains/Airways       Peripheral Intravenous Line  Duration                  Peripheral IV - Single Lumen 22 G Anterior;Left Wrist -- days         Peripheral IV - Single Lumen 06/25/24 2234 20 G Left Antecubital <1 day                     Physical Exam  Vitals and nursing note reviewed.   Constitutional:       Appearance: Normal appearance.   HENT:      Head: Normocephalic.   Eyes:      Pupils: Pupils are equal, round, and reactive to light.   Cardiovascular:      Rate and Rhythm: Normal rate and regular rhythm.      Heart sounds: Normal heart sounds, S1 normal and S2 normal. No murmur heard.     No S3 or S4 sounds.   Pulmonary:      Effort: Pulmonary effort is normal.      Breath sounds: Rales present.   Abdominal:      General: Bowel sounds are normal.      Palpations: Abdomen is soft.   Musculoskeletal:         General: Normal range of motion.      Cervical back: Normal range of motion.   Skin:     Capillary Refill: Capillary refill takes less than 2 seconds.   Neurological:      General: No focal deficit present.      Mental Status: She is alert and oriented to person, place, and time.   Psychiatric:         Mood and Affect: Mood normal.         Behavior: Behavior normal.         Thought Content: Thought content normal.          Significant Labs: BMP:   Recent Labs   Lab 06/25/24  2221 06/26/24  0756    91    144   K 3.4* 3.4*    105   CO2 29 28   BUN 18 18   CREATININE 1.2 0.9   CALCIUM 9.5 9.1   MG 1.7  --    , CMP   Recent Labs   Lab  "06/25/24 2221 06/26/24  0756    144   K 3.4* 3.4*    105   CO2 29 28    91   BUN 18 18   CREATININE 1.2 0.9   CALCIUM 9.5 9.1   PROT 6.6  --    ALBUMIN 3.3*  --    BILITOT 0.4  --    ALKPHOS 54*  --    AST 17  --    ALT 12  --    ANIONGAP 12 11   , CBC   Recent Labs   Lab 06/25/24 2221 06/26/24 0221   WBC 8.14 8.72   HGB 12.2 11.5*   HCT 37.2 35.2*    184   , INR   Recent Labs   Lab 06/25/24 2226   INR 1.0   , Lipid Panel No results for input(s): "CHOL", "HDL", "LDLCALC", "TRIG", "CHOLHDL" in the last 48 hours., Troponin   Recent Labs   Lab 06/25/24 2221 06/26/24 0221 06/26/24 0756   TROPONINI 0.074* 0.074* 0.067*   , and All pertinent lab results from the last 24 hours have been reviewed.    Significant Imaging: Cardiac Cath: reviewed, Echocardiogram: Transthoracic echo (TTE) complete (Cupid Only):   Results for orders placed or performed during the hospital encounter of 06/25/24   Echo   Result Value Ref Range    BSA 2.11 m2    LA WIDTH 4.3 cm    RA Width 2.7 cm    LVOT stroke volume 85.97 cm3    LVIDd 5.78 3.5 - 6.0 cm    LV Systolic Volume 68.29 mL    LV Systolic Volume Index 34.0 mL/m2    LVIDs 3.96 2.1 - 4.0 cm    LV Diastolic Volume 164.92 mL    LV Diastolic Volume Index 82.05 mL/m2    Left Ventricular End Systolic Volume by Teichholz Method 68.29 mL    Left Ventricular End Diastolic Volume by Teichholz Method 164.92 mL    IVS 1.16 (A) 0.6 - 1.1 cm    LVOT diameter 1.95 cm    LVOT area 3.0 cm2    FS 31 28 - 44 %    Left Ventricle Relative Wall Thickness 0.41 cm    Posterior Wall 1.18 (A) 0.6 - 1.1 cm    LV mass 285.38 g    LV Mass Index 142 g/m2    MV Peak E Caden 1.51 m/s    TDI LATERAL 0.09 m/s    TDI SEPTAL 0.04 m/s    E/E' ratio 23.23 m/s    MV Peak A Caden 1.10 m/s    TR Max Caden 3.38 m/s    E/A ratio 1.37     IVRT 71.36 msec    E wave deceleration time 308.83 msec    LV SEPTAL E/E' RATIO 37.75 m/s    LA Volume Index 35.4 mL/m2    LV LATERAL E/E' RATIO 16.78 m/s    LA volume " 71.16 cm3    LVOT peak rosanne 0.98 m/s    Left Ventricular Outflow Tract Mean Velocity 0.78 cm/s    Left Ventricular Outflow Tract Mean Gradient 2.61 mmHg    RVOT peak VTI 17.1 cm    TAPSE 2.50 cm    LA size 3.21 cm    Left Atrium Minor Axis 6.03 cm    Left Atrium Major Axis 6.10 cm    RA Major Axis 4.49 cm    AV mean gradient 11 mmHg    AV peak gradient 21 mmHg    Ao peak rosanne 2.31 m/s    Ao VTI 56.60 cm    LVOT peak VTI 28.80 cm    AV valve area 1.52 cm²    AV Velocity Ratio 0.42     AV index (prosthetic) 0.51     ALFREDO by Velocity Ratio 1.27 cm²    Mr max rosanne 5.45 m/s    MV mean gradient 3 mmHg    MV peak gradient 11 mmHg    MV valve area by continuity eq 1.59 cm2    MV VTI 54.2 cm    Triscuspid Valve Regurgitation Peak Gradient 46 mmHg    PV mean gradient 1 mmHg    PV peak gradient 2     RVOT peak rosanne 0.73 m/s    Ao root annulus 2.90 cm    STJ 3.46 cm    Ascending aorta 3.09 cm    IVC diameter 1.55 cm    Mean e' 0.07 m/s    ZLVIDS 0.72     ZLVIDD -0.19    , EKG: reviewed, Stress Test: reviewed, and X-Ray: CXR: X-Ray Chest 1 View (CXR): No results found for this visit on 06/25/24.

## 2024-06-26 NOTE — ASSESSMENT & PLAN NOTE
Patient presents with NSTEMI. Chest pain is currently controlled. CONY score is 4. Patient is currently on NSTEMI Pathway.    EKG reviewed. Troponins reviewed and results noted-   Recent Labs   Lab 06/26/24  0221   TROPONINI 0.074*   .     Lipid panel reviewed and shows-     Lab Results   Component Value Date    LDLCALC 88.6 04/05/2024     Lab Results   Component Value Date    TRIG 152 (H) 04/05/2024         Medical management includes; Beta Blocker, Anticoagulation, and Nitrate Echo has not been performed. Latest ECHO results are as follows- Results for orders placed during the hospital encounter of 10/27/23    Echo    Interpretation Summary    Left Ventricle: The left ventricle is normal in size. Ventricular mass is normal. Mildly increased wall thickness. There is concentric remodeling. Septal motion is consistent with post-operative status. There is normal systolic function with a visually estimated ejection fraction of 55 - 60%. There is normal diastolic function.    Right Ventricle: Normal right ventricular cavity size. Wall thickness is normal. Right ventricle wall motion  is normal. Systolic function is normal.    Aortic Valve: There is a transcatheter valve replacement in the aortic position that is appropriately positioned. Aortic valve area by VTI is 1.14 cm². Aortic valve peak velocity is 2.13 m/s. Mean gradient is 9 mmHg. The dimensionless index is 0.52. Mild paravalvular regurgitation.    Mitral Valve: There is mild mitral annular calcification present. There is mild regurgitation.  .   Consult for cardiac rehab is ordered. Patient counseled on lifestyle modifications- follow a low fat, low cholesterol diet, attempt to lose weight, decrease or avoid alcohol intake, reduce salt in diet and cooking, reduce exposure to stress, improve dietary compliance, continue current medications, continue current healthy lifestyle patterns, and return for routine annual checkups. Cardiology is consulted. Plan of care  reviewed with cardiology team. Continue to monitor patient closely and adjust therapy as needed.

## 2024-06-26 NOTE — PLAN OF CARE
"A218/A218 CAITLINThierno Ortiz is a 74 y.o.female admitted on 6/25/2024 for NSTEMI (non-ST elevated myocardial infarction)   Code Status: Full Code MRN: [id number]   Review of patient's allergies indicates:   Allergen Reactions    Adhesive Nausea And Vomiting     EKG Electrodes    Aspirin-dipyridamole Other (See Comments)     headaches  Other reaction(s): Not Indicated  Other reaction(s): unknown    Butorphanol      Other reaction(s): Not Indicated  Other reaction(s): cardiac arrest    Citalopram Anaphylaxis     Other reaction(s): Not Indicated  Other reaction(s): unknown    Clindamycin Itching and Swelling     Other reaction(s): Not Indicated  Other reaction(s): unknown    Codeine Shortness Of Breath, Itching and Swelling     Other reaction(s): Not Indicated  Other reaction(s): hives/trouble breathing/"possible cardiac arrest"    Ezetimibe      Other reaction(s): Not Indicated  Other reaction(s): unknown    Hydrocodone-acetaminophen Shortness Of Breath     Other reaction(s): Not Indicated  Other reaction(s): unknown    Isosorbide Other (See Comments)     Severe headache  Other reaction(s): Not Indicated  Other reaction(s): arrhythmia    Kiwi (actinidia chinensis) Blisters     Oral  Blisters/burning    Lisinopril Anaphylaxis     Other reaction(s): Not Indicated  Other reaction(s): unknown    Magnesium citrate Shortness Of Breath     Other reaction(s): Not Indicated    Meloxicam      Other reaction(s): Not Indicated  Other reaction(s): Caused Acute renal failure    Methylprednisolone Other (See Comments)     Patient reports taking IV in the past without any issues. Reports allergy to oral preparation   Other reaction(s): Not Indicated  Other reaction(s): unknown    Metronidazole Nausea And Vomiting    Misoprostol Anaphylaxis and Other (See Comments)     Difficulty breathing  Other reaction(s): Not Indicated  Other reaction(s): unknown    Morphine      Other reaction(s): Not Indicated  Other reaction(s): trouble " "breathing/"possible cardiac arrest"    Nedocromil      Other reaction(s): Not Indicated  Other reaction(s): unknown    Neuromuscular blockers, steroidal Other (See Comments)    Pentazocine lactate      Other reaction(s): Not Indicated  Other reaction(s): unknown    Ranolazine Nausea And Vomiting     Other reaction(s): Not Indicated  Other reaction(s): PVC's/ SOB    Rosuvastatin Anaphylaxis     Other reaction(s): Not Indicated  Other reaction(s): gastric sleeve    Stadol [butorphanol tartrate] Anaphylaxis     Coded    Sulfa (sulfonamide antibiotics) Other (See Comments)     unknown  Other reaction(s): hives/trouble breathing    Tetracyclines      Other reaction(s): unknown    Triamcinolone acetonide      Other reaction(s): Not Indicated  Other reaction(s): unknown    Zoledronic acid-mannitol-water Other (See Comments)     Bones hurt  Other reaction(s): Not Indicated  Other reaction(s): unknown    Hydromorphone Hallucinations    Azithromycin      Other reaction(s): Not Indicated    Cleocin [clindamycin hcl]     Meperidine      Other reaction(s): Not Indicated    Moxifloxacin      PATIENT STATES DO NOT GIVE UNDER ANY CIRCUSTANCES  Other reaction(s): Not Indicated    Nitroglycerin      Long acting  Other reaction(s): Not Indicated    Pholcodine     Prednisone      GASTRIC PAIN  Other reaction(s): Not Indicated    Tetracycline      Past Medical History:   Diagnosis Date    Carotid stenosis     19%    Cellulitis and abscess of foot, except toes 06/22/2022    Cerumen debris on tympanic membrane of right ear 11/30/2022    Coronary artery disease     CVA (cerebral vascular accident)     Dr. 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 Hope    Psoriatic arthritis  "    Rheumatology    Transient ischemic attack (TIA) 01/02/2019      PRN meds    acetaminophen, 650 mg, Q6H PRN  heparin (PORCINE), 56 Units/kg (Adjusted), PRN  heparin (PORCINE), 30 Units/kg (Adjusted), PRN  hydrALAZINE, 10 mg, Q8H PRN  nitroGLYCERIN, 0.4 mg, Q5 Min PRN  traMADoL, 50 mg, Q6H PRN      Chart check completed. Will continue plan of care.         [place] Coma Scale Score: 15     Lead Monitored: Lead II Rhythm: sinus bradycardia    Cardiac/Telemetry Box Number: [id number]  VTE Required Core Measure: Pharmacological prophylaxis initiated/maintained Last Bowel Movement: 06/25/24  Diet Cardiac     Thor Score: 20  Fall Risk Score: 10  Accucheck []   Freq?      Lines/Drains/Airways       Peripheral Intravenous Line  Duration                  Peripheral IV - Single Lumen 22 G Anterior;Left Wrist -- days         Peripheral IV - Single Lumen 06/25/24 2234 20 G Left Antecubital <1 day                       Problem: Adult Inpatient Plan of Care  Goal: Plan of Care Review  Outcome: Progressing  Goal: Patient-Specific Goal (Individualized)  Outcome: Progressing  Goal: Absence of Hospital-Acquired Illness or Injury  Outcome: Progressing  Goal: Optimal Comfort and Wellbeing  Outcome: Progressing  Goal: Readiness for Transition of Care  Outcome: Progressing     Problem: Acute Coronary Syndrome  Goal: Optimal Adaptation to Illness  Outcome: Progressing  Goal: Absence of Cardiac-Related Pain  Outcome: Progressing  Goal: Normalized Cardiac Rhythm  Outcome: Progressing  Goal: Effective Cardiac Pump Function  Outcome: Progressing  Goal: Adequate Tissue Perfusion  Outcome: Progressing     Problem: Cardiac Catheterization (Diagnostic/Interventional)  Goal: Absence of Bleeding  Outcome: Progressing  Goal: Absence of Contrast-Induced Injury  Outcome: Progressing  Goal: Stable Heart Rate and Rhythm  Outcome: Progressing  Goal: Absence of Embolism Signs and Symptoms  Outcome: Progressing  Goal: Anesthesia/Sedation Recovery  Outcome:  Progressing  Goal: Optimal Pain Control and Function  Outcome: Progressing  Goal: Absence of Vascular Access Complication  Outcome: Progressing     Problem: Diabetes Comorbidity  Goal: Blood Glucose Level Within Targeted Range  Outcome: Progressing     Problem: Fall Injury Risk  Goal: Absence of Fall and Fall-Related Injury  Outcome: Progressing

## 2024-06-26 NOTE — ASSESSMENT & PLAN NOTE
Patient's anemia is currently controlled. Has not received any PRBCs to date. Etiology likely d/t Iron deficiency  Current CBC reviewed-   Lab Results   Component Value Date    HGB 11.5 (L) 06/26/2024    HCT 35.2 (L) 06/26/2024     Monitor serial CBC and transfuse if patient becomes hemodynamically unstable, symptomatic or H/H drops below 7/21.  Continue iron supplement

## 2024-06-27 LAB
ALBUMIN SERPL BCP-MCNC: 3.5 G/DL (ref 3.5–5.2)
ALP SERPL-CCNC: 55 U/L (ref 55–135)
ALT SERPL W/O P-5'-P-CCNC: 11 U/L (ref 10–44)
ANION GAP SERPL CALC-SCNC: 14 MMOL/L (ref 8–16)
APTT PPP: 33.3 SEC (ref 21–32)
AST SERPL-CCNC: 20 U/L (ref 10–40)
BASOPHILS # BLD AUTO: 0.05 K/UL (ref 0–0.2)
BASOPHILS NFR BLD: 0.7 % (ref 0–1.9)
BILIRUB SERPL-MCNC: 0.5 MG/DL (ref 0.1–1)
BUN SERPL-MCNC: 18 MG/DL (ref 8–23)
CALCIUM SERPL-MCNC: 9.6 MG/DL (ref 8.7–10.5)
CHLORIDE SERPL-SCNC: 102 MMOL/L (ref 95–110)
CO2 SERPL-SCNC: 26 MMOL/L (ref 23–29)
CREAT SERPL-MCNC: 1 MG/DL (ref 0.5–1.4)
DIFFERENTIAL METHOD BLD: ABNORMAL
EOSINOPHIL # BLD AUTO: 0.2 K/UL (ref 0–0.5)
EOSINOPHIL NFR BLD: 2.3 % (ref 0–8)
ERYTHROCYTE [DISTWIDTH] IN BLOOD BY AUTOMATED COUNT: 14.8 % (ref 11.5–14.5)
EST. GFR  (NO RACE VARIABLE): 59 ML/MIN/1.73 M^2
GLUCOSE SERPL-MCNC: 86 MG/DL (ref 70–110)
HCT VFR BLD AUTO: 39.8 % (ref 37–48.5)
HGB BLD-MCNC: 12.6 G/DL (ref 12–16)
IMM GRANULOCYTES # BLD AUTO: 0.02 K/UL (ref 0–0.04)
IMM GRANULOCYTES NFR BLD AUTO: 0.3 % (ref 0–0.5)
LYMPHOCYTES # BLD AUTO: 1.9 K/UL (ref 1–4.8)
LYMPHOCYTES NFR BLD: 24.8 % (ref 18–48)
MCH RBC QN AUTO: 30.1 PG (ref 27–31)
MCHC RBC AUTO-ENTMCNC: 31.7 G/DL (ref 32–36)
MCV RBC AUTO: 95 FL (ref 82–98)
MONOCYTES # BLD AUTO: 0.6 K/UL (ref 0.3–1)
MONOCYTES NFR BLD: 8.2 % (ref 4–15)
NEUTROPHILS # BLD AUTO: 4.9 K/UL (ref 1.8–7.7)
NEUTROPHILS NFR BLD: 63.7 % (ref 38–73)
NRBC BLD-RTO: 0 /100 WBC
OHS QRS DURATION: 86 MS
OHS QRS DURATION: 88 MS
OHS QTC CALCULATION: 418 MS
OHS QTC CALCULATION: 420 MS
OHS QTC CALCULATION: 443 MS
OHS QTC CALCULATION: 445 MS
OHS QTC CALCULATION: 457 MS
PLATELET # BLD AUTO: 207 K/UL (ref 150–450)
PMV BLD AUTO: 10.5 FL (ref 9.2–12.9)
POC ACTIVATED CLOTTING TIME K: 238 SEC (ref 74–137)
POC ACTIVATED CLOTTING TIME K: 250 SEC (ref 74–137)
POC ACTIVATED CLOTTING TIME K: 269 SEC (ref 74–137)
POCT GLUCOSE: 139 MG/DL (ref 70–110)
POTASSIUM SERPL-SCNC: 3.8 MMOL/L (ref 3.5–5.1)
PROT SERPL-MCNC: 6.7 G/DL (ref 6–8.4)
RBC # BLD AUTO: 4.18 M/UL (ref 4–5.4)
SAMPLE: ABNORMAL
SODIUM SERPL-SCNC: 142 MMOL/L (ref 136–145)
WBC # BLD AUTO: 7.66 K/UL (ref 3.9–12.7)

## 2024-06-27 PROCEDURE — 85025 COMPLETE CBC W/AUTO DIFF WBC: CPT | Performed by: EMERGENCY MEDICINE

## 2024-06-27 PROCEDURE — 27201423 OPTIME MED/SURG SUP & DEVICES STERILE SUPPLY: Performed by: INTERNAL MEDICINE

## 2024-06-27 PROCEDURE — 63600175 PHARM REV CODE 636 W HCPCS: Performed by: EMERGENCY MEDICINE

## 2024-06-27 PROCEDURE — 85730 THROMBOPLASTIN TIME PARTIAL: CPT | Performed by: STUDENT IN AN ORGANIZED HEALTH CARE EDUCATION/TRAINING PROGRAM

## 2024-06-27 PROCEDURE — 36415 COLL VENOUS BLD VENIPUNCTURE: CPT | Performed by: STUDENT IN AN ORGANIZED HEALTH CARE EDUCATION/TRAINING PROGRAM

## 2024-06-27 PROCEDURE — C1894 INTRO/SHEATH, NON-LASER: HCPCS | Performed by: INTERNAL MEDICINE

## 2024-06-27 PROCEDURE — 63600175 PHARM REV CODE 636 W HCPCS: Performed by: INTERNAL MEDICINE

## 2024-06-27 PROCEDURE — 92928 PRQ TCAT PLMT NTRAC ST 1 LES: CPT | Mod: LD,,, | Performed by: INTERNAL MEDICINE

## 2024-06-27 PROCEDURE — 93459 L HRT ART/GRFT ANGIO: CPT | Mod: 59 | Performed by: INTERNAL MEDICINE

## 2024-06-27 PROCEDURE — 80053 COMPREHEN METABOLIC PANEL: CPT | Performed by: NURSE PRACTITIONER

## 2024-06-27 PROCEDURE — 25000003 PHARM REV CODE 250: Performed by: INTERNAL MEDICINE

## 2024-06-27 PROCEDURE — C1887 CATHETER, GUIDING: HCPCS | Performed by: INTERNAL MEDICINE

## 2024-06-27 PROCEDURE — 25000242 PHARM REV CODE 250 ALT 637 W/ HCPCS: Performed by: NURSE PRACTITIONER

## 2024-06-27 PROCEDURE — 25500020 PHARM REV CODE 255: Performed by: INTERNAL MEDICINE

## 2024-06-27 PROCEDURE — B2121ZZ FLUOROSCOPY OF SINGLE CORONARY ARTERY BYPASS GRAFT USING LOW OSMOLAR CONTRAST: ICD-10-PCS | Performed by: INTERNAL MEDICINE

## 2024-06-27 PROCEDURE — 4A023N7 MEASUREMENT OF CARDIAC SAMPLING AND PRESSURE, LEFT HEART, PERCUTANEOUS APPROACH: ICD-10-PCS | Performed by: INTERNAL MEDICINE

## 2024-06-27 PROCEDURE — 027035Z DILATION OF CORONARY ARTERY, ONE ARTERY WITH TWO DRUG-ELUTING INTRALUMINAL DEVICES, PERCUTANEOUS APPROACH: ICD-10-PCS | Performed by: INTERNAL MEDICINE

## 2024-06-27 PROCEDURE — 25000003 PHARM REV CODE 250: Performed by: NURSE PRACTITIONER

## 2024-06-27 PROCEDURE — 93459 L HRT ART/GRFT ANGIO: CPT | Mod: 26,22,51,59 | Performed by: INTERNAL MEDICINE

## 2024-06-27 PROCEDURE — C1874 STENT, COATED/COV W/DEL SYS: HCPCS | Performed by: INTERNAL MEDICINE

## 2024-06-27 PROCEDURE — C1769 GUIDE WIRE: HCPCS | Performed by: INTERNAL MEDICINE

## 2024-06-27 PROCEDURE — C1725 CATH, TRANSLUMIN NON-LASER: HCPCS | Performed by: INTERNAL MEDICINE

## 2024-06-27 PROCEDURE — B2111ZZ FLUOROSCOPY OF MULTIPLE CORONARY ARTERIES USING LOW OSMOLAR CONTRAST: ICD-10-PCS | Performed by: INTERNAL MEDICINE

## 2024-06-27 PROCEDURE — 21400001 HC TELEMETRY ROOM

## 2024-06-27 PROCEDURE — 93005 ELECTROCARDIOGRAM TRACING: CPT

## 2024-06-27 PROCEDURE — 99153 MOD SED SAME PHYS/QHP EA: CPT | Performed by: INTERNAL MEDICINE

## 2024-06-27 PROCEDURE — 99152 MOD SED SAME PHYS/QHP 5/>YRS: CPT | Mod: ,,, | Performed by: INTERNAL MEDICINE

## 2024-06-27 PROCEDURE — 85347 COAGULATION TIME ACTIVATED: CPT | Performed by: INTERNAL MEDICINE

## 2024-06-27 PROCEDURE — 99152 MOD SED SAME PHYS/QHP 5/>YRS: CPT | Performed by: INTERNAL MEDICINE

## 2024-06-27 PROCEDURE — 27800903 OPTIME MED/SURG SUP & DEVICES OTHER IMPLANTS: Performed by: INTERNAL MEDICINE

## 2024-06-27 PROCEDURE — C9600 PERC DRUG-EL COR STENT SING: HCPCS | Mod: LD | Performed by: INTERNAL MEDICINE

## 2024-06-27 DEVICE — EVEROLIMUS-ELUTING PLATINUM CHROMIUM CORONARY STENT SYSTEM
Type: IMPLANTABLE DEVICE | Site: HEART | Status: FUNCTIONAL
Brand: SYNERGY™ XD

## 2024-06-27 RX ORDER — CLOPIDOGREL BISULFATE 300 MG/1
TABLET, FILM COATED ORAL
Status: DISCONTINUED | OUTPATIENT
Start: 2024-06-27 | End: 2024-06-27 | Stop reason: HOSPADM

## 2024-06-27 RX ORDER — HEPARIN SODIUM 1000 [USP'U]/ML
INJECTION INTRAVENOUS; SUBCUTANEOUS
Status: DISCONTINUED | OUTPATIENT
Start: 2024-06-27 | End: 2024-06-27 | Stop reason: HOSPADM

## 2024-06-27 RX ORDER — VERAPAMIL HYDROCHLORIDE 2.5 MG/ML
INJECTION, SOLUTION INTRAVENOUS
Status: DISCONTINUED | OUTPATIENT
Start: 2024-06-27 | End: 2024-06-27 | Stop reason: HOSPADM

## 2024-06-27 RX ORDER — ALBUTEROL SULFATE 0.83 MG/ML
2.5 SOLUTION RESPIRATORY (INHALATION) ONCE
Status: COMPLETED | OUTPATIENT
Start: 2024-06-27 | End: 2024-06-27

## 2024-06-27 RX ORDER — CLOPIDOGREL BISULFATE 75 MG/1
75 TABLET ORAL DAILY
Status: DISCONTINUED | OUTPATIENT
Start: 2024-06-28 | End: 2024-06-28

## 2024-06-27 RX ORDER — LIDOCAINE HYDROCHLORIDE 20 MG/ML
INJECTION, SOLUTION EPIDURAL; INFILTRATION; INTRACAUDAL; PERINEURAL
Status: DISCONTINUED | OUTPATIENT
Start: 2024-06-27 | End: 2024-06-27 | Stop reason: HOSPADM

## 2024-06-27 RX ORDER — SODIUM CHLORIDE 9 MG/ML
INJECTION, SOLUTION INTRAVENOUS CONTINUOUS
Status: ACTIVE | OUTPATIENT
Start: 2024-06-27 | End: 2024-06-28

## 2024-06-27 RX ORDER — ONDANSETRON 8 MG/1
8 TABLET, ORALLY DISINTEGRATING ORAL EVERY 8 HOURS PRN
Status: DISCONTINUED | OUTPATIENT
Start: 2024-06-27 | End: 2024-06-28 | Stop reason: HOSPADM

## 2024-06-27 RX ORDER — FENTANYL CITRATE 50 UG/ML
INJECTION, SOLUTION INTRAMUSCULAR; INTRAVENOUS
Status: DISCONTINUED | OUTPATIENT
Start: 2024-06-27 | End: 2024-06-27 | Stop reason: HOSPADM

## 2024-06-27 RX ORDER — NITROGLYCERIN 5 MG/ML
INJECTION, SOLUTION INTRAVENOUS
Status: DISCONTINUED | OUTPATIENT
Start: 2024-06-27 | End: 2024-06-27 | Stop reason: HOSPADM

## 2024-06-27 RX ORDER — ACETAMINOPHEN 325 MG/1
650 TABLET ORAL EVERY 4 HOURS PRN
Status: DISCONTINUED | OUTPATIENT
Start: 2024-06-27 | End: 2024-06-28 | Stop reason: HOSPADM

## 2024-06-27 RX ORDER — MIDAZOLAM HYDROCHLORIDE 1 MG/ML
INJECTION, SOLUTION INTRAMUSCULAR; INTRAVENOUS
Status: DISCONTINUED | OUTPATIENT
Start: 2024-06-27 | End: 2024-06-27 | Stop reason: HOSPADM

## 2024-06-27 RX ORDER — FUROSEMIDE 10 MG/ML
20 INJECTION INTRAMUSCULAR; INTRAVENOUS ONCE
Status: DISCONTINUED | OUTPATIENT
Start: 2024-06-27 | End: 2024-06-27

## 2024-06-27 RX ORDER — DIPHENHYDRAMINE HYDROCHLORIDE 50 MG/ML
INJECTION INTRAMUSCULAR; INTRAVENOUS
Status: DISCONTINUED | OUTPATIENT
Start: 2024-06-27 | End: 2024-06-27 | Stop reason: HOSPADM

## 2024-06-27 RX ADMIN — TICAGRELOR 90 MG: 90 TABLET ORAL at 01:06

## 2024-06-27 RX ADMIN — AMLODIPINE BESYLATE 2.5 MG: 2.5 TABLET ORAL at 09:06

## 2024-06-27 RX ADMIN — HEPARIN SODIUM AND DEXTROSE 12 UNITS/KG/HR: 10000; 5 INJECTION INTRAVENOUS at 12:06

## 2024-06-27 RX ADMIN — TICAGRELOR 90 MG: 90 TABLET ORAL at 08:06

## 2024-06-27 RX ADMIN — LEVOTHYROXINE SODIUM 100 MCG: 100 TABLET ORAL at 06:06

## 2024-06-27 RX ADMIN — ASPIRIN 81 MG CHEWABLE TABLET 81 MG: 81 TABLET CHEWABLE at 09:06

## 2024-06-27 RX ADMIN — POTASSIUM CHLORIDE 10 MEQ: 750 TABLET, EXTENDED RELEASE ORAL at 09:06

## 2024-06-27 RX ADMIN — FUROSEMIDE 20 MG: 20 TABLET ORAL at 09:06

## 2024-06-27 RX ADMIN — METOPROLOL SUCCINATE 100 MG: 50 TABLET, EXTENDED RELEASE ORAL at 08:06

## 2024-06-27 RX ADMIN — SPIRONOLACTONE 25 MG: 25 TABLET, FILM COATED ORAL at 09:06

## 2024-06-27 RX ADMIN — ALBUTEROL SULFATE 2.5 MG: 2.5 SOLUTION RESPIRATORY (INHALATION) at 06:06

## 2024-06-27 RX ADMIN — FUROSEMIDE 20 MG: 20 TABLET ORAL at 08:06

## 2024-06-27 RX ADMIN — SODIUM CHLORIDE: 9 INJECTION, SOLUTION INTRAVENOUS at 01:06

## 2024-06-27 RX ADMIN — METOPROLOL SUCCINATE 100 MG: 50 TABLET, EXTENDED RELEASE ORAL at 09:06

## 2024-06-27 NOTE — ASSESSMENT & PLAN NOTE
Patient is identified as having Diastolic (HFpEF) heart failure that is Chronic. CHF is currently controlled. Latest ECHO performed and demonstrates- Results for orders placed during the hospital encounter of 10/27/23    Echo    Interpretation Summary    Left Ventricle: The left ventricle is normal in size. Ventricular mass is normal. Mildly increased wall thickness. There is concentric remodeling. Septal motion is consistent with post-operative status. There is normal systolic function with a visually estimated ejection fraction of 55 - 60%. There is normal diastolic function.    Right Ventricle: Normal right ventricular cavity size. Wall thickness is normal. Right ventricle wall motion  is normal. Systolic function is normal.    Aortic Valve: There is a transcatheter valve replacement in the aortic position that is appropriately positioned. Aortic valve area by VTI is 1.14 cm². Aortic valve peak velocity is 2.13 m/s. Mean gradient is 9 mmHg. The dimensionless index is 0.52. Mild paravalvular regurgitation.    Mitral Valve: There is mild mitral annular calcification present. There is mild regurgitation.  . Continue Beta Blocker Furosemide and monitor clinical status closely. Monitor on telemetry. Patient is off CHF pathway.  Monitor strict Is&Os and daily weights.  Place on fluid restriction of 1.5 L. Continue to stress to patient importance of self efficacy and  on diet for CHF. Last BNP reviewed- and noted below   Recent Labs   Lab 06/25/24  2221   *     .

## 2024-06-27 NOTE — SUBJECTIVE & OBJECTIVE
Past Medical History:   Diagnosis Date    Carotid stenosis     19%    Cellulitis and abscess of foot, except toes 06/22/2022    Cerumen debris on tympanic membrane of right ear 11/30/2022    Coronary artery disease     CVA (cerebral vascular accident)     Dr. 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 Hope    Psoriatic arthritis     Rheumatology    Transient ischemic attack (TIA) 01/02/2019       Past Surgical History:   Procedure Laterality Date    BREAST BIOPSY      R sided/benign    CARDIAC SURGERY      sept 28 2016    CERVICAL FUSION      CHOLECYSTECTOMY      CORONARY ANGIOPLASTY      CORONARY ARTERY BYPASS GRAFT      triple bypass    CORONARY STENT PLACEMENT      EYE SURGERY      INTRAUTERINE DEVICE INSERTION      LEFT HEART CATHETERIZATION Left 9/8/2020    Procedure: CATHETERIZATION, HEART, LEFT;  Surgeon: Veronica Ibarra MD;  Location: [facility] CATH LAB;  Service: Cardiology;  Laterality: Left;  7am start time    mass removed from R groin      TOTAL ABDOMINAL HYSTERECTOMY W/ BILATERAL SALPINGOOPHORECTOMY      due to benign mass, adhesions    TUBAL LIGATION         Review of patient's allergies indicates:   Allergen Reactions    Adhesive Nausea And Vomiting     EKG Electrodes    Aspirin-dipyridamole Other (See Comments)     headaches  Other reaction(s): Not Indicated  Other reaction(s): unknown    Butorphanol      Other reaction(s): Not Indicated  Other reaction(s): cardiac arrest    Citalopram Anaphylaxis     Other reaction(s): Not Indicated  Other reaction(s): unknown    Clindamycin Itching and Swelling     Other reaction(s): Not Indicated  Other reaction(s): unknown    Codeine Shortness Of Breath, Itching and Swelling     Other reaction(s): Not  "Indicated  Other reaction(s): hives/trouble breathing/"possible cardiac arrest"    Ezetimibe      Other reaction(s): Not Indicated  Other reaction(s): unknown    Hydrocodone-acetaminophen Shortness Of Breath     Other reaction(s): Not Indicated  Other reaction(s): unknown    Isosorbide Other (See Comments)     Severe headache  Other reaction(s): Not Indicated  Other reaction(s): arrhythmia    Kiwi (actinidia chinensis) Blisters     Oral  Blisters/burning    Lisinopril Anaphylaxis     Other reaction(s): Not Indicated  Other reaction(s): unknown    Magnesium citrate Shortness Of Breath     Other reaction(s): Not Indicated    Meloxicam      Other reaction(s): Not Indicated  Other reaction(s): Caused Acute renal failure    Methylprednisolone Other (See Comments)     Patient reports taking IV in the past without any issues. Reports allergy to oral preparation   Other reaction(s): Not Indicated  Other reaction(s): unknown    Metronidazole Nausea And Vomiting    Misoprostol Anaphylaxis and Other (See Comments)     Difficulty breathing  Other reaction(s): Not Indicated  Other reaction(s): unknown    Morphine      Other reaction(s): Not Indicated  Other reaction(s): trouble breathing/"possible cardiac arrest"    Nedocromil      Other reaction(s): Not Indicated  Other reaction(s): unknown    Neuromuscular blockers, steroidal Other (See Comments)    Pentazocine lactate      Other reaction(s): Not Indicated  Other reaction(s): unknown    Ranolazine Nausea And Vomiting     Other reaction(s): Not Indicated  Other reaction(s): PVC's/ SOB    Rosuvastatin Anaphylaxis     Other reaction(s): Not Indicated  Other reaction(s): gastric sleeve    Stadol [butorphanol tartrate] Anaphylaxis     Coded    Sulfa (sulfonamide antibiotics) Other (See Comments)     unknown  Other reaction(s): hives/trouble breathing    Tetracyclines      Other reaction(s): unknown    Triamcinolone acetonide      Other reaction(s): Not Indicated  Other reaction(s): " unknown    Zoledronic acid-mannitol-water Other (See Comments)     Bones hurt  Other reaction(s): Not Indicated  Other reaction(s): unknown    Hydromorphone Hallucinations    Azithromycin      Other reaction(s): Not Indicated    Cleocin [clindamycin hcl]     Meperidine      Other reaction(s): Not Indicated    Moxifloxacin      PATIENT STATES DO NOT GIVE UNDER ANY CIRCUSTANCES  Other reaction(s): Not Indicated    Nitroglycerin      Long acting  Other reaction(s): Not Indicated    Pholcodine     Prednisone      GASTRIC PAIN  Other reaction(s): Not Indicated    Tetracycline        No current facility-administered medications on file prior to encounter.     Current Outpatient Medications on File Prior to Encounter   Medication Sig    acetaminophen (TYLENOL) 650 MG TbSR as directed    allopurinoL (ZYLOPRIM) 100 MG tablet TAKE 2 TABLETS ONCE DAILY    amLODIPine (NORVASC) 2.5 MG tablet Take 1 tablet (2.5 mg total) by mouth once daily.    aspirin 81 MG Chew Take 1 tablet (81 mg total) by mouth once daily.    calcium carbonate 650 mg calcium (1,625 mg) tablet Take 1 tablet by mouth once daily.    clopidogreL (PLAVIX) 75 mg tablet TAKE 1 TABLET ONCE AS DIRECTED    docusate sodium (COLACE) 100 MG capsule Take 1 capsule (100 mg total) by mouth 2 (two) times daily.    folic acid (FOLVITE) 1 MG tablet TAKE 1 TABLET DAILY    furosemide (LASIX) 20 MG tablet TAKE 1 TABLET TWICE A DAY    garlic 2,000 mg Cap Take 3,000 mg by mouth once daily.     levothyroxine (SYNTHROID) 100 MCG tablet TAKE 1 TABLET BEFORE BREAKFAST    metoprolol succinate (TOPROL-XL) 100 MG 24 hr tablet TAKE 1 TABLET TWICE A DAY    potassium chloride SA (K-DUR,KLOR-CON M) 10 MEQ tablet Take 1 tablet (10 mEq total) by mouth once daily.    pyridoxine, vitamin B6, (B-6) 100 MG Tab Take 200 mg by mouth once daily.     REPATHA SURECLICK 140 mg/mL PnIj INJECT 1 ML (140 MG) UNDER THE SKIN EVERY 14 DAYS    secukinumab (COSENTYX PEN) 150 mg/mL PnIj Inject 300 mg into the  skin every 28 days.    spironolactone (ALDACTONE) 25 MG tablet TAKE 1 TABLET DAILY    vitamin D 1000 units Tab Take 185 mg by mouth once daily.    albuterol (PROVENTIL) 2.5 mg /3 mL (0.083 %) nebulizer solution Take 3 mLs (2.5 mg total) by nebulization every 6 (six) hours as needed for Wheezing. Rescue    calcipotriene (DOVONOX) 0.005 % cream Apply topically 2 (two) times daily.    nitroGLYCERIN (NITROSTAT) 0.4 MG SL tablet DISSOLVE 1 TABLET UNDER THE TONGUE EVERY 5 MINUTES AS NEEDED FOR CHEST PAIN     Family History       Problem Relation (Age of Onset)    Breast cancer Maternal Grandfather, Paternal Aunt, Sister (60)    Diabetes Daughter    Heart disease     Leukemia Sister (8)    Lung cancer Paternal Grandfather    Stroke           Tobacco Use    Smoking status: Never    Smokeless tobacco: Never   Substance and Sexual Activity    Alcohol use: No    Drug use: No    Sexual activity: Not Currently     Partners: Male     Review of Systems   Constitutional: Positive for malaise/fatigue.   HENT: Negative.     Eyes: Negative.    Cardiovascular:  Positive for chest pain, dyspnea on exertion and paroxysmal nocturnal dyspnea.   Respiratory:  Positive for cough and shortness of breath.    Skin: Negative.    Musculoskeletal:  Positive for arthritis, back pain, falls, muscle cramps and stiffness.   Gastrointestinal: Negative.    Genitourinary: Negative.    Neurological:  Positive for dizziness, light-headedness and weakness.   Psychiatric/Behavioral: Negative.       Objective:     Vital Signs (Most Recent):  Temp: 98.4 °F (36.9 °C) (06/27/24 1218)  Pulse: (!) 56 (06/27/24 1530)  Resp: 19 (06/27/24 1530)  BP: (!) 185/76 (06/27/24 1530)  SpO2: 98 % (06/27/24 1530) Vital Signs (24h Range):  Temp:  [98.1 °F (36.7 °C)-98.4 °F (36.9 °C)] 98.4 °F (36.9 °C)  Pulse:  [53-62] 56  Resp:  [14-20] 19  SpO2:  [94 %-100 %] 98 %  BP: (142-185)/(64-76) 185/76     Weight: 98.4 kg (217 lb)  Body mass index is 38.44 kg/m².    SpO2: 98 %       No  intake or output data in the 24 hours ending 06/27/24 1614    Lines/Drains/Airways       Peripheral Intravenous Line  Duration                  Peripheral IV - Single Lumen 22 G Anterior;Left Wrist -- days         Peripheral IV - Single Lumen 06/25/24 2234 20 G Left Antecubital 1 day                     Physical Exam  Vitals and nursing note reviewed.   Constitutional:       Appearance: Normal appearance.   HENT:      Head: Normocephalic.   Eyes:      Pupils: Pupils are equal, round, and reactive to light.   Cardiovascular:      Rate and Rhythm: Normal rate and regular rhythm.      Heart sounds: Normal heart sounds, S1 normal and S2 normal. No murmur heard.     No S3 or S4 sounds.   Pulmonary:      Effort: Pulmonary effort is normal.      Breath sounds: Rales present.   Abdominal:      General: Bowel sounds are normal.      Palpations: Abdomen is soft.   Musculoskeletal:         General: Normal range of motion.      Cervical back: Normal range of motion.   Skin:     Capillary Refill: Capillary refill takes less than 2 seconds.   Neurological:      General: No focal deficit present.      Mental Status: She is alert and oriented to person, place, and time.   Psychiatric:         Mood and Affect: Mood normal.         Behavior: Behavior normal.         Thought Content: Thought content normal.          Significant Labs: BMP:   Recent Labs   Lab 06/25/24  2221 06/26/24  0756 06/27/24  0517    91 86    144 142   K 3.4* 3.4* 3.8    105 102   CO2 29 28 26   BUN 18 18 18   CREATININE 1.2 0.9 1.0   CALCIUM 9.5 9.1 9.6   MG 1.7  --   --    , CMP   Recent Labs   Lab 06/25/24  2221 06/26/24  0756 06/27/24  0517    144 142   K 3.4* 3.4* 3.8    105 102   CO2 29 28 26    91 86   BUN 18 18 18   CREATININE 1.2 0.9 1.0   CALCIUM 9.5 9.1 9.6   PROT 6.6  --  6.7   ALBUMIN 3.3*  --  3.5   BILITOT 0.4  --  0.5   ALKPHOS 54*  --  55   AST 17  --  20   ALT 12  --  11   ANIONGAP 12 11 14   , CBC   Recent  "Labs   Lab 06/25/24  2221 06/26/24 0221 06/27/24  0517   WBC 8.14 8.72 7.66   HGB 12.2 11.5* 12.6   HCT 37.2 35.2* 39.8    184 207   , INR   Recent Labs   Lab 06/25/24 2226   INR 1.0   , Lipid Panel No results for input(s): "CHOL", "HDL", "LDLCALC", "TRIG", "CHOLHDL" in the last 48 hours., Troponin   Recent Labs   Lab 06/26/24  0221 06/26/24  0756 06/26/24  1513   TROPONINI 0.074* 0.067* 0.065*   , and All pertinent lab results from the last 24 hours have been reviewed.    Significant Imaging: Cardiac Cath: reviewed, Echocardiogram: Transthoracic echo (TTE) complete (Cupid Only):   Results for orders placed or performed during the hospital encounter of 06/25/24   Echo   Result Value Ref Range    BSA 2.11 m2    LA WIDTH 4.3 cm    RA Width 2.7 cm    LVOT stroke volume 85.97 cm3    LVIDd 5.78 3.5 - 6.0 cm    LV Systolic Volume 68.29 mL    LV Systolic Volume Index 34.0 mL/m2    LVIDs 3.96 2.1 - 4.0 cm    LV Diastolic Volume 164.92 mL    LV Diastolic Volume Index 82.05 mL/m2    Left Ventricular End Systolic Volume by Teichholz Method 68.29 mL    Left Ventricular End Diastolic Volume by Teichholz Method 164.92 mL    IVS 1.16 (A) 0.6 - 1.1 cm    LVOT diameter 1.95 cm    LVOT area 3.0 cm2    FS 31 28 - 44 %    Left Ventricle Relative Wall Thickness 0.41 cm    Posterior Wall 1.18 (A) 0.6 - 1.1 cm    LV mass 285.38 g    LV Mass Index 142 g/m2    MV Peak E Caden 1.51 m/s    TDI LATERAL 0.09 m/s    TDI SEPTAL 0.04 m/s    E/E' ratio 23.23 m/s    MV Peak A Caden 1.10 m/s    TR Max Caden 3.38 m/s    E/A ratio 1.37     IVRT 71.36 msec    E wave deceleration time 308.83 msec    LV SEPTAL E/E' RATIO 37.75 m/s    LA Volume Index 35.4 mL/m2    LV LATERAL E/E' RATIO 16.78 m/s    LA volume 71.16 cm3    LVOT peak caden 0.98 m/s    Left Ventricular Outflow Tract Mean Velocity 0.78 cm/s    Left Ventricular Outflow Tract Mean Gradient 2.61 mmHg    RVOT peak VTI 17.1 cm    TAPSE 2.50 cm    LA size 3.21 cm    Left Atrium Minor Axis 6.03 cm    " Left Atrium Major Axis 6.10 cm    RA Major Axis 4.49 cm    AV mean gradient 11 mmHg    AV peak gradient 21 mmHg    Ao peak rosanne 2.31 m/s    Ao VTI 56.60 cm    LVOT peak VTI 28.80 cm    AV valve area 1.52 cm²    AV Velocity Ratio 0.42     AV index (prosthetic) 0.51     ALFREDO by Velocity Ratio 1.27 cm²    Mr max rosanne 5.45 m/s    MV mean gradient 3 mmHg    MV peak gradient 11 mmHg    MV valve area by continuity eq 1.59 cm2    MV VTI 54.2 cm    Triscuspid Valve Regurgitation Peak Gradient 46 mmHg    PV mean gradient 1 mmHg    RVOT peak rosanne 0.73 m/s    Ao root annulus 2.90 cm    STJ 3.46 cm    Ascending aorta 3.09 cm    IVC diameter 1.55 cm    Mean e' 0.07 m/s    ZLVIDS 0.72     ZLVIDD -0.19     EF 60 %    TV resting pulmonary artery pressure 49 mmHg    RV TB RVSP 6 mmHg    Est. RA pres 3 mmHg    Narrative      Left Ventricle: The left ventricle is moderately dilated. Normal wall   thickness. There is eccentric hypertrophy. Normal wall motion. Ejection   fraction by visual approximation is 60%. Grade II diastolic dysfunction.    Right Ventricle: Normal right ventricular cavity size. Wall thickness   is normal. Right ventricle wall motion  is normal. Systolic function is   normal.    Left Atrium: Left atrium is mildly dilated.    Aortic Valve: There is a transcatheter valve replacement in the aortic   position. Aortic valve area by VTI is 1.52 cm². Aortic valve peak velocity   is 2.31 m/s. Mean gradient is 11 mmHg. The dimensionless index is 0.51.   There is mild aortic regurgitation.    Mitral Valve: There is mild regurgitation.    Tricuspid Valve: There is mild regurgitation.    Pulmonary Artery: The estimated pulmonary artery systolic pressure is   49 mmHg.    IVC/SVC: Normal venous pressure at 3 mmHg.     , EKG: reviewed, Stress Test: reviewed, and X-Ray: CXR: X-Ray Chest 1 View (CXR): No results found for this visit on 06/25/24.

## 2024-06-27 NOTE — INTERVAL H&P NOTE
The patient has been examined and the H&P has been reviewed:    I concur with the findings and no changes have occurred since H&P was written.    Procedure risks, benefits and alternative options discussed and understood by patient/family.          Active Hospital Problems    Diagnosis  POA    *NSTEMI (non-ST elevated myocardial infarction) [I21.4]  Yes    Type 2 diabetes mellitus with diabetic neuropathy, without long-term current use of insulin [E11.40]  Yes    Chronic diastolic heart failure [I50.32]  Yes     Chronic    Anemia due to folic acid deficiency [D52.9]  Yes    S/P TAVR (transcatheter aortic valve replacement) [Z95.2]  Not Applicable    Iron deficiency anemia due to chronic blood loss [D50.0]  Yes     Chronic     Elida Alanis NP 8/6/2021  Iron levels replenished s/p Venofer x 8. She denies abnormal bleeding at preset time but notes intermittent blood noted in stool. Patient has not yet had follow up with GI service for endoscopies due to ongoing COVID concerns previously. She has been encouraged to follow up with GI. Rationale discussed in detail.   Patient unable to tolerate oral iron due to GI upset/constipation. F/u 3 months with repeat labs. Discussed S&S to report sooner        Essential hypertension [I10]  Yes     Chronic    S/P CABG (coronary artery bypass graft) [Z95.1]  Not Applicable     Chronic    Hyperlipidemia [E78.5]  Yes     Chronic    Hypothyroidism [E03.9]  Yes     Chronic      Resolved Hospital Problems   No resolved problems to display.

## 2024-06-27 NOTE — HOSPITAL COURSE
6/27/24 pt seen and examined today, no acute CV events reported. Labs reviewed, chart reviewed    6/28/24 Pt seen and examined today sitting up on bedside. S/p LHC with Dr. Ibarra. No acute CV events reported. Labs reviewed

## 2024-06-27 NOTE — ASSESSMENT & PLAN NOTE
Patient presents with NSTEMI. Chest pain is currently controlled. CONY score is 4. Patient is currently on NSTEMI Pathway.    EKG reviewed. Troponins reviewed and results noted-   Recent Labs   Lab 06/26/24  1513   TROPONINI 0.065*     .     Lipid panel reviewed and shows-     Lab Results   Component Value Date    LDLCALC 88.6 04/05/2024     Lab Results   Component Value Date    TRIG 152 (H) 04/05/2024         Medical management includes; Beta Blocker, Anticoagulation, and Nitrate Echo has not been performed. Latest ECHO results are as follows- Results for orders placed during the hospital encounter of 10/27/23    Echo    Interpretation Summary    Left Ventricle: The left ventricle is normal in size. Ventricular mass is normal. Mildly increased wall thickness. There is concentric remodeling. Septal motion is consistent with post-operative status. There is normal systolic function with a visually estimated ejection fraction of 55 - 60%. There is normal diastolic function.    Right Ventricle: Normal right ventricular cavity size. Wall thickness is normal. Right ventricle wall motion  is normal. Systolic function is normal.    Aortic Valve: There is a transcatheter valve replacement in the aortic position that is appropriately positioned. Aortic valve area by VTI is 1.14 cm². Aortic valve peak velocity is 2.13 m/s. Mean gradient is 9 mmHg. The dimensionless index is 0.52. Mild paravalvular regurgitation.    Mitral Valve: There is mild mitral annular calcification present. There is mild regurgitation.  .   Consult for cardiac rehab is ordered. Patient counseled on lifestyle modifications- follow a low fat, low cholesterol diet, attempt to lose weight, decrease or avoid alcohol intake, reduce salt in diet and cooking, reduce exposure to stress, improve dietary compliance, continue current medications, continue current healthy lifestyle patterns, and return for routine annual checkups. Cardiology is consulted. Plan of  care reviewed with cardiology team. Continue to monitor patient closely and adjust therapy as needed.    Echo showed normal LVEF, Diastolic dysfunction   Stress test showed reversible ischemia   LHC s/p stents x 2   Cont BB, ASA, Plavix.   Allergy to Statin

## 2024-06-27 NOTE — ASSESSMENT & PLAN NOTE
Cont lasix, BB, aldactone  Echo pending  Nuc stress today    6/27/24  [facility]  All risks, benefits and treatment alternatives explained all questions answered. Pt agreeable and ready to proceed

## 2024-06-27 NOTE — PROGRESS NOTES
[facility]  Progress Note    Patient Name: Qi Ortiz  MRN: [id number]  Patient Class: IP- Inpatient   Admission Date: 6/25/2024  Length of Stay: 1 days  Attending Physician: Andrea Burrell MD  Primary Care Provider: Lisa Porter MD        Subjective:     Principal Problem:NSTEMI (non-ST elevated myocardial infarction)        HPI:  Qi Ortiz is a 74 y.o. female with a PMH  has a past medical history of Carotid stenosis, Cellulitis and abscess of foot, except toes (06/22/2022), Cerumen debris on tympanic membrane of right ear (11/30/2022), Coronary artery disease, CVA (cerebral vascular accident), Depression, Double ectopic ureters, Hemiplegia affecting right dominant side (11/09/2021), Hyperlipidemia, Hypertension, Hypothyroid, Left foot infection (07/08/2022), Mild asthma with exacerbation (12/04/2022), OP (osteoporosis), IRIS (obstructive sleep apnea), Psoriatic arthritis, and Transient ischemic attack (TIA) (01/02/2019).Presented to the ER for evaluation of an uncomfortable numbness to the chest. Symptom persisted from 2:00 p.m. yesterday till about 6:00 p.m. yesterday. She was seen by Dr. Paez yesterday. Patient had troponin ordered outpatient yesterday. Trop elevated at 0.098.  Repeat troponin 0.074.  Her EKG was abnormal but had similar findings compared to previous EKGs. Dr. Cohen with Cardiology was consulted.  He recommended initiating heparin drip, administered Lasix, continue home meds, administered nitro paste and echocardiogram in a.. Hospital Medicine consulted to admit patient for NSTEMI.  Patient in agreement with treatment plan.  Patient admitted under inpatient status.    PCP: Lisa Porter      Overview/Hospital Course:  The patient is a 75 yo female with CAD s/p CABG 1998 and 2016, SVT, AS s/p TAVR 20', chronic diastolic HF, HTN HLD COPD, CKD, DM, obesity, IRIS, statin intolerance, multiple drug allergies who was admitted for  NSTEMI on IV heparin and continued on ASA, BB, and Plavix. EKG showed no ischemia-nonspecific changes. Troponin 0.098>0.074>0.067. Echo pending. Cardiology was consulted and recommended Nuclear stress test which is pending. After pt got back from chest test, she c/o of chest pain and SOB. Repeat EKG unchanged. Troponin 0.065. Mild relief with NTG x3. /74. Discussed with cards. Will increase amlodipine dose. Pt also c/o of SOB- will give IV Lasix x one dose. SOB improved.   Nuclear stress test showed reversible ischemia. Cardiology recommenced LHC which showed 2 vessel CAD s/p stents x 2, well seated TAVR. LHC was technically challenging which lead to prolonged fluoroscopy and significant amount of dye used -cardiology recommended IVFs overnight.     Interval History: s/p LHC with stents x 2.No further CP. +SOB. Pt attributes SOB to the sedation she received.   Review of Systems   Constitutional:  Positive for activity change. Negative for appetite change, chills, diaphoresis, fatigue, fever and unexpected weight change.   HENT:  Negative for congestion, nosebleeds, sinus pressure and sore throat.    Eyes:  Negative for pain, discharge and visual disturbance.   Respiratory:  Positive for shortness of breath. Negative for cough, chest tightness, wheezing and stridor.    Cardiovascular:  Negative for palpitations and leg swelling.   Gastrointestinal:  Negative for abdominal distention, abdominal pain, blood in stool, constipation, diarrhea, nausea and vomiting.   Endocrine: Negative for cold intolerance and heat intolerance.   Genitourinary:  Negative for difficulty urinating, dysuria, flank pain, frequency and urgency.   Musculoskeletal:  Positive for arthralgias and back pain. Negative for joint swelling, myalgias, neck pain and neck stiffness.   Skin:  Negative for rash and wound.   Allergic/Immunologic: Negative for food allergies and immunocompromised state.   Neurological:  Negative for dizziness, seizures,  syncope, facial asymmetry, speech difficulty, weakness, light-headedness, numbness and headaches.   Hematological:  Negative for adenopathy.   Psychiatric/Behavioral:  Negative for agitation, confusion and hallucinations.      Objective:     Vital Signs (Most Recent):  Temp: 98.5 °F (36.9 °C) (06/27/24 1719)  Pulse: 64 (06/27/24 1719)  Resp: 16 (06/27/24 1719)  BP: 136/64 (06/27/24 1719)  SpO2: 98 % (06/27/24 1719) Vital Signs (24h Range):  Temp:  [98.1 °F (36.7 °C)-98.5 °F (36.9 °C)] 98.5 °F (36.9 °C)  Pulse:  [53-64] 64  Resp:  [14-24] 16  SpO2:  [94 %-100 %] 98 %  BP: (136-185)/(64-80) 136/64     Weight: 98.4 kg (217 lb)  Body mass index is 38.44 kg/m².    Intake/Output Summary (Last 24 hours) at 6/27/2024 1751  Last data filed at 6/27/2024 1630  Gross per 24 hour   Intake 745 ml   Output 200 ml   Net 545 ml         Physical Exam  Vitals and nursing note reviewed.   Constitutional:       General: She is not in acute distress.     Appearance: She is well-developed. She is not diaphoretic.   HENT:      Head: Normocephalic and atraumatic.      Nose: Nose normal.   Eyes:      General: No scleral icterus.     Conjunctiva/sclera: Conjunctivae normal.   Neck:      Trachea: No tracheal deviation.   Cardiovascular:      Rate and Rhythm: Normal rate and regular rhythm.      Heart sounds: Normal heart sounds. No murmur heard.     No friction rub. No gallop.   Pulmonary:      Effort: Pulmonary effort is normal. No respiratory distress.      Breath sounds: No stridor. No wheezing.   Chest:      Chest wall: No tenderness.   Abdominal:      General: Bowel sounds are normal. There is no distension.      Palpations: Abdomen is soft. There is no mass.      Tenderness: There is no abdominal tenderness. There is no guarding or rebound.   Musculoskeletal:         General: No tenderness or deformity. Normal range of motion.      Cervical back: Normal range of motion and neck supple.   Skin:     General: Skin is warm and dry.       Coloration: Skin is not pale.      Findings: No erythema or rash.      Comments: [facility] site C/D/I without swelling or drainage. Splint in place.    Neurological:      Mental Status: She is alert and oriented to person, place, and time.      Cranial Nerves: No cranial nerve deficit.      Motor: No abnormal muscle tone.      Coordination: Coordination normal.   Psychiatric:         Behavior: Behavior normal.         Thought Content: Thought content normal.             Significant Labs: All pertinent labs within the past 24 hours have been reviewed.    Significant Imaging: I have reviewed all pertinent imaging results/findings within the past 24 hours.    Assessment/Plan:      * NSTEMI (non-ST elevated myocardial infarction)  Patient presents with NSTEMI. Chest pain is currently controlled. CONY score is 4. Patient is currently on NSTEMI Pathway.    EKG reviewed. Troponins reviewed and results noted-   Recent Labs   Lab 06/26/24  1513   TROPONINI 0.065*     .     Lipid panel reviewed and shows-     Lab Results   Component Value Date    LDLCALC 88.6 04/05/2024     Lab Results   Component Value Date    TRIG 152 (H) 04/05/2024         Medical management includes; Beta Blocker, Anticoagulation, and Nitrate Echo has not been performed. Latest ECHO results are as follows- Results for orders placed during the hospital encounter of 10/27/23    Echo    Interpretation Summary    Left Ventricle: The left ventricle is normal in size. Ventricular mass is normal. Mildly increased wall thickness. There is concentric remodeling. Septal motion is consistent with post-operative status. There is normal systolic function with a visually estimated ejection fraction of 55 - 60%. There is normal diastolic function.    Right Ventricle: Normal right ventricular cavity size. Wall thickness is normal. Right ventricle wall motion  is normal. Systolic function is normal.    Aortic Valve: There is a transcatheter valve replacement in the aortic  "position that is appropriately positioned. Aortic valve area by VTI is 1.14 cm². Aortic valve peak velocity is 2.13 m/s. Mean gradient is 9 mmHg. The dimensionless index is 0.52. Mild paravalvular regurgitation.    Mitral Valve: There is mild mitral annular calcification present. There is mild regurgitation.  .   Consult for cardiac rehab is ordered. Patient counseled on lifestyle modifications- follow a low fat, low cholesterol diet, attempt to lose weight, decrease or avoid alcohol intake, reduce salt in diet and cooking, reduce exposure to stress, improve dietary compliance, continue current medications, continue current healthy lifestyle patterns, and return for routine annual checkups. Cardiology is consulted. Plan of care reviewed with cardiology team. Continue to monitor patient closely and adjust therapy as needed.    Echo showed normal LVEF, Diastolic dysfunction   Stress test showed reversible ischemia   LHC s/p stents x 2   Cont BB, ASA, Plavix.   Allergy to Statin     Type 2 diabetes mellitus with diabetic neuropathy, without long-term current use of insulin  Patient's FSGs are controlled on current medication regimen.  Last A1c reviewed-   Lab Results   Component Value Date    HGBA1C 6.0 (H) 04/05/2024     Most recent fingerstick glucose reviewed- No results for input(s): "POCTGLUCOSE" in the last 24 hours.  Current correctional scale  Low  Maintain anti-hyperglycemic dose as follows-   Antihyperglycemics (From admission, onward)      None          Plan:  -SSI  -A1c  -Accu-checks  -Hold oral hypoglycemics while patient is in the hospital  -Continue home long-acting insulin at 20% decrease, titrate up as needed  -Hypoglycemic protocol          Chronic diastolic heart failure  Patient is identified as having Diastolic (HFpEF) heart failure that is Chronic. CHF is currently controlled. Latest ECHO performed and demonstrates- Results for orders placed during the hospital encounter of " 10/27/23    Echo    Interpretation Summary    Left Ventricle: The left ventricle is normal in size. Ventricular mass is normal. Mildly increased wall thickness. There is concentric remodeling. Septal motion is consistent with post-operative status. There is normal systolic function with a visually estimated ejection fraction of 55 - 60%. There is normal diastolic function.    Right Ventricle: Normal right ventricular cavity size. Wall thickness is normal. Right ventricle wall motion  is normal. Systolic function is normal.    Aortic Valve: There is a transcatheter valve replacement in the aortic position that is appropriately positioned. Aortic valve area by VTI is 1.14 cm². Aortic valve peak velocity is 2.13 m/s. Mean gradient is 9 mmHg. The dimensionless index is 0.52. Mild paravalvular regurgitation.    Mitral Valve: There is mild mitral annular calcification present. There is mild regurgitation.  . Continue Beta Blocker Furosemide and monitor clinical status closely. Monitor on telemetry. Patient is off CHF pathway.  Monitor strict Is&Os and daily weights.  Place on fluid restriction of 1.5 L. Continue to stress to patient importance of self efficacy and  on diet for CHF. Last BNP reviewed- and noted below   Recent Labs   Lab 06/25/24  2221   *     .            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 Has not received any PRBCs to date. Etiology likely d/t  combined folic acid and iron deficiency  Current CBC reviewed-   Lab Results   Component Value Date    HGB 11.5 (L) 06/26/2024    HCT 35.2 (L) 06/26/2024     Monitor serial CBC and transfuse if patient becomes hemodynamically unstable, symptomatic or H/H drops below 7/21.  Continue folic acid and iron supplement.    S/P TAVR (transcatheter aortic valve replacement)  See treatment above      Iron deficiency anemia due to chronic blood loss  Patient's anemia is currently controlled. Has not received any PRBCs  to date. Etiology likely d/t Iron deficiency  Current CBC reviewed-   Lab Results   Component Value Date    HGB 11.5 (L) 06/26/2024    HCT 35.2 (L) 06/26/2024     Monitor serial CBC and transfuse if patient becomes hemodynamically unstable, symptomatic or H/H drops below 7/21.  Continue iron supplement    S/P CABG (coronary artery bypass graft)  Patient with known CAD s/p stent placement and CABG, which is controlled Will continue ASA and Plavix and monitor for S/Sx of angina/ACS. Continue to monitor on telemetry.         Essential hypertension  Chronic, controlled. Latest blood pressure and vitals reviewed-     Temp:  [97.7 °F (36.5 °C)-98.7 °F (37.1 °C)]   Pulse:  [51-70]   Resp:  [12-20]   BP: (133-208)/()   SpO2:  [96 %-100 %] .   Home meds for hypertension were reviewed and noted below.   Hypertension Medications               amLODIPine (NORVASC) 2.5 MG tablet Take 1 tablet (2.5 mg total) by mouth once daily.    furosemide (LASIX) 20 MG tablet TAKE 1 TABLET TWICE A DAY    metoprolol succinate (TOPROL-XL) 100 MG 24 hr tablet TAKE 1 TABLET TWICE A DAY    nitroGLYCERIN (NITROSTAT) 0.4 MG SL tablet DISSOLVE 1 TABLET UNDER THE TONGUE EVERY 5 MINUTES AS NEEDED FOR CHEST PAIN    spironolactone (ALDACTONE) 25 MG tablet TAKE 1 TABLET DAILY            While in the hospital, will manage blood pressure as follows; Continue home antihypertensive regimen  Will utilize p.r.n. blood pressure medication only if patient's blood pressure greater than 160/100 and she develops symptoms such as worsening chest pain or shortness of breath.    Amlodipine dose increased   IV Lasix x one dose     Hypothyroidism  Patient has chronic hypothyroidism. TFTs reviewed-   Lab Results   Component Value Date    TSH 1.770 04/19/2024   . Will continue chronic levothyroxine and adjust for and clinical changes.        Hyperlipidemia  Patient is chronically on statin.will continue for now. Last Lipid Panel:   Lab Results   Component Value Date     CHOL 154 04/05/2024    HDL 35 (L) 04/05/2024    LDLCALC 88.6 04/05/2024    TRIG 152 (H) 04/05/2024    CHOLHDL 22.7 04/05/2024     Plan:  -Continue home medication  -low fat/low calorie diet          VTE Risk Mitigation (From admission, onward)           Ordered     IP VTE HIGH RISK PATIENT  Once         06/26/24 0017     Place sequential compression device  Until discontinued         06/26/24 0017                    Discharge Planning   DEMI:      Code Status: Full Code   Is the patient medically ready for discharge?:     Reason for patient still in hospital (select all that apply): Patient trending condition  Discharge Plan A: Home Health                  Karla Cook NP  Department of Hospital Medicine   [facility] - Telemetry ([facility])

## 2024-06-27 NOTE — PLAN OF CARE
Updated patient on plan of care. Instructed patient to use call light for assistance, call light in reach. Hourly rounding performed. Vitals q4 hours. Education provided, questions answered/ encouraged. Chart check complete.

## 2024-06-27 NOTE — ASSESSMENT & PLAN NOTE
Troponin peaked 0.98 trending down  Atypical chest pains  Hept gtt  Nuc stress  EKG with no acute dynamic changes noted    6/27/24  [facility] planned for today with Dr. Ibarra

## 2024-06-27 NOTE — PROGRESS NOTES
MEDICAL ELIGIBILITY FORM    Name: Mundo Rosas YOB: 2009     Mundo is Medically eligible for all sports without restriction    Recommendations: N/A    I have examined the student named on this form and completed the preparticipation physical evaluation. The athlete does not have apparent clinical contraindications to practice and can participate in the sport(s) as outlined on this form. A copy of the physical examination findings are on record in my office and can be made available to the school at the request of the parents. If conditions arise after the athlete has been cleared for participation, the physician may rescind the medical eligibility until the problem is resolved and the potential consequences are completely explained to the athlete (and parents or guardians).    Name of health care professional (print or type): Haily Wyatt MD Date: 6/27/2024     Address: [address] [address]  Dept: [phone]     Signature of health care professional: ___ ____________________________________      SHARED EMERGENCY INFORMATION    No Known Allergies    Current Outpatient Medications   Medication Instructions   • albuterol 108 (90 Base) MCG/ACT inhaler 2 puffs, Inhalation, EVERY 4 HOURS PRN   • cetirizine (ZYRTEC) 10 mg, Oral, DAILY   • Litfulo 50 MG Cap [None received]   • minoxidil (LONITEN) 2.5 MG tablet PLEASE SEE ATTACHED FOR DETAILED DIRECTIONS   • Pediatric Multiple Vitamins (MULTIVITAMIN CHILDRENS PO) 1 tablet, Oral, DAILY   • Qvar RediHaler 40 MCG/ACT inhaler INHALE 2 PUFFS BY MOUTH TWICE A DAY       Other information:_____________________________________________________________________  _____________________________________________________________________________________  _____________________________________________________________________________________    Emergency  [facility] - Cath Lab ([facility])  Cardiology  Progress Note    Patient Name: Qi Ortiz  MRN: [id number]  Admission Date: 6/25/2024  Hospital Length of Stay: 1 days  Code Status: Full Code   Attending Physician: Andrea Burrell MD   Primary Care Physician: Lisa Porter MD  Expected Discharge Date:   Principal Problem:NSTEMI (non-ST elevated myocardial infarction)    Subjective:   HPI:   Qi Ortiz is a 74 y.o. female with a PMH CAD s/p CABG twice in 1998 and 2016, SVT, AS, s/p TAVR 20', chronic diastolic HF, HTN HLD COPD, CKD, DM, obesity, IRIS, statin intolerance. Presented to the ER for evaluation of an uncomfortable numbness to the chest. Symptom persisted from 2:00 p.m. yesterday till about 6:00 p.m. yesterday. She was seen by Dr. Paez yesterday. Patient had troponin ordered outpatient yesterday. Trop elevated at 0.098.  Repeat troponin 0.074.  Her EKG was abnormal but had similar findings compared to previous EKGs. Dr. Cohen with Cardiology was consulted.  He recommended initiating heparin drip, administered Lasix, continue home meds, administered nitro paste and echocardiogram in a.m. Hospital Medicine consulted to admit patient for NSTEMI.  Patient in agreement with treatment plan.  Patient admitted under inpatient status.     Cardiology consulted to assist with management. Pt seen and examined today resting in bed, c/o SOB and chest discomfort. Pt reports episode of SOB walking into [facility] clinic on Monday. OP troponin was elevated and she was sent to ED. Followed by Dr. Paez in clinic. Labs reviewed, troponin flat initially 0.98 trending down, .Echo in 23' EF nml, repeat pending. Nuc stress today  Hospital Course:   6/27/24 pt seen and examined today, no acute CV events reported. Labs reviewed, chart reviewed    Past Medical History:   Diagnosis Date    Carotid stenosis     19%    Cellulitis and abscess of foot, except toes 06/22/2022    Cerumen debris on tympanic membrane of right  "ear 11/30/2022    Coronary artery disease     CVA (cerebral vascular accident)     Dr. 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 Hope    Psoriatic arthritis     Rheumatology    Transient ischemic attack (TIA) 01/02/2019       Past Surgical History:   Procedure Laterality Date    BREAST BIOPSY      R sided/benign    CARDIAC SURGERY      sept 28 2016    CERVICAL FUSION      CHOLECYSTECTOMY      CORONARY ANGIOPLASTY      CORONARY ARTERY BYPASS GRAFT      triple bypass    CORONARY STENT PLACEMENT      EYE SURGERY      INTRAUTERINE DEVICE INSERTION      LEFT HEART CATHETERIZATION Left 9/8/2020    Procedure: CATHETERIZATION, HEART, LEFT;  Surgeon: Veronica Ibarra MD;  Location: [facility] CATH LAB;  Service: Cardiology;  Laterality: Left;  7am start time    mass removed from R groin      TOTAL ABDOMINAL HYSTERECTOMY W/ BILATERAL SALPINGOOPHORECTOMY      due to benign mass, adhesions    TUBAL LIGATION         Review of patient's allergies indicates:   Allergen Reactions    Adhesive Nausea And Vomiting     EKG Electrodes    Aspirin-dipyridamole Other (See Comments)     headaches  Other reaction(s): Not Indicated  Other reaction(s): unknown    Butorphanol      Other reaction(s): Not Indicated  Other reaction(s): cardiac arrest    Citalopram Anaphylaxis     Other reaction(s): Not Indicated  Other reaction(s): unknown    Clindamycin Itching and Swelling     Other reaction(s): Not Indicated  Other reaction(s): unknown    Codeine Shortness Of Breath, Itching and Swelling     Other reaction(s): Not Indicated  Other reaction(s): hives/trouble breathing/"possible cardiac arrest"    Ezetimibe      Other reaction(s): Not Indicated  Other reaction(s): unknown    " contacts:___________________________________________________________________  _____________________________________________________________________________________  _____________________________________________________________________________________     © 2019 [place] [employer], American Academy of Pediatrics, American College of Sport Medicine, American Medical Society for Sports Medicine, American Orthopaedics Society for Sport Medicine, and American Osteopathic Academy of Sports Medicine.  Permission is granted to reprint for noncommercial, educational purposes with acknowledgement.        PHYSICAL EXAMINATION FORM     Name: Mundo Rosas YOB: 2009   PHYSICIAN REMINDERS  1. Consider additional questions on more-sensitive issues.  Do you feel stressed out or under a lot of pressure?  Do you feel sad, hopeless, depressed or anxious?  Do you feel safe at your home or residence?  During the past 30 days, did you use chewing tobacco, snuff or dip?  Do you drink alcohol or user any other drugs?  Have you even taken anabolic steroids or used any other performance-enhancing supplement?  Have you even take any supplements to help you gain or lose weight or improve your performance?  Do you wear a seat belt, use a helmet, and use condoms?  2.  Consider reviewing questions on cardiovascular symptoms (Q4-Q13 of History Form).    EXAMINATION     Vitals: /73   Pulse 74   Temp 97 °F (36.1 °C) (Temporal)   Ht 5' 6\" (1.676 m)   Wt 49.2 kg (108 lb 7 oz)   BMI 17.50 kg/m²   BSA 1.54 m²    Vision: R 20/               L 20/                      Corrected  Y         N     MEDICAL NORMAL ABNORMAL FINDINGS   Appearance  Marfan stigmata (kyphoscoliosis, high-arched palate, pectus excavatum, arachnodactyly, arm span > height, hyperlaxity, myopia, MVP, aortic insufficiency) Yes    Eyes, ears, nose, throat  Pupils equal  Hearing Yes    Lymph nodes Yes    Heart*  Murmurs (auscultation  "Hydrocodone-acetaminophen Shortness Of Breath     Other reaction(s): Not Indicated  Other reaction(s): unknown    Isosorbide Other (See Comments)     Severe headache  Other reaction(s): Not Indicated  Other reaction(s): arrhythmia    Kiwi (actinidia chinensis) Blisters     Oral  Blisters/burning    Lisinopril Anaphylaxis     Other reaction(s): Not Indicated  Other reaction(s): unknown    Magnesium citrate Shortness Of Breath     Other reaction(s): Not Indicated    Meloxicam      Other reaction(s): Not Indicated  Other reaction(s): Caused Acute renal failure    Methylprednisolone Other (See Comments)     Patient reports taking IV in the past without any issues. Reports allergy to oral preparation   Other reaction(s): Not Indicated  Other reaction(s): unknown    Metronidazole Nausea And Vomiting    Misoprostol Anaphylaxis and Other (See Comments)     Difficulty breathing  Other reaction(s): Not Indicated  Other reaction(s): unknown    Morphine      Other reaction(s): Not Indicated  Other reaction(s): trouble breathing/"possible cardiac arrest"    Nedocromil      Other reaction(s): Not Indicated  Other reaction(s): unknown    Neuromuscular blockers, steroidal Other (See Comments)    Pentazocine lactate      Other reaction(s): Not Indicated  Other reaction(s): unknown    Ranolazine Nausea And Vomiting     Other reaction(s): Not Indicated  Other reaction(s): PVC's/ SOB    Rosuvastatin Anaphylaxis     Other reaction(s): Not Indicated  Other reaction(s): gastric sleeve    Stadol [butorphanol tartrate] Anaphylaxis     Coded    Sulfa (sulfonamide antibiotics) Other (See Comments)     unknown  Other reaction(s): hives/trouble breathing    Tetracyclines      Other reaction(s): unknown    Triamcinolone acetonide      Other reaction(s): Not Indicated  Other reaction(s): unknown    Zoledronic acid-mannitol-water Other (See Comments)     Bones hurt  Other reaction(s): Not Indicated  Other reaction(s): unknown    Hydromorphone " standing, auscultation supine, +/- Valsalva maneuver) Yes    Lungs Yes    Abdomen Yes    Skin  Herpes simplex virus (HSV), lesions suggestive of methicillin-resistant Staphylococcus aureus MRSA, or tinea corporis Yes    MUSCULOSKELETAL NORMAL ABNORMAL FINDINGS   Neck Yes    Back Yes    Shoulder and arm Yes    Elbow and forearm Yes    Wrist, hand, and fingers Yes    Hip and thigh Yes    Knee Yes    Leg and ankle Yes    Foot and toes Yes    Functional  Double-leg squat test, single-leg squat test, and box drop or step drop test Yes    * Consider electrocardiography ECG, echocardiogram, referral to cardiology for abnormal cardiac history or examination finding, or a combination of those.  Name of health care professional (print or type): Haily Wyatt MD  Date: 6/27/2024  Address: [address] [address]  Dept: [phone]   Signature of health care professional: __________ _____________________________    © 2019 American Academy of Family Physicians, American Academy of Pediatrics, American College of Sport Medicine, American Medical Society for Sports Medicine, American Orthopaedics Society for Sport Medicine, and American Osteopathic Academy of Sports Medicine.  Permission is granted to reprint for noncommercial, educational purposes with acknowledgement.         HISTORY FORM  Note: Complete and sign this form (with your parents if younger than 18) before your appointment.  Name: Mundo Rosas YOB: 2009     Date of examination: 6/27/2024  Sport(s):_________________________________________   Sex assigned at birth: (F, M, or other): male How do you identify your gender?(F, M, or other):_________     List past and current medical  Hallucinations    Azithromycin      Other reaction(s): Not Indicated    Cleocin [clindamycin hcl]     Meperidine      Other reaction(s): Not Indicated    Moxifloxacin      PATIENT STATES DO NOT GIVE UNDER ANY CIRCUSTANCES  Other reaction(s): Not Indicated    Nitroglycerin      Long acting  Other reaction(s): Not Indicated    Pholcodine     Prednisone      GASTRIC PAIN  Other reaction(s): Not Indicated    Tetracycline        No current facility-administered medications on file prior to encounter.     Current Outpatient Medications on File Prior to Encounter   Medication Sig    acetaminophen (TYLENOL) 650 MG TbSR as directed    allopurinoL (ZYLOPRIM) 100 MG tablet TAKE 2 TABLETS ONCE DAILY    amLODIPine (NORVASC) 2.5 MG tablet Take 1 tablet (2.5 mg total) by mouth once daily.    aspirin 81 MG Chew Take 1 tablet (81 mg total) by mouth once daily.    calcium carbonate 650 mg calcium (1,625 mg) tablet Take 1 tablet by mouth once daily.    clopidogreL (PLAVIX) 75 mg tablet TAKE 1 TABLET ONCE AS DIRECTED    docusate sodium (COLACE) 100 MG capsule Take 1 capsule (100 mg total) by mouth 2 (two) times daily.    folic acid (FOLVITE) 1 MG tablet TAKE 1 TABLET DAILY    furosemide (LASIX) 20 MG tablet TAKE 1 TABLET TWICE A DAY    garlic 2,000 mg Cap Take 3,000 mg by mouth once daily.     levothyroxine (SYNTHROID) 100 MCG tablet TAKE 1 TABLET BEFORE BREAKFAST    metoprolol succinate (TOPROL-XL) 100 MG 24 hr tablet TAKE 1 TABLET TWICE A DAY    potassium chloride SA (K-DUR,KLOR-CON M) 10 MEQ tablet Take 1 tablet (10 mEq total) by mouth once daily.    pyridoxine, vitamin B6, (B-6) 100 MG Tab Take 200 mg by mouth once daily.     REPATHA SURECLICK 140 mg/mL PnIj INJECT 1 ML (140 MG) UNDER THE SKIN EVERY 14 DAYS    secukinumab (COSENTYX PEN) 150 mg/mL PnIj Inject 300 mg into the skin every 28 days.    spironolactone (ALDACTONE) 25 MG tablet TAKE 1 TABLET DAILY    vitamin D 1000 units Tab Take 185 mg by mouth once daily.    albuterol  conditions:____________________________________________________________________  _______________________________________________________________________________________________________________    Have you ever had surgery? If yes, list all past surgical procedures: ______________________________________________  _______________________________________________________________________________________________________________    Medicines and supplements: List all current prescriptions, over-the-counter medicines, and supplements (herbal and nutritional):   ______________________________________________________________________________________________________________  ______________________________________________________________________________________________________________    Do you have any allergies? If yes, please list all your allergies (ie, medicines, pollens, food, stinging insects).   ______________________________________________________________________________________________________________  ______________________________________________________________________________________________________________       Patient Health Questionnaire Version 4 (PHQ-4)  Over the last 2 weeks, how often have you been bothered by any of the following problems? ([place] response.)   Not at all Several days Over half the days Nearly every day   Feeling nervous, anxious, or on edge 0 1 2 3   Not being able to stop or control worrying 0 1 2 3   Little interest or pleasure in doing things 0 1 2 3   Feeling down, depressed, or hopeless 0 1 2 3   (A sum of =3 is considered positive on either subscale [questions 1 and 2, or questions 3 and 4] for screening purposes.)     GENERAL QUESTIONS  (Explain “Yes” answers at the end of this form.  [place] questions if you don’t know the answer.)     Yes     No   1. Do you have any concerns that you would like to discuss with your provider?       2. Has a provider ever denied or restricted your  (PROVENTIL) 2.5 mg /3 mL (0.083 %) nebulizer solution Take 3 mLs (2.5 mg total) by nebulization every 6 (six) hours as needed for Wheezing. Rescue    calcipotriene (DOVONOX) 0.005 % cream Apply topically 2 (two) times daily.    nitroGLYCERIN (NITROSTAT) 0.4 MG SL tablet DISSOLVE 1 TABLET UNDER THE TONGUE EVERY 5 MINUTES AS NEEDED FOR CHEST PAIN     Family History       Problem Relation (Age of Onset)    Breast cancer Maternal Grandfather, Paternal Aunt, Sister (60)    Diabetes Daughter    Heart disease     Leukemia Sister (8)    Lung cancer Paternal Grandfather    Stroke           Tobacco Use    Smoking status: Never    Smokeless tobacco: Never   Substance and Sexual Activity    Alcohol use: No    Drug use: No    Sexual activity: Not Currently     Partners: Male     Review of Systems   Constitutional: Positive for malaise/fatigue.   HENT: Negative.     Eyes: Negative.    Cardiovascular:  Positive for chest pain, dyspnea on exertion and paroxysmal nocturnal dyspnea.   Respiratory:  Positive for cough and shortness of breath.    Skin: Negative.    Musculoskeletal:  Positive for arthritis, back pain, falls, muscle cramps and stiffness.   Gastrointestinal: Negative.    Genitourinary: Negative.    Neurological:  Positive for dizziness, light-headedness and weakness.   Psychiatric/Behavioral: Negative.       Objective:     Vital Signs (Most Recent):  Temp: 98.4 °F (36.9 °C) (06/27/24 1218)  Pulse: (!) 56 (06/27/24 1530)  Resp: 19 (06/27/24 1530)  BP: (!) 185/76 (06/27/24 1530)  SpO2: 98 % (06/27/24 1530) Vital Signs (24h Range):  Temp:  [98.1 °F (36.7 °C)-98.4 °F (36.9 °C)] 98.4 °F (36.9 °C)  Pulse:  [53-62] 56  Resp:  [14-20] 19  SpO2:  [94 %-100 %] 98 %  BP: (142-185)/(64-76) 185/76     Weight: 98.4 kg (217 lb)  Body mass index is 38.44 kg/m².    SpO2: 98 %       No intake or output data in the 24 hours ending 06/27/24 1614    Lines/Drains/Airways       Peripheral Intravenous Line  Duration                  Peripheral  participation in sports for any reason?       3. Do you have any ongoing medical issues or recent illness?       HEART HEALTH QUESTIONS ABOUT YOU Yes No   4. Have you ever passed out or nearly passed out during or after exercise?       5. Have you ever had discomfort, pain, tightness, or pressure in your chest during exercise?       6. Does your heart ever race, flutter in your chest, or skip beats (irregular beats) during exercise?       7. Has a doctor ever told you that you have any heart problems?       8. Has a doctor ever requested a test for your heart? For example, electrocardiography (ECG) or echocardiography.      HEART HEALTH QUESTIONS ABOUT YOU (CONTINUED ) Yes No   9. Do you get light-headed or feel shorter of breath than your friends during exercise?       10. Have you ever had a seizure?       HEART HEALTH QUESTIONS ABOUT YOUR FAMILY Yes No   11. Has any family member or relative  of heart problems or had an unexpected or unexplained sudden death before age 35 years (including drowning or unexplained car crash)?       12. Does anyone in your family have a genetic heart problem such as hypertrophic cardiomyopathy (HCM), Marfan syndrome, arrhythmogenic right ventricular cardiomyopathy (ARVC), long QT syndrome (LQTS), short QT syndrome (SQTS), Brugada syndrome, or catecholaminergic polymorphic ventricular tachycardia (CPVT)?       13. Has anyone in your family had a pacemaker or an implanted defibrillator before age 35?                Name: Mundo Rosas YOB: 2009     BONE AND JOINT QUESTIONS Yes No   14. Have you ever had a stress fracture or an injury to a bone, muscle, ligament, joint, or tendon that caused you to miss a practice or game?       15. Do you have a bone, muscle, ligament, or joint injury that bothers you?       MEDICAL QUESTIONS Yes No   16. Do you cough, wheeze, or have difficulty breathing during or after exercise?       17. Are you missing a kidney, an eye, a  IV - Single Lumen 22 G Anterior;Left Wrist -- days         Peripheral IV - Single Lumen 06/25/24 2234 20 G Left Antecubital 1 day                     Physical Exam  Vitals and nursing note reviewed.   Constitutional:       Appearance: Normal appearance.   HENT:      Head: Normocephalic.   Eyes:      Pupils: Pupils are equal, round, and reactive to light.   Cardiovascular:      Rate and Rhythm: Normal rate and regular rhythm.      Heart sounds: Normal heart sounds, S1 normal and S2 normal. No murmur heard.     No S3 or S4 sounds.   Pulmonary:      Effort: Pulmonary effort is normal.      Breath sounds: Rales present.   Abdominal:      General: Bowel sounds are normal.      Palpations: Abdomen is soft.   Musculoskeletal:         General: Normal range of motion.      Cervical back: Normal range of motion.   Skin:     Capillary Refill: Capillary refill takes less than 2 seconds.   Neurological:      General: No focal deficit present.      Mental Status: She is alert and oriented to person, place, and time.   Psychiatric:         Mood and Affect: Mood normal.         Behavior: Behavior normal.         Thought Content: Thought content normal.          Significant Labs: BMP:   Recent Labs   Lab 06/25/24 2221 06/26/24  0756 06/27/24  0517    91 86    144 142   K 3.4* 3.4* 3.8    105 102   CO2 29 28 26   BUN 18 18 18   CREATININE 1.2 0.9 1.0   CALCIUM 9.5 9.1 9.6   MG 1.7  --   --    , CMP   Recent Labs   Lab 06/25/24 2221 06/26/24  0756 06/27/24  0517    144 142   K 3.4* 3.4* 3.8    105 102   CO2 29 28 26    91 86   BUN 18 18 18   CREATININE 1.2 0.9 1.0   CALCIUM 9.5 9.1 9.6   PROT 6.6  --  6.7   ALBUMIN 3.3*  --  3.5   BILITOT 0.4  --  0.5   ALKPHOS 54*  --  55   AST 17  --  20   ALT 12  --  11   ANIONGAP 12 11 14   , CBC   Recent Labs   Lab 06/25/24 2221 06/26/24  0221 06/27/24  0517   WBC 8.14 8.72 7.66   HGB 12.2 11.5* 12.6   HCT 37.2 35.2* 39.8    184 207   , INR   Recent  testicle (males), your spleen, or any other organ?       18. Do you have groin or testicle pain or a painful bulge or hernia in the groin area?       19. Do you have any recurring skin rashes or rashes that come and go, including herpes or methicillin-resistant Staphylococcus aureus  (MRSA)?       20. Have you had a concussion or head injury that caused confusion, a prolonged headache, or memory problems?       21. Have you ever had numbness, had tingling, had weakness in your arms or legs, or been unable to move your arms or legs after being hit or falling?       22. Have you ever become ill while exercising in the heat?       23. Do you or does someone in your family have sickle cell trait or disease?       24. Have you ever had or do you have any problems with your eyes or vision?        MEDICAL QUESTIONS (CONTINUED ) Yes No   25. Do you worry about your weight?         26. Are you trying to or has anyone recommended that you gain or lose weight?       27. Are you on a special diet or do you avoid certain types of foods or food groups?       28. Have you ever had an eating disorder?       FEMALES ONLY     29. Have you ever had a menstrual period?       30. How old were you when you had your first menstrual period?       31. When was your most recent menstrual period?       32. How many periods have you had in the past 12 months?         Explain \"Yes\" answers here.  ______________________________________________    ______________________________________________    ______________________________________________    ______________________________________________    ______________________________________________    ______________________________________________    ______________________________________________    ______________________________________________     I hereby state that, to the best of my knowledge, my answers to the questions on this form are complete and correct.    Signature of athlete:  "Labs   Lab 06/25/24  2226   INR 1.0   , Lipid Panel No results for input(s): "CHOL", "HDL", "LDLCALC", "TRIG", "CHOLHDL" in the last 48 hours., Troponin   Recent Labs   Lab 06/26/24  0221 06/26/24  0756 06/26/24  1513   TROPONINI 0.074* 0.067* 0.065*   , and All pertinent lab results from the last 24 hours have been reviewed.    Significant Imaging: Cardiac Cath: reviewed, Echocardiogram: Transthoracic echo (TTE) complete ([facility] Only):   Results for orders placed or performed during the hospital encounter of 06/25/24   Echo   Result Value Ref Range    BSA 2.11 m2    LA WIDTH 4.3 cm    RA Width 2.7 cm    LVOT stroke volume 85.97 cm3    LVIDd 5.78 3.5 - 6.0 cm    LV Systolic Volume 68.29 mL    LV Systolic Volume Index 34.0 mL/m2    LVIDs 3.96 2.1 - 4.0 cm    LV Diastolic Volume 164.92 mL    LV Diastolic Volume Index 82.05 mL/m2    Left Ventricular End Systolic Volume by Teichholz Method 68.29 mL    Left Ventricular End Diastolic Volume by Teichholz Method 164.92 mL    IVS 1.16 (A) 0.6 - 1.1 cm    LVOT diameter 1.95 cm    LVOT area 3.0 cm2    FS 31 28 - 44 %    Left Ventricle Relative Wall Thickness 0.41 cm    Posterior Wall 1.18 (A) 0.6 - 1.1 cm    LV mass 285.38 g    LV Mass Index 142 g/m2    MV Peak E Caden 1.51 m/s    TDI LATERAL 0.09 m/s    TDI SEPTAL 0.04 m/s    E/E' ratio 23.23 m/s    MV Peak A Caden 1.10 m/s    TR Max Caden 3.38 m/s    E/A ratio 1.37     IVRT 71.36 msec    E wave deceleration time 308.83 msec    LV SEPTAL E/E' RATIO 37.75 m/s    LA Volume Index 35.4 mL/m2    LV LATERAL E/E' RATIO 16.78 m/s    LA volume 71.16 cm3    LVOT peak caden 0.98 m/s    Left Ventricular Outflow Tract Mean Velocity 0.78 cm/s    Left Ventricular Outflow Tract Mean Gradient 2.61 mmHg    RVOT peak VTI 17.1 cm    TAPSE 2.50 cm    LA size 3.21 cm    Left Atrium Minor Axis 6.03 cm    Left Atrium Major Axis 6.10 cm    RA Major Axis 4.49 cm    AV mean gradient 11 mmHg    AV peak gradient 21 mmHg    Ao peak caden 2.31 m/s    Ao VTI 56.60 cm " ____________________________________________________________________________________    Signature of parent or guardian: ___________________________________________________________________________  Date: ________________________________________________  _____________________________________________________________________________________________________  © 2019 American [employer], American Academy of Pediatrics, American College of Sports Medicine, American Medical Society for Sports Medicine, American Orthopaedic Society for Sports Medicine, and American Osteopathic Academy of Sports Medicine. Permission is granted to reprint for noncommercial, educational purposes with acknowledgment.      LVOT peak VTI 28.80 cm    AV valve area 1.52 cm²    AV Velocity Ratio 0.42     AV index (prosthetic) 0.51     ALFREDO by Velocity Ratio 1.27 cm²    Mr max rosanne 5.45 m/s    MV mean gradient 3 mmHg    MV peak gradient 11 mmHg    MV valve area by continuity eq 1.59 cm2    MV VTI 54.2 cm    Triscuspid Valve Regurgitation Peak Gradient 46 mmHg    PV mean gradient 1 mmHg    RVOT peak rosanne 0.73 m/s    Ao root annulus 2.90 cm    STJ 3.46 cm    Ascending aorta 3.09 cm    IVC diameter 1.55 cm    Mean e' 0.07 m/s    ZLVIDS 0.72     ZLVIDD -0.19     EF 60 %    TV resting pulmonary artery pressure 49 mmHg    RV TB RVSP 6 mmHg    Est. RA pres 3 mmHg    Narrative      Left Ventricle: The left ventricle is moderately dilated. Normal wall   thickness. There is eccentric hypertrophy. Normal wall motion. Ejection   fraction by visual approximation is 60%. Grade II diastolic dysfunction.    Right Ventricle: Normal right ventricular cavity size. Wall thickness   is normal. Right ventricle wall motion  is normal. Systolic function is   normal.    Left Atrium: Left atrium is mildly dilated.    Aortic Valve: There is a transcatheter valve replacement in the aortic   position. Aortic valve area by VTI is 1.52 cm². Aortic valve peak velocity   is 2.31 m/s. Mean gradient is 11 mmHg. The dimensionless index is 0.51.   There is mild aortic regurgitation.    Mitral Valve: There is mild regurgitation.    Tricuspid Valve: There is mild regurgitation.    Pulmonary Artery: The estimated pulmonary artery systolic pressure is   49 mmHg.    IVC/SVC: Normal venous pressure at 3 mmHg.     , EKG: reviewed, Stress Test: reviewed, and X-Ray: CXR: X-Ray Chest 1 View (CXR): No results found for this visit on 06/25/24.  Assessment and Plan:       * NSTEMI (non-ST elevated myocardial infarction)  Troponin peaked 0.98 trending down  Atypical chest pains  Hept gtt  Nuc stress  EKG with no acute dynamic changes noted    6/27/24  [facility] planned for today with   Juanitabrandi    Type 2 diabetes mellitus with diabetic neuropathy, without long-term current use of insulin  Management per primary team    Chronic diastolic heart failure    Cont lasix, BB, aldactone  Echo pending  Nuc stress today    6/27/24  Nuc stress yesterday [facility] planned for today  All risks, benefits and treatment alternatives explained all questions answered. Pt agreeable and ready to proceed    S/P TAVR (transcatheter aortic valve replacement)  Cont asa and metoprolol    S/P CABG (coronary artery bypass graft)  S/p CABG x2  Cont asa, BB    Essential hypertension  Titrate medications    Hyperlipidemia  Statin intol  On repatha OP        VTE Risk Mitigation (From admission, onward)           Ordered     heparin, porcine (PF) injection  As needed (PRN)         06/27/24 1040     IP VTE HIGH RISK PATIENT  Once         06/26/24 0017     Place sequential compression device  Until discontinued         06/26/24 0017                    Rosa Child NP  Cardiology  [facility] ([facility])

## 2024-06-27 NOTE — CONSULTS
Food & Nutrition Education     Diet Education: Cardiac Nutrition Therapy  Time Spent: 15 minutes.  Learners: Patient, Daughter     Nutrition Education provided with handouts:  Hearty-Heart Nutrition Therapy, Low-Sodium Nutrition Therapy ([email or web address])    Patient Active Problem List   Diagnosis    Hyperlipidemia    Hypothyroidism    Degenerative arthritis of lumbar spine    Polyneuropathy    Metabolic syndrome    Vitamin D deficiency    Age-related osteoporosis without current pathological fracture    Essential hypertension    CAD (coronary artery disease), with stents     S/P CABG (coronary artery bypass graft)    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    Carotid stenosis    IRIS (obstructive sleep apnea)    Double ectopic ureters    Psoriatic arthritis    Angina, class II    Primary osteoarthritis involving multiple joints    Statin intolerance    Osteopenia of multiple sites    Psoriasis    History of CVA (cerebrovascular accident)    Iron deficiency anemia due to chronic blood loss    B12 deficiency    Iron deficiency anemia due to chronic blood loss    Allergy to statin medication    Near syncope    Palpitations    Supraventricular tachycardia    Nonrheumatic aortic valve stenosis    S/P TAVR (transcatheter aortic valve replacement)    Anemia due to folic acid deficiency    Chronic diastolic heart failure    AP (angina pectoris)    CAD, multiple vessel    Pulmonary hypertension    Abnormal blood level of uric acid    Folic acid deficiency    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    Pain in lateral portion of left knee    Secondary hyperparathyroidism    Aortic atherosclerosis    Immunocompromised    Grade IV hemorrhoids    Orthostatic hypotension    Class 2 severe obesity due to excess calories with serious comorbidity and body mass index (BMI) of 38.0 to 38.9 in adult    Prediabetes    Balance problems    Frailty syndrome in geriatric  patient    Bilateral hearing loss    Dog bite    Iron deficiency    BROOKS (dyspnea on exertion)    EKG abnormalities    Type 2 diabetes mellitus with hyperglycemia, without long-term current use of insulin    CKD stage 3a, GFR 45-59 ml/min    Fall    Chest wall contusion, right, initial encounter    Contusion of right shoulder    Encounter for preventive health examination    Type 2 diabetes mellitus with chronic kidney disease, without long-term current use of insulin    Type 2 diabetes mellitus with diabetic neuropathy, without long-term current use of insulin    Other reduced mobility    Traumatic abrasion    Cellulitis of left lower extremity    NSTEMI (non-ST elevated myocardial infarction)     Past Medical History:   Diagnosis Date    Carotid stenosis     19%    Cellulitis and abscess of foot, except toes 06/22/2022    Cerumen debris on tympanic membrane of right ear 11/30/2022    Coronary artery disease     CVA (cerebral vascular accident)     Dr. 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 Hope    Psoriatic arthritis     Rheumatology    Transient ischemic attack (TIA) 01/02/2019     Nutrition Related Social Determinants of Health:   SDOH: Adequate food in home environment     Comments:  Dietitian educated patient on low sodium, general healthful diet r/t recent hospital diagnosis. Recommended a well-balanced diet with a variety of fresh foods, fruits, and vegetables (5 cups/day), whole grains (3 oz/day), and fat-free or low-fat dairy. Discussed reading food packages, food labels, and nutrition facts labels to identify nutrient content of foods.     Discussed the importance of limiting sodium to less than 2,000 mg per day. Recommended salt free  seasonings and other herbs and spices in meals to enhance flavor without additional sodium.     Discussed dietary sources of cholesterol, the importance of incorporating healthy fats into the diet, and avoiding saturated and trans fats for heart health. For a generally healthy diet, aim for total fat less than 25-35% of calories. Discussed alternative vegetable protein options for some of her meal throughout the week (Beans, peas, and lentils)      Discussed the importance of fiber (especially soluble fiber), dietary sources, and a goal intake of >20-30g/day.    Education provided to the pt daughter per the pt was hard of hearing and restless d/t recently returning to room. The pt daughter states that her mother lives independently and prepares her own meals. She states that her mother eat 50% home cooked meals and 50% meals to may  on her way home weekly. Discussed the importance of preparing meals at home to assist with limited consumption of excessive harmful nutrient. The pt currently has a good appetite. Dietitian will continue to follow and monitor the pt nutrition status during current admission.      NFPE not performed, pt appears well nourished.  All questions and concerns answered.  Provided handout with dietitian's contact information.  *Please re-consult as needed.  Thank You!  Laz Xavier MS, Registration Eligible, Provisional LDN

## 2024-06-27 NOTE — SUBJECTIVE & OBJECTIVE
Interval History: s/p LHC with stents x 2.No further CP. +SOB. Pt attributes SOB to the sedation she received.   Review of Systems   Constitutional:  Positive for activity change. Negative for appetite change, chills, diaphoresis, fatigue, fever and unexpected weight change.   HENT:  Negative for congestion, nosebleeds, sinus pressure and sore throat.    Eyes:  Negative for pain, discharge and visual disturbance.   Respiratory:  Positive for shortness of breath. Negative for cough, chest tightness, wheezing and stridor.    Cardiovascular:  Negative for palpitations and leg swelling.   Gastrointestinal:  Negative for abdominal distention, abdominal pain, blood in stool, constipation, diarrhea, nausea and vomiting.   Endocrine: Negative for cold intolerance and heat intolerance.   Genitourinary:  Negative for difficulty urinating, dysuria, flank pain, frequency and urgency.   Musculoskeletal:  Positive for arthralgias and back pain. Negative for joint swelling, myalgias, neck pain and neck stiffness.   Skin:  Negative for rash and wound.   Allergic/Immunologic: Negative for food allergies and immunocompromised state.   Neurological:  Negative for dizziness, seizures, syncope, facial asymmetry, speech difficulty, weakness, light-headedness, numbness and headaches.   Hematological:  Negative for adenopathy.   Psychiatric/Behavioral:  Negative for agitation, confusion and hallucinations.      Objective:     Vital Signs (Most Recent):  Temp: 98.5 °F (36.9 °C) (06/27/24 1719)  Pulse: 64 (06/27/24 1719)  Resp: 16 (06/27/24 1719)  BP: 136/64 (06/27/24 1719)  SpO2: 98 % (06/27/24 1719) Vital Signs (24h Range):  Temp:  [98.1 °F (36.7 °C)-98.5 °F (36.9 °C)] 98.5 °F (36.9 °C)  Pulse:  [53-64] 64  Resp:  [14-24] 16  SpO2:  [94 %-100 %] 98 %  BP: (136-185)/(64-80) 136/64     Weight: 98.4 kg (217 lb)  Body mass index is 38.44 kg/m².    Intake/Output Summary (Last 24 hours) at 6/27/2024 4551  Last data filed at 6/27/2024 1630  Gross  per 24 hour   Intake 745 ml   Output 200 ml   Net 545 ml         Physical Exam  Vitals and nursing note reviewed.   Constitutional:       General: She is not in acute distress.     Appearance: She is well-developed. She is not diaphoretic.   HENT:      Head: Normocephalic and atraumatic.      Nose: Nose normal.   Eyes:      General: No scleral icterus.     Conjunctiva/sclera: Conjunctivae normal.   Neck:      Trachea: No tracheal deviation.   Cardiovascular:      Rate and Rhythm: Normal rate and regular rhythm.      Heart sounds: Normal heart sounds. No murmur heard.     No friction rub. No gallop.   Pulmonary:      Effort: Pulmonary effort is normal. No respiratory distress.      Breath sounds: No stridor. No wheezing.   Chest:      Chest wall: No tenderness.   Abdominal:      General: Bowel sounds are normal. There is no distension.      Palpations: Abdomen is soft. There is no mass.      Tenderness: There is no abdominal tenderness. There is no guarding or rebound.   Musculoskeletal:         General: No tenderness or deformity. Normal range of motion.      Cervical back: Normal range of motion and neck supple.   Skin:     General: Skin is warm and dry.      Coloration: Skin is not pale.      Findings: No erythema or rash.      Comments: [facility] site C/D/I without swelling or drainage. Splint in place.    Neurological:      Mental Status: She is alert and oriented to person, place, and time.      Cranial Nerves: No cranial nerve deficit.      Motor: No abnormal muscle tone.      Coordination: Coordination normal.   Psychiatric:         Behavior: Behavior normal.         Thought Content: Thought content normal.             Significant Labs: All pertinent labs within the past 24 hours have been reviewed.    Significant Imaging: I have reviewed all pertinent imaging results/findings within the past 24 hours.

## 2024-06-27 NOTE — PLAN OF CARE
Transferred to  218 per hosp bed per AMY Suarez L radial dsg CDI/ no hematoma noted/ immobilizer intact to Left wrist / pt and dgt educated on s/s bleeding and hematoma to report to RN ,activity restrictions/ both v/u

## 2024-06-28 VITALS
DIASTOLIC BLOOD PRESSURE: 82 MMHG | HEIGHT: 63 IN | SYSTOLIC BLOOD PRESSURE: 158 MMHG | RESPIRATION RATE: 18 BRPM | HEART RATE: 62 BPM | WEIGHT: 216.06 LBS | BODY MASS INDEX: 38.28 KG/M2 | TEMPERATURE: 97 F | OXYGEN SATURATION: 97 %

## 2024-06-28 LAB
ANION GAP SERPL CALC-SCNC: 12 MMOL/L (ref 8–16)
BASOPHILS # BLD AUTO: 0.04 K/UL (ref 0–0.2)
BASOPHILS # BLD AUTO: 0.04 K/UL (ref 0–0.2)
BASOPHILS NFR BLD: 0.4 % (ref 0–1.9)
BASOPHILS NFR BLD: 0.4 % (ref 0–1.9)
BNP SERPL-MCNC: 434 PG/ML (ref 0–99)
BUN SERPL-MCNC: 15 MG/DL (ref 8–23)
CALCIUM SERPL-MCNC: 9.4 MG/DL (ref 8.7–10.5)
CHLORIDE SERPL-SCNC: 105 MMOL/L (ref 95–110)
CO2 SERPL-SCNC: 25 MMOL/L (ref 23–29)
CREAT SERPL-MCNC: 1.1 MG/DL (ref 0.5–1.4)
DIFFERENTIAL METHOD BLD: ABNORMAL
DIFFERENTIAL METHOD BLD: ABNORMAL
EOSINOPHIL # BLD AUTO: 0.2 K/UL (ref 0–0.5)
EOSINOPHIL # BLD AUTO: 0.2 K/UL (ref 0–0.5)
EOSINOPHIL NFR BLD: 2.1 % (ref 0–8)
EOSINOPHIL NFR BLD: 2.1 % (ref 0–8)
ERYTHROCYTE [DISTWIDTH] IN BLOOD BY AUTOMATED COUNT: 14.6 % (ref 11.5–14.5)
ERYTHROCYTE [DISTWIDTH] IN BLOOD BY AUTOMATED COUNT: 14.6 % (ref 11.5–14.5)
EST. GFR  (NO RACE VARIABLE): 53 ML/MIN/1.73 M^2
GLUCOSE SERPL-MCNC: 138 MG/DL (ref 70–110)
GLUCOSE SERPL-MCNC: 142 MG/DL (ref 70–110)
GLUCOSE SERPL-MCNC: 97 MG/DL (ref 70–110)
HCT VFR BLD AUTO: 37.1 % (ref 37–48.5)
HCT VFR BLD AUTO: 37.1 % (ref 37–48.5)
HGB BLD-MCNC: 12 G/DL (ref 12–16)
HGB BLD-MCNC: 12 G/DL (ref 12–16)
IMM GRANULOCYTES # BLD AUTO: 0.03 K/UL (ref 0–0.04)
IMM GRANULOCYTES # BLD AUTO: 0.03 K/UL (ref 0–0.04)
IMM GRANULOCYTES NFR BLD AUTO: 0.3 % (ref 0–0.5)
IMM GRANULOCYTES NFR BLD AUTO: 0.3 % (ref 0–0.5)
LYMPHOCYTES # BLD AUTO: 1.6 K/UL (ref 1–4.8)
LYMPHOCYTES # BLD AUTO: 1.6 K/UL (ref 1–4.8)
LYMPHOCYTES NFR BLD: 17.4 % (ref 18–48)
LYMPHOCYTES NFR BLD: 17.4 % (ref 18–48)
MCH RBC QN AUTO: 30.1 PG (ref 27–31)
MCH RBC QN AUTO: 30.1 PG (ref 27–31)
MCHC RBC AUTO-ENTMCNC: 32.3 G/DL (ref 32–36)
MCHC RBC AUTO-ENTMCNC: 32.3 G/DL (ref 32–36)
MCV RBC AUTO: 93 FL (ref 82–98)
MCV RBC AUTO: 93 FL (ref 82–98)
MONOCYTES # BLD AUTO: 0.8 K/UL (ref 0.3–1)
MONOCYTES # BLD AUTO: 0.8 K/UL (ref 0.3–1)
MONOCYTES NFR BLD: 9.2 % (ref 4–15)
MONOCYTES NFR BLD: 9.2 % (ref 4–15)
NEUTROPHILS # BLD AUTO: 6.4 K/UL (ref 1.8–7.7)
NEUTROPHILS # BLD AUTO: 6.4 K/UL (ref 1.8–7.7)
NEUTROPHILS NFR BLD: 70.6 % (ref 38–73)
NEUTROPHILS NFR BLD: 70.6 % (ref 38–73)
NRBC BLD-RTO: 0 /100 WBC
NRBC BLD-RTO: 0 /100 WBC
OHS QRS DURATION: 90 MS
OHS QTC CALCULATION: 441 MS
PLATELET # BLD AUTO: 219 K/UL (ref 150–450)
PLATELET # BLD AUTO: 219 K/UL (ref 150–450)
PMV BLD AUTO: 10.4 FL (ref 9.2–12.9)
PMV BLD AUTO: 10.4 FL (ref 9.2–12.9)
POCT GLUCOSE: 138 MG/DL (ref 70–110)
POTASSIUM SERPL-SCNC: 3.7 MMOL/L (ref 3.5–5.1)
RBC # BLD AUTO: 3.99 M/UL (ref 4–5.4)
RBC # BLD AUTO: 3.99 M/UL (ref 4–5.4)
SODIUM SERPL-SCNC: 142 MMOL/L (ref 136–145)
WBC # BLD AUTO: 9.04 K/UL (ref 3.9–12.7)
WBC # BLD AUTO: 9.04 K/UL (ref 3.9–12.7)

## 2024-06-28 PROCEDURE — 93005 ELECTROCARDIOGRAM TRACING: CPT

## 2024-06-28 PROCEDURE — 85025 COMPLETE CBC W/AUTO DIFF WBC: CPT | Performed by: INTERNAL MEDICINE

## 2024-06-28 PROCEDURE — 83880 ASSAY OF NATRIURETIC PEPTIDE: CPT | Performed by: NURSE PRACTITIONER

## 2024-06-28 PROCEDURE — 80048 BASIC METABOLIC PNL TOTAL CA: CPT | Performed by: INTERNAL MEDICINE

## 2024-06-28 PROCEDURE — 25000003 PHARM REV CODE 250: Performed by: INTERNAL MEDICINE

## 2024-06-28 PROCEDURE — 36415 COLL VENOUS BLD VENIPUNCTURE: CPT | Performed by: INTERNAL MEDICINE

## 2024-06-28 PROCEDURE — 99900035 HC TECH TIME PER 15 MIN (STAT)

## 2024-06-28 PROCEDURE — 36415 COLL VENOUS BLD VENIPUNCTURE: CPT | Performed by: NURSE PRACTITIONER

## 2024-06-28 PROCEDURE — 25000003 PHARM REV CODE 250: Performed by: STUDENT IN AN ORGANIZED HEALTH CARE EDUCATION/TRAINING PROGRAM

## 2024-06-28 RX ORDER — AMLODIPINE BESYLATE 5 MG/1
5 TABLET ORAL DAILY
Qty: 30 TABLET | Refills: 0 | Status: SHIPPED | OUTPATIENT
Start: 2024-06-28 | End: 2024-07-01 | Stop reason: SINTOL

## 2024-06-28 RX ORDER — AMLODIPINE BESYLATE 2.5 MG/1
2.5 TABLET ORAL ONCE
Status: COMPLETED | OUTPATIENT
Start: 2024-06-28 | End: 2024-06-28

## 2024-06-28 RX ORDER — AMLODIPINE BESYLATE 5 MG/1
5 TABLET ORAL DAILY
Status: DISCONTINUED | OUTPATIENT
Start: 2024-06-29 | End: 2024-06-28 | Stop reason: HOSPADM

## 2024-06-28 RX ADMIN — FUROSEMIDE 20 MG: 20 TABLET ORAL at 09:06

## 2024-06-28 RX ADMIN — CLOPIDOGREL BISULFATE 75 MG: 75 TABLET ORAL at 09:06

## 2024-06-28 RX ADMIN — AMLODIPINE BESYLATE 2.5 MG: 2.5 TABLET ORAL at 10:06

## 2024-06-28 RX ADMIN — METOPROLOL SUCCINATE 100 MG: 50 TABLET, EXTENDED RELEASE ORAL at 09:06

## 2024-06-28 RX ADMIN — POTASSIUM CHLORIDE 10 MEQ: 750 TABLET, EXTENDED RELEASE ORAL at 09:06

## 2024-06-28 RX ADMIN — SPIRONOLACTONE 25 MG: 25 TABLET, FILM COATED ORAL at 09:06

## 2024-06-28 RX ADMIN — LEVOTHYROXINE SODIUM 100 MCG: 100 TABLET ORAL at 06:06

## 2024-06-28 RX ADMIN — ASPIRIN 81 MG CHEWABLE TABLET 81 MG: 81 TABLET CHEWABLE at 09:06

## 2024-06-28 RX ADMIN — AMLODIPINE BESYLATE 2.5 MG: 2.5 TABLET ORAL at 09:06

## 2024-06-28 RX ADMIN — TICAGRELOR 90 MG: 90 TABLET ORAL at 09:06

## 2024-06-28 NOTE — DISCHARGE SUMMARY
[facility] Medicine  Discharge Summary      Patient Name: Qi Ortiz  MRN: [id number]  SHEMAR: [id number]  Patient Class: IP- Inpatient  Admission Date: 6/25/2024  Hospital Length of Stay: 2 days  Discharge Date and Time: 6/28/2024  2:02 PM  Attending Physician:Dr. Burrell  Discharging Provider: Karla Cook NP  Primary Care Provider: Lisa Porter MD    Primary Care Team: Networked reference to record PCT     HPI:   Qi Ortiz is a 74 y.o. female with a PMH  has a past medical history of Carotid stenosis, Cellulitis and abscess of foot, except toes (06/22/2022), Cerumen debris on tympanic membrane of right ear (11/30/2022), Coronary artery disease, CVA (cerebral vascular accident), Depression, Double ectopic ureters, Hemiplegia affecting right dominant side (11/09/2021), Hyperlipidemia, Hypertension, Hypothyroid, Left foot infection (07/08/2022), Mild asthma with exacerbation (12/04/2022), OP (osteoporosis), IRIS (obstructive sleep apnea), Psoriatic arthritis, and Transient ischemic attack (TIA) (01/02/2019).Presented to the ER for evaluation of an uncomfortable numbness to the chest. Symptom persisted from 2:00 p.m. yesterday till about 6:00 p.m. yesterday. She was seen by Dr. Paez yesterday. Patient had troponin ordered outpatient yesterday. Trop elevated at 0.098.  Repeat troponin 0.074.  Her EKG was abnormal but had similar findings compared to previous EKGs. Dr. Cohen with Cardiology was consulted.  He recommended initiating heparin drip, administered Lasix, continue home meds, administered nitro paste and echocardiogram in a.m. Hospital Medicine consulted to admit patient for NSTEMI.  Patient in agreement with treatment plan.  Patient admitted under inpatient status.    PCP: Lisa Porter      Procedure(s) (LRB):  Left heart cath (Left)  Bypass graft study  Percutaneous coronary intervention (N/A)  Stent, Drug Eluting, Single Vessel, Coronary      Hospital  Course:   The patient is a 73 yo female with CAD s/p CABG 1998 and 2016, SVT, AS s/p TAVR 20', chronic diastolic HF, HTN HLD COPD, CKD, DM, obesity, IRIS, statin intolerance, multiple drug allergies who was admitted for NSTEMI on IV heparin and continued on ASA, BB, and Plavix. EKG showed no ischemia-nonspecific changes. Troponin 0.098>0.074>0.067. Echo pending. Cardiology was consulted and recommended Nuclear stress test which is pending. After pt got back from chest test, she c/o of chest pain and SOB. Repeat EKG unchanged. Troponin 0.065. Mild relief with NTG x3. /74. Discussed with cards. Will increase amlodipine dose. Pt also c/o of SOB- will give IV Lasix x one dose. SOB improved.   Nuclear stress test showed reversible ischemia. Cardiology recommenced LHC which showed 2 vessel CAD s/p stents x 2, well seated TAVR. LHC was technically challenging which lead to prolonged fluoroscopy and significant amount of dye used -cardiology recommended IVFs overnight. Serum Cr remained stable. Left wrist cath site remained C/D/I without swelling. Pt counseled on left arm precautions. Pt ambulated down castillo- O2sats remained 98%. Cardiology added Brilinta to medication regimen. Pt will f/u with cardiology. Pt declined home health. The patient was seen and examined today and determined to be stable for discharge.           Goals of Care Treatment Preferences:  Code Status: Full Code      Consults:   Consults (From admission, onward)          Status Ordering Provider     Inpatient consult to Social Work  Once        Provider:  (Not yet assigned)    Completed JEFFY SMITH     Inpatient consult to Registered Dietitian/Nutritionist  Once        Provider:  (Not yet assigned)    Completed RYAN COLBY     Inpatient consult to Social Work/Case Management  Once        Provider:  (Not yet assigned)    Completed FREDRICK PEACOCK     Inpatient consult to Cardiology  Once        Provider:  (Not yet assigned)    Completed  FREDRICK PEACOCK     Inpatient consult to Hospitalist  Once        Provider:  (Not yet assigned)    Acknowledged RYAN COLBY            Final Active Diagnoses:    Diagnosis Date Noted POA    PRINCIPAL PROBLEM:  NSTEMI (non-ST elevated myocardial infarction) [I21.4] 06/26/2024 Yes    Type 2 diabetes mellitus with diabetic neuropathy, without long-term current use of insulin [E11.40] 05/21/2024 Yes    Chronic diastolic heart failure [I50.32] 05/23/2022 Yes     Chronic    Anemia due to folic acid deficiency [D52.9] 05/21/2022 Yes    S/P TAVR (transcatheter aortic valve replacement) [Z95.2] 10/06/2020 Not Applicable    Iron deficiency anemia due to chronic blood loss [D50.0] 01/19/2018 Yes     Chronic    Essential hypertension [I10] 07/30/2015 Yes     Chronic    S/P CABG (coronary artery bypass graft) [Z95.1] 07/30/2015 Not Applicable     Chronic    Hyperlipidemia [E78.5] 07/24/2013 Yes     Chronic    Hypothyroidism [E03.9] 07/24/2013 Yes     Chronic      Problems Resolved During this Admission:       Discharged Condition: stable    Disposition: Home-Health Care Svc    Follow Up:   Follow-up Information       Lisa Porter MD Follow up in 3 day(s).    Specialty: Internal Medicine  Contact information:  [address] [facility] [address] [phone]               [facility] CARDIOLOGY Follow up in 1 week(s).    Specialty: Cardiology  Contact information:  [address] [facility] [address] [phone]                         Patient Instructions:      Ambulatory referral/consult to Outpatient Case Management   Referral Priority: Routine Referral Type: Consultation   Referral Reason: Specialty Services Required   Number of Visits Requested: 1     ACCEPT - Ambulatory referral/consult to [facility]   Standing Status: Future   Referral Priority: Routine Referral Type: Consultation   Referral Reason: Specialty Services Required   Requested Specialty:  Cardiology   Number of Visits Requested: 1     Ambulatory referral/consult to Cardiology   Standing Status: Future   Referral Priority: Routine Referral Type: Consultation   Referral Reason: Specialty Services Required   Requested Specialty: Cardiology   Number of Visits Requested: 1     Diet diabetic     Activity as tolerated       Significant Diagnostic Studies:   Imaging Results              X-Ray Chest AP Portable (Final result)  Result time 06/25/24 22:24:47      Final result by Meaghan Vaughn MD (06/25/24 22:24:47)                   Impression:      no active pulmonary finding      Electronically signed by: Meaghan Vaughn  Date:    06/25/2024  Time:    22:24               Narrative:    EXAMINATION:  XR CHEST AP PORTABLE    CLINICAL HISTORY:  Chest Pain;    TECHNIQUE:  Single frontal portable view of the chest was performed.    COMPARISON:  None    FINDINGS:  No pulmonary consolidation pleural effusion or convincing pneumothorax                                       Pending Diagnostic Studies:       Procedure Component Value Units Date/Time    EKG 12-lead [[id number]] Collected: 06/25/24 2236    Order Status: Sent Lab Status: In process Updated: 06/26/24 1157     QRS Duration 84 ms      [facility] QTC Calculation 398 ms     Narrative:      Test Reason : I21.4,    Vent. Rate : 058 BPM     Atrial Rate : 058 BPM     P-R Int : 154 ms          QRS Dur : 084 ms      QT Int : 406 ms       P-R-T Axes : 070 024 150 degrees     QTc Int : 398 ms    Sinus bradycardia  Possible Inferior infarct (cited on or before 12-OCT-2023)  ST and T wave abnormality, consider lateral ischemia  Abnormal ECG  When compared with ECG of 25-JUN-2024 22:06,  No significant change was found    Referred By: [email or web address]   SELF           Confirmed By:            Medications:  Reconciled Home Medications:      Medication List        START taking these medications      ticagrelor 90 mg tablet  Commonly known as: BRILINTA  Take 1 tablet (90 mg  total) by mouth 2 (two) times daily.            CHANGE how you take these medications      amLODIPine 5 MG tablet  Commonly known as: NORVASC  Take 1 tablet (5 mg total) by mouth once daily.  What changed:   medication strength  how much to take            CONTINUE taking these medications      acetaminophen 650 MG Tbsr  Commonly known as: TYLENOL  as directed     albuterol 2.5 mg /3 mL (0.083 %) nebulizer solution  Commonly known as: PROVENTIL  Take 3 mLs (2.5 mg total) by nebulization every 6 (six) hours as needed for Wheezing. Rescue     allopurinoL 100 MG tablet  Commonly known as: ZYLOPRIM  TAKE 2 TABLETS ONCE DAILY     aspirin 81 MG Chew  Take 1 tablet (81 mg total) by mouth once daily.     calcipotriene 0.005 % cream  Commonly known as: DOVONOX  Apply topically 2 (two) times daily.     calcium carbonate 650 mg calcium (1,625 mg) tablet  Take 1 tablet by mouth once daily.     COSENTYX  mg/mL Pnij  Generic drug: secukinumab  Inject 300 mg into the skin every 28 days.     docusate sodium 100 MG capsule  Commonly known as: COLACE  Take 1 capsule (100 mg total) by mouth 2 (two) times daily.     folic acid 1 MG tablet  Commonly known as: FOLVITE  TAKE 1 TABLET DAILY     furosemide 20 MG tablet  Commonly known as: LASIX  TAKE 1 TABLET TWICE A DAY     garlic 2,000 mg Cap  Take 3,000 mg by mouth once daily.     levothyroxine 100 MCG tablet  Commonly known as: SYNTHROID  TAKE 1 TABLET BEFORE BREAKFAST     metoprolol succinate 100 MG 24 hr tablet  Commonly known as: TOPROL-XL  TAKE 1 TABLET TWICE A DAY     nitroGLYCERIN 0.4 MG SL tablet  Commonly known as: NITROSTAT  DISSOLVE 1 TABLET UNDER THE TONGUE EVERY 5 MINUTES AS NEEDED FOR CHEST PAIN     potassium chloride SA 10 MEQ tablet  Commonly known as: K-DUR,KLOR-CON M  Take 1 tablet (10 mEq total) by mouth once daily.     pyridoxine (vitamin B6) 100 MG Tab  Commonly known as: B-6  Take 200 mg by mouth once daily.     REPATHA SURECLICK 140 mg/mL Pnij  Generic drug:  evolocumab  INJECT 1 ML (140 MG) UNDER THE SKIN EVERY 14 DAYS     spironolactone 25 MG tablet  Commonly known as: ALDACTONE  TAKE 1 TABLET DAILY     vitamin D 1000 units Tab  Commonly known as: VITAMIN D3  Take 185 mg by mouth once daily.            STOP taking these medications      clopidogreL 75 mg tablet  Commonly known as: PLAVIX              Indwelling Lines/Drains at time of discharge:   Lines/Drains/Airways       None                   Time spent on the discharge of patient: 50 minutes         Karla Cook NP  Department of Hospital Medicine  '[facility] - Telemetry ([facility])

## 2024-06-28 NOTE — PLAN OF CARE
06/28/24 1218   Final Note   Assessment Type Final Discharge Note   Anticipated Discharge Disposition Home   Hospital Resources/Appts/Education Provided Appointment suggestion unavailable   Post-Acute Status   Discharge Delays None known at this time     Message to schedule patient for hospital follow up with Dr. Porter within one week as no appointments available for over 2 weeks.

## 2024-06-28 NOTE — PLAN OF CARE
Problem: Adult Inpatient Plan of Care  Goal: Plan of Care Review  Outcome: Progressing  Goal: Patient-Specific Goal (Individualized)  Outcome: Progressing  Goal: Absence of Hospital-Acquired Illness or Injury  Outcome: Progressing  Goal: Optimal Comfort and Wellbeing  Outcome: Progressing  Goal: Readiness for Transition of Care  Outcome: Progressing     Problem: Acute Coronary Syndrome  Goal: Optimal Adaptation to Illness  Outcome: Progressing  Goal: Absence of Cardiac-Related Pain  Outcome: Progressing  Goal: Normalized Cardiac Rhythm  Outcome: Progressing  Goal: Effective Cardiac Pump Function  Outcome: Progressing  Goal: Adequate Tissue Perfusion  Outcome: Progressing

## 2024-06-28 NOTE — ASSESSMENT & PLAN NOTE
Cont lasix, BB, aldactone  Echo pending  Nuc stress today    6/27/24  Nuc stress yesterday abn  LHC planned for today  All risks, benefits and treatment alternatives explained all questions answered. Pt agreeable and ready to proceed    6/28/24  S/p LHC with PCI x2 LAD  Cont ASA, metoprolol, repatha and brilinta  Pt needs meds to bed prior to DC

## 2024-06-28 NOTE — RESPIRATORY THERAPY
"Instructions for measuring Oxygen Saturation  to qualify for Home Oxygen:     Please obtain and document (REPLACE # SIGNS AND COPY AND PASTE TEXT INSIDE QUOTATION MARKS) the following for Home Oxygen:     This must be performed and documented within 2 days of discharge.     "Pulse Oximetry:   98% SpO2 on room air at rest on 6/28/2024                                 If 88% or less, STOP and document.     If 89% or more, measure and  document the following:     "Pulse Oximetry:   98% SpO2 on room air at rest on 6/28/2024                             98% SpO2 on room air with activity/exercise on 6/28/2024       (NOTE:  FOR OXYGEN WITH ACTIVITY - MEDICARE WANTS TO SEE THAT THE OXYGEN INCREASES ONCE A PATIENT HAS WALKED AND IS BACK ON THE OXYGEN)    "

## 2024-06-28 NOTE — ASSESSMENT & PLAN NOTE
Troponin peaked 0.98 trending down  Atypical chest pains  Hept gtt  Nuc stress  EKG with no acute dynamic changes noted    6/27/24  [facility] planned for today with Dr. Ibarra    6/28/24  S/p C with Dr. Ibarra  F/u in clinic  Asa, BB, repatha and brilinta  Dc plavix  Meds to bed

## 2024-06-28 NOTE — SUBJECTIVE & OBJECTIVE
Past Medical History:   Diagnosis Date    Carotid stenosis     19%    Cellulitis and abscess of foot, except toes 06/22/2022    Cerumen debris on tympanic membrane of right ear 11/30/2022    Coronary artery disease     CVA (cerebral vascular accident)     Dr. 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 Hope    Psoriatic arthritis     Rheumatology    Transient ischemic attack (TIA) 01/02/2019       Past Surgical History:   Procedure Laterality Date    BREAST BIOPSY      R sided/benign    CARDIAC SURGERY      sept 28 2016    CERVICAL FUSION      CHOLECYSTECTOMY      CORONARY ANGIOPLASTY      CORONARY ARTERY BYPASS GRAFT      triple bypass    CORONARY STENT PLACEMENT      EYE SURGERY      INTRAUTERINE DEVICE INSERTION      LEFT HEART CATHETERIZATION Left 9/8/2020    Procedure: CATHETERIZATION, HEART, LEFT;  Surgeon: Veronica Ibarra MD;  Location: [facility] CATH LAB;  Service: Cardiology;  Laterality: Left;  7am start time    mass removed from R groin      TOTAL ABDOMINAL HYSTERECTOMY W/ BILATERAL SALPINGOOPHORECTOMY      due to benign mass, adhesions    TUBAL LIGATION         Review of patient's allergies indicates:   Allergen Reactions    Adhesive Nausea And Vomiting     EKG Electrodes    Aspirin-dipyridamole Other (See Comments)     headaches  Other reaction(s): Not Indicated  Other reaction(s): unknown    Butorphanol      Other reaction(s): Not Indicated  Other reaction(s): cardiac arrest    Citalopram Anaphylaxis     Other reaction(s): Not Indicated  Other reaction(s): unknown    Clindamycin Itching and Swelling     Other reaction(s): Not Indicated  Other reaction(s): unknown    Codeine Shortness Of Breath, Itching and Swelling     Other reaction(s): Not  "Indicated  Other reaction(s): hives/trouble breathing/"possible cardiac arrest"    Ezetimibe      Other reaction(s): Not Indicated  Other reaction(s): unknown    Hydrocodone-acetaminophen Shortness Of Breath     Other reaction(s): Not Indicated  Other reaction(s): unknown    Isosorbide Other (See Comments)     Severe headache  Other reaction(s): Not Indicated  Other reaction(s): arrhythmia    Kiwi (actinidia chinensis) Blisters     Oral  Blisters/burning    Lisinopril Anaphylaxis     Other reaction(s): Not Indicated  Other reaction(s): unknown    Magnesium citrate Shortness Of Breath     Other reaction(s): Not Indicated    Meloxicam      Other reaction(s): Not Indicated  Other reaction(s): Caused Acute renal failure    Methylprednisolone Other (See Comments)     Patient reports taking IV in the past without any issues. Reports allergy to oral preparation   Other reaction(s): Not Indicated  Other reaction(s): unknown    Metronidazole Nausea And Vomiting    Misoprostol Anaphylaxis and Other (See Comments)     Difficulty breathing  Other reaction(s): Not Indicated  Other reaction(s): unknown    Morphine      Other reaction(s): Not Indicated  Other reaction(s): trouble breathing/"possible cardiac arrest"    Nedocromil      Other reaction(s): Not Indicated  Other reaction(s): unknown    Neuromuscular blockers, steroidal Other (See Comments)    Pentazocine lactate      Other reaction(s): Not Indicated  Other reaction(s): unknown    Ranolazine Nausea And Vomiting     Other reaction(s): Not Indicated  Other reaction(s): PVC's/ SOB    Rosuvastatin Anaphylaxis     Other reaction(s): Not Indicated  Other reaction(s): gastric sleeve    Stadol [butorphanol tartrate] Anaphylaxis     Coded    Sulfa (sulfonamide antibiotics) Other (See Comments)     unknown  Other reaction(s): hives/trouble breathing    Tetracyclines      Other reaction(s): unknown    Triamcinolone acetonide      Other reaction(s): Not Indicated  Other reaction(s): " unknown    Zoledronic acid-mannitol-water Other (See Comments)     Bones hurt  Other reaction(s): Not Indicated  Other reaction(s): unknown    Hydromorphone Hallucinations    Azithromycin      Other reaction(s): Not Indicated    Cleocin [clindamycin hcl]     Meperidine      Other reaction(s): Not Indicated    Moxifloxacin      PATIENT STATES DO NOT GIVE UNDER ANY CIRCUSTANCES  Other reaction(s): Not Indicated    Nitroglycerin      Long acting  Other reaction(s): Not Indicated    Pholcodine     Prednisone      GASTRIC PAIN  Other reaction(s): Not Indicated    Tetracycline        No current facility-administered medications on file prior to encounter.     Current Outpatient Medications on File Prior to Encounter   Medication Sig    acetaminophen (TYLENOL) 650 MG TbSR as directed    allopurinoL (ZYLOPRIM) 100 MG tablet TAKE 2 TABLETS ONCE DAILY    aspirin 81 MG Chew Take 1 tablet (81 mg total) by mouth once daily.    calcium carbonate 650 mg calcium (1,625 mg) tablet Take 1 tablet by mouth once daily.    docusate sodium (COLACE) 100 MG capsule Take 1 capsule (100 mg total) by mouth 2 (two) times daily.    folic acid (FOLVITE) 1 MG tablet TAKE 1 TABLET DAILY    furosemide (LASIX) 20 MG tablet TAKE 1 TABLET TWICE A DAY    garlic 2,000 mg Cap Take 3,000 mg by mouth once daily.     levothyroxine (SYNTHROID) 100 MCG tablet TAKE 1 TABLET BEFORE BREAKFAST    metoprolol succinate (TOPROL-XL) 100 MG 24 hr tablet TAKE 1 TABLET TWICE A DAY    potassium chloride SA (K-DUR,KLOR-CON M) 10 MEQ tablet Take 1 tablet (10 mEq total) by mouth once daily.    pyridoxine, vitamin B6, (B-6) 100 MG Tab Take 200 mg by mouth once daily.     REPATHA SURECLICK 140 mg/mL PnIj INJECT 1 ML (140 MG) UNDER THE SKIN EVERY 14 DAYS    secukinumab (COSENTYX PEN) 150 mg/mL PnIj Inject 300 mg into the skin every 28 days.    spironolactone (ALDACTONE) 25 MG tablet TAKE 1 TABLET DAILY    vitamin D 1000 units Tab Take 185 mg by mouth once daily.     [DISCONTINUED] amLODIPine (NORVASC) 2.5 MG tablet Take 1 tablet (2.5 mg total) by mouth once daily.    [DISCONTINUED] clopidogreL (PLAVIX) 75 mg tablet TAKE 1 TABLET ONCE AS DIRECTED    albuterol (PROVENTIL) 2.5 mg /3 mL (0.083 %) nebulizer solution Take 3 mLs (2.5 mg total) by nebulization every 6 (six) hours as needed for Wheezing. Rescue    calcipotriene (DOVONOX) 0.005 % cream Apply topically 2 (two) times daily.    nitroGLYCERIN (NITROSTAT) 0.4 MG SL tablet DISSOLVE 1 TABLET UNDER THE TONGUE EVERY 5 MINUTES AS NEEDED FOR CHEST PAIN     Family History       Problem Relation (Age of Onset)    Breast cancer Maternal Grandfather, Paternal Aunt, Sister (60)    Diabetes Daughter    Heart disease     Leukemia Sister (8)    Lung cancer Paternal Grandfather    Stroke           Tobacco Use    Smoking status: Never    Smokeless tobacco: Never   Substance and Sexual Activity    Alcohol use: No    Drug use: No    Sexual activity: Not Currently     Partners: Male     Review of Systems   Constitutional: Positive for malaise/fatigue.   HENT: Negative.     Eyes: Negative.    Cardiovascular: Negative.    Respiratory: Negative.     Skin: Negative.    Musculoskeletal:  Positive for arthritis, back pain, falls, muscle cramps and stiffness.   Gastrointestinal: Negative.    Genitourinary: Negative.    Neurological: Negative.    Psychiatric/Behavioral: Negative.       Objective:     Vital Signs (Most Recent):  Temp: 97 °F (36.1 °C) (06/28/24 1117)  Pulse: 62 (06/28/24 1117)  Resp: 18 (06/28/24 1117)  BP: (!) 158/82 (06/28/24 1117)  SpO2: 97 % (06/28/24 1117) Vital Signs (24h Range):  Temp:  [97 °F (36.1 °C)-98.5 °F (36.9 °C)] 97 °F (36.1 °C)  Pulse:  [53-80] 62  Resp:  [14-24] 18  SpO2:  [94 %-100 %] 97 %  BP: (136-185)/(64-82) 158/82     Weight: 98 kg (216 lb 0.8 oz)  Body mass index is 38.27 kg/m².    SpO2: 97 %         Intake/Output Summary (Last 24 hours) at 6/28/2024 1155  Last data filed at 6/28/2024 0631  Gross per 24 hour    Intake 1077.37 ml   Output 200 ml   Net 877.37 ml       Lines/Drains/Airways       Peripheral Intravenous Line  Duration                  Peripheral IV - Single Lumen 22 G Anterior;Left Wrist -- days         Peripheral IV - Single Lumen 06/25/24 2234 20 G Left Antecubital 2 days                     Physical Exam  Vitals and nursing note reviewed.   Constitutional:       Appearance: Normal appearance.   HENT:      Head: Normocephalic.   Eyes:      Pupils: Pupils are equal, round, and reactive to light.   Cardiovascular:      Rate and Rhythm: Normal rate and regular rhythm.      Heart sounds: Normal heart sounds, S1 normal and S2 normal. No murmur heard.     No S3 or S4 sounds.   Pulmonary:      Effort: Pulmonary effort is normal.      Breath sounds: Rales present.   Abdominal:      General: Bowel sounds are normal.      Palpations: Abdomen is soft.   Musculoskeletal:         General: Normal range of motion.      Cervical back: Normal range of motion.   Skin:     Capillary Refill: Capillary refill takes less than 2 seconds.   Neurological:      General: No focal deficit present.      Mental Status: She is alert and oriented to person, place, and time.   Psychiatric:         Mood and Affect: Mood normal.         Behavior: Behavior normal.         Thought Content: Thought content normal.          Significant Labs: BMP:   Recent Labs   Lab 06/27/24  0517 06/28/24 0417   GLU 86 97    142   K 3.8 3.7    105   CO2 26 25   BUN 18 15   CREATININE 1.0 1.1   CALCIUM 9.6 9.4   , CMP   Recent Labs   Lab 06/27/24  0517 06/28/24 0417    142   K 3.8 3.7    105   CO2 26 25   GLU 86 97   BUN 18 15   CREATININE 1.0 1.1   CALCIUM 9.6 9.4   PROT 6.7  --    ALBUMIN 3.5  --    BILITOT 0.5  --    ALKPHOS 55  --    AST 20  --    ALT 11  --    ANIONGAP 14 12   , CBC   Recent Labs   Lab 06/27/24  0517 06/28/24 0417   WBC 7.66 9.04  9.04   HGB 12.6 12.0  12.0   HCT 39.8 37.1  37.1    219  219   , INR   No  "results for input(s): "INR", "PROTIME" in the last 48 hours.  , Lipid Panel No results for input(s): "CHOL", "HDL", "LDLCALC", "TRIG", "CHOLHDL" in the last 48 hours., Troponin   Recent Labs   Lab 06/26/24  1513   TROPONINI 0.065*   , and All pertinent lab results from the last 24 hours have been reviewed.    Significant Imaging: Cardiac Cath: reviewed, Echocardiogram: Transthoracic echo (TTE) complete (Cupid Only):   Results for orders placed or performed during the hospital encounter of 06/25/24   Echo   Result Value Ref Range    BSA 2.11 m2    LA WIDTH 4.3 cm    RA Width 2.7 cm    LVOT stroke volume 85.97 cm3    LVIDd 5.78 3.5 - 6.0 cm    LV Systolic Volume 68.29 mL    LV Systolic Volume Index 34.0 mL/m2    LVIDs 3.96 2.1 - 4.0 cm    LV Diastolic Volume 164.92 mL    LV Diastolic Volume Index 82.05 mL/m2    Left Ventricular End Systolic Volume by Teichholz Method 68.29 mL    Left Ventricular End Diastolic Volume by Teichholz Method 164.92 mL    IVS 1.16 (A) 0.6 - 1.1 cm    LVOT diameter 1.95 cm    LVOT area 3.0 cm2    FS 31 28 - 44 %    Left Ventricle Relative Wall Thickness 0.41 cm    Posterior Wall 1.18 (A) 0.6 - 1.1 cm    LV mass 285.38 g    LV Mass Index 142 g/m2    MV Peak E Caden 1.51 m/s    TDI LATERAL 0.09 m/s    TDI SEPTAL 0.04 m/s    E/E' ratio 23.23 m/s    MV Peak A Caden 1.10 m/s    TR Max Caden 3.38 m/s    E/A ratio 1.37     IVRT 71.36 msec    E wave deceleration time 308.83 msec    LV SEPTAL E/E' RATIO 37.75 m/s    LA Volume Index 35.4 mL/m2    LV LATERAL E/E' RATIO 16.78 m/s    LA volume 71.16 cm3    LVOT peak caden 0.98 m/s    Left Ventricular Outflow Tract Mean Velocity 0.78 cm/s    Left Ventricular Outflow Tract Mean Gradient 2.61 mmHg    RVOT peak VTI 17.1 cm    TAPSE 2.50 cm    LA size 3.21 cm    Left Atrium Minor Axis 6.03 cm    Left Atrium Major Axis 6.10 cm    RA Major Axis 4.49 cm    AV mean gradient 11 mmHg    AV peak gradient 21 mmHg    Ao peak caden 2.31 m/s    Ao VTI 56.60 cm    LVOT peak VTI " 28.80 cm    AV valve area 1.52 cm²    AV Velocity Ratio 0.42     AV index (prosthetic) 0.51     ALFREDO by Velocity Ratio 1.27 cm²    Mr max rosanne 5.45 m/s    MV mean gradient 3 mmHg    MV peak gradient 11 mmHg    MV valve area by continuity eq 1.59 cm2    MV VTI 54.2 cm    Triscuspid Valve Regurgitation Peak Gradient 46 mmHg    PV mean gradient 1 mmHg    RVOT peak rosanne 0.73 m/s    Ao root annulus 2.90 cm    STJ 3.46 cm    Ascending aorta 3.09 cm    IVC diameter 1.55 cm    Mean e' 0.07 m/s    ZLVIDS 0.72     ZLVIDD -0.19     EF 60 %    TV resting pulmonary artery pressure 49 mmHg    RV TB RVSP 6 mmHg    Est. RA pres 3 mmHg    Narrative      Left Ventricle: The left ventricle is moderately dilated. Normal wall   thickness. There is eccentric hypertrophy. Normal wall motion. Ejection   fraction by visual approximation is 60%. Grade II diastolic dysfunction.    Right Ventricle: Normal right ventricular cavity size. Wall thickness   is normal. Right ventricle wall motion  is normal. Systolic function is   normal.    Left Atrium: Left atrium is mildly dilated.    Aortic Valve: There is a transcatheter valve replacement in the aortic   position. Aortic valve area by VTI is 1.52 cm². Aortic valve peak velocity   is 2.31 m/s. Mean gradient is 11 mmHg. The dimensionless index is 0.51.   There is mild aortic regurgitation.    Mitral Valve: There is mild regurgitation.    Tricuspid Valve: There is mild regurgitation.    Pulmonary Artery: The estimated pulmonary artery systolic pressure is   49 mmHg.    IVC/SVC: Normal venous pressure at 3 mmHg.     , EKG: reviewed, Stress Test: reviewed, and X-Ray: CXR: X-Ray Chest 1 View (CXR): No results found for this visit on 06/25/24.

## 2024-06-28 NOTE — PLAN OF CARE
Updated patient on plan of care. Instructed patient to use call light for assistance, call light in reach. Hourly rounding performed. Vitals q4 hours. Education provided, questions answered/ encouraged. Chart check complete. Updated patient on plan of care. Instructed patient to use call light for assistance, call light in reach. Hourly rounding performed. Vitals q4 hours. Education provided, questions answered/ encouraged. Chart check complete.

## 2024-06-28 NOTE — PROGRESS NOTES
[facility] Telemetry ([facility])  Cardiology  Progress Note    Patient Name: Qi Ortiz  MRN: [id number]  Admission Date: 6/25/2024  Hospital Length of Stay: 2 days  Code Status: Full Code   Attending Physician: Andrea Burrell MD   Primary Care Physician: Lisa Porter MD  Expected Discharge Date: 6/28/2024  Principal Problem:NSTEMI (non-ST elevated myocardial infarction)    Subjective:   HPI:   Qi Ortiz is a 74 y.o. female with a PMH CAD s/p CABG twice in 1998 and 2016, SVT, AS, s/p TAVR 20', chronic diastolic HF, HTN HLD COPD, CKD, DM, obesity, IRIS, statin intolerance. Presented to the ER for evaluation of an uncomfortable numbness to the chest. Symptom persisted from 2:00 p.m. yesterday till about 6:00 p.m. yesterday. She was seen by Dr. Paez yesterday. Patient had troponin ordered outpatient yesterday. Trop elevated at 0.098.  Repeat troponin 0.074.  Her EKG was abnormal but had similar findings compared to previous EKGs. Dr. Cohen with Cardiology was consulted.  He recommended initiating heparin drip, administered Lasix, continue home meds, administered nitro paste and echocardiogram in a.m. Hospital Medicine consulted to admit patient for NSTEMI.  Patient in agreement with treatment plan.  Patient admitted under inpatient status.     Cardiology consulted to assist with management. Pt seen and examined today resting in bed, c/o SOB and chest discomfort. Pt reports episode of SOB walking into cards clinic on Monday. OP troponin was elevated and she was sent to ED. Followed by Dr. Paez in clinic. Labs reviewed, troponin flat initially 0.98 trending down, .Echo in 23' EF nml, repeat pending. Nuc stress today  Hospital Course:   6/27/24 pt seen and examined today, no acute CV events reported. Labs reviewed, chart reviewed    6/28/24 Pt seen and examined today sitting up on bedside. S/p C with Dr. Ibarra. No acute CV events reported. Labs reviewed    Past Medical History:   Diagnosis  Date    Carotid stenosis     19%    Cellulitis and abscess of foot, except toes 06/22/2022    Cerumen debris on tympanic membrane of right ear 11/30/2022    Coronary artery disease     CVA (cerebral vascular accident)     Dr. 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 Hope    Psoriatic arthritis     Rheumatology    Transient ischemic attack (TIA) 01/02/2019       Past Surgical History:   Procedure Laterality Date    BREAST BIOPSY      R sided/benign    CARDIAC SURGERY      sept 28 2016    CERVICAL FUSION      CHOLECYSTECTOMY      CORONARY ANGIOPLASTY      CORONARY ARTERY BYPASS GRAFT      triple bypass    CORONARY STENT PLACEMENT      EYE SURGERY      INTRAUTERINE DEVICE INSERTION      LEFT HEART CATHETERIZATION Left 9/8/2020    Procedure: CATHETERIZATION, HEART, LEFT;  Surgeon: Veronica Ibarra MD;  Location: [facility] CATH LAB;  Service: Cardiology;  Laterality: Left;  7am start time    mass removed from R groin      TOTAL ABDOMINAL HYSTERECTOMY W/ BILATERAL SALPINGOOPHORECTOMY      due to benign mass, adhesions    TUBAL LIGATION         Review of patient's allergies indicates:   Allergen Reactions    Adhesive Nausea And Vomiting     EKG Electrodes    Aspirin-dipyridamole Other (See Comments)     headaches  Other reaction(s): Not Indicated  Other reaction(s): unknown    Butorphanol      Other reaction(s): Not Indicated  Other reaction(s): cardiac arrest    Citalopram Anaphylaxis     Other reaction(s): Not Indicated  Other reaction(s): unknown    Clindamycin Itching and Swelling     Other reaction(s): Not Indicated  Other reaction(s): unknown    Codeine Shortness Of Breath, Itching and Swelling     Other reaction(s): Not Indicated  Other reaction(s):  "hives/trouble breathing/"possible cardiac arrest"    Ezetimibe      Other reaction(s): Not Indicated  Other reaction(s): unknown    Hydrocodone-acetaminophen Shortness Of Breath     Other reaction(s): Not Indicated  Other reaction(s): unknown    Isosorbide Other (See Comments)     Severe headache  Other reaction(s): Not Indicated  Other reaction(s): arrhythmia    Kiwi (actinidia chinensis) Blisters     Oral  Blisters/burning    Lisinopril Anaphylaxis     Other reaction(s): Not Indicated  Other reaction(s): unknown    Magnesium citrate Shortness Of Breath     Other reaction(s): Not Indicated    Meloxicam      Other reaction(s): Not Indicated  Other reaction(s): Caused Acute renal failure    Methylprednisolone Other (See Comments)     Patient reports taking IV in the past without any issues. Reports allergy to oral preparation   Other reaction(s): Not Indicated  Other reaction(s): unknown    Metronidazole Nausea And Vomiting    Misoprostol Anaphylaxis and Other (See Comments)     Difficulty breathing  Other reaction(s): Not Indicated  Other reaction(s): unknown    Morphine      Other reaction(s): Not Indicated  Other reaction(s): trouble breathing/"possible cardiac arrest"    Nedocromil      Other reaction(s): Not Indicated  Other reaction(s): unknown    Neuromuscular blockers, steroidal Other (See Comments)    Pentazocine lactate      Other reaction(s): Not Indicated  Other reaction(s): unknown    Ranolazine Nausea And Vomiting     Other reaction(s): Not Indicated  Other reaction(s): PVC's/ SOB    Rosuvastatin Anaphylaxis     Other reaction(s): Not Indicated  Other reaction(s): gastric sleeve    Stadol [butorphanol tartrate] Anaphylaxis     Coded    Sulfa (sulfonamide antibiotics) Other (See Comments)     unknown  Other reaction(s): hives/trouble breathing    Tetracyclines      Other reaction(s): unknown    Triamcinolone acetonide      Other reaction(s): Not Indicated  Other reaction(s): unknown    Zoledronic " acid-mannitol-water Other (See Comments)     Bones hurt  Other reaction(s): Not Indicated  Other reaction(s): unknown    Hydromorphone Hallucinations    Azithromycin      Other reaction(s): Not Indicated    Cleocin [clindamycin hcl]     Meperidine      Other reaction(s): Not Indicated    Moxifloxacin      PATIENT STATES DO NOT GIVE UNDER ANY CIRCUSTANCES  Other reaction(s): Not Indicated    Nitroglycerin      Long acting  Other reaction(s): Not Indicated    Pholcodine     Prednisone      GASTRIC PAIN  Other reaction(s): Not Indicated    Tetracycline        No current facility-administered medications on file prior to encounter.     Current Outpatient Medications on File Prior to Encounter   Medication Sig    acetaminophen (TYLENOL) 650 MG TbSR as directed    allopurinoL (ZYLOPRIM) 100 MG tablet TAKE 2 TABLETS ONCE DAILY    aspirin 81 MG Chew Take 1 tablet (81 mg total) by mouth once daily.    calcium carbonate 650 mg calcium (1,625 mg) tablet Take 1 tablet by mouth once daily.    docusate sodium (COLACE) 100 MG capsule Take 1 capsule (100 mg total) by mouth 2 (two) times daily.    folic acid (FOLVITE) 1 MG tablet TAKE 1 TABLET DAILY    furosemide (LASIX) 20 MG tablet TAKE 1 TABLET TWICE A DAY    garlic 2,000 mg Cap Take 3,000 mg by mouth once daily.     levothyroxine (SYNTHROID) 100 MCG tablet TAKE 1 TABLET BEFORE BREAKFAST    metoprolol succinate (TOPROL-XL) 100 MG 24 hr tablet TAKE 1 TABLET TWICE A DAY    potassium chloride SA (K-DUR,KLOR-CON M) 10 MEQ tablet Take 1 tablet (10 mEq total) by mouth once daily.    pyridoxine, vitamin B6, (B-6) 100 MG Tab Take 200 mg by mouth once daily.     REPATHA SURECLICK 140 mg/mL PnIj INJECT 1 ML (140 MG) UNDER THE SKIN EVERY 14 DAYS    secukinumab (COSENTYX PEN) 150 mg/mL PnIj Inject 300 mg into the skin every 28 days.    spironolactone (ALDACTONE) 25 MG tablet TAKE 1 TABLET DAILY    vitamin D 1000 units Tab Take 185 mg by mouth once daily.    [DISCONTINUED] amLODIPine  (NORVASC) 2.5 MG tablet Take 1 tablet (2.5 mg total) by mouth once daily.    [DISCONTINUED] clopidogreL (PLAVIX) 75 mg tablet TAKE 1 TABLET ONCE AS DIRECTED    albuterol (PROVENTIL) 2.5 mg /3 mL (0.083 %) nebulizer solution Take 3 mLs (2.5 mg total) by nebulization every 6 (six) hours as needed for Wheezing. Rescue    calcipotriene (DOVONOX) 0.005 % cream Apply topically 2 (two) times daily.    nitroGLYCERIN (NITROSTAT) 0.4 MG SL tablet DISSOLVE 1 TABLET UNDER THE TONGUE EVERY 5 MINUTES AS NEEDED FOR CHEST PAIN     Family History       Problem Relation (Age of Onset)    Breast cancer Maternal Grandfather, Paternal Aunt, Sister (60)    Diabetes Daughter    Heart disease     Leukemia Sister (8)    Lung cancer Paternal Grandfather    Stroke           Tobacco Use    Smoking status: Never    Smokeless tobacco: Never   Substance and Sexual Activity    Alcohol use: No    Drug use: No    Sexual activity: Not Currently     Partners: Male     Review of Systems   Constitutional: Positive for malaise/fatigue.   HENT: Negative.     Eyes: Negative.    Cardiovascular: Negative.    Respiratory: Negative.     Skin: Negative.    Musculoskeletal:  Positive for arthritis, back pain, falls, muscle cramps and stiffness.   Gastrointestinal: Negative.    Genitourinary: Negative.    Neurological: Negative.    Psychiatric/Behavioral: Negative.       Objective:     Vital Signs (Most Recent):  Temp: 97 °F (36.1 °C) (06/28/24 1117)  Pulse: 62 (06/28/24 1117)  Resp: 18 (06/28/24 1117)  BP: (!) 158/82 (06/28/24 1117)  SpO2: 97 % (06/28/24 1117) Vital Signs (24h Range):  Temp:  [97 °F (36.1 °C)-98.5 °F (36.9 °C)] 97 °F (36.1 °C)  Pulse:  [53-80] 62  Resp:  [14-24] 18  SpO2:  [94 %-100 %] 97 %  BP: (136-185)/(64-82) 158/82     Weight: 98 kg (216 lb 0.8 oz)  Body mass index is 38.27 kg/m².    SpO2: 97 %         Intake/Output Summary (Last 24 hours) at 6/28/2024 1155  Last data filed at 6/28/2024 0631  Gross per 24 hour   Intake 1077.37 ml   Output  "200 ml   Net 877.37 ml       Lines/Drains/Airways       Peripheral Intravenous Line  Duration                  Peripheral IV - Single Lumen 22 G Anterior;Left Wrist -- days         Peripheral IV - Single Lumen 06/25/24 2234 20 G Left Antecubital 2 days                     Physical Exam  Vitals and nursing note reviewed.   Constitutional:       Appearance: Normal appearance.   HENT:      Head: Normocephalic.   Eyes:      Pupils: Pupils are equal, round, and reactive to light.   Cardiovascular:      Rate and Rhythm: Normal rate and regular rhythm.      Heart sounds: Normal heart sounds, S1 normal and S2 normal. No murmur heard.     No S3 or S4 sounds.   Pulmonary:      Effort: Pulmonary effort is normal.      Breath sounds: Rales present.   Abdominal:      General: Bowel sounds are normal.      Palpations: Abdomen is soft.   Musculoskeletal:         General: Normal range of motion.      Cervical back: Normal range of motion.   Skin:     Capillary Refill: Capillary refill takes less than 2 seconds.   Neurological:      General: No focal deficit present.      Mental Status: She is alert and oriented to person, place, and time.   Psychiatric:         Mood and Affect: Mood normal.         Behavior: Behavior normal.         Thought Content: Thought content normal.          Significant Labs: BMP:   Recent Labs   Lab 06/27/24  0517 06/28/24 0417   GLU 86 97    142   K 3.8 3.7    105   CO2 26 25   BUN 18 15   CREATININE 1.0 1.1   CALCIUM 9.6 9.4   , CMP   Recent Labs   Lab 06/27/24  0517 06/28/24 0417    142   K 3.8 3.7    105   CO2 26 25   GLU 86 97   BUN 18 15   CREATININE 1.0 1.1   CALCIUM 9.6 9.4   PROT 6.7  --    ALBUMIN 3.5  --    BILITOT 0.5  --    ALKPHOS 55  --    AST 20  --    ALT 11  --    ANIONGAP 14 12   , CBC   Recent Labs   Lab 06/27/24  0517 06/28/24 0417   WBC 7.66 9.04  9.04   HGB 12.6 12.0  12.0   HCT 39.8 37.1  37.1    219  219   , INR   No results for input(s): "INR", " ""PROTIME" in the last 48 hours.  , Lipid Panel No results for input(s): "CHOL", "HDL", "LDLCALC", "TRIG", "CHOLHDL" in the last 48 hours., Troponin   Recent Labs   Lab 06/26/24  1513   TROPONINI 0.065*   , and All pertinent lab results from the last 24 hours have been reviewed.    Significant Imaging: Cardiac Cath: reviewed, Echocardiogram: Transthoracic echo (TTE) complete (Cupid Only):   Results for orders placed or performed during the hospital encounter of 06/25/24   Echo   Result Value Ref Range    BSA 2.11 m2    LA WIDTH 4.3 cm    RA Width 2.7 cm    LVOT stroke volume 85.97 cm3    LVIDd 5.78 3.5 - 6.0 cm    LV Systolic Volume 68.29 mL    LV Systolic Volume Index 34.0 mL/m2    LVIDs 3.96 2.1 - 4.0 cm    LV Diastolic Volume 164.92 mL    LV Diastolic Volume Index 82.05 mL/m2    Left Ventricular End Systolic Volume by Teichholz Method 68.29 mL    Left Ventricular End Diastolic Volume by Teichholz Method 164.92 mL    IVS 1.16 (A) 0.6 - 1.1 cm    LVOT diameter 1.95 cm    LVOT area 3.0 cm2    FS 31 28 - 44 %    Left Ventricle Relative Wall Thickness 0.41 cm    Posterior Wall 1.18 (A) 0.6 - 1.1 cm    LV mass 285.38 g    LV Mass Index 142 g/m2    MV Peak E Caden 1.51 m/s    TDI LATERAL 0.09 m/s    TDI SEPTAL 0.04 m/s    E/E' ratio 23.23 m/s    MV Peak A Caden 1.10 m/s    TR Max Caden 3.38 m/s    E/A ratio 1.37     IVRT 71.36 msec    E wave deceleration time 308.83 msec    LV SEPTAL E/E' RATIO 37.75 m/s    LA Volume Index 35.4 mL/m2    LV LATERAL E/E' RATIO 16.78 m/s    LA volume 71.16 cm3    LVOT peak caden 0.98 m/s    Left Ventricular Outflow Tract Mean Velocity 0.78 cm/s    Left Ventricular Outflow Tract Mean Gradient 2.61 mmHg    RVOT peak VTI 17.1 cm    TAPSE 2.50 cm    LA size 3.21 cm    Left Atrium Minor Axis 6.03 cm    Left Atrium Major Axis 6.10 cm    RA Major Axis 4.49 cm    AV mean gradient 11 mmHg    AV peak gradient 21 mmHg    Ao peak caden 2.31 m/s    Ao VTI 56.60 cm    LVOT peak VTI 28.80 cm    AV valve area 1.52 " cm²    AV Velocity Ratio 0.42     AV index (prosthetic) 0.51     ALFREDO by Velocity Ratio 1.27 cm²    Mr max rosanne 5.45 m/s    MV mean gradient 3 mmHg    MV peak gradient 11 mmHg    MV valve area by continuity eq 1.59 cm2    MV VTI 54.2 cm    Triscuspid Valve Regurgitation Peak Gradient 46 mmHg    PV mean gradient 1 mmHg    RVOT peak rosanne 0.73 m/s    Ao root annulus 2.90 cm    STJ 3.46 cm    Ascending aorta 3.09 cm    IVC diameter 1.55 cm    Mean e' 0.07 m/s    ZLVIDS 0.72     ZLVIDD -0.19     EF 60 %    TV resting pulmonary artery pressure 49 mmHg    RV TB RVSP 6 mmHg    Est. RA pres 3 mmHg    Narrative      Left Ventricle: The left ventricle is moderately dilated. Normal wall   thickness. There is eccentric hypertrophy. Normal wall motion. Ejection   fraction by visual approximation is 60%. Grade II diastolic dysfunction.    Right Ventricle: Normal right ventricular cavity size. Wall thickness   is normal. Right ventricle wall motion  is normal. Systolic function is   normal.    Left Atrium: Left atrium is mildly dilated.    Aortic Valve: There is a transcatheter valve replacement in the aortic   position. Aortic valve area by VTI is 1.52 cm². Aortic valve peak velocity   is 2.31 m/s. Mean gradient is 11 mmHg. The dimensionless index is 0.51.   There is mild aortic regurgitation.    Mitral Valve: There is mild regurgitation.    Tricuspid Valve: There is mild regurgitation.    Pulmonary Artery: The estimated pulmonary artery systolic pressure is   49 mmHg.    IVC/SVC: Normal venous pressure at 3 mmHg.     , EKG: reviewed, Stress Test: reviewed, and X-Ray: CXR: X-Ray Chest 1 View (CXR): No results found for this visit on 06/25/24.  Assessment and Plan:       * NSTEMI (non-ST elevated myocardial infarction)  Troponin peaked 0.98 trending down  Atypical chest pains  Hept gtt  Nuc stress  EKG with no acute dynamic changes noted    6/27/24  [facility] planned for today with Dr. Ibarra    6/28/24  S/p [facility] with Dr. Ibarra  F/u in  clinic  Asa, BB, repatha and brilinta  Dc plavix  Meds to bed    Type 2 diabetes mellitus with diabetic neuropathy, without long-term current use of insulin  Management per primary team    Chronic diastolic heart failure    Cont lasix, BB, aldactone  Echo pending  Nuc stress today    6/27/24  Nuc stress yesterday abnml  LHC planned for today  All risks, benefits and treatment alternatives explained all questions answered. Pt agreeable and ready to proceed    6/28/24  S/p LHC with PCI x2 LAD  Cont ASA, metoprolol, repatha and brilinta  Pt needs meds to bed prior to DC      S/P TAVR (transcatheter aortic valve replacement)  Cont asa and metoprolol    S/P CABG (coronary artery bypass graft)  S/p CABG x2  Cont asa, BB    6/28/24  Dc plavix    Essential hypertension  Titrate medications    Hyperlipidemia  Statin intol  On repatha OP        VTE Risk Mitigation (From admission, onward)           Ordered     IP VTE HIGH RISK PATIENT  Once         06/26/24 0017     Place sequential compression device  Until discontinued         06/26/24 0017                    Rosa Child, NP  Cardiology  [facility] - Telemetry ([facility])

## 2024-06-29 ENCOUNTER — NURSE TRIAGE (OUTPATIENT)
Dept: ADMINISTRATIVE | Facility: CLINIC | Age: 74
End: 2024-06-29
Payer: MEDICARE

## 2024-06-29 ENCOUNTER — HOSPITAL ENCOUNTER (EMERGENCY)
Facility: HOSPITAL | Age: 74
Discharge: HOME | End: 2024-06-29
Attending: STUDENT IN AN ORGANIZED HEALTH CARE EDUCATION/TRAINING PROGRAM
Payer: MEDICARE

## 2024-06-29 VITALS
TEMPERATURE: 98 F | OXYGEN SATURATION: 97 % | RESPIRATION RATE: 18 BRPM | DIASTOLIC BLOOD PRESSURE: 74 MMHG | HEART RATE: 63 BPM | SYSTOLIC BLOOD PRESSURE: 161 MMHG

## 2024-06-29 DIAGNOSIS — R06.02 SHORTNESS OF BREATH: ICD-10-CM

## 2024-06-29 DIAGNOSIS — R60.0 PERIPHERAL EDEMA: Primary | ICD-10-CM

## 2024-06-29 LAB
ALBUMIN SERPL BCP-MCNC: 3.6 G/DL (ref 3.5–5.2)
ALP SERPL-CCNC: 59 U/L (ref 55–135)
ALT SERPL W/O P-5'-P-CCNC: 16 U/L (ref 10–44)
ANION GAP SERPL CALC-SCNC: 14 MMOL/L (ref 8–16)
AST SERPL-CCNC: 24 U/L (ref 10–40)
BASOPHILS # BLD AUTO: 0.04 K/UL (ref 0–0.2)
BASOPHILS NFR BLD: 0.5 % (ref 0–1.9)
BILIRUB SERPL-MCNC: 0.7 MG/DL (ref 0.1–1)
BNP SERPL-MCNC: 373 PG/ML (ref 0–99)
BUN SERPL-MCNC: 13 MG/DL (ref 8–23)
CALCIUM SERPL-MCNC: 10 MG/DL (ref 8.7–10.5)
CHLORIDE SERPL-SCNC: 105 MMOL/L (ref 95–110)
CO2 SERPL-SCNC: 23 MMOL/L (ref 23–29)
CREAT SERPL-MCNC: 1.1 MG/DL (ref 0.5–1.4)
DIFFERENTIAL METHOD BLD: ABNORMAL
EOSINOPHIL # BLD AUTO: 0.1 K/UL (ref 0–0.5)
EOSINOPHIL NFR BLD: 1.7 % (ref 0–8)
ERYTHROCYTE [DISTWIDTH] IN BLOOD BY AUTOMATED COUNT: 14.6 % (ref 11.5–14.5)
EST. GFR  (NO RACE VARIABLE): 53 ML/MIN/1.73 M^2
GLUCOSE SERPL-MCNC: 114 MG/DL (ref 70–110)
HCT VFR BLD AUTO: 38.9 % (ref 37–48.5)
HGB BLD-MCNC: 12.8 G/DL (ref 12–16)
IMM GRANULOCYTES # BLD AUTO: 0.03 K/UL (ref 0–0.04)
IMM GRANULOCYTES NFR BLD AUTO: 0.4 % (ref 0–0.5)
LYMPHOCYTES # BLD AUTO: 1.4 K/UL (ref 1–4.8)
LYMPHOCYTES NFR BLD: 18.6 % (ref 18–48)
MCH RBC QN AUTO: 29.8 PG (ref 27–31)
MCHC RBC AUTO-ENTMCNC: 32.9 G/DL (ref 32–36)
MCV RBC AUTO: 91 FL (ref 82–98)
MONOCYTES # BLD AUTO: 0.6 K/UL (ref 0.3–1)
MONOCYTES NFR BLD: 7.9 % (ref 4–15)
NEUTROPHILS # BLD AUTO: 5.5 K/UL (ref 1.8–7.7)
NEUTROPHILS NFR BLD: 70.9 % (ref 38–73)
NRBC BLD-RTO: 0 /100 WBC
OHS QRS DURATION: 84 MS
OHS QRS DURATION: 96 MS
OHS QTC CALCULATION: 398 MS
OHS QTC CALCULATION: 440 MS
PLATELET # BLD AUTO: 233 K/UL (ref 150–450)
PMV BLD AUTO: 10.3 FL (ref 9.2–12.9)
POTASSIUM SERPL-SCNC: 3.5 MMOL/L (ref 3.5–5.1)
PROT SERPL-MCNC: 7.3 G/DL (ref 6–8.4)
RBC # BLD AUTO: 4.3 M/UL (ref 4–5.4)
SODIUM SERPL-SCNC: 142 MMOL/L (ref 136–145)
TROPONIN I SERPL DL<=0.01 NG/ML-MCNC: 0.15 NG/ML (ref 0–0.03)
WBC # BLD AUTO: 7.74 K/UL (ref 3.9–12.7)

## 2024-06-29 PROCEDURE — 83880 ASSAY OF NATRIURETIC PEPTIDE: CPT | Performed by: PHYSICIAN ASSISTANT

## 2024-06-29 PROCEDURE — 85025 COMPLETE CBC W/AUTO DIFF WBC: CPT | Performed by: PHYSICIAN ASSISTANT

## 2024-06-29 PROCEDURE — 80053 COMPREHEN METABOLIC PANEL: CPT | Performed by: PHYSICIAN ASSISTANT

## 2024-06-29 PROCEDURE — 93005 ELECTROCARDIOGRAM TRACING: CPT

## 2024-06-29 PROCEDURE — 96374 THER/PROPH/DIAG INJ IV PUSH: CPT

## 2024-06-29 PROCEDURE — 93010 ELECTROCARDIOGRAM REPORT: CPT | Mod: ,,, | Performed by: STUDENT IN AN ORGANIZED HEALTH CARE EDUCATION/TRAINING PROGRAM

## 2024-06-29 PROCEDURE — 84484 ASSAY OF TROPONIN QUANT: CPT | Performed by: PHYSICIAN ASSISTANT

## 2024-06-29 PROCEDURE — 99285 EMERGENCY DEPT VISIT HI MDM: CPT | Mod: 25

## 2024-06-29 PROCEDURE — 63600175 PHARM REV CODE 636 W HCPCS: Performed by: STUDENT IN AN ORGANIZED HEALTH CARE EDUCATION/TRAINING PROGRAM

## 2024-06-29 RX ORDER — FUROSEMIDE 10 MG/ML
40 INJECTION INTRAMUSCULAR; INTRAVENOUS
Status: COMPLETED | OUTPATIENT
Start: 2024-06-29 | End: 2024-06-29

## 2024-06-29 RX ADMIN — FUROSEMIDE 40 MG: 10 INJECTION, SOLUTION INTRAMUSCULAR; INTRAVENOUS at 06:06

## 2024-06-29 NOTE — ED NOTES
"While starting IV and getting blood work, pt continually attempted to slide out of wheel chair, stating " I just need to lay down." Patients family member preceded to laugh at pt while she was attempting to slide out of wheel chair. After IV was started, Pt's right arm was grabbed and pt and family member was advised I would assist pt back fully into wheel chair. I attempted to use both arms to place pt into wheelchair but both pt and family member advised I could not touch her left arm due to her having a heart cath 2 days ago. Pt was assisted into wheel chair with out difficulty and at no time was pt completley out of wheelchair. As pt was assisted fully into wheel chair pt yelled out in pain and stated I had hurt her shoulder. Pt.'s family member proceeded to laugh while pt was crying. Pt was then transported to Room 9 while en route to room 9 pt's family member stopped laughing and became very angry, demanding to see someone in charge. Once in room 9 pt was assisted into bed by KERON dickens and myself. Charge Nurse Sher, and pt's primary nurse Savanna where informed of the incident   "

## 2024-06-29 NOTE — ED PROVIDER NOTES
ED Provider Note - 6/29/2024    History     Chief Complaint   Patient presents with    Shortness of Breath     SOB with BL swelling that started today. Pt had 2 stents placed 2 days ago. Pt placed on amlodipine daily. Hx of CHF       The history is provided by the patient. No  was used.        Qi Ortiz is a 74 y.o. year old female with past medical and surgical history as seen below, presenting with chief complaint of SOB and BLE swelling which onset this morning. Pt states that her SOB is worse with exertion. Pt was admitted on 6/25 following an NSTEMI. She received a L-heart catheterization and 2 stents on Thursday. Pt states that following her procedure, she was placed on Amlodipine. Pt also reports that she began taking Brillinta instead of Plavix. Pt takes 40 mg Lasix daily. Pt states that she took amlodipine several years ago and also experienced BLE swelling.        Past Medical History:   Diagnosis Date    Carotid stenosis     19%    Cellulitis and abscess of foot, except toes 06/22/2022    Cerumen debris on tympanic membrane of right ear 11/30/2022    Coronary artery disease     CVA (cerebral vascular accident)     Dr. 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 Hope    Psoriatic arthritis     Rheumatology    Transient ischemic attack (TIA) 01/02/2019     Past Surgical History:   Procedure Laterality Date    BREAST BIOPSY      R sided/benign    CARDIAC SURGERY      sept 28 2016    CERVICAL FUSION      CHOLECYSTECTOMY      CORONARY ANGIOPLASTY      CORONARY ARTERY BYPASS GRAFT      triple bypass    CORONARY BYPASS GRAFT ANGIOGRAPHY  6/27/2024    Procedure: Bypass graft study;  Surgeon: Veronica Ibarra MD;  Location:  [facility] CATH LAB;  Service: Cardiology;;    CORONARY STENT PLACEMENT      EYE SURGERY      INTRAUTERINE DEVICE INSERTION      LEFT HEART CATHETERIZATION Left 2020    Procedure: CATHETERIZATION, HEART, LEFT;  Surgeon: Veronica Ibarra MD;  Location: [facility] CATH LAB;  Service: Cardiology;  Laterality: Left;  7am start time    LEFT HEART CATHETERIZATION Left 2024    Procedure: Left heart cath;  Surgeon: Veronica Ibarra MD;  Location: [facility] CATH LAB;  Service: Cardiology;  Laterality: Left;    mass removed from R groin      PERCUTANEOUS CORONARY INTERVENTION, ARTERY N/A 2024    Procedure: Percutaneous coronary intervention;  Surgeon: Veronica Ibarra MD;  Location: [facility] CATH LAB;  Service: Cardiology;  Laterality: N/A;    STENT, DRUG ELUTING, SINGLE VESSEL, CORONARY  2024    Procedure: Stent, Drug Eluting, Single Vessel, Coronary;  Surgeon: Veronica Ibarra MD;  Location: [facility] CATH LAB;  Service: Cardiology;;    TOTAL ABDOMINAL HYSTERECTOMY W/ BILATERAL SALPINGOOPHORECTOMY      due to benign mass, adhesions    TUBAL LIGATION           Family History   Problem Relation Name Age of Onset    Breast cancer Maternal Grandfather      Breast cancer Paternal Aunt      Stroke Unknown      Breast cancer Sister  60    Leukemia Sister  8         as child    Lung cancer Paternal Grandfather      Heart disease Unknown      Diabetes Daughter a      Social History     Tobacco Use    Smoking status: Never    Smokeless tobacco: Never   Substance Use Topics    Alcohol use: No    Drug use: No     Social Determinants of Health with Concerns     Physical Activity: Inactive (2024)    Exercise Vital Sign     Days of Exercise per Week: 0 days     Minutes of Exercise per Session: 0 min   Depression: Medium Risk (2024)    Depression     Last PHQ-4: Flowsheet Data: 4   Social Isolation: Not on file      Review of patient's allergies indicates:   Allergen Reactions    Adhesive Nausea And Vomiting     EKG Electrodes     "Aspirin-dipyridamole Other (See Comments)     headaches  Other reaction(s): Not Indicated  Other reaction(s): unknown    Butorphanol      Other reaction(s): Not Indicated  Other reaction(s): cardiac arrest    Citalopram Anaphylaxis     Other reaction(s): Not Indicated  Other reaction(s): unknown    Clindamycin Itching and Swelling     Other reaction(s): Not Indicated  Other reaction(s): unknown    Codeine Shortness Of Breath, Itching and Swelling     Other reaction(s): Not Indicated  Other reaction(s): hives/trouble breathing/"possible cardiac arrest"    Ezetimibe      Other reaction(s): Not Indicated  Other reaction(s): unknown    Hydrocodone-acetaminophen Shortness Of Breath     Other reaction(s): Not Indicated  Other reaction(s): unknown    Isosorbide Other (See Comments)     Severe headache  Other reaction(s): Not Indicated  Other reaction(s): arrhythmia    Kiwi (actinidia chinensis) Blisters     Oral  Blisters/burning    Lisinopril Anaphylaxis     Other reaction(s): Not Indicated  Other reaction(s): unknown    Magnesium citrate Shortness Of Breath     Other reaction(s): Not Indicated    Meloxicam      Other reaction(s): Not Indicated  Other reaction(s): Caused Acute renal failure    Methylprednisolone Other (See Comments)     Patient reports taking IV in the past without any issues. Reports allergy to oral preparation   Other reaction(s): Not Indicated  Other reaction(s): unknown    Metronidazole Nausea And Vomiting    Misoprostol Anaphylaxis and Other (See Comments)     Difficulty breathing  Other reaction(s): Not Indicated  Other reaction(s): unknown    Morphine      Other reaction(s): Not Indicated  Other reaction(s): trouble breathing/"possible cardiac arrest"    Nedocromil      Other reaction(s): Not Indicated  Other reaction(s): unknown    Neuromuscular blockers, steroidal Other (See Comments)    Pentazocine lactate      Other reaction(s): Not Indicated  Other reaction(s): unknown    Ranolazine Nausea And " Vomiting     Other reaction(s): Not Indicated  Other reaction(s): PVC's/ SOB    Rosuvastatin Anaphylaxis     Other reaction(s): Not Indicated  Other reaction(s): gastric sleeve    Stadol [butorphanol tartrate] Anaphylaxis     Coded    Sulfa (sulfonamide antibiotics) Other (See Comments)     unknown  Other reaction(s): hives/trouble breathing    Tetracyclines      Other reaction(s): unknown    Triamcinolone acetonide      Other reaction(s): Not Indicated  Other reaction(s): unknown    Zoledronic acid-mannitol-water Other (See Comments)     Bones hurt  Other reaction(s): Not Indicated  Other reaction(s): unknown    Hydromorphone Hallucinations    Azithromycin      Other reaction(s): Not Indicated    Cleocin [clindamycin hcl]     Meperidine      Other reaction(s): Not Indicated    Moxifloxacin      PATIENT STATES DO NOT GIVE UNDER ANY CIRCUSTANCES  Other reaction(s): Not Indicated    Nitroglycerin      Long acting  Other reaction(s): Not Indicated    Pholcodine     Prednisone      GASTRIC PAIN  Other reaction(s): Not Indicated    Tetracycline        Review of Systems     A full Review of Systems (ROS) was performed and was negative unless otherwise stated in the HPI.      Physical Exam     Vitals:    06/29/24 1620 06/29/24 1710 06/29/24 2000 06/29/24 2030   BP: (!) 184/88  (!) 166/77 (!) 161/74   BP Location: Right arm      Patient Position: Sitting      Pulse: 67 61 66 63   Resp: 20   18   Temp: 98.6 °F (37 °C)   98.4 °F (36.9 °C)   TempSrc: Oral   Oral   SpO2: 100%  97% 97%        Physical Exam    Nursing note and vitals reviewed.  Constitutional: She appears well-developed and well-nourished. No distress.   HENT:   Head: Normocephalic and atraumatic.   Right Ear: External ear normal.   Left Ear: External ear normal.   Nose: Nose normal.   Mouth/Throat: Oropharynx is clear and moist.   Eyes: Conjunctivae and EOM are normal. Pupils are equal, round, and reactive to light.   Neck: Neck supple.   Normal range of  motion.  Cardiovascular:  Normal rate, regular rhythm, normal heart sounds and intact distal pulses.           Pulmonary/Chest: Breath sounds normal. No stridor. No respiratory distress. She has no wheezes. She has no rhonchi. She has no rales.   Abdominal: Abdomen is soft. Bowel sounds are normal. There is no abdominal tenderness.   Musculoskeletal:         General: Edema (1+ PE in BLE) present. No tenderness. Normal range of motion.      Cervical back: Normal range of motion and neck supple.     Neurological: She is alert and oriented to person, place, and time. She has normal strength. No cranial nerve deficit or sensory deficit.   Skin: Skin is warm and dry. No rash noted.   Psychiatric: She has a normal mood and affect. Thought content normal.         Lab Results- Independently reviewed by myself      Labs Reviewed   CBC W/ AUTO DIFFERENTIAL - Abnormal; Notable for the following components:       Result Value    RDW 14.6 (*)     All other components within normal limits   COMPREHENSIVE METABOLIC PANEL - Abnormal; Notable for the following components:    Glucose 114 (*)     eGFR 53 (*)     All other components within normal limits   TROPONIN I - Abnormal; Notable for the following components:    Troponin I 0.149 (*)     All other components within normal limits   B-TYPE NATRIURETIC PEPTIDE - Abnormal; Notable for the following components:     (*)     All other components within normal limits           Imaging     Imaging Results              X-Ray Chest AP Portable (Final result)  Result time 06/29/24 18:38:49      Final result by Meaghan Vaughn MD (06/29/24 18:38:49)                   Impression:      Stable chest exam      Electronically signed by: Meaghan Vaughn  Date:    06/29/2024  Time:    18:38               Narrative:    EXAMINATION:  XR CHEST AP PORTABLE    CLINICAL HISTORY:  CHF;    TECHNIQUE:  Single frontal portable view of the chest was  performed.    COMPARISON:  06/25/2024    FINDINGS:  No pulmonary consolidation or pleural effusion                                    X-Rays:   Independently Interpreted Readings:   Chest X-Ray: No infiltrates.  No acute abnormalities.           The EKG was ordered, reviewed, and independently interpreted by the ED provider.  Interpretation time: 16:26  Rate: 65 BPM  Rhythm: normal sinus rhythm  Interpretation: Inferior infarct. Anterior infarct, age undetermined. ST and T wave abnormality, consider lateral ischemia. No STEMI.      ED Course         Procedures         Orders Placed This Encounter    X-Ray Chest AP Portable    CBC auto differential    Comprehensive metabolic panel    Troponin I    Brain natriuretic peptide    Confirm Patient is not Eligible for Congestive Heart Failure Pathway    Vital signs    Cardiac Monitoring - Adult    Pulse Oximetry Continuous    EKG 12-lead    Insert peripheral IV    furosemide injection 40 mg          ED Course as of 06/29/24 2205   Sat Jun 29, 2024   1845 CBC auto differential(!)  CBC: No significant anemia, platelet disorder, or leukocytosis.  [KB]   1846 Comprehensive metabolic panel(!)  CMP: Normal electrolytes, renal function, liver enzymes   [KB]   1846 Brain natriuretic peptide(!)  Decline from recent values [KB]   1846 Troponin I(!): 0.149  Increase is likely related to recent catheterization/NSTEMI [KB]      ED Course User Index  [KB] Lavon Hernandez MD              Medical Decision Making       The patient's list of active medical problems, social history, medications, and allergies as documented per RN staff has been reviewed.     Comorbidities taken into consideration for development of diagnosis and treatment plan include CAD, HTN, HLD, and CVA, TIA.      8:30 PM: Reassessed pt at this time. Discussed with pt all pertinent ED information and results. Discussed pt dx and plan of tx. Gave pt all f/u and return to the ED instructions. All questions and concerns were  addressed at this time. Pt expresses understanding of information and instructions, and is comfortable with plan to discharge. Pt is stable for discharge.    I discussed with patient and/or family/caretaker that evaluation in the ED does not suggest any emergent or life threatening medical conditions requiring immediate intervention beyond what was provided in the ED, and I believe patient is safe for discharge.  Regardless, an unremarkable evaluation in the ED does not preclude the development or presence of a serious of life threatening condition. As such, patient was instructed to return immediately for any worsening or change in current symptoms.     Medical Decision Making  This patient presents with signs and symptoms consistent with an acute exacerbation of chronic CHF. Differential diagnosis includes alternate cardiopulmonary causes such as ischemia, PE, pneumothorax, and pneumonia, as well as other causes of dyspnea such as asthma/RAD, COPD, flash pulmonary edema, dysrhythmia but seem less likely after data gathered thusfar. Patient is generally hemodynamically stable. Responded well to IV Diuretic at 2x home dose. At time of reassessment appears stable from a respiratory standpoint to be discharged with outpatient f/u. Advised to stay compliant with medication regimen and low salt diet. F/u PCP/cards. Told to return to ED if symptoms worsen, return, or change in character.    Amount and/or Complexity of Data Reviewed  External Data Reviewed: labs, radiology, ECG and notes.  Labs: ordered. Decision-making details documented in ED Course.  Radiology: ordered and independent interpretation performed. Decision-making details documented in ED Course.  ECG/medicine tests: ordered and independent interpretation performed. Decision-making details documented in ED Course.    Risk  Prescription drug management.              SCRIBE #1 NOTE: ITorri, am scribing for, and in the presence of,  Lavon BURK  MD Mary. I have scribed the entire note.          ED Prescriptions    None           Clinical Impression       Follow-up Information       Follow up With Specialties Details Why Contact Info    Lisa Porter MD Internal Medicine Schedule an appointment as soon as possible for a visit  For follow-up on today's visit. [id number]  [facility] [address]  [phone]      [facility] - Emergency Dept. Emergency Medicine Go to  As needed, If symptoms worsen [id number] [facility] [address] [phone]            Referrals:  No orders of the defined types were placed in this encounter.      Disposition   ED Disposition Condition    Discharge Stable            Diagnosis    ICD-10-CM ICD-9-CM   1. Peripheral edema  R60.0 782.3   2. Shortness of breath  R06.02 786.05           Lavon Hernandez MD        06/29/2024          DISCLAIMER: This note was prepared with [email or web address] voice recognition transcription software. Garbled syntax, mangled pronouns, and other bizarre constructions may be attributed to that software system.      Lavon Hernandez MD  07/02/24 0049

## 2024-06-29 NOTE — FIRST PROVIDER EVALUATION
Medical screening examination initiated.  I have conducted a focused provider triage encounter, findings are as follows:    Brief history of present illness:  SOB, pedal edema, cardiac stents 2 days ago    Vitals:    06/29/24 1620   BP: (!) 184/88   BP Location: Right arm   Patient Position: Sitting   Pulse: 67   Resp: 20   Temp: 98.6 °F (37 °C)   TempSrc: Oral   SpO2: 100%       Pertinent physical exam:  Alert    Brief workup plan:  cardiac/chf    Preliminary workup initiated; this workup will be continued and followed by the physician or advanced practice provider that is assigned to the patient when roomed.

## 2024-06-29 NOTE — TELEPHONE ENCOUNTER
[place]    PCP:  Dr. Lisa Porter    Pt escalated to Carmine kay.  S/P hospital discharge on 6/28/24 S/P Lt Heart Cath with Stent to RCA for NSTEMI.  She reports prescribed Amlodipine.  She states that she told them that she had problems with Amlodipine in the past.  C/O BLE swelling, intermittent SOB, and unable lay flat d/t SOB.  Denies SOB currently unless she lays down, CP, and fever.  Per protocol, care advised is go to ED now.  Pt VU.  Advised to call for worsening/questions/concerns.  VU.    Reason for Disposition   Difficulty breathing at rest    Additional Information   Negative: SEVERE difficulty breathing (e.g., struggling for each breath, speaks in single words)   Negative: Looks like a broken bone or dislocated joint (e.g., crooked or deformed)   Negative: Sounds like a life-threatening emergency to the triager    Protocols used: Leg Swelling and Edema-A-AH

## 2024-06-30 LAB
OHS QRS DURATION: 84 MS
OHS QTC CALCULATION: 453 MS

## 2024-06-30 NOTE — ED NOTES
Performed ambulatory pulse ox challenge for the patient.  Oxygen saturation stayed between 98%-99% with one even registering at 97%.  MD notified.

## 2024-07-01 ENCOUNTER — TELEPHONE (OUTPATIENT)
Dept: CARDIOLOGY | Facility: HOSPITAL | Age: 74
End: 2024-07-01
Payer: MEDICARE

## 2024-07-01 ENCOUNTER — OFFICE VISIT (OUTPATIENT)
Dept: PRIMARY CARE CLINIC | Facility: CLINIC | Age: 74
End: 2024-07-01
Payer: MEDICARE

## 2024-07-01 ENCOUNTER — DOCUMENTATION ONLY (OUTPATIENT)
Dept: CARDIOLOGY | Facility: CLINIC | Age: 74
End: 2024-07-01
Payer: MEDICARE

## 2024-07-01 VITALS
TEMPERATURE: 98 F | HEIGHT: 63 IN | DIASTOLIC BLOOD PRESSURE: 72 MMHG | HEART RATE: 70 BPM | BODY MASS INDEX: 37.07 KG/M2 | OXYGEN SATURATION: 98 % | SYSTOLIC BLOOD PRESSURE: 142 MMHG | WEIGHT: 209.19 LBS

## 2024-07-01 DIAGNOSIS — I10 ESSENTIAL HYPERTENSION: Chronic | ICD-10-CM

## 2024-07-01 DIAGNOSIS — I25.810 CORONARY ARTERY DISEASE INVOLVING CORONARY BYPASS GRAFT OF NATIVE HEART, UNSPECIFIED WHETHER ANGINA PRESENT: Primary | Chronic | ICD-10-CM

## 2024-07-01 DIAGNOSIS — Z78.9 MEDICATION INTOLERANCE: Chronic | ICD-10-CM

## 2024-07-01 DIAGNOSIS — Z74.09 OTHER REDUCED MOBILITY: ICD-10-CM

## 2024-07-01 PROCEDURE — 99496 TRANSJ CARE MGMT HIGH F2F 7D: CPT | Mod: S$PBB,,, | Performed by: INTERNAL MEDICINE

## 2024-07-01 PROCEDURE — 99215 OFFICE O/P EST HI 40 MIN: CPT | Mod: PBBFAC,PN | Performed by: INTERNAL MEDICINE

## 2024-07-01 PROCEDURE — 99999 PR PBB SHADOW E&M-EST. PATIENT-LVL V: CPT | Mod: PBBFAC,,, | Performed by: INTERNAL MEDICINE

## 2024-07-01 RX ORDER — LANOLIN ALCOHOL/MO/W.PET/CERES
100 CREAM (GRAM) TOPICAL DAILY
COMMUNITY
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
## 2024-07-01 NOTE — PROGRESS NOTES
Heart Failure Transitional [facility] (HFTCC) Team notified of pt referral via Ambulatory Referral to Heart Failure Transitional Care ([id number]).    Patient screened today 07/01/2024 by provider and RN for enrollment to program.      Pt was deemed not a candidate for enrollment at this time related to patient is located outside of [facility] area and will need to follow up with local services.  Heart Failure Transitional Care Clinic ([id number]) is [facility] location only.      Pt will require additional follow up planning per primary team.     If pt status, diagnosis, or treatment plan changes , please place AMB referral to Heart Failure Transitional Care Clinic ([id number]) for HFTC enrollment re-evalution.

## 2024-07-01 NOTE — PATIENT INSTRUCTIONS
If you are feeling unwell, we'd like to be the first ones to know here at [facility]! Please give us a call. Same day appointments are our top priority to keep you well and out of the emergency rooms and hospitals. Call [phone] for our direct line. After hours advice is always available. Please call [phone] after hours to speak to the on-call team.      Please cont check and chart BP and weights  Let us and/or your Cardiologist Dr. MARKOS Paez know if any concerns

## 2024-07-02 ENCOUNTER — OUTPATIENT CASE MANAGEMENT (OUTPATIENT)
Dept: ADMINISTRATIVE | Facility: OTHER | Age: 74
End: 2024-07-02
Payer: MEDICARE

## 2024-07-02 PROBLEM — S20.211A CHEST WALL CONTUSION, RIGHT, INITIAL ENCOUNTER: Status: RESOLVED | Noted: 2024-05-09 | Resolved: 2024-07-02

## 2024-07-02 PROBLEM — R06.09 DOE (DYSPNEA ON EXERTION): Chronic | Status: ACTIVE | Noted: 2023-10-12
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
## 2024-07-02 PROBLEM — D52.9 ANEMIA DUE TO FOLIC ACID DEFICIENCY: Status: RESOLVED | Noted: 2022-05-21 | Resolved: 2024-07-02

## 2024-07-02 PROBLEM — T14.8XXA TRAUMATIC ABRASION: Status: RESOLVED | Noted: 2024-06-06 | Resolved: 2024-07-02
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
## 2024-07-02 PROBLEM — L03.116 CELLULITIS OF LEFT LOWER EXTREMITY: Status: RESOLVED | Noted: 2024-06-06 | Resolved: 2024-07-02

## 2024-07-02 PROBLEM — Z00.00 ENCOUNTER FOR PREVENTIVE HEALTH EXAMINATION: Status: RESOLVED | Noted: 2024-05-21 | Resolved: 2024-07-02

## 2024-07-02 PROBLEM — Z74.09 OTHER REDUCED MOBILITY: Chronic | Status: ACTIVE | Noted: 2024-05-21

## 2024-07-02 PROBLEM — Z78.9 MEDICATION INTOLERANCE: Chronic | Status: ACTIVE | Noted: 2024-07-01

## 2024-07-02 NOTE — PROGRESS NOTES
Outpatient Care Management  Patient Does Not Consent    Patient: Qi Ortiz  MRN:  [id number]  Date of Service:  7/2/2024  Completed by:  Madelyn Babb RN    Chief Complaint   Patient presents with    OPCM Enrollment Call    Case Closure     Declines OPCM, needs met        Patient Summary           Consent Received:  Decline

## 2024-07-02 NOTE — ASSESSMENT & PLAN NOTE
Reviewed procedure diagram w pt - pt reports CP/pressure resolved - cont current Rx - f/u cardiology as scheduled

## 2024-07-08 DIAGNOSIS — E78.2 MIXED HYPERLIPIDEMIA: Primary | Chronic | ICD-10-CM

## 2024-07-08 RX ORDER — EVOLOCUMAB 140 MG/ML
INJECTION, SOLUTION SUBCUTANEOUS
Qty: 6 ML | Refills: 3 | Status: SHIPPED | OUTPATIENT
Start: 2024-07-08

## 2024-07-12 ENCOUNTER — OFFICE VISIT (OUTPATIENT)
Dept: CARDIOLOGY | Facility: CLINIC | Age: 74
End: 2024-07-12
Payer: MEDICARE

## 2024-07-12 ENCOUNTER — TELEPHONE (OUTPATIENT)
Dept: PRIMARY CARE CLINIC | Facility: CLINIC | Age: 74
End: 2024-07-12
Payer: MEDICARE

## 2024-07-12 VITALS
OXYGEN SATURATION: 95 % | SYSTOLIC BLOOD PRESSURE: 148 MMHG | RESPIRATION RATE: 16 BRPM | WEIGHT: 208.25 LBS | BODY MASS INDEX: 36.9 KG/M2 | HEART RATE: 65 BPM | HEIGHT: 63 IN | DIASTOLIC BLOOD PRESSURE: 81 MMHG

## 2024-07-12 DIAGNOSIS — I50.32 CHRONIC DIASTOLIC HEART FAILURE: Chronic | ICD-10-CM

## 2024-07-12 DIAGNOSIS — Z95.1 S/P CABG (CORONARY ARTERY BYPASS GRAFT): Chronic | ICD-10-CM

## 2024-07-12 DIAGNOSIS — I70.0 AORTIC ATHEROSCLEROSIS: Chronic | ICD-10-CM

## 2024-07-12 DIAGNOSIS — I21.4 NSTEMI (NON-ST ELEVATED MYOCARDIAL INFARCTION): ICD-10-CM

## 2024-07-12 DIAGNOSIS — Z86.73 HISTORY OF CVA (CEREBROVASCULAR ACCIDENT): Primary | Chronic | ICD-10-CM

## 2024-07-12 DIAGNOSIS — I10 ESSENTIAL HYPERTENSION: Chronic | ICD-10-CM

## 2024-07-12 DIAGNOSIS — I25.810: ICD-10-CM

## 2024-07-12 DIAGNOSIS — I25.810 CORONARY ARTERY DISEASE INVOLVING CORONARY BYPASS GRAFT OF NATIVE HEART, UNSPECIFIED WHETHER ANGINA PRESENT: Chronic | ICD-10-CM

## 2024-07-12 DIAGNOSIS — Z95.2 S/P TAVR (TRANSCATHETER AORTIC VALVE REPLACEMENT): ICD-10-CM

## 2024-07-12 DIAGNOSIS — I65.21 STENOSIS OF RIGHT CAROTID ARTERY: ICD-10-CM

## 2024-07-12 DIAGNOSIS — I27.20 PULMONARY HYPERTENSION: Chronic | ICD-10-CM

## 2024-07-12 PROCEDURE — 99213 OFFICE O/P EST LOW 20 MIN: CPT | Mod: PBBFAC | Performed by: INTERNAL MEDICINE

## 2024-07-12 PROCEDURE — 99999 PR PBB SHADOW E&M-EST. PATIENT-LVL III: CPT | Mod: PBBFAC,,, | Performed by: INTERNAL MEDICINE

## 2024-07-12 RX ORDER — SPIRONOLACTONE 50 MG/1
50 TABLET, FILM COATED ORAL DAILY
Qty: 90 TABLET | Refills: 2 | Status: SHIPPED | OUTPATIENT
Start: 2024-07-12

## 2024-07-12 NOTE — PROGRESS NOTES
Subjective:   Patient ID:  Qi Ortiz is a 74 y.o. female who presents for follow up of No chief complaint on file.      73y/o F came in for post pCI f/u  PMH CAD s/p CABG twice in 1998 and 2016, SVT, AS, s/p TAVR 20', chronic diastolic HF, HTN HLD COPD, CKD, DM, obesity, IRIS, statin intolerance  06/06/23 went to [facility] ER for SOB. Rx for CHF and d/c o/n.  Now sob stable no orthopnea leg swelling  No chest pain   Refused Jardiance    10/23 visit  C/o SOB worse with exertion, no orthopnea and PND. Onyy trace leg swelling  BNP was 300's this week and on Lasix 40 mg bid for 3 days and weight 10lbs. Now on lasix 20 mg bid  The SOB improved significantly  Does drink a lot of water  Ekg reviewed and leads I and avl revealed misplaced limb leads    12/23 visit  The  passed and stressful now. No orthopnea PND. On lasix 20 mg bid  10/23 echo EF nl s/p TAVR TG 9 mmHg    06/24 visit  05/06/24 fell and syncope in the yard. Woke up in 15 min and back pain. In the two days was in and out?, CXR rib on 05/09 negative  EKG reviewed by myself today reveals NSR nonspecific STT change  C/o chest pain at exertion and fatigue  BP high  LDL 88    Interval history  Troponin elevated 0.098 at last visit. Pt was called and advised to go to ER. The cath was done. DESx2 placed on ostial LAD and pLAD. LVEDP 29 mmHG. ASHIA to LAD patent.  H/o allergy to ranexa amlodipine and Imdur          Past Medical History:   Diagnosis Date    Carotid stenosis     19%    Cellulitis and abscess of foot, except toes 06/22/2022    Cerumen debris on tympanic membrane of right ear 11/30/2022    Coronary artery disease     CVA (cerebral vascular accident)     Dr. Hoffman    Depression     Dog bite 10/03/2023    Double ectopic ureters     Dr. Porras    Encounter for preventive health examination 05/21/2024    Fall 05/09/2024    Hemiplegia affecting right dominant side 11/09/2021    Hyperlipidemia     Hypertension     Hypothyroid     Left foot infection  07/08/2022    Mild asthma with exacerbation 12/04/2022    OP (osteoporosis)     IRIS (obstructive sleep apnea)     Dr. Hope    Psoriatic arthritis     Rheumatology    Transient ischemic attack (TIA) 01/02/2019       Past Surgical History:   Procedure Laterality Date    BREAST BIOPSY      R sided/benign    CARDIAC SURGERY      sept 28 2016    CERVICAL FUSION      CHOLECYSTECTOMY      CORONARY ANGIOPLASTY      CORONARY ARTERY BYPASS GRAFT      triple bypass    CORONARY BYPASS GRAFT ANGIOGRAPHY  6/27/2024    Procedure: Bypass graft study;  Surgeon: Veronica Ibarra MD;  Location: [facility] CATH LAB;  Service: Cardiology;;    CORONARY STENT PLACEMENT      EYE SURGERY      INTRAUTERINE DEVICE INSERTION      LEFT HEART CATHETERIZATION Left 9/8/2020    Procedure: CATHETERIZATION, HEART, LEFT;  Surgeon: Veronica Ibarra MD;  Location: [facility] CATH LAB;  Service: Cardiology;  Laterality: Left;  7am start time    LEFT HEART CATHETERIZATION Left 6/27/2024    Procedure: Left heart cath;  Surgeon: Veronica Ibarra MD;  Location: [facility] CATH LAB;  Service: Cardiology;  Laterality: Left;    mass removed from R groin      PERCUTANEOUS CORONARY INTERVENTION, ARTERY N/A 6/27/2024    Procedure: Percutaneous coronary intervention;  Surgeon: Veronica Ibarra MD;  Location: [facility] CATH LAB;  Service: Cardiology;  Laterality: N/A;    STENT, DRUG ELUTING, SINGLE VESSEL, CORONARY  6/27/2024    Procedure: Stent, Drug Eluting, Single Vessel, Coronary;  Surgeon: Veronica Ibarra MD;  Location: [facility] CATH LAB;  Service: Cardiology;;    TOTAL ABDOMINAL HYSTERECTOMY W/ BILATERAL SALPINGOOPHORECTOMY      due to benign mass, adhesions    TUBAL LIGATION         Social History     Tobacco Use    Smoking status: Never    Smokeless tobacco: Never   Substance Use Topics    Alcohol use: No    Drug use: No       Family History   Problem Relation Name Age of Onset    Breast cancer Maternal Grandfather      Breast cancer Paternal Aunt      Stroke Unknown      Breast  cancer Sister  60    Leukemia Sister  8         as child    Lung cancer Paternal Grandfather      Heart disease Unknown      Diabetes Daughter a          ROS    Objective:   Physical Exam    Lab Results   Component Value Date    CHOL 154 2024    CHOL 137 2023    CHOL 125 2023     Lab Results   Component Value Date    HDL 35 (L) 2024    HDL 35 (L) 2023    HDL 43 2023     Lab Results   Component Value Date    LDLCALC 88.6 2024    LDLCALC 69.4 2023    LDLCALC 50.0 (L) 2023     Lab Results   Component Value Date    TRIG 152 (H) 2024    TRIG 163 (H) 2023    TRIG 160 (H) 2023     Lab Results   Component Value Date    CHOLHDL 22.7 2024    CHOLHDL 25.5 2023    CHOLHDL 34.4 2023       Chemistry        Component Value Date/Time     2024 1715    K 3.5 2024 1715     2024 1715    CO2 23 2024 1715    BUN 13 2024 1715    CREATININE 1.1 2024 1715     (H) 2024 1715        Component Value Date/Time    CALCIUM 10.0 2024 1715    ALKPHOS 59 2024 1715    AST 24 2024 1715    ALT 16 2024 1715    BILITOT 0.7 2024 1715    ESTGFRAFRICA 48 (A) 2022 0803    EGFRNONAA 41 (A) 2022 0803          Lab Results   Component Value Date    HGBA1C 6.0 (H) 2024     Lab Results   Component Value Date    TSH 1.770 2024     Lab Results   Component Value Date    INR 1.0 2024    INR 1.0 2020    INR 1.0 2017     Lab Results   Component Value Date    WBC 7.74 2024    HGB 12.8 2024    HCT 38.9 2024    MCV 91 2024     2024     BMP  Sodium   Date Value Ref Range Status   2024 142 136 - 145 mmol/L Final     Potassium   Date Value Ref Range Status   2024 3.5 3.5 - 5.1 mmol/L Final     Chloride   Date Value Ref Range Status   2024 105 95 - 110 mmol/L Final     CO2   Date Value Ref Range  Status   06/29/2024 23 23 - 29 mmol/L Final     BUN   Date Value Ref Range Status   06/29/2024 13 8 - 23 mg/dL Final     Creatinine   Date Value Ref Range Status   06/29/2024 1.1 0.5 - 1.4 mg/dL Final     Calcium   Date Value Ref Range Status   06/29/2024 10.0 8.7 - 10.5 mg/dL Final     Anion Gap   Date Value Ref Range Status   06/29/2024 14 8 - 16 mmol/L Final     eGFR if    Date Value Ref Range Status   07/18/2022 48 (A) >60 mL/min/1.73 m^2 Final     eGFR if non    Date Value Ref Range Status   07/18/2022 41 (A) >60 mL/min/1.73 m^2 Final     Comment:     Calculation used to obtain the estimated glomerular filtration  rate (eGFR) is the CKD-EPI equation.        BNP  @LABRCNTIP(BNP,BNPTRIAGEBLO)@  @LABRCNTIP(troponini)@  CrCl cannot be calculated (Patient's most recent lab result is older than the maximum 7 days allowed.).  No results found in the last 24 hours.  No results found in the last 24 hours.  No results found in the last 24 hours.    Assessment:      1. History of CVA (cerebrovascular accident)    2. NSTEMI (non-ST elevated myocardial infarction)    3. Coronary artery disease involving coronary bypass graft of native heart, unspecified whether angina present    4. Chronic diastolic heart failure    5. Essential hypertension    6. Aortic atherosclerosis    7. 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    8. Stenosis of right carotid artery    9. S/P TAVR (transcatheter aortic valve replacement)    10. Pulmonary hypertension    11. S/P CABG (coronary artery bypass graft)      S/p PCI  LVEDP 29 mmHG    Plan:   Increase aldactone to 50 mg daily for HTN  Reluctant to add clonidine or other new med for HTN rx  Continue ASA brillinta repatha metoprolol  Fluid restriction 50 oz    Counseled DASH  Check Lipid profile with PCP in 6 months  Recommend heart-healthy diet, weight control and regular exercise.  Milind. Risk modification.   I have reviewed all pertinent  labs and cardiac studies independently. Plans and recommendations have been formulated under my direct supervision. All questions answered and patient voiced understanding.   If symptoms persist go to the ED  RTC in 3 months

## 2024-07-12 NOTE — TELEPHONE ENCOUNTER
"Pt called stating that she saw Dr. Paez this am    Was told that CHF is worse- advised to limit water intake and changed her medications    Was told that she is not going to get any better     Was told that she did have a "light" heart attack    Pt asking whether or not she should come to appt Monday    Dr. Paez told her he was going to manage her blood pressure       "

## 2024-07-15 ENCOUNTER — TELEPHONE (OUTPATIENT)
Dept: PRIMARY CARE CLINIC | Facility: CLINIC | Age: 74
End: 2024-07-15
Payer: MEDICARE

## 2024-07-15 DIAGNOSIS — L40.50 PSORIATIC ARTHRITIS: Primary | ICD-10-CM

## 2024-07-16 RX ORDER — SECUKINUMAB 150 MG/ML
INJECTION SUBCUTANEOUS
Qty: 4 ML | Refills: 1 | Status: SHIPPED | OUTPATIENT
Start: 2024-07-16

## 2024-07-19 NOTE — TELEPHONE ENCOUNTER
----- Message from China Stack sent at 7/19/2024 11:36 AM CDT -----  Contact: self   .Type: Patient Call Back        Who called:   Patient      What is the request in detail:    Patient called in for prescription for blood thinner 90 mg . Patient states she just got released from hospital for mild heart attack . and was only given a 9 day supply and states that she can't miss a day with taking this medication . Please send this prescription in to the pharmacy listed below     [facility] - [address] [address]  Phone: [phone] Fax: [phone]        Can the clinic reply by [facility]?           Would the patient rather a call back or a response via [facility]?   call   Best call back number:  [phone]

## 2024-07-25 NOTE — TELEPHONE ENCOUNTER
----- Message from Sarath Manriquez sent at 7/25/2024  9:33 AM CDT -----  Contact: Qi  ..Type:  RX Refill Request    Who Called:  Qi  Refill or New Rx: Refill   RX Name and Strength: ticagrelor (BRILINTA) 90 mg tablet  How is the patient currently taking it? (ex. 1XDay): 2 x Day   Is this a 30 day or 90 day RX: 90 day   Preferred Pharmacy with phone number: [facility] [place] [place]   Phone: [phone] Fax: [phone]      Local or Mail Order: Mail Order   Ordering Provider: Philippe Gold   Would the patient rather a call back or a response via [facility]?  Call back   Best Call Back Number: .[phone]     Additional Information:  pt states she will be completely out of the medication on 07/29 and [facility] will be sending it out a 30 day supply on 07/28.  Pt states it is urgent that she gets the medication due she was told not to miss a dose.       Thanks

## 2024-07-26 ENCOUNTER — OFFICE VISIT (OUTPATIENT)
Dept: PRIMARY CARE CLINIC | Facility: CLINIC | Age: 74
End: 2024-07-26
Payer: MEDICARE

## 2024-07-26 VITALS
HEIGHT: 63 IN | TEMPERATURE: 97 F | SYSTOLIC BLOOD PRESSURE: 128 MMHG | BODY MASS INDEX: 36.82 KG/M2 | WEIGHT: 207.81 LBS | HEART RATE: 68 BPM | OXYGEN SATURATION: 98 % | DIASTOLIC BLOOD PRESSURE: 62 MMHG

## 2024-07-26 DIAGNOSIS — E66.01 CLASS 2 SEVERE OBESITY DUE TO EXCESS CALORIES WITH SERIOUS COMORBIDITY AND BODY MASS INDEX (BMI) OF 36.0 TO 36.9 IN ADULT: ICD-10-CM

## 2024-07-26 DIAGNOSIS — N18.31 TYPE 2 DIABETES MELLITUS WITH STAGE 3A CHRONIC KIDNEY DISEASE, WITHOUT LONG-TERM CURRENT USE OF INSULIN: Primary | ICD-10-CM

## 2024-07-26 DIAGNOSIS — E53.8 B12 DEFICIENCY: ICD-10-CM

## 2024-07-26 DIAGNOSIS — Z95.2 S/P TAVR (TRANSCATHETER AORTIC VALVE REPLACEMENT): Chronic | ICD-10-CM

## 2024-07-26 DIAGNOSIS — L40.9 PSORIASIS: Chronic | ICD-10-CM

## 2024-07-26 DIAGNOSIS — Z86.2 HISTORY OF ANEMIA DUE TO VITAMIN B12 DEFICIENCY: ICD-10-CM

## 2024-07-26 DIAGNOSIS — I25.2 HISTORY OF MI (MYOCARDIAL INFARCTION): ICD-10-CM

## 2024-07-26 DIAGNOSIS — E11.22 TYPE 2 DIABETES MELLITUS WITH STAGE 3A CHRONIC KIDNEY DISEASE, WITHOUT LONG-TERM CURRENT USE OF INSULIN: Primary | ICD-10-CM

## 2024-07-26 DIAGNOSIS — H91.93 BILATERAL HEARING LOSS, UNSPECIFIED HEARING LOSS TYPE: Chronic | ICD-10-CM

## 2024-07-26 DIAGNOSIS — Z86.73 HISTORY OF CVA (CEREBROVASCULAR ACCIDENT): Chronic | ICD-10-CM

## 2024-07-26 PROBLEM — Z95.3 S/P TAVR (TRANSCATHETER AORTIC VALVE REPLACEMENT): Chronic | Status: ACTIVE | Noted: 2020-10-06

## 2024-07-26 PROBLEM — S40.011A CONTUSION OF RIGHT SHOULDER: Status: RESOLVED | Noted: 2024-05-09 | Resolved: 2024-07-26

## 2024-07-26 PROBLEM — R73.03 PREDIABETES: Chronic | Status: ACTIVE | Noted: 2023-09-30

## 2024-07-26 PROBLEM — E66.812 CLASS 2 SEVERE OBESITY DUE TO EXCESS CALORIES WITH SERIOUS COMORBIDITY AND BODY MASS INDEX (BMI) OF 38.0 TO 38.9 IN ADULT: Chronic | Status: RESOLVED | Noted: 2023-07-12 | Resolved: 2024-07-26

## 2024-07-26 PROBLEM — I21.4 NSTEMI (NON-ST ELEVATED MYOCARDIAL INFARCTION): Status: RESOLVED | Noted: 2024-06-26 | Resolved: 2024-07-26

## 2024-07-26 PROBLEM — R55 NEAR SYNCOPE: Status: RESOLVED | Noted: 2019-01-02 | Resolved: 2024-07-26

## 2024-07-26 PROBLEM — R00.2 PALPITATIONS: Status: RESOLVED | Noted: 2019-01-31 | Resolved: 2024-07-26

## 2024-07-26 PROBLEM — E66.812 CLASS 2 SEVERE OBESITY DUE TO EXCESS CALORIES WITH SERIOUS COMORBIDITY AND BODY MASS INDEX (BMI) OF 36.0 TO 36.9 IN ADULT: Status: ACTIVE | Noted: 2024-07-26

## 2024-07-26 PROCEDURE — 99999 PR PBB SHADOW E&M-EST. PATIENT-LVL III: CPT | Mod: PBBFAC,,, | Performed by: INTERNAL MEDICINE

## 2024-07-26 PROCEDURE — 99213 OFFICE O/P EST LOW 20 MIN: CPT | Mod: PBBFAC,PN | Performed by: INTERNAL MEDICINE

## 2024-07-26 NOTE — ASSESSMENT & PLAN NOTE
"Has maintained A1c in "prediabetes" range over past 2 years w weight loss and dietary adjustments - encourage stay mobile and active to extent able - encourage cont healthier food and beverage choices   "

## 2024-07-26 NOTE — ASSESSMENT & PLAN NOTE
Cont encourage stay mobile and active to extent able - encourage cont healthier food and beverage choices

## 2024-07-26 NOTE — ASSESSMENT & PLAN NOTE
Cont secondary preventative measures - lipid levels good w Repatha -  cont current Rx and monitor BP HR at home

## 2024-07-26 NOTE — PROGRESS NOTES
"Qi Ortiz  07/26/2024  [id number]    Lisa oPrter MD  Patient Care Team:  Lisa Porter MD as PCP - General (Internal Medicine)    Visit Type: Follow-up    Chief Complaint:  Chief Complaint   Patient presents with    Follow-up     Refill on medication     History of Present Illness: Ms. Qi Ortiz is a 74 year old female w multiple chronic medical issues here for scheduled f/u     Chronic medical issues include type 2 DM, h/o CVA, CAD s/p CABG x 2, AS s/p TAVR, HTN, HLP (intolerant of statin), CKD stage 3, hypothyroidism, psoriasis and psoriatic arthritis, CAREN, obesity, and IRIS (intolerant of CPAP). Ms. Ortiz has multiple drug and contact allergies and sensitivities. Food allergies include kiwi fruit and crab boil.    Reports home BP's have been good since increase of the aldactone  Started taking extra B12 recently and has been feeling better   Reports dizziness resolved since stopped taking gabapentin - no recent falls    Denies chest pain, palpitations  Does get SOB w exertion but resolves after rests for a while    Pt was contacted by [facility] but declined assistance  Her trailer is old and "falling apart" but she has no plan to move  Her 2 daughters do live nearby     PHQ-4 Score: 0     From LOV w me 7/01/24  Recently sent to ED by Card Dr. MARKOS Paez for elevated troponin    Hospitalized & Dx'd NSTEMI s/p [facility] stent x 2  Pt reports developed LE edema, SOB following hospital discharge which she attributes to the amlodipine  Was seen in ED - received 1 dose furosemide 40 mg IV discharged home  Pt discontinued amlodipine (and declined to start nitro patch due to HA's w imdur and sensitivity to adhesives)  She is concerned that her BP's have been running high at home w current RX: furosemide 20 mg BID, spironolactone 25 mg Qam, metoprolol 100 mg BID.  Medication options are limited due to numerous allergies and intolerances.      No medication yet this morning w BP mildly elevated  Feeling a little " SOB as usual   But no CP, pressure, palpitations or LE edema at present   Reports no CP, pressure since LHC & stenting     Only able to raise L arm to shoulder height  Notes R arm has felt weak since fall earlier this year  Still feels off-balance but declines referral to PT either outpatient or in-home     PHQ-4 Score: 2     Hosp/ED/Urgent Care:  6/29/24 ED SOB edema  6/25-6/28/24 Hosp NSTEMI s/p [facility] stent x 2    Recent appointments:   7/12/24 MARKOS Montalvo    Upcoming appointments:  Future Appointments       Date Provider Specialty Appt Notes    7/30/2024  Lab fabiano    8/2/2024 Fabiano Javed NP Hematology and Oncology f/u    8/15/2024 Jacky Ackerman MD Rheumatology PSORIATIC--[facility]--[facility]    8/26/2024 Padma Paez MD Primary Care 1 month f/u    9/27/2024 Padma Paez MD Primary Care F/U 2 months    10/14/2024 Asif Paez MD Cardiology 3 month    10/25/2024 Lisa Porter MD Primary Care F/U 3 mo           The following were reviewed: Active problem list, medication list, allergies, family history, social history, and Health Maintenance.     Medications have been reviewed and reconciled with patient at visit today.    Review of Systems   See HPI above    Exam: sat 98%   Vitals:    07/26/24 1614   BP: 128/62   Pulse:    Temp:      Weight: 94.3 kg (207 lb 12.5 oz)   Body mass index is 36.81 kg/m².    BP Readings from Last 3 Encounters:   07/26/24 128/62   07/12/24 (!) 148/81   07/01/24 (!) 142/72      Wt Readings from Last 3 Encounters:   07/26/24 1452 94.3 kg (207 lb 12.5 oz)   07/12/24 0805 94.4 kg (208 lb 3.6 oz)   07/01/24 1031 94.9 kg (209 lb 3.5 oz)     Physical Exam  Vitals reviewed.   Constitutional:       General: She is not in acute distress.     Appearance: Normal appearance. She is obese.   HENT:      Head: Normocephalic and atraumatic.      Right Ear: External ear normal. There is impacted cerumen.      Left Ear: Tympanic membrane, ear canal and external ear normal.      Ears:      Comments:  "Crow     Nose: Nose normal.      Mouth/Throat:      Mouth: Mucous membranes are moist.      Pharynx: Oropharynx is clear.   Eyes:      Extraocular Movements: Extraocular movements intact.      Conjunctiva/sclera: Conjunctivae normal.      Comments: glasses   Neck:      Vascular: No carotid bruit.   Cardiovascular:      Rate and Rhythm: Normal rate and regular rhythm.      Comments: Prominent "click"   Pulmonary:      Effort: Pulmonary effort is normal. No respiratory distress.      Breath sounds: No wheezing, rhonchi or rales.   Abdominal:      General: Bowel sounds are normal.      Palpations: Abdomen is soft.      Tenderness: There is no abdominal tenderness. There is no guarding.   Musculoskeletal:      Right lower leg: No edema.      Left lower leg: No edema.   Lymphadenopathy:      Cervical: No cervical adenopathy.   Skin:     General: Skin is warm and dry.      Findings: Bruising present.   Neurological:      Mental Status: She is alert and oriented to person, place, and time. Mental status is at baseline.   Psychiatric:         Mood and Affect: Mood normal.         Behavior: Behavior normal.        Laboratory Reviewed  Lab Results   Component Value Date    WBC 7.74 06/29/2024    HGB 12.8 06/29/2024    HCT 38.9 06/29/2024     06/29/2024    MCV 91 06/29/2024    CHOL 154 04/05/2024    TRIG 152 (H) 04/05/2024    HDL 35 (L) 04/05/2024    LDLCALC 88.6 04/05/2024    ALT 16 06/29/2024    AST 24 06/29/2024     06/29/2024    K 3.5 06/29/2024     06/29/2024    CREATININE 1.1 06/29/2024    BUN 13 06/29/2024    CO2 23 06/29/2024    MG 1.7 06/25/2024    TSH 1.770 04/19/2024    FREET4 1.01 04/19/2024    INR 1.0 06/25/2024    HGBA1C 6.0 (H) 04/05/2024    CRP 3.1 01/26/2024     Lab Results   Component Value Date    PTH 91.5 (H) 05/20/2022    CALCIUM 10.0 06/29/2024    PHOS 3.8 09/29/2023      Lab Results   Component Value Date    AMQCPGHP87 >2000 (H) 06/13/2023     Lab Results   Component Value Date    " "FOLATE 18.7 05/20/2022      Lab Results   Component Value Date    IRON 70 06/17/2024    TRANSFERRIN 228 06/17/2024    TIBC 337 06/17/2024    FESATURATED 21 06/17/2024      Lab Results   Component Value Date    EGFRNORACEVR 53 (A) 06/29/2024    ALBUMIN 3.6 06/29/2024     (H) 06/29/2024     Lab Results   Component Value Date    CGVOJLAP66PY 45 06/13/2023          Assessment:   74 y.o. female with multiple co-morbid illnesses here for continued follow up of medical problems.      The primary encounter diagnosis was Type 2 diabetes mellitus with stage 3a chronic kidney disease, without long-term current use of insulin. Diagnoses of History of anemia due to vitamin B12 deficiency, B12 deficiency, Bilateral hearing loss, unspecified hearing loss type, Psoriasis, History of CVA (cerebrovascular accident), S/P TAVR (transcatheter aortic valve replacement), History of MI (myocardial infarction), and Class 2 severe obesity due to excess calories with serious comorbidity and body mass index (BMI) of 36.0 to 36.9 in adult were also pertinent to this visit.      Plan:   1. Type 2 diabetes mellitus with stage 3a chronic kidney disease, without long-term current use of insulin  Overview:  5/20/22 HgbA1c 7.1%     Assessment & Plan:  Has maintained A1c in "prediabetes" range over past 2 years w weight loss and dietary adjustments - encourage stay mobile and active to extent able - encourage cont healthier food and beverage choices     Orders:  -     Hemoglobin A1C; Future; Expected date: 07/30/2024  -     Microalbumin/creatinine urine ratio; Future; Expected date: 07/30/2024    2. History of anemia due to vitamin B12 deficiency  -     Methylmalonic Acid, Serum; Future; Expected date: 07/30/2024    3. B12 deficiency  -     Vitamin B12; Future; Expected date: 07/30/2024    4. Bilateral hearing loss, unspecified hearing loss type  Assessment & Plan:  Very [place] - says can't wear hearing aids due to psoriasis in ear canals       5. " Psoriasis  Assessment & Plan:  Cont f/u w Rheum for secukinumab (COSENTYX)       6. History of CVA (cerebrovascular accident)  Assessment & Plan:  Cont secondary preventative measures - lipid levels good w Repatha -  cont current Rx and monitor BP HR at home       7. S/P TAVR (transcatheter aortic valve replacement)  Assessment & Plan:  Advised ok to take Plavix until get new refill of Brilinta - followed by Cardiology      8. History of MI (myocardial infarction)  Overview:  6/25-6/28/24 Hosp NSTEMI s/p [facility] stent x 2    Assessment & Plan:  Cont current Rx and close f/u w cardiology       9. Class 2 severe obesity due to excess calories with serious comorbidity and body mass index (BMI) of 36.0 to 36.9 in adult  Assessment & Plan:  Cont encourage stay mobile and active to extent able - encourage cont healthier food and beverage choices            Health Maintenance         Date Due Completion Date    COVID-19 Vaccine (1) Never done ---    Pneumococcal Vaccines (Age 65+) (1 of 2 - PCV) Never done ---    RSV Vaccine (Age 60+ and Pregnant patients) (1 - 1-dose 60+ series) Never done ---    Colorectal Cancer Screening 04/18/2016 4/18/2011    Mammogram 10/23/2016 10/23/2015    Shingles Vaccine (2 of 2) 11/28/2022 10/3/2022    Eye Exam 03/06/2024 3/6/2023    Diabetes Urine Screening 09/29/2024 9/29/2023    Foot Exam 09/29/2024 9/29/2023 (Done)    Override on 9/29/2023: Done (pt w idiopathic peripheral neuropathy - not diabetic - prediabetic)    Hemoglobin A1c 10/05/2024 4/5/2024    Lipid Panel 04/05/2025 4/5/2024    DEXA Scan 07/25/2025 7/25/2022    TETANUS VACCINE 10/02/2033 10/2/2023          -Patient's lab results were reviewed and discussed with patient  -Treatment options and alternatives were discussed with the patient. Patient expressed understanding. Patient was given the opportunity to ask questions and be an active participant in their medical care. Patient had no further questions or concerns at this time.    -Patient is an overall moderate to HIGH risk for health complications from their medical conditions.     Follow up: Follow up for Follow Up 1 and 2 and 3 mos f/u (w me if avail) .    After visit summary printed and given to patient upon discharge.  Patient care plan included in After visit summary.    TOTAL TIME evaluating and managing this patient for this encounter was 85 minutes. This time was spent personally by me on some of the following activities: review of patient's past medical history, assessing age-appropriate health maintenance needs, review of any interval history, review and interpretation of lab results, review and interpretation of imaging test results, review and interpretation of cardiology test results, reviewing consulting specialist notes, obtaining history from the patient and family, examination of the patient, medication reconciliation, managing and/or ordering prescription medications, ordering imaging tests, ordering referral to subspecialty provider(s), educating patient and answering their questions about diagnosis, treatment plan, and goals of treatment, discussing planned follow-up and final documentation of the visit. This time was exclusive of any separately billable procedures for this patient and exclusive of time spent treating any other patients.

## 2024-07-26 NOTE — PATIENT INSTRUCTIONS
If you are feeling unwell, we'd like to be the first ones to know here at [facility]! Please give us a call. Same day appointments are our top priority to keep you well and out of the emergency rooms and hospitals. Call [phone] for our direct line. After hours advice is always available. Please call [phone] after hours to speak to the on-call team.      Ok to take Plavix until get new refill of Brilinta     Recommend resume weekly ear wash to help prevent wax build-up

## 2024-07-30 ENCOUNTER — LAB VISIT (OUTPATIENT)
Dept: LAB | Facility: HOSPITAL | Age: 74
End: 2024-07-30
Attending: NURSE PRACTITIONER
Payer: MEDICARE

## 2024-07-30 DIAGNOSIS — E53.8 B12 DEFICIENCY: ICD-10-CM

## 2024-07-30 DIAGNOSIS — E11.22 TYPE 2 DIABETES MELLITUS WITH STAGE 3A CHRONIC KIDNEY DISEASE, WITHOUT LONG-TERM CURRENT USE OF INSULIN: ICD-10-CM

## 2024-07-30 DIAGNOSIS — E61.1 IRON DEFICIENCY: ICD-10-CM

## 2024-07-30 DIAGNOSIS — Z86.2 HISTORY OF ANEMIA DUE TO VITAMIN B12 DEFICIENCY: ICD-10-CM

## 2024-07-30 DIAGNOSIS — Z86.2 HISTORY OF IRON DEFICIENCY ANEMIA: ICD-10-CM

## 2024-07-30 DIAGNOSIS — N18.31 TYPE 2 DIABETES MELLITUS WITH STAGE 3A CHRONIC KIDNEY DISEASE, WITHOUT LONG-TERM CURRENT USE OF INSULIN: ICD-10-CM

## 2024-07-30 LAB
ALBUMIN SERPL BCP-MCNC: 3.4 G/DL (ref 3.5–5.2)
ALP SERPL-CCNC: 62 U/L (ref 55–135)
ALT SERPL W/O P-5'-P-CCNC: 12 U/L (ref 10–44)
ANION GAP SERPL CALC-SCNC: 14 MMOL/L (ref 8–16)
AST SERPL-CCNC: 18 U/L (ref 10–40)
BASOPHILS # BLD AUTO: 0.05 K/UL (ref 0–0.2)
BASOPHILS NFR BLD: 0.7 % (ref 0–1.9)
BILIRUB SERPL-MCNC: 0.5 MG/DL (ref 0.1–1)
BUN SERPL-MCNC: 13 MG/DL (ref 8–23)
CALCIUM SERPL-MCNC: 9.8 MG/DL (ref 8.7–10.5)
CHLORIDE SERPL-SCNC: 104 MMOL/L (ref 95–110)
CO2 SERPL-SCNC: 26 MMOL/L (ref 23–29)
CREAT SERPL-MCNC: 1.1 MG/DL (ref 0.5–1.4)
DIFFERENTIAL METHOD BLD: NORMAL
EOSINOPHIL # BLD AUTO: 0.2 K/UL (ref 0–0.5)
EOSINOPHIL NFR BLD: 2.4 % (ref 0–8)
ERYTHROCYTE [DISTWIDTH] IN BLOOD BY AUTOMATED COUNT: 13.9 % (ref 11.5–14.5)
EST. GFR  (NO RACE VARIABLE): 53 ML/MIN/1.73 M^2
ESTIMATED AVG GLUCOSE: 117 MG/DL (ref 68–131)
FERRITIN SERPL-MCNC: 53 NG/ML (ref 20–300)
GLUCOSE SERPL-MCNC: 116 MG/DL (ref 70–110)
HBA1C MFR BLD: 5.7 % (ref 4–5.6)
HCT VFR BLD AUTO: 37 % (ref 37–48.5)
HGB BLD-MCNC: 12.2 G/DL (ref 12–16)
IMM GRANULOCYTES # BLD AUTO: 0.03 K/UL (ref 0–0.04)
IMM GRANULOCYTES NFR BLD AUTO: 0.4 % (ref 0–0.5)
IRON SERPL-MCNC: 57 UG/DL (ref 30–160)
LYMPHOCYTES # BLD AUTO: 1.6 K/UL (ref 1–4.8)
LYMPHOCYTES NFR BLD: 21 % (ref 18–48)
MCH RBC QN AUTO: 29.9 PG (ref 27–31)
MCHC RBC AUTO-ENTMCNC: 33 G/DL (ref 32–36)
MCV RBC AUTO: 91 FL (ref 82–98)
MONOCYTES # BLD AUTO: 0.6 K/UL (ref 0.3–1)
MONOCYTES NFR BLD: 8.5 % (ref 4–15)
NEUTROPHILS # BLD AUTO: 5 K/UL (ref 1.8–7.7)
NEUTROPHILS NFR BLD: 67 % (ref 38–73)
NRBC BLD-RTO: 0 /100 WBC
PLATELET # BLD AUTO: 271 K/UL (ref 150–450)
PMV BLD AUTO: 10.3 FL (ref 9.2–12.9)
POTASSIUM SERPL-SCNC: 3.3 MMOL/L (ref 3.5–5.1)
PROT SERPL-MCNC: 6.7 G/DL (ref 6–8.4)
RBC # BLD AUTO: 4.08 M/UL (ref 4–5.4)
SATURATED IRON: 16 % (ref 20–50)
SODIUM SERPL-SCNC: 144 MMOL/L (ref 136–145)
TOTAL IRON BINDING CAPACITY: 360 UG/DL (ref 250–450)
TRANSFERRIN SERPL-MCNC: 243 MG/DL (ref 200–375)
VIT B12 SERPL-MCNC: >2000 PG/ML (ref 210–950)
WBC # BLD AUTO: 7.42 K/UL (ref 3.9–12.7)

## 2024-07-30 PROCEDURE — 36415 COLL VENOUS BLD VENIPUNCTURE: CPT | Mod: PO | Performed by: INTERNAL MEDICINE

## 2024-07-30 PROCEDURE — 83540 ASSAY OF IRON: CPT

## 2024-07-30 PROCEDURE — 83036 HEMOGLOBIN GLYCOSYLATED A1C: CPT | Performed by: INTERNAL MEDICINE

## 2024-07-30 PROCEDURE — 83921 ORGANIC ACID SINGLE QUANT: CPT | Performed by: INTERNAL MEDICINE

## 2024-07-30 PROCEDURE — 85025 COMPLETE CBC W/AUTO DIFF WBC: CPT

## 2024-07-30 PROCEDURE — 82607 VITAMIN B-12: CPT | Performed by: INTERNAL MEDICINE

## 2024-07-30 PROCEDURE — 82728 ASSAY OF FERRITIN: CPT

## 2024-07-30 PROCEDURE — 80053 COMPREHEN METABOLIC PANEL: CPT | Performed by: NURSE PRACTITIONER

## 2024-07-31 DIAGNOSIS — I25.118 CORONARY ARTERY DISEASE OF NATIVE ARTERY OF NATIVE HEART WITH STABLE ANGINA PECTORIS: ICD-10-CM

## 2024-07-31 NOTE — TELEPHONE ENCOUNTER
Please see the attached refill request.    07.30.2024 Potassium Level is 3.3; call to pt, states she was told to stop taking her potassium because there's potassium in her BP pill

## 2024-08-01 ENCOUNTER — TELEPHONE (OUTPATIENT)
Dept: PRIMARY CARE CLINIC | Facility: CLINIC | Age: 74
End: 2024-08-01
Payer: MEDICARE

## 2024-08-01 RX ORDER — FUROSEMIDE 20 MG/1
TABLET ORAL
Qty: 60 TABLET | Refills: 11 | Status: SHIPPED | OUTPATIENT
Start: 2024-08-01

## 2024-08-01 NOTE — PROGRESS NOTES
"Pt does not use [facility] pt portal - please call w message below:    Please let Ms. Ortiz know B12 level is high but waiting on another lab to confirm this.  A1c is still in "pre-diabetic" range but improving - cont movement, healthy food and beverage choices.  Other labs were ordered by other providers so she should hear from them on those but we'll follow also.    "

## 2024-08-01 NOTE — TELEPHONE ENCOUNTER
"Spoke with pt and gave lab results.    SJ        ----- Message from Lisa Porter MD sent at 8/1/2024 12:45 PM CDT -----  Pt does not use [facility] pt portal - please call w message below:    Please let Ms. Ortiz know B12 level is high but waiting on another lab to confirm this.  A1c is still in "pre-diabetic" range but improving - cont movement, healthy food and beverage choices.  Other labs were ordered by other providers so she should hear from them on those but we'll follow also.      "

## 2024-08-02 ENCOUNTER — OFFICE VISIT (OUTPATIENT)
Dept: HEMATOLOGY/ONCOLOGY | Facility: CLINIC | Age: 74
End: 2024-08-02
Payer: MEDICARE

## 2024-08-02 VITALS
HEART RATE: 65 BPM | HEIGHT: 63 IN | SYSTOLIC BLOOD PRESSURE: 149 MMHG | WEIGHT: 205.69 LBS | DIASTOLIC BLOOD PRESSURE: 71 MMHG | TEMPERATURE: 98 F | OXYGEN SATURATION: 98 % | BODY MASS INDEX: 36.45 KG/M2

## 2024-08-02 DIAGNOSIS — E87.6 HYPOKALEMIA: ICD-10-CM

## 2024-08-02 DIAGNOSIS — D53.9 NUTRITIONAL ANEMIA, UNSPECIFIED: ICD-10-CM

## 2024-08-02 DIAGNOSIS — E53.8 FOLIC ACID DEFICIENCY: ICD-10-CM

## 2024-08-02 DIAGNOSIS — D50.0 IRON DEFICIENCY ANEMIA DUE TO CHRONIC BLOOD LOSS: ICD-10-CM

## 2024-08-02 DIAGNOSIS — K62.5 BRBPR (BRIGHT RED BLOOD PER RECTUM): ICD-10-CM

## 2024-08-02 DIAGNOSIS — E61.1 IRON DEFICIENCY: Primary | ICD-10-CM

## 2024-08-02 DIAGNOSIS — Z86.2 HISTORY OF ANEMIA DUE TO VITAMIN B12 DEFICIENCY: ICD-10-CM

## 2024-08-02 PROCEDURE — 99999 PR PBB SHADOW E&M-EST. PATIENT-LVL IV: CPT | Mod: PBBFAC,,, | Performed by: NURSE PRACTITIONER

## 2024-08-02 PROCEDURE — 99214 OFFICE O/P EST MOD 30 MIN: CPT | Mod: PBBFAC | Performed by: NURSE PRACTITIONER

## 2024-08-02 NOTE — PROGRESS NOTES
Subjective:      Patient ID: Qi Ortiz is a 74 y.o. female.    Chief Complaint: fatigue    HPI: 74 year old female presents today to evaluate labs.  Has been found in the past to have folic acid deficiency, recurrent CAREN requiring IV iron infusions and also anemia d/t CKD.        medical history significant for HTN, carotid stenosis, hypothyroidism, COPD, psoriatic arthritis, hyperlipidemia, CVA with right hemiplegia, depression, CAD, osteoporosis, IRIS, and double ectopic ureters.     Has been followed in the clinic by Dr. Mckinney, Elida Alanis NP, Jade Delgado NP, and Denis Caban NP, Today is the first time I am evaluating/assessing the patient.      Currently with normocytic anemia - hgb 11.6, mcv 83, ferritin 17.  Recently found with B12 deficiency and prescribed B12 1000 mcg SL daily - states that she hasn't started as of yet. States used to take B12.     States that she does have some hemorrhoidal bleeding but does not get in the toilet or stool.  Only seen when she wipes and not all the time.  Has not been able to tolerate GI prep for colonoscopy and has not had transportation to have EGD/colonoscopy done     Interval History:  12/15/2023  Received venofer 200 mg IV x 4 doses and presents today to evaluate response.  States that she tolerated the once weekly iron infusions.  States that she felt sick to her stomach with abdominal cramping.  States now her energy level is back up.  Has one small hemorrhoids that bleeds at times.      Interval History:  8/2/2024 with b12 and folate deficiency and recurrent iron deficiency.  Recently had 2 stents placed in LAD on 6/27/2024 - currently on Brillinta 90 mg by mouth twice daily. States that she has stopped the b12 supplement because her levels were elevated.  Currently not anemic.  Saturated iron slight low at 16%.     I have reviewed all of the patient's relevant lab work available in the medical record and have utilized this in my evaluation and  management recommendations today.      Social History     Socioeconomic History    Marital status: Single    Number of children: 3   Occupational History    Occupation: Not working   Tobacco Use    Smoking status: Never    Smokeless tobacco: Never   Substance and Sexual Activity    Alcohol use: No    Drug use: No    Sexual activity: Not Currently     Partners: Male     Social Determinants of Health     Financial Resource Strain: Low Risk  (2024)    Overall Financial Resource Strain (CARDIA)     Difficulty of Paying Living Expenses: Not hard at all   Food Insecurity: No Food Insecurity (2024)    Hunger Vital Sign     Worried About Running Out of Food in the Last Year: Never true     Ran Out of Food in the Last Year: Never true   Transportation Needs: No Transportation Needs (2024)    PRAPARE - Transportation     Lack of Transportation (Medical): No     Lack of Transportation (Non-Medical): No   Physical Activity: Inactive (2024)    Exercise Vital Sign     Days of Exercise per Week: 0 days     Minutes of Exercise per Session: 0 min   Stress: No Stress Concern Present (2024)    Citizen of [place] [employer] - Occupational Stress Questionnaire     Feeling of Stress : Not at all   Housing Stability: Low Risk  (2024)    Housing Stability Vital Sign     Unable to Pay for Housing in the Last Year: No     Homeless in the Last Year: No       Family History   Problem Relation Name Age of Onset    Breast cancer Maternal Grandfather      Breast cancer Paternal Aunt      Stroke Unknown      Breast cancer Sister  60    Leukemia Sister  8         as child    Lung cancer Paternal Grandfather      Heart disease Unknown      Diabetes Daughter a        Past Surgical History:   Procedure Laterality Date    BREAST BIOPSY      R sided/benign    CARDIAC SURGERY      2016    CERVICAL FUSION      CHOLECYSTECTOMY      CORONARY ANGIOPLASTY      CORONARY ARTERY BYPASS GRAFT      triple bypass     CORONARY BYPASS GRAFT ANGIOGRAPHY  6/27/2024    Procedure: Bypass graft study;  Surgeon: Veronica Ibarra MD;  Location: [facility] CATH LAB;  Service: Cardiology;;    CORONARY STENT PLACEMENT      EYE SURGERY      INTRAUTERINE DEVICE INSERTION      LEFT HEART CATHETERIZATION Left 9/8/2020    Procedure: CATHETERIZATION, HEART, LEFT;  Surgeon: Veronica Ibarra MD;  Location: [facility] CATH LAB;  Service: Cardiology;  Laterality: Left;  7am start time    LEFT HEART CATHETERIZATION Left 6/27/2024    Procedure: Left heart cath;  Surgeon: Veronica Ibarra MD;  Location: [facility] CATH LAB;  Service: Cardiology;  Laterality: Left;    mass removed from R groin      PERCUTANEOUS CORONARY INTERVENTION, ARTERY N/A 6/27/2024    Procedure: Percutaneous coronary intervention;  Surgeon: Veronica Ibarra MD;  Location: [facility] CATH LAB;  Service: Cardiology;  Laterality: N/A;    STENT, DRUG ELUTING, SINGLE VESSEL, CORONARY  6/27/2024    Procedure: Stent, Drug Eluting, Single Vessel, Coronary;  Surgeon: Veronica Ibarra MD;  Location: [facility] CATH LAB;  Service: Cardiology;;    TOTAL ABDOMINAL HYSTERECTOMY W/ BILATERAL SALPINGOOPHORECTOMY      due to benign mass, adhesions    TUBAL LIGATION         Past Medical History:   Diagnosis Date    Carotid stenosis     19%    Cellulitis and abscess of foot, except toes 06/22/2022    Cerumen debris on tympanic membrane of right ear 11/30/2022    Class 2 severe obesity due to excess calories with serious comorbidity and body mass index (BMI) of 38.0 to 38.9 in adult 07/12/2023    Coronary artery disease     CVA (cerebral vascular accident)     Dr. Hoffman    Depression     Dog bite 10/03/2023    Double ectopic ureters     Dr. Porras    Encounter for preventive health examination 05/21/2024    Fall 05/09/2024    Hemiplegia affecting right dominant side 11/09/2021    Hyperlipidemia     Hypertension     Hypothyroid     Left foot infection 07/08/2022    Mild asthma with exacerbation 12/04/2022    Near syncope 01/02/2019     NSTEMI (non-ST elevated myocardial infarction) 06/26/2024    OP (osteoporosis)     IRIS (obstructive sleep apnea)     Dr. Hope    Palpitations 01/31/2019    Psoriatic arthritis     Rheumatology    Transient ischemic attack (TIA) 01/02/2019       Review of Systems   Constitutional:  Positive for fatigue.   HENT:  Positive for hearing loss. Negative for nosebleeds.    Eyes: Negative.    Respiratory:  Positive for shortness of breath.    Cardiovascular: Negative.    Gastrointestinal:  Positive for constipation. Negative for anal bleeding and blood in stool.        States that she has had some hemorrhoidal bleeding since starting the Brillinta over the weekend, but by Monday the bleeding has stopped.     Endocrine: Negative.    Genitourinary: Negative.    Musculoskeletal: Negative.    Skin: Negative.    Allergic/Immunologic: Negative.    Neurological: Negative.    Hematological:  Bruises/bleeds easily.   Psychiatric/Behavioral:  The patient is nervous/anxious.           Medication List with Changes/Refills   Current Medications    ACETAMINOPHEN (TYLENOL) 650 MG TBSR    as directed    ALBUTEROL (PROVENTIL) 2.5 MG /3 ML (0.083 %) NEBULIZER SOLUTION    Take 3 mLs (2.5 mg total) by nebulization every 6 (six) hours as needed for Wheezing. Rescue    ALLOPURINOL (ZYLOPRIM) 100 MG TABLET    TAKE 2 TABLETS ONCE DAILY    ASPIRIN 81 MG CHEW    Take 1 tablet (81 mg total) by mouth once daily.    CALCIPOTRIENE (DOVONOX) 0.005 % CREAM    Apply topically 2 (two) times daily.    CALCIUM CARBONATE 650 MG CALCIUM (1,625 MG) TABLET    Take 1 tablet by mouth once daily.    CYANOCOBALAMIN (VITAMIN B-12) 1000 MCG TABLET    Take 100 mcg by mouth once daily.    DOCUSATE SODIUM (COLACE) 100 MG CAPSULE    Take 1 capsule (100 mg total) by mouth 2 (two) times daily.    FOLIC ACID (FOLVITE) 1 MG TABLET    TAKE 1 TABLET DAILY    FUROSEMIDE (LASIX) 20 MG TABLET    TAKE 1 TABLET TWICE A DAY (DOSE INCREASED BY CARDIOLOGIST DR. MASON)    GARLIC  2,000 MG CAP    Take 3,000 mg by mouth once daily.     LEVOTHYROXINE (SYNTHROID) 100 MCG TABLET    TAKE 1 TABLET BEFORE BREAKFAST    METOPROLOL SUCCINATE (TOPROL-XL) 100 MG 24 HR TABLET    TAKE 1 TABLET TWICE A DAY    NITROGLYCERIN (NITROSTAT) 0.4 MG SL TABLET    DISSOLVE 1 TABLET UNDER THE TONGUE EVERY 5 MINUTES AS NEEDED FOR CHEST PAIN    POTASSIUM CHLORIDE SA (K-DUR,KLOR-CON M) 10 MEQ TABLET    Take 1 tablet (10 mEq total) by mouth once daily.    PYRIDOXINE, VITAMIN B6, (B-6) 100 MG TAB    Take 200 mg by mouth once daily.     REPATHA SURECLICK 140 MG/ML PNIJ    INJECT 1 ML (140 MG) UNDER THE SKIN EVERY 14 DAYS    SECUKINUMAB (COSENTYX PEN, 2 PENS,) 150 MG/ML PNIJ    INJECT 300 MG (2 PENS) UNDER THE SKIN EVERY 28 DAYS    SPIRONOLACTONE (ALDACTONE) 50 MG TABLET    Take 1 tablet (50 mg total) by mouth once daily.    TICAGRELOR (BRILINTA) 90 MG TABLET    Take 1 tablet (90 mg total) by mouth 2 (two) times daily.    VITAMIN D 1000 UNITS TAB    Take 185 mg by mouth once daily.        Objective:     Vitals:    08/02/24 1444   BP: (!) 149/71   Pulse: 65   Temp: 97.5 °F (36.4 °C)       Physical Exam  Constitutional:       Appearance: Normal appearance. She is obese.   HENT:      Head: Normocephalic and atraumatic.      Nose: Nose normal.   Eyes:      Extraocular Movements: Extraocular movements intact.      Pupils: Pupils are equal, round, and reactive to light.   Cardiovascular:      Rate and Rhythm: Normal rate.   Pulmonary:      Effort: Pulmonary effort is normal.   Musculoskeletal:         General: Normal range of motion.      Cervical back: Normal range of motion.   Skin:     Coloration: Skin is pale.      Findings: Bruising present.   Neurological:      General: No focal deficit present.      Mental Status: She is alert and oriented to person, place, and time.      Gait: Gait abnormal.   Psychiatric:         Attention and Perception: Attention normal.         Mood and Affect: Mood is anxious.         Behavior:  Behavior normal.         Thought Content: Thought content normal.         Cognition and Memory: Cognition and memory normal.         Judgment: Judgment normal.         Assessment:     Problem List Items Addressed This Visit          Oncology    Iron deficiency anemia due to chronic blood loss (Chronic)    Relevant Orders    CBC Auto Differential    Comprehensive Metabolic Panel    Ferritin    Iron and TIBC    Iron deficiency - Primary    Relevant Orders    Ambulatory referral/consult to Endo Procedure     CBC Auto Differential    Ferritin    Iron and TIBC       Endocrine    Folic acid deficiency    Relevant Orders    CBC Auto Differential    Folate     Other Visit Diagnoses       BRBPR (bright red blood per rectum)        Relevant Orders    Ambulatory referral/consult to Endo Procedure     CBC Auto Differential    Comprehensive Metabolic Panel    Ferritin    Iron and TIBC    Hypokalemia        Relevant Orders    CBC Auto Differential    Comprehensive Metabolic Panel    History of anemia due to vitamin B12 deficiency        Relevant Orders    CBC Auto Differential    Comprehensive Metabolic Panel    Vitamin B12    Nutritional anemia, unspecified        Relevant Orders    Vitamin B12            Lab Results   Component Value Date    WBC 7.42 07/30/2024    RBC 4.08 07/30/2024    HGB 12.2 07/30/2024    HCT 37.0 07/30/2024    MCV 91 07/30/2024    MCH 29.9 07/30/2024    MCHC 33.0 07/30/2024    RDW 13.9 07/30/2024     07/30/2024    MPV 10.3 07/30/2024    GRAN 5.0 07/30/2024    GRAN 67.0 07/30/2024    LYMPH 1.6 07/30/2024    LYMPH 21.0 07/30/2024    MONO 0.6 07/30/2024    MONO 8.5 07/30/2024    EOS 0.2 07/30/2024    BASO 0.05 07/30/2024    EOSINOPHIL 2.4 07/30/2024    BASOPHIL 0.7 07/30/2024      Lab Results   Component Value Date     07/30/2024    K 3.3 (L) 07/30/2024     07/30/2024    CO2 26 07/30/2024    BUN 13 07/30/2024    CREATININE 1.1 07/30/2024    CALCIUM 9.8 07/30/2024     ANIONGAP 14 07/30/2024    ESTGFRAFRICA 48 (A) 07/18/2022    EGFRNONAA 41 (A) 07/18/2022     Lab Results   Component Value Date    ALT 12 07/30/2024    AST 18 07/30/2024    ALKPHOS 62 07/30/2024    BILITOT 0.5 07/30/2024     Lab Results   Component Value Date    IRON 57 07/30/2024    TRANSFERRIN 243 07/30/2024    TIBC 360 07/30/2024    FESATURATED 16 (L) 07/30/2024    FERRITIN 53 07/30/2024     Lab Results   Component Value Date    FOLATE 18.7 05/20/2022     Lab Results   Component Value Date    TSH 1.770 04/19/2024     Lab Results   Component Value Date    QBSGBPUD68 >2000 (H) 07/30/2024         Plan:   Iron deficiency  -     Ambulatory referral/consult to Endo Procedure ; Future; Expected date: 08/03/2024  -     CBC Auto Differential; Future; Expected date: 08/02/2024  -     Ferritin; Future; Expected date: 08/02/2024  -     Iron and TIBC; Future; Expected date: 08/02/2024    Folic acid deficiency  -     CBC Auto Differential; Future; Expected date: 08/02/2024  -     Folate; Future; Expected date: 08/02/2024    Iron deficiency anemia due to chronic blood loss  -     CBC Auto Differential; Future; Expected date: 08/02/2024  -     Comprehensive Metabolic Panel; Future; Expected date: 08/02/2024  -     Ferritin; Future; Expected date: 08/02/2024  -     Iron and TIBC; Future; Expected date: 08/02/2024    BRBPR (bright red blood per rectum)  -     Ambulatory referral/consult to Endo Procedure ; Future; Expected date: 08/03/2024  -     CBC Auto Differential; Future; Expected date: 08/02/2024  -     Comprehensive Metabolic Panel; Future; Expected date: 08/02/2024  -     Ferritin; Future; Expected date: 08/02/2024  -     Iron and TIBC; Future; Expected date: 08/02/2024    Hypokalemia  -     CBC Auto Differential; Future; Expected date: 08/02/2024  -     Comprehensive Metabolic Panel; Future; Expected date: 08/02/2024    History of anemia due to vitamin B12 deficiency  -     CBC Auto Differential; Future;  Expected date: 08/02/2024  -     Comprehensive Metabolic Panel; Future; Expected date: 08/02/2024  -     Vitamin B12; Future; Expected date: 08/02/2024    Nutritional anemia, unspecified  -     Vitamin B12; Future; Expected date: 08/02/2024    Start potassium 10 meq (take 2 capsule = 20 meq) by mouth daily for 3 days.  No extra iron needed currently.        Med Onc Chart Routing      Follow up with physician    Follow up with [facility] 3 months. with labs prior - in person visit at    Infusion scheduling note   n/a   Injection scheduling note n/a   Labs   Scheduling:  Preferred lab: [facility]  Lab interval:  cbc, cmp, b12, folate, iron studies   Imaging   N/a   Pharmacy appointment No pharmacy appointment needed      Other referrals       No additional referrals needed  n/a         Recommend eval with EGD/colonoscopy - recommend to be done at [facility] end[facility] on 5th floor.      Total time spent on encounter: 45 minutes    Collaborating Provider:  Dr. Royal Mckinney    Thank You,  KJ SunshineP-C  Benign Hematology

## 2024-08-06 LAB — METHYLMALONATE SERPL-SCNC: 0.37 UMOL/L

## 2024-08-07 NOTE — PROGRESS NOTES
Pt does not use [facility] pt portal - please call w message below:    Please let Ms. Ortiz know that her mma level is good - recommend to continue taking oral B12 supplement as she has been taking.

## 2024-08-08 ENCOUNTER — TELEPHONE (OUTPATIENT)
Dept: PRIMARY CARE CLINIC | Facility: CLINIC | Age: 74
End: 2024-08-08
Payer: MEDICARE

## 2024-08-15 ENCOUNTER — OFFICE VISIT (OUTPATIENT)
Dept: RHEUMATOLOGY | Facility: CLINIC | Age: 74
End: 2024-08-15
Payer: MEDICARE

## 2024-08-15 VITALS
WEIGHT: 200.81 LBS | HEART RATE: 77 BPM | SYSTOLIC BLOOD PRESSURE: 136 MMHG | DIASTOLIC BLOOD PRESSURE: 79 MMHG | BODY MASS INDEX: 35.58 KG/M2 | HEIGHT: 63 IN

## 2024-08-15 DIAGNOSIS — L40.50 PSORIATIC ARTHRITIS: Primary | ICD-10-CM

## 2024-08-15 DIAGNOSIS — R73.03 PREDIABETES: Chronic | ICD-10-CM

## 2024-08-15 DIAGNOSIS — H91.93 BILATERAL HEARING LOSS, UNSPECIFIED HEARING LOSS TYPE: Chronic | ICD-10-CM

## 2024-08-15 DIAGNOSIS — N18.31 CKD STAGE 3A, GFR 45-59 ML/MIN: ICD-10-CM

## 2024-08-15 DIAGNOSIS — I25.810 CORONARY ARTERY DISEASE INVOLVING CORONARY BYPASS GRAFT OF NATIVE HEART, UNSPECIFIED WHETHER ANGINA PRESENT: Chronic | ICD-10-CM

## 2024-08-15 DIAGNOSIS — Z51.81 MEDICATION MONITORING ENCOUNTER: ICD-10-CM

## 2024-08-15 DIAGNOSIS — L40.9 PSORIASIS: ICD-10-CM

## 2024-08-15 DIAGNOSIS — D84.9 IMMUNOCOMPROMISED: ICD-10-CM

## 2024-08-15 PROCEDURE — 99999 PR PBB SHADOW E&M-EST. PATIENT-LVL IV: CPT | Mod: PBBFAC,,, | Performed by: INTERNAL MEDICINE

## 2024-08-15 PROCEDURE — 99214 OFFICE O/P EST MOD 30 MIN: CPT | Mod: PBBFAC | Performed by: INTERNAL MEDICINE

## 2024-08-15 NOTE — PROGRESS NOTES
RHEUMATOLOGY OUTPATIENT CLINIC NOTE    8/15/2024    Attending Rheumatologist: Jacky Ackerman  Primary Care Provider/Physician Requesting Consultation: Lisa Porter MD   Chief Complaint/Reason For Consultation:  Psoriatic Arthritis, Osteoarthritis, and Gout      Subjective:     Qi Ortiz is a 74 y.o. White female with PsA    Due for Cosentyx for 2 weeks.  Mild hand inflammatory pain since missing biologic.  Denies PsA flares since last visit.    Review of Systems   Constitutional:  Negative for fever.   Eyes:  Negative for photophobia and pain.   Respiratory:  Negative for cough and shortness of breath (BROOKS).    Cardiovascular:  Negative for chest pain.   Gastrointestinal:  Negative for blood in stool and melena.   Genitourinary:  Negative for dysuria, hematuria and urgency.   Musculoskeletal:  Negative for joint pain.   Skin:  Negative for rash.   Neurological:  Negative for focal weakness.       Chronic comorbid conditions affecting medical decision making today:  Past Medical History:   Diagnosis Date    Carotid stenosis     19%    Cellulitis and abscess of foot, except toes 06/22/2022    Cerumen debris on tympanic membrane of right ear 11/30/2022    Class 2 severe obesity due to excess calories with serious comorbidity and body mass index (BMI) of 38.0 to 38.9 in adult 07/12/2023    Coronary artery disease     CVA (cerebral vascular accident)     Dr. Hoffman    Depression     Dog bite 10/03/2023    Double ectopic ureters     Dr. Porras    Encounter for preventive health examination 05/21/2024    Fall 05/09/2024    Hemiplegia affecting right dominant side 11/09/2021    Hyperlipidemia     Hypertension     Hypothyroid     Left foot infection 07/08/2022    Mild asthma with exacerbation 12/04/2022    Near syncope 01/02/2019    NSTEMI (non-ST elevated myocardial infarction) 06/26/2024    OP (osteoporosis)     IRIS (obstructive sleep apnea)     Dr. Hope    Palpitations 01/31/2019    Psoriatic arthritis      Rheumatology    Transient ischemic attack (TIA) 2019     Past Surgical History:   Procedure Laterality Date    BREAST BIOPSY      R sided/benign    CARDIAC SURGERY      2016    CERVICAL FUSION      CHOLECYSTECTOMY      CORONARY ANGIOPLASTY      CORONARY ARTERY BYPASS GRAFT      triple bypass    CORONARY BYPASS GRAFT ANGIOGRAPHY  2024    Procedure: Bypass graft study;  Surgeon: Veronica Ibarra MD;  Location: [facility] CATH LAB;  Service: Cardiology;;    CORONARY STENT PLACEMENT      EYE SURGERY      INTRAUTERINE DEVICE INSERTION      LEFT HEART CATHETERIZATION Left 2020    Procedure: CATHETERIZATION, HEART, LEFT;  Surgeon: Veronica Ibarra MD;  Location: [facility] CATH LAB;  Service: Cardiology;  Laterality: Left;  7am start time    LEFT HEART CATHETERIZATION Left 2024    Procedure: Left heart cath;  Surgeon: Veronica Ibarra MD;  Location: [facility] CATH LAB;  Service: Cardiology;  Laterality: Left;    mass removed from R groin      PERCUTANEOUS CORONARY INTERVENTION, ARTERY N/A 2024    Procedure: Percutaneous coronary intervention;  Surgeon: Veronica Ibarra MD;  Location: [facility] CATH LAB;  Service: Cardiology;  Laterality: N/A;    STENT, DRUG ELUTING, SINGLE VESSEL, CORONARY  2024    Procedure: Stent, Drug Eluting, Single Vessel, Coronary;  Surgeon: Veronica Ibarra MD;  Location: [facility] CATH LAB;  Service: Cardiology;;    TOTAL ABDOMINAL HYSTERECTOMY W/ BILATERAL SALPINGOOPHORECTOMY      due to benign mass, adhesions    TUBAL LIGATION       Family History   Problem Relation Name Age of Onset    Breast cancer Maternal Grandfather      Breast cancer Paternal Aunt      Stroke Unknown      Breast cancer Sister  60    Leukemia Sister  8         as child    Lung cancer Paternal Grandfather      Heart disease Unknown      Diabetes Daughter a      Social History     Tobacco Use   Smoking Status Never   Smokeless Tobacco Never       Current Outpatient Medications:     acetaminophen (TYLENOL) 650  MG TbSR, as directed, Disp: , Rfl:     allopurinoL (ZYLOPRIM) 100 MG tablet, TAKE 2 TABLETS ONCE DAILY, Disp: 180 tablet, Rfl: 3    aspirin 81 MG Chew, Take 1 tablet (81 mg total) by mouth once daily., Disp: 90 tablet, Rfl: 3    calcium carbonate 650 mg calcium (1,625 mg) tablet, Take 1 tablet by mouth once daily., Disp: , Rfl:     cyanocobalamin (VITAMIN B-12) 1000 MCG tablet, Take 100 mcg by mouth once daily., Disp: , Rfl:     docusate sodium (COLACE) 100 MG capsule, Take 1 capsule (100 mg total) by mouth 2 (two) times daily., Disp: 60 capsule, Rfl: 0    folic acid (FOLVITE) 1 MG tablet, TAKE 1 TABLET DAILY, Disp: 90 tablet, Rfl: 3    furosemide (LASIX) 20 MG tablet, TAKE 1 TABLET TWICE A DAY (DOSE INCREASED BY CARDIOLOGIST DR. MASON), Disp: 60 tablet, Rfl: 11    garlic 2,000 mg Cap, Take 3,000 mg by mouth once daily. , Disp: , Rfl:     levothyroxine (SYNTHROID) 100 MCG tablet, TAKE 1 TABLET BEFORE BREAKFAST, Disp: 90 tablet, Rfl: 3    metoprolol succinate (TOPROL-XL) 100 MG 24 hr tablet, TAKE 1 TABLET TWICE A DAY, Disp: 180 tablet, Rfl: 3    nitroGLYCERIN (NITROSTAT) 0.4 MG SL tablet, DISSOLVE 1 TABLET UNDER THE TONGUE EVERY 5 MINUTES AS NEEDED FOR CHEST PAIN, Disp: 75 tablet, Rfl: 3    potassium chloride SA (K-DUR,KLOR-CON M) 10 MEQ tablet, Take 1 tablet (10 mEq total) by mouth once daily., Disp: 30 tablet, Rfl: 5    pyridoxine, vitamin B6, (B-6) 100 MG Tab, Take 200 mg by mouth once daily. , Disp: , Rfl:     REPATHA SURECLICK 140 mg/mL PnIj, INJECT 1 ML (140 MG) UNDER THE SKIN EVERY 14 DAYS, Disp: 6 mL, Rfl: 3    secukinumab (COSENTYX PEN, 2 PENS,) 150 mg/mL PnIj, INJECT 300 MG (2 PENS) UNDER THE SKIN EVERY 28 DAYS, Disp: 4 mL, Rfl: 1    spironolactone (ALDACTONE) 50 MG tablet, Take 1 tablet (50 mg total) by mouth once daily., Disp: 90 tablet, Rfl: 2    ticagrelor (BRILINTA) 90 mg tablet, Take 1 tablet (90 mg total) by mouth 2 (two) times daily., Disp: 180 tablet, Rfl: 3    vitamin D 1000 units Tab, Take 185 mg  by mouth once daily., Disp: , Rfl:     albuterol (PROVENTIL) 2.5 mg /3 mL (0.083 %) nebulizer solution, Take 3 mLs (2.5 mg total) by nebulization every 6 (six) hours as needed for Wheezing. Rescue, Disp: 30 each, Rfl: 3    calcipotriene (DOVONOX) 0.005 % cream, Apply topically 2 (two) times daily., Disp: 120 g, Rfl: 0     Objective:     Vitals:    08/15/24 1354   BP: 136/79   Pulse: 77     Physical Exam   Pulmonary/Chest: Effort normal. No respiratory distress.   Musculoskeletal:         General: No swelling or tenderness. Normal range of motion.   Skin: No rash noted.       Reviewed available old and all outside pertinent medical records available.    All lab results personally reviewed and interpreted by me.       ASSESSMENT / PLAN     1. Psoriatic arthritis  DAPSA: low disease activity.  Great results w/ Cosentyx 300mg q/ monthly.  Refractory sx w/o biologic, plan to continue unchanged.                            2. Psoriasis  Topical Dovonex PRN or topical clobetasol <14d      3. Immunocompromised  Hold cosentyx in event of active infections or need for surgery  CBC Auto Differential      4. Medication monitoring encounter  Comprehensive Metabolic Panel  Quantiferon Gold TB  Hepatitis B Surface Antigen  Hepatitis B Core Antibody, Total      5. Bilateral hearing loss, unspecified hearing loss type  Patient can read lips and big font text very well      6. Coronary artery disease involving coronary bypass graft of native heart, unspecified whether angina present  Increased CV risk w/ Luther, not recommended      7. Prediabetes  Caution advised w/ systemic steroid use      8. CKD stage 3a, GFR 45-59 ml/min  Renally dose medications.                Jacky Ackerman M.D.

## 2024-08-26 ENCOUNTER — OFFICE VISIT (OUTPATIENT)
Dept: PRIMARY CARE CLINIC | Facility: CLINIC | Age: 74
End: 2024-08-26
Payer: MEDICARE

## 2024-08-26 ENCOUNTER — HOSPITAL ENCOUNTER (EMERGENCY)
Facility: HOSPITAL | Age: 74
Discharge: LEFT BEFORE DISCHARGE | End: 2024-08-26
Payer: MEDICARE

## 2024-08-26 VITALS
DIASTOLIC BLOOD PRESSURE: 99 MMHG | OXYGEN SATURATION: 99 % | TEMPERATURE: 99 F | HEART RATE: 66 BPM | RESPIRATION RATE: 20 BRPM | SYSTOLIC BLOOD PRESSURE: 154 MMHG

## 2024-08-26 VITALS
RESPIRATION RATE: 25 BRPM | DIASTOLIC BLOOD PRESSURE: 60 MMHG | SYSTOLIC BLOOD PRESSURE: 124 MMHG | OXYGEN SATURATION: 99 % | HEART RATE: 75 BPM

## 2024-08-26 DIAGNOSIS — K64.3 GRADE IV HEMORRHOIDS: ICD-10-CM

## 2024-08-26 DIAGNOSIS — R42 LIGHTHEADEDNESS: ICD-10-CM

## 2024-08-26 DIAGNOSIS — I25.2 HISTORY OF MI (MYOCARDIAL INFARCTION): Primary | Chronic | ICD-10-CM

## 2024-08-26 DIAGNOSIS — R06.02 SOB (SHORTNESS OF BREATH): ICD-10-CM

## 2024-08-26 DIAGNOSIS — R53.81 MALAISE: ICD-10-CM

## 2024-08-26 PROCEDURE — 99215 OFFICE O/P EST HI 40 MIN: CPT | Mod: S$PBB,,, | Performed by: INTERNAL MEDICINE

## 2024-08-26 PROCEDURE — 99213 OFFICE O/P EST LOW 20 MIN: CPT | Mod: PBBFAC,PN | Performed by: INTERNAL MEDICINE

## 2024-08-26 PROCEDURE — 99900041 HC LEFT WITHOUT BEING SEEN- EMERGENCY

## 2024-08-26 PROCEDURE — 99999 PR PBB SHADOW E&M-EST. PATIENT-LVL III: CPT | Mod: PBBFAC,,, | Performed by: INTERNAL MEDICINE

## 2024-08-26 PROCEDURE — 99283 EMERGENCY DEPT VISIT LOW MDM: CPT | Mod: 27

## 2024-08-26 NOTE — PROGRESS NOTES
Qi Ortiz  08/26/2024  [id number]    Lisa Porter MD  Patient Care Team:  Lisa Porter MD as PCP - General (Internal Medicine)    Ms. Qi Ortiz is a 74 year old female w multiple chronic medical issues here for scheduled f/u     Chronic medical issues include type 2 DM, h/o CVA, CAD s/p CABG x 2, AS s/p TAVR, s/p NSTEMI  6/25/24, HTN, HLD (intolerant of statin), CKD stage 3, hypothyroidism, psoriasis and psoriatic arthritis, CAREN, obesity, and IRIS (intolerant of CPAP). Ms. Ortiz has multiple drug and contact allergies and sensitivities. Food allergies include kiwi fruit and crab boil.    Visit Type: Follow-up    History of Present Illness    CHIEF COMPLAINT:  Ms. Ortiz presents today with shortness of breath and near-syncope.    HISTORY OF PRESENT ILLNESS:  She reports an acute onset of symptoms approximately 5 days ago, experiencing difficulty breathing with a feeling that she would stop breathing. Today, she had a near-syncope episode at the bank after leaving her car inspection appointment, almost passing out and having to hold onto the door for support. She denies chest pain, palpitations, or other associated symptoms, emphasizing that this is not a panic attack. Her symptoms have progressively worsened throughout the day. She denies fever. Earlier today, she had her car windshield replaced and rested at home before leaving at 1:00 PM for a car inspection, denying feeling unwell during these activities.    CARDIOVASCULAR:  She has a significant cardiac history including a heart attack resulting in the placement of two stents in June of last year. She also has a history of CABG surgery, AS s/p TAVR. She denies any current chest pain.    MEDICATIONS:  She reports not taking her fluid pill or stomach medication today but did take other medication as prescribed.    INJURY:  Five days ago, she hit her right lower leg on a metal flower pot while hurrying to the bathroom. An open sore is visible  "on the right lower leg at the site of impact. She initially experienced significant bruising, describing it as "black snf up my calf." She has been self-treating the wound by cleaning it, applying antibiotic ointment, and using bandages. She denies difficulty walking or pain while ambulating, noting that the injury site is only painful when touched.    DERMATOLOGIC:  She reports recent itching. She also mentions having fever blisters a week ago, which have started drying up with medication.    ALLERGIES:  She reports taking Benadryl for the itching, states it causes her to bleed. Since discontinuing Benadryl, the bleeding has slowed down.    ROS:  Constitutional: -fevers  Respiratory: +difficulty breathing, +shortness of breath  Cardiovascular: -chest pain, -palpitations  Integumentary: +itching + wound  Neurological: +near-syncope   GI: +blood in stool         PHQ-4 Score: 0     From LOV w me 7/26/24  Reports home BP's have been good since increase of the aldactone  Started taking extra B12 recently and has been feeling better   Reports dizziness resolved since stopped taking gabapentin - no recent falls     Denies chest pain, palpitations  Does get SOB w exertion but resolves after rests for a while     Pt was contacted by [facility] but declined assistance  Her trailer is old and "falling apart" but she has no plan to move  Her 2 daughters do live nearby     PHQ-4 Score: 0     Upcoming appointments:  Future Appointments       Date Provider Specialty Appt Notes    10/14/2024 Asif Paez MD Cardiology 3 month    10/25/2024 Lisa Porter MD Primary Care F/U 3 mo    11/5/2024  Lab [facility]    11/8/2024 Trinidad Javed NP Hematology and Oncology 3mthfu/alsbprior/kj    3/5/2025  Lab Dr. Ackerman    3/12/2025 Jacky Ackerman MD Rheumatology PSORIATIC --OA--GOUT           The following were reviewed: Active problem list, medication list, allergies, family history, social history, and Health Maintenance. " "    Medications have been reviewed and reconciled with patient at visit today.    Review of Systems   See HPI above    Exam: sat 98%  Vitals:    08/26/24 1541   BP: 124/60   Pulse: 75   Resp: (!) 25     BP Readings from Last 3 Encounters:   08/26/24 124/60   08/15/24 136/79   08/02/24 (!) 149/71      Wt Readings from Last 3 Encounters:   08/15/24 1354 91.1 kg (200 lb 13.4 oz)   08/02/24 1444 93.3 kg (205 lb 11 oz)   07/26/24 1452 94.3 kg (207 lb 12.5 oz)      Physical Exam  Vitals reviewed.   Constitutional:       General: She is not in acute distress.     Appearance: Normal appearance. She is ill-appearing.   HENT:      Head: Normocephalic and atraumatic.      Right Ear: External ear normal. There is impacted cerumen.      Left Ear: External ear normal.      Nose: Nose normal.      Mouth/Throat:      Pharynx: Oropharynx is clear.      Comments: Dry mouth  Eyes:      Comments: photophobia   Cardiovascular:      Rate and Rhythm: Normal rate and regular rhythm.      Heart sounds: No murmur heard.  Pulmonary:      Effort: Pulmonary effort is normal. No respiratory distress.      Breath sounds: No wheezing.      Comments: "Crackles" posterior R lower lobe on deep inhalation  Resp rate 25  Musculoskeletal:      Right lower leg: No edema.      Left lower leg: No edema.   Skin:     General: Skin is warm and dry.      Findings: Lesion present.      Comments: Open wound R anterior shin with localized erythema    Neurological:      Mental Status: She is alert. Mental status is at baseline.   Psychiatric:         Behavior: Behavior normal.      Comments: anxious           Laboratory Reviewed  Lab Results   Component Value Date    WBC 7.42 07/30/2024    HGB 12.2 07/30/2024    HCT 37.0 07/30/2024     07/30/2024    MCV 91 07/30/2024    CHOL 154 04/05/2024    TRIG 152 (H) 04/05/2024    HDL 35 (L) 04/05/2024    LDLCALC 88.6 04/05/2024    ALT 12 07/30/2024    AST 18 07/30/2024     07/30/2024    K 3.3 (L) 07/30/2024    "  07/30/2024    CREATININE 1.1 07/30/2024    BUN 13 07/30/2024    CO2 26 07/30/2024    MG 1.7 06/25/2024    TSH 1.770 04/19/2024    FREET4 1.01 04/19/2024    INR 1.0 06/25/2024    HGBA1C 5.7 (H) 07/30/2024    CRP 3.1 01/26/2024     Lab Results   Component Value Date    PTH 91.5 (H) 05/20/2022    CALCIUM 9.8 07/30/2024    PHOS 3.8 09/29/2023      Lab Results   Component Value Date    PHBBLBLG91 >2000 (H) 07/30/2024     Lab Results   Component Value Date    FOLATE 18.7 05/20/2022      Lab Results   Component Value Date    IRON 57 07/30/2024    TRANSFERRIN 243 07/30/2024    TIBC 360 07/30/2024    FESATURATED 16 (L) 07/30/2024      Lab Results   Component Value Date    EGFRNORACEVR 53 (A) 07/30/2024    ALBUMIN 3.4 (L) 07/30/2024     (H) 06/29/2024     Lab Results   Component Value Date    EJKHGJSI67BZ 45 06/13/2023          Assessment:   74 y.o. female with multiple co-morbid illnesses here for continued follow up of medical problems.      The primary encounter diagnosis was History of MI (myocardial infarction). Diagnoses of Malaise, SOB (shortness of breath), Grade IV hemorrhoids, and Lightheadedness were also pertinent to this visit.      Plan:   1. History of MI (myocardial infarction)  Overview:  6/25-6/28/24 Hosp NSTEMI s/p [facility] stent x 2      2. Malaise  Assessment & Plan:  Pt reports started feeling bad this afternoon around 2 pm with increased SOB and lightheadedness while waiting in lobby here       3. SOB (shortness of breath)  Assessment & Plan:  Mild tachypnea - Resp exam otherwise fairly unremarkable w O2 sat 98% - at high risk for recurrent MI - transport to [facility] ED      4. Grade IV hemorrhoids  Assessment & Plan:  Reports recent blood in stool (pt feels this was due to recent oral benadryl intake)      5. Lightheadedness  Assessment & Plan:  Starting this afternoon - VSS wnl but will send to ED given pt distress, unable to stand, ambulate - unsafe to drive home - recent NSTEMI w high risk  for recurrent MI            Health Maintenance         Date Due Completion Date    COVID-19 Vaccine (1) Never done ---    Pneumococcal Vaccines (Age 65+) (1 of 2 - PCV) Never done ---    RSV Vaccine (Age 60+ and Pregnant patients) (1 - 1-dose 60+ series) Never done ---    Colorectal Cancer Screening 04/18/2016 4/18/2011    Mammogram 10/23/2016 10/23/2015    Shingles Vaccine (2 of 2) 11/28/2022 10/3/2022    Eye Exam 03/06/2024 3/6/2023    Foot Exam 09/29/2024 9/29/2023 (Done)    Override on 9/29/2023: Done (pt w idiopathic peripheral neuropathy - not diabetic - prediabetic)    Diabetes Urine Screening 09/29/2024 9/29/2023    Hemoglobin A1c 01/30/2025 7/30/2024    Lipid Panel 04/05/2025 4/5/2024    DEXA Scan 07/25/2025 7/25/2022    TETANUS VACCINE 10/02/2033 10/2/2023            -Patient's lab results were reviewed and discussed with patient  -Treatment options and alternatives were discussed with the patient. Patient expressed understanding. Patient was given the opportunity to ask questions and be an active participant in their medical care. Patient had no further questions or concerns at this time.   -Patient is an overall HIGH risk for health complications from their medical conditions.     Follow up: Follow up for Follow Up 1 week (for ED or hosp f/u) .    Patient care plan are included in After visit summary.    TOTAL TIME evaluating and managing this patient for this encounter was 57 minutes. This time was spent personally by me on some of the following activities: review of patient's past medical history, assessing age-appropriate health maintenance needs, review of any interval history, review and interpretation of lab results, review and interpretation of imaging test results, review and interpretation of cardiology test results, reviewing consulting specialist notes, obtaining history from the patient and family, examination of the patient, medication reconciliation, managing and/or ordering prescription  medications, ordering imaging tests, ordering referral to subspecialty provider(s), educating patient and answering their questions about diagnosis, treatment plan, and goals of treatment, discussing planned follow-up and final documentation of the visit. This time was exclusive of any separately billable procedures for this patient and exclusive of time spent treating any other patients.     This note was generated with the assistance of ambient listening technology. Verbal consent was obtained by the patient and accompanying visitor(s) for the recording of patient appointment to facilitate this note. I attest to having reviewed and edited the generated note for accuracy, though some syntax or spelling errors may persist. Please contact the author of this note for any clarification.

## 2024-08-26 NOTE — ASSESSMENT & PLAN NOTE
Pt reports started feeling bad this afternoon around 2 pm with increased SOB and lightheadedness while waiting in lobby here

## 2024-08-26 NOTE — ED NOTES
Pt was unhappy with wait time after being in ER for 15 minutes. Patient became verbally aggressive with staff while on EMS stretcher. Discussed wait time expectations and that she can wait in the lobby with her family if she felt more comfortable. Patient would like to leave and go to another hospital. Patient escorted to lobby. Spoke with patient's daughter (Shyanne) to pick patient up.

## 2024-08-26 NOTE — ASSESSMENT & PLAN NOTE
Starting this afternoon - VSS wnl but will send to ED given pt distress, unable to stand, ambulate - unsafe to drive home - recent NSTEMI w high risk for recurrent MI

## 2024-08-26 NOTE — PROGRESS NOTES
Nurse Note: Pt transported via ambulance to [facility]. Family (daughter, grandson) on record notified. Stable; AAO X 4, O2 97%. Please see related progress note per PCP.

## 2024-08-26 NOTE — PATIENT INSTRUCTIONS
If you are feeling unwell, we'd like to be the first ones to know here at [facility]! Please give us a call. Same day appointments are our top priority to keep you well and out of the emergency rooms and hospitals. Call [phone] for our direct line. After hours advice is always available. Please call [phone] after hours to speak to the on-call team.

## 2024-08-26 NOTE — ASSESSMENT & PLAN NOTE
Mild tachypnea - Resp exam otherwise fairly unremarkable w O2 sat 98% - at high risk for recurrent MI - transport to [facility] ED

## 2024-08-27 ENCOUNTER — TELEPHONE (OUTPATIENT)
Dept: PRIMARY CARE CLINIC | Facility: CLINIC | Age: 74
End: 2024-08-27
Payer: MEDICARE

## 2024-08-27 NOTE — ED NOTES
Per Sheriff Bucio who has been speaking with patient in lobby. Family arrived and patient left facility

## 2024-08-27 NOTE — TELEPHONE ENCOUNTER
Received call from pt, states after EMS transport from [facility] to [facility] per stretcher ER staff had EMS bring her to the ED wait area, EKG per EMS was reported to ED as normal. Pt states she remained SOB and told ED staff that she was unable to sit up. Reports being placed in a wheelchair, waited about 30 minutes, estimated wait time 3 hours. Pt reports that she was pushed outside in wheelchair by ED staff to wait for family and was begging for assistance with no response. Pt transported to [facility] by family, kept overnight, discharged to home today.     Associated ED notes below:    ED Notes   Dee Ramírez, RN at 8/26/2024  5:25 PM  Pt was unhappy with wait time after being in ER for 15 minutes. Patient became verbally aggressive with staff while on EMS stretcher. Discussed wait time expectations and that she can wait in the lobby with her family if she felt more comfortable. Patient would like to leave and go to another hospital. Patient escorted to lobby. Spoke with patient's daughter (Shyanne) to pick patient up.     ED Notes   Meghna Ballard, RN at 8/26/2024  7:38 PM  Per Sheriff Bucio who has been speaking with patient in lobby. Family arrived and patient left facility.

## 2024-09-06 ENCOUNTER — OFFICE VISIT (OUTPATIENT)
Dept: PRIMARY CARE CLINIC | Facility: CLINIC | Age: 74
End: 2024-09-06
Payer: MEDICARE

## 2024-09-06 VITALS
TEMPERATURE: 97 F | DIASTOLIC BLOOD PRESSURE: 64 MMHG | OXYGEN SATURATION: 99 % | HEART RATE: 61 BPM | BODY MASS INDEX: 35.9 KG/M2 | SYSTOLIC BLOOD PRESSURE: 186 MMHG | WEIGHT: 202.63 LBS | HEIGHT: 63 IN

## 2024-09-06 DIAGNOSIS — I10 ESSENTIAL HYPERTENSION: Primary | Chronic | ICD-10-CM

## 2024-09-06 DIAGNOSIS — H91.93 BILATERAL HEARING LOSS, UNSPECIFIED HEARING LOSS TYPE: Chronic | ICD-10-CM

## 2024-09-06 DIAGNOSIS — E11.40 TYPE 2 DIABETES MELLITUS WITH DIABETIC NEUROPATHY, WITHOUT LONG-TERM CURRENT USE OF INSULIN: Chronic | ICD-10-CM

## 2024-09-06 DIAGNOSIS — R54 FRAILTY SYNDROME IN GERIATRIC PATIENT: Chronic | ICD-10-CM

## 2024-09-06 DIAGNOSIS — I50.32 CHRONIC DIASTOLIC HEART FAILURE: Chronic | ICD-10-CM

## 2024-09-06 DIAGNOSIS — Z86.73 HISTORY OF CVA (CEREBROVASCULAR ACCIDENT): Chronic | ICD-10-CM

## 2024-09-06 PROCEDURE — 99999 PR PBB SHADOW E&M-EST. PATIENT-LVL V: CPT | Mod: PBBFAC,,, | Performed by: INTERNAL MEDICINE

## 2024-09-06 PROCEDURE — 99215 OFFICE O/P EST HI 40 MIN: CPT | Mod: PBBFAC,PN | Performed by: INTERNAL MEDICINE

## 2024-09-06 PROCEDURE — 99215 OFFICE O/P EST HI 40 MIN: CPT | Mod: S$PBB,,, | Performed by: INTERNAL MEDICINE

## 2024-09-06 NOTE — PATIENT INSTRUCTIONS
If you are feeling unwell, we'd like to be the first ones to know here at [facility]! Please give us a call. Same day appointments are our top priority to keep you well and out of the emergency rooms and hospitals. Call [phone] for our direct line. After hours advice is always available. Please call [phone] after hours to speak to the on-call team.      Please discuss concerns about heart issues and whether ok to proceed with colonoscopy with Cardiologist Dr. MARKOS Paez     Let us know how home BP and Pulse are running

## 2024-09-06 NOTE — PROGRESS NOTES
Qi Ortiz  09/06/2024  [id number]    Lisa Porter MD  Patient Care Team:  Lisa Porter MD as PCP - General (Internal Medicine)    Visit Type: Follow-up    Ms. Qi Ortiz is a 74 year old female w multiple chronic medical issues here for scheduled f/u     Chronic medical issues include type 2 DM, h/o CVA, CAD s/p CABG x 2, AS s/p TAVR, s/p NSTEMI  6/25/24, HTN, HLD (intolerant of statin), CKD stage 3, hypothyroidism, psoriasis and psoriatic arthritis, CAREN, obesity, and IRIS (intolerant of CPAP). Ms. Ortiz has multiple drug and contact allergies and sensitivities. Food allergies include kiwi fruit and crab boil.    Sent to ED from [facility] LOV 8/26/24 due to near syncope, SOB  She left from [facility] ED due to wait time was taken  to [facility] by family, admitted then discharged home next day    Has not yet taken am BP meds today  Reports recent home BP's ranging 115/57 to 161/74 with pulse 50's to 60's     History of Present Illness    CHIEF COMPLAINT:  Ms. Ortiz presents today for follow up after a recent episode of loss of consciousness and hospitalization.    SYNCOPE AND CARDIOVASCULAR HISTORY:  She has a distant history of open heart surgery and stent placement more recently. She reports that she was confused and disoriented during recent event. She reports recurring episodes of feeling unable to breathe properly but denies experiencing episodes of similar intensity since then. She monitors her blood pressure and heart rate at home, noting occasional spikes associated with an 'uncomfortable feeling' in her chest, but denies pain similar to her previous heart attack.    BLOOD PRESSURE CONCERNS:  She reports fluctuations in her blood pressure readings at home, with most readings within normal range (e.g., 126/60 and 163/60). She is unable to identify specific triggers for these fluctuations despite careful monitoring.    HOSPITAL EXPERIENCE:  She reports a negative experience at [facility], where  she reports that she was placed in a wheelchair in the waiting room despite her condition causing her to fall to the floor.    SOCIAL HISTORY:  She reports family conflict, particularly with her daughter who has accused her of fabricating medical issues. She expresses distress over this situation and feels a lack of support from her family members.    PAST MEDICAL HISTORY:  She has a history of multiple episodes of loss of consciousness, open heart surgery, and breathing difficulties. She mentions past disputes with medical professionals regarding her cardiac history.    SCHEDULED PROCEDURES:  She is hoping to schedule colonoscopy for October 28th but plans to discuss risks with her cardiologist at upcoming appointment on October 14th.    OTHER MEDICAL CONCERNS:  She reports new bruising on her right foot.  She is currently taking Brilinta. She has a history or iron deficiency anemia, for which she has received iron infusions and is followed by Hematology.        PHQ-4 Score: 0     From LOV w me 8/26/24  She reports an acute onset of symptoms approximately 5 days ago, experiencing difficulty breathing with a feeling that she would stop breathing. Today, she had a near-syncope episode at the bank after leaving her car inspection appointment, almost passing out and having to hold onto the door for support. She denies chest pain, palpitations, or other associated symptoms, emphasizing that this is not a panic attack. Her symptoms have progressively worsened throughout the day. She denies fever. Earlier today, she had her car windshield replaced and rested at home before leaving at 1:00 PM for a car inspection, denying feeling unwell during these activities.     CARDIOVASCULAR:  She has a significant cardiac history including a heart attack resulting in the placement of two stents in June. She also has a history of CABG surgery, AS s/p TAVR. She denies any current chest pain.     MEDICATIONS:  She reports not taking her  "fluid pill or stomach medication today but did take other medication as prescribed.     INJURY:  Five days ago, she hit her right lower leg on a metal flower pot while hurrying to the bathroom. An open sore is visible on the right lower leg at the site of impact. She initially experienced significant bruising, describing it as "black [place] up my calf." She has been self-treating the wound by cleaning it, applying antibiotic ointment, and using bandages. She denies difficulty walking or pain while ambulating, noting that the injury site is only painful when touched.     ROS:  Constitutional: -fevers  Respiratory: +difficulty breathing, +shortness of breath  Cardiovascular: -chest pain, -palpitations  Integumentary: +itching + wound  Neurological: +near-syncope   GI: +blood in stool    Hosp/ED/Urgent Care:  8/26-8/27/24 [facility] orthostatic hypotension dehydration FRANCINE  8/26/24 ED pre-syncope, SOB (left w/o being seen)    Recent appointments:   8/26/24 [facility] Porter pre-syncope, SOB     Upcoming appointments:  Future Appointments       Date Provider Specialty Appt Notes    10/14/2024 Asif Paez MD Cardiology 3 month    10/25/2024 Lisa Porter MD Primary Care F/U 3 mo    11/5/2024  Lab [facility]    11/8/2024 Trinidad Javed NP Hematology and Oncology 3mthfu/alsbprior/kj    3/5/2025  Lab Dr. Ackerman    3/12/2025 Jacky Ackerman MD Rheumatology PSORIATIC --OA--GOUT           The following were reviewed: Active problem list, medication list, allergies, family history, social history, and Health Maintenance.     Medications have been reviewed and reconciled with patient at visit today.    Exam: Initial VS: /62 P 61 sat 99% temp 97.3   Vitals:    09/06/24 1100   BP: (!) 186/64   Pulse:    Temp:      Weight: 91.9 kg (202 lb 9.6 oz)   Body mass index is 35.89 kg/m².    BP Readings from Last 3 Encounters:   09/06/24 (!) 186/64   08/26/24 (!) 154/99   08/26/24 124/60      Wt Readings from Last 3 Encounters: "   09/06/24 0952 91.9 kg (202 lb 9.6 oz)   08/15/24 1354 91.1 kg (200 lb 13.4 oz)   08/02/24 1444 93.3 kg (205 lb 11 oz)      Physical Exam  Vitals reviewed.   Constitutional:       General: She is not in acute distress.     Appearance: Normal appearance. She is obese. She is ill-appearing.   HENT:      Head: Normocephalic and atraumatic.      Right Ear: External ear normal.      Left Ear: External ear normal.      Ears:      Comments: [place]     Nose: Nose normal.      Mouth/Throat:      Mouth: Mucous membranes are moist.      Pharynx: Oropharynx is clear.   Eyes:      Extraocular Movements: Extraocular movements intact.      Conjunctiva/sclera: Conjunctivae normal.   Cardiovascular:      Rate and Rhythm: Normal rate and regular rhythm.      Heart sounds: Murmur heard.      Comments: Soft heart murmur radiating to left carotid.  Pulmonary:      Effort: Pulmonary effort is normal. No respiratory distress.      Breath sounds: No wheezing, rhonchi or rales.   Abdominal:      General: Bowel sounds are normal.      Palpations: Abdomen is soft.      Tenderness: There is no abdominal tenderness. There is no guarding.   Musculoskeletal:      Right lower leg: No edema.      Left lower leg: No edema.   Skin:     General: Skin is warm and dry.      Findings: Bruising present.      Comments: Healing wound R anterior lower leg - clean and dry  Bruising top of foot btwn 4th and 5th toes   Neurological:      Mental Status: She is alert. Mental status is at baseline.      Motor: Weakness present.      Gait: Gait abnormal.           Laboratory Reviewed  Lab Results   Component Value Date    WBC 7.42 07/30/2024    HGB 12.2 07/30/2024    HCT 37.0 07/30/2024     07/30/2024    MCV 91 07/30/2024    CHOL 154 04/05/2024    TRIG 152 (H) 04/05/2024    HDL 35 (L) 04/05/2024    LDLCALC 88.6 04/05/2024    ALT 12 07/30/2024    AST 18 07/30/2024     07/30/2024    K 3.3 (L) 07/30/2024     07/30/2024    CREATININE 1.1 07/30/2024     BUN 13 07/30/2024    CO2 26 07/30/2024    MG 1.7 06/25/2024    TSH 1.770 04/19/2024    FREET4 1.01 04/19/2024    INR 1.0 06/25/2024    HGBA1C 5.7 (H) 07/30/2024    CRP 3.1 01/26/2024     Lab Results   Component Value Date    PTH 91.5 (H) 05/20/2022    CALCIUM 9.8 07/30/2024    PHOS 3.8 09/29/2023      Lab Results   Component Value Date    HCIQTVMW52 >2000 (H) 07/30/2024     Lab Results   Component Value Date    FOLATE 18.7 05/20/2022      Lab Results   Component Value Date    IRON 57 07/30/2024    TRANSFERRIN 243 07/30/2024    TIBC 360 07/30/2024    FESATURATED 16 (L) 07/30/2024      Lab Results   Component Value Date    EGFRNORACEVR 53 (A) 07/30/2024    ALBUMIN 3.4 (L) 07/30/2024     (H) 06/29/2024     Lab Results   Component Value Date    XVWWDAEC85KV 45 06/13/2023          Assessment:   74 y.o. female with multiple co-morbid illnesses here for continued follow up of medical problems.      The primary encounter diagnosis was Essential hypertension. Diagnoses of Bilateral hearing loss, unspecified hearing loss type, History of CVA (cerebrovascular accident), Chronic diastolic heart failure, Type 2 diabetes mellitus with diabetic neuropathy, without long-term current use of insulin, and Frailty syndrome in geriatric patient were also pertinent to this visit.      Plan:   1. Essential hypertension  Assessment & Plan:  Elevated BP's here today - has not yet taken am anti-HTN medication - cont current Rx and f/u on home BP's       2. Bilateral hearing loss, unspecified hearing loss type  Assessment & Plan:  Very [place] - says can't wear hearing aids due to psoriasis in ear canals - unfortunately this is sometimes an impediment to communication btwn MsThierno Diana and other healthcare providers and Ms. Ortiz can become quite frustrated at times      3. History of CVA (cerebrovascular accident)  Assessment & Plan:  Cont secondary preventative measures - lipid levels good w [place] -  cont current Rx and monitor BP HR at  home       4. Chronic diastolic heart failure  Assessment & Plan:  Compensated at present - f/u BRIANDA to [facility] - f/u cardiology as scheduled       5. Type 2 diabetes mellitus with diabetic neuropathy, without long-term current use of insulin  Assessment & Plan:  Managing w diet, weight loss - most recent A1c in prediabetic range       6. Frailty syndrome in geriatric patient  Assessment & Plan:  High risk for complications of chronic medical conditions with limited family support, communication barriers - at high risk for falls - cont monitor closely - does not want anyone coming to her home - consider referral for PT somewhere near where she lives?           Health Maintenance         Date Due Completion Date    COVID-19 Vaccine (1) Never done ---    Pneumococcal Vaccines (Age 65+) (1 of 2 - PCV) Never done ---    RSV Vaccine (Age 60+ and Pregnant patients) (1 - 1-dose 60+ series) Never done ---    Colorectal Cancer Screening 04/18/2016 4/18/2011    Mammogram 10/23/2016 10/23/2015    Shingles Vaccine (2 of 2) 11/28/2022 10/3/2022    Eye Exam 03/06/2024 3/6/2023    Diabetes Urine Screening 09/29/2024 9/29/2023    Foot Exam 09/29/2024 9/29/2023 (Done)    Override on 9/29/2023: Done (pt w idiopathic peripheral neuropathy - not diabetic - prediabetic)    Hemoglobin A1c 01/30/2025 7/30/2024    Lipid Panel 04/05/2025 4/5/2024    DEXA Scan 07/25/2025 7/25/2022    TETANUS VACCINE 10/02/2033 10/2/2023          Declines vaccines   Cannot tolerate having mammogram on left due to nerve pain  Hasn't had anyone to accompany her for colonoscopy but hoping to schedule for Oct 28th    -Patient's lab results were reviewed and discussed with patient  -Treatment options and alternatives were discussed with the patient. Patient expressed understanding. Patient was given the opportunity to ask questions and be an active participant in their medical care. Patient had no further questions or concerns at this time.     Follow up: Follow up for  Follow Up w me 10/25/24 as scheduled.    After visit summary printed and given to patient upon discharge.  Patient care plan included in After visit summary.    TOTAL TIME evaluating and managing this patient for this encounter was 44 minutes. This time was spent personally by me on some of the following activities: review of patient's past medical history, assessing age-appropriate health maintenance needs, review of any interval history, review and interpretation of lab results, review and interpretation of imaging test results, review and interpretation of cardiology test results, reviewing consulting specialist notes, obtaining history from the patient and family, examination of the patient, medication reconciliation, managing and/or ordering prescription medications, ordering imaging tests, ordering referral to subspecialty provider(s), educating patient and answering their questions about diagnosis, treatment plan, and goals of treatment, discussing planned follow-up and final documentation of the visit. This time was exclusive of any separately billable procedures for this patient and exclusive of time spent treating any other patients.     This note was generated with the assistance of ambient listening technology. Verbal consent was obtained by the patient and accompanying visitor(s) for the recording of patient appointment to facilitate this note. I attest to having reviewed and edited the generated note for accuracy, though some syntax or spelling errors may persist. Please contact the author of this note for any clarification.

## 2024-09-07 NOTE — ASSESSMENT & PLAN NOTE
Elevated BP's here today - has not yet taken am anti-HTN medication - cont current Rx and f/u on home BP's

## 2024-09-07 NOTE — ASSESSMENT & PLAN NOTE
High risk for complications of chronic medical conditions with limited family support, communication barriers - at high risk for falls - cont monitor closely - does not want anyone coming to her home - consider referral for PT somewhere near where she lives?

## 2024-09-07 NOTE — ASSESSMENT & PLAN NOTE
Very [place] - says can't wear hearing aids due to psoriasis in ear canals - unfortunately this is sometimes an impediment to communication btwn Ms. Ortiz and other healthcare providers and Ms. Diana can become quite frustrated at times

## 2024-09-16 DIAGNOSIS — E53.8 FOLIC ACID DEFICIENCY: ICD-10-CM

## 2024-09-19 RX ORDER — FOLIC ACID 1 MG/1
1000 TABLET ORAL
Qty: 90 TABLET | Refills: 3 | Status: SHIPPED | OUTPATIENT
Start: 2024-09-19

## 2024-09-23 ENCOUNTER — TELEPHONE (OUTPATIENT)
Dept: CARDIOLOGY | Facility: CLINIC | Age: 74
End: 2024-09-23
Payer: MEDICARE

## 2024-09-23 ENCOUNTER — OFFICE VISIT (OUTPATIENT)
Dept: CARDIOLOGY | Facility: CLINIC | Age: 74
End: 2024-09-23
Payer: MEDICARE

## 2024-09-23 VITALS
WEIGHT: 202.63 LBS | BODY MASS INDEX: 35.9 KG/M2 | DIASTOLIC BLOOD PRESSURE: 100 MMHG | OXYGEN SATURATION: 98 % | HEART RATE: 80 BPM | SYSTOLIC BLOOD PRESSURE: 200 MMHG | HEIGHT: 63 IN

## 2024-09-23 DIAGNOSIS — Z86.73 HISTORY OF CVA (CEREBROVASCULAR ACCIDENT): ICD-10-CM

## 2024-09-23 DIAGNOSIS — Z95.1 S/P CABG (CORONARY ARTERY BYPASS GRAFT): ICD-10-CM

## 2024-09-23 DIAGNOSIS — I10 HYPERTENSION, UNSPECIFIED TYPE: Primary | ICD-10-CM

## 2024-09-23 DIAGNOSIS — Z95.2 S/P TAVR (TRANSCATHETER AORTIC VALVE REPLACEMENT): ICD-10-CM

## 2024-09-23 DIAGNOSIS — I50.32 CHRONIC DIASTOLIC HEART FAILURE: ICD-10-CM

## 2024-09-23 DIAGNOSIS — Z95.5 S/P CORONARY ARTERY STENT PLACEMENT: ICD-10-CM

## 2024-09-23 DIAGNOSIS — I27.20 PULMONARY HYPERTENSION: ICD-10-CM

## 2024-09-23 DIAGNOSIS — I16.1 HYPERTENSIVE EMERGENCY: ICD-10-CM

## 2024-09-23 DIAGNOSIS — Z98.890 HISTORY OF CORONARY ANGIOGRAM: ICD-10-CM

## 2024-09-23 PROCEDURE — 99215 OFFICE O/P EST HI 40 MIN: CPT | Mod: S$PBB,,, | Performed by: INTERNAL MEDICINE

## 2024-09-23 PROCEDURE — 99213 OFFICE O/P EST LOW 20 MIN: CPT | Mod: PBBFAC | Performed by: INTERNAL MEDICINE

## 2024-09-23 PROCEDURE — 99999 PR PBB SHADOW E&M-EST. PATIENT-LVL III: CPT | Mod: PBBFAC,,, | Performed by: INTERNAL MEDICINE

## 2024-09-23 PROCEDURE — 93010 ELECTROCARDIOGRAM REPORT: CPT | Mod: ,,, | Performed by: INTERNAL MEDICINE

## 2024-09-23 PROCEDURE — 93005 ELECTROCARDIOGRAM TRACING: CPT

## 2024-09-23 RX ORDER — CLONIDINE HYDROCHLORIDE 0.1 MG/1
0.1 TABLET ORAL ONCE
Status: SHIPPED | OUTPATIENT
Start: 2024-09-23

## 2024-09-23 NOTE — PROGRESS NOTES
Subjective:   Patient ID:  Qi Ortiz is a 74 y.o. female who presents for evaluation of No chief complaint on file.      HPI  74-year-old female with extensive history as below.    Had PCI to her ostial LAD and prox LAD in June 2024.    She states that all of the sudden she is having symptoms similar to prior to having her stent.    Her blood pressure today in the clinic was 200/100.  We will give her 1 dose of clonidine.    Her EKG did not show a STEMI.    She can not finish her sentences in between speaking.    Had multiple episodes of near-syncope.    Does not feel safe going home.    Complains of severe chest tightness as well.    EMS called.  Patient does not want to go to [facility] room.  Past Medical History:   Diagnosis Date    Carotid stenosis     19%    Cellulitis and abscess of foot, except toes 06/22/2022    Cerumen debris on tympanic membrane of right ear 11/30/2022    Class 2 severe obesity due to excess calories with serious comorbidity and body mass index (BMI) of 38.0 to 38.9 in adult 07/12/2023    Coronary artery disease     CVA (cerebral vascular accident)     Dr. Hoffman    Depression     Dog bite 10/03/2023    Double ectopic ureters     Dr. Porras    Encounter for preventive health examination 05/21/2024    Fall 05/09/2024    Hemiplegia affecting right dominant side 11/09/2021    Hyperlipidemia     Hypertension     Hypothyroid     Left foot infection 07/08/2022    Mild asthma with exacerbation 12/04/2022    Near syncope 01/02/2019    NSTEMI (non-ST elevated myocardial infarction) 06/26/2024    OP (osteoporosis)     IRIS (obstructive sleep apnea)     Dr. Hope    Palpitations 01/31/2019    Psoriatic arthritis     Rheumatology    Transient ischemic attack (TIA) 01/02/2019       Past Surgical History:   Procedure Laterality Date    BREAST BIOPSY      R sided/benign    CARDIAC SURGERY      sept 28 2016    CERVICAL FUSION      CHOLECYSTECTOMY      CORONARY ANGIOPLASTY       CORONARY ARTERY BYPASS GRAFT      triple bypass    CORONARY BYPASS GRAFT ANGIOGRAPHY  2024    Procedure: Bypass graft study;  Surgeon: Veronica Ibarra MD;  Location: [facility] CATH LAB;  Service: Cardiology;;    CORONARY STENT PLACEMENT      EYE SURGERY      INTRAUTERINE DEVICE INSERTION      LEFT HEART CATHETERIZATION Left 2020    Procedure: CATHETERIZATION, HEART, LEFT;  Surgeon: Veronica Ibarra MD;  Location: [facility] CATH LAB;  Service: Cardiology;  Laterality: Left;  7am start time    LEFT HEART CATHETERIZATION Left 2024    Procedure: Left heart cath;  Surgeon: Veronica Ibarra MD;  Location: [facility] CATH LAB;  Service: Cardiology;  Laterality: Left;    mass removed from R groin      PERCUTANEOUS CORONARY INTERVENTION, ARTERY N/A 2024    Procedure: Percutaneous coronary intervention;  Surgeon: Veronica Ibarra MD;  Location: [facility] CATH LAB;  Service: Cardiology;  Laterality: N/A;    STENT, DRUG ELUTING, SINGLE VESSEL, CORONARY  2024    Procedure: Stent, Drug Eluting, Single Vessel, Coronary;  Surgeon: Veronica Ibarra MD;  Location: [facility] CATH LAB;  Service: Cardiology;;    TOTAL ABDOMINAL HYSTERECTOMY W/ BILATERAL SALPINGOOPHORECTOMY      due to benign mass, adhesions    TUBAL LIGATION         Social History     Tobacco Use    Smoking status: Never    Smokeless tobacco: Never   Substance Use Topics    Alcohol use: No    Drug use: No       Family History   Problem Relation Name Age of Onset    Breast cancer Maternal Grandfather      Breast cancer Paternal Aunt      Stroke Unknown      Breast cancer Sister  60    Leukemia Sister  8         as child    Lung cancer Paternal Grandfather      Heart disease Unknown      Diabetes Daughter a        Review of Systems   Cardiovascular:  Positive for chest pain, dyspnea on exertion and near-syncope. Negative for palpitations and syncope.   Genitourinary: Negative.    Neurological: Negative.        Current Outpatient Medications on File Prior to Visit    Medication Sig    acetaminophen (TYLENOL) 650 MG TbSR as directed    albuterol (PROVENTIL) 2.5 mg /3 mL (0.083 %) nebulizer solution Take 3 mLs (2.5 mg total) by nebulization every 6 (six) hours as needed for Wheezing. Rescue    allopurinoL (ZYLOPRIM) 100 MG tablet TAKE 2 TABLETS ONCE DAILY    aspirin 81 MG Chew Take 1 tablet (81 mg total) by mouth once daily.    calcipotriene (DOVONOX) 0.005 % cream Apply topically 2 (two) times daily.    calcium carbonate 650 mg calcium (1,625 mg) tablet Take 1 tablet by mouth once daily.    cyanocobalamin (VITAMIN B-12) 1000 MCG tablet Take 100 mcg by mouth once daily.    docusate sodium (COLACE) 100 MG capsule Take 1 capsule (100 mg total) by mouth 2 (two) times daily.    folic acid (FOLVITE) 1 MG tablet TAKE 1 TABLET DAILY    furosemide (LASIX) 20 MG tablet TAKE 1 TABLET TWICE A DAY (DOSE INCREASED BY CARDIOLOGIST DR. MASON)    garlic 2,000 mg Cap Take 3,000 mg by mouth once daily.     levothyroxine (SYNTHROID) 100 MCG tablet TAKE 1 TABLET BEFORE BREAKFAST    metoprolol succinate (TOPROL-XL) 100 MG 24 hr tablet TAKE 1 TABLET TWICE A DAY    nitroGLYCERIN (NITROSTAT) 0.4 MG SL tablet DISSOLVE 1 TABLET UNDER THE TONGUE EVERY 5 MINUTES AS NEEDED FOR CHEST PAIN    potassium chloride SA (K-DUR,KLOR-CON M) 10 MEQ tablet Take 1 tablet (10 mEq total) by mouth once daily.    pyridoxine, vitamin B6, (B-6) 100 MG Tab Take 200 mg by mouth once daily.     REPATHA SURECLICK 140 mg/mL PnIj INJECT 1 ML (140 MG) UNDER THE SKIN EVERY 14 DAYS    secukinumab (COSENTYX PEN, 2 PENS,) 150 mg/mL PnIj INJECT 300 MG (2 PENS) UNDER THE SKIN EVERY 28 DAYS    spironolactone (ALDACTONE) 50 MG tablet Take 1 tablet (50 mg total) by mouth once daily.    ticagrelor (BRILINTA) 90 mg tablet Take 1 tablet (90 mg total) by mouth 2 (two) times daily.    vitamin D 1000 units Tab Take 185 mg by mouth once daily.     No current facility-administered medications on file prior to visit.       Objective:   Objective:  Wt  Readings from Last 3 Encounters:   09/23/24 91.9 kg (202 lb 9.6 oz)   09/06/24 91.9 kg (202 lb 9.6 oz)   08/15/24 91.1 kg (200 lb 13.4 oz)     Temp Readings from Last 3 Encounters:   09/06/24 97.3 °F (36.3 °C) (Temporal)   08/26/24 99.3 °F (37.4 °C)   08/02/24 97.5 °F (36.4 °C) (Temporal)     BP Readings from Last 3 Encounters:   09/23/24 (!) 200/100   09/06/24 (!) 186/64   08/26/24 (!) 154/99     Pulse Readings from Last 3 Encounters:   09/23/24 80   09/06/24 61   08/26/24 66       Physical Exam  Vitals reviewed.   Constitutional:       General: She is in acute distress.      Appearance: She is well-developed. She is ill-appearing.   Neck:      Vascular: No carotid bruit.   Cardiovascular:      Rate and Rhythm: Normal rate and regular rhythm.      Pulses: Intact distal pulses.      Heart sounds: Normal heart sounds. No murmur heard.  Pulmonary:      Breath sounds: Normal breath sounds.   Neurological:      Mental Status: She is oriented to person, place, and time.         Lab Results   Component Value Date    CHOL 154 04/05/2024    CHOL 137 08/24/2023    CHOL 125 02/03/2023     Lab Results   Component Value Date    HDL 35 (L) 04/05/2024    HDL 35 (L) 08/24/2023    HDL 43 02/03/2023     Lab Results   Component Value Date    LDLCALC 88.6 04/05/2024    LDLCALC 69.4 08/24/2023    LDLCALC 50.0 (L) 02/03/2023     Lab Results   Component Value Date    TRIG 152 (H) 04/05/2024    TRIG 163 (H) 08/24/2023    TRIG 160 (H) 02/03/2023     Lab Results   Component Value Date    CHOLHDL 22.7 04/05/2024    CHOLHDL 25.5 08/24/2023    CHOLHDL 34.4 02/03/2023       Chemistry        Component Value Date/Time     07/30/2024 1405    K 3.3 (L) 07/30/2024 1405     07/30/2024 1405    CO2 26 07/30/2024 1405    BUN 13 07/30/2024 1405    CREATININE 1.1 07/30/2024 1405     (H) 07/30/2024 1405        Component Value Date/Time    CALCIUM 9.8 07/30/2024 1405    ALKPHOS 62 07/30/2024 1405    AST 18 07/30/2024 1405    ALT 12  07/30/2024 1405    BILITOT 0.5 07/30/2024 1405    ESTGFRAFRICA 48 (A) 07/18/2022 0803    EGFRNONAA 41 (A) 07/18/2022 0803          Lab Results   Component Value Date    TSH 1.770 04/19/2024     Lab Results   Component Value Date    INR 1.0 06/25/2024    INR 1.0 09/28/2020    INR 1.0 12/18/2017     Lab Results   Component Value Date    WBC 7.42 07/30/2024    HGB 12.2 07/30/2024    HCT 37.0 07/30/2024    MCV 91 07/30/2024     07/30/2024     BNP  @LABRCNTIP(BNP,BNPTRIAGEBLO)@  CrCl cannot be calculated (Patient's most recent lab result is older than the maximum 7 days allowed.).     Imaging:  ======    No results found for this or any previous visit.    No results found for this or any previous visit.    Results for orders placed in visit on 10/12/23    X-Ray Chest PA And Lateral    Narrative  EXAM:  XR CHEST PA AND LATERAL    CLINICAL HISTORY: Shortness of breath    COMPARISON: 12/09/2020    FINDINGS: Stable cardiomegaly.  Aortic valve replacement noted.  Sternotomy wires are intact.  Mild pulmonary vascular congestion. No confluent airspace opacity or consolidation.  There is no evidence of pleural effusion, pneumothorax, or other acute pulmonary disease.       No acute osseous abnormality is evident.   Moderate scattered degenerative change and atherosclerotic disease.    Impression  See findings above    Finalized on: 10/12/2023 3:02 PM By:  Moiz Alexander MD  [facility]# [id number]      2023-10-12 15:04:17.364    [facility]    No results found for this or any previous visit.    No valid procedures specified.    Results for orders placed during the hospital encounter of 06/25/24    Cardiac catheterization    Conclusion    The 1st Mrg lesion was 100% stenosed.    The Mid LAD lesion was 100% stenosed.    The Ost LAD lesion was 90% stenosed with 0% stenosis post-intervention.    The Prox LAD lesion was 50% stenosed with 0% stenosis post-intervention.    The pre-procedure left ventricular end diastolic pressure was 27.    Ost  LAD lesion: A .    Ost LAD lesion: A STENT SYNERGY XD 2.5X8MM stent was successfully placed at 18 EKTA for 18 sec.    Ost LAD lesion: A .    Prox LAD lesion: A STENT SYNERGY XD 3.0X28MM stent was successfully placed at 17 EKTA for 17 sec.    The estimated blood loss was none.    There was two vessel coronary artery disease.    The PCI was successful.    Well seated TAVR prosthesis with 10 mmhg gradient.      This was a technically challenging intervention due to the presence of aortic tavr prosthesis limiting maneuverability of catheters  and requiring multiple attempts at finding a guiding catheter that would engage the lmca. This lead top multiple wire exchanges and the need to down size guiding catheter to 5 fr in order to cannulate the lmca. This all lead to prolonged fluoroscopy and significant amount of dye used. The procedure log was documented by Documenter: Tiesha King RN and verified by José Ibarra MD.    Date: 6/27/2024  Time: 12:24 PM      Results for orders placed during the hospital encounter of 06/25/24    Nuclear Stress - Cardiology Interpreted    Interpretation Summary    Abnormal myocardial perfusion scan.    There is a mild intensity, small sized, reversible perfusion abnormality that is consistent with ischemia in the inferolateral wall(s).    The gated perfusion images showed an ejection fraction of 70% at rest. The gated perfusion images showed an ejection fraction of 59% post stress. Normal ejection fraction is greater than 59%.    The ECG portion of the study is negative for ischemia.    There were no arrhythmias during stress.      Results for orders placed during the hospital encounter of 06/25/24    Echo    Interpretation Summary    Left Ventricle: The left ventricle is moderately dilated. Normal wall thickness. There is eccentric hypertrophy. Normal wall motion. Ejection fraction by visual approximation is 60%. Grade II diastolic dysfunction.    Right Ventricle: Normal right  ventricular cavity size. Wall thickness is normal. Right ventricle wall motion  is normal. Systolic function is normal.    Left Atrium: Left atrium is mildly dilated.    Aortic Valve: There is a transcatheter valve replacement in the aortic position. Aortic valve area by VTI is 1.52 cm². Aortic valve peak velocity is 2.31 m/s. Mean gradient is 11 mmHg. The dimensionless index is 0.51. There is mild aortic regurgitation.    Mitral Valve: There is mild regurgitation.    Tricuspid Valve: There is mild regurgitation.    Pulmonary Artery: The estimated pulmonary artery systolic pressure is 49 mmHg.    IVC/SVC: Normal venous pressure at 3 mmHg.      Diagnostic Results:  ECG: Reviewed    The ASCVD Risk score (Geovanni PRABHAKAR, et al., 2019) failed to calculate for the following reasons:    The patient has a prior MI or stroke diagnosis        Assessment and Plan:   Hypertension, unspecified type  -     cloNIDine tablet 0.1 mg    Hypertensive emergency    History of CVA (cerebrovascular accident)    Chronic diastolic heart failure    Pulmonary hypertension    S/P TAVR (transcatheter aortic valve replacement)    S/P CABG (coronary artery bypass graft)    History of coronary angiogram    S/P coronary artery stent placement      Given clonidine.  States allergic to nitroglycerin unclear history in distress could not get good history from her.    EKG did not show acute ST-elevation.    EMS called transferred to the hospital for hypertensive emergency and chest pain.  Reviewed all tests and above medical conditions with patient in detail and formulated treatment plan.  Risk factor modification discussed.   Cardiac low salt diet discussed.  Maintaining healthy weight and weight loss goals were discussed in clinic.    Follow up after discharge   Visit today included higher complexity with patient in distress who hypertensive emergency and chest pain called EMS to transfer to the ER

## 2024-09-23 NOTE — TELEPHONE ENCOUNTER
Spoke with pt in regards to concerns about sob   Thinks something maybe wrong with her aortic valve. Pt was scheduled for a visit today to see Dr. Cohen in regards to this.                 ----- Message from Marj Ogden sent at 9/23/2024  9:51 AM CDT -----  Contact: Patient, [phone]  Patient is stating she had SOB, she has been to the Emergency Room with no help. The OnCall Nurse line is busy. Please call her. Thanks.

## 2024-09-27 ENCOUNTER — TELEPHONE (OUTPATIENT)
Dept: PRIMARY CARE CLINIC | Facility: CLINIC | Age: 74
End: 2024-09-27
Payer: MEDICARE

## 2024-09-27 NOTE — TELEPHONE ENCOUNTER
Call to pt, no answer, message left to return call,    ----- Message from Lisa Porter MD sent at 2024  7:52 PM CDT -----  Regardin/23 ED [facility]  Please call pt to f/u ED encounter [facility]

## 2024-09-27 NOTE — TELEPHONE ENCOUNTER
Spoke with pt in lobby at [facility] pt states she was scheduled to see Dr. Paez today. Appointment noted in past visit folder as cancelled. Pt offered a nurse visit today, declined at this time. Reports that she was sent from [facility] cardiology at [facility] via ambulance to [facility]. Pt stated provider looked at her and asked staff to call for an ambulance, was admitted to [facility] 09.23.24 and discharged 09.25.24. Verbalized medication changes, states she feels better, on her way to a beach in [place] for rest/relaxation. BRIANDA signed to request [facility] records.

## 2024-10-23 ENCOUNTER — OFFICE VISIT (OUTPATIENT)
Dept: CARDIOLOGY | Facility: CLINIC | Age: 74
End: 2024-10-23
Payer: MEDICARE

## 2024-10-23 VITALS
HEART RATE: 76 BPM | SYSTOLIC BLOOD PRESSURE: 170 MMHG | HEIGHT: 63 IN | OXYGEN SATURATION: 97 % | BODY MASS INDEX: 36.57 KG/M2 | DIASTOLIC BLOOD PRESSURE: 56 MMHG | WEIGHT: 206.38 LBS

## 2024-10-23 DIAGNOSIS — E66.01 CLASS 2 SEVERE OBESITY DUE TO EXCESS CALORIES WITH SERIOUS COMORBIDITY AND BODY MASS INDEX (BMI) OF 36.0 TO 36.9 IN ADULT: ICD-10-CM

## 2024-10-23 DIAGNOSIS — E78.2 MIXED HYPERLIPIDEMIA: Chronic | ICD-10-CM

## 2024-10-23 DIAGNOSIS — I70.0 AORTIC ATHEROSCLEROSIS: Chronic | ICD-10-CM

## 2024-10-23 DIAGNOSIS — E66.812 CLASS 2 SEVERE OBESITY DUE TO EXCESS CALORIES WITH SERIOUS COMORBIDITY AND BODY MASS INDEX (BMI) OF 36.0 TO 36.9 IN ADULT: ICD-10-CM

## 2024-10-23 DIAGNOSIS — E11.9 TYPE 2 DIABETES MELLITUS WITHOUT COMPLICATION: ICD-10-CM

## 2024-10-23 DIAGNOSIS — I25.810: ICD-10-CM

## 2024-10-23 DIAGNOSIS — I65.21 STENOSIS OF RIGHT CAROTID ARTERY: ICD-10-CM

## 2024-10-23 DIAGNOSIS — I25.10 CAD, MULTIPLE VESSEL: ICD-10-CM

## 2024-10-23 DIAGNOSIS — I10 ESSENTIAL HYPERTENSION: Chronic | ICD-10-CM

## 2024-10-23 DIAGNOSIS — I50.32 CHRONIC DIASTOLIC HEART FAILURE: Chronic | ICD-10-CM

## 2024-10-23 DIAGNOSIS — I25.810 CORONARY ARTERY DISEASE INVOLVING CORONARY BYPASS GRAFT OF NATIVE HEART, UNSPECIFIED WHETHER ANGINA PRESENT: Primary | Chronic | ICD-10-CM

## 2024-10-23 DIAGNOSIS — I27.20 PULMONARY HYPERTENSION: Chronic | ICD-10-CM

## 2024-10-23 DIAGNOSIS — E11.40 TYPE 2 DIABETES MELLITUS WITH DIABETIC NEUROPATHY, WITHOUT LONG-TERM CURRENT USE OF INSULIN: Chronic | ICD-10-CM

## 2024-10-23 DIAGNOSIS — I25.2 HISTORY OF MI (MYOCARDIAL INFARCTION): Chronic | ICD-10-CM

## 2024-10-23 DIAGNOSIS — Z95.1 S/P CABG (CORONARY ARTERY BYPASS GRAFT): Chronic | ICD-10-CM

## 2024-10-23 DIAGNOSIS — I35.0 NONRHEUMATIC AORTIC VALVE STENOSIS: Chronic | ICD-10-CM

## 2024-10-23 PROCEDURE — 99215 OFFICE O/P EST HI 40 MIN: CPT | Mod: PBBFAC,PO | Performed by: INTERNAL MEDICINE

## 2024-10-23 PROCEDURE — 99214 OFFICE O/P EST MOD 30 MIN: CPT | Mod: S$PBB,,, | Performed by: INTERNAL MEDICINE

## 2024-10-23 PROCEDURE — 99999 PR PBB SHADOW E&M-EST. PATIENT-LVL V: CPT | Mod: PBBFAC,,, | Performed by: INTERNAL MEDICINE

## 2024-10-23 RX ORDER — CLOPIDOGREL BISULFATE 75 MG/1
75 TABLET ORAL DAILY
Qty: 90 TABLET | Refills: 3 | Status: SHIPPED | OUTPATIENT
Start: 2024-10-23

## 2024-10-23 RX ORDER — METOPROLOL SUCCINATE 100 MG/1
100 TABLET, EXTENDED RELEASE ORAL 2 TIMES DAILY
Qty: 180 TABLET | Refills: 3 | Status: SHIPPED | OUTPATIENT
Start: 2024-10-23

## 2024-10-23 RX ORDER — CLOPIDOGREL BISULFATE 75 MG/1
75 TABLET ORAL
COMMUNITY
End: 2024-10-23 | Stop reason: SDUPTHER

## 2024-10-23 NOTE — PROGRESS NOTES
Subjective:   Patient ID:  Qi Ortiz is a 74 y.o. female who presents for follow up of Medication Refill (Metoprolol and plavix), Medication Management, and Hospital Follow Up      73y/o F came in for ER visit f/u  PMH CAD s/p CABG twice in 1998 and 2016, SVT, AS, s/p TAVR 20', chronic diastolic HF, HTN HLD COPD, CKD, DM, obesity, IRIS, statin intolerance  06/06/23 went to [facility] ER for SOB. Rx for CHF and d/c o/n.  Now sob stable no orthopnea leg swelling  No chest pain   Refused Jardiance    10/23 visit  C/o SOB worse with exertion, no orthopnea and PND. Onyy trace leg swelling  BNP was 300's this week and on Lasix 40 mg bid for 3 days and weight 10lbs. Now on lasix 20 mg bid  The SOB improved significantly  Does drink a lot of water  Ekg reviewed and leads I and avl revealed misplaced limb leads    12/23 visit  The  passed and stressful now. No orthopnea PND. On lasix 20 mg bid  10/23 echo EF nl s/p TAVR TG 9 mmHg    06/24 visit  05/06/24 fell and syncope in the yard. Woke up in 15 min and back pain. In the two days was in and out?, CXR rib on 05/09 negative  EKG reviewed by myself today reveals NSR nonspecific STT change  C/o chest pain at exertion and fatigue  BP high  LDL 88    07/24 visit  Troponin elevated 0.098 at last visit. Pt was called and advised to go to ER. The cath was done. DESx2 placed on ostial LAD and pLAD. LVEDP 29 mmHG. ASHIA to LAD patent.  H/o allergy to ranexa amlodipine and Imdur    Interval history  Sob and went to ER at [facility]. Resolved after d/c Brillinta. Switched to Plavix  BP high and Pt was upset for the schedule  LDL 88   Leg swelling mildly and taking Lasix 40 mg daily           Past Medical History:   Diagnosis Date    Carotid stenosis     19%    Cellulitis and abscess of foot, except toes 06/22/2022    Cerumen debris on tympanic membrane of right ear 11/30/2022    Class 2 severe obesity due to excess calories with serious comorbidity and body mass index (BMI) of 38.0 to  38.9 in adult 07/12/2023    Coronary artery disease     CVA (cerebral vascular accident)     Dr. Hoffman    Depression     Dog bite 10/03/2023    Double ectopic ureters     Dr. Porras    Encounter for preventive health examination 05/21/2024    Fall 05/09/2024    Hemiplegia affecting right dominant side 11/09/2021    Hyperlipidemia     Hypertension     Hypothyroid     Left foot infection 07/08/2022    Mild asthma with exacerbation 12/04/2022    Near syncope 01/02/2019    NSTEMI (non-ST elevated myocardial infarction) 06/26/2024    OP (osteoporosis)     IRIS (obstructive sleep apnea)     Dr. Hope    Palpitations 01/31/2019    Psoriatic arthritis     Rheumatology    Transient ischemic attack (TIA) 01/02/2019       Past Surgical History:   Procedure Laterality Date    BREAST BIOPSY      R sided/benign    CARDIAC SURGERY      sept 28 2016    CERVICAL FUSION      CHOLECYSTECTOMY      CORONARY ANGIOPLASTY      CORONARY ARTERY BYPASS GRAFT      triple bypass    CORONARY BYPASS GRAFT ANGIOGRAPHY  6/27/2024    Procedure: Bypass graft study;  Surgeon: Veronica Ibarra MD;  Location: [facility] CATH LAB;  Service: Cardiology;;    CORONARY STENT PLACEMENT      EYE SURGERY      INTRAUTERINE DEVICE INSERTION      LEFT HEART CATHETERIZATION Left 9/8/2020    Procedure: CATHETERIZATION, HEART, LEFT;  Surgeon: Veronica Ibarra MD;  Location: [facility] CATH LAB;  Service: Cardiology;  Laterality: Left;  7am start time    LEFT HEART CATHETERIZATION Left 6/27/2024    Procedure: Left heart cath;  Surgeon: Veronica Ibarra MD;  Location: [facility] CATH LAB;  Service: Cardiology;  Laterality: Left;    mass removed from R groin      PERCUTANEOUS CORONARY INTERVENTION, ARTERY N/A 6/27/2024    Procedure: Percutaneous coronary intervention;  Surgeon: Veronica Ibarra MD;  Location: [facility] CATH LAB;  Service: Cardiology;  Laterality: N/A;    STENT, DRUG ELUTING, SINGLE VESSEL, CORONARY  6/27/2024    Procedure: Stent, Drug Eluting, Single Vessel, Coronary;  Surgeon:  Veronica Ibarra MD;  Location: [facility] CATH LAB;  Service: Cardiology;;    TOTAL ABDOMINAL HYSTERECTOMY W/ BILATERAL SALPINGOOPHORECTOMY      due to benign mass, adhesions    TUBAL LIGATION         Social History     Tobacco Use    Smoking status: Never    Smokeless tobacco: Never   Substance Use Topics    Alcohol use: No    Drug use: No       Family History   Problem Relation Name Age of Onset    Breast cancer Maternal Grandfather      Breast cancer Paternal Aunt      Stroke Unknown      Breast cancer Sister  60    Leukemia Sister  8         as child    Lung cancer Paternal Grandfather      Heart disease Unknown      Diabetes Daughter a          ROS    Objective:   Physical Exam  HENT:      Head: Normocephalic.   Eyes:      Pupils: Pupils are equal, round, and reactive to light.   Neck:      Thyroid: No thyromegaly.      Vascular: Normal carotid pulses. No carotid bruit or JVD.   Cardiovascular:      Rate and Rhythm: Normal rate and regular rhythm. No extrasystoles are present.     Chest Wall: PMI is not displaced.      Pulses: Normal pulses.      Heart sounds: Normal heart sounds. No murmur heard.     No gallop. No S3 sounds.   Pulmonary:      Effort: No respiratory distress.      Breath sounds: Normal breath sounds. No stridor.   Abdominal:      General: Bowel sounds are normal.      Palpations: Abdomen is soft.      Tenderness: There is no abdominal tenderness. There is no rebound.   Musculoskeletal:         General: Swelling present.   Skin:     Findings: No rash.   Neurological:      Mental Status: She is alert and oriented to person, place, and time.   Psychiatric:         Behavior: Behavior normal.         Lab Results   Component Value Date    CHOL 154 2024    CHOL 137 2023    CHOL 125 2023     Lab Results   Component Value Date    HDL 35 (L) 2024    HDL 35 (L) 2023    HDL 43 2023     Lab Results   Component Value Date    LDLCALC 88.6 2024    LDLCALC 69.4  08/24/2023    LDLCALC 50.0 (L) 02/03/2023     Lab Results   Component Value Date    TRIG 152 (H) 04/05/2024    TRIG 163 (H) 08/24/2023    TRIG 160 (H) 02/03/2023     Lab Results   Component Value Date    CHOLHDL 22.7 04/05/2024    CHOLHDL 25.5 08/24/2023    CHOLHDL 34.4 02/03/2023       Chemistry        Component Value Date/Time     07/30/2024 1405    K 3.3 (L) 07/30/2024 1405     07/30/2024 1405    CO2 26 07/30/2024 1405    BUN 13 07/30/2024 1405    CREATININE 1.1 07/30/2024 1405     (H) 07/30/2024 1405        Component Value Date/Time    CALCIUM 9.8 07/30/2024 1405    ALKPHOS 62 07/30/2024 1405    AST 18 07/30/2024 1405    ALT 12 07/30/2024 1405    BILITOT 0.5 07/30/2024 1405    ESTGFRAFRICA 48 (A) 07/18/2022 0803    EGFRNONAA 41 (A) 07/18/2022 0803          Lab Results   Component Value Date    HGBA1C 5.7 (H) 07/30/2024     Lab Results   Component Value Date    TSH 1.770 04/19/2024     Lab Results   Component Value Date    INR 1.0 06/25/2024    INR 1.0 09/28/2020    INR 1.0 12/18/2017     Lab Results   Component Value Date    WBC 7.42 07/30/2024    HGB 12.2 07/30/2024    HCT 37.0 07/30/2024    MCV 91 07/30/2024     07/30/2024     BMP  Sodium   Date Value Ref Range Status   07/30/2024 144 136 - 145 mmol/L Final     Potassium   Date Value Ref Range Status   07/30/2024 3.3 (L) 3.5 - 5.1 mmol/L Final     Chloride   Date Value Ref Range Status   07/30/2024 104 95 - 110 mmol/L Final     CO2   Date Value Ref Range Status   07/30/2024 26 23 - 29 mmol/L Final     BUN   Date Value Ref Range Status   07/30/2024 13 8 - 23 mg/dL Final     Creatinine   Date Value Ref Range Status   07/30/2024 1.1 0.5 - 1.4 mg/dL Final     Calcium   Date Value Ref Range Status   07/30/2024 9.8 8.7 - 10.5 mg/dL Final     Anion Gap   Date Value Ref Range Status   07/30/2024 14 8 - 16 mmol/L Final     eGFR if    Date Value Ref Range Status   07/18/2022 48 (A) >60 mL/min/1.73 m^2 Final     eGFR if non     Date Value Ref Range Status   07/18/2022 41 (A) >60 mL/min/1.73 m^2 Final     Comment:     Calculation used to obtain the estimated glomerular filtration  rate (eGFR) is the CKD-EPI equation.        BNP  @LABRCNTIP(BNP,BNPTRIAGEBLO)@  @LABRCNTIP(troponini)@  CrCl cannot be calculated (Patient's most recent lab result is older than the maximum 7 days allowed.).  No results found in the last 24 hours.  No results found in the last 24 hours.  No results found in the last 24 hours.    Assessment:      1. Coronary artery disease involving coronary bypass graft of native heart, unspecified whether angina present    2. Aortic atherosclerosis    3. 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    4. CAD, multiple vessel    5. Stenosis of right carotid artery    6. Chronic diastolic heart failure    7. Essential hypertension    8. History of MI (myocardial infarction)    9. Mixed hyperlipidemia    10. Nonrheumatic aortic valve stenosis    11. Pulmonary hypertension    12. S/P CABG (coronary artery bypass graft)    13. Class 2 severe obesity due to excess calories with serious comorbidity and body mass index (BMI) of 36.0 to 36.9 in adult    14. Type 2 diabetes mellitus with diabetic neuropathy, without long-term current use of insulin        Plan:   Continue repatha asa Plavix lasix metoprolol   DM Rx per PCP  Counseled DASH  Check Lipid profile with PCP in 6 months  Recommend heart-healthy diet, weight control and regular exercise.  Milind. Risk modification.   I have reviewed all pertinent labs and cardiac studies independently. Plans and recommendations have been formulated under my direct supervision. All questions answered and patient voiced understanding.   If symptoms persist go to the ED  RTC in 6 months          Carotid US

## 2024-10-24 ENCOUNTER — TELEPHONE (OUTPATIENT)
Dept: CARDIOLOGY | Facility: CLINIC | Age: 74
End: 2024-10-24
Payer: MEDICARE

## 2024-10-24 NOTE — TELEPHONE ENCOUNTER
LM to speakto pt- she had trouble getting scheduled with Dr berry. So just wanted to talk with her and see where we could help for next time.

## 2024-10-25 ENCOUNTER — OFFICE VISIT (OUTPATIENT)
Dept: PRIMARY CARE CLINIC | Facility: CLINIC | Age: 74
End: 2024-10-25
Payer: MEDICARE

## 2024-10-25 VITALS
TEMPERATURE: 97 F | SYSTOLIC BLOOD PRESSURE: 130 MMHG | HEART RATE: 69 BPM | BODY MASS INDEX: 36.41 KG/M2 | DIASTOLIC BLOOD PRESSURE: 58 MMHG | OXYGEN SATURATION: 98 % | WEIGHT: 205.5 LBS | HEIGHT: 63 IN

## 2024-10-25 DIAGNOSIS — E11.22 TYPE 2 DIABETES MELLITUS WITH STAGE 3 CHRONIC KIDNEY DISEASE, WITHOUT LONG-TERM CURRENT USE OF INSULIN, UNSPECIFIED WHETHER STAGE 3A OR 3B CKD: Chronic | ICD-10-CM

## 2024-10-25 DIAGNOSIS — E78.2 MIXED HYPERLIPIDEMIA: Chronic | ICD-10-CM

## 2024-10-25 DIAGNOSIS — E55.9 VITAMIN D DEFICIENCY: Primary | ICD-10-CM

## 2024-10-25 DIAGNOSIS — N18.30 TYPE 2 DIABETES MELLITUS WITH STAGE 3 CHRONIC KIDNEY DISEASE, WITHOUT LONG-TERM CURRENT USE OF INSULIN, UNSPECIFIED WHETHER STAGE 3A OR 3B CKD: Chronic | ICD-10-CM

## 2024-10-25 PROCEDURE — 99215 OFFICE O/P EST HI 40 MIN: CPT | Mod: S$PBB,,, | Performed by: INTERNAL MEDICINE

## 2024-10-25 PROCEDURE — 99215 OFFICE O/P EST HI 40 MIN: CPT | Mod: PBBFAC,PN | Performed by: INTERNAL MEDICINE

## 2024-10-25 PROCEDURE — 99999 PR PBB SHADOW E&M-EST. PATIENT-LVL V: CPT | Mod: PBBFAC,,, | Performed by: INTERNAL MEDICINE

## 2024-10-27 PROBLEM — R73.03 PREDIABETES: Chronic | Status: RESOLVED | Noted: 2023-09-30 | Resolved: 2024-10-27

## 2024-11-05 ENCOUNTER — LAB VISIT (OUTPATIENT)
Dept: LAB | Facility: HOSPITAL | Age: 74
End: 2024-11-05
Attending: NURSE PRACTITIONER
Payer: MEDICARE

## 2024-11-05 DIAGNOSIS — E53.8 FOLIC ACID DEFICIENCY: ICD-10-CM

## 2024-11-05 DIAGNOSIS — E11.9 TYPE 2 DIABETES MELLITUS WITHOUT COMPLICATION: ICD-10-CM

## 2024-11-05 DIAGNOSIS — D50.0 IRON DEFICIENCY ANEMIA DUE TO CHRONIC BLOOD LOSS: ICD-10-CM

## 2024-11-05 DIAGNOSIS — E11.22 TYPE 2 DIABETES MELLITUS WITH STAGE 3 CHRONIC KIDNEY DISEASE, WITHOUT LONG-TERM CURRENT USE OF INSULIN, UNSPECIFIED WHETHER STAGE 3A OR 3B CKD: Chronic | ICD-10-CM

## 2024-11-05 DIAGNOSIS — Z86.2 HISTORY OF ANEMIA DUE TO VITAMIN B12 DEFICIENCY: ICD-10-CM

## 2024-11-05 DIAGNOSIS — E78.2 MIXED HYPERLIPIDEMIA: Chronic | ICD-10-CM

## 2024-11-05 DIAGNOSIS — K62.5 BRBPR (BRIGHT RED BLOOD PER RECTUM): ICD-10-CM

## 2024-11-05 DIAGNOSIS — D53.9 NUTRITIONAL ANEMIA, UNSPECIFIED: ICD-10-CM

## 2024-11-05 DIAGNOSIS — N18.30 TYPE 2 DIABETES MELLITUS WITH STAGE 3 CHRONIC KIDNEY DISEASE, WITHOUT LONG-TERM CURRENT USE OF INSULIN, UNSPECIFIED WHETHER STAGE 3A OR 3B CKD: Chronic | ICD-10-CM

## 2024-11-05 DIAGNOSIS — E55.9 VITAMIN D DEFICIENCY: ICD-10-CM

## 2024-11-05 DIAGNOSIS — E61.1 IRON DEFICIENCY: ICD-10-CM

## 2024-11-05 DIAGNOSIS — E87.6 HYPOKALEMIA: ICD-10-CM

## 2024-11-05 LAB
25(OH)D3+25(OH)D2 SERPL-MCNC: 60 NG/ML (ref 30–96)
ALBUMIN SERPL BCP-MCNC: 3.4 G/DL (ref 3.5–5.2)
ALP SERPL-CCNC: 62 U/L (ref 40–150)
ALT SERPL W/O P-5'-P-CCNC: 8 U/L (ref 10–44)
ANION GAP SERPL CALC-SCNC: 9 MMOL/L (ref 8–16)
AST SERPL-CCNC: 15 U/L (ref 10–40)
BASOPHILS # BLD AUTO: 0.07 K/UL (ref 0–0.2)
BASOPHILS NFR BLD: 0.8 % (ref 0–1.9)
BILIRUB SERPL-MCNC: 0.5 MG/DL (ref 0.1–1)
BUN SERPL-MCNC: 15 MG/DL (ref 8–23)
CALCIUM SERPL-MCNC: 9.1 MG/DL (ref 8.7–10.5)
CHLORIDE SERPL-SCNC: 104 MMOL/L (ref 95–110)
CHOLEST SERPL-MCNC: 123 MG/DL (ref 120–199)
CHOLEST/HDLC SERPL: 3.5 {RATIO} (ref 2–5)
CO2 SERPL-SCNC: 30 MMOL/L (ref 23–29)
CREAT SERPL-MCNC: 1.3 MG/DL (ref 0.5–1.4)
DIFFERENTIAL METHOD BLD: ABNORMAL
EOSINOPHIL # BLD AUTO: 0.2 K/UL (ref 0–0.5)
EOSINOPHIL NFR BLD: 2.2 % (ref 0–8)
ERYTHROCYTE [DISTWIDTH] IN BLOOD BY AUTOMATED COUNT: 17.5 % (ref 11.5–14.5)
EST. GFR  (NO RACE VARIABLE): 43 ML/MIN/1.73 M^2
FERRITIN SERPL-MCNC: 9 NG/ML (ref 20–300)
FOLATE SERPL-MCNC: 18.2 NG/ML (ref 4–24)
GLUCOSE SERPL-MCNC: 114 MG/DL (ref 70–110)
HCT VFR BLD AUTO: 32 % (ref 37–48.5)
HDLC SERPL-MCNC: 35 MG/DL (ref 40–75)
HDLC SERPL: 28.5 % (ref 20–50)
HGB BLD-MCNC: 9 G/DL (ref 12–16)
IMM GRANULOCYTES # BLD AUTO: 0.02 K/UL (ref 0–0.04)
IMM GRANULOCYTES NFR BLD AUTO: 0.2 % (ref 0–0.5)
IRON SERPL-MCNC: 17 UG/DL (ref 30–160)
LDLC SERPL CALC-MCNC: 60.8 MG/DL (ref 63–159)
LYMPHOCYTES # BLD AUTO: 1.7 K/UL (ref 1–4.8)
LYMPHOCYTES NFR BLD: 20.1 % (ref 18–48)
MCH RBC QN AUTO: 22.2 PG (ref 27–31)
MCHC RBC AUTO-ENTMCNC: 28.1 G/DL (ref 32–36)
MCV RBC AUTO: 79 FL (ref 82–98)
MONOCYTES # BLD AUTO: 0.7 K/UL (ref 0.3–1)
MONOCYTES NFR BLD: 8.6 % (ref 4–15)
NEUTROPHILS # BLD AUTO: 5.6 K/UL (ref 1.8–7.7)
NEUTROPHILS NFR BLD: 68.1 % (ref 38–73)
NONHDLC SERPL-MCNC: 88 MG/DL
NRBC BLD-RTO: 0 /100 WBC
PLATELET # BLD AUTO: 344 K/UL (ref 150–450)
PMV BLD AUTO: 10.4 FL (ref 9.2–12.9)
POTASSIUM SERPL-SCNC: 4 MMOL/L (ref 3.5–5.1)
PROT SERPL-MCNC: 6.8 G/DL (ref 6–8.4)
RBC # BLD AUTO: 4.05 M/UL (ref 4–5.4)
SATURATED IRON: 4 % (ref 20–50)
SODIUM SERPL-SCNC: 143 MMOL/L (ref 136–145)
TOTAL IRON BINDING CAPACITY: 454 UG/DL (ref 250–450)
TRANSFERRIN SERPL-MCNC: 307 MG/DL (ref 200–375)
TRIGL SERPL-MCNC: 136 MG/DL (ref 30–150)
VIT B12 SERPL-MCNC: 748 PG/ML (ref 210–950)
WBC # BLD AUTO: 8.27 K/UL (ref 3.9–12.7)

## 2024-11-05 PROCEDURE — 82607 VITAMIN B-12: CPT | Performed by: NURSE PRACTITIONER

## 2024-11-05 PROCEDURE — 84466 ASSAY OF TRANSFERRIN: CPT | Performed by: NURSE PRACTITIONER

## 2024-11-05 PROCEDURE — 80061 LIPID PANEL: CPT | Performed by: INTERNAL MEDICINE

## 2024-11-05 PROCEDURE — 36415 COLL VENOUS BLD VENIPUNCTURE: CPT | Mod: PO | Performed by: NURSE PRACTITIONER

## 2024-11-05 PROCEDURE — 83036 HEMOGLOBIN GLYCOSYLATED A1C: CPT | Performed by: INTERNAL MEDICINE

## 2024-11-05 PROCEDURE — 85025 COMPLETE CBC W/AUTO DIFF WBC: CPT | Performed by: NURSE PRACTITIONER

## 2024-11-05 PROCEDURE — 80053 COMPREHEN METABOLIC PANEL: CPT | Performed by: NURSE PRACTITIONER

## 2024-11-05 PROCEDURE — 82746 ASSAY OF FOLIC ACID SERUM: CPT | Performed by: NURSE PRACTITIONER

## 2024-11-05 PROCEDURE — 82306 VITAMIN D 25 HYDROXY: CPT | Performed by: INTERNAL MEDICINE

## 2024-11-05 PROCEDURE — 82728 ASSAY OF FERRITIN: CPT | Performed by: NURSE PRACTITIONER

## 2024-11-06 LAB
ESTIMATED AVG GLUCOSE: 126 MG/DL (ref 68–131)
HBA1C MFR BLD: 6 % (ref 4–5.6)

## 2024-11-07 ENCOUNTER — TELEPHONE (OUTPATIENT)
Dept: PRIMARY CARE CLINIC | Facility: CLINIC | Age: 74
End: 2024-11-07
Payer: MEDICARE

## 2024-11-07 NOTE — PROGRESS NOTES
"Pt does not use [facility] pt portal - please call w message below:    A1c ok - still in "prediabetes" range.  Lipid levels look good - please continue Repatha.  Vitamin D level looks good too - please continue current vitamin D supplementation.    "

## 2024-11-07 NOTE — TELEPHONE ENCOUNTER
"Spoke with pt and gave lab results and instructions.         ----- Message from Lisa Porter MD sent at 11/7/2024 11:17 AM CST -----  Pt does not use [facility] pt portal - please call w message below:    A1c ok - still in "prediabetes" range.  Lipid levels look good - please continue Repatha.  Vitamin D level looks good too - please continue current vitamin D supplementation.      "

## 2024-11-08 ENCOUNTER — OFFICE VISIT (OUTPATIENT)
Dept: HEMATOLOGY/ONCOLOGY | Facility: CLINIC | Age: 74
End: 2024-11-08
Payer: MEDICARE

## 2024-11-08 VITALS
TEMPERATURE: 98 F | WEIGHT: 201.5 LBS | HEART RATE: 60 BPM | HEIGHT: 63 IN | DIASTOLIC BLOOD PRESSURE: 52 MMHG | OXYGEN SATURATION: 98 % | SYSTOLIC BLOOD PRESSURE: 136 MMHG | RESPIRATION RATE: 20 BRPM | BODY MASS INDEX: 35.7 KG/M2

## 2024-11-08 DIAGNOSIS — R53.83 FATIGUE, UNSPECIFIED TYPE: ICD-10-CM

## 2024-11-08 DIAGNOSIS — D50.9 IRON DEFICIENCY ANEMIA, UNSPECIFIED IRON DEFICIENCY ANEMIA TYPE: Primary | ICD-10-CM

## 2024-11-08 DIAGNOSIS — E53.8 B12 DEFICIENCY: Chronic | ICD-10-CM

## 2024-11-08 DIAGNOSIS — R06.00 DYSPNEA, UNSPECIFIED TYPE: ICD-10-CM

## 2024-11-08 DIAGNOSIS — E53.8 FOLIC ACID DEFICIENCY: ICD-10-CM

## 2024-11-08 PROCEDURE — 99999 PR PBB SHADOW E&M-EST. PATIENT-LVL V: CPT | Mod: PBBFAC,,, | Performed by: NURSE PRACTITIONER

## 2024-11-08 PROCEDURE — 99215 OFFICE O/P EST HI 40 MIN: CPT | Mod: PBBFAC | Performed by: NURSE PRACTITIONER

## 2024-11-08 RX ORDER — HEPARIN 100 UNIT/ML
500 SYRINGE INTRAVENOUS
OUTPATIENT
Start: 2024-11-08

## 2024-11-08 RX ORDER — METHYLPREDNISOLONE SOD SUCC 125 MG
80 VIAL (EA) INJECTION
Start: 2024-11-08

## 2024-11-08 RX ORDER — SODIUM CHLORIDE 0.9 % (FLUSH) 0.9 %
10 SYRINGE (ML) INJECTION
OUTPATIENT
Start: 2024-11-08

## 2024-11-08 NOTE — PROGRESS NOTES
Subjective:      Patient ID: Qi Ortiz is a 74 y.o. female.    Chief Complaint: fatigue    HPI: 74 year old female presents today to evaluate labs.  Has been found in the past to have folic acid deficiency, recurrent CAREN requiring IV iron infusions and also anemia d/t CKD.        medical history significant for HTN, carotid stenosis, hypothyroidism, COPD, psoriatic arthritis, hyperlipidemia, CVA with right hemiplegia, depression, CAD, osteoporosis, IRIS, and double ectopic ureters.     Has been followed in the clinic by Dr. Mckinney, Elida Alanis NP, Jade Delgado NP, and Denis Caban NP, Today is the first time I am evaluating/assessing the patient.      Currently with normocytic anemia - hgb 11.6, mcv 83, ferritin 17.  Recently found with B12 deficiency and prescribed B12 1000 mcg SL daily - states that she hasn't started as of yet. States used to take B12.     States that she does have some hemorrhoidal bleeding but does not get in the toilet or stool.  Only seen when she wipes and not all the time.  Has not been able to tolerate GI prep for colonoscopy and has not had transportation to have EGD/colonoscopy done     Interval History:  12/15/2023  Received venofer 200 mg IV x 4 doses and presents today to evaluate response.  States that she tolerated the once weekly iron infusions.  States that she felt sick to her stomach with abdominal cramping.  States now her energy level is back up.  Has one small hemorrhoids that bleeds at times.       Interval History:  8/2/2024 with b12 and folate deficiency and recurrent iron deficiency.  Recently had 2 stents placed in LAD on 6/27/2024 - currently on Brillinta 90 mg by mouth twice daily. States that she has stopped the b12 supplement because her levels were elevated.  Currently not anemic.  Saturated iron slight low at 16%.     Interval History:  11/8/2024 Presents today to review labs with recurrent iron deficiency anemia.  Currently with microcytic anemia and  low ferritin.  Has sob and weakness that has progressively worsened.  Denies any known abnormal bleeding except for some minor hemorrhoidal bleeding.  States that she was taken off of Brillinta because it was causing shortness of breath.    I have reviewed all of the patient's relevant lab work available in the medical record and have utilized this in my evaluation and management recommendations today.      Social History     Socioeconomic History    Marital status: Single    Number of children: 3   Occupational History    Occupation: Not working   Tobacco Use    Smoking status: Never    Smokeless tobacco: Never   Substance and Sexual Activity    Alcohol use: No    Drug use: No    Sexual activity: Not Currently     Partners: Male     Social Drivers of Health     Financial Resource Strain: Low Risk  (5/21/2024)    Overall Financial Resource Strain (CARDIA)     Difficulty of Paying Living Expenses: Not hard at all   Food Insecurity: No Food Insecurity (5/21/2024)    Hunger Vital Sign     Worried About Running Out of Food in the Last Year: Never true     Ran Out of Food in the Last Year: Never true   Transportation Needs: No Transportation Needs (5/21/2024)    PRAPARE - Transportation     Lack of Transportation (Medical): No     Lack of Transportation (Non-Medical): No   Physical Activity: Inactive (5/21/2024)    Exercise Vital Sign     Days of Exercise per Week: 0 days     Minutes of Exercise per Session: 0 min   Stress: No Stress Concern Present (5/21/2024)    [facility] - Occupational Stress Questionnaire     Feeling of Stress : Not at all   Housing Stability: Low Risk  (5/21/2024)    Housing Stability Vital Sign     Unable to Pay for Housing in the Last Year: No     Homeless in the Last Year: No       Family History   Problem Relation Name Age of Onset    Breast cancer Maternal Grandfather      Breast cancer Paternal Aunt      Stroke Unknown      Breast cancer Sister  60    Leukemia  Sister  8         as child    Lung cancer Paternal Grandfather      Heart disease Unknown      Diabetes Daughter a        Past Surgical History:   Procedure Laterality Date    BREAST BIOPSY      R sided/benign    CARDIAC SURGERY      2016    CERVICAL FUSION      CHOLECYSTECTOMY      CORONARY ANGIOPLASTY      CORONARY ARTERY BYPASS GRAFT      triple bypass    CORONARY BYPASS GRAFT ANGIOGRAPHY  2024    Procedure: Bypass graft study;  Surgeon: Veronica Ibarra MD;  Location: [facility] CATH LAB;  Service: Cardiology;;    CORONARY STENT PLACEMENT      EYE SURGERY      INTRAUTERINE DEVICE INSERTION      LEFT HEART CATHETERIZATION Left 2020    Procedure: CATHETERIZATION, HEART, LEFT;  Surgeon: Veronica Ibarra MD;  Location: [facility] CATH LAB;  Service: Cardiology;  Laterality: Left;  7am start time    LEFT HEART CATHETERIZATION Left 2024    Procedure: Left heart cath;  Surgeon: Veronica Ibarra MD;  Location: [facility] CATH LAB;  Service: Cardiology;  Laterality: Left;    mass removed from R groin      PERCUTANEOUS CORONARY INTERVENTION, ARTERY N/A 2024    Procedure: Percutaneous coronary intervention;  Surgeon: Veronica Ibarra MD;  Location: [facility] CATH LAB;  Service: Cardiology;  Laterality: N/A;    STENT, DRUG ELUTING, SINGLE VESSEL, CORONARY  2024    Procedure: Stent, Drug Eluting, Single Vessel, Coronary;  Surgeon: Veronica Ibarra MD;  Location: [facility] CATH LAB;  Service: Cardiology;;    TOTAL ABDOMINAL HYSTERECTOMY W/ BILATERAL SALPINGOOPHORECTOMY      due to benign mass, adhesions    TUBAL LIGATION         Past Medical History:   Diagnosis Date    Carotid stenosis     19%    Cellulitis and abscess of foot, except toes 2022    Cerumen debris on tympanic membrane of right ear 2022    Class 2 severe obesity due to excess calories with serious comorbidity and body mass index (BMI) of 38.0 to 38.9 in adult 2023    Coronary artery disease     CVA (cerebral vascular accident)       Yasmin    Depression     Dog bite 10/03/2023    Double ectopic ureters     Dr. Porras    Encounter for preventive health examination 05/21/2024    Fall 05/09/2024    Hemiplegia affecting right dominant side 11/09/2021    Hyperlipidemia     Hypertension     Hypothyroid     Left foot infection 07/08/2022    Mild asthma with exacerbation 12/04/2022    Near syncope 01/02/2019    NSTEMI (non-ST elevated myocardial infarction) 06/26/2024    OP (osteoporosis)     IRIS (obstructive sleep apnea)     Dr. Hope    Palpitations 01/31/2019    Prediabetes 09/30/2023    Psoriatic arthritis     Rheumatology    Transient ischemic attack (TIA) 01/02/2019       Review of Systems   Constitutional:  Positive for fatigue.   HENT:  Negative for nosebleeds.    Eyes: Negative.    Respiratory:  Positive for shortness of breath.    Cardiovascular:  Positive for palpitations. Negative for chest pain.   Gastrointestinal:  Positive for anal bleeding (minor hemorrhoidal bleeding). Negative for blood in stool.   Endocrine: Negative.    Genitourinary:  Negative for hematuria and vaginal bleeding.   Musculoskeletal: Negative.    Skin:  Positive for pallor.   Allergic/Immunologic: Negative.    Neurological:  Positive for weakness and headaches.   Hematological: Negative.    Psychiatric/Behavioral:  Positive for sleep disturbance. The patient is nervous/anxious.           Medication List with Changes/Refills   Current Medications    ACETAMINOPHEN (TYLENOL) 650 MG TBSR    as directed    ALBUTEROL (PROVENTIL) 2.5 MG /3 ML (0.083 %) NEBULIZER SOLUTION    Take 3 mLs (2.5 mg total) by nebulization every 6 (six) hours as needed for Wheezing. Rescue    ALLOPURINOL (ZYLOPRIM) 100 MG TABLET    TAKE 2 TABLETS ONCE DAILY    ASPIRIN 81 MG CHEW    Take 1 tablet (81 mg total) by mouth once daily.    CALCIPOTRIENE (DOVONOX) 0.005 % CREAM    Apply topically 2 (two) times daily.    CALCIUM CARBONATE 650 MG CALCIUM (1,625 MG) TABLET    Take 1 tablet by mouth once daily.     CLOPIDOGREL (PLAVIX) 75 MG TABLET    Take 1 tablet (75 mg total) by mouth once daily.    CYANOCOBALAMIN (VITAMIN B-12) 1000 MCG TABLET    Take 100 mcg by mouth once daily.    DOCUSATE SODIUM (COLACE) 100 MG CAPSULE    Take 1 capsule (100 mg total) by mouth 2 (two) times daily.    FOLIC ACID (FOLVITE) 1 MG TABLET    TAKE 1 TABLET DAILY    FUROSEMIDE (LASIX) 20 MG TABLET    TAKE 1 TABLET TWICE A DAY (DOSE INCREASED BY CARDIOLOGIST DR. MASON)    GARLIC 2,000 MG CAP    Take 3,000 mg by mouth once daily.     LEVOTHYROXINE (SYNTHROID) 100 MCG TABLET    TAKE 1 TABLET BEFORE BREAKFAST    METOPROLOL SUCCINATE (TOPROL-XL) 100 MG 24 HR TABLET    Take 1 tablet (100 mg total) by mouth 2 (two) times daily.    NITROGLYCERIN (NITROSTAT) 0.4 MG SL TABLET    DISSOLVE 1 TABLET UNDER THE TONGUE EVERY 5 MINUTES AS NEEDED FOR CHEST PAIN    POTASSIUM CHLORIDE SA (K-DUR,KLOR-CON M) 10 MEQ TABLET    Take 1 tablet (10 mEq total) by mouth once daily.    PYRIDOXINE, VITAMIN B6, (B-6) 100 MG TAB    Take 200 mg by mouth once daily.     REPATHA SURECLICK 140 MG/ML PNIJ    INJECT 1 ML (140 MG) UNDER THE SKIN EVERY 14 DAYS    SECUKINUMAB (COSENTYX PEN, 2 PENS,) 150 MG/ML PNIJ    INJECT 300 MG (2 PENS) UNDER THE SKIN EVERY 28 DAYS    SPIRONOLACTONE (ALDACTONE) 50 MG TABLET    Take 1 tablet (50 mg total) by mouth once daily.    VITAMIN D 1000 UNITS TAB    Take 185 mg by mouth once daily.        Objective:     Vitals:    11/08/24 1405   BP: (!) 136/52   Pulse: 60   Resp: 20   Temp: 97.6 °F (36.4 °C)       Physical Exam  Vitals reviewed.   Constitutional:       Appearance: Normal appearance. She is obese.   HENT:      Head: Normocephalic and atraumatic.      Right Ear: External ear normal.      Left Ear: External ear normal.   Cardiovascular:      Rate and Rhythm: Normal rate and regular rhythm.      Heart sounds: S1 normal and S2 normal. Murmur heard.      Systolic murmur is present with a grade of 2/6.   Pulmonary:      Effort: Pulmonary effort is  normal.      Breath sounds: Normal breath sounds.   Abdominal:      General: There is no distension.   Musculoskeletal:         General: Normal range of motion.      Cervical back: Normal range of motion.   Skin:     General: Skin is warm and dry.      Coloration: Skin is pale.   Neurological:      General: No focal deficit present.      Mental Status: She is alert and oriented to person, place, and time.   Psychiatric:         Attention and Perception: Attention and perception normal.         Mood and Affect: Affect normal. Mood is anxious.         Speech: Speech normal.         Behavior: Behavior normal. Behavior is cooperative.         Thought Content: Thought content normal.         Cognition and Memory: Cognition and memory normal.         Judgment: Judgment normal.         Assessment:     Problem List Items Addressed This Visit       B12 deficiency (Chronic)    Relevant Orders    CBC Auto Differential    Folic acid deficiency    Relevant Orders    CBC Auto Differential    Iron deficiency anemia - Primary    Relevant Orders    CBC Auto Differential    Comprehensive Metabolic Panel    Ferritin    Iron and TIBC    Ambulatory referral/consult to Endo Procedure      Other Visit Diagnoses       Dyspnea, unspecified type        Relevant Orders    Ambulatory referral/consult to Endo Procedure     Fatigue, unspecified type        Relevant Orders    Ambulatory referral/consult to Endo Procedure             Lab Results   Component Value Date    WBC 8.27 11/05/2024    RBC 4.05 11/05/2024    HGB 9.0 (L) 11/05/2024    HCT 32.0 (L) 11/05/2024    MCV 79 (L) 11/05/2024    MCH 22.2 (L) 11/05/2024    MCHC 28.1 (L) 11/05/2024    RDW 17.5 (H) 11/05/2024     11/05/2024    MPV 10.4 11/05/2024    GRAN 5.6 11/05/2024    GRAN 68.1 11/05/2024    LYMPH 1.7 11/05/2024    LYMPH 20.1 11/05/2024    MONO 0.7 11/05/2024    MONO 8.6 11/05/2024    EOS 0.2 11/05/2024    BASO 0.07 11/05/2024    EOSINOPHIL 2.2  11/05/2024    BASOPHIL 0.8 11/05/2024      Lab Results   Component Value Date     11/05/2024    K 4.0 11/05/2024     11/05/2024    CO2 30 (H) 11/05/2024    BUN 15 11/05/2024    CREATININE 1.3 11/05/2024    CALCIUM 9.1 11/05/2024    ANIONGAP 9 11/05/2024    ESTGFRAFRICA 48 (A) 07/18/2022    EGFRNONAA 41 (A) 07/18/2022     Lab Results   Component Value Date    ALT 8 (L) 11/05/2024    AST 15 11/05/2024    ALKPHOS 62 11/05/2024    BILITOT 0.5 11/05/2024     Lab Results   Component Value Date    IRON 17 (L) 11/05/2024    TRANSFERRIN 307 11/05/2024    TIBC 454 (H) 11/05/2024    FESATURATED 4 (L) 11/05/2024    FERRITIN 9 (L) 11/05/2024      Lab Results   Component Value Date    [id number] 748 11/05/2024     Lab Results   Component Value Date    FOLATE 18.2 11/05/2024       Plan:   Iron deficiency anemia, unspecified iron deficiency anemia type  -     0.9% NaCl 250 mL flush bag  -     sodium chloride 0.9% flush 10 mL  -     iron sucrose injection 200 mg  -     methylPREDNISolone sodium succinate injection 80 mg  -     heparin, porcine (PF) 100 unit/mL injection flush 500 Units  -     alteplase injection 2 mg  -     CBC Auto Differential; Future; Expected date: 11/08/2024  -     Comprehensive Metabolic Panel; Future; Expected date: 11/08/2024  -     Ferritin; Future; Expected date: 11/08/2024  -     Iron and TIBC; Future; Expected date: 11/08/2024  -     Ambulatory referral/consult to Endo Procedure ; Future; Expected date: 11/09/2024    B12 deficiency  -     CBC Auto Differential; Future; Expected date: 11/08/2024    Folic acid deficiency  -     CBC Auto Differential; Future; Expected date: 11/08/2024    Dyspnea, unspecified type  -     Ambulatory referral/consult to Endo Procedure ; Future; Expected date: 11/09/2024    Fatigue, unspecified type  -     Ambulatory referral/consult to Endo Procedure ; Future; Expected date: 11/09/2024    Other orders  -     Cancel: iron sucrose injection  200 mg      Med Onc Chart Routing      Follow up with physician    Follow up with [facility] . F/u 5 weeks after last dose of IV venofer - [facility] with [facility]- unable to go to  due to long distance of walking in to clinic and sob   Infusion scheduling note   schedule venofer 200 mg IVPB weekly x 5 weeks at [facility]   Injection scheduling note n/a   Labs   Scheduling:  Preferred lab:  [facility]  Lab interval:  cbc, cmp, iron studies   Imaging   N/a   Pharmacy appointment No pharmacy appointment needed      Other referrals       No additional referrals needed  n/a          Recommend eval with upper and lower endoscopy to evaluate cause of recurrent iron deficiency anemia.  Continue folic acid and B12 supplements.      Total time spent on encounter: 45 minutes    Collaborating Provider:  Dr. Royal Mckinney    Thank You,  Warren Javed, KJP-C  Benign Hematology

## 2024-11-14 RX ORDER — METHYLPREDNISOLONE SOD SUCC 125 MG
60 VIAL (EA) INJECTION
Start: 2024-11-14

## 2024-11-14 RX ORDER — SODIUM CHLORIDE 0.9 % (FLUSH) 0.9 %
10 SYRINGE (ML) INJECTION
OUTPATIENT
Start: 2024-11-14

## 2024-11-22 ENCOUNTER — OFFICE VISIT (OUTPATIENT)
Dept: PRIMARY CARE CLINIC | Facility: CLINIC | Age: 74
End: 2024-11-22
Payer: MEDICARE

## 2024-11-22 ENCOUNTER — TELEPHONE (OUTPATIENT)
Dept: PRIMARY CARE CLINIC | Facility: CLINIC | Age: 74
End: 2024-11-22

## 2024-11-22 VITALS
DIASTOLIC BLOOD PRESSURE: 52 MMHG | OXYGEN SATURATION: 99 % | TEMPERATURE: 96 F | WEIGHT: 202.5 LBS | BODY MASS INDEX: 35.88 KG/M2 | SYSTOLIC BLOOD PRESSURE: 128 MMHG | HEART RATE: 67 BPM | HEIGHT: 63 IN

## 2024-11-22 DIAGNOSIS — R06.02 SHORTNESS OF BREATH: ICD-10-CM

## 2024-11-22 DIAGNOSIS — E78.2 MIXED HYPERLIPIDEMIA: Chronic | ICD-10-CM

## 2024-11-22 DIAGNOSIS — I10 ESSENTIAL HYPERTENSION: Chronic | ICD-10-CM

## 2024-11-22 DIAGNOSIS — D50.0 IRON DEFICIENCY ANEMIA DUE TO CHRONIC BLOOD LOSS: Primary | ICD-10-CM

## 2024-11-22 DIAGNOSIS — E03.9 HYPOTHYROIDISM, UNSPECIFIED TYPE: Chronic | ICD-10-CM

## 2024-11-22 DIAGNOSIS — N18.31 CKD STAGE 3A, GFR 45-59 ML/MIN: ICD-10-CM

## 2024-11-22 DIAGNOSIS — R54 FRAILTY SYNDROME IN GERIATRIC PATIENT: Chronic | ICD-10-CM

## 2024-11-22 DIAGNOSIS — R73.03 PREDIABETES: ICD-10-CM

## 2024-11-22 LAB
ALBUMIN SERPL BCP-MCNC: 3.3 G/DL (ref 3.5–5.2)
ANION GAP SERPL CALC-SCNC: 10 MMOL/L (ref 8–16)
BASOPHILS # BLD AUTO: 0.07 K/UL (ref 0–0.2)
BASOPHILS NFR BLD: 0.8 % (ref 0–1.9)
BUN SERPL-MCNC: 15 MG/DL (ref 8–23)
CALCIUM SERPL-MCNC: 9 MG/DL (ref 8.7–10.5)
CHLORIDE SERPL-SCNC: 104 MMOL/L (ref 95–110)
CO2 SERPL-SCNC: 27 MMOL/L (ref 23–29)
CREAT SERPL-MCNC: 1.2 MG/DL (ref 0.5–1.4)
DIFFERENTIAL METHOD BLD: ABNORMAL
EOSINOPHIL # BLD AUTO: 0.1 K/UL (ref 0–0.5)
EOSINOPHIL NFR BLD: 1.6 % (ref 0–8)
ERYTHROCYTE [DISTWIDTH] IN BLOOD BY AUTOMATED COUNT: 18.4 % (ref 11.5–14.5)
EST. GFR  (NO RACE VARIABLE): 48 ML/MIN/1.73 M^2
GLUCOSE SERPL-MCNC: 107 MG/DL (ref 70–110)
HCT VFR BLD AUTO: 31.5 % (ref 37–48.5)
HGB BLD-MCNC: 9.2 G/DL (ref 12–16)
IMM GRANULOCYTES # BLD AUTO: 0.03 K/UL (ref 0–0.04)
IMM GRANULOCYTES NFR BLD AUTO: 0.4 % (ref 0–0.5)
LYMPHOCYTES # BLD AUTO: 1.2 K/UL (ref 1–4.8)
LYMPHOCYTES NFR BLD: 13.8 % (ref 18–48)
MCH RBC QN AUTO: 21.6 PG (ref 27–31)
MCHC RBC AUTO-ENTMCNC: 29.2 G/DL (ref 32–36)
MCV RBC AUTO: 74 FL (ref 82–98)
MONOCYTES # BLD AUTO: 0.7 K/UL (ref 0.3–1)
MONOCYTES NFR BLD: 8.5 % (ref 4–15)
NEUTROPHILS # BLD AUTO: 6.3 K/UL (ref 1.8–7.7)
NEUTROPHILS NFR BLD: 74.9 % (ref 38–73)
NRBC BLD-RTO: 0 /100 WBC
PHOSPHATE SERPL-MCNC: 3.2 MG/DL (ref 2.7–4.5)
PLATELET # BLD AUTO: 300 K/UL (ref 150–450)
PMV BLD AUTO: 10 FL (ref 9.2–12.9)
POTASSIUM SERPL-SCNC: 3.8 MMOL/L (ref 3.5–5.1)
RBC # BLD AUTO: 4.26 M/UL (ref 4–5.4)
SODIUM SERPL-SCNC: 141 MMOL/L (ref 136–145)
WBC # BLD AUTO: 8.34 K/UL (ref 3.9–12.7)

## 2024-11-22 PROCEDURE — 80069 RENAL FUNCTION PANEL: CPT

## 2024-11-22 PROCEDURE — 99215 OFFICE O/P EST HI 40 MIN: CPT | Mod: PBBFAC,PN

## 2024-11-22 PROCEDURE — 85025 COMPLETE CBC W/AUTO DIFF WBC: CPT

## 2024-11-22 PROCEDURE — 99999 PR PBB SHADOW E&M-EST. PATIENT-LVL V: CPT | Mod: PBBFAC,,,

## 2024-11-22 NOTE — ASSESSMENT & PLAN NOTE
At high risk for falls.   Advise patient get assistance at home but pt states limited family support.   Continue to closely monitor  Instruct not to bend over or change positions suddenly.

## 2024-11-22 NOTE — ASSESSMENT & PLAN NOTE
O2 sat 99% room air.   Labored breathing while talking and moving noted.   Advise patient to take breaks between talking  Respirations improves once patient stop moving or talking.

## 2024-11-22 NOTE — ASSESSMENT & PLAN NOTE
Controlled this visit  Continue HTN management   Continue daily activity, low sodium diet, weight loss efforts  BP Readings from Last 3 Encounters:   11/22/24 (!) 128/52   11/08/24 (!) 136/52   10/25/24 (!) 130/58

## 2024-11-22 NOTE — ASSESSMENT & PLAN NOTE
Assess and monitor  Lab Results   Component Value Date    CREATININE 1.2 11/22/2024    CREATININE 1.3 11/05/2024    CREATININE 1.1 07/30/2024     Lab Results   Component Value Date    EGFRNONAA 41 (A) 07/18/2022    EGFRNONAA 41 (A) 07/01/2022    EGFRNONAA 37.8 (A) 06/27/2022   Avoid nephrotoxic agents  Denies any prerenal GI losses, postrenal complaints  F/U with PCP or Nephrology

## 2024-11-22 NOTE — ASSESSMENT & PLAN NOTE
States waiting to go for iron infusion on [place] 11/25/24  Weakness  Not able to tolerate oral iron.   Recommend to take breaks in between activities   Hold on to objects while walking throughout home for stability, recommend using a cane or walker.     Component      Latest Ref Rng 6/17/2024 7/30/2024 11/5/2024   Iron      30 - 160 ug/dL 70  57  17 (L)    Transferrin      200 - 375 mg/dL 228  243  307    TIBC      250 - 450 ug/dL 337  360  454 (H)    Saturated Iron      20 - 50 % 21  16 (L)  4 (L)       Legend:  (L) Low  (H) High

## 2024-11-22 NOTE — PATIENT INSTRUCTIONS
If you are feeling unwell, we'd like to be the first ones to know here at [facility]! Please give us a call. Same day appointments are our top priority to keep you well and out of the emergency rooms and hospitals. Call [phone] for our direct line. After hours advice is always available. Please call [phone] after hours to speak to the on-call team.      Will collect blood work and will contact you of results to further discuss interventions.

## 2024-11-22 NOTE — PROGRESS NOTES
Qi Ortiz  11/22/2024  [id number]    Lisa Porter MD  Patient Care Team:  Lisa Porter MD as PCP - General (Internal Medicine)    Visit Type: Follow-up    Chief Complaint:  Chief Complaint   Patient presents with    Follow-up     Pt is weak, not been able to get iron infusion.        History of Present Illness:        CHIEF COMPLAINT:  Qi presents today for follow-up on severe anemia.    ANEMIA:  She reports severe anemia with extremely low iron levels, [facility] She cannot tolerate oral iron supplements due to severe constipation and bleeding. Eight iron infusions have been scheduled. She expresses frustration with treatment delays. She experiences weakness and has difficulty distinguishing between anemia symptoms and her heart condition symptoms.    FATIGUE AND WEAKNESS:  She reports extreme fatigue and weakness, significantly impacting daily activities. It takes her two hours to get dressed and prepare for appointments, depleting her energy. She experiences shortness of breath with minimal exertion, including difficulty speaking. She also reports dizziness and an unsteady gait, mentioning challenges with walking and needing time to regain stability in her legs. She feels so weak that she could easily fall asleep or pass out.    CARDIAC HISTORY:  She has a significant cardiac history, including two open heart surgeries and an aortic valve replacement. She recently had a heart attack, initially undetected on an EKG during a June cardiology appointment. The heart attack was later confirmed through labs, leading to hospitalization and cardiac catheterization with stent placement. She mentions having an allergic reaction to Brilinta. She reports ongoing cardiac symptoms that are difficult to distinguish from her underlying heart condition.    GASTROINTESTINAL ISSUES:  She reports minor rectal bleeding associated with hemorrhoids, particularly after  consuming certain foods like walnuts. The bleeding is minimal and intermittent. She denies any current significant bleeding episodes. She also notes changes in taste and a decreased appetite. She has to make an effort to eat due to lack of appetite but denies any pain associated with eating.    WEIGHT LOSS:  She reports a 75-pound weight loss, with an initial rapid loss of 25 lbs in four weeks after starting metformin. A previous hematology consultation initially advised against upper and lower GI studies, but a subsequent visit resulted in a recommendation for at least an upper GI exam.    MEDICATIONS:  She takes metformin for diabetes management, prescribed by Dr. Porter in 2022. She monitors herself regularly and ensures adequate food intake. She also confirms daily use of B-12 supplements.    SOCIAL HISTORY:  She lives alone and reports having a limited support system with two daughters working full time, though some individuals are available to assist her if needed.        Presents w/ SOB and weakness. States iron infusion coming up but not quick enough. Seen Hematology 11/08/24, but have not received iron infusion yet. States bled for 3 weeks rectally after IV iron push in the past. States unsure of where the blood loss is coming from. States used her energy to get dress and walk into clinic. C/o decrease energy. States takes a lot of breaks in between activities.     Chronic medical issues include type 2 DM, h/o CVA, CAD s/p CABG x 2, AS s/p TAVR, s/p NSTEMI  6/25/24, HTN, HLD (intolerant of statin), CKD stage 3, hypothyroidism, psoriasis and psoriatic arthritis, CAREN, obesity, and IRIS (intolerant of CPAP).     Recent Fall:  []Yes  [x]No  Activity:  []Vigorous []Moderate [x]Sedentary  Appetite:  []Good  []Fair  [x]Poor  Mood: [x]Stable []Anxious []Depressed   Insomnia: []Yes  [x]No    PHQ-4 Score: 0     Hosp/ED/Urgent Care:  9/25/24 ED Dyspnea [facility]  8/26-8/27/24 Hosp orthostatic hypotension dehydration [facility]   [facility]   8/26/24 ED pre-syncope, SOB (left w/o being seen)    Recent appointments:   11/08/24 Hematology  10/25/24 MD German  10/23/24 Addy Paez CAD  9/23/24 Addy Cohen    Upcoming appointments:  Future Appointments       Next 10 Appointments       Date Provider Specialty Appt Notes    11/25/2024  Chemotherapy AM///*Venofer 1/5 IVPB ow/cbd    12/2/2024  Chemotherapy Venofer 2/5 ow/cbd    12/9/2024  Chemotherapy Venofer 3/5 ow/cbd    12/16/2024  Chemotherapy Venofer 4/5 ow/cbd    12/20/2024 Gayatri Rubi, K[facility] Primary [facility] 1 month f/u    12/23/2024  Chemotherapy Venofer 5/5 ow/cbd    1/27/2025  Lab lab    1/29/2025 Asif Paez MD Cardiology 3 month f/u    1/31/2025 Trinidad Javed NP Hematology and Oncology 5wk post iron prior lab    3/5/2025  Lab Dr. Ackerman              Displaying the next 10 appointments. This patient has additional appointments scheduled.             The following were reviewed: Active problem list, medication list, allergies, family history, social history, and Health Maintenance.     Medications have been reviewed and reconciled with patient at visit today.    Review of Systems   Constitutional:  Positive for malaise/fatigue. Negative for chills and fever.   HENT:  Negative for congestion, ear pain, sinus pain and sore throat.    Eyes:  Negative for blurred vision and pain.   Respiratory:  Negative for cough and shortness of breath.    Cardiovascular:  Negative for chest pain and leg swelling.   Gastrointestinal:  Negative for abdominal pain, nausea and vomiting.   Genitourinary:  Negative for dysuria.   Musculoskeletal:  Negative for back pain, falls and myalgias.   Skin:  Negative for itching and rash.   Neurological:  Positive for weakness. Negative for dizziness and headaches.   Psychiatric/Behavioral:  Negative for suicidal ideas. The patient is nervous/anxious. The patient does not have insomnia.         See HPI above    Exam:  Vitals:    11/22/24 1014   BP: (!) 128/52   BP  "Location: Left arm   Patient Position: Sitting   Pulse: 67   Temp: 96.3 °F (35.7 °C)   TempSrc: Temporal   SpO2: 99%   Weight: 91.8 kg (202 lb 7.9 oz)   Height: 5' 3" (1.6 m)     Weight: 91.8 kg (202 lb 7.9 oz)   Body mass index is 35.87 kg/m².    BP Readings from Last 3 Encounters:   24 (!) 128/52   24 (!) 136/52   10/25/24 (!) 130/58        Wt Readings from Last 3 Encounters:   24 1014 91.8 kg (202 lb 7.9 oz)   24 1405 91.4 kg (201 lb 8 oz)   10/25/24 1508 93.2 kg (205 lb 7.5 oz)        Physical Exam  Constitutional:       Appearance: Normal appearance. She is normal weight.   HENT:      Head: Normocephalic.      Right Ear: Tympanic membrane, ear canal and external ear normal.      Left Ear: Tympanic membrane, ear canal and external ear normal.      Nose: Nose normal.      Mouth/Throat:      Lips: Pink.      Mouth: Mucous membranes are dry.      Pharynx: Oropharynx is clear.   Eyes:      Extraocular Movements: Extraocular movements intact.      Conjunctiva/sclera: Conjunctivae normal.      Pupils: Pupils are equal, round, and reactive to light.   Cardiovascular:      Rate and Rhythm: Normal rate and regular rhythm.      Pulses: Normal pulses.      Heart sounds: Murmur heard.   Pulmonary:      Effort: Pulmonary effort is normal.      Breath sounds: Normal breath sounds.      Comments: Labored breathing noted w/ talking  Abdominal:      General: Bowel sounds are normal.      Palpations: Abdomen is soft.   Musculoskeletal:         General: Normal range of motion.      Left hand: Deformity (lower digits) present.      Cervical back: Normal range of motion and neck supple.      Right lower leg: Tenderness (lower leg w/ dry skin & bruising noted) present. 2+ Edema present.      Left lower le+ Edema present.   Skin:     General: Skin is warm and dry.      Capillary Refill: Capillary refill takes less than 2 seconds.      Coloration: Skin is pale.      Findings: Abrasion (right wrist) present. "   Neurological:      General: No focal deficit present.      Mental Status: She is alert and oriented to person, place, and time. Mental status is at baseline.   Psychiatric:         Mood and Affect: Mood is anxious.         Behavior: Behavior normal.         Thought Content: Thought content normal.         Judgment: Judgment normal.        Laboratory Reviewed  Lab Results   Component Value Date    WBC 8.34 11/22/2024    HGB 9.2 (L) 11/22/2024    HCT 31.5 (L) 11/22/2024     11/22/2024    MCV 74 (L) 11/22/2024    CHOL 123 11/05/2024    TRIG 136 11/05/2024    HDL 35 (L) 11/05/2024    LDLCALC 60.8 (L) 11/05/2024    ALT 8 (L) 11/05/2024    AST 15 11/05/2024     11/22/2024    K 3.8 11/22/2024     11/22/2024    CREATININE 1.2 11/22/2024    BUN 15 11/22/2024    CO2 27 11/22/2024    MG 1.7 06/25/2024    TSH 1.770 04/19/2024    FREET4 1.01 04/19/2024    INR 1.0 06/25/2024    HGBA1C 6.0 (H) 11/05/2024    CRP 3.1 01/26/2024     Lab Results   Component Value Date    PTH 91.5 (H) 05/20/2022    CALCIUM 9.0 11/22/2024    PHOS 3.2 11/22/2024      Lab Results   Component Value Date    RISKSIWW93 748 11/05/2024     Lab Results   Component Value Date    FOLATE 18.2 11/05/2024      Lab Results   Component Value Date    IRON 17 (L) 11/05/2024    TRANSFERRIN 307 11/05/2024    TIBC 454 (H) 11/05/2024    FESATURATED 4 (L) 11/05/2024      Lab Results   Component Value Date    EGFRNORACEVR 48 (A) 11/22/2024    ALBUMIN 3.3 (L) 11/22/2024     (H) 06/29/2024     Lab Results   Component Value Date    GRJVXHQG14SY 60 11/05/2024          Assessment:   74 y.o. female with multiple co-morbid illnesses here for continued follow up of medical problems.      The primary encounter diagnosis was Iron deficiency anemia due to chronic blood loss. Diagnoses of Mixed hyperlipidemia, Essential hypertension, CKD stage 3a, GFR 45-59 ml/min, Hypothyroidism, unspecified type, Prediabetes, Frailty syndrome in geriatric patient, and  Shortness of breath were also pertinent to this visit.      Plan:   1. Iron deficiency anemia due to chronic blood loss  Assessment & Plan:  States waiting to go for iron infusion on [place] 11/25/24  Weakness  Not able to tolerate oral iron.   Recommend to take breaks in between activities   Hold on to objects while walking throughout home for stability, recommend using a cane or walker.     Component      Latest Ref Rng 6/17/2024 7/30/2024 11/5/2024   Iron      30 - 160 ug/dL 70  57  17 (L)    Transferrin      200 - 375 mg/dL 228  243  307    TIBC      250 - 450 ug/dL 337  360  454 (H)    Saturated Iron      20 - 50 % 21  16 (L)  4 (L)       Legend:  (L) Low  (H) High    Orders:  -     CBC W/ AUTO DIFFERENTIAL  -     RENAL FUNCTION PANEL    2. Mixed hyperlipidemia  Assessment & Plan:     Lab Results   Component Value Date    CHOL 123 11/05/2024    HDL 35 (L) 11/05/2024    LDLCALC 60.8 (L) 11/05/2024    TRIG 136 11/05/2024     Chronic, stable  Continue repatha  Include vigorous activity and weight loss in healthcare plan  Mediterranean diet  F/U PCP       3. Essential hypertension  Assessment & Plan:  Controlled this visit  Continue HTN management   Continue daily activity, low sodium diet, weight loss efforts  BP Readings from Last 3 Encounters:   11/22/24 (!) 128/52   11/08/24 (!) 136/52   10/25/24 (!) 130/58           4. CKD stage 3a, GFR 45-59 ml/min  Assessment & Plan:  Assess and monitor  Lab Results   Component Value Date    CREATININE 1.2 11/22/2024    CREATININE 1.3 11/05/2024    CREATININE 1.1 07/30/2024     Lab Results   Component Value Date    EGFRNONAA 41 (A) 07/18/2022    EGFRNONAA 41 (A) 07/01/2022    EGFRNONAA 37.8 (A) 06/27/2022   Avoid nephrotoxic agents  Denies any prerenal GI losses, postrenal complaints  F/U with PCP or Nephrology       5. Hypothyroidism, unspecified type  Assessment & Plan:  Stable.   Patient has chronic hypothyroidism. TFTs reviewed-   Lab Results   Component Value Date    TSH  1.770 04/19/2024   . Will continue chronic levothyroxine and adjust for and clinical changes.          6. Prediabetes  Comments:  HGB A1c 6.0  Discuss diet changes.   Avoid increasing carbs and sugar rich foods    7. Frailty syndrome in geriatric patient  Assessment & Plan:  At high risk for falls.   Advise patient get assistance at home but pt states limited family support.   Continue to closely monitor  Instruct not to bend over or change positions suddenly.         8. Shortness of breath  Assessment & Plan:  O2 sat 99% room air.   Labored breathing while talking and moving noted.   Advise patient to take breaks between talking  Respirations improves once patient stop moving or talking.     Orders:  -     CBC W/ AUTO DIFFERENTIAL  -     RENAL FUNCTION PANEL       Informed patient that labs will be collected stat and will call with results. If H & H dropped from previous levels and continue s/s, discuss with patient will receive a call from PCP and advise to go to nearest hospital.       Health Maintenance         Date Due Completion Date    COVID-19 Vaccine (1) Never done ---    Pneumococcal Vaccines (Age 65+) (1 of 2 - PCV) Never done ---    RSV Vaccine (Age 60+ and Pregnant patients) (1 - Risk 60-74 years 1-dose series) Never done ---    Colorectal Cancer Screening 04/18/2016 4/18/2011    Mammogram 10/23/2016 10/23/2015    Shingles Vaccine (2 of 2) 11/28/2022 10/3/2022    Eye Exam 03/06/2024 3/6/2023    Diabetes Urine Screening 09/29/2024 9/29/2023    Hemoglobin A1c 05/05/2025 11/5/2024    DEXA Scan 07/25/2025 7/25/2022    Foot Exam 10/25/2025 10/25/2024    Override on 9/29/2023: Done (pt w idiopathic peripheral neuropathy - not diabetic - prediabetic)    Lipid Panel 11/05/2025 11/5/2024    TETANUS VACCINE 10/02/2033 10/2/2023            -Patient's lab results were reviewed and discussed with patient  -Treatment options and alternatives were discussed with the patient. Patient expressed understanding. Patient  was given the opportunity to ask questions and be an active participant in their medical care. Patient had no further questions or concerns at this time.       Follow up: Follow up in about 1 month (around 12/22/2024).    Care Plan/Goals: Reviewed N/A   Goals    None         After visit summary printed and given to patient upon discharge.  Patient goals and care plan are included in After visit summary.    TOTAL TIME evaluating and managing this patient for this encounter was  69 minutes. This time was spent personally by me on some of the following activities: review of patient's past medical history, assessing age-appropriate health maintenance needs, review of any interval history, review and interpretation of lab results, review and interpretation of imaging test results, review and interpretation of cardiology test results, reviewing consulting specialist notes, obtaining history from the patient and family, examination of the patient, medication reconciliation, managing and/or ordering prescription medications, ordering imaging tests, ordering referral to subspecialty provider(s), educating patient and answering their questions about diagnosis, treatment plan, and goals of treatment, discussing planned follow-up and final documentation of the visit. This time was exclusive of any separately billable procedures for this patient and exclusive of time spent treating any other patients.     This note was generated with the assistance of ambient listening technology. Verbal consent was obtained by the patient and accompanying visitor(s) for the recording of patient appointment to facilitate this note. I attest to having reviewed and edited the generated note for accuracy, though some syntax or spelling errors may persist. Please contact the author of this note for any clarification.              ANA LILIA Shukla, FNP-C  [facility] [address] [place] [address]   [phone]    [phone] fax

## 2024-11-22 NOTE — ASSESSMENT & PLAN NOTE
Stable.   Patient has chronic hypothyroidism. TFTs reviewed-   Lab Results   Component Value Date    TSH 1.770 04/19/2024   . Will continue chronic levothyroxine and adjust for and clinical changes.

## 2024-11-22 NOTE — ASSESSMENT & PLAN NOTE
Lab Results   Component Value Date    CHOL 123 11/05/2024    HDL 35 (L) 11/05/2024    LDLCALC 60.8 (L) 11/05/2024    TRIG 136 11/05/2024     Chronic, stable  Continue repatha  Include vigorous activity and weight loss in healthcare plan  Mediterranean diet  F/U PCP

## 2024-11-22 NOTE — TELEPHONE ENCOUNTER
Left voicemail for patient that her labwork was stable.  Also spoke by phone with Ms. Lebron's daughter, Shyanne Quintanilla, informing her of the same.

## 2024-11-25 ENCOUNTER — INFUSION (OUTPATIENT)
Dept: INFUSION THERAPY | Facility: HOSPITAL | Age: 74
End: 2024-11-25
Attending: INTERNAL MEDICINE
Payer: MEDICARE

## 2024-11-25 VITALS
OXYGEN SATURATION: 96 % | DIASTOLIC BLOOD PRESSURE: 60 MMHG | HEART RATE: 56 BPM | RESPIRATION RATE: 16 BRPM | TEMPERATURE: 98 F | SYSTOLIC BLOOD PRESSURE: 129 MMHG

## 2024-11-25 DIAGNOSIS — M81.0 AGE-RELATED OSTEOPOROSIS WITHOUT CURRENT PATHOLOGICAL FRACTURE: ICD-10-CM

## 2024-11-25 DIAGNOSIS — D50.9 IRON DEFICIENCY ANEMIA, UNSPECIFIED IRON DEFICIENCY ANEMIA TYPE: Primary | ICD-10-CM

## 2024-11-25 DIAGNOSIS — D50.0 IRON DEFICIENCY ANEMIA DUE TO CHRONIC BLOOD LOSS: ICD-10-CM

## 2024-11-25 PROCEDURE — 63600175 PHARM REV CODE 636 W HCPCS: Performed by: NURSE PRACTITIONER

## 2024-11-25 PROCEDURE — 96365 THER/PROPH/DIAG IV INF INIT: CPT

## 2024-11-25 PROCEDURE — 96375 TX/PRO/DX INJ NEW DRUG ADDON: CPT

## 2024-11-25 PROCEDURE — 25000003 PHARM REV CODE 250: Performed by: NURSE PRACTITIONER

## 2024-11-25 RX ORDER — SODIUM CHLORIDE 0.9 % (FLUSH) 0.9 %
10 SYRINGE (ML) INJECTION
OUTPATIENT
Start: 2024-12-02

## 2024-11-25 RX ORDER — METHYLPREDNISOLONE SOD SUCC 125 MG
60 VIAL (EA) INJECTION
Status: COMPLETED | OUTPATIENT
Start: 2024-11-25 | End: 2024-11-25

## 2024-11-25 RX ORDER — SODIUM CHLORIDE 0.9 % (FLUSH) 0.9 %
10 SYRINGE (ML) INJECTION
Status: DISCONTINUED | OUTPATIENT
Start: 2024-11-25 | End: 2024-11-25 | Stop reason: HOSPADM

## 2024-11-25 RX ORDER — METHYLPREDNISOLONE SOD SUCC 125 MG
60 VIAL (EA) INJECTION
Start: 2024-12-02

## 2024-11-25 RX ADMIN — METHYLPREDNISOLONE SODIUM SUCCINATE 60 MG: 125 INJECTION, POWDER, FOR SOLUTION INTRAMUSCULAR; INTRAVENOUS at 01:11

## 2024-11-25 RX ADMIN — SODIUM CHLORIDE 200 MG: 9 INJECTION, SOLUTION INTRAVENOUS at 01:11

## 2024-11-25 NOTE — PLAN OF CARE
Problem: Adult Inpatient Plan of Care  Goal: Plan of Care Review  Outcome: Progressing  Flowsheets (Taken 11/25/2024 1426)  Plan of Care Reviewed With: patient  Goal: Patient-Specific Goal (Individualized)  Outcome: Progressing  Flowsheets (Taken 11/25/2024 1426)  Individualized Care Needs: patient likes feet elevated, multiple pillow, juice, and speaking directily to pt when talking d/t hard of hearing  Anxieties, Fears or Concerns: Patient reports feeling very weak and tired for the past four weeks  Patient/Family-Specific Goals (Include Timeframe): patient tolerated treatment well with no adverse reaction.     Problem: Fall Injury Risk  Goal: Absence of Fall and Fall-Related Injury  Outcome: Progressing     Problem: Anemia  Goal: Anemia Symptom Improvement  Outcome: Progressing

## 2024-11-25 NOTE — NURSING
Discussed with Trinidad Javed NP regarding patient receiving IV venofer route and dose. Patient does not want IV venofer as a push but as a piggy back.   Discussed all patients concerns with SAMEERA Abdi. Agreed to give patient a total of 800 mg IVPB venofer. Treatment plan had 200 mg IVP in it.  Plan is now updated to reflect SAMEERA Abdi and patients requests.   Patient is to get today's dose of 200 mg IVPB venofer. She will then get 6 doses of 100 mg IVPB to equal 800 mg of venofer total.

## 2024-12-02 ENCOUNTER — INFUSION (OUTPATIENT)
Dept: INFUSION THERAPY | Facility: HOSPITAL | Age: 74
End: 2024-12-02
Attending: INTERNAL MEDICINE
Payer: MEDICARE

## 2024-12-02 VITALS
RESPIRATION RATE: 18 BRPM | OXYGEN SATURATION: 97 % | DIASTOLIC BLOOD PRESSURE: 71 MMHG | SYSTOLIC BLOOD PRESSURE: 152 MMHG | HEART RATE: 58 BPM | TEMPERATURE: 98 F

## 2024-12-02 DIAGNOSIS — L40.50 PSORIATIC ARTHRITIS: ICD-10-CM

## 2024-12-02 DIAGNOSIS — D50.0 IRON DEFICIENCY ANEMIA DUE TO CHRONIC BLOOD LOSS: ICD-10-CM

## 2024-12-02 DIAGNOSIS — D50.9 IRON DEFICIENCY ANEMIA, UNSPECIFIED IRON DEFICIENCY ANEMIA TYPE: Primary | ICD-10-CM

## 2024-12-02 DIAGNOSIS — M81.0 AGE-RELATED OSTEOPOROSIS WITHOUT CURRENT PATHOLOGICAL FRACTURE: ICD-10-CM

## 2024-12-02 PROCEDURE — 96365 THER/PROPH/DIAG IV INF INIT: CPT

## 2024-12-02 PROCEDURE — 96375 TX/PRO/DX INJ NEW DRUG ADDON: CPT

## 2024-12-02 PROCEDURE — 25000003 PHARM REV CODE 250: Performed by: NURSE PRACTITIONER

## 2024-12-02 PROCEDURE — 63600175 PHARM REV CODE 636 W HCPCS: Performed by: NURSE PRACTITIONER

## 2024-12-02 RX ORDER — METHYLPREDNISOLONE SOD SUCC 125 MG
60 VIAL (EA) INJECTION
Start: 2024-12-09

## 2024-12-02 RX ORDER — SECUKINUMAB 150 MG/ML
INJECTION SUBCUTANEOUS
Qty: 4 ML | Refills: 1 | Status: SHIPPED | OUTPATIENT
Start: 2024-12-02

## 2024-12-02 RX ORDER — SODIUM CHLORIDE 0.9 % (FLUSH) 0.9 %
10 SYRINGE (ML) INJECTION
OUTPATIENT
Start: 2024-12-09

## 2024-12-02 RX ORDER — METHYLPREDNISOLONE SOD SUCC 125 MG
60 VIAL (EA) INJECTION
Status: COMPLETED | OUTPATIENT
Start: 2024-12-02 | End: 2024-12-02

## 2024-12-02 RX ADMIN — IRON SUCROSE 100 MG: 20 INJECTION, SOLUTION INTRAVENOUS at 01:12

## 2024-12-02 RX ADMIN — METHYLPREDNISOLONE SODIUM SUCCINATE 60 MG: 125 INJECTION, POWDER, FOR SOLUTION INTRAMUSCULAR; INTRAVENOUS at 01:12

## 2024-12-02 NOTE — PLAN OF CARE
Problem: Adult Inpatient Plan of Care  Goal: Plan of Care Review  Outcome: Progressing  Flowsheets (Taken 12/2/2024 1332)  Plan of Care Reviewed With: patient  Goal: Patient-Specific Goal (Individualized)  Outcome: Progressing  Flowsheets (Taken 12/2/2024 1332)  Individualized Care Needs: feet elevated, drink provided  Anxieties, Fears or Concerns: weak and tired today  Goal: Optimal Comfort and Wellbeing  Outcome: Progressing  Intervention: Provide Person-Centered Care  Flowsheets (Taken 12/2/2024 1332)  Trust Relationship/Rapport:   care explained   choices provided   emotional support provided   empathic listening provided   questions answered   questions encouraged   reassurance provided   thoughts/feelings acknowledged

## 2024-12-09 ENCOUNTER — INFUSION (OUTPATIENT)
Dept: INFUSION THERAPY | Facility: HOSPITAL | Age: 74
End: 2024-12-09
Payer: MEDICARE

## 2024-12-09 VITALS
RESPIRATION RATE: 16 BRPM | OXYGEN SATURATION: 98 % | HEART RATE: 63 BPM | DIASTOLIC BLOOD PRESSURE: 56 MMHG | SYSTOLIC BLOOD PRESSURE: 145 MMHG | TEMPERATURE: 97 F

## 2024-12-09 DIAGNOSIS — D50.0 IRON DEFICIENCY ANEMIA DUE TO CHRONIC BLOOD LOSS: ICD-10-CM

## 2024-12-09 DIAGNOSIS — D50.9 IRON DEFICIENCY ANEMIA, UNSPECIFIED IRON DEFICIENCY ANEMIA TYPE: Primary | ICD-10-CM

## 2024-12-09 DIAGNOSIS — M81.0 AGE-RELATED OSTEOPOROSIS WITHOUT CURRENT PATHOLOGICAL FRACTURE: ICD-10-CM

## 2024-12-09 PROCEDURE — 96365 THER/PROPH/DIAG IV INF INIT: CPT

## 2024-12-09 PROCEDURE — 96375 TX/PRO/DX INJ NEW DRUG ADDON: CPT

## 2024-12-09 PROCEDURE — 25000003 PHARM REV CODE 250: Performed by: NURSE PRACTITIONER

## 2024-12-09 PROCEDURE — 63600175 PHARM REV CODE 636 W HCPCS: Performed by: NURSE PRACTITIONER

## 2024-12-09 RX ORDER — METHYLPREDNISOLONE SOD SUCC 125 MG
60 VIAL (EA) INJECTION
Start: 2024-12-16

## 2024-12-09 RX ORDER — METHYLPREDNISOLONE SOD SUCC 125 MG
60 VIAL (EA) INJECTION
Status: COMPLETED | OUTPATIENT
Start: 2024-12-09 | End: 2024-12-09

## 2024-12-09 RX ORDER — SODIUM CHLORIDE 0.9 % (FLUSH) 0.9 %
10 SYRINGE (ML) INJECTION
OUTPATIENT
Start: 2024-12-16

## 2024-12-09 RX ADMIN — SODIUM CHLORIDE 100 MG: 9 INJECTION, SOLUTION INTRAVENOUS at 01:12

## 2024-12-09 RX ADMIN — METHYLPREDNISOLONE SODIUM SUCCINATE 60 MG: 125 INJECTION, POWDER, FOR SOLUTION INTRAMUSCULAR; INTRAVENOUS at 01:12

## 2024-12-09 NOTE — PLAN OF CARE
Problem: Adult Inpatient Plan of Care  Goal: Plan of Care Review  Outcome: Progressing  Flowsheets (Taken 12/9/2024 1431)  Plan of Care Reviewed With: patient  Goal: Patient-Specific Goal (Individualized)  Outcome: Progressing  Flowsheets (Taken 12/9/2024 1431)  Individualized Care Needs: patient likes feet elevated, IV to right arm, and orange juice  Anxieties, Fears or Concerns: Patient reports still feeling weak and tired today  Patient/Family-Specific Goals (Include Timeframe): patient tolerated treatment well with no adverse reactions.     Problem: Fall Injury Risk  Goal: Absence of Fall and Fall-Related Injury  Outcome: Progressing     Problem: Anemia  Goal: Anemia Symptom Improvement  Outcome: Progressing

## 2024-12-16 ENCOUNTER — INFUSION (OUTPATIENT)
Dept: INFUSION THERAPY | Facility: HOSPITAL | Age: 74
End: 2024-12-16
Payer: MEDICARE

## 2024-12-16 VITALS
OXYGEN SATURATION: 98 % | DIASTOLIC BLOOD PRESSURE: 65 MMHG | HEART RATE: 62 BPM | RESPIRATION RATE: 18 BRPM | SYSTOLIC BLOOD PRESSURE: 140 MMHG | TEMPERATURE: 97 F

## 2024-12-16 DIAGNOSIS — D50.0 IRON DEFICIENCY ANEMIA DUE TO CHRONIC BLOOD LOSS: ICD-10-CM

## 2024-12-16 DIAGNOSIS — M81.0 AGE-RELATED OSTEOPOROSIS WITHOUT CURRENT PATHOLOGICAL FRACTURE: ICD-10-CM

## 2024-12-16 DIAGNOSIS — D50.9 IRON DEFICIENCY ANEMIA, UNSPECIFIED IRON DEFICIENCY ANEMIA TYPE: Primary | ICD-10-CM

## 2024-12-16 PROCEDURE — 63600175 PHARM REV CODE 636 W HCPCS: Performed by: NURSE PRACTITIONER

## 2024-12-16 PROCEDURE — 96365 THER/PROPH/DIAG IV INF INIT: CPT

## 2024-12-16 PROCEDURE — 96375 TX/PRO/DX INJ NEW DRUG ADDON: CPT

## 2024-12-16 PROCEDURE — 25000003 PHARM REV CODE 250: Performed by: NURSE PRACTITIONER

## 2024-12-16 RX ORDER — SODIUM CHLORIDE 0.9 % (FLUSH) 0.9 %
10 SYRINGE (ML) INJECTION
OUTPATIENT
Start: 2024-12-23

## 2024-12-16 RX ORDER — METHYLPREDNISOLONE SOD SUCC 125 MG
60 VIAL (EA) INJECTION
Start: 2024-12-23

## 2024-12-16 RX ORDER — METHYLPREDNISOLONE SOD SUCC 125 MG
60 VIAL (EA) INJECTION
Status: COMPLETED | OUTPATIENT
Start: 2024-12-16 | End: 2024-12-16

## 2024-12-16 RX ADMIN — IRON SUCROSE 100 MG: 20 INJECTION, SOLUTION INTRAVENOUS at 01:12

## 2024-12-16 RX ADMIN — METHYLPREDNISOLONE SODIUM SUCCINATE 60 MG: 125 INJECTION, POWDER, FOR SOLUTION INTRAMUSCULAR; INTRAVENOUS at 01:12

## 2024-12-16 NOTE — PLAN OF CARE
Problem: Adult Inpatient Plan of Care  Goal: Plan of Care Review  Outcome: Progressing  Flowsheets (Taken 12/16/2024 1333)  Plan of Care Reviewed With: patient  Goal: Patient-Specific Goal (Individualized)  Outcome: Progressing  Flowsheets (Taken 12/16/2024 1333)  Individualized Care Needs: recline with blanket, orange juice, iv to right arm if possible  Anxieties, Fears or Concerns: none today, feeling better  Goal: Absence of Hospital-Acquired Illness or Injury  Outcome: Progressing  Intervention: Prevent Infection  Flowsheets (Taken 12/16/2024 1333)  Infection Prevention:   equipment surfaces disinfected   hand hygiene promoted   personal protective equipment utilized   rest/sleep promoted  Goal: Optimal Comfort and Wellbeing  Outcome: Progressing  Intervention: Provide Person-Centered Care  Flowsheets (Taken 12/16/2024 1333)  Trust Relationship/Rapport:   care explained   thoughts/feelings acknowledged   choices provided   emotional support provided   empathic listening provided   questions answered   questions encouraged   reassurance provided     Problem: Anemia  Goal: Anemia Symptom Improvement  Outcome: Progressing  Intervention: Monitor and Manage Anemia  Flowsheets (Taken 12/16/2024 1333)  Safety Promotion/Fall Prevention:   assistive device/personal item within reach   in recliner, wheels locked   instructed to call staff for mobility   medications reviewed  Fatigue Management:   fatigue-related activity identified   frequent rest breaks encouraged   paced activity encouraged

## 2024-12-16 NOTE — NURSING
Pt tolerated Venofer infusion well. No adverse reaction noted. Pt education reinforced on possible side effects, what to expect, and when to call her provider. Pt verbalized understanding. I reviewed pt calendar w/ pt and understanding verbalized. After 30 minute wait post infusion, IV flushed w/ NS and D/C per protocol.

## 2024-12-20 PROBLEM — E61.1 IRON DEFICIENCY: Status: RESOLVED | Noted: 2023-10-12 | Resolved: 2024-12-20

## 2024-12-23 ENCOUNTER — INFUSION (OUTPATIENT)
Dept: INFUSION THERAPY | Facility: HOSPITAL | Age: 74
End: 2024-12-23
Attending: INTERNAL MEDICINE
Payer: MEDICARE

## 2024-12-23 VITALS
OXYGEN SATURATION: 98 % | HEART RATE: 60 BPM | SYSTOLIC BLOOD PRESSURE: 151 MMHG | RESPIRATION RATE: 20 BRPM | DIASTOLIC BLOOD PRESSURE: 71 MMHG | TEMPERATURE: 97 F

## 2024-12-23 DIAGNOSIS — D50.9 IRON DEFICIENCY ANEMIA, UNSPECIFIED IRON DEFICIENCY ANEMIA TYPE: Primary | ICD-10-CM

## 2024-12-23 DIAGNOSIS — M81.0 AGE-RELATED OSTEOPOROSIS WITHOUT CURRENT PATHOLOGICAL FRACTURE: ICD-10-CM

## 2024-12-23 DIAGNOSIS — D50.0 IRON DEFICIENCY ANEMIA DUE TO CHRONIC BLOOD LOSS: ICD-10-CM

## 2024-12-23 PROCEDURE — 96375 TX/PRO/DX INJ NEW DRUG ADDON: CPT

## 2024-12-23 PROCEDURE — 25000003 PHARM REV CODE 250: Performed by: NURSE PRACTITIONER

## 2024-12-23 PROCEDURE — 96365 THER/PROPH/DIAG IV INF INIT: CPT

## 2024-12-23 PROCEDURE — 63600175 PHARM REV CODE 636 W HCPCS: Performed by: NURSE PRACTITIONER

## 2024-12-23 RX ORDER — METHYLPREDNISOLONE SOD SUCC 125 MG
60 VIAL (EA) INJECTION
Start: 2024-12-30

## 2024-12-23 RX ORDER — SODIUM CHLORIDE 0.9 % (FLUSH) 0.9 %
10 SYRINGE (ML) INJECTION
Status: DISCONTINUED | OUTPATIENT
Start: 2024-12-23 | End: 2024-12-23 | Stop reason: HOSPADM

## 2024-12-23 RX ORDER — METHYLPREDNISOLONE SOD SUCC 125 MG
60 VIAL (EA) INJECTION
Status: COMPLETED | OUTPATIENT
Start: 2024-12-23 | End: 2024-12-23

## 2024-12-23 RX ORDER — SODIUM CHLORIDE 0.9 % (FLUSH) 0.9 %
10 SYRINGE (ML) INJECTION
OUTPATIENT
Start: 2024-12-30

## 2024-12-23 RX ADMIN — IRON SUCROSE 100 MG: 20 INJECTION, SOLUTION INTRAVENOUS at 01:12

## 2024-12-23 RX ADMIN — SODIUM CHLORIDE: 9 INJECTION, SOLUTION INTRAVENOUS at 01:12

## 2024-12-23 RX ADMIN — METHYLPREDNISOLONE SODIUM SUCCINATE 60 MG: 125 INJECTION, POWDER, FOR SOLUTION INTRAMUSCULAR; INTRAVENOUS at 01:12

## 2024-12-23 NOTE — PLAN OF CARE
Discussed plan of care with pt. Addressed any and ongoing concerns. Pt denies   Problem: Adult Inpatient Plan of Care  Goal: Plan of Care Review  Outcome: Progressing  Goal: Patient-Specific Goal (Individualized)  Outcome: Progressing  Flowsheets (Taken 12/23/2024 1436)  Individualized Care Needs: Reclined position with orange juice and pillow for IV arm  Anxieties, Fears or Concerns: None  Patient/Family-Specific Goals (Include Timeframe): Will tolerate treatment with no adverse reactions  Goal: Absence of Hospital-Acquired Illness or Injury  Outcome: Progressing  Intervention: Identify and Manage Fall Risk  Flowsheets (Taken 12/23/2024 1436)  Safety Promotion/Fall Prevention: in recliner, wheels locked  Intervention: Prevent Infection  Flowsheets (Taken 12/23/2024 1436)  Infection Prevention:   equipment surfaces disinfected   hand hygiene promoted   personal protective equipment utilized  Goal: Optimal Comfort and Wellbeing  Outcome: Progressing  Intervention: Provide Person-Centered Care  Flowsheets (Taken 12/23/2024 1436)  Trust Relationship/Rapport:   care explained   choices provided   questions encouraged   reassurance provided   emotional support provided   thoughts/feelings acknowledged   empathic listening provided   questions answered

## 2024-12-27 ENCOUNTER — OFFICE VISIT (OUTPATIENT)
Dept: PRIMARY CARE CLINIC | Facility: CLINIC | Age: 74
End: 2024-12-27
Payer: MEDICARE

## 2024-12-27 VITALS
BODY MASS INDEX: 36.91 KG/M2 | OXYGEN SATURATION: 98 % | HEART RATE: 70 BPM | WEIGHT: 208.31 LBS | HEIGHT: 63 IN | TEMPERATURE: 97 F | SYSTOLIC BLOOD PRESSURE: 152 MMHG | DIASTOLIC BLOOD PRESSURE: 64 MMHG

## 2024-12-27 DIAGNOSIS — R54 FRAILTY SYNDROME IN GERIATRIC PATIENT: Chronic | ICD-10-CM

## 2024-12-27 DIAGNOSIS — G62.9 POLYNEUROPATHY: Primary | Chronic | ICD-10-CM

## 2024-12-27 DIAGNOSIS — I10 ESSENTIAL HYPERTENSION: Chronic | ICD-10-CM

## 2024-12-27 DIAGNOSIS — D50.0 IRON DEFICIENCY ANEMIA DUE TO CHRONIC BLOOD LOSS: Chronic | ICD-10-CM

## 2024-12-27 DIAGNOSIS — E66.812 CLASS 2 SEVERE OBESITY DUE TO EXCESS CALORIES WITH SERIOUS COMORBIDITY AND BODY MASS INDEX (BMI) OF 36.0 TO 36.9 IN ADULT: ICD-10-CM

## 2024-12-27 DIAGNOSIS — E78.2 MIXED HYPERLIPIDEMIA: Chronic | ICD-10-CM

## 2024-12-27 DIAGNOSIS — I50.32 CHRONIC DIASTOLIC HEART FAILURE: Chronic | ICD-10-CM

## 2024-12-27 DIAGNOSIS — E11.40 TYPE 2 DIABETES MELLITUS WITH DIABETIC NEUROPATHY, WITHOUT LONG-TERM CURRENT USE OF INSULIN: Chronic | ICD-10-CM

## 2024-12-27 DIAGNOSIS — N18.31 CKD STAGE 3A, GFR 45-59 ML/MIN: ICD-10-CM

## 2024-12-27 DIAGNOSIS — E66.01 CLASS 2 SEVERE OBESITY DUE TO EXCESS CALORIES WITH SERIOUS COMORBIDITY AND BODY MASS INDEX (BMI) OF 36.0 TO 36.9 IN ADULT: ICD-10-CM

## 2024-12-27 PROCEDURE — 99215 OFFICE O/P EST HI 40 MIN: CPT | Mod: PBBFAC,PN

## 2024-12-27 PROCEDURE — 99999 PR PBB SHADOW E&M-EST. PATIENT-LVL V: CPT | Mod: PBBFAC,,,

## 2024-12-27 NOTE — ASSESSMENT & PLAN NOTE
Using home remedy such as aluminium foil for 4 hours under feet to help relieve pain.   Taking Tylenol as needed

## 2024-12-27 NOTE — ASSESSMENT & PLAN NOTE
Controlled this visit  Continue metoprolol; will take lasix once make it home.   Continue daily activity, low sodium diet, weight loss efforts  BP Readings from Last 3 Encounters:   12/27/24 (!) 170/62   12/23/24 (!) 151/71   12/16/24 (!) 140/65

## 2024-12-27 NOTE — ASSESSMENT & PLAN NOTE
Component      Latest Ref Rng 7/30/2024 11/5/2024 11/22/2024   eGFR      >60 mL/min/1.73 m^2 53 !  43 !  48 !       Component      Latest Ref Rng 7/30/2024 11/5/2024 11/22/2024   BUN      8 - 23 mg/dL 13  15  15    Creatinine      0.5 - 1.4 mg/dL 1.1  1.3  1.2      Continue to monitor  Recommend drinking water

## 2024-12-27 NOTE — ASSESSMENT & PLAN NOTE
Try to work out, but SOB  Recommend small steps with breaks between steps.   BNP 0 - 99 pg/mL 373 High  434 High      Recommend compression stockings for bilateral leg swelling.   Advise to elevate legs as tolerated.   Take Lasix

## 2024-12-27 NOTE — PROGRESS NOTES
Qi Ortiz  12/27/2024  [id number]    Lisa Porter MD  Patient Care Team:  Lisa Porter MD as PCP - General (Internal Medicine)    Visit Type: Follow-up    Chief Complaint:  Chief Complaint   Patient presents with    Follow-up     No issues or concerns       History of Present Illness:        CHIEF COMPLAINT:  Qi presents today for follow-up on iron treatment and pain management.    IRON DEFICIENCY:  She reports significant improvement in energy levels since starting iron infusion treatments.    NEUROPATHIC PAIN:  She experienced severe bilateral feet pain radiating up the legs and arm pain that limited hand mobility. Due to multiple medication allergies, she can only take Tylenol for pain management. She reports using aluminum foil strips in an X-pattern configuration under feet with socks and shoes, which provided complete pain relief in feet and improved mobility after 4 hours of use. While she continues to have neuropathy, she denies current pain symptoms.    CARDIOVASCULAR:  She has heart failure with associated physical activity limitations. She experiences balance difficulties when bending over or turning quickly, requiring slow and careful movements. She needs support when rising from a bent position to prevent falls and has adapted her movements to maintain balance when getting up from chairs. She reports current lower extremity swelling and takes fluid pills, though avoids taking them before appointments. She notes that drinking Janes D increases urination. She denies use of compression stockings.    WEIGHT:  She reports good appetite, but with recent weight loss of approximately 10 lbs, though notes this fluctuates.    MEDICATIONS:  She takes Cosentyx injections monthly, Repatha injections every two weeks, and Spironolactone for blood pressure management. Her medications are managed through  pharmacy with automatic refill system.         Recent Fall:  []Yes  [x]No  Activity:   "[]Vigorous [x]Moderate []Sedentary  Appetite:  [x]Good  []Fair  []Poor  Mood: [x]Stable []Anxious []Depressed   Insomnia: []Yes  [x]No    PHQ-4 Score: 0     Upcoming appointments:  Future Appointments       Date Provider Specialty Appt Notes    12/30/2024  Chemotherapy OG//Venofer 100mg IVPB 6/7// ow    1/6/2025  Chemotherapy Venofer 100mg IVPB 7/7// ow    1/27/2025  Lab lab    1/29/2025 Lisa Porter MD Primary Care One  month f/u    1/29/2025 Asif Paez MD Cardiology 3 month f/u    1/31/2025 Trinidad Javed NP Hematology and Oncology 5wk post iron prior lab    3/5/2025  Lab Dr. Ackerman    3/12/2025 Jacky Ackerman MD Rheumatology PSORIATIC --OA--GOUT             The following were reviewed: Active problem list, medication list, allergies, family history, social history, and Health Maintenance.     Medications have been reviewed and reconciled with patient at visit today.    Review of Systems   Constitutional:  Negative for chills and fever.   HENT:  Negative for congestion, ear pain, sinus pain and sore throat.    Eyes:  Negative for blurred vision and pain.   Respiratory:  Negative for cough and shortness of breath.    Cardiovascular:  Negative for chest pain and leg swelling.   Gastrointestinal:  Negative for abdominal pain, nausea and vomiting.   Genitourinary:  Negative for dysuria.   Musculoskeletal:  Negative for back pain, falls, joint pain and myalgias.        Increased mobility. Denies pain   Skin:  Negative for itching and rash.   Neurological:  Negative for dizziness, weakness and headaches.   Psychiatric/Behavioral:  Negative for suicidal ideas. The patient does not have insomnia.         See HPI above    Exam:  Vitals:    12/27/24 1438   BP: (!) 152/64   BP Location: Right arm   Patient Position: Sitting   Pulse: 70   Temp: 96.9 °F (36.1 °C)   TempSrc: Temporal   SpO2: 98%   Weight: 94.5 kg (208 lb 5.4 oz)   Height: 5' 3" (1.6 m)     Weight: 94.5 kg (208 lb 5.4 oz)   Body mass index is 36.9 " kg/m².    BP Readings from Last 3 Encounters:   24 (!) 152/64   24 (!) 151/71   24 (!) 140/65        Wt Readings from Last 3 Encounters:   24 1438 94.5 kg (208 lb 5.4 oz)   24 1014 91.8 kg (202 lb 7.9 oz)   24 1405 91.4 kg (201 lb 8 oz)        Physical Exam  Vitals reviewed.   Constitutional:       Appearance: Normal appearance. She is normal weight.   HENT:      Head: Normocephalic.      Right Ear: Tympanic membrane, ear canal and external ear normal.      Left Ear: Tympanic membrane, ear canal and external ear normal.      Nose: Nose normal.      Mouth/Throat:      Mouth: Mucous membranes are moist.      Pharynx: Oropharynx is clear.   Eyes:      Extraocular Movements: Extraocular movements intact.      Conjunctiva/sclera: Conjunctivae normal.      Pupils: Pupils are equal, round, and reactive to light.   Cardiovascular:      Rate and Rhythm: Normal rate and regular rhythm.      Pulses: Normal pulses.      Heart sounds: Normal heart sounds.   Pulmonary:      Effort: Pulmonary effort is normal.      Breath sounds: Normal breath sounds.   Abdominal:      General: Bowel sounds are normal.      Palpations: Abdomen is soft.   Musculoskeletal:         General: Normal range of motion.      Cervical back: Normal range of motion and neck supple.      Right lower le+ Edema present.      Left lower le+ Edema present.   Skin:     General: Skin is warm and dry.      Capillary Refill: Capillary refill takes less than 2 seconds.   Neurological:      General: No focal deficit present.      Mental Status: She is alert and oriented to person, place, and time. Mental status is at baseline.   Psychiatric:         Mood and Affect: Mood normal.         Behavior: Behavior normal.         Thought Content: Thought content normal.         Judgment: Judgment normal.          Laboratory Reviewed  Lab Results   Component Value Date    WBC 8.34 2024    HGB 9.2 (L) 2024    HCT 31.5 (L)  11/22/2024     11/22/2024    MCV 74 (L) 11/22/2024    CHOL 123 11/05/2024    TRIG 136 11/05/2024    HDL 35 (L) 11/05/2024    LDLCALC 60.8 (L) 11/05/2024    ALT 8 (L) 11/05/2024    AST 15 11/05/2024     11/22/2024    K 3.8 11/22/2024     11/22/2024    CREATININE 1.2 11/22/2024    BUN 15 11/22/2024    CO2 27 11/22/2024    MG 1.7 06/25/2024    TSH 1.770 04/19/2024    FREET4 1.01 04/19/2024    INR 1.0 06/25/2024    HGBA1C 6.0 (H) 11/05/2024    CRP 3.1 01/26/2024     Lab Results   Component Value Date    PTH 91.5 (H) 05/20/2022    CALCIUM 9.0 11/22/2024    PHOS 3.2 11/22/2024      Lab Results   Component Value Date    MZXKLDFH24 748 11/05/2024     Lab Results   Component Value Date    FOLATE 18.2 11/05/2024      Lab Results   Component Value Date    IRON 17 (L) 11/05/2024    TRANSFERRIN 307 11/05/2024    TIBC 454 (H) 11/05/2024    FESATURATED 4 (L) 11/05/2024      Lab Results   Component Value Date    EGFRNORACEVR 48 (A) 11/22/2024    ALBUMIN 3.3 (L) 11/22/2024     (H) 06/29/2024     Lab Results   Component Value Date    [id number] 60 11/05/2024          Assessment:   74 y.o. female with multiple co-morbid illnesses here for continued follow up of medical problems.      The primary encounter diagnosis was Polyneuropathy. Diagnoses of Essential hypertension, CKD stage 3a, GFR 45-59 ml/min, Iron deficiency anemia due to chronic blood loss, Type 2 diabetes mellitus with diabetic neuropathy, without long-term current use of insulin, Mixed hyperlipidemia, Chronic diastolic heart failure, Class 2 severe obesity due to excess calories with serious comorbidity and body mass index (BMI) of 36.0 to 36.9 in adult, and Frailty syndrome in geriatric patient were also pertinent to this visit.      Plan:   1. Polyneuropathy  Assessment & Plan:  Using home remedy such as aluminium foil for 4 hours under feet to help relieve pain.   Taking Tylenol as needed      2. Essential hypertension  Assessment &  Plan:  Controlled this visit  Continue metoprolol; will take lasix once make it home.   Continue daily activity, low sodium diet, weight loss efforts  BP Readings from Last 3 Encounters:   12/27/24 (!) 170/62   12/23/24 (!) 151/71   12/16/24 (!) 140/65           3. CKD stage 3a, GFR 45-59 ml/min  Assessment & Plan:  Component      Latest Ref Rng 7/30/2024 11/5/2024 11/22/2024   eGFR      >60 mL/min/1.73 m^2 53 !  43 !  48 !       Component      Latest Ref Rng 7/30/2024 11/5/2024 11/22/2024   BUN      8 - 23 mg/dL 13  15  15    Creatinine      0.5 - 1.4 mg/dL 1.1  1.3  1.2      Continue to monitor  Recommend drinking water       4. Iron deficiency anemia due to chronic blood loss  Overview:  Elida Alanis NP 8/6/2021  Iron levels replenished s/p Venofer x 8. She denies abnormal bleeding at preset time but notes intermittent blood noted in stool. Patient has not yet had follow up with GI service for endoscopies due to ongoing COVID concerns previously. She has been encouraged to follow up with GI. Rationale discussed in detail.   Patient unable to tolerate oral iron due to GI upset/constipation. F/u 3 months with repeat labs. Discussed S&S to report sooner        5. Type 2 diabetes mellitus with diabetic neuropathy, without long-term current use of insulin  Assessment & Plan:  Hemoglobin A1C 4.0 - 5.6 % 6.0 High  5.7 High  CM 6.0 High  CM   Hgb A1c prediabetes range  Continue to closely monitor  No longer taking gabapentin   Taking Tylenol as needed for discomfort  Using home remedy to relief pain      6. Mixed hyperlipidemia  Assessment & Plan:     Lab Results   Component Value Date    CHOL 123 11/05/2024    HDL 35 (L) 11/05/2024    LDLCALC 60.8 (L) 11/05/2024    TRIG 136 11/05/2024     Chronic, stable  Continue repatha  Include vigorous activity and weight loss in healthcare plan  Mediterranean diet      7. Chronic diastolic heart failure  Assessment & Plan:  Try to work out, but SOB  Recommend small steps with  breaks between steps.   BNP 0 - 99 pg/mL 373 High  434 High      Recommend compression stockings for bilateral leg swelling.   Advise to elevate legs as tolerated.   Take Lasix       8. Class 2 severe obesity due to excess calories with serious comorbidity and body mass index (BMI) of 36.0 to 36.9 in adult  Assessment & Plan:  Recommend exercising as tolerated  Be active.   Recommend diet control and portion control       9. Frailty syndrome in geriatric patient  Assessment & Plan:  At high risk for falls.   Advise patient get assistance at home but pt states limited family support.   Denies walker or cane.   Continue to closely monitor  Instruct not to bend over or change positions suddenly.              Health Maintenance         Date Due Completion Date    COVID-19 Vaccine (1) Never done ---    Pneumococcal Vaccines (Age 50+) (1 of 2 - PCV) Never done ---    RSV Vaccine (Age 60+ and Pregnant patients) (1 - Risk 60-74 years 1-dose series) Never done ---    Colorectal Cancer Screening 04/18/2016 4/18/2011    Mammogram 10/23/2016 10/23/2015    Shingles Vaccine (2 of 2) 11/28/2022 10/3/2022    Diabetes Urine Screening 09/29/2024 9/29/2023    Hemoglobin A1c 05/05/2025 11/5/2024    DEXA Scan 07/25/2025 7/25/2022    Foot Exam 10/25/2025 10/25/2024    Override on 9/29/2023: Done (pt w idiopathic peripheral neuropathy - not diabetic - prediabetic)    Eye Exam 11/04/2025 11/4/2024    Lipid Panel 11/05/2025 11/5/2024    TETANUS VACCINE 10/02/2033 10/2/2023            -Patient's lab results were reviewed and discussed with patient  -Treatment options and alternatives were discussed with the patient. Patient expressed understanding. Patient was given the opportunity to ask questions and be an active participant in their medical care. Patient had no further questions or concerns at this time.       Follow up: Follow up in about 1 month (around 1/27/2025) for w/ me or Porter .    Care Plan/Goals: Reviewed N/A   Goals     None         After visit summary printed and given to patient upon discharge.  Patient goals and care plan are included in After visit summary.    TOTAL TIME evaluating and managing this patient for this encounter was  37 minutes. This time was spent personally by me on some of the following activities: review of patient's past medical history, assessing age-appropriate health maintenance needs, review of any interval history, review and interpretation of lab results, review and interpretation of imaging test results, review and interpretation of cardiology test results, reviewing consulting specialist notes, obtaining history from the patient and family, examination of the patient, medication reconciliation, managing and/or ordering prescription medications, ordering imaging tests, ordering referral to subspecialty provider(s), educating patient and answering their questions about diagnosis, treatment plan, and goals of treatment, discussing planned follow-up and final documentation of the visit. This time was exclusive of any separately billable procedures for this patient and exclusive of time spent treating any other patients.     This note was generated with the assistance of ambient listening technology. Verbal consent was obtained by the patient and accompanying visitor(s) for the recording of patient appointment to facilitate this note. I attest to having reviewed and edited the generated note for accuracy, though some syntax or spelling errors may persist. Please contact the author of this note for any clarification.              ANA LILIA Shukla, FNP-C  [facility] [address] [place] [address]   [phone]   [phone] fax

## 2024-12-27 NOTE — ASSESSMENT & PLAN NOTE
Lab Results   Component Value Date    CHOL 123 11/05/2024    HDL 35 (L) 11/05/2024    LDLCALC 60.8 (L) 11/05/2024    TRIG 136 11/05/2024     Chronic, stable  Continue repatha  Include vigorous activity and weight loss in healthcare plan  Mediterranean diet

## 2024-12-27 NOTE — ASSESSMENT & PLAN NOTE
Hemoglobin A1C 4.0 - 5.6 % 6.0 High  5.7 High  CM 6.0 High  CM   Hgb A1c prediabetes range  Continue to closely monitor  No longer taking gabapentin   Taking Tylenol as needed for discomfort  Using home remedy to relief pain

## 2024-12-27 NOTE — ASSESSMENT & PLAN NOTE
At high risk for falls.   Advise patient get assistance at home but pt states limited family support.   Denies walker or cane.   Continue to closely monitor  Instruct not to bend over or change positions suddenly.

## 2024-12-27 NOTE — PATIENT INSTRUCTIONS
If you are feeling unwell, we'd like to be the first ones to know here at [facility]! Please give us a call. Same day appointments are our top priority to keep you well and out of the emergency rooms and hospitals. Call [phone] for our direct line. After hours advice is always available. Please call [phone] after hours to speak to the on-call team.

## 2024-12-30 ENCOUNTER — INFUSION (OUTPATIENT)
Dept: INFUSION THERAPY | Facility: HOSPITAL | Age: 74
End: 2024-12-30
Attending: INTERNAL MEDICINE
Payer: MEDICARE

## 2024-12-30 VITALS
RESPIRATION RATE: 18 BRPM | OXYGEN SATURATION: 95 % | HEART RATE: 63 BPM | SYSTOLIC BLOOD PRESSURE: 177 MMHG | TEMPERATURE: 98 F | DIASTOLIC BLOOD PRESSURE: 74 MMHG

## 2024-12-30 DIAGNOSIS — D50.9 IRON DEFICIENCY ANEMIA, UNSPECIFIED IRON DEFICIENCY ANEMIA TYPE: Primary | ICD-10-CM

## 2024-12-30 DIAGNOSIS — D50.0 IRON DEFICIENCY ANEMIA DUE TO CHRONIC BLOOD LOSS: ICD-10-CM

## 2024-12-30 DIAGNOSIS — M81.0 AGE-RELATED OSTEOPOROSIS WITHOUT CURRENT PATHOLOGICAL FRACTURE: ICD-10-CM

## 2024-12-30 PROCEDURE — 96375 TX/PRO/DX INJ NEW DRUG ADDON: CPT

## 2024-12-30 PROCEDURE — 96365 THER/PROPH/DIAG IV INF INIT: CPT

## 2024-12-30 PROCEDURE — 63600175 PHARM REV CODE 636 W HCPCS: Performed by: NURSE PRACTITIONER

## 2024-12-30 PROCEDURE — 25000003 PHARM REV CODE 250: Performed by: NURSE PRACTITIONER

## 2024-12-30 RX ORDER — METHYLPREDNISOLONE SOD SUCC 125 MG
60 VIAL (EA) INJECTION
Start: 2025-01-06

## 2024-12-30 RX ORDER — METHYLPREDNISOLONE SOD SUCC 125 MG
60 VIAL (EA) INJECTION
Status: COMPLETED | OUTPATIENT
Start: 2024-12-30 | End: 2024-12-30

## 2024-12-30 RX ORDER — SODIUM CHLORIDE 0.9 % (FLUSH) 0.9 %
10 SYRINGE (ML) INJECTION
OUTPATIENT
Start: 2025-01-06

## 2024-12-30 RX ADMIN — SODIUM CHLORIDE 100 MG: 9 INJECTION, SOLUTION INTRAVENOUS at 03:12

## 2024-12-30 RX ADMIN — METHYLPREDNISOLONE SODIUM SUCCINATE 60 MG: 125 INJECTION, POWDER, FOR SOLUTION INTRAMUSCULAR; INTRAVENOUS at 02:12

## 2024-12-30 NOTE — PLAN OF CARE
Problem: Adult Inpatient Plan of Care  Goal: Plan of Care Review  Outcome: Progressing  Flowsheets (Taken 12/30/2024 1513)  Plan of Care Reviewed With: patient  Goal: Patient-Specific Goal (Individualized)  Outcome: Progressing  Flowsheets (Taken 12/30/2024 1513)  Individualized Care Needs: recline with pillow and blanket  Anxieties, Fears or Concerns: none  Goal: Absence of Hospital-Acquired Illness or Injury  Outcome: Progressing  Intervention: Prevent Infection  Flowsheets (Taken 12/30/2024 1513)  Infection Prevention:   equipment surfaces disinfected   hand hygiene promoted   personal protective equipment utilized   rest/sleep promoted  Goal: Optimal Comfort and Wellbeing  Outcome: Progressing  Intervention: Provide Person-Centered Care  Flowsheets (Taken 12/30/2024 1513)  Trust Relationship/Rapport:   care explained   thoughts/feelings acknowledged   choices provided   emotional support provided   empathic listening provided   questions answered   reassurance provided   questions encouraged

## 2024-12-30 NOTE — NURSING
Pt tolerated Venofer infusion well. No adverse reaction noted. Pt education reinforced on possible , side effects, what to expect, and when to call her provider. Pt verbalized understanding. I reviewed pt calendar w/ pt and understanding verbalized. IV flushed w/ NS and D/C per protocol.

## 2025-01-06 ENCOUNTER — INFUSION (OUTPATIENT)
Dept: INFUSION THERAPY | Facility: HOSPITAL | Age: 75
End: 2025-01-06
Attending: INTERNAL MEDICINE
Payer: MEDICARE

## 2025-01-06 VITALS
OXYGEN SATURATION: 98 % | TEMPERATURE: 97 F | RESPIRATION RATE: 16 BRPM | SYSTOLIC BLOOD PRESSURE: 155 MMHG | HEART RATE: 60 BPM | DIASTOLIC BLOOD PRESSURE: 63 MMHG

## 2025-01-06 DIAGNOSIS — D50.0 IRON DEFICIENCY ANEMIA DUE TO CHRONIC BLOOD LOSS: ICD-10-CM

## 2025-01-06 DIAGNOSIS — D50.9 IRON DEFICIENCY ANEMIA, UNSPECIFIED IRON DEFICIENCY ANEMIA TYPE: Primary | ICD-10-CM

## 2025-01-06 DIAGNOSIS — M81.0 AGE-RELATED OSTEOPOROSIS WITHOUT CURRENT PATHOLOGICAL FRACTURE: ICD-10-CM

## 2025-01-06 PROCEDURE — 63600175 PHARM REV CODE 636 W HCPCS: Mod: JZ,TB | Performed by: NURSE PRACTITIONER

## 2025-01-06 PROCEDURE — 63600175 PHARM REV CODE 636 W HCPCS: Performed by: NURSE PRACTITIONER

## 2025-01-06 PROCEDURE — 25000003 PHARM REV CODE 250: Performed by: NURSE PRACTITIONER

## 2025-01-06 PROCEDURE — 96365 THER/PROPH/DIAG IV INF INIT: CPT

## 2025-01-06 PROCEDURE — 96375 TX/PRO/DX INJ NEW DRUG ADDON: CPT

## 2025-01-06 RX ORDER — METHYLPREDNISOLONE SOD SUCC 125 MG
60 VIAL (EA) INJECTION
Start: 2025-01-13

## 2025-01-06 RX ORDER — SODIUM CHLORIDE 0.9 % (FLUSH) 0.9 %
10 SYRINGE (ML) INJECTION
OUTPATIENT
Start: 2025-01-13

## 2025-01-06 RX ORDER — METHYLPREDNISOLONE SOD SUCC 125 MG
60 VIAL (EA) INJECTION
Status: COMPLETED | OUTPATIENT
Start: 2025-01-06 | End: 2025-01-06

## 2025-01-06 RX ADMIN — IRON SUCROSE 100 MG: 20 INJECTION, SOLUTION INTRAVENOUS at 03:01

## 2025-01-06 RX ADMIN — METHYLPREDNISOLONE SODIUM SUCCINATE 60 MG: 125 INJECTION, POWDER, FOR SOLUTION INTRAMUSCULAR; INTRAVENOUS at 03:01

## 2025-01-06 NOTE — DISCHARGE INSTRUCTIONS
.[facility]  [address] [facility]  [address]  [phone] phone     [phone] fax  Hours of Operation: Monday- Friday 8:00am- 5:00pm  After hours phone  [phone]  Hematology / Oncology Physicians on call    Dr. Hank Jones           Nurse Practitioners:     Radha Richardson, SAMEERA Alanis, DENNIS Chapin, SAMEERA Delgado, SAMEERA Javed, NP    Please don't hesitate to call if you have any concerns.      FALL PREVENTION   Falls often occur due to slipping, tripping or losing your balance. Here are ways to reduce your risk of falling again.   Was there anything that caused your fall that can be fixed, removed or replaced?   Make your home safe by keeping walkways clear of objects you may trip over.   Use non-slip pads under rugs.   Do not walk in poorly lit areas.   Do not stand on chairs or wobbly ladders.   Use caution when reaching overhead or looking upward. This position can cause a loss of balance.   Be sure your shoes fit properly, have non-slip bottoms and are in good condition.   Be cautious when going up and down stairs, curbs, and when walking on uneven sidewalks.   If your balance is poor, consider using a cane or walker.   If your fall was related to alcohol use, stop or limit alcohol intake.   If your fall was related to use of sleeping medicines, talk to your doctor about this. You may need to reduce your dosage at bedtime if you awaken during the night to go to the bathroom.   To reduce the need for nighttime bathroom trips:   Avoid drinking fluids for several hours before going to bed   Empty your bladder before going to bed   Men can keep a urinal at the bedside   © 3293-4462 [employer], [address] [place] [address], [place] [address]. All rights reserved. This information is not intended as a substitute for professional medical care. Always follow your healthcare  professional's instructions.    WAYS TO HELP PREVENT INFECTION        WASH YOUR HANDS OFTEN DURING THE DAY, ESPECIALLY BEFORE YOU EAT, AFTER USING THE BATHROOM, AND AFTER TOUCHING ANIMALS    STAY AWAY FROM PEOPLE WHO HAVE ILLNESSES YOU CAN CATCH; SUCH AS COLDS, FLU, CHICKEN POX    TRY TO AVOID CROWDS    STAY AWAY FROM CHILDREN WHO RECENTLY HAVE RECEIVED LIVE VIRUS VACCINES    MAINTAIN GOOD MOUTH CARE    DO NOT SQUEEZE OR SCRATCH PIMPLES    CLEAN CUTS & SCRAPES RIGHT AWAY AND DAILY UNTIL HEALED WITH WARM WATER, SOAP & AN ANTISEPTIC    AVOID CONTACT WITH LITTER BOXES, BIRD CAGES, & FISH TANKS    AVOID STANDING WATER, IE., BIRD BATHS, FLOWER POTS/VASES, OR HUMIDIFIERS    WEAR GLOVES WHEN GARDENING OR CLEANING UP AFTER OTHERS, ESPECIALLY BABIES & SMALL CHILDREN    DO NOT EAT RAW FISH, SEAFOOD, MEAT, OR EGGS

## 2025-01-06 NOTE — PLAN OF CARE
Problem: Adult Inpatient Plan of Care  Goal: Plan of Care Review  Outcome: Progressing  Flowsheets (Taken 1/6/2025 1646)  Plan of Care Reviewed With: patient  Goal: Patient-Specific Goal (Individualized)  Outcome: Progressing  Flowsheets (Taken 1/6/2025 1646)  Individualized Care Needs: Reclined, hasown pillow and blanket.  Anxieties, Fears or Concerns: No concerns expressed. Happy it is her last dose  Patient/Family-Specific Goals (Include Timeframe): Tolerated infusion  Goal: Optimal Comfort and Wellbeing  Outcome: Progressing  Intervention: Monitor Pain and Promote Comfort  Flowsheets (Taken 1/6/2025 1646)  Pain Management Interventions: quiet environment facilitated  Intervention: Provide Person-Centered Care  Flowsheets (Taken 1/6/2025 1646)  Trust Relationship/Rapport:   care explained   reassurance provided   choices provided   thoughts/feelings acknowledged   emotional support provided   empathic listening provided   questions answered   questions encouraged     Problem: Anemia  Goal: Anemia Symptom Improvement  Outcome: Progressing  Intervention: Monitor and Manage Anemia  Flowsheets (Taken 1/6/2025 1646)  Safety Promotion/Fall Prevention:   in recliner, wheels locked   instructed to call staff for mobility   lighting adjusted   medications reviewed  Fatigue Management: frequent rest breaks encouraged

## 2025-01-08 DIAGNOSIS — E11.9 TYPE 2 DIABETES MELLITUS WITHOUT COMPLICATION: ICD-10-CM

## 2025-01-29 ENCOUNTER — LAB VISIT (OUTPATIENT)
Dept: LAB | Facility: HOSPITAL | Age: 75
End: 2025-01-29
Attending: NURSE PRACTITIONER
Payer: MEDICARE

## 2025-01-29 ENCOUNTER — OFFICE VISIT (OUTPATIENT)
Dept: PRIMARY CARE CLINIC | Facility: CLINIC | Age: 75
End: 2025-01-29
Payer: MEDICARE

## 2025-01-29 ENCOUNTER — OFFICE VISIT (OUTPATIENT)
Dept: CARDIOLOGY | Facility: CLINIC | Age: 75
End: 2025-01-29
Payer: MEDICARE

## 2025-01-29 VITALS
DIASTOLIC BLOOD PRESSURE: 86 MMHG | HEIGHT: 63 IN | BODY MASS INDEX: 37.11 KG/M2 | HEART RATE: 63 BPM | SYSTOLIC BLOOD PRESSURE: 164 MMHG | OXYGEN SATURATION: 98 % | WEIGHT: 209.44 LBS

## 2025-01-29 VITALS
HEART RATE: 71 BPM | HEIGHT: 63 IN | WEIGHT: 208.75 LBS | SYSTOLIC BLOOD PRESSURE: 162 MMHG | OXYGEN SATURATION: 98 % | TEMPERATURE: 97 F | DIASTOLIC BLOOD PRESSURE: 64 MMHG | BODY MASS INDEX: 36.99 KG/M2

## 2025-01-29 DIAGNOSIS — Z87.09 HISTORY OF RESTRICTIVE LUNG DISEASE: Chronic | ICD-10-CM

## 2025-01-29 DIAGNOSIS — I47.10 SUPRAVENTRICULAR TACHYCARDIA: ICD-10-CM

## 2025-01-29 DIAGNOSIS — I25.810: ICD-10-CM

## 2025-01-29 DIAGNOSIS — I50.32 CHRONIC DIASTOLIC HEART FAILURE: Chronic | ICD-10-CM

## 2025-01-29 DIAGNOSIS — E53.8 FOLIC ACID DEFICIENCY: ICD-10-CM

## 2025-01-29 DIAGNOSIS — I25.810 CORONARY ARTERY DISEASE INVOLVING CORONARY BYPASS GRAFT OF NATIVE HEART, UNSPECIFIED WHETHER ANGINA PRESENT: Chronic | ICD-10-CM

## 2025-01-29 DIAGNOSIS — E66.812 CLASS 2 SEVERE OBESITY DUE TO EXCESS CALORIES WITH SERIOUS COMORBIDITY AND BODY MASS INDEX (BMI) OF 36.0 TO 36.9 IN ADULT: ICD-10-CM

## 2025-01-29 DIAGNOSIS — L40.50 PSORIATIC ARTHRITIS: Chronic | ICD-10-CM

## 2025-01-29 DIAGNOSIS — I10 ESSENTIAL HYPERTENSION: Chronic | ICD-10-CM

## 2025-01-29 DIAGNOSIS — E66.01 CLASS 2 SEVERE OBESITY DUE TO EXCESS CALORIES WITH SERIOUS COMORBIDITY AND BODY MASS INDEX (BMI) OF 36.0 TO 36.9 IN ADULT: ICD-10-CM

## 2025-01-29 DIAGNOSIS — I65.21 STENOSIS OF RIGHT CAROTID ARTERY: ICD-10-CM

## 2025-01-29 DIAGNOSIS — N25.81 SECONDARY HYPERPARATHYROIDISM: Chronic | ICD-10-CM

## 2025-01-29 DIAGNOSIS — Z95.1 S/P CABG (CORONARY ARTERY BYPASS GRAFT): Chronic | ICD-10-CM

## 2025-01-29 DIAGNOSIS — R06.09 DOE (DYSPNEA ON EXERTION): Chronic | ICD-10-CM

## 2025-01-29 DIAGNOSIS — I70.0 AORTIC ATHEROSCLEROSIS: Primary | Chronic | ICD-10-CM

## 2025-01-29 DIAGNOSIS — N18.31 CKD STAGE 3A, GFR 45-59 ML/MIN: Chronic | ICD-10-CM

## 2025-01-29 DIAGNOSIS — I20.9 ANGINA, CLASS II: ICD-10-CM

## 2025-01-29 DIAGNOSIS — I35.0 NONRHEUMATIC AORTIC VALVE STENOSIS: Chronic | ICD-10-CM

## 2025-01-29 DIAGNOSIS — E11.40 TYPE 2 DIABETES MELLITUS WITH DIABETIC NEUROPATHY, WITHOUT LONG-TERM CURRENT USE OF INSULIN: Chronic | ICD-10-CM

## 2025-01-29 DIAGNOSIS — Z95.2 S/P TAVR (TRANSCATHETER AORTIC VALVE REPLACEMENT): Chronic | ICD-10-CM

## 2025-01-29 DIAGNOSIS — I27.20 PULMONARY HYPERTENSION: Chronic | ICD-10-CM

## 2025-01-29 DIAGNOSIS — E53.8 B12 DEFICIENCY: Chronic | ICD-10-CM

## 2025-01-29 DIAGNOSIS — I25.2 HISTORY OF MI (MYOCARDIAL INFARCTION): Chronic | ICD-10-CM

## 2025-01-29 DIAGNOSIS — E11.22 TYPE 2 DIABETES MELLITUS WITH STAGE 3A CHRONIC KIDNEY DISEASE, WITHOUT LONG-TERM CURRENT USE OF INSULIN: Chronic | ICD-10-CM

## 2025-01-29 DIAGNOSIS — N18.31 TYPE 2 DIABETES MELLITUS WITH STAGE 3A CHRONIC KIDNEY DISEASE, WITHOUT LONG-TERM CURRENT USE OF INSULIN: Chronic | ICD-10-CM

## 2025-01-29 DIAGNOSIS — I10 ESSENTIAL HYPERTENSION: Primary | Chronic | ICD-10-CM

## 2025-01-29 DIAGNOSIS — I70.0 AORTIC ATHEROSCLEROSIS: Chronic | ICD-10-CM

## 2025-01-29 DIAGNOSIS — D84.9 IMMUNOCOMPROMISED: Chronic | ICD-10-CM

## 2025-01-29 DIAGNOSIS — D50.9 IRON DEFICIENCY ANEMIA, UNSPECIFIED IRON DEFICIENCY ANEMIA TYPE: ICD-10-CM

## 2025-01-29 DIAGNOSIS — D50.0 IRON DEFICIENCY ANEMIA DUE TO CHRONIC BLOOD LOSS: Chronic | ICD-10-CM

## 2025-01-29 DIAGNOSIS — E78.2 MIXED HYPERLIPIDEMIA: Chronic | ICD-10-CM

## 2025-01-29 PROBLEM — M85.89 OSTEOPENIA OF MULTIPLE SITES: Status: RESOLVED | Noted: 2017-06-07 | Resolved: 2025-01-29

## 2025-01-29 LAB
ALBUMIN SERPL BCP-MCNC: 3.4 G/DL (ref 3.5–5.2)
ALP SERPL-CCNC: 53 U/L (ref 40–150)
ALT SERPL W/O P-5'-P-CCNC: 8 U/L (ref 10–44)
ANION GAP SERPL CALC-SCNC: 8 MMOL/L (ref 8–16)
AST SERPL-CCNC: 17 U/L (ref 10–40)
BASOPHILS # BLD AUTO: 0.05 K/UL (ref 0–0.2)
BASOPHILS NFR BLD: 0.6 % (ref 0–1.9)
BILIRUB SERPL-MCNC: 0.2 MG/DL (ref 0.1–1)
BUN SERPL-MCNC: 20 MG/DL (ref 8–23)
CALCIUM SERPL-MCNC: 9.5 MG/DL (ref 8.7–10.5)
CHLORIDE SERPL-SCNC: 104 MMOL/L (ref 95–110)
CO2 SERPL-SCNC: 30 MMOL/L (ref 23–29)
CREAT SERPL-MCNC: 1.6 MG/DL (ref 0.5–1.4)
DIFFERENTIAL METHOD BLD: ABNORMAL
EOSINOPHIL # BLD AUTO: 0.1 K/UL (ref 0–0.5)
EOSINOPHIL NFR BLD: 1.3 % (ref 0–8)
ERYTHROCYTE [DISTWIDTH] IN BLOOD BY AUTOMATED COUNT: 22 % (ref 11.5–14.5)
EST. GFR  (NO RACE VARIABLE): 34 ML/MIN/1.73 M^2
GLUCOSE SERPL-MCNC: 105 MG/DL (ref 70–110)
HCT VFR BLD AUTO: 38.5 % (ref 37–48.5)
HGB BLD-MCNC: 11.5 G/DL (ref 12–16)
IMM GRANULOCYTES # BLD AUTO: 0.02 K/UL (ref 0–0.04)
IMM GRANULOCYTES NFR BLD AUTO: 0.2 % (ref 0–0.5)
LYMPHOCYTES # BLD AUTO: 1.8 K/UL (ref 1–4.8)
LYMPHOCYTES NFR BLD: 22 % (ref 18–48)
MCH RBC QN AUTO: 24.9 PG (ref 27–31)
MCHC RBC AUTO-ENTMCNC: 29.9 G/DL (ref 32–36)
MCV RBC AUTO: 84 FL (ref 82–98)
MONOCYTES # BLD AUTO: 0.7 K/UL (ref 0.3–1)
MONOCYTES NFR BLD: 8.5 % (ref 4–15)
NEUTROPHILS # BLD AUTO: 5.6 K/UL (ref 1.8–7.7)
NEUTROPHILS NFR BLD: 67.4 % (ref 38–73)
NRBC BLD-RTO: 0 /100 WBC
PLATELET # BLD AUTO: 253 K/UL (ref 150–450)
PMV BLD AUTO: 9.6 FL (ref 9.2–12.9)
POTASSIUM SERPL-SCNC: 4.3 MMOL/L (ref 3.5–5.1)
PROT SERPL-MCNC: 6.8 G/DL (ref 6–8.4)
RBC # BLD AUTO: 4.61 M/UL (ref 4–5.4)
SODIUM SERPL-SCNC: 142 MMOL/L (ref 136–145)
WBC # BLD AUTO: 8.28 K/UL (ref 3.9–12.7)

## 2025-01-29 PROCEDURE — 85025 COMPLETE CBC W/AUTO DIFF WBC: CPT | Performed by: NURSE PRACTITIONER

## 2025-01-29 PROCEDURE — 99999 PR PBB SHADOW E&M-EST. PATIENT-LVL IV: CPT | Mod: PBBFAC,,, | Performed by: INTERNAL MEDICINE

## 2025-01-29 PROCEDURE — 99214 OFFICE O/P EST MOD 30 MIN: CPT | Mod: PBBFAC,PO | Performed by: INTERNAL MEDICINE

## 2025-01-29 PROCEDURE — 83970 ASSAY OF PARATHORMONE: CPT | Performed by: INTERNAL MEDICINE

## 2025-01-29 PROCEDURE — 99999 PR PBB SHADOW E&M-EST. PATIENT-LVL V: CPT | Mod: PBBFAC,,, | Performed by: INTERNAL MEDICINE

## 2025-01-29 PROCEDURE — 83540 ASSAY OF IRON: CPT | Performed by: NURSE PRACTITIONER

## 2025-01-29 PROCEDURE — 80053 COMPREHEN METABOLIC PANEL: CPT | Performed by: NURSE PRACTITIONER

## 2025-01-29 PROCEDURE — 99215 OFFICE O/P EST HI 40 MIN: CPT | Mod: PBBFAC,27,PN | Performed by: INTERNAL MEDICINE

## 2025-01-29 PROCEDURE — 99214 OFFICE O/P EST MOD 30 MIN: CPT | Mod: S$PBB,,, | Performed by: INTERNAL MEDICINE

## 2025-01-29 PROCEDURE — 82728 ASSAY OF FERRITIN: CPT | Performed by: NURSE PRACTITIONER

## 2025-01-29 PROCEDURE — 36415 COLL VENOUS BLD VENIPUNCTURE: CPT | Mod: PO | Performed by: NURSE PRACTITIONER

## 2025-01-29 NOTE — PROGRESS NOTES
Subjective:   Patient ID:  Qi Ortiz is a 74 y.o. female who presents for follow up of No chief complaint on file.      75y/o F came in for ER visit f/u  PMH CAD s/p CABG twice in 1998 and 2016, SVT, AS, s/p TAVR 20', chronic diastolic HF, HTN HLD COPD, CKD, DM, obesity, IRIS, statin intolerance  06/06/23 went to [facility] ER for SOB. Rx for CHF and d/c o/n.  Now sob stable no orthopnea leg swelling  No chest pain   Refused Jardiance    10/23 visit  C/o SOB worse with exertion, no orthopnea and PND. Onyy trace leg swelling  BNP was 300's this week and on Lasix 40 mg bid for 3 days and weight 10lbs. Now on lasix 20 mg bid  The SOB improved significantly  Does drink a lot of water  Ekg reviewed and leads I and avl revealed misplaced limb leads    12/23 visit  The  passed and stressful now. No orthopnea PND. On lasix 20 mg bid  10/23 echo EF nl s/p TAVR TG 9 mmHg    06/24 visit  05/06/24 fell and syncope in the yard. Woke up in 15 min and back pain. In the two days was in and out?, CXR rib on 05/09 negative  EKG reviewed by myself today reveals NSR nonspecific STT change  C/o chest pain at exertion and fatigue  BP high  LDL 88    07/24 visit  Troponin elevated 0.098 at last visit. Pt was called and advised to go to ER. The cath was done. DESx2 placed on ostial LAD and pLAD. LVEDP 29 mmHG. ASHIA to LAD patent.  H/o allergy to ranexa amlodipine and Imdur    10/24 visit  Sob and went to ER at [facility]. Resolved after d/c Brillinta. Switched to Plavix  BP high and Pt was upset for the schedule  LDL 88   Leg swelling mildly and taking Lasix 40 mg daily     Interval history  BP high in the office after upset on high way. BP controlled at home.   Denied chest pain, dyspnea on exertion, palpitation, fainting, PND, orthopnea, syncope and claudication.   Chronic mild SOB. BP LDL 60 and A1c controled   Plan egd and colonoscopy  04/24 echo EF nml. S/p AVR                  Past Medical History:   Diagnosis Date    Carotid stenosis      19%    Cellulitis and abscess of foot, except toes 06/22/2022    Cerumen debris on tympanic membrane of right ear 11/30/2022    Class 2 severe obesity due to excess calories with serious comorbidity and body mass index (BMI) of 38.0 to 38.9 in adult 07/12/2023    Coronary artery disease     CVA (cerebral vascular accident)     Dr. Hoffman    Depression     Dog bite 10/03/2023    Double ectopic ureters     Dr. Porras    Encounter for preventive health examination 05/21/2024    Fall 05/09/2024    Hemiplegia affecting right dominant side 11/09/2021    Hyperlipidemia     Hypertension     Hypothyroid     Left foot infection 07/08/2022    Mild asthma with exacerbation 12/04/2022    Near syncope 01/02/2019    NSTEMI (non-ST elevated myocardial infarction) 06/26/2024    OP (osteoporosis)     IRIS (obstructive sleep apnea)     Dr. Hope    Palpitations 01/31/2019    Prediabetes 09/30/2023    Psoriatic arthritis     Rheumatology    Transient ischemic attack (TIA) 01/02/2019       Past Surgical History:   Procedure Laterality Date    BREAST BIOPSY      R sided/benign    CARDIAC SURGERY      sept 28 2016    CERVICAL FUSION      CHOLECYSTECTOMY      CORONARY ANGIOPLASTY      CORONARY ARTERY BYPASS GRAFT      triple bypass    CORONARY BYPASS GRAFT ANGIOGRAPHY  6/27/2024    Procedure: Bypass graft study;  Surgeon: Veronica Ibarra MD;  Location: [facility] CATH LAB;  Service: Cardiology;;    CORONARY STENT PLACEMENT      EYE SURGERY      INTRAUTERINE DEVICE INSERTION      LEFT HEART CATHETERIZATION Left 9/8/2020    Procedure: CATHETERIZATION, HEART, LEFT;  Surgeon: Veronica Ibarra MD;  Location: [facility] CATH LAB;  Service: Cardiology;  Laterality: Left;  7am start time    LEFT HEART CATHETERIZATION Left 6/27/2024    Procedure: Left heart cath;  Surgeon: Veronica Ibarra MD;  Location: [facility] CATH LAB;  Service: Cardiology;  Laterality: Left;    mass removed from R groin      PERCUTANEOUS CORONARY INTERVENTION, ARTERY N/A 6/27/2024     Procedure: Percutaneous coronary intervention;  Surgeon: Veronica Ibarra MD;  Location: [facility] CATH LAB;  Service: Cardiology;  Laterality: N/A;    STENT, DRUG ELUTING, SINGLE VESSEL, CORONARY  2024    Procedure: Stent, Drug Eluting, Single Vessel, Coronary;  Surgeon: Veronica Ibarra MD;  Location: [facility] CATH LAB;  Service: Cardiology;;    TOTAL ABDOMINAL HYSTERECTOMY W/ BILATERAL SALPINGOOPHORECTOMY      due to benign mass, adhesions    TUBAL LIGATION         Social History     Tobacco Use    Smoking status: Never    Smokeless tobacco: Never   Substance Use Topics    Alcohol use: No    Drug use: No       Family History   Problem Relation Name Age of Onset    Breast cancer Maternal Grandfather      Breast cancer Paternal Aunt      Stroke Unknown      Breast cancer Sister  60    Leukemia Sister  8         as child    Lung cancer Paternal Grandfather      Heart disease Unknown      Diabetes Daughter a          ROS    Objective:   Physical Exam  HENT:      Head: Normocephalic.   Eyes:      Pupils: Pupils are equal, round, and reactive to light.   Neck:      Thyroid: No thyromegaly.      Vascular: Normal carotid pulses. No carotid bruit or JVD.   Cardiovascular:      Rate and Rhythm: Normal rate and regular rhythm. No extrasystoles are present.     Chest Wall: PMI is not displaced.      Pulses: Normal pulses.      Heart sounds: Normal heart sounds. No murmur heard.     No gallop. No S3 sounds.   Pulmonary:      Effort: No respiratory distress.      Breath sounds: Normal breath sounds. No stridor.   Abdominal:      General: Bowel sounds are normal.      Palpations: Abdomen is soft.      Tenderness: There is no abdominal tenderness. There is no rebound.   Musculoskeletal:         General: Swelling present.   Skin:     Findings: No rash.   Neurological:      Mental Status: She is alert and oriented to person, place, and time.   Psychiatric:         Behavior: Behavior normal.         Lab Results   Component Value  Date    CHOL 123 11/05/2024    CHOL 154 04/05/2024    CHOL 137 08/24/2023     Lab Results   Component Value Date    HDL 35 (L) 11/05/2024    HDL 35 (L) 04/05/2024    HDL 35 (L) 08/24/2023     Lab Results   Component Value Date    LDLCALC 60.8 (L) 11/05/2024    LDLCALC 88.6 04/05/2024    LDLCALC 69.4 08/24/2023     Lab Results   Component Value Date    TRIG 136 11/05/2024    TRIG 152 (H) 04/05/2024    TRIG 163 (H) 08/24/2023     Lab Results   Component Value Date    CHOLHDL 28.5 11/05/2024    CHOLHDL 22.7 04/05/2024    CHOLHDL 25.5 08/24/2023       Chemistry        Component Value Date/Time     11/22/2024 1132    K 3.8 11/22/2024 1132     11/22/2024 1132    CO2 27 11/22/2024 1132    BUN 15 11/22/2024 1132    CREATININE 1.2 11/22/2024 1132     11/22/2024 1132        Component Value Date/Time    CALCIUM 9.0 11/22/2024 1132    ALKPHOS 62 11/05/2024 1040    AST 15 11/05/2024 1040    ALT 8 (L) 11/05/2024 1040    BILITOT 0.5 11/05/2024 1040    ESTGFRAFRICA 48 (A) 07/18/2022 0803    EGFRNONAA 41 (A) 07/18/2022 0803          Lab Results   Component Value Date    HGBA1C 6.0 (H) 11/05/2024     Lab Results   Component Value Date    TSH 1.770 04/19/2024     Lab Results   Component Value Date    INR 1.0 06/25/2024    INR 1.0 09/28/2020    INR 1.0 12/18/2017     Lab Results   Component Value Date    WBC 8.34 11/22/2024    HGB 9.2 (L) 11/22/2024    HCT 31.5 (L) 11/22/2024    MCV 74 (L) 11/22/2024     11/22/2024     BMP  Sodium   Date Value Ref Range Status   11/22/2024 141 136 - 145 mmol/L Final     Potassium   Date Value Ref Range Status   11/22/2024 3.8 3.5 - 5.1 mmol/L Final     Chloride   Date Value Ref Range Status   11/22/2024 104 95 - 110 mmol/L Final     CO2   Date Value Ref Range Status   11/22/2024 27 23 - 29 mmol/L Final     BUN   Date Value Ref Range Status   11/22/2024 15 8 - 23 mg/dL Final     Creatinine   Date Value Ref Range Status   11/22/2024 1.2 0.5 - 1.4 mg/dL Final     Calcium   Date  Value Ref Range Status   11/22/2024 9.0 8.7 - 10.5 mg/dL Final     Anion Gap   Date Value Ref Range Status   11/22/2024 10 8 - 16 mmol/L Final     eGFR if    Date Value Ref Range Status   07/18/2022 48 (A) >60 mL/min/1.73 m^2 Final     eGFR if non    Date Value Ref Range Status   07/18/2022 41 (A) >60 mL/min/1.73 m^2 Final     Comment:     Calculation used to obtain the estimated glomerular filtration  rate (eGFR) is the CKD-EPI equation.        BNP  @LABRCNTIP(BNP,BNPTRIAGEBLO)@  @LABRCNTIP(troponini)@  CrCl cannot be calculated (Patient's most recent lab result is older than the maximum 7 days allowed.).  No results found in the last 24 hours.  No results found in the last 24 hours.  No results found in the last 24 hours.    Assessment:      1. Aortic atherosclerosis    2. Angina, class II    3. Supraventricular tachycardia    4. 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    5. Coronary artery disease involving coronary bypass graft of native heart, unspecified whether angina present    6. Stenosis of right carotid artery    7. BROOKS (dyspnea on exertion)    8. Essential hypertension    9. History of MI (myocardial infarction)    10. Mixed hyperlipidemia    11. Nonrheumatic aortic valve stenosis    12. Pulmonary hypertension    13. S/P CABG (coronary artery bypass graft)    14. S/P TAVR (transcatheter aortic valve replacement)    15. Type 2 diabetes mellitus with diabetic neuropathy, without long-term current use of insulin      CAD stable  S/p ATVR     Plan:   Continue asa Plavix repatha BB aldactone and lasix   Carotid US   DM Rx per PCP  Counseled DASH  Check Lipid profile with PCP in 6 months  Recommend heart-healthy diet, weight control and regular exercise.  Milind. Risk modification.   I have reviewed all pertinent labs and cardiac studies independently. Plans and recommendations have been formulated under my direct supervision. All questions answered and  patient voiced understanding.   If symptoms persist go to the ED  RTC in 6 months

## 2025-01-29 NOTE — PROGRESS NOTES
Qi Ortiz  01/29/2025  [id number]    Lisa Porter MD  Patient Care Team:  Lisa Porter MD as PCP - General (Internal Medicine)    Visit Type: Follow-up    Ms. Qi Ortiz is a 74 year old female w multiple chronic medical issues here for scheduled f/u     Chronic medical issues include type 2 DM, h/o CVA, CAD s/p CABG x 2, AS s/p TAVR, s/p NSTEMI  6/25/24, HTN, HLD (intolerant of statin), CKD stage 3, hypothyroidism, psoriasis and psoriatic arthritis, CAREN, obesity, and IRIS (intolerant of CPAP). Ms. Ortiz has multiple drug and contact allergies and sensitivities. Food allergies include kiwi fruit and crab boil.     PHQ-4 Score: 0     History of Present Illness    CHIEF COMPLAINT:  Ms. Ortiz presents today with a cough.    RESPIRATORY:  She reports experiencing occasional cough without persistent symptoms throughout the day. She denies gagging sensation or associated breathing difficulties. She experiences shortness of breath with exercise, becoming dyspneic after a few minutes of using exercise equipment, even without weights.    CARDIOVASCULAR:  She has had two open heart surgeries, with the most recent in 2020. She takes Lasix for fluid retention, using 40mg initially and an additional 20mg around 11-12 o'clock as needed when experiencing symptoms. Due to her cardiac condition, she expresses concern about potential cardiac events during exercise.    CHRONIC PAIN:  She reports left upper breast pain since her second open heart surgery in 2020. The pain is localized to the top of the left upper breast and is consistently present. She describes severe exacerbation with certain movements or impacts. She experiences arm pain and inflammation when attempting to use exercise equipment.    MEDICAL HISTORY:  She has a history of chronic anemia requiring treatment every 8-10 months. She reports infrequent infectious illness aside from a recent COVID infection and a urinary tract/kidney infection last  year.    GI:  She reports hemorrhoids with bleeding but denies blood in stool. She expresses concern about undergoing colonoscopy due to her cardiac condition.    WEIGHT MANAGEMENT:  She reports successful weight loss from size 22-24 to size 18-20, maintaining weight within 8 lbs of her goal. Her daughter has expressed concern about the weight loss.    FAMILY HISTORY:  She reports cancer on both maternal and paternal sides of the family.         PHQ-4 Score: 0     Upcoming appointments:   Date Provider Specialty Appt Notes    1/29/2025  Lab lab    1/29/2025 Asif Paez MD Cardiology 3 month f/u    1/31/2025 Trinidad Javed NP Hematology and Oncology 5wk post iron prior lab    2/12/2025  Primary Care bp check    2/26/2025 Lisa Porter MD Primary Care 1 month f/u    3/5/2025  Lab Dr. Ackerman    3/12/2025 Jacky Ackerman MD Rheumatology PSORIATIC --OA--GOUT        The following were reviewed: Active problem list, medication list, allergies, family history, social history, and Health Maintenance.     Medications have been reviewed and reconciled with patient at visit today.    Exam: sats 98%  Vitals:    01/29/25 1116   BP: (!) 162/64   Pulse: 71   Temp: 96.5 °F (35.8 °C)     Weight: 94.7 kg (208 lb 12.4 oz)   Body mass index is 36.98 kg/m².     BP Readings from Last 3 Encounters:   01/29/25 (!) 162/64   01/06/25 (!) 155/63   12/30/24 (!) 177/74      Wt Readings from Last 3 Encounters:   01/29/25 1116 94.7 kg (208 lb 12.4 oz)   12/27/24 1438 94.5 kg (208 lb 5.4 oz)   11/22/24 1014 91.8 kg (202 lb 7.9 oz)       Physical Exam  Vitals reviewed.   Constitutional:       General: She is not in acute distress.     Appearance: Normal appearance. She is obese. She is ill-appearing.   HENT:      Head: Normocephalic and atraumatic.      Right Ear: External ear normal.      Left Ear: External ear normal.      Ears:      Comments: Partial cerumen L ear canal  [place]     Nose: Nose normal.      Mouth/Throat:      Mouth: Mucous  membranes are moist.      Pharynx: Oropharynx is clear.   Eyes:      Extraocular Movements: Extraocular movements intact.      Conjunctiva/sclera: Conjunctivae normal.   Cardiovascular:      Rate and Rhythm: Normal rate and regular rhythm.      Heart sounds: Murmur heard.   Pulmonary:      Effort: Pulmonary effort is normal. No respiratory distress.      Breath sounds: No wheezing.      Comments: Crackles at lung bases.   Abdominal:      General: Bowel sounds are normal. There is no distension.      Palpations: Abdomen is soft.      Tenderness: There is no abdominal tenderness. There is no guarding.   Musculoskeletal:      Right lower leg: Edema present.      Left lower leg: Edema present.   Skin:     General: Skin is warm and dry.   Neurological:      Mental Status: She is alert and oriented to person, place, and time. Mental status is at baseline.      Comments: Ambulates independently w/o AD   Psychiatric:         Mood and Affect: Mood normal.         Behavior: Behavior normal.        Laboratory Reviewed  Lab Results   Component Value Date    WBC 8.28 01/29/2025    HGB 11.5 (L) 01/29/2025    HCT 38.5 01/29/2025     01/29/2025    MCV 84 01/29/2025    CHOL 123 11/05/2024    TRIG 136 11/05/2024    HDL 35 (L) 11/05/2024    LDLCALC 60.8 (L) 11/05/2024    ALT 8 (L) 01/29/2025    AST 17 01/29/2025     01/29/2025    K 4.3 01/29/2025     01/29/2025    CREATININE 1.6 (H) 01/29/2025    BUN 20 01/29/2025    CO2 30 (H) 01/29/2025    MG 1.7 06/25/2024    TSH 1.770 04/19/2024    FREET4 1.01 04/19/2024    INR 1.0 06/25/2024    HGBA1C 6.0 (H) 11/05/2024    CRP 3.1 01/26/2024     Lab Results   Component Value Date    PTH 64.3 01/29/2025    CALCIUM 9.5 01/29/2025    PHOS 3.2 11/22/2024      Lab Results   Component Value Date    PNMYUMXT29 748 11/05/2024     Lab Results   Component Value Date    FOLATE 18.2 11/05/2024      Lab Results   Component Value Date    IRON 28 (L) 01/29/2025    TRANSFERRIN 277 01/29/2025     TIBC 410 01/29/2025    FESATURATED 7 (L) 01/29/2025      Lab Results   Component Value Date    EGFRNORACEVR 34 (A) 01/29/2025    ALBUMIN 3.4 (L) 01/29/2025     (H) 06/29/2024     Lab Results   Component Value Date    MSYSDMCO29VH 60 11/05/2024          Assessment:   74 y.o. female with multiple co-morbid illnesses here for continued follow up of medical problems.      The primary encounter diagnosis was Essential hypertension. Diagnoses of Secondary hyperparathyroidism, Chronic diastolic heart failure, CKD stage 3a, GFR 45-59 ml/min, Iron deficiency anemia due to chronic blood loss, Type 2 diabetes mellitus with stage 3a chronic kidney disease, without long-term current use of insulin, Class 2 severe obesity due to excess calories with serious comorbidity and body mass index (BMI) of 36.0 to 36.9 in adult, Psoriatic arthritis, Immunocompromised, Aortic atherosclerosis, and History of restrictive lung disease were also pertinent to this visit.      Plan:   1. Essential hypertension  Assessment & Plan:  BP elevated here today - reports stressed due to traffic and has not yet taken furosemide - f/u home BP's      2. Secondary hyperparathyroidism  Assessment & Plan:  Calcium,phos, vit D wnl - Recheck PTHi with upcoming labs - may want to add radial forearm to DEXA if still elevated     Orders:  -     PTH, Intact; Future; Expected date: 01/29/2025    3. Chronic diastolic heart failure  Assessment & Plan:  Minimal signs/symptoms at present  watch for volume overload if note sig wt increase over short time period   Cont current Rx and close f/u w cardiology      4. CKD stage 3a, GFR 45-59 ml/min  Assessment & Plan:  F/u repeat chemistry later today - intolerant of ace-I (reports anaphylactic rxn) - cont efforts to manage BP, avoid nephrotoxins       5. Iron deficiency anemia due to chronic blood loss  Overview:  Elida Alanis NP 8/6/2021  Iron levels replenished s/p Venofer x 8. She denies abnormal bleeding  "at preset time but notes intermittent blood noted in stool. Patient has not yet had follow up with GI service for endoscopies due to ongoing COVID concerns previously. She has been encouraged to follow up with GI. Rationale discussed in detail.   Patient unable to tolerate oral iron due to GI upset/constipation. F/u 3 months with repeat labs. Discussed S&S to report sooner        Assessment & Plan:  Followed by Heme - intermittent iron infusions - continues to defer colonoscopy though aware of colon cancer screening recomendation       6. Type 2 diabetes mellitus with stage 3a chronic kidney disease, without long-term current use of insulin  Overview:  5/20/22 HgbA1c 7.1%     Assessment & Plan:  Multiple medication allergies and sensitivities  - has maintained A1c in "prediabetes" range over past 2 years w weight loss and dietary adjustments - encourage stay mobile and active to extent able - encourage cont healthier food and beverage choices       7. Class 2 severe obesity due to excess calories with serious comorbidity and body mass index (BMI) of 36.0 to 36.9 in adult  Assessment & Plan:  Cont healthy weight loss measures w movement and healthier food and beverage choices - watch for volume overload if note sig wt increase over short time period       8. Psoriatic arthritis  Overview:  Rheumatology    Assessment & Plan:  followed by Rheum - doing well so far w Cosentyx for psoriatic arthritis       9. Immunocompromised  Assessment & Plan:  Cont monitor for s/s infection - followed by Rheum - doing well so far w Cosentyx for psoriatic arthritis - declines vaccines       10. Aortic atherosclerosis  Overview:  CXR 10/12/23 Moderate scattered degenerative change and atherosclerotic disease.     Assessment & Plan:  Statin intolerance - Improved lipid profile w addition of Repatha - work on BP management      11. History of restrictive lung disease  Overview:  2016: Pulmonary function tests: FEV1: 1.77  (76 % " predicted), FVC:  2.29 (75 % predicted), FEV1/FVC:  77, TLC: 3.53 (76 % predicted), RV/TLVC: 34 (87 % predicted), DLCO: 17.0 (67 % predicted) Normal airflow. Plethysmographic lung volumes reveal mild restriction. Diffusion capacity is mildly reduced but corrects for alveolar volume.     Assessment & Plan:  Consider repeat PFTs?           Health Maintenance         Date Due Completion Date    COVID-19 Vaccine (1) Never done ---    Pneumococcal Vaccines (Age 50+) (1 of 2 - PCV) Never done ---    RSV Vaccine (Age 60+ and Pregnant patients) (1 - Risk 60-74 years 1-dose series) Never done ---    Colorectal Cancer Screening 04/18/2016 4/18/2011    Mammogram 10/23/2016 10/23/2015    Shingles Vaccine (2 of 2) 11/28/2022 10/3/2022    Hemoglobin A1c 05/05/2025 11/5/2024    DEXA Scan 07/25/2025 7/25/2022    Foot Exam 10/25/2025 10/25/2024    Override on 9/29/2023: Done (pt w idiopathic peripheral neuropathy - not diabetic - prediabetic)    Diabetic Eye Exam 11/04/2025 11/4/2024    Lipid Panel 11/05/2025 11/5/2024    Diabetes Urine Screening 01/31/2026 1/31/2025    TETANUS VACCINE 10/02/2033 10/2/2023          Declines vaccines    -Patient's lab results were reviewed and discussed with patient  -Treatment options and alternatives were discussed with the patient. Patient expressed understanding. Patient was given the opportunity to ask questions and be an active participant in their medical care. Patient had no further questions or concerns at this time.     Follow up: Follow up in about 1 month (around 2/28/2025) for if next f/u not w me please also schedule 2 mos f/u w me (prefers afternoon appt).    After visit summary printed and given to patient upon discharge.  Patient care plan are included in After visit summary.    TOTAL TIME evaluating and managing this patient for this encounter was 33 minutes. This time was spent personally by me on some of the following activities: review of patient's past medical history, assessing  age-appropriate health maintenance needs, review of any interval history, review and interpretation of lab results, review and interpretation of imaging test results, review and interpretation of cardiology test results, reviewing consulting specialist notes, obtaining history from the patient and family, examination of the patient, medication reconciliation, managing and/or ordering prescription medications, ordering imaging tests, ordering referral to subspecialty provider(s), educating patient and answering their questions about diagnosis, treatment plan, and goals of treatment, discussing planned follow-up and final documentation of the visit. This time was exclusive of any separately billable procedures for this patient and exclusive of time spent treating any other patients.     This note was generated with the assistance of ambient listening technology. Verbal consent was obtained by the patient and accompanying visitor(s) for the recording of patient appointment to facilitate this note. I attest to having reviewed and edited the generated note for accuracy, though some syntax or spelling errors may persist. Please contact the author of this note for any clarification.

## 2025-01-29 NOTE — PATIENT INSTRUCTIONS
If you are feeling unwell, we'd like to be the first ones to know here at [facility]! Please give us a call. Same day appointments are our top priority to keep you well and out of the emergency rooms and hospitals. Call [phone] for our direct line. After hours advice is always available. Please call [phone] after hours to speak to the on-call team.      Recommend to schedule EGD and colonoscopy sometime soon    Recommend check and chart BP few times per week one to two hours after anti-hypertensive medication

## 2025-01-29 NOTE — ASSESSMENT & PLAN NOTE
Calcium,phos, vit D wnl - Recheck PTHi with upcoming labs - may want to add radial forearm to DEXA if still elevated

## 2025-01-30 ENCOUNTER — TELEPHONE (OUTPATIENT)
Dept: PRIMARY CARE CLINIC | Facility: CLINIC | Age: 75
End: 2025-01-30
Payer: MEDICARE

## 2025-01-30 LAB
FERRITIN SERPL-MCNC: 38 NG/ML (ref 20–300)
IRON SERPL-MCNC: 28 UG/DL (ref 30–160)
PTH-INTACT SERPL-MCNC: 64.3 PG/ML (ref 9–77)
SATURATED IRON: 7 % (ref 20–50)
TOTAL IRON BINDING CAPACITY: 410 UG/DL (ref 250–450)
TRANSFERRIN SERPL-MCNC: 277 MG/DL (ref 200–375)

## 2025-01-30 NOTE — TELEPHONE ENCOUNTER
----- Message from Lisa Porter MD sent at 1/30/2025  6:10 AM CST -----  Pt does not use [facility] pt portal - please call w message below:    Your parathyroid hormone level is normal.  Your renal function is down a bit - not sure why.   Are you taking any over the counter medications like ibuprofen or naproxen   Please check and chart BP at home and let us know how they are running and still need the urine test.  Will f/u on repeat labwork already ordered by Dr. Ackerman for March 5th.    Your iron and blood count have improved - please f/u with SAMEERA Javed on this.

## 2025-01-30 NOTE — PROGRESS NOTES
Pt does not use [facility] pt portal - please call w message below:    Your parathyroid hormone level is normal.  Your renal function is down a bit - not sure why.   Are you taking any over the counter medications like ibuprofen or naproxen   Please check and chart BP at home and let us know how they are running and still need the urine test.  Will f/u on repeat labwork already ordered by Dr. Ackerman for March 5th.    Your iron and blood count have improved - please f/u with SAMEERA Javed on this.

## 2025-01-31 ENCOUNTER — OFFICE VISIT (OUTPATIENT)
Dept: HEMATOLOGY/ONCOLOGY | Facility: CLINIC | Age: 75
End: 2025-01-31
Payer: MEDICARE

## 2025-01-31 VITALS
HEIGHT: 60 IN | BODY MASS INDEX: 40.98 KG/M2 | DIASTOLIC BLOOD PRESSURE: 53 MMHG | WEIGHT: 208.75 LBS | SYSTOLIC BLOOD PRESSURE: 127 MMHG | HEART RATE: 60 BPM

## 2025-01-31 DIAGNOSIS — D50.0 IRON DEFICIENCY ANEMIA DUE TO CHRONIC BLOOD LOSS: Primary | Chronic | ICD-10-CM

## 2025-01-31 DIAGNOSIS — E53.8 FOLIC ACID DEFICIENCY: ICD-10-CM

## 2025-01-31 DIAGNOSIS — E53.8 B12 DEFICIENCY: Chronic | ICD-10-CM

## 2025-01-31 PROCEDURE — 99214 OFFICE O/P EST MOD 30 MIN: CPT | Mod: S$PBB,,, | Performed by: NURSE PRACTITIONER

## 2025-01-31 PROCEDURE — G2211 COMPLEX E/M VISIT ADD ON: HCPCS | Mod: S$PBB,,, | Performed by: NURSE PRACTITIONER

## 2025-01-31 PROCEDURE — 99214 OFFICE O/P EST MOD 30 MIN: CPT | Mod: PBBFAC | Performed by: NURSE PRACTITIONER

## 2025-01-31 PROCEDURE — 99999 PR PBB SHADOW E&M-EST. PATIENT-LVL IV: CPT | Mod: PBBFAC,,, | Performed by: NURSE PRACTITIONER

## 2025-01-31 RX ORDER — METHYLPREDNISOLONE SOD SUCC 125 MG
60 VIAL (EA) INJECTION
Start: 2025-01-31

## 2025-01-31 NOTE — PROGRESS NOTES
Subjective:      Patient ID: Qi Ortiz is a 74 y.o. female.    Chief Complaint: fatigue    HPI: 74 year old female presents today to evaluate labs.  Has been found in the past to have folic acid deficiency, recurrent CAREN requiring IV iron infusions and also anemia d/t CKD.        medical history significant for HTN, carotid stenosis, hypothyroidism, COPD, psoriatic arthritis, hyperlipidemia, CVA with right hemiplegia, depression, CAD, osteoporosis, IRIS, and double ectopic ureters.     Has been followed in the clinic by Dr. Mckinney, Elida Alanis NP, Jade Delgado NP, and Denis Caban NP, Today is the first time I am evaluating/assessing the patient.      Currently with normocytic anemia - hgb 11.6, mcv 83, ferritin 17.  Recently found with B12 deficiency and prescribed B12 1000 mcg SL daily - states that she hasn't started as of yet. States used to take B12.     States that she does have some hemorrhoidal bleeding but does not get in the toilet or stool.  Only seen when she wipes and not all the time.  Has not been able to tolerate GI prep for colonoscopy and has not had transportation to have EGD/colonoscopy done     Interval History:  12/15/2023  Received venofer 200 mg IV x 4 doses and presents today to evaluate response.  States that she tolerated the once weekly iron infusions.  States that she felt sick to her stomach with abdominal cramping.  States now her energy level is back up.  Has one small hemorrhoids that bleeds at times.       Interval History:  8/2/2024 with b12 and folate deficiency and recurrent iron deficiency.  Recently had 2 stents placed in LAD on 6/27/2024 - currently on Brillinta 90 mg by mouth twice daily. States that she has stopped the b12 supplement because her levels were elevated.  Currently not anemic.  Saturated iron slight low at 16%.     Interval History:  11/8/2024 Presents today to review labs with recurrent iron deficiency anemia.  Currently with microcytic anemia and  low ferritin.  Has sob and weakness that has progressively worsened.  Denies any known abnormal bleeding except for some minor hemorrhoidal bleeding.  States that she was taken off of Brillinta because it was causing shortness of breath.     Interval History:  1/31/2025  Found with CAREN and received 4/8 doses of IV venofer 200 mg x 1 dose and venofer 100 mg x 7 doses IV.  States that she is feeling a lot better.  Continues with anemia and low saturated iron.  Presents today to evaluate response.   I have reviewed all of the patient's relevant lab work available in the medical record and have utilized this in my evaluation and management recommendations today.      Social History     Socioeconomic History    Marital status: Single    Number of children: 3   Occupational History    Occupation: Not working   Tobacco Use    Smoking status: Never    Smokeless tobacco: Never   Substance and Sexual Activity    Alcohol use: No    Drug use: No    Sexual activity: Not Currently     Partners: Male     Social Drivers of Health     Financial Resource Strain: Low Risk  (5/21/2024)    Overall Financial Resource Strain (CARDIA)     Difficulty of Paying Living Expenses: Not hard at all   Food Insecurity: No Food Insecurity (5/21/2024)    Hunger Vital Sign     Worried About Running Out of Food in the Last Year: Never true     Ran Out of Food in the Last Year: Never true   Transportation Needs: No Transportation Needs (5/21/2024)    PRAPARE - Transportation     Lack of Transportation (Medical): No     Lack of Transportation (Non-Medical): No   Physical Activity: Inactive (5/21/2024)    Exercise Vital Sign     Days of Exercise per Week: 0 days     Minutes of Exercise per Session: 0 min   Stress: No Stress Concern Present (5/21/2024)    [employer] - Occupational Stress Questionnaire     Feeling of Stress : Not at all   Housing Stability: Low Risk  (5/21/2024)    Housing Stability Vital Sign     Unable to Pay  for Housing in the Last Year: No     Homeless in the Last Year: No       Family History   Problem Relation Name Age of Onset    Breast cancer Maternal Grandfather      Breast cancer Paternal Aunt      Stroke Unknown      Breast cancer Sister  60    Leukemia Sister  8         as child    Lung cancer Paternal Grandfather      Heart disease Unknown      Diabetes Daughter a        Past Surgical History:   Procedure Laterality Date    BREAST BIOPSY      R sided/benign    CARDIAC SURGERY      2016    CERVICAL FUSION      CHOLECYSTECTOMY      CORONARY ANGIOPLASTY      CORONARY ARTERY BYPASS GRAFT      triple bypass    CORONARY BYPASS GRAFT ANGIOGRAPHY  2024    Procedure: Bypass graft study;  Surgeon: Veronica Ibarra MD;  Location: [facility] CATH LAB;  Service: Cardiology;;    CORONARY STENT PLACEMENT      EYE SURGERY      INTRAUTERINE DEVICE INSERTION      LEFT HEART CATHETERIZATION Left 2020    Procedure: CATHETERIZATION, HEART, LEFT;  Surgeon: Veronica Ibarra MD;  Location: [facility] CATH LAB;  Service: Cardiology;  Laterality: Left;  7am start time    LEFT HEART CATHETERIZATION Left 2024    Procedure: Left heart cath;  Surgeon: Veronica Ibarra MD;  Location: [facility] CATH LAB;  Service: Cardiology;  Laterality: Left;    mass removed from R groin      PERCUTANEOUS CORONARY INTERVENTION, ARTERY N/A 2024    Procedure: Percutaneous coronary intervention;  Surgeon: Veronica Ibarra MD;  Location: [facility] CATH LAB;  Service: Cardiology;  Laterality: N/A;    STENT, DRUG ELUTING, SINGLE VESSEL, CORONARY  2024    Procedure: Stent, Drug Eluting, Single Vessel, Coronary;  Surgeon: Veronica Ibarra MD;  Location: [facility] CATH LAB;  Service: Cardiology;;    TOTAL ABDOMINAL HYSTERECTOMY W/ BILATERAL SALPINGOOPHORECTOMY      due to benign mass, adhesions    TUBAL LIGATION         Past Medical History:   Diagnosis Date    Carotid stenosis     19%    Cellulitis and abscess of foot, except toes 2022    José Luis  debris on tympanic membrane of right ear 11/30/2022    Class 2 severe obesity due to excess calories with serious comorbidity and body mass index (BMI) of 38.0 to 38.9 in adult 07/12/2023    Coronary artery disease     CVA (cerebral vascular accident)     Dr. Hoffman    Depression     Dog bite 10/03/2023    Double ectopic ureters     Dr. Porras    Encounter for preventive health examination 05/21/2024    Fall 05/09/2024    Hemiplegia affecting right dominant side 11/09/2021    Hyperlipidemia     Hypertension     Hypothyroid     Left foot infection 07/08/2022    Mild asthma with exacerbation 12/04/2022    Near syncope 01/02/2019    NSTEMI (non-ST elevated myocardial infarction) 06/26/2024    OP (osteoporosis)     IRIS (obstructive sleep apnea)     Dr. Hope    Palpitations 01/31/2019    Prediabetes 09/30/2023    Psoriatic arthritis     Rheumatology    Transient ischemic attack (TIA) 01/02/2019       Review of Systems   Constitutional:  Positive for fatigue.   HENT:  Negative for nosebleeds.    Eyes: Negative.    Respiratory:  Positive for shortness of breath.    Cardiovascular:  Positive for palpitations. Negative for chest pain.   Gastrointestinal:  Positive for anal bleeding (minor hemorrhoidal bleeding). Negative for blood in stool.   Endocrine: Negative.    Genitourinary:  Negative for hematuria and vaginal bleeding.   Musculoskeletal: Negative.    Skin:  Negative for pallor.   Allergic/Immunologic: Negative.    Neurological:  Positive for headaches. Negative for weakness.   Hematological: Negative.    Psychiatric/Behavioral:  Positive for sleep disturbance. The patient is nervous/anxious.           Medication List with Changes/Refills   Current Medications    ACETAMINOPHEN (TYLENOL) 650 MG TBSR    as directed    ALBUTEROL (PROVENTIL) 2.5 MG /3 ML (0.083 %) NEBULIZER SOLUTION    Take 3 mLs (2.5 mg total) by nebulization every 6 (six) hours as needed for Wheezing. Rescue    ALLOPURINOL (ZYLOPRIM) 100 MG TABLET    TAKE  2 TABLETS ONCE DAILY    ASPIRIN 81 MG CHEW    Take 1 tablet (81 mg total) by mouth once daily.    CALCIPOTRIENE (DOVONOX) 0.005 % CREAM    Apply topically 2 (two) times daily.    CALCIUM CARBONATE 650 MG CALCIUM (1,625 MG) TABLET    Take 1 tablet by mouth once daily.    CLOPIDOGREL (PLAVIX) 75 MG TABLET    Take 1 tablet (75 mg total) by mouth once daily.    COSENTYX PEN, 2 PENS, 150 MG/ML PNIJ    INJECT 300 MG (2 PENS) UNDER THE SKIN EVERY 28 DAYS    CYANOCOBALAMIN (VITAMIN B-12) 1000 MCG TABLET    Take 100 mcg by mouth once daily.    DOCUSATE SODIUM (COLACE) 100 MG CAPSULE    Take 1 capsule (100 mg total) by mouth 2 (two) times daily.    FOLIC ACID (FOLVITE) 1 MG TABLET    TAKE 1 TABLET DAILY    FUROSEMIDE (LASIX) 20 MG TABLET    TAKE 1 TABLET TWICE A DAY (DOSE INCREASED BY CARDIOLOGIST DR. MASON)    GARLIC 2,000 MG CAP    Take 3,000 mg by mouth once daily.     LEVOTHYROXINE (SYNTHROID) 100 MCG TABLET    TAKE 1 TABLET BEFORE BREAKFAST    METOPROLOL SUCCINATE (TOPROL-XL) 100 MG 24 HR TABLET    Take 1 tablet (100 mg total) by mouth 2 (two) times daily.    NITROGLYCERIN (NITROSTAT) 0.4 MG SL TABLET    DISSOLVE 1 TABLET UNDER THE TONGUE EVERY 5 MINUTES AS NEEDED FOR CHEST PAIN    POTASSIUM CHLORIDE SA (K-DUR,KLOR-CON M) 10 MEQ TABLET    Take 1 tablet (10 mEq total) by mouth once daily.    PYRIDOXINE, VITAMIN B6, (B-6) 100 MG TAB    Take 200 mg by mouth once daily.     REPATHA SURECLICK 140 MG/ML PNIJ    INJECT 1 ML (140 MG) UNDER THE SKIN EVERY 14 DAYS    SPIRONOLACTONE (ALDACTONE) 50 MG TABLET    Take 1 tablet (50 mg total) by mouth once daily.    VITAMIN D 1000 UNITS TAB    Take 185 mg by mouth once daily.        Objective:     Vitals:    01/31/25 1254   BP: (!) 127/53   Pulse: 60         Physical Exam  Vitals reviewed.   Constitutional:       Appearance: Normal appearance. She is obese.   HENT:      Head: Normocephalic and atraumatic.      Right Ear: External ear normal.      Left Ear: External ear normal.    Cardiovascular:      Rate and Rhythm: Normal rate and regular rhythm.      Heart sounds: S1 normal and S2 normal. Murmur heard.      Systolic murmur is present with a grade of 2/6.   Pulmonary:      Effort: Pulmonary effort is normal.      Breath sounds: Normal breath sounds.   Abdominal:      General: There is no distension.   Musculoskeletal:         General: Normal range of motion.      Cervical back: Normal range of motion.   Skin:     General: Skin is warm and dry.      Coloration: Skin is not pale.   Neurological:      General: No focal deficit present.      Mental Status: She is alert and oriented to person, place, and time.   Psychiatric:         Attention and Perception: Attention and perception normal.         Mood and Affect: Affect normal. Mood is anxious.         Speech: Speech normal.         Behavior: Behavior normal. Behavior is cooperative.         Thought Content: Thought content normal.         Cognition and Memory: Cognition and memory normal.         Judgment: Judgment normal.         Assessment:     Problem List Items Addressed This Visit       Iron deficiency anemia due to chronic blood loss - Primary (Chronic)    B12 deficiency (Chronic)    Folic acid deficiency         Lab Results   Component Value Date    WBC 8.28 01/29/2025    RBC 4.61 01/29/2025    HGB 11.5 (L) 01/29/2025    HCT 38.5 01/29/2025    MCV 84 01/29/2025    MCH 24.9 (L) 01/29/2025    MCHC 29.9 (L) 01/29/2025    RDW 22.0 (H) 01/29/2025     01/29/2025    MPV 9.6 01/29/2025    GRAN 5.6 01/29/2025    GRAN 67.4 01/29/2025    LYMPH 1.8 01/29/2025    LYMPH 22.0 01/29/2025    MONO 0.7 01/29/2025    MONO 8.5 01/29/2025    EOS 0.1 01/29/2025    BASO 0.05 01/29/2025    EOSINOPHIL 1.3 01/29/2025    BASOPHIL 0.6 01/29/2025      Lab Results   Component Value Date     01/29/2025    K 4.3 01/29/2025     01/29/2025    CO2 30 (H) 01/29/2025    BUN 20 01/29/2025    CREATININE 1.6 (H) 01/29/2025    CALCIUM 9.5 01/29/2025     ANIONGAP 8 01/29/2025    ESTGFRAFRICA 48 (A) 07/18/2022    EGFRNONAA 41 (A) 07/18/2022     Lab Results   Component Value Date    ALT 8 (L) 01/29/2025    AST 17 01/29/2025    ALKPHOS 53 01/29/2025    BILITOT 0.2 01/29/2025     Lab Results   Component Value Date    IRON 28 (L) 01/29/2025    TRANSFERRIN 277 01/29/2025    TIBC 410 01/29/2025    FESATURATED 7 (L) 01/29/2025    FERRITIN 38 01/29/2025      Lab Results   Component Value Date    VUARSGZV32 748 11/05/2024     Lab Results   Component Value Date    FOLATE 18.2 11/05/2024       Plan:   Iron deficiency anemia due to chronic blood loss  Comments:  Will give an additional Venofer 100 mg IVPB x 8 weekly doses.  Anemia and iron has improved; however she does need additional IV iron at this time.    Folic acid deficiency  Comments:  She continues to take her fovite 1 mg Po daily  Stable    B12 deficiency  Comments:  Continue to take B12 1000 mcg PO daily. stable    Other orders  -     0.9% NaCl 250 mL flush bag  -     methylPREDNISolone sodium succinate injection 60 mg  -     iron sucrose injection 100 mg        Med Onc Chart Routing      Follow up with physician    Follow up with [facility] . F/u 5 weeks with labs prior - in person visit at [facility]   Infusion scheduling note   [facility]   Injection scheduling note n/a   Labs   Scheduling:  [facility]erred lab:  [facility]  Lab interval:  cbc, iron studies   Imaging   N/a   Pharmacy appointment No pharmacy appointment needed      Other referrals       No additional referrals needed  n/a            Continue folic acid and B12 supplements.      Total time spent on encounter: 30 minutes. .      Collaborating Provider:  Dr. Royal Mckinney    Thank You,  KJ SunshineP-C  [facility]

## 2025-02-03 ENCOUNTER — TELEPHONE (OUTPATIENT)
Dept: PRIMARY CARE CLINIC | Facility: CLINIC | Age: 75
End: 2025-02-03
Payer: MEDICARE

## 2025-02-03 NOTE — TELEPHONE ENCOUNTER
----- Message from Lisa Porter MD sent at 2/1/2025  4:18 PM CST -----  Pt does not use [facility] pt portal - please call w message below:    Urine microalbumin test result is good - minimal albumin in the urine.

## 2025-02-05 ENCOUNTER — HOSPITAL ENCOUNTER (OUTPATIENT)
Dept: CARDIOLOGY | Facility: HOSPITAL | Age: 75
Discharge: HOME | End: 2025-02-05
Attending: INTERNAL MEDICINE
Payer: MEDICARE

## 2025-02-05 VITALS
WEIGHT: 208 LBS | SYSTOLIC BLOOD PRESSURE: 127 MMHG | BODY MASS INDEX: 40.84 KG/M2 | HEIGHT: 60 IN | DIASTOLIC BLOOD PRESSURE: 53 MMHG

## 2025-02-05 DIAGNOSIS — I65.21 STENOSIS OF RIGHT CAROTID ARTERY: ICD-10-CM

## 2025-02-05 LAB
LEFT ARM DIASTOLIC BLOOD PRESSURE: 65 MMHG
LEFT ARM SYSTOLIC BLOOD PRESSURE: 124 MMHG
LEFT CBA DIAS: 7 CM/S
LEFT CBA SYS: 63 CM/S
LEFT CCA DIST DIAS: 10 CM/S
LEFT CCA DIST SYS: 85 CM/S
LEFT CCA MID DIAS: 13 CM/S
LEFT CCA MID SYS: 82 CM/S
LEFT CCA PROX DIAS: 8 CM/S
LEFT CCA PROX SYS: 68 CM/S
LEFT ECA DIAS: 10 CM/S
LEFT ECA SYS: 103 CM/S
LEFT ICA DIST DIAS: 24 CM/S
LEFT ICA DIST SYS: 83 CM/S
LEFT ICA MID DIAS: 19 CM/S
LEFT ICA MID SYS: 74 CM/S
LEFT ICA PROX DIAS: 17 CM/S
LEFT ICA PROX SYS: 82 CM/S
LEFT VERTEBRAL DIAS: 9 CM/S
LEFT VERTEBRAL SYS: 50 CM/S
OHS CV CAROTID RIGHT ICA EDV HIGHEST: 26
OHS CV CAROTID ULTRASOUND LEFT ICA/CCA RATIO: 0.98
OHS CV CAROTID ULTRASOUND RIGHT ICA/CCA RATIO: 1.56
OHS CV PV CAROTID LEFT HIGHEST CCA: 85
OHS CV PV CAROTID LEFT HIGHEST ICA: 83
OHS CV PV CAROTID RIGHT HIGHEST CCA: 73
OHS CV PV CAROTID RIGHT HIGHEST ICA: 112
OHS CV US CAROTID LEFT HIGHEST EDV: 24
RIGHT ARM DIASTOLIC BLOOD PRESSURE: 60 MMHG
RIGHT ARM SYSTOLIC BLOOD PRESSURE: 127 MMHG
RIGHT CBA DIAS: 11 CM/S
RIGHT CBA SYS: 64 CM/S
RIGHT CCA DIST DIAS: 12 CM/S
RIGHT CCA DIST SYS: 72 CM/S
RIGHT CCA MID DIAS: 6 CM/S
RIGHT CCA MID SYS: 73 CM/S
RIGHT CCA PROX DIAS: 11 CM/S
RIGHT CCA PROX SYS: 65 CM/S
RIGHT ECA DIAS: 5 CM/S
RIGHT ECA SYS: 53 CM/S
RIGHT ICA DIST DIAS: 26 CM/S
RIGHT ICA DIST SYS: 91 CM/S
RIGHT ICA MID DIAS: 24 CM/S
RIGHT ICA MID SYS: 112 CM/S
RIGHT ICA PROX DIAS: 8 CM/S
RIGHT ICA PROX SYS: 65 CM/S
RIGHT VERTEBRAL DIAS: 10 CM/S
RIGHT VERTEBRAL SYS: 60 CM/S

## 2025-02-05 PROCEDURE — 93880 EXTRACRANIAL BILAT STUDY: CPT | Mod: 26,,, | Performed by: INTERNAL MEDICINE

## 2025-02-05 PROCEDURE — 93880 EXTRACRANIAL BILAT STUDY: CPT

## 2025-02-06 ENCOUNTER — TELEPHONE (OUTPATIENT)
Dept: CARDIOLOGY | Facility: CLINIC | Age: 75
End: 2025-02-06
Payer: MEDICARE

## 2025-02-06 NOTE — TELEPHONE ENCOUNTER
Spoke with pt in regards to test results. Pt verbalized understanding information without any concerns.                 ----- Message from Asif Paez MD sent at 2/6/2025  8:11 AM CST -----  Carotid artery US showed mild dz  Continue current Rx.   F/U as scheduled

## 2025-02-06 NOTE — ASSESSMENT & PLAN NOTE
Intolerant of statin - cont Repatha   Sotyktu Pregnancy And Lactation Text: There is insufficient data to evaluate whether or not Sotyktu is safe to use during pregnancy.? ?It is not known if Sotyktu passes into breast milk and whether or not it is safe to use when breastfeeding.?? Ketoconazole Counseling:   Patient counseled regarding improving absorption with orange juice.  Adverse effects include but are not limited to breast enlargement, headache, diarrhea, nausea, upset stomach, liver function test abnormalities, taste disturbance, and stomach pain.  There is a rare possibility of liver failure that can occur when taking ketoconazole. The patient understands that monitoring of LFTs may be required, especially at baseline. The patient verbalized understanding of the proper use and possible adverse effects of ketoconazole.  All of the patient's questions and concerns were addressed. Hydroxyzine Counseling: Patient advised that the medication is sedating and not to drive a car after taking this medication.  Patient informed of potential adverse effects including but not limited to dry mouth, urinary retention, and blurry vision.  The patient verbalized understanding of the proper use and possible adverse effects of hydroxyzine.  All of the patient's questions and concerns were addressed. High Dose Vitamin A Pregnancy And Lactation Text: High dose vitamin A therapy is contraindicated during pregnancy and breast feeding. Dutasteride Male Counseling: Dustasteride Counseling:  I discussed with the patient the risks of use of dutasteride including but not limited to decreased libido, decreased ejaculate volume, and gynecomastia. Women who can become pregnant should not handle medication.  All of the patient's questions and concerns were addressed. Spevigo Counseling: I discussed with the patient the risks of Spevigo including but not limited to fatigue, nasuea, vomiting, headache, pruritus, urinary tract infection, an infusion related reactions.  The patient understands that monitoring is required including screening for tuberculosis at baseline and yearly screening thereafter while continuing Spevigo therapy. They should contact us if symptoms of infection or other concerning signs are noted. Otezla Counseling: The side effects of Otezla were discussed with the patient, including but not limited to worsening or new depression, weight loss, diarrhea, nausea, upper respiratory tract infection, and headache. Patient instructed to call the office should any adverse effect occur.  The patient verbalized understanding of the proper use and possible adverse effects of Otezla.  All the patient's questions and concerns were addressed. Winlevi Counseling:  I discussed with the patient the risks of topical clascoterone including but not limited to erythema, scaling, itching, and stinging. Patient voiced their understanding. 5-Fu Pregnancy And Lactation Text: This medication is Pregnancy Category X and contraindicated in pregnancy and in women who may become pregnant. It is unknown if this medication is excreted in breast milk. Thalidomide Counseling: I discussed with the patient the risks of thalidomide including but not limited to birth defects, anxiety, weakness, chest pain, dizziness, cough and severe allergy. Tazorac Pregnancy And Lactation Text: This medication is not safe during pregnancy. It is unknown if this medication is excreted in breast milk. Erythromycin Pregnancy And Lactation Text: This medication is Pregnancy Category B and is considered safe during pregnancy. It is also excreted in breast milk. Siliq Pregnancy And Lactation Text: The risk during pregnancy and breastfeeding is uncertain with this medication. Gabapentin Pregnancy And Lactation Text: This medication is Pregnancy Category C and isn't considered safe during pregnancy. It is excreted in breast milk. Arava Pregnancy And Lactation Text: This medication is Pregnancy Category X and is absolutely contraindicated during pregnancy. It is unknown if it is excreted in breast milk. Ebglyss Counseling: I discussed with the patient the risks of lebrikizumab including but not limited to eye inflammation and irritation, cold sores, injection site reactions, allergic reactions and increased risk of parasitic infection. The patient understands that monitoring is required and they must alert us or the primary physician if symptoms of infection or other concerning signs are noted. Glycopyrrolate Counseling:  I discussed with the patient the risks of glycopyrrolate including but not limited to skin rash, drowsiness, dry mouth, difficulty urinating, and blurred vision. Simlandi Counseling:  I discussed with the patient the risks of adalimumab including but not limited to myelosuppression, immunosuppression, autoimmune hepatitis, demyelinating diseases, lymphoma, and serious infections.  The patient understands that monitoring is required including a PPD at baseline and must alert us or the primary physician if symptoms of infection or other concerning signs are noted. Bimzelx Counseling:  I discussed with the patient the risks of Bimzelx including but not limited to depression, immunosuppression, allergic reactions and infections.  The patient understands that monitoring is required including a PPD at baseline and must alert us or the primary physician if symptoms of infection or other concerning signs are noted. Bactrim Pregnancy And Lactation Text: This medication is Pregnancy Category D and is known to cause fetal risk.  It is also excreted in breast milk. Xolair Pregnancy And Lactation Text: This medication is Pregnancy Category B and is considered safe during pregnancy. This medication is excreted in breast milk. Imiquimod Counseling:  I discussed with the patient the risks of imiquimod including but not limited to erythema, scaling, itching, weeping, crusting, and pain.  Patient understands that the inflammatory response to imiquimod is variable from person to person and was educated regarded proper titration schedule.  If flu-like symptoms develop, patient knows to discontinue the medication and contact us. Tetracycline Counseling: Patient counseled regarding possible photosensitivity and increased risk for sunburn.  Patient instructed to avoid sunlight, if possible.  When exposed to sunlight, patients should wear protective clothing, sunglasses, and sunscreen.  The patient was instructed to call the office immediately if the following severe adverse effects occur:  hearing changes, easy bruising/bleeding, severe headache, or vision changes.  The patient verbalized understanding of the proper use and possible adverse effects of tetracycline.  All of the patient's questions and concerns were addressed. Patient understands to avoid pregnancy while on therapy due to potential birth defects. Spironolactone Pregnancy And Lactation Text: This medication can cause feminization of the male fetus and should be avoided during pregnancy. The active metabolite is also found in breast milk. Picato Pregnancy And Lactation Text: This medication is Pregnancy Category C. It is unknown if this medication is excreted in breast milk. Cyclosporine Pregnancy And Lactation Text: This medication is Pregnancy Category C and it isn't know if it is safe during pregnancy. This medication is excreted in breast milk. Litfulo Pregnancy And Lactation Text: There is insufficient data to evaluate whether or not Litfulo is safe to use during pregnancy.  Breastfeeding is not recommended during treatment. Niacinamide Counseling: I recommended taking niacin or niacinamide, also know as vitamin B3, twice daily. Recent evidence suggests that taking vitamin B3 (500 mg twice daily) can reduce the risk of actinic keratoses and non-melanoma skin cancers. Side effects of vitamin B3 include flushing and headache. Olumiant Counseling: I discussed with the patient the risks of Olumiant therapy including but not limited to upper respiratory tract infections, shingles, cold sores, and nausea. Live vaccines should be avoided.  This medication has been linked to serious infections; higher rate of mortality; malignancy and lymphoproliferative disorders; major adverse cardiovascular events; thrombosis; gastrointestinal perforations; neutropenia; lymphopenia; anemia; liver enzyme elevations; and lipid elevations. Tetracycline Pregnancy And Lactation Text: This medication is Pregnancy Category D and not consider safe during pregnancy. It is also excreted in breast milk. Ketoconazole Pregnancy And Lactation Text: This medication is Pregnancy Category C and it isn't know if it is safe during pregnancy. It is also excreted in breast milk and breast feeding isn't recommended. Dutasteride Female Counseling: Dutasteride Counseling:  I discussed with the patient the risks of use of dutasteride including but not limited to decreased libido and sexual dysfunction. Explained the teratogenic nature of the medication and stressed the importance of not getting pregnant during treatment. All of the patient's questions and concerns were addressed. Winlevi Pregnancy And Lactation Text: This medication is considered safe during pregnancy and breastfeeding. Clofazimine Counseling:  I discussed with the patient the risks of clofazimine including but not limited to skin and eye pigmentation, liver damage, nausea/vomiting, gastrointestinal bleeding and allergy. Otezla Pregnancy And Lactation Text: This medication is Pregnancy Category C and it isn't known if it is safe during pregnancy. It is unknown if it is excreted in breast milk. Spevigo Pregnancy And Lactation Text: The risk during pregnancy and breastfeeding is uncertain with this medication. This medication does cross the placenta. It is unknown if this medication is found in breast milk. Metronidazole Counseling:  I discussed with the patient the risks of metronidazole including but not limited to seizures, nausea/vomiting, a metallic taste in the mouth, nausea/vomiting and severe allergy. Infliximab Counseling:  I discussed with the patient the risks of infliximab including but not limited to myelosuppression, immunosuppression, autoimmune hepatitis, demyelinating diseases, lymphoma, and serious infections.  The patient understands that monitoring is required including a PPD at baseline and must alert us or the primary physician if symptoms of infection or other concerning signs are noted. Drysol Counseling:  I discussed with the patient the risks of drysol/aluminum chloride including but not limited to skin rash, itching, irritation, burning. Topical Clindamycin Counseling: Patient counseled that this medication may cause skin irritation or allergic reactions.  In the event of skin irritation, the patient was advised to reduce the amount of the drug applied or use it less frequently.   The patient verbalized understanding of the proper use and possible adverse effects of clindamycin.  All of the patient's questions and concerns were addressed. Ebglyss Pregnancy And Lactation Text: This medication likely crosses the placenta but the risk for the fetus is uncertain. It is unknown if this medication is excreted in breast milk. Hydroxyzine Pregnancy And Lactation Text: This medication is not safe during pregnancy and should not be taken. It is also excreted in breast milk and breast feeding isn't recommended. Simlandi Pregnancy And Lactation Text: This medication is Pregnancy Category B and is considered safe during pregnancy. It is unknown if this medication is excreted in breast milk. Include Pregnancy/Lactation Warning?: No Topical Clindamycin Pregnancy And Lactation Text: This medication is Pregnancy Category B and is considered safe during pregnancy. It is unknown if it is excreted in breast milk. Tranexamic Acid Counseling:  Patient advised of the small risk of bleeding problems with tranexamic acid. They were also instructed to call if they developed any nausea, vomiting or diarrhea. All of the patient's questions and concerns were addressed. Protopic Counseling: Patient may experience a mild burning sensation during topical application. Protopic is not approved in children less than 2 years of age. There have been case reports of hematologic and skin malignancies in patients using topical calcineurin inhibitors although causality is questionable. Bimzelx Pregnancy And Lactation Text: This medication crosses the placenta and the safety is uncertain during pregnancy. It is unknown if this medication is present in breast milk. Acitretin Counseling:  I discussed with the patient the risks of acitretin including but not limited to hair loss, dry lips/skin/eyes, liver damage, hyperlipidemia, depression/suicidal ideation, photosensitivity.  Serious rare side effects can include but are not limited to pancreatitis, pseudotumor cerebri, bony changes, clot formation/stroke/heart attack.  Patient understands that alcohol is contraindicated since it can result in liver toxicity and significantly prolong the elimination of the drug by many years. Glycopyrrolate Pregnancy And Lactation Text: This medication is Pregnancy Category B and is considered safe during pregnancy. It is unknown if it is excreted breast milk. Cephalexin Counseling: I counseled the patient regarding use of cephalexin as an antibiotic for prophylactic and/or therapeutic purposes. Cephalexin (commonly prescribed under brand name Keflex) is a cephalosporin antibiotic which is active against numerous classes of bacteria, including most skin bacteria. Side effects may include nausea, diarrhea, gastrointestinal upset, rash, hives, yeast infections, and in rare cases, hepatitis, kidney disease, seizures, fever, confusion, neurologic symptoms, and others. Patients with severe allergies to penicillin medications are cautioned that there is about a 10% incidence of cross-reactivity with cephalosporins. When possible, patients with penicillin allergies should use alternatives to cephalosporins for antibiotic therapy. Azelaic Acid Counseling: Patient counseled that medicine may cause skin irritation and to avoid applying near the eyes.  In the event of skin irritation, the patient was advised to reduce the amount of the drug applied or use it less frequently.   The patient verbalized understanding of the proper use and possible adverse effects of azelaic acid.  All of the patient's questions and concerns were addressed. Drysol Pregnancy And Lactation Text: This medication is considered safe during pregnancy and breast feeding. Niacinamide Pregnancy And Lactation Text: These medications are considered safe during pregnancy. Methotrexate Counseling:  Patient counseled regarding adverse effects of methotrexate including but not limited to nausea, vomiting, abnormalities in liver function tests. Patients may develop mouth sores, rash, diarrhea, and abnormalities in blood counts. The patient understands that monitoring is required including LFT's and blood counts.  There is a rare possibility of scarring of the liver and lung problems that can occur when taking methotrexate. Persistent nausea, loss of appetite, pale stools, dark urine, cough, and shortness of breath should be reported immediately. Patient advised to discontinue methotrexate treatment at least three months before attempting to become pregnant.  I discussed the need for folate supplements while taking methotrexate.  These supplements can decrease side effects during methotrexate treatment. The patient verbalized understanding of the proper use and possible adverse effects of methotrexate.  All of the patient's questions and concerns were addressed. Olumiant Pregnancy And Lactation Text: Based on animal studies, Olumiant may cause embryo-fetal harm when administered to pregnant women.  The medication should not be used in pregnancy.  Breastfeeding is not recommended during treatment. Erivedge Counseling- I discussed with the patient the risks of Erivedge including but not limited to nausea, vomiting, diarrhea, constipation, weight loss, changes in the sense of taste, decreased appetite, muscle spasms, and hair loss.  The patient verbalized understanding of the proper use and possible adverse effects of Erivedge.  All of the patient's questions and concerns were addressed. Methotrexate Pregnancy And Lactation Text: This medication is Pregnancy Category X and is known to cause fetal harm. This medication is excreted in breast milk. Oxybutynin Counseling:  I discussed with the patient the risks of oxybutynin including but not limited to skin rash, drowsiness, dry mouth, difficulty urinating, and blurred vision. Stelara Counseling:  I discussed with the patient the risks of ustekinumab including but not limited to immunosuppression, malignancy, posterior leukoencephalopathy syndrome, and serious infections.  The patient understands that monitoring is required including a PPD at baseline and must alert us or the primary physician if symptoms of infection or other concerning signs are noted. Minoxidil Counseling: Minoxidil is a topical medication which can increase blood flow where it is applied. It is uncertain how this medication increases hair growth. Side effects are uncommon and include stinging and allergic reactions. Zyclara Counseling:  I discussed with the patient the risks of imiquimod including but not limited to erythema, scaling, itching, weeping, crusting, and pain.  Patient understands that the inflammatory response to imiquimod is variable from person to person and was educated regarded proper titration schedule.  If flu-like symptoms develop, patient knows to discontinue the medication and contact us. Azathioprine Counseling:  I discussed with the patient the risks of azathioprine including but not limited to myelosuppression, immunosuppression, hepatotoxicity, lymphoma, and infections.  The patient understands that monitoring is required including baseline LFTs, Creatinine, possible TPMP genotyping and weekly CBCs for the first month and then every 2 weeks thereafter.  The patient verbalized understanding of the proper use and possible adverse effects of azathioprine.  All of the patient's questions and concerns were addressed. Enbrel Counseling:  I discussed with the patient the risks of etanercept including but not limited to myelosuppression, immunosuppression, autoimmune hepatitis, demyelinating diseases, lymphoma, and infections.  The patient understands that monitoring is required including a PPD at baseline and must alert us or the primary physician if symptoms of infection or other concerning signs are noted. Metronidazole Pregnancy And Lactation Text: This medication is Pregnancy Category B and considered safe during pregnancy.  It is also excreted in breast milk. Dutasteride Pregnancy And Lactation Text: This medication is absolutely contraindicated in women, especially during pregnancy and breast feeding. Feminization of male fetuses is possible if taking while pregnant. Terbinafine Counseling: Patient counseling regarding adverse effects of terbinafine including but not limited to headache, diarrhea, rash, upset stomach, liver function test abnormalities, itching, taste/smell disturbance, nausea, abdominal pain, and flatulence.  There is a rare possibility of liver failure that can occur when taking terbinafine.  The patient understands that a baseline LFT and kidney function test may be required. The patient verbalized understanding of the proper use and possible adverse effects of terbinafine.  All of the patient's questions and concerns were addressed. Tranexamic Acid Pregnancy And Lactation Text: It is unknown if this medication is safe during pregnancy or breast feeding. Fluconazole Counseling:  Patient counseled regarding adverse effects of fluconazole including but not limited to headache, diarrhea, nausea, upset stomach, liver function test abnormalities, taste disturbance, and stomach pain.  There is a rare possibility of liver failure that can occur when taking fluconazole.  The patient understands that monitoring of LFTs and kidney function test may be required, especially at baseline. The patient verbalized understanding of the proper use and possible adverse effects of fluconazole.  All of the patient's questions and concerns were addressed. Elidel Counseling: Patient may experience a mild burning sensation during topical application. Elidel is not approved in children less than 2 years of age. There have been case reports of hematologic and skin malignancies in patients using topical calcineurin inhibitors although causality is questionable. Topical Ketoconazole Counseling: Patient counseled that this medication may cause skin irritation or allergic reactions.  In the event of skin irritation, the patient was advised to reduce the amount of the drug applied or use it less frequently.   The patient verbalized understanding of the proper use and possible adverse effects of ketoconazole.  All of the patient's questions and concerns were addressed. Quinolones Counseling:  I discussed with the patient the risks of fluoroquinolones including but not limited to GI upset, allergic reaction, drug rash, diarrhea, dizziness, photosensitivity, yeast infections, liver function test abnormalities, tendonitis/tendon rupture. Acitretin Pregnancy And Lactation Text: This medication is Pregnancy Category X and should not be given to women who are pregnant or may become pregnant in the future. This medication is excreted in breast milk. Hydroxychloroquine Counseling:  I discussed with the patient that a baseline ophthalmologic exam is needed at the start of therapy and every year thereafter while on therapy. A CBC may also be warranted for monitoring.  The side effects of this medication were discussed with the patient, including but not limited to agranulocytosis, aplastic anemia, seizures, rashes, retinopathy, and liver toxicity. Patient instructed to call the office should any adverse effect occur.  The patient verbalized understanding of the proper use and possible adverse effects of Plaquenil.  All the patient's questions and concerns were addressed. Cephalexin Pregnancy And Lactation Text: This medication is Pregnancy Category B and considered safe during pregnancy.  It is also excreted in breast milk but can be used safely for shorter doses. Benzoyl Peroxide Pregnancy And Lactation Text: This medication is Pregnancy Category C. It is unknown if benzoyl peroxide is excreted in breast milk. Protopic Pregnancy And Lactation Text: This medication is Pregnancy Category C. It is unknown if this medication is excreted in breast milk when applied topically. Albendazole Counseling:  I discussed with the patient the risks of albendazole including but not limited to cytopenia, kidney damage, nausea/vomiting and severe allergy.  The patient understands that this medication is being used in an off-label manner. Nsaids Counseling: NSAID Counseling: I discussed with the patient that NSAIDs should be taken with food. Prolonged use of NSAIDs can result in the development of stomach ulcers.  Patient advised to stop taking NSAIDs if abdominal pain occurs.  The patient verbalized understanding of the proper use and possible adverse effects of NSAIDs.  All of the patient's questions and concerns were addressed. Simponi Counseling:  I discussed with the patient the risks of golimumab including but not limited to myelosuppression, immunosuppression, autoimmune hepatitis, demyelinating diseases, lymphoma, and serious infections.  The patient understands that monitoring is required including a PPD at baseline and must alert us or the primary physician if symptoms of infection or other concerning signs are noted. Cimzia Counseling:  I discussed with the patient the risks of Cimzia including but not limited to immunosuppression, allergic reactions and infections.  The patient understands that monitoring is required including a PPD at baseline and must alert us or the primary physician if symptoms of infection or other concerning signs are noted. Prednisone Counseling:  I discussed with the patient the risks of prolonged use of prednisone including but not limited to weight gain, insomnia, osteoporosis, mood changes, diabetes, susceptibility to infection, glaucoma and high blood pressure.  In cases where prednisone use is prolonged, patients should be monitored with blood pressure checks, serum glucose levels and an eye exam.  Additionally, the patient may need to be placed on GI prophylaxis, PCP prophylaxis, and calcium and vitamin D supplementation and/or a bisphosphonate.  The patient verbalized understanding of the proper use and the possible adverse effects of prednisone.  All of the patient's questions and concerns were addressed. Nemluvio Counseling: I discussed with the patient the risks of nemolizumab including but not limited to headache, gastrointestinal complaints, nasopharyngitis, musculoskeletal complaints, injection site reactions, and allergic reactions. The patient understands that monitoring is required and they must alert us or the primary physician if any side effects are noted. Rinvoq Counseling: I discussed with the patient the risks of Rinvoq therapy including but not limited to upper respiratory tract infections, shingles, cold sores, bronchitis, nausea, cough, fever, acne, and headache. Live vaccines should be avoided.  This medication has been linked to serious infections; higher rate of mortality; malignancy and lymphoproliferative disorders; major adverse cardiovascular events; thrombosis; thrombocytopenia, anemia, and neutropenia; lipid elevations; liver enzyme elevations; and gastrointestinal perforations. Solaraze Counseling:  I discussed with the patient the risks of Solaraze including but not limited to erythema, scaling, itching, weeping, crusting, and pain. Colchicine Counseling:  Patient counseled regarding adverse effects including but not limited to stomach upset (nausea, vomiting, stomach pain, or diarrhea).  Patient instructed to limit alcohol consumption while taking this medication.  Colchicine may reduce blood counts especially with prolonged use.  The patient understands that monitoring of kidney function and blood counts may be required, especially at baseline. The patient verbalized understanding of the proper use and possible adverse effects of colchicine.  All of the patient's questions and concerns were addressed. Azathioprine Pregnancy And Lactation Text: This medication is Pregnancy Category D and isn't considered safe during pregnancy. It is unknown if this medication is excreted in breast milk. Minoxidil Pregnancy And Lactation Text: This medication has not been assigned a Pregnancy Risk Category but animal studies failed to show danger with the topical medication. It is unknown if the medication is excreted in breast milk. Terbinafine Pregnancy And Lactation Text: This medication is Pregnancy Category B and is considered safe during pregnancy. It is also excreted in breast milk and breast feeding isn't recommended. Finasteride Male Counseling: Finasteride Counseling:  I discussed with the patient the risks of use of finasteride including but not limited to decreased libido, decreased ejaculate volume, gynecomastia, and depression. Women should not handle medication.  All of the patient's questions and concerns were addressed. Minocycline Counseling: Patient advised regarding possible photosensitivity and discoloration of the teeth, skin, lips, tongue and gums.  Patient instructed to avoid sunlight, if possible.  When exposed to sunlight, patients should wear protective clothing, sunglasses, and sunscreen.  The patient was instructed to call the office immediately if the following severe adverse effects occur:  hearing changes, easy bruising/bleeding, severe headache, or vision changes.  The patient verbalized understanding of the proper use and possible adverse effects of minocycline.  All of the patient's questions and concerns were addressed. Quinolones Pregnancy And Lactation Text: This medication is Pregnancy Category C and it isn't know if it is safe during pregnancy. It is also excreted in breast milk. Valtrex Counseling: I discussed with the patient the risks of valacyclovir including but not limited to kidney damage, nausea, vomiting and severe allergy.  The patient understands that if the infection seems to be worsening or is not improving, they are to call. Humira Counseling:  I discussed with the patient the risks of adalimumab including but not limited to myelosuppression, immunosuppression, autoimmune hepatitis, demyelinating diseases, lymphoma, and serious infections.  The patient understands that monitoring is required including a PPD at baseline and must alert us or the primary physician if symptoms of infection or other concerning signs are noted. Carac Counseling:  I discussed with the patient the risks of Carac including but not limited to erythema, scaling, itching, weeping, crusting, and pain. Bexarotene Counseling:  I discussed with the patient the risks of bexarotene including but not limited to hair loss, dry lips/skin/eyes, liver abnormalities, hyperlipidemia, pancreatitis, depression/suicidal ideation, photosensitivity, drug rash/allergic reactions, hypothyroidism, anemia, leukopenia, infection, cataracts, and teratogenicity.  Patient understands that they will need regular blood tests to check lipid profile, liver function tests, white blood cell count, thyroid function tests and pregnancy test if applicable. Rhofade Counseling: Rhofade is a topical medication which can decrease superficial blood flow where applied. Side effects are uncommon and include stinging, redness and allergic reactions. Benzoyl Peroxide Counseling: Patient counseled that medicine may cause skin irritation and bleach clothing.  In the event of skin irritation, the patient was advised to reduce the amount of the drug applied or use it less frequently.   The patient verbalized understanding of the proper use and possible adverse effects of benzoyl peroxide.  All of the patient's questions and concerns were addressed. Clindamycin Counseling: I counseled the patient regarding use of clindamycin as an antibiotic for prophylactic and/or therapeutic purposes. Clindamycin is active against numerous classes of bacteria, including skin bacteria. Side effects may include nausea, diarrhea, gastrointestinal upset, rash, hives, yeast infections, and in rare cases, colitis. Hydroxychloroquine Pregnancy And Lactation Text: This medication has been shown to cause fetal harm but it isn't assigned a Pregnancy Risk Category. There are small amounts excreted in breast milk. Nsaids Pregnancy And Lactation Text: These medications are considered safe up to 30 weeks gestation. It is excreted in breast milk. Cimzia Pregnancy And Lactation Text: This medication crosses the placenta but can be considered safe in certain situations. Cimzia may be excreted in breast milk. Albendazole Pregnancy And Lactation Text: This medication is Pregnancy Category C and it isn't known if it is safe during pregnancy. It is also excreted in breast milk. Propranolol Counseling:  I discussed with the patient the risks of propranolol including but not limited to low heart rate, low blood pressure, low blood sugar, restlessness and increased cold sensitivity. They should call the office if they experience any of these side effects. Nemluvio Pregnancy And Lactation Text: It is not known if Nemluvio causes fetal harm or is present in breast milk. Please proceed with caution if patients who are pregnant or breastfeeding. Solaraze Pregnancy And Lactation Text: This medication is Pregnancy Category B and is considered safe. There is some data to suggest avoiding during the third trimester. It is unknown if this medication is excreted in breast milk. Ivermectin Counseling:  Patient instructed to take medication on an empty stomach with a full glass of water.  Patient informed of potential adverse effects including but not limited to nausea, diarrhea, dizziness, itching, and swelling of the extremities or lymph nodes.  The patient verbalized understanding of the proper use and possible adverse effects of ivermectin.  All of the patient's questions and concerns were addressed. Rinvoq Pregnancy And Lactation Text: Based on animal studies, Rinvoq may cause embryo-fetal harm when administered to pregnant women.  The medication should not be used in pregnancy.  Breastfeeding is not recommended during treatment and for 6 days after the last dose. Rhofade Pregnancy And Lactation Text: This medication has not been assigned a Pregnancy Risk Category. It is unknown if the medication is excreted in breast milk. Cosentyx Counseling:  I discussed with the patient the risks of Cosentyx including but not limited to worsening of Crohn's disease, immunosuppression, allergic reactions and infections.  The patient understands that monitoring is required including a PPD at baseline and must alert us or the primary physician if symptoms of infection or other concerning signs are noted. Finasteride Female Counseling: Finasteride Counseling:  I discussed with the patient the risks of use of finasteride including but not limited to decreased libido and sexual dysfunction. Explained the teratogenic nature of the medication and stressed the importance of not getting pregnant during treatment. All of the patient's questions and concerns were addressed. Mirvaso Counseling: Mirvaso is a topical medication which can decrease superficial blood flow where applied. Side effects are uncommon and include stinging, redness and allergic reactions. Cellcept Counseling:  I discussed with the patient the risks of mycophenolate mofetil including but not limited to infection/immunosuppression, GI upset, hypokalemia, hypercholesterolemia, bone marrow suppression, lymphoproliferative disorders, malignancy, GI ulceration/bleed/perforation, colitis, interstitial lung disease, kidney failure, progressive multifocal leukoencephalopathy, and birth defects.  The patient understands that monitoring is required including a baseline creatinine and regular CBC testing. In addition, patient must alert us immediately if symptoms of infection or other concerning signs are noted. Libtayo Counseling- I discussed with the patient the risks of Libtayo including but not limited to nausea, vomiting, diarrhea, and bone or muscle pain.  The patient verbalized understanding of the proper use and possible adverse effects of Libtayo.  All of the patient's questions and concerns were addressed. Taltz Counseling: I discussed with the patient the risks of ixekizumab including but not limited to immunosuppression, serious infections, worsening of inflammatory bowel disease and drug reactions.  The patient understands that monitoring is required including a PPD at baseline and must alert us or the primary physician if symptoms of infection or other concerning signs are noted. Rifampin Counseling: I discussed with the patient the risks of rifampin including but not limited to liver damage, kidney damage, red-orange body fluids, nausea/vomiting and severe allergy. Eucrisa Counseling: Patient may experience a mild burning sensation during topical application. Eucrisa is not approved in children less than 3 months of age. Valtrex Pregnancy And Lactation Text: this medication is Pregnancy Category B and is considered safe during pregnancy. This medication is not directly found in breast milk but it's metabolite acyclovir is present. Olanzapine Counseling- I discussed with the patient the common side effects of olanzapine including but are not limited to: lack of energy, dry mouth, increased appetite, sleepiness, tremor, constipation, dizziness, changes in behavior, or restlessness.  Explained that teenagers are more likely to experience headaches, abdominal pain, pain in the arms or legs, tiredness, and sleepiness.  Serious side effects include but are not limited: increased risk of death in elderly patients who are confused, have memory loss, or dementia-related psychosis; hyperglycemia; increased cholesterol and triglycerides; and weight gain. Bexarotene Pregnancy And Lactation Text: This medication is Pregnancy Category X and should not be given to women who are pregnant or may become pregnant. This medication should not be used if you are breast feeding. Skyrizi Counseling: I discussed with the patient the risks of risankizumab-rzaa including but not limited to immunosuppression, and serious infections.  The patient understands that monitoring is required including a PPD at baseline and must alert us or the primary physician if symptoms of infection or other concerning signs are noted. Clindamycin Pregnancy And Lactation Text: This medication can be used in pregnancy if certain situations. Clindamycin is also present in breast milk. Opioid Counseling: I discussed with the patient the potential side effects of opioids including but not limited to addiction, altered mental status, and depression. I stressed avoiding alcohol, benzodiazepines, muscle relaxants and sleep aids unless specifically okayed by a physician. The patient verbalized understanding of the proper use and possible adverse effects of opioids. All of the patient's questions and concerns were addressed. They were instructed to flush the remaining pills down the toilet if they did not need them for pain. Topical Sulfur Applications Counseling: Topical Sulfur Counseling: Patient counseled that this medication may cause skin irritation or allergic reactions.  In the event of skin irritation, the patient was advised to reduce the amount of the drug applied or use it less frequently.   The patient verbalized understanding of the proper use and possible adverse effects of topical sulfur application.  All of the patient's questions and concerns were addressed. Griseofulvin Counseling:  I discussed with the patient the risks of griseofulvin including but not limited to photosensitivity, cytopenia, liver damage, nausea/vomiting and severe allergy.  The patient understands that this medication is best absorbed when taken with a fatty meal (e.g., ice cream or french fries). Cimetidine Counseling:  I discussed with the patient the risks of Cimetidine including but not limited to gynecomastia, headache, diarrhea, nausea, drowsiness, arrhythmias, pancreatitis, skin rashes, psychosis, bone marrow suppression and kidney toxicity. Topical Retinoid counseling:  Patient advised to apply a pea-sized amount only at bedtime and wait 30 minutes after washing their face before applying.  If too drying, patient may add a non-comedogenic moisturizer. The patient verbalized understanding of the proper use and possible adverse effects of retinoids.  All of the patient's questions and concerns were addressed. Calcipotriene Counseling:  I discussed with the patient the risks of calcipotriene including but not limited to erythema, scaling, itching, and irritation. Finasteride Pregnancy And Lactation Text: This medication is absolutely contraindicated during pregnancy. It is unknown if it is excreted in breast milk. Libtayo Pregnancy And Lactation Text: This medication is contraindicated in pregnancy and when breast feeding. Propranolol Pregnancy And Lactation Text: This medication is Pregnancy Category C and it isn't known if it is safe during pregnancy. It is excreted in breast milk. Rituxan Counseling:  I discussed with the patient the risks of Rituxan infusions. Side effects can include infusion reactions, severe drug rashes including mucocutaneous reactions, reactivation of latent hepatitis and other infections and rarely progressive multifocal leukoencephalopathy.  All of the patient's questions and concerns were addressed. Dapsone Counseling: I discussed with the patient the risks of dapsone including but not limited to hemolytic anemia, agranulocytosis, rashes, methemoglobinemia, kidney failure, peripheral neuropathy, headaches, GI upset, and liver toxicity.  Patients who start dapsone require monitoring including baseline LFTs and weekly CBCs for the first month, then every month thereafter.  The patient verbalized understanding of the proper use and possible adverse effects of dapsone.  All of the patient's questions and concerns were addressed. Xeljanz Counseling: I discussed with the patient the risks of Xeljanz therapy including increased risk of infection, liver issues, headache, diarrhea, or cold symptoms. Live vaccines should be avoided. They were instructed to call if they have any problems. Cyclophosphamide Counseling:  I discussed with the patient the risks of cyclophosphamide including but not limited to hair loss, hormonal abnormalities, decreased fertility, abdominal pain, diarrhea, nausea and vomiting, bone marrow suppression and infection. The patient understands that monitoring is required while taking this medication. Azithromycin Counseling:  I discussed with the patient the risks of azithromycin including but not limited to GI upset, allergic reaction, drug rash, diarrhea, and yeast infections. Opioid Pregnancy And Lactation Text: These medications can lead to premature delivery and should be avoided during pregnancy. These medications are also present in breast milk in small amounts. Dapsone Pregnancy And Lactation Text: This medication is Pregnancy Category C and is not considered safe during pregnancy or breast feeding. Birth Control Pills Counseling: Birth Control Pill Counseling: I discussed with the patient the potential side effects of OCPs including but not limited to increased risk of stroke, heart attack, thrombophlebitis, deep venous thrombosis, hepatic adenomas, breast changes, GI upset, headaches, and depression.  The patient verbalized understanding of the proper use and possible adverse effects of OCPs. All of the patient's questions and concerns were addressed. Olanzapine Pregnancy And Lactation Text: This medication is pregnancy category C.   There are no adequate and well controlled trials with olanzapine in pregnant females.  Olanzapine should be used during pregnancy only if the potential benefit justifies the potential risk to the fetus.   In a study in lactating healthy women, olanzapine was excreted in breast milk.  It is recommended that women taking olanzapine should not breast feed. Cibinqo Counseling: I discussed with the patient the risks of Cibinqo therapy including but not limited to common cold, nausea, headache, cold sores, increased blood CPK levels, dizziness, UTIs, fatigue, acne, and vomitting. Live vaccines should be avoided.  This medication has been linked to serious infections; higher rate of mortality; malignancy and lymphoproliferative disorders; major adverse cardiovascular events; thrombosis; thrombocytopenia and lymphopenia; lipid elevations; and retinal detachment. Topical Sulfur Applications Pregnancy And Lactation Text: This medication is considered safe during pregnancy and breast feeding secondary to limited systemic absorption. Doxycycline Counseling:  Patient counseled regarding possible photosensitivity and increased risk for sunburn.  Patient instructed to avoid sunlight, if possible.  When exposed to sunlight, patients should wear protective clothing, sunglasses, and sunscreen.  The patient was instructed to call the office immediately if the following severe adverse effects occur:  hearing changes, easy bruising/bleeding, severe headache, or vision changes.  The patient verbalized understanding of the proper use and possible adverse effects of doxycycline.  All of the patient's questions and concerns were addressed. Hyrimoz Counseling:  I discussed with the patient the risks of adalimumab including but not limited to myelosuppression, immunosuppression, autoimmune hepatitis, demyelinating diseases, lymphoma, and serious infections.  The patient understands that monitoring is required including a PPD at baseline and must alert us or the primary physician if symptoms of infection or other concerning signs are noted. Rifampin Pregnancy And Lactation Text: This medication is Pregnancy Category C and it isn't know if it is safe during pregnancy. It is also excreted in breast milk and should not be used if you are breast feeding. Isotretinoin Counseling: Patient should get monthly blood tests, not donate blood, not drive at night if vision affected, not share medication, and not undergo elective surgery for 6 months after tx completed. Side effects reviewed, pt to contact office should one occur. Griseofulvin Pregnancy And Lactation Text: This medication is Pregnancy Category X and is known to cause serious birth defects. It is unknown if this medication is excreted in breast milk but breast feeding should be avoided. Dupixent Counseling: I discussed with the patient the risks of dupilumab including but not limited to eye infection and irritation, cold sores, injection site reactions, worsening of asthma, allergic reactions and increased risk of parasitic infection.  Live vaccines should be avoided while taking dupilumab. Dupilumab will also interact with certain medications such as warfarin and cyclosporine. The patient understands that monitoring is required and they must alert us or the primary physician if symptoms of infection or other concerning signs are noted. Doxycycline Pregnancy And Lactation Text: This medication is Pregnancy Category D and not consider safe during pregnancy. It is also excreted in breast milk but is considered safe for shorter treatment courses. Itraconazole Counseling:  I discussed with the patient the risks of itraconazole including but not limited to liver damage, nausea/vomiting, neuropathy, and severe allergy.  The patient understands that this medication is best absorbed when taken with acidic beverages such as non-diet cola or ginger ale.  The patient understands that monitoring is required including baseline LFTs and repeat LFTs at intervals.  The patient understands that they are to contact us or the primary physician if concerning signs are noted. Doxepin Counseling:  Patient advised that the medication is sedating and not to drive a car after taking this medication. Patient informed of potential adverse effects including but not limited to dry mouth, urinary retention, and blurry vision.  The patient verbalized understanding of the proper use and possible adverse effects of doxepin.  All of the patient's questions and concerns were addressed. Opzelura Counseling:  I discussed with the patient the risks of Opzelura including but not limited to nasopharngitis, bronchitis, ear infection, eosinophila, hives, diarrhea, folliculitis, tonsillitis, and rhinorrhea.  Taken orally, this medication has been linked to serious infections; higher rate of mortality; malignancy and lymphoproliferative disorders; major adverse cardiovascular events; thrombosis; thrombocytopenia, anemia, and neutropenia; and lipid elevations. Xelestrellaz Pregnancy And Lactation Text: This medication is Pregnancy Category D and is not considered safe during pregnancy.  The risk during breast feeding is also uncertain. Odomzo Counseling- I discussed with the patient the risks of Odomzo including but not limited to nausea, vomiting, diarrhea, constipation, weight loss, changes in the sense of taste, decreased appetite, muscle spasms, and hair loss.  The patient verbalized understanding of the proper use and possible adverse effects of Odomzo.  All of the patient's questions and concerns were addressed. Tremfya Counseling: I discussed with the patient the risks of guselkumab including but not limited to immunosuppression, serious infections, and drug reactions.  The patient understands that monitoring is required including a PPD at baseline and must alert us or the primary physician if symptoms of infection or other concerning signs are noted. SSKI Counseling:  I discussed with the patient the risks of SSKI including but not limited to thyroid abnormalities, metallic taste, GI upset, fever, headache, acne, arthralgias, paraesthesias, lymphadenopathy, easy bleeding, arrhythmias, and allergic reaction. Calcipotriene Pregnancy And Lactation Text: This medication has not been proven safe during pregnancy. It is unknown if this medication is excreted in breast milk. Rituxan Pregnancy And Lactation Text: This medication is Pregnancy Category C and it isn't know if it is safe during pregnancy. It is unknown if this medication is excreted in breast milk but similar antibodies are known to be excreted. Birth Control Pills Pregnancy And Lactation Text: This medication should be avoided if pregnant and for the first 30 days post-partum. Low Dose Naltrexone Counseling- I discussed with the patient the potential risks and side effects of low dose naltrexone including but not limited to: more vivid dreams, headaches, nausea, vomiting, abdominal pain, fatigue, dizziness, and anxiety. Azithromycin Pregnancy And Lactation Text: This medication is considered safe during pregnancy and is also secreted in breast milk. Siliq Counseling:  I discussed with the patient the risks of Siliq including but not limited to new or worsening depression, suicidal thoughts and behavior, immunosuppression, malignancy, posterior leukoencephalopathy syndrome, and serious infections.  The patient understands that monitoring is required including a PPD at baseline and must alert us or the primary physician if symptoms of infection or other concerning signs are noted. There is also a special program designed to monitor depression which is required with Siliq. Opzelura Pregnancy And Lactation Text: There is insufficient data to evaluate drug-associated risk for major birth defects, miscarriage, or other adverse maternal or fetal outcomes.  There is a pregnancy registry that monitors pregnancy outcomes in pregnant persons exposed to the medication during pregnancy.  It is unknown if this medication is excreted in breast milk.  Do not breastfeed during treatment and for about 4 weeks after the last dose. Adbry Counseling: I discussed with the patient the risks of tralokinumab including but not limited to eye infection and irritation, cold sores, injection site reactions, worsening of asthma, allergic reactions and increased risk of parasitic infection.  Live vaccines should be avoided while taking tralokinumab. The patient understands that monitoring is required and they must alert us or the primary physician if symptoms of infection or other concerning signs are noted. Wartpeel Counseling:  I discussed with the patient the risks of Wartpeel including but not limited to erythema, scaling, itching, weeping, crusting, and pain. Oral Minoxidil Counseling- I discussed with the patient the risks of oral minoxidil including but not limited to shortness of breath, swelling of the feet or ankles, dizziness, lightheadedness, unwanted hair growth and allergic reaction.  The patient verbalized understanding of the proper use and possible adverse effects of oral minoxidil.  All of the patient's questions and concerns were addressed. Cyclophosphamide Pregnancy And Lactation Text: This medication is Pregnancy Category D and it isn't considered safe during pregnancy. This medication is excreted in breast milk. Cibinqo Pregnancy And Lactation Text: It is unknown if this medication will adversely affect pregnancy or breast feeding.  You should not take this medication if you are currently pregnant or planning a pregnancy or while breastfeeding. Sarecycline Counseling: Patient advised regarding possible photosensitivity and discoloration of the teeth, skin, lips, tongue and gums.  Patient instructed to avoid sunlight, if possible.  When exposed to sunlight, patients should wear protective clothing, sunglasses, and sunscreen.  The patient was instructed to call the office immediately if the following severe adverse effects occur:  hearing changes, easy bruising/bleeding, severe headache, or vision changes.  The patient verbalized understanding of the proper use and possible adverse effects of sarecycline.  All of the patient's questions and concerns were addressed. Isotretinoin Pregnancy And Lactation Text: This medication is Pregnancy Category X and is considered extremely dangerous during pregnancy. It is unknown if it is excreted in breast milk. Hydroquinone Counseling:  Patient advised that medication may result in skin irritation, lightening (hypopigmentation), dryness, and burning.  In the event of skin irritation, the patient was advised to reduce the amount of the drug applied or use it less frequently.  Rarely, spots that are treated with hydroquinone can become darker (pseudoochronosis).  Should this occur, patient instructed to stop medication and call the office. The patient verbalized understanding of the proper use and possible adverse effects of hydroquinone.  All of the patient's questions and concerns were addressed. Erythromycin Counseling:  I discussed with the patient the risks of erythromycin including but not limited to GI upset, allergic reaction, drug rash, diarrhea, increase in liver enzymes, and yeast infections. Doxepin Pregnancy And Lactation Text: This medication is Pregnancy Category C and it isn't known if it is safe during pregnancy. It is also excreted in breast milk and breast feeding isn't recommended. High Dose Vitamin A Counseling: Side effects reviewed, pt to contact office should one occur. Dupixent Pregnancy And Lactation Text: This medication likely crosses the placenta but the risk for the fetus is uncertain. This medication is excreted in breast milk. 5-Fu Counseling: 5-Fluorouracil Counseling:  I discussed with the patient the risks of 5-fluorouracil including but not limited to erythema, scaling, itching, weeping, crusting, and pain. Detail Level: Zone Oral Minoxidil Pregnancy And Lactation Text: This medication should only be used when clearly needed if you are pregnant, attempting to become pregnant or breast feeding. Gabapentin Counseling: I discussed with the patient the risks of gabapentin including but not limited to dizziness, somnolence, fatigue and ataxia. Sski Pregnancy And Lactation Text: This medication is Pregnancy Category D and isn't considered safe during pregnancy. It is excreted in breast milk. Arava Counseling:  Patient counseled regarding adverse effects of Arava including but not limited to nausea, vomiting, abnormalities in liver function tests. Patients may develop mouth sores, rash, diarrhea, and abnormalities in blood counts. The patient understands that monitoring is required including LFTs and blood counts.  There is a rare possibility of scarring of the liver and lung problems that can occur when taking methotrexate. Persistent nausea, loss of appetite, pale stools, dark urine, cough, and shortness of breath should be reported immediately. Patient advised to discontinue Arava treatment and consult with a physician prior to attempting conception. The patient will have to undergo a treatment to eliminate Arava from the body prior to conception. Tazorac Counseling:  Patient advised that medication is irritating and drying.  Patient may need to apply sparingly and wash off after an hour before eventually leaving it on overnight.  The patient verbalized understanding of the proper use and possible adverse effects of tazorac.  All of the patient's questions and concerns were addressed. Low Dose Naltrexone Pregnancy And Lactation Text: Naltrexone is pregnancy category C.  There have been no adequate and well-controlled studies in pregnant women.  It should be used in pregnancy only if the potential benefit justifies the potential risk to the fetus.   Limited data indicates that naltrexone is minimally excreted into breastmilk. Adbry Pregnancy And Lactation Text: It is unknown if this medication will adversely affect pregnancy or breast feeding. Bactrim Counseling:  I discussed with the patient the risks of sulfa antibiotics including but not limited to GI upset, allergic reaction, drug rash, diarrhea, dizziness, photosensitivity, and yeast infections.  Rarely, more serious reactions can occur including but not limited to aplastic anemia, agranulocytosis, methemoglobinemia, blood dyscrasias, liver or kidney failure, lung infiltrates or desquamative/blistering drug rashes. Xolair Counseling:  Patient informed of potential adverse effects including but not limited to fever, muscle aches, rash and allergic reactions.  The patient verbalized understanding of the proper use and possible adverse effects of Xolair.  All of the patient's questions and concerns were addressed. Picato Counseling:  I discussed with the patient the risks of Picato including but not limited to erythema, scaling, itching, weeping, crusting, and pain. Ilumya Counseling: I discussed with the patient the risks of tildrakizumab including but not limited to immunosuppression, malignancy, posterior leukoencephalopathy syndrome, and serious infections.  The patient understands that monitoring is required including a PPD at baseline and must alert us or the primary physician if symptoms of infection or other concerning signs are noted. Spironolactone Counseling: Patient advised regarding risks of diarrhea, abdominal pain, hyperkalemia, birth defects (for female patients), liver toxicity and renal toxicity. The patient may need blood work to monitor liver and kidney function and potassium levels while on therapy. The patient verbalized understanding of the proper use and possible adverse effects of spironolactone.  All of the patient's questions and concerns were addressed. Cyclosporine Counseling:  I discussed with the patient the risks of cyclosporine including but not limited to hypertension, gingival hyperplasia,myelosuppression, immunosuppression, liver damage, kidney damage, neurotoxicity, lymphoma, and serious infections. The patient understands that monitoring is required including baseline blood pressure, CBC, CMP, lipid panel and uric acid, and then 1-2 times monthly CMP and blood pressure. Litfulo Counseling: Litfulo (Ritlecitinib) Counseling: I discussed with the patient the risks of Litfulo therapy including but not limited to headache, upper respiratory infections, nausea, diarrhea, acne, and urticaria. Live vaccines should be avoided.  This medication has been linked to serious infections; higher rate of mortality; malignancy and lymphoproliferative disorders; major adverse cardiovascular events; thrombosis; decreases in lymphocytes and platelets; liver enzyme elevations; and CPK elevations. Sotyktu Counseling:  I discussed the most common side effects of Sotyktu including: common cold, sore throat, sinus infections, cold sores, canker sores, folliculitis, and acne.? I also discussed more serious side effects of Sotyktu including but not limited to: serious allergic reactions; increased risk for infections such as TB; cancers such as lymphomas; rhabdomyolysis and elevated CPK; and elevated triglycerides and liver enzymes.?

## 2025-02-10 ENCOUNTER — TELEPHONE (OUTPATIENT)
Dept: INFUSION THERAPY | Facility: HOSPITAL | Age: 75
End: 2025-02-10
Payer: MEDICARE

## 2025-02-10 PROBLEM — Z87.09 HISTORY OF RESTRICTIVE LUNG DISEASE: Chronic | Status: ACTIVE | Noted: 2025-01-29

## 2025-02-10 NOTE — ASSESSMENT & PLAN NOTE
Minimal signs/symptoms at present  watch for volume overload if note sig wt increase over short time period   Cont current Rx and close f/u w cardiology

## 2025-02-10 NOTE — ASSESSMENT & PLAN NOTE
BP elevated here today - reports stressed due to traffic and has not yet taken furosemide - f/u home BP's

## 2025-02-10 NOTE — ASSESSMENT & PLAN NOTE
F/u repeat chemistry later today - intolerant of ace-I (reports anaphylactic rxn) - cont efforts to manage BP, avoid nephrotoxins

## 2025-02-10 NOTE — ASSESSMENT & PLAN NOTE
Followed by Heme - intermittent iron infusions - continues to defer colonoscopy though aware of colon cancer screening recomendation

## 2025-02-10 NOTE — ASSESSMENT & PLAN NOTE
"Multiple medication allergies and sensitivities  - has maintained A1c in "prediabetes" range over past 2 years w weight loss and dietary adjustments - encourage stay mobile and active to extent able - encourage cont healthier food and beverage choices   "

## 2025-02-10 NOTE — TELEPHONE ENCOUNTER
Patient calling to schedule iron infusions. Iron infusions x 2 scheduled per patient's preferences.  Informed patient that if insurance is still pending at her infusion appt. Someone would contact her.

## 2025-02-10 NOTE — ASSESSMENT & PLAN NOTE
Cont healthy weight loss measures w movement and healthier food and beverage choices - watch for volume overload if note sig wt increase over short time period

## 2025-02-10 NOTE — ASSESSMENT & PLAN NOTE
Cont monitor for s/s infection - followed by Rheum - doing well so far w Cosentyx for psoriatic arthritis - declines vaccines

## 2025-02-20 ENCOUNTER — INFUSION (OUTPATIENT)
Dept: INFUSION THERAPY | Facility: HOSPITAL | Age: 75
End: 2025-02-20
Attending: INTERNAL MEDICINE
Payer: MEDICARE

## 2025-02-20 VITALS
TEMPERATURE: 97 F | DIASTOLIC BLOOD PRESSURE: 64 MMHG | HEART RATE: 60 BPM | RESPIRATION RATE: 17 BRPM | OXYGEN SATURATION: 98 % | SYSTOLIC BLOOD PRESSURE: 149 MMHG

## 2025-02-20 DIAGNOSIS — D50.9 IRON DEFICIENCY ANEMIA, UNSPECIFIED IRON DEFICIENCY ANEMIA TYPE: Primary | ICD-10-CM

## 2025-02-20 PROCEDURE — 96365 THER/PROPH/DIAG IV INF INIT: CPT

## 2025-02-20 PROCEDURE — 63600175 PHARM REV CODE 636 W HCPCS: Performed by: INTERNAL MEDICINE

## 2025-02-20 PROCEDURE — 25000003 PHARM REV CODE 250: Performed by: INTERNAL MEDICINE

## 2025-02-20 PROCEDURE — 63600175 PHARM REV CODE 636 W HCPCS: Mod: JZ,TB | Performed by: NURSE PRACTITIONER

## 2025-02-20 PROCEDURE — 96375 TX/PRO/DX INJ NEW DRUG ADDON: CPT

## 2025-02-20 RX ORDER — SODIUM CHLORIDE 0.9 % (FLUSH) 0.9 %
10 SYRINGE (ML) INJECTION
OUTPATIENT
Start: 2025-02-27

## 2025-02-20 RX ORDER — METHYLPREDNISOLONE SOD SUCC 125 MG
60 VIAL (EA) INJECTION
Start: 2025-02-27

## 2025-02-20 RX ORDER — SODIUM CHLORIDE 0.9 % (FLUSH) 0.9 %
10 SYRINGE (ML) INJECTION
Status: DISCONTINUED | OUTPATIENT
Start: 2025-02-20 | End: 2025-02-20 | Stop reason: HOSPADM

## 2025-02-20 RX ORDER — METHYLPREDNISOLONE SOD SUCC 125 MG
60 VIAL (EA) INJECTION
Status: COMPLETED | OUTPATIENT
Start: 2025-02-20 | End: 2025-02-20

## 2025-02-20 RX ADMIN — SODIUM CHLORIDE 100 MG: 9 INJECTION, SOLUTION INTRAVENOUS at 01:02

## 2025-02-20 RX ADMIN — METHYLPREDNISOLONE SODIUM SUCCINATE 60 MG: 125 INJECTION, POWDER, FOR SOLUTION INTRAMUSCULAR; INTRAVENOUS at 01:02

## 2025-02-20 NOTE — PLAN OF CARE
Problem: Adult Inpatient Plan of Care  Goal: Plan of Care Review  Outcome: Progressing  Flowsheets (Taken 2/20/2025 1441)  Plan of Care Reviewed With: patient  Goal: Optimal Comfort and Wellbeing  Outcome: Progressing  Intervention: Provide Person-Centered Care  Flowsheets (Taken 2/20/2025 1441)  Trust Relationship/Rapport:   care explained   questions encouraged   choices provided   reassurance provided   emotional support provided   thoughts/feelings acknowledged   empathic listening provided   questions answered

## 2025-02-26 ENCOUNTER — OFFICE VISIT (OUTPATIENT)
Dept: PRIMARY CARE CLINIC | Facility: CLINIC | Age: 75
End: 2025-02-26
Payer: MEDICARE

## 2025-02-26 VITALS
TEMPERATURE: 97 F | OXYGEN SATURATION: 98 % | WEIGHT: 205.5 LBS | DIASTOLIC BLOOD PRESSURE: 64 MMHG | SYSTOLIC BLOOD PRESSURE: 148 MMHG | HEIGHT: 60 IN | HEART RATE: 68 BPM | BODY MASS INDEX: 40.34 KG/M2

## 2025-02-26 DIAGNOSIS — L40.50 PSORIATIC ARTHRITIS: Chronic | ICD-10-CM

## 2025-02-26 DIAGNOSIS — D50.0 IRON DEFICIENCY ANEMIA DUE TO CHRONIC BLOOD LOSS: Primary | Chronic | ICD-10-CM

## 2025-02-26 DIAGNOSIS — H91.93 BILATERAL HEARING LOSS, UNSPECIFIED HEARING LOSS TYPE: Chronic | ICD-10-CM

## 2025-02-26 DIAGNOSIS — E78.2 MIXED HYPERLIPIDEMIA: Chronic | ICD-10-CM

## 2025-02-26 DIAGNOSIS — R26.89 BALANCE PROBLEMS: Chronic | ICD-10-CM

## 2025-02-26 DIAGNOSIS — I10 ESSENTIAL HYPERTENSION: Chronic | ICD-10-CM

## 2025-02-26 DIAGNOSIS — I50.32 CHRONIC DIASTOLIC HEART FAILURE: Chronic | ICD-10-CM

## 2025-02-26 PROCEDURE — 99215 OFFICE O/P EST HI 40 MIN: CPT | Mod: S$PBB,,, | Performed by: INTERNAL MEDICINE

## 2025-02-26 PROCEDURE — 99215 OFFICE O/P EST HI 40 MIN: CPT | Mod: PBBFAC,PN | Performed by: INTERNAL MEDICINE

## 2025-02-26 PROCEDURE — 99999 PR PBB SHADOW E&M-EST. PATIENT-LVL V: CPT | Mod: PBBFAC,,, | Performed by: INTERNAL MEDICINE

## 2025-02-26 NOTE — ASSESSMENT & PLAN NOTE
Continues to defer colonoscopy - H/H improving - iron levels still low - scheduled for additional iron infusions next month

## 2025-02-26 NOTE — PROGRESS NOTES
Qi Ortiz  02/26/2025  [id number]    Lisa Porter MD  Patient Care Team:  Lisa Porter MD as PCP - General (Internal Medicine)    Visit Type: Follow-up    Ms. Qi Ortiz is a 74 year old female w multiple chronic medical issues here for scheduled f/u     Chronic medical issues include type 2 DM, h/o CVA, CAD s/p CABG x 2, AS s/p TAVR, s/p NSTEMI  6/25/24, HTN, HLD (intolerant of statin), CKD stage 3, hypothyroidism, psoriasis and psoriatic arthritis, CAREN, obesity, and IRIS (intolerant of CPAP). Ms. Ortiz has multiple drug and contact allergies and sensitivities. Food allergies include kiwi fruit and crab boil.    Reports home BP's 120's-140/70's    History of Present Illness    CHIEF COMPLAINT:  Ms. Ortiz presents today for follow up of iron deficiency among other issues.    IRON DEFICIENCY:  She reports symptoms including loss of appetite, taste changes, and increased fatigue as her iron levels decrease. She notes feeling stronger the day following iron treatments. She is scheduled for more iron infusions in March.    CARDIOVASCULAR:  She reports home blood pressure readings ranging from 120s/60-70s to 140/74. She has a history of TIAs and known heart murmur since childhood. Carotid artery stenosis has progressed from 19% to 39% on right side.    MUSCULOSKELETAL:  She reports lower back pain associated with sewing, with the bending, standing, and turning movements involved. She experiences hand pain that interferes with cutting, requiring 2-3 day breaks at times. She reports aluminum foil in bottom of shoes arranged in an X pattern provides pain relief from ankle upward after 4 hours of use.    MEDICATIONS:  She is taking Repatha for cholesterol management and Cosentyx for psoriasis/psoriatic arthritis - both are working well.     PREVENTIVE CARE:  She declines mammogram due to previous surgical nerve damage causing left upper breast discomfort and unwillingness to undergo compression. She  expresses hesitancy about colonoscopy due to concerns about low blood counts and cardiac history.         PHQ-4 Score: 1     From LOV w me 1/29/25  CHIEF COMPLAINT:  Ms. Ortiz presents today with a cough.  RESPIRATORY:  She reports experiencing occasional cough without persistent symptoms throughout the day. She denies gagging sensation or associated breathing difficulties. She experiences shortness of breath with exercise, becoming dyspneic after a few minutes of using exercise equipment, even without weights.  CARDIOVASCULAR:  She has had two open heart surgeries, with the most recent in 2020. She takes Lasix for fluid retention, using 40mg initially and an additional 20mg around 11-12 o'clock as needed when experiencing symptoms. Due to her cardiac condition, she expresses concern about potential cardiac events during exercise.  CHRONIC PAIN:  She reports left upper breast pain since her second open heart surgery in 2020. The pain is localized to the top of the left upper breast and is consistently present. She describes severe exacerbation with certain movements or impacts. She experiences arm pain and inflammation when attempting to use exercise equipment.  MEDICAL HISTORY:  She has a history of chronic anemia requiring treatment every 8-10 months. She reports infrequent infectious illness aside from a recent COVID infection and a urinary tract/kidney infection last year.  GI:  She reports hemorrhoids with bleeding but denies recent blood in stool. She expresses concern about undergoing colonoscopy due to her cardiac condition.  WEIGHT MANAGEMENT:  She reports successful weight loss from size 22-24 to size 18-20, maintaining weight within 8 lbs of her goal. Her daughter has expressed concern about the weight loss.  FAMILY HISTORY:  She reports cancer on both maternal and paternal sides of the family.  PHQ-4 Score: 0     Upcoming appointments:   Next 10 Appointments       Date Provider Specialty Appt Notes     "2/27/2025  Chemotherapy J[facility]//Venofer 2/8 ow/cbd    3/5/2025  Lab Dr. Ackerman    3/6/2025  Chemotherapy Venofer 3/8 ow/cbd    3/12/2025 Jacky Ackerman MD Rheumatology PSORIATIC --[facility]--G[facility]    3/13/2025  Chemotherapy Venofer 4/8 ow/cbd    3/20/2025  Chemotherapy Venofer 5/8 ow/cbd    3/27/2025  Chemotherapy Venofer 6/8 ow/cbd    4/3/2025  Chemotherapy Venofer 7/8 ow/cbd    4/10/2025  Chemotherapy Venofer 8/8 ow/cbd    5/9/2025 Lisa Porter MD Primary Care 10 wk f/u        The following were reviewed: Active problem list, medication list, allergies, family history, social history, and Health Maintenance.     Medications have been reviewed and reconciled with patient at visit today.    Exam: Initial VS /78 P 68 temp 96.7 sat 98%  Vitals:    02/26/25 1502   BP: (!) 148/64   Pulse:    Temp:      Weight: 93.2 kg (205 lb 7.5 oz)   Body mass index is 40.13 kg/m².     Physical Exam  Vitals reviewed.   Constitutional:       General: She is not in acute distress.     Appearance: Normal appearance. She is obese.   HENT:      Head: Normocephalic and atraumatic.      Right Ear: External ear normal.      Left Ear: External ear normal.      Ears:      Comments: Opaque TMs     Nose: Nose normal.      Mouth/Throat:      Mouth: Mucous membranes are moist.      Pharynx: Oropharynx is clear.   Eyes:      Extraocular Movements: Extraocular movements intact.      Conjunctiva/sclera: Conjunctivae normal.      Comments: glasses   Neck:      Comments: Cardiac murmur radiates to L carotid  Cardiovascular:      Rate and Rhythm: Normal rate and regular rhythm.      Heart sounds: Murmur heard.   Pulmonary:      Effort: Pulmonary effort is normal. No respiratory distress.      Breath sounds: No wheezing.      Comments: "Crackles" at bases on deep inhalation R> L  Abdominal:      General: Bowel sounds are normal.      Palpations: Abdomen is soft.      Tenderness: There is no abdominal tenderness. There is no guarding.   Musculoskeletal: "      Right lower leg: No edema.      Left lower leg: No edema.   Lymphadenopathy:      Cervical: No cervical adenopathy.   Skin:     General: Skin is warm and dry.   Neurological:      Mental Status: She is alert and oriented to person, place, and time. Mental status is at baseline.      Motor: No weakness.      Coordination: Coordination normal.      Gait: Gait abnormal.      Comments: Balance issues but ambulating independently w/o AD   Psychiatric:         Mood and Affect: Mood normal.         Behavior: Behavior normal.         Thought Content: Thought content normal.        Laboratory Reviewed  Lab Results   Component Value Date    WBC 8.28 01/29/2025    HGB 11.5 (L) 01/29/2025    HCT 38.5 01/29/2025     01/29/2025    MCV 84 01/29/2025    CHOL 123 11/05/2024    TRIG 136 11/05/2024    HDL 35 (L) 11/05/2024    LDLCALC 60.8 (L) 11/05/2024    ALT 8 (L) 01/29/2025    AST 17 01/29/2025     01/29/2025    K 4.3 01/29/2025     01/29/2025    CREATININE 1.6 (H) 01/29/2025    BUN 20 01/29/2025    CO2 30 (H) 01/29/2025    MG 1.7 06/25/2024    TSH 1.770 04/19/2024    FREET4 1.01 04/19/2024    INR 1.0 06/25/2024    HGBA1C 6.0 (H) 11/05/2024    CRP 3.1 01/26/2024     Lab Results   Component Value Date    PTH 64.3 01/29/2025    CALCIUM 9.5 01/29/2025    PHOS 3.2 11/22/2024      Lab Results   Component Value Date    QCRGSLRN09 748 11/05/2024     Lab Results   Component Value Date    FOLATE 18.2 11/05/2024      Lab Results   Component Value Date    IRON 28 (L) 01/29/2025    TRANSFERRIN 277 01/29/2025    TIBC 410 01/29/2025    FESATURATED 7 (L) 01/29/2025      Lab Results   Component Value Date    EGFRNORACEVR 34 (A) 01/29/2025    ALBUMIN 3.4 (L) 01/29/2025     (H) 06/29/2024     Lab Results   Component Value Date    QTEZUNIB25NP 60 11/05/2024      CV US B carotid 2/05/25   There is 20-39% right Internal Carotid Stenosis.    There is 0-19% left Internal Carotid Stenosis.       Assessment:   74 y.o. female  with multiple co-morbid illnesses here for continued follow up of medical problems.      The primary encounter diagnosis was Iron deficiency anemia due to chronic blood loss. Diagnoses of Bilateral hearing loss, unspecified hearing loss type, Chronic diastolic heart failure, Essential hypertension, Mixed hyperlipidemia, Balance problems, and Psoriatic arthritis were also pertinent to this visit.      Plan:   1. Iron deficiency anemia due to chronic blood loss  Overview:  Elida Alanis NP 8/6/2021  Iron levels replenished s/p Venofer x 8. She denies abnormal bleeding at preset time but notes intermittent blood noted in stool. Patient has not yet had follow up with GI service for endoscopies due to ongoing COVID concerns previously. She has been encouraged to follow up with GI. Rationale discussed in detail.   Patient unable to tolerate oral iron due to GI upset/constipation. F/u 3 months with repeat labs. Discussed S&S to report sooner      Assessment & Plan:  Continues to defer colonoscopy - H/H improving - iron levels still low - scheduled for additional iron infusions next month       2. Bilateral hearing loss, unspecified hearing loss type  Assessment & Plan:  Very [place] - can affect communication at times - per MsThierno Ortiz can not wear hearing aids       3. Chronic diastolic heart failure  Assessment & Plan:  Compensated at present - cont current rx, sodium restriction      4. Essential hypertension  Assessment & Plan:  BP elevated here today - agai, reports stressed due to traffic- reports home BP's @ goal 120-140/70's - cont current Rx - cont limit sodium intake - encourage stay mobile and active      5. Mixed hyperlipidemia  Assessment & Plan:  Doing well w Repatha      6. Balance problems  Assessment & Plan:  Maybe due to chronic microvascular changes in brain - suspect multifactoral - no recent falls - maintain caution w ambulation      7. Psoriatic arthritis  Overview:  Rheumatology    Assessment &  Plan:  followed by Rheum - doing well so far w Cosentyx for psoriatic arthritis            Health Maintenance         Date Due Completion Date    COVID-19 Vaccine (1) Never done ---    Pneumococcal Vaccines (Age 50+) (1 of 2 - PCV) Never done ---    RSV Vaccine (Age 60+ and Pregnant patients) (1 - Risk 60-74 years 1-dose series) Never done ---    Colorectal Cancer Screening 04/18/2016 4/18/2011    Mammogram 10/23/2016 10/23/2015    Shingles Vaccine (2 of 2) 11/28/2022 10/3/2022    Hemoglobin A1c 05/05/2025 11/5/2024    DEXA Scan 07/25/2025 7/25/2022    Foot Exam 10/25/2025 10/25/2024    Override on 9/29/2023: Done (pt w idiopathic peripheral neuropathy - not diabetic - prediabetic)    Diabetic Eye Exam 11/04/2025 11/4/2024    Lipid Panel 11/05/2025 11/5/2024    Diabetes Urine Screening 01/31/2026 1/31/2025    TETANUS VACCINE 10/02/2033 10/2/2023          -Patient's lab results were reviewed and discussed with patient  -Treatment options and alternatives were discussed with the patient. Patient expressed understanding. Patient was given the opportunity to ask questions and be an active participant in their medical care. Patient had no further questions or concerns at this time.     Follow up: Follow up in about 10 weeks (around 5/7/2025) for Follow Up w me (prefers afternoon) & EAWV after 5/21/25.    After visit summary printed and given to patient upon discharge.  Patient care plan included in After visit summary.    TOTAL TIME evaluating and managing this patient for this encounter was 48 minutes. This time was spent personally by me on some of the following activities: review of patient's past medical history, assessing age-appropriate health maintenance needs, review of any interval history, review and interpretation of lab results, review and interpretation of imaging test results, review and interpretation of cardiology test results, reviewing consulting specialist notes, obtaining history from the patient and  family, examination of the patient, medication reconciliation, managing and/or ordering prescription medications, ordering imaging tests, ordering referral to subspecialty provider(s), educating patient and answering their questions about diagnosis, treatment plan, and goals of treatment, discussing planned follow-up and final documentation of the visit. This time was exclusive of any separately billable procedures for this patient and exclusive of time spent treating any other patients.     This note was generated with the assistance of ambient listening technology. Verbal consent was obtained by the patient and accompanying visitor(s) for the recording of patient appointment to facilitate this note. I attest to having reviewed and edited the generated note for accuracy, though some syntax or spelling errors may persist. Please contact the author of this note for any clarification.

## 2025-02-26 NOTE — ASSESSMENT & PLAN NOTE
Maybe due to chronic microvascular changes in brain - suspect multifactoral - no recent falls - maintain caution w ambulation

## 2025-02-26 NOTE — ASSESSMENT & PLAN NOTE
BP elevated here today - agai, reports stressed due to traffic- reports home BP's @ goal 120-140/70's - cont current Rx - cont limit sodium intake - encourage stay mobile and active

## 2025-02-27 ENCOUNTER — INFUSION (OUTPATIENT)
Dept: INFUSION THERAPY | Facility: HOSPITAL | Age: 75
End: 2025-02-27
Attending: INTERNAL MEDICINE
Payer: MEDICARE

## 2025-02-27 VITALS
TEMPERATURE: 97 F | HEART RATE: 71 BPM | SYSTOLIC BLOOD PRESSURE: 156 MMHG | RESPIRATION RATE: 16 BRPM | OXYGEN SATURATION: 98 % | DIASTOLIC BLOOD PRESSURE: 66 MMHG

## 2025-02-27 DIAGNOSIS — D50.9 IRON DEFICIENCY ANEMIA, UNSPECIFIED IRON DEFICIENCY ANEMIA TYPE: Primary | ICD-10-CM

## 2025-02-27 PROCEDURE — 96365 THER/PROPH/DIAG IV INF INIT: CPT

## 2025-02-27 PROCEDURE — 96375 TX/PRO/DX INJ NEW DRUG ADDON: CPT

## 2025-02-27 PROCEDURE — 63600175 PHARM REV CODE 636 W HCPCS: Mod: JZ,TB | Performed by: NURSE PRACTITIONER

## 2025-02-27 PROCEDURE — 25000003 PHARM REV CODE 250: Performed by: NURSE PRACTITIONER

## 2025-02-27 PROCEDURE — 63600175 PHARM REV CODE 636 W HCPCS: Performed by: NURSE PRACTITIONER

## 2025-02-27 RX ORDER — METHYLPREDNISOLONE SOD SUCC 125 MG
60 VIAL (EA) INJECTION
Status: COMPLETED | OUTPATIENT
Start: 2025-02-27 | End: 2025-02-27

## 2025-02-27 RX ORDER — SODIUM CHLORIDE 0.9 % (FLUSH) 0.9 %
10 SYRINGE (ML) INJECTION
Status: DISCONTINUED | OUTPATIENT
Start: 2025-02-27 | End: 2025-02-27 | Stop reason: HOSPADM

## 2025-02-27 RX ORDER — METHYLPREDNISOLONE SOD SUCC 125 MG
60 VIAL (EA) INJECTION
Start: 2025-03-06

## 2025-02-27 RX ORDER — SODIUM CHLORIDE 0.9 % (FLUSH) 0.9 %
10 SYRINGE (ML) INJECTION
OUTPATIENT
Start: 2025-03-06

## 2025-02-27 RX ADMIN — SODIUM CHLORIDE 100 MG: 9 INJECTION, SOLUTION INTRAVENOUS at 01:02

## 2025-02-27 RX ADMIN — METHYLPREDNISOLONE SODIUM SUCCINATE 60 MG: 125 INJECTION, POWDER, FOR SOLUTION INTRAMUSCULAR; INTRAVENOUS at 01:02

## 2025-02-27 NOTE — PLAN OF CARE
Problem: Adult Inpatient Plan of Care  Goal: Plan of Care Review  2/27/2025 1546 by Savanna Mcallister RN  Outcome: Progressing  2/27/2025 1545 by Savanna Mcallister RN  Outcome: Progressing  Flowsheets (Taken 2/27/2025 1545)  Plan of Care Reviewed With: patient  Goal: Optimal Comfort and Wellbeing  2/27/2025 1546 by Savanna Mcallister RN  Outcome: Progressing  2/27/2025 1545 by Savanna Mcallister RN  Outcome: Progressing  Goal: Patient-Specific Goal (Individualized)  Outcome: Progressing     Problem: Anemia  Goal: Anemia Symptom Improvement  2/27/2025 1546 by Savanna Mcallister RN  Outcome: Progressing  2/27/2025 1545 by Savanna Mcallister RN  Outcome: Progressing

## 2025-03-05 ENCOUNTER — LAB VISIT (OUTPATIENT)
Dept: LAB | Facility: HOSPITAL | Age: 75
End: 2025-03-05
Attending: INTERNAL MEDICINE
Payer: MEDICARE

## 2025-03-05 DIAGNOSIS — L40.50 PSORIATIC ARTHRITIS: ICD-10-CM

## 2025-03-05 DIAGNOSIS — E03.9 HYPOTHYROIDISM, UNSPECIFIED TYPE: Chronic | ICD-10-CM

## 2025-03-05 LAB
ALBUMIN SERPL BCP-MCNC: 3.6 G/DL (ref 3.5–5.2)
ALP SERPL-CCNC: 66 U/L (ref 40–150)
ALT SERPL W/O P-5'-P-CCNC: 9 U/L (ref 10–44)
ANION GAP SERPL CALC-SCNC: 11 MMOL/L (ref 8–16)
AST SERPL-CCNC: 14 U/L (ref 10–40)
BASOPHILS # BLD AUTO: 0.06 K/UL (ref 0–0.2)
BASOPHILS NFR BLD: 0.4 % (ref 0–1.9)
BILIRUB SERPL-MCNC: 0.5 MG/DL (ref 0.1–1)
BUN SERPL-MCNC: 21 MG/DL (ref 8–23)
CALCIUM SERPL-MCNC: 9.8 MG/DL (ref 8.7–10.5)
CHLORIDE SERPL-SCNC: 101 MMOL/L (ref 95–110)
CO2 SERPL-SCNC: 28 MMOL/L (ref 23–29)
CREAT SERPL-MCNC: 1.5 MG/DL (ref 0.5–1.4)
CRP SERPL-MCNC: 16 MG/L (ref 0–8.2)
DIFFERENTIAL METHOD BLD: ABNORMAL
EOSINOPHIL # BLD AUTO: 0.1 K/UL (ref 0–0.5)
EOSINOPHIL NFR BLD: 0.8 % (ref 0–8)
ERYTHROCYTE [DISTWIDTH] IN BLOOD BY AUTOMATED COUNT: 18.9 % (ref 11.5–14.5)
EST. GFR  (NO RACE VARIABLE): 37 ML/MIN/1.73 M^2
GLUCOSE SERPL-MCNC: 176 MG/DL (ref 70–110)
HBV CORE AB SERPL QL IA: NORMAL
HBV SURFACE AG SERPL QL IA: NORMAL
HCT VFR BLD AUTO: 43.3 % (ref 37–48.5)
HGB BLD-MCNC: 13.2 G/DL (ref 12–16)
IMM GRANULOCYTES # BLD AUTO: 0.06 K/UL (ref 0–0.04)
IMM GRANULOCYTES NFR BLD AUTO: 0.4 % (ref 0–0.5)
LYMPHOCYTES # BLD AUTO: 1.4 K/UL (ref 1–4.8)
LYMPHOCYTES NFR BLD: 9.9 % (ref 18–48)
MCH RBC QN AUTO: 25.7 PG (ref 27–31)
MCHC RBC AUTO-ENTMCNC: 30.5 G/DL (ref 32–36)
MCV RBC AUTO: 84 FL (ref 82–98)
MONOCYTES # BLD AUTO: 0.8 K/UL (ref 0.3–1)
MONOCYTES NFR BLD: 5.4 % (ref 4–15)
NEUTROPHILS # BLD AUTO: 11.7 K/UL (ref 1.8–7.7)
NEUTROPHILS NFR BLD: 83.1 % (ref 38–73)
NRBC BLD-RTO: 0 /100 WBC
PLATELET # BLD AUTO: 230 K/UL (ref 150–450)
PMV BLD AUTO: 10.1 FL (ref 9.2–12.9)
POTASSIUM SERPL-SCNC: 4.4 MMOL/L (ref 3.5–5.1)
PROT SERPL-MCNC: 7.1 G/DL (ref 6–8.4)
RBC # BLD AUTO: 5.13 M/UL (ref 4–5.4)
SODIUM SERPL-SCNC: 140 MMOL/L (ref 136–145)
WBC # BLD AUTO: 14.13 K/UL (ref 3.9–12.7)

## 2025-03-05 PROCEDURE — 85025 COMPLETE CBC W/AUTO DIFF WBC: CPT | Performed by: INTERNAL MEDICINE

## 2025-03-05 PROCEDURE — 86480 TB TEST CELL IMMUN MEASURE: CPT | Performed by: INTERNAL MEDICINE

## 2025-03-05 PROCEDURE — 87340 HEPATITIS B SURFACE AG IA: CPT | Performed by: INTERNAL MEDICINE

## 2025-03-05 PROCEDURE — 86704 HEP B CORE ANTIBODY TOTAL: CPT | Performed by: INTERNAL MEDICINE

## 2025-03-05 PROCEDURE — 36415 COLL VENOUS BLD VENIPUNCTURE: CPT | Mod: PO | Performed by: INTERNAL MEDICINE

## 2025-03-05 PROCEDURE — 86140 C-REACTIVE PROTEIN: CPT | Performed by: INTERNAL MEDICINE

## 2025-03-05 PROCEDURE — 80053 COMPREHEN METABOLIC PANEL: CPT | Performed by: INTERNAL MEDICINE

## 2025-03-06 ENCOUNTER — INFUSION (OUTPATIENT)
Dept: INFUSION THERAPY | Facility: HOSPITAL | Age: 75
End: 2025-03-06
Attending: INTERNAL MEDICINE
Payer: MEDICARE

## 2025-03-06 VITALS
OXYGEN SATURATION: 97 % | RESPIRATION RATE: 18 BRPM | HEART RATE: 59 BPM | DIASTOLIC BLOOD PRESSURE: 67 MMHG | SYSTOLIC BLOOD PRESSURE: 136 MMHG | TEMPERATURE: 97 F

## 2025-03-06 DIAGNOSIS — D50.9 IRON DEFICIENCY ANEMIA, UNSPECIFIED IRON DEFICIENCY ANEMIA TYPE: Primary | ICD-10-CM

## 2025-03-06 PROCEDURE — 25000003 PHARM REV CODE 250: Performed by: INTERNAL MEDICINE

## 2025-03-06 PROCEDURE — 63600175 PHARM REV CODE 636 W HCPCS: Performed by: INTERNAL MEDICINE

## 2025-03-06 PROCEDURE — 25000003 PHARM REV CODE 250: Performed by: NURSE PRACTITIONER

## 2025-03-06 PROCEDURE — 63600175 PHARM REV CODE 636 W HCPCS: Mod: JZ,TB | Performed by: NURSE PRACTITIONER

## 2025-03-06 PROCEDURE — 96375 TX/PRO/DX INJ NEW DRUG ADDON: CPT

## 2025-03-06 PROCEDURE — 96365 THER/PROPH/DIAG IV INF INIT: CPT

## 2025-03-06 RX ORDER — SODIUM CHLORIDE 0.9 % (FLUSH) 0.9 %
10 SYRINGE (ML) INJECTION
OUTPATIENT
Start: 2025-03-13

## 2025-03-06 RX ORDER — METHYLPREDNISOLONE SOD SUCC 125 MG
60 VIAL (EA) INJECTION
Status: COMPLETED | OUTPATIENT
Start: 2025-03-06 | End: 2025-03-06

## 2025-03-06 RX ORDER — SODIUM CHLORIDE 0.9 % (FLUSH) 0.9 %
10 SYRINGE (ML) INJECTION
Status: DISCONTINUED | OUTPATIENT
Start: 2025-03-06 | End: 2025-03-06 | Stop reason: HOSPADM

## 2025-03-06 RX ORDER — LEVOTHYROXINE SODIUM 100 UG/1
100 TABLET ORAL
Qty: 90 TABLET | Refills: 1 | Status: SHIPPED | OUTPATIENT
Start: 2025-03-06

## 2025-03-06 RX ORDER — METHYLPREDNISOLONE SOD SUCC 125 MG
60 VIAL (EA) INJECTION
Start: 2025-03-13

## 2025-03-06 RX ADMIN — IRON SUCROSE 100 MG: 20 INJECTION, SOLUTION INTRAVENOUS at 01:03

## 2025-03-06 RX ADMIN — METHYLPREDNISOLONE SODIUM SUCCINATE 60 MG: 125 INJECTION, POWDER, FOR SOLUTION INTRAMUSCULAR; INTRAVENOUS at 01:03

## 2025-03-06 RX ADMIN — SODIUM CHLORIDE: 9 INJECTION, SOLUTION INTRAVENOUS at 01:03

## 2025-03-06 NOTE — DISCHARGE INSTRUCTIONS
[facility]  [address] [address]  [address]  [phone] phone     [phone] fax  Hours of Operation: Monday- Friday 8:00am- 5:00pm  After hours phone  [phone]  Hematology / Oncology Physicians on call      SLAVA Sanz Dr., NP Phaon Dunbar, NP Khelsea Conley, FNP    Please call with any concerns regarding your appointment today.

## 2025-03-06 NOTE — PLAN OF CARE
Discussed plan of care with pt. Addressed any and ongoing concerns. Pt denies   Problem: Adult Inpatient Plan of Care  Goal: Plan of Care Review  Outcome: Progressing  Flowsheets (Taken 3/6/2025 1334)  Plan of Care Reviewed With: patient  Goal: Patient-Specific Goal (Individualized)  Outcome: Progressing  Flowsheets (Taken 3/6/2025 1334)  Individualized Care Needs: Reclined position, warm blanket, pillow for IV arm , orange juice  Anxieties, Fears or Concerns: None  Patient/Family-Specific Goals (Include Timeframe): will tolerate iron infusion  Goal: Absence of Hospital-Acquired Illness or Injury  Outcome: Progressing  Intervention: Identify and Manage Fall Risk  Flowsheets (Taken 3/6/2025 1334)  Safety Promotion/Fall Prevention: in recliner, wheels locked  Intervention: Prevent Infection  Flowsheets (Taken 3/6/2025 1334)  Infection Prevention:   equipment surfaces disinfected   hand hygiene promoted   personal protective equipment utilized  Goal: Optimal Comfort and Wellbeing  Outcome: Progressing  Intervention: Provide Person-Centered Care  Flowsheets (Taken 3/6/2025 1334)  Trust Relationship/Rapport:   care explained   questions answered   thoughts/feelings acknowledged   choices provided

## 2025-03-07 LAB
GAMMA INTERFERON BACKGROUND BLD IA-ACNC: 0 IU/ML
M TB IFN-G CD4+ BCKGRND COR BLD-ACNC: 0 IU/ML
M TB IFN-G CD4+ BCKGRND COR BLD-ACNC: 0 IU/ML
MITOGEN IGNF BCKGRD COR BLD-ACNC: 1.53 IU/ML
TB GOLD PLUS: NEGATIVE

## 2025-03-11 DIAGNOSIS — I10 ESSENTIAL HYPERTENSION: Chronic | ICD-10-CM

## 2025-03-12 ENCOUNTER — OFFICE VISIT (OUTPATIENT)
Dept: RHEUMATOLOGY | Facility: CLINIC | Age: 75
End: 2025-03-12
Payer: MEDICARE

## 2025-03-12 VITALS
HEART RATE: 80 BPM | SYSTOLIC BLOOD PRESSURE: 127 MMHG | BODY MASS INDEX: 42.5 KG/M2 | HEIGHT: 60 IN | WEIGHT: 216.5 LBS | DIASTOLIC BLOOD PRESSURE: 67 MMHG

## 2025-03-12 DIAGNOSIS — N18.31 CKD STAGE 3A, GFR 45-59 ML/MIN: Chronic | ICD-10-CM

## 2025-03-12 DIAGNOSIS — Z51.81 MEDICATION MONITORING ENCOUNTER: ICD-10-CM

## 2025-03-12 DIAGNOSIS — L40.59 POLYARTICULAR PSORIATIC ARTHRITIS: Primary | ICD-10-CM

## 2025-03-12 DIAGNOSIS — D84.9 IMMUNOCOMPROMISED: Chronic | ICD-10-CM

## 2025-03-12 DIAGNOSIS — L40.9 PSORIASIS: Chronic | ICD-10-CM

## 2025-03-12 PROCEDURE — 99999 PR PBB SHADOW E&M-EST. PATIENT-LVL IV: CPT | Mod: PBBFAC,,, | Performed by: INTERNAL MEDICINE

## 2025-03-12 PROCEDURE — 99214 OFFICE O/P EST MOD 30 MIN: CPT | Mod: PBBFAC | Performed by: INTERNAL MEDICINE

## 2025-03-12 RX ORDER — SPIRONOLACTONE 50 MG/1
50 TABLET, FILM COATED ORAL DAILY
Qty: 90 TABLET | Refills: 0 | Status: SHIPPED | OUTPATIENT
Start: 2025-03-12 | End: 2025-06-10

## 2025-03-12 NOTE — PROGRESS NOTES
RHEUMATOLOGY OUTPATIENT CLINIC NOTE    3/12/2025    Attending Rheumatologist: Jacky Ackerman  Primary Care Provider/Physician Requesting Consultation: Lisa Poretr MD   Chief Complaint/Reason For Consultation:  Psoriatic Arthritis, Osteoarthritis, and Gout      Subjective:     Qi Ortiz is a 74 y.o. White female with PsA    Adherence w/ Cosentyx at 300mg q30d.  Excellent response to current biologic.      Review of Systems   Constitutional:  Negative for fever.   Eyes:  Negative for photophobia and pain.   Respiratory:  Negative for shortness of breath.    Genitourinary:  Negative for dysuria.   Musculoskeletal:  Negative for joint pain.   Skin:  Negative for rash.   Neurological:  Negative for focal weakness.       Chronic comorbid conditions affecting medical decision making today:  Past Medical History:   Diagnosis Date    Carotid stenosis     19%    Cellulitis and abscess of foot, except toes 06/22/2022    Cerumen debris on tympanic membrane of right ear 11/30/2022    Class 2 severe obesity due to excess calories with serious comorbidity and body mass index (BMI) of 38.0 to 38.9 in adult 07/12/2023    Coronary artery disease     CVA (cerebral vascular accident)     Dr. Hoffman    Depression     Dog bite 10/03/2023    Double ectopic ureters     Dr. Porras    Encounter for preventive health examination 05/21/2024    Fall 05/09/2024    Hemiplegia affecting right dominant side 11/09/2021    Hyperlipidemia     Hypertension     Hypothyroid     Left foot infection 07/08/2022    Mild asthma with exacerbation 12/04/2022    Near syncope 01/02/2019    NSTEMI (non-ST elevated myocardial infarction) 06/26/2024    OP (osteoporosis)     IRIS (obstructive sleep apnea)     Dr. Hope    Palpitations 01/31/2019    Prediabetes 09/30/2023    Psoriatic arthritis     Rheumatology    Transient ischemic attack (TIA) 01/02/2019     Past Surgical History:   Procedure Laterality Date    BREAST BIOPSY      R sided/benign     CARDIAC SURGERY      2016    CERVICAL FUSION      CHOLECYSTECTOMY      CORONARY ANGIOPLASTY      CORONARY ARTERY BYPASS GRAFT      triple bypass    CORONARY BYPASS GRAFT ANGIOGRAPHY  2024    Procedure: Bypass graft study;  Surgeon: Veronica Ibarra MD;  Location: [facility] CATH LAB;  Service: Cardiology;;    CORONARY STENT PLACEMENT      EYE SURGERY      INTRAUTERINE DEVICE INSERTION      LEFT HEART CATHETERIZATION Left 2020    Procedure: CATHETERIZATION, HEART, LEFT;  Surgeon: Veronica Ibarra MD;  Location: [facility] CATH LAB;  Service: Cardiology;  Laterality: Left;  7am start time    LEFT HEART CATHETERIZATION Left 2024    Procedure: Left heart cath;  Surgeon: Veronica Ibarra MD;  Location: [facility] CATH LAB;  Service: Cardiology;  Laterality: Left;    mass removed from R groin      PERCUTANEOUS CORONARY INTERVENTION, ARTERY N/A 2024    Procedure: Percutaneous coronary intervention;  Surgeon: Veronica Ibarra MD;  Location: [facility] CATH LAB;  Service: Cardiology;  Laterality: N/A;    STENT, DRUG ELUTING, SINGLE VESSEL, CORONARY  2024    Procedure: Stent, Drug Eluting, Single Vessel, Coronary;  Surgeon: Veronica Ibarra MD;  Location: [facility] CATH LAB;  Service: Cardiology;;    TOTAL ABDOMINAL HYSTERECTOMY W/ BILATERAL SALPINGOOPHORECTOMY      due to benign mass, adhesions    TUBAL LIGATION       Family History   Problem Relation Name Age of Onset    Breast cancer Maternal Grandfather      Breast cancer Paternal Aunt      Stroke Unknown      Breast cancer Sister  60    Leukemia Sister  8         as child    Lung cancer Paternal Grandfather      Heart disease Unknown      Diabetes Daughter a      Tobacco Use History[1]  Current Medications[2]     Objective:     Vitals:    25 1336   BP: 127/67   Pulse: 80     Physical Exam   Eyes: Conjunctivae are normal.   Pulmonary/Chest: Effort normal. No respiratory distress.   Musculoskeletal:         General: No swelling or tenderness. Normal range of  motion.   Skin: Rash noted.       Reviewed available old and all outside pertinent medical records available.    All lab results personally reviewed and interpreted by me.       ASSESSMENT / PLAN     1. Polyarticular psoriatic arthritis  DAPSA: low disease activity.  C/ Cosentyx 300mg unchanged  C-Reactive Protein      2. Psoriasis  Dovonex PRN      3. CKD stage 3a, GFR 45-59 ml/min        4. Medication monitoring encounter  Comprehensive Metabolic Panel      5. Immunocompromised  Hold immunosuppression in event of active infections or need for surgery  CBC Auto Differential              Visit today included increased complexity associated with the care of the episodic problem Polyarticular psoriatic arthritis addressed and managing the longitudinal care of the patient due to the serious and/or complex managed problem(s) Immunocompromised , Medication monitoring encounter.    Jacky Ackerman M.D.         [1]   Social History  Tobacco Use   Smoking Status Never   Smokeless Tobacco Never   [2]   Current Outpatient Medications:     acetaminophen (TYLENOL) 650 MG TbSR, as directed, Disp: , Rfl:     allopurinoL (ZYLOPRIM) 100 MG tablet, TAKE 2 TABLETS ONCE DAILY, Disp: 180 tablet, Rfl: 3    aspirin 81 MG Chew, Take 1 tablet (81 mg total) by mouth once daily., Disp: 90 tablet, Rfl: 3    calcium carbonate 650 mg calcium (1,625 mg) tablet, Take 1 tablet by mouth once daily., Disp: , Rfl:     clopidogreL (PLAVIX) 75 mg tablet, Take 1 tablet (75 mg total) by mouth once daily., Disp: 90 tablet, Rfl: 3    COSENTYX PEN, 2 PENS, 150 mg/mL PnIj, INJECT 300 MG (2 PENS) UNDER THE SKIN EVERY 28 DAYS, Disp: 4 mL, Rfl: 1    cyanocobalamin (VITAMIN B-12) 1000 MCG tablet, Take 100 mcg by mouth once daily., Disp: , Rfl:     docusate sodium (COLACE) 100 MG capsule, Take 1 capsule (100 mg total) by mouth 2 (two) times daily., Disp: 60 capsule, Rfl: 0    folic acid (FOLVITE) 1 MG tablet, TAKE 1 TABLET DAILY, Disp: 90 tablet, Rfl: 3     furosemide (LASIX) 20 MG tablet, TAKE 1 TABLET TWICE A DAY (DOSE INCREASED BY CARDIOLOGIST DR. MASON), Disp: 60 tablet, Rfl: 11    garlic 2,000 mg Cap, Take 3,000 mg by mouth once daily. , Disp: , Rfl:     levothyroxine (SYNTHROID) 100 MCG tablet, TAKE 1 TABLET BEFORE BREAKFAST, Disp: 90 tablet, Rfl: 1    metoprolol succinate (TOPROL-XL) 100 MG 24 hr tablet, Take 1 tablet (100 mg total) by mouth 2 (two) times daily., Disp: 180 tablet, Rfl: 3    nitroGLYCERIN (NITROSTAT) 0.4 MG SL tablet, DISSOLVE 1 TABLET UNDER THE TONGUE EVERY 5 MINUTES AS NEEDED FOR CHEST PAIN, Disp: 75 tablet, Rfl: 3    potassium chloride SA (K-DUR,KLOR-CON M) 10 MEQ tablet, Take 1 tablet (10 mEq total) by mouth once daily., Disp: 30 tablet, Rfl: 5    pyridoxine, vitamin B6, (B-6) 100 MG Tab, Take 200 mg by mouth once daily. , Disp: , Rfl:     REPATHA SURECLICK 140 mg/mL PnIj, INJECT 1 ML (140 MG) UNDER THE SKIN EVERY 14 DAYS, Disp: 6 mL, Rfl: 3    spironolactone (ALDACTONE) 50 MG tablet, Take 1 tablet (50 mg total) by mouth once daily., Disp: 90 tablet, Rfl: 2    vitamin D 1000 units Tab, Take 185 mg by mouth once daily., Disp: , Rfl:     albuterol (PROVENTIL) 2.5 mg /3 mL (0.083 %) nebulizer solution, Take 3 mLs (2.5 mg total) by nebulization every 6 (six) hours as needed for Wheezing. Rescue, Disp: 30 each, Rfl: 3    calcipotriene (DOVONOX) 0.005 % cream, Apply topically 2 (two) times daily., Disp: 120 g, Rfl: 0    Current Facility-Administered Medications:     cloNIDine tablet 0.1 mg, 0.1 mg, Oral, Once,

## 2025-03-13 ENCOUNTER — INFUSION (OUTPATIENT)
Dept: INFUSION THERAPY | Facility: HOSPITAL | Age: 75
End: 2025-03-13
Attending: INTERNAL MEDICINE
Payer: MEDICARE

## 2025-03-13 VITALS
TEMPERATURE: 97 F | SYSTOLIC BLOOD PRESSURE: 141 MMHG | DIASTOLIC BLOOD PRESSURE: 55 MMHG | OXYGEN SATURATION: 97 % | HEART RATE: 54 BPM | RESPIRATION RATE: 16 BRPM

## 2025-03-13 DIAGNOSIS — D50.9 IRON DEFICIENCY ANEMIA, UNSPECIFIED IRON DEFICIENCY ANEMIA TYPE: Primary | ICD-10-CM

## 2025-03-13 PROCEDURE — 96365 THER/PROPH/DIAG IV INF INIT: CPT

## 2025-03-13 PROCEDURE — 63600175 PHARM REV CODE 636 W HCPCS: Performed by: NURSE PRACTITIONER

## 2025-03-13 PROCEDURE — 96375 TX/PRO/DX INJ NEW DRUG ADDON: CPT

## 2025-03-13 PROCEDURE — 25000003 PHARM REV CODE 250: Performed by: NURSE PRACTITIONER

## 2025-03-13 RX ORDER — METHYLPREDNISOLONE SOD SUCC 125 MG
60 VIAL (EA) INJECTION
Status: COMPLETED | OUTPATIENT
Start: 2025-03-13 | End: 2025-03-13

## 2025-03-13 RX ORDER — METHYLPREDNISOLONE SOD SUCC 125 MG
60 VIAL (EA) INJECTION
Start: 2025-03-20

## 2025-03-13 RX ORDER — SODIUM CHLORIDE 0.9 % (FLUSH) 0.9 %
10 SYRINGE (ML) INJECTION
OUTPATIENT
Start: 2025-03-20

## 2025-03-13 RX ORDER — SODIUM CHLORIDE 0.9 % (FLUSH) 0.9 %
10 SYRINGE (ML) INJECTION
Status: DISCONTINUED | OUTPATIENT
Start: 2025-03-13 | End: 2025-03-13 | Stop reason: HOSPADM

## 2025-03-13 RX ADMIN — SODIUM CHLORIDE: 9 INJECTION, SOLUTION INTRAVENOUS at 02:03

## 2025-03-13 RX ADMIN — SODIUM CHLORIDE 100 MG: 9 INJECTION, SOLUTION INTRAVENOUS at 02:03

## 2025-03-13 RX ADMIN — METHYLPREDNISOLONE SODIUM SUCCINATE 60 MG: 125 INJECTION, POWDER, FOR SOLUTION INTRAMUSCULAR; INTRAVENOUS at 02:03

## 2025-03-13 NOTE — DISCHARGE INSTRUCTIONS
.[facility]  [address] [address] [facility]  [address] [address]  [phone] phone     [phone] fax  Hours of Operation: Monday- Friday 8:00am- 5:00pm  After hours phone  [phone]  Hematology / Oncology Physicians on call    Dr. Hank Jones           Nurse Practitioners:     Radha Richardson, SAMEERA Alanis, DENNIS Chapin, SAMEERA Delgado, SAMEERA Javed, NP    Please don't hesitate to call if you have any concerns.      FALL PREVENTION   Falls often occur due to slipping, tripping or losing your balance. Here are ways to reduce your risk of falling again.   Was there anything that caused your fall that can be fixed, removed or replaced?   Make your home safe by keeping walkways clear of objects you may trip over.   Use non-slip pads under rugs.   Do not walk in poorly lit areas.   Do not stand on chairs or wobbly ladders.   Use caution when reaching overhead or looking upward. This position can cause a loss of balance.   Be sure your shoes fit properly, have non-slip bottoms and are in good condition.   Be cautious when going up and down stairs, curbs, and when walking on uneven sidewalks.   If your balance is poor, consider using a cane or walker.   If your fall was related to alcohol use, stop or limit alcohol intake.   If your fall was related to use of sleeping medicines, talk to your doctor about this. You may need to reduce your dosage at bedtime if you awaken during the night to go to the bathroom.   To reduce the need for nighttime bathroom trips:   Avoid drinking fluids for several hours before going to bed   Empty your bladder before going to bed   Men can keep a urinal at the bedside   © 9277-5890 [employer], [address] [place] [address], [place] [address]. All rights reserved. This information is not intended as a substitute for professional medical care. Always follow your healthcare  professional's instructions.    WAYS TO HELP PREVENT INFECTION        WASH YOUR HANDS OFTEN DURING THE DAY, ESPECIALLY BEFORE YOU EAT, AFTER USING THE BATHROOM, AND AFTER TOUCHING ANIMALS    STAY AWAY FROM PEOPLE WHO HAVE ILLNESSES YOU CAN CATCH; SUCH AS COLDS, FLU, CHICKEN POX    TRY TO AVOID CROWDS    STAY AWAY FROM CHILDREN WHO RECENTLY HAVE RECEIVED LIVE VIRUS VACCINES    MAINTAIN GOOD MOUTH CARE    DO NOT SQUEEZE OR SCRATCH PIMPLES    CLEAN CUTS & SCRAPES RIGHT AWAY AND DAILY UNTIL HEALED WITH WARM WATER, SOAP & AN ANTISEPTIC    AVOID CONTACT WITH LITTER BOXES, BIRD CAGES, & FISH TANKS    AVOID STANDING WATER, IE., BIRD BATHS, FLOWER POTS/VASES, OR HUMIDIFIERS    WEAR GLOVES WHEN GARDENING OR CLEANING UP AFTER OTHERS, ESPECIALLY BABIES & SMALL CHILDREN    DO NOT EAT RAW FISH, SEAFOOD, MEAT, OR EGGS

## 2025-03-13 NOTE — PLAN OF CARE
Problem: Adult Inpatient Plan of Care  Goal: Plan of Care Review  Outcome: Progressing  Flowsheets (Taken 3/13/2025 1552)  Plan of Care Reviewed With: patient  Goal: Patient-Specific Goal (Individualized)  Outcome: Progressing  Flowsheets (Taken 3/13/2025 1552)  Individualized Care Needs: reclined, warm blanket and pillows under each arm. Orange juice given as well  Anxieties, Fears or Concerns: No concerns expressed  Patient/Family-Specific Goals (Include Timeframe): Tolerate infusion today  Goal: Optimal Comfort and Wellbeing  Outcome: Progressing  Intervention: Monitor Pain and Promote Comfort  Flowsheets (Taken 3/13/2025 1552)  Pain Management Interventions:   warm blanket provided   quiet environment facilitated   pillow support provided  Intervention: Provide Person-Centered Care  Flowsheets (Taken 3/13/2025 1552)  Trust Relationship/Rapport:   care explained   reassurance provided   choices provided   thoughts/feelings acknowledged   emotional support provided   empathic listening provided   questions answered   questions encouraged     Problem: Anemia  Goal: Anemia Symptom Improvement  Outcome: Progressing  Intervention: Monitor and Manage Anemia  Flowsheets (Taken 3/13/2025 1552)  Safety Promotion/Fall Prevention:   in recliner, wheels locked   instructed to call staff for mobility   lighting adjusted   medications reviewed  Fatigue Management: frequent rest breaks encouraged

## 2025-03-17 DIAGNOSIS — L40.50 PSORIATIC ARTHRITIS: ICD-10-CM

## 2025-03-17 RX ORDER — SECUKINUMAB 150 MG/ML
INJECTION SUBCUTANEOUS
Qty: 4 ML | Refills: 1 | Status: SHIPPED | OUTPATIENT
Start: 2025-03-17

## 2025-03-20 ENCOUNTER — INFUSION (OUTPATIENT)
Dept: INFUSION THERAPY | Facility: HOSPITAL | Age: 75
End: 2025-03-20
Attending: INTERNAL MEDICINE
Payer: MEDICARE

## 2025-03-20 VITALS
OXYGEN SATURATION: 96 % | RESPIRATION RATE: 20 BRPM | SYSTOLIC BLOOD PRESSURE: 154 MMHG | TEMPERATURE: 98 F | DIASTOLIC BLOOD PRESSURE: 67 MMHG | HEART RATE: 54 BPM

## 2025-03-20 DIAGNOSIS — D50.9 IRON DEFICIENCY ANEMIA, UNSPECIFIED IRON DEFICIENCY ANEMIA TYPE: Primary | ICD-10-CM

## 2025-03-20 PROCEDURE — 96375 TX/PRO/DX INJ NEW DRUG ADDON: CPT

## 2025-03-20 PROCEDURE — 63600175 PHARM REV CODE 636 W HCPCS: Performed by: NURSE PRACTITIONER

## 2025-03-20 PROCEDURE — 96365 THER/PROPH/DIAG IV INF INIT: CPT

## 2025-03-20 PROCEDURE — 25000003 PHARM REV CODE 250: Performed by: NURSE PRACTITIONER

## 2025-03-20 RX ORDER — METHYLPREDNISOLONE SOD SUCC 125 MG
60 VIAL (EA) INJECTION
Start: 2025-03-27

## 2025-03-20 RX ORDER — SODIUM CHLORIDE 0.9 % (FLUSH) 0.9 %
10 SYRINGE (ML) INJECTION
OUTPATIENT
Start: 2025-03-27

## 2025-03-20 RX ORDER — SODIUM CHLORIDE 0.9 % (FLUSH) 0.9 %
10 SYRINGE (ML) INJECTION
Status: DISCONTINUED | OUTPATIENT
Start: 2025-03-20 | End: 2025-03-20 | Stop reason: HOSPADM

## 2025-03-20 RX ORDER — METHYLPREDNISOLONE SOD SUCC 125 MG
60 VIAL (EA) INJECTION
Status: COMPLETED | OUTPATIENT
Start: 2025-03-20 | End: 2025-03-20

## 2025-03-20 RX ADMIN — METHYLPREDNISOLONE SODIUM SUCCINATE 60 MG: 125 INJECTION, POWDER, FOR SOLUTION INTRAMUSCULAR; INTRAVENOUS at 01:03

## 2025-03-20 RX ADMIN — IRON SUCROSE 100 MG: 20 INJECTION, SOLUTION INTRAVENOUS at 01:03

## 2025-03-27 ENCOUNTER — INFUSION (OUTPATIENT)
Dept: INFUSION THERAPY | Facility: HOSPITAL | Age: 75
End: 2025-03-27
Attending: INTERNAL MEDICINE
Payer: MEDICARE

## 2025-03-27 VITALS
DIASTOLIC BLOOD PRESSURE: 65 MMHG | TEMPERATURE: 97 F | RESPIRATION RATE: 18 BRPM | HEART RATE: 67 BPM | OXYGEN SATURATION: 98 % | SYSTOLIC BLOOD PRESSURE: 151 MMHG

## 2025-03-27 DIAGNOSIS — D50.9 IRON DEFICIENCY ANEMIA, UNSPECIFIED IRON DEFICIENCY ANEMIA TYPE: Primary | ICD-10-CM

## 2025-03-27 PROCEDURE — 25000003 PHARM REV CODE 250: Performed by: NURSE PRACTITIONER

## 2025-03-27 PROCEDURE — 63600175 PHARM REV CODE 636 W HCPCS: Mod: JZ,TB | Performed by: NURSE PRACTITIONER

## 2025-03-27 PROCEDURE — 96375 TX/PRO/DX INJ NEW DRUG ADDON: CPT

## 2025-03-27 PROCEDURE — 63600175 PHARM REV CODE 636 W HCPCS: Performed by: NURSE PRACTITIONER

## 2025-03-27 PROCEDURE — 96365 THER/PROPH/DIAG IV INF INIT: CPT

## 2025-03-27 RX ORDER — SODIUM CHLORIDE 0.9 % (FLUSH) 0.9 %
10 SYRINGE (ML) INJECTION
Status: DISCONTINUED | OUTPATIENT
Start: 2025-03-27 | End: 2025-03-27 | Stop reason: HOSPADM

## 2025-03-27 RX ORDER — SODIUM CHLORIDE 0.9 % (FLUSH) 0.9 %
10 SYRINGE (ML) INJECTION
OUTPATIENT
Start: 2025-04-03

## 2025-03-27 RX ORDER — METHYLPREDNISOLONE SOD SUCC 125 MG
60 VIAL (EA) INJECTION
Status: COMPLETED | OUTPATIENT
Start: 2025-03-27 | End: 2025-03-27

## 2025-03-27 RX ORDER — METHYLPREDNISOLONE SOD SUCC 125 MG
60 VIAL (EA) INJECTION
Start: 2025-04-03

## 2025-03-27 RX ADMIN — SODIUM CHLORIDE 100 MG: 9 INJECTION, SOLUTION INTRAVENOUS at 01:03

## 2025-03-27 RX ADMIN — SODIUM CHLORIDE: 9 INJECTION, SOLUTION INTRAVENOUS at 01:03

## 2025-03-27 RX ADMIN — METHYLPREDNISOLONE SODIUM SUCCINATE 60 MG: 125 INJECTION, POWDER, FOR SOLUTION INTRAMUSCULAR; INTRAVENOUS at 01:03

## 2025-03-27 NOTE — PLAN OF CARE
Problem: Adult Inpatient Plan of Care  Goal: Plan of Care Review  Outcome: Progressing  Flowsheets (Taken 3/27/2025 1327)  Plan of Care Reviewed With: patient  Goal: Patient-Specific Goal (Individualized)  Outcome: Progressing  Flowsheets (Taken 3/27/2025 1327)  Individualized Care Needs: feet elvated pillow provided  Anxieties, Fears or Concerns: denies  Goal: Optimal Comfort and Wellbeing  Outcome: Progressing  Intervention: Monitor Pain and Promote Comfort  Flowsheets (Taken 3/27/2025 1327)  Pain Management Interventions: position adjusted  Intervention: Provide Person-Centered Care  Flowsheets (Taken 3/27/2025 1327)  Trust Relationship/Rapport:   reassurance provided   care explained   choices provided   thoughts/feelings acknowledged   emotional support provided   empathic listening provided   questions answered   questions encouraged     Problem: Anemia  Goal: Anemia Symptom Improvement  Outcome: Progressing  Intervention: Monitor and Manage Anemia  Flowsheets (Taken 3/27/2025 1327)  Safety Promotion/Fall Prevention:   assistive device/personal item within reach   Fall Risk reviewed with patient/family   in recliner, wheels locked   instructed to call staff for mobility   medications reviewed   patient expresses understanding of fall risk and prevention  Fatigue Management: frequent rest breaks encouraged

## 2025-04-03 ENCOUNTER — INFUSION (OUTPATIENT)
Dept: INFUSION THERAPY | Facility: HOSPITAL | Age: 75
End: 2025-04-03
Attending: INTERNAL MEDICINE
Payer: MEDICARE

## 2025-04-03 VITALS
HEART RATE: 53 BPM | TEMPERATURE: 98 F | RESPIRATION RATE: 16 BRPM | DIASTOLIC BLOOD PRESSURE: 58 MMHG | SYSTOLIC BLOOD PRESSURE: 159 MMHG | OXYGEN SATURATION: 98 %

## 2025-04-03 DIAGNOSIS — D50.9 IRON DEFICIENCY ANEMIA, UNSPECIFIED IRON DEFICIENCY ANEMIA TYPE: Primary | ICD-10-CM

## 2025-04-03 PROCEDURE — 96365 THER/PROPH/DIAG IV INF INIT: CPT

## 2025-04-03 PROCEDURE — 63600175 PHARM REV CODE 636 W HCPCS: Performed by: INTERNAL MEDICINE

## 2025-04-03 PROCEDURE — 25000003 PHARM REV CODE 250: Performed by: NURSE PRACTITIONER

## 2025-04-03 PROCEDURE — 25000003 PHARM REV CODE 250: Performed by: INTERNAL MEDICINE

## 2025-04-03 PROCEDURE — 63600175 PHARM REV CODE 636 W HCPCS: Mod: JZ,TB | Performed by: NURSE PRACTITIONER

## 2025-04-03 PROCEDURE — 96375 TX/PRO/DX INJ NEW DRUG ADDON: CPT

## 2025-04-03 RX ORDER — SODIUM CHLORIDE 0.9 % (FLUSH) 0.9 %
10 SYRINGE (ML) INJECTION
Status: DISCONTINUED | OUTPATIENT
Start: 2025-04-03 | End: 2025-04-03 | Stop reason: HOSPADM

## 2025-04-03 RX ORDER — METHYLPREDNISOLONE SOD SUCC 125 MG
60 VIAL (EA) INJECTION
Status: COMPLETED | OUTPATIENT
Start: 2025-04-03 | End: 2025-04-03

## 2025-04-03 RX ORDER — SODIUM CHLORIDE 0.9 % (FLUSH) 0.9 %
10 SYRINGE (ML) INJECTION
OUTPATIENT
Start: 2025-04-10

## 2025-04-03 RX ORDER — METHYLPREDNISOLONE SOD SUCC 125 MG
60 VIAL (EA) INJECTION
Start: 2025-04-10

## 2025-04-03 RX ADMIN — SODIUM CHLORIDE: 9 INJECTION, SOLUTION INTRAVENOUS at 01:04

## 2025-04-03 RX ADMIN — SODIUM CHLORIDE 100 MG: 9 INJECTION, SOLUTION INTRAVENOUS at 01:04

## 2025-04-03 RX ADMIN — METHYLPREDNISOLONE SODIUM SUCCINATE 60 MG: 125 INJECTION, POWDER, FOR SOLUTION INTRAMUSCULAR; INTRAVENOUS at 01:04

## 2025-04-03 NOTE — PLAN OF CARE
Problem: Adult Inpatient Plan of Care  Goal: Plan of Care Review  Outcome: Progressing  Flowsheets (Taken 4/3/2025 1331)  Plan of Care Reviewed With: patient  Goal: Patient-Specific Goal (Individualized)  Outcome: Progressing  Flowsheets (Taken 4/3/2025 1332)  Individualized Care Needs: pt likes legs up, blanket and orange juice  Anxieties, Fears or Concerns: pt denies  Patient/Family-Specific Goals (Include Timeframe): pt to tolerate iron infusion without reaction     Problem: Anemia  Goal: Anemia Symptom Improvement  Outcome: Progressing

## 2025-04-03 NOTE — DISCHARGE INSTRUCTIONS
[facility]  [address] [facility]  [address] [address]  [phone] phone     [phone] fax  Hours of Operation: Monday- Friday 8:00am- 5:00pm  After hours phone  [phone]  Hematology / Oncology Physicians on call      SLAVA Sanz Dr., NP Phaon Dunbar, NP Khelsea Conley, FNP    Please call with any concerns regarding your appointment today.

## 2025-04-09 DIAGNOSIS — R07.2 PRECORDIAL PAIN: ICD-10-CM

## 2025-04-10 ENCOUNTER — INFUSION (OUTPATIENT)
Dept: INFUSION THERAPY | Facility: HOSPITAL | Age: 75
End: 2025-04-10
Attending: INTERNAL MEDICINE
Payer: MEDICARE

## 2025-04-10 VITALS
SYSTOLIC BLOOD PRESSURE: 139 MMHG | DIASTOLIC BLOOD PRESSURE: 63 MMHG | OXYGEN SATURATION: 95 % | HEART RATE: 52 BPM | TEMPERATURE: 98 F | RESPIRATION RATE: 18 BRPM

## 2025-04-10 DIAGNOSIS — D50.9 IRON DEFICIENCY ANEMIA, UNSPECIFIED IRON DEFICIENCY ANEMIA TYPE: Primary | ICD-10-CM

## 2025-04-10 PROCEDURE — 63600175 PHARM REV CODE 636 W HCPCS: Performed by: NURSE PRACTITIONER

## 2025-04-10 PROCEDURE — 96375 TX/PRO/DX INJ NEW DRUG ADDON: CPT

## 2025-04-10 PROCEDURE — 25000003 PHARM REV CODE 250: Performed by: INTERNAL MEDICINE

## 2025-04-10 PROCEDURE — 63600175 PHARM REV CODE 636 W HCPCS: Performed by: INTERNAL MEDICINE

## 2025-04-10 PROCEDURE — 96365 THER/PROPH/DIAG IV INF INIT: CPT

## 2025-04-10 PROCEDURE — 96366 THER/PROPH/DIAG IV INF ADDON: CPT

## 2025-04-10 RX ORDER — METHYLPREDNISOLONE SOD SUCC 125 MG
60 VIAL (EA) INJECTION
Start: 2025-04-17

## 2025-04-10 RX ORDER — SODIUM CHLORIDE 0.9 % (FLUSH) 0.9 %
10 SYRINGE (ML) INJECTION
Status: DISCONTINUED | OUTPATIENT
Start: 2025-04-10 | End: 2025-04-10 | Stop reason: HOSPADM

## 2025-04-10 RX ORDER — SODIUM CHLORIDE 0.9 % (FLUSH) 0.9 %
10 SYRINGE (ML) INJECTION
OUTPATIENT
Start: 2025-04-17

## 2025-04-10 RX ORDER — NITROGLYCERIN 0.4 MG/1
TABLET SUBLINGUAL
Qty: 75 TABLET | Refills: 3 | Status: SHIPPED | OUTPATIENT
Start: 2025-04-10

## 2025-04-10 RX ORDER — METHYLPREDNISOLONE SOD SUCC 125 MG
60 VIAL (EA) INJECTION
Status: COMPLETED | OUTPATIENT
Start: 2025-04-10 | End: 2025-04-10

## 2025-04-10 RX ADMIN — SODIUM CHLORIDE 100 MG: 9 INJECTION, SOLUTION INTRAVENOUS at 01:04

## 2025-04-10 RX ADMIN — Medication 60 MG: at 01:04

## 2025-04-22 NOTE — PLAN OF CARE
Plan of care reviewed with patient. Discussed if there are any new or ongoing concerns. Denies.   Problem: Adult Inpatient Plan of Care  Goal: Plan of Care Review  Outcome: Ongoing, Progressing  Flowsheets (Taken 7/15/2020 1136)  Plan of Care Reviewed With: patient  Goal: Patient-Specific Goal (Individualization)  Outcome: Ongoing, Progressing  Flowsheets (Taken 7/15/2020 1136)  Anxieties, Fears or Concerns: none  Goal: Optimal Comfort and Wellbeing  Outcome: Ongoing, Progressing  Intervention: Provide Person-Centered Care  Flowsheets (Taken 7/15/2020 1136)  Trust Relationship/Rapport:   reassurance provided   care explained   choices provided   thoughts/feelings acknowledged   emotional support provided   empathic listening provided   questions answered   questions encouraged     
Pacemaker

## 2025-05-01 ENCOUNTER — OFFICE VISIT (OUTPATIENT)
Dept: PRIMARY CARE CLINIC | Facility: CLINIC | Age: 75
End: 2025-05-01
Payer: MEDICARE

## 2025-05-01 VITALS
TEMPERATURE: 96 F | SYSTOLIC BLOOD PRESSURE: 166 MMHG | BODY MASS INDEX: 41.03 KG/M2 | OXYGEN SATURATION: 96 % | DIASTOLIC BLOOD PRESSURE: 64 MMHG | WEIGHT: 209 LBS | HEART RATE: 55 BPM | HEIGHT: 60 IN

## 2025-05-01 DIAGNOSIS — M79.671 RIGHT FOOT PAIN: Primary | ICD-10-CM

## 2025-05-01 DIAGNOSIS — Z87.39 HISTORY OF GOUT: ICD-10-CM

## 2025-05-01 LAB — URATE SERPL-MCNC: 3.8 MG/DL (ref 2.4–5.7)

## 2025-05-01 PROCEDURE — 99999PBSHW PR PBB SHADOW TECHNICAL ONLY FILED TO HB: Mod: PBBFAC,,,

## 2025-05-01 PROCEDURE — 96372 THER/PROPH/DIAG INJ SC/IM: CPT | Mod: PBBFAC,PN

## 2025-05-01 PROCEDURE — 99214 OFFICE O/P EST MOD 30 MIN: CPT | Mod: S$PBB,,, | Performed by: FAMILY MEDICINE

## 2025-05-01 PROCEDURE — 99215 OFFICE O/P EST HI 40 MIN: CPT | Mod: PBBFAC,PN | Performed by: FAMILY MEDICINE

## 2025-05-01 PROCEDURE — 99999 PR PBB SHADOW E&M-EST. PATIENT-LVL V: CPT | Mod: PBBFAC,,, | Performed by: FAMILY MEDICINE

## 2025-05-01 PROCEDURE — 84550 ASSAY OF BLOOD/URIC ACID: CPT | Performed by: FAMILY MEDICINE

## 2025-05-01 RX ORDER — BETAMETHASONE SODIUM PHOSPHATE AND BETAMETHASONE ACETATE 3; 3 MG/ML; MG/ML
6 INJECTION, SUSPENSION INTRA-ARTICULAR; INTRALESIONAL; INTRAMUSCULAR; SOFT TISSUE
Status: COMPLETED | OUTPATIENT
Start: 2025-05-01 | End: 2025-05-01

## 2025-05-01 RX ADMIN — BETAMETHASONE ACETATE AND BETAMETHASONE SODIUM PHOSPHATE 6 MG: 3; 3 INJECTION, SUSPENSION INTRA-ARTICULAR; INTRALESIONAL; INTRAMUSCULAR; SOFT TISSUE at 02:05

## 2025-05-01 NOTE — PROGRESS NOTES
Patient Care Team:  Lisa Porter MD as PCP - General (Internal Medicine)  Lisa Porter MD  Future Appointments   Date Time Provider Department [place]   5/9/2025  1:00 PM Lisa Porter MD [facility] [place]   5/14/2025 11:20 AM LABORATORY, [facility] LAB [facility]   5/16/2025  1:30 PM Trinidad Javed, NP [facility]   5/22/2025  1:00 PM Gayatri Rubi FNP-C [facility] 65+ [place]   7/30/2025  2:40 PM Asif Paez MD [facility] CARDIO [facility]   10/15/2025 11:00 AM LABORATORY, [facility] LAB [facility]   10/22/2025  1:00 PM [facility]       Subjective:      Patient ID: Qi Ortiz is a 74 y.o. female.    Chief Complaint: Foot Pain (Pt is complaining of bilateral foot pain)      HPI  Bottom of right foot pain x 2 weeks, worsening last 4 days. Eating a lot of cat fish shrimp last 2 weeks. No recent injuries recalled. Took extra fluid pills, did not help. Tylenol 500 mg daily helped some. Pt says her cardiologist told her not to take more than that  Dx with gout in the same foot 4 years ago, no attacks since. On allopurinol nightly  Hx of foot neuropathy.   Multiple drug allergies. Pt states she can take po steroids, makes her sick, but tolerated low dose IV steroids when given together with IV iron infusion    Review of Systems   Respiratory:  Negative for shortness of breath.    Cardiovascular:  Negative for chest pain and palpitations.     PHQ-4 Score: 0   Last worry score 3    PMHx, active ongoing problems, labs and medications reviewed    Objective:   BP (!) 166/64 (BP Location: Right arm, Patient Position: Sitting)   Pulse (!) 55   Temp 96.4 °F (35.8 °C) (Temporal)   Ht 5' (1.524 m)   Wt 94.8 kg (208 lb 15.9 oz)   SpO2 96%   BMI 40.82 kg/m²     Physical Exam  Vitals and nursing note reviewed.   Constitutional:       General: She is not in acute distress.     Appearance: She is not toxic-appearing.   Cardiovascular:      Rate  and Rhythm: Normal rate and regular rhythm.   Pulmonary:      Effort: Pulmonary effort is normal.      Breath sounds: Normal breath sounds.   Musculoskeletal:         General: Swelling (bilateral ankle swelling nonpitting. no erythema) and tenderness (tender bottom of right midfoot, base of 3rd MTP. no swelling noted. no erythema. no lesions) present.   Skin:     Capillary Refill: Capillary refill takes less than 2 seconds.   Neurological:      Mental Status: She is alert and oriented to person, place, and time. Mental status is at baseline.         Assessment and Plan     1. Right foot pain  -     Cancel: Uric Acid; Future; Expected date: 05/01/2025  -     betamethasone acetate-betamethasone sodium phosphate injection 6 mg  -     Uric Acid; Future; Expected date: 05/01/2025    2. History of gout  -     Cancel: Uric Acid; Future; Expected date: 05/01/2025  -     betamethasone acetate-betamethasone sodium phosphate injection 6 mg  -     Uric Acid; Future; Expected date: 05/01/2025        Discharge instructions     The following issues were discussed: The primary encounter diagnosis was Right foot pain. A diagnosis of History of gout was also pertinent to this visit.  Discussed with the patient/parent the diagnosis, treatment plan, home care instructions.  All questions and concerns addressed.  Patient/guardian understands and is agreeable with the plan.

## 2025-05-01 NOTE — PATIENT INSTRUCTIONS
For foot pain, continue tylenol 500 mg daily  Try elevate your foot, and icing   You received celestone shot today    If you are feeling unwell, we'd [facility] the [facility] ones to [facility] Please give us a call. Same day appointments are our top priority to keep you well and out of the emergency rooms and hospitals. Call [phone] for our direct line. After hours advice is always available. Please call [phone] after hours to speak to the on-call team.

## 2025-05-02 ENCOUNTER — RESULTS FOLLOW-UP (OUTPATIENT)
Dept: PRIMARY CARE CLINIC | Facility: CLINIC | Age: 75
End: 2025-05-02

## 2025-05-09 ENCOUNTER — OFFICE VISIT (OUTPATIENT)
Dept: PRIMARY CARE CLINIC | Facility: CLINIC | Age: 75
End: 2025-05-09
Payer: MEDICARE

## 2025-05-09 VITALS
TEMPERATURE: 96 F | OXYGEN SATURATION: 98 % | DIASTOLIC BLOOD PRESSURE: 64 MMHG | SYSTOLIC BLOOD PRESSURE: 150 MMHG | HEIGHT: 60 IN | HEART RATE: 58 BPM | BODY MASS INDEX: 40.82 KG/M2

## 2025-05-09 DIAGNOSIS — E11.22 TYPE 2 DIABETES MELLITUS WITH STAGE 3A CHRONIC KIDNEY DISEASE, WITHOUT LONG-TERM CURRENT USE OF INSULIN: Primary | Chronic | ICD-10-CM

## 2025-05-09 DIAGNOSIS — Z78.0 POST-MENOPAUSAL: ICD-10-CM

## 2025-05-09 DIAGNOSIS — D50.0 IRON DEFICIENCY ANEMIA DUE TO CHRONIC BLOOD LOSS: Chronic | ICD-10-CM

## 2025-05-09 DIAGNOSIS — E83.42 HYPOMAGNESEMIA: ICD-10-CM

## 2025-05-09 DIAGNOSIS — H91.93 BILATERAL HEARING LOSS, UNSPECIFIED HEARING LOSS TYPE: Chronic | ICD-10-CM

## 2025-05-09 DIAGNOSIS — D72.829 LEUKOCYTOSIS, UNSPECIFIED TYPE: ICD-10-CM

## 2025-05-09 DIAGNOSIS — N18.31 TYPE 2 DIABETES MELLITUS WITH STAGE 3A CHRONIC KIDNEY DISEASE, WITHOUT LONG-TERM CURRENT USE OF INSULIN: Primary | Chronic | ICD-10-CM

## 2025-05-09 PROBLEM — D50.9 IRON DEFICIENCY ANEMIA: Chronic | Status: ACTIVE | Noted: 2024-11-08

## 2025-05-09 PROCEDURE — 99999 PR PBB SHADOW E&M-EST. PATIENT-LVL V: CPT | Mod: PBBFAC,,, | Performed by: INTERNAL MEDICINE

## 2025-05-09 PROCEDURE — 99215 OFFICE O/P EST HI 40 MIN: CPT | Mod: S$PBB,,, | Performed by: INTERNAL MEDICINE

## 2025-05-09 PROCEDURE — 99215 OFFICE O/P EST HI 40 MIN: CPT | Mod: PBBFAC,PN | Performed by: INTERNAL MEDICINE

## 2025-05-09 NOTE — PATIENT INSTRUCTIONS
If you are feeling unwell, we'd like to be the first ones to know here at [facility]! Please give us a call. Same day appointments are our top priority to keep you well and out of the emergency rooms and hospitals. Call [phone] for our direct line. After hours advice is always available. Please call [phone] after hours to speak to the on-call team.

## 2025-05-09 NOTE — PROGRESS NOTES
Qi Ortiz  2025  [id number]    Lisa Porter MD  Patient Care Team:  Lisa Porter MD as PCP - General (Internal Medicine)    Visit Type: Follow-up    Ms. Qi Ortiz is a 74 year old female w multiple chronic medical issues here for scheduled f/u     Chronic medical issues include type 2 DM, h/o CVA, CAD s/p CABG x 2, AS s/p TAVR, s/p NSTEMI  24, HTN, HLD (intolerant of statin), CKD stage 3, hypothyroidism, psoriasis and psoriatic arthritis, CAREN, obesity, and IRIS (intolerant of CPAP). Ms. Ortiz has multiple drug and contact allergies and sensitivities. Food allergies include kiwi fruit and crab boil.    Home BP's 130's/60-70's  Reports foot pain has resolved.    History of Present Illness    CHIEF COMPLAINT:  Ms. Ortiz presents today for follow up after adverse reaction to steroid injection    ADVERSE REACTION TO STEROID:  She reports severe adverse reaction to Celestone injection approximately 2 hours post-injection, developing severe weakness leading to near collapse when attempting to exit her vehicle. She experienced paralysis particularly affecting her arm, accompanied by severe pain described as repeated striking sensation. She reports that she lost consciousness from Thursday evening through Friday, regaining awareness on Saturday.    MEDICAL HISTORY:  She has history of 14 surgeries. She has history of severe bladder infection requiring antibiotic treatment and flushing, followed by one year of antibiotic therapy. Multiple medication allergies, intolerances and sensitivities.    IRON DEFICIENCY:  She reports receiving 16 iron infusion treatments over a 5-month period, with initial treatments in November and December. She reports that she did not begin feeling improvement after the 14th treatment.    CARDIOVASCULAR:  She reports home blood pressure readings typically around 137/70.    FAMILY HISTORY:  Her two sisters  of cancer. Her paternal aunt, age 77, has stage 4  cancer with ongoing monitoring via quarterly lab work. Her maternal aunt  of breast cancer. She is interested in doing the Galleri testing if qualified.    ROS:  General: -fever, -chills, -fatigue, -weight gain, -weight loss, +decreased energy levels, +weakness, +body aches  Eyes: -vision changes, -redness, -discharge  ENT: -ear pain, -nasal congestion, -sore throat  Cardiovascular: -chest pain, -palpitations, +lower extremity edema (intermittent but better)  Respiratory: -cough, -shortness of breath  Gastrointestinal: -abdominal pain, -nausea, -vomiting, -diarrhea, -constipation, -blood in stool  Genitourinary: -dysuria, -hematuria, -frequency, +painful urination, +bladder pain  Musculoskeletal: -joint pain, -muscle pain  Skin: +rash, -lesion  Neurological: -headache, -dizziness, -numbness, -tingling, +syncope, +paralysis, +trembling  Psychiatric: -anxiety, -depression, -sleep difficulty        PHQ-4 Score: 0     From LOV w me 25  Reports home BP's 120's-140/70's  CHIEF COMPLAINT:  Ms. Ortiz presents today for follow up of iron deficiency among other issues.  IRON DEFICIENCY:  She reports symptoms including loss of appetite, taste changes, and increased fatigue as her iron levels decrease. She notes feeling stronger the day following iron treatments. She is scheduled for more iron infusions in March.  CARDIOVASCULAR:  She reports home blood pressure readings ranging from 120s/60-70s to 140/74. She has a history of TIAs and known heart murmur since childhood. Carotid artery stenosis has progressed from 19% to 39% on right side.  MUSCULOSKELETAL:  She reports lower back pain associated with sewing, with the bending, standing, and turning movements involved. She experiences hand pain that interferes with cutting, requiring 2-3 day breaks at times. She reports aluminum foil in bottom of shoes arranged in an X pattern provides pain relief from ankle upward after 4 hours of use.  MEDICATIONS:  She is taking  Repatha for cholesterol management and Cosentyx for psoriasis/psoriatic arthritis - both are working well.   PREVENTIVE CARE:  She declines mammogram due to previous surgical nerve damage causing left upper breast discomfort and unwillingness to undergo compression. She expresses hesitancy about colonoscopy due to concerns about low blood counts and cardiac history.  PHQ-4 Score: 1     Recent appointments:   5/01/25 O65+ Paez, Y R foot pain  3/12/25 Rheum Ackerman polyarticular psoriatic arthritis    S/p iron infusions x 7 2/27-4/10/25    Recent external appointments:   5/08/25 Ophthal [facility]    Upcoming appointments: 6/11/25 Ophthal Surgeon [place] cataracts   Future Appointments       Date Provider Specialty Appt Notes    5/14/2025  Lab fabiano    5/16/2025 Fabiano Javed NP Hematology and Oncology 5wk post iron prior lab    5/22/2025 Gayatri Rubi, FNP-C Primary Care [facility]    5/22/2025  Primary Care RESEARCH APPT W KENIAA 6/23/2025 Lisa Porter MD Primary Care 6 week f/u    7/29/2025  Radiology Post-menopausal [Z78.0]    7/30/2025 Asif Paez MD Cardiology 6 month f/u    10/15/2025  Lab Dr. Ackerman    10/22/2025 Jacky Ackerman MD Rheumatology PSORIATIC --OA--GOUT           The following were reviewed: Active problem list, medication list, allergies, family history, social history, and Health Maintenance.     Medications have been reviewed and reconciled with patient at visit today.    Exam: sat 98%  Vitals:    05/09/25 1302   BP: (!) 150/64   Pulse: (!) 58   Temp: 96.2 °F (35.7 °C)         Body mass index is 40.82 kg/m².    BP Readings from Last 3 Encounters:   05/09/25 (!) 150/64   05/01/25 (!) 166/64   04/10/25 139/63      Wt Readings from Last 3 Encounters:   05/01/25 1338 94.8 kg (208 lb 15.9 oz)   03/12/25 1336 98.2 kg (216 lb 7.9 oz)   02/26/25 1417 93.2 kg (205 lb 7.5 oz)      Physical Exam  Vitals reviewed.   Constitutional:       General: She is not in acute distress.      Appearance: Normal appearance. She is obese. She is ill-appearing.   HENT:      Head: Normocephalic and atraumatic.      Right Ear: External ear normal.      Left Ear: External ear normal.      Nose: Nose normal.      Mouth/Throat:      Mouth: Mucous membranes are moist.      Pharynx: Oropharynx is clear.   Eyes:      General: No scleral icterus.     Extraocular Movements: Extraocular movements intact.      Conjunctiva/sclera: Conjunctivae normal.      Comments: [place]   Cardiovascular:      Rate and Rhythm: Normal rate and regular rhythm.   Pulmonary:      Effort: Pulmonary effort is normal. No respiratory distress.      Breath sounds: No wheezing, rhonchi or rales.   Abdominal:      General: Bowel sounds are normal. There is no distension.      Palpations: Abdomen is soft.      Tenderness: There is no abdominal tenderness. There is no guarding.   Musculoskeletal:      Comments: Trace LE edema B   Skin:     General: Skin is warm and dry.      Findings: Rash present.      Comments: Dry skin   Neurological:      Mental Status: She is alert. Mental status is at baseline.   Psychiatric:         Mood and Affect: Mood normal.         Behavior: Behavior normal.        Laboratory Reviewed  Lab Results   Component Value Date    WBC 14.13 (H) 03/05/2025    HGB 13.2 03/05/2025    HCT 43.3 03/05/2025     03/05/2025    MCV 84 03/05/2025    CHOL 123 11/05/2024    TRIG 136 11/05/2024    HDL 35 (L) 11/05/2024    LDLCALC 60.8 (L) 11/05/2024    ALT 9 (L) 03/05/2025    AST 14 03/05/2025     03/05/2025    K 4.4 03/05/2025     03/05/2025    CREATININE 1.5 (H) 03/05/2025    BUN 21 03/05/2025    CO2 28 03/05/2025    MG 1.7 06/25/2024    TSH 1.770 04/19/2024    FREET4 1.01 04/19/2024    INR 1.0 06/25/2024    HGBA1C 6.0 (H) 11/05/2024    CRP 16.0 (H) 03/05/2025     Lab Results   Component Value Date    PTH 64.3 01/29/2025    CALCIUM 9.8 03/05/2025    PHOS 3.2 11/22/2024      Lab Results   Component Value Date    MCXLDUXL95  748 11/05/2024     Lab Results   Component Value Date    FOLATE 18.2 11/05/2024      Lab Results   Component Value Date    IRON 28 (L) 01/29/2025    TRANSFERRIN 277 01/29/2025    TIBC 410 01/29/2025    FESATURATED 7 (L) 01/29/2025      Lab Results   Component Value Date    EGFRNORACEVR 37 (A) 03/05/2025    ALBUMIN 3.6 03/05/2025     (H) 06/29/2024     Lab Results   Component Value Date    IYVXRCAA59SO 60 11/05/2024 5/01/25 uric acid 3.8    Assessment:   74 y.o. female with multiple co-morbid illnesses here for continued follow up of medical problems.      The primary encounter diagnosis was Type 2 diabetes mellitus with stage 3a chronic kidney disease, without long-term current use of insulin. Diagnoses of Hypomagnesemia, Leukocytosis, unspecified type, Post-menopausal, Bilateral hearing loss, unspecified hearing loss type, and Iron deficiency anemia due to chronic blood loss were also pertinent to this visit.      Plan:   1. Type 2 diabetes mellitus with stage 3a chronic kidney disease, without long-term current use of insulin  Overview:  5/20/22 HgbA1c 7.1%     Orders:  -     Hemoglobin A1C; Future; Expected date: 05/14/2025  -     Renal Function Panel; Future; Expected date: 05/14/2025    2. Hypomagnesemia  -     Magnesium, RBC; Future; Expected date: 05/14/2025    3. Leukocytosis, unspecified type  Assessment & Plan:  F/u CBC ordered by Tegan for next week       4. Post-menopausal  Assessment & Plan:  F/u DEXA - decreased renal fx but last PTHi, calcium and vit D levels all wnl    Orders:  -     DXA Bone Density Axial Skeleton 1 or more sites; Future; Expected date: 07/28/2025    5. Bilateral hearing loss, unspecified hearing loss type  Assessment & Plan:  Very [place] - can affect communication at times - per Ms. Ortiz can not wear hearing aids       6. Iron deficiency anemia due to chronic blood loss  Overview:  Elida Alanis NP 8/6/2021  Iron levels replenished s/p Venofer x 8. She denies  abnormal bleeding at preset time but notes intermittent blood noted in stool. Patient has not yet had follow up with GI service for endoscopies due to ongoing COVID concerns previously. She has been encouraged to follow up with GI. Rationale discussed in detail.   Patient unable to tolerate oral iron due to GI upset/constipation. F/u 3 months with repeat labs. Discussed S&S to report sooner      Assessment & Plan:  S/p second round of IV iron infusions - H/H wnl but Reinaldo iron levels were still low - f/u repeat CBC, iron studies - cont recommend colonoscopy - she is interested in doing the Galleri testing and says will move forward w colonoscopy if shows positive signal for possible GI malignancy            Health Maintenance         Date Due Completion Date    COVID-19 Vaccine (1) Never done ---    Pneumococcal Vaccines (Age 50+) (1 of 2 - PCV) Never done ---    RSV Vaccine (Age 60+ and Pregnant patients) (1 - Risk 60-74 years 1-dose series) Never done ---    Colorectal Cancer Screening 04/18/2016 4/18/2011    Mammogram 10/23/2016 10/23/2015    Shingles Vaccine (2 of 2) 11/28/2022 10/3/2022    Diabetic Eye Exam 03/06/2024 3/6/2023    Hemoglobin A1c 05/05/2025 11/5/2024    DEXA Scan 07/25/2025 7/25/2022    Foot Exam 10/25/2025 10/25/2024    Override on 9/29/2023: Done (pt w idiopathic peripheral neuropathy - not diabetic - prediabetic)    Lipid Panel 11/05/2025 11/5/2024    Diabetes Urine Screening 01/31/2026 1/31/2025    TETANUS VACCINE 10/02/2033 10/2/2023          Declines vaccines   Declines mammogram due to L upper breast sensitivity   Defers colonoscopy     -Patient's lab results were reviewed and discussed with patient  -Treatment options and alternatives were discussed with the patient. Patient expressed understanding. Patient was given the opportunity to ask questions and be an active participant in their medical care. Patient had no further questions or concerns at this time.     Follow up: Follow up in  about 6 weeks (around 6/20/2025) for Follow Up w me.    After visit summary printed and given to patient upon discharge.  Patient care plan included in After visit summary.    TOTAL TIME evaluating and managing this patient for this encounter was 42 minutes. This time was spent personally by me on some of the following activities: review of patient's past medical history, assessing age-appropriate health maintenance needs, review of any interval history, review and interpretation of lab results, review and interpretation of imaging test results, review and interpretation of cardiology test results, reviewing consulting specialist notes, obtaining history from the patient and family, examination of the patient, medication reconciliation, managing and/or ordering prescription medications, ordering imaging tests, ordering referral to subspecialty provider(s), educating patient and answering their questions about diagnosis, treatment plan, and goals of treatment, discussing planned follow-up and final documentation of the visit. This time was exclusive of any separately billable procedures for this patient and exclusive of time spent treating any other patients.     This note was generated with the assistance of ambient listening technology. Verbal consent was obtained by the patient and accompanying visitor(s) for the recording of patient appointment to facilitate this note. I attest to having reviewed and edited the generated note for accuracy, though some syntax or spelling errors may persist. Please contact the author of this note for any clarification.

## 2025-05-10 NOTE — ASSESSMENT & PLAN NOTE
S/p second round of IV iron infusions - H/H wnl but Reinaldo iron levels were still low - f/u repeat CBC, iron studies - cont recommend colonoscopy - she is interested in doing the Galleri testing and says will move forward w colonoscopy if shows positive signal for possible GI malignancy

## 2025-05-13 ENCOUNTER — PATIENT OUTREACH (OUTPATIENT)
Dept: ADMINISTRATIVE | Facility: HOSPITAL | Age: 75
End: 2025-05-13
Payer: MEDICARE

## 2025-05-13 NOTE — PROGRESS NOTES
Lab visit report: Patient has upcoming lab appointment on 5/14/25 for recheck of Hemoglobin A1c.

## 2025-05-14 ENCOUNTER — LAB VISIT (OUTPATIENT)
Dept: LAB | Facility: HOSPITAL | Age: 75
End: 2025-05-14
Attending: NURSE PRACTITIONER
Payer: MEDICARE

## 2025-05-14 DIAGNOSIS — N18.31 TYPE 2 DIABETES MELLITUS WITH STAGE 3A CHRONIC KIDNEY DISEASE, WITHOUT LONG-TERM CURRENT USE OF INSULIN: Chronic | ICD-10-CM

## 2025-05-14 DIAGNOSIS — E53.8 B12 DEFICIENCY: ICD-10-CM

## 2025-05-14 DIAGNOSIS — D50.0 IRON DEFICIENCY ANEMIA DUE TO CHRONIC BLOOD LOSS: ICD-10-CM

## 2025-05-14 DIAGNOSIS — E83.42 HYPOMAGNESEMIA: ICD-10-CM

## 2025-05-14 DIAGNOSIS — E53.8 FOLIC ACID DEFICIENCY: ICD-10-CM

## 2025-05-14 DIAGNOSIS — E11.22 TYPE 2 DIABETES MELLITUS WITH STAGE 3A CHRONIC KIDNEY DISEASE, WITHOUT LONG-TERM CURRENT USE OF INSULIN: Chronic | ICD-10-CM

## 2025-05-14 LAB
ABSOLUTE EOSINOPHIL (OHS): 0.15 K/UL
ABSOLUTE MONOCYTE (OHS): 0.77 K/UL (ref 0.3–1)
ABSOLUTE NEUTROPHIL COUNT (OHS): 6.29 K/UL (ref 1.8–7.7)
ALBUMIN SERPL BCP-MCNC: 3.1 G/DL (ref 3.5–5.2)
ANION GAP (OHS): 9 MMOL/L (ref 8–16)
BASOPHILS # BLD AUTO: 0.06 K/UL
BASOPHILS NFR BLD AUTO: 0.6 %
BUN SERPL-MCNC: 22 MG/DL (ref 8–23)
CALCIUM SERPL-MCNC: 9 MG/DL (ref 8.7–10.5)
CHLORIDE SERPL-SCNC: 106 MMOL/L (ref 95–110)
CO2 SERPL-SCNC: 26 MMOL/L (ref 23–29)
CREAT SERPL-MCNC: 1.2 MG/DL (ref 0.5–1.4)
EAG (OHS): 128 MG/DL (ref 68–131)
ERYTHROCYTE [DISTWIDTH] IN BLOOD BY AUTOMATED COUNT: 16.6 % (ref 11.5–14.5)
FERRITIN SERPL-MCNC: 53.3 NG/ML (ref 20–300)
GFR SERPLBLD CREATININE-BSD FMLA CKD-EPI: 48 ML/MIN/1.73/M2
GLUCOSE SERPL-MCNC: 149 MG/DL (ref 70–110)
HBA1C MFR BLD: 6.1 % (ref 4–5.6)
HCT VFR BLD AUTO: 42.2 % (ref 37–48.5)
HGB BLD-MCNC: 13.2 GM/DL (ref 12–16)
IMM GRANULOCYTES # BLD AUTO: 0.03 K/UL (ref 0–0.04)
IMM GRANULOCYTES NFR BLD AUTO: 0.3 % (ref 0–0.5)
IRON SATN MFR SERPL: 16 % (ref 20–50)
IRON SERPL-MCNC: 60 UG/DL (ref 30–160)
LYMPHOCYTES # BLD AUTO: 2.08 K/UL (ref 1–4.8)
MCH RBC QN AUTO: 28.8 PG (ref 27–31)
MCHC RBC AUTO-ENTMCNC: 31.3 G/DL (ref 32–36)
MCV RBC AUTO: 92 FL (ref 82–98)
NUCLEATED RBC (/100WBC) (OHS): 0 /100 WBC
PHOSPHATE SERPL-MCNC: 3 MG/DL (ref 2.7–4.5)
PLATELET # BLD AUTO: 203 K/UL (ref 150–450)
PMV BLD AUTO: 10.1 FL (ref 9.2–12.9)
POTASSIUM SERPL-SCNC: 4.1 MMOL/L (ref 3.5–5.1)
RBC # BLD AUTO: 4.59 M/UL (ref 4–5.4)
RELATIVE EOSINOPHIL (OHS): 1.6 %
RELATIVE LYMPHOCYTE (OHS): 22.2 % (ref 18–48)
RELATIVE MONOCYTE (OHS): 8.2 % (ref 4–15)
RELATIVE NEUTROPHIL (OHS): 67.1 % (ref 38–73)
SODIUM SERPL-SCNC: 141 MMOL/L (ref 136–145)
TIBC SERPL-MCNC: 367 UG/DL (ref 250–450)
TRANSFERRIN SERPL-MCNC: 248 MG/DL (ref 200–375)
WBC # BLD AUTO: 9.38 K/UL (ref 3.9–12.7)

## 2025-05-14 PROCEDURE — 82040 ASSAY OF SERUM ALBUMIN: CPT

## 2025-05-14 PROCEDURE — 83036 HEMOGLOBIN GLYCOSYLATED A1C: CPT

## 2025-05-14 PROCEDURE — 84466 ASSAY OF TRANSFERRIN: CPT

## 2025-05-14 PROCEDURE — 82728 ASSAY OF FERRITIN: CPT

## 2025-05-14 PROCEDURE — 85025 COMPLETE CBC W/AUTO DIFF WBC: CPT

## 2025-05-14 PROCEDURE — 83735 ASSAY OF MAGNESIUM: CPT

## 2025-05-14 PROCEDURE — 36415 COLL VENOUS BLD VENIPUNCTURE: CPT | Mod: PO

## 2025-05-16 ENCOUNTER — OFFICE VISIT (OUTPATIENT)
Dept: HEMATOLOGY/ONCOLOGY | Facility: CLINIC | Age: 75
End: 2025-05-16
Payer: MEDICARE

## 2025-05-16 VITALS
DIASTOLIC BLOOD PRESSURE: 68 MMHG | SYSTOLIC BLOOD PRESSURE: 133 MMHG | TEMPERATURE: 98 F | HEART RATE: 55 BPM | HEIGHT: 60 IN | WEIGHT: 210.56 LBS | BODY MASS INDEX: 41.34 KG/M2

## 2025-05-16 DIAGNOSIS — E53.8 B12 DEFICIENCY: Chronic | ICD-10-CM

## 2025-05-16 DIAGNOSIS — D50.0 IRON DEFICIENCY ANEMIA DUE TO CHRONIC BLOOD LOSS: Primary | Chronic | ICD-10-CM

## 2025-05-16 PROCEDURE — 99999 PR PBB SHADOW E&M-EST. PATIENT-LVL IV: CPT | Mod: PBBFAC,,, | Performed by: NURSE PRACTITIONER

## 2025-05-16 PROCEDURE — 99214 OFFICE O/P EST MOD 30 MIN: CPT | Mod: PBBFAC | Performed by: NURSE PRACTITIONER

## 2025-05-16 NOTE — PROGRESS NOTES
Subjective:      Patient ID: Qi Ortiz is a 74 y.o. female.    Chief Complaint: chest pain when walking from parking lot to clinic    HPI: 74 year old female presents today to evaluate labs.  Has been found in the past to have folic acid deficiency, recurrent CAREN requiring IV iron infusions and also anemia d/t CKD.        medical history significant for HTN, carotid stenosis, hypothyroidism, COPD, psoriatic arthritis, hyperlipidemia, CVA with right hemiplegia, depression, CAD, osteoporosis, IRIS, and double ectopic ureters.     Has been followed in the clinic by Dr. Mckinney, Elida Alanis NP, Jade Delgado NP, and Denis Caban NP, Today is the first time I am evaluating/assessing the patient.      Currently with normocytic anemia - hgb 11.6, mcv 83, ferritin 17.  Recently found with B12 deficiency and prescribed B12 1000 mcg SL daily - states that she hasn't started as of yet. States used to take B12.     States that she does have some hemorrhoidal bleeding but does not get in the toilet or stool.  Only seen when she wipes and not all the time.  Has not been able to tolerate GI prep for colonoscopy and has not had transportation to have EGD/colonoscopy done     Interval History:  12/15/2023  Received venofer 200 mg IV x 4 doses and presents today to evaluate response.  States that she tolerated the once weekly iron infusions.  States that she felt sick to her stomach with abdominal cramping.  States now her energy level is back up.  Has one small hemorrhoids that bleeds at times.       Interval History:  8/2/2024 with b12 and folate deficiency and recurrent iron deficiency.  Recently had 2 stents placed in LAD on 6/27/2024 - currently on Brillinta 90 mg by mouth twice daily. States that she has stopped the b12 supplement because her levels were elevated.  Currently not anemic.  Saturated iron slight low at 16%.     Interval History:  11/8/2024 Presents today to review labs with recurrent iron deficiency  anemia.  Currently with microcytic anemia and low ferritin.  Has sob and weakness that has progressively worsened.  Denies any known abnormal bleeding except for some minor hemorrhoidal bleeding.  States that she was taken off of Brillinta because it was causing shortness of breath.     Interval History:  1/31/2025  Found with CAREN and received 4/8 doses of IV venofer 200 mg x 1 dose and venofer 100 mg x 7 doses IV.  States that she is feeling a lot better.  Continues with anemia and low saturated iron.  Presents today to evaluate response.     Interval History:  5/16/2025 Has received additional IV venofer 100 mg IV infusions x 8 with last dose received on 4/10/2025.  Presents today to evaluate response.  States that she started to have chest pain due to having to walk a long distance from parking lot to clinic and needed to take a nitro.     I have reviewed all of the patient's relevant lab work available in the medical record and have utilized this in my evaluation and management recommendations today.      Social History     Socioeconomic History    Marital status: Single    Number of children: 3   Occupational History    Occupation: Not working   Tobacco Use    Smoking status: Never    Smokeless tobacco: Never   Substance and Sexual Activity    Alcohol use: No    Drug use: No    Sexual activity: Not Currently     Partners: Male     Social Drivers of Health     Financial Resource Strain: Low Risk  (5/21/2024)    Overall Financial Resource Strain (CARDIA)     Difficulty of Paying Living Expenses: Not hard at all   Food Insecurity: No Food Insecurity (5/21/2024)    Hunger Vital Sign     Worried About Running Out of Food in the Last Year: Never true     Ran Out of Food in the Last Year: Never true   Transportation Needs: No Transportation Needs (5/21/2024)    PRAPARE - Transportation     Lack of Transportation (Medical): No     Lack of Transportation (Non-Medical): No   Physical Activity: Inactive (5/21/2024)     Exercise Vital Sign     Days of Exercise per Week: 0 days     Minutes of Exercise per Session: 0 min   Stress: No Stress Concern Present (2024)    [employer] [place] [employer] - Occupational Stress Questionnaire     Feeling of Stress : Not at all   Housing Stability: Unknown (2024)    Housing Stability Vital Sign     Unable to Pay for Housing in the Last Year: No     Homeless in the Last Year: No       Family History   Problem Relation Name Age of Onset    Breast cancer Maternal Grandfather      Breast cancer Paternal Aunt      Stroke Unknown      Breast cancer Sister  60    Leukemia Sister  8         as child    Lung cancer Paternal Grandfather      Heart disease Unknown      Diabetes Daughter a        Past Surgical History:   Procedure Laterality Date    BREAST BIOPSY      R sided/benign    CARDIAC SURGERY      2016    CERVICAL FUSION      CHOLECYSTECTOMY      CORONARY ANGIOPLASTY      CORONARY ARTERY BYPASS GRAFT      triple bypass    CORONARY BYPASS GRAFT ANGIOGRAPHY  2024    Procedure: Bypass graft study;  Surgeon: Veronica Ibarra MD;  Location: [facility] CATH LAB;  Service: Cardiology;;    CORONARY STENT PLACEMENT      EYE SURGERY      INTRAUTERINE DEVICE INSERTION      LEFT HEART CATHETERIZATION Left 2020    Procedure: CATHETERIZATION, HEART, LEFT;  Surgeon: Veronica Ibarra MD;  Location: [facility] CATH LAB;  Service: Cardiology;  Laterality: Left;  7am start time    LEFT HEART CATHETERIZATION Left 2024    Procedure: Left heart cath;  Surgeon: Veronica Ibarra MD;  Location: [facility] CATH LAB;  Service: Cardiology;  Laterality: Left;    mass removed from R groin      PERCUTANEOUS CORONARY INTERVENTION, ARTERY N/A 2024    Procedure: Percutaneous coronary intervention;  Surgeon: Veronica Ibarra MD;  Location: [facility] CATH LAB;  Service: Cardiology;  Laterality: N/A;    STENT, DRUG ELUTING, SINGLE VESSEL, CORONARY  2024    Procedure: Stent, Drug Eluting, Single Vessel,  Coronary;  Surgeon: Veronica Ibarra MD;  Location: [facility] CATH LAB;  Service: Cardiology;;    TOTAL ABDOMINAL HYSTERECTOMY W/ BILATERAL SALPINGOOPHORECTOMY      due to benign mass, adhesions    TUBAL LIGATION         Past Medical History:   Diagnosis Date    Carotid stenosis     19%    Cellulitis and abscess of foot, except toes 06/22/2022    Cerumen debris on tympanic membrane of right ear 11/30/2022    Class 2 severe obesity due to excess calories with serious comorbidity and body mass index (BMI) of 38.0 to 38.9 in adult 07/12/2023    Coronary artery disease     CVA (cerebral vascular accident)     Dr. Hoffman    Depression     Dog bite 10/03/2023    Double ectopic ureters     Dr. Porras    Encounter for preventive health examination 05/21/2024    Fall 05/09/2024    Hemiplegia affecting right dominant side 11/09/2021    Hyperlipidemia     Hypertension     Hypothyroid     Left foot infection 07/08/2022    Mild asthma with exacerbation 12/04/2022    Near syncope 01/02/2019    NSTEMI (non-ST elevated myocardial infarction) 06/26/2024    OP (osteoporosis)     IRIS (obstructive sleep apnea)     Dr. Hope    Palpitations 01/31/2019    Prediabetes 09/30/2023    Psoriatic arthritis     Rheumatology    Transient ischemic attack (TIA) 01/02/2019       Review of Systems   Constitutional:  Positive for fatigue (improved with IV iron).   HENT:  Negative for nosebleeds.    Eyes: Negative.    Respiratory:  Positive for shortness of breath (improved with iron but still there).    Cardiovascular:  Positive for chest pain (when getting over exerted) and palpitations.   Gastrointestinal:  Positive for anal bleeding (minor hemorrhoidal bleeding). Negative for blood in stool.   Endocrine: Negative.    Genitourinary:  Negative for hematuria and vaginal bleeding.   Musculoskeletal: Negative.    Skin:  Negative for pallor.   Allergic/Immunologic: Negative.    Neurological:  Positive for headaches. Negative for weakness.   Hematological:  Negative.    Psychiatric/Behavioral:  Positive for sleep disturbance. The patient is nervous/anxious.           Medication List with Changes/Refills   Current Medications    ACETAMINOPHEN (TYLENOL) 650 MG TBSR    as directed    ALBUTEROL (PROVENTIL) 2.5 MG /3 ML (0.083 %) NEBULIZER SOLUTION    Take 3 mLs (2.5 mg total) by nebulization every 6 (six) hours as needed for Wheezing. Rescue    ALLOPURINOL (ZYLOPRIM) 100 MG TABLET    TAKE 2 TABLETS ONCE DAILY    ASPIRIN 81 MG CHEW    Take 1 tablet (81 mg total) by mouth once daily.    CALCIPOTRIENE (DOVONOX) 0.005 % CREAM    Apply topically 2 (two) times daily.    CALCIUM CARBONATE 650 MG CALCIUM (1,625 MG) TABLET    Take 1 tablet by mouth once daily.    CLOPIDOGREL (PLAVIX) 75 MG TABLET    Take 1 tablet (75 mg total) by mouth once daily.    COSENTYX PEN, 2 PENS, 150 MG/ML PNIJ    INJECT 300 MG (2 PENS) UNDER THE SKIN EVERY 28 DAYS    CYANOCOBALAMIN (VITAMIN B-12) 1000 MCG TABLET    Take 100 mcg by mouth once daily.    DOCUSATE SODIUM (COLACE) 100 MG CAPSULE    Take 1 capsule (100 mg total) by mouth 2 (two) times daily.    FOLIC ACID (FOLVITE) 1 MG TABLET    TAKE 1 TABLET DAILY    FUROSEMIDE (LASIX) 20 MG TABLET    TAKE 1 TABLET TWICE A DAY (DOSE INCREASED BY CARDIOLOGIST DR. MASON)    GARLIC 2,000 MG CAP    Take 3,000 mg by mouth once daily.     LEVOTHYROXINE (SYNTHROID) 100 MCG TABLET    TAKE 1 TABLET BEFORE BREAKFAST    METOPROLOL SUCCINATE (TOPROL-XL) 100 MG 24 HR TABLET    Take 1 tablet (100 mg total) by mouth 2 (two) times daily.    NITROGLYCERIN (NITROSTAT) 0.4 MG SL TABLET    DISSOLVE 1 TABLET UNDER THE TONGUE EVERY 5 MINUTES AS NEEDED FOR CHEST PAIN    POTASSIUM CHLORIDE SA (K-DUR,KLOR-CON M) 10 MEQ TABLET    Take 1 tablet (10 mEq total) by mouth once daily.    PYRIDOXINE, VITAMIN B6, (B-6) 100 MG TAB    Take 200 mg by mouth once daily.     REPATHA SURECLICK 140 MG/ML PNIJ    INJECT 1 ML (140 MG) UNDER THE SKIN EVERY 14 DAYS    SPIRONOLACTONE (ALDACTONE) 50 MG  TABLET    Take 1 tablet (50 mg total) by mouth once daily.    VITAMIN D 1000 UNITS TAB    Take 185 mg by mouth once daily.        Objective:     Vitals:    05/16/25 1311   BP: 133/68   Pulse: (!) 55   Temp: 98 °F (36.7 °C)           Physical Exam  Vitals reviewed.   Constitutional:       Appearance: Normal appearance. She is obese.   HENT:      Head: Normocephalic and atraumatic.      Right Ear: External ear normal.      Left Ear: External ear normal.   Cardiovascular:      Rate and Rhythm: Normal rate and regular rhythm.      Heart sounds: S1 normal and S2 normal. Murmur heard.      Systolic murmur is present with a grade of 2/6.   Pulmonary:      Effort: Pulmonary effort is normal.      Breath sounds: Normal breath sounds.   Abdominal:      General: There is no distension.   Musculoskeletal:         General: Normal range of motion.      Cervical back: Normal range of motion.   Skin:     General: Skin is warm and dry.      Coloration: Skin is not pale.   Neurological:      General: No focal deficit present.      Mental Status: She is alert and oriented to person, place, and time.   Psychiatric:         Attention and Perception: Attention and perception normal.         Mood and Affect: Affect normal. Mood is anxious.         Speech: Speech normal.         Behavior: Behavior normal. Behavior is cooperative.         Thought Content: Thought content normal.         Cognition and Memory: Cognition and memory normal.         Judgment: Judgment normal.         Assessment:     Problem List Items Addressed This Visit       Iron deficiency anemia due to chronic blood loss - Primary (Chronic)    Relevant Orders    CBC Auto Differential    Basic Metabolic Panel    Ferritin    Iron and TIBC    B12 deficiency (Chronic)    Relevant Orders    CBC Auto Differential           Lab Results   Component Value Date    WBC 9.38 05/14/2025    RBC 4.59 05/14/2025    HGB 13.2 05/14/2025    HCT 42.2 05/14/2025    MCV 92 05/14/2025    MCH 28.8  05/14/2025    MCHC 31.3 (L) 05/14/2025    RDW 16.6 (H) 05/14/2025     05/14/2025    MPV 10.1 05/14/2025    GRAN 11.7 (H) 03/05/2025    GRAN 83.1 (H) 03/05/2025    LYMPH 22.2 05/14/2025    LYMPH 2.08 05/14/2025    MONO 8.2 05/14/2025    MONO 0.77 05/14/2025    EOS 1.6 05/14/2025    EOS 0.15 05/14/2025    BASO 0.06 03/05/2025    EOSINOPHIL 0.8 03/05/2025    BASOPHIL 0.6 05/14/2025    BASOPHIL 0.06 05/14/2025      Lab Results   Component Value Date     05/14/2025    K 4.1 05/14/2025     05/14/2025    CO2 26 05/14/2025    BUN 22 05/14/2025    CREATININE 1.2 05/14/2025    CALCIUM 9.0 05/14/2025    ANIONGAP 9 05/14/2025    ESTGFRAFRICA 48 (A) 07/18/2022    EGFRNONAA 41 (A) 07/18/2022     Lab Results   Component Value Date    ALT 9 (L) 03/05/2025    AST 14 03/05/2025    ALKPHOS 66 03/05/2025    BILITOT 0.5 03/05/2025     Lab Results   Component Value Date    IRON 60 05/14/2025    TRANSFERRIN 248 05/14/2025    TIBC 367 05/14/2025    FESATURATED 7 (L) 01/29/2025    FERRITIN 53.3 05/14/2025      Lab Results   Component Value Date    ETPNQPWR31 748 11/05/2024     Lab Results   Component Value Date    FOLATE 18.2 11/05/2024       Plan:   Iron deficiency anemia due to chronic blood loss  -     CBC Auto Differential; Future; Expected date: 05/16/2025  -     Basic Metabolic Panel; Future; Expected date: 05/16/2025  -     Ferritin; Future; Expected date: 05/16/2025  -     Iron and TIBC; Future; Expected date: 05/16/2025    B12 deficiency  -     CBC Auto Differential; Future; Expected date: 05/16/2025    Other orders  -     Cancel: iron sucrose injection 100 mg  -     iron sucrose injection 100 mg          Med Onc Chart Routing      Follow up with physician    Follow up with [facility] . [facility]- 5 weeks after last dose of IV venofer - labs prior - unable to walk long distance from [facility] parking into the clinic.  [facility] schedule [facility]   Infusion scheduling note   schedule  venofer 100 mg IV push in 100 ml 0.9% NS IV x 4 doses at the [facility]   Injection scheduling note n/a   Labs   Scheduling:  Preferred lab:  [facility]  Lab interval:  cbc, bmp, iron studies prior to next visit   Imaging   N/a   Pharmacy appointment No pharmacy appointment needed      Other referrals No nutrition appointment needed -        No additional referrals needed  n/a        Continue folic acid and B12 supplements.      Total time spent on encounter:45 minutes      Collaborating Provider:  Dr. Nay Jones    Thank You,  Warren Javed, KJP-C  [facility] Hematology

## 2025-05-21 LAB — W MAGNESIUM RBC: 5 MG/DL

## 2025-05-22 ENCOUNTER — RESULTS FOLLOW-UP (OUTPATIENT)
Dept: PRIMARY CARE CLINIC | Facility: CLINIC | Age: 75
End: 2025-05-22

## 2025-05-22 ENCOUNTER — TELEPHONE (OUTPATIENT)
Dept: PRIMARY CARE CLINIC | Facility: CLINIC | Age: 75
End: 2025-05-22
Payer: MEDICARE

## 2025-05-22 NOTE — TELEPHONE ENCOUNTER
"Informed pt of results, pt VU. No further questions.         ----- Message from Lisa Porter MD sent at 5/22/2025  9:30 AM CDT -----  Pt does not use [facility] pt portal - please call w message below:    A1c is stable still in the "prediabetic" range.  Albumin is down a bit but renal function has improved.  Make sure you are getting adequate protein in your diet.  RBC tagged magnesium level looks good too.        ----- Message -----  From: Lab, Background User  Sent: 5/14/2025   1:58 PM CDT  To: Lisa Porter MD    "

## 2025-05-22 NOTE — PROGRESS NOTES
"Pt does not use [facility] pt portal - please call w message below:    A1c is stable still in the "prediabetic" range.  Albumin is down a bit but renal function has improved.  Make sure you are getting adequate protein in your diet.  RBC tagged magnesium level looks good too.        "

## 2025-05-26 DIAGNOSIS — Z00.00 ENCOUNTER FOR MEDICARE ANNUAL WELLNESS EXAM: ICD-10-CM

## 2025-06-02 ENCOUNTER — INFUSION (OUTPATIENT)
Dept: INFUSION THERAPY | Facility: HOSPITAL | Age: 75
End: 2025-06-02
Attending: INTERNAL MEDICINE
Payer: MEDICARE

## 2025-06-02 VITALS
DIASTOLIC BLOOD PRESSURE: 63 MMHG | SYSTOLIC BLOOD PRESSURE: 125 MMHG | RESPIRATION RATE: 18 BRPM | HEART RATE: 57 BPM | TEMPERATURE: 98 F | OXYGEN SATURATION: 98 %

## 2025-06-02 DIAGNOSIS — D50.9 IRON DEFICIENCY ANEMIA, UNSPECIFIED IRON DEFICIENCY ANEMIA TYPE: Primary | ICD-10-CM

## 2025-06-02 PROCEDURE — 25000003 PHARM REV CODE 250: Performed by: NURSE PRACTITIONER

## 2025-06-02 PROCEDURE — 96365 THER/PROPH/DIAG IV INF INIT: CPT

## 2025-06-02 PROCEDURE — 63600175 PHARM REV CODE 636 W HCPCS: Performed by: INTERNAL MEDICINE

## 2025-06-02 PROCEDURE — 96375 TX/PRO/DX INJ NEW DRUG ADDON: CPT

## 2025-06-02 PROCEDURE — 25000003 PHARM REV CODE 250: Performed by: INTERNAL MEDICINE

## 2025-06-02 PROCEDURE — 63600175 PHARM REV CODE 636 W HCPCS: Mod: JZ,TB | Performed by: NURSE PRACTITIONER

## 2025-06-02 RX ORDER — SODIUM CHLORIDE 0.9 % (FLUSH) 0.9 %
10 SYRINGE (ML) INJECTION
OUTPATIENT
Start: 2025-06-09

## 2025-06-02 RX ORDER — METHYLPREDNISOLONE SOD SUCC 125 MG
60 VIAL (EA) INJECTION
Status: COMPLETED | OUTPATIENT
Start: 2025-06-02 | End: 2025-06-02

## 2025-06-02 RX ORDER — SODIUM CHLORIDE 0.9 % (FLUSH) 0.9 %
10 SYRINGE (ML) INJECTION
Status: DISCONTINUED | OUTPATIENT
Start: 2025-06-02 | End: 2025-06-02 | Stop reason: HOSPADM

## 2025-06-02 RX ORDER — METHYLPREDNISOLONE SOD SUCC 125 MG
60 VIAL (EA) INJECTION
Start: 2025-06-09

## 2025-06-02 RX ADMIN — IRON SUCROSE 100 MG: 20 INJECTION, SOLUTION INTRAVENOUS at 12:06

## 2025-06-02 RX ADMIN — METHYLPREDNISOLONE SODIUM SUCCINATE 60 MG: 125 INJECTION, POWDER, FOR SOLUTION INTRAMUSCULAR; INTRAVENOUS at 12:06

## 2025-06-02 RX ADMIN — SODIUM CHLORIDE: 9 INJECTION, SOLUTION INTRAVENOUS at 12:06

## 2025-06-09 ENCOUNTER — INFUSION (OUTPATIENT)
Dept: INFUSION THERAPY | Facility: HOSPITAL | Age: 75
End: 2025-06-09
Attending: INTERNAL MEDICINE
Payer: MEDICARE

## 2025-06-09 VITALS
DIASTOLIC BLOOD PRESSURE: 66 MMHG | TEMPERATURE: 98 F | OXYGEN SATURATION: 98 % | SYSTOLIC BLOOD PRESSURE: 137 MMHG | RESPIRATION RATE: 18 BRPM | HEART RATE: 58 BPM

## 2025-06-09 DIAGNOSIS — D50.9 IRON DEFICIENCY ANEMIA, UNSPECIFIED IRON DEFICIENCY ANEMIA TYPE: Primary | ICD-10-CM

## 2025-06-09 PROCEDURE — 96365 THER/PROPH/DIAG IV INF INIT: CPT

## 2025-06-09 PROCEDURE — 96375 TX/PRO/DX INJ NEW DRUG ADDON: CPT

## 2025-06-09 PROCEDURE — 25000003 PHARM REV CODE 250: Performed by: NURSE PRACTITIONER

## 2025-06-09 PROCEDURE — 63600175 PHARM REV CODE 636 W HCPCS: Performed by: NURSE PRACTITIONER

## 2025-06-09 RX ORDER — METHYLPREDNISOLONE SOD SUCC 125 MG
60 VIAL (EA) INJECTION
Status: COMPLETED | OUTPATIENT
Start: 2025-06-09 | End: 2025-06-09

## 2025-06-09 RX ORDER — SODIUM CHLORIDE 0.9 % (FLUSH) 0.9 %
10 SYRINGE (ML) INJECTION
Status: CANCELLED | OUTPATIENT
Start: 2025-06-16

## 2025-06-09 RX ORDER — METHYLPREDNISOLONE SOD SUCC 125 MG
60 VIAL (EA) INJECTION
Start: 2025-06-16

## 2025-06-09 RX ORDER — SODIUM CHLORIDE 0.9 % (FLUSH) 0.9 %
10 SYRINGE (ML) INJECTION
Status: DISCONTINUED | OUTPATIENT
Start: 2025-06-09 | End: 2025-06-09 | Stop reason: HOSPADM

## 2025-06-09 RX ADMIN — IRON SUCROSE 100 MG: 20 INJECTION, SOLUTION INTRAVENOUS at 11:06

## 2025-06-09 RX ADMIN — SODIUM CHLORIDE: 9 INJECTION, SOLUTION INTRAVENOUS at 11:06

## 2025-06-09 RX ADMIN — METHYLPREDNISOLONE SODIUM SUCCINATE 60 MG: 125 INJECTION, POWDER, FOR SOLUTION INTRAMUSCULAR; INTRAVENOUS at 11:06

## 2025-06-09 NOTE — DISCHARGE INSTRUCTIONS
[facility]  [address] [address]  [address]  [phone] phone     [phone] fax  Hours of Operation: Monday- Friday 8:00am- 5:00pm  After hours phone  [phone]  Hematology / Oncology Physicians on call      SLAVA Sanz Dr., NP Phaon Dunbar, NP Khelsea Conley, FNP    Please call with any concerns regarding your appointment today.

## 2025-06-09 NOTE — PLAN OF CARE
Discussed plan of care with pt. Addressed any and ongoing concerns. Pt denies   Problem: Adult Inpatient Plan of Care  Goal: Plan of Care Review  Outcome: Progressing  Flowsheets (Taken 6/9/2025 1347)  Plan of Care Reviewed With: patient  Goal: Patient-Specific Goal (Individualized)  Outcome: Progressing  Flowsheets (Taken 6/9/2025 1347)  Individualized Care Needs: Reclined position, warm blanket and orange juice  Anxieties, Fears or Concerns: None  Patient/Family-Specific Goals (Include Timeframe): Will tolerate infusion without reaction  Goal: Absence of Hospital-Acquired Illness or Injury  Outcome: Progressing  Intervention: Identify and Manage Fall Risk  Flowsheets (Taken 6/9/2025 1347)  Safety Promotion/Fall Prevention: in recliner, wheels locked  Intervention: Prevent Infection  Flowsheets (Taken 6/9/2025 1347)  Infection Prevention:   equipment surfaces disinfected   hand hygiene promoted   personal protective equipment utilized  Goal: Optimal Comfort and Wellbeing  Outcome: Progressing  Intervention: Provide Person-Centered Care  Flowsheets (Taken 6/9/2025 1347)  Trust Relationship/Rapport:   care explained   questions encouraged   choices provided   reassurance provided   emotional support provided   thoughts/feelings acknowledged   empathic listening provided   questions answered

## 2025-06-10 DIAGNOSIS — I10 ESSENTIAL HYPERTENSION: Chronic | ICD-10-CM

## 2025-06-10 RX ORDER — SPIRONOLACTONE 50 MG/1
50 TABLET, FILM COATED ORAL
Qty: 90 TABLET | Refills: 3 | Status: SHIPPED | OUTPATIENT
Start: 2025-06-10

## 2025-06-16 ENCOUNTER — INFUSION (OUTPATIENT)
Dept: INFUSION THERAPY | Facility: HOSPITAL | Age: 75
End: 2025-06-16
Attending: INTERNAL MEDICINE
Payer: MEDICARE

## 2025-06-16 VITALS
OXYGEN SATURATION: 96 % | TEMPERATURE: 97 F | HEART RATE: 56 BPM | DIASTOLIC BLOOD PRESSURE: 64 MMHG | RESPIRATION RATE: 16 BRPM | SYSTOLIC BLOOD PRESSURE: 138 MMHG

## 2025-06-16 DIAGNOSIS — D50.9 IRON DEFICIENCY ANEMIA, UNSPECIFIED IRON DEFICIENCY ANEMIA TYPE: Primary | ICD-10-CM

## 2025-06-16 PROCEDURE — 96365 THER/PROPH/DIAG IV INF INIT: CPT

## 2025-06-16 PROCEDURE — 25000003 PHARM REV CODE 250: Performed by: NURSE PRACTITIONER

## 2025-06-16 PROCEDURE — 96375 TX/PRO/DX INJ NEW DRUG ADDON: CPT

## 2025-06-16 PROCEDURE — 63600175 PHARM REV CODE 636 W HCPCS: Mod: JZ,TB | Performed by: NURSE PRACTITIONER

## 2025-06-16 RX ORDER — METHYLPREDNISOLONE SOD SUCC 125 MG
60 VIAL (EA) INJECTION
Status: COMPLETED | OUTPATIENT
Start: 2025-06-16 | End: 2025-06-16

## 2025-06-16 RX ORDER — METHYLPREDNISOLONE SOD SUCC 125 MG
60 VIAL (EA) INJECTION
Status: CANCELLED
Start: 2025-06-23

## 2025-06-16 RX ADMIN — METHYLPREDNISOLONE SODIUM SUCCINATE 60 MG: 125 INJECTION, POWDER, FOR SOLUTION INTRAMUSCULAR; INTRAVENOUS at 09:06

## 2025-06-16 RX ADMIN — IRON SUCROSE 100 MG: 20 INJECTION, SOLUTION INTRAVENOUS at 09:06

## 2025-06-16 NOTE — PLAN OF CARE
Problem: Adult Inpatient Plan of Care  Goal: Plan of Care Review  Outcome: Progressing  Flowsheets (Taken 6/16/2025 1037)  Plan of Care Reviewed With: patient  Goal: Patient-Specific Goal (Individualized)  Outcome: Progressing  Flowsheets (Taken 6/16/2025 1037)  Individualized Care Needs: Reclined, warm blanket, and orange juice provided. Wants the entire 100ml flush bag.  Anxieties, Fears or Concerns: No concerns expressed besides stating she is a hard sticl  Patient/Family-Specific Goals (Include Timeframe): To tolerate infusion today     Problem: Anemia  Goal: Anemia Symptom Improvement  Outcome: Progressing  Intervention: Monitor and Manage Anemia  Flowsheets (Taken 6/16/2025 1037)  Safety Promotion/Fall Prevention:   in recliner, wheels locked   high risk medications identified   instructed to call staff for mobility   lighting adjusted   medications reviewed  Fatigue Management:   activity schedule adjusted   frequent rest breaks encouraged

## 2025-06-16 NOTE — DISCHARGE INSTRUCTIONS
.[facility]  [address] [address]  [address] [address]  [phone] phone     [phone] fax  Hours of Operation: Monday- Friday 8:00am- 5:00pm  After hours phone  [phone]  Hematology / Oncology Physicians on call    Dr. Hank Jones           Nurse Practitioners:     Radha Richardson, SAMEERA Alanis, DENNIS Chapin, SAMEERA Delgado, SAMEERA Javed, NP    Please don't hesitate to call if you have any concerns.      FALL PREVENTION   Falls often occur due to slipping, tripping or losing your balance. Here are ways to reduce your risk of falling again.   Was there anything that caused your fall that can be fixed, removed or replaced?   Make your home safe by keeping walkways clear of objects you may trip over.   Use non-slip pads under rugs.   Do not walk in poorly lit areas.   Do not stand on chairs or wobbly ladders.   Use caution when reaching overhead or looking upward. This position can cause a loss of balance.   Be sure your shoes fit properly, have non-slip bottoms and are in good condition.   Be cautious when going up and down stairs, curbs, and when walking on uneven sidewalks.   If your balance is poor, consider using a cane or walker.   If your fall was related to alcohol use, stop or limit alcohol intake.   If your fall was related to use of sleeping medicines, talk to your doctor about this. You may need to reduce your dosage at bedtime if you awaken during the night to go to the bathroom.   To reduce the need for nighttime bathroom trips:   Avoid drinking fluids for several hours before going to bed   Empty your bladder before going to bed   Men can keep a urinal at the bedside   © 3047-3064 [employer], [address] [place] [address], [place] [address]. All rights reserved. This information is not intended as a substitute for professional medical care. Always follow your healthcare  professional's instructions.    WAYS TO HELP PREVENT INFECTION        WASH YOUR HANDS OFTEN DURING THE DAY, ESPECIALLY BEFORE YOU EAT, AFTER USING THE BATHROOM, AND AFTER TOUCHING ANIMALS    STAY AWAY FROM PEOPLE WHO HAVE ILLNESSES YOU CAN CATCH; SUCH AS COLDS, FLU, CHICKEN POX    TRY TO AVOID CROWDS    STAY AWAY FROM CHILDREN WHO RECENTLY HAVE RECEIVED LIVE VIRUS VACCINES    MAINTAIN GOOD MOUTH CARE    DO NOT SQUEEZE OR SCRATCH PIMPLES    CLEAN CUTS & SCRAPES RIGHT AWAY AND DAILY UNTIL HEALED WITH WARM WATER, SOAP & AN ANTISEPTIC    AVOID CONTACT WITH LITTER BOXES, BIRD CAGES, & FISH TANKS    AVOID STANDING WATER, IE., BIRD BATHS, FLOWER POTS/VASES, OR HUMIDIFIERS    WEAR GLOVES WHEN GARDENING OR CLEANING UP AFTER OTHERS, ESPECIALLY BABIES & SMALL CHILDREN    DO NOT EAT RAW FISH, SEAFOOD, MEAT, OR EGGS

## 2025-06-23 ENCOUNTER — OFFICE VISIT (OUTPATIENT)
Dept: PRIMARY CARE CLINIC | Facility: CLINIC | Age: 75
End: 2025-06-23
Payer: MEDICARE

## 2025-06-23 VITALS
SYSTOLIC BLOOD PRESSURE: 132 MMHG | WEIGHT: 209.88 LBS | HEART RATE: 71 BPM | OXYGEN SATURATION: 98 % | BODY MASS INDEX: 41.2 KG/M2 | DIASTOLIC BLOOD PRESSURE: 64 MMHG | HEIGHT: 60 IN | TEMPERATURE: 97 F

## 2025-06-23 DIAGNOSIS — M47.896 OTHER OSTEOARTHRITIS OF SPINE, LUMBAR REGION: Chronic | ICD-10-CM

## 2025-06-23 DIAGNOSIS — E55.9 VITAMIN D DEFICIENCY: ICD-10-CM

## 2025-06-23 DIAGNOSIS — N18.31 CKD STAGE 3A, GFR 45-59 ML/MIN: Chronic | ICD-10-CM

## 2025-06-23 DIAGNOSIS — E03.9 HYPOTHYROIDISM, UNSPECIFIED TYPE: Primary | Chronic | ICD-10-CM

## 2025-06-23 DIAGNOSIS — E53.8 B12 DEFICIENCY: Chronic | ICD-10-CM

## 2025-06-23 PROCEDURE — 99215 OFFICE O/P EST HI 40 MIN: CPT | Mod: PBBFAC,PN | Performed by: INTERNAL MEDICINE

## 2025-06-23 PROCEDURE — 99999 PR PBB SHADOW E&M-EST. PATIENT-LVL V: CPT | Mod: PBBFAC,,, | Performed by: INTERNAL MEDICINE

## 2025-06-23 PROCEDURE — 99214 OFFICE O/P EST MOD 30 MIN: CPT | Mod: S$PBB,,, | Performed by: INTERNAL MEDICINE

## 2025-06-23 NOTE — PATIENT INSTRUCTIONS
If you are feeling unwell, we'd like to be the first ones to know here at [facility]! Please give us a call. Same day appointments are our top priority to keep you well and out of the emergency rooms and hospitals. Call [phone] for our direct line. After hours advice is always available. Please call [phone] after hours to speak to the on-call team.

## 2025-06-24 ENCOUNTER — INFUSION (OUTPATIENT)
Dept: INFUSION THERAPY | Facility: HOSPITAL | Age: 75
End: 2025-06-24
Attending: INTERNAL MEDICINE
Payer: MEDICARE

## 2025-06-24 VITALS
DIASTOLIC BLOOD PRESSURE: 63 MMHG | OXYGEN SATURATION: 94 % | SYSTOLIC BLOOD PRESSURE: 151 MMHG | TEMPERATURE: 98 F | RESPIRATION RATE: 16 BRPM | HEART RATE: 68 BPM

## 2025-06-24 DIAGNOSIS — D50.9 IRON DEFICIENCY ANEMIA, UNSPECIFIED IRON DEFICIENCY ANEMIA TYPE: Primary | ICD-10-CM

## 2025-06-24 PROCEDURE — 96375 TX/PRO/DX INJ NEW DRUG ADDON: CPT

## 2025-06-24 PROCEDURE — 96365 THER/PROPH/DIAG IV INF INIT: CPT

## 2025-06-24 PROCEDURE — 25000003 PHARM REV CODE 250: Performed by: NURSE PRACTITIONER

## 2025-06-24 PROCEDURE — 63600175 PHARM REV CODE 636 W HCPCS: Performed by: NURSE PRACTITIONER

## 2025-06-24 PROCEDURE — 63600175 PHARM REV CODE 636 W HCPCS: Mod: JZ,TB | Performed by: NURSE PRACTITIONER

## 2025-06-24 RX ADMIN — METHYLPREDNISOLONE SODIUM SUCCINATE 60 MG: 125 INJECTION, POWDER, FOR SOLUTION INTRAMUSCULAR; INTRAVENOUS at 12:06

## 2025-06-24 RX ADMIN — SODIUM CHLORIDE 100 MG: 9 INJECTION, SOLUTION INTRAVENOUS at 12:06

## 2025-06-24 NOTE — PLAN OF CARE
Problem: Adult Inpatient Plan of Care  Goal: Plan of Care Review  Outcome: Progressing  Flowsheets (Taken 6/24/2025 1445)  Plan of Care Reviewed With: patient  Goal: Optimal Comfort and Wellbeing  Outcome: Progressing  Intervention: Provide Person-Centered Care  Flowsheets (Taken 6/24/2025 1445)  Trust Relationship/Rapport:   care explained   questions encouraged   choices provided   reassurance provided   emotional support provided   thoughts/feelings acknowledged   empathic listening provided   questions answered

## 2025-06-25 NOTE — TELEPHONE ENCOUNTER
Take Vitamin d3 as directed and check labs  Orders:  •  Comprehensive metabolic panel; Future  •  Vitamin D 25 hydroxy; Future   The appointment for cardio is Nov 28,2017 per Dr Vergara office. Patient only wanted to see Dr. Dial.

## 2025-06-26 RX ORDER — ALLOPURINOL 100 MG/1
200 TABLET ORAL
Qty: 180 TABLET | Refills: 3 | Status: SHIPPED | OUTPATIENT
Start: 2025-06-26

## 2025-06-29 PROBLEM — R53.81 MALAISE: Status: RESOLVED | Noted: 2024-08-26 | Resolved: 2025-06-29

## 2025-06-29 PROBLEM — D72.829 LEUCOCYTOSIS: Status: RESOLVED | Noted: 2025-05-09 | Resolved: 2025-06-29

## 2025-07-28 DIAGNOSIS — L40.50 PSORIATIC ARTHRITIS: ICD-10-CM

## 2025-07-28 NOTE — TELEPHONE ENCOUNTER
Copied from CRM #[id number]. Topic: Medications - Medication Refill  >> Jul 28, 2025  9:24 AM Selvin wrote:  Type:  RX Refill Request    Who Called: Qi   Refill or New Rx: Refill  RX Name and Strength:COSENTYX PEN, 2 PENS, 150 mg/mL PnIj  How is the patient currently taking it? (ex. 1XDay): every 28 days   Is this a 30 day or 90 day RX: 30 day   Preferred Pharmacy with phone number:   [address] [address]  Phone: [phone] Fax: [phone]  Local or Mail Order: Mail order  Ordering Provider:  Would the patient rather a call back or a response via [facility]? Call back   Best Call Back Number:please call back at [phone]  Additional Information:

## 2025-07-29 ENCOUNTER — APPOINTMENT (OUTPATIENT)
Dept: RADIOLOGY | Facility: HOSPITAL | Age: 75
End: 2025-07-29
Attending: INTERNAL MEDICINE
Payer: MEDICARE

## 2025-07-29 DIAGNOSIS — Z78.0 POST-MENOPAUSAL: ICD-10-CM

## 2025-07-29 PROCEDURE — 77080 DXA BONE DENSITY AXIAL: CPT | Mod: TC

## 2025-07-29 PROCEDURE — 77080 DXA BONE DENSITY AXIAL: CPT | Mod: 26,,, | Performed by: STUDENT IN AN ORGANIZED HEALTH CARE EDUCATION/TRAINING PROGRAM

## 2025-07-29 RX ORDER — SECUKINUMAB 150 MG/ML
300 INJECTION SUBCUTANEOUS
Qty: 4 ML | Refills: 1 | Status: SHIPPED | OUTPATIENT
Start: 2025-07-29 | End: 2026-07-29

## 2025-07-30 ENCOUNTER — OFFICE VISIT (OUTPATIENT)
Dept: CARDIOLOGY | Facility: CLINIC | Age: 75
End: 2025-07-30
Payer: MEDICARE

## 2025-07-30 VITALS
SYSTOLIC BLOOD PRESSURE: 132 MMHG | OXYGEN SATURATION: 96 % | HEART RATE: 84 BPM | WEIGHT: 215.25 LBS | BODY MASS INDEX: 42.26 KG/M2 | DIASTOLIC BLOOD PRESSURE: 66 MMHG | HEIGHT: 60 IN

## 2025-07-30 DIAGNOSIS — E78.2 MIXED HYPERLIPIDEMIA: Chronic | ICD-10-CM

## 2025-07-30 DIAGNOSIS — E11.65 TYPE 2 DIABETES MELLITUS WITH HYPERGLYCEMIA, WITHOUT LONG-TERM CURRENT USE OF INSULIN: ICD-10-CM

## 2025-07-30 DIAGNOSIS — Z95.3 S/P TAVR (TRANSCATHETER AORTIC VALVE REPLACEMENT): Chronic | ICD-10-CM

## 2025-07-30 DIAGNOSIS — Z86.73 HISTORY OF CVA (CEREBROVASCULAR ACCIDENT): Chronic | ICD-10-CM

## 2025-07-30 DIAGNOSIS — I20.9 ANGINA, CLASS II: Chronic | ICD-10-CM

## 2025-07-30 DIAGNOSIS — I27.20 PULMONARY HYPERTENSION: Chronic | ICD-10-CM

## 2025-07-30 DIAGNOSIS — I65.21 STENOSIS OF RIGHT CAROTID ARTERY: ICD-10-CM

## 2025-07-30 DIAGNOSIS — I35.0 NONRHEUMATIC AORTIC VALVE STENOSIS: Chronic | ICD-10-CM

## 2025-07-30 DIAGNOSIS — Z95.1 S/P CABG (CORONARY ARTERY BYPASS GRAFT): Chronic | ICD-10-CM

## 2025-07-30 DIAGNOSIS — Z78.9 STATIN INTOLERANCE: ICD-10-CM

## 2025-07-30 DIAGNOSIS — I25.2 HISTORY OF MI (MYOCARDIAL INFARCTION): Chronic | ICD-10-CM

## 2025-07-30 DIAGNOSIS — I70.0 AORTIC ATHEROSCLEROSIS: Chronic | ICD-10-CM

## 2025-07-30 DIAGNOSIS — I10 ESSENTIAL HYPERTENSION: Chronic | ICD-10-CM

## 2025-07-30 DIAGNOSIS — I25.10 CAD, MULTIPLE VESSEL: ICD-10-CM

## 2025-07-30 DIAGNOSIS — I50.32 CHRONIC DIASTOLIC HEART FAILURE: Primary | Chronic | ICD-10-CM

## 2025-07-30 PROCEDURE — 99214 OFFICE O/P EST MOD 30 MIN: CPT | Mod: PBBFAC,PO | Performed by: INTERNAL MEDICINE

## 2025-07-30 PROCEDURE — 99999 PR PBB SHADOW E&M-EST. PATIENT-LVL IV: CPT | Mod: PBBFAC,,, | Performed by: INTERNAL MEDICINE

## 2025-07-30 NOTE — PROGRESS NOTES
Subjective:   Patient ID:  Qi Ortiz is a 75 y.o. female who presents for follow up of No chief complaint on file.      73y/o F came in for ER visit f/u  PMH CAD s/p CABG twice in 1998 and 2016, SVT, AS, s/p TAVR 20', chronic diastolic HF, HTN HLD COPD, CKD, DM, obesity, IRIS, statin intolerance  06/06/23 went to [facility] ER for SOB. Rx for CHF and d/c o/n.  Now sob stable no orthopnea leg swelling  No chest pain   Refused Jardiance  07/24 s/p PCI  DESx2 placed on ostial LAD and pLAD. LVEDP 29 mmHG. LIMA to LAD patent by Dr. Ibarra  10/23 echo EF nl s/p TAVR TG 9 mmHg    06/24 visit  05/06/24 fell and syncope in the yard. Woke up in 15 min and back pain. In the two days was in and out?, CXR rib on 05/09 negative    07/24 visit  Troponin elevated 0.098 at last visit. Pt was called and advised to go to ER.   The cath was done. DESx2 placed on ostial LAD and pLAD. LVEDP 29 mmHG. LIMA to LAD patent.  H/o allergy to ranexa amlodipine and Imdur    10/24 visit  Sob and went to ER at [facility]. Resolved after d/c Brillinta. Switched to Plavix  BP high and Pt was upset for the schedule  LDL 88   Leg swelling mildly and taking Lasix 40 mg daily     01/25 visit  BP high in the office after upset on high way. BP controlled at home.   Denied chest pain, dyspnea on exertion, palpitation, fainting, PND, orthopnea, syncope and claudication.   Chronic mild SOB. BP LDL 60 and A1c controled   Plan egd and colonoscopy  04/24 echo EF nml. S/p AVR    Interval history  Episode of chest pain fatigue. 's  02/25 carotid US  ·  There is 20-39% right Internal Carotid Stenosis.  ·  There is 0-19% left Internal Carotid Stenosis.  Declined new med for CHFpEF                History of Present Illness    CHIEF COMPLAINT:  - Qi presents with concerns about recent chest pain episodes, fatigue, and to discuss vitamin B12 supplementation for potential neuropathy symptoms.    HPI:  - Had significant chest pain episode 1 month ago while shopping,  requiring 2 doses of nitroglycerin for relief  - Experienced fatigue and tiredness for 10 days following the chest pain episode, which are ongoing issues  - Reports balance problems and other symptoms coinciding with the onset of her issues  - Dr. Porter suggested her body might not be absorbing B12 properly, possibly due to gastric issues  - Purchased sublingual B12 supplements but has not started taking them  - Reports dyspnea related to humidity and heat, with improvement on days when humidity drops  - Missed 2 doctor's appointments due to inability to get in the car during hot weather  - Has history of using CPAP for sleep apnea but was told by a pulmonologist at last visit that follow-ups were no longer necessary  - Acknowledges irregular sleep patterns, often napping during the day and being more active at night  - Currently taking Lasix BID for CHF  - Denies knowing if she snores at night  - Denies dyspnea when walking around at home, except when humidity is high    MEDICATIONS:  - Lasix, 1 tablet in the morning and 1 tablet at night, for congestive heart failure  - Nitroglycerin, PRN for chest pain  - Vitamin B12 supplement  - Vitamin D supplement    ALLERGIES:  - She reports an allergy that resulted in multiple ED visits and hospitalizations. Specific allergen and reaction are not stated.    TEST RESULTS:  - Cholesterol: Controlled  - BP: Controlled  - Glucose: Controlled  - Thyroid function: Fine  - Vitamin D: 82  - Sleep study: Past test indicated she was sleeping 6-7 hours straight, no further intervention recommended at that time    MEDICAL HISTORY:  - Neuropathy          Past Medical History:   Diagnosis Date    Carotid stenosis     19%    Cellulitis and abscess of foot, except toes 06/22/2022    Cerumen debris on tympanic membrane of right ear 11/30/2022    Class 2 severe obesity due to excess calories with serious comorbidity and body mass index (BMI) of 38.0 to 38.9 in adult 07/12/2023    Coronary  artery disease     CVA (cerebral vascular accident)     Dr. Hoffman    Depression     Dog bite 10/03/2023    Double ectopic ureters     Dr. Porras    Encounter for preventive health examination 05/21/2024    Fall 05/09/2024    Hemiplegia affecting right dominant side 11/09/2021    Hyperlipidemia     Hypertension     Hypothyroid     Left foot infection 07/08/2022    Leucocytosis 05/09/2025    Mild asthma with exacerbation 12/04/2022    Near syncope 01/02/2019    NSTEMI (non-ST elevated myocardial infarction) 06/26/2024    OP (osteoporosis)     IRIS (obstructive sleep apnea)     Dr. Hope    Palpitations 01/31/2019    Prediabetes 09/30/2023    Psoriatic arthritis     Rheumatology    Transient ischemic attack (TIA) 01/02/2019       Past Surgical History:   Procedure Laterality Date    BREAST BIOPSY      R sided/benign    CARDIAC SURGERY      sept 28 2016    CERVICAL FUSION      CHOLECYSTECTOMY      CORONARY ANGIOPLASTY      CORONARY ARTERY BYPASS GRAFT      triple bypass    CORONARY BYPASS GRAFT ANGIOGRAPHY  6/27/2024    Procedure: Bypass graft study;  Surgeon: Veronica Ibarra MD;  Location: [facility] CATH LAB;  Service: Cardiology;;    CORONARY STENT PLACEMENT      EYE SURGERY      INTRAUTERINE DEVICE INSERTION      LEFT HEART CATHETERIZATION Left 9/8/2020    Procedure: CATHETERIZATION, HEART, LEFT;  Surgeon: Veronica Ibarra MD;  Location: [facility] CATH LAB;  Service: Cardiology;  Laterality: Left;  7am start time    LEFT HEART CATHETERIZATION Left 6/27/2024    Procedure: Left heart cath;  Surgeon: Veronica Ibarra MD;  Location: [facility] CATH LAB;  Service: Cardiology;  Laterality: Left;    mass removed from R groin      PERCUTANEOUS CORONARY INTERVENTION, ARTERY N/A 6/27/2024    Procedure: Percutaneous coronary intervention;  Surgeon: Veronica Ibarra MD;  Location: [facility] CATH LAB;  Service: Cardiology;  Laterality: N/A;    STENT, DRUG ELUTING, SINGLE VESSEL, CORONARY  6/27/2024    Procedure: Stent, Drug Eluting, Single Vessel,  Coronary;  Surgeon: Veronica Ibarra MD;  Location: [facility] CATH LAB;  Service: Cardiology;;    TOTAL ABDOMINAL HYSTERECTOMY W/ BILATERAL SALPINGOOPHORECTOMY      due to benign mass, adhesions    TUBAL LIGATION         Social History[1]    Family History   Problem Relation Name Age of Onset    Breast cancer Maternal Grandfather      Breast cancer Paternal Aunt      Stroke Unknown      Breast cancer Sister  60    Leukemia Sister  8         as child    Lung cancer Paternal Grandfather      Heart disease Unknown      Diabetes Daughter a          ROS    Objective:   Physical Exam  HENT:      Head: Normocephalic.   Eyes:      Pupils: Pupils are equal, round, and reactive to light.   Neck:      Thyroid: No thyromegaly.      Vascular: Normal carotid pulses. No carotid bruit or JVD.   Cardiovascular:      Rate and Rhythm: Normal rate and regular rhythm. No extrasystoles are present.     Chest Wall: PMI is not displaced.      Pulses: Normal pulses.      Heart sounds: Normal heart sounds. No murmur heard.     No gallop. No S3 sounds.   Pulmonary:      Effort: No respiratory distress.      Breath sounds: Normal breath sounds. No stridor.   Abdominal:      General: Bowel sounds are normal.      Palpations: Abdomen is soft.      Tenderness: There is no abdominal tenderness. There is no rebound.   Musculoskeletal:         General: Swelling present.   Skin:     Findings: No rash.   Neurological:      Mental Status: She is alert and oriented to person, place, and time.   Psychiatric:         Behavior: Behavior normal.         Lab Results   Component Value Date    CHOL 123 2024    CHOL 154 2024    CHOL 137 2023     Lab Results   Component Value Date    HDL 35 (L) 2024    HDL 35 (L) 2024    HDL 35 (L) 2023     Lab Results   Component Value Date    LDLCALC 60.8 (L) 2024    LDLCALC 88.6 2024    LDLCALC 69.4 2023     Lab Results   Component Value Date    TRIG 136 2024    TRIG  152 (H) 04/05/2024    TRIG 163 (H) 08/24/2023     Lab Results   Component Value Date    CHOLHDL 28.5 11/05/2024    CHOLHDL 22.7 04/05/2024    CHOLHDL 25.5 08/24/2023       Chemistry        Component Value Date/Time     07/29/2025 0943     03/05/2025 1130    K 4.4 07/29/2025 0943    K 4.4 03/05/2025 1130     07/29/2025 0943     03/05/2025 1130    CO2 29 07/29/2025 0943    CO2 28 03/05/2025 1130    BUN 24 (H) 07/29/2025 0943    CREATININE 1.5 (H) 07/29/2025 0943     (H) 07/29/2025 0943     (H) 03/05/2025 1130        Component Value Date/Time    CALCIUM 9.5 07/29/2025 0943    CALCIUM 9.8 03/05/2025 1130    ALKPHOS 66 03/05/2025 1130    AST 14 03/05/2025 1130    ALT 9 (L) 03/05/2025 1130    BILITOT 0.5 03/05/2025 1130    ESTGFRAFRICA 48 (A) 07/18/2022 0803    EGFRNONAA 41 (A) 07/18/2022 0803          Lab Results   Component Value Date    HGBA1C 6.1 (H) 05/14/2025     Lab Results   Component Value Date    TSH 1.495 07/29/2025     Lab Results   Component Value Date    INR 1.0 06/25/2024    INR 1.0 09/28/2020    INR 1.0 12/18/2017     Lab Results   Component Value Date    WBC 9.33 07/29/2025    HGB 13.9 07/29/2025    HCT 42.3 07/29/2025    MCV 90 07/29/2025     07/29/2025     BMP  Sodium   Date Value Ref Range Status   07/29/2025 142 136 - 145 mmol/L Final   03/05/2025 140 136 - 145 mmol/L Final     Potassium   Date Value Ref Range Status   07/29/2025 4.4 3.5 - 5.1 mmol/L Final   03/05/2025 4.4 3.5 - 5.1 mmol/L Final     Chloride   Date Value Ref Range Status   07/29/2025 101 95 - 110 mmol/L Final   03/05/2025 101 95 - 110 mmol/L Final     CO2   Date Value Ref Range Status   07/29/2025 29 23 - 29 mmol/L Final   03/05/2025 28 23 - 29 mmol/L Final     BUN   Date Value Ref Range Status   07/29/2025 24 (H) 8 - 23 mg/dL Final     Creatinine   Date Value Ref Range Status   07/29/2025 1.5 (H) 0.5 - 1.4 mg/dL Final     Calcium   Date Value Ref Range Status   07/29/2025 9.5 8.7 - 10.5  mg/dL Final   03/05/2025 9.8 8.7 - 10.5 mg/dL Final     Anion Gap   Date Value Ref Range Status   07/29/2025 12 8 - 16 mmol/L Final     eGFR if    Date Value Ref Range Status   07/18/2022 48 (A) >60 mL/min/1.73 m^2 Final     eGFR if non    Date Value Ref Range Status   07/18/2022 41 (A) >60 mL/min/1.73 m^2 Final     Comment:     Calculation used to obtain the estimated glomerular filtration  rate (eGFR) is the CKD-EPI equation.        BNP  @LABRCNTIP(BNP,BNPTRIAGEBLO)@  @LABRCNTIP(troponini)@  Estimated Creatinine Clearance: 34.5 mL/min (A) (based on SCr of 1.5 mg/dL (H)).  No results found in the last 24 hours.  No results found in the last 24 hours.  No results found in the last 24 hours.    Assessment:      1. Chronic diastolic heart failure    2. History of CVA (cerebrovascular accident)    3. S/P TAVR (transcatheter aortic valve replacement)    4. S/P CABG (coronary artery bypass graft)    5. Pulmonary hypertension    6. Nonrheumatic aortic valve stenosis    7. Mixed hyperlipidemia    8. History of MI (myocardial infarction)    9. Essential hypertension    10. CAD, multiple vessel    11. Aortic atherosclerosis    12. Angina, class II    13. Stenosis of right carotid artery    14. Type 2 diabetes mellitus with hyperglycemia, without long-term current use of insulin    15. Statin intolerance        Plan:     Assessment & Plan    IMPRESSION:  - Considered sleep apnea as potential cause of fatigue.  - Will trial increased Lasix to address breathing issues and fatigue before considering addition of new medication.    HEART FAILURE:  - Informed patient about Jardiance, explaining it is typically a diabetes medication but also beneficial for congestive heart failure.  - Increased Lasix from 1 tablet twice daily to 2 tablets in the morning and 1 tablet in the afternoon for one week. If no improvement after one week, return to previous dosage of 1 tablet twice daily. If 3 tablets daily  proves significantly helpful, continue this dosage and inform the doctor for renal function monitoring.  - Ordered lab work in 4 weeks to monitor congestive heart failure and renal function.  - Contact the office if continuing 3 Lasix tablets daily beyond the initial week trial.    FOLLOW-UP:  - Follow up in 6 months.           Increase Lasix to 40mg in AM and 20 mg in PM and then 20 mg bid  RTC in 6m   NTBNP and BMP In 4 weeks      This note was generated with the assistance of ambient listening technology. Verbal consent was obtained by the patient and accompanying visitor(s) for the recording of patient appointment to facilitate this note. I attest to having reviewed and edited the generated note for accuracy, though some syntax or spelling errors may persist. Please contact the author of this note for any clarification.            [1]   Social History  Tobacco Use    Smoking status: Never    Smokeless tobacco: Never   Substance Use Topics    Alcohol use: No    Drug use: No

## 2025-07-31 ENCOUNTER — OFFICE VISIT (OUTPATIENT)
Dept: HEMATOLOGY/ONCOLOGY | Facility: CLINIC | Age: 75
End: 2025-07-31
Payer: MEDICARE

## 2025-07-31 DIAGNOSIS — E53.8 FOLIC ACID DEFICIENCY: ICD-10-CM

## 2025-07-31 DIAGNOSIS — Z86.2 HISTORY OF IRON DEFICIENCY ANEMIA: ICD-10-CM

## 2025-07-31 DIAGNOSIS — E53.8 B12 DEFICIENCY: Primary | ICD-10-CM

## 2025-07-31 NOTE — PROGRESS NOTES
Audio Only Telehealth Visit     The patient location is: [place]   The chief complaint leading to consultation is: no complaints  Visit type: Virtual visit with audio only (telephone)  Total time spent in medical discussion with patient: 15 minutes  Total time spent on date of the encounter:30 minutes       The reason for the audio only service rather than synchronous audio and video virtual visit was related to technical difficulties or patient preference/necessity.       Each patient to whom I provide medical services by telemedicine is:  (1) informed of the relationship between the physician and patient and the respective role of any other health care provider with respect to management of the patient; and (2) notified that they may decline to receive medical services by telemedicine and may withdraw from such care at any time. Patient verbally consented to receive this service via voice-only telephone call.         Subjective:      Patient ID: Qi Ortiz is a 75 y.o. female.    Chief Complaint: no complaints    HPI: 75 year old female presents today to evaluate labs.  Has been found in the past to have folic acid deficiency, recurrent CAREN requiring IV iron infusions and also anemia d/t CKD.        medical history significant for HTN, carotid stenosis, hypothyroidism, COPD, psoriatic arthritis, hyperlipidemia, CVA with right hemiplegia, depression, CAD, osteoporosis, IRIS, and double ectopic ureters.     Has been followed in the clinic by Dr. Mckinney, Elida Alanis NP, Jade Delgado NP, and Denis Caban NP, Today is the first time I am evaluating/assessing the patient.      Currently with normocytic anemia - hgb 11.6, mcv 83, ferritin 17.  Recently found with B12 deficiency and prescribed B12 1000 mcg SL daily - states that she hasn't started as of yet. States used to take B12.     States that she does have some hemorrhoidal bleeding but does not get in the toilet or stool.  Only seen when she wipes and  not all the time.  Has not been able to tolerate GI prep for colonoscopy and has not had transportation to have EGD/colonoscopy done     Interval History:  12/15/2023  Received venofer 200 mg IV x 4 doses and presents today to evaluate response.  States that she tolerated the once weekly iron infusions.  States that she felt sick to her stomach with abdominal cramping.  States now her energy level is back up.  Has one small hemorrhoids that bleeds at times.       Interval History:  8/2/2024 with b12 and folate deficiency and recurrent iron deficiency.  Recently had 2 stents placed in LAD on 6/27/2024 - currently on Brillinta 90 mg by mouth twice daily. States that she has stopped the b12 supplement because her levels were elevated.  Currently not anemic.  Saturated iron slight low at 16%.     Interval History:  11/8/2024 Presents today to review labs with recurrent iron deficiency anemia.  Currently with microcytic anemia and low ferritin.  Has sob and weakness that has progressively worsened.  Denies any known abnormal bleeding except for some minor hemorrhoidal bleeding.  States that she was taken off of Brillinta because it was causing shortness of breath.     Interval History:  1/31/2025  Found with CAREN and received 4/8 doses of IV venofer 200 mg x 1 dose and venofer 100 mg x 7 doses IV.  States that she is feeling a lot better.  Continues with anemia and low saturated iron.  Presents today to evaluate response.     Interval History:  5/16/2025 Has received additional IV venofer 100 mg IV infusions x 8 with last dose received on 4/10/2025.  Presents today to evaluate response.  States that she started to have chest pain due to having to walk a long distance from parking lot to clinic and needed to take a nitro.       Interval History:  7/31/2025 Since patients last visit she has received venofer 100 mg IVPB infusions x 4 with solumedrol 60 mg IV pre med in the clinic for treatment of recurrent iron deficiency  anemia.  She also continue to take folic acid and B12 supplements daily.  She presents today to evaluate response.  Currently without anemia or iron deficiency.  Noted increased BUN, increased creatinine, and decrease egfr.  States that she has been really hot in her house but is trying to stay hydrated.   I have reviewed all of the patient's relevant lab work available in the medical record and have utilized this in my evaluation and management recommendations today.      Social History     Socioeconomic History    Marital status: Single    Number of children: 3   Occupational History    Occupation: Not working   Tobacco Use    Smoking status: Never    Smokeless tobacco: Never   Substance and Sexual Activity    Alcohol use: No    Drug use: No    Sexual activity: Not Currently     Partners: Male     Social Drivers of Health     Financial Resource Strain: Low Risk  (5/21/2024)    Overall Financial Resource Strain (CARDIA)     Difficulty of Paying Living Expenses: Not hard at all   Food Insecurity: No Food Insecurity (5/21/2024)    Hunger Vital Sign     Worried About Running Out of Food in the Last Year: Never true     Ran Out of Food in the Last Year: Never true   Transportation Needs: No Transportation Needs (5/21/2024)    PRAPARE - Transportation     Lack of Transportation (Medical): No     Lack of Transportation (Non-Medical): No   Physical Activity: Inactive (5/21/2024)    Exercise Vital Sign     Days of Exercise per Week: 0 days     Minutes of Exercise per Session: 0 min   Stress: No Stress Concern Present (5/21/2024)    [employer] [place] [employer] - Occupational Stress Questionnaire     Feeling of Stress : Not at all   Housing Stability: Unknown (5/21/2024)    Housing Stability Vital Sign     Unable to Pay for Housing in the Last Year: No     Homeless in the Last Year: No       Family History   Problem Relation Name Age of Onset    Breast cancer Maternal Grandfather      Breast cancer Paternal Aunt       Stroke Unknown      Breast cancer Sister  60    Leukemia Sister  8         as child    Lung cancer Paternal Grandfather      Heart disease Unknown      Diabetes Daughter a        Past Surgical History:   Procedure Laterality Date    BREAST BIOPSY      R sided/benign    CARDIAC SURGERY      2016    CERVICAL FUSION      CHOLECYSTECTOMY      CORONARY ANGIOPLASTY      CORONARY ARTERY BYPASS GRAFT      triple bypass    CORONARY BYPASS GRAFT ANGIOGRAPHY  2024    Procedure: Bypass graft study;  Surgeon: Veronica Ibarra MD;  Location: [facility] CATH LAB;  Service: Cardiology;;    CORONARY STENT PLACEMENT      EYE SURGERY      INTRAUTERINE DEVICE INSERTION      LEFT HEART CATHETERIZATION Left 2020    Procedure: CATHETERIZATION, HEART, LEFT;  Surgeon: Veronica Ibarra MD;  Location: [facility] CATH LAB;  Service: Cardiology;  Laterality: Left;  7am start time    LEFT HEART CATHETERIZATION Left 2024    Procedure: Left heart cath;  Surgeon: Veronica Ibarra MD;  Location: [facility] CATH LAB;  Service: Cardiology;  Laterality: Left;    mass removed from R groin      PERCUTANEOUS CORONARY INTERVENTION, ARTERY N/A 2024    Procedure: Percutaneous coronary intervention;  Surgeon: Veronica Ibarra MD;  Location: [facility] CATH LAB;  Service: Cardiology;  Laterality: N/A;    STENT, DRUG ELUTING, SINGLE VESSEL, CORONARY  2024    Procedure: Stent, Drug Eluting, Single Vessel, Coronary;  Surgeon: Veronica Ibarra MD;  Location: [facility] CATH LAB;  Service: Cardiology;;    TOTAL ABDOMINAL HYSTERECTOMY W/ BILATERAL SALPINGOOPHORECTOMY      due to benign mass, adhesions    TUBAL LIGATION         Past Medical History:   Diagnosis Date    Carotid stenosis     19%    Cellulitis and abscess of foot, except toes 2022    Cerumen debris on tympanic membrane of right ear 2022    Class 2 severe obesity due to excess calories with serious comorbidity and body mass index (BMI) of 38.0 to 38.9 in adult 2023    Coronary  artery disease     CVA (cerebral vascular accident)     Dr. Hoffman    Depression     Dog bite 10/03/2023    Double ectopic ureters     Dr. Porras    Encounter for preventive health examination 05/21/2024    Fall 05/09/2024    Hemiplegia affecting right dominant side 11/09/2021    Hyperlipidemia     Hypertension     Hypothyroid     Left foot infection 07/08/2022    Leucocytosis 05/09/2025    Mild asthma with exacerbation 12/04/2022    Near syncope 01/02/2019    NSTEMI (non-ST elevated myocardial infarction) 06/26/2024    OP (osteoporosis)     IRIS (obstructive sleep apnea)     Dr. Hope    Palpitations 01/31/2019    Prediabetes 09/30/2023    Psoriatic arthritis     Rheumatology    Transient ischemic attack (TIA) 01/02/2019       Review of Systems   Constitutional:  Positive for fatigue (improved with IV iron).   HENT:  Negative for nosebleeds.    Eyes: Negative.    Respiratory:  Positive for shortness of breath (improved with iron but still there).    Cardiovascular:  Positive for chest pain (when getting over exerted) and palpitations.   Gastrointestinal:  Positive for anal bleeding (minor hemorrhoidal bleeding). Negative for blood in stool.   Endocrine: Negative.    Genitourinary:  Negative for hematuria and vaginal bleeding.   Musculoskeletal: Negative.    Skin:  Negative for pallor.   Allergic/Immunologic: Negative.    Neurological:  Positive for headaches. Negative for weakness.   Hematological: Negative.    Psychiatric/Behavioral:  Positive for sleep disturbance. The patient is nervous/anxious.           Medication List with Changes/Refills   Current Medications    ACETAMINOPHEN (TYLENOL) 650 MG TBSR    as directed    ALBUTEROL (PROVENTIL) 2.5 MG /3 ML (0.083 %) NEBULIZER SOLUTION    Take 3 mLs (2.5 mg total) by nebulization every 6 (six) hours as needed for Wheezing. Rescue    ALLOPURINOL (ZYLOPRIM) 100 MG TABLET    TAKE 2 TABLETS ONCE DAILY    ASPIRIN 81 MG CHEW    Take 1 tablet (81 mg total) by mouth once  daily.    CALCIPOTRIENE (DOVONOX) 0.005 % CREAM    Apply topically 2 (two) times daily.    CALCIUM CARBONATE 650 MG CALCIUM (1,625 MG) TABLET    Take 1 tablet by mouth once daily.    CLOPIDOGREL (PLAVIX) 75 MG TABLET    Take 1 tablet (75 mg total) by mouth once daily.    CYANOCOBALAMIN (VITAMIN B-12) 1000 MCG TABLET    Take 100 mcg by mouth once daily.    DOCUSATE SODIUM (COLACE) 100 MG CAPSULE    Take 1 capsule (100 mg total) by mouth 2 (two) times daily.    FOLIC ACID (FOLVITE) 1 MG TABLET    TAKE 1 TABLET DAILY    FUROSEMIDE (LASIX) 20 MG TABLET    TAKE 1 TABLET TWICE A DAY (DOSE INCREASED BY CARDIOLOGIST DR. MASON)    GARLIC 2,000 MG CAP    Take 3,000 mg by mouth once daily.     LEVOTHYROXINE (SYNTHROID) 100 MCG TABLET    TAKE 1 TABLET BEFORE BREAKFAST    METOPROLOL SUCCINATE (TOPROL-XL) 100 MG 24 HR TABLET    Take 1 tablet (100 mg total) by mouth 2 (two) times daily.    NITROGLYCERIN (NITROSTAT) 0.4 MG SL TABLET    DISSOLVE 1 TABLET UNDER THE TONGUE EVERY 5 MINUTES AS NEEDED FOR CHEST PAIN    POTASSIUM CHLORIDE SA (K-DUR,KLOR-CON M) 10 MEQ TABLET    Take 1 tablet (10 mEq total) by mouth once daily.    PYRIDOXINE, VITAMIN B6, (B-6) 100 MG TAB    Take 200 mg by mouth once daily.     REPATHA SURECLICK 140 MG/ML PNIJ    INJECT 1 ML (140 MG) UNDER THE SKIN EVERY 14 DAYS    SECUKINUMAB (COSENTYX PEN, 2 PENS,) 150 MG/ML PNIJ    Inject 300 mg into the skin every 30 days.    SPIRONOLACTONE (ALDACTONE) 50 MG TABLET    TAKE 1 TABLET DAILY    VITAMIN D 1000 UNITS TAB    Take 185 mg by mouth once daily.        Objective:     There were no vitals filed for this visit.          Physical Exam  No physical exam - audio only visit  Assessment:     Problem List Items Addressed This Visit    None            Lab Results   Component Value Date    WBC 9.33 07/29/2025    RBC 4.69 07/29/2025    HGB 13.9 07/29/2025    HCT 42.3 07/29/2025    MCV 90 07/29/2025    MCH 29.6 07/29/2025    MCHC 32.9 07/29/2025    RDW 14.6 (H) 07/29/2025      07/29/2025    MPV 9.9 07/29/2025    GRAN 11.7 (H) 03/05/2025    GRAN 83.1 (H) 03/05/2025    LYMPH 19.4 07/29/2025    LYMPH 1.81 07/29/2025    MONO 9.1 07/29/2025    MONO 0.85 07/29/2025    EOS 2.0 07/29/2025    EOS 0.19 07/29/2025    BASO 0.06 03/05/2025    EOSINOPHIL 0.8 03/05/2025    BASOPHIL 0.6 07/29/2025    BASOPHIL 0.06 07/29/2025      Lab Results   Component Value Date     07/29/2025    K 4.4 07/29/2025     07/29/2025    CO2 29 07/29/2025    BUN 24 (H) 07/29/2025    CREATININE 1.5 (H) 07/29/2025    CALCIUM 9.5 07/29/2025    ANIONGAP 12 07/29/2025    ESTGFRAFRICA 48 (A) 07/18/2022    EGFRNONAA 41 (A) 07/18/2022     Lab Results   Component Value Date    ALT 9 (L) 03/05/2025    AST 14 03/05/2025    ALKPHOS 66 03/05/2025    BILITOT 0.5 03/05/2025     Lab Results   Component Value Date    IRON 86 07/29/2025    TRANSFERRIN 246 07/29/2025    TIBC 364 07/29/2025    FESATURATED 7 (L) 01/29/2025    FERRITIN 95.0 07/29/2025      Lab Results   Component Value Date    GOWPYNIJ12 748 11/05/2024     Lab Results   Component Value Date    FOLATE 18.2 11/05/2024       Plan:   There are no diagnoses linked to this encounter.        Med Onc Chart Routing      Follow up with physician    Follow up with LOVE 3 months. audio only visit with labs prior   Infusion scheduling note   n/a   Injection scheduling note n/a   Labs   Scheduling:  Preferred lab: [facility]  Lab interval:  cbc, bmp, iron studies, b12, folate   Imaging   N/a   Pharmacy appointment No pharmacy appointment needed      Other referrals No nutrition appointment needed -        No additional referrals needed  n/a        Assessment and plan: Continue folic acid and B12 supplements daily.  No need for additional IV iron at this time.  Increase water intake.  F/u in 3 months with repeat labs and an audio visit after to assess need for additional IV iron and recheck b12/folate levels with currently treatment for deficiency.      Collaborating  Provider:  Dr. Nay Jones    Thank You,  Warren Javed, FNP-C  Benign Hematology                                   This service was not originating from a related E/M service provided within the previous 7 days nor will  to an E/M service or procedure within the next 24 hours or my soonest available appointment.  Prevailing standard of care was able to be met in this audio-only visit.

## 2025-08-05 ENCOUNTER — OFFICE VISIT (OUTPATIENT)
Dept: PRIMARY CARE CLINIC | Facility: CLINIC | Age: 75
End: 2025-08-05
Payer: MEDICARE

## 2025-08-05 VITALS
SYSTOLIC BLOOD PRESSURE: 118 MMHG | HEART RATE: 69 BPM | OXYGEN SATURATION: 98 % | TEMPERATURE: 98 F | BODY MASS INDEX: 41.71 KG/M2 | WEIGHT: 212.44 LBS | HEIGHT: 60 IN | DIASTOLIC BLOOD PRESSURE: 58 MMHG

## 2025-08-05 DIAGNOSIS — H91.93 BILATERAL HEARING LOSS, UNSPECIFIED HEARING LOSS TYPE: Chronic | ICD-10-CM

## 2025-08-05 DIAGNOSIS — E53.8 B12 DEFICIENCY: Chronic | ICD-10-CM

## 2025-08-05 DIAGNOSIS — I25.2 HISTORY OF MI (MYOCARDIAL INFARCTION): Chronic | ICD-10-CM

## 2025-08-05 DIAGNOSIS — E11.22 TYPE 2 DM WITH CKD STAGE 3 AND HYPERTENSION: Primary | Chronic | ICD-10-CM

## 2025-08-05 DIAGNOSIS — N18.30 TYPE 2 DM WITH CKD STAGE 3 AND HYPERTENSION: Primary | Chronic | ICD-10-CM

## 2025-08-05 DIAGNOSIS — I12.9 TYPE 2 DM WITH CKD STAGE 3 AND HYPERTENSION: Primary | Chronic | ICD-10-CM

## 2025-08-05 DIAGNOSIS — M85.852 OSTEOPENIA OF NECK OF LEFT FEMUR: ICD-10-CM

## 2025-08-05 PROBLEM — E11.40 TYPE 2 DIABETES MELLITUS WITH DIABETIC NEUROPATHY, WITHOUT LONG-TERM CURRENT USE OF INSULIN: Chronic | Status: ACTIVE | Noted: 2024-04-19

## 2025-08-05 PROCEDURE — 99215 OFFICE O/P EST HI 40 MIN: CPT | Mod: PBBFAC,PN | Performed by: INTERNAL MEDICINE

## 2025-08-05 PROCEDURE — 99999 PR PBB SHADOW E&M-EST. PATIENT-LVL V: CPT | Mod: PBBFAC,,, | Performed by: INTERNAL MEDICINE

## 2025-08-05 PROCEDURE — 99215 OFFICE O/P EST HI 40 MIN: CPT | Mod: S$PBB,,, | Performed by: INTERNAL MEDICINE

## 2025-08-05 NOTE — PATIENT INSTRUCTIONS
If you are feeling unwell, we'd like to be the first ones to know here at [facility]! Please give us a call. Same day appointments are our top priority to keep you well and out of the emergency rooms and hospitals. Call [phone] for our direct line. After hours advice is always available. Please call [phone] after hours to speak to the on-call team.      Recommend flush ears with hydrogen peroxide and warm water once weekly to help prevent wax build-up

## 2025-08-05 NOTE — PROGRESS NOTES
"Qi Ortiz  08/05/2025  [id number]    Lisa Porter MD  Patient Care Team:  Lisa Porter MD as PCP - General (Internal Medicine)    Visit Type: Follow-up    Ms. Qi Ortiz is a 74 year old female w multiple chronic medical issues here for scheduled f/u     Chronic medical issues include type 2 DM, h/o CVA, CAD s/p CABG x 2, AS s/p TAVR, s/p NSTEMI  6/25/24, HTN, HLD (intolerant of statin), CKD stage 3, hypothyroidism, psoriasis and psoriatic arthritis, CAREN, obesity, and IRIS (intolerant of CPAP). Ms. Ortiz has multiple drug and contact allergies and sensitivities. Food allergies include kiwi fruit and crab boil.    Anginal episode @ [facility] about a month ago relieved following 2 nitro    History of Present Illness    CHIEF COMPLAINT:  Ms. Ortiz presents today for scheduled follow-up. Reports experiencing an episode of chest pain at [facility] approximately one month ago.    CARDIAC SYMPTOMS:  She experienced chest pain at her previous heart surgery site that radiated down her left arm and to the left side of her face. She describes the episode as potentially anginal with associated tingling sensations. Two nitroglycerin doses were required, with the second dose resolving symptoms within minutes. Following the episode, she experienced significant fatigue lasting approximately 10 days. She felt something was "not right" during the incident. She currently reports shortness of breath with heat exposure and heavy lifting.    CARDIOVASCULAR HISTORY:  She has a history of two open heart surgeries with associated anterior chest wall nerve damage, chronic diastolic heart failure and right carotid artery stenosis 20-39%  which reportedly has been stable. She also has a history of stroke with left-sided symptoms involving face and arm.    BLOOD PRESSURE:  She reports home blood pressure readings are 130's/60-70's, consistent with recent cardiology visit readings. She denies recent issues with dizziness or " lightheadedness.    SLEEP AND FATIGUE:  She reports irregular sleep patterns, sleeping only two to four hours at a time, being awake at night with disrupted sleep cycles. She experiences dizziness when tired, which she interprets as her body's signal to rest.    ROS:  General: -fever, -chills, +fatigue, -weight gain, -weight loss, +sleep disturbances, +difficulty staying asleep  Eyes: -vision changes, -redness, -discharge  ENT: -ear pain, -nasal congestion, -sore throat  Cardiovascular: +chest pain, -palpitations, -lower extremity edema  Respiratory: -cough, -shortness of breath, +exertional dyspnea  Gastrointestinal: -abdominal pain, -nausea, -vomiting, -diarrhea, -constipation, -blood in stool  Genitourinary: -dysuria, -hematuria, -frequency  Musculoskeletal: -joint pain, -muscle pain  Skin: -rash, -lesion  Neurological: -headache, +dizziness, -numbness, +tingling  Psychiatric: -anxiety, -depression, -sleep difficulty        PHQ-4 Score: 0     From LOV w me 6/23/25  Completing iron infusions - next (last) of series scheduled tomorrow  Home BP's good 130's/70's  RECENT HEAT-RELATED EPISODE:  She experienced an episode during a recent power outage while attempting to restore power and manage refrigerator contents during high heat conditions. Her symptoms included nausea, unusual mouth sensations, trouble breathing, and significant fatigue while walking back and forth across her yard attempting to reset breakers. She denies believing it was a heart attack but felt physically overwhelmed by the heat and stress of the situation.  IRON DEFICIENCY:  She is currently receiving iron infusions, reportedly completing her 20th treatment since the beginning of the year tomorrow.  OPHTHALMOLOGIC:  She reports that recent comprehensive eye exam revealed cataracts that can be deferred for three more years per ophthalmologist. She reports improved vision with decreased visual difficulties and reduced stumbling. Her eyes are less  bothersome, and she does not require urgent corrective eyewear.  PHYSICAL IMPROVEMENTS:  She reports improved leg strength and gait stability with reduced stumbling and better foot placement. She has regained fine motor dexterity, now able to perform hand sewing, which she was previously unable to do.  WEIGHT MANAGEMENT:  Her weight remains stable within 5 lbs, though reports noting progressive inches loss despite weight maintenance.  CURRENT MEDICATIONS:  She takes levothyroxine, Repatha, vitamin B12, turmeric supplement, water soluble Co-Q10, and vitamin D (2 capsules daily for past 2 weeks).  ROS:  General: -fever, -chills, +fatigue, -weight gain, -weight loss  Eyes: -vision changes, -redness, -discharge, +eye strain  ENT: -ear pain, -nasal congestion, -sore throat, +[place]  Cardiovascular: -chest pain, -palpitations, -lower extremity edema  Respiratory: -cough, -shortness of breath, +difficulty breathing  Gastrointestinal: -abdominal pain, -nausea, -vomiting, -diarrhea, -constipation, -blood in stool  Genitourinary: -dysuria, -hematuria, -frequency, +excessive urination  Musculoskeletal: -joint pain, -muscle pain, +muscle weakness  Skin: -rash, -lesion  Neurological: +headache, -dizziness, -numbness, -tingling  Psychiatric: -anxiety, -depression, -sleep difficulty   PHQ-4 Score: 0     Recent appointments:   7/31/25 Heme/Onc Jaret  7/30/25 Addy BURROWS    Upcoming appointments:  Future Appointments       Date Provider Specialty Appt Notes    8/27/2025  Lab Per Dr Paez    9/4/2025 Gayatri Rubi, [facility]    9/30/2025 Lisa Porter MD Primary Care 2 mon f/u    10/15/2025  Lab Dr. Ackerman    10/22/2025 Jacky Ackerman MD Rheumatology PSORIATIC --[facility]    11/3/2025  Lab carline    11/6/2025 Trinidad Javed NP Hematology and Oncology  Arrive at: Telehealth 3 mo f/u w/ prior labs    2/4/2026 Asif Paez MD Cardiology 6 month follow up           The following were reviewed: Active problem  list, medication list, allergies, family history, social history, and Health Maintenance.     Medications have been reviewed and reconciled with patient at visit today.    Exam: Initial VS /66 P 69 temp 97.5 sat 98%  Vitals:    08/05/25 1148   BP: (!) 118/58   Pulse:    Temp:      Weight: 96.3 kg (212 lb 6.6 oz)   Body mass index is 41.48 kg/m².    BP Readings from Last 3 Encounters:   08/05/25 (!) 118/58   07/30/25 132/66   06/24/25 (!) 151/63      Wt Readings from Last 3 Encounters:   08/05/25 1114 96.3 kg (212 lb 6.6 oz)   07/30/25 1458 97.7 kg (215 lb 4.5 oz)   06/23/25 1309 95.2 kg (209 lb 14.1 oz)      Physical Exam  Vitals reviewed.   Constitutional:       General: She is not in acute distress.     Appearance: Normal appearance. She is obese.   HENT:      Head: Normocephalic and atraumatic.      Right Ear: Tympanic membrane, ear canal and external ear normal.      Left Ear: Tympanic membrane, ear canal and external ear normal.      Ears:      Comments: Very [place]  Partial cerumen impaction L ear canal     Nose: Nose normal.      Mouth/Throat:      Mouth: Mucous membranes are moist.      Pharynx: Oropharynx is clear.   Eyes:      Extraocular Movements: Extraocular movements intact.      Conjunctiva/sclera: Conjunctivae normal.   Cardiovascular:      Rate and Rhythm: Normal rate. Rhythm irregular.   Pulmonary:      Effort: Pulmonary effort is normal. No respiratory distress.      Breath sounds: No wheezing, rhonchi or rales.   Abdominal:      General: Bowel sounds are normal. There is no distension.      Palpations: Abdomen is soft.      Tenderness: There is no abdominal tenderness. There is no guarding.   Skin:     General: Skin is warm and dry.   Neurological:      Mental Status: She is alert. Mental status is at baseline.      Comments: Ambulating independently w/o AD   Psychiatric:         Mood and Affect: Mood normal.         Behavior: Behavior normal.         Thought Content: Thought content normal.         Laboratory Reviewed  Lab Results   Component Value Date    WBC 9.33 07/29/2025    HGB 13.9 07/29/2025    HCT 42.3 07/29/2025     07/29/2025    MCV 90 07/29/2025    CHOL 123 11/05/2024    TRIG 136 11/05/2024    HDL 35 (L) 11/05/2024    LDLCALC 60.8 (L) 11/05/2024    ALT 9 (L) 03/05/2025    AST 14 03/05/2025     07/29/2025    K 4.4 07/29/2025     07/29/2025    CREATININE 1.5 (H) 07/29/2025    BUN 24 (H) 07/29/2025    CO2 29 07/29/2025    MG 1.7 06/25/2024    TSH 1.495 07/29/2025    FREET4 1.01 04/19/2024    INR 1.0 06/25/2024    HGBA1C 6.1 (H) 05/14/2025    CRP 1.9 07/29/2025     Lab Results   Component Value Date    PTH 64.3 01/29/2025    CALCIUM 9.5 07/29/2025    PHOS 3.0 05/14/2025      Lab Results   Component Value Date    KHUTDGIZ08 748 11/05/2024     Lab Results   Component Value Date    FOLATE 18.2 11/05/2024      Lab Results   Component Value Date    IRON 86 07/29/2025    TRANSFERRIN 246 07/29/2025    TIBC 364 07/29/2025    LABIRON 24 07/29/2025    FESATURATED 7 (L) 01/29/2025      Lab Results   Component Value Date    EGFRNORACEVR 36 (L) 07/29/2025    ALBUMIN 3.1 (L) 05/14/2025     (H) 06/29/2024     Lab Results   Component Value Date    RAQDZYIB32ID 82 07/29/2025 7/29/25 mma 0.39 (borderline) mag rbc 4.6 wnl    DEXA 7/29/25 The L1 to L4 vertebral bone mineral density is equal to 1.025 g/cm squared with a T score of -0.2.  The left femoral neck bone mineral density is equal to 0.593 g/cm squared with a T score of -2.3.  There is a 20% risk of a major osteoporotic fracture and a 4.9% risk of hip fracture in the next 10 years (FRAX).  Impression: Osteopenia      Assessment:   75 y.o. female with multiple co-morbid illnesses here for continued follow up of medical problems.      The primary encounter diagnosis was Type 2 DM with CKD stage 3 and hypertension. Diagnoses of Bilateral hearing loss, unspecified hearing loss type, History of MI (myocardial infarction), Osteopenia  "of neck of left femur, and B12 deficiency were also pertinent to this visit.      Plan:   1. Type 2 DM with CKD stage 3 and hypertension  Assessment & Plan:   Multiple medication allergies and sensitivities  - has maintained A1c in "prediabetes" range w weight loss and dietary adjustments - encourage stay mobile and active to extent able - encourage cont healthier food and beverage choices       2. Bilateral hearing loss, unspecified hearing loss type  Assessment & Plan:  Recommend flush ears with hydrogen peroxide and warm water once weekly to help prevent wax build-up  Reports cannot afford hearing aids      3. History of MI (myocardial infarction)  Overview:  6/25-6/28/24 Hosp NSTEMI s/p C stent x 2    Assessment & Plan:  Cont optimal medical management w/in possible parameters given pts many medication allergies/sensitivities/intolerances      4. Osteopenia of neck of left femur  Overview:  DEXA 7/29/25 The L1 to L4 vertebral bone mineral density is equal to 1.025 g/cm squared with a T score of -0.2.  The left femoral neck bone mineral density is equal to 0.593 g/cm squared with a T score of -2.3.  There is a 20% risk of a major osteoporotic fracture and a 4.9% risk of hip fracture in the next 10 years (FRAX).  Impression: Osteopenia    Assessment & Plan:  W elevated FRAX score - consider Rx? Calcium, vit D levels are good - f/u Rheum Dr. Ackerman as scheduled      5. B12 deficiency  Overview:  7/29/25 mma 0.39 (borderline)    Assessment & Plan:  Mma level borderline - Recommend cont oral B12 supplementation - f/u repeat B12 ordered by Heme/Onc           Health Maintenance         Date Due Completion Date    COVID-19 Vaccine (1) Never done ---    Pneumococcal Vaccines (Age 50+) (1 of 2 - PCV) Never done ---    RSV Vaccine (Age 60+ and Pregnant patients) (1 - Risk 60-74 years 1-dose series) Never done ---    Colorectal Cancer Screening 04/18/2016 4/18/2011    Mammogram 10/23/2016 10/23/2015    Shingles Vaccine (2 " of 2) 11/28/2022 10/3/2022    Foot Exam 10/25/2025 10/25/2024    Override on 9/29/2023: Done (pt w idiopathic peripheral neuropathy - not diabetic - prediabetic)    Lipid Panel 11/05/2025 11/5/2024    Hemoglobin A1c 11/14/2025 5/14/2025    Diabetes Urine Screening 01/31/2026 1/31/2025    Diabetic Eye Exam 06/11/2026 6/11/2025    Aspirin/Antiplatelet Therapy 08/05/2026 8/5/2025    DEXA Scan 07/29/2027 7/29/2025    TETANUS VACCINE 10/02/2033 10/2/2023            -Patient's lab results were reviewed and discussed with patient  -Treatment options and alternatives were discussed with the patient. Patient expressed understanding. Patient was given the opportunity to ask questions and be an active participant in their medical care. Patient had no further questions or concerns at this time.     Follow up: Follow up in about 2 months (around 10/5/2025) for cali shahid (and TUTU Rubi 9/04).    After visit summary printed and given to patient upon discharge.  Patient care plan included in After visit summary.    TOTAL TIME evaluating and managing this patient for this encounter was 48 minutes. This time was spent personally by me on some of the following activities: review of patient's past medical history, assessing age-appropriate health maintenance needs, review of any interval history, review and interpretation of lab results, review and interpretation of imaging test results, review and interpretation of cardiology test results, reviewing consulting specialist notes, obtaining history from the patient and family, examination of the patient, medication reconciliation, managing and/or ordering prescription medications, ordering imaging tests, ordering referral to subspecialty provider(s), educating patient and answering their questions about diagnosis, treatment plan, and goals of treatment, discussing planned follow-up and final documentation of the visit. This time was exclusive of any separately billable procedures for  this patient and exclusive of time spent treating any other patients.     This note was generated with the assistance of ambient listening technology. Verbal consent was obtained by the patient and accompanying visitor(s) for the recording of patient appointment to facilitate this note. I attest to having reviewed and edited the generated note for accuracy, though some syntax or spelling errors may persist. Please contact the author of this note for any clarification.

## 2025-08-08 PROBLEM — M85.852 OSTEOPENIA OF NECK OF LEFT FEMUR: Status: ACTIVE | Noted: 2025-08-05

## 2025-08-08 NOTE — ASSESSMENT & PLAN NOTE
W elevated FRAX score - consider Rx? Calcium, vit D levels are good - f/u Rheum Dr. Ackerman as scheduled

## 2025-08-08 NOTE — ASSESSMENT & PLAN NOTE
Recommend flush ears with hydrogen peroxide and warm water once weekly to help prevent wax build-up  Reports cannot afford hearing aids

## 2025-08-08 NOTE — ASSESSMENT & PLAN NOTE
" Multiple medication allergies and sensitivities  - has maintained A1c in "prediabetes" range w weight loss and dietary adjustments - encourage stay mobile and active to extent able - encourage cont healthier food and beverage choices   "

## 2025-08-08 NOTE — ASSESSMENT & PLAN NOTE
Mma level borderline - Recommend cont oral B12 supplementation - f/u repeat B12 ordered by Heme/Onc

## 2025-08-08 NOTE — ASSESSMENT & PLAN NOTE
Cont optimal medical management w/in possible parameters given pts many medication allergies/sensitivities/intolerances

## 2025-08-12 ENCOUNTER — TELEPHONE (OUTPATIENT)
Dept: NEUROLOGY | Facility: CLINIC | Age: 75
End: 2025-08-12
Payer: MEDICARE

## 2025-08-13 DIAGNOSIS — I25.118 CORONARY ARTERY DISEASE OF NATIVE ARTERY OF NATIVE HEART WITH STABLE ANGINA PECTORIS: ICD-10-CM

## 2025-08-13 RX ORDER — FUROSEMIDE 20 MG/1
TABLET ORAL
Qty: 60 TABLET | Refills: 11 | Status: SHIPPED | OUTPATIENT
Start: 2025-08-13

## 2025-08-27 ENCOUNTER — LAB VISIT (OUTPATIENT)
Dept: LAB | Facility: HOSPITAL | Age: 75
End: 2025-08-27
Attending: INTERNAL MEDICINE
Payer: MEDICARE

## 2025-08-27 DIAGNOSIS — I50.32 CHRONIC DIASTOLIC HEART FAILURE: Chronic | ICD-10-CM

## 2025-08-27 PROCEDURE — 36415 COLL VENOUS BLD VENIPUNCTURE: CPT | Mod: PO

## 2025-08-27 PROCEDURE — 83880 ASSAY OF NATRIURETIC PEPTIDE: CPT

## 2025-08-27 PROCEDURE — 80048 BASIC METABOLIC PNL TOTAL CA: CPT

## 2025-08-28 LAB
ANION GAP (OHS): 11 MMOL/L (ref 8–16)
BUN SERPL-MCNC: 18 MG/DL (ref 8–23)
CALCIUM SERPL-MCNC: 9.6 MG/DL (ref 8.7–10.5)
CHLORIDE SERPL-SCNC: 103 MMOL/L (ref 95–110)
CO2 SERPL-SCNC: 28 MMOL/L (ref 23–29)
CREAT SERPL-MCNC: 1.2 MG/DL (ref 0.5–1.4)
GFR SERPLBLD CREATININE-BSD FMLA CKD-EPI: 48 ML/MIN/1.73/M2
GLUCOSE SERPL-MCNC: 141 MG/DL (ref 70–110)
NT-PROBNP SERPL-MCNC: 1121 PG/ML
POTASSIUM SERPL-SCNC: 3.9 MMOL/L (ref 3.5–5.1)
SODIUM SERPL-SCNC: 142 MMOL/L (ref 136–145)

## 2025-09-01 DIAGNOSIS — E03.9 HYPOTHYROIDISM, UNSPECIFIED TYPE: Chronic | ICD-10-CM

## 2025-09-02 RX ORDER — LEVOTHYROXINE SODIUM 100 UG/1
100 TABLET ORAL
Qty: 90 TABLET | Refills: 3 | Status: SHIPPED | OUTPATIENT
Start: 2025-09-02

## 2025-09-04 ENCOUNTER — TELEPHONE (OUTPATIENT)
Dept: CARDIOLOGY | Facility: CLINIC | Age: 75
End: 2025-09-04
Payer: MEDICARE

## (undated) DEVICE — KIT INTRODUCER STIFFEN MICRO

## (undated) DEVICE — CATH JR4 5FR

## (undated) DEVICE — GUIDEWIRE WHOLEY HI TORQ 175CM

## (undated) DEVICE — PAD DEFIB CADENCE ADULT R2

## (undated) DEVICE — DRAPE ANGIO BRACH 38X44IN

## (undated) DEVICE — OMNIPAQUE 300MG 150ML VIAL

## (undated) DEVICE — Device

## (undated) DEVICE — KIT GLIDESHEATH SLEND 6FR 10CM

## (undated) DEVICE — ANGIOTOUCH KIT

## (undated) DEVICE — CATH INFINITI MP JL3.5 JR4 5FR

## (undated) DEVICE — KIT MANIFOLD LOW PRESS TUBING

## (undated) DEVICE — GUIDE LAUNCHER 6FR JL3.0W/O SH

## (undated) DEVICE — CATH JL3.5 5FR

## (undated) DEVICE — CATH AL1 5FR

## (undated) DEVICE — BAND TR COMP DEVICE REG 24CM

## (undated) DEVICE — INFLATOR ENCORE 26 BLLN INFL

## (undated) DEVICE — CONTRAST OMNIPAQUE 240 150ML

## (undated) DEVICE — CATH IMA INFINITI 5X100CM

## (undated) DEVICE — CATH NC EMERGE MR 3X8MM

## (undated) DEVICE — WIRE X-SUP CHOICE PT .014X182

## (undated) DEVICE — GUIDEWIRE EMERALD .035IN 260CM

## (undated) DEVICE — GUIDE LAUNCHER 5FR JL 3.5

## (undated) DEVICE — BAND TR WITH INFLATOR

## (undated) DEVICE — CATH EMERGE MR 12 X 2.50

## (undated) DEVICE — CATH OPTITORQUE TIGER 5F 100CM

## (undated) DEVICE — CATH PIGTAIL 5FX110CM

## (undated) DEVICE — CATH IMPULSE IM CRV 100CM 5FR

## (undated) DEVICE — SEE MEDLINE ITEM 157187

## (undated) DEVICE — GUIDE LAUNCHER 6FR JL 3.5

## (undated) DEVICE — KIT SYR REUSABLE

## (undated) DEVICE — CATH PIG145 INFINITI 5X110CM

## (undated) DEVICE — KIT WATCHDOG HEMSTAS VALVE 8FR

## (undated) DEVICE — PACK ANGIOPLASTY ACCESS PLUS

## (undated) DEVICE — PACK CATH LAB CUSTOM BR